# Patient Record
Sex: FEMALE | Race: WHITE | Employment: OTHER
[De-identification: names, ages, dates, MRNs, and addresses within clinical notes are randomized per-mention and may not be internally consistent; named-entity substitution may affect disease eponyms.]

---

## 2017-01-11 ENCOUNTER — TELEPHONE (OUTPATIENT)
Dept: FAMILY MEDICINE CLINIC | Facility: CLINIC | Age: 57
End: 2017-01-11

## 2017-01-20 RX ORDER — OXYCODONE HYDROCHLORIDE AND ACETAMINOPHEN 5; 325 MG/1; MG/1
1 TABLET ORAL EVERY 8 HOURS PRN
Qty: 90 TABLET | Refills: 0 | Status: SHIPPED | OUTPATIENT
Start: 2017-01-20 | End: 2017-02-27 | Stop reason: SDUPTHER

## 2017-01-20 RX ORDER — DEXTROAMPHETAMINE SACCHARATE, AMPHETAMINE ASPARTATE, DEXTROAMPHETAMINE SULFATE AND AMPHETAMINE SULFATE 7.5; 7.5; 7.5; 7.5 MG/1; MG/1; MG/1; MG/1
30 TABLET ORAL 2 TIMES DAILY
Qty: 60 TABLET | Refills: 0 | Status: SHIPPED | OUTPATIENT
Start: 2017-01-20 | End: 2017-02-22 | Stop reason: SDUPTHER

## 2017-02-22 RX ORDER — DEXTROAMPHETAMINE SACCHARATE, AMPHETAMINE ASPARTATE, DEXTROAMPHETAMINE SULFATE AND AMPHETAMINE SULFATE 7.5; 7.5; 7.5; 7.5 MG/1; MG/1; MG/1; MG/1
30 TABLET ORAL 2 TIMES DAILY
Qty: 60 TABLET | Refills: 0 | Status: SHIPPED | OUTPATIENT
Start: 2017-02-22 | End: 2017-03-20 | Stop reason: SDUPTHER

## 2017-02-27 ENCOUNTER — OFFICE VISIT (OUTPATIENT)
Dept: FAMILY MEDICINE CLINIC | Facility: CLINIC | Age: 57
End: 2017-02-27

## 2017-02-27 VITALS
OXYGEN SATURATION: 97 % | WEIGHT: 144 LBS | SYSTOLIC BLOOD PRESSURE: 130 MMHG | DIASTOLIC BLOOD PRESSURE: 80 MMHG | HEART RATE: 67 BPM | BODY MASS INDEX: 28.12 KG/M2 | RESPIRATION RATE: 16 BRPM

## 2017-02-27 DIAGNOSIS — G89.4 CHRONIC PAIN SYNDROME: ICD-10-CM

## 2017-02-27 DIAGNOSIS — S92.902S FOOT FRACTURE, LEFT, SEQUELA: ICD-10-CM

## 2017-02-27 DIAGNOSIS — J40 BRONCHITIS: ICD-10-CM

## 2017-02-27 DIAGNOSIS — R19.7 DIARRHEA, UNSPECIFIED TYPE: Primary | ICD-10-CM

## 2017-02-27 PROCEDURE — 99214 OFFICE O/P EST MOD 30 MIN: CPT | Performed by: FAMILY MEDICINE

## 2017-02-27 RX ORDER — OXYCODONE HYDROCHLORIDE AND ACETAMINOPHEN 5; 325 MG/1; MG/1
1 TABLET ORAL EVERY 8 HOURS PRN
Qty: 90 TABLET | Refills: 0 | Status: SHIPPED | OUTPATIENT
Start: 2017-02-27 | End: 2017-03-27 | Stop reason: SDUPTHER

## 2017-02-27 RX ORDER — TIZANIDINE 4 MG/1
TABLET ORAL
COMMUNITY
Start: 2016-12-28 | End: 2017-09-01

## 2017-02-27 RX ORDER — ALBUTEROL SULFATE 90 UG/1
2 AEROSOL, METERED RESPIRATORY (INHALATION) EVERY 4 HOURS PRN
Qty: 1 INHALER | Refills: 3 | Status: SHIPPED | OUTPATIENT
Start: 2017-02-27 | End: 2017-07-12 | Stop reason: SDUPTHER

## 2017-02-27 RX ORDER — HYDROCODONE BITARTRATE AND ACETAMINOPHEN 5; 325 MG/1; MG/1
TABLET ORAL
COMMUNITY
Start: 2017-02-23 | End: 2017-09-01

## 2017-02-27 RX ORDER — CLINDAMYCIN HYDROCHLORIDE 150 MG/1
CAPSULE ORAL
COMMUNITY
Start: 2017-02-21 | End: 2017-02-27 | Stop reason: ALTCHOICE

## 2017-02-27 ASSESSMENT — PATIENT HEALTH QUESTIONNAIRE - PHQ9
1. LITTLE INTEREST OR PLEASURE IN DOING THINGS: 0
SUM OF ALL RESPONSES TO PHQ QUESTIONS 1-9: 0
SUM OF ALL RESPONSES TO PHQ9 QUESTIONS 1 & 2: 0
2. FEELING DOWN, DEPRESSED OR HOPELESS: 0

## 2017-03-02 RX ORDER — OMEPRAZOLE 20 MG/1
CAPSULE, DELAYED RELEASE ORAL
Qty: 60 CAPSULE | Refills: 4 | Status: SHIPPED | OUTPATIENT
Start: 2017-03-02 | End: 2017-04-27 | Stop reason: SDUPTHER

## 2017-03-02 RX ORDER — CITALOPRAM 40 MG/1
TABLET ORAL
Qty: 30 TABLET | Refills: 3 | Status: SHIPPED | OUTPATIENT
Start: 2017-03-02 | End: 2017-06-28 | Stop reason: SDUPTHER

## 2017-03-20 RX ORDER — DEXTROAMPHETAMINE SACCHARATE, AMPHETAMINE ASPARTATE, DEXTROAMPHETAMINE SULFATE AND AMPHETAMINE SULFATE 7.5; 7.5; 7.5; 7.5 MG/1; MG/1; MG/1; MG/1
30 TABLET ORAL 2 TIMES DAILY
Qty: 60 TABLET | Refills: 0 | Status: SHIPPED | OUTPATIENT
Start: 2017-03-20 | End: 2017-04-17 | Stop reason: SDUPTHER

## 2017-03-20 RX ORDER — OMEPRAZOLE 20 MG/1
CAPSULE, DELAYED RELEASE ORAL
Qty: 60 CAPSULE | Refills: 4 | Status: CANCELLED | OUTPATIENT
Start: 2017-03-20

## 2017-03-22 ENCOUNTER — TELEPHONE (OUTPATIENT)
Dept: FAMILY MEDICINE CLINIC | Age: 57
End: 2017-03-22

## 2017-03-27 RX ORDER — OXYCODONE HYDROCHLORIDE AND ACETAMINOPHEN 5; 325 MG/1; MG/1
1 TABLET ORAL EVERY 8 HOURS PRN
Qty: 90 TABLET | Refills: 0 | Status: SHIPPED | OUTPATIENT
Start: 2017-03-27 | End: 2017-04-27 | Stop reason: SDUPTHER

## 2017-03-30 ENCOUNTER — HOSPITAL ENCOUNTER (OUTPATIENT)
Age: 57
Setting detail: SPECIMEN
Discharge: HOME | End: 2017-03-30
Payer: MEDICARE

## 2017-03-30 ENCOUNTER — OFFICE VISIT (OUTPATIENT)
Dept: FAMILY MEDICINE CLINIC | Age: 57
End: 2017-03-30
Payer: MEDICARE

## 2017-03-30 VITALS
DIASTOLIC BLOOD PRESSURE: 74 MMHG | OXYGEN SATURATION: 98 % | WEIGHT: 144 LBS | HEART RATE: 61 BPM | BODY MASS INDEX: 28.12 KG/M2 | SYSTOLIC BLOOD PRESSURE: 124 MMHG

## 2017-03-30 DIAGNOSIS — R04.2 HEMOPTYSIS: ICD-10-CM

## 2017-03-30 DIAGNOSIS — R05.9 COUGH: Primary | ICD-10-CM

## 2017-03-30 DIAGNOSIS — R05.9 COUGH: ICD-10-CM

## 2017-03-30 LAB
ANION GAP SERPL CALCULATED.3IONS-SCNC: 14 MMOL/L (ref 9–17)
BUN BLDV-MCNC: 16 MG/DL (ref 6–20)
BUN/CREAT BLD: ABNORMAL (ref 9–20)
CALCIUM SERPL-MCNC: 8.8 MG/DL (ref 8.6–10.4)
CHLORIDE BLD-SCNC: 101 MMOL/L (ref 98–107)
CO2: 25 MMOL/L (ref 20–31)
CREAT SERPL-MCNC: 1.01 MG/DL (ref 0.5–0.9)
GFR AFRICAN AMERICAN: >60 ML/MIN
GFR NON-AFRICAN AMERICAN: 56 ML/MIN
GFR SERPL CREATININE-BSD FRML MDRD: ABNORMAL ML/MIN/{1.73_M2}
GFR SERPL CREATININE-BSD FRML MDRD: ABNORMAL ML/MIN/{1.73_M2}
GLUCOSE BLD-MCNC: 87 MG/DL (ref 70–99)
HCT VFR BLD CALC: 38.3 % (ref 36–46)
HEMOGLOBIN: 12.5 G/DL (ref 12–16)
MCH RBC QN AUTO: 28.3 PG (ref 26–34)
MCHC RBC AUTO-ENTMCNC: 32.6 G/DL (ref 31–37)
MCV RBC AUTO: 86.8 FL (ref 80–100)
PDW BLD-RTO: 16.8 % (ref 12.5–15.4)
PLATELET # BLD: 367 K/UL (ref 140–450)
PMV BLD AUTO: 8.5 FL (ref 6–12)
POTASSIUM SERPL-SCNC: 4.2 MMOL/L (ref 3.7–5.3)
RBC # BLD: 4.41 M/UL (ref 4–5.2)
SODIUM BLD-SCNC: 140 MMOL/L (ref 135–144)
WBC # BLD: 7.8 K/UL (ref 3.5–11)

## 2017-03-30 PROCEDURE — 99214 OFFICE O/P EST MOD 30 MIN: CPT | Performed by: FAMILY MEDICINE

## 2017-03-30 RX ORDER — RIVAROXABAN 20 MG/1
TABLET, FILM COATED ORAL
Qty: 30 TABLET | Refills: 4 | Status: SHIPPED | OUTPATIENT
Start: 2017-03-30 | End: 2017-10-29 | Stop reason: SDUPTHER

## 2017-04-05 ENCOUNTER — HOSPITAL ENCOUNTER (OUTPATIENT)
Dept: CT IMAGING | Age: 57
Discharge: HOME | End: 2017-04-05
Payer: MEDICARE

## 2017-04-05 DIAGNOSIS — R05.9 COUGH: ICD-10-CM

## 2017-04-05 DIAGNOSIS — R04.2 HEMOPTYSIS: ICD-10-CM

## 2017-04-05 PROCEDURE — 6360000004 HC RX CONTRAST MEDICATION: Performed by: FAMILY MEDICINE

## 2017-04-05 PROCEDURE — 71260 CT THORAX DX C+: CPT

## 2017-04-05 RX ADMIN — IOVERSOL 80 ML: 741 INJECTION INTRA-ARTERIAL; INTRAVENOUS at 09:09

## 2017-04-17 RX ORDER — DEXTROAMPHETAMINE SACCHARATE, AMPHETAMINE ASPARTATE, DEXTROAMPHETAMINE SULFATE AND AMPHETAMINE SULFATE 7.5; 7.5; 7.5; 7.5 MG/1; MG/1; MG/1; MG/1
30 TABLET ORAL 2 TIMES DAILY
Qty: 60 TABLET | Refills: 0 | Status: SHIPPED | OUTPATIENT
Start: 2017-04-17 | End: 2017-05-19 | Stop reason: SDUPTHER

## 2017-04-27 RX ORDER — OXYCODONE HYDROCHLORIDE AND ACETAMINOPHEN 5; 325 MG/1; MG/1
1 TABLET ORAL EVERY 8 HOURS PRN
Qty: 90 TABLET | Refills: 0 | Status: SHIPPED | OUTPATIENT
Start: 2017-04-27 | End: 2017-05-26 | Stop reason: SDUPTHER

## 2017-04-27 RX ORDER — OMEPRAZOLE 20 MG/1
20 CAPSULE, DELAYED RELEASE ORAL DAILY
Qty: 60 CAPSULE | Refills: 4 | Status: SHIPPED | OUTPATIENT
Start: 2017-04-27 | End: 2017-12-13

## 2017-05-01 ENCOUNTER — TELEPHONE (OUTPATIENT)
Dept: FAMILY MEDICINE CLINIC | Age: 57
End: 2017-05-01

## 2017-05-19 RX ORDER — DEXTROAMPHETAMINE SACCHARATE, AMPHETAMINE ASPARTATE, DEXTROAMPHETAMINE SULFATE AND AMPHETAMINE SULFATE 7.5; 7.5; 7.5; 7.5 MG/1; MG/1; MG/1; MG/1
30 TABLET ORAL 2 TIMES DAILY
Qty: 60 TABLET | Refills: 0 | Status: SHIPPED | OUTPATIENT
Start: 2017-05-19 | End: 2017-06-14 | Stop reason: SDUPTHER

## 2017-05-26 RX ORDER — OXYCODONE HYDROCHLORIDE AND ACETAMINOPHEN 5; 325 MG/1; MG/1
1 TABLET ORAL EVERY 8 HOURS PRN
Qty: 90 TABLET | Refills: 0 | Status: SHIPPED | OUTPATIENT
Start: 2017-05-26 | End: 2017-06-21 | Stop reason: SDUPTHER

## 2017-05-31 ENCOUNTER — OFFICE VISIT (OUTPATIENT)
Dept: FAMILY MEDICINE CLINIC | Age: 57
End: 2017-05-31
Payer: MEDICARE

## 2017-05-31 VITALS
BODY MASS INDEX: 27.54 KG/M2 | WEIGHT: 141 LBS | DIASTOLIC BLOOD PRESSURE: 80 MMHG | HEART RATE: 83 BPM | OXYGEN SATURATION: 98 % | RESPIRATION RATE: 16 BRPM | SYSTOLIC BLOOD PRESSURE: 120 MMHG

## 2017-05-31 DIAGNOSIS — I10 ESSENTIAL HYPERTENSION: Primary | ICD-10-CM

## 2017-05-31 DIAGNOSIS — F32.A DEPRESSION, UNSPECIFIED DEPRESSION TYPE: ICD-10-CM

## 2017-05-31 PROCEDURE — 99214 OFFICE O/P EST MOD 30 MIN: CPT | Performed by: FAMILY MEDICINE

## 2017-05-31 RX ORDER — BUPROPION HYDROCHLORIDE 150 MG/1
150 TABLET ORAL EVERY MORNING
Qty: 30 TABLET | Refills: 3 | Status: SHIPPED | OUTPATIENT
Start: 2017-05-31 | End: 2017-10-16 | Stop reason: SDUPTHER

## 2017-06-14 RX ORDER — DEXTROAMPHETAMINE SACCHARATE, AMPHETAMINE ASPARTATE, DEXTROAMPHETAMINE SULFATE AND AMPHETAMINE SULFATE 7.5; 7.5; 7.5; 7.5 MG/1; MG/1; MG/1; MG/1
30 TABLET ORAL 2 TIMES DAILY
Qty: 60 TABLET | Refills: 0 | Status: SHIPPED | OUTPATIENT
Start: 2017-06-14 | End: 2017-07-12 | Stop reason: SDUPTHER

## 2017-06-21 RX ORDER — OXYCODONE HYDROCHLORIDE AND ACETAMINOPHEN 5; 325 MG/1; MG/1
1 TABLET ORAL EVERY 8 HOURS PRN
Qty: 90 TABLET | Refills: 0 | Status: SHIPPED | OUTPATIENT
Start: 2017-06-21 | End: 2017-07-25 | Stop reason: SDUPTHER

## 2017-06-29 RX ORDER — CITALOPRAM 40 MG/1
TABLET ORAL
Qty: 30 TABLET | Refills: 2 | Status: SHIPPED | OUTPATIENT
Start: 2017-06-29 | End: 2017-12-03 | Stop reason: SDUPTHER

## 2017-07-03 ENCOUNTER — TELEPHONE (OUTPATIENT)
Dept: FAMILY MEDICINE CLINIC | Age: 57
End: 2017-07-03

## 2017-07-03 RX ORDER — DOXYCYCLINE HYCLATE 100 MG
100 TABLET ORAL 2 TIMES DAILY
Qty: 30 TABLET | Refills: 0 | Status: SHIPPED | OUTPATIENT
Start: 2017-07-03 | End: 2017-07-18

## 2017-07-12 RX ORDER — ALBUTEROL SULFATE 90 UG/1
2 AEROSOL, METERED RESPIRATORY (INHALATION) EVERY 4 HOURS PRN
Qty: 1 INHALER | Refills: 0 | Status: SHIPPED | OUTPATIENT
Start: 2017-07-12 | End: 2018-02-05 | Stop reason: SDUPTHER

## 2017-07-12 RX ORDER — DEXTROAMPHETAMINE SACCHARATE, AMPHETAMINE ASPARTATE, DEXTROAMPHETAMINE SULFATE AND AMPHETAMINE SULFATE 7.5; 7.5; 7.5; 7.5 MG/1; MG/1; MG/1; MG/1
30 TABLET ORAL 2 TIMES DAILY
Qty: 60 TABLET | Refills: 0 | Status: SHIPPED | OUTPATIENT
Start: 2017-07-12 | End: 2017-08-15 | Stop reason: SDUPTHER

## 2017-07-25 RX ORDER — OXYCODONE HYDROCHLORIDE AND ACETAMINOPHEN 5; 325 MG/1; MG/1
1 TABLET ORAL EVERY 8 HOURS PRN
Qty: 90 TABLET | Refills: 0 | Status: SHIPPED | OUTPATIENT
Start: 2017-07-25 | End: 2017-08-22 | Stop reason: SDUPTHER

## 2017-08-15 RX ORDER — DEXTROAMPHETAMINE SACCHARATE, AMPHETAMINE ASPARTATE, DEXTROAMPHETAMINE SULFATE AND AMPHETAMINE SULFATE 7.5; 7.5; 7.5; 7.5 MG/1; MG/1; MG/1; MG/1
30 TABLET ORAL 2 TIMES DAILY
Qty: 60 TABLET | Refills: 0 | Status: SHIPPED | OUTPATIENT
Start: 2017-08-15 | End: 2017-09-12 | Stop reason: SDUPTHER

## 2017-08-22 RX ORDER — OXYCODONE HYDROCHLORIDE AND ACETAMINOPHEN 5; 325 MG/1; MG/1
1 TABLET ORAL EVERY 8 HOURS PRN
Qty: 90 TABLET | Refills: 0 | Status: SHIPPED | OUTPATIENT
Start: 2017-08-22 | End: 2017-09-18 | Stop reason: SDUPTHER

## 2017-09-01 ENCOUNTER — OFFICE VISIT (OUTPATIENT)
Dept: FAMILY MEDICINE CLINIC | Age: 57
End: 2017-09-01
Payer: MEDICARE

## 2017-09-01 VITALS
SYSTOLIC BLOOD PRESSURE: 120 MMHG | HEART RATE: 56 BPM | DIASTOLIC BLOOD PRESSURE: 62 MMHG | RESPIRATION RATE: 16 BRPM | BODY MASS INDEX: 28.32 KG/M2 | WEIGHT: 145 LBS | OXYGEN SATURATION: 98 %

## 2017-09-01 DIAGNOSIS — G47.429 NARCOLEPSY DUE TO UNDERLYING CONDITION WITHOUT CATAPLEXY: Primary | ICD-10-CM

## 2017-09-01 DIAGNOSIS — F32.A DEPRESSION, UNSPECIFIED DEPRESSION TYPE: ICD-10-CM

## 2017-09-01 DIAGNOSIS — M54.12 CERVICAL RADICULAR PAIN: ICD-10-CM

## 2017-09-01 PROCEDURE — 99213 OFFICE O/P EST LOW 20 MIN: CPT | Performed by: FAMILY MEDICINE

## 2017-09-06 ENCOUNTER — HOSPITAL ENCOUNTER (OUTPATIENT)
Age: 57
Discharge: HOME | End: 2017-09-06
Payer: MEDICARE

## 2017-09-06 ENCOUNTER — HOSPITAL ENCOUNTER (OUTPATIENT)
Dept: GENERAL RADIOLOGY | Age: 57
Discharge: HOME | End: 2017-09-06
Payer: MEDICARE

## 2017-09-06 DIAGNOSIS — M54.12 CERVICAL RADICULAR PAIN: ICD-10-CM

## 2017-09-06 PROCEDURE — 72040 X-RAY EXAM NECK SPINE 2-3 VW: CPT

## 2017-09-07 DIAGNOSIS — M50.30 DDD (DEGENERATIVE DISC DISEASE), CERVICAL: ICD-10-CM

## 2017-09-07 DIAGNOSIS — M47.22 OSTEOARTHRITIS OF SPINE WITH RADICULOPATHY, CERVICAL REGION: Primary | ICD-10-CM

## 2017-09-07 DIAGNOSIS — M54.12 CERVICAL RADICULAR PAIN: ICD-10-CM

## 2017-09-08 ENCOUNTER — TELEPHONE (OUTPATIENT)
Dept: FAMILY MEDICINE CLINIC | Age: 57
End: 2017-09-08

## 2017-09-08 RX ORDER — DIAZEPAM 10 MG/1
TABLET ORAL
Qty: 1 TABLET | Refills: 0 | Status: SHIPPED | OUTPATIENT
Start: 2017-09-08 | End: 2017-09-26 | Stop reason: ALTCHOICE

## 2017-09-13 RX ORDER — DEXTROAMPHETAMINE SACCHARATE, AMPHETAMINE ASPARTATE, DEXTROAMPHETAMINE SULFATE AND AMPHETAMINE SULFATE 7.5; 7.5; 7.5; 7.5 MG/1; MG/1; MG/1; MG/1
30 TABLET ORAL 2 TIMES DAILY
Qty: 60 TABLET | Refills: 0 | Status: SHIPPED | OUTPATIENT
Start: 2017-09-13 | End: 2017-10-12 | Stop reason: SDUPTHER

## 2017-09-18 ENCOUNTER — HOSPITAL ENCOUNTER (OUTPATIENT)
Dept: MRI IMAGING | Age: 57
Discharge: HOME | End: 2017-09-18
Payer: MEDICARE

## 2017-09-18 DIAGNOSIS — M54.12 CERVICAL RADICULAR PAIN: ICD-10-CM

## 2017-09-18 DIAGNOSIS — M50.30 DDD (DEGENERATIVE DISC DISEASE), CERVICAL: ICD-10-CM

## 2017-09-18 DIAGNOSIS — M47.22 OSTEOARTHRITIS OF SPINE WITH RADICULOPATHY, CERVICAL REGION: ICD-10-CM

## 2017-09-18 PROCEDURE — 72141 MRI NECK SPINE W/O DYE: CPT

## 2017-09-19 RX ORDER — OXYCODONE HYDROCHLORIDE AND ACETAMINOPHEN 5; 325 MG/1; MG/1
1 TABLET ORAL EVERY 8 HOURS PRN
Qty: 90 TABLET | Refills: 0 | Status: SHIPPED | OUTPATIENT
Start: 2017-09-19 | End: 2017-10-18 | Stop reason: SDUPTHER

## 2017-09-21 ENCOUNTER — OFFICE VISIT (OUTPATIENT)
Dept: FAMILY MEDICINE CLINIC | Age: 57
End: 2017-09-21
Payer: MEDICARE

## 2017-09-21 VITALS
OXYGEN SATURATION: 98 % | WEIGHT: 147 LBS | SYSTOLIC BLOOD PRESSURE: 130 MMHG | DIASTOLIC BLOOD PRESSURE: 80 MMHG | RESPIRATION RATE: 16 BRPM | HEART RATE: 79 BPM | BODY MASS INDEX: 28.71 KG/M2
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
## 2017-09-21 DIAGNOSIS — M54.12 LEFT CERVICAL RADICULOPATHY: Primary | ICD-10-CM

## 2017-09-21 PROCEDURE — 99213 OFFICE O/P EST LOW 20 MIN: CPT | Performed by: FAMILY MEDICINE

## 2017-09-22 ENCOUNTER — HOSPITAL ENCOUNTER (OUTPATIENT)
Dept: GENERAL RADIOLOGY | Age: 57
Discharge: HOME | End: 2017-09-22
Payer: MEDICARE

## 2017-09-22 ENCOUNTER — HOSPITAL ENCOUNTER (OUTPATIENT)
Age: 57
Discharge: HOME | End: 2017-09-22
Payer: MEDICARE
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
## 2017-09-22 PROCEDURE — 73620 X-RAY EXAM OF FOOT: CPT

## 2017-09-22 PROCEDURE — 73600 X-RAY EXAM OF ANKLE: CPT

## 2017-09-26 ENCOUNTER — OFFICE VISIT (OUTPATIENT)
Dept: FAMILY MEDICINE CLINIC | Age: 57
End: 2017-09-26
Payer: MEDICARE

## 2017-09-26 ENCOUNTER — HOSPITAL ENCOUNTER (OUTPATIENT)
Dept: PHYSICAL THERAPY | Facility: CLINIC | Age: 57
Setting detail: THERAPIES SERIES
Discharge: HOME | End: 2017-09-26
Payer: MEDICARE

## 2017-09-26 VITALS
HEART RATE: 73 BPM | DIASTOLIC BLOOD PRESSURE: 70 MMHG | BODY MASS INDEX: 28.26 KG/M2 | SYSTOLIC BLOOD PRESSURE: 130 MMHG | WEIGHT: 140.2 LBS | HEIGHT: 59 IN | OXYGEN SATURATION: 96 %

## 2017-09-26 DIAGNOSIS — S92.354D CLOSED NONDISPLACED FRACTURE OF FIFTH METATARSAL BONE OF RIGHT FOOT WITH ROUTINE HEALING, SUBSEQUENT ENCOUNTER: ICD-10-CM

## 2017-09-26 DIAGNOSIS — S92.324D CLOSED NONDISPLACED FRACTURE OF SECOND METATARSAL BONE OF RIGHT FOOT WITH ROUTINE HEALING, SUBSEQUENT ENCOUNTER: Primary | ICD-10-CM

## 2017-09-26 PROCEDURE — 99213 OFFICE O/P EST LOW 20 MIN: CPT | Performed by: FAMILY MEDICINE

## 2017-10-09 ENCOUNTER — TELEPHONE (OUTPATIENT)
Dept: FAMILY MEDICINE CLINIC | Age: 57
End: 2017-10-09

## 2017-10-09 ENCOUNTER — HOSPITAL ENCOUNTER (OUTPATIENT)
Dept: PHYSICAL THERAPY | Facility: CLINIC | Age: 57
Setting detail: THERAPIES SERIES
Discharge: HOME | End: 2017-10-09
Payer: MEDICARE

## 2017-10-09 NOTE — FLOWSHEET NOTE
[] [facility]        Outpatient Physical                Therapy       [address]       Phone: [phone]       Fax: [phone] [x] [facility] at [address] [facility]       Phone: [phone]       Fax: [phone] [] Eduardo. [address] [place] [address]     [address] [facility]      Phone: [phone]      Fax:  [phone]     Physical Therapy Cancel/No Show note    Date: 10/9/2017  Patient: Sabine Bess  : 1960  MRN: [id number]    Cancels/No Shows to date:     For today's appointment patient:  [x]  Cancelled  []  Rescheduled appointment  []  No-show     Reason given by patient:  []  Patient ill  []  Conflicting appointment  []  No transportation    []  Conflict with work  []  No reason given  []  Weather related  [x]  Other:     Comments: Pt showed up to her appt but walked out when  told pt. what her copay would be.       Electronically signed by: Angus Rivas PT

## 2017-10-09 NOTE — TELEPHONE ENCOUNTER
The patient was calling in to notify you that she does not have $30 to pay every time she goes to PT. She says that she will no longer go. Just an FYI.

## 2017-10-12 RX ORDER — DEXTROAMPHETAMINE SACCHARATE, AMPHETAMINE ASPARTATE, DEXTROAMPHETAMINE SULFATE AND AMPHETAMINE SULFATE 7.5; 7.5; 7.5; 7.5 MG/1; MG/1; MG/1; MG/1
30 TABLET ORAL 2 TIMES DAILY
Qty: 60 TABLET | Refills: 0 | Status: SHIPPED | OUTPATIENT
Start: 2017-10-12 | End: 2017-11-09 | Stop reason: SDUPTHER

## 2017-10-12 NOTE — TELEPHONE ENCOUNTER
From: Marlene Beltre  Sent: 10/12/2017 3:47 PM EDT  Subject: Medication Renewal Request    Daphney Mariella.  Sinan Flory would like a refill of the following medications:  amphetamine-dextroamphetamine (ADDERALL) 30 MG tablet Tonja Dominguez MD]    [facility] [address] [place] [address]    Comment:

## 2017-10-16 RX ORDER — BUPROPION HYDROCHLORIDE 150 MG/1
TABLET ORAL
Qty: 30 TABLET | Refills: 2 | Status: SHIPPED | OUTPATIENT
Start: 2017-10-16 | End: 2018-01-16 | Stop reason: SDUPTHER

## 2017-10-16 RX ORDER — ALENDRONATE SODIUM 70 MG/1
TABLET ORAL
Qty: 4 TABLET | Refills: 10 | Status: SHIPPED | OUTPATIENT
Start: 2017-10-16 | End: 2018-08-17 | Stop reason: SDUPTHER

## 2017-10-18 ENCOUNTER — HOSPITAL ENCOUNTER (OUTPATIENT)
Dept: PHYSICAL THERAPY | Facility: CLINIC | Age: 57
Setting detail: THERAPIES SERIES
Discharge: HOME | End: 2017-10-18
Payer: MEDICARE

## 2017-10-18 PROCEDURE — G8985 CARRY GOAL STATUS: HCPCS

## 2017-10-18 PROCEDURE — G8984 CARRY CURRENT STATUS: HCPCS

## 2017-10-18 PROCEDURE — 97110 THERAPEUTIC EXERCISES: CPT

## 2017-10-18 PROCEDURE — 97162 PT EVAL MOD COMPLEX 30 MIN: CPT

## 2017-10-18 RX ORDER — OXYCODONE HYDROCHLORIDE AND ACETAMINOPHEN 5; 325 MG/1; MG/1
1 TABLET ORAL EVERY 8 HOURS PRN
Qty: 90 TABLET | Refills: 0 | Status: SHIPPED | OUTPATIENT
Start: 2017-10-18 | End: 2017-11-14 | Stop reason: SDUPTHER

## 2017-10-18 NOTE — CONSULTS
[] JADIEL [facility]        Outpatient Physical                Therapy       [address]       Phone: [phone]       Fax: [phone] [] [facility]       Promotion at [address]       Phone: [phone]       Fax: [phone] [] Eduardo. [facility]    [address]     Phone: [phone]     Fax:  [phone]     Physical Therapy Spine Evaluation    Date:  10/18/2017  Patient: Seda Hernandez  : 1960  MRN: [id number]  Physician: Bryan Fitzgerald MD   Insurance: Medical [place] Medicare Advantage  Medical Diagnosis: Left cervical radiculopathy Rehab Codes: M54.12  Onset Date: 2017  Next 's appt.: 2017   Medicare cap:  10/18/17 Eval  $77.68, therex $23.89 = $101.57  Subjective:   CC: History of chronic neck pain, last six months progressively worse neck pain, radicular symptoms into right UE to hand and left shoulder blade. HPI: 2017    PMHx: [] Unremarkable [] Diabetes [] HTN  [] Pacemaker   [] MI/Heart Problems [] Cancer [] Arthritis [x] Other: DVT, Adult ADHD              [x] Refer to full medical chart  In EPIC   Tests: [x] X-ray   17    No acute abnormality.  Partial visualization C7.  Non visualization T1. Multilevel degenerative findings, most severe C5-C7. [x] MRI: 17    Multilevel degenerative changes of the cervical spine.  There is moderate   spinal canal stenosis at C5-C6.       Multilevel severe neural foraminal narrowing.       [] Other:    Medications: [x] Refer to full medical record [] None [] Other:  Allergies:      [x] Refer to full medical record [] None [] Other:    Function:  Hand Dominance  [x] Right  [] Left  Working:  [] Normal Duty  [] Light Duty  [] Off D/T Condition  [] Retired  [] Not Employed                  [x]  Disability since   [] Other:           Return to work:   Job/ADL Description:  Pain:  [x] Yes  [] No Location: Occiput down cervical spine into right and left shoulder radiating down right shoulder to scapula, and left UE to hand. Pain Rating: (0-10 scale) 7/10. Pain never goes below a 4/10. Pain altered Tx:  [] Yes  [x] No  Action:  Symptoms:  [] Improving [] Worsening [x] Same  Better:  [] AM    [] PM    [] Sit    [] Rise/Sit    []Stand    [] Walk    [] Lying    [] Other:  Worse: [] AM    [] PM    [] Sit    [] Rise/Sit    []Stand    [] Walk    [] Lying    [] Bend                             [] Valsalva    [] Other:  Sleep: [] OK    [x] Disturbed    Objective:      STRENGTH  STRENGTH  ROM    Left Right  Left Right Cervical    C5 Shld Abd 5 5 L1-2 Hip Flex   Flexion 40   Shld Flexion 5 5 Hip Abd   Extension 20   Shld IR 5 5 L3-4 Knee Ext   Rotation L  30 R 40   Shld ER 5 5 L4 Ankle DF   Sidebend L  20 R  20   C6 Elb Flex 5 5 5L5 EHL   Retraction    C7 Elb Ext 4+ 5 S1 Plant. Flex   Lumbar    C8 EPL 5 5 Abdominals   Flexion    T1 Fing Abd   Erector Spinae   Extension     60lbs 50lbs    Rotation L  R         Sidebend L R         UE/LE                                                            Bilateral shoulder Flexion to 110Right wrist fused, wrist stength and motion not tested. TESTS (+/-) LEFT RIGHT Not Tested   SLR [] sit [] supine   []   Hamstring (SLR)   []   SKTC   []   DKTC   []   Slump/Dural   []   SI JT   []   ELIZABETH   []   Joint Mobility Limited right cervical ext C 2-5  []   Cerv. Comp + + []   Cerv. Distraction Relieves Relieves []   Cerv. Alar/Transverse   []   Vertebral Artery   []   Adsons   []   Donelda Sever   []   Renetta Tests ? Pain ?  Pain No Change Not Tested   RFIS [] [] [] []   ELIJAH [] [] [] []   RFIL [] [] [] []   REIL [] [] [] []   Rep Prot [] [] [] []   Rep Retract [] [] [] []       OBSERVATION No Deficit Deficit Not Tested Comments   Posture       Forward Head [] [x] []    Rounded Shoulders [] [] []    Kyphosis [] [] []    Lordosis [] [x] [] Increased lumbar lordosis   Lateral Shift [] [] []    Scoliosis [] [] []    Iliac Crest [] [] []    PSIS [] [] []    ASIS [] [] []    Genu Valgus [] [] []    Genu Varus [] [] []    Genu Recurvatum [] [] []    Pronation [] [] []    Supination [] [] []    Leg Length Discrp [] [] []    Slumped Sitting [] [] []    Palpation [] [x] [] Trigger point tenderness Levator at scapular medial border bilaterally, UT bilaterally, hyptertension mm cervical paraspinal   Sensation [] [] []    Edema [] [] []    Neurological [] [x] [] Right biceps DTR hyper,  Left normal               FUNCTION Normal Difficult Unable   Sitting [] [] []   Standing [] [] []   Ambulation [] [] []   Groom/Dress [] [] []   Lift/Carry [] [x] []   Stairs [] [] []   Bending [] [] []   OH reach [] [x] []   Sit to Stand [] [] []     Comments:  Most difficulty is with driving and looking behind   Assessment:  Problems:    [] ? Back Pain:    [x] ? Cervical Pain:    [x] ? ROM:     [x] ? Strength:   [x] ? Function:  NDI = 48%  [x] Postural Deviations  [] Gait Deviations  [x] Other:    STG: (to be met in 6-8 treatments)  1. ? Pain:  Pain in cervical spine and shoulders rated 3/10  2. ? ROM:  Cervical and shoulder ROM WNL  3. ? Strength:  Bilateral shoulder, left triceps 5/5  4. ? Function: NDI = 25%  5. Independent with Home Exercise Programs  6. Demonstrate Knowledge of fall prevention  LTG: (to be met in 8 treatments)  1. Pain in cervical spine and shoulders rated 1-2/10  2.  NDI 10%      Patient goals:  Function normally    G-CODE    Functional Limitation:   Carrying, moving and handling objects  Functional Assessment Used: NDI = 48%  Current Status Modifier: CK  Goal Status Modifier: CI  Discharge Status Modifier:       Rehab Potential:  [x] Good  [] Fair  [] Poor   Suggested Professional Referral:  [x] No  [] Yes:  Barriers to Goal Achievement[de-identified]  [x] No  [] Yes:  Domestic Concerns:  [x] No  [] Yes:    Pt. Education:  [x] Plans/Goals, Risks/Benefits discussed  [x] Home exercise program  Method of Education: [x] Verbal  [x] Demo  [x] Written  Comprehension of Education:  [x] Verbalizes understanding. [x] Demonstrates understanding. [x] Needs Review. [] Demonstrates/verbalizes understanding of HEP/Ed previously given. Treatment Plan:  [x] Therapeutic Exercise    [x] Modalities:  [] Therapeutic Activity    [] Ultrasound  [x] Electrical Stimulation  [] Gait Training     []Massage       [x] Lumbar/Cervical Traction  [x] Neuromuscular Re-education [x] Cold/hotpack [] Iontophoresis: 4 mg/mL  [x] Instruction in HEP             Dexamethasone Sodium  [x] Manual Therapy             Phosphate 40-80 mAmin  [] Aquatic Therapy   [x] Other:  Dry needling  []  Medication allergies reviewed for use of    Dexamethasone Sodium Phosphate 4mg/ml     with iontophoresis treatments. Pt is not allergic. Frequency:  1 x/week for 8 visits    Todays Treatment:  Modalities:   Precautions:  DVT, Xeralto  Exercises:  Exercise Reps/ Time Weight/ Level Comments   Chin retraction 10 reps     Scap retraction 10 reps     UT stretch  30 second hold x 3     Seated Brugger - ER c breathing 5           Other:    Specific Instructions for next treatment:  Manual c spine mobs C2-5 right for extension restriction, MFR c spine paraspinals, UT, Levator, cervical DNF strength, scap stab strength, Hot pack IFC for pain, DN for pain if appropriate    Treatment Charges: Mins Units   [] Evaluation       []  Low       [x]  Moderate       []  High 45 1   []  Modalities     [x]  Ther Exercise 15 1   []  Manual Therapy     []  Ther Activities     []  Aquatics     []  Vasocompression     []  Other       TOTAL TREATMENT TIME: 60    Time in: 1630      Time out: 5377    Electronically signed by: Lashawn Hernandez PT        Physician Signature:________________________________Date:__________________  By signing above or cosigning this note, I have reviewed this plan of care and certify a need for medically necessary rehabilitation services.      *PLEASE SIGN

## 2017-10-19 ENCOUNTER — TELEPHONE (OUTPATIENT)
Dept: FAMILY MEDICINE CLINIC | Age: 57
End: 2017-10-19

## 2017-10-19 DIAGNOSIS — S92.912A CLOSED FRACTURE OF PHALANX OF TOE OF LEFT FOOT, PHYSEAL INVOLVEMENT UNSPECIFIED, UNSPECIFIED TOE, INITIAL ENCOUNTER: Primary | ICD-10-CM

## 2017-10-20 ENCOUNTER — HOSPITAL ENCOUNTER (OUTPATIENT)
Dept: GENERAL RADIOLOGY | Age: 57
Discharge: HOME | End: 2017-10-20
Payer: MEDICARE

## 2017-10-20 ENCOUNTER — HOSPITAL ENCOUNTER (OUTPATIENT)
Age: 57
Discharge: HOME | End: 2017-10-20
Payer: MEDICARE

## 2017-10-20 DIAGNOSIS — S92.912A CLOSED FRACTURE OF PHALANX OF TOE OF LEFT FOOT, PHYSEAL INVOLVEMENT UNSPECIFIED, UNSPECIFIED TOE, INITIAL ENCOUNTER: ICD-10-CM

## 2017-10-20 PROCEDURE — 73620 X-RAY EXAM OF FOOT: CPT

## 2017-10-30 ENCOUNTER — HOSPITAL ENCOUNTER (OUTPATIENT)
Dept: PHYSICAL THERAPY | Facility: CLINIC | Age: 57
Setting detail: THERAPIES SERIES
Discharge: HOME | End: 2017-10-30
Payer: MEDICARE

## 2017-10-30 RX ORDER — OMEPRAZOLE 40 MG/1
CAPSULE, DELAYED RELEASE ORAL
Qty: 30 CAPSULE | Refills: 2 | Status: SHIPPED | OUTPATIENT
Start: 2017-10-30 | End: 2018-02-05 | Stop reason: SDUPTHER

## 2017-10-30 RX ORDER — RIVAROXABAN 20 MG/1
TABLET, FILM COATED ORAL
Qty: 30 TABLET | Refills: 3 | Status: SHIPPED | OUTPATIENT
Start: 2017-10-30 | End: 2018-03-16 | Stop reason: SDUPTHER

## 2017-10-30 NOTE — TELEPHONE ENCOUNTER
Next Visit Date:  Future Appointments  Date Time Provider Naya Roger   10/30/2017 6:00 PM SAMANTHA Choi [facility] [address]   12/4/2017 10:30 AM Nayeli Chase  [address] Maintenance   Topic Date Due    Hepatitis C screen  1960    HIV screen  01/05/1974    DTaP/Tdap/Td vaccine (1 - Tdap) 07/19/2003    Breast cancer screen  07/19/2015    Flu vaccine (1) 09/01/2017    Cervical cancer screen  02/02/2019    Lipid screen  02/02/2021    Colon cancer screen colonoscopy  02/02/2025    Pneumococcal med risk  Completed       No results found for: LABA1C          ( goal A1C is < 7)   No results found for: LABMICR  LDL Cholesterol (mg/dL)   Date Value   02/02/2016 117       (goal LDL is <100)   AST (U/L)   Date Value   02/02/2016 37 (H)     ALT (U/L)   Date Value   02/02/2016 32     BUN (mg/dL)   Date Value   03/30/2017 16     BP Readings from Last 3 Encounters:   09/26/17 130/70   09/21/17 130/80   09/01/17 120/62          (goal 120/80)    All Future Testing planned in [facility]  Lab Frequency Next Occurrence               Patient Active Problem List:     Adult ADHD     Narcolepsy     Asthma     History of DVT (deep vein thrombosis)     DVT of leg (deep venous thrombosis) (HCC)     Closed displaced fracture of body of calcaneus     Hiatal hernia     Internal hemorrhoids     Helicobacter pylori (H. pylori) infection     Dysphagia

## 2017-10-30 NOTE — FLOWSHEET NOTE
[] Grisel Zambrano        Outpatient Physical                Therapy       [address]       Phone: [phone]       Fax: [phone] [x] [facility] at [address] [facility]       Phone: [phone]       Fax: [phone] [] Eduardo. [address] [place] [address]      Phone: [phone]      Fax:  [phone]     Physical Therapy Cancel/No Show note    Date: 10/30/2017  Patient: Kevin Munoz  : 1960  MRN: [id number]    Cancels/No Shows to date: 3 cx    For today's appointment patient:  [x]  Cancelled  []  Rescheduled appointment  []  No-show     Reason given by patient:  []  Patient ill  []  Conflicting appointment  []  No transportation    []  Conflict with work  []  No reason given  []  Weather related  [x]  Other:  Patient's ankle is swollen/painful.    Comments: scheduled patient for 17 at 10:00 am    Electronically signed by: Aneta Reyes PT

## 2017-11-02 ENCOUNTER — HOSPITAL ENCOUNTER (OUTPATIENT)
Dept: PHYSICAL THERAPY | Facility: CLINIC | Age: 57
Setting detail: THERAPIES SERIES
Discharge: HOME | End: 2017-11-02
Payer: MEDICARE

## 2017-11-02 PROCEDURE — 97110 THERAPEUTIC EXERCISES: CPT

## 2017-11-02 PROCEDURE — 97140 MANUAL THERAPY 1/> REGIONS: CPT

## 2017-11-02 NOTE — FLOWSHEET NOTE
[] Antonia RUST       Outpatient Physical        Therapy       [address]       Phone: [phone]       Fax: [phone] [x] [facility] at [address] [facility]       Phone: [phone]       Fax: [phone] [] [facility]  [address]   Phone: [phone]   Fax:  [phone]     Physical Therapy Daily Treatment Note    Date:  2017  Patient Name:  Venkata Zuniga    :  1960  MRN: [id number]  Physician: Stefan Perez MD                                                                 Insurance: Medical [place] Medicare Advantage  Medical Diagnosis: Left cervical radiculopathy                  Rehab Codes: M54.12  Onset Date: 2017                                   Next 's appt.: 2017   Medicare cap:  17 = $154.94  Visit# / total visits: 2/8  Cancels/No Shows: 1/0    Subjective:    Pain:  [] Yes  [] No Location:Neck  Pain Rating: (0-10 scale) 3/10  Pain altered Tx:  [] No  [] Yes  Action:  Comments: Pt reports she took a pain pill this morning. Before she took the pain pill her pain was a 8/10. Pt frustrated with her costs and is not sure what to do.      Objective:  Modalities:  HP to cervical area 15' after exercises   Precautions: DVT, Xeralto  Exercises:  Exercise Reps/ Time Weight/ Level Comments   UT stretch  3x30\"     Levator stretch  3x30\"           Scap Retraction 10x      Chin retraction  10x     Seated Brugger-ER c breathing  10x                 Other: MFR to C spine paraspinals, UT, and levator     Specific Instructions for next treatment: Manual c spine mobs C2-5 right for extension restriction, MFR c spine paraspinals, UT, Levator, cervical DNF     Treatment Charges: Mins Units   [x]  Modalities: HP 15 0   [x]  Ther Exercise 15 1   [x]  Manual Therapy 10 1   []  Ther Activities     []  Aquatics     []  Vasocompression     []  Other     Total Treatment time 25 2       Assessment: [] Progressing toward goals. [] No change. [x] Other: Pt had increased pain throughout stretches and exercises today. Discussed to perform stretches to a stretch and not pain. Pt noted some relief and less tightness in upper traps after MFR to that area. STG: (to be met in 6-8 treatments)  1. ? Pain:  Pain in cervical spine and shoulders rated 3/10  2. ? ROM:  Cervical and shoulder ROM WNL  3. ? Strength:  Bilateral shoulder, left triceps 5/5  4. ? Function: NDI = 25%  5. Independent with Home Exercise Programs  6. Demonstrate Knowledge of fall prevention  LTG: (to be met in 8 treatments)  1. Pain in cervical spine and shoulders rated 1-2/10  2. NDI 10%        Patient goals:  Function normally     G-CODE     Functional Limitation:   Carrying, moving and handling objects  Functional Assessment Used: NDI = 48%  Current Status Modifier: CK  Goal Status Modifier: CI  Discharge Status Modifier:     Pt. Education:  [x] Yes  [] No  [x] Reviewed Prior HEP/Ed  Method of Education: [x] Verbal  [x] Demo  [] Written  Comprehension of Education:  [x] Verbalizes understanding. [x] Demonstrates understanding. [x] Needs review. [x] Demonstrates/verbalizes HEP/Ed previously given. Plan: [x] Continue per plan of care.    [] Other:      Time In:1015            Time Out: 8693    Electronically signed by:  Fiona Esquivel PTA

## 2017-11-10 RX ORDER — DEXTROAMPHETAMINE SACCHARATE, AMPHETAMINE ASPARTATE, DEXTROAMPHETAMINE SULFATE AND AMPHETAMINE SULFATE 7.5; 7.5; 7.5; 7.5 MG/1; MG/1; MG/1; MG/1
30 TABLET ORAL 2 TIMES DAILY
Qty: 60 TABLET | Refills: 0 | Status: SHIPPED | OUTPATIENT
Start: 2017-11-10 | End: 2017-12-11 | Stop reason: SDUPTHER

## 2017-11-14 RX ORDER — OXYCODONE HYDROCHLORIDE AND ACETAMINOPHEN 5; 325 MG/1; MG/1
1 TABLET ORAL EVERY 8 HOURS PRN
Qty: 90 TABLET | Refills: 0 | Status: SHIPPED | OUTPATIENT
Start: 2017-11-14 | End: 2017-12-13 | Stop reason: SDUPTHER

## 2017-12-04 RX ORDER — CITALOPRAM 40 MG/1
TABLET ORAL
Qty: 30 TABLET | Refills: 1 | Status: SHIPPED | OUTPATIENT
Start: 2017-12-04 | End: 2018-02-12 | Stop reason: SDUPTHER

## 2017-12-04 NOTE — TELEPHONE ENCOUNTER
Next Visit Date:  Future Appointments  Date Time Provider Naya Roger   12/4/2017 10:30 AM Tejal Wolff  [address] [facility]   Topic Date Due    Hepatitis C screen  1960    HIV screen  01/05/1974    DTaP/Tdap/Td vaccine (1 - Tdap) 07/19/2003    Breast cancer screen  07/19/2015    Flu vaccine (1) 09/01/2017    Cervical cancer screen  02/02/2019    Lipid screen  02/02/2021    Colon cancer screen colonoscopy  02/02/2025    Pneumococcal med risk  Completed       No results found for: LABA1C          ( goal A1C is < 7)   No results found for: LABMICR  LDL Cholesterol (mg/dL)   Date Value   02/02/2016 117       (goal LDL is <100)   AST (U/L)   Date Value   02/02/2016 37 (H)     ALT (U/L)   Date Value   02/02/2016 32     BUN (mg/dL)   Date Value   03/30/2017 16     BP Readings from Last 3 Encounters:   09/26/17 130/70   09/21/17 130/80   09/01/17 120/62          (goal 120/80)    All Future Testing planned in [facility]  Lab Frequency Next Occurrence               Patient Active Problem List:     Adult ADHD     Narcolepsy     Asthma     History of DVT (deep vein thrombosis)     DVT of leg (deep venous thrombosis) (HCC)     Closed displaced fracture of body of calcaneus     Hiatal hernia     Internal hemorrhoids     Helicobacter pylori (H. pylori) infection     Dysphagia

## 2017-12-11 RX ORDER — DEXTROAMPHETAMINE SACCHARATE, AMPHETAMINE ASPARTATE, DEXTROAMPHETAMINE SULFATE AND AMPHETAMINE SULFATE 7.5; 7.5; 7.5; 7.5 MG/1; MG/1; MG/1; MG/1
30 TABLET ORAL 2 TIMES DAILY
Qty: 60 TABLET | Refills: 0 | Status: SHIPPED | OUTPATIENT
Start: 2017-12-11 | End: 2018-01-05 | Stop reason: SDUPTHER

## 2017-12-11 NOTE — TELEPHONE ENCOUNTER
From: Joseph Marking  Sent: 12/11/2017 12:32 PM EST  Subject: Medication Renewal Request    Marita Cartwright would like a refill of the following medications:  amphetamine-dextroamphetamine (ADDERALL) 30 MG tablet Jazmin Squires MD]    Preferred pharmacy: [place] [address] [address] [facility]    Comment:

## 2017-12-11 NOTE — TELEPHONE ENCOUNTER
lv 9/21/17  Oars 10/18/17    Next Visit Date:  Future Appointments  Date Time Provider Naya Roger   12/13/2017 8:10 AM Oj Funk  [address] [facility] Maintenance   Topic Date Due    Hepatitis C screen  1960    HIV screen  01/05/1974    DTaP/Tdap/Td vaccine (1 - Tdap) 07/19/2003    Breast cancer screen  07/19/2015    Flu vaccine (1) 09/01/2017    Cervical cancer screen  02/02/2019    Lipid screen  02/02/2021    Colon cancer screen colonoscopy  02/02/2025    Pneumococcal med risk  Completed       No results found for: LABA1C          ( goal A1C is < 7)   No results found for: LABMICR  LDL Cholesterol (mg/dL)   Date Value   02/02/2016 117       (goal LDL is <100)   AST (U/L)   Date Value   02/02/2016 37 (H)     ALT (U/L)   Date Value   02/02/2016 32     BUN (mg/dL)   Date Value   03/30/2017 16     BP Readings from Last 3 Encounters:   09/26/17 130/70   09/21/17 130/80   09/01/17 120/62          (goal 120/80)    All Future Testing planned in [facility]  Lab Frequency Next Occurrence               Patient Active Problem List:     Adult ADHD     Narcolepsy     Asthma     History of DVT (deep vein thrombosis)     DVT of leg (deep venous thrombosis) (HCC)     Closed displaced fracture of body of calcaneus     Hiatal hernia     Internal hemorrhoids     Helicobacter pylori (H. pylori) infection     Dysphagia

## 2017-12-13 ENCOUNTER — OFFICE VISIT (OUTPATIENT)
Dept: FAMILY MEDICINE CLINIC | Age: 57
End: 2017-12-13
Payer: MEDICARE

## 2017-12-13 VITALS
DIASTOLIC BLOOD PRESSURE: 80 MMHG | SYSTOLIC BLOOD PRESSURE: 138 MMHG | RESPIRATION RATE: 16 BRPM | WEIGHT: 150 LBS | HEART RATE: 81 BPM | BODY MASS INDEX: 30.3 KG/M2 | OXYGEN SATURATION: 98 %

## 2017-12-13 DIAGNOSIS — Z00.00 ROUTINE ADULT HEALTH MAINTENANCE: ICD-10-CM

## 2017-12-13 DIAGNOSIS — F98.8 ATTENTION DEFICIT DISORDER (ADD) WITHOUT HYPERACTIVITY: ICD-10-CM

## 2017-12-13 DIAGNOSIS — M54.12 CERVICAL RADICULOPATHY: ICD-10-CM

## 2017-12-13 DIAGNOSIS — G89.4 CHRONIC PAIN SYNDROME: Primary | ICD-10-CM

## 2017-12-13 PROCEDURE — 99213 OFFICE O/P EST LOW 20 MIN: CPT | Performed by: FAMILY MEDICINE

## 2017-12-13 RX ORDER — OXYCODONE HYDROCHLORIDE AND ACETAMINOPHEN 5; 325 MG/1; MG/1
1 TABLET ORAL EVERY 8 HOURS PRN
Qty: 90 TABLET | Refills: 0 | Status: SHIPPED | OUTPATIENT
Start: 2017-12-13 | End: 2018-01-09 | Stop reason: SDUPTHER

## 2017-12-19 NOTE — TELEPHONE ENCOUNTER
Next Visit Date:  No future appointments.     Health Maintenance   Topic Date Due    Hepatitis C screen  1960    HIV screen  01/05/1974    DTaP/Tdap/Td vaccine (1 - Tdap) 07/19/2003    Breast cancer screen  07/19/2015    Flu vaccine (1) 09/01/2017    Cervical cancer screen  02/02/2019    Lipid screen  02/02/2021    Colon cancer screen colonoscopy  02/02/2025    Pneumococcal med risk  Completed       No results found for: LABA1C          ( goal A1C is < 7)   No results found for: LABMICR  LDL Cholesterol (mg/dL)   Date Value   02/02/2016 117       (goal LDL is <100)   AST (U/L)   Date Value   02/02/2016 37 (H)     ALT (U/L)   Date Value   02/02/2016 32     BUN (mg/dL)   Date Value   03/30/2017 16     BP Readings from Last 3 Encounters:   12/13/17 138/80   09/26/17 130/70   09/21/17 130/80          (goal 120/80)    All Future Testing planned in [facility]  Lab Frequency Next Occurrence   HIV Screen Once 12/13/2017   Hepatitis C Antibody Once 12/13/2017               Patient Active Problem List:     Adult ADHD     Narcolepsy     Asthma     History of DVT (deep vein thrombosis)     DVT of leg (deep venous thrombosis) (HCC)     Closed displaced fracture of body of calcaneus     Hiatal hernia     Internal hemorrhoids     Helicobacter pylori (H. pylori) infection     Dysphagia     Cervical radiculopathy

## 2017-12-21 ENCOUNTER — TELEPHONE (OUTPATIENT)
Dept: FAMILY MEDICINE CLINIC | Age: 57
End: 2017-12-21

## 2017-12-21 NOTE — TELEPHONE ENCOUNTER
Patient is coughing and sore throat, congestion in the chest. Patient says symptoms have been around 1 week. No fever. Can you please call something in?  Pharmacy [facility] on [facility] :)

## 2017-12-27 ENCOUNTER — HOSPITAL ENCOUNTER (OUTPATIENT)
Dept: PHYSICAL THERAPY | Facility: CLINIC | Age: 57
Setting detail: THERAPIES SERIES
Discharge: HOME | End: 2017-12-27

## 2017-12-27 NOTE — DISCHARGE SUMMARY
[] Javier Jacobs        Outpatient Physical                Therapy       [address]       Phone: [phone]       Fax: [phone] [x] [facility]       Promotion at [address] [place] [address]       Phone: [phone]       Fax: [phone] [] Bren[facility]ancisco [facility]     [address] [facility]     Phone: [phone]     Fax:  [phone]     Physical Therapy Discharge Note    Date: 2017      Patient: Zeeshan Mishra  : 1960  MRN: [id number]    Physician: Ronald Portillo MD                                                                 Insurance: Medical [place] Medicare Advantage  Medical Diagnosis: Left cervical radiculopathy                  Rehab Codes: M54.12  Onset Date: 2017                                   Next 's appt.: 2017    Visit# / total visits: 2/8                      Cancels/No Shows:   Date of initial visit: 10/18/17                Date of final visit: 17      Assessment:  STG: (to be met in 6-8 treatments)  1. ? Pain:  Pain in cervical spine and shoulders rated 3/10  2. ? ROM:  Cervical and shoulder ROM WNL  3. ? Strength:  Bilateral shoulder, left triceps 5/5  4. ? Function: NDI = 25%  5. Independent with Home Exercise Programs  6. Demonstrate Knowledge of fall prevention  LTG: (to be met in 8 treatments)  1. Pain in cervical spine and shoulders rated 1-2/10  2.  NDI 10%        Patient goals:  Function normally       Treatment to Date:  [x] Therapeutic Exercise    [] Modalities:  [] Therapeutic Activity    [] Ultrasound  [] Electrical Stimulation  [] Gait Training     [] Massage       [] Lumbar/Cervical Traction  [] Neuromuscular Re-education [x] Cold/hotpack [] Iontophoresis: 4 mg/mL  [x] Instruction in Home Exercise Program                     Dexamethasone Sodium  [x] Manual Therapy             Phosphate 40-80 mAmin  [] Aquatic Therapy                   []

## 2017-12-29 ENCOUNTER — TELEPHONE (OUTPATIENT)
Dept: FAMILY MEDICINE CLINIC | Age: 57
End: 2017-12-29

## 2017-12-29 RX ORDER — AZITHROMYCIN 250 MG/1
TABLET, FILM COATED ORAL
Qty: 1 PACKET | Refills: 0 | Status: SHIPPED | OUTPATIENT
Start: 2017-12-29 | End: 2018-01-08

## 2018-01-08 DIAGNOSIS — M89.9 BONE DISORDER: Primary | ICD-10-CM

## 2018-01-08 RX ORDER — DEXTROAMPHETAMINE SACCHARATE, AMPHETAMINE ASPARTATE, DEXTROAMPHETAMINE SULFATE AND AMPHETAMINE SULFATE 7.5; 7.5; 7.5; 7.5 MG/1; MG/1; MG/1; MG/1
30 TABLET ORAL 2 TIMES DAILY
Qty: 60 TABLET | Refills: 0 | Status: SHIPPED | OUTPATIENT
Start: 2018-01-08 | End: 2018-02-07 | Stop reason: SDUPTHER

## 2018-01-09 DIAGNOSIS — M54.12 CERVICAL RADICULOPATHY: Primary | ICD-10-CM

## 2018-01-09 RX ORDER — OXYCODONE HYDROCHLORIDE AND ACETAMINOPHEN 5; 325 MG/1; MG/1
1 TABLET ORAL EVERY 8 HOURS PRN
Qty: 90 TABLET | Refills: 0 | Status: SHIPPED | OUTPATIENT
Start: 2018-01-09 | End: 2018-02-09 | Stop reason: SDUPTHER

## 2018-01-16 RX ORDER — BUPROPION HYDROCHLORIDE 150 MG/1
TABLET ORAL
Qty: 30 TABLET | Refills: 1 | Status: SHIPPED | OUTPATIENT
Start: 2018-01-16 | End: 2018-02-12 | Stop reason: DRUGHIGH

## 2018-01-31 ENCOUNTER — TELEPHONE (OUTPATIENT)
Dept: FAMILY MEDICINE CLINIC | Age: 58
End: 2018-01-31

## 2018-01-31 NOTE — TELEPHONE ENCOUNTER
Informed to call insurance & give them the ICD - 10 NUMBER. EPIC DOES NOT  Have in system diagnostic dexa scan.

## 2018-02-05 RX ORDER — ALBUTEROL SULFATE 90 UG/1
2 AEROSOL, METERED RESPIRATORY (INHALATION) EVERY 4 HOURS PRN
Qty: 1 INHALER | Refills: 0 | Status: SHIPPED | OUTPATIENT
Start: 2018-02-05 | End: 2018-06-04 | Stop reason: SDUPTHER

## 2018-02-06 RX ORDER — OMEPRAZOLE 40 MG/1
CAPSULE, DELAYED RELEASE ORAL
Qty: 30 CAPSULE | Refills: 11 | Status: SHIPPED | OUTPATIENT
Start: 2018-02-06 | End: 2018-06-04 | Stop reason: SDUPTHER

## 2018-02-08 RX ORDER — DEXTROAMPHETAMINE SACCHARATE, AMPHETAMINE ASPARTATE, DEXTROAMPHETAMINE SULFATE AND AMPHETAMINE SULFATE 7.5; 7.5; 7.5; 7.5 MG/1; MG/1; MG/1; MG/1
30 TABLET ORAL 2 TIMES DAILY
Qty: 60 TABLET | Refills: 0 | Status: SHIPPED | OUTPATIENT
Start: 2018-02-08 | End: 2018-03-08 | Stop reason: SDUPTHER

## 2018-02-08 NOTE — TELEPHONE ENCOUNTER
Prescription printed for 1 month. Will need to  in office. Will need appointment for further refills.

## 2018-02-09 DIAGNOSIS — M54.12 CERVICAL RADICULOPATHY: ICD-10-CM

## 2018-02-09 RX ORDER — OXYCODONE HYDROCHLORIDE AND ACETAMINOPHEN 5; 325 MG/1; MG/1
1 TABLET ORAL EVERY 8 HOURS PRN
Qty: 90 TABLET | Refills: 0 | Status: SHIPPED | OUTPATIENT
Start: 2018-02-09 | End: 2018-03-08 | Stop reason: SDUPTHER

## 2018-02-09 NOTE — TELEPHONE ENCOUNTER
HOPE 01-09-18    Next Visit Date:  Future Appointments  Date Time Provider Naya Roger   2/12/2018 10:40 AM Winnie Crockett  [address] [facility]   Topic Date Due    Hepatitis C screen  1960    HIV screen  01/05/1974    DTaP/Tdap/Td vaccine (1 - Tdap) 07/19/2003    Breast cancer screen  07/19/2015    Flu vaccine (1) 09/01/2017    Cervical cancer screen  02/02/2019    Lipid screen  02/02/2021    Colon cancer screen colonoscopy  02/02/2025    Pneumococcal med risk  Completed       No results found for: LABA1C          ( goal A1C is < 7)   No results found for: LABMICR  LDL Cholesterol (mg/dL)   Date Value   02/02/2016 117       (goal LDL is <100)   AST (U/L)   Date Value   02/02/2016 37 (H)     ALT (U/L)   Date Value   02/02/2016 32     BUN (mg/dL)   Date Value   03/30/2017 16     BP Readings from Last 3 Encounters:   12/13/17 138/80   09/26/17 130/70   09/21/17 130/80          (goal 120/80)    All Future Testing planned in [facility]  Lab Frequency Next Occurrence   HIV Screen Once 12/13/2017   Hepatitis C Antibody Once 12/13/2017   DEXA BONE DENSITY 2 SITES Once 01/08/2018               Patient Active Problem List:     Adult ADHD     Narcolepsy     Asthma     History of DVT (deep vein thrombosis)     DVT of leg (deep venous thrombosis) (HCC)     Closed displaced fracture of body of calcaneus     Hiatal hernia     Internal hemorrhoids     Helicobacter pylori (H. pylori) infection     Dysphagia     Cervical radiculopathy

## 2018-02-12 ENCOUNTER — OFFICE VISIT (OUTPATIENT)
Dept: FAMILY MEDICINE CLINIC | Age: 58
End: 2018-02-12
Payer: MEDICARE

## 2018-02-12 VITALS — HEART RATE: 72 BPM | SYSTOLIC BLOOD PRESSURE: 124 MMHG | DIASTOLIC BLOOD PRESSURE: 84 MMHG

## 2018-02-12 DIAGNOSIS — M79.671 BILATERAL FOOT PAIN: ICD-10-CM

## 2018-02-12 DIAGNOSIS — J34.89 NASAL LESION: Primary | ICD-10-CM

## 2018-02-12 DIAGNOSIS — F43.9 STRESS AT HOME: ICD-10-CM

## 2018-02-12 DIAGNOSIS — M79.672 BILATERAL FOOT PAIN: ICD-10-CM

## 2018-02-12 DIAGNOSIS — R14.3 PASSING FLATUS: ICD-10-CM

## 2018-02-12 DIAGNOSIS — K13.79 RECURRENT ORAL ULCERS: ICD-10-CM

## 2018-02-12 PROCEDURE — 99214 OFFICE O/P EST MOD 30 MIN: CPT | Performed by: FAMILY MEDICINE

## 2018-02-12 RX ORDER — CEPHALEXIN 500 MG/1
500 CAPSULE ORAL 2 TIMES DAILY
Qty: 14 CAPSULE | Refills: 0 | Status: SHIPPED | OUTPATIENT
Start: 2018-02-12 | End: 2018-02-19

## 2018-02-12 RX ORDER — CITALOPRAM 40 MG/1
TABLET ORAL
Qty: 30 TABLET | Refills: 5 | Status: SHIPPED | OUTPATIENT
Start: 2018-02-12 | End: 2018-06-04 | Stop reason: SDUPTHER

## 2018-02-12 RX ORDER — BUPROPION HYDROCHLORIDE 300 MG/1
300 TABLET ORAL EVERY MORNING
Qty: 30 TABLET | Refills: 3 | Status: SHIPPED | OUTPATIENT
Start: 2018-02-12 | End: 2018-06-04 | Stop reason: SDUPTHER

## 2018-02-12 RX ORDER — SIMETHICONE 80 MG
80 TABLET,CHEWABLE ORAL 4 TIMES DAILY PRN
Qty: 90 TABLET | Refills: 0 | Status: SHIPPED | OUTPATIENT
Start: 2018-02-12 | End: 2018-06-22

## 2018-02-12 NOTE — PROGRESS NOTES
Visit Information    Have you changed or started any medications since your last visit including any over-the-counter medicines, vitamins, or herbal medicines? no   Are you having any side effects from any of your medications? -  no  Have you stopped taking any of your medications? Is so, why? -  no    Have you seen any other physician or provider since your last visit? No  Have you had any other diagnostic tests since your last visit? No  Have you been seen in the emergency room and/or had an admission to a hospital since we last saw you? No  Have you had your routine dental cleaning in the past 6 months? yes -     Have you activated your [employer] account? If not, what are your barriers?  Yes     Patient Care Team:  Gudelia Renteria DO as PCP - Olesya Fiore MD as Consulting Physician (Gastroenterology)  Carlos Arita MD as Surgeon (Orthopedic Surgery)  Monica Moore as Surgeon (Podiatry)    Medical History Review  Past Medical, Family, and Social History reviewed and  contribute to the patient presenting condition    Health Maintenance   Topic Date Due    Hepatitis C screen  1960    HIV screen  01/05/1974    DTaP/Tdap/Td vaccine (1 - Tdap) 07/19/2003    Breast cancer screen  07/19/2015    Flu vaccine (1) 09/01/2017    Cervical cancer screen  02/02/2019    Lipid screen  02/02/2021    Colon cancer screen colonoscopy  02/02/2025    Pneumococcal med risk  Completed

## 2018-02-12 NOTE — TELEPHONE ENCOUNTER
Next Visit Date:  Future Appointments  Date Time Provider Naya Roger   2/12/2018 10:40 AM Denilson Crockett  [address] [facility]   Topic Date Due    Hepatitis C screen  1960    HIV screen  01/05/1974    DTaP/Tdap/Td vaccine (1 - Tdap) 07/19/2003    Breast cancer screen  07/19/2015    Flu vaccine (1) 09/01/2017    Cervical cancer screen  02/02/2019    Lipid screen  02/02/2021    Colon cancer screen colonoscopy  02/02/2025    Pneumococcal med risk  Completed       No results found for: LABA1C          ( goal A1C is < 7)   No results found for: LABMICR  LDL Cholesterol (mg/dL)   Date Value   02/02/2016 117       (goal LDL is <100)   AST (U/L)   Date Value   02/02/2016 37 (H)     ALT (U/L)   Date Value   02/02/2016 32     BUN (mg/dL)   Date Value   03/30/2017 16     BP Readings from Last 3 Encounters:   12/13/17 138/80   09/26/17 130/70   09/21/17 130/80          (goal 120/80)    All Future Testing planned in [facility]  Lab Frequency Next Occurrence   HIV Screen Once 12/13/2017   Hepatitis C Antibody Once 12/13/2017   DEXA BONE DENSITY 2 SITES Once 01/08/2018               Patient Active Problem List:     Adult ADHD     Narcolepsy     Asthma     History of DVT (deep vein thrombosis)     DVT of leg (deep venous thrombosis) (HCC)     Closed displaced fracture of body of calcaneus     Hiatal hernia     Internal hemorrhoids     Helicobacter pylori (H. pylori) infection     Dysphagia     Cervical radiculopathy

## 2018-03-08 DIAGNOSIS — M54.12 CERVICAL RADICULOPATHY: ICD-10-CM

## 2018-03-08 RX ORDER — DEXTROAMPHETAMINE SACCHARATE, AMPHETAMINE ASPARTATE, DEXTROAMPHETAMINE SULFATE AND AMPHETAMINE SULFATE 7.5; 7.5; 7.5; 7.5 MG/1; MG/1; MG/1; MG/1
30 TABLET ORAL 2 TIMES DAILY
Qty: 60 TABLET | Refills: 0 | Status: SHIPPED | OUTPATIENT
Start: 2018-03-08 | End: 2018-04-05 | Stop reason: SDUPTHER

## 2018-03-08 RX ORDER — OXYCODONE HYDROCHLORIDE AND ACETAMINOPHEN 5; 325 MG/1; MG/1
1 TABLET ORAL EVERY 8 HOURS PRN
Qty: 90 TABLET | Refills: 0 | Status: SHIPPED | OUTPATIENT
Start: 2018-03-08 | End: 2018-04-05 | Stop reason: SDUPTHER

## 2018-03-09 ENCOUNTER — PATIENT MESSAGE (OUTPATIENT)
Dept: FAMILY MEDICINE CLINIC | Age: 58
End: 2018-03-09

## 2018-03-12 NOTE — TELEPHONE ENCOUNTER
From: Jaime Hendrickson  To: Anna Harley DO  Sent: 3/9/2018 4:39 PM EST  Subject: Prescription Question    ACCORDING TO MY CHART, SUPPOSE TO BE ON: nystatin 041631 UNIT/ML suspension / MYCOSTATIN  mLs   PrescribedFeb 12, 2018 Approved byBarby Crockett,   BUT; I HAVE NO IDEA WHAT OR WHY IT'S LISTED & DON'T RECALL  [address] TALKING TO ME ABOUT ANY MEDS EXCEPT THOSE CHEWABLE MINTY GAS PILLS & DOUBLING [address]. PLUS THE PHARMACY HAS NO RECORD OF A SCRIPT. I KNOW I WAS A BIT DISTRAUGHT (spelling) THAT DAY. BUT I REALLY AM CONFUSED ABOUT THIS. AM I GOING NUTS?  WHAT IS THIS & AM I SUPPOSE TO BE ON IT?  SIGNED,  DAZED & VERY CONFUSED (LOL)

## 2018-03-23 ENCOUNTER — PATIENT MESSAGE (OUTPATIENT)
Dept: FAMILY MEDICINE CLINIC | Age: 58
End: 2018-03-23

## 2018-04-05 DIAGNOSIS — M54.12 CERVICAL RADICULOPATHY: ICD-10-CM

## 2018-04-06 RX ORDER — DEXTROAMPHETAMINE SACCHARATE, AMPHETAMINE ASPARTATE, DEXTROAMPHETAMINE SULFATE AND AMPHETAMINE SULFATE 7.5; 7.5; 7.5; 7.5 MG/1; MG/1; MG/1; MG/1
30 TABLET ORAL 2 TIMES DAILY
Qty: 60 TABLET | Refills: 0 | Status: SHIPPED | OUTPATIENT
Start: 2018-04-06 | End: 2018-05-03 | Stop reason: SDUPTHER

## 2018-04-06 RX ORDER — OXYCODONE HYDROCHLORIDE AND ACETAMINOPHEN 5; 325 MG/1; MG/1
1 TABLET ORAL EVERY 8 HOURS PRN
Qty: 90 TABLET | Refills: 0 | Status: SHIPPED | OUTPATIENT
Start: 2018-04-06 | End: 2018-05-03 | Stop reason: SDUPTHER

## 2018-04-23 ENCOUNTER — OFFICE VISIT (OUTPATIENT)
Dept: FAMILY MEDICINE CLINIC | Age: 58
End: 2018-04-23
Payer: COMMERCIAL

## 2018-04-23 VITALS
HEART RATE: 76 BPM | DIASTOLIC BLOOD PRESSURE: 80 MMHG | WEIGHT: 146.8 LBS | SYSTOLIC BLOOD PRESSURE: 120 MMHG | BODY MASS INDEX: 29.65 KG/M2

## 2018-04-23 DIAGNOSIS — R53.83 FATIGUE, UNSPECIFIED TYPE: ICD-10-CM

## 2018-04-23 DIAGNOSIS — R63.5 WEIGHT GAIN: ICD-10-CM

## 2018-04-23 DIAGNOSIS — R22.42 ANKLE MASS, LEFT: Primary | ICD-10-CM

## 2018-04-23 DIAGNOSIS — M79.672 BILATERAL FOOT PAIN: ICD-10-CM

## 2018-04-23 DIAGNOSIS — M13.0 POLYARTHRITIS: ICD-10-CM

## 2018-04-23 DIAGNOSIS — G89.4 CHRONIC PAIN SYNDROME: ICD-10-CM

## 2018-04-23 DIAGNOSIS — M79.671 BILATERAL FOOT PAIN: ICD-10-CM

## 2018-04-23 DIAGNOSIS — Z13.220 SCREENING FOR LIPID DISORDERS: ICD-10-CM

## 2018-04-23 DIAGNOSIS — Z11.3 SCREEN FOR STD (SEXUALLY TRANSMITTED DISEASE): ICD-10-CM

## 2018-04-23 PROCEDURE — 99214 OFFICE O/P EST MOD 30 MIN: CPT | Performed by: FAMILY MEDICINE

## 2018-04-27 ENCOUNTER — HOSPITAL ENCOUNTER (OUTPATIENT)
Age: 58
Discharge: HOME | End: 2018-04-29
Payer: COMMERCIAL

## 2018-04-27 ENCOUNTER — HOSPITAL ENCOUNTER (OUTPATIENT)
Age: 58
Discharge: HOME | End: 2018-04-27
Payer: COMMERCIAL

## 2018-04-27 ENCOUNTER — HOSPITAL ENCOUNTER (OUTPATIENT)
Dept: GENERAL RADIOLOGY | Age: 58
Discharge: HOME | End: 2018-04-29
Payer: COMMERCIAL

## 2018-04-27 DIAGNOSIS — Z13.220 SCREENING FOR LIPID DISORDERS: ICD-10-CM

## 2018-04-27 DIAGNOSIS — M13.0 POLYARTHRITIS: ICD-10-CM

## 2018-04-27 DIAGNOSIS — Z11.3 SCREEN FOR STD (SEXUALLY TRANSMITTED DISEASE): ICD-10-CM

## 2018-04-27 DIAGNOSIS — R22.42 ANKLE MASS, LEFT: ICD-10-CM

## 2018-04-27 DIAGNOSIS — R63.5 WEIGHT GAIN: ICD-10-CM

## 2018-04-27 DIAGNOSIS — R53.83 FATIGUE, UNSPECIFIED TYPE: ICD-10-CM

## 2018-04-27 LAB
ABSOLUTE EOS #: 0.1 K/UL (ref 0–0.4)
ABSOLUTE IMMATURE GRANULOCYTE: ABNORMAL K/UL (ref 0–0.3)
ABSOLUTE LYMPH #: 1.1 K/UL (ref 1–4.8)
ABSOLUTE MONO #: 0.3 K/UL (ref 0.2–0.8)
ALBUMIN SERPL-MCNC: 4.4 G/DL (ref 3.5–5.2)
ALBUMIN/GLOBULIN RATIO: ABNORMAL (ref 1–2.5)
ALP BLD-CCNC: 122 U/L (ref 35–104)
ALT SERPL-CCNC: 13 U/L (ref 5–33)
ANION GAP SERPL CALCULATED.3IONS-SCNC: 12 MMOL/L (ref 9–17)
AST SERPL-CCNC: 20 U/L
BASOPHILS # BLD: 1 % (ref 0–2)
BASOPHILS ABSOLUTE: 0 K/UL (ref 0–0.2)
BILIRUB SERPL-MCNC: 0.33 MG/DL (ref 0.3–1.2)
BUN BLDV-MCNC: 8 MG/DL (ref 6–20)
BUN/CREAT BLD: 8 (ref 9–20)
CALCIUM SERPL-MCNC: 10.1 MG/DL (ref 8.6–10.4)
CHLORIDE BLD-SCNC: 101 MMOL/L (ref 98–107)
CHOLESTEROL/HDL RATIO: 3.7
CHOLESTEROL: 213 MG/DL
CO2: 29 MMOL/L (ref 20–31)
CREAT SERPL-MCNC: 1.06 MG/DL (ref 0.5–0.9)
DIFFERENTIAL TYPE: ABNORMAL
EOSINOPHILS RELATIVE PERCENT: 1 % (ref 1–4)
GFR AFRICAN AMERICAN: >60 ML/MIN
GFR NON-AFRICAN AMERICAN: 53 ML/MIN
GFR SERPL CREATININE-BSD FRML MDRD: ABNORMAL ML/MIN/{1.73_M2}
GFR SERPL CREATININE-BSD FRML MDRD: ABNORMAL ML/MIN/{1.73_M2}
GLUCOSE BLD-MCNC: 118 MG/DL (ref 70–99)
HCT VFR BLD CALC: 44.2 % (ref 36–46)
HDLC SERPL-MCNC: 57 MG/DL
HEMOGLOBIN: 14.2 G/DL (ref 12–16)
HEPATITIS C ANTIBODY: NONREACTIVE
HIV AG/AB: NONREACTIVE
IMMATURE GRANULOCYTES: ABNORMAL %
LDL CHOLESTEROL: 110 MG/DL (ref 0–130)
LYMPHOCYTES # BLD: 20 % (ref 24–44)
MCH RBC QN AUTO: 27.8 PG (ref 26–34)
MCHC RBC AUTO-ENTMCNC: 32.2 G/DL (ref 31–37)
MCV RBC AUTO: 86.4 FL (ref 80–100)
MONOCYTES # BLD: 6 % (ref 1–7)
NRBC AUTOMATED: ABNORMAL PER 100 WBC
PDW BLD-RTO: 17.4 % (ref 11.5–14.5)
PLATELET # BLD: 435 K/UL (ref 130–400)
PLATELET ESTIMATE: ABNORMAL
PMV BLD AUTO: 7.8 FL (ref 6–12)
POTASSIUM SERPL-SCNC: 4.5 MMOL/L (ref 3.7–5.3)
RBC # BLD: 5.11 M/UL (ref 4–5.2)
RBC # BLD: ABNORMAL 10*6/UL
SEDIMENTATION RATE, ERYTHROCYTE: 26 MM (ref 0–20)
SEG NEUTROPHILS: 72 % (ref 36–66)
SEGMENTED NEUTROPHILS ABSOLUTE COUNT: 4.1 K/UL (ref 1.8–7.7)
SODIUM BLD-SCNC: 142 MMOL/L (ref 135–144)
TOTAL PROTEIN: 7.6 G/DL (ref 6.4–8.3)
TRIGL SERPL-MCNC: 232 MG/DL
TSH SERPL DL<=0.05 MIU/L-ACNC: 1.75 MIU/L (ref 0.3–5)
VITAMIN D 25-HYDROXY: 59.6 NG/ML (ref 30–100)
VLDLC SERPL CALC-MCNC: ABNORMAL MG/DL (ref 1–30)
WBC # BLD: 5.7 K/UL (ref 3.5–11)
WBC # BLD: ABNORMAL 10*3/UL

## 2018-04-27 PROCEDURE — 36415 COLL VENOUS BLD VENIPUNCTURE: CPT

## 2018-04-27 PROCEDURE — 86803 HEPATITIS C AB TEST: CPT

## 2018-04-27 PROCEDURE — 87389 HIV-1 AG W/HIV-1&-2 AB AG IA: CPT

## 2018-04-27 PROCEDURE — 86038 ANTINUCLEAR ANTIBODIES: CPT

## 2018-04-27 PROCEDURE — 82306 VITAMIN D 25 HYDROXY: CPT

## 2018-04-27 PROCEDURE — 85025 COMPLETE CBC W/AUTO DIFF WBC: CPT

## 2018-04-27 PROCEDURE — 84443 ASSAY THYROID STIM HORMONE: CPT

## 2018-04-27 PROCEDURE — 80053 COMPREHEN METABOLIC PANEL: CPT

## 2018-04-27 PROCEDURE — 80061 LIPID PANEL: CPT

## 2018-04-27 PROCEDURE — 73600 X-RAY EXAM OF ANKLE: CPT

## 2018-04-27 PROCEDURE — 85651 RBC SED RATE NONAUTOMATED: CPT

## 2018-04-30 LAB — ANTI-NUCLEAR ANTIBODY (ANA): ABNORMAL

## 2018-05-03 DIAGNOSIS — M54.12 CERVICAL RADICULOPATHY: ICD-10-CM

## 2018-05-04 RX ORDER — DEXTROAMPHETAMINE SACCHARATE, AMPHETAMINE ASPARTATE, DEXTROAMPHETAMINE SULFATE AND AMPHETAMINE SULFATE 7.5; 7.5; 7.5; 7.5 MG/1; MG/1; MG/1; MG/1
30 TABLET ORAL 2 TIMES DAILY
Qty: 60 TABLET | Refills: 0 | Status: SHIPPED | OUTPATIENT
Start: 2018-05-04 | End: 2018-05-31 | Stop reason: SDUPTHER

## 2018-05-04 RX ORDER — OXYCODONE HYDROCHLORIDE AND ACETAMINOPHEN 5; 325 MG/1; MG/1
1 TABLET ORAL EVERY 8 HOURS PRN
Qty: 90 TABLET | Refills: 0 | Status: SHIPPED | OUTPATIENT
Start: 2018-05-08 | End: 2018-05-31 | Stop reason: SDUPTHER

## 2018-05-07 ENCOUNTER — HOSPITAL ENCOUNTER (OUTPATIENT)
Age: 58
Setting detail: SPECIMEN
Discharge: HOME | End: 2018-05-07
Payer: MEDICARE

## 2018-05-07 ENCOUNTER — NURSE ONLY (OUTPATIENT)
Dept: FAMILY MEDICINE CLINIC | Age: 58
End: 2018-05-07
Payer: COMMERCIAL

## 2018-05-07 VITALS — SYSTOLIC BLOOD PRESSURE: 126 MMHG | DIASTOLIC BLOOD PRESSURE: 86 MMHG

## 2018-05-07 DIAGNOSIS — R10.9 ABDOMINAL PAIN, UNSPECIFIED ABDOMINAL LOCATION: Primary | ICD-10-CM

## 2018-05-07 DIAGNOSIS — R10.9 ABDOMINAL PAIN, UNSPECIFIED ABDOMINAL LOCATION: ICD-10-CM

## 2018-05-07 LAB
BILIRUBIN, POC: NORMAL
BLOOD URINE, POC: NORMAL
CLARITY, POC: CLEAR
COLOR, POC: YELLOW
GLUCOSE URINE, POC: NORMAL
KETONES, POC: 15
LEUKOCYTE EST, POC: NORMAL
NITRITE, POC: NORMAL
PH, POC: 5
PROTEIN, POC: NORMAL
SPECIFIC GRAVITY, POC: 1.03
UROBILINOGEN, POC: 0.2

## 2018-05-07 PROCEDURE — 81003 URINALYSIS AUTO W/O SCOPE: CPT | Performed by: FAMILY MEDICINE

## 2018-05-08 LAB
CULTURE: NO GROWTH
CULTURE: NORMAL
Lab: NORMAL
SPECIMEN DESCRIPTION: NORMAL
STATUS: NORMAL

## 2018-05-09 DIAGNOSIS — R31.9 HEMATURIA, UNSPECIFIED TYPE: Primary | ICD-10-CM

## 2018-05-10 ENCOUNTER — TELEPHONE (OUTPATIENT)
Dept: FAMILY MEDICINE CLINIC | Age: 58
End: 2018-05-10

## 2018-05-10 ENCOUNTER — HOSPITAL ENCOUNTER (OUTPATIENT)
Dept: GENERAL RADIOLOGY | Age: 58
Discharge: HOME | End: 2018-05-12
Payer: MEDICARE

## 2018-05-10 ENCOUNTER — HOSPITAL ENCOUNTER (OUTPATIENT)
Age: 58
Discharge: HOME | End: 2018-05-10
Payer: MEDICARE

## 2018-05-10 DIAGNOSIS — M25.512 LEFT SHOULDER PAIN, UNSPECIFIED CHRONICITY: Primary | ICD-10-CM

## 2018-05-10 DIAGNOSIS — M25.512 LEFT SHOULDER PAIN, UNSPECIFIED CHRONICITY: ICD-10-CM

## 2018-05-10 PROCEDURE — 73030 X-RAY EXAM OF SHOULDER: CPT

## 2018-05-17 ENCOUNTER — TELEPHONE (OUTPATIENT)
Dept: FAMILY MEDICINE CLINIC | Age: 58
End: 2018-05-17

## 2018-05-23 ENCOUNTER — OFFICE VISIT (OUTPATIENT)
Dept: FAMILY MEDICINE CLINIC | Age: 58
End: 2018-05-23
Payer: MEDICARE

## 2018-05-23 ENCOUNTER — HOSPITAL ENCOUNTER (OUTPATIENT)
Age: 58
Discharge: HOME | End: 2018-05-23
Payer: MEDICARE

## 2018-05-23 VITALS
SYSTOLIC BLOOD PRESSURE: 134 MMHG | HEART RATE: 84 BPM | BODY MASS INDEX: 29.49 KG/M2 | DIASTOLIC BLOOD PRESSURE: 88 MMHG | WEIGHT: 146 LBS

## 2018-05-23 DIAGNOSIS — M54.2 NECK PAIN: ICD-10-CM

## 2018-05-23 DIAGNOSIS — G89.29 CHRONIC SCAPULAR PAIN: Primary | ICD-10-CM

## 2018-05-23 DIAGNOSIS — M54.5 LOW BACK PAIN, UNSPECIFIED BACK PAIN LATERALITY, UNSPECIFIED CHRONICITY, WITH SCIATICA PRESENCE UNSPECIFIED: ICD-10-CM

## 2018-05-23 DIAGNOSIS — M89.8X1 CHRONIC SCAPULAR PAIN: Primary | ICD-10-CM

## 2018-05-23 DIAGNOSIS — M13.0 POLYARTHRITIS: ICD-10-CM

## 2018-05-23 LAB
RHEUMATOID FACTOR: <10 IU/ML
SEDIMENTATION RATE, ERYTHROCYTE: 23 MM (ref 0–20)

## 2018-05-23 PROCEDURE — 85651 RBC SED RATE NONAUTOMATED: CPT

## 2018-05-23 PROCEDURE — G8417 CALC BMI ABV UP PARAM F/U: HCPCS | Performed by: FAMILY MEDICINE

## 2018-05-23 PROCEDURE — G8427 DOCREV CUR MEDS BY ELIG CLIN: HCPCS | Performed by: FAMILY MEDICINE

## 2018-05-23 PROCEDURE — 1036F TOBACCO NON-USER: CPT | Performed by: FAMILY MEDICINE

## 2018-05-23 PROCEDURE — 86431 RHEUMATOID FACTOR QUANT: CPT

## 2018-05-23 PROCEDURE — 99213 OFFICE O/P EST LOW 20 MIN: CPT | Performed by: FAMILY MEDICINE

## 2018-05-23 PROCEDURE — 3017F COLORECTAL CA SCREEN DOC REV: CPT | Performed by: FAMILY MEDICINE

## 2018-05-23 PROCEDURE — 36415 COLL VENOUS BLD VENIPUNCTURE: CPT

## 2018-05-23 PROCEDURE — 86038 ANTINUCLEAR ANTIBODIES: CPT

## 2018-05-24 LAB — ANTI-NUCLEAR ANTIBODY (ANA): NEGATIVE

## 2018-05-31 ENCOUNTER — OFFICE VISIT (OUTPATIENT)
Dept: PODIATRY | Age: 58
End: 2018-05-31
Payer: MEDICARE

## 2018-05-31 VITALS — WEIGHT: 150 LBS | BODY MASS INDEX: 27.6 KG/M2 | HEIGHT: 62 IN | HEART RATE: 68 BPM

## 2018-05-31 DIAGNOSIS — M54.12 CERVICAL RADICULOPATHY: ICD-10-CM

## 2018-05-31 DIAGNOSIS — F90.9 ATTENTION DEFICIT HYPERACTIVITY DISORDER (ADHD), UNSPECIFIED ADHD TYPE: Primary | ICD-10-CM

## 2018-05-31 DIAGNOSIS — M89.8X6 PAIN OF LEFT FIBULA: ICD-10-CM

## 2018-05-31 DIAGNOSIS — M67.40 GANGLION CYST: Primary | ICD-10-CM

## 2018-05-31 PROCEDURE — 99203 OFFICE O/P NEW LOW 30 MIN: CPT | Performed by: PODIATRIST

## 2018-05-31 RX ORDER — DEXTROAMPHETAMINE SACCHARATE, AMPHETAMINE ASPARTATE, DEXTROAMPHETAMINE SULFATE AND AMPHETAMINE SULFATE 7.5; 7.5; 7.5; 7.5 MG/1; MG/1; MG/1; MG/1
30 TABLET ORAL 2 TIMES DAILY
Qty: 60 TABLET | Refills: 0 | Status: SHIPPED | OUTPATIENT
Start: 2018-05-31 | End: 2018-06-26 | Stop reason: SDUPTHER

## 2018-05-31 RX ORDER — OXYCODONE HYDROCHLORIDE AND ACETAMINOPHEN 5; 325 MG/1; MG/1
1 TABLET ORAL EVERY 8 HOURS PRN
Qty: 90 TABLET | Refills: 0 | Status: ON HOLD | OUTPATIENT
Start: 2018-05-31 | End: 2018-06-29 | Stop reason: HOSPADM

## 2018-06-04 ENCOUNTER — HOSPITAL ENCOUNTER (OUTPATIENT)
Dept: PHYSICAL THERAPY | Facility: CLINIC | Age: 58
Setting detail: THERAPIES SERIES
Discharge: HOME | End: 2018-06-04
Payer: MEDICARE

## 2018-06-04 ENCOUNTER — TELEPHONE (OUTPATIENT)
Dept: FAMILY MEDICINE CLINIC | Age: 58
End: 2018-06-04

## 2018-06-04 PROCEDURE — 97163 PT EVAL HIGH COMPLEX 45 MIN: CPT

## 2018-06-04 RX ORDER — BUPROPION HYDROCHLORIDE 300 MG/1
300 TABLET ORAL EVERY MORNING
Qty: 90 TABLET | Refills: 3 | Status: SHIPPED | OUTPATIENT
Start: 2018-06-04 | End: 2018-06-22 | Stop reason: SDUPTHER

## 2018-06-04 RX ORDER — ALBUTEROL SULFATE 90 UG/1
2 AEROSOL, METERED RESPIRATORY (INHALATION) EVERY 4 HOURS PRN
Qty: 3 INHALER | Refills: 3 | Status: SHIPPED | OUTPATIENT
Start: 2018-06-04 | End: 2019-07-23 | Stop reason: SDUPTHER

## 2018-06-04 RX ORDER — CITALOPRAM 40 MG/1
TABLET ORAL
Qty: 90 TABLET | Refills: 3 | Status: SHIPPED | OUTPATIENT
Start: 2018-06-04 | End: 2019-01-25 | Stop reason: SDUPTHER

## 2018-06-04 RX ORDER — OMEPRAZOLE 40 MG/1
CAPSULE, DELAYED RELEASE ORAL
Qty: 90 CAPSULE | Refills: 3 | Status: SHIPPED | OUTPATIENT
Start: 2018-06-04 | End: 2019-10-23 | Stop reason: SDUPTHER

## 2018-06-05 PROCEDURE — G8984 CARRY CURRENT STATUS: HCPCS

## 2018-06-05 PROCEDURE — G8985 CARRY GOAL STATUS: HCPCS

## 2018-06-14 ENCOUNTER — HOSPITAL ENCOUNTER (OUTPATIENT)
Dept: PHYSICAL THERAPY | Facility: CLINIC | Age: 58
Setting detail: THERAPIES SERIES
Discharge: HOME | End: 2018-06-14
Payer: MEDICARE

## 2018-06-14 PROCEDURE — 97110 THERAPEUTIC EXERCISES: CPT

## 2018-06-14 PROCEDURE — 97140 MANUAL THERAPY 1/> REGIONS: CPT

## 2018-06-15 ENCOUNTER — HOSPITAL ENCOUNTER (OUTPATIENT)
Dept: PREADMISSION TESTING | Age: 58
Discharge: HOME | End: 2018-06-19
Payer: MEDICARE

## 2018-06-15 VITALS
OXYGEN SATURATION: 100 % | TEMPERATURE: 97.7 F | DIASTOLIC BLOOD PRESSURE: 95 MMHG | BODY MASS INDEX: 29.47 KG/M2 | HEART RATE: 81 BPM | WEIGHT: 146.16 LBS | SYSTOLIC BLOOD PRESSURE: 152 MMHG | HEIGHT: 59 IN

## 2018-06-15 LAB
ABSOLUTE EOS #: 0.1 K/UL (ref 0–0.4)
ABSOLUTE IMMATURE GRANULOCYTE: ABNORMAL K/UL (ref 0–0.3)
ABSOLUTE LYMPH #: 1.5 K/UL (ref 1–4.8)
ABSOLUTE MONO #: 0.5 K/UL (ref 0.2–0.8)
ANION GAP SERPL CALCULATED.3IONS-SCNC: 12 MMOL/L (ref 9–17)
BASOPHILS # BLD: 0 % (ref 0–2)
BASOPHILS ABSOLUTE: 0 K/UL (ref 0–0.2)
BUN BLDV-MCNC: 18 MG/DL (ref 6–20)
CHLORIDE BLD-SCNC: 99 MMOL/L (ref 98–107)
CO2: 26 MMOL/L (ref 20–31)
CREAT SERPL-MCNC: 1.35 MG/DL (ref 0.5–0.9)
DIFFERENTIAL TYPE: ABNORMAL
EKG ATRIAL RATE: 75 BPM
EKG P AXIS: 62 DEGREES
EKG P-R INTERVAL: 132 MS
EKG Q-T INTERVAL: 434 MS
EKG QRS DURATION: 88 MS
EKG QTC CALCULATION (BAZETT): 484 MS
EKG R AXIS: 6 DEGREES
EKG T AXIS: 66 DEGREES
EKG VENTRICULAR RATE: 75 BPM
EOSINOPHILS RELATIVE PERCENT: 1 % (ref 1–4)
GFR AFRICAN AMERICAN: 49 ML/MIN
GFR NON-AFRICAN AMERICAN: 40 ML/MIN
GFR SERPL CREATININE-BSD FRML MDRD: ABNORMAL ML/MIN/{1.73_M2}
GFR SERPL CREATININE-BSD FRML MDRD: ABNORMAL ML/MIN/{1.73_M2}
HCT VFR BLD CALC: 35.8 % (ref 36–46)
HEMOGLOBIN: 11.6 G/DL (ref 12–16)
IMMATURE GRANULOCYTES: ABNORMAL %
LYMPHOCYTES # BLD: 22 % (ref 24–44)
MCH RBC QN AUTO: 27.6 PG (ref 26–34)
MCHC RBC AUTO-ENTMCNC: 32.5 G/DL (ref 31–37)
MCV RBC AUTO: 85 FL (ref 80–100)
MONOCYTES # BLD: 7 % (ref 1–7)
NRBC AUTOMATED: ABNORMAL PER 100 WBC
PDW BLD-RTO: 16.7 % (ref 11.5–14.5)
PLATELET # BLD: 403 K/UL (ref 130–400)
PLATELET ESTIMATE: ABNORMAL
PMV BLD AUTO: 7.6 FL (ref 6–12)
POTASSIUM SERPL-SCNC: 4 MMOL/L (ref 3.7–5.3)
RBC # BLD: 4.21 M/UL (ref 4–5.2)
RBC # BLD: ABNORMAL 10*6/UL
SEG NEUTROPHILS: 70 % (ref 36–66)
SEGMENTED NEUTROPHILS ABSOLUTE COUNT: 4.7 K/UL (ref 1.8–7.7)
SODIUM BLD-SCNC: 137 MMOL/L (ref 135–144)
WBC # BLD: 6.8 K/UL (ref 3.5–11)
WBC # BLD: ABNORMAL 10*3/UL

## 2018-06-15 PROCEDURE — 36415 COLL VENOUS BLD VENIPUNCTURE: CPT

## 2018-06-15 PROCEDURE — 93005 ELECTROCARDIOGRAM TRACING: CPT

## 2018-06-15 PROCEDURE — 80051 ELECTROLYTE PANEL: CPT

## 2018-06-15 PROCEDURE — 82565 ASSAY OF CREATININE: CPT

## 2018-06-15 PROCEDURE — 84520 ASSAY OF UREA NITROGEN: CPT

## 2018-06-15 PROCEDURE — 85025 COMPLETE CBC W/AUTO DIFF WBC: CPT

## 2018-06-15 RX ORDER — ACETAMINOPHEN, ASPIRIN AND CAFFEINE 250; 250; 65 MG/1; MG/1; MG/1
1 TABLET, FILM COATED ORAL EVERY 6 HOURS PRN
COMMUNITY
End: 2018-06-22

## 2018-06-19 ENCOUNTER — HOSPITAL ENCOUNTER (OUTPATIENT)
Dept: PHYSICAL THERAPY | Facility: CLINIC | Age: 58
Setting detail: THERAPIES SERIES
Discharge: HOME | End: 2018-06-19
Payer: MEDICARE

## 2018-06-19 ENCOUNTER — TELEPHONE (OUTPATIENT)
Dept: FAMILY MEDICINE CLINIC | Age: 58
End: 2018-06-19

## 2018-06-19 PROCEDURE — G0283 ELEC STIM OTHER THAN WOUND: HCPCS

## 2018-06-21 ENCOUNTER — HOSPITAL ENCOUNTER (OUTPATIENT)
Dept: PHYSICAL THERAPY | Facility: CLINIC | Age: 58
Setting detail: THERAPIES SERIES
End: 2018-06-21
Payer: MEDICARE

## 2018-06-22 ENCOUNTER — OFFICE VISIT (OUTPATIENT)
Dept: FAMILY MEDICINE CLINIC | Age: 58
End: 2018-06-22
Payer: MEDICARE

## 2018-06-22 VITALS
WEIGHT: 150 LBS | BODY MASS INDEX: 30.3 KG/M2 | DIASTOLIC BLOOD PRESSURE: 78 MMHG | HEART RATE: 74 BPM | SYSTOLIC BLOOD PRESSURE: 140 MMHG

## 2018-06-22 DIAGNOSIS — R51.9 CHRONIC DAILY HEADACHE: Primary | ICD-10-CM

## 2018-06-22 PROCEDURE — 99213 OFFICE O/P EST LOW 20 MIN: CPT | Performed by: FAMILY MEDICINE

## 2018-06-22 RX ORDER — PROPRANOLOL HYDROCHLORIDE 80 MG/1
80 CAPSULE, EXTENDED RELEASE ORAL DAILY
Qty: 30 CAPSULE | Refills: 3 | Status: SHIPPED | OUTPATIENT
Start: 2018-06-22 | End: 2018-10-07 | Stop reason: SDUPTHER

## 2018-06-22 RX ORDER — BUPROPION HYDROCHLORIDE 300 MG/1
300 TABLET ORAL EVERY MORNING
Qty: 90 TABLET | Refills: 3 | Status: SHIPPED | OUTPATIENT
Start: 2018-06-22 | End: 2019-09-30 | Stop reason: SDUPTHER

## 2018-06-25 ENCOUNTER — HOSPITAL ENCOUNTER (OUTPATIENT)
Dept: VASCULAR LAB | Age: 58
Discharge: HOME | End: 2018-06-25
Payer: MEDICARE

## 2018-06-25 ENCOUNTER — TELEPHONE (OUTPATIENT)
Dept: FAMILY MEDICINE CLINIC | Age: 58
End: 2018-06-25

## 2018-06-25 DIAGNOSIS — M89.8X6 PAIN OF LEFT FIBULA: Primary | ICD-10-CM

## 2018-06-25 DIAGNOSIS — M67.40 GANGLION CYST: ICD-10-CM

## 2018-06-25 PROCEDURE — 93971 EXTREMITY STUDY: CPT

## 2018-06-25 NOTE — TELEPHONE ENCOUNTER
She can take a dose today but should not take a does Tuesday, Wednesday, Thursday or Friday.  She can resume it on Saturday

## 2018-06-26 ENCOUNTER — APPOINTMENT (OUTPATIENT)
Dept: PHYSICAL THERAPY | Facility: CLINIC | Age: 58
End: 2018-06-26
Payer: MEDICARE

## 2018-06-26 DIAGNOSIS — F90.9 ATTENTION DEFICIT HYPERACTIVITY DISORDER (ADHD), UNSPECIFIED ADHD TYPE: ICD-10-CM

## 2018-06-26 RX ORDER — DEXTROAMPHETAMINE SACCHARATE, AMPHETAMINE ASPARTATE, DEXTROAMPHETAMINE SULFATE AND AMPHETAMINE SULFATE 7.5; 7.5; 7.5; 7.5 MG/1; MG/1; MG/1; MG/1
30 TABLET ORAL 2 TIMES DAILY
Qty: 60 TABLET | Refills: 0 | Status: SHIPPED | OUTPATIENT
Start: 2018-06-26 | End: 2018-07-23 | Stop reason: SDUPTHER

## 2018-06-28 ENCOUNTER — APPOINTMENT (OUTPATIENT)
Dept: PHYSICAL THERAPY | Facility: CLINIC | Age: 58
End: 2018-06-28
Payer: MEDICARE

## 2018-06-28 ENCOUNTER — ANESTHESIA EVENT (OUTPATIENT)
Dept: OPERATING ROOM | Age: 58
End: 2018-06-28
Payer: MEDICARE

## 2018-06-28 RX ORDER — SODIUM CHLORIDE 9 MG/ML
INJECTION, SOLUTION INTRAVENOUS CONTINUOUS
Status: CANCELLED | OUTPATIENT
Start: 2018-06-28

## 2018-06-29 ENCOUNTER — APPOINTMENT (OUTPATIENT)
Dept: GENERAL RADIOLOGY | Age: 58
End: 2018-06-29
Attending: PODIATRIST
Payer: MEDICARE

## 2018-06-29 ENCOUNTER — HOSPITAL ENCOUNTER (OUTPATIENT)
Age: 58
Setting detail: OUTPATIENT SURGERY
Discharge: HOME | End: 2018-06-29
Attending: PODIATRIST | Admitting: PODIATRIST
Payer: MEDICARE

## 2018-06-29 ENCOUNTER — ANESTHESIA (OUTPATIENT)
Dept: OPERATING ROOM | Age: 58
End: 2018-06-29
Payer: MEDICARE

## 2018-06-29 VITALS
HEART RATE: 49 BPM | SYSTOLIC BLOOD PRESSURE: 101 MMHG | DIASTOLIC BLOOD PRESSURE: 60 MMHG | HEIGHT: 59 IN | BODY MASS INDEX: 29.43 KG/M2 | TEMPERATURE: 97 F | OXYGEN SATURATION: 99 % | WEIGHT: 146 LBS | RESPIRATION RATE: 11 BRPM

## 2018-06-29 VITALS — OXYGEN SATURATION: 99 % | DIASTOLIC BLOOD PRESSURE: 54 MMHG | SYSTOLIC BLOOD PRESSURE: 84 MMHG

## 2018-06-29 DIAGNOSIS — G89.18 POSTOPERATIVE PAIN: Primary | ICD-10-CM

## 2018-06-29 DIAGNOSIS — Z98.890 STATUS POST LEFT FOOT SURGERY: ICD-10-CM

## 2018-06-29 PROCEDURE — 27635 REMOVE LOWER LEG BONE LESION: CPT | Performed by: PODIATRIST

## 2018-06-29 PROCEDURE — 2580000003 HC RX 258: Performed by: ANESTHESIOLOGY

## 2018-06-29 PROCEDURE — 6360000002 HC RX W HCPCS: Performed by: ANESTHESIOLOGY

## 2018-06-29 PROCEDURE — 6360000002 HC RX W HCPCS: Performed by: PODIATRIST

## 2018-06-29 PROCEDURE — 7100000011 HC PHASE II RECOVERY - ADDTL 15 MIN: Performed by: PODIATRIST

## 2018-06-29 PROCEDURE — 3700000000 HC ANESTHESIA ATTENDED CARE: Performed by: PODIATRIST

## 2018-06-29 PROCEDURE — C9290 INJ, BUPIVACAINE LIPOSOME: HCPCS | Performed by: PODIATRIST

## 2018-06-29 PROCEDURE — 7100000010 HC PHASE II RECOVERY - FIRST 15 MIN: Performed by: PODIATRIST

## 2018-06-29 PROCEDURE — 88305 TISSUE EXAM BY PATHOLOGIST: CPT

## 2018-06-29 PROCEDURE — 3700000001 HC ADD 15 MINUTES (ANESTHESIA): Performed by: PODIATRIST

## 2018-06-29 PROCEDURE — 3600000012 HC SURGERY LEVEL 2 ADDTL 15MIN: Performed by: PODIATRIST

## 2018-06-29 PROCEDURE — 27630 REMOVAL OF TENDON LESION: CPT | Performed by: PODIATRIST

## 2018-06-29 PROCEDURE — 88311 DECALCIFY TISSUE: CPT

## 2018-06-29 PROCEDURE — 2580000003 HC RX 258: Performed by: NURSE ANESTHETIST, CERTIFIED REGISTERED

## 2018-06-29 PROCEDURE — 2500000003 HC RX 250 WO HCPCS: Performed by: NURSE ANESTHETIST, CERTIFIED REGISTERED

## 2018-06-29 PROCEDURE — 3600000002 HC SURGERY LEVEL 2 BASE: Performed by: PODIATRIST

## 2018-06-29 PROCEDURE — 6360000002 HC RX W HCPCS: Performed by: NURSE ANESTHETIST, CERTIFIED REGISTERED

## 2018-06-29 PROCEDURE — 2500000003 HC RX 250 WO HCPCS: Performed by: PODIATRIST

## 2018-06-29 PROCEDURE — 73610 X-RAY EXAM OF ANKLE: CPT

## 2018-06-29 PROCEDURE — 2500000003 HC RX 250 WO HCPCS

## 2018-06-29 PROCEDURE — A6454 SELF-ADHER BAND W>=3" <5"/YD: HCPCS | Performed by: PODIATRIST

## 2018-06-29 RX ORDER — PROPOFOL 10 MG/ML
INJECTION, EMULSION INTRAVENOUS CONTINUOUS PRN
Status: DISCONTINUED | OUTPATIENT
Start: 2018-06-29 | End: 2018-06-29 | Stop reason: SDUPTHER

## 2018-06-29 RX ORDER — SODIUM CHLORIDE, SODIUM LACTATE, POTASSIUM CHLORIDE, CALCIUM CHLORIDE 600; 310; 30; 20 MG/100ML; MG/100ML; MG/100ML; MG/100ML
INJECTION, SOLUTION INTRAVENOUS CONTINUOUS
Status: DISCONTINUED | OUTPATIENT
Start: 2018-06-29 | End: 2018-06-29 | Stop reason: HOSPADM

## 2018-06-29 RX ORDER — HYDROMORPHONE HCL 110MG/55ML
0.25 PATIENT CONTROLLED ANALGESIA SYRINGE INTRAVENOUS EVERY 5 MIN PRN
Status: DISCONTINUED | OUTPATIENT
Start: 2018-06-29 | End: 2018-06-29 | Stop reason: HOSPADM

## 2018-06-29 RX ORDER — SODIUM CHLORIDE, SODIUM LACTATE, POTASSIUM CHLORIDE, CALCIUM CHLORIDE 600; 310; 30; 20 MG/100ML; MG/100ML; MG/100ML; MG/100ML
INJECTION, SOLUTION INTRAVENOUS CONTINUOUS PRN
Status: DISCONTINUED | OUTPATIENT
Start: 2018-06-29 | End: 2018-06-29 | Stop reason: SDUPTHER

## 2018-06-29 RX ORDER — SODIUM CHLORIDE 0.9 % (FLUSH) 0.9 %
10 SYRINGE (ML) INJECTION PRN
Status: DISCONTINUED | OUTPATIENT
Start: 2018-06-29 | End: 2018-06-29 | Stop reason: HOSPADM

## 2018-06-29 RX ORDER — ONDANSETRON 2 MG/ML
4 INJECTION INTRAMUSCULAR; INTRAVENOUS
Status: DISCONTINUED | OUTPATIENT
Start: 2018-06-29 | End: 2018-06-29 | Stop reason: HOSPADM

## 2018-06-29 RX ORDER — LIDOCAINE HYDROCHLORIDE 10 MG/ML
INJECTION, SOLUTION EPIDURAL; INFILTRATION; INTRACAUDAL; PERINEURAL PRN
Status: DISCONTINUED | OUTPATIENT
Start: 2018-06-29 | End: 2018-06-29 | Stop reason: HOSPADM

## 2018-06-29 RX ORDER — PROPOFOL 10 MG/ML
INJECTION, EMULSION INTRAVENOUS PRN
Status: DISCONTINUED | OUTPATIENT
Start: 2018-06-29 | End: 2018-06-29 | Stop reason: SDUPTHER

## 2018-06-29 RX ORDER — SODIUM CHLORIDE 0.9 % (FLUSH) 0.9 %
10 SYRINGE (ML) INJECTION EVERY 12 HOURS SCHEDULED
Status: DISCONTINUED | OUTPATIENT
Start: 2018-06-29 | End: 2018-06-29 | Stop reason: HOSPADM

## 2018-06-29 RX ORDER — EPHEDRINE SULFATE/0.9% NACL/PF 10MG/ML(1)
SYRINGE (ML) INTRAVENOUS PRN
Status: DISCONTINUED | OUTPATIENT
Start: 2018-06-29 | End: 2018-06-29 | Stop reason: SDUPTHER

## 2018-06-29 RX ORDER — FENTANYL CITRATE 50 UG/ML
25 INJECTION, SOLUTION INTRAMUSCULAR; INTRAVENOUS EVERY 5 MIN PRN
Status: DISCONTINUED | OUTPATIENT
Start: 2018-06-29 | End: 2018-06-29 | Stop reason: HOSPADM

## 2018-06-29 RX ORDER — LIDOCAINE HYDROCHLORIDE 20 MG/ML
INJECTION, SOLUTION EPIDURAL; INFILTRATION; INTRACAUDAL; PERINEURAL PRN
Status: DISCONTINUED | OUTPATIENT
Start: 2018-06-29 | End: 2018-06-29 | Stop reason: SDUPTHER

## 2018-06-29 RX ORDER — LIDOCAINE HYDROCHLORIDE 10 MG/ML
1 INJECTION, SOLUTION EPIDURAL; INFILTRATION; INTRACAUDAL; PERINEURAL
Status: DISCONTINUED | OUTPATIENT
Start: 2018-06-29 | End: 2018-06-29 | Stop reason: HOSPADM

## 2018-06-29 RX ORDER — DOXYCYCLINE HYCLATE 100 MG
100 TABLET ORAL 2 TIMES DAILY
Qty: 14 TABLET | Refills: 0 | Status: SHIPPED | OUTPATIENT
Start: 2018-06-29 | End: 2018-07-06

## 2018-06-29 RX ORDER — OXYCODONE AND ACETAMINOPHEN 10; 325 MG/1; MG/1
1 TABLET ORAL EVERY 6 HOURS PRN
Qty: 28 TABLET | Refills: 0 | Status: SHIPPED | OUTPATIENT
Start: 2018-06-29 | End: 2018-07-09 | Stop reason: SDUPTHER

## 2018-06-29 RX ORDER — FENTANYL CITRATE 50 UG/ML
50 INJECTION, SOLUTION INTRAMUSCULAR; INTRAVENOUS EVERY 5 MIN PRN
Status: DISCONTINUED | OUTPATIENT
Start: 2018-06-29 | End: 2018-06-29 | Stop reason: HOSPADM

## 2018-06-29 RX ORDER — HYDROMORPHONE HCL 110MG/55ML
0.5 PATIENT CONTROLLED ANALGESIA SYRINGE INTRAVENOUS EVERY 5 MIN PRN
Status: DISCONTINUED | OUTPATIENT
Start: 2018-06-29 | End: 2018-06-29 | Stop reason: HOSPADM

## 2018-06-29 RX ADMIN — SODIUM CHLORIDE, POTASSIUM CHLORIDE, SODIUM LACTATE AND CALCIUM CHLORIDE: 600; 310; 30; 20 INJECTION, SOLUTION INTRAVENOUS at 08:28

## 2018-06-29 RX ADMIN — LIDOCAINE HYDROCHLORIDE 100 MG: 20 INJECTION, SOLUTION EPIDURAL; INFILTRATION; INTRACAUDAL; PERINEURAL at 10:42

## 2018-06-29 RX ADMIN — CEFAZOLIN SODIUM 2 G: 2 SOLUTION INTRAVENOUS at 10:45

## 2018-06-29 RX ADMIN — Medication 10 MG: at 10:56

## 2018-06-29 RX ADMIN — FENTANYL CITRATE 25 MCG: 50 INJECTION INTRAMUSCULAR; INTRAVENOUS at 11:38

## 2018-06-29 RX ADMIN — SODIUM CHLORIDE, POTASSIUM CHLORIDE, SODIUM LACTATE AND CALCIUM CHLORIDE: 600; 310; 30; 20 INJECTION, SOLUTION INTRAVENOUS at 10:41

## 2018-06-29 RX ADMIN — PROPOFOL 100 MCG/KG/MIN: 10 INJECTION, EMULSION INTRAVENOUS at 10:42

## 2018-06-29 RX ADMIN — PROPOFOL 100 MG: 10 INJECTION, EMULSION INTRAVENOUS at 10:42

## 2018-06-29 ASSESSMENT — PULMONARY FUNCTION TESTS
PIF_VALUE: 1
PIF_VALUE: 2
PIF_VALUE: 1
PIF_VALUE: 0
PIF_VALUE: 1
PIF_VALUE: 1
PIF_VALUE: 0
PIF_VALUE: 1

## 2018-06-29 ASSESSMENT — PAIN DESCRIPTION - ORIENTATION: ORIENTATION: LEFT

## 2018-06-29 ASSESSMENT — PAIN - FUNCTIONAL ASSESSMENT: PAIN_FUNCTIONAL_ASSESSMENT: 0-10

## 2018-06-29 ASSESSMENT — PAIN SCALES - GENERAL
PAINLEVEL_OUTOF10: 5
PAINLEVEL_OUTOF10: 5

## 2018-06-29 ASSESSMENT — PAIN DESCRIPTION - LOCATION: LOCATION: ANKLE

## 2018-06-29 NOTE — H&P
History and Physical Update    Pt Name: Anabel Muñoz  MRN: [id number]  YOB: 1960  Date of evaluation: 6/29/2018      [x] I have reviewed the H&P by Leigh Gracia NP on 6/15/18 which meets the criteria for an Interval History and Physical note and is attached below. [x] I have examined  Anabel Muñoz  There are no changes to the patient who is scheduled for a resection bony neoplasm left fibula by Dr. Shannan Che. The patient denies health changes, fever, chills, productive cough, SOB, skin changes or chest pain. Vital signs: /81   Pulse 50   Temp 98.4 °F (36.9 °C)   Resp 20   Ht 4' 11\" (1.499 m)   Wt 146 lb (66.2 kg)   SpO2 99%   BMI 29.49 kg/m²     Allergies:  Latex; Prozac [fluoxetine hcl]; Sulfa antibiotics; and Codeine    Medications:    Prior to Admission medications    Medication Sig Start Date End Date Taking? Authorizing Provider   amphetamine-dextroamphetamine (ADDERALL) 30 MG tablet Take 1 tablet by mouth 2 times daily for 30 days. . 6/26/18 7/26/18 Yes Barby Crockett DO   buPROPion (WELLBUTRIN XL) 300 MG extended release tablet Take 1 tablet by mouth every morning 6/22/18  Yes Barby Crockett DO   propranolol (INDERAL LA) 80 MG extended release capsule Take 1 capsule by mouth daily 6/22/18  Yes Barby Crockett DO   Calcium Carbonate (CALCIUM 600 PO) Take 600 mg by mouth daily   Yes Historical Provider, MD   omeprazole (PRILOSEC) 40 MG delayed release capsule TAKE ONE CAPSULE BY MOUTH DAILY 6/4/18  Yes Barby Crockett DO   citalopram (CELEXA) 40 MG tablet TAKE ONE TABLET BY MOUTH DAILY 6/4/18  Yes Barby Crockett DO   albuterol sulfate HFA (PROAIR HFA) 108 (90 Base) MCG/ACT inhaler Inhale 2 puffs into the lungs every 4 hours as needed for Wheezing 6/4/18  Yes Barby Crockett DO   oxyCODONE-acetaminophen (PERCOCET) 5-325 MG per tablet Take 1 tablet by mouth every 8 hours as needed for Pain for up to 30 days. . 5/31/18 6/30/18 Yes [address], DO alendronate (FOSAMAX) 70 MG tablet TAKE 1 TABLET BY MOUTH ONCE WEEKLY BEFORE BREAKFAST, ON AN EMPTY STOMACH: REMAIN UPRIGHT FOR 30 MINUTES 10/16/17  Yes Ifeanyi Roy MD   rivaroxaban (XARELTO) 20 MG TABS tablet TAKE ONE TABLET BY MOUTH DAILY 6/4/18   Elliot Montoya DO       This is a 60 y.o. female who is pleasant, cooperative, alert and oriented x3, in no acute distress. Heart: Heart sounds are normal.  Bradycardia rate 52 and rhythm without murmur, gallop or rub. Lungs: Normal respiratory effort with good air exchange and clear to auscultation without wheezes or rales bilaterally   Abdomen: soft, nontender, nondistended with bowel sounds . Labs:  Recent Labs      06/15/18   1203   HGB  11.6*   HCT  35.8*   WBC  6.8   MCV  85.0   PLT  403*   NA  137   K  4.0   CL  99   CO2  26   BUN  18   CREATININE  1.35*       BRUCE MIREILLE Lennon-BC  Electronically signed 6/29/2018 at 8:29 AM     PIETER Emanuel - CNP Nurse Practitioner Signed General Surgery  H&P Date of Service: 6/15/2018 11:12 AM   Related encounter: Pre-Admission Testing Visit from 6/15/2018 in [place] [address] PRE-ADMIT TESTING       Expand All Collapse All    []Manual[]Template  []Copied  History and Physical Service   [place] [address]     HISTORY AND PHYSICAL EXAMINATION            Date of Evaluation:     6/15/2018  Patient name:              Marine Quigley  MRN:                           [id number]  YOB: 1960  PCP:                            Joy Rivera DO     History Obtained From:      Patient     History of Present Illness:      This is Marine Quigley a 60 y.o. female who presents for a pre-admission testing appointment for an upcoming resection/removal bony neoplasm left fibula, left soft tissue mass excision with bone biopsy left ankle fibula by Dr. Suleman Curry scheduled on 06- at 0900 due to left benign bony neoplasm, left soft tissue mass.  The  patient's chief complaint is constant, 6/10 left foot pain which has progressively worsened over the past three years when the pt fell, injuring her left foot. Pt states she had left foot surgery soon after the injury. Left foot pain is aggravated by walking; and is minimally relieved with percocet. Denies numbness and tinging in left foot. Pt states she is not able to wear certain types of shoes due to the left foot pain. Left foot weakness and swelling. Prior treatment includes a left foot boot. Pt states she \"tripped up the steps\" two days ago, but denies injury to her head and left foot. Patient presents for surgical correction.          Past Medical History:      Past Medical History        Past Medical History:   Diagnosis Date    Adult ADHD 2012    Asthma 2012    Broken foot      Left foot    Depression       Under control per pt. on 6/15/18    DVT of leg (deep venous thrombosis) ([address] 3/86/1306    Helicobacter pylori (H. pylori) infection      per EGD    Hematuria 2018     Has a follow-up with Urologist     Hiatal hernia      History of DVT (deep vein thrombosis) 2012    Internal hemorrhoids      MDRO (multiple drug resistant organisms) resistance      Staph, unknown location per pt.     Narcolepsy 2012            Past Surgical History:      Past Surgical History         Past Surgical History:   Procedure Laterality Date    ARM SURGERY Right       Pt states she has had 13 right arm surgeries    BREAST ENHANCEMENT SURGERY Bilateral       SECTION         x 3    COLONOSCOPY   2013     sigmoid diverticula, prominent large internal hemorrhoid    FOOT SURGERY Left 2015    KNEE ARTHROSCOPY Left       Two surgeries on left knee per pt    KNEE SURGERY Right       Three surgeries per pt    NOSE SURGERY        SHOULDER SURGERY Left      UPPER GASTROINTESTINAL ENDOSCOPY   2013     tertiary contractures esophagus, 3 to 4 cm hiatal hernia, antral gastritis, + H Pylori, mild active chronic esophagitis    UPPER GASTROINTESTINAL ENDOSCOPY   4/6/2016     one biopsy    VAGINAL DILATATION        WRIST SURGERY                Medications Prior to Admission:      Home Medications           Prior to Admission medications    Medication Sig Start Date End Date Taking? Authorizing Provider   Calcium Carbonate (CALCIUM 600 PO) Take 600 mg by mouth daily     Yes Historical Provider, MD   aspirin-acetaminophen-caffeine ([place]) [phone] MG per tablet Take 1 tablet by mouth every 6 hours as needed for Headaches     Yes Historical Provider, MD   rivaroxaban (XARELTO) 20 MG TABS tablet TAKE ONE TABLET BY MOUTH DAILY 6/4/18     Heidy Crockett, DO   omeprazole (PRILOSEC) 40 MG delayed release capsule TAKE ONE CAPSULE BY MOUTH DAILY 6/4/18     Heidy Crockett, DO   buPROPion (WELLBUTRIN XL) 300 MG extended release tablet Take 1 tablet by mouth every morning 6/4/18     Barby Crockett, DO   citalopram (CELEXA) 40 MG tablet TAKE ONE TABLET BY MOUTH DAILY 6/4/18     Heidy Crockett,    albuterol sulfate HFA (PROAIR HFA) 108 (90 Base) MCG/ACT inhaler Inhale 2 puffs into the lungs every 4 hours as needed for Wheezing 6/4/18     Barby Crockett,    amphetamine-dextroamphetamine (ADDERALL) 30 MG tablet Take 1 tablet by mouth 2 times daily for 30 days. . 5/31/18 6/30/18   Heidy Crockett,    oxyCODONE-acetaminophen (PERCOCET) 5-325 MG per tablet Take 1 tablet by mouth every 8 hours as needed for Pain for up to 30 days. . 5/31/18 6/30/18   Heidy Crockett,    simethicone (MYLICON) 80 MG chewable tablet Take 1 tablet by mouth 4 times daily as needed for Flatulence 2/12/18     Barby Crockett,    alendronate (FOSAMAX) 70 MG tablet TAKE 1 TABLET BY MOUTH ONCE WEEKLY BEFORE BREAKFAST, ON AN EMPTY STOMACH: REMAIN UPRIGHT FOR 30 MINUTES 10/16/17     Diana Moser MD            Allergies:      Latex; Prozac [fluoxetine hcl]; Sulfa antibiotics; and Codeine     Social History:   P-R Interval 132 ms     QRS Duration 88 ms     Q-T Interval 434 ms     QTc Calculation (Bazett) 484 ms     P Axis 62 degrees     R Axis 6 degrees     T Axis 66 degrees   CBC Auto Differential     Collection Time: 06/15/18 12:03 PM   Result Value Ref Range     WBC 6.8 3.5 - 11.0 k/uL     RBC 4.21 4.0 - 5.2 m/uL     Hemoglobin 11.6 (L) 12.0 - 16.0 g/dL     Hematocrit 35.8 (L) 36 - 46 %     MCV 85.0 80 - 100 fL     MCH 27.6 26 - 34 pg     MCHC 32.5 31 - 37 g/dL     RDW 16.7 (H) 11.5 - 14.5 %     Platelets 660 (H) 450 - 400 k/uL     MPV 7.6 6.0 - 12.0 fL     NRBC Automated NOT REPORTED per 100 WBC     Differential Type NOT REPORTED       Seg Neutrophils 70 (H) 36 - 66 %     Lymphocytes 22 (L) 24 - 44 %     Monocytes 7 1 - 7 %     Eosinophils % 1 1 - 4 %     Basophils 0 0 - 2 %     Immature Granulocytes NOT REPORTED 0 %     Segs Absolute 4.70 1.8 - 7.7 k/uL     Absolute Lymph # 1.50 1.0 - 4.8 k/uL     Absolute Mono # 0.50 0.2 - 0.8 k/uL     Absolute Eos # 0.10 0.0 - 0.4 k/uL     Basophils # 0.00 0.0 - 0.2 k/uL     Absolute Immature Granulocyte NOT REPORTED 0.00 - 0.30 k/uL     WBC Morphology NOT REPORTED       RBC Morphology NOT REPORTED       Platelet Estimate NOT REPORTED     BUN & Creatinine     Collection Time: 06/15/18 12:03 PM   Result Value Ref Range     BUN 18 6 - 20 mg/dL     CREATININE 1.35 (H) 0.50 - 0.90 mg/dL     GFR Non-African American 40 (L) >60 mL/min     GFR  49 (L) >60 mL/min     GFR Comment            GFR Staging NOT REPORTED     Electrolyte Panel     Collection Time: 06/15/18 12:03 PM   Result Value Ref Range     Sodium 137 135 - 144 mmol/L     Potassium 4.0 3.7 - 5.3 mmol/L     Chloride 99 98 - 107 mmol/L     CO2 26 20 - 31 mmol/L     Anion Gap 12 9 - 17 mmol/L                Recent Labs      06/15/18   1203   HGB  11.6*   HCT  35.8*   WBC  6.8   MCV  85.0   NA  137   K  4.0   CL  99   CO2  26   BUN  18   CREATININE  1.35*         Imaging/Diagnostics:     EK-.  See

## 2018-07-01 NOTE — OP NOTE
PODIATRY BRIEF OP NOTE     PATIENT NAME: Rogelio Oates DATE: 1960  -  60 y.o. female  MRN: [id number]  DATE: 6/29/2018     BILLING #:[id number]     SURGEON: Nomi Coronado DPM     ASSISTANTS: Luther Her, NIRANJANY2     PRE-OP DIAGNOSIS: 1) Ganglion cyst, left ankle. 2) Osseous lesion left fibula.      POST-OP DIAGNOSIS: Same as above.     PROCEDURE: 1) Excision of ganglion cyst left ankle. 2) Excision of osseous lesion left fibula,      ANESTHESIA: MAC. 10 cc of 1% lidocaine plain.      HEMOSTASIS: Pneumatic ankle tourniquet @ 250 mmHg for 22 minutes.     ESTIMATED BLOOD LOSS: Less than 10 cc.     MATERIALS: 4-0 monocryl, 3-0 prolene.      INJECTABLES: 10 cc of exparel.      SPECIMEN: 1) Osseous lesion left fibula.      COMPLICATIONS: None.      FINDINGS: As expected. INDICATION FOR PROCEDURE  This is a 60 y.o. female that presents with a ganglion cyst of the left ankle and an osseous outgrowth of the left fibula. The patient has failed conservative measures and opted to have this surgically addressed. NPO status was confirmed. An H&P was performed. Consent was signed and placed in the chart. The patient was made aware of all risks, benefits and alternatives to surgery. No guarantees were given or implied. OPERATIVE PROCEDURE IN DETAIL:  The patient was brought to the operating room and placed on the operating table in the supine position. Following induction of IV sedation, local anesthesia was obtained using 10 cc of 1% lidocaine plain. A tourniquet was then placed to the left high ankle. The left lower extremity was then scrubbed, prepped and draped in the usual aseptic manner. Following a timeout, the tourniquet was inflated to 250 mmHg. Attention was directed to the left ankle. A #15 blade was used to make a linear longitudinal incision over the fibula. This was carried down through subcutaneous tissue with care to avoid all neurovascular structures and to cauterize all bleeders.  The

## 2018-07-02 LAB — SURGICAL PATHOLOGY REPORT: NORMAL

## 2018-07-05 ENCOUNTER — OFFICE VISIT (OUTPATIENT)
Dept: PODIATRY | Age: 58
End: 2018-07-05

## 2018-07-05 VITALS — WEIGHT: 150 LBS | HEIGHT: 62 IN | BODY MASS INDEX: 27.6 KG/M2

## 2018-07-05 DIAGNOSIS — Z98.890 POST-OPERATIVE STATE: Primary | ICD-10-CM

## 2018-07-05 PROBLEM — M17.9 OSTEOARTHRITIS OF KNEE: Status: ACTIVE | Noted: 2018-07-05

## 2018-07-05 PROBLEM — M65.30 ACQUIRED TRIGGER FINGER: Status: ACTIVE | Noted: 2018-07-05

## 2018-07-05 PROBLEM — M72.2 PLANTAR FASCIITIS: Status: ACTIVE | Noted: 2018-07-05

## 2018-07-05 PROBLEM — M76.60 ACHILLES TENDINITIS: Status: ACTIVE | Noted: 2018-07-05

## 2018-07-05 PROBLEM — M25.569 KNEE PAIN: Status: ACTIVE | Noted: 2018-07-05

## 2018-07-05 PROBLEM — M70.50 PES ANSERINUS BURSITIS: Status: ACTIVE | Noted: 2018-07-05

## 2018-07-05 PROCEDURE — 99024 POSTOP FOLLOW-UP VISIT: CPT | Performed by: PODIATRIST

## 2018-07-06 ENCOUNTER — PATIENT MESSAGE (OUTPATIENT)
Dept: FAMILY MEDICINE CLINIC | Age: 58
End: 2018-07-06

## 2018-07-09 ENCOUNTER — PATIENT MESSAGE (OUTPATIENT)
Dept: FAMILY MEDICINE CLINIC | Age: 58
End: 2018-07-09

## 2018-07-09 DIAGNOSIS — G89.18 POSTOPERATIVE PAIN: ICD-10-CM

## 2018-07-09 DIAGNOSIS — Z98.890 STATUS POST LEFT FOOT SURGERY: ICD-10-CM

## 2018-07-09 RX ORDER — OXYCODONE AND ACETAMINOPHEN 10; 325 MG/1; MG/1
1 TABLET ORAL EVERY 6 HOURS PRN
Qty: 28 TABLET | Refills: 0 | Status: SHIPPED | OUTPATIENT
Start: 2018-07-09 | End: 2018-07-16

## 2018-07-09 NOTE — TELEPHONE ENCOUNTER
LV: 6/22/18    Next Visit Date:  Future Appointments  Date Time Provider Naya Roger   7/12/2018 9:30 AM PRINCE Mccauley [facility]   7/23/2018 1:40 PM Jordan Mae  [address] [facility]   Topic Date Due    Diabetes screen  01/05/1999    DTaP/Tdap/Td vaccine (1 - Tdap) 07/19/2003    Shingles Vaccine (1 of 2 - 2 Dose Series) 01/05/2009    Breast cancer screen  07/19/2015    Flu vaccine (1) 09/01/2018    Cervical cancer screen  02/02/2019    Lipid screen  04/27/2023    Colon cancer screen colonoscopy  02/02/2025    Pneumococcal med risk  Completed    Hepatitis C screen  Completed    HIV screen  Completed       No results found for: LABA1C          ( goal A1C is < 7)   No results found for: LABMICR  LDL Cholesterol (mg/dL)   Date Value   04/27/2018 110   02/02/2016 117       (goal LDL is <100)   AST (U/L)   Date Value   04/27/2018 20     ALT (U/L)   Date Value   04/27/2018 13     BUN (mg/dL)   Date Value   06/15/2018 18     BP Readings from Last 3 Encounters:   06/29/18 (!) 84/54   06/29/18 101/60   06/22/18 (!) 140/78          (goal 120/80)    All Future Testing planned in [facility]  Lab Frequency Next Occurrence   HIV Screen Once 12/13/2017   Hepatitis C Antibody Once 12/13/2017   DEXA BONE DENSITY 2 SITES Once 01/08/2018               Patient Active Problem List:     Adult ADHD     Narcolepsy     Asthma     History of DVT (deep vein thrombosis)     DVT of leg (deep venous thrombosis) (HCC)     Closed displaced fracture of body of calcaneus     Hiatal hernia     Internal hemorrhoids     Helicobacter pylori (H. 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

## 2018-07-09 NOTE — TELEPHONE ENCOUNTER
From: Len Francisco  To: Jamel Fabry, DO  Sent: 7/9/2018 9:34 AM EDT  Subject: Prescription Question    Thank you but to tell the truth I had more pain from the other part of my body (that Dr QUAN is addressing) than where I had surgery. I know THE WEIRDEST THING EVER. As of this am though my pain is back to a bit more of a tolerable level and my regular maint dose from you guys helps to keep it in check. I m sorry for bothering you the other day but I just hurt so bad. Maybe it was the weather, I don't know. My stitches come out on the 12th + then I'll go back to PT. I guess we go from there. Nxt appt w/Dr QUAN on 23rd but willing to see her earlier if she wants. Thanks again and sorry to bother you.  ----- Message -----  From: Basim French  Sent: 7/9/2018 8:23 AM EDT  To: Ryan Prasad  Subject: RE: Prescription Question  Mario Toledo- I hope you were able to talk to someone before now. First, you need to understand, that this My Chart is for non emergent questions. We see these during the regular work day,not on weekends or enenings. If your pain is from the surgery then I would think you should be contacting the surgeon. If you want to have an appointment here please call [phone] to get one scheduled. Dr Michael Ray is out of the office this week until Wednesday, but perhaps one of the other doctors, or nurse practitioner might have an opening. Take care,   [facility]    ----- Message -----   From: Ryan Shetty [place]: 7/6/2018 6:49 PM EDT   To: Sherron De Los Santos DO  Subject: Prescription Question    I have been on hold for the last 55 min and in really bad pain. Partially due to last fridays surgery (undetrstandable) but even percocet 10/325 prescribed after surgery ain't making a dent in the pain level of my parts receiving PT ( shoulders, neck, back & everything else) HELP! What's the possibility of coming in tomorrow to get a couple trigger pt shots? Please.  If you can't see me till Monday I

## 2018-07-09 NOTE — TELEPHONE ENCOUNTER
From: Len Francisco  To: Jamel Fabry, DO  Sent: 7/6/2018 6:49 PM EDT  Subject: Prescription Question    I have been on hold for the last 55 min and in really bad pain. Partially due to last fridays surgery (undetrstandable) but even percocet 10/325 prescribed after surgery ain't making a dent in the pain level of my parts receiving PT ( shoulders, neck, back & everything else) HELP! What's the possibility of coming in tomorrow to get a couple trigger pt shots? Please. If you can't see me till Monday I understand but REALLY hurts. Is it possible to prescribe a mild muscle relaxer or something to help the pain pills work? That's helped in the past. Just till you can work me in for those trigger pts. Please let me know if you can help me out.  ASAP if possible

## 2018-07-10 ENCOUNTER — PATIENT MESSAGE (OUTPATIENT)
Dept: FAMILY MEDICINE CLINIC | Age: 58
End: 2018-07-10

## 2018-07-10 DIAGNOSIS — M54.12 CERVICAL RADICULOPATHY: ICD-10-CM

## 2018-07-10 RX ORDER — OXYCODONE HYDROCHLORIDE AND ACETAMINOPHEN 5; 325 MG/1; MG/1
1 TABLET ORAL EVERY 8 HOURS PRN
Qty: 90 TABLET | Refills: 0 | Status: SHIPPED | OUTPATIENT
Start: 2018-07-10 | End: 2018-08-06 | Stop reason: SDUPTHER

## 2018-07-12 ENCOUNTER — OFFICE VISIT (OUTPATIENT)
Dept: PODIATRY | Age: 58
End: 2018-07-12

## 2018-07-12 VITALS — BODY MASS INDEX: 27.6 KG/M2 | HEIGHT: 62 IN | WEIGHT: 150 LBS

## 2018-07-12 DIAGNOSIS — Z98.890 POST-OPERATIVE STATE: Primary | ICD-10-CM

## 2018-07-12 PROCEDURE — 99024 POSTOP FOLLOW-UP VISIT: CPT | Performed by: PODIATRIST

## 2018-07-17 ENCOUNTER — HOSPITAL ENCOUNTER (OUTPATIENT)
Dept: PHYSICAL THERAPY | Facility: CLINIC | Age: 58
Setting detail: THERAPIES SERIES
Discharge: HOME | End: 2018-07-17
Payer: MEDICARE

## 2018-07-23 ENCOUNTER — OFFICE VISIT (OUTPATIENT)
Dept: FAMILY MEDICINE CLINIC | Age: 58
End: 2018-07-23
Payer: MEDICARE

## 2018-07-23 ENCOUNTER — HOSPITAL ENCOUNTER (OUTPATIENT)
Age: 58
Setting detail: SPECIMEN
Discharge: HOME | End: 2018-07-23
Payer: MEDICARE

## 2018-07-23 ENCOUNTER — HOSPITAL ENCOUNTER (OUTPATIENT)
Dept: PHYSICAL THERAPY | Facility: CLINIC | Age: 58
Setting detail: THERAPIES SERIES
Discharge: HOME | End: 2018-07-23
Payer: MEDICARE

## 2018-07-23 VITALS
HEART RATE: 72 BPM | WEIGHT: 149.8 LBS | BODY MASS INDEX: 27.4 KG/M2 | DIASTOLIC BLOOD PRESSURE: 82 MMHG | SYSTOLIC BLOOD PRESSURE: 124 MMHG

## 2018-07-23 DIAGNOSIS — R51.9 CHRONIC DAILY HEADACHE: ICD-10-CM

## 2018-07-23 DIAGNOSIS — F90.9 ATTENTION DEFICIT HYPERACTIVITY DISORDER (ADHD), UNSPECIFIED ADHD TYPE: Primary | ICD-10-CM

## 2018-07-23 DIAGNOSIS — G89.4 CHRONIC PAIN SYNDROME: ICD-10-CM

## 2018-07-23 DIAGNOSIS — F41.9 ANXIETY: ICD-10-CM

## 2018-07-23 DIAGNOSIS — Z12.39 SCREENING FOR BREAST CANCER: ICD-10-CM

## 2018-07-23 PROCEDURE — G8978 MOBILITY CURRENT STATUS: HCPCS

## 2018-07-23 PROCEDURE — 97110 THERAPEUTIC EXERCISES: CPT

## 2018-07-23 PROCEDURE — 97140 MANUAL THERAPY 1/> REGIONS: CPT

## 2018-07-23 PROCEDURE — 99214 OFFICE O/P EST MOD 30 MIN: CPT | Performed by: FAMILY MEDICINE

## 2018-07-23 PROCEDURE — G8979 MOBILITY GOAL STATUS: HCPCS

## 2018-07-23 RX ORDER — HYDROXYZINE 50 MG/1
50 TABLET, FILM COATED ORAL EVERY 8 HOURS PRN
Qty: 30 TABLET | Refills: 1 | Status: SHIPPED | OUTPATIENT
Start: 2018-07-23 | End: 2018-08-01 | Stop reason: ALTCHOICE

## 2018-07-23 RX ORDER — DEXTROAMPHETAMINE SACCHARATE, AMPHETAMINE ASPARTATE, DEXTROAMPHETAMINE SULFATE AND AMPHETAMINE SULFATE 7.5; 7.5; 7.5; 7.5 MG/1; MG/1; MG/1; MG/1
30 TABLET ORAL 2 TIMES DAILY
Qty: 60 TABLET | Refills: 0 | Status: SHIPPED | OUTPATIENT
Start: 2018-07-23 | End: 2018-08-27 | Stop reason: SDUPTHER

## 2018-07-23 NOTE — PLAN OF CARE
[] Chris Banegasund        Outpatient Physical                Therapy       [address]       Phone: [phone]       Fax: [phone] [x] [facility] at [address]       Phone: [phone]       Fax: [phone] [] Eduardo. [facility] [address]     Phone: [phone]     Fax:  [phone]     Physical Therapy Plan of Care/RE-EVAL  *Ignore recent d/c as pt resumed therapy the following day after discharge was made with same diagnoses     Date:  2018  Patient: Hannah Bloom  : 1960  MRN: [id number]  Physician: Thien Gomez DO   Medical Diagnosis:   O05.7U0, R00.85 (ICD-10-CM) - Chronic scapular pain   M54.2 (ICD-10-CM) - Neck pain   M54.5 (ICD-10-CM) - Low back pain, unspecified back pain laterality, unspecified chronicity, with sciatica presence unspecified   Rehab Codes: R29.3, M54.2, M54.6, M54.5, M79.1, M62.830, 729.5   Onset Date:2007              Next Dr.'s appt. : tbd     Visit# / total visits:                     Cancels/No Shows:     Subjective:   Pain:  [x] Yes  [] No                      Location: neck, low back and shoulder pain bilaterally Pain Rating: (0-10 scale) 5/10 (neck); low back 6/10;   Pain altered Tx:  [x] No  [] Yes  Action:      Comments: Pt is returning to therapy after 1 month absence. Current complaints are : bilateral hip pain, low back pain, R knee pain, bilateral shoulder pain (worst), neck pain. Pt reports she was put on medication  That tackles headache, BP pressure, and anxiety. Pt reports she still gets about 3-4x with severe headaches. No falls in past 5 weeks. Pt received left ankle surgery on  and is cleared for all water activities/fulll weight bearing activities including exercises per pt.  Pt reports she has \"been very stressed lately because my mother has dementia and its been worsening and I feel like everything I do keeps getting undone and its so hard to see her like this. I cry every day. \"       Reassessment/objective:     STRENGTH  STRENGTH      LEFT RIGHT   HIP FLEX 4+ 4+, pain in hip    HIPEXT 3+ 3+   HIP ABD 4 4            KNEE FLEX 4 4   KNEE EXT 4 4           ANKLE DF 3, pain  4                PF 4 4        ROM            A/P ROM A/P STRENGTH  STRENGTH     LEFT RIGHT LEFT RIGHT   SEATED SHLD FLEX     [id number] XXXXXXXXX   SEATED SHD ABD     XXXXXXXX BVPGRPEOT   SEATED IR     [id number]               SHLD FLEX 120/140 empty (pain) 120/135; empty (pain)       SHLD ABD 85/85 70/75, empty (painful)                    ELBOW FLEX 4+ 4+       ELBOW EXT. 4+ 4+        strength  Strong  Strong              Special Tests:   L SPURLINGS(-)   Distraction relief of s/xsx  Compression - uncvomforatble  R spurlings (+) R radicular to shoulder     Tests ? Pain ? Pain No Change   RFIS [] [x] []   ELIJAH [] [] [x]   RFIL [] [x] []   REIL [x] [] []         Assessment:  Resume Therapy; transfer to Aquatics due to pt's multiple pain complaints that worsen with gravity resisted exercise. Pt has multiple co-morbidities.      STG: (to be met in 10  treatments)   1. ? Pain:  a. Pt to report pain less than 5/10 on average generally for improved quality of life. 2. ? Strength:  a. Pt to score greater than 4/5 for bilateral shoulder strength, pain free for improved shoulder stabilization and use of BUEs for completing ADLs. 3. ? Function:  1. Pt to have >75% improvement in radiating sx/sx for improved function and use of LUE. 2. Pt to exhibit pain free  AROM for lumbar spine for improved spinal mobility/function. 4. Independent with Home Exercise Programs  5. Demonstrate Knowledge of fall prevention  LTG: (to be met in 20 treatments)  1. Pt to score less than 25% on the NDI to indicate improved function/return to baseline.    2. Pt to report pain at 4/10 (for all pain complaints) with aggravation for return to normal functioning. 3. Pt to exhibit WFL, pain free cervical AROM to allow for improved mobility when driving or performing recreational activities. 4. Pt able to lift greater than 5# over head for 5 repetitions to stimulate putting items away in [place] cabinet (plates, groceries, etc) with less than 1/10 pain present. 5. Pt to score 120 deg of A shoulder flexion for reaching OH into cabinets for the left shoulder. 6. Pt to be able to withstand 10 min of walking with pain <2/10 to allow pt to walk her dog and to improve cardiovascular endurance. 7. Pt to be able to negotiate 10 stairs reciprocally with minimal pain (<2/10) for every day activity. 8. Pt able to maintain 15s of SLS balance bilaterally to prevent falls.        Patient goals: \"to at least function with some normality and to work out in some shape or form. \"     G-CODE (updated 7/23)      Functional Limitation: Carrying, moving objects    Functional Assessment Used: NDI (50%)   Current Status Modifier: CK   Goal Status Modifier: CI  Discharge Status Modifier:      Treatment Plan:  [x] Therapeutic Exercise    [x] Modalities:  [] Therapeutic Activity    [] Ultrasound  [x] Electrical Stimulation  [] Gait Training     [] Massage       [x] Lumbar/Cervical Traction  [] Neuromuscular Re-education [x] Cold/hotpack [] Iontophoresis: 4 mg/mL  [x] Instruction in HEP             Dexamethasone Sodium  [x] Manual Therapy             Phosphate 40-80 mAmin  [] Aquatic Therapy    [] Other:     [] Vasocompression/       Game Ready      []  Medication allergies reviewed for use of    Dexamethasone Sodium Phosphate 4mg/ml     with iontophoresis treatments. Pt is not allergic.       Frequency:  2 x/week for 16 visits      Electronically signed by: Lady Daja PT        Physician Signature:________________________________Date:__________________  By signing above or cosigning this note, I have reviewed this plan of care and certify a need for medically necessary

## 2018-07-23 NOTE — FLOWSHEET NOTE
[] [facility]       Outpatient Physical        Therapy       [address]       Phone: [phone]       Fax: [phone] [x] [facility] at [address]       Phone: [phone]       Fax: [phone] [] Corneliusfrida. [address] [place] [address]   Phone: [phone]   Fax:  [phone]     Physical Therapy Daily Treatment Note/ RE-EVAL  *Ignore recent d/c as pt resumed therapy the following day after discharge was made with same diagnoses       Date:  2018  Patient Name:  Anabel Muñoz    :  1960  MRN: [id number]  Physician: Jeffrey Aase, DO   Medical Diagnosis:   M89.8X1, G89.29 (ICD-10-CM) - Chronic scapular pain   M54.2 (ICD-10-CM) - Neck pain   M54.5 (ICD-10-CM) - Low back pain, unspecified back pain laterality, unspecified chronicity, with sciatica presence unspecified   Rehab Codes: R29.3, M54.2, M54.6, M54.5, M79.1, M62.830, 729.5   Onset Date:2007              Next 's appt. : tbd    Visit# / total visits:  Cancels/No Shows:     Subjective:    Pain:  [x] Yes  [] No  Location: neck, low back and shoulder pain bilaterally Pain Rating: (0-10 scale) 5/10 (neck); low back 6/10;   Pain altered Tx:  [x] No  [] Yes  Action:     Comments: Pt is returning to therapy after 1 month absence. Current complaints are : bilateral hip pain, low back pain, R knee pain, bilateral shoulder pain (worst), neck pain. Pt reports she was put on medication  That tackles headache, BP pressure, and anxiety. Pt reports she still gets about 3-4x with severe headaches. No falls in past 5 weeks. Pt received left ankle surgery on  and is cleared for all water activities/fulll weight bearing activities including exercises per pt.  Pt reports she has \"been very stressed lately because my mother has dementia and its been worsening and I feel like everything I do keeps getting undone and its Activities     []  Aquatics     []  Vasocompression     []  Other     Total Treatment time 28 2       Assessment: [] Progressing toward goals. [] No change. [x] Other:  Resume Therapy; transfer to Aquatics due to pt's multiple pain complaints that worsen with gravity resisted exercise. Pt has multiple co-morbidities. STG: (to be met in 10  treatments)   1. ? Pain:  a. Pt to report pain less than 5/10 on average generally for improved quality of life. 2. ? Strength:  a. Pt to score greater than 4/5 for bilateral shoulder strength, pain free for improved shoulder stabilization and use of BUEs for completing ADLs. 3. ? Function:  1. Pt to have >75% improvement in radiating sx/sx for improved function and use of LUE. 2. Pt to exhibit pain free  AROM for lumbar spine for improved spinal mobility/function. 4. Independent with Home Exercise Programs  5. Demonstrate Knowledge of fall prevention  LTG: (to be met in 20 treatments)  1. Pt to score less than 25% on the NDI to indicate improved function/return to baseline. 2. Pt to report pain at 4/10 (for all pain complaints) with aggravation for return to normal functioning. 3. Pt to exhibit WFL, pain free cervical AROM to allow for improved mobility when driving or performing recreational activities. 4. Pt able to lift greater than 5# over head for 5 repetitions to stimulate putting items away in [place] cabinet (plates, groceries, etc) with less than 1/10 pain present. 5. Pt to score 120 deg of A shoulder flexion for reaching OH into cabinets for the left shoulder. 6. Pt to be able to withstand 10 min of walking with pain <2/10 to allow pt to walk her dog and to improve cardiovascular endurance. 7. Pt to be able to negotiate 10 stairs reciprocally with minimal pain (<2/10) for every day activity.    8. Pt able to maintain 15s of SLS balance bilaterally to prevent falls.        Patient goals: \"to at least function with some normality and to work out in

## 2018-07-23 NOTE — PROGRESS NOTES
Visit Information    Have you changed or started any medications since your last visit including any over-the-counter medicines, vitamins, or herbal medicines? no   Are you having any side effects from any of your medications? -  no  Have you stopped taking any of your medications? Is so, why? -  no    Have you seen any other physician or provider since your last visit? No  Have you had any other diagnostic tests since your last visit? No  Have you been seen in the emergency room and/or had an admission to a hospital since we last saw you? No  Have you had your routine dental cleaning in the past 6 months? yes -     Have you activated your [employer] account? If not, what are your barriers?  Yes     Patient Care Team:  Nadine Perez DO as PCP - Bharath Murphy MD as Consulting Physician (Gastroenterology)  Mellissa Peterson MD as Surgeon (Orthopedic Surgery)  Jina Abrams as Surgeon (Podiatry)  Smiley Younger DPM as Physician (Podiatry)    Medical History Review  Past Medical, Family, and Social History reviewed and  contribute to the patient presenting condition    Health Maintenance   Topic Date Due    Diabetes screen  01/05/1999    DTaP/Tdap/Td vaccine (1 - Tdap) 07/19/2003    Shingles Vaccine (1 of 2 - 2 Dose Series) 01/05/2009    Breast cancer screen  07/19/2015    Flu vaccine (1) 09/01/2018    Cervical cancer screen  02/02/2019    Lipid screen  04/27/2023    Colon cancer screen colonoscopy  02/02/2025    Pneumococcal med risk  Completed    Hepatitis C screen  Completed    HIV screen  Completed

## 2018-07-24 LAB
AMPHETAMINE SCREEN URINE: POSITIVE
BARBITURATE SCREEN URINE: NEGATIVE
BENZODIAZEPINE SCREEN, URINE: NEGATIVE
BUPRENORPHINE URINE: ABNORMAL
CANNABINOID SCREEN URINE: NEGATIVE
COCAINE METABOLITE, URINE: NEGATIVE
MDMA URINE: ABNORMAL
METHADONE SCREEN, URINE: NEGATIVE
METHAMPHETAMINE, URINE: ABNORMAL
OPIATES, URINE: NEGATIVE
OXYCODONE SCREEN URINE: NEGATIVE
PHENCYCLIDINE, URINE: NEGATIVE
PROPOXYPHENE, URINE: ABNORMAL
TEST INFORMATION: ABNORMAL
TRICYCLIC ANTIDEPRESSANTS, UR: ABNORMAL

## 2018-07-27 DIAGNOSIS — M54.12 CERVICAL RADICULOPATHY: ICD-10-CM

## 2018-07-27 RX ORDER — OXYCODONE HYDROCHLORIDE AND ACETAMINOPHEN 5; 325 MG/1; MG/1
1 TABLET ORAL EVERY 8 HOURS PRN
Qty: 90 TABLET | Refills: 0 | OUTPATIENT
Start: 2018-07-27 | End: 2018-08-26

## 2018-07-27 NOTE — TELEPHONE ENCOUNTER
lv 7/23/18  Oars  Please review today    Next Visit Date:  Future Appointments  Date Time Provider Naya Roger   8/2/2018 9:45 AM Andrew Cedeno DPM [facility]   8/13/2018 3:15 PM STA SCR MAMMO RM 2 STAZ MAMMO STA Radiolog   10/23/2018 1:40 PM Farhana Luis  [address] [facility]   Topic Date Due    Diabetes screen  01/05/1999    DTaP/Tdap/Td vaccine (1 - Tdap) 07/19/2003    Shingles Vaccine (1 of 2 - 2 Dose Series) 01/05/2009    Breast cancer screen  07/19/2015    Flu vaccine (1) 09/01/2018    Cervical cancer screen  02/02/2019    Lipid screen  04/27/2023    Colon cancer screen colonoscopy  02/02/2025    Pneumococcal med risk  Completed    Hepatitis C screen  Completed    HIV screen  Completed       No results found for: LABA1C          ( goal A1C is < 7)   No results found for: LABMICR  LDL Cholesterol (mg/dL)   Date Value   04/27/2018 110   02/02/2016 117       (goal LDL is <100)   AST (U/L)   Date Value   04/27/2018 20     ALT (U/L)   Date Value   04/27/2018 13     BUN (mg/dL)   Date Value   06/15/2018 18     BP Readings from Last 3 Encounters:   07/23/18 124/82   06/29/18 (!) 84/54   06/29/18 101/60          (goal 120/80)    All Future Testing planned in [facility]  Lab Frequency Next Occurrence   HIV Screen Once 12/13/2017   Hepatitis C Antibody Once 12/13/2017   DEXA BONE DENSITY 2 SITES Once 01/08/2018   JULES CAD SCREENING Once 07/23/2018               Patient Active Problem List:     Adult ADHD     Narcolepsy     Asthma     History of DVT (deep vein thrombosis)     DVT of leg (deep venous thrombosis) (HCC)     Closed displaced fracture of body of calcaneus     Hiatal hernia     Internal hemorrhoids     Helicobacter pylori (H. 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

## 2018-07-31 ENCOUNTER — TELEPHONE (OUTPATIENT)
Dept: FAMILY MEDICINE CLINIC | Age: 58
End: 2018-07-31

## 2018-08-01 RX ORDER — BUSPIRONE HYDROCHLORIDE 10 MG/1
10 TABLET ORAL 2 TIMES DAILY PRN
Qty: 60 TABLET | Refills: 3 | Status: SHIPPED | OUTPATIENT
Start: 2018-08-01 | End: 2018-09-18 | Stop reason: DRUGHIGH

## 2018-08-06 DIAGNOSIS — M54.12 CERVICAL RADICULOPATHY: ICD-10-CM

## 2018-08-06 RX ORDER — OXYCODONE HYDROCHLORIDE AND ACETAMINOPHEN 5; 325 MG/1; MG/1
1 TABLET ORAL EVERY 8 HOURS PRN
Qty: 90 TABLET | Refills: 0 | Status: SHIPPED | OUTPATIENT
Start: 2018-08-06 | End: 2018-08-31 | Stop reason: SDUPTHER

## 2018-08-06 NOTE — TELEPHONE ENCOUNTER
From: Darleen Kawasaki  Sent: 2018 3:09 AM EDT  Subject: Medication Renewal Request    Maryloubang Chikis. Louie Santiago would like a refill of the following medications:     oxyCODONE-acetaminophen (PERCOCET) 5-325 MG per tablet Mitch Crockett, MAGGIE   Patient Comment: I DONT UNDERSTAND WHY MY PAIN PILLS WERE DENIED. THOUGHT CAUSED BY MY EARLY REFILL REQUEST BUT AS WE AGREED THAT IT [facility] [address] BE A PROBLEM SINCE [facility] HOLDS REFILLS, DISPENSED ALEGAL REFILL DATEPROPER REFIL DATEIT TILL THE REFILL DATE, SO I FIGURED WE WERE GOOD. -UNLESS THERE IS ANOTHER REASON FOR DENIAL, IF SO PLEASE LET ME KNOW ASAP. I NEED TO GO TO [place] FOR UNCLES  BUT BACK IN A FEW DAYS, AND CLOSE TO RUNNING OUT.  SO U CAN SEE WHY I WANT TO CLR EVERYTHING UP     Preferred pharmacy: [facility] [address]  [phone] - F [phone]

## 2018-08-06 NOTE — TELEPHONE ENCOUNTER
OARRS 4/6/18 contract 7/23/18 lv 7/23/18   Next Visit Date:  Future Appointments  Date Time Provider Naya Roger   8/13/2018 3:15 PM STA SCR MAMMO RM 2 STAZ MAMMO STA Radiolog   8/14/2018 2:15 PM PRINCE [facility]   10/23/2018 1:40 PM Stefani Rahman  [address] [facility]   Topic Date Due    Diabetes screen  01/05/1999    DTaP/Tdap/Td vaccine (1 - Tdap) 07/19/2003    Shingles Vaccine (1 of 2 - 2 Dose Series) 01/05/2009    Breast cancer screen  07/19/2015    Flu vaccine (1) 09/01/2018    Cervical cancer screen  02/02/2019    Lipid screen  04/27/2023    Colon cancer screen colonoscopy  02/02/2025    Pneumococcal med risk  Completed    Hepatitis C screen  Completed    HIV screen  Completed       No results found for: LABA1C          ( goal A1C is < 7)   No results found for: LABMICR  LDL Cholesterol (mg/dL)   Date Value   04/27/2018 110   02/02/2016 117       (goal LDL is <100)   AST (U/L)   Date Value   04/27/2018 20     ALT (U/L)   Date Value   04/27/2018 13     BUN (mg/dL)   Date Value   06/15/2018 18     BP Readings from Last 3 Encounters:   07/23/18 124/82   06/29/18 (!) 84/54   06/29/18 101/60          (goal 120/80)    All Future Testing planned in [facility]  Lab Frequency Next Occurrence   HIV Screen Once 12/13/2017   Hepatitis C Antibody Once 12/13/2017   DEXA BONE DENSITY 2 SITES Once 01/08/2018   JULES CAD SCREENING Once 07/23/2018               Patient Active Problem List:     Adult ADHD     Narcolepsy     Asthma     History of DVT (deep vein thrombosis)     DVT of leg (deep venous thrombosis) (HCC)     Closed displaced fracture of body of calcaneus     Hiatal hernia     Internal hemorrhoids     Helicobacter pylori (H. 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

## 2018-08-09 ENCOUNTER — TELEPHONE (OUTPATIENT)
Dept: FAMILY MEDICINE CLINIC | Age: 58
End: 2018-08-09

## 2018-08-09 NOTE — TELEPHONE ENCOUNTER
Pt called and stated that she has jury duty and would like something in writing stating she can not sit that long due to her back and neck.

## 2018-08-13 NOTE — TELEPHONE ENCOUNTER
Last visit:7/23/18  Last Med refill:6-4-18    Next Visit Date:  Future Appointments  Date Time Provider Naya Roger   8/14/2018 2:15 PM [facility]   10/23/2018 1:40 PM Farhana Smith  [address] [facility]   Topic Date Due    Diabetes screen  01/05/1999    DTaP/Tdap/Td vaccine (1 - Tdap) 07/19/2003    Shingles Vaccine (1 of 2 - 2 Dose Series) 01/05/2009    Breast cancer screen  07/19/2015    Flu vaccine (1) 09/01/2018    Cervical cancer screen  02/02/2019    Lipid screen  04/27/2023    Colon cancer screen colonoscopy  02/02/2025    Pneumococcal med risk  Completed    Hepatitis C screen  Completed    HIV screen  Completed       No results found for: LABA1C          ( goal A1C is < 7)   No results found for: LABMICR  LDL Cholesterol (mg/dL)   Date Value   04/27/2018 110   02/02/2016 117       (goal LDL is <100)   AST (U/L)   Date Value   04/27/2018 20     ALT (U/L)   Date Value   04/27/2018 13     BUN (mg/dL)   Date Value   06/15/2018 18     BP Readings from Last 3 Encounters:   07/23/18 124/82   06/29/18 (!) 84/54   06/29/18 101/60          (goal 120/80)    All Future Testing planned in [facility]  Lab Frequency Next Occurrence   HIV Screen Once 12/13/2017   Hepatitis C Antibody Once 12/13/2017   DEXA BONE DENSITY 2 SITES Once 01/08/2018   JULES CAD SCREENING Once 07/23/2018               Patient Active Problem List:     Adult ADHD     Narcolepsy     Asthma     History of DVT (deep vein thrombosis)     DVT of leg (deep venous thrombosis) (HCC)     Closed displaced fracture of body of calcaneus     Hiatal hernia     Internal hemorrhoids     Helicobacter pylori (H. 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

## 2018-08-14 ENCOUNTER — OFFICE VISIT (OUTPATIENT)
Dept: PODIATRY | Age: 58
End: 2018-08-14
Payer: MEDICARE

## 2018-08-14 VITALS — WEIGHT: 150 LBS | RESPIRATION RATE: 16 BRPM | BODY MASS INDEX: 27.6 KG/M2 | HEIGHT: 62 IN

## 2018-08-14 DIAGNOSIS — S92.321A DISPLACED FRACTURE OF SECOND METATARSAL BONE, RIGHT FOOT, INITIAL ENCOUNTER FOR CLOSED FRACTURE: ICD-10-CM

## 2018-08-14 DIAGNOSIS — Z98.890 POST-OPERATIVE STATE: Primary | ICD-10-CM

## 2018-08-14 PROCEDURE — 73600 X-RAY EXAM OF ANKLE: CPT | Performed by: PODIATRIST

## 2018-08-14 PROCEDURE — 99213 OFFICE O/P EST LOW 20 MIN: CPT | Performed by: PODIATRIST

## 2018-08-14 PROCEDURE — 73630 X-RAY EXAM OF FOOT: CPT | Performed by: PODIATRIST

## 2018-08-14 NOTE — PROGRESS NOTES
[facility]YSICIANS  [facility] [address]  [address] [place] [address]  Dept: [phone]  Dept Fax: [phone]    POST-OP PROGRESS NOTE  Date of patient's visit: 8/14/2018  Patient's Name:  Ezio Daniels YOB: 1960            Patient Care Team:  Nita Elmore DO as PCP - Trena Lemus MD as Consulting Physician (Gastroenterology)  Blade Corona MD as Surgeon (Orthopedic Surgery)  Yumiko Soler as Surgeon (Podiatry)  Carly Dawkins DPM as Physician (Podiatry)        Chief Complaint   Patient presents with    Post-Op Check     RESECTION/REMOVAL BONY NEOPLASM LEFT FIBULA, LEFT SOFT TISSUE MASS EXCISION WITH BONE BIOPSY LEFT ANKLE FIBULA           Subjective: Ezio Daniels is a 60 y.o. female who presents to the office today 6week(s)  S/P pain to left foot for correction of   Problem List Items Addressed This Visit     None      . Patient relates pain is Present and worse. Pt states pain is an 8 and is now having pain to right foot F/C/N/V. Patient is taking pain medications as prescribed and is controlling pain. Physical Examination:  Incision is coapted, sutures/steri-strips are intact. Minimal bleeding post operatively. Edema present. No erythema. No Pus. Operative correction is satisfactory. Radiographs: right and left foot     Right foot. Fracture of the base of the 2nd metatarsal.  Pain     Left ankle show no fracture or dislocation. Assessment: Ezio Daniels is status post as above  Normal post operative course. Doing well           Diagnosis Orders   1. Post-operative state  US DOPPLER VENOUS LEGS B/L   2. Displaced fracture of second metatarsal bone, right foot, initial encounter for closed fracture         Plan:  Patient examined and evaluated. Current condition and treatment options discussed in detail. Advised pt to her condtion. rx given for doppler .   Verbal and written instructions given to patient. Orders: No orders of the defined types were placed in this encounter. Contact office with any questions/problems/concerns. RTC in 10 days.      Electronically signed by Andrew Cedeno DPM on 8/14/2018 at 2:16 PM  8/14/2018

## 2018-08-16 ENCOUNTER — PATIENT MESSAGE (OUTPATIENT)
Dept: FAMILY MEDICINE CLINIC | Age: 58
End: 2018-08-16

## 2018-08-17 RX ORDER — ALENDRONATE SODIUM 70 MG/1
TABLET ORAL
Qty: 4 TABLET | Refills: 11 | Status: SHIPPED | OUTPATIENT
Start: 2018-08-17 | End: 2018-10-19 | Stop reason: SDUPTHER

## 2018-08-17 NOTE — TELEPHONE ENCOUNTER
Last visit:18  Last Med refill:10-16-17    Next Visit Date:  Future Appointments  Date Time Provider Naya Roger   2018 1:00 PM STA VASCULAR RM STAZ VASCLAB None   2018 2:30 PM [facility]   10/23/2018 1:40 PM Julissa Alas  [address] [facility]   Topic Date Due    Diabetes screen  1999    DTaP/Tdap/Td vaccine (1 - Tdap) 2003    Shingles Vaccine (1 of 2 - 2 Dose Series) 2009    Breast cancer screen  2015    Flu vaccine (1) 2018    Cervical cancer screen  2019    Lipid screen  2023    Colon cancer screen colonoscopy  2025    Pneumococcal med risk  Completed    Hepatitis C screen  Completed    HIV screen  Completed       No results found for: LABA1C          ( goal A1C is < 7)   No results found for: LABMICR  LDL Cholesterol (mg/dL)   Date Value   2018 110   2016 117       (goal LDL is <100)   AST (U/L)   Date Value   2018 20     ALT (U/L)   Date Value   2018 13     BUN (mg/dL)   Date Value   06/15/2018 18     BP Readings from Last 3 Encounters:   18 124/82   18 (!) 84/54   18 101/60          (goal 120/80)    All Future Testing planned in [facility]  Lab Frequency Next Occurrence   HIV Screen Once 2017   Hepatitis C Antibody Once 2017   DEXA BONE DENSITY 2 SITES Once 2018   JULES CAD SCREENING Once 2018   US DOPPLER VENOUS LEGS B/L Once 2018               Patient Active Problem List:     Adult ADHD     Narcolepsy     Asthma     History of DVT (deep vein thrombosis)     DVT of leg (deep venous thrombosis) (HCC)     Closed displaced fracture of body of calcaneus     Hiatal hernia     Internal hemorrhoids     Helicobacter pylori (H. 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

## 2018-08-18 ENCOUNTER — HOSPITAL ENCOUNTER (OUTPATIENT)
Dept: VASCULAR LAB | Age: 58
Discharge: HOME | End: 2018-08-18
Payer: MEDICARE

## 2018-08-18 DIAGNOSIS — Z98.890 POST-OPERATIVE STATE: ICD-10-CM

## 2018-08-18 PROCEDURE — 93970 EXTREMITY STUDY: CPT

## 2018-08-24 ENCOUNTER — TELEPHONE (OUTPATIENT)
Dept: FAMILY MEDICINE CLINIC | Age: 58
End: 2018-08-24

## 2018-08-24 NOTE — TELEPHONE ENCOUNTER
Would she be interested in coming to talk with Miguel Angel Saucedo sometime to go over some coping mechanisms and ways to keep herself calm?
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
## 2018-08-26 ENCOUNTER — TELEPHONE (OUTPATIENT)
Dept: FAMILY MEDICINE CLINIC | Age: 58
End: 2018-08-26

## 2018-08-27 DIAGNOSIS — F90.9 ATTENTION DEFICIT HYPERACTIVITY DISORDER (ADHD), UNSPECIFIED ADHD TYPE: ICD-10-CM

## 2018-08-27 RX ORDER — DEXTROAMPHETAMINE SACCHARATE, AMPHETAMINE ASPARTATE, DEXTROAMPHETAMINE SULFATE AND AMPHETAMINE SULFATE 7.5; 7.5; 7.5; 7.5 MG/1; MG/1; MG/1; MG/1
30 TABLET ORAL 2 TIMES DAILY
Qty: 60 TABLET | Refills: 0 | Status: SHIPPED | OUTPATIENT
Start: 2018-08-27 | End: 2018-09-24 | Stop reason: SDUPTHER
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
## 2018-08-28 ENCOUNTER — APPOINTMENT (OUTPATIENT)
Dept: CT IMAGING | Age: 58
End: 2018-08-28
Payer: MEDICARE

## 2018-08-28 ENCOUNTER — HOSPITAL ENCOUNTER (EMERGENCY)
Age: 58
Discharge: HOME | End: 2018-08-28
Payer: MEDICARE

## 2018-08-28 VITALS
RESPIRATION RATE: 18 BRPM | DIASTOLIC BLOOD PRESSURE: 70 MMHG | HEIGHT: 59 IN | OXYGEN SATURATION: 99 % | WEIGHT: 150 LBS | TEMPERATURE: 98.1 F | SYSTOLIC BLOOD PRESSURE: 125 MMHG | HEART RATE: 63 BPM | BODY MASS INDEX: 30.24 KG/M2

## 2018-08-28 DIAGNOSIS — S06.0X0A CONCUSSION WITHOUT LOSS OF CONSCIOUSNESS, INITIAL ENCOUNTER: Primary | ICD-10-CM

## 2018-08-28 PROCEDURE — 99283 EMERGENCY DEPT VISIT LOW MDM: CPT

## 2018-08-28 PROCEDURE — 70450 CT HEAD/BRAIN W/O DYE: CPT

## 2018-08-28 PROCEDURE — 6370000000 HC RX 637 (ALT 250 FOR IP): Performed by: PHYSICIAN ASSISTANT

## 2018-08-28 PROCEDURE — 6360000002 HC RX W HCPCS: Performed by: PHYSICIAN ASSISTANT

## 2018-08-28 PROCEDURE — 72125 CT NECK SPINE W/O DYE: CPT

## 2018-08-28 RX ORDER — ONDANSETRON 4 MG/1
4 TABLET, ORALLY DISINTEGRATING ORAL EVERY 8 HOURS PRN
Qty: 20 TABLET | Refills: 0 | Status: SHIPPED | OUTPATIENT
Start: 2018-08-28 | End: 2018-11-12

## 2018-08-28 RX ORDER — OXYCODONE HYDROCHLORIDE AND ACETAMINOPHEN 5; 325 MG/1; MG/1
1 TABLET ORAL ONCE
Status: COMPLETED | OUTPATIENT
Start: 2018-08-28 | End: 2018-08-28

## 2018-08-28 RX ORDER — ONDANSETRON 4 MG/1
4 TABLET, ORALLY DISINTEGRATING ORAL ONCE
Status: COMPLETED | OUTPATIENT
Start: 2018-08-28 | End: 2018-08-28

## 2018-08-28 RX ADMIN — ONDANSETRON 4 MG: 4 TABLET, ORALLY DISINTEGRATING ORAL at 15:12

## 2018-08-28 RX ADMIN — OXYCODONE HYDROCHLORIDE AND ACETAMINOPHEN 1 TABLET: 5; 325 TABLET ORAL at 15:31

## 2018-08-28 ASSESSMENT — PAIN DESCRIPTION - DESCRIPTORS: DESCRIPTORS: POUNDING;PRESSURE

## 2018-08-28 ASSESSMENT — PAIN DESCRIPTION - LOCATION: LOCATION: HEAD

## 2018-08-28 ASSESSMENT — PAIN DESCRIPTION - PROGRESSION: CLINICAL_PROGRESSION: RESOLVED

## 2018-08-28 ASSESSMENT — PAIN DESCRIPTION - PAIN TYPE: TYPE: ACUTE PAIN

## 2018-08-28 ASSESSMENT — PAIN SCALES - GENERAL
PAINLEVEL_OUTOF10: 0
PAINLEVEL_OUTOF10: 9
PAINLEVEL_OUTOF10: 9

## 2018-08-28 NOTE — ED PROVIDER NOTES
The patient was seen and examined by me in conjunction with the mid-level provider. I agree with his/her assessment and treatment plan. CT is negative and she is able to be released. Findings discussed with the patient.      Sebastian Heard MD  08/28/18 [id number]

## 2018-08-31 DIAGNOSIS — M54.12 CERVICAL RADICULOPATHY: ICD-10-CM

## 2018-08-31 RX ORDER — OXYCODONE HYDROCHLORIDE AND ACETAMINOPHEN 5; 325 MG/1; MG/1
1 TABLET ORAL EVERY 8 HOURS PRN
Qty: 90 TABLET | Refills: 0 | Status: SHIPPED | OUTPATIENT
Start: 2018-09-07 | End: 2018-10-02 | Stop reason: SDUPTHER

## 2018-08-31 NOTE — TELEPHONE ENCOUNTER
Last visit:7/23/18  Last Med refill:8/6/18    Next Visit Date:  Future Appointments  Date Time Provider Naya Roger   9/4/2018 2:30 PM PR[facility]   10/23/2018 1:40 PM Elliot Montoya  [address] [facility] Maintenance   Topic Date Due    Diabetes screen  01/05/1999    DTaP/Tdap/Td vaccine (1 - Tdap) 07/19/2003    Shingles Vaccine (1 of 2 - 2 Dose Series) 01/05/2009    Breast cancer screen  07/19/2015    Flu vaccine (1) 09/01/2018    Cervical cancer screen  02/02/2019    Lipid screen  04/27/2023    Colon cancer screen colonoscopy  02/02/2025    Pneumococcal med risk  Completed    Hepatitis C screen  Completed    HIV screen  Completed       No results found for: LABA1C          ( goal A1C is < 7)   No results found for: LABMICR  LDL Cholesterol (mg/dL)   Date Value   04/27/2018 110   02/02/2016 117       (goal LDL is <100)   AST (U/L)   Date Value   04/27/2018 20     ALT (U/L)   Date Value   04/27/2018 13     BUN (mg/dL)   Date Value   06/15/2018 18     BP Readings from Last 3 Encounters:   08/28/18 125/70   07/23/18 124/82   06/29/18 (!) 84/54          (goal 120/80)    All Future Testing planned in [facility]  Lab Frequency Next Occurrence   HIV Screen Once 12/13/2017   Hepatitis C Antibody Once 12/13/2017   DEXA BONE DENSITY 2 SITES Once 01/08/2018   JULES CAD SCREENING Once 07/23/2018               Patient Active Problem List:     Adult ADHD     Narcolepsy     Asthma     History of DVT (deep vein thrombosis)     DVT of leg (deep venous thrombosis) (HCC)     Closed displaced fracture of body of calcaneus     Hiatal hernia     Internal hemorrhoids     Helicobacter pylori (H. 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

## 2018-09-04 ENCOUNTER — OFFICE VISIT (OUTPATIENT)
Dept: PODIATRY | Age: 58
End: 2018-09-04

## 2018-09-04 ENCOUNTER — HOSPITAL ENCOUNTER (OUTPATIENT)
Dept: GENERAL RADIOLOGY | Age: 58
Discharge: HOME | End: 2018-09-06
Payer: MEDICARE

## 2018-09-04 ENCOUNTER — HOSPITAL ENCOUNTER (OUTPATIENT)
Age: 58
Discharge: HOME | End: 2018-09-06
Payer: MEDICARE

## 2018-09-04 VITALS — HEIGHT: 62 IN | WEIGHT: 150 LBS | RESPIRATION RATE: 16 BRPM | BODY MASS INDEX: 27.6 KG/M2

## 2018-09-04 DIAGNOSIS — Z98.890 POST-OPERATIVE STATE: ICD-10-CM

## 2018-09-04 DIAGNOSIS — M79.672 LEFT FOOT PAIN: ICD-10-CM

## 2018-09-04 DIAGNOSIS — M25.572 ACUTE LEFT ANKLE PAIN: ICD-10-CM

## 2018-09-04 DIAGNOSIS — M79.672 LEFT FOOT PAIN: Primary | ICD-10-CM

## 2018-09-04 PROCEDURE — 99024 POSTOP FOLLOW-UP VISIT: CPT | Performed by: PODIATRIST

## 2018-09-04 PROCEDURE — 73600 X-RAY EXAM OF ANKLE: CPT

## 2018-09-04 PROCEDURE — 73630 X-RAY EXAM OF FOOT: CPT

## 2018-09-04 RX ORDER — PREDNISONE 10 MG/1
TABLET ORAL
Qty: 20 TABLET | Refills: 0 | Status: SHIPPED | OUTPATIENT
Start: 2018-09-04 | End: 2018-10-23 | Stop reason: ALTCHOICE

## 2018-09-10 ENCOUNTER — TELEPHONE (OUTPATIENT)
Dept: FAMILY MEDICINE CLINIC | Age: 58
End: 2018-09-10
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
## 2018-09-10 NOTE — PROGRESS NOTES
[facility]  [facility] [address]  [address] [place] [address]  Dept: [phone]  Dept Fax: [phone]    POST-OP PROGRESS NOTE  Date of patient's visit: 9/10/2018  Patient's Name:  Steven Mosqueda YOB: 1960            Patient Care Team:  Kirti Franklin DO as PCP - Franco Ellis MD as Consulting Physician (Gastroenterology)  Shazia Raya MD as Surgeon (Orthopedic Surgery)  Xi Liz as Surgeon (Podiatry)  Zuleyka Keenan DPM as Physician (Podiatry)        Chief Complaint   Patient presents with    Post-Op Check    Foot Pain     bilateral           Subjective: Steven Mosqueda is a 60 y.o. female who presents to the office today 4week(s)  S/P resection of fibula mass for correction of pain  Problem List Items Addressed This Visit     None      Visit Diagnoses     Left foot pain    -  Primary    Relevant Orders    XR FOOT LEFT (MIN 3 VIEWS) (Completed)    XR ANKLE LEFT (2 VIEWS) (Completed)    Post-operative state        Relevant Orders    XR FOOT LEFT (MIN 3 VIEWS) (Completed)    XR ANKLE LEFT (2 VIEWS) (Completed)    Acute left ankle pain          . Patient relates pain is Present and improved. Pain is rated 4 out of 10 and is described as constant. Currently denies F/C/N/V. Patient is taking pain medications as prescribed and is controlling pain. Physical Examination:  Incision is coapted, sutures/steri-strips are intact. Minimal bleeding post operatively. Edema present. No erythema. No Pus. Operative correction is satisfactory. Assessment: Steven Mosqueda is status post as above  Normal post operative course. Doing well            ICD-10-CM ICD-9-CM    1.  Left foot pain M79.672 729.5 XR FOOT LEFT (MIN 3 VIEWS)      XR ANKLE LEFT (2 VIEWS)      CANCELED: XR FOOT LEFT (MIN 3 VIEWS)   2. Post-operative state Z98.890 V45.89 XR FOOT LEFT (MIN 3 VIEWS)      XR ANKLE LEFT (2 VIEWS)      CANCELED: XR FOOT LEFT (MIN 3

## 2018-09-11 ENCOUNTER — OFFICE VISIT (OUTPATIENT)
Dept: FAMILY MEDICINE CLINIC | Age: 58
End: 2018-09-11
Payer: MEDICARE

## 2018-09-11 DIAGNOSIS — F43.0 STRESS REACTION: ICD-10-CM

## 2018-09-11 DIAGNOSIS — M54.9 BACK PAIN, UNSPECIFIED BACK LOCATION, UNSPECIFIED BACK PAIN LATERALITY, UNSPECIFIED CHRONICITY: Primary | ICD-10-CM

## 2018-09-11 PROCEDURE — 99213 OFFICE O/P EST LOW 20 MIN: CPT | Performed by: FAMILY MEDICINE

## 2018-09-11 NOTE — PROGRESS NOTES
Visit Information    Have you changed or started any medications since your last visit including any over-the-counter medicines, vitamins, or herbal medicines? no   Are you having any side effects from any of your medications? -  no  Have you stopped taking any of your medications? Is so, why? -  no    Have you seen any other physician or provider since your last visit? No  Have you had any other diagnostic tests since your last visit? No  Have you been seen in the emergency room and/or had an admission to a hospital since we last saw you? No  Have you had your routine dental cleaning in the past 6 months? no    Have you activated your [employer] account? If not, what are your barriers?  Yes     Patient Care Team:  Luma Youssef DO as PCP - Vern Enriquez MD as Consulting Physician (Gastroenterology)  Gaudencio Flores MD as Surgeon (Orthopedic Surgery)  Alla García as Surgeon (Podiatry)  Maria Del Carmen Goodwin DPM as Physician (Podiatry)    Medical History Review  Past Medical, Family, and Social History reviewed and  contribute to the patient presenting condition    Health Maintenance   Topic Date Due    Diabetes screen  01/05/1999    DTaP/Tdap/Td vaccine (1 - Tdap) 07/19/2003    Shingles Vaccine (1 of 2 - 2 Dose Series) 01/05/2009    Breast cancer screen  07/19/2015    Flu vaccine (1) 09/01/2018    Cervical cancer screen  02/02/2019    Lipid screen  04/27/2023    Colon cancer screen colonoscopy  02/02/2025    Pneumococcal med risk  Completed    Hepatitis C screen  Completed    HIV screen  Completed

## 2018-09-17 ENCOUNTER — TELEPHONE (OUTPATIENT)
Dept: FAMILY MEDICINE CLINIC | Age: 58
End: 2018-09-17

## 2018-09-18 ENCOUNTER — OFFICE VISIT (OUTPATIENT)
Dept: PODIATRY | Age: 58
End: 2018-09-18
Payer: MEDICARE

## 2018-09-18 VITALS — HEIGHT: 62 IN | WEIGHT: 150 LBS | BODY MASS INDEX: 27.6 KG/M2 | RESPIRATION RATE: 16 BRPM

## 2018-09-18 DIAGNOSIS — Z98.890 POST-OPERATIVE STATE: ICD-10-CM

## 2018-09-18 DIAGNOSIS — M84.374D STRESS FRACTURE OF METATARSAL BONE OF RIGHT FOOT WITH ROUTINE HEALING, SUBSEQUENT ENCOUNTER: Primary | ICD-10-CM

## 2018-09-18 PROCEDURE — 99024 POSTOP FOLLOW-UP VISIT: CPT | Performed by: PODIATRIST

## 2018-09-18 PROCEDURE — 73630 X-RAY EXAM OF FOOT: CPT | Performed by: PODIATRIST

## 2018-09-18 RX ORDER — BUSPIRONE HYDROCHLORIDE 15 MG/1
15 TABLET ORAL 3 TIMES DAILY
Qty: 90 TABLET | Refills: 5 | Status: SHIPPED | OUTPATIENT
Start: 2018-09-18 | End: 2019-03-19 | Stop reason: SDUPTHER

## 2018-09-24 DIAGNOSIS — F90.9 ATTENTION DEFICIT HYPERACTIVITY DISORDER (ADHD), UNSPECIFIED ADHD TYPE: ICD-10-CM

## 2018-09-24 NOTE — TELEPHONE ENCOUNTER
Last visit:8/27/18  Last Med refill:9/11/18    Next Visit Date:  Future Appointments  Date Time Provider Naya Roger   10/16/2018 2:00 PM St[facility]   10/23/2018 1:40 PM Anna Haro  [address] [facility] Maintenance   Topic Date Due    Diabetes screen  01/05/1999    DTaP/Tdap/Td vaccine (1 - Tdap) 07/19/2003    Shingles Vaccine (1 of 2 - 2 Dose Series) 01/05/2009    Breast cancer screen  07/19/2015    Flu vaccine (1) 09/01/2018    Cervical cancer screen  02/02/2019    Lipid screen  04/27/2023    Colon cancer screen colonoscopy  02/02/2025    Pneumococcal med risk  Completed    Hepatitis C screen  Completed    HIV screen  Completed       No results found for: LABA1C          ( goal A1C is < 7)   No results found for: LABMICR  LDL Cholesterol (mg/dL)   Date Value   04/27/2018 110   02/02/2016 117       (goal LDL is <100)   AST (U/L)   Date Value   04/27/2018 20     ALT (U/L)   Date Value   04/27/2018 13     BUN (mg/dL)   Date Value   06/15/2018 18     BP Readings from Last 3 Encounters:   08/28/18 125/70   07/23/18 124/82   06/29/18 (!) 84/54          (goal 120/80)    All Future Testing planned in [facility]  Lab Frequency Next Occurrence   HIV Screen Once 12/13/2017   Hepatitis C Antibody Once 12/13/2017   DEXA BONE DENSITY 2 SITES Once 01/08/2018   JULES CAD SCREENING Once 07/23/2018               Patient Active Problem List:     Adult ADHD     Narcolepsy     Asthma     History of DVT (deep vein thrombosis)     DVT of leg (deep venous thrombosis) (HCC)     Closed displaced fracture of body of calcaneus     Hiatal hernia     Internal hemorrhoids     Helicobacter pylori (H. 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

## 2018-09-25 RX ORDER — DEXTROAMPHETAMINE SACCHARATE, AMPHETAMINE ASPARTATE, DEXTROAMPHETAMINE SULFATE AND AMPHETAMINE SULFATE 7.5; 7.5; 7.5; 7.5 MG/1; MG/1; MG/1; MG/1
30 TABLET ORAL 2 TIMES DAILY
Qty: 60 TABLET | Refills: 0 | Status: SHIPPED | OUTPATIENT
Start: 2018-09-25 | End: 2018-10-23 | Stop reason: SDUPTHER

## 2018-09-26 ENCOUNTER — PATIENT MESSAGE (OUTPATIENT)
Dept: FAMILY MEDICINE CLINIC | Age: 58
End: 2018-09-26

## 2018-09-27 ENCOUNTER — PATIENT MESSAGE (OUTPATIENT)
Dept: FAMILY MEDICINE CLINIC | Age: 58
End: 2018-09-27

## 2018-09-28 NOTE — TELEPHONE ENCOUNTER
From: Loraine Cruz  To: Arturo Franz DO  Sent: 9/27/2018 5:44 PM EDT  Subject: Non-Urgent Medical Question    okee dooc ok  ----- Message -----  From: Rosangela Yu  Sent: 9/27/2018 1:04 PM EDT  To: Dolores Peres. Osmar  Subject: RE: Non-Urgent Medical Question  Hi Bruna Ramirezck- Probably the best thing would be for Dr Maribel Victor to do a new order for the DEXA. We can do that next month at your visit with her. Take care,   Jazmin Sen[place]    ----- Message -----   From: Dolores Peres. Denia Morenogger: 9/26/2018 11:08 PM EDT   To: Jomar Zhang DO  Subject: Non-Urgent Medical Question    FYI - FLIPPED THROUGH MY CHART, NOTICED MIGHT BE DUE FOR BONE DENSITY TEST. RADHA ORDERED 1st OF YR- INS. DENIED. SAID TOO SOON. STILL MIGHT BE.   ODDER EXPIRES YR END SO FIGURED WORTH MENTIONING. NOT SURE EVEN NEEDED. WAS JUST CURIOUS. GUESS BALL YOUR COURT. GONNA HURT EITHER WAY SO NO PROB IF LET IT SLIDE. BESIDES HATE TONS OF APPTS .

## 2018-10-02 DIAGNOSIS — M54.12 CERVICAL RADICULOPATHY: ICD-10-CM

## 2018-10-03 RX ORDER — OXYCODONE HYDROCHLORIDE AND ACETAMINOPHEN 5; 325 MG/1; MG/1
1 TABLET ORAL EVERY 8 HOURS PRN
Qty: 90 TABLET | Refills: 0 | Status: SHIPPED | OUTPATIENT
Start: 2018-10-03 | End: 2018-10-31 | Stop reason: SDUPTHER

## 2018-10-05 ENCOUNTER — TELEPHONE (OUTPATIENT)
Dept: FAMILY MEDICINE CLINIC | Age: 58
End: 2018-10-05

## 2018-10-07 RX ORDER — PROPRANOLOL HYDROCHLORIDE 80 MG/1
CAPSULE, EXTENDED RELEASE ORAL
Qty: 30 CAPSULE | Refills: 2 | Status: SHIPPED | OUTPATIENT
Start: 2018-10-07 | End: 2019-01-18 | Stop reason: SDUPTHER

## 2018-10-17 ENCOUNTER — HOSPITAL ENCOUNTER (OUTPATIENT)
Dept: PHYSICAL THERAPY | Age: 58
Setting detail: THERAPIES SERIES
Discharge: HOME | End: 2018-10-17
Payer: MEDICARE

## 2018-10-17 PROCEDURE — G8985 CARRY GOAL STATUS: HCPCS

## 2018-10-17 PROCEDURE — 97110 THERAPEUTIC EXERCISES: CPT

## 2018-10-17 PROCEDURE — 97164 PT RE-EVAL EST PLAN CARE: CPT

## 2018-10-17 PROCEDURE — G8984 CARRY CURRENT STATUS: HCPCS

## 2018-10-18 DIAGNOSIS — G89.29 CHRONIC FOOT PAIN, LEFT: ICD-10-CM

## 2018-10-18 DIAGNOSIS — S86.312D TEAR OF PERONEAL TENDON, LEFT, SUBSEQUENT ENCOUNTER: ICD-10-CM

## 2018-10-18 DIAGNOSIS — R26.2 DIFFICULTY WALKING: Primary | ICD-10-CM

## 2018-10-18 DIAGNOSIS — M79.672 CHRONIC FOOT PAIN, LEFT: ICD-10-CM

## 2018-10-19 RX ORDER — ALENDRONATE SODIUM 70 MG/1
TABLET ORAL
Qty: 4 TABLET | Refills: 11 | Status: SHIPPED | OUTPATIENT
Start: 2018-10-19 | End: 2019-10-23 | Stop reason: SDUPTHER

## 2018-10-19 NOTE — TELEPHONE ENCOUNTER
From: Lou Jefferson  Sent: 10/19/2018 2:29 AM EDT  Subject: Medication Renewal Request    Zaire Garner.  Jabari Galan would like a refill of the following medications:     alendronate (FOSAMAX) 70 MG tablet Miles Kunz DO]   Patient Comment: Jordan Hernandez    Preferred pharmacy: [facility] [address] [place] [address]  [phone] - F [phone]

## 2018-10-22 ENCOUNTER — HOSPITAL ENCOUNTER (OUTPATIENT)
Dept: PHYSICAL THERAPY | Age: 58
Setting detail: THERAPIES SERIES
Discharge: HOME | End: 2018-10-22
Payer: MEDICARE

## 2018-10-22 PROCEDURE — 97113 AQUATIC THERAPY/EXERCISES: CPT

## 2018-10-22 NOTE — PROGRESS NOTES
Retro Shrugs 10x           Horiz abd/add  10x           Fwd flex  10x           Abd/add  10x           PNF             Bicep Curls  10x           IR/ER  10x           Wall Push-Ups                           Kickboard Ex.   Add          ROM w/ kickboard             Iso Abd.             Push-pull             Paddling             Breast-stroke 2 Laps            Rope pull                           UE Stretches             Corner stretch             Post. Capsule stretch             LE Stretches             Hamstring             Achilles             Quad             C-Spine Stretches             Upper trap             Chin tucks             Cerv ROM             Deep H2O             Cycling             Jacks             Cross-country             Hang                           Pain Rating 8               Specific Instructions for next treatment: Assess response and add stretches    Treatment Charges: Mins Units   []  Modalities     []  Ther Exercise     []  Manual Therapy     []  Ther Activities     [x]  Aquatics 24 2   []  Neuromuscular     []  Other     Total Treatment time 24 2       Assessment: [x] Progressing toward goals. Emphasis on technique- patient needs to be redirected and encouraged for proper technique. [] No change. [] Other:    STG: (to be met in 10  treatments)   1. ? Pain:  a. Pt to report pain less than 5/10 on average generally for improved quality of life. 2. ? Strength:  a. Pt to score greater than 4/5 for bilateral shoulder strength, pain free for improved shoulder stabilization and use of BUEs for completing ADLs. 3. ? Function:  1. Pt to have >75% improvement in radiating sx/sx for improved function and use of LUE. 2. Pt to exhibit pain free  AROM for lumbar spine for improved spinal mobility/function. 4. Independent with Home Exercise Programs  5. Demonstrate Knowledge of fall prevention  LTG: (to be met in 20 treatments)  1.  Pt to score less than 25% on the NDI to indicate

## 2018-10-23 ENCOUNTER — OFFICE VISIT (OUTPATIENT)
Dept: FAMILY MEDICINE CLINIC | Age: 58
End: 2018-10-23
Payer: MEDICARE

## 2018-10-23 VITALS — WEIGHT: 150 LBS | SYSTOLIC BLOOD PRESSURE: 130 MMHG | BODY MASS INDEX: 30.3 KG/M2 | DIASTOLIC BLOOD PRESSURE: 84 MMHG

## 2018-10-23 DIAGNOSIS — G89.4 CHRONIC PAIN SYNDROME: ICD-10-CM

## 2018-10-23 DIAGNOSIS — R63.5 WEIGHT GAIN: ICD-10-CM

## 2018-10-23 DIAGNOSIS — Z00.00 ROUTINE ADULT HEALTH MAINTENANCE: ICD-10-CM

## 2018-10-23 DIAGNOSIS — F41.9 ANXIETY: ICD-10-CM

## 2018-10-23 DIAGNOSIS — N95.0 POSTMENOPAUSAL BLEEDING: ICD-10-CM

## 2018-10-23 DIAGNOSIS — F90.9 ATTENTION DEFICIT HYPERACTIVITY DISORDER (ADHD), UNSPECIFIED ADHD TYPE: Primary | ICD-10-CM

## 2018-10-23 PROCEDURE — 99214 OFFICE O/P EST MOD 30 MIN: CPT | Performed by: FAMILY MEDICINE

## 2018-10-23 RX ORDER — DEXTROAMPHETAMINE SACCHARATE, AMPHETAMINE ASPARTATE, DEXTROAMPHETAMINE SULFATE AND AMPHETAMINE SULFATE 7.5; 7.5; 7.5; 7.5 MG/1; MG/1; MG/1; MG/1
30 TABLET ORAL 2 TIMES DAILY
Qty: 60 TABLET | Refills: 0 | Status: CANCELLED | OUTPATIENT
Start: 2018-10-23 | End: 2018-11-22

## 2018-10-23 RX ORDER — DEXTROAMPHETAMINE SACCHARATE, AMPHETAMINE ASPARTATE, DEXTROAMPHETAMINE SULFATE AND AMPHETAMINE SULFATE 7.5; 7.5; 7.5; 7.5 MG/1; MG/1; MG/1; MG/1
30 TABLET ORAL 2 TIMES DAILY
Qty: 60 TABLET | Refills: 0 | Status: SHIPPED | OUTPATIENT
Start: 2018-10-23 | End: 2018-11-19 | Stop reason: SDUPTHER

## 2018-10-23 ASSESSMENT — PATIENT HEALTH QUESTIONNAIRE - PHQ9
SUM OF ALL RESPONSES TO PHQ9 QUESTIONS 1 & 2: 0
2. FEELING DOWN, DEPRESSED OR HOPELESS: 0
SUM OF ALL RESPONSES TO PHQ QUESTIONS 1-9: 0
SUM OF ALL RESPONSES TO PHQ QUESTIONS 1-9: 0
1. LITTLE INTEREST OR PLEASURE IN DOING THINGS: 0

## 2018-10-24 ENCOUNTER — HOSPITAL ENCOUNTER (OUTPATIENT)
Dept: PHYSICAL THERAPY | Age: 58
Setting detail: THERAPIES SERIES
Discharge: HOME | End: 2018-10-24
Payer: MEDICARE

## 2018-10-24 PROCEDURE — 97113 AQUATIC THERAPY/EXERCISES: CPT

## 2018-10-24 NOTE — PROGRESS NOTES
[facility]   [address]   Phone: [phone]   Fax: [phone]    Physical Therapy Daily Treatment Note      Date:  10/24/2018  Patient Name:  Mike Lewis    :  1960  MRN: [id number]  Physician: Alexandria Mahmood DO   Medical Diagnosis:   F70.2E5, S11.26 (ICD-10-CM) - Chronic scapular pain   M54.2 (ICD-10-CM) - Neck pain   M54.5 (ICD-10-CM) - Low back pain, unspecified back pain laterality, unspecified chronicity, with sciatica presence unspecified   Rehab Codes: R29.3, M54.2, M54.6, M54.5, M79.1, M62.830, 729.5   Onset Date:2007              Next 's appt.: 10/23/2018   Visit# / total visits:                     Cancels/No Shows:       Subjective:  Pain is better today than last visit but noted increased symptoms after initial visit- Neck and shoulder are most bothersome today. Was in bed most of the day yesterday. MRI tomorrow of L ankle  Pain:  [x] Yes  [] No Location: Neck 6/10 and L UE just above elbow 4/10; L ankle 8/10; Low back 5/10 Pain Rating: (0-10 scale)   Pain altered Tx:  [x] No  [] Yes  Action:  Comments: Reviewed importance of dorsal/core stability and postural awareness  Objective[de-identified]  Exercises:  [facility] Exercise Log  Protocol Fibromyalgia     Date of Eval:       10/17/18 ( transfer from [facility] )                         Primary PT: Leatha Nails neck/ scapular/low back pain  Things to Focus On (goals):  Improve overall mobility particularly neck/ B shoulders  Surgical Precautions: recent L foot surgery  Medical Precautions:  [] C-9 dates  [] Occ Med   [x] Medicare     Date 10/22/18   10/24/18         Visit #          Walk F/L/R w/ UE ROM 2 Laps   2 Laps         LE Exercise             Marching 10x   10x  Lap       Squats  10x5\"  10x5\"   Low Box         Step-Ups F/L             SLR F/L/R  10x 10x+R          HS Curl             Lunges             Knee spine for improved spinal mobility/function. 4. Independent with Home Exercise Programs  5. Demonstrate Knowledge of fall prevention  LTG: (to be met in 20 treatments)  1. Pt to score less than 25% on the NDI to indicate improved function/return to baseline. 2. Pt to report pain at 4/10 (for all pain complaints) with aggravation for return to normal functioning. 3. Pt to exhibit WFL, pain free cervical AROM to allow for improved mobility when driving or performing recreational activities. 4. Pt able to lift greater than 5# over head for 5 repetitions to stimulate putting items away in [place] cabinet (plates, groceries, etc) with less than 1/10 pain present. 5. Pt to score 120 deg of A shoulder flexion for reaching [place] into cabinets for the left shoulder. 6. Pt to be able to withstand 10 min of walking with pain <2/10 to allow pt to walk her dog and to improve cardiovascular endurance. 7. Pt to be able to negotiate 10 stairs reciprocally with minimal pain (<2/10) for every day activity. 8. Pt able to maintain 15s of SLS balance bilaterally to prevent falls.     Patient goals: \"to at least function with some normality and to work out in some shape or form. \"    Pt. Education:  [x] Yes  [] No  [] Reviewed Prior HEP/Ed  Method of Education: [x] Verbal  [x] Demo  [] Written  Comprehension of Education:  [x] Verbalizes understanding. [x] Demonstrates understanding. [x] Needs review. [] Demonstrates/verbalizes HEP/Ed previously given. Plan: [x] Continue per plan of care.    [] Other:      Time In: 150 PM           Time Out: 230 PM    Electronically signed by:  Uche Hernandez PTA

## 2018-10-25 ENCOUNTER — HOSPITAL ENCOUNTER (OUTPATIENT)
Dept: MRI IMAGING | Age: 58
Discharge: HOME | End: 2018-10-27
Payer: MEDICARE

## 2018-10-25 DIAGNOSIS — R26.2 TROUBLE WALKING: ICD-10-CM

## 2018-10-25 DIAGNOSIS — M25.572 CHRONIC PAIN OF LEFT ANKLE: ICD-10-CM

## 2018-10-25 DIAGNOSIS — M79.672 CHRONIC FOOT PAIN, LEFT: ICD-10-CM

## 2018-10-25 DIAGNOSIS — R26.2 DIFFICULTY WALKING: ICD-10-CM

## 2018-10-25 DIAGNOSIS — S86.312D TEAR OF PERONEAL TENDON, LEFT, SUBSEQUENT ENCOUNTER: ICD-10-CM

## 2018-10-25 DIAGNOSIS — G89.29 CHRONIC FOOT PAIN, LEFT: ICD-10-CM

## 2018-10-25 DIAGNOSIS — G89.29 CHRONIC PAIN OF LEFT ANKLE: ICD-10-CM

## 2018-10-25 PROCEDURE — 73718 MRI LOWER EXTREMITY W/O DYE: CPT

## 2018-10-25 PROCEDURE — 73721 MRI JNT OF LWR EXTRE W/O DYE: CPT

## 2018-10-29 ENCOUNTER — HOSPITAL ENCOUNTER (OUTPATIENT)
Dept: PHYSICAL THERAPY | Age: 58
Setting detail: THERAPIES SERIES
Discharge: HOME | End: 2018-10-29
Payer: MEDICARE

## 2018-10-29 ENCOUNTER — TELEPHONE (OUTPATIENT)
Dept: FAMILY MEDICINE CLINIC | Age: 58
End: 2018-10-29

## 2018-10-29 PROCEDURE — 97113 AQUATIC THERAPY/EXERCISES: CPT

## 2018-10-29 NOTE — PROGRESS NOTES
10/24/18  10/29/18       Visit # 6/20 7/20 8/20       Walk F/L/R w/ UE ROM 2 Laps   2 Laps  2 laps ea       LE Exercise             Marching 10x   10x  2 laps       Squats  10x5\"  10x5\"   Low Box  10x5\"  Low Box       Step-Ups F/L             SLR F/L/R  10x 10x+R   10x ea       HS Curl             Lunges             Knee flex/ext 10x 10x 10x        UE exercises             Retro Shrugs 10x  10x 10x        Horiz abd/add  10x  10x 10x        Fwd flex  10x  10x  10x       Abd/add  10x  10x  10x       PNF             Bicep Curls  10x  10x 10x        IR/ER  10x  10x  10x       Wall Push-Ups                           Kickboard Ex. Small  Small       ROM w/ kickboard             Iso Abd.    10x5\"  10x5\"       Push-pull    10x Unable-pain        Paddling             Breast-stroke 2 Laps   2 Laps 2 laps        Rope pull                           UE Stretches             Corner stretch             Post. Capsule stretch     2x20\"       LE Stretches             Hamstring   2x20\"   2x20\"       Achilles    2x20\"  2x20\"       Quad             C-Spine Stretches             Upper trap             Chin tucks             Cerv ROM             Deep H2O             Cycling             Jacks             Cross-country             Hang                           Pain Rating 8  5-8  3-7.5           Specific Instructions for next treatment: Assess patient response and progress as able    Treatment Charges: Mins Units   []  Modalities     []  Ther Exercise     []  Manual Therapy     []  Ther Activities     [x]  Aquatics 39 3   []  Neuromuscular     []  Other     Total Treatment time 39 3        Assessment: [x] Progressing toward goals. Emphasis on technique- patient needs to be redirected and encouraged to perform small/controlled motions. Progressed to marching laps and added (B) post. Capsular stretches without issue   [] No change. [] Other:    STG: (to be met in 10  treatments)   1. ? Pain:  a.  Pt to report pain less than 5/10 on average generally for improved quality of life. 2. ? Strength:  a. Pt to score greater than 4/5 for bilateral shoulder strength, pain free for improved shoulder stabilization and use of BUEs for completing ADLs. 3. ? Function:  1. Pt to have >75% improvement in radiating sx/sx for improved function and use of LUE. 2. Pt to exhibit pain free  AROM for lumbar spine for improved spinal mobility/function. 4. Independent with Home Exercise Programs  5. Demonstrate Knowledge of fall prevention  LTG: (to be met in 20 treatments)  1. Pt to score less than 25% on the NDI to indicate improved function/return to baseline. 2. Pt to report pain at 4/10 (for all pain complaints) with aggravation for return to normal functioning. 3. Pt to exhibit WFL, pain free cervical AROM to allow for improved mobility when driving or performing recreational activities. 4. Pt able to lift greater than 5# over head for 5 repetitions to stimulate putting items away in [place] cabinet (plates, groceries, etc) with less than 1/10 pain present. 5. Pt to score 120 deg of A shoulder flexion for reaching [place] into cabinets for the left shoulder. 6. Pt to be able to withstand 10 min of walking with pain <2/10 to allow pt to walk her dog and to improve cardiovascular endurance. 7. Pt to be able to negotiate 10 stairs reciprocally with minimal pain (<2/10) for every day activity. 8. Pt able to maintain 15s of SLS balance bilaterally to prevent falls.     Patient goals: \"to at least function with some normality and to work out in some shape or form. \"    Pt. Education:  [x] Yes  [] No  [] Reviewed Prior HEP/Ed  Method of Education: [x] Verbal  [x] Demo  [] Written  Comprehension of Education:  [x] Verbalizes understanding. [x] Demonstrates understanding. [x] Needs review. [] Demonstrates/verbalizes HEP/Ed previously given. Plan: [x] Continue per plan of care.    [] Other:      Time In: 3960           Time Out: 1450    Electronically

## 2018-10-30 NOTE — TELEPHONE ENCOUNTER
Patient would like to see if she could get this prescribed and see if her insurance will cover it first, if that is ok?

## 2018-10-31 ENCOUNTER — HOSPITAL ENCOUNTER (OUTPATIENT)
Dept: PHYSICAL THERAPY | Age: 58
Setting detail: THERAPIES SERIES
Discharge: HOME | End: 2018-10-31
Payer: MEDICARE

## 2018-10-31 DIAGNOSIS — M54.12 CERVICAL RADICULOPATHY: ICD-10-CM

## 2018-10-31 PROCEDURE — 97113 AQUATIC THERAPY/EXERCISES: CPT

## 2018-10-31 RX ORDER — OXYCODONE HYDROCHLORIDE AND ACETAMINOPHEN 5; 325 MG/1; MG/1
1 TABLET ORAL EVERY 8 HOURS PRN
Qty: 90 TABLET | Refills: 0 | Status: SHIPPED | OUTPATIENT
Start: 2018-10-31 | End: 2018-12-03 | Stop reason: SDUPTHER

## 2018-10-31 NOTE — PROGRESS NOTES
2 laps ea 2 Laps     LE Exercise             Marching 10x   10x  2 laps 2 Laps      Squats  10x5\"  10x5\"   Low Box  10x5\"  Low Box Low Box 12x5\"      Step-Ups F/L       Low 10x      SLR F/L/R  10x 10x+R   10x ea  12x     Lunges             Knee flex/ext 10x 10x 10x  12x      UE exercises             Retro Shrugs 10x  10x 10x   10x     Horiz abd/add  10x  10x 10x   10x     Fwd flex  10x  10x  10x  10x     Abd/add  10x  10x  10x  10x     PNF         Add    Bicep Curls  10x  10x 10x   10x     IR/ER  10x  10x  10x  10x     Wall Push-Ups                     Ball Shapes     Add                 Kickboard Ex. Small  Small Small      3- Way ROM w/ kickboard        3x5\" each     Iso Abd.    10x5\"  10x5\"  10x5\"     Push-pull    10x Unable-pain   NT     Paddling             Breast-stroke 2 Laps   2 Laps 2 laps   2 Laps     Rope pull                           UE Stretches             Corner stretch             Post. Capsule stretch     2x20\"  2x20\"     LE Stretches             Hamstring   2x20\"   2x20\"  2x20\"     Achilles    2x20\"  2x20\"  2x20\"     Piriformis-  Seated on Step       2x20\"     C-Spine Stretches             Upper trap             Chin tucks             Cerv ROM             Deep H2O             Cycling             Jacks             Cross-country             Hang                           Pain Rating 8  5-8  3-7.5   see above        Specific Instructions for next treatment: Add PNF and ball shapes to challenge core and dorsal stability    Treatment Charges: Mins Units   []  Modalities     []  Ther Exercise     []  Manual Therapy     []  Ther Activities     [x]  Aquatics 41 3   []  Neuromuscular     []  Other     Total Treatment time 41 3        Assessment: [x] Progressing toward goals. Emphasis on technique- patient needs to be redirected and encouraged to perform small/controlled motions. Added step ups and increased reps with LE exercise; also added kickboard stretches.  Attempted piriformis stretch on step- very tight with hip ER- demonstrated to patient how to perform knee to opposite chest supine at home   [] No change. [] Other:    STG: (to be met in 10  treatments)   1. ? Pain:  a. Pt to report pain less than 5/10 on average generally for improved quality of life. 2. ? Strength:  a. Pt to score greater than 4/5 for bilateral shoulder strength, pain free for improved shoulder stabilization and use of BUEs for completing ADLs. 3. ? Function:  1. Pt to have >75% improvement in radiating sx/sx for improved function and use of LUE. 2. Pt to exhibit pain free  AROM for lumbar spine for improved spinal mobility/function. 4. Independent with Home Exercise Programs  5. Demonstrate Knowledge of fall prevention  LTG: (to be met in 20 treatments)  1. Pt to score less than 25% on the NDI to indicate improved function/return to baseline. 2. Pt to report pain at 4/10 (for all pain complaints) with aggravation for return to normal functioning. 3. Pt to exhibit WFL, pain free cervical AROM to allow for improved mobility when driving or performing recreational activities. 4. Pt able to lift greater than 5# over head for 5 repetitions to stimulate putting items away in [place] cabinet (plates, groceries, etc) with less than 1/10 pain present. 5. Pt to score 120 deg of A shoulder flexion for reaching [place] into cabinets for the left shoulder. 6. Pt to be able to withstand 10 min of walking with pain <2/10 to allow pt to walk her dog and to improve cardiovascular endurance. 7. Pt to be able to negotiate 10 stairs reciprocally with minimal pain (<2/10) for every day activity. 8. Pt able to maintain 15s of SLS balance bilaterally to prevent falls.     Patient goals: \"to at least function with some normality and to work out in some shape or form. \"    Pt. Education:  [x] Yes  [] No  [] Reviewed Prior HEP/Ed  Method of Education: [x] Verbal  [x] Demo  [] Written  Comprehension of Education:  [x] Verbalizes understanding.   [x]

## 2018-11-06 ENCOUNTER — HOSPITAL ENCOUNTER (OUTPATIENT)
Dept: PHYSICAL THERAPY | Age: 58
Setting detail: THERAPIES SERIES
Discharge: HOME | End: 2018-11-06
Payer: MEDICARE

## 2018-11-06 PROCEDURE — 97113 AQUATIC THERAPY/EXERCISES: CPT

## 2018-11-06 PROCEDURE — G8985 CARRY GOAL STATUS: HCPCS

## 2018-11-06 PROCEDURE — G8984 CARRY CURRENT STATUS: HCPCS

## 2018-11-06 PROCEDURE — 97110 THERAPEUTIC EXERCISES: CPT

## 2018-11-06 NOTE — PLAN OF CARE
[] Misael Ahuja        Outpatient Physical                Therapy       [address]       Phone: [phone]       Fax: [phone] [x] [facility]       Promotion at [address] [facility]       Phone: [phone]       Fax: [phone] [] Suzy Melissa Martinez      for Health Promotion    [address]     Phone: [phone]     Fax:  [phone]     Date:  2018  Patient: Valerie Bailey  : 1960  MRN: [id number]  Physician: Sudhakar Francisco DO   Medical Diagnosis:   V51.8G1, X02.92 (ICD-10-CM) - Chronic scapular pain   M54.2 (ICD-10-CM) - Neck pain   M54.5 (ICD-10-CM) - Low back pain, unspecified back pain laterality, unspecified chronicity, with sciatica presence unspecified   Rehab Codes: R29.3, M54.2, M54.6, M54.5, M79.1, M62.830, 729.5   Onset Date:2007              Next 's appt. : 2019     Visit# / total visits:10/20                     Cancels/No Shows:     Subjective:   Pain:  [x] Yes  [] No                      Location: neck, low back and shoulder pain bilaterally Pain Rating: (0-10 scale) 8 1/2 - 10 (neck); low back 5/10;L shoulder  7/10; R scapula 5/10 - increased pain after Aquatics  Pain altered Tx:  [x] No  [] Yes  Action:      Comments: Feels she's getting worse , maybe \" weather related \"  Reassessment/objective:     STRENGTH  STRENGTH      LEFT RIGHT   HIP FLEX 4+ 4+, pain in hip ( NOW 4/5)   HIPEXT 3+ 3+   HIP ABD 4 4            KNEE FLEX 4 4   KNEE EXT 4 4           ANKLE DF defer  defer                PF defer defer         ROM            A/P ROM A/P STRENGTH       LEFT RIGHT LEFT    SEATED SHLD FLEX     3-/5 3-/5    SEATED SHD ABD     3-/5 3-/5    SEATED IR     3+-4/5 3+-4/5     Cervical  sidebend  24 ( pain ); rotation:50  20 (pain); 50 ( NOW 45 )      SHLD FLEX 70/75  (pain/ tightness) - NOW 65 115/135;  (pain)  NOW 95 flex      SHLD ABD 50/55  (pain/ tightness)- now 55 90/95,  (painful)

## 2018-11-06 NOTE — PROGRESS NOTES
[facility] Outpatient Physical Therapy   [address]   Phone: [phone]   Fax: [phone]    Physical Therapy Daily Treatment Note    Date:  2018  Patient Name:  Sergo Agarwal    :  1960  MRN: [id number]  Physician: Margarita Ro DO   Medical Diagnosis:   U91.7A5, B78.61 (ICD-10-CM) - Chronic scapular pain   M54.2 (ICD-10-CM) - Neck pain   M54.5 (ICD-10-CM) - Low back pain, unspecified back pain laterality, unspecified chronicity, with sciatica presence unspecified   Rehab Codes: R29.3, M54.2, M54.6, M54.5, M79.1, M62.830, 729.5   Onset Date:2007              Next 's appt. : 18;  PCP 18    Visit# / total visits: 10/20     (6/10 G-Codes)                Cancels/No Shows:     Subjective:   States pain overall is worse since beginning PT- yesterday pain was so bad in L ankle- it fred/shot pain and caused her to fall. [facility] Feel B shoulders are very painful and only L was at beginning of PT. Has trouble sleeping due to inability to get comfortable. Pain was up to 10/10 yesterday. Feel colder weather has contributed to pain also. Also reports she was unable to put weight on R LE after last visit when trying to climb steps and walking    Pain:  [x] Yes  [] No Location: Neck 6/10, (B) Ankles 3/10, Left shoulder/ Upper arm 7.5/10, Low Back 10/10 with sit to stand; 2-3/10 at rest  Pain altered Tx:  [] No  [x] Yes  Action: Held all UE ROM with walking and exercise; modified LE exercise to tolerance- patient very painful    Comments: Reviewed importance of dorsal/core stability and postural awareness     Objective[de-identified]  Exercises:  [facility] Exercise Log  Protocol Fibromyalgia     Date of Eval:       10/17/18 ( transfer from [facility] )                         Primary PT: Kathy Brad neck/ scapular/low back pain  Things to Focus On (goals):  Improve overall mobility particularly neck/ B shoulders  Surgical Precautions: recent L foot surgery  Medical Precautions:  [] C-9 dates  [] Occ Med   [x] Medicare     Date 10/22/18   10/24/18  10/29/18  10/31/18  11/6/18   Visit # 6/20  7/20  8/20  9/20  10/20   Walk F/L/R w/ UE ROM 2 Laps   2 Laps  2 laps ea 2 Laps  2 Laps NO UE   LE Exercise             Marching 10x   10x  2 laps 2 Laps   2 Laps   Squats  10x5\"  10x5\"   Low Box  10x5\"  Low Box Low Box 12x5\"   Low Box 10x5\"   Step-Ups F/L       Low 10x   Low 10x   SLR F/L/R  10x 10x+R   10x ea  12x  10x   Lunges             Knee flex/ext 10x 10x 10x  12x   10x   UE exercises         Hold    Retro Shrugs 10x  10x 10x   10x  -   Horiz abd/add  10x  10x 10x   10x  -   Fwd flex  10x  10x  10x  10x  -   Abd/add  10x  10x  10x  10x  -   PNF         -    Bicep Curls  10x  10x 10x   10x -    IR/ER  10x  10x  10x  10x -    Wall Push-Ups                     Ball Shapes     -                 Kickboard Ex. Small  Small Small  Hold    3- Way ROM w/ kickboard        3x5\" each  -   Iso Abd.     10x5\"  10x5\"  10x5\"  -   Push-pull    10x Unable-pain   NT  -   Paddling             Breast-stroke 2 Laps   2 Laps 2 laps   2 Laps  Hold   Rope pull                           UE Stretches             Corner stretch             Post. Capsule stretch     2x20\"  2x20\" Hold    LE Stretches             Hamstring   2x20\"   2x20\"  2x20\" 2x20\"    Achilles    2x20\"  2x20\"  2x20\"  2x20\"   Piriformis-  Seated on Step       2x20\" NT    C-Spine Stretches             Upper trap             Chin tucks             Cerv ROM             Deep H2O             Cycling             Jacks             Cross-country             Hang                           Pain Rating 8  5-8  3-7.5   see above See Above       Specific Instructions for next treatment: Resume exercise to tolerance    Treatment Charges: Mins Units   []  Modalities     []  Ther Exercise     []  Manual Therapy     []  Ther Activities     [x]  Aquatics 25 2   []  Neuromuscular     []  Other

## 2018-11-12 ENCOUNTER — OFFICE VISIT (OUTPATIENT)
Dept: PODIATRY | Age: 58
End: 2018-11-12
Payer: MEDICARE

## 2018-11-12 VITALS — BODY MASS INDEX: 30.24 KG/M2 | HEIGHT: 59 IN | WEIGHT: 150 LBS

## 2018-11-12 DIAGNOSIS — M25.572 ACUTE LEFT ANKLE PAIN: Primary | ICD-10-CM

## 2018-11-12 DIAGNOSIS — M76.72 PERONEAL TENDONITIS OF LEFT LOWER EXTREMITY: ICD-10-CM

## 2018-11-12 PROCEDURE — 20550 NJX 1 TENDON SHEATH/LIGAMENT: CPT | Performed by: PODIATRIST

## 2018-11-12 PROCEDURE — 99213 OFFICE O/P EST LOW 20 MIN: CPT | Performed by: PODIATRIST

## 2018-11-12 RX ORDER — BETAMETHASONE SODIUM PHOSPHATE AND BETAMETHASONE ACETATE 3; 3 MG/ML; MG/ML
3 INJECTION, SUSPENSION INTRA-ARTICULAR; INTRALESIONAL; INTRAMUSCULAR; SOFT TISSUE ONCE
Status: COMPLETED | OUTPATIENT
Start: 2018-11-12 | End: 2018-11-13

## 2018-11-13 RX ADMIN — BETAMETHASONE SODIUM PHOSPHATE AND BETAMETHASONE ACETATE 3 MG: 3; 3 INJECTION, SUSPENSION INTRA-ARTICULAR; INTRALESIONAL; INTRAMUSCULAR; SOFT TISSUE at 15:52

## 2018-11-14 ENCOUNTER — HOSPITAL ENCOUNTER (OUTPATIENT)
Dept: PHYSICAL THERAPY | Age: 58
Setting detail: THERAPIES SERIES
Discharge: HOME | End: 2018-11-14
Payer: MEDICARE

## 2018-11-14 PROCEDURE — 97113 AQUATIC THERAPY/EXERCISES: CPT

## 2018-11-14 NOTE — PROGRESS NOTES
ADLs. 3. ? Function:  1. Pt to have >75% improvement in radiating sx/sx for improved function and use of LUE. 2. Pt to exhibit pain free  AROM for lumbar spine for improved spinal mobility/function. 4. Independent with Home Exercise Programs  5. Demonstrate Knowledge of fall prevention  LTG: (to be met in 20 treatments)  1. Pt to score less than 25% on the NDI to indicate improved function/return to baseline. 2. Pt to report pain at 4/10 (for all pain complaints) with aggravation for return to normal functioning. 3. Pt to exhibit WFL, pain free cervical AROM to allow for improved mobility when driving or performing recreational activities. 4. Pt able to lift greater than 5# over head for 5 repetitions to stimulate putting items away in [place] cabinet (plates, groceries, etc) with less than 1/10 pain present. 5. Pt to score 120 deg of A shoulder flexion for reaching OH into cabinets for the left shoulder. 6. Pt to be able to withstand 10 min of walking with pain <2/10 to allow pt to walk her dog and to improve cardiovascular endurance. 7. Pt to be able to negotiate 10 stairs reciprocally with minimal pain (<2/10) for every day activity. 8. Pt able to maintain 15s of SLS balance bilaterally to prevent falls.     Patient goals: \"to at least function with some normality and to work out in some shape or form. \"    Pt. Education:  [x] Yes  [] No  [] Reviewed Prior HEP/Ed  Method of Education: [x] Verbal  [x] Demo  [] Written  Comprehension of Education:  [x] Verbalizes understanding. [x] Demonstrates understanding. [x] Needs review. [] Demonstrates/verbalizes HEP/Ed previously given. Plan: [] Continue per plan of care.    [x] Other: PT Re-assess this date    Time In: 0604        Time Out: 8927    Electronically signed by Shai Schumacher PTA on 11/14/2018 at 12:59 PM

## 2018-11-15 ENCOUNTER — HOSPITAL ENCOUNTER (OUTPATIENT)
Dept: ULTRASOUND IMAGING | Age: 58
Discharge: HOME | End: 2018-11-17
Payer: MEDICARE

## 2018-11-15 ENCOUNTER — HOSPITAL ENCOUNTER (OUTPATIENT)
Dept: MAMMOGRAPHY | Age: 58
Discharge: HOME | End: 2018-11-17
Payer: MEDICARE

## 2018-11-15 ENCOUNTER — HOSPITAL ENCOUNTER (OUTPATIENT)
Age: 58
Discharge: HOME | End: 2018-11-15
Payer: MEDICARE

## 2018-11-15 DIAGNOSIS — Z12.39 SCREENING FOR BREAST CANCER: ICD-10-CM

## 2018-11-15 DIAGNOSIS — M89.9 BONE DISORDER: ICD-10-CM

## 2018-11-15 DIAGNOSIS — N95.0 POSTMENOPAUSAL BLEEDING: ICD-10-CM

## 2018-11-15 DIAGNOSIS — Z00.00 ROUTINE ADULT HEALTH MAINTENANCE: ICD-10-CM

## 2018-11-15 LAB
ABSOLUTE EOS #: 0.05 K/UL (ref 0–0.44)
ABSOLUTE IMMATURE GRANULOCYTE: <0.03 K/UL (ref 0–0.3)
ABSOLUTE LYMPH #: 1.51 K/UL (ref 1.1–3.7)
ABSOLUTE MONO #: 0.51 K/UL (ref 0.1–1.2)
ALBUMIN SERPL-MCNC: 4 G/DL (ref 3.5–5.2)
ALBUMIN/GLOBULIN RATIO: 1.4 (ref 1–2.5)
ALP BLD-CCNC: 101 U/L (ref 35–104)
ALT SERPL-CCNC: 12 U/L (ref 5–33)
ANION GAP SERPL CALCULATED.3IONS-SCNC: 12 MMOL/L (ref 9–17)
AST SERPL-CCNC: 18 U/L
BASOPHILS # BLD: 1 % (ref 0–2)
BASOPHILS ABSOLUTE: 0.07 K/UL (ref 0–0.2)
BILIRUB SERPL-MCNC: 0.21 MG/DL (ref 0.3–1.2)
BUN BLDV-MCNC: 15 MG/DL (ref 6–20)
BUN/CREAT BLD: ABNORMAL (ref 9–20)
CALCIUM SERPL-MCNC: 9.4 MG/DL (ref 8.6–10.4)
CHLORIDE BLD-SCNC: 105 MMOL/L (ref 98–107)
CHOLESTEROL/HDL RATIO: 3.8
CHOLESTEROL: 198 MG/DL
CO2: 27 MMOL/L (ref 20–31)
CREAT SERPL-MCNC: 1.04 MG/DL (ref 0.5–0.9)
DIFFERENTIAL TYPE: ABNORMAL
EOSINOPHILS RELATIVE PERCENT: 1 % (ref 1–4)
GFR AFRICAN AMERICAN: >60 ML/MIN
GFR NON-AFRICAN AMERICAN: 54 ML/MIN
GFR SERPL CREATININE-BSD FRML MDRD: ABNORMAL ML/MIN/{1.73_M2}
GFR SERPL CREATININE-BSD FRML MDRD: ABNORMAL ML/MIN/{1.73_M2}
GLUCOSE BLD-MCNC: 101 MG/DL (ref 70–99)
HCT VFR BLD CALC: 43.2 % (ref 36.3–47.1)
HDLC SERPL-MCNC: 52 MG/DL
HEMOGLOBIN: 12.9 G/DL (ref 11.9–15.1)
IMMATURE GRANULOCYTES: 0 %
LDL CHOLESTEROL: 111 MG/DL (ref 0–130)
LYMPHOCYTES # BLD: 26 % (ref 24–43)
MCH RBC QN AUTO: 27.7 PG (ref 25.2–33.5)
MCHC RBC AUTO-ENTMCNC: 29.9 G/DL (ref 28.4–34.8)
MCV RBC AUTO: 92.7 FL (ref 82.6–102.9)
MONOCYTES # BLD: 9 % (ref 3–12)
NRBC AUTOMATED: 0 PER 100 WBC
PDW BLD-RTO: 15.6 % (ref 11.8–14.4)
PLATELET # BLD: 376 K/UL (ref 138–453)
PLATELET ESTIMATE: ABNORMAL
PMV BLD AUTO: 10.5 FL (ref 8.1–13.5)
POTASSIUM SERPL-SCNC: 3.8 MMOL/L (ref 3.7–5.3)
RBC # BLD: 4.66 M/UL (ref 3.95–5.11)
RBC # BLD: ABNORMAL 10*6/UL
SEG NEUTROPHILS: 63 % (ref 36–65)
SEGMENTED NEUTROPHILS ABSOLUTE COUNT: 3.7 K/UL (ref 1.5–8.1)
SODIUM BLD-SCNC: 144 MMOL/L (ref 135–144)
TOTAL PROTEIN: 6.9 G/DL (ref 6.4–8.3)
TRIGL SERPL-MCNC: 176 MG/DL
VLDLC SERPL CALC-MCNC: ABNORMAL MG/DL (ref 1–30)
WBC # BLD: 5.9 K/UL (ref 3.5–11.3)
WBC # BLD: ABNORMAL 10*3/UL

## 2018-11-15 PROCEDURE — 80053 COMPREHEN METABOLIC PANEL: CPT

## 2018-11-15 PROCEDURE — 76830 TRANSVAGINAL US NON-OB: CPT

## 2018-11-15 PROCEDURE — 36415 COLL VENOUS BLD VENIPUNCTURE: CPT

## 2018-11-15 PROCEDURE — 80061 LIPID PANEL: CPT

## 2018-11-15 PROCEDURE — 85025 COMPLETE CBC W/AUTO DIFF WBC: CPT

## 2018-11-15 PROCEDURE — 77063 BREAST TOMOSYNTHESIS BI: CPT

## 2018-11-19 ENCOUNTER — HOSPITAL ENCOUNTER (OUTPATIENT)
Dept: PHYSICAL THERAPY | Age: 58
Setting detail: THERAPIES SERIES
Discharge: HOME | End: 2018-11-19
Payer: MEDICARE

## 2018-11-19 DIAGNOSIS — F90.9 ATTENTION DEFICIT HYPERACTIVITY DISORDER (ADHD), UNSPECIFIED ADHD TYPE: ICD-10-CM

## 2018-11-19 PROCEDURE — 97113 AQUATIC THERAPY/EXERCISES: CPT

## 2018-11-19 RX ORDER — DEXTROAMPHETAMINE SACCHARATE, AMPHETAMINE ASPARTATE, DEXTROAMPHETAMINE SULFATE AND AMPHETAMINE SULFATE 7.5; 7.5; 7.5; 7.5 MG/1; MG/1; MG/1; MG/1
30 TABLET ORAL 2 TIMES DAILY
Qty: 60 TABLET | Refills: 0 | Status: SHIPPED | OUTPATIENT
Start: 2018-11-19 | End: 2018-12-21 | Stop reason: SDUPTHER

## 2018-11-19 NOTE — TELEPHONE ENCOUNTER
Last visit:10-23-18  Last Med refill:10-23-18    Next Visit Date:  Future Appointments  Date Time Provider Naya Acacia   11/19/2018 1:30 PM MARICRUZ [facility] Professor Gerald Garcia 192   11/21/2018 1:30 PM Ivreece Bruce PTA [facility] Professor Gerald Garcia 192   11/26/2018 1:30 PM Ivreece Bruce PTA [facility] Professor Gerald Garcia 192   11/27/2018 9:00 AM Makayla Herring DPM [facility]   11/28/2018 1:30 PM Ivreece Bruce PTA [facility] PT Professor Gerald Garcia 192   1/25/2019 2:40 PM Nathalie Crockett  [facility]   Topic Date Due    Diabetes screen  01/05/1999    DTaP/Tdap/Td vaccine (1 - Tdap) 07/19/2003    Shingles Vaccine (1 of 2 - 2 Dose Series) 01/05/2009    Flu vaccine (1) 09/01/2018    Cervical cancer screen  02/02/2019    Breast cancer screen  11/15/2020    Lipid screen  11/15/2023    Colon cancer screen colonoscopy  02/02/2025    Pneumococcal med risk  Completed    Hepatitis C screen  Completed    HIV screen  Completed       No results found for: LABA1C          ( goal A1C is < 7)   No results found for: LABMICR  LDL Cholesterol (mg/dL)   Date Value   11/15/2018 111   04/27/2018 110       (goal LDL is <100)   AST (U/L)   Date Value   11/15/2018 18     ALT (U/L)   Date Value   11/15/2018 12     BUN (mg/dL)   Date Value   11/15/2018 15     BP Readings from Last 3 Encounters:   10/23/18 130/84   08/28/18 125/70   07/23/18 124/82          (goal 120/80)    All Future Testing planned in [facility]  Lab Frequency Next Occurrence   HIV Screen Once 12/13/2017   Hepatitis C Antibody Once 12/13/2017   DEXA BONE DENSITY 2 SITES Once 01/08/2018               Patient Active Problem List:     Adult ADHD     Narcolepsy     Asthma     History of DVT (deep vein thrombosis)     DVT of leg (deep venous thrombosis) (HCC)     Closed displaced fracture of body of calcaneus     Hiatal hernia     Internal hemorrhoids     Helicobacter pylori (H. 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

## 2018-11-19 NOTE — PROGRESS NOTES
[facility] Outpatient Physical Therapy   [address]   Phone: [phone]   Fax: [phone]    Physical Therapy Daily Treatment Note    Date:  2018  Patient Name:  Marito Diamond    :  1960  MRN: [id number]  Physician: Day Carrera DO   Medical Diagnosis:   F92.0C8, G00.34 (ICD-10-CM) - Chronic scapular pain   M54.2 (ICD-10-CM) - Neck pain   M54.5 (ICD-10-CM) - Low back pain, unspecified back pain laterality, unspecified chronicity, with sciatica presence unspecified   Rehab Codes: R29.3, M54.2, M54.6, M54.5, M79.1, M62.830, 729.5   Onset Date:2007              Next 's appt. : 18;  PCP 18    Visit# / total visits:      (2/10 G-Codes)                Cancels/No Shows:     Subjective:   Felt great last week due to ankle injection- states it only lasted 3 days and pain all returned. Has the most pain with sit to stand- excruciating low back pain; also with rotating L forearm/arm- states it locks up. Pain:  [x] Yes  [] No Location: Neck 5/10, (B) Ankles 2/10, Left shoulder/ Upper arm/forearm 5/10, Low Back 10/10 with sit to stand; 1/10 at rest  Pain altered Tx:  [x] No  [] Yes  Action:   Comments: Reviewed importance of dorsal/core stability and postural awareness   Objective[de-identified]  Exercises:  [facility] Exercise Log  Protocol Fibromyalgia     Date of Eval:       10/17/18 ( transfer from [facility] )                         Primary PT: Cherise Castro neck/ scapular/low back pain  Things to Focus On (goals):  Improve overall mobility particularly neck/ B shoulders  Surgical Precautions: recent L foot surgery  Medical Precautions:  [] C-9 dates  [] Occ Med   [x] Medicare     Date 18      Visit #       Walk F/L/R w/ UE ROM 2 Laps w/ UE 2 Laps w/ UE      LE Exercise        Marching 2 Laps 2 Laps         Low Box     Squats Low Box  10x5\" Tall box  10x5'      Step-Ups F/L Low 10x

## 2018-11-19 NOTE — TELEPHONE ENCOUNTER
From: Jordon Campos  Sent: 11/17/2018 5:01 PM EST  Subject: Medication Renewal Request    Mariana Dias would like a refill of the following medications:     amphetamine-dextroamphetamine (ADDERALL) 30 MG tablet Josh Nuñez DO]    Preferred pharmacy: [address], [address] [place], [place] [phone] -  [phone]

## 2018-11-20 DIAGNOSIS — N93.9 ABNORMAL UTERINE BLEEDING: Primary | ICD-10-CM

## 2018-11-21 ENCOUNTER — HOSPITAL ENCOUNTER (OUTPATIENT)
Dept: PHYSICAL THERAPY | Age: 58
Setting detail: THERAPIES SERIES
Discharge: HOME | End: 2018-11-21
Payer: MEDICARE

## 2018-11-21 PROCEDURE — 97113 AQUATIC THERAPY/EXERCISES: CPT

## 2018-11-21 NOTE — PROGRESS NOTES
[facility] Outpatient Physical Therapy   [address]   Phone: [phone]   Fax: [phone]    Physical Therapy Daily Treatment Note    Date:  2018  Patient Name:  John Gutierrez    :  1960  MRN: [id number]  Physician: Tai Montero DO   Medical Diagnosis:   P50.8Q1, G41.66 (ICD-10-CM) - Chronic scapular pain   M54.2 (ICD-10-CM) - Neck pain   M54.5 (ICD-10-CM) - Low back pain, unspecified back pain laterality, unspecified chronicity, with sciatica presence unspecified   Rehab Codes: R29.3, M54.2, M54.6, M54.5, M79.1, M62.830, 729.5   Onset Date:2007              Next 's appt. : 18;  PCP 18    Visit# / total visits:      (2/10 G-Codes)                Cancels/No Shows:     Subjective:   Felt great last week due to ankle injection- states it only lasted 3 days and pain all returned. Has the most pain with sit to stand- excruciating low back pain; also with rotating L forearm/arm- states it locks up. Pain:  [x] Yes  [] No Location: Neck 5/10, (B) Ankles 2/10, Left shoulder/ Upper arm/forearm 5/10, Low Back 10/10 with sit to stand; 1/10 at rest  Pain altered Tx:  [x] No  [] Yes  Action:   Comments: Reviewed importance of dorsal/core stability and postural awareness   Objective[de-identified]  Exercises:  [facility] Exercise Log  Protocol Fibromyalgia     Date of Eval:       10/17/18 ( transfer from [facility] )                         Primary PT: Quynh Delong neck/ scapular/low back pain  Things to Focus On (goals):  Improve overall mobility particularly neck/ B shoulders  Surgical Precautions: recent L foot surgery  Medical Precautions:  [] C-9 dates  [] Occ Med   [x] Medicare     Date 18     Visit #      Walk F/L/R w/ UE ROM 2 Laps w/ UE 2 Laps w/ UE 2 Laps w/ UE ROM     LE Exercise        Marching 2 Laps 2 Laps 2 Laps        Low Box     Squats Low Box  10x5\" Tall improved spinal mobility/function. 4. Independent with Home Exercise Programs  5. Demonstrate Knowledge of fall prevention  LTG: (to be met in 20 treatments)  1. Pt to score less than 25% on the NDI to indicate improved function/return to baseline. 2. Pt to report pain at 4/10 (for all pain complaints) with aggravation for return to normal functioning. 3. Pt to exhibit WFL, pain free cervical AROM to allow for improved mobility when driving or performing recreational activities. 4. Pt able to lift greater than 5# over head for 5 repetitions to stimulate putting items away in [place] cabinet (plates, groceries, etc) with less than 1/10 pain present. 5. Pt to score 120 deg of A shoulder flexion for reaching OH into cabinets for the left shoulder. 6. Pt to be able to withstand 10 min of walking with pain <2/10 to allow pt to walk her dog and to improve cardiovascular endurance. 7. Pt to be able to negotiate 10 stairs reciprocally with minimal pain (<2/10) for every day activity. 8. Pt able to maintain 15s of SLS balance bilaterally to prevent falls.     Patient goals: \"to at least function with some normality and to work out in some shape or form. \"    Pt. Education:  [x] Yes  [] No  [] Reviewed Prior HEP/Ed  Method of Education: [x] Verbal  [x] Demo  [] Written  Comprehension of Education:  [x] Verbalizes understanding. [x] Demonstrates understanding. [x] Needs review. [] Demonstrates/verbalizes HEP/Ed previously given. Plan: [x] Continue per plan of care.    [] Other:    Time In: 6775     Time Out: 2058    Electronically signed by Daily Lopez PTA on 11/21/2018 at 1:54 PM

## 2018-11-26 ENCOUNTER — HOSPITAL ENCOUNTER (OUTPATIENT)
Dept: PHYSICAL THERAPY | Age: 58
Setting detail: THERAPIES SERIES
Discharge: HOME | End: 2018-11-26
Payer: MEDICARE

## 2018-11-26 PROCEDURE — 97113 AQUATIC THERAPY/EXERCISES: CPT

## 2018-12-02 DIAGNOSIS — M54.12 CERVICAL RADICULOPATHY: ICD-10-CM

## 2018-12-03 ENCOUNTER — HOSPITAL ENCOUNTER (OUTPATIENT)
Dept: PHYSICAL THERAPY | Age: 58
Setting detail: THERAPIES SERIES
End: 2018-12-03
Payer: MEDICARE

## 2018-12-03 RX ORDER — CITALOPRAM 40 MG/1
TABLET ORAL
Qty: 30 TABLET | Refills: 11 | Status: SHIPPED | OUTPATIENT
Start: 2018-12-03 | End: 2020-02-24 | Stop reason: SDUPTHER

## 2018-12-03 RX ORDER — OXYCODONE HYDROCHLORIDE AND ACETAMINOPHEN 5; 325 MG/1; MG/1
1 TABLET ORAL EVERY 8 HOURS PRN
Qty: 90 TABLET | Refills: 0 | Status: SHIPPED | OUTPATIENT
Start: 2018-12-03 | End: 2018-12-31 | Stop reason: SDUPTHER

## 2018-12-03 NOTE — TELEPHONE ENCOUNTER
Last visit:10-23-18  Last Med refill:6/4/18    Next Visit Date:  Future Appointments  Date Time Provider Naya Roger   12/3/2018 1:30 PM Natalia French, PTA [facility]   12/5/2018 1:30 PM Natalia French, PTA [facility] 1 [facility]   12/6/2018 11:45 AM PRINCE [facility]   12/13/2018 8:30 AM STA DEXA RM [facility]   1/25/2019 2:40 PM Rebekah Lazo  [address] [facility]   Topic Date Due    Diabetes screen  01/05/1999    DTaP/Tdap/Td vaccine (1 - Tdap) 07/19/2003    Shingles Vaccine (1 of 2 - 2 Dose Series) 01/05/2009    Flu vaccine (1) 09/01/2018    Cervical cancer screen  02/02/2019    Breast cancer screen  11/15/2020    Lipid screen  11/15/2023    Colon cancer screen colonoscopy  02/02/2025    Pneumococcal med risk  Completed    Hepatitis C screen  Completed    HIV screen  Completed       No results found for: LABA1C          ( goal A1C is < 7)   No results found for: LABMICR  LDL Cholesterol (mg/dL)   Date Value   11/15/2018 111   04/27/2018 110       (goal LDL is <100)   AST (U/L)   Date Value   11/15/2018 18     ALT (U/L)   Date Value   11/15/2018 12     BUN (mg/dL)   Date Value   11/15/2018 15     BP Readings from Last 3 Encounters:   10/23/18 130/84   08/28/18 125/70   07/23/18 124/82          (goal 120/80)    All Future Testing planned in [facility]  Lab Frequency Next Occurrence   HIV Screen Once 12/13/2017   Hepatitis C Antibody Once 12/13/2017   DEXA BONE DENSITY 2 SITES Once 01/08/2018               Patient Active Problem List:     Adult ADHD     Narcolepsy     Asthma     History of DVT (deep vein thrombosis)     DVT of leg (deep venous thrombosis) (HCC)     Closed displaced fracture of body of calcaneus     Hiatal hernia     Internal hemorrhoids     Helicobacter pylori (H. pylori) infection     Dysphagia     Cervical radiculopathy     Ganglion cyst     Chronic pain of left ankle     Benign neoplasm of long bone of

## 2018-12-05 ENCOUNTER — HOSPITAL ENCOUNTER (OUTPATIENT)
Dept: PHYSICAL THERAPY | Age: 58
Setting detail: THERAPIES SERIES
Discharge: HOME | End: 2018-12-05
Payer: MEDICARE

## 2018-12-05 PROCEDURE — 97113 AQUATIC THERAPY/EXERCISES: CPT

## 2018-12-05 NOTE — PROGRESS NOTES
[facility]   [address]   Phone: [phone]   Fax: [phone]    Physical Therapy Daily Treatment Note    Date:  2018  Patient Name:  Esthela Rose    :  1960  MRN: [id number]  Physician: Madelyn Villarreal DO   Medical Diagnosis:   O71.8O8, M32.74 (ICD-10-CM) - Chronic scapular pain   M54.2 (ICD-10-CM) - Neck pain   M54.5 (ICD-10-CM) - Low back pain, unspecified back pain laterality, unspecified chronicity, with sciatica presence unspecified   Rehab Codes: R29.3, M54.2, M54.6, M54.5, M79.1, M62.830, 729.5   Onset Date:2007              Next 's appt. : 18;  PCP 18    Visit# / total visits: 15/20     (5/10 G-Codes)                Cancels/No Shows:     Subjective: Reports decreased pain this date- \"It feels okay. Much better than last time, I am a six all around. \"    Pain:  [x] Yes  [] No Location: R Side of body /10; L shoulder, ankle, knee also painful 6/10  Pain altered Tx:  [x] No  [] Yes  Action:     Comments: Reviewed importance of dorsal/core stability and postural awareness     Objective[de-identified]  Exercises:  [facility] J Exercise Log  Protocol Fibromyalgia     Date of Eval:       10/17/18 ( transfer from [facility] )                         Primary PT: Robson Clark neck/ scapular/low back pain  Things to Focus On (goals):  Improve overall mobility particularly neck/ B shoulders  Surgical Precautions: recent L foot surgery  Medical Precautions:  [] C-9 dates  [] Occ Med   [x] Medicare     Date 18    Visit # 12/20 13/20 14/20 15/20    Walk F/L/R w/ UE ROM 2 Laps w/ UE 2 Laps w/ UE ROM 2 Laps w/ UE ROM 2 laps ea w/ UE ROM    LE Exercise        Marching 2 Laps 2 Laps 2 Laps 2 laps      Low Box Tall Box Tall Box    Squats Tall box  10x5' 10x5\" 10x5\" 10x5\"    Step-Ups F/L Tall 10x Tall 10x Tall 10x Tall 10x    SLR F/L/R 10x 10x 10x 10x ea    Lunges

## 2018-12-06 ENCOUNTER — OFFICE VISIT (OUTPATIENT)
Dept: PODIATRY | Age: 58
End: 2018-12-06
Payer: MEDICARE

## 2018-12-06 VITALS — WEIGHT: 150 LBS | HEIGHT: 59 IN | BODY MASS INDEX: 30.24 KG/M2

## 2018-12-06 DIAGNOSIS — G89.29 CHRONIC PAIN OF LEFT ANKLE: Primary | ICD-10-CM

## 2018-12-06 DIAGNOSIS — M25.572 CHRONIC PAIN OF LEFT ANKLE: Primary | ICD-10-CM

## 2018-12-06 DIAGNOSIS — M76.72 PERONEAL TENDINITIS OF LEFT LOWER EXTREMITY: ICD-10-CM

## 2018-12-06 PROCEDURE — 99213 OFFICE O/P EST LOW 20 MIN: CPT | Performed by: PODIATRIST

## 2018-12-06 RX ORDER — OXYCODONE HYDROCHLORIDE AND ACETAMINOPHEN 5; 325 MG/1; MG/1
TABLET ORAL
COMMUNITY
End: 2018-12-06 | Stop reason: SDUPTHER

## 2018-12-06 NOTE — PROGRESS NOTES
 Hypertension     Internal hemorrhoids     Narcolepsy 8/1/2012       Prior to Admission medications    Medication Sig Start Date End Date Taking? Authorizing Provider   citalopram (CELEXA) 40 MG tablet TAKE ONE TABLET BY MOUTH DAILY 12/3/18  Yes Barby Crockett DO   oxyCODONE-acetaminophen (PERCOCET) 5-325 MG per tablet Take 1 tablet by mouth every 8 hours as needed for Pain for up to 30 days. . 12/3/18 1/2/19 Yes Barby Crockett DO   amphetamine-dextroamphetamine (ADDERALL) 30 MG tablet Take 1 tablet by mouth 2 times daily for 30 days. . 11/19/18 12/19/18 Yes Magdalena Allen MD   alendronate (FOSAMAX) 70 MG tablet TAKE 1 TABLET BY MOUTH ONCE WEEKLY BEFORE BREAKFAST, ON AN EMPTY STOMACH: REMAIN UPRIGHT FOR 30 MINUTES 10/19/18  Yes Barby Crockett DO   propranolol (INDERAL LA) 80 MG extended release capsule TAKE ONE CAPSULE BY MOUTH DAILY 10/7/18  Yes Magdalena Allen MD   busPIRone (BUSPAR) 15 MG tablet Take 15 mg by mouth 3 times daily 9/18/18  Yes Barby Crockett DO   VENTOLIN  (90 Base) MCG/ACT inhaler INHALE TWO PUFFS BY MOUTH EVERY 4 HOURS AS NEEDED FOR WHEEZING 8/13/18  Yes Barby Crockett DO   buPROPion (WELLBUTRIN XL) 300 MG extended release tablet Take 1 tablet by mouth every morning 6/22/18  Yes Barby Crockett DO   Calcium Carbonate (CALCIUM 600 PO) Take 600 mg by mouth daily   Yes Historical Provider, MD   rivaroxaban (XARELTO) 20 MG TABS tablet TAKE ONE TABLET BY MOUTH DAILY 6/4/18  Yes Kim Crockett DO   omeprazole (PRILOSEC) 40 MG delayed release capsule TAKE ONE CAPSULE BY MOUTH DAILY 6/4/18  Yes Barby Crockett DO   citalopram (CELEXA) 40 MG tablet TAKE ONE TABLET BY MOUTH DAILY 6/4/18  Yes Barby Crockett DO   albuterol sulfate HFA (PROAIR HFA) 108 (90 Base) MCG/ACT inhaler Inhale 2 puffs into the lungs every 4 hours as needed for Wheezing 6/4/18  Yes Fozia Polo, DO       Review of Systems    Review of Systems:  History obtained from chart

## 2018-12-10 ENCOUNTER — HOSPITAL ENCOUNTER (OUTPATIENT)
Dept: PHYSICAL THERAPY | Age: 58
Setting detail: THERAPIES SERIES
Discharge: HOME | End: 2018-12-10
Payer: MEDICARE

## 2018-12-10 PROCEDURE — 97113 AQUATIC THERAPY/EXERCISES: CPT

## 2018-12-10 NOTE — PROGRESS NOTES
aggravation for return to normal functioning. 3. Pt to exhibit WFL, pain free cervical AROM to allow for improved mobility when driving or performing recreational activities. 4. Pt able to lift greater than 5# over head for 5 repetitions to stimulate putting items away in [facility] cabinet (plates, groceries, etc) with less than 1/10 pain present. 5. Pt to score 120 deg of A shoulder flexion for reaching OH into cabinets for the left shoulder. 6. Pt to be able to withstand 10 min of walking with pain <2/10 to allow pt to walk her dog and to improve cardiovascular endurance. 7. Pt to be able to negotiate 10 stairs reciprocally with minimal pain (<2/10) for every day activity. 8. Pt able to maintain 15s of SLS balance bilaterally to prevent falls.     Patient goals: \"to at least function with some normality and to work out in some shape or form. \"    Pt. Education:  [x] Yes  [] No  [] Reviewed Prior HEP/Ed  Method of Education: [x] Verbal  [x] Demo  [] Written  Comprehension of Education:  [x] Verbalizes understanding. [x] Demonstrates understanding. [x] Needs review. [] Demonstrates/verbalizes HEP/Ed previously given. Plan: [x] Continue per plan of care.    [] Other:      Time In: 205     Time Out: 250    Electronically signed by Uche Hernandez PTA on 12/10/2018 at 2:02 PM

## 2018-12-12 ENCOUNTER — HOSPITAL ENCOUNTER (OUTPATIENT)
Dept: PHYSICAL THERAPY | Age: 58
Setting detail: THERAPIES SERIES
Discharge: HOME | End: 2018-12-12
Payer: MEDICARE

## 2018-12-12 PROCEDURE — 97113 AQUATIC THERAPY/EXERCISES: CPT

## 2018-12-12 NOTE — PROGRESS NOTES
[address] [facility] Outpatient Physical Therapy   [address] [facility] [address]   Phone: [phone]   Fax: [phone]    Physical Therapy Daily Treatment Note    Date:  2018  Patient Name:  Glenn Riggs    :  1960  MRN: [id number]  Physician: Steve Perry DO   Medical Diagnosis:   G44.8A1, U64.09 (ICD-10-CM) - Chronic scapular pain   M54.2 (ICD-10-CM) - Neck pain   M54.5 (ICD-10-CM) - Low back pain, unspecified back pain laterality, unspecified chronicity, with sciatica presence unspecified   Rehab Codes: R29.3, M54.2, M54.6, M54.5, M79.1, M62.830, 729.5   Onset Date:2007              Next 's appt. : 18;  PCP 18    Visit# / total visits:      (7/10 G-Codes)                Cancels/No Shows:     Subjective: Has headache with neck pain today into L shoulder; also states R hip is very sore today. Did not wear brace on L ankle this date so notes increased discomfort. Has not been sleeping well for last few days- takes percocet PRN but it only takes edge off. Was up and down steps a lot yesterday doing laundry  Pain:  [x] Yes  [] No Location: Neck/head 9-/10 L shoulder 8/10, R hip 8/10, L Ankle 8/10  Pain altered Tx:  [x] No  [] Yes  Action:     Comments: Reviewed importance of dorsal/core stability and postural awareness     Objective[de-identified]  Exercises:  [address] [facility] J Exercise Log  Protocol Fibromyalgia     Date of Eval:       10/17/18 ( transfer from [facility] )                         Primary PT: Bronwyn Lands neck/ scapular/low back pain  Things to Focus On (goals):  Improve overall mobility particularly neck/ B shoulders  Surgical Precautions: recent L foot surgery  Medical Precautions:  [] C-9 dates  [] Occ Med   [x] Medicare     Date 12/5/18 12/10/18 12/12/18     Visit # 15/20 16/20 17/20     Walk F/L/R w/ UE ROM 2 laps ea w/ UE ROM 2 Laps w/ UE ROM 2 Laps w/ UE ROM     LE Exercise        Marching 2 laps 2 Laps 2 Laps BUEs for completing ADLs. 3. ? Function:  1. Pt to have >75% improvement in radiating sx/sx for improved function and use of LUE. 2. Pt to exhibit pain free  AROM for lumbar spine for improved spinal mobility/function. 4. Independent with Home Exercise Programs  5. Demonstrate Knowledge of fall prevention  LTG: (to be met in 20 treatments)  1. Pt to score less than 25% on the NDI to indicate improved function/return to baseline. 2. Pt to report pain at 4/10 (for all pain complaints) with aggravation for return to normal functioning. 3. Pt to exhibit WFL, pain free cervical AROM to allow for improved mobility when driving or performing recreational activities. 4. Pt able to lift greater than 5# over head for 5 repetitions to stimulate putting items away in [place] cabinet (plates, groceries, etc) with less than 1/10 pain present. 5. Pt to score 120 deg of A shoulder flexion for reaching OH into cabinets for the left shoulder. 6. Pt to be able to withstand 10 min of walking with pain <2/10 to allow pt to walk her dog and to improve cardiovascular endurance. 7. Pt to be able to negotiate 10 stairs reciprocally with minimal pain (<2/10) for every day activity. 8. Pt able to maintain 15s of SLS balance bilaterally to prevent falls.     Patient goals: \"to at least function with some normality and to work out in some shape or form. \"    Pt. Education:  [x] Yes  [] No  [] Reviewed Prior HEP/Ed  Method of Education: [x] Verbal  [x] Demo  [] Written  Comprehension of Education:  [x] Verbalizes understanding. [x] Demonstrates understanding. [x] Needs review. [] Demonstrates/verbalizes HEP/Ed previously given. Plan: [x] Continue per plan of care.    [] Other:      Time In: 115 PM     Time Out: 215 PM    Electronically signed by Andrade Rm PTA on 12/12/2018 at 1:11 PM

## 2018-12-13 ENCOUNTER — HOSPITAL ENCOUNTER (OUTPATIENT)
Dept: MAMMOGRAPHY | Age: 58
Discharge: HOME | End: 2018-12-15
Payer: MEDICARE

## 2018-12-13 PROCEDURE — 77080 DXA BONE DENSITY AXIAL: CPT

## 2018-12-17 ENCOUNTER — HOSPITAL ENCOUNTER (OUTPATIENT)
Dept: PHYSICAL THERAPY | Age: 58
Setting detail: THERAPIES SERIES
Discharge: HOME | End: 2018-12-17
Payer: MEDICARE

## 2018-12-17 PROCEDURE — 97113 AQUATIC THERAPY/EXERCISES: CPT

## 2018-12-17 NOTE — PROGRESS NOTES
with Home Exercise Programs  5. Demonstrate Knowledge of fall prevention  LTG: (to be met in 20 treatments)  1. Pt to score less than 25% on the NDI to indicate improved function/return to baseline. 2. Pt to report pain at 4/10 (for all pain complaints) with aggravation for return to normal functioning. 3. Pt to exhibit WFL, pain free cervical AROM to allow for improved mobility when driving or performing recreational activities. 4. Pt able to lift greater than 5# over head for 5 repetitions to stimulate putting items away in [place] cabinet (plates, groceries, etc) with less than 1/10 pain present. 5. Pt to score 120 deg of A shoulder flexion for reaching OH into cabinets for the left shoulder. 6. Pt to be able to withstand 10 min of walking with pain <2/10 to allow pt to walk her dog and to improve cardiovascular endurance. 7. Pt to be able to negotiate 10 stairs reciprocally with minimal pain (<2/10) for every day activity. 8. Pt able to maintain 15s of SLS balance bilaterally to prevent falls.     Patient goals: \"to at least function with some normality and to work out in some shape or form. \"    Pt. Education:  [x] Yes  [] No  [] Reviewed Prior HEP/Ed  Method of Education: [x] Verbal  [x] Demo  [] Written  Comprehension of Education:  [x] Verbalizes understanding. [x] Demonstrates understanding. [x] Needs review. [] Demonstrates/verbalizes HEP/Ed previously given. Plan: [x] Continue per plan of care.    [] Other:      Time In: 125 PM     Time Out:  208 PM    Electronically signed by Emily Manning PTA on 12/17/2018 at 1:33 PM

## 2018-12-19 ENCOUNTER — HOSPITAL ENCOUNTER (OUTPATIENT)
Dept: PHYSICAL THERAPY | Age: 58
Setting detail: THERAPIES SERIES
Discharge: HOME | End: 2018-12-19
Payer: MEDICARE

## 2018-12-19 PROCEDURE — 97110 THERAPEUTIC EXERCISES: CPT

## 2018-12-19 PROCEDURE — 97113 AQUATIC THERAPY/EXERCISES: CPT

## 2018-12-19 NOTE — PLAN OF CARE
[] [facility]        Outpatient Physical                Therapy       [address]       Phone: [phone]       Fax: [phone] [x] [facility] at [address]       Phone: [phone]       Fax: [phone] [] Eduardo. [address] [place] [address] [facility]    [address] [place] [address]     Phone: [phone]     Fax:  [phone]     Date:  2018  Patient: Tina Braxton  : 1960  MRN: [id number]  Physician: Melanie Gracia DO   Medical Diagnosis:   U60.6F8, A15.11 (ICD-10-CM) - Chronic scapular pain   M54.2 (ICD-10-CM) - Neck pain   M54.5 (ICD-10-CM) - Low back pain, unspecified back pain laterality, unspecified chronicity, with sciatica presence unspecified   Rehab Codes: R29.3, M54.2, M54.6, M54.5, M79.1, M62.830, 729.5   Onset Date:2007              Next 's appt. : 2019     Visit# / total visits:                     Cancels/No Shows:     Subjective:   Pain:  [x] Yes  [] No                      Location: neck, low back and shoulder pain bilaterally Pain Rating: (0-10 scale) 8 / 10 (neck); low back 5/10;L shoulder  9/10; R scapula 0-1/10 ( has not gone up to 4/10 )  Pain altered Tx:  [x] No  [] Yes  Action:      Comments: Feels Aquatics seem to help especially distraction in the pool.   Reassessment/objective:     STRENGTH  STRENGTH      LEFT RIGHT   HIP FLEX 4+ 4+, pain in hip ( NOW 4/5)   HIPEXT 3+ 3+   HIP ABD 4 4            KNEE FLEX 4 4   KNEE EXT 4 4           ANKLE DF defer  defer                PF defer defer         ROM            A/P ROM A/P STRENGTH       LEFT RIGHT LEFT    SEATED SHLD FLEX     3-/5 3-/5    SEATED SHD ABD     3-/5 3-/5    SEATED IR     3+-4/5 3+-4/5     Cervical  sidebend  24 ( pain ); rotation:50  20 (pain); 50 ( NOW 45 )      SHLD FLEX 70/75  (pain/ tightness) - NOW 44 115/135;  (pain)   flex      SHLD ABD 50/55  (pain/ tightness)- now 35 90/95,

## 2018-12-21 DIAGNOSIS — F90.9 ATTENTION DEFICIT HYPERACTIVITY DISORDER (ADHD), UNSPECIFIED ADHD TYPE: ICD-10-CM

## 2018-12-21 RX ORDER — DEXTROAMPHETAMINE SACCHARATE, AMPHETAMINE ASPARTATE, DEXTROAMPHETAMINE SULFATE AND AMPHETAMINE SULFATE 7.5; 7.5; 7.5; 7.5 MG/1; MG/1; MG/1; MG/1
30 TABLET ORAL 2 TIMES DAILY
Qty: 60 TABLET | Refills: 0 | Status: SHIPPED | OUTPATIENT
Start: 2018-12-21 | End: 2019-01-18 | Stop reason: SDUPTHER

## 2018-12-21 NOTE — TELEPHONE ENCOUNTER
From: Chanel Mohamud  Sent: 12/20/2018 6:46 PM EST  Subject: Medication Renewal Request    Latoya Geiger Chas would like a refill of the following medications:     amphetamine-dextroamphetamine (ADDERALL) 30 MG tablet Sharla Solano MD]   Patient Comment: thank you + flor Zarate    Preferred pharmacy: [address], [address] [place], [place] [phone] - F [phone]

## 2018-12-26 ENCOUNTER — HOSPITAL ENCOUNTER (OUTPATIENT)
Dept: PHYSICAL THERAPY | Age: 58
Setting detail: THERAPIES SERIES
Discharge: HOME | End: 2018-12-26
Payer: MEDICARE
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
## 2018-12-27 ENCOUNTER — APPOINTMENT (OUTPATIENT)
Dept: PHYSICAL THERAPY | Age: 58
End: 2018-12-27
Payer: MEDICARE

## 2018-12-31 DIAGNOSIS — M54.12 CERVICAL RADICULOPATHY: ICD-10-CM

## 2018-12-31 RX ORDER — OXYCODONE HYDROCHLORIDE AND ACETAMINOPHEN 5; 325 MG/1; MG/1
1 TABLET ORAL EVERY 8 HOURS PRN
Qty: 90 TABLET | Refills: 0 | Status: SHIPPED | OUTPATIENT
Start: 2018-12-31 | End: 2019-01-28 | Stop reason: SDUPTHER

## 2018-12-31 NOTE — TELEPHONE ENCOUNTER
Ebony Rodriguez is an 45 year old female.  CPE  OBESITY- WT STABLE.  LIPIDS-LOW FAT DIET.  GERD-NO NSAIDS, DYSPHAGIA.  ANXIETY- SEEING DR BRAGA  DM-.  BIPOLAR/SI- CRISIS CENTER RECENTLY. SEEING DR BRAGA.  RAD- ALBUTEROL 1X A DAY.  SMOKING-NOT READY TO QUIT  WANTS DISABILITY EVAL.      Current Outpatient Prescriptions   Medication Sig Dispense Refill   • tiZANidine (ZANAFLEX) 2 MG tablet TAKE 1 TO 2 TABLETS BY MOUTH EVERY NIGHT AT BEDTIME FOR NECK PAIN 60 tablet 6   • cetirizine (ZYRTEC) 10 MG tablet TAKE 1 TABLET BY MOUTH DAILY 90 tablet 3   • spironolactone (ALDACTONE) 25 MG tablet Take 1 tablet by mouth daily. 90 tablet 1   • budesonide-formoterol (SYMBICORT) 160-4.5 MCG/ACT inhaler Inhale 2 puffs into the lungs 2 times daily. 10.2 g 6   • fluticasone (FLONASE) 50 MCG/ACT nasal spray SHAKE LIQUID AND USE 1 SPRAY IN EACH NOSTRIL DAILY 48 mL 1   • blood glucose meter Test blood sugar 1 times daily as directed. Diagnosis: E11.9. Meter: Insurance preference 1 kit 0   • blood glucose test strip Test blood sugar 1 times daily as directed. Diagnosis: E11.9. Meter: Insurance prefrence 100 each 5   • blood glucose lancets Test blood sugar 1 times daily as directed. Diagnosis: E11.9. Meter: Insurance preference. 100 each 5   • albuterol 108 (90 Base) MCG/ACT inhaler Inhale 2 puffs into the lungs every 4 hours as needed for Shortness of Breath or Wheezing. 1 Inhaler 1   • olopatadine (PATADAY) 0.2 % ophthalmic solution Place 1 drop into both eyes as needed for Allergies. 5 mL 6   • albuterol-ipratropium 2.5 mg/0.5 mg (DUONEB) 0.5-2.5 (3) MG/3ML nebulizer solution 3 ML QD PRN 90 mL 6   • Respiratory Therapy Supplies (NEBULIZER) Device      • LORazepam (ATIVAN) 2 MG tablet Take 2 mg by mouth every 6 hours as needed for Anxiety.     • traMADol (ULTRAM) 50 MG tablet 1 BID PRN 30 tablet 0   • meloxicam (MOBIC) 15 MG tablet Take 1 tablet by mouth daily. 30 tablet 1   • hydroCORTisone (ANUSOL-HC) 2.5 % rectal cream Apply rectally  Other synovitis and tenosynovitis, unspecified hand twice daily for 5 days then as needed 30 g 6   • lactulose (CHRONULAC) 10 GM/15ML solution 30 ML QD- HIGHER DOSE 900 mL 6   • lidocaine (XYLOCAINE) 5 % ointment Apply 1 application topically 2 times daily as needed (rectal pain from hemorrhoids). 30 g 3   • Hydrocortisone 1 % Foam Apply 1 Dose topically as needed (hemmorhoids). 1 Can 3     No current facility-administered medications for this visit.        Past Medical History:   Diagnosis Date   • Anxiety    • Asthma    • Bipolar affective disorder (CMS/HCC)     SEES DR BRAGA -[facility]   • Cancer of cervix (CMS/HCC)     age 23   • Depressive disorder    • Diverticulitis    • DJD (degenerative joint disease) of knee    • Essential (primary) hypertension    • GERD (gastroesophageal reflux disease)    • Hyperlipidemia    • Multiple personalities    • Obesity      Past Surgical History:   Procedure Laterality Date   • Appendectomy     • Hysterectomy      total abdominal hysterectomy   • Wrist surgery       Family History   Problem Relation Age of Onset   • Hypertension Mother    • Hyperlipidemia Mother    • Genitourinary Mother         /renal   • Diabetes Mother    • Diabetes Brother    • Depression Brother    • Anxiety disorder Brother    • Cancer Maternal Grandmother      Social History   Substance Use Topics   • Smoking status: Current Every Day Smoker     Packs/day: 0.50     Years: 27.00     Types: Cigarettes     Start date: 2/10/1995   • Smokeless tobacco: Never Used      Comment: refuses quit line    • Alcohol use No       Prior to Admission Meds:  (Not in a hospital admission)  Scheduled Meds:  Continuous Infusions:  PRN Meds:    Allergies:   ALLERGIES:   Allergen Reactions   • Influenza Virus Vaccine H5n1 Other (See Comments)     Flu like symptoms       Active Problems:    * No active hospital problems. *    Blood pressure 122/86, pulse 97, height 5' 3\" (1.6 m), weight 124.3 kg, SpO2 97 %.    Review of Systems   Constitutional: Negative for malaise/fatigue.    Respiratory: Negative for shortness of breath.    Cardiovascular: Negative for chest pain.   Gastrointestinal: Negative for abdominal pain and heartburn.   Neurological: Negative for dizziness and tingling.       Physical Exam   Constitutional: She is oriented to person, place, and time. She appears well-developed and well-nourished. No distress.   HENT:   Head: Normocephalic and atraumatic.   Right Ear: External ear normal.   Left Ear: External ear normal.   Nose: Nose normal.   Mouth/Throat: Oropharynx is clear and moist. No oropharyngeal exudate.   Eyes: Pupils are equal, round, and reactive to light. Conjunctivae and EOM are normal. Right eye exhibits no discharge. Left eye exhibits no discharge. No scleral icterus.   Neck: Neck supple. Carotid bruit is not present. No thyromegaly present.   Cardiovascular: Normal rate, regular rhythm and intact distal pulses.  Exam reveals no gallop and no friction rub.    No murmur heard.  Pulmonary/Chest: Effort normal and breath sounds normal. No respiratory distress. She has no wheezes. She has no rales.   Abdominal: Soft. Bowel sounds are normal. She exhibits no distension and no mass. There is no splenomegaly or hepatomegaly. There is no tenderness. There is no rebound and no guarding.   Genitourinary: No breast swelling, tenderness, discharge or bleeding.   Musculoskeletal: She exhibits no edema or tenderness.   Lymphadenopathy:     She has no cervical adenopathy.   Neurological: She is alert and oriented to person, place, and time. No cranial nerve deficit. Coordination normal.   Diabetic Foot Exam Documentation     Normal Bilateral Foot Exam: Skin integrity is normal. Dorsalis pedis and posterior tibial pulses are present.  Pressure sensation using the [place]anne monofilament is present.     Skin: Skin is warm and dry. No rash noted. She is not diaphoretic.   Psychiatric:   FLAT AFFECT   Vitals reviewed.      ASSESSMENT:  1. Preventative health care    2. Type  2 diabetes mellitus without complication, without long-term current use of insulin (CMS/[facility])    3. Carpal tunnel syndrome of right wrist    4. Bipolar affective disorder, remission status unspecified (CMS/[facility])    5. Benign essential HTN    6. GERD without esophagitis    7. Severe persistent asthma with acute exacerbation    8. Tobacco use disorder      PLAN:  Orders Placed This Encounter   • Mammo Screening Bilateral   • Lipid Panel without Reflex   • Comprehensive Metabolic Panel   • Microalbumin Urine Random   • Glycohemoglobin   • SERVICE TO OCCUPATIONAL MEDICINE   • albuterol-ipratropium 2.5 mg/0.5 mg (DUONEB) 0.5-2.5 (3) MG/3ML nebulizer solution   • olopatadine (PATADAY) 0.2 % ophthalmic solution   LOSE 20 LBS  NEED TO CUT BACK ON ALBUTEROL USE.      Return in about 6 months (around 1/30/2019).  German Moreno MD  7/30/2018

## 2019-01-03 ENCOUNTER — OFFICE VISIT (OUTPATIENT)
Dept: PODIATRY | Age: 59
End: 2019-01-03
Payer: MEDICARE

## 2019-01-03 VITALS — HEIGHT: 60 IN | BODY MASS INDEX: 28.27 KG/M2 | WEIGHT: 144 LBS

## 2019-01-03 DIAGNOSIS — S93.412A SPRAIN OF CALCANEOFIBULAR LIGAMENT OF LEFT ANKLE, INITIAL ENCOUNTER: ICD-10-CM

## 2019-01-03 DIAGNOSIS — M25.572 ACUTE LEFT ANKLE PAIN: ICD-10-CM

## 2019-01-03 DIAGNOSIS — R26.2 TROUBLE WALKING: Primary | ICD-10-CM

## 2019-01-03 PROCEDURE — L4360 PNEUMAT WALKING BOOT PRE CST: HCPCS | Performed by: PODIATRIST

## 2019-01-03 PROCEDURE — 99213 OFFICE O/P EST LOW 20 MIN: CPT | Performed by: PODIATRIST

## 2019-01-18 DIAGNOSIS — F90.9 ATTENTION DEFICIT HYPERACTIVITY DISORDER (ADHD), UNSPECIFIED ADHD TYPE: ICD-10-CM

## 2019-01-18 RX ORDER — PROPRANOLOL HYDROCHLORIDE 80 MG/1
CAPSULE, EXTENDED RELEASE ORAL
Qty: 30 CAPSULE | Refills: 11 | Status: SHIPPED | OUTPATIENT
Start: 2019-01-18 | End: 2019-11-21 | Stop reason: SDUPTHER

## 2019-01-18 RX ORDER — DEXTROAMPHETAMINE SACCHARATE, AMPHETAMINE ASPARTATE, DEXTROAMPHETAMINE SULFATE AND AMPHETAMINE SULFATE 7.5; 7.5; 7.5; 7.5 MG/1; MG/1; MG/1; MG/1
30 TABLET ORAL 2 TIMES DAILY
Qty: 60 TABLET | Refills: 0 | Status: SHIPPED | OUTPATIENT
Start: 2019-01-18 | End: 2019-02-19 | Stop reason: SDUPTHER

## 2019-01-25 ENCOUNTER — OFFICE VISIT (OUTPATIENT)
Dept: FAMILY MEDICINE CLINIC | Age: 59
End: 2019-01-25
Payer: MEDICARE

## 2019-01-25 VITALS
SYSTOLIC BLOOD PRESSURE: 136 MMHG | WEIGHT: 150 LBS | BODY MASS INDEX: 29.29 KG/M2 | OXYGEN SATURATION: 97 % | HEART RATE: 66 BPM | DIASTOLIC BLOOD PRESSURE: 86 MMHG

## 2019-01-25 DIAGNOSIS — F90.9 ATTENTION DEFICIT HYPERACTIVITY DISORDER (ADHD), UNSPECIFIED ADHD TYPE: Primary | ICD-10-CM

## 2019-01-25 DIAGNOSIS — F41.9 ANXIETY: ICD-10-CM

## 2019-01-25 DIAGNOSIS — G89.4 CHRONIC PAIN SYNDROME: ICD-10-CM

## 2019-01-25 PROCEDURE — 99213 OFFICE O/P EST LOW 20 MIN: CPT | Performed by: FAMILY MEDICINE

## 2019-01-25 RX ORDER — LIDOCAINE 50 MG/G
OINTMENT TOPICAL
Qty: 240 G | Refills: 2 | Status: SHIPPED | OUTPATIENT
Start: 2019-01-25 | End: 2019-10-23 | Stop reason: ALTCHOICE

## 2019-01-28 DIAGNOSIS — M54.12 CERVICAL RADICULOPATHY: ICD-10-CM

## 2019-01-28 RX ORDER — OXYCODONE HYDROCHLORIDE AND ACETAMINOPHEN 5; 325 MG/1; MG/1
1 TABLET ORAL EVERY 8 HOURS PRN
Qty: 90 TABLET | Refills: 0 | Status: ON HOLD | OUTPATIENT
Start: 2019-01-28 | End: 2019-02-22 | Stop reason: HOSPADM

## 2019-01-31 ENCOUNTER — OFFICE VISIT (OUTPATIENT)
Dept: PODIATRY | Age: 59
End: 2019-01-31
Payer: MEDICARE

## 2019-01-31 ENCOUNTER — TELEPHONE (OUTPATIENT)
Dept: FAMILY MEDICINE CLINIC | Age: 59
End: 2019-01-31

## 2019-01-31 VITALS — HEIGHT: 62 IN | BODY MASS INDEX: 27.6 KG/M2 | RESPIRATION RATE: 16 BRPM | WEIGHT: 150 LBS

## 2019-01-31 DIAGNOSIS — G89.29 CHRONIC PAIN OF LEFT ANKLE: Primary | ICD-10-CM

## 2019-01-31 DIAGNOSIS — M25.572 CHRONIC PAIN OF LEFT ANKLE: Primary | ICD-10-CM

## 2019-01-31 DIAGNOSIS — M17.9 OSTEOARTHRITIS OF KNEE, UNSPECIFIED LATERALITY, UNSPECIFIED OSTEOARTHRITIS TYPE: Primary | ICD-10-CM

## 2019-01-31 DIAGNOSIS — M19.039 OSTEOARTHRITIS OF WRIST, UNSPECIFIED LATERALITY, UNSPECIFIED OSTEOARTHRITIS TYPE: ICD-10-CM

## 2019-01-31 PROCEDURE — 99213 OFFICE O/P EST LOW 20 MIN: CPT | Performed by: PODIATRIST

## 2019-02-01 ENCOUNTER — TELEPHONE (OUTPATIENT)
Dept: PODIATRY | Age: 59
End: 2019-02-01

## 2019-02-04 ENCOUNTER — TELEPHONE (OUTPATIENT)
Dept: FAMILY MEDICINE CLINIC | Age: 59
End: 2019-02-04

## 2019-02-04 DIAGNOSIS — M54.5 LOW BACK PAIN, UNSPECIFIED BACK PAIN LATERALITY, UNSPECIFIED CHRONICITY, WITH SCIATICA PRESENCE UNSPECIFIED: Primary | ICD-10-CM

## 2019-02-04 DIAGNOSIS — M25.511 CHRONIC PAIN OF BOTH SHOULDERS: ICD-10-CM

## 2019-02-04 DIAGNOSIS — M25.512 CHRONIC PAIN OF BOTH SHOULDERS: ICD-10-CM

## 2019-02-04 DIAGNOSIS — G89.29 CHRONIC PAIN OF BOTH SHOULDERS: ICD-10-CM

## 2019-02-04 DIAGNOSIS — M54.2 NONSPECIFIC PAIN IN THE NECK REGION: ICD-10-CM

## 2019-02-11 RX ORDER — OMEPRAZOLE 40 MG/1
CAPSULE, DELAYED RELEASE ORAL
Qty: 30 CAPSULE | Refills: 10 | Status: SHIPPED | OUTPATIENT
Start: 2019-02-11 | End: 2019-02-12

## 2019-02-12 ENCOUNTER — HOSPITAL ENCOUNTER (OUTPATIENT)
Dept: PREADMISSION TESTING | Age: 59
Discharge: HOME | End: 2019-02-16
Payer: MEDICARE

## 2019-02-12 VITALS
DIASTOLIC BLOOD PRESSURE: 81 MMHG | OXYGEN SATURATION: 97 % | BODY MASS INDEX: 30 KG/M2 | RESPIRATION RATE: 18 BRPM | WEIGHT: 148.81 LBS | HEIGHT: 59 IN | SYSTOLIC BLOOD PRESSURE: 136 MMHG | HEART RATE: 87 BPM

## 2019-02-12 LAB
ABSOLUTE EOS #: 0.1 K/UL (ref 0–0.4)
ABSOLUTE IMMATURE GRANULOCYTE: ABNORMAL K/UL (ref 0–0.3)
ABSOLUTE LYMPH #: 1.1 K/UL (ref 1–4.8)
ABSOLUTE MONO #: 0.4 K/UL (ref 0.2–0.8)
ANION GAP SERPL CALCULATED.3IONS-SCNC: 13 MMOL/L (ref 9–17)
BASOPHILS # BLD: 1 % (ref 0–2)
BASOPHILS ABSOLUTE: 0 K/UL (ref 0–0.2)
BUN BLDV-MCNC: 11 MG/DL (ref 8–23)
CHLORIDE BLD-SCNC: 104 MMOL/L (ref 98–107)
CO2: 24 MMOL/L (ref 20–31)
CREAT SERPL-MCNC: 1.13 MG/DL (ref 0.5–0.9)
DIFFERENTIAL TYPE: ABNORMAL
EKG ATRIAL RATE: 69 BPM
EKG P AXIS: 74 DEGREES
EKG P-R INTERVAL: 138 MS
EKG Q-T INTERVAL: 404 MS
EKG QRS DURATION: 80 MS
EKG QTC CALCULATION (BAZETT): 432 MS
EKG R AXIS: 12 DEGREES
EKG T AXIS: 69 DEGREES
EKG VENTRICULAR RATE: 69 BPM
EOSINOPHILS RELATIVE PERCENT: 2 % (ref 1–4)
GFR AFRICAN AMERICAN: 60 ML/MIN
GFR NON-AFRICAN AMERICAN: 49 ML/MIN
GFR SERPL CREATININE-BSD FRML MDRD: ABNORMAL ML/MIN/{1.73_M2}
GFR SERPL CREATININE-BSD FRML MDRD: ABNORMAL ML/MIN/{1.73_M2}
HCT VFR BLD CALC: 38.8 % (ref 36–46)
HEMOGLOBIN: 12.6 G/DL (ref 12–16)
IMMATURE GRANULOCYTES: ABNORMAL %
LYMPHOCYTES # BLD: 21 % (ref 24–44)
MCH RBC QN AUTO: 27.8 PG (ref 26–34)
MCHC RBC AUTO-ENTMCNC: 32.4 G/DL (ref 31–37)
MCV RBC AUTO: 85.8 FL (ref 80–100)
MONOCYTES # BLD: 7 % (ref 1–7)
NRBC AUTOMATED: ABNORMAL PER 100 WBC
PDW BLD-RTO: 16.6 % (ref 11.5–14.5)
PLATELET # BLD: 341 K/UL (ref 130–400)
PLATELET ESTIMATE: ABNORMAL
PMV BLD AUTO: 7.9 FL (ref 6–12)
POTASSIUM SERPL-SCNC: 4.1 MMOL/L (ref 3.7–5.3)
RBC # BLD: 4.53 M/UL (ref 4–5.2)
RBC # BLD: ABNORMAL 10*6/UL
SEG NEUTROPHILS: 69 % (ref 36–66)
SEGMENTED NEUTROPHILS ABSOLUTE COUNT: 3.9 K/UL (ref 1.8–7.7)
SODIUM BLD-SCNC: 141 MMOL/L (ref 135–144)
WBC # BLD: 5.6 K/UL (ref 3.5–11)
WBC # BLD: ABNORMAL 10*3/UL

## 2019-02-12 PROCEDURE — 82565 ASSAY OF CREATININE: CPT

## 2019-02-12 PROCEDURE — 85025 COMPLETE CBC W/AUTO DIFF WBC: CPT

## 2019-02-12 PROCEDURE — 36415 COLL VENOUS BLD VENIPUNCTURE: CPT

## 2019-02-12 PROCEDURE — 80051 ELECTROLYTE PANEL: CPT

## 2019-02-12 PROCEDURE — 84520 ASSAY OF UREA NITROGEN: CPT

## 2019-02-12 PROCEDURE — 93005 ELECTROCARDIOGRAM TRACING: CPT

## 2019-02-12 ASSESSMENT — PAIN DESCRIPTION - PROGRESSION: CLINICAL_PROGRESSION: GRADUALLY WORSENING

## 2019-02-12 ASSESSMENT — PAIN DESCRIPTION - PAIN TYPE: TYPE: ACUTE PAIN

## 2019-02-12 ASSESSMENT — PAIN SCALES - GENERAL: PAINLEVEL_OUTOF10: 7

## 2019-02-12 ASSESSMENT — PAIN DESCRIPTION - FREQUENCY: FREQUENCY: CONTINUOUS

## 2019-02-12 ASSESSMENT — PAIN DESCRIPTION - ORIENTATION: ORIENTATION: LEFT

## 2019-02-12 ASSESSMENT — PAIN DESCRIPTION - ONSET: ONSET: AWAKENED FROM SLEEP

## 2019-02-12 ASSESSMENT — PAIN DESCRIPTION - DESCRIPTORS: DESCRIPTORS: STABBING;BURNING;CONSTANT

## 2019-02-12 ASSESSMENT — PAIN DESCRIPTION - LOCATION: LOCATION: FOOT

## 2019-02-12 ASSESSMENT — PAIN - FUNCTIONAL ASSESSMENT: PAIN_FUNCTIONAL_ASSESSMENT: PREVENTS OR INTERFERES SOME ACTIVE ACTIVITIES AND ADLS

## 2019-02-14 ENCOUNTER — TELEPHONE (OUTPATIENT)
Dept: FAMILY MEDICINE CLINIC | Age: 59
End: 2019-02-14

## 2019-02-19 DIAGNOSIS — F90.9 ATTENTION DEFICIT HYPERACTIVITY DISORDER (ADHD), UNSPECIFIED ADHD TYPE: ICD-10-CM

## 2019-02-19 RX ORDER — DEXTROAMPHETAMINE SACCHARATE, AMPHETAMINE ASPARTATE, DEXTROAMPHETAMINE SULFATE AND AMPHETAMINE SULFATE 7.5; 7.5; 7.5; 7.5 MG/1; MG/1; MG/1; MG/1
30 TABLET ORAL 2 TIMES DAILY
Qty: 60 TABLET | Refills: 0 | Status: SHIPPED | OUTPATIENT
Start: 2019-02-19 | End: 2019-03-20 | Stop reason: SDUPTHER

## 2019-02-21 ENCOUNTER — ANESTHESIA EVENT (OUTPATIENT)
Dept: OPERATING ROOM | Age: 59
End: 2019-02-21
Payer: MEDICARE

## 2019-02-22 ENCOUNTER — ANESTHESIA (OUTPATIENT)
Dept: OPERATING ROOM | Age: 59
End: 2019-02-22
Payer: MEDICARE

## 2019-02-22 ENCOUNTER — HOSPITAL ENCOUNTER (OUTPATIENT)
Age: 59
Setting detail: OUTPATIENT SURGERY
Discharge: HOME | End: 2019-02-22
Attending: PODIATRIST | Admitting: PODIATRIST
Payer: MEDICARE

## 2019-02-22 ENCOUNTER — TELEPHONE (OUTPATIENT)
Dept: FAMILY MEDICINE CLINIC | Age: 59
End: 2019-02-22

## 2019-02-22 VITALS — SYSTOLIC BLOOD PRESSURE: 93 MMHG | OXYGEN SATURATION: 100 % | TEMPERATURE: 97.3 F | DIASTOLIC BLOOD PRESSURE: 60 MMHG

## 2019-02-22 VITALS
SYSTOLIC BLOOD PRESSURE: 108 MMHG | BODY MASS INDEX: 30 KG/M2 | OXYGEN SATURATION: 96 % | TEMPERATURE: 97.3 F | HEIGHT: 59 IN | HEART RATE: 68 BPM | DIASTOLIC BLOOD PRESSURE: 66 MMHG | RESPIRATION RATE: 17 BRPM | WEIGHT: 148.81 LBS

## 2019-02-22 DIAGNOSIS — G89.18 POSTOPERATIVE PAIN: Primary | ICD-10-CM

## 2019-02-22 DIAGNOSIS — Z98.890 STATUS POST LEFT FOOT SURGERY: ICD-10-CM

## 2019-02-22 PROCEDURE — 3600000002 HC SURGERY LEVEL 2 BASE: Performed by: PODIATRIST

## 2019-02-22 PROCEDURE — 6360000002 HC RX W HCPCS: Performed by: NURSE ANESTHETIST, CERTIFIED REGISTERED

## 2019-02-22 PROCEDURE — 2709999900 HC NON-CHARGEABLE SUPPLY: Performed by: PODIATRIST

## 2019-02-22 PROCEDURE — 7100000011 HC PHASE II RECOVERY - ADDTL 15 MIN: Performed by: PODIATRIST

## 2019-02-22 PROCEDURE — 3700000001 HC ADD 15 MINUTES (ANESTHESIA): Performed by: PODIATRIST

## 2019-02-22 PROCEDURE — 2500000003 HC RX 250 WO HCPCS: Performed by: NURSE ANESTHETIST, CERTIFIED REGISTERED

## 2019-02-22 PROCEDURE — 3700000000 HC ANESTHESIA ATTENDED CARE: Performed by: PODIATRIST

## 2019-02-22 PROCEDURE — 6360000002 HC RX W HCPCS: Performed by: PODIATRIST

## 2019-02-22 PROCEDURE — 3600000012 HC SURGERY LEVEL 2 ADDTL 15MIN: Performed by: PODIATRIST

## 2019-02-22 PROCEDURE — 2500000003 HC RX 250 WO HCPCS: Performed by: PODIATRIST

## 2019-02-22 PROCEDURE — 2580000003 HC RX 258: Performed by: ANESTHESIOLOGY

## 2019-02-22 PROCEDURE — 27691 REVISE LOWER LEG TENDON: CPT | Performed by: PODIATRIST

## 2019-02-22 PROCEDURE — 7100000010 HC PHASE II RECOVERY - FIRST 15 MIN: Performed by: PODIATRIST

## 2019-02-22 RX ORDER — SODIUM CHLORIDE 9 MG/ML
INJECTION, SOLUTION INTRAVENOUS CONTINUOUS
Status: DISCONTINUED | OUTPATIENT
Start: 2019-02-22 | End: 2019-02-22

## 2019-02-22 RX ORDER — CEPHALEXIN 500 MG/1
500 CAPSULE ORAL 3 TIMES DAILY
Qty: 7 CAPSULE | Refills: 0 | Status: SHIPPED | OUTPATIENT
Start: 2019-02-22 | End: 2019-02-26

## 2019-02-22 RX ORDER — FENTANYL CITRATE 50 UG/ML
INJECTION, SOLUTION INTRAMUSCULAR; INTRAVENOUS PRN
Status: DISCONTINUED | OUTPATIENT
Start: 2019-02-22 | End: 2019-02-22 | Stop reason: SDUPTHER

## 2019-02-22 RX ORDER — ONDANSETRON 2 MG/ML
4 INJECTION INTRAMUSCULAR; INTRAVENOUS
Status: DISCONTINUED | OUTPATIENT
Start: 2019-02-22 | End: 2019-02-22 | Stop reason: HOSPADM

## 2019-02-22 RX ORDER — GLYCOPYRROLATE 1 MG/5 ML
SYRINGE (ML) INTRAVENOUS PRN
Status: DISCONTINUED | OUTPATIENT
Start: 2019-02-22 | End: 2019-02-22 | Stop reason: SDUPTHER

## 2019-02-22 RX ORDER — SODIUM CHLORIDE 0.9 % (FLUSH) 0.9 %
10 SYRINGE (ML) INJECTION PRN
Status: DISCONTINUED | OUTPATIENT
Start: 2019-02-22 | End: 2019-02-22 | Stop reason: HOSPADM

## 2019-02-22 RX ORDER — CEFAZOLIN SODIUM 2 G/50ML
2 SOLUTION INTRAVENOUS ONCE
Status: COMPLETED | OUTPATIENT
Start: 2019-02-22 | End: 2019-02-22

## 2019-02-22 RX ORDER — DEXAMETHASONE SODIUM PHOSPHATE 10 MG/ML
INJECTION INTRAMUSCULAR; INTRAVENOUS PRN
Status: DISCONTINUED | OUTPATIENT
Start: 2019-02-22 | End: 2019-02-22 | Stop reason: SDUPTHER

## 2019-02-22 RX ORDER — SODIUM CHLORIDE, SODIUM LACTATE, POTASSIUM CHLORIDE, CALCIUM CHLORIDE 600; 310; 30; 20 MG/100ML; MG/100ML; MG/100ML; MG/100ML
INJECTION, SOLUTION INTRAVENOUS CONTINUOUS
Status: DISCONTINUED | OUTPATIENT
Start: 2019-02-22 | End: 2019-02-22 | Stop reason: HOSPADM

## 2019-02-22 RX ORDER — PROPOFOL 10 MG/ML
INJECTION, EMULSION INTRAVENOUS PRN
Status: DISCONTINUED | OUTPATIENT
Start: 2019-02-22 | End: 2019-02-22 | Stop reason: SDUPTHER

## 2019-02-22 RX ORDER — LIDOCAINE HYDROCHLORIDE 10 MG/ML
1 INJECTION, SOLUTION EPIDURAL; INFILTRATION; INTRACAUDAL; PERINEURAL
Status: DISCONTINUED | OUTPATIENT
Start: 2019-02-22 | End: 2019-02-22 | Stop reason: HOSPADM

## 2019-02-22 RX ORDER — SODIUM CHLORIDE 0.9 % (FLUSH) 0.9 %
10 SYRINGE (ML) INJECTION EVERY 12 HOURS SCHEDULED
Status: DISCONTINUED | OUTPATIENT
Start: 2019-02-22 | End: 2019-02-22

## 2019-02-22 RX ORDER — OXYCODONE HYDROCHLORIDE AND ACETAMINOPHEN 5; 325 MG/1; MG/1
1 TABLET ORAL EVERY 6 HOURS PRN
Qty: 28 TABLET | Refills: 0 | Status: SHIPPED | OUTPATIENT
Start: 2019-02-22 | End: 2019-03-01

## 2019-02-22 RX ORDER — MIDAZOLAM HYDROCHLORIDE 1 MG/ML
INJECTION INTRAMUSCULAR; INTRAVENOUS PRN
Status: DISCONTINUED | OUTPATIENT
Start: 2019-02-22 | End: 2019-02-22 | Stop reason: SDUPTHER

## 2019-02-22 RX ORDER — FENTANYL CITRATE 50 UG/ML
25 INJECTION, SOLUTION INTRAMUSCULAR; INTRAVENOUS EVERY 5 MIN PRN
Status: DISCONTINUED | OUTPATIENT
Start: 2019-02-22 | End: 2019-02-22 | Stop reason: HOSPADM

## 2019-02-22 RX ORDER — FENTANYL CITRATE 50 UG/ML
50 INJECTION, SOLUTION INTRAMUSCULAR; INTRAVENOUS EVERY 5 MIN PRN
Status: DISCONTINUED | OUTPATIENT
Start: 2019-02-22 | End: 2019-02-22 | Stop reason: HOSPADM

## 2019-02-22 RX ORDER — ONDANSETRON 2 MG/ML
INJECTION INTRAMUSCULAR; INTRAVENOUS PRN
Status: DISCONTINUED | OUTPATIENT
Start: 2019-02-22 | End: 2019-02-22 | Stop reason: SDUPTHER

## 2019-02-22 RX ORDER — LIDOCAINE HYDROCHLORIDE 20 MG/ML
INJECTION, SOLUTION EPIDURAL; INFILTRATION; INTRACAUDAL; PERINEURAL PRN
Status: DISCONTINUED | OUTPATIENT
Start: 2019-02-22 | End: 2019-02-22 | Stop reason: SDUPTHER

## 2019-02-22 RX ORDER — LIDOCAINE HYDROCHLORIDE 10 MG/ML
INJECTION, SOLUTION EPIDURAL; INFILTRATION; INTRACAUDAL; PERINEURAL PRN
Status: DISCONTINUED | OUTPATIENT
Start: 2019-02-22 | End: 2019-02-22 | Stop reason: ALTCHOICE

## 2019-02-22 RX ADMIN — FENTANYL CITRATE 25 MCG: 50 INJECTION, SOLUTION INTRAMUSCULAR; INTRAVENOUS at 10:26

## 2019-02-22 RX ADMIN — DEXAMETHASONE SODIUM PHOSPHATE 10 MG: 10 INJECTION INTRAMUSCULAR; INTRAVENOUS at 09:24

## 2019-02-22 RX ADMIN — Medication 0.2 MG: at 09:30

## 2019-02-22 RX ADMIN — FENTANYL CITRATE 25 MCG: 50 INJECTION, SOLUTION INTRAMUSCULAR; INTRAVENOUS at 10:30

## 2019-02-22 RX ADMIN — FENTANYL CITRATE 25 MCG: 50 INJECTION, SOLUTION INTRAMUSCULAR; INTRAVENOUS at 10:12

## 2019-02-22 RX ADMIN — FENTANYL CITRATE 50 MCG: 50 INJECTION, SOLUTION INTRAMUSCULAR; INTRAVENOUS at 09:38

## 2019-02-22 RX ADMIN — LIDOCAINE HYDROCHLORIDE 60 MG: 20 INJECTION, SOLUTION EPIDURAL; INFILTRATION; INTRACAUDAL; PERINEURAL at 09:16

## 2019-02-22 RX ADMIN — FENTANYL CITRATE 50 MCG: 50 INJECTION, SOLUTION INTRAMUSCULAR; INTRAVENOUS at 09:16

## 2019-02-22 RX ADMIN — SODIUM CHLORIDE, POTASSIUM CHLORIDE, SODIUM LACTATE AND CALCIUM CHLORIDE: 600; 310; 30; 20 INJECTION, SOLUTION INTRAVENOUS at 07:21

## 2019-02-22 RX ADMIN — MIDAZOLAM 2 MG: 1 INJECTION INTRAMUSCULAR; INTRAVENOUS at 09:12

## 2019-02-22 RX ADMIN — PROPOFOL 200 MG: 10 INJECTION, EMULSION INTRAVENOUS at 09:16

## 2019-02-22 RX ADMIN — ONDANSETRON 4 MG: 2 INJECTION INTRAMUSCULAR; INTRAVENOUS at 09:54

## 2019-02-22 RX ADMIN — CEFAZOLIN SODIUM 2 G: 2 SOLUTION INTRAVENOUS at 09:21

## 2019-02-22 RX ADMIN — FENTANYL CITRATE 25 MCG: 50 INJECTION, SOLUTION INTRAMUSCULAR; INTRAVENOUS at 10:20

## 2019-02-22 ASSESSMENT — PULMONARY FUNCTION TESTS
PIF_VALUE: 3
PIF_VALUE: 15
PIF_VALUE: 13
PIF_VALUE: 12
PIF_VALUE: 14
PIF_VALUE: 19
PIF_VALUE: 19
PIF_VALUE: 14
PIF_VALUE: 19
PIF_VALUE: 14
PIF_VALUE: 19
PIF_VALUE: 5
PIF_VALUE: 14
PIF_VALUE: 13
PIF_VALUE: 19
PIF_VALUE: 4
PIF_VALUE: 14
PIF_VALUE: 19
PIF_VALUE: 22
PIF_VALUE: 13
PIF_VALUE: 19
PIF_VALUE: 14
PIF_VALUE: 17
PIF_VALUE: 13
PIF_VALUE: 19
PIF_VALUE: 13
PIF_VALUE: 14
PIF_VALUE: 13
PIF_VALUE: 18
PIF_VALUE: 0
PIF_VALUE: 14
PIF_VALUE: 13
PIF_VALUE: 13
PIF_VALUE: 19
PIF_VALUE: 13
PIF_VALUE: 19
PIF_VALUE: 1
PIF_VALUE: 1
PIF_VALUE: 19
PIF_VALUE: 14
PIF_VALUE: 18
PIF_VALUE: 14
PIF_VALUE: 13
PIF_VALUE: 1
PIF_VALUE: 13
PIF_VALUE: 14
PIF_VALUE: 14
PIF_VALUE: 19
PIF_VALUE: 20
PIF_VALUE: 13
PIF_VALUE: 14
PIF_VALUE: 13
PIF_VALUE: 19
PIF_VALUE: 4
PIF_VALUE: 1
PIF_VALUE: 14
PIF_VALUE: 13
PIF_VALUE: 13
PIF_VALUE: 19
PIF_VALUE: 14
PIF_VALUE: 3
PIF_VALUE: 1
PIF_VALUE: 13

## 2019-02-22 ASSESSMENT — PAIN DESCRIPTION - DESCRIPTORS: DESCRIPTORS: STABBING;BURNING;CONSTANT

## 2019-02-22 ASSESSMENT — PAIN SCALES - GENERAL
PAINLEVEL_OUTOF10: 0

## 2019-02-22 ASSESSMENT — ENCOUNTER SYMPTOMS: SHORTNESS OF BREATH: 0

## 2019-02-22 ASSESSMENT — PAIN - FUNCTIONAL ASSESSMENT: PAIN_FUNCTIONAL_ASSESSMENT: 0-10

## 2019-02-25 DIAGNOSIS — G89.18 POSTOPERATIVE PAIN: ICD-10-CM

## 2019-02-25 DIAGNOSIS — Z98.890 STATUS POST LEFT FOOT SURGERY: ICD-10-CM

## 2019-02-25 DIAGNOSIS — M54.12 CERVICAL RADICULOPATHY: ICD-10-CM

## 2019-02-25 RX ORDER — OXYCODONE HYDROCHLORIDE AND ACETAMINOPHEN 5; 325 MG/1; MG/1
1 TABLET ORAL EVERY 8 HOURS PRN
Qty: 90 TABLET | Refills: 0 | Status: SHIPPED | OUTPATIENT
Start: 2019-02-25 | End: 2019-03-22 | Stop reason: SDUPTHER

## 2019-02-25 RX ORDER — OXYCODONE HYDROCHLORIDE AND ACETAMINOPHEN 5; 325 MG/1; MG/1
1 TABLET ORAL EVERY 6 HOURS PRN
Qty: 28 TABLET | Refills: 0 | Status: CANCELLED | OUTPATIENT
Start: 2019-02-25 | End: 2019-03-04

## 2019-02-26 ENCOUNTER — OFFICE VISIT (OUTPATIENT)
Dept: FAMILY MEDICINE CLINIC | Age: 59
End: 2019-02-26
Payer: MEDICARE

## 2019-02-26 VITALS — OXYGEN SATURATION: 97 % | HEART RATE: 51 BPM | SYSTOLIC BLOOD PRESSURE: 118 MMHG | DIASTOLIC BLOOD PRESSURE: 80 MMHG

## 2019-02-26 DIAGNOSIS — M25.562 LEFT KNEE PAIN, UNSPECIFIED CHRONICITY: Primary | ICD-10-CM

## 2019-02-26 PROCEDURE — 99213 OFFICE O/P EST LOW 20 MIN: CPT | Performed by: FAMILY MEDICINE

## 2019-02-28 ENCOUNTER — HOSPITAL ENCOUNTER (OUTPATIENT)
Dept: GENERAL RADIOLOGY | Age: 59
Discharge: HOME | End: 2019-03-02
Payer: MEDICARE

## 2019-02-28 ENCOUNTER — HOSPITAL ENCOUNTER (OUTPATIENT)
Age: 59
Discharge: HOME | End: 2019-03-02
Payer: MEDICARE

## 2019-02-28 ENCOUNTER — OFFICE VISIT (OUTPATIENT)
Dept: PODIATRY | Age: 59
End: 2019-02-28

## 2019-02-28 VITALS — HEIGHT: 60 IN | WEIGHT: 145 LBS | BODY MASS INDEX: 28.47 KG/M2

## 2019-02-28 DIAGNOSIS — M79.671 FOOT PAIN, BILATERAL: Primary | ICD-10-CM

## 2019-02-28 DIAGNOSIS — Z98.890 POST-OPERATIVE STATE: ICD-10-CM

## 2019-02-28 DIAGNOSIS — M25.562 LEFT KNEE PAIN, UNSPECIFIED CHRONICITY: ICD-10-CM

## 2019-02-28 DIAGNOSIS — M79.672 FOOT PAIN, BILATERAL: Primary | ICD-10-CM

## 2019-02-28 DIAGNOSIS — R26.2 TROUBLE WALKING: ICD-10-CM

## 2019-02-28 PROCEDURE — 99024 POSTOP FOLLOW-UP VISIT: CPT | Performed by: PODIATRIST

## 2019-02-28 PROCEDURE — 73562 X-RAY EXAM OF KNEE 3: CPT

## 2019-03-07 ENCOUNTER — OFFICE VISIT (OUTPATIENT)
Dept: PODIATRY | Age: 59
End: 2019-03-07
Payer: MEDICARE

## 2019-03-07 VITALS — HEIGHT: 60 IN | WEIGHT: 145 LBS | BODY MASS INDEX: 28.47 KG/M2

## 2019-03-07 DIAGNOSIS — Z98.890 POST-OPERATIVE STATE: ICD-10-CM

## 2019-03-07 DIAGNOSIS — R26.2 TROUBLE WALKING: Primary | ICD-10-CM

## 2019-03-07 DIAGNOSIS — M79.672 LEFT FOOT PAIN: ICD-10-CM

## 2019-03-07 DIAGNOSIS — M76.72 PERONEAL TENDONITIS OF LEFT LOWER EXTREMITY: ICD-10-CM

## 2019-03-07 PROCEDURE — L4361 PNEUMA/VAC WALK BOOT PRE OTS: HCPCS | Performed by: PODIATRIST

## 2019-03-07 PROCEDURE — 99024 POSTOP FOLLOW-UP VISIT: CPT | Performed by: PODIATRIST

## 2019-03-14 ENCOUNTER — TELEPHONE (OUTPATIENT)
Dept: FAMILY MEDICINE CLINIC | Age: 59
End: 2019-03-14

## 2019-03-15 ENCOUNTER — PATIENT MESSAGE (OUTPATIENT)
Dept: FAMILY MEDICINE CLINIC | Age: 59
End: 2019-03-15

## 2019-03-19 RX ORDER — BUSPIRONE HYDROCHLORIDE 15 MG/1
TABLET ORAL
Qty: 90 TABLET | Refills: 5 | Status: SHIPPED | OUTPATIENT
Start: 2019-03-19 | End: 2019-09-13 | Stop reason: SDUPTHER

## 2019-03-20 DIAGNOSIS — F90.9 ATTENTION DEFICIT HYPERACTIVITY DISORDER (ADHD), UNSPECIFIED ADHD TYPE: ICD-10-CM

## 2019-03-20 RX ORDER — DEXTROAMPHETAMINE SACCHARATE, AMPHETAMINE ASPARTATE, DEXTROAMPHETAMINE SULFATE AND AMPHETAMINE SULFATE 7.5; 7.5; 7.5; 7.5 MG/1; MG/1; MG/1; MG/1
30 TABLET ORAL 2 TIMES DAILY
Qty: 60 TABLET | Refills: 0 | Status: SHIPPED | OUTPATIENT
Start: 2019-03-20 | End: 2019-04-15 | Stop reason: SDUPTHER

## 2019-03-22 DIAGNOSIS — M54.12 CERVICAL RADICULOPATHY: ICD-10-CM

## 2019-03-25 RX ORDER — OXYCODONE HYDROCHLORIDE AND ACETAMINOPHEN 5; 325 MG/1; MG/1
1 TABLET ORAL EVERY 8 HOURS PRN
Qty: 90 TABLET | Refills: 0 | Status: SHIPPED | OUTPATIENT
Start: 2019-03-25 | End: 2019-04-24 | Stop reason: SDUPTHER

## 2019-03-26 ENCOUNTER — OFFICE VISIT (OUTPATIENT)
Dept: PODIATRY | Age: 59
End: 2019-03-26

## 2019-03-26 VITALS — WEIGHT: 145 LBS | HEIGHT: 60 IN | BODY MASS INDEX: 28.47 KG/M2

## 2019-03-26 DIAGNOSIS — M79.672 LEFT FOOT PAIN: ICD-10-CM

## 2019-03-26 DIAGNOSIS — Z98.890 POST-OPERATIVE STATE: ICD-10-CM

## 2019-03-26 DIAGNOSIS — R26.2 TROUBLE WALKING: Primary | ICD-10-CM

## 2019-03-26 PROCEDURE — 99024 POSTOP FOLLOW-UP VISIT: CPT | Performed by: PODIATRIST

## 2019-04-05 ENCOUNTER — TELEPHONE (OUTPATIENT)
Dept: FAMILY MEDICINE CLINIC | Age: 59
End: 2019-04-05

## 2019-04-05 ENCOUNTER — HOSPITAL ENCOUNTER (EMERGENCY)
Age: 59
Discharge: HOME | End: 2019-04-05
Attending: EMERGENCY MEDICINE
Payer: MEDICARE

## 2019-04-05 ENCOUNTER — APPOINTMENT (OUTPATIENT)
Dept: GENERAL RADIOLOGY | Age: 59
End: 2019-04-05
Payer: MEDICARE

## 2019-04-05 VITALS
TEMPERATURE: 98.3 F | WEIGHT: 154 LBS | BODY MASS INDEX: 30.23 KG/M2 | HEIGHT: 60 IN | SYSTOLIC BLOOD PRESSURE: 134 MMHG | DIASTOLIC BLOOD PRESSURE: 88 MMHG | RESPIRATION RATE: 16 BRPM | HEART RATE: 66 BPM | OXYGEN SATURATION: 98 %

## 2019-04-05 DIAGNOSIS — S91.331A PUNCTURE WOUND OF RIGHT FOOT, INITIAL ENCOUNTER: Primary | ICD-10-CM

## 2019-04-05 PROCEDURE — 6360000002 HC RX W HCPCS: Performed by: NURSE PRACTITIONER

## 2019-04-05 PROCEDURE — 90715 TDAP VACCINE 7 YRS/> IM: CPT | Performed by: NURSE PRACTITIONER

## 2019-04-05 PROCEDURE — 90471 IMMUNIZATION ADMIN: CPT | Performed by: NURSE PRACTITIONER

## 2019-04-05 PROCEDURE — 73630 X-RAY EXAM OF FOOT: CPT

## 2019-04-05 PROCEDURE — 99283 EMERGENCY DEPT VISIT LOW MDM: CPT

## 2019-04-05 RX ORDER — CIPROFLOXACIN 500 MG/1
500 TABLET, FILM COATED ORAL 2 TIMES DAILY
Qty: 20 TABLET | Refills: 0 | Status: SHIPPED | OUTPATIENT
Start: 2019-04-05 | End: 2019-04-15

## 2019-04-05 RX ADMIN — TETANUS TOXOID, REDUCED DIPHTHERIA TOXOID AND ACELLULAR PERTUSSIS VACCINE, ADSORBED 0.5 ML: 5; 2.5; 8; 8; 2.5 SUSPENSION INTRAMUSCULAR at 19:55

## 2019-04-05 ASSESSMENT — PAIN DESCRIPTION - DESCRIPTORS: DESCRIPTORS: SORE

## 2019-04-05 ASSESSMENT — PAIN SCALES - GENERAL: PAINLEVEL_OUTOF10: 6

## 2019-04-05 ASSESSMENT — PAIN DESCRIPTION - PAIN TYPE: TYPE: ACUTE PAIN

## 2019-04-05 ASSESSMENT — PAIN DESCRIPTION - LOCATION: LOCATION: FOOT

## 2019-04-05 ASSESSMENT — PAIN DESCRIPTION - ORIENTATION: ORIENTATION: RIGHT

## 2019-04-05 ASSESSMENT — ENCOUNTER SYMPTOMS: COLOR CHANGE: 0

## 2019-04-05 NOTE — ED PROVIDER NOTES
eMERGENCY dEPARTMENT eNCOUnter   Attending Attestation     Pt Name: Elias Miles  MRN: [id number]  [place] 1960  Date of evaluation: 4/5/19   Elias Miles is a 60 y.o. female with CC: Puncture Wound (stepped on nail, r foot, needs Tdap)    MDM:   Patient's a 60-year-old female presented to the ED secondary to wound evaluation. Patient stepped on a nail, x-rays obtained, tetanus updated patient started antibiotics. Patient discharged home with outpatient follow-up and parameters for return to the emergency department such as erythema and pain or other concerning symptoms. CRITICAL CARE:       EKG: All EKG's are interpreted by the Emergency Department Physician who either signs or Co-signs this chart in the absence of a cardiologist.      RADIOLOGY:All plain film, CT, MRI, and formal ultrasound images (except ED bedside ultrasound) are read by the radiologist, see reports below, unless otherwise noted in MDM or here. XR FOOT RIGHT (MIN 3 VIEWS)    (Results Pending)     LABS: All lab results were reviewed by myself, and all abnormals are listed below. Labs Reviewed - No data to display        I personally evaluated and examined the patient in conjunction with the APC and agree with the assessment, treatment plan, and disposition of the patient as recorded by the APC.    Tessy Kenny MD  Attending Emergency Physician          Tessy Kenny MD  82/11/80 [id number]

## 2019-04-05 NOTE — TELEPHONE ENCOUNTER
Patient stepped on a nail while weeding a flower bed , and she said it went through her right foot. It went through the sole of shoe, gel insert , her sock and her foot . Last tetnaus was 2003. I advised her to get a tetnaus today but she wants to wait til her appt at the end of the month.

## 2019-04-05 NOTE — ED PROVIDER NOTES
[address] [facility] ED  eMERGENCY dEPARTMENT eNCOUnter      Pt Name: Ekaterina Chauhan  MRN: [id number]  [place] 1960  Date of evaluation: 2019  Provider: Palmira Craven       Chief Complaint   Patient presents with    Puncture Wound     stepped on nail, r foot, needs Tdap         HISTORY OFPRESENT ILLNESS  (Location/Symptom, Timing/Onset, Context/Setting, Quality, Duration, Modifying Factors, Severity.)   Ekaterina Chauhan is a 60 y.o. female who presents to the emergency department by private auto for evaluation of puncture wound to right foot. Patient states she went this [place] and stepped on a marie nail. She was wearing shoes at the time and states that she had to pull the nail out with pliers. Her pain is a 6 out of 10. She denies fever or chills. Nursing Notes were reviewed. PASTMEDICAL HISTORY     Past Medical History:   Diagnosis Date    Acid reflux     Adult ADHD 2012    Asthma 2012    Bipolar 1 disorder ([address]     Broken foot     Left foot    Chest pain of uncertain etiology     Last episode was in  (Written 2019)    Depression     Under control per pt.  on 6/15/18    DVT of leg (deep venous thrombosis) ([address] 2014    Fracture     right foot / pt denies surgical intervention    Helicobacter pylori (H. pylori) infection     per EGD    Hematuria 2018    Has a follow-up with Urologist     Hiatal hernia     History of DVT (deep vein thrombosis) 2012    Hypertension     Internal hemorrhoids     Narcolepsy 2012    Osteoporosis          SURGICAL HISTORY       Past Surgical History:   Procedure Laterality Date    ANKLE SURGERY Left 2018    mass excision    ARM SURGERY Right     Pt states she has had 13 right arm surgeries    BREAST ENHANCEMENT SURGERY Bilateral      SECTION      x 3    COLONOSCOPY  2013    sigmoid diverticula, prominent large internal 40 MG tablet TAKE ONE TABLET BY MOUTH DAILY, Disp-30 tablet, R-11Normal      alendronate (FOSAMAX) 70 MG tablet TAKE 1 TABLET BY MOUTH ONCE WEEKLY BEFORE BREAKFAST, ON AN EMPTY STOMACH: REMAIN UPRIGHT FOR 30 MINUTES, Disp-4 tablet, R-11Normal      !! VENTOLIN  (90 Base) MCG/ACT inhaler INHALE TWO PUFFS BY MOUTH EVERY 4 HOURS AS NEEDED FOR WHEEZING, Disp-1 Inhaler, R-11Normal      buPROPion (WELLBUTRIN XL) 300 MG extended release tablet Take 1 tablet by mouth every morning, Disp-90 tablet, R-3Normal      Calcium Carbonate (CALCIUM 600 PO) Take 600 mg by mouth dailyHistorical Med      omeprazole (PRILOSEC) 40 MG delayed release capsule TAKE ONE CAPSULE BY MOUTH DAILY, Disp-90 capsule, R-3Normal      !! albuterol sulfate HFA (PROAIR HFA) 108 (90 Base) MCG/ACT inhaler Inhale 2 puffs into the lungs every 4 hours as needed for Wheezing, Disp-3 Inhaler, R-3Normal       !! - Potential duplicate medications found. Please discuss with provider. ALLERGIES     Latex; Prozac [fluoxetine hcl]; Sulfa antibiotics; and Codeine    FAMILY HISTORY       Family History   Problem Relation Age of Onset    Cancer Mother     Hypertension Mother     Stroke Mother     Cancer Maternal Aunt     Hypertension Father     Heart Surgery Father     Diabetes Neg Hx           SOCIAL HISTORY       Social History     Socioeconomic History    Marital status: Single     Spouse name: None    Number of children: None    Years of education: None    Highest education level: None   Occupational History    None   Social Needs    Financial resource strain: None    Food insecurity:     Worry: None     Inability: None    Transportation needs:     Medical: None     Non-medical: None   Tobacco Use    Smoking status: Former Smoker     Packs/day: 1.00     Years: 20.00     Pack years: 20.00     Last attempt to quit: 46     Years since quittin.2    Smokeless tobacco: Never Used   Substance and Sexual Activity    Alcohol use:  Yes Comment: rare    Drug use: No    Sexual activity: None   Lifestyle    Physical activity:     Days per week: None     Minutes per session: None    Stress: None   Relationships    Social connections:     Talks on phone: None     Gets together: None     Attends Buddhist service: None     Active member of club or organization: None     Attends meetings of clubs or organizations: None     Relationship status: None    Intimate partner violence:     Fear of current or ex partner: None     Emotionally abused: None     Physically abused: None     Forced sexual activity: None   Other Topics Concern    None   Social History Narrative    None         REVIEW OF SYSTEMS    (2-9 systems for level 4, 10 or more for level 5)     Review of Systems   Musculoskeletal: Positive for arthralgias. Skin: Positive for wound. Negative for color change. All other systems reviewed and are negative. Except as noted above the remainder of the review of systems was reviewed and negative. PHYSICAL EXAM    (up to 7 for level 4, 8 or more for level 5)     ED Triage Vitals [04/05/19 1748]   BP Temp Temp Source Pulse Resp SpO2 Height Weight   134/88 98.3 °F (36.8 °C) Oral 66 16 98 % 5' (1.524 m) 154 lb (69.9 kg)       Physical Exam   Constitutional: She is oriented to person, place, and time. She appears well-developed and well-nourished. HENT:   Head: Normocephalic and atraumatic. Right Ear: External ear normal.   Left Ear: External ear normal.   Nose: Nose normal.   Mouth/Throat: Oropharynx is clear and moist.   Eyes: Pupils are equal, round, and reactive to light. Conjunctivae and EOM are normal.   Neck: Normal range of motion. Neck supple. Pulmonary/Chest: Effort normal. No respiratory distress. Musculoskeletal: Normal range of motion. Right foot: There is tenderness. There is no swelling, no crepitus and no deformity. Feet:    Neurological: She is alert and oriented to person, place, and time.  She has normal strength. No cranial nerve deficit or sensory deficit. Skin: Skin is warm and dry. Capillary refill takes less than 2 seconds. No ecchymosis noted. No erythema. DIAGNOSTIC RESULTS     EKG:All EKG's are interpreted by the Emergency Department Physician who either signs or Co-signs this chart in the absence of a cardiologist.        RADIOLOGY:   Non-plain film images such as CT, Ultrasound and MRI are read by theradiologist. Plain radiographic images are visualized and preliminarily interpreted by the emergency physician with the below findings:  Xr Foot Right (min 3 Views)    Result Date: 4/5/2019  EXAMINATION: 3 XRAY VIEWS OF THE RIGHT FOOT 4/5/2019 7:24 pm COMPARISON: None. HISTORY: ORDERING SYSTEM PROVIDED HISTORY: Stepped on nail this morning TECHNOLOGIST PROVIDED HISTORY: Stepped on nail this morning Ordering Physician Provided Reason for Exam: Rt foot pain, puncture wound to plantar surface Acuity: Acute Type of Exam: Initial Mechanism of Injury: stepped on nail FINDINGS: There is no acute fracture or dislocation. Chronic stress fracture deformity of the proximal 2nd metatarsal is noted. There are advanced degenerative changes within the 1st MTP. There are no radiopaque foreign bodies. No acute radiographic abnormality. Interpretation per the Radiologist below, if available at the time of this note:    XR FOOT RIGHT (MIN 3 VIEWS)   Final Result   No acute radiographic abnormality. EDBEDSIDE ULTRASOUND:   Performed by Croy Braxton - rohith    LABS:  [unfilled]    All other labs were within normal range or not returned as of this dictation. EMERGENCY DEPARTMENT COURSE andDIFFERENTIAL DIAGNOSIS/MDM:   X-ray of right foot per radiologist shows no acute findings. Tetanus updated. Patient was discharged on ciprofloxacin. She was instructed to check her foot for redness, swelling or increased pain. Instructed to follow-up with her doctor as needed.             Vitals: Vitals:    04/05/19 1748   BP: 134/88   Pulse: 66   Resp: 16   Temp: 98.3 °F (36.8 °C)   TempSrc: Oral   SpO2: 98%   Weight: 154 lb (69.9 kg)   Height: 5' (1.524 m)         CONSULTS:  None    PROCEDURES:  Procedures    FINAL IMPRESSION      1.  Puncture wound of right foot, initial encounter          DISPOSITION/PLAN   DISPOSITION Decision To Discharge 04/05/2019 07:54:06 PM      PATIENT REFERRED TO:   Madhav Chen, [address] [place] [address] [facility]  [address], [address] [facility]  [phone]      As needed      DISCHARGE MEDICATIONS:     Discharge Medication List as of 4/5/2019  7:55 PM      START taking these medications    Details   ciprofloxacin (CIPRO) 500 MG tablet Take 1 tablet by mouth 2 times daily for 10 days, Disp-20 tablet, R-0Print           Electronically signed by PIETER Reynoso 4/5/2019 at 8:42 PM           PIEETR Reynoso CNP  04/05/19 204

## 2019-04-09 ENCOUNTER — OFFICE VISIT (OUTPATIENT)
Dept: PODIATRY | Age: 59
End: 2019-04-09

## 2019-04-09 VITALS — RESPIRATION RATE: 18 BRPM | WEIGHT: 145 LBS | BODY MASS INDEX: 28.47 KG/M2 | HEIGHT: 60 IN

## 2019-04-09 DIAGNOSIS — R26.2 TROUBLE WALKING: ICD-10-CM

## 2019-04-09 DIAGNOSIS — M79.672 LEFT FOOT PAIN: ICD-10-CM

## 2019-04-09 DIAGNOSIS — Z98.890 POST-OPERATIVE STATE: Primary | ICD-10-CM

## 2019-04-09 PROCEDURE — 99024 POSTOP FOLLOW-UP VISIT: CPT | Performed by: PODIATRIST

## 2019-04-09 NOTE — PROGRESS NOTES
[facility] PHYSICIANS  [facility] [address]  [address] [facility] [address]  Dept: [phone]  Dept Fax: [phone]    POST-OP PROGRESS NOTE  Date of patient's visit: 4/9/2019  Patient's Name:  Abdullahi Delatorre YOB: 1960            Patient Care Team:  Dolores Jain DO as PCP - Tuyet Ch MD as Consulting Physician (Gastroenterology)  Vitaly Oro MD as Surgeon (Orthopedic Surgery)  Merari Nolan as Surgeon (Podiatry)  aSlina Flores DPM as Physician (Podiatry)        Chief Complaint   Patient presents with    Post-Op Check    Foot Pain       Pt's primary care physician is Abdi Deleon DO last seen 2/26/2019. Subjective: Abdullahi Delatorre is a 60 y.o. female who presents to the office today 1month(s)  S/P left foot peroneal tendon transfer for correction of torn tendon to the left foot  Problem List Items Addressed This Visit     None      Visit Diagnoses     Post-operative state    -  Primary    Left foot pain        Trouble walking          . Patient relates pain is Absent  and improved. Pain is rated 3 out of 10 and is described as mild. Currently denies F/C/N/V. Is patient taking pain medications as prescribed and is controlling pain    Physical Examination:  Incision is coapted, sutures/steri-strips are intact. Minimal bleeding post operatively. Edema present. No erythema. No Pus. Operative correction is satisfactory. Some mild pain with ROM          Assessment: Abdullahi Delatorre is status post as above  Normal post operative course. Doing well          ICD-10-CM    1. Post-operative state Z98.890    2. Left foot pain M79.672    3. Trouble walking R26.2          Plan:  Patient examined and evaluated. Current condition and treatment options discussed in detail. Advised pt to his condition. rx for referral to physical therapy. Verbal and written instructions given to patient.   Orders: No orders of the defined types were placed in this encounter. Contact office with any questions/problems/concerns. RTC in 2week(s) after starting therapy.      Electronically signed by Minnie Bueno DPM on 4/9/2019 at 9:25 AM  4/9/2019

## 2019-04-15 DIAGNOSIS — F90.9 ATTENTION DEFICIT HYPERACTIVITY DISORDER (ADHD), UNSPECIFIED ADHD TYPE: ICD-10-CM

## 2019-04-16 NOTE — TELEPHONE ENCOUNTER
Last visit: 2-26-19  Last Med refill: 3-20-19  Does patient have enough medication for 72 hours: Yes    Next Visit Date:  Future Appointments   Date Time Provider Naya Roger   4/25/2019  3:00 PM Emmy MELENDEZ MORAIMA [facility]   4/30/2019  9:15 AM PRINCE [address] Maintenance   Topic Date Due    Diabetes screen  01/05/1999    Shingles Vaccine (1 of 2) 01/05/2009    Cervical cancer screen  02/02/2019    Flu vaccine (Season Ended) 09/01/2019    Breast cancer screen  11/15/2020    Lipid screen  11/15/2023    Colon cancer screen colonoscopy  02/02/2025    DTaP/Tdap/Td vaccine (2 - Td) 04/05/2029    Pneumococcal 0-64 years Vaccine  Completed    Hepatitis C screen  Completed    HIV screen  Completed       No results found for: LABA1C          ( goal A1C is < 7)   No results found for: LABMICR  LDL Cholesterol (mg/dL)   Date Value   11/15/2018 111   04/27/2018 110       (goal LDL is <100)   AST (U/L)   Date Value   11/15/2018 18     ALT (U/L)   Date Value   11/15/2018 12     BUN (mg/dL)   Date Value   02/12/2019 11     BP Readings from Last 3 Encounters:   04/05/19 134/88   02/26/19 118/80   02/22/19 93/60          (goal 120/80)    All Future Testing planned in [facility]  Lab Frequency Next Occurrence               Patient Active Problem List:     Adult ADHD     Narcolepsy     Asthma     History of DVT (deep vein thrombosis)     DVT of leg (deep venous thrombosis) (HCC)     Closed displaced fracture of body of calcaneus     Hiatal hernia     Internal hemorrhoids     Helicobacter pylori (H. 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

## 2019-04-17 RX ORDER — DEXTROAMPHETAMINE SACCHARATE, AMPHETAMINE ASPARTATE, DEXTROAMPHETAMINE SULFATE AND AMPHETAMINE SULFATE 7.5; 7.5; 7.5; 7.5 MG/1; MG/1; MG/1; MG/1
30 TABLET ORAL 2 TIMES DAILY
Qty: 60 TABLET | Refills: 0 | Status: SHIPPED | OUTPATIENT
Start: 2019-04-17 | End: 2019-05-16 | Stop reason: SDUPTHER

## 2019-04-24 DIAGNOSIS — M54.12 CERVICAL RADICULOPATHY: ICD-10-CM

## 2019-04-24 RX ORDER — OXYCODONE HYDROCHLORIDE AND ACETAMINOPHEN 5; 325 MG/1; MG/1
1 TABLET ORAL EVERY 8 HOURS PRN
Qty: 90 TABLET | Refills: 0 | Status: SHIPPED | OUTPATIENT
Start: 2019-04-24 | End: 2019-05-22 | Stop reason: SDUPTHER

## 2019-04-24 NOTE — TELEPHONE ENCOUNTER
Last visit: 2/26/19  Last Med refill: 3-25-19 OARRS 1-25-19  Does patient have enough medication for 72 hours: Yes    Next Visit Date:  Future Appointments   Date Time Provider Naya Roger   4/25/2019  3:00 PM Cameron Lily VALERA [facility]   4/30/2019  9:15 AM Dearl PRINCE [facility] [address] Maintenance   Topic Date Due    Diabetes screen  01/05/1999    Shingles Vaccine (1 of 2) 01/05/2009    Cervical cancer screen  02/02/2019    Flu vaccine (Season Ended) 09/01/2019    Breast cancer screen  11/15/2020    Lipid screen  11/15/2023    Colon cancer screen colonoscopy  02/02/2025    DTaP/Tdap/Td vaccine (2 - Td) 04/05/2029    Pneumococcal 0-64 years Vaccine  Completed    Hepatitis C screen  Completed    HIV screen  Completed       No results found for: LABA1C          ( goal A1C is < 7)   No results found for: LABMICR  LDL Cholesterol (mg/dL)   Date Value   11/15/2018 111   04/27/2018 110       (goal LDL is <100)   AST (U/L)   Date Value   11/15/2018 18     ALT (U/L)   Date Value   11/15/2018 12     BUN (mg/dL)   Date Value   02/12/2019 11     BP Readings from Last 3 Encounters:   04/05/19 134/88   02/26/19 118/80   02/22/19 93/60          (goal 120/80)    All Future Testing planned in [facility]  Lab Frequency Next Occurrence               Patient Active Problem List:     Adult ADHD     Narcolepsy     Asthma     History of DVT (deep vein thrombosis)     DVT of leg (deep venous thrombosis) (HCC)     Closed displaced fracture of body of calcaneus     Hiatal hernia     Internal hemorrhoids     Helicobacter pylori (H. 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

## 2019-04-25 ENCOUNTER — OFFICE VISIT (OUTPATIENT)
Dept: FAMILY MEDICINE CLINIC | Age: 59
End: 2019-04-25
Payer: MEDICARE

## 2019-04-25 ENCOUNTER — HOSPITAL ENCOUNTER (OUTPATIENT)
Age: 59
Setting detail: SPECIMEN
Discharge: HOME | End: 2019-04-25
Payer: MEDICARE

## 2019-04-25 VITALS
WEIGHT: 151.6 LBS | SYSTOLIC BLOOD PRESSURE: 124 MMHG | BODY MASS INDEX: 29.61 KG/M2 | HEART RATE: 63 BPM | DIASTOLIC BLOOD PRESSURE: 82 MMHG | OXYGEN SATURATION: 98 %

## 2019-04-25 DIAGNOSIS — F90.9 ATTENTION DEFICIT HYPERACTIVITY DISORDER (ADHD), UNSPECIFIED ADHD TYPE: Primary | ICD-10-CM

## 2019-04-25 DIAGNOSIS — G89.4 CHRONIC PAIN SYNDROME: ICD-10-CM

## 2019-04-25 DIAGNOSIS — R61 HYPERHIDROSIS: ICD-10-CM

## 2019-04-25 LAB
AMPHETAMINE SCREEN URINE: POSITIVE
BARBITURATE SCREEN URINE: NEGATIVE
BENZODIAZEPINE SCREEN, URINE: NEGATIVE
BUPRENORPHINE URINE: ABNORMAL
CANNABINOID SCREEN URINE: NEGATIVE
COCAINE METABOLITE, URINE: NEGATIVE
MDMA URINE: ABNORMAL
METHADONE SCREEN, URINE: NEGATIVE
METHAMPHETAMINE, URINE: ABNORMAL
OPIATES, URINE: NEGATIVE
OXYCODONE SCREEN URINE: POSITIVE
PHENCYCLIDINE, URINE: NEGATIVE
PROPOXYPHENE, URINE: ABNORMAL
TEST INFORMATION: ABNORMAL
TRICYCLIC ANTIDEPRESSANTS, UR: ABNORMAL

## 2019-04-25 PROCEDURE — 99214 OFFICE O/P EST MOD 30 MIN: CPT | Performed by: FAMILY MEDICINE

## 2019-04-25 RX ORDER — CYCLOBENZAPRINE HCL 10 MG
5-10 TABLET ORAL 3 TIMES DAILY PRN
Qty: 30 TABLET | Refills: 0 | Status: SHIPPED | OUTPATIENT
Start: 2019-04-25 | End: 2019-05-20 | Stop reason: SDUPTHER

## 2019-04-25 ASSESSMENT — PATIENT HEALTH QUESTIONNAIRE - PHQ9
SUM OF ALL RESPONSES TO PHQ9 QUESTIONS 1 & 2: 0
2. FEELING DOWN, DEPRESSED OR HOPELESS: 0
1. LITTLE INTEREST OR PLEASURE IN DOING THINGS: 0
SUM OF ALL RESPONSES TO PHQ QUESTIONS 1-9: 0
SUM OF ALL RESPONSES TO PHQ QUESTIONS 1-9: 0

## 2019-04-25 NOTE — PROGRESS NOTES
Visit Information    Have you changed or started any medications since your last visit including any over-the-counter medicines, vitamins, or herbal medicines? no   Are you having any side effects from any of your medications? -  no  Have you stopped taking any of your medications? Is so, why? -  no    Have you seen any other physician or provider since your last visit? No  Have you had any other diagnostic tests since your last visit? No  Have you been seen in the emergency room and/or had an admission to a hospital since we last saw you? No  Have you had your routine dental cleaning in the past 6 months? yes -     Have you activated your [employer] account? If not, what are your barriers?  Yes     Patient Care Team:  Pamela Betancur DO as PCP - Dwain De La Torre DO as PCP - S Attributed Provider  Rafael Oneal MD as Consulting Physician (Gastroenterology)  Aleks Laird MD as Surgeon (Orthopedic Surgery)  Lorne Romano as Surgeon (Podiatry)  Samira Reza DPM as Physician (Podiatry)    Medical History Review  Past Medical, Family, and Social History reviewed and  contribute to the patient presenting condition    Health Maintenance   Topic Date Due    Diabetes screen  01/05/1999    Shingles Vaccine (1 of 2) 01/05/2009    Cervical cancer screen  02/02/2019    Flu vaccine (Season Ended) 09/01/2019    Breast cancer screen  11/15/2020    Lipid screen  11/15/2023    Colon cancer screen colonoscopy  02/02/2025    DTaP/Tdap/Td vaccine (2 - Td) 04/05/2029    Pneumococcal 0-64 years Vaccine  Completed    Hepatitis C screen  Completed    HIV screen  Completed

## 2019-04-25 NOTE — PROGRESS NOTES
Subjective:      Patient ID: Lorenzo Soler is a 60 y.o. female. HPI     Here to today to follow up on anxiety/depression, ADHD, and chronic pain    Doing fairly well lately. Would like to try a muscle relaxer for her pain. Notes a lot of swelling. Still seeing podiatry for chronic foot pain  She feels she is dealing with her mom's dementia with behavioral disturbance much better than previously. Hasn't been able to schedule with GYN yet    She is s/p surgery for removal of mass to ankle and is doing really well. Continues physical therapy for back and shoulder pain. She is doing great with this. Continues to take percocet for chronic pain. No side effects, continues to work well. She is on chronic anticoagulation for recurrent DVT (xarelto)  Takes adderall for ADHD  Takes wellbutrin, buspar, celexa and propranolol for anxiety and depression  Propranolol is also for chronic migraine prophylaxis  Fosamax for osteopenia    Review of Systems   Except as noted in HPI, otherwise 10 point ROS negative    Objective:   Physical Exam   Constitutional: She appears well-developed and well-nourished. No distress. HENT:   Head: Normocephalic and atraumatic. Cardiovascular: Normal rate, regular rhythm and normal heart sounds. No murmur heard. Pulmonary/Chest: Effort normal and breath sounds normal. She has no wheezes. Musculoskeletal: She exhibits tenderness (cervical paraspinals). Neurological: She is alert. No cranial nerve deficit. She exhibits normal muscle tone. Coordination normal.   Skin: Skin is warm and dry. Psychiatric: She has a normal mood and affect. Her behavior is normal. Judgment and thought content normal.   Vitals reviewed. Assessment:      1. Attention deficit hyperactivity disorder (ADHD), unspecified ADHD type    2. Chronic pain syndrome    3. Hyperhidrosis          Plan:      1. Symptoms managed. Continue current regimen  2. OARRS reviewed. No red flags.  Existing med contract. UDS collected. Add flexeril  3.  Trial of drysol    FU 3 mo or sooner PRN

## 2019-04-30 ENCOUNTER — OFFICE VISIT (OUTPATIENT)
Dept: PODIATRY | Age: 59
End: 2019-04-30

## 2019-04-30 VITALS — WEIGHT: 145 LBS | BODY MASS INDEX: 28.47 KG/M2 | RESPIRATION RATE: 16 BRPM | HEIGHT: 60 IN

## 2019-04-30 DIAGNOSIS — R26.2 TROUBLE WALKING: ICD-10-CM

## 2019-04-30 DIAGNOSIS — M79.672 LEFT FOOT PAIN: Primary | ICD-10-CM

## 2019-04-30 PROCEDURE — 99024 POSTOP FOLLOW-UP VISIT: CPT | Performed by: PODIATRIST

## 2019-04-30 NOTE — PROGRESS NOTES
[facility]ICIANS  [facility] [address]  [address] [place]   Dept: [phone]  Dept Fax: [phone]    RETURN PATIENT PROGRESS NOTE  Date of patient's visit: 4/30/2019  Patient's Name:  Nathaniel Natarajan YOB: 1960            Patient Care Team:  Demian Mayo DO as PCP - [facility] DO Britt as PCP - [facility] Attributed Provider  Ami Rodriguez MD as Consulting Physician (Gastroenterology)  Glenys Mauro MD as Surgeon (Orthopedic Surgery)  Waylon Ashley as Surgeon (Podiatry)  Ashley Medrano DPM as Physician (Podiatry)       Nathaniel Natarajan 60 y.o. female that presents for follow-up of   Chief Complaint   Patient presents with    Follow-up     left foot. pt was supposed to start therapy but was unable to   [address]     Pt's primary care physician is Mirna Mckenzie DO last seen 4/25/2019  Symptoms began 2 month(s) ago and are unchanged . Patient relates pain is Present. Pain is rated 4 out of 10 and is described as constant, moderate. Treatments prior to today's visit include: surgery on the left ankle and it has been much better. .  Currently denies F/C/N/V. Allergies   Allergen Reactions    Latex Itching    Prozac [Fluoxetine Hcl]     Sulfa Antibiotics      migraine    Codeine Itching       Past Medical History:   Diagnosis Date    Acid reflux     Adult ADHD 8/1/2012    Asthma 8/1/2012    Bipolar 1 disorder ([address]     Broken foot 2014    Left foot    Chest pain of uncertain etiology 15/99/6149    Last episode was in 2013 (Written 02/12/2019)    Depression     Under control per pt.  on 6/15/18    DVT of leg (deep venous thrombosis) ([address] 8/22/2014    Fracture     right foot / pt denies surgical intervention    Helicobacter pylori (H. pylori) infection 2013    per EGD    Hematuria 04/2018    Has a follow-up with Urologist 6/20    Hiatal hernia     History of DVT (deep vein thrombosis) 8/1/2012    Hypertension  Internal hemorrhoids     Narcolepsy 8/1/2012    Osteoporosis        Prior to Admission medications    Medication Sig Start Date End Date Taking? Authorizing Provider   cyclobenzaprine (FLEXERIL) 10 MG tablet Take 0.5-1 tablets by mouth 3 times daily as needed for Muscle spasms 4/25/19 5/5/19 Yes Barby Crockett DO   aluminum chloride (DRYSOL) 20 % external solution Apply topically nightly. 4/25/19  Yes Barby Crockett DO   oxyCODONE-acetaminophen (PERCOCET) 5-325 MG per tablet Take 1 tablet by mouth every 8 hours as needed for Pain for up to 30 days. 4/24/19 5/24/19 Yes Barby Crockett DO   amphetamine-dextroamphetamine (ADDERALL) 30 MG tablet Take 1 tablet by mouth 2 times daily for 30 days. 4/17/19 5/17/19 Yes Barby Crockett DO   busPIRone (BUSPAR) 15 MG tablet TAKE ONE TABLET BY MOUTH THREE TIMES A DAY 3/19/19  Yes Barby Crockett DO   albuterol (PROVENTIL) (5 MG/ML) 0.5% nebulizer solution Take 1 mL by nebulization 4 times daily as needed for Wheezing 3/13/19  Yes Ankit Walker MD   lidocaine (XYLOCAINE) 5 % ointment Apply topically as needed.  1/25/19  Yes Barby Crockett DO   propranolol (INDERAL LA) 80 MG extended release capsule TAKE ONE CAPSULE BY MOUTH DAILY 1/18/19  Yes Barby Crockett DO   XARELTO 20 MG TABS tablet TAKE ONE TABLET BY MOUTH DAILY 12/31/18  Yes Barby Crockett DO   citalopram (CELEXA) 40 MG tablet TAKE ONE TABLET BY MOUTH DAILY 12/3/18  Yes Cora Crockett DO   alendronate (FOSAMAX) 70 MG tablet TAKE 1 TABLET BY MOUTH ONCE WEEKLY BEFORE BREAKFAST, ON AN EMPTY STOMACH: REMAIN UPRIGHT FOR 30 MINUTES 10/19/18  Yes Barby Crockett DO   VENTOLIN  (90 Base) MCG/ACT inhaler INHALE TWO PUFFS BY MOUTH EVERY 4 HOURS AS NEEDED FOR WHEEZING 8/13/18  Yes Barby Crockett DO   buPROPion (WELLBUTRIN XL) 300 MG extended release tablet Take 1 tablet by mouth every morning 6/22/18  Yes Barby Crockett, DO   Calcium Carbonate (CALCIUM 600 PO) Bilateral.  negative Tinel's, Bilateral.  negative Valleix sign, Bilateral.      Integument: Warm, dry, supple, Bilateral.  Open lesion absent, Bilateral.  Interdigital maceration absent to web spaces 1-4, Bilateral.  Nails are normal in length, thickness and color 1-5 bilateral.  Fissures absent, Bilateral.         Asessment: Patient is a 60 y.o. female with:   1. Left foot pain    2. Trouble walking        Plan: Patient examined and evaluated. Current condition and treatment options discussed in detail. Advised pt to attend the therapy sessions and work the program.  Pt to stay out of the boot. .  Verbal and written instructions given to patient. Contact office with any questions/problems/concerns. No orders of the defined types were placed in this encounter. No orders of the defined types were placed in this encounter. RTC in 4week(s).     4/30/2019      Electronically signed by Marla Pacheco DPM on 4/30/2019 at 9:13 AM  4/30/2019

## 2019-05-06 ENCOUNTER — HOSPITAL ENCOUNTER (OUTPATIENT)
Dept: PHYSICAL THERAPY | Age: 59
Setting detail: THERAPIES SERIES
Discharge: HOME | End: 2019-05-06
Payer: MEDICARE

## 2019-05-06 PROCEDURE — 97162 PT EVAL MOD COMPLEX 30 MIN: CPT

## 2019-05-06 PROCEDURE — 97110 THERAPEUTIC EXERCISES: CPT

## 2019-05-06 NOTE — CONSULTS
[address] [facility]  [address] [facility]. [address]         Phone: [phone]       Fax: [phone]    Physical Therapy Lower Extremity Evaluation    Date:  2019  Patient: Franchesca Hall  : 1960  MRN: [id number]  Physician: Edita Waldrop DPM  Insurance:  Medical [place]               Eligibility Status:  Eligible        Secondary Insurance (if applicable) [place] Medicaid - [employer]              Eligibility Status: Eligible        DOS: 19  # of visits allowed/remaining: based on medical necessity  Source: Website  Spoke To: "[employer]  Reference: [id number]  Auth: NPRe     Electronically signed by Connie Pate on 19 at 2:10 PM     Medical Diagnosis: Left foot pain, difficulty walking, s/p PTT 19    Rehab Codes: M62.81, M25.572, M25.562, M25.662, R26.2  Onset date: 2019  Next 's appt.: 19    Subjective: Patient requents HEP she cannot afford copays for PT. Patient scheduled 1 follow up visit. CC:  HPI: (2019) Subjective: Franchesca Hall is a 60 y.o. female who presents to the office today 1month(s)  S/P left foot peroneal tendon transfer for correction of torn tendon to the left foot 19.         PMHx: [] Unremarkable [] Diabetes [x] HTN  [] Pacemaker   [] MI/Heart Problems [] Cancer [] Arthritis [x] Other: Left LE DVT, psych: bipolar  LIGAMENT REPAIR Left 2019 LEFT PERONEAL TENDON REPAIR performed by Edita Waldrop DPM at [address] [place] [address] Provider                         [x] Refer to full medical chart  In EPIC   Tests: [] X-Ray: [] MRI:  [] Other:     Medications: [x] Refer to full medical record [] None [] Other:  Allergies:      [x] Refer to full medical record [] None [] Other:    Function:  Hand Dominance  [x] Right  [] Left  Working:  [] Normal Duty  [] Light Duty  [] Off D/T Condition  [] Retired    [] Not Employed    [x]  Disability  [] Other:           Return to work:   Job/ADL Description:      Pain:  [x] Yes  [] No Location: left foot  Pain Rating: (0-10 scale) 8/10  Pain altered Tx:  [] Yes  [] No  Action:  Symptoms:  [] Improving [] Worsening [] Same  Better:  [] AM    [] PM    [] Sit    [] Rise/Sit    []Stand    [] Walk    [] Lying    [] Other:  Worse: [] AM    [] PM    [] Sit    [x] Rise/Sit    [x]Stand    [x] Walk    [] Lying    [] Bend                             [] Valsalva    [] Other:  Sleep: [] OK    [] Disturbed    Objective:    ROM  ° A/P STRENGTH TESTS (+/-) Left Right Not Tested    Left Right Left Right Ant.  Drawer   []   Hip Flex     Post. Drawer   []   Ext     Lachmans   []   ER     Valgus Stress   []   IR     Varus Stress   []   ABD     Sherris   []   ADD     Apleys Comp.   []   Knee Flex     Apleys Dist.   []   Ext     Hip Scouring   []   Ankle DF 0  5-  ELIZABETHs   []   PF   5-  Piriformis   []   INV 5  NT  Pardeeps   []   EVER 0  NT  Talor Tilt   []        Pat-Fem Grind   []     Observation:  Calcaneal incision, left lateral malleolar incision    OBSERVATION No Deficit Deficit Not Tested Comments   Posture       Forward Head [] [] []    Rounded Shoulders [] [] []    Kyphosis [] [] []    Lordosis [] [] []    Lateral Shift [] [] []    Scoliosis [] [] []    Iliac Crest [] [] []    PSIS [] [] []    ASIS [] [] []    Genu Valgus [] [] []    Genu Varus [] [] []    Genu Recurvatum [] [] []    Pronation [] [] []    Supination [] [] []    Leg Length Discrp [] [] []    Slumped Sitting [] [] []    Palpation [] [] []    Sensation [] [] []    Edema [] [x] [] Figure 8 left ankle 56 cm, right 52.5 cm   Neurological [] [] []    Patellar Mobility [] [] []    Patellar Orientation [] [] []    Gait [] [x] [] Analysis:  Antalgic gait pattern due to left foot pain         FUNCTION Normal Difficult Unable   Sitting [] [] []   Standing [] [x] []   Ambulation [] [x] []   Groom/Dress [] [] []   Lift/Carry [] [] []   Stairs [] [x] []   Bending [] [x] []   Squat [] [x] []   Kneel [] [x] []     BALANCE/PROPRIOCEPTION              [] Not tested   Single leg stance       R                     L        Unable                        PAIN   Eyes open                             Sec. Sec                  . []    Eyes closed                          Sec. Sec                  . []        Comments:  Assessment:  Problems:    [x] ? Pain: Left ankle 8/10    [x] ? ROM:  Limited left ankle ROM    [x] ? Strength:  Decreased Left LE strength    [x] ? Function: LEFS 71%   [x] ? Balance  Unable  [x] Edema:  Left ankle  [] Postural Deviations  [x] Gait Deviations  [] Other:      STG: (to be met in 1 treatments)  1. ? Pain:  Left ankle 5/10  2. ? ROM:  Left ankle ROM improved 50% all planes  3. ? Strength:  Bilateral LE grossly 5/5  4. ? Function:  LEFS 40% or less  5. Independent with Home Exercise Programs  6. Demonstrate Knowledge of fall prevention  LTG: (to be met in 2 treatments)  1. Left ankle pain 0/10  2. LEFS 25% or less                   Patient goals: No more pain, be able to walk, ride bike, exercise    Rehab Potential:  [x] Good  [] Fair  [] Poor   Suggested Professional Referral:  [x] No  [] Yes:  Barriers to Goal Achievement[de-identified]  [x] No  [] Yes:  Domestic Concerns:  [x] No  [] Yes:    Pt. Education:  [x] Plans/Goals, Risks/Benefits discussed  [x] Home exercise program    Method of Education: [x] Verbal  [x] Demo  [x] Written  Comprehension of Education:  [x] Verbalizes understanding. [x] Demonstrates understanding. [x] Needs Review. [] Demonstrates/verbalizes understanding of HEP/Ed previously given.     Treatment Plan:  [x] Therapeutic Exercise    [x] Modalities:  [] Therapeutic Activity    [] Ultrasound  [x] Electrical Stimulation  [x] Gait Training     [] Massage       [] Lumbar/Cervical Traction  [x] Neuromuscular Re-education [] Cold/hotpack [] Iontophoresis: 4 mg/mL  [x] Instruction in HEP             Dexamethasone Sodium  [x] Manual Therapy             Phosphate 40-80 mAmin  [x] Aquatic Therapy  [x]

## 2019-05-06 NOTE — FLOWSHEET NOTE
Bud Fall Risk Assessment    Patient Name:  Rambo Poster  : 1960        Risk Factor Scale  Score   History of Falls [x] Yes  [] No 25  0    Secondary Diagnosis [] Yes  [x] No 15  0    Ambulatory Aid [] Furniture  [] Crutches/cane/walker  [x] None/bedrest/wheelchair/nurse 30  15  0    IV/Heparin Lock [] Yes  [x] No 20  0    Gait/Transferring [] Impaired  [] Weak  [x] Normal/bedrest/immobile 20  10  0    Mental Status [] Forgets limitations  [x] Oriented to own ability 15  0       Total: 25     Based on the Assessment score: check the appropriate box.     []  No intervention needed   Low =   Score of 0-24    [x]  Use standard prevention interventions Moderate =  Score of 24-44   [x] Give patient handout and discuss fall prevention strategies   [x] Establish goal of education for patient/family RE: fall prevention strategies    []  Use high risk prevention interventions High = Score of 45 and higher   [] Give patient handout and discuss fall prevention strategies   [] Establish goal of education for patient/family Re: fall prevention strategies   [] Discuss lifeline / other resources    Electronically signed by:   Jessenia Marr PT  Date: 2019

## 2019-05-10 NOTE — FLOWSHEET NOTE
[] [facility]ade[facility]  Outpatient Physical Therapy  [address]  Phone: [phone]  Fax: [phone]  [] [facility]  Promotion at [address] [place] [address]  Phone: [phone]  Fax: [phone]  [] [facility]  [address]  Phone: [phone]  Fax: [phone]      THERAPY RESPONSIBILITY OF CARE TRANSFER FORM       PATIENT NAME: Sonia Anand  MRN: [id number]   : 1960      TRANSFERRING FACILITY:    [] [facility]   [x] [facility]   []  [facility]   [] [facility]   [] [facility]   [] [facility]   [] [facility]    [] Other:       ACCEPTING FACILITY   [] [facility]   [x] [facility]   []  [facility]   [] [facility]   [] [facility]   [] [facility]   [] [facility]    [] Other:          REASON FOR TRANSFER: Primary PT relocating      TRANSFER OF CARE:    I am transferring the care of the above patient to: Chet Marcelino, PT  Mary Gutierrez PT  5/10/2019      ACCEPTANCE OF CARE:     I am accepting the care of the above patient.  Chet Marcelino, PT

## 2019-05-16 DIAGNOSIS — F90.9 ATTENTION DEFICIT HYPERACTIVITY DISORDER (ADHD), UNSPECIFIED ADHD TYPE: ICD-10-CM

## 2019-05-16 RX ORDER — DEXTROAMPHETAMINE SACCHARATE, AMPHETAMINE ASPARTATE, DEXTROAMPHETAMINE SULFATE AND AMPHETAMINE SULFATE 7.5; 7.5; 7.5; 7.5 MG/1; MG/1; MG/1; MG/1
30 TABLET ORAL 2 TIMES DAILY
Qty: 60 TABLET | Refills: 0 | Status: SHIPPED | OUTPATIENT
Start: 2019-05-16 | End: 2019-06-14 | Stop reason: SDUPTHER

## 2019-05-16 NOTE — TELEPHONE ENCOUNTER
tenosynovitis, unspecified hand     Flexion contracture of joint of foot, left     Peroneal tendon injury, left, subsequent encounter

## 2019-05-20 RX ORDER — CYCLOBENZAPRINE HCL 10 MG
TABLET ORAL
Qty: 30 TABLET | Refills: 5 | Status: SHIPPED | OUTPATIENT
Start: 2019-05-20 | End: 2019-07-23 | Stop reason: SDUPTHER

## 2019-05-20 NOTE — TELEPHONE ENCOUNTER
Last visit: 4/25/19  Last Med refill: 4/25/19  Does patient have enough medication for 72 hours: Yes    Next Visit Date:  Future Appointments   Date Time Provider Naya Roger   5/28/2019  9:30 AM PRINCE Rojas [facility]   6/7/2019 10:15 AM Jaleel Gutierrez, PT [facility]   7/23/2019  2:00 PM Ashley Kinney  [address] [facility]   Topic Date Due    Diabetes screen  01/05/1999    Shingles Vaccine (1 of 2) 01/05/2009    Cervical cancer screen  02/02/2019    Flu vaccine (Season Ended) 09/01/2019    Breast cancer screen  11/15/2020    Lipid screen  11/15/2023    Colon cancer screen colonoscopy  02/02/2025    DTaP/Tdap/Td vaccine (2 - Td) 04/05/2029    Pneumococcal 0-64 years Vaccine  Completed    Hepatitis C screen  Completed    HIV screen  Completed       No results found for: LABA1C          ( goal A1C is < 7)   No results found for: LABMICR  LDL Cholesterol (mg/dL)   Date Value   11/15/2018 111   04/27/2018 110       (goal LDL is <100)   AST (U/L)   Date Value   11/15/2018 18     ALT (U/L)   Date Value   11/15/2018 12     BUN (mg/dL)   Date Value   02/12/2019 11     BP Readings from Last 3 Encounters:   04/25/19 124/82   04/05/19 134/88   02/26/19 118/80          (goal 120/80)    All Future Testing planned in [facility]  Lab Frequency Next Occurrence               Patient Active Problem List:     Adult ADHD     Narcolepsy     Asthma     History of DVT (deep vein thrombosis)     DVT of leg (deep venous thrombosis) (HCC)     Closed displaced fracture of body of calcaneus     Hiatal hernia     Internal hemorrhoids     Helicobacter pylori (H. 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

## 2019-05-22 DIAGNOSIS — M54.12 CERVICAL RADICULOPATHY: ICD-10-CM

## 2019-05-22 RX ORDER — OXYCODONE HYDROCHLORIDE AND ACETAMINOPHEN 5; 325 MG/1; MG/1
1 TABLET ORAL EVERY 8 HOURS PRN
Qty: 90 TABLET | Refills: 0 | Status: SHIPPED | OUTPATIENT
Start: 2019-05-22 | End: 2019-06-25 | Stop reason: SDUPTHER

## 2019-05-22 RX ORDER — CYCLOBENZAPRINE HCL 10 MG
TABLET ORAL
Qty: 30 TABLET | Refills: 5 | Status: CANCELLED | OUTPATIENT
Start: 2019-05-22

## 2019-05-22 NOTE — TELEPHONE ENCOUNTER
Last visit: 4/25/19  Last Med refill: 4/24/19 [facility] 4/27/19  Does patient have enough medication for 72 hours: Yes    Next Visit Date:  Future Appointments   Date Time Provider Naya Roger   5/28/2019  9:30 AM PRINCE Jeetr [facility]iat[facility]   6/7/2019 10:15 AM Daisy Garcia, PT [facility]   7/23/2019  2:00 PM Maddison Payan  [address] [facility]   Topic Date Due    Diabetes screen  01/05/1999    Shingles Vaccine (1 of 2) 01/05/2009    Cervical cancer screen  02/02/2019    Flu vaccine (Season Ended) 09/01/2019    Breast cancer screen  11/15/2020    Lipid screen  11/15/2023    Colon cancer screen colonoscopy  02/02/2025    DTaP/Tdap/Td vaccine (2 - Td) 04/05/2029    Pneumococcal 0-64 years Vaccine  Completed    Hepatitis C screen  Completed    HIV screen  Completed       No results found for: LABA1C          ( goal A1C is < 7)   No results found for: LABMICR  LDL Cholesterol (mg/dL)   Date Value   11/15/2018 111   04/27/2018 110       (goal LDL is <100)   AST (U/L)   Date Value   11/15/2018 18     ALT (U/L)   Date Value   11/15/2018 12     BUN (mg/dL)   Date Value   02/12/2019 11     BP Readings from Last 3 Encounters:   04/25/19 124/82   04/05/19 134/88   02/26/19 118/80          (goal 120/80)    All Future Testing planned in [facility]  Lab Frequency Next Occurrence               Patient Active Problem List:     Adult ADHD     Narcolepsy     Asthma     History of DVT (deep vein thrombosis)     DVT of leg (deep venous thrombosis) (HCC)     Closed displaced fracture of body of calcaneus     Hiatal hernia     Internal hemorrhoids     Helicobacter pylori (H. 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

## 2019-05-28 ENCOUNTER — OFFICE VISIT (OUTPATIENT)
Dept: PODIATRY | Age: 59
End: 2019-05-28

## 2019-05-28 VITALS — RESPIRATION RATE: 16 BRPM | WEIGHT: 145 LBS | BODY MASS INDEX: 25.69 KG/M2 | HEIGHT: 63 IN

## 2019-05-28 DIAGNOSIS — S93.412A SPRAIN OF CALCANEOFIBULAR LIGAMENT OF LEFT ANKLE, INITIAL ENCOUNTER: ICD-10-CM

## 2019-05-28 DIAGNOSIS — M25.572 ACUTE LEFT ANKLE PAIN: ICD-10-CM

## 2019-05-28 DIAGNOSIS — M76.72 PERONEAL TENDINITIS OF LEFT LOWER EXTREMITY: Primary | ICD-10-CM

## 2019-05-28 PROCEDURE — 99024 POSTOP FOLLOW-UP VISIT: CPT | Performed by: PODIATRIST

## 2019-05-28 NOTE — PROGRESS NOTES
[facility]  [facility] [address]  [address] [place]   Dept: [phone]  Dept Fax: [phone]    RETURN PATIENT PROGRESS NOTE  Date of patient's visit: 5/28/2019  Patient's Name:  Maxi Rogel YOB: 1960            Patient Care Team:  Vidal Keith [facility] - [facility] Attributed Provider  Jadiel Garnica MD as Consulting Physician (Gastroenterology)  Jim Lancaster MD as Surgeon (Orthopedic Surgery)  Michelle Najera as Surgeon (Podiatry)  Vinita Burns DPM as Physician (Podiatry)       Maxi Juan F 60 y.o. female that presents for follow-up of   Chief Complaint   Patient presents with    Post-Op Check     12 wks    Foot Pain     left     Pt's primary care physician is Shilpi Shelby DO last seen 4/25/2019  Symptoms began 2 month(s) ago and are unchanged . Patient relates pain is Present. Pain is rated 4 out of 10 and is described as constant, moderate. Treatments prior to today's visit include: surgery on the left ankle and it has been much better. .  Currently denies F/C/N/V. Allergies   Allergen Reactions    Latex Itching    Prozac [Fluoxetine Hcl]     Sulfa Antibiotics      migraine    Codeine Itching       Past Medical History:   Diagnosis Date    Acid reflux     Adult ADHD 8/1/2012    Asthma 8/1/2012    Bipolar 1 disorder ([address]     Broken foot 2014    Left foot    Chest pain of uncertain etiology 45/60/3499    Last episode was in 2013 (Written 02/12/2019)    Depression     Under control per pt.  on 6/15/18    DVT of leg (deep venous thrombosis) ([address] 8/22/2014    Fracture     right foot / pt denies surgical intervention    Helicobacter pylori (H. pylori) infection 2013    per EGD    Hematuria 04/2018    Has a follow-up with Urologist 6/20    Hiatal hernia     History of DVT (deep vein thrombosis) 8/1/2012    Hypertension     Internal hemorrhoids     Narcolepsy Integument: Warm, dry, supple, Bilateral.  Open lesion absent, Bilateral.  Interdigital maceration absent to web spaces 1-4, Bilateral.  Nails are normal in length, thickness and color 1-5 bilateral.  Fissures absent, Bilateral.         Asessment: Patient is a 60 y.o. female with:    Diagnosis Orders   1. Peroneal tendinitis of left lower extremity     2. Sprain of calcaneofibular ligament of left ankle, initial encounter     3. Acute left ankle pain           Plan: Patient examined and evaluated. Current condition and treatment options discussed in detail. Advised pt to attend the therapy sessions and work the program.  Pt to stay out of the boot. .PT can continue to do her physical activities. Verbal and written instructions given to patient. Contact office with any questions/problems/concerns. No orders of the defined types were placed in this encounter. No orders of the defined types were placed in this encounter.        RTC in 4week(s).    5/28/2019      Electronically signed by Ashley Medrano DPM on 5/28/2019 at 9:47 AM  5/28/2019

## 2019-06-14 DIAGNOSIS — F90.9 ATTENTION DEFICIT HYPERACTIVITY DISORDER (ADHD), UNSPECIFIED ADHD TYPE: ICD-10-CM

## 2019-06-14 RX ORDER — DEXTROAMPHETAMINE SACCHARATE, AMPHETAMINE ASPARTATE, DEXTROAMPHETAMINE SULFATE AND AMPHETAMINE SULFATE 7.5; 7.5; 7.5; 7.5 MG/1; MG/1; MG/1; MG/1
30 TABLET ORAL 2 TIMES DAILY
Qty: 60 TABLET | Refills: 0 | Status: SHIPPED | OUTPATIENT
Start: 2019-06-14 | End: 2019-07-20 | Stop reason: SDUPTHER

## 2019-06-14 NOTE — TELEPHONE ENCOUNTER
Last visit: 4/25/19  Last Med refill: 5-16-19  Does patient have enough medication for 72 hours: Yes    Next Visit Date:  Future Appointments   Date Time Provider Naya Roger   7/23/2019  2:00 PM Cameron Gravel [facility]   7/30/2019  9:15 AM Dearl PRINCE [address] Maintenance   Topic Date Due    Diabetes screen  01/05/1999    Shingles Vaccine (1 of 2) 01/05/2009    Cervical cancer screen  02/02/2019    Flu vaccine (Season Ended) 09/01/2019    Breast cancer screen  11/15/2020    Lipid screen  11/15/2023    Colon cancer screen colonoscopy  02/02/2025    DTaP/Tdap/Td vaccine (2 - Td) 04/05/2029    Pneumococcal 0-64 years Vaccine  Completed    Hepatitis C screen  Completed    HIV screen  Completed       No results found for: LABA1C          ( goal A1C is < 7)   No results found for: LABMICR  LDL Cholesterol (mg/dL)   Date Value   11/15/2018 111   04/27/2018 110       (goal LDL is <100)   AST (U/L)   Date Value   11/15/2018 18     ALT (U/L)   Date Value   11/15/2018 12     BUN (mg/dL)   Date Value   02/12/2019 11     BP Readings from Last 3 Encounters:   04/25/19 124/82   04/05/19 134/88   02/26/19 118/80          (goal 120/80)    All Future Testing planned in [facility]  Lab Frequency Next Occurrence               Patient Active Problem List:     Adult ADHD     Narcolepsy     Asthma     History of DVT (deep vein thrombosis)     DVT of leg (deep venous thrombosis) (HCC)     Closed displaced fracture of body of calcaneus     Hiatal hernia     Internal hemorrhoids     Helicobacter pylori (H. 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

## 2019-06-24 DIAGNOSIS — M54.12 CERVICAL RADICULOPATHY: ICD-10-CM

## 2019-06-24 NOTE — TELEPHONE ENCOUNTER
Last visit: 4/25/19  Last Med refill: 5-22-19 [facility] 4/25/19  Does patient have enough medication for 72 hours: Yes    Next Visit Date:  Future Appointments   Date Time Provider Naya Roger   7/1/2019  9:45 AM Valerie Whitlock, PT [facility]   7/23/2019  2:00 PM DO Thu Starks [facility]   7/30/2019  9:15 AM PRINCE [facility] [address] Maintenance   Topic Date Due    Diabetes screen  01/05/1999    Shingles Vaccine (1 of 2) 01/05/2009    Cervical cancer screen  02/02/2019    Flu vaccine (Season Ended) 09/01/2019    Breast cancer screen  11/15/2020    Annual Wellness Visit (AWV)  01/05/2022    Lipid screen  11/15/2023    Colon cancer screen colonoscopy  02/02/2025    DTaP/Tdap/Td vaccine (2 - Td) 04/05/2029    Pneumococcal 0-64 years Vaccine  Completed    Hepatitis C screen  Completed    HIV screen  Completed       No results found for: LABA1C          ( goal A1C is < 7)   No results found for: LABMICR  LDL Cholesterol (mg/dL)   Date Value   11/15/2018 111   04/27/2018 110       (goal LDL is <100)   AST (U/L)   Date Value   11/15/2018 18     ALT (U/L)   Date Value   11/15/2018 12     BUN (mg/dL)   Date Value   02/12/2019 11     BP Readings from Last 3 Encounters:   04/25/19 124/82   04/05/19 134/88   02/26/19 118/80          (goal 120/80)    All Future Testing planned in [facility]  Lab Frequency Next Occurrence               Patient Active Problem List:     Adult ADHD     Narcolepsy     Asthma     History of DVT (deep vein thrombosis)     DVT of leg (deep venous thrombosis) (HCC)     Closed displaced fracture of body of calcaneus     Hiatal hernia     Internal hemorrhoids     Helicobacter pylori (H. 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

## 2019-06-25 RX ORDER — OXYCODONE HYDROCHLORIDE AND ACETAMINOPHEN 5; 325 MG/1; MG/1
1 TABLET ORAL EVERY 8 HOURS PRN
Qty: 90 TABLET | Refills: 0 | Status: SHIPPED | OUTPATIENT
Start: 2019-06-25 | End: 2019-07-22 | Stop reason: SDUPTHER

## 2019-07-20 DIAGNOSIS — F90.9 ATTENTION DEFICIT HYPERACTIVITY DISORDER (ADHD), UNSPECIFIED ADHD TYPE: ICD-10-CM

## 2019-07-20 NOTE — PROGRESS NOTES
20-Jul-2019 16:10 Visit Information    Have you changed or started any medications since your last visit including any over-the-counter medicines, vitamins, or herbal medicines? no   Have you stopped taking any of your medications? Is so, why? -  no  Are you having any side effects from any of your medications? - no    Have you seen any other physician or provider since your last visit?  no   Have you had any other diagnostic tests since your last visit?  no   Have you been seen in the emergency room and/or had an admission in a hospital since we last saw you?  no   Have you had your routine dental cleaning in the past 6 months?  yes -      Do you have an active MyChart account? If no, what is the barrier?   Yes    Patient Care Team:  Sabine Wyatt MD as PCP - General (Family Medicine)  Nevaeh Romero MD as Consulting Physician (Gastroenterology)  Pina Moncada MD as Surgeon (Orthopedic Surgery)  Ligia Vang as Surgeon (Podiatry)    Medical History Review  Past Medical, Family, and Social History reviewed and does not contribute to the patient presenting condition    Health Maintenance   Topic Date Due    Hepatitis C screen  1960    HIV screen  01/05/1974    DTaP/Tdap/Td vaccine (1 - Tdap) 07/19/2003    Breast cancer screen  07/19/2015    Flu vaccine (1) 09/01/2017    Cervical cancer screen  02/02/2019    Lipid screen  02/02/2021    Colon cancer screen colonoscopy  02/02/2025    Pneumococcal med risk  Completed

## 2019-07-22 DIAGNOSIS — M54.12 CERVICAL RADICULOPATHY: ICD-10-CM

## 2019-07-22 RX ORDER — DEXTROAMPHETAMINE SACCHARATE, AMPHETAMINE ASPARTATE, DEXTROAMPHETAMINE SULFATE AND AMPHETAMINE SULFATE 7.5; 7.5; 7.5; 7.5 MG/1; MG/1; MG/1; MG/1
30 TABLET ORAL 2 TIMES DAILY
Qty: 60 TABLET | Refills: 0 | Status: SHIPPED | OUTPATIENT
Start: 2019-07-22 | End: 2019-08-20 | Stop reason: SDUPTHER

## 2019-07-23 ENCOUNTER — OFFICE VISIT (OUTPATIENT)
Dept: FAMILY MEDICINE CLINIC | Age: 59
End: 2019-07-23
Payer: MEDICARE

## 2019-07-23 VITALS
BODY MASS INDEX: 26.32 KG/M2 | DIASTOLIC BLOOD PRESSURE: 84 MMHG | OXYGEN SATURATION: 94 % | SYSTOLIC BLOOD PRESSURE: 126 MMHG | WEIGHT: 148.6 LBS | HEART RATE: 66 BPM

## 2019-07-23 DIAGNOSIS — G89.4 CHRONIC PAIN SYNDROME: ICD-10-CM

## 2019-07-23 DIAGNOSIS — F90.9 ATTENTION DEFICIT HYPERACTIVITY DISORDER (ADHD), UNSPECIFIED ADHD TYPE: Primary | ICD-10-CM

## 2019-07-23 PROCEDURE — 99213 OFFICE O/P EST LOW 20 MIN: CPT | Performed by: FAMILY MEDICINE

## 2019-07-23 RX ORDER — OXYCODONE HYDROCHLORIDE AND ACETAMINOPHEN 5; 325 MG/1; MG/1
1 TABLET ORAL EVERY 8 HOURS PRN
Qty: 90 TABLET | Refills: 0 | Status: SHIPPED | OUTPATIENT
Start: 2019-07-23 | End: 2019-08-20 | Stop reason: SDUPTHER

## 2019-07-23 RX ORDER — CYCLOBENZAPRINE HCL 10 MG
TABLET ORAL
Qty: 30 TABLET | Refills: 5 | Status: SHIPPED | OUTPATIENT
Start: 2019-07-23 | End: 2020-02-24 | Stop reason: SDUPTHER

## 2019-07-24 ENCOUNTER — TELEPHONE (OUTPATIENT)
Dept: FAMILY MEDICINE CLINIC | Age: 59
End: 2019-07-24

## 2019-07-24 NOTE — TELEPHONE ENCOUNTER
Please let Kanwal Manan know that it is okay to take more buspar. The daily maximum is 60 mg.  It would be too much for her to take 1 1/2 tablets three times a day but she could do that for one of the doses or I could change her Rx to 20 mg tablets

## 2019-07-25 ENCOUNTER — TELEPHONE (OUTPATIENT)
Dept: FAMILY MEDICINE CLINIC | Age: 59
End: 2019-07-25

## 2019-07-25 NOTE — TELEPHONE ENCOUNTER
Patient is calling because she has eczema on her right foot on the top. [facility]

## 2019-07-25 NOTE — TELEPHONE ENCOUNTER
I can but If it doesn't get better it could be fungus because they can look the same.  Please have her call if it does not get better

## 2019-08-08 ENCOUNTER — OFFICE VISIT (OUTPATIENT)
Dept: PODIATRY | Age: 59
End: 2019-08-08
Payer: MEDICARE

## 2019-08-08 VITALS — WEIGHT: 147 LBS | RESPIRATION RATE: 16 BRPM | HEIGHT: 60 IN | BODY MASS INDEX: 28.86 KG/M2

## 2019-08-08 DIAGNOSIS — M79.672 LEFT FOOT PAIN: Primary | ICD-10-CM

## 2019-08-08 DIAGNOSIS — R26.2 TROUBLE WALKING: ICD-10-CM

## 2019-08-08 PROCEDURE — 99213 OFFICE O/P EST LOW 20 MIN: CPT | Performed by: PODIATRIST

## 2019-08-20 DIAGNOSIS — M54.12 CERVICAL RADICULOPATHY: ICD-10-CM

## 2019-08-20 DIAGNOSIS — F90.9 ATTENTION DEFICIT HYPERACTIVITY DISORDER (ADHD), UNSPECIFIED ADHD TYPE: ICD-10-CM

## 2019-08-20 RX ORDER — DEXTROAMPHETAMINE SACCHARATE, AMPHETAMINE ASPARTATE, DEXTROAMPHETAMINE SULFATE AND AMPHETAMINE SULFATE 7.5; 7.5; 7.5; 7.5 MG/1; MG/1; MG/1; MG/1
30 TABLET ORAL 2 TIMES DAILY
Qty: 60 TABLET | Refills: 0 | Status: SHIPPED | OUTPATIENT
Start: 2019-08-20 | End: 2019-09-20 | Stop reason: SDUPTHER

## 2019-08-20 RX ORDER — OXYCODONE HYDROCHLORIDE AND ACETAMINOPHEN 5; 325 MG/1; MG/1
1 TABLET ORAL EVERY 8 HOURS PRN
Qty: 90 TABLET | Refills: 0 | Status: SHIPPED | OUTPATIENT
Start: 2019-08-20 | End: 2019-09-19 | Stop reason: SDUPTHER

## 2019-08-20 NOTE — TELEPHONE ENCOUNTER
wrist     Other synovitis and tenosynovitis, unspecified hand     Flexion contracture of joint of foot, left     Peroneal tendon injury, left, subsequent encounter

## 2019-09-13 RX ORDER — BUSPIRONE HYDROCHLORIDE 15 MG/1
TABLET ORAL
Qty: 90 TABLET | Refills: 3 | Status: SHIPPED | OUTPATIENT
Start: 2019-09-13 | End: 2020-04-06 | Stop reason: SDUPTHER

## 2019-09-19 DIAGNOSIS — M54.12 CERVICAL RADICULOPATHY: ICD-10-CM

## 2019-09-19 DIAGNOSIS — F90.9 ATTENTION DEFICIT HYPERACTIVITY DISORDER (ADHD), UNSPECIFIED ADHD TYPE: ICD-10-CM

## 2019-09-20 RX ORDER — OXYCODONE HYDROCHLORIDE AND ACETAMINOPHEN 5; 325 MG/1; MG/1
1 TABLET ORAL EVERY 8 HOURS PRN
Qty: 90 TABLET | Refills: 0 | Status: SHIPPED | OUTPATIENT
Start: 2019-09-20 | End: 2019-10-20

## 2019-09-20 RX ORDER — DEXTROAMPHETAMINE SACCHARATE, AMPHETAMINE ASPARTATE, DEXTROAMPHETAMINE SULFATE AND AMPHETAMINE SULFATE 7.5; 7.5; 7.5; 7.5 MG/1; MG/1; MG/1; MG/1
30 TABLET ORAL 2 TIMES DAILY
Qty: 60 TABLET | Refills: 0 | Status: SHIPPED | OUTPATIENT
Start: 2019-09-20 | End: 2019-10-23

## 2019-10-01 RX ORDER — BUPROPION HYDROCHLORIDE 300 MG/1
TABLET ORAL
Qty: 90 TABLET | Refills: 3 | Status: SHIPPED | OUTPATIENT
Start: 2019-10-01 | End: 2020-12-11 | Stop reason: SDUPTHER

## 2019-10-11 ENCOUNTER — TELEPHONE (OUTPATIENT)
Dept: FAMILY MEDICINE CLINIC | Age: 59
End: 2019-10-11

## 2019-10-15 ENCOUNTER — TELEPHONE (OUTPATIENT)
Dept: FAMILY MEDICINE CLINIC | Age: 59
End: 2019-10-15

## 2019-10-15 DIAGNOSIS — M79.662 PAIN OF LEFT CALF: Primary | ICD-10-CM

## 2019-10-16 ENCOUNTER — HOSPITAL ENCOUNTER (OUTPATIENT)
Dept: VASCULAR LAB | Age: 59
Discharge: HOME | End: 2019-10-16
Payer: MEDICARE

## 2019-10-16 DIAGNOSIS — M79.662 PAIN OF LEFT CALF: ICD-10-CM

## 2019-10-16 PROCEDURE — 93971 EXTREMITY STUDY: CPT

## 2019-10-18 ENCOUNTER — OFFICE VISIT (OUTPATIENT)
Dept: FAMILY MEDICINE CLINIC | Age: 59
End: 2019-10-18
Payer: MEDICARE

## 2019-10-18 VITALS
BODY MASS INDEX: 30.12 KG/M2 | SYSTOLIC BLOOD PRESSURE: 120 MMHG | OXYGEN SATURATION: 98 % | DIASTOLIC BLOOD PRESSURE: 84 MMHG | HEART RATE: 59 BPM | WEIGHT: 154.2 LBS

## 2019-10-18 DIAGNOSIS — M54.12 CERVICAL RADICULOPATHY: Primary | ICD-10-CM

## 2019-10-18 DIAGNOSIS — G89.4 CHRONIC PAIN SYNDROME: ICD-10-CM

## 2019-10-18 DIAGNOSIS — F90.9 ATTENTION DEFICIT HYPERACTIVITY DISORDER (ADHD), UNSPECIFIED ADHD TYPE: ICD-10-CM

## 2019-10-18 DIAGNOSIS — R06.09 EXERTIONAL DYSPNEA: ICD-10-CM

## 2019-10-18 PROCEDURE — 99214 OFFICE O/P EST MOD 30 MIN: CPT | Performed by: FAMILY MEDICINE

## 2019-10-18 RX ORDER — OXYCODONE HYDROCHLORIDE AND ACETAMINOPHEN 5; 325 MG/1; MG/1
1 TABLET ORAL EVERY 8 HOURS PRN
Qty: 90 TABLET | Refills: 0 | Status: SHIPPED | OUTPATIENT
Start: 2019-10-20 | End: 2019-10-23

## 2019-10-18 RX ORDER — DEXTROAMPHETAMINE SACCHARATE, AMPHETAMINE ASPARTATE, DEXTROAMPHETAMINE SULFATE AND AMPHETAMINE SULFATE 7.5; 7.5; 7.5; 7.5 MG/1; MG/1; MG/1; MG/1
30 TABLET ORAL 2 TIMES DAILY
Qty: 60 TABLET | Refills: 0 | Status: SHIPPED | OUTPATIENT
Start: 2019-10-20 | End: 2019-10-23

## 2019-10-18 RX ORDER — DEXTROAMPHETAMINE SACCHARATE, AMPHETAMINE ASPARTATE, DEXTROAMPHETAMINE SULFATE AND AMPHETAMINE SULFATE 7.5; 7.5; 7.5; 7.5 MG/1; MG/1; MG/1; MG/1
30 TABLET ORAL 2 TIMES DAILY
Qty: 60 TABLET | Refills: 0 | Status: SHIPPED | OUTPATIENT
Start: 2019-12-19 | End: 2020-01-17 | Stop reason: SDUPTHER

## 2019-10-18 RX ORDER — DEXTROAMPHETAMINE SACCHARATE, AMPHETAMINE ASPARTATE, DEXTROAMPHETAMINE SULFATE AND AMPHETAMINE SULFATE 7.5; 7.5; 7.5; 7.5 MG/1; MG/1; MG/1; MG/1
30 TABLET ORAL 2 TIMES DAILY
Qty: 60 TABLET | Refills: 0 | Status: SHIPPED | OUTPATIENT
Start: 2019-11-19 | End: 2019-10-23

## 2019-10-18 RX ORDER — OXYCODONE HYDROCHLORIDE AND ACETAMINOPHEN 5; 325 MG/1; MG/1
1 TABLET ORAL EVERY 8 HOURS PRN
Qty: 90 TABLET | Refills: 0 | Status: SHIPPED | OUTPATIENT
Start: 2019-12-19 | End: 2020-01-17 | Stop reason: SDUPTHER

## 2019-10-18 RX ORDER — OXYCODONE HYDROCHLORIDE AND ACETAMINOPHEN 5; 325 MG/1; MG/1
1 TABLET ORAL EVERY 8 HOURS PRN
Qty: 90 TABLET | Refills: 0 | Status: SHIPPED | OUTPATIENT
Start: 2019-11-19 | End: 2019-10-23

## 2019-10-23 ENCOUNTER — HOSPITAL ENCOUNTER (OUTPATIENT)
Dept: GENERAL RADIOLOGY | Age: 59
Discharge: HOME | End: 2019-10-25
Payer: MEDICARE

## 2019-10-23 ENCOUNTER — HOSPITAL ENCOUNTER (OUTPATIENT)
Age: 59
Discharge: HOME | End: 2019-10-25
Payer: MEDICARE

## 2019-10-23 ENCOUNTER — OFFICE VISIT (OUTPATIENT)
Dept: FAMILY MEDICINE CLINIC | Age: 59
End: 2019-10-23
Payer: MEDICARE

## 2019-10-23 VITALS
OXYGEN SATURATION: 97 % | HEART RATE: 88 BPM | BODY MASS INDEX: 30.31 KG/M2 | WEIGHT: 155.2 LBS | DIASTOLIC BLOOD PRESSURE: 60 MMHG | SYSTOLIC BLOOD PRESSURE: 110 MMHG

## 2019-10-23 DIAGNOSIS — K21.9 GASTROESOPHAGEAL REFLUX DISEASE WITHOUT ESOPHAGITIS: ICD-10-CM

## 2019-10-23 DIAGNOSIS — G89.4 CHRONIC PAIN SYNDROME: ICD-10-CM

## 2019-10-23 DIAGNOSIS — R93.7 ABNORMAL X-RAY OF LUMBAR SPINE: ICD-10-CM

## 2019-10-23 DIAGNOSIS — F32.A ANXIETY AND DEPRESSION: ICD-10-CM

## 2019-10-23 DIAGNOSIS — M85.80 OSTEOPENIA, UNSPECIFIED LOCATION: ICD-10-CM

## 2019-10-23 DIAGNOSIS — G89.29 CHRONIC MIDLINE LOW BACK PAIN WITH LEFT-SIDED SCIATICA: ICD-10-CM

## 2019-10-23 DIAGNOSIS — M79.662 PAIN IN LEFT LOWER LEG: Primary | ICD-10-CM

## 2019-10-23 DIAGNOSIS — Z86.718 H/O DEEP VENOUS THROMBOSIS: ICD-10-CM

## 2019-10-23 DIAGNOSIS — M54.42 CHRONIC MIDLINE LOW BACK PAIN WITH LEFT-SIDED SCIATICA: ICD-10-CM

## 2019-10-23 DIAGNOSIS — Z00.00 ENCOUNTER FOR MEDICAL EXAMINATION TO ESTABLISH CARE: ICD-10-CM

## 2019-10-23 DIAGNOSIS — F41.9 ANXIETY AND DEPRESSION: ICD-10-CM

## 2019-10-23 DIAGNOSIS — F90.9 ATTENTION DEFICIT HYPERACTIVITY DISORDER (ADHD), UNSPECIFIED ADHD TYPE: ICD-10-CM

## 2019-10-23 PROCEDURE — 99214 OFFICE O/P EST MOD 30 MIN: CPT | Performed by: NURSE PRACTITIONER

## 2019-10-23 PROCEDURE — 72100 X-RAY EXAM L-S SPINE 2/3 VWS: CPT

## 2019-10-23 RX ORDER — OMEPRAZOLE 40 MG/1
CAPSULE, DELAYED RELEASE ORAL
Qty: 90 CAPSULE | Refills: 3 | Status: SHIPPED
Start: 2019-10-23 | End: 2020-02-07 | Stop reason: ALTCHOICE

## 2019-10-23 RX ORDER — ALENDRONATE SODIUM 70 MG/1
TABLET ORAL
Qty: 4 TABLET | Refills: 11 | Status: SHIPPED | OUTPATIENT
Start: 2019-10-23 | End: 2020-11-02

## 2019-11-21 RX ORDER — PROPRANOLOL HYDROCHLORIDE 80 MG/1
CAPSULE, EXTENDED RELEASE ORAL
Qty: 30 CAPSULE | Refills: 0 | Status: SHIPPED | OUTPATIENT
Start: 2019-11-21 | End: 2020-04-06 | Stop reason: SDUPTHER

## 2019-11-26 ENCOUNTER — TELEPHONE (OUTPATIENT)
Dept: FAMILY MEDICINE CLINIC | Age: 59
End: 2019-11-26

## 2019-11-29 ENCOUNTER — TELEPHONE (OUTPATIENT)
Dept: FAMILY MEDICINE CLINIC | Age: 59
End: 2019-11-29

## 2019-12-11 ENCOUNTER — OFFICE VISIT (OUTPATIENT)
Dept: FAMILY MEDICINE CLINIC | Age: 59
End: 2019-12-11
Payer: MEDICARE

## 2019-12-11 VITALS
WEIGHT: 154.3 LBS | DIASTOLIC BLOOD PRESSURE: 76 MMHG | BODY MASS INDEX: 30.13 KG/M2 | HEART RATE: 81 BPM | OXYGEN SATURATION: 90 % | SYSTOLIC BLOOD PRESSURE: 120 MMHG

## 2019-12-11 DIAGNOSIS — F90.9 ADULT ADHD: ICD-10-CM

## 2019-12-11 DIAGNOSIS — J45.901 MODERATE ASTHMA WITH ACUTE EXACERBATION, UNSPECIFIED WHETHER PERSISTENT: Primary | ICD-10-CM

## 2019-12-11 PROCEDURE — 99213 OFFICE O/P EST LOW 20 MIN: CPT | Performed by: FAMILY MEDICINE

## 2019-12-11 RX ORDER — AZITHROMYCIN 250 MG/1
TABLET, FILM COATED ORAL
Qty: 1 PACKET | Refills: 0 | Status: SHIPPED | OUTPATIENT
Start: 2019-12-11 | End: 2020-02-07 | Stop reason: ALTCHOICE

## 2019-12-11 RX ORDER — PREDNISONE 20 MG/1
TABLET ORAL
Qty: 20 TABLET | Refills: 0 | Status: SHIPPED | OUTPATIENT
Start: 2019-12-11 | End: 2020-02-08

## 2019-12-11 ASSESSMENT — ENCOUNTER SYMPTOMS
SINUS PRESSURE: 0
BACK PAIN: 0
WHEEZING: 1
SHORTNESS OF BREATH: 1
SORE THROAT: 0
CHEST TIGHTNESS: 1
DIARRHEA: 0
NAUSEA: 0
COUGH: 0
VOMITING: 0

## 2020-01-15 ENCOUNTER — TELEPHONE (OUTPATIENT)
Dept: FAMILY MEDICINE CLINIC | Age: 60
End: 2020-01-15

## 2020-01-17 RX ORDER — DEXTROAMPHETAMINE SACCHARATE, AMPHETAMINE ASPARTATE, DEXTROAMPHETAMINE SULFATE AND AMPHETAMINE SULFATE 7.5; 7.5; 7.5; 7.5 MG/1; MG/1; MG/1; MG/1
30 TABLET ORAL 2 TIMES DAILY
Qty: 60 TABLET | Refills: 0 | Status: SHIPPED | OUTPATIENT
Start: 2020-01-17 | End: 2020-02-20 | Stop reason: SDUPTHER

## 2020-01-17 RX ORDER — OXYCODONE HYDROCHLORIDE AND ACETAMINOPHEN 5; 325 MG/1; MG/1
1 TABLET ORAL EVERY 8 HOURS PRN
Qty: 90 TABLET | Refills: 0 | Status: SHIPPED | OUTPATIENT
Start: 2020-01-17 | End: 2020-02-20 | Stop reason: SDUPTHER

## 2020-01-24 ENCOUNTER — TELEPHONE (OUTPATIENT)
Dept: FAMILY MEDICINE CLINIC | Age: 60
End: 2020-01-24

## 2020-01-29 ENCOUNTER — OFFICE VISIT (OUTPATIENT)
Dept: FAMILY MEDICINE CLINIC | Age: 60
End: 2020-01-29
Payer: MEDICARE

## 2020-01-29 VITALS
SYSTOLIC BLOOD PRESSURE: 116 MMHG | BODY MASS INDEX: 30.84 KG/M2 | DIASTOLIC BLOOD PRESSURE: 78 MMHG | HEIGHT: 59 IN | RESPIRATION RATE: 98 BRPM | HEART RATE: 66 BPM | WEIGHT: 153 LBS

## 2020-01-29 PROCEDURE — 99213 OFFICE O/P EST LOW 20 MIN: CPT | Performed by: FAMILY MEDICINE

## 2020-01-29 PROCEDURE — G0444 DEPRESSION SCREEN ANNUAL: HCPCS | Performed by: FAMILY MEDICINE

## 2020-01-29 RX ORDER — PREDNISONE 20 MG/1
TABLET ORAL
Qty: 20 TABLET | Refills: 0 | Status: SHIPPED
Start: 2020-01-29 | End: 2020-02-07 | Stop reason: SDUPTHER

## 2020-01-29 ASSESSMENT — PATIENT HEALTH QUESTIONNAIRE - PHQ9
9. THOUGHTS THAT YOU WOULD BE BETTER OFF DEAD, OR OF HURTING YOURSELF: 0
10. IF YOU CHECKED OFF ANY PROBLEMS, HOW DIFFICULT HAVE THESE PROBLEMS MADE IT FOR YOU TO DO YOUR WORK, TAKE CARE OF THINGS AT HOME, OR GET ALONG WITH OTHER PEOPLE: 0
1. LITTLE INTEREST OR PLEASURE IN DOING THINGS: 3
SUM OF ALL RESPONSES TO PHQ QUESTIONS 1-9: 11
2. FEELING DOWN, DEPRESSED OR HOPELESS: 3
6. FEELING BAD ABOUT YOURSELF - OR THAT YOU ARE A FAILURE OR HAVE LET YOURSELF OR YOUR FAMILY DOWN: 3
7. TROUBLE CONCENTRATING ON THINGS, SUCH AS READING THE NEWSPAPER OR WATCHING TELEVISION: 1
SUM OF ALL RESPONSES TO PHQ9 QUESTIONS 1 & 2: 6
SUM OF ALL RESPONSES TO PHQ QUESTIONS 1-9: 11
8. MOVING OR SPEAKING SO SLOWLY THAT OTHER PEOPLE COULD HAVE NOTICED. OR THE OPPOSITE, BEING SO FIGETY OR RESTLESS THAT YOU HAVE BEEN MOVING AROUND A LOT MORE THAN USUAL: 0
3. TROUBLE FALLING OR STAYING ASLEEP: 0
4. FEELING TIRED OR HAVING LITTLE ENERGY: 1
5. POOR APPETITE OR OVEREATING: 0

## 2020-01-29 ASSESSMENT — ENCOUNTER SYMPTOMS
SORE THROAT: 0
SHORTNESS OF BREATH: 1
CHEST TIGHTNESS: 1
DIARRHEA: 0
COUGH: 1
VOMITING: 0
BACK PAIN: 0
NAUSEA: 0
WHEEZING: 1
SINUS PRESSURE: 0

## 2020-01-29 NOTE — PROGRESS NOTES
Allyson Bowens is a 63 y.o. female who presents todayfor her medical conditions/complaints as noted below. Allyson Bowens is here today c/oCough; Shortness of Breath; and Discuss Medications  Routine prednisone helped but as soon as she was off her tightness recurred. She failed to increase her asmanex dosage to bid.    :         HPI      Past Medical History:   Diagnosis Date    Acid reflux     Adult ADHD 2012    Asthma 2012    Bipolar 1 disorder ([facility] [address]     Broken foot     Left foot    Chest pain of uncertain etiology     Last episode was in  (Written 2019)    Depression     Under control per pt.  on 6/15/18    DVT of leg (deep venous thrombosis) ([facility] [address] 2014    Fracture     right foot / pt denies surgical intervention    Helicobacter pylori (H. pylori) infection     per EGD    Hematuria 2018    Has a follow-up with Urologist     Hiatal hernia     History of DVT (deep vein thrombosis) 2012    Hypertension     Internal hemorrhoids     Narcolepsy 2012    Osteoporosis       Past Surgical History:   Procedure Laterality Date    ANKLE SURGERY Left 2018    mass excision    ARM SURGERY Right     Pt states she has had 13 right arm surgeries    BREAST ENHANCEMENT SURGERY Bilateral      SECTION      x 3    COLONOSCOPY  2013    sigmoid diverticula, prominent large internal hemorrhoid    FOOT SURGERY Left     FOOT TENDON SURGERY Left 2019    LEFT PERONEAL TENDON REPAIR WITH POSSIBLE LEFT TENDON TRANSFER (Left )    KNEE ARTHROSCOPY Left     Two surgeries on left knee per pt    KNEE SURGERY Right     Three surgeries per pt    LIGAMENT REPAIR Left 2019    LEFT PERONEAL TENDON REPAIR performed by Flori Vicente DPM at [address] [place] [address] OFFICE/OUTPT VISIT,PROCEDURE ONLY Left 2018    RESECTION/REMOVAL BONY NEOPLASM LEFT FIBULA, LEFT SOFT TISSUE MASS EXCISION WITH BONE BIOPSY LEFT disturbance.       :   /78   Pulse 66   Resp (!) 98   Ht 4' 11\" (1.499 m)   Wt 153 lb (69.4 kg)   BMI 30.90 kg/m²     Physical Exam  Constitutional:       General: She is not in acute distress. Appearance: She is well-developed. She is not diaphoretic. HENT:      Head: Normocephalic and atraumatic. Mouth/Throat:      Pharynx: No oropharyngeal exudate. Eyes:      General: No scleral icterus. Neck:      Musculoskeletal: Neck supple. Vascular: No carotid bruit. Cardiovascular:      Heart sounds: No murmur. No friction rub. No gallop. Pulmonary:      Effort: No respiratory distress. Breath sounds: No wheezing or rales. Chest:      Chest wall: No tenderness. Lymphadenopathy:      Cervical: No cervical adenopathy. Skin:     Findings: No rash. Neurological:      Mental Status: She is alert and oriented to person, place, and time. Cranial Nerves: No cranial nerve deficit. Psychiatric:         Behavior: Behavior normal.         Thought Content: Thought content normal.         Judgment: Judgment normal.         Assessment:       Diagnosis Orders   1. Moderate asthma with acute exacerbation, unspecified whether persistent           Plan:      Return in about 4 weeks (around 2/26/2020). No orders of the defined types were placed in this encounter. Orders Placed This Encounter   Medications    predniSONE (DELTASONE) 20 MG tablet     Sig: Take 3 tabs daily for 3 days, then 2 a day for 3 days, then 1 a day for 3 days, then 1/2 a day for 3 days. Dispense:  20 tablet     Refill:  0    mometasone (ASMANEX HFA) 200 MCG/ACT AERO inhaler     Sig: Inhale 2 puffs into the lungs 2 times daily     Dispense:  1 Inhaler     Refill:  5     Repeat prednisone but this time pt knows to increase to bid her asmanex see me in one month.

## 2020-02-03 ENCOUNTER — TELEPHONE (OUTPATIENT)
Dept: FAMILY MEDICINE CLINIC | Age: 60
End: 2020-02-03

## 2020-02-03 NOTE — TELEPHONE ENCOUNTER
Patient says her nebulizer is broke down and she would like an order for a new nebulizer sent to  [facility] on [address]

## 2020-02-07 ENCOUNTER — OFFICE VISIT (OUTPATIENT)
Dept: FAMILY MEDICINE CLINIC | Age: 60
End: 2020-02-07
Payer: MEDICARE

## 2020-02-07 ENCOUNTER — HOSPITAL ENCOUNTER (OUTPATIENT)
Age: 60
Setting detail: SPECIMEN
Discharge: HOME | End: 2020-02-07
Payer: MEDICARE

## 2020-02-07 ENCOUNTER — APPOINTMENT (OUTPATIENT)
Dept: GENERAL RADIOLOGY | Age: 60
DRG: 378 | End: 2020-02-07
Payer: MEDICARE

## 2020-02-07 ENCOUNTER — HOSPITAL ENCOUNTER (INPATIENT)
Age: 60
LOS: 3 days | Discharge: HOME | DRG: 378 | End: 2020-02-10
Attending: EMERGENCY MEDICINE | Admitting: INTERNAL MEDICINE
Payer: MEDICARE

## 2020-02-07 ENCOUNTER — APPOINTMENT (OUTPATIENT)
Dept: CT IMAGING | Age: 60
DRG: 378 | End: 2020-02-07
Payer: MEDICARE

## 2020-02-07 VITALS
WEIGHT: 154.4 LBS | DIASTOLIC BLOOD PRESSURE: 80 MMHG | OXYGEN SATURATION: 90 % | BODY MASS INDEX: 31.19 KG/M2 | SYSTOLIC BLOOD PRESSURE: 150 MMHG | HEART RATE: 84 BPM

## 2020-02-07 PROBLEM — K92.2 GI BLEED: Status: ACTIVE | Noted: 2020-02-07

## 2020-02-07 LAB
ABSOLUTE EOS #: 0 K/UL (ref 0–0.44)
ABSOLUTE EOS #: 0.37 K/UL (ref 0–0.44)
ABSOLUTE IMMATURE GRANULOCYTE: 0.16 K/UL (ref 0–0.3)
ABSOLUTE IMMATURE GRANULOCYTE: 0.19 K/UL (ref 0–0.3)
ABSOLUTE LYMPH #: 1.31 K/UL (ref 1.1–3.7)
ABSOLUTE LYMPH #: 1.76 K/UL (ref 1.1–3.7)
ABSOLUTE MONO #: 1.12 K/UL (ref 0.1–1.2)
ABSOLUTE MONO #: 1.31 K/UL (ref 0.1–1.2)
ALBUMIN SERPL-MCNC: 3.7 G/DL (ref 3.5–5.2)
ALBUMIN SERPL-MCNC: 3.8 G/DL (ref 3.5–5.2)
ALBUMIN/GLOBULIN RATIO: 1.7 (ref 1–2.5)
ALBUMIN/GLOBULIN RATIO: ABNORMAL (ref 1–2.5)
ALP BLD-CCNC: 67 U/L (ref 35–104)
ALP BLD-CCNC: 69 U/L (ref 35–104)
ALT SERPL-CCNC: 12 U/L (ref 5–33)
ALT SERPL-CCNC: 12 U/L (ref 5–33)
ANION GAP SERPL CALCULATED.3IONS-SCNC: 10 MMOL/L (ref 9–17)
ANION GAP SERPL CALCULATED.3IONS-SCNC: 15 MMOL/L (ref 9–17)
AST SERPL-CCNC: 16 U/L
AST SERPL-CCNC: 16 U/L
BASOPHILS # BLD: 0 % (ref 0–2)
BASOPHILS # BLD: 0 % (ref 0–2)
BASOPHILS ABSOLUTE: 0 K/UL (ref 0–0.2)
BASOPHILS ABSOLUTE: 0 K/UL (ref 0–0.2)
BILIRUB SERPL-MCNC: 0.11 MG/DL (ref 0.3–1.2)
BILIRUB SERPL-MCNC: 0.19 MG/DL (ref 0.3–1.2)
BILIRUBIN DIRECT: <0.08 MG/DL
BILIRUBIN, INDIRECT: ABNORMAL MG/DL (ref 0–1)
BNP INTERPRETATION: ABNORMAL
BUN BLDV-MCNC: 15 MG/DL (ref 8–23)
BUN BLDV-MCNC: 16 MG/DL (ref 8–23)
BUN/CREAT BLD: 15 (ref 9–20)
BUN/CREAT BLD: ABNORMAL (ref 9–20)
CALCIUM SERPL-MCNC: 8.6 MG/DL (ref 8.6–10.4)
CALCIUM SERPL-MCNC: 9.1 MG/DL (ref 8.6–10.4)
CHLORIDE BLD-SCNC: 103 MMOL/L (ref 98–107)
CHLORIDE BLD-SCNC: 106 MMOL/L (ref 98–107)
CO2: 20 MMOL/L (ref 20–31)
CO2: 23 MMOL/L (ref 20–31)
CREAT SERPL-MCNC: 1.03 MG/DL (ref 0.5–0.9)
CREAT SERPL-MCNC: 1.07 MG/DL (ref 0.5–0.9)
DATE, STOOL #1: ABNORMAL
DATE, STOOL #2: ABNORMAL
DATE, STOOL #3: ABNORMAL
DIFFERENTIAL TYPE: ABNORMAL
DIFFERENTIAL TYPE: ABNORMAL
EOSINOPHILS RELATIVE PERCENT: 0 % (ref 1–4)
EOSINOPHILS RELATIVE PERCENT: 2 % (ref 1–4)
GFR AFRICAN AMERICAN: >60 ML/MIN
GFR AFRICAN AMERICAN: >60 ML/MIN
GFR NON-AFRICAN AMERICAN: 52 ML/MIN
GFR NON-AFRICAN AMERICAN: 54 ML/MIN
GFR SERPL CREATININE-BSD FRML MDRD: ABNORMAL ML/MIN/{1.73_M2}
GLOBULIN: ABNORMAL G/DL (ref 1.5–3.8)
GLUCOSE BLD-MCNC: 108 MG/DL (ref 70–99)
GLUCOSE BLD-MCNC: 98 MG/DL (ref 70–99)
HCT VFR BLD CALC: 19.4 % (ref 36.3–47.1)
HCT VFR BLD CALC: 21.7 % (ref 36.3–47.1)
HEMOCCULT SP1 STL QL: POSITIVE
HEMOCCULT SP2 STL QL: ABNORMAL
HEMOCCULT SP3 STL QL: ABNORMAL
HEMOGLOBIN: 5.4 G/DL (ref 11.9–15.1)
HEMOGLOBIN: 5.9 G/DL (ref 11.9–15.1)
IMMATURE GRANULOCYTES: 1 %
IMMATURE GRANULOCYTES: 1 %
INR BLD: 1.4
LIPASE: 25 U/L (ref 13–60)
LYMPHOCYTES # BLD: 11 % (ref 24–43)
LYMPHOCYTES # BLD: 7 % (ref 24–43)
MCH RBC QN AUTO: 21.4 PG (ref 25.2–33.5)
MCH RBC QN AUTO: 21.4 PG (ref 25.2–33.5)
MCHC RBC AUTO-ENTMCNC: 27.2 G/DL (ref 28.4–34.8)
MCHC RBC AUTO-ENTMCNC: 27.8 G/DL (ref 28.4–34.8)
MCV RBC AUTO: 77 FL (ref 82.6–102.9)
MCV RBC AUTO: 78.6 FL (ref 82.6–102.9)
MONOCYTES # BLD: 7 % (ref 3–12)
MONOCYTES # BLD: 7 % (ref 3–12)
MORPHOLOGY: ABNORMAL
NRBC AUTOMATED: 1 PER 100 WBC
NRBC AUTOMATED: 1.2 PER 100 WBC
PARTIAL THROMBOPLASTIN TIME: 25.1 SEC (ref 23–31)
PDW BLD-RTO: 17.8 % (ref 11.8–14.4)
PDW BLD-RTO: 17.9 % (ref 11.8–14.4)
PLATELET # BLD: 451 K/UL (ref 138–453)
PLATELET # BLD: 504 K/UL (ref 138–453)
PLATELET ESTIMATE: ABNORMAL
PLATELET ESTIMATE: ABNORMAL
PMV BLD AUTO: 10.1 FL (ref 8.1–13.5)
PMV BLD AUTO: 10.8 FL (ref 8.1–13.5)
POTASSIUM SERPL-SCNC: 3.6 MMOL/L (ref 3.7–5.3)
POTASSIUM SERPL-SCNC: 4 MMOL/L (ref 3.7–5.3)
PRO-BNP: 2547 PG/ML
PROTHROMBIN TIME: 14.2 SEC (ref 9.7–11.6)
RBC # BLD: 2.52 M/UL (ref 3.95–5.11)
RBC # BLD: 2.76 M/UL (ref 3.95–5.11)
RBC # BLD: ABNORMAL 10*6/UL
RBC # BLD: ABNORMAL 10*6/UL
SEG NEUTROPHILS: 81 % (ref 36–65)
SEG NEUTROPHILS: 83 % (ref 36–65)
SEGMENTED NEUTROPHILS ABSOLUTE COUNT: 12.96 K/UL (ref 1.5–8.1)
SEGMENTED NEUTROPHILS ABSOLUTE COUNT: 15.52 K/UL (ref 1.5–8.1)
SODIUM BLD-SCNC: 136 MMOL/L (ref 135–144)
SODIUM BLD-SCNC: 141 MMOL/L (ref 135–144)
TIME, STOOL #1: 2153
TIME, STOOL #2: ABNORMAL
TIME, STOOL #3: ABNORMAL
TOTAL PROTEIN: 6 G/DL (ref 6.4–8.3)
TOTAL PROTEIN: 6.1 G/DL (ref 6.4–8.3)
WBC # BLD: 16 K/UL (ref 3.5–11.3)
WBC # BLD: 18.7 K/UL (ref 3.5–11.3)
WBC # BLD: ABNORMAL 10*3/UL
WBC # BLD: ABNORMAL 10*3/UL

## 2020-02-07 PROCEDURE — 82607 VITAMIN B-12: CPT

## 2020-02-07 PROCEDURE — 85610 PROTHROMBIN TIME: CPT

## 2020-02-07 PROCEDURE — 6360000002 HC RX W HCPCS: Performed by: NURSE PRACTITIONER

## 2020-02-07 PROCEDURE — 74177 CT ABD & PELVIS W/CONTRAST: CPT

## 2020-02-07 PROCEDURE — 99285 EMERGENCY DEPT VISIT HI MDM: CPT

## 2020-02-07 PROCEDURE — 80048 BASIC METABOLIC PNL TOTAL CA: CPT

## 2020-02-07 PROCEDURE — 83690 ASSAY OF LIPASE: CPT

## 2020-02-07 PROCEDURE — 82728 ASSAY OF FERRITIN: CPT

## 2020-02-07 PROCEDURE — 86901 BLOOD TYPING SEROLOGIC RH(D): CPT

## 2020-02-07 PROCEDURE — 96374 THER/PROPH/DIAG INJ IV PUSH: CPT

## 2020-02-07 PROCEDURE — C9113 INJ PANTOPRAZOLE SODIUM, VIA: HCPCS | Performed by: NURSE PRACTITIONER

## 2020-02-07 PROCEDURE — 82746 ASSAY OF FOLIC ACID SERUM: CPT

## 2020-02-07 PROCEDURE — 83880 ASSAY OF NATRIURETIC PEPTIDE: CPT

## 2020-02-07 PROCEDURE — 83550 IRON BINDING TEST: CPT

## 2020-02-07 PROCEDURE — 2580000003 HC RX 258: Performed by: NURSE PRACTITIONER

## 2020-02-07 PROCEDURE — 71045 X-RAY EXAM CHEST 1 VIEW: CPT

## 2020-02-07 PROCEDURE — 86900 BLOOD TYPING SEROLOGIC ABO: CPT

## 2020-02-07 PROCEDURE — 6360000004 HC RX CONTRAST MEDICATION: Performed by: NURSE PRACTITIONER

## 2020-02-07 PROCEDURE — 86850 RBC ANTIBODY SCREEN: CPT

## 2020-02-07 PROCEDURE — 2060000000 HC ICU INTERMEDIATE R&B

## 2020-02-07 PROCEDURE — 80076 HEPATIC FUNCTION PANEL: CPT

## 2020-02-07 PROCEDURE — 85730 THROMBOPLASTIN TIME PARTIAL: CPT

## 2020-02-07 PROCEDURE — 85025 COMPLETE CBC W/AUTO DIFF WBC: CPT

## 2020-02-07 PROCEDURE — 86920 COMPATIBILITY TEST SPIN: CPT

## 2020-02-07 PROCEDURE — P9016 RBC LEUKOCYTES REDUCED: HCPCS

## 2020-02-07 PROCEDURE — G0328 FECAL BLOOD SCRN IMMUNOASSAY: HCPCS

## 2020-02-07 PROCEDURE — 99213 OFFICE O/P EST LOW 20 MIN: CPT | Performed by: FAMILY MEDICINE

## 2020-02-07 PROCEDURE — 83540 ASSAY OF IRON: CPT

## 2020-02-07 PROCEDURE — 36430 TRANSFUSION BLD/BLD COMPNT: CPT

## 2020-02-07 PROCEDURE — 93005 ELECTROCARDIOGRAM TRACING: CPT | Performed by: INTERNAL MEDICINE

## 2020-02-07 RX ORDER — SODIUM CHLORIDE 9 MG/ML
10 INJECTION INTRAVENOUS ONCE
Status: COMPLETED | OUTPATIENT
Start: 2020-02-07 | End: 2020-02-07

## 2020-02-07 RX ORDER — SODIUM CHLORIDE 9 MG/ML
INJECTION, SOLUTION INTRAVENOUS CONTINUOUS
Status: DISCONTINUED | OUTPATIENT
Start: 2020-02-07 | End: 2020-02-08 | Stop reason: SDUPTHER

## 2020-02-07 RX ORDER — MORPHINE SULFATE 2 MG/ML
2 INJECTION, SOLUTION INTRAMUSCULAR; INTRAVENOUS ONCE
Status: COMPLETED | OUTPATIENT
Start: 2020-02-07 | End: 2020-02-07

## 2020-02-07 RX ORDER — 0.9 % SODIUM CHLORIDE 0.9 %
250 INTRAVENOUS SOLUTION INTRAVENOUS ONCE
Status: COMPLETED | OUTPATIENT
Start: 2020-02-07 | End: 2020-02-08

## 2020-02-07 RX ORDER — PANTOPRAZOLE SODIUM 40 MG/10ML
40 INJECTION, POWDER, LYOPHILIZED, FOR SOLUTION INTRAVENOUS ONCE
Status: COMPLETED | OUTPATIENT
Start: 2020-02-07 | End: 2020-02-07

## 2020-02-07 RX ORDER — PANTOPRAZOLE SODIUM 40 MG/1
40 TABLET, DELAYED RELEASE ORAL
Qty: 90 TABLET | Refills: 1 | Status: SHIPPED | OUTPATIENT
Start: 2020-02-07 | End: 2020-08-03

## 2020-02-07 RX ORDER — ACETAMINOPHEN 325 MG/1
650 TABLET ORAL ONCE
Status: DISCONTINUED | OUTPATIENT
Start: 2020-02-07 | End: 2020-02-07

## 2020-02-07 RX ORDER — 0.9 % SODIUM CHLORIDE 0.9 %
80 INTRAVENOUS SOLUTION INTRAVENOUS ONCE
Status: COMPLETED | OUTPATIENT
Start: 2020-02-07 | End: 2020-02-07

## 2020-02-07 RX ORDER — ONDANSETRON 2 MG/ML
4 INJECTION INTRAMUSCULAR; INTRAVENOUS ONCE
Status: COMPLETED | OUTPATIENT
Start: 2020-02-07 | End: 2020-02-07

## 2020-02-07 RX ORDER — SODIUM CHLORIDE 0.9 % (FLUSH) 0.9 %
10 SYRINGE (ML) INJECTION PRN
Status: DISCONTINUED | OUTPATIENT
Start: 2020-02-07 | End: 2020-02-08 | Stop reason: SDUPTHER

## 2020-02-07 RX ADMIN — IOPAMIDOL 75 ML: 755 INJECTION, SOLUTION INTRAVENOUS at 23:04

## 2020-02-07 RX ADMIN — PANTOPRAZOLE SODIUM 40 MG: 40 INJECTION, POWDER, FOR SOLUTION INTRAVENOUS at 23:48

## 2020-02-07 RX ADMIN — SODIUM CHLORIDE: 9 INJECTION, SOLUTION INTRAVENOUS at 23:19

## 2020-02-07 RX ADMIN — Medication 10 ML: at 22:17

## 2020-02-07 RX ADMIN — PANTOPRAZOLE SODIUM 40 MG: 40 INJECTION, POWDER, FOR SOLUTION INTRAVENOUS at 22:17

## 2020-02-07 RX ADMIN — SODIUM CHLORIDE 80 ML: 9 INJECTION, SOLUTION INTRAVENOUS at 23:04

## 2020-02-07 RX ADMIN — Medication 10 ML: at 23:04

## 2020-02-07 RX ADMIN — SODIUM CHLORIDE 250 ML: 0.9 INJECTION, SOLUTION INTRAVENOUS at 23:56

## 2020-02-07 RX ADMIN — ONDANSETRON 4 MG: 2 INJECTION INTRAMUSCULAR; INTRAVENOUS at 23:50

## 2020-02-07 RX ADMIN — MORPHINE SULFATE 2 MG: 2 INJECTION, SOLUTION INTRAMUSCULAR; INTRAVENOUS at 23:48

## 2020-02-07 RX ADMIN — SODIUM CHLORIDE 10 ML: 9 INJECTION, SOLUTION INTRAMUSCULAR; INTRAVENOUS; SUBCUTANEOUS at 23:48

## 2020-02-07 ASSESSMENT — ENCOUNTER SYMPTOMS
COLOR CHANGE: 0
SHORTNESS OF BREATH: 1
BACK PAIN: 0
COUGH: 1
BACK PAIN: 0
DIARRHEA: 0
DIARRHEA: 0
SORE THROAT: 0
RECTAL PAIN: 0
ABDOMINAL PAIN: 1
VOMITING: 1
ANAL BLEEDING: 0
NAUSEA: 1
SHORTNESS OF BREATH: 1
VOMITING: 0
SINUS PRESSURE: 0
BLOOD IN STOOL: 1
COUGH: 0
NAUSEA: 0

## 2020-02-07 ASSESSMENT — PAIN SCALES - GENERAL
PAINLEVEL_OUTOF10: 2
PAINLEVEL_OUTOF10: 6

## 2020-02-07 NOTE — PROGRESS NOTES
Head: Normocephalic and atraumatic. Mouth/Throat:      Pharynx: No oropharyngeal exudate. Eyes:      General: No scleral icterus. Neck:      Musculoskeletal: Neck supple. Vascular: No carotid bruit. Cardiovascular:      Heart sounds: No murmur. No friction rub. No gallop. Pulmonary:      Effort: No respiratory distress. Breath sounds: Examination of the right-lower field reveals decreased breath sounds. Examination of the left-lower field reveals decreased breath sounds. Decreased breath sounds present. No wheezing or rales. Chest:      Chest wall: No tenderness. Lymphadenopathy:      Cervical: No cervical adenopathy. Skin:     Findings: No rash. Neurological:      Mental Status: She is alert and oriented to person, place, and time. Cranial Nerves: No cranial nerve deficit. Psychiatric:         Behavior: Behavior normal.         Thought Content: Thought content normal.         Judgment: Judgment normal.         Assessment:       Diagnosis Orders   1. Shortness of breath  CBC Auto Differential    Comprehensive Metabolic Panel    POCT Fecal Immunochemical Test (FIT)   2. Epigastric pain     3. Change in stool           Plan:      No follow-ups on file. Orders Placed This Encounter   Procedures    CBC Auto Differential     Standing Status:   Future     Standing Expiration Date:   2/7/2021    Comprehensive Metabolic Panel     Standing Status:   Future     Standing Expiration Date:   2/7/2021    POCT Fecal Immunochemical Test (FIT)     Standing Status:   Future     Standing Expiration Date:   2/7/2021     Orders Placed This Encounter   Medications    pantoprazole (PROTONIX) 40 MG tablet     Sig: Take 1 tablet by mouth every morning (before breakfast)     Dispense:  90 tablet     Refill:  1     She was given samples of mucinex for sx. Labs now. Stop prilosec in favor of above. Keep March appt.

## 2020-02-08 PROBLEM — D64.9 ANEMIA: Status: ACTIVE | Noted: 2020-02-08

## 2020-02-08 PROBLEM — Z79.01 CHRONIC ANTICOAGULATION: Status: ACTIVE | Noted: 2020-02-08

## 2020-02-08 PROBLEM — D50.0 IRON DEFICIENCY ANEMIA DUE TO CHRONIC BLOOD LOSS: Status: ACTIVE | Noted: 2020-02-08

## 2020-02-08 PROBLEM — D62 ACUTE ON CHRONIC BLOOD LOSS ANEMIA: Status: ACTIVE | Noted: 2020-02-08

## 2020-02-08 LAB
ANION GAP SERPL CALCULATED.3IONS-SCNC: 11 MMOL/L (ref 9–17)
BUN BLDV-MCNC: 13 MG/DL (ref 8–23)
BUN/CREAT BLD: 13 (ref 9–20)
CALCIUM SERPL-MCNC: 8 MG/DL (ref 8.6–10.4)
CHLORIDE BLD-SCNC: 107 MMOL/L (ref 98–107)
CO2: 21 MMOL/L (ref 20–31)
CREAT SERPL-MCNC: 0.99 MG/DL (ref 0.5–0.9)
EKG ATRIAL RATE: 54 BPM
EKG ATRIAL RATE: 60 BPM
EKG P AXIS: 35 DEGREES
EKG P AXIS: 46 DEGREES
EKG P-R INTERVAL: 126 MS
EKG P-R INTERVAL: 134 MS
EKG Q-T INTERVAL: 462 MS
EKG Q-T INTERVAL: 486 MS
EKG QRS DURATION: 82 MS
EKG QRS DURATION: 84 MS
EKG QTC CALCULATION (BAZETT): 460 MS
EKG QTC CALCULATION (BAZETT): 462 MS
EKG R AXIS: 16 DEGREES
EKG R AXIS: 25 DEGREES
EKG T AXIS: 72 DEGREES
EKG T AXIS: 75 DEGREES
EKG VENTRICULAR RATE: 54 BPM
EKG VENTRICULAR RATE: 60 BPM
FERRITIN: 5 UG/L (ref 13–150)
FOLATE: 18 NG/ML
GFR AFRICAN AMERICAN: >60 ML/MIN
GFR NON-AFRICAN AMERICAN: 57 ML/MIN
GFR SERPL CREATININE-BSD FRML MDRD: ABNORMAL ML/MIN/{1.73_M2}
GFR SERPL CREATININE-BSD FRML MDRD: ABNORMAL ML/MIN/{1.73_M2}
GLUCOSE BLD-MCNC: 89 MG/DL (ref 70–99)
HCT VFR BLD CALC: 25.2 % (ref 36.3–47.1)
HEMOGLOBIN: 7.5 G/DL (ref 11.9–15.1)
HEMOGLOBIN: 7.6 G/DL (ref 11.9–15.1)
HEMOGLOBIN: 8.1 G/DL (ref 11.9–15.1)
INR BLD: 1.2
IRON SATURATION: 3 % (ref 20–55)
IRON: 11 UG/DL (ref 37–145)
MCH RBC QN AUTO: 24.1 PG (ref 25.2–33.5)
MCHC RBC AUTO-ENTMCNC: 30.2 G/DL (ref 28.4–34.8)
MCV RBC AUTO: 79.7 FL (ref 82.6–102.9)
NRBC AUTOMATED: 1.3 PER 100 WBC
PDW BLD-RTO: 17.6 % (ref 11.8–14.4)
PLATELET # BLD: 339 K/UL (ref 138–453)
PMV BLD AUTO: 10 FL (ref 8.1–13.5)
POTASSIUM SERPL-SCNC: 3.8 MMOL/L (ref 3.7–5.3)
PROTHROMBIN TIME: 12 SEC (ref 9.7–11.6)
RBC # BLD: 3.16 M/UL (ref 3.95–5.11)
SODIUM BLD-SCNC: 139 MMOL/L (ref 135–144)
TOTAL IRON BINDING CAPACITY: 381 UG/DL (ref 250–450)
UNSATURATED IRON BINDING CAPACITY: 370 UG/DL (ref 112–347)
VITAMIN B-12: <150 PG/ML (ref 232–1245)
WBC # BLD: 13.8 K/UL (ref 3.5–11.3)

## 2020-02-08 PROCEDURE — 86900 BLOOD TYPING SEROLOGIC ABO: CPT

## 2020-02-08 PROCEDURE — 6370000000 HC RX 637 (ALT 250 FOR IP): Performed by: INTERNAL MEDICINE

## 2020-02-08 PROCEDURE — 99222 1ST HOSP IP/OBS MODERATE 55: CPT | Performed by: INTERNAL MEDICINE

## 2020-02-08 PROCEDURE — 99223 1ST HOSP IP/OBS HIGH 75: CPT | Performed by: INTERNAL MEDICINE

## 2020-02-08 PROCEDURE — C9113 INJ PANTOPRAZOLE SODIUM, VIA: HCPCS | Performed by: NURSE PRACTITIONER

## 2020-02-08 PROCEDURE — 6370000000 HC RX 637 (ALT 250 FOR IP): Performed by: NURSE PRACTITIONER

## 2020-02-08 PROCEDURE — P9016 RBC LEUKOCYTES REDUCED: HCPCS

## 2020-02-08 PROCEDURE — 36415 COLL VENOUS BLD VENIPUNCTURE: CPT

## 2020-02-08 PROCEDURE — 2580000003 HC RX 258: Performed by: NURSE PRACTITIONER

## 2020-02-08 PROCEDURE — APPNB30 APP NON BILLABLE TIME 0-30 MINS: Performed by: NURSE PRACTITIONER

## 2020-02-08 PROCEDURE — 93010 ELECTROCARDIOGRAM REPORT: CPT | Performed by: INTERNAL MEDICINE

## 2020-02-08 PROCEDURE — 2060000000 HC ICU INTERMEDIATE R&B

## 2020-02-08 PROCEDURE — 80048 BASIC METABOLIC PNL TOTAL CA: CPT

## 2020-02-08 PROCEDURE — 85027 COMPLETE CBC AUTOMATED: CPT

## 2020-02-08 PROCEDURE — 6360000002 HC RX W HCPCS: Performed by: INTERNAL MEDICINE

## 2020-02-08 PROCEDURE — 6360000002 HC RX W HCPCS: Performed by: NURSE PRACTITIONER

## 2020-02-08 PROCEDURE — 85018 HEMOGLOBIN: CPT

## 2020-02-08 PROCEDURE — 2580000003 HC RX 258: Performed by: INTERNAL MEDICINE

## 2020-02-08 PROCEDURE — 93005 ELECTROCARDIOGRAM TRACING: CPT | Performed by: INTERNAL MEDICINE

## 2020-02-08 PROCEDURE — 85610 PROTHROMBIN TIME: CPT

## 2020-02-08 PROCEDURE — 36430 TRANSFUSION BLD/BLD COMPNT: CPT

## 2020-02-08 RX ORDER — TRAMADOL HYDROCHLORIDE 50 MG/1
50 TABLET ORAL EVERY 6 HOURS PRN
Status: DISCONTINUED | OUTPATIENT
Start: 2020-02-08 | End: 2020-02-10 | Stop reason: HOSPADM

## 2020-02-08 RX ORDER — ONDANSETRON 2 MG/ML
4 INJECTION INTRAMUSCULAR; INTRAVENOUS EVERY 6 HOURS PRN
Status: DISCONTINUED | OUTPATIENT
Start: 2020-02-08 | End: 2020-02-10 | Stop reason: HOSPADM

## 2020-02-08 RX ORDER — BUPROPION HYDROCHLORIDE 150 MG/1
300 TABLET ORAL DAILY
Status: DISCONTINUED | OUTPATIENT
Start: 2020-02-08 | End: 2020-02-10 | Stop reason: HOSPADM

## 2020-02-08 RX ORDER — CITALOPRAM 20 MG/1
40 TABLET ORAL DAILY
Status: DISCONTINUED | OUTPATIENT
Start: 2020-02-08 | End: 2020-02-10 | Stop reason: HOSPADM

## 2020-02-08 RX ORDER — OXYCODONE HYDROCHLORIDE AND ACETAMINOPHEN 5; 325 MG/1; MG/1
1 TABLET ORAL EVERY 8 HOURS PRN
Status: DISCONTINUED | OUTPATIENT
Start: 2020-02-08 | End: 2020-02-10 | Stop reason: HOSPADM

## 2020-02-08 RX ORDER — PANTOPRAZOLE SODIUM 40 MG/1
40 TABLET, DELAYED RELEASE ORAL
Status: DISCONTINUED | OUTPATIENT
Start: 2020-02-08 | End: 2020-02-08 | Stop reason: ALTCHOICE

## 2020-02-08 RX ORDER — ACETAMINOPHEN 325 MG/1
650 TABLET ORAL EVERY 4 HOURS PRN
Status: DISCONTINUED | OUTPATIENT
Start: 2020-02-08 | End: 2020-02-10 | Stop reason: HOSPADM

## 2020-02-08 RX ORDER — ALBUTEROL SULFATE 2.5 MG/3ML
2.5 SOLUTION RESPIRATORY (INHALATION) EVERY 6 HOURS PRN
COMMUNITY

## 2020-02-08 RX ORDER — ONDANSETRON 2 MG/ML
4 INJECTION INTRAMUSCULAR; INTRAVENOUS EVERY 6 HOURS PRN
Status: DISCONTINUED | OUTPATIENT
Start: 2020-02-08 | End: 2020-02-08 | Stop reason: SDUPTHER

## 2020-02-08 RX ORDER — PANTOPRAZOLE SODIUM 40 MG/10ML
40 INJECTION, POWDER, LYOPHILIZED, FOR SOLUTION INTRAVENOUS DAILY
Status: DISCONTINUED | OUTPATIENT
Start: 2020-02-11 | End: 2020-02-10

## 2020-02-08 RX ORDER — SODIUM CHLORIDE 0.9 % (FLUSH) 0.9 %
10 SYRINGE (ML) INJECTION PRN
Status: DISCONTINUED | OUTPATIENT
Start: 2020-02-08 | End: 2020-02-10 | Stop reason: HOSPADM

## 2020-02-08 RX ORDER — SODIUM CHLORIDE 9 MG/ML
10 INJECTION INTRAVENOUS DAILY
Status: DISCONTINUED | OUTPATIENT
Start: 2020-02-11 | End: 2020-02-10

## 2020-02-08 RX ORDER — SODIUM CHLORIDE 9 MG/ML
INJECTION, SOLUTION INTRAVENOUS CONTINUOUS
Status: DISCONTINUED | OUTPATIENT
Start: 2020-02-08 | End: 2020-02-10 | Stop reason: HOSPADM

## 2020-02-08 RX ORDER — ONDANSETRON 4 MG/1
4 TABLET, ORALLY DISINTEGRATING ORAL EVERY 6 HOURS PRN
Status: DISCONTINUED | OUTPATIENT
Start: 2020-02-08 | End: 2020-02-10 | Stop reason: HOSPADM

## 2020-02-08 RX ORDER — BENZONATATE 100 MG/1
100 CAPSULE ORAL 3 TIMES DAILY PRN
Status: DISCONTINUED | OUTPATIENT
Start: 2020-02-08 | End: 2020-02-10 | Stop reason: HOSPADM

## 2020-02-08 RX ORDER — PROPRANOLOL HYDROCHLORIDE 80 MG/1
80 CAPSULE, EXTENDED RELEASE ORAL DAILY
Status: DISCONTINUED | OUTPATIENT
Start: 2020-02-08 | End: 2020-02-10 | Stop reason: HOSPADM

## 2020-02-08 RX ORDER — ALBUTEROL SULFATE 0.63 MG/3ML
1 SOLUTION RESPIRATORY (INHALATION) EVERY 6 HOURS PRN
COMMUNITY
End: 2020-02-08

## 2020-02-08 RX ORDER — SODIUM CHLORIDE 0.9 % (FLUSH) 0.9 %
10 SYRINGE (ML) INJECTION EVERY 12 HOURS SCHEDULED
Status: DISCONTINUED | OUTPATIENT
Start: 2020-02-08 | End: 2020-02-10 | Stop reason: HOSPADM

## 2020-02-08 RX ORDER — CYCLOBENZAPRINE HCL 5 MG
5 TABLET ORAL 2 TIMES DAILY PRN
Status: DISCONTINUED | OUTPATIENT
Start: 2020-02-08 | End: 2020-02-10 | Stop reason: HOSPADM

## 2020-02-08 RX ORDER — LIDOCAINE 4 G/G
1 PATCH TOPICAL DAILY
Status: DISCONTINUED | OUTPATIENT
Start: 2020-02-08 | End: 2020-02-10 | Stop reason: HOSPADM

## 2020-02-08 RX ADMIN — BUSPIRONE HYDROCHLORIDE 15 MG: 10 TABLET ORAL at 08:59

## 2020-02-08 RX ADMIN — SODIUM CHLORIDE 8 MG/HR: 9 INJECTION, SOLUTION INTRAVENOUS at 23:10

## 2020-02-08 RX ADMIN — TRAMADOL HYDROCHLORIDE 50 MG: 50 TABLET, FILM COATED ORAL at 10:17

## 2020-02-08 RX ADMIN — IRON SUCROSE 200 MG: 20 INJECTION, SOLUTION INTRAVENOUS at 14:52

## 2020-02-08 RX ADMIN — SODIUM CHLORIDE: 9 INJECTION, SOLUTION INTRAVENOUS at 01:09

## 2020-02-08 RX ADMIN — SODIUM CHLORIDE, PRESERVATIVE FREE 10 ML: 5 INJECTION INTRAVENOUS at 09:03

## 2020-02-08 RX ADMIN — OXYCODONE AND ACETAMINOPHEN 1 TABLET: 5; 325 TABLET ORAL at 14:24

## 2020-02-08 RX ADMIN — SODIUM CHLORIDE 8 MG/HR: 9 INJECTION, SOLUTION INTRAVENOUS at 14:52

## 2020-02-08 RX ADMIN — ACETAMINOPHEN 650 MG: 325 TABLET ORAL at 04:45

## 2020-02-08 RX ADMIN — OXYCODONE AND ACETAMINOPHEN 1 TABLET: 5; 325 TABLET ORAL at 17:56

## 2020-02-08 RX ADMIN — BUSPIRONE HYDROCHLORIDE 15 MG: 10 TABLET ORAL at 20:34

## 2020-02-08 RX ADMIN — BUPROPION HYDROCHLORIDE 300 MG: 150 TABLET, EXTENDED RELEASE ORAL at 08:59

## 2020-02-08 RX ADMIN — BENZONATATE 100 MG: 100 CAPSULE ORAL at 10:17

## 2020-02-08 RX ADMIN — ACETAMINOPHEN 650 MG: 325 TABLET ORAL at 09:00

## 2020-02-08 RX ADMIN — PROPRANOLOL HYDROCHLORIDE 80 MG: 80 CAPSULE, EXTENDED RELEASE ORAL at 08:59

## 2020-02-08 RX ADMIN — BUSPIRONE HYDROCHLORIDE 15 MG: 10 TABLET ORAL at 14:24

## 2020-02-08 RX ADMIN — CYCLOBENZAPRINE HYDROCHLORIDE 5 MG: 5 TABLET, FILM COATED ORAL at 23:03

## 2020-02-08 RX ADMIN — BENZONATATE 100 MG: 100 CAPSULE ORAL at 17:56

## 2020-02-08 RX ADMIN — CITALOPRAM HYDROBROMIDE 40 MG: 20 TABLET ORAL at 09:00

## 2020-02-08 RX ADMIN — SODIUM CHLORIDE 8 MG/HR: 9 INJECTION, SOLUTION INTRAVENOUS at 02:38

## 2020-02-08 RX ADMIN — SODIUM CHLORIDE, PRESERVATIVE FREE 10 ML: 5 INJECTION INTRAVENOUS at 20:29

## 2020-02-08 RX ADMIN — SODIUM CHLORIDE: 9 INJECTION, SOLUTION INTRAVENOUS at 14:26

## 2020-02-08 ASSESSMENT — PAIN SCALES - GENERAL
PAINLEVEL_OUTOF10: 8
PAINLEVEL_OUTOF10: 8
PAINLEVEL_OUTOF10: 6
PAINLEVEL_OUTOF10: 3
PAINLEVEL_OUTOF10: 0
PAINLEVEL_OUTOF10: 6
PAINLEVEL_OUTOF10: 7

## 2020-02-08 NOTE — H&P
palpable, no pedal edema or calf pain with palpation  Psych: normal affect     Investigations:      Laboratory Testing:  Recent Results (from the past 24 hour(s))   EKG 12 Lead    Collection Time: 02/07/20  9:13 PM   Result Value Ref Range    Ventricular Rate 60 BPM    Atrial Rate 60 BPM    P-R Interval 126 ms    QRS Duration 82 ms    Q-T Interval 462 ms    QTc Calculation (Bazett) 462 ms    P Axis 46 degrees    R Axis 25 degrees    T Axis 75 degrees   Basic Metabolic Prof    Collection Time: 02/07/20  9:42 PM   Result Value Ref Range    Glucose 108 (H) 70 - 99 mg/dL    BUN 16 8 - 23 mg/dL    CREATININE 1.07 (H) 0.50 - 0.90 mg/dL    Bun/Cre Ratio 15 9 - 20    Calcium 8.6 8.6 - 10.4 mg/dL    Sodium 136 135 - 144 mmol/L    Potassium 3.6 (L) 3.7 - 5.3 mmol/L    Chloride 103 98 - 107 mmol/L    CO2 23 20 - 31 mmol/L    Anion Gap 10 9 - 17 mmol/L    GFR Non-African American 52 (L) >60 mL/min    GFR African American >60 >60 mL/min    GFR Comment          GFR Staging NOT REPORTED    Brain Natriuretic Peptide    Collection Time: 02/07/20  9:42 PM   Result Value Ref Range    Pro-BNP 2,547 (H) <300 pg/mL    BNP Interpretation Pro-BNP Reference Range:    CBC with DIFF    Collection Time: 02/07/20  9:42 PM   Result Value Ref Range    WBC 16.0 (H) 3.5 - 11.3 k/uL    RBC 2.52 (L) 3.95 - 5.11 m/uL    Hemoglobin 5.4 (LL) 11.9 - 15.1 g/dL    Hematocrit 19.4 (L) 36.3 - 47.1 %    MCV 77.0 (L) 82.6 - 102.9 fL    MCH 21.4 (L) 25.2 - 33.5 pg    MCHC 27.8 (L) 28.4 - 34.8 g/dL    RDW 17.8 (H) 11.8 - 14.4 %    Platelets 232 524 - 196 k/uL    MPV 10.1 8.1 - 13.5 fL    NRBC Automated 1.2 (H) 0.0 per 100 WBC    Differential Type NOT REPORTED     WBC Morphology NOT REPORTED     RBC Morphology NOT REPORTED     Platelet Estimate NOT REPORTED     Seg Neutrophils 81 (H) 36 - 65 %    Lymphocytes 11 (L) 24 - 43 %    Monocytes 7 3 - 12 %    Eosinophils % 0 (L) 1 - 4 %    Basophils 0 0 - 2 %    Immature Granulocytes 1 (H) 0 %    Segs Absolute 12.96 (H) NOT REPORTED     Time, Stool #3 NOT REPORTED    TYPE AND SCREEN    Collection Time: 02/07/20 10:05 PM   Result Value Ref Range    Expiration Date 02/10/2020,2359     Arm Band Number [id number]     ABO/Rh A POSITIVE     Antibody Screen NEGATIVE     Unit Number [id number]     Product Code Leukocyte Reduced Red Cell     Unit Divison 00     Dispense Status ISSUED     Transfusion Status OK TO TRANSFUSE     Crossmatch Result COMPATIBLE     Unit Number [id number]     Product Code Leukocyte Reduced Red Cell     Unit Divison 00     Dispense Status TRANSFUSED     Transfusion Status OK TO TRANSFUSE     Crossmatch Result COMPATIBLE    Basic Metabolic Panel w/ Reflex to MG    Collection Time: 02/08/20  5:39 AM   Result Value Ref Range    Glucose 89 70 - 99 mg/dL    BUN 13 8 - 23 mg/dL    CREATININE 0.99 (H) 0.50 - 0.90 mg/dL    Bun/Cre Ratio 13 9 - 20    Calcium 8.0 (L) 8.6 - 10.4 mg/dL    Sodium 139 135 - 144 mmol/L    Potassium 3.8 3.7 - 5.3 mmol/L    Chloride 107 98 - 107 mmol/L    CO2 21 20 - 31 mmol/L    Anion Gap 11 9 - 17 mmol/L    GFR Non-African American 57 (L) >60 mL/min    GFR African American >60 >60 mL/min    GFR Comment          GFR Staging NOT REPORTED    CBC    Collection Time: 02/08/20  5:39 AM   Result Value Ref Range    WBC 13.8 (H) 3.5 - 11.3 k/uL    RBC 3.16 (L) 3.95 - 5.11 m/uL    Hemoglobin 7.6 (L) 11.9 - 15.1 g/dL    Hematocrit 25.2 (L) 36.3 - 47.1 %    MCV 79.7 (L) 82.6 - 102.9 fL    MCH 24.1 (L) 25.2 - 33.5 pg    MCHC 30.2 28.4 - 34.8 g/dL    RDW 17.6 (H) 11.8 - 14.4 %    Platelets 790 893 - 504 k/uL    MPV 10.0 8.1 - 13.5 fL    NRBC Automated 1.3 (H) 0.0 per 100 WBC   Protime-INR    Collection Time: 02/08/20  5:39 AM   Result Value Ref Range    Protime 12.0 (H) 9.7 - 11.6 sec    INR 1.2    EKG 12 Lead    Collection Time: 02/08/20  7:15 AM   Result Value Ref Range    Ventricular Rate 54 BPM    Atrial Rate 54 BPM    P-R Interval 134 ms    QRS Duration 84 ms    Q-T Interval 486 ms    QTc Calculation

## 2020-02-08 NOTE — ED PROVIDER NOTES
[facility] ED  eMERGENCY dEPARTMENT eNCOUnter      Pt Name: Sunil Farias  MRN: [id number]  [place] 1960  Date of evaluation: 2/7/2020  Provider: PIETER Lloyd [id number]       Chief Complaint   Patient presents with    Shortness of Breath     Dr. Stevens Come called her PTA and said thather hgb is low    Chest Pain     with walking    Abdominal Pain     was having black tarry stool, saw PCP today, and he ran blood work today, and now she is here due to low hgb         HISTORY OF PRESENT ILLNESS  (Location/Symptom, Timing/Onset, Context/Setting, Quality, Duration, Modifying Factors, Severity.)   Sunil Farias is a 63 y.o. female who presents to the emergency department via private auto for SOB, fatigue, generalized weakness. It has been getting progressively worse over the past few days. States earlier this week she had black tarry stools for 2 days. She also has some epigastric discomfort. She had out patient laboratory studies drawn today. She was advised to come to the ED for a hemoglobin of 5.9. Rates her pain 2/10 at this time. She has a history of H pylori, GERD. States she does not consume alcohol. She takes xarelto for a history of DVT. Nursing Notes were reviewed. ALLERGIES     Latex; Prozac [fluoxetine hcl]; Sulfa antibiotics; and Codeine    CURRENT MEDICATIONS       Previous Medications    ALBUTEROL (PROVENTIL) (5 MG/ML) 0.5% NEBULIZER SOLUTION    Take 1 mL by nebulization 4 times daily as needed for Wheezing    ALENDRONATE (FOSAMAX) 70 MG TABLET    TAKE 1 TABLET BY MOUTH ONCE WEEKLY BEFORE BREAKFAST, ON AN EMPTY STOMACH: REMAIN UPRIGHT FOR 30 MINUTES    AMPHETAMINE-DEXTROAMPHETAMINE (ADDERALL) 30 MG TABLET    Take 1 tablet by mouth 2 times daily for 30 days.     BUPROPION (WELLBUTRIN XL) 300 MG EXTENDED RELEASE TABLET    TAKE ONE TABLET BY MOUTH EVERY MORNING    BUSPIRONE (BUSPAR) 15 MG TABLET    TAKE ONE TABLET BY MOUTH THREE TIMES A DAY    CALCIUM SURGERY Bilateral      SECTION      x 3    COLONOSCOPY  2013    sigmoid diverticula, prominent large internal hemorrhoid    FOOT SURGERY Left 2015    FOOT TENDON SURGERY Left 2019    LEFT PERONEAL TENDON REPAIR WITH POSSIBLE LEFT TENDON TRANSFER (Left )    KNEE ARTHROSCOPY Left     Two surgeries on left knee per pt    KNEE SURGERY Right     Three surgeries per pt    LIGAMENT REPAIR Left 2019    LEFT PERONEAL TENDON REPAIR performed by Gaile Najjar, DPM at [facility] OFFICE/OUTPT VISIT,PROCEDURE ONLY Left 2018    RESECTION/REMOVAL BONY NEOPLASM LEFT FIBULA, LEFT SOFT TISSUE MASS EXCISION WITH BONE BIOPSY LEFT ANKLE FIBULA performed by Gaile Najjar, DPM at [address] [facility] Left     UPPER GASTROINTESTINAL ENDOSCOPY  2013    tertiary contractures esophagus, 3 to 4 cm hiatal hernia, antral gastritis, + H Pylori, mild active chronic esophagitis    UPPER GASTROINTESTINAL ENDOSCOPY  2016    one biopsy    VAGINAL DILATATION      WRIST SURGERY           FAMILY HISTORY           Problem Relation Age of Onset    Cancer Mother     Hypertension Mother     Stroke Mother     Cancer Maternal Aunt     Hypertension Father     Heart Surgery Father     Diabetes Neg Hx      Family Status   Relation Name Status    Mother  (Not Specified)    MAunt  (Not Specified)    Father  (Not Specified)    Neg Hx  (Not Specified)        SOCIAL HISTORY      reports that she quit smoking about 31 years ago. She has a 20.00 pack-year smoking history. She has never used smokeless tobacco. She reports current alcohol use. She reports that she does not use drugs. REVIEW OF SYSTEMS    (2-9 systems for level 4, 10 or more for level 5)     Review of Systems   Constitutional: Positive for fatigue. Negative for chills, diaphoresis and fever. Respiratory: Positive for shortness of breath. Negative for cough.     Cardiovascular: Negative for chest pain, She is alert and oriented to person, place, and time. Psychiatric:         Behavior: Behavior normal.         DIAGNOSTIC RESULTS       RADIOLOGY:   Non-plain film images such as CT, Ultrasound and MRI are read by the radiologist. Plain radiographic images are visualized and preliminarily interpreted by the emergency physician with the below findings:    Interpretation per the Radiologist below, if available at the time of this note:    Ct Abdomen Pelvis W Iv Contrast Additional Contrast? None    Result Date: 2/7/2020  EXAMINATION: CT OF THE ABDOMEN AND PELVIS WITH CONTRAST 2/7/2020 10:51 pm TECHNIQUE: CT of the abdomen and pelvis was performed with the administration of intravenous contrast. Multiplanar reformatted images are provided for review. Dose modulation, iterative reconstruction, and/or weight based adjustment of the mA/kV was utilized to reduce the radiation dose to as low as reasonably achievable. COMPARISON: January 22, 2009 HISTORY: ORDERING SYSTEM PROVIDED HISTORY: upper abd pain TECHNOLOGIST PROVIDED HISTORY: upper abd pain FINDINGS: Lower Chest: Atelectatic changes now seen within the lung bases bilaterally. Bilateral breast implants are noted. Organs: Hepatic steatosis is present. Gallbladder is partially contracted. No calcified gallstones are present. The pancreas, spleen, adrenal glands, and kidneys are normal. GI/Bowel: A large hiatal hernia is present. Small bowel appears normal. Scattered colonic diverticula are noted, without evidence of diverticulitis. The appendix is normal Pelvis: Urinary bladder, uterus, and adnexal regions appear normal. Peritoneum/Retroperitoneum: No free fluid is seen within the abdomen or pelvis. No adenopathy is noted. No aneurysm formation is present. Bones/Soft Tissues: Degenerate changes are present within the spine. No acute osseous abnormality is noted. Deformity seen involving the right iliac wing, likely related to prior trauma.   A small umbilical normal limits   PROTIME-INR - Abnormal; Notable for the following components:    Protime 14.2 (*)     All other components within normal limits   OCCULT BLOOD SCREEN - Abnormal; Notable for the following components:    Occult Blood, Stool #1 POSITIVE (*)     All other components within normal limits   LIPASE   APTT   TYPE AND SCREEN   PREPARE RBC (CROSSMATCH)       All other labs were within normal range or not returned as of this dictation. EMERGENCY DEPARTMENT COURSE and DIFFERENTIAL DIAGNOSIS/MDM:   Vitals:    Vitals:    02/07/20 2110   BP: (!) 142/84   Pulse: 65   Resp: 16   Temp: 98 °F (36.7 °C)   TempSrc: Oral   SpO2: 99%   Weight: 153 lb (69.4 kg)   Height: 4' 11\" (1.499 m)       MEDICATIONS GIVEN IN THE ED:  Medications   0.9 % sodium chloride infusion ( Intravenous New Bag 2/7/20 2319)   sodium chloride flush 0.9 % injection 10 mL (10 mLs Intravenous Given 2/7/20 2304)   pantoprazole (PROTONIX) injection 40 mg (has no administration in time range)     And   sodium chloride (PF) 0.9 % injection 10 mL (has no administration in time range)   pantoprazole (PROTONIX) 80 mg in sodium chloride 0.9 % 100 mL infusion (has no administration in time range)   ondansetron (ZOFRAN) injection 4 mg (has no administration in time range)   morphine (PF) injection 2 mg (has no administration in time range)   0.9 % sodium chloride bolus (has no administration in time range)   pantoprazole (PROTONIX) injection 40 mg (40 mg Intravenous Given 2/7/20 2217)     And   sodium chloride (PF) 0.9 % injection 10 mL (10 mLs Intravenous Given 2/7/20 2217)   0.9 % sodium chloride bolus (0 mLs Intravenous Stopped 2/7/20 2305)   iopamidol (ISOVUE-370) 76 % injection 75 mL (75 mLs Intravenous Given 2/7/20 2304)       CLINICAL DECISION MAKING:  The patient presented alert with a nontoxic appearance and was seen in conjunction with Dr. Ernesto Pina. Her Hgb was 5.4. Hemoccult was positive. She will be admitted for further evaluation and treatment.

## 2020-02-09 LAB
ABO/RH: NORMAL
ANTIBODY SCREEN: NEGATIVE
ARM BAND NUMBER: NORMAL
BLD PROD TYP BPU: NORMAL
BLD PROD TYP BPU: NORMAL
CROSSMATCH RESULT: NORMAL
CROSSMATCH RESULT: NORMAL
DISPENSE STATUS BLOOD BANK: NORMAL
DISPENSE STATUS BLOOD BANK: NORMAL
EXPIRATION DATE: NORMAL
HEMOGLOBIN: 7.4 G/DL (ref 11.9–15.1)
HEMOGLOBIN: 7.7 G/DL (ref 11.9–15.1)
HEMOGLOBIN: 7.9 G/DL (ref 11.9–15.1)
HEMOGLOBIN: 8 G/DL (ref 11.9–15.1)
TRANSFUSION STATUS: NORMAL
TRANSFUSION STATUS: NORMAL
UNIT DIVISION: 0
UNIT DIVISION: 0
UNIT NUMBER: NORMAL
UNIT NUMBER: NORMAL

## 2020-02-09 PROCEDURE — 6360000002 HC RX W HCPCS: Performed by: INTERNAL MEDICINE

## 2020-02-09 PROCEDURE — 85018 HEMOGLOBIN: CPT

## 2020-02-09 PROCEDURE — 2580000003 HC RX 258: Performed by: INTERNAL MEDICINE

## 2020-02-09 PROCEDURE — 99232 SBSQ HOSP IP/OBS MODERATE 35: CPT | Performed by: INTERNAL MEDICINE

## 2020-02-09 PROCEDURE — 6370000000 HC RX 637 (ALT 250 FOR IP): Performed by: NURSE PRACTITIONER

## 2020-02-09 PROCEDURE — 6360000002 HC RX W HCPCS: Performed by: NURSE PRACTITIONER

## 2020-02-09 PROCEDURE — APPNB30 APP NON BILLABLE TIME 0-30 MINS: Performed by: NURSE PRACTITIONER

## 2020-02-09 PROCEDURE — 36415 COLL VENOUS BLD VENIPUNCTURE: CPT

## 2020-02-09 PROCEDURE — 2580000003 HC RX 258: Performed by: NURSE PRACTITIONER

## 2020-02-09 PROCEDURE — C9113 INJ PANTOPRAZOLE SODIUM, VIA: HCPCS | Performed by: NURSE PRACTITIONER

## 2020-02-09 PROCEDURE — 6370000000 HC RX 637 (ALT 250 FOR IP): Performed by: INTERNAL MEDICINE

## 2020-02-09 PROCEDURE — 2060000000 HC ICU INTERMEDIATE R&B

## 2020-02-09 RX ADMIN — IRON SUCROSE 200 MG: 20 INJECTION, SOLUTION INTRAVENOUS at 15:19

## 2020-02-09 RX ADMIN — BUSPIRONE HYDROCHLORIDE 15 MG: 10 TABLET ORAL at 21:36

## 2020-02-09 RX ADMIN — SODIUM CHLORIDE, PRESERVATIVE FREE 10 ML: 5 INJECTION INTRAVENOUS at 12:40

## 2020-02-09 RX ADMIN — ACETAMINOPHEN 650 MG: 325 TABLET ORAL at 08:43

## 2020-02-09 RX ADMIN — OXYCODONE AND ACETAMINOPHEN 1 TABLET: 5; 325 TABLET ORAL at 13:30

## 2020-02-09 RX ADMIN — CITALOPRAM HYDROBROMIDE 40 MG: 20 TABLET ORAL at 08:42

## 2020-02-09 RX ADMIN — BENZONATATE 100 MG: 100 CAPSULE ORAL at 21:36

## 2020-02-09 RX ADMIN — POLYETHYLENE GLYCOL 3350, SODIUM SULFATE ANHYDROUS, SODIUM BICARBONATE, SODIUM CHLORIDE, POTASSIUM CHLORIDE 2000 ML: 236; 22.74; 6.74; 5.86; 2.97 POWDER, FOR SOLUTION ORAL at 17:52

## 2020-02-09 RX ADMIN — BUSPIRONE HYDROCHLORIDE 15 MG: 10 TABLET ORAL at 12:40

## 2020-02-09 RX ADMIN — CYCLOBENZAPRINE HYDROCHLORIDE 5 MG: 5 TABLET, FILM COATED ORAL at 23:46

## 2020-02-09 RX ADMIN — BENZONATATE 100 MG: 100 CAPSULE ORAL at 02:51

## 2020-02-09 RX ADMIN — SODIUM CHLORIDE 8 MG/HR: 9 INJECTION, SOLUTION INTRAVENOUS at 23:46

## 2020-02-09 RX ADMIN — BUSPIRONE HYDROCHLORIDE 15 MG: 10 TABLET ORAL at 08:42

## 2020-02-09 RX ADMIN — BUPROPION HYDROCHLORIDE 300 MG: 150 TABLET, EXTENDED RELEASE ORAL at 08:42

## 2020-02-09 RX ADMIN — OXYCODONE AND ACETAMINOPHEN 1 TABLET: 5; 325 TABLET ORAL at 23:45

## 2020-02-09 RX ADMIN — OXYCODONE AND ACETAMINOPHEN 1 TABLET: 5; 325 TABLET ORAL at 02:40

## 2020-02-09 RX ADMIN — PROPRANOLOL HYDROCHLORIDE 80 MG: 80 CAPSULE, EXTENDED RELEASE ORAL at 08:43

## 2020-02-09 ASSESSMENT — PAIN SCALES - GENERAL
PAINLEVEL_OUTOF10: 8
PAINLEVEL_OUTOF10: 7
PAINLEVEL_OUTOF10: 7
PAINLEVEL_OUTOF10: 5

## 2020-02-09 NOTE — PROGRESS NOTES
Son @ bedside ,updated on plan for scope tomorrow. Patient resting quietly,anxiety appears to have subsided.

## 2020-02-09 NOTE — PROGRESS NOTES
is noted.  No aneurysm formation is present.       Bones/Soft Tissues: Degenerate changes are present within the spine.  No   acute osseous abnormality is noted.  Deformity seen involving the right iliac   wing, likely related to prior trauma.  A small umbilical hernia is present,   containing fat           Impression   1. No acute findings within the abdomen or pelvis   2. Bibasilar atelectasis   3. Large hiatal hernia       ASSESSMENT/PLAN:  Anemia with melena; no bowel movements overnight hgb 7.4  -continue the ppi and venofir  -clear diet today then npo after midnight for egd and colonoscopy tomorrow  -trend h/h and keep hgb >7  -hold the xaralto             This plan was formulated in collaboration with Dr. Frank Nolasco . Electronically signed by: PIETER Mejias - CNP on 2/9/2020 at 9:42 AM   Attending Physician Statement  I have discussed the care of Flako Coronado and   I have examined the patient myselft independently, and taken ros and hpi , including pertinent history and exam findings,  with the author of this note . I have reviewed the key elements of all parts of the encounter with the nurse practitioner/resident.     I agree with the assessment, plan and orders as documented by the above health care provider         Electronically signed by Feli Galicia MD

## 2020-02-09 NOTE — PROGRESS NOTES
[address]    Progress Note    2/9/2020    2:52 PM    Name:   Irene Moser  MRN:     [id number]     Acct:      [de-identified]   Room:   [address] [place] [address] Day:  2  Admit Date:  2/7/2020  9:26 PM    PCP:   Mic Hussein MD  Code Status:  Full Code    Subjective:     C/C:   Chief Complaint   Patient presents with    Shortness of Breath     Dr. Flaco Shepherd called her PTA and said thather hgb is low    Chest Pain     with walking    Abdominal Pain     was having black tarry stool, saw PCP today, and he ran blood work today, and now she is here due to low hgb     Interval History Status: improved. Patient feels much better after blood transfusion yesterday. Denies any chest pain, shortness of breath, nausea or vomiting or complaints. Brief History: This is a 76-year-old female that presents with a complaint of progressive dyspnea over several months and subsequent findings of severe microcytic anemia on outpatient labs. She is found to have a hemoglobin less than 6 and was advised to come to the emergency room. Findings were confirmed and she was transfused 2 units of packed cells. She had evidence of melena and Hemoccult positive stools and GI consultations been requested. She has a history of hiatal hernia and reflux disease has been worked up for chronic gastrointestinal bleed with chronic blood loss. She has documented history of prior H. pylori infection 4 years ago. Review of Systems:     Constitutional:  negative for chills, fevers, sweats  Respiratory:  negative for cough, dyspnea on exertion, shortness of breath, wheezing  Cardiovascular:  negative for chest pain, chest pressure/discomfort, lower extremity edema, palpitations  Gastrointestinal:  negative for abdominal pain, constipation, diarrhea, nausea, vomiting  Neurological:  negative for dizziness, headache    Medications: Allergies:     Allergies   Allergen Reactions    m)   Wt 161 lb 1.6 oz (73.1 kg)   SpO2 97%   BMI 32.54 kg/m²   Temp (24hrs), Av °F (36.7 °C), Min:97.9 °F (36.6 °C), Max:98.4 °F (36.9 °C)    No results for input(s): POCGLU in the last 72 hours. I/O (24Hr): Intake/Output Summary (Last 24 hours) at 2020 1452  Last data filed at 2020 0616  Gross per 24 hour   Intake 1227 ml   Output --   Net 1227 ml       Labs:  Hematology:  Recent Labs     20  11320  0539  20  2349 20  0529 20  1138   WBC 18.7* 16.0* 13.8*  --   --   --   --    RBC 2.76* 2.52* 3.16*  --   --   --   --    HGB 5.9* 5.4* 7.6*   < > 7.9* 7.4* 7.7*   HCT 21.7* 19.4* 25.2*  --   --   --   --    MCV 78.6* 77.0* 79.7*  --   --   --   --    MCH 21.4* 21.4* 24.1*  --   --   --   --    MCHC 27.2* 27.8* 30.2  --   --   --   --    RDW 17.9* 17.8* 17.6*  --   --   --   --    * 451 339  --   --   --   --    MPV 10.8 10.1 10.0  --   --   --   --    INR  --  1.4 1.2  --   --   --   --     < > = values in this interval not displayed.      Chemistry:  Recent Labs     20  1130 20  0539    136 139   K 4.0 3.6* 3.8    103 107   CO2 20 23 21   GLUCOSE 98 108* 89   BUN 15 16 13   CREATININE 1.03* 1.07* 0.99*   ANIONGAP 15 10 11   LABGLOM 54* 52* 57*   GFRAA >60 >60 >60   CALCIUM 9.1 8.6 8.0*   PROBNP  --  2,547*  --      Recent Labs     20  1130 20  2142   PROT 6.1* 6.0*   LABALBU 3.8 3.7   AST 16 16   ALT 12 12   ALKPHOS 67 69   BILITOT 0.19* 0.11*   BILIDIR  --  <0.08   LIPASE  --  25     ABG:No results found for: POCPH, PHART, PH, POCPCO2, IBT4NSU, PCO2, POCPO2, PO2ART, PO2, POCHCO3, CZM4LNG, HCO3, NBEA, PBEA, BEART, BE, THGBART, THB, SWL7NSJ, XKIE6QYL, U4HONEUH, O2SAT, FIO2  Lab Results   Component Value Date/Time    SPECIAL NOT REPORTED 2018 10:19 PM     Lab Results   Component Value Date/Time    CULTURE NO GROWTH 2018 10:19 PM    CULTURE  2018 10:19 PM     [employer] [id number] [facility], [address] [place] [address] [phone]       Radiology:  Ct Abdomen Pelvis W Iv Contrast Additional Contrast? None    Result Date: 2/7/2020  1. No acute findings within the abdomen or pelvis 2. Bibasilar atelectasis 3. Large hiatal hernia     Xr Chest Portable    Result Date: 2/7/2020  1. Mild bilateral linear atelectasis/scarring. No focal pulmonary consolidation. 2.  Cardiomegaly. 3.  Moderate hiatal hernia.        Physical Examination:        General appearance:  alert, cooperative and no distress  Mental Status:  oriented to person, place and time and normal affect  Lungs:  clear to auscultation bilaterally, normal effort  Heart:  regular rate and rhythm, no murmur  Abdomen:  soft, nontender, nondistended, normal bowel sounds, no masses, hepatomegaly, splenomegaly  Extremities:  no edema, redness, tenderness in the calves  Skin:  no gross lesions, rashes, induration    Assessment:        Hospital Problems           Last Modified POA    * (Principal) Acute upper GI bleed 2/8/2020 Yes    Narcolepsy 2/8/2020 Yes    Moderate persistent asthma without complication 8/6/3861 Yes    Hiatal hernia 2/8/2020 Yes    Acute on chronic blood loss anemia 2/9/2020 Yes    Essential hypertension 2/8/2020 Yes    Chronic anticoagulation 2/8/2020 Yes    Iron deficiency anemia due to chronic blood loss 2/8/2020 Yes    Gastrointestinal hemorrhage 2/8/2020 Yes          Plan:        Follow H&H, transfuse as needed decrease frequency of lab draws as H&H appears stable no active bleeding  Continue IV iron replacement  Continue home medications, avoid anticoagulation pending endoscopy results  Monitor and control blood pressure  Oxygen and aerosols as needed  GI evaluation progress, EGD and colonoscopy tomorrow  Further recommendations and plans pending endoscopy findings    Michelle Culver DO  2/9/2020  2:52 PM

## 2020-02-09 NOTE — CONSULTS
Gastroenterology Consult Note      Patient: Kae Monreal  : 1960  Acct#:  [de-identified]     Date:  2020    Subjective:       History of Present Illness  Patient is a 63 y.o.  female admitted with GI bleed [K92.2] who is seen in consult for     Patient with multiple medical problems including DVT hypertension bipolar disease acid reflux. Admitted with a very low hemoglobin of 5.4 after she was called to come to the hospital with a regular check but when asked she did admit to melena for 2 days last week after that resolved. In the last few days she did not have any black stool she did not have any blood in the stool she denied any hematemesis she denied any new symptoms she is on Protonix for reflux. May be some intermittent mild epigastric pain she said. Which been going on for months. The patient is on Xarelto for history of DVT. But she denied taking any aspirin or any nonsteroidal anti-inflammatory medication  CT scan of her abdomen showed steatosis of the liver, hiatal hernia, diverticulosis. After transfusion her hemoglobin right now is 7.5  She is iron deficient with very low saturation of 3%  The rest of her labs are unremarkable her INR is 1.2    She had an EGD with expiratory which was treated in   Large hiatal hernia and gastritis  No colonoscopy      Past Medical History:   Diagnosis Date    Acid reflux     Adult ADHD 2012    Asthma 2012    Bipolar 1 disorder ([address]     Broken foot     Left foot    Chest pain of uncertain etiology     Last episode was in  (Written 2019)    Depression     Under control per pt.  on 6/15/18    DVT of leg (deep venous thrombosis) ([address] 2014    Fracture     right foot / pt denies surgical intervention    Helicobacter pylori (H. pylori) infection     per EGD    Hematuria 2018    Has a follow-up with Urologist     Hiatal hernia     History of DVT (deep vein thrombosis) 2012    Hypertension     Internal hemorrhoids     Narcolepsy 2012    Osteoporosis       Past Surgical History:   Procedure Laterality Date    ANKLE SURGERY Left 2018    mass excision    ARM SURGERY Right     Pt states she has had 13 right arm surgeries    BREAST ENHANCEMENT SURGERY Bilateral      SECTION      x 3    COLONOSCOPY  2013    sigmoid diverticula, prominent large internal hemorrhoid    FOOT SURGERY Left 2015    FOOT TENDON SURGERY Left 2019    LEFT PERONEAL TENDON REPAIR WITH POSSIBLE LEFT TENDON TRANSFER (Left )    KNEE ARTHROSCOPY Left     Two surgeries on left knee per pt    KNEE SURGERY Right     Three surgeries per pt    LIGAMENT REPAIR Left 2019    LEFT PERONEAL TENDON REPAIR performed by Joseph Bach DPM at [facility] OFFICE/OUTPT VISIT,PROCEDURE ONLY Left 2018    RESECTION/REMOVAL BONY NEOPLASM LEFT FIBULA, LEFT SOFT TISSUE MASS EXCISION WITH BONE BIOPSY LEFT ANKLE FIBULA performed by Joseph [facility] DPM at [facility] Count includes the [facility] Left     UPPER GASTROINTESTINAL ENDOSCOPY  2013    tertiary contractures esophagus, 3 to 4 cm hiatal hernia, antral gastritis, + H Pylori, mild active chronic esophagitis    UPPER GASTROINTESTINAL ENDOSCOPY  2016    one biopsy    VAGINAL DILATATION      WRIST SURGERY        Past Endoscopic History as above    Admission Meds  No current facility-administered medications on file prior to encounter. Current Outpatient Medications on File Prior to Encounter   Medication Sig Dispense Refill    mometasone (ASMANEX HFA) 200 MCG/ACT AERO inhaler Inhale 2 puffs into the lungs 2 times daily 1 Inhaler 5    rivaroxaban (XARELTO) 20 MG TABS tablet TAKE ONE TABLET BY MOUTH DAILY 30 tablet 11    oxyCODONE-acetaminophen (PERCOCET) 5-325 MG per tablet Take 1 tablet by mouth every 8 hours as needed for Pain for up to 30 days.  90 tablet 0    amphetamine-dextroamphetamine (ADDERALL) 30 MG tablet Take 1 tablet by mouth 2 times daily for 30 days.  60 tablet 0    propranolol (INDERAL LA) 80 MG extended release capsule TAKE ONE CAPSULE BY MOUTH DAILY 30 capsule 0    alendronate (FOSAMAX) 70 MG tablet TAKE 1 TABLET BY MOUTH ONCE WEEKLY BEFORE BREAKFAST, ON AN EMPTY STOMACH: REMAIN UPRIGHT FOR 30 MINUTES (Patient taking differently: TAKE 1 TABLET BY MOUTH ONCE WEEKLY BEFORE BREAKFAST, ON AN EMPTY STOMACH: REMAIN UPRIGHT FOR 30 MINUTES, Takes on ) 4 tablet 11    buPROPion (WELLBUTRIN XL) 300 MG extended release tablet TAKE ONE TABLET BY MOUTH EVERY MORNING 90 tablet 3    busPIRone (BUSPAR) 15 MG tablet TAKE ONE TABLET BY MOUTH THREE TIMES A DAY 90 tablet 3    cyclobenzaprine (FLEXERIL) 10 MG tablet TAKE ONE-HALF TO ONE TABLET BY MOUTH THREE TIMES A DAY AS NEEDED FOR MUSCLE SPASMS 30 tablet 5    citalopram (CELEXA) 40 MG tablet TAKE ONE TABLET BY MOUTH DAILY 30 tablet 11    Calcium Carbonate (CALCIUM 600 PO) Take 600 mg by mouth daily      albuterol (PROVENTIL) (2.5 MG/3ML) 0.083% nebulizer solution Take 2.5 mg by nebulization every 6 hours as needed for Wheezing or Shortness of Breath      pantoprazole (PROTONIX) 40 MG tablet Take 1 tablet by mouth every morning (before breakfast) 90 tablet 1    VENTOLIN  (90 Base) MCG/ACT inhaler INHALE TWO PUFFS BY MOUTH EVERY 4 HOURS AS NEEDED FOR WHEEZING 1 Inhaler 11       Patient   Does Use ASA, NSAID No  Allergies  Allergies   Allergen Reactions    Latex Itching    Prozac [Fluoxetine Hcl]     Sulfa Antibiotics      migraine    Codeine Itching        Social   Social History     Tobacco Use    Smoking status: Former Smoker     Packs/day: 1.00     Years: 20.00     Pack years: 20.00     Last attempt to quit:      Years since quittin.1    Smokeless tobacco: Never Used   Substance Use Topics    Alcohol use: Yes     Comment: rare        PSYCH HISTORY:  Depression No  Anxiety No  Suicide No       Family History 02/09/20  0529   WBC 18.7* 16.0* 13.8*  --   --   --   --    HGB 5.9* 5.4* 7.6*   < > 8.1* 7.9* 7.4*   HCT 21.7* 19.4* 25.2*  --   --   --   --    MCV 78.6* 77.0* 79.7*  --   --   --   --    * 451 339  --   --   --   --     < > = values in this interval not displayed. Recent Labs     02/07/20 1130 02/07/20 2142 02/08/20  0539    136 139   K 4.0 3.6* 3.8    103 107   CO2 20 23 21   BUN 15 16 13   CREATININE 1.03* 1.07* 0.99*     Recent Labs     02/07/20 1130 02/07/20 2142   AST 16 16   ALT 12 12   BILIDIR  --  <0.08   BILITOT 0.19* 0.11*   ALKPHOS 67 69     Recent Labs     02/07/20 2142   LIPASE 25     Recent Labs     02/07/20 2142 02/08/20  0539   PROTIME 14.2* 12.0*   INR 1.4 1.2     No results for input(s): PTT in the last 72 hours. No results for input(s): OCCULTBLD in the last 72 hours. CEA:  No results found for: CEA  Ca 125:  No results found for:   Ca 19-9:  No results found for:   Ca 15-3:  No results found for:   AFP:  No components found for: AFAFP  Beta HCG:  No components found for: BHCG  Neuron Specific Enolase:  No results found for: NSE  Imaging Studies:                           All appropriate imaging studies and reports reviewed: Yes                 Assessment:     Principal Problem:    Acute upper GI bleed  Active Problems:    Narcolepsy    Moderate persistent asthma without complication    Hiatal hernia    Anemia    Essential hypertension    Chronic anticoagulation    Iron deficiency anemia due to chronic blood loss    Gastrointestinal hemorrhage  Resolved Problems:    * No resolved hospital problems.  *    Patient with severe iron deficiency anemia  History of reflux  History of H. pylori gastritis  Some abdominal pain  Family history of colon cancer  Intermittent history of melena    Recommendations:   Plan for EGD colonoscopy on Monday  Transfuse as needed  She will need iron infusion with Venofer  PPI  Follow closely                      Thank you for allowing me to participate in the care of your patient. Please feel free to contact me with any questions or concerns.      Lula Baumgarten, MD

## 2020-02-09 NOTE — CARE COORDINATION
Case Management Initial Discharge Plan  Kae Monreal,         Readmission Risk              Risk of Unplanned Readmission:        15             Met with:patient to discuss discharge plans. Information verified: address, contacts, phone number, , insurance Yes  PCP: Apryl Pink MD  Date of last visit:     Insurance Provider:  M[employer] medicare    Discharge Planning  Current Residence: house  Living Arrangements:  Family Members   Home has 2 stories/1 flight stairs to climb  Support Systems:  Family Members, Spouse/Significant Other  Current Services PTA:  none Agency:  none  Patient able to perform ADL's:Independent  DME in home:  cane  DME used to aid ambulation prior to admission:   cane  DME used during admission:  none    Potential Assistance Needed:  N/A    Pharmacy:  [facility] Medications:  Yes  Does patient want to participate in local refill/ meds to beds program?  Yes    Patient agreeable to home care: No  [place] of choice provided:  n/a      Type of Home Care Services:  None  Patient expects to be discharged to:  home    Prior SNF/Rehab Placement and Facility:  none  Agreeable to SNF/Rehab: No  [place] of choice provided: n/a   Evaluation: no    Expected Discharge date:  02/10/20  Follow Up Appointment: Best Day/ Time: Tuesday AM    Transportation provider: self  Transportation arrangements needed for discharge: No    Discharge Plan: Met with pt at bedside. Pt admitted with GI bleed. HGB 5.9  Plan for EGD and colonoscopy tomorrow. Pt lives with her [address] [place] [address] and is independent at home. 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 States she is over to her mother's home caring for her several times a day and at night. Pt very upset and wanting to leave AMA to care for her mother. States her sons and SO assist but have been unwilling to provide care her mother needs.   Encouraged pt to talk with family to provide care and safe

## 2020-02-09 NOTE — PLAN OF CARE
Patient resting in bed, but is moving constantly. Appears very anxious,but denies pain or distress. Will continue to monitor. Uses call light appropriately. Up in room with a slow,steady gait. No falls or injury noted.

## 2020-02-10 ENCOUNTER — ANESTHESIA (OUTPATIENT)
Dept: OPERATING ROOM | Age: 60
DRG: 378 | End: 2020-02-10
Payer: MEDICARE

## 2020-02-10 ENCOUNTER — ANESTHESIA EVENT (OUTPATIENT)
Dept: OPERATING ROOM | Age: 60
DRG: 378 | End: 2020-02-10
Payer: MEDICARE

## 2020-02-10 VITALS
SYSTOLIC BLOOD PRESSURE: 164 MMHG | RESPIRATION RATE: 35 BRPM | DIASTOLIC BLOOD PRESSURE: 72 MMHG | OXYGEN SATURATION: 88 %

## 2020-02-10 VITALS
HEART RATE: 97 BPM | TEMPERATURE: 100.7 F | OXYGEN SATURATION: 95 % | HEIGHT: 59 IN | RESPIRATION RATE: 24 BRPM | BODY MASS INDEX: 31.69 KG/M2 | DIASTOLIC BLOOD PRESSURE: 77 MMHG | WEIGHT: 157.2 LBS | SYSTOLIC BLOOD PRESSURE: 141 MMHG

## 2020-02-10 LAB
ABSOLUTE EOS #: 0.19 K/UL (ref 0–0.44)
ABSOLUTE IMMATURE GRANULOCYTE: 0.18 K/UL (ref 0–0.3)
ABSOLUTE LYMPH #: 1.43 K/UL (ref 1.1–3.7)
ABSOLUTE MONO #: 0.85 K/UL (ref 0.1–1.2)
ANION GAP SERPL CALCULATED.3IONS-SCNC: 6 MMOL/L (ref 9–17)
BASOPHILS # BLD: 0 % (ref 0–2)
BASOPHILS ABSOLUTE: 0.04 K/UL (ref 0–0.2)
BUN BLDV-MCNC: 4 MG/DL (ref 8–23)
BUN/CREAT BLD: 4 (ref 9–20)
CALCIUM SERPL-MCNC: 8.4 MG/DL (ref 8.6–10.4)
CHLORIDE BLD-SCNC: 105 MMOL/L (ref 98–107)
CO2: 27 MMOL/L (ref 20–31)
CREAT SERPL-MCNC: 0.95 MG/DL (ref 0.5–0.9)
DIFFERENTIAL TYPE: ABNORMAL
EOSINOPHILS RELATIVE PERCENT: 2 % (ref 1–4)
GFR AFRICAN AMERICAN: >60 ML/MIN
GFR NON-AFRICAN AMERICAN: 60 ML/MIN
GFR SERPL CREATININE-BSD FRML MDRD: ABNORMAL ML/MIN/{1.73_M2}
GFR SERPL CREATININE-BSD FRML MDRD: ABNORMAL ML/MIN/{1.73_M2}
GLUCOSE BLD-MCNC: 89 MG/DL (ref 70–99)
HCT VFR BLD CALC: 28.1 % (ref 36.3–47.1)
HEMOGLOBIN: 8.2 G/DL (ref 11.9–15.1)
HEMOGLOBIN: 8.2 G/DL (ref 11.9–15.1)
IMMATURE GRANULOCYTES: 1 %
LYMPHOCYTES # BLD: 11 % (ref 24–43)
MCH RBC QN AUTO: 23.6 PG (ref 25.2–33.5)
MCHC RBC AUTO-ENTMCNC: 29.2 G/DL (ref 28.4–34.8)
MCV RBC AUTO: 80.7 FL (ref 82.6–102.9)
MONOCYTES # BLD: 7 % (ref 3–12)
NRBC AUTOMATED: 1.3 PER 100 WBC
PDW BLD-RTO: 18.9 % (ref 11.8–14.4)
PLATELET # BLD: 345 K/UL (ref 138–453)
PLATELET ESTIMATE: ABNORMAL
PMV BLD AUTO: 10 FL (ref 8.1–13.5)
POTASSIUM SERPL-SCNC: 4.1 MMOL/L (ref 3.7–5.3)
RBC # BLD: 3.48 M/UL (ref 3.95–5.11)
RBC # BLD: ABNORMAL 10*6/UL
SEG NEUTROPHILS: 79 % (ref 36–65)
SEGMENTED NEUTROPHILS ABSOLUTE COUNT: 9.96 K/UL (ref 1.5–8.1)
SODIUM BLD-SCNC: 138 MMOL/L (ref 135–144)
WBC # BLD: 12.7 K/UL (ref 3.5–11.3)
WBC # BLD: ABNORMAL 10*3/UL

## 2020-02-10 PROCEDURE — 7100000000 HC PACU RECOVERY - FIRST 15 MIN: Performed by: INTERNAL MEDICINE

## 2020-02-10 PROCEDURE — 2580000003 HC RX 258: Performed by: NURSE PRACTITIONER

## 2020-02-10 PROCEDURE — 88305 TISSUE EXAM BY PATHOLOGIST: CPT

## 2020-02-10 PROCEDURE — 3609027000 HC COLONOSCOPY: Performed by: INTERNAL MEDICINE

## 2020-02-10 PROCEDURE — 3700000001 HC ADD 15 MINUTES (ANESTHESIA): Performed by: INTERNAL MEDICINE

## 2020-02-10 PROCEDURE — 2709999900 HC NON-CHARGEABLE SUPPLY: Performed by: INTERNAL MEDICINE

## 2020-02-10 PROCEDURE — 6370000000 HC RX 637 (ALT 250 FOR IP): Performed by: INTERNAL MEDICINE

## 2020-02-10 PROCEDURE — 0DB88ZX EXCISION OF SMALL INTESTINE, VIA NATURAL OR ARTIFICIAL OPENING ENDOSCOPIC, DIAGNOSTIC: ICD-10-PCS | Performed by: INTERNAL MEDICINE

## 2020-02-10 PROCEDURE — 0DJD8ZZ INSPECTION OF LOWER INTESTINAL TRACT, VIA NATURAL OR ARTIFICIAL OPENING ENDOSCOPIC: ICD-10-PCS | Performed by: INTERNAL MEDICINE

## 2020-02-10 PROCEDURE — 2500000003 HC RX 250 WO HCPCS: Performed by: NURSE ANESTHETIST, CERTIFIED REGISTERED

## 2020-02-10 PROCEDURE — 7100000001 HC PACU RECOVERY - ADDTL 15 MIN: Performed by: INTERNAL MEDICINE

## 2020-02-10 PROCEDURE — 85018 HEMOGLOBIN: CPT

## 2020-02-10 PROCEDURE — 6370000000 HC RX 637 (ALT 250 FOR IP): Performed by: NURSE PRACTITIONER

## 2020-02-10 PROCEDURE — 80048 BASIC METABOLIC PNL TOTAL CA: CPT

## 2020-02-10 PROCEDURE — 36415 COLL VENOUS BLD VENIPUNCTURE: CPT

## 2020-02-10 PROCEDURE — 99232 SBSQ HOSP IP/OBS MODERATE 35: CPT | Performed by: INTERNAL MEDICINE

## 2020-02-10 PROCEDURE — 3700000000 HC ANESTHESIA ATTENDED CARE: Performed by: INTERNAL MEDICINE

## 2020-02-10 PROCEDURE — 2580000003 HC RX 258: Performed by: NURSE ANESTHETIST, CERTIFIED REGISTERED

## 2020-02-10 PROCEDURE — 6360000002 HC RX W HCPCS: Performed by: NURSE ANESTHETIST, CERTIFIED REGISTERED

## 2020-02-10 PROCEDURE — 43239 EGD BIOPSY SINGLE/MULTIPLE: CPT | Performed by: INTERNAL MEDICINE

## 2020-02-10 PROCEDURE — 6370000000 HC RX 637 (ALT 250 FOR IP)

## 2020-02-10 PROCEDURE — 45378 DIAGNOSTIC COLONOSCOPY: CPT | Performed by: INTERNAL MEDICINE

## 2020-02-10 PROCEDURE — 3609012400 HC EGD TRANSORAL BIOPSY SINGLE/MULTIPLE: Performed by: INTERNAL MEDICINE

## 2020-02-10 PROCEDURE — 85025 COMPLETE CBC W/AUTO DIFF WBC: CPT

## 2020-02-10 RX ORDER — BENZONATATE 100 MG/1
100 CAPSULE ORAL 3 TIMES DAILY PRN
Qty: 30 CAPSULE | Refills: 0 | Status: SHIPPED | OUTPATIENT
Start: 2020-02-10 | End: 2020-02-12 | Stop reason: SDUPTHER

## 2020-02-10 RX ORDER — SODIUM CHLORIDE 9 MG/ML
INJECTION, SOLUTION INTRAVENOUS CONTINUOUS PRN
Status: DISCONTINUED | OUTPATIENT
Start: 2020-02-10 | End: 2020-02-10 | Stop reason: SDUPTHER

## 2020-02-10 RX ORDER — PROPOFOL 10 MG/ML
INJECTION, EMULSION INTRAVENOUS PRN
Status: DISCONTINUED | OUTPATIENT
Start: 2020-02-10 | End: 2020-02-10 | Stop reason: SDUPTHER

## 2020-02-10 RX ORDER — BUTALBITAL, ACETAMINOPHEN AND CAFFEINE 50; 325; 40 MG/1; MG/1; MG/1
1 TABLET ORAL EVERY 4 HOURS PRN
Status: DISCONTINUED | OUTPATIENT
Start: 2020-02-10 | End: 2020-02-10 | Stop reason: HOSPADM

## 2020-02-10 RX ORDER — FERROUS SULFATE 325(65) MG
325 TABLET ORAL
Qty: 30 TABLET | Refills: 5 | Status: SHIPPED | OUTPATIENT
Start: 2020-02-10 | End: 2020-05-05

## 2020-02-10 RX ORDER — LIDOCAINE HYDROCHLORIDE 20 MG/ML
INJECTION, SOLUTION EPIDURAL; INFILTRATION; INTRACAUDAL; PERINEURAL PRN
Status: DISCONTINUED | OUTPATIENT
Start: 2020-02-10 | End: 2020-02-10 | Stop reason: SDUPTHER

## 2020-02-10 RX ORDER — IPRATROPIUM BROMIDE AND ALBUTEROL SULFATE 2.5; .5 MG/3ML; MG/3ML
SOLUTION RESPIRATORY (INHALATION)
Status: COMPLETED
Start: 2020-02-10 | End: 2020-02-10

## 2020-02-10 RX ORDER — ONDANSETRON 2 MG/ML
INJECTION INTRAMUSCULAR; INTRAVENOUS PRN
Status: DISCONTINUED | OUTPATIENT
Start: 2020-02-10 | End: 2020-02-10 | Stop reason: SDUPTHER

## 2020-02-10 RX ORDER — IPRATROPIUM BROMIDE AND ALBUTEROL SULFATE 2.5; .5 MG/3ML; MG/3ML
1 SOLUTION RESPIRATORY (INHALATION)
Status: DISCONTINUED | OUTPATIENT
Start: 2020-02-10 | End: 2020-02-10 | Stop reason: HOSPADM

## 2020-02-10 RX ADMIN — ACETAMINOPHEN 650 MG: 325 TABLET ORAL at 16:27

## 2020-02-10 RX ADMIN — PROPOFOL 20 MG: 10 INJECTION, EMULSION INTRAVENOUS at 14:02

## 2020-02-10 RX ADMIN — PROPOFOL 100 MG: 10 INJECTION, EMULSION INTRAVENOUS at 14:00

## 2020-02-10 RX ADMIN — PROPOFOL 20 MG: 10 INJECTION, EMULSION INTRAVENOUS at 14:12

## 2020-02-10 RX ADMIN — BUTALBITAL, ACETAMINOPHEN, AND CAFFEINE 1 TABLET: 50; 325; 40 TABLET ORAL at 17:19

## 2020-02-10 RX ADMIN — BUSPIRONE HYDROCHLORIDE 15 MG: 10 TABLET ORAL at 08:56

## 2020-02-10 RX ADMIN — CITALOPRAM HYDROBROMIDE 40 MG: 20 TABLET ORAL at 08:56

## 2020-02-10 RX ADMIN — IPRATROPIUM BROMIDE AND ALBUTEROL SULFATE 3 ML: .5; 3 SOLUTION RESPIRATORY (INHALATION) at 14:36

## 2020-02-10 RX ADMIN — POLYETHYLENE GLYCOL-3350 AND ELECTROLYTES 2000 ML: 236; 6.74; 5.86; 2.97; 22.74 POWDER, FOR SOLUTION ORAL at 00:45

## 2020-02-10 RX ADMIN — PROPOFOL 20 MG: 10 INJECTION, EMULSION INTRAVENOUS at 14:06

## 2020-02-10 RX ADMIN — PROPOFOL 20 MG: 10 INJECTION, EMULSION INTRAVENOUS at 14:10

## 2020-02-10 RX ADMIN — PROPRANOLOL HYDROCHLORIDE 80 MG: 80 CAPSULE, EXTENDED RELEASE ORAL at 08:56

## 2020-02-10 RX ADMIN — PROPOFOL 20 MG: 10 INJECTION, EMULSION INTRAVENOUS at 14:16

## 2020-02-10 RX ADMIN — PROPOFOL 20 MG: 10 INJECTION, EMULSION INTRAVENOUS at 14:14

## 2020-02-10 RX ADMIN — SODIUM CHLORIDE, PRESERVATIVE FREE 10 ML: 5 INJECTION INTRAVENOUS at 08:57

## 2020-02-10 RX ADMIN — LIDOCAINE HYDROCHLORIDE 100 MG: 20 INJECTION, SOLUTION EPIDURAL; INFILTRATION; INTRACAUDAL; PERINEURAL at 14:00

## 2020-02-10 RX ADMIN — ONDANSETRON 4 MG: 2 INJECTION, SOLUTION INTRAMUSCULAR; INTRAVENOUS at 14:00

## 2020-02-10 RX ADMIN — BUPROPION HYDROCHLORIDE 300 MG: 150 TABLET, EXTENDED RELEASE ORAL at 08:56

## 2020-02-10 RX ADMIN — SODIUM CHLORIDE: 9 INJECTION, SOLUTION INTRAVENOUS at 13:54

## 2020-02-10 RX ADMIN — BUSPIRONE HYDROCHLORIDE 15 MG: 10 TABLET ORAL at 16:27

## 2020-02-10 RX ADMIN — PROPOFOL 20 MG: 10 INJECTION, EMULSION INTRAVENOUS at 14:08

## 2020-02-10 RX ADMIN — PROPOFOL 20 MG: 10 INJECTION, EMULSION INTRAVENOUS at 14:04

## 2020-02-10 ASSESSMENT — PAIN SCALES - GENERAL
PAINLEVEL_OUTOF10: 0
PAINLEVEL_OUTOF10: 3
PAINLEVEL_OUTOF10: 9
PAINLEVEL_OUTOF10: 0
PAINLEVEL_OUTOF10: 0

## 2020-02-10 ASSESSMENT — PULMONARY FUNCTION TESTS
PIF_VALUE: 1

## 2020-02-10 ASSESSMENT — PAIN DESCRIPTION - PAIN TYPE: TYPE: ACUTE PAIN

## 2020-02-10 ASSESSMENT — PAIN DESCRIPTION - ORIENTATION: ORIENTATION: RIGHT

## 2020-02-10 ASSESSMENT — PAIN DESCRIPTION - LOCATION: LOCATION: HEAD

## 2020-02-10 ASSESSMENT — PAIN DESCRIPTION - DESCRIPTORS: DESCRIPTORS: HEADACHE

## 2020-02-10 ASSESSMENT — PAIN DESCRIPTION - FREQUENCY: FREQUENCY: CONTINUOUS

## 2020-02-10 NOTE — CARE COORDINATION
[address] Transitions Interview     2/10/2020    Patient: Efren Archuleta Patient : 1960   MRN: [id number]  Reason for Admission: GI hemorrhage, hgb 5.9  RARS: Readmission Risk Score: 15         Spoke with: Bo     Met with pt in her room. Writer reviewed role of CTN following discharge- v/u. Contact information provided.  Agreeable to transition calls   EGD/ colonoscopy today  Per CM/ DP, plan is home independently      Readmission Risk  Patient Active Problem List   Diagnosis    Adult ADHD    Narcolepsy    Moderate persistent asthma without complication    History of DVT (deep vein thrombosis)    DVT of leg (deep venous thrombosis) (HCC)    Closed displaced fracture of body of calcaneus    Hiatal hernia    Internal hemorrhoids    Helicobacter pylori (H. 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    Achilles tendinitis    Acquired trigger finger    Knee pain    Osteoarthritis of knee    Osteoarthritis of wrist    Pes anserinus bursitis    Plantar fasciitis    Sprain of shoulder and upper arm    Sprain of wrist    Other synovitis and tenosynovitis, unspecified hand    Flexion contracture of joint of foot, left    Peroneal tendon injury, left, subsequent encounter    Acute upper GI bleed    Acute on chronic blood loss anemia    Essential hypertension    Chronic anticoagulation    Iron deficiency anemia due to chronic blood loss    Gastrointestinal hemorrhage       Inpatient Assessment  Care Transitions Summary    Care Transitions Inpatient Review  Medication Review  Housing Review  Are you an active caregiver in your home?:  Yes  For whom are you the caregiver?:  cares for her mom  in mother's home several times a day   Social Support  Do you have a ?:  No  Do you have a [address] [place] [address]?:  No  Durable Medical Equipment  Patient DME:  Straight cane  Functional Review  Ability to seek help/take action for Emergent/Urgent situations i.e. fire, crime, inclement weather or health crisis. :  Independent  Ability handle personal hygiene needs (bathing/dressing/grooming): Independent  Ability to manage medications: Independent  Ability to prepare food:  Independent  Ability to maintain home (clean home, laundry): Independent  Ability to drive and/or has transportation:  Independent  Ability to do shopping:  Independent  Ability to manage finances: Independent  Is patient able to live independently?:  Yes  Hearing and Vision  Care Transitions Interventions                                 Follow Up  Future Appointments   Date Time Provider Naya Roger   3/3/2020 11:30 AM Trina Almanzar  [facility]  There are no preventive care reminders to display for this patient.     Roberta Delacruz RN

## 2020-02-10 NOTE — ANESTHESIA PRE PROCEDURE
Department of Anesthesiology  Preprocedure Note       Name:  Konrad Cat   Age:  63 y.o.  :  1960                                          MRN:  [id number]         Date:  2/10/2020      Surgeon: Erin Yarbrough):  Delisa Alvarenga MD    Procedure: EGD ESOPHAGOGASTRODUODENOSCOPY (N/A )    Medications prior to admission:   Prior to Admission medications    Medication Sig Start Date End Date Taking? Authorizing Provider   [facility]CG/ACT AERO inhaler Inhale 2 puffs into the lungs 2 times daily 20  Yes Ankit Walker MD   rivaroxaban (XARELTO) 20 MG TABS tablet TAKE ONE TABLET BY MOUTH DAILY 20  Yes Bashir Pierce MD   oxyCODONE-acetaminophen (PERCOCET) 5-325 MG per tablet Take 1 tablet by mouth every 8 hours as needed for Pain for up to 30 days. 20 Yes Valentín Kimbrough DO   amphetamine-dextroamphetamine (ADDERALL) 30 MG tablet Take 1 tablet by mouth 2 times daily for 30 days.  20 Yes Valentín Kimbrough DO   propranolol (INDERAL LA) 80 MG extended release capsule TAKE ONE CAPSULE BY MOUTH DAILY 19  Yes PIETER Bazzi CNP   alendronate (FOSAMAX) 70 MG tablet TAKE 1 TABLET BY MOUTH ONCE WEEKLY BEFORE BREAKFAST, ON AN EMPTY STOMACH: REMAIN UPRIGHT FOR 30 MINUTES  Patient taking differently: TAKE 1 TABLET BY MOUTH ONCE WEEKLY BEFORE BREAKFAST, ON AN EMPTY STOMACH: REMAIN UPRIGHT FOR 30 MINUTES, Takes on Fridays 10/23/19  Yes PIETER Bazzi CNP   buPROPion (WELLBUTRIN XL) 300 MG extended release tablet TAKE ONE TABLET BY MOUTH EVERY MORNING 10/1/19  Yes Barby Crockett DO   busPIRone (BUSPAR) 15 MG tablet TAKE ONE TABLET BY MOUTH THREE TIMES A DAY 19  Yes Barby Crockett DO   cyclobenzaprine (FLEXERIL) 10 MG tablet TAKE ONE-HALF TO ONE TABLET BY MOUTH THREE TIMES A DAY AS NEEDED FOR MUSCLE SPASMS 19  Yes Barby Crockett DO   citalopram (CELEXA) 40 MG tablet TAKE ONE TABLET BY MOUTH DAILY 12/3/18  Yes Barby Crockett, DO   Calcium Carbonate (CALCIUM 600 PO) Take 600 mg by mouth daily   Yes Historical Provider, MD   albuterol (PROVENTIL) (2.5 MG/3ML) 0.083% nebulizer solution Take 2.5 mg by nebulization every 6 hours as needed for Wheezing or Shortness of Breath    Historical Provider, MD   pantoprazole (PROTONIX) 40 MG tablet Take 1 tablet by mouth every morning (before breakfast) 2/7/20   Liz Walker MD   VENTOLIN  (90 Base) MCG/ACT inhaler INHALE TWO PUFFS BY MOUTH EVERY 4 HOURS AS NEEDED FOR WHEEZING 8/13/18   Dorota Crockett DO       Current medications:    Current Facility-Administered Medications   Medication Dose Route Frequency Provider Last Rate Last Dose    buPROPion (WELLBUTRIN XL) extended release tablet 300 mg  300 mg Oral Daily Jailyn Bone, APRN - CNP   300 mg at 02/10/20 0856    busPIRone (BUSPAR) tablet 15 mg  15 mg Oral TID Jailyn Bone, APRN - CNP   15 mg at 02/10/20 0856    citalopram (CELEXA) tablet 40 mg  40 mg Oral Daily Jailyn Bone, APRN - CNP   40 mg at 02/10/20 0856    propranolol (INDERAL LA) extended release capsule 80 mg  80 mg Oral Daily Jailyn Bone, APRN - CNP   80 mg at 02/10/20 0856    0.9 % sodium chloride infusion   Intravenous Continuous Carmen Barnett DO 75 mL/hr at 02/08/20 1838      sodium chloride flush 0.9 % injection 10 mL  10 mL Intravenous 2 times per day Jailyn Bone, APRN - CNP   10 mL at 02/10/20 0857    sodium chloride flush 0.9 % injection 10 mL  10 mL Intravenous PRN Jailyn Bone, APRN - CNP        acetaminophen (TYLENOL) tablet 650 mg  650 mg Oral Q4H PRN Jailyn Bone, APRN - CNP   650 mg at 02/09/20 0843    pantoprazole (PROTONIX) 80 mg in sodium chloride 0.9 % 50 mL bolus  80 mg Intravenous Once Jailyn Bone, APRN - CNP        pantoprazole (PROTONIX) 80 mg in sodium chloride 0.9 % 100 mL infusion  8 mg/hr Intravenous Continuous Jailyn Bone, APRN - CNP 10 mL/hr at 02/09/20 2346 8 mg/hr at 02/09/20 2346    [MAR Hold] ondansetron (ZOFRAN-ODT) disintegrating tablet 4 mg  4 mg Oral Q6H PRN Beatrice Deep, APRN - CNP        Or    [MAR Hold] ondansetron (ZOFRAN) injection 4 mg  4 mg Intravenous Q6H PRN Beatrice Deep, APRN - CNP        [MAR Hold] benzonatate (TESSALON) capsule 100 mg  100 mg Oral TID PRN [facility] Or[facility], DO   100 mg at 02/09/20 2136    [MAR Hold] traMADol (ULTRAM) tablet 50 mg  50 mg Oral Q6H PRN [facility] Or[facility], DO   50 mg at 02/08/20 1017    [MAR Hold] oxyCODONE-acetaminophen (PERCOCET) 5-325 MG per tablet 1 tablet  1 tablet Oral Q8H PRN [facility] Orlop, DO   1 tablet at 02/09/20 2345    [MAR Hold] iron sucrose (VENOFER) 200 mg in sodium chloride 0.9 % 100 mL IVPB  200 mg Intravenous Q24H [facility], DO   Stopped at 02/09/20 1619    [MAR Hold] lidocaine 4 % external patch 1 patch  1 patch Transdermal Daily [facility] Or[facility], DO   1 patch at 02/10/20 1221    [MAR Hold] cyclobenzaprine (FLEXERIL) tablet 5 mg  5 mg Oral BID PRN Beatrice Deep, APRN - CNP   5 mg at 02/09/20 2346       Allergies:     Allergies   Allergen Reactions    Latex Itching    Prozac [Fluoxetine Hcl]     Sulfa Antibiotics      migraine    Codeine Itching       Problem List:    Patient Active Problem List   Diagnosis Code    Adult ADHD F90.9    Narcolepsy G47.419    Moderate persistent asthma without complication X92.91    History of DVT (deep vein thrombosis) Z86.718    DVT of leg (deep venous thrombosis) ([facility]) I82.409    Closed displaced fracture of body of calcaneus S92.013A    Hiatal hernia K44.9    Internal hemorrhoids U57.4    Helicobacter pylori (H. 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    Achilles tendinitis M76.60    Acquired trigger finger M65.30    Knee pain M25.569    02/10/2020       CMP:   Lab Results   Component Value Date     02/10/2020    K 4.1 02/10/2020     02/10/2020    CO2 27 02/10/2020    BUN 4 02/10/2020    CREATININE 0.95 02/10/2020    GFRAA >60 02/10/2020    LABGLOM 60 02/10/2020    GLUCOSE 89 02/10/2020    PROT 6.0 02/07/2020    CALCIUM 8.4 02/10/2020    BILITOT 0.11 02/07/2020    ALKPHOS 69 02/07/2020    AST 16 02/07/2020    ALT 12 02/07/2020       POC Tests: No results for input(s): POCGLU, POCNA, POCK, POCCL, POCBUN, POCHEMO, POCHCT in the last 72 hours. Coags:   Lab Results   Component Value Date    PROTIME 12.0 02/08/2020    INR 1.2 02/08/2020    APTT 25.1 02/07/2020       HCG (If Applicable): No results found for: PREGTESTUR, PREGSERUM, HCG, HCGQUANT     ABGs: No results found for: PHART, PO2ART, GAG4PKN, TVL6LNO, BEART, L7ZKPDAV     Type & Screen (If Applicable):  No results found for: [facility]    Anesthesia Evaluation  Patient summary reviewed and Nursing notes reviewed  Airway: Mallampati: II  TM distance: >3 FB   Neck ROM: full  Mouth opening: > = 3 FB Dental: normal exam         Pulmonary:normal exam    (+) asthma:                            Cardiovascular:  Exercise tolerance: no interval change,   (+) hypertension:,     (-) past MI, CAD and CABG/stent        Rate: normal                    Neuro/Psych:   (+) psychiatric history:            GI/Hepatic/Renal:   (+) GERD:,           Endo/Other:        (-) diabetes mellitus               Abdominal:           Vascular:                                        Anesthesia Plan      MAC and general     ASA 2       Induction: intravenous. Anesthetic plan and risks discussed with patient. Plan discussed with CRNA.     Attending anesthesiologist reviewed and agrees with Pre Eval content              Amanda Brown DO   2/10/2020

## 2020-02-10 NOTE — PROGRESS NOTES
Patient was non-compliant with the prep ordered for her EGD and colonoscopy scheduled for Monday 2/10. Writer tried to encourage her by having the Golytely flavored, ensured it was cold, and brought in a bedside commode to make the process easier, however only abuot 100 ml/4000 ml  was taken in before midnight and there was no BM.

## 2020-02-10 NOTE — FLOWSHEET NOTE
02/10/20 1242   Provider Notification   Reason for Communication Evaluate  (Sepsis screening popped up )   [facility]    Provider Notification Physician   Method of Communication Secure Message   Response No new orders   Notification Time [phone]

## 2020-02-10 NOTE — PROGRESS NOTES
Patient still refused to drink prep. Even with extended time up 10am.    Tap water enema x1 given per order (With much protest)     Patient got up to commode, and claimed to be \"clear\"  However, unable to determine how much is tap water and how much is stool. Small brown flecks seen in commode.      Will continue to monitor

## 2020-02-10 NOTE — PROGRESS NOTES
Patient transferred back from PACU post-EGD/Colonscopy   Vitals stable  Re-oriented to room  Assessment completed        updated on status of negative EGD and inadequate colonoscopy prep

## 2020-02-10 NOTE — DISCHARGE SUMMARY
[facility]    Discharge Summary     Patient ID: Mary Gates  :  1960   MRN: [id number]     ACCOUNT:  [de-identified]   Patient's PCP: Autry Lesch, MD  Admit Date: 2020   Discharge Date: 2/10/2020     Length of Stay: 3  Code Status:  Full Code  Admitting Physician: Carlos Gifford DO  Discharge Physician: Carlos Gifford DO     Active Discharge Diagnoses:     Hospital Problem Lists:  Principal Problem:    Acute upper GI bleed  Active Problems:    Narcolepsy    Moderate persistent asthma without complication    Hiatal hernia    Acute on chronic blood loss anemia    Essential hypertension    Chronic anticoagulation    Iron deficiency anemia due to chronic blood loss    Gastrointestinal hemorrhage  Resolved Problems:    * No resolved hospital problems. *      Admission Condition:  fair     Discharged Condition: stable    Hospital Stay:     Hospital Course:  Mary Gates is a 63 y.o. female who was admitted for the management of  Acute upper GI bleed , presented to ER with Shortness of Breath (Dr. Patti Leahy called her PTA and said thather hgb is low); Chest Pain (with walking); and Abdominal Pain (was having black tarry stool, saw PCP today, and he ran blood work today, and now she is here due to low hgb)    This is a 56-year-old white female that presents with a complaint of abnormal labs. She had been seen by her primary care physician for complaint of fatigue and exertional dyspnea and had outpatient labs completed. She was found to have a hemoglobin of 5.9 was advised to go the emergency room. Findings were confirmed with a hemoglobin less than 6 and she was transfused 2 units of packed cells. She reported complaints of melena over the last week or so. GI was consulted she underwent EGD which was nondiagnostic. She went a colonoscopy with poor bowel prep and had evidence of diverticulosis but again a poor prep.   She is advised HOSPITALIZATION/Incidental Findings: Outpatient colonoscopy per GI. CBC at discretion of PCP, recommend following monthly until H&H is stable and recovered from iron deficiency. Further work-up for iron deficiency because outpatient pending colonoscopy findings    Diet: cardiac diet    Activity: As tolerated    Instructions to Patient: Take medications as prescribed    Discharge Medications:      Medication List      START taking these medications    benzonatate 100 MG capsule  Commonly known as:  TESSALON  Take 1 capsule by mouth 3 times daily as needed for Cough     ferrous sulfate 325 (65 Fe) MG tablet  Take 1 tablet by mouth daily (with breakfast)        CHANGE how you take these medications    alendronate 70 MG tablet  Commonly known as:  FOSAMAX  TAKE 1 TABLET BY MOUTH ONCE WEEKLY BEFORE BREAKFAST, ON AN EMPTY STOMACH: REMAIN UPRIGHT FOR 30 MINUTES  What changed:  additional instructions        CONTINUE taking these medications    amphetamine-dextroamphetamine 30 MG tablet  Commonly known as:  ADDERALL  Take 1 tablet by mouth 2 times daily for 30 days. buPROPion 300 MG extended release tablet  Commonly known as:  WELLBUTRIN XL  TAKE ONE TABLET BY MOUTH EVERY MORNING     busPIRone 15 MG tablet  Commonly known as:  BUSPAR  TAKE ONE TABLET BY MOUTH THREE TIMES A DAY     CALCIUM 600 PO     citalopram 40 MG tablet  Commonly known as:  CELEXA  TAKE ONE TABLET BY MOUTH DAILY     cyclobenzaprine 10 MG tablet  Commonly known as:  FLEXERIL  TAKE ONE-HALF TO ONE TABLET BY MOUTH THREE TIMES A DAY AS NEEDED FOR MUSCLE SPASMS     mometasone 200 MCG/ACT Aero inhaler  Commonly known as:  Asmanex HFA  Inhale 2 puffs into the lungs 2 times daily     oxyCODONE-acetaminophen 5-325 MG per tablet  Commonly known as:  PERCOCET  Take 1 tablet by mouth every 8 hours as needed for Pain for up to 30 days.      pantoprazole 40 MG tablet  Commonly known as:  PROTONIX  Take 1 tablet by mouth every morning (before breakfast) propranolol 80 MG extended release capsule  Commonly known as:  INDERAL LA  TAKE ONE CAPSULE BY MOUTH DAILY     rivaroxaban 20 MG Tabs tablet  Commonly known as:  Xarelto  TAKE ONE TABLET BY MOUTH DAILY     * Ventolin  (90 Base) MCG/ACT inhaler  Generic drug:  albuterol sulfate HFA  INHALE TWO PUFFS BY MOUTH EVERY 4 HOURS AS NEEDED FOR WHEEZING     * albuterol (2.5 MG/3ML) 0.083% nebulizer solution  Commonly known as:  PROVENTIL         * This list has 2 medication(s) that are the same as other medications prescribed for you. Read the directions carefully, and ask your doctor or other care provider to review them with you. Where to Get Your Medications      These medications were sent to [address] [facility] [phone] [facility] [phone] [address] [facility] [address]    Phone:  [phone]   benzonatate 100 MG capsule  ferrous sulfate 325 (65 Fe) MG tablet         No discharge procedures on file. Time Spent on discharge is  27 minutes in patient examination, evaluation, counseling as well as medication reconciliation, prescriptions for required medications, discharge plan and follow up. Electronically signed by   Jovon Saavedra DO  2/10/2020  4:35 PM      Thank you Dr. Yaneth Gardner MD for the opportunity to be involved in this patient's care.

## 2020-02-11 ENCOUNTER — CARE COORDINATION (OUTPATIENT)
Dept: CASE MANAGEMENT | Age: 60
End: 2020-02-11

## 2020-02-12 ENCOUNTER — OFFICE VISIT (OUTPATIENT)
Dept: FAMILY MEDICINE CLINIC | Age: 60
End: 2020-02-12
Payer: MEDICARE

## 2020-02-12 ENCOUNTER — CARE COORDINATION (OUTPATIENT)
Dept: CASE MANAGEMENT | Age: 60
End: 2020-02-12

## 2020-02-12 VITALS
SYSTOLIC BLOOD PRESSURE: 112 MMHG | BODY MASS INDEX: 30.13 KG/M2 | DIASTOLIC BLOOD PRESSURE: 74 MMHG | HEART RATE: 74 BPM | OXYGEN SATURATION: 96 % | WEIGHT: 149.2 LBS

## 2020-02-12 LAB — SURGICAL PATHOLOGY REPORT: NORMAL

## 2020-02-12 PROCEDURE — 1111F DSCHRG MED/CURRENT MED MERGE: CPT | Performed by: FAMILY MEDICINE

## 2020-02-12 PROCEDURE — 99495 TRANSJ CARE MGMT MOD F2F 14D: CPT | Performed by: FAMILY MEDICINE

## 2020-02-12 RX ORDER — BENZONATATE 100 MG/1
100 CAPSULE ORAL 3 TIMES DAILY PRN
Qty: 30 CAPSULE | Refills: 0 | Status: SHIPPED | OUTPATIENT
Start: 2020-02-12 | End: 2020-02-19

## 2020-02-12 NOTE — PROGRESS NOTES
2/12/20 encounter (Office Visit) with Jose Herrmann MD   Medication Sig Dispense Refill    benzonatate (TESSALON) 100 MG capsule Take 1 capsule by mouth 3 times daily as needed for Cough 30 capsule 0    ferrous sulfate 325 (65 Fe) MG tablet Take 1 tablet by mouth daily (with breakfast) 30 tablet 5    albuterol (PROVENTIL) (2.5 MG/3ML) 0.083% nebulizer solution Take 2.5 mg by nebulization every 6 hours as needed for Wheezing or Shortness of Breath      pantoprazole (PROTONIX) 40 MG tablet Take 1 tablet by mouth every morning (before breakfast) 90 tablet 1    mometasone (ASMANEX HFA) 200 MCG/ACT AERO inhaler Inhale 2 puffs into the lungs 2 times daily 1 Inhaler 5    rivaroxaban (XARELTO) 20 MG TABS tablet TAKE ONE TABLET BY MOUTH DAILY 30 tablet 11    oxyCODONE-acetaminophen (PERCOCET) 5-325 MG per tablet Take 1 tablet by mouth every 8 hours as needed for Pain for up to 30 days. 90 tablet 0    amphetamine-dextroamphetamine (ADDERALL) 30 MG tablet Take 1 tablet by mouth 2 times daily for 30 days.  60 tablet 0    propranolol (INDERAL LA) 80 MG extended release capsule TAKE ONE CAPSULE BY MOUTH DAILY 30 capsule 0    alendronate (FOSAMAX) 70 MG tablet TAKE 1 TABLET BY MOUTH ONCE WEEKLY BEFORE BREAKFAST, ON AN EMPTY STOMACH: REMAIN UPRIGHT FOR 30 MINUTES (Patient taking differently: TAKE 1 TABLET BY MOUTH ONCE WEEKLY BEFORE BREAKFAST, ON AN EMPTY STOMACH: REMAIN UPRIGHT FOR 30 MINUTES, Takes on Fridays) 4 tablet 11    buPROPion (WELLBUTRIN XL) 300 MG extended release tablet TAKE ONE TABLET BY MOUTH EVERY MORNING 90 tablet 3    busPIRone (BUSPAR) 15 MG tablet TAKE ONE TABLET BY MOUTH THREE TIMES A DAY 90 tablet 3    cyclobenzaprine (FLEXERIL) 10 MG tablet TAKE ONE-HALF TO ONE TABLET BY MOUTH THREE TIMES A DAY AS NEEDED FOR MUSCLE SPASMS 30 tablet 5    citalopram (CELEXA) 40 MG tablet TAKE ONE TABLET BY MOUTH DAILY 30 tablet 11    VENTOLIN  (90 Base) MCG/ACT inhaler INHALE TWO PUFFS BY MOUTH EVERY 4

## 2020-02-12 NOTE — CARE COORDINATION
[facility] Transitions Follow Up Call    2020    Patient: Gerald Sal  Patient : 1960   MRN: [id number]  Reason for Admission: GI hemorrhage, hgb 5.9, SOB, CP, abdominal pain    Discharge Date: 2/10/20 RARS: Readmission Risk Score: 16         Spoke with: Mercedes Black     Call back to pt who states she is \"not feeling real good\"  who states she still has a bad headache, cough and stomach ache. States she did not mention headache or stomach ache to Dr Jyothi Benitez at appt this morning  States she did not mention to him she still cannot walk in a straight line either   States she has not made appt with Dr Avis Simpson (GI) yet- writer strongly encourages her to schedule this since she was admitted for GI bleed. States she takes 3 percocet daily for her back and does not really help the headache. Writer advised notifying Dr Jyothi Benitez if the headache, stomach ache and difficulty walking continue or worsens  States appetite is fair, denies abdominal pain, N, V, D. States she is passing as but no BM yet since discharge  Again, writer advises calling Dr Marlon Bacon office if symptoms continue or worsen- v/u  Denies needs  Encouraged to call writer/ CTC or providers if questions or concerns- v/u       Care Transitions Subsequent and Final Call    Subsequent and Final Calls  Do you have any ongoing symptoms?:  No  Have your medications changed?:  No  Do you have any questions related to your medications?:  No  Do you currently have any active services?:  No  Do you have any needs or concerns that I can assist you with?:  No  Identified Barriers:  Impairment, Lack of Education, Stress, Lack of Support  Care Transitions Interventions                          Other Interventions:             Follow Up  Future Appointments   Date Time Provider Naya Roger   3/3/2020 11:30 AM MD KAMRYN Hernandez RN

## 2020-02-13 ENCOUNTER — HOSPITAL ENCOUNTER (EMERGENCY)
Age: 60
Discharge: HOME | End: 2020-02-14
Attending: EMERGENCY MEDICINE
Payer: MEDICARE

## 2020-02-13 ENCOUNTER — NURSE TRIAGE (OUTPATIENT)
Dept: OTHER | Age: 60
End: 2020-02-13

## 2020-02-13 ENCOUNTER — APPOINTMENT (OUTPATIENT)
Dept: GENERAL RADIOLOGY | Age: 60
End: 2020-02-13
Payer: MEDICARE

## 2020-02-13 LAB
-: ABNORMAL
ALBUMIN SERPL-MCNC: 3.7 G/DL (ref 3.5–5.2)
ALBUMIN/GLOBULIN RATIO: ABNORMAL (ref 1–2.5)
ALP BLD-CCNC: 76 U/L (ref 35–104)
ALT SERPL-CCNC: 14 U/L (ref 5–33)
AMORPHOUS: ABNORMAL
ANION GAP SERPL CALCULATED.3IONS-SCNC: 13 MMOL/L (ref 9–17)
AST SERPL-CCNC: 41 U/L
BACTERIA: ABNORMAL
BILIRUB SERPL-MCNC: 0.23 MG/DL (ref 0.3–1.2)
BILIRUBIN URINE: NEGATIVE
BUN BLDV-MCNC: 17 MG/DL (ref 8–23)
BUN/CREAT BLD: 14 (ref 9–20)
CALCIUM SERPL-MCNC: 8.5 MG/DL (ref 8.6–10.4)
CASTS UA: ABNORMAL /LPF
CASTS UA: ABNORMAL /LPF
CHLORIDE BLD-SCNC: 96 MMOL/L (ref 98–107)
CO2: 22 MMOL/L (ref 20–31)
COLOR: YELLOW
COMMENT UA: ABNORMAL
CREAT SERPL-MCNC: 1.25 MG/DL (ref 0.5–0.9)
CRYSTALS, UA: ABNORMAL /HPF
EPITHELIAL CELLS UA: ABNORMAL /HPF (ref 0–5)
GFR AFRICAN AMERICAN: 53 ML/MIN
GFR NON-AFRICAN AMERICAN: 44 ML/MIN
GFR SERPL CREATININE-BSD FRML MDRD: ABNORMAL ML/MIN/{1.73_M2}
GFR SERPL CREATININE-BSD FRML MDRD: ABNORMAL ML/MIN/{1.73_M2}
GLUCOSE BLD-MCNC: 97 MG/DL (ref 70–99)
GLUCOSE URINE: NEGATIVE
KETONES, URINE: NEGATIVE
LEUKOCYTE ESTERASE, URINE: NEGATIVE
LIPASE: 39 U/L (ref 13–60)
MUCUS: ABNORMAL
NITRITE, URINE: NEGATIVE
OTHER OBSERVATIONS UA: ABNORMAL
PH UA: 5.5 (ref 5–8)
POTASSIUM SERPL-SCNC: 4.1 MMOL/L (ref 3.7–5.3)
PROTEIN UA: ABNORMAL
RBC UA: ABNORMAL /HPF (ref 0–2)
RENAL EPITHELIAL, UA: ABNORMAL /HPF
SODIUM BLD-SCNC: 131 MMOL/L (ref 135–144)
SPECIFIC GRAVITY UA: 1.02 (ref 1–1.03)
TOTAL PROTEIN: 6.4 G/DL (ref 6.4–8.3)
TRICHOMONAS: ABNORMAL
TURBIDITY: CLEAR
URINE HGB: ABNORMAL
UROBILINOGEN, URINE: NORMAL
WBC UA: ABNORMAL /HPF (ref 0–5)
YEAST: ABNORMAL

## 2020-02-13 PROCEDURE — 81001 URINALYSIS AUTO W/SCOPE: CPT

## 2020-02-13 PROCEDURE — 85025 COMPLETE CBC W/AUTO DIFF WBC: CPT

## 2020-02-13 PROCEDURE — 96375 TX/PRO/DX INJ NEW DRUG ADDON: CPT

## 2020-02-13 PROCEDURE — 80053 COMPREHEN METABOLIC PANEL: CPT

## 2020-02-13 PROCEDURE — 83690 ASSAY OF LIPASE: CPT

## 2020-02-13 PROCEDURE — 96374 THER/PROPH/DIAG INJ IV PUSH: CPT

## 2020-02-13 PROCEDURE — 6360000002 HC RX W HCPCS: Performed by: NURSE PRACTITIONER

## 2020-02-13 PROCEDURE — 99284 EMERGENCY DEPT VISIT MOD MDM: CPT

## 2020-02-13 PROCEDURE — 71046 X-RAY EXAM CHEST 2 VIEWS: CPT

## 2020-02-13 RX ORDER — ONDANSETRON 2 MG/ML
4 INJECTION INTRAMUSCULAR; INTRAVENOUS ONCE
Status: COMPLETED | OUTPATIENT
Start: 2020-02-13 | End: 2020-02-13

## 2020-02-13 RX ORDER — MORPHINE SULFATE 2 MG/ML
2 INJECTION, SOLUTION INTRAMUSCULAR; INTRAVENOUS ONCE
Status: COMPLETED | OUTPATIENT
Start: 2020-02-13 | End: 2020-02-13

## 2020-02-13 RX ADMIN — ONDANSETRON 4 MG: 2 INJECTION INTRAMUSCULAR; INTRAVENOUS at 23:04

## 2020-02-13 RX ADMIN — MORPHINE SULFATE 2 MG: 2 INJECTION, SOLUTION INTRAMUSCULAR; INTRAVENOUS at 23:06

## 2020-02-13 ASSESSMENT — ENCOUNTER SYMPTOMS
SINUS PRESSURE: 0
VOMITING: 0
ABDOMINAL PAIN: 1
COUGH: 1
SHORTNESS OF BREATH: 1
CHEST TIGHTNESS: 0
BACK PAIN: 0
PHOTOPHOBIA: 0
NAUSEA: 0
DIARRHEA: 0
CONSTIPATION: 0

## 2020-02-13 ASSESSMENT — PAIN SCALES - GENERAL
PAINLEVEL_OUTOF10: 1
PAINLEVEL_OUTOF10: 6

## 2020-02-13 ASSESSMENT — PAIN DESCRIPTION - LOCATION: LOCATION: ABDOMEN

## 2020-02-14 VITALS
HEART RATE: 128 BPM | HEIGHT: 59 IN | TEMPERATURE: 99.5 F | WEIGHT: 147 LBS | DIASTOLIC BLOOD PRESSURE: 94 MMHG | BODY MASS INDEX: 29.64 KG/M2 | RESPIRATION RATE: 16 BRPM | OXYGEN SATURATION: 95 % | SYSTOLIC BLOOD PRESSURE: 116 MMHG

## 2020-02-14 LAB
ABSOLUTE EOS #: 0 K/UL (ref 0–0.4)
ABSOLUTE IMMATURE GRANULOCYTE: 0.08 K/UL (ref 0–0.3)
ABSOLUTE LYMPH #: 0.99 K/UL (ref 1–4.8)
ABSOLUTE MONO #: 0.68 K/UL (ref 0.2–0.8)
BASOPHILS # BLD: 0 %
BASOPHILS ABSOLUTE: 0 K/UL (ref 0–0.2)
DIFFERENTIAL TYPE: ABNORMAL
EOSINOPHILS RELATIVE PERCENT: 0 % (ref 1–4)
HCT VFR BLD CALC: 30.9 % (ref 36.3–47.1)
HEMOGLOBIN: 9 G/DL (ref 11.9–15.1)
IMMATURE GRANULOCYTES: 1 %
LYMPHOCYTES # BLD: 13 % (ref 24–44)
MCH RBC QN AUTO: 24.3 PG (ref 25.2–33.5)
MCHC RBC AUTO-ENTMCNC: 29.1 G/DL (ref 28.4–34.8)
MCV RBC AUTO: 83.3 FL (ref 82.6–102.9)
MONOCYTES # BLD: 9 % (ref 1–7)
MORPHOLOGY: ABNORMAL
NRBC AUTOMATED: 0.4 PER 100 WBC
PDW BLD-RTO: 22.2 % (ref 11.8–14.4)
PLATELET # BLD: 242 K/UL (ref 138–453)
PLATELET ESTIMATE: ABNORMAL
PMV BLD AUTO: 10.7 FL (ref 8.1–13.5)
RBC # BLD: 3.71 M/UL (ref 3.95–5.11)
RBC # BLD: ABNORMAL 10*6/UL
SEG NEUTROPHILS: 77 % (ref 36–66)
SEGMENTED NEUTROPHILS ABSOLUTE COUNT: 5.85 K/UL (ref 1.8–7.7)
WBC # BLD: 7.6 K/UL (ref 3.5–11.3)
WBC # BLD: ABNORMAL 10*3/UL

## 2020-02-14 PROCEDURE — 2580000003 HC RX 258: Performed by: NURSE PRACTITIONER

## 2020-02-14 RX ORDER — 0.9 % SODIUM CHLORIDE 0.9 %
1000 INTRAVENOUS SOLUTION INTRAVENOUS ONCE
Status: COMPLETED | OUTPATIENT
Start: 2020-02-14 | End: 2020-02-14

## 2020-02-14 RX ORDER — GUAIFENESIN/DEXTROMETHORPHAN 100-10MG/5
5 SYRUP ORAL 3 TIMES DAILY PRN
Qty: 120 ML | Refills: 0 | Status: SHIPPED | OUTPATIENT
Start: 2020-02-14 | End: 2020-02-24

## 2020-02-14 RX ADMIN — SODIUM CHLORIDE 1000 ML: 9 INJECTION, SOLUTION INTRAVENOUS at 00:07

## 2020-02-14 NOTE — ED NOTES
Up in bed. Awaiting lab results for dispo at this time. Voices no further c/o or request at this time.       Robyn Knowles RN  02/13/20 5184

## 2020-02-14 NOTE — ED NOTES
C/o RUQ abdominal pain. States,\"I feel like I got kicked in the ribs, I've been off balance, and had a few stools with blood in them today. \"      Magaly Washington RN  02/13/20 5222

## 2020-02-14 NOTE — ED PROVIDER NOTES
[facility] ED  EMERGENCY DEPARTMENT ENCOUNTER      Pt Name: Raad Quiroz  MRN: [id number]  [place] 1960  Date of evaluation: 2/13/20  CHIEF COMPLAINT       Chief Complaint   Patient presents with    Abdominal Pain    Cough     HISTORY OF PRESENT ILLNESS   Presents to the emergency room via private auto with multiple complaints. Planes of cough, shortness of breath, abdominal pain, fatigue and difficulty concentrating. She had the same symptoms over a week ago and had hemoglobin of 5.2 and received 3 units of packed red blood cells. She had an EGD during that visit which she says was unremarkable. She was not able to tolerate the prep for the colonoscopy and has a follow-up appointment with the gastroenterologist.  She has not had any blood in the stool, hematuria or abnormal bruising. Her abdominal pain is constant and worse with coughing. She feels as if the abdominal pain is from the constant cough. The history is provided by the patient. REVIEW OF SYSTEMS     Review of Systems   Constitutional: Positive for activity change and fatigue. Negative for fever. HENT: Negative for congestion and sinus pressure. Eyes: Negative for photophobia. Respiratory: Positive for cough and shortness of breath. Negative for chest tightness. Cardiovascular: Negative for chest pain. Gastrointestinal: Positive for abdominal pain. Negative for constipation, diarrhea, nausea and vomiting. Genitourinary: Negative for difficulty urinating, dysuria and frequency. Musculoskeletal: Negative for back pain and myalgias. Neurological: Negative for dizziness, weakness, numbness and headaches. Psychiatric/Behavioral: Positive for decreased concentration. Negative for confusion.      PASTMEDICAL HISTORY     Past Medical History:   Diagnosis Date    Acid reflux     Adult ADHD 8/1/2012    Asthma 8/1/2012    B[facility]     Broken foot 2014    Left foot    Chest pain of uncertain VAGINAL DILATATION      WRIST SURGERY       CURRENT MEDICATIONS       Previous Medications    ALBUTEROL (PROVENTIL) (2.5 MG/3ML) 0.083% NEBULIZER SOLUTION    Take 2.5 mg by nebulization every 6 hours as needed for Wheezing or Shortness of Breath    ALENDRONATE (FOSAMAX) 70 MG TABLET    TAKE 1 TABLET BY MOUTH ONCE WEEKLY BEFORE BREAKFAST, ON AN EMPTY STOMACH: REMAIN UPRIGHT FOR 30 MINUTES    AMPHETAMINE-DEXTROAMPHETAMINE (ADDERALL) 30 MG TABLET    Take 1 tablet by mouth 2 times daily for 30 days. BENZONATATE (TESSALON) 100 MG CAPSULE    Take 1 capsule by mouth 3 times daily as needed for Cough    BUPROPION (WELLBUTRIN XL) 300 MG EXTENDED RELEASE TABLET    TAKE ONE TABLET BY MOUTH EVERY MORNING    BUSPIRONE (BUSPAR) 15 MG TABLET    TAKE ONE TABLET BY MOUTH THREE TIMES A DAY    CALCIUM CARBONATE (CALCIUM 600 PO)    Take 600 mg by mouth daily    CITALOPRAM (CELEXA) 40 MG TABLET    TAKE ONE TABLET BY MOUTH DAILY    CYCLOBENZAPRINE (FLEXERIL) 10 MG TABLET    TAKE ONE-HALF TO ONE TABLET BY MOUTH THREE TIMES A DAY AS NEEDED FOR MUSCLE SPASMS    FERROUS SULFATE 325 (65 FE) MG TABLET    Take 1 tablet by mouth daily (with breakfast)    MOMETASONE (ASMANEX HFA) 200 MCG/ACT AERO INHALER    Inhale 2 puffs into the lungs 2 times daily    OXYCODONE-ACETAMINOPHEN (PERCOCET) 5-325 MG PER TABLET    Take 1 tablet by mouth every 8 hours as needed for Pain for up to 30 days. PANTOPRAZOLE (PROTONIX) 40 MG TABLET    Take 1 tablet by mouth every morning (before breakfast)    PROPRANOLOL (INDERAL LA) 80 MG EXTENDED RELEASE CAPSULE    TAKE ONE CAPSULE BY MOUTH DAILY    RIVAROXABAN (XARELTO) 20 MG TABS TABLET    TAKE ONE TABLET BY MOUTH DAILY    VENTOLIN  (90 BASE) MCG/ACT INHALER    INHALE TWO PUFFS BY MOUTH EVERY 4 HOURS AS NEEDED FOR WHEEZING     ALLERGIES     is allergic to latex; prozac [fluoxetine hcl]; sulfa antibiotics; and codeine. FAMILY HISTORY     She indicated that the status of her mother is unknown.  She indicated CT, MRI, and formal ultrasound images (except ED bedside ultrasound) are read by the radiologist, see reports below, unless otherwise noted in MDM or here. Interpretation per the Radiologist below, if available at the time of this note:    XR CHEST STANDARD (2 VW)   Final Result   No acute cardiopulmonary process      Right hilar fullness may correspond to right hilar vasculature. LABS:  Labs Reviewed   CBC WITH AUTO DIFFERENTIAL - Abnormal; Notable for the following components:       Result Value    RBC 3.71 (*)     Hemoglobin 9.0 (*)     Hematocrit 30.9 (*)     MCH 24.3 (*)     RDW 22.2 (*)     NRBC Automated 0.4 (*)     All other components within normal limits   COMPREHENSIVE METABOLIC PANEL - Abnormal; Notable for the following components:    CREATININE 1.25 (*)     Calcium 8.5 (*)     Sodium 131 (*)     Chloride 96 (*)     AST 41 (*)     Total Bilirubin 0.23 (*)     GFR Non- 44 (*)     GFR  53 (*)     All other components within normal limits   URINALYSIS - Abnormal; Notable for the following components:    Urine Hgb 1+ (*)     Protein, UA TRACE (*)     All other components within normal limits   MICROSCOPIC URINALYSIS - Abnormal; Notable for the following components:    Bacteria, UA MANY (*)     Mucus, UA 1+ (*)     Amorphous, UA 1+ (*)     All other components within normal limits   LIPASE       All other labs were within normal range or not returned as of this dictation. EMERGENCY DEPARTMENT COURSE and DIFFERENTIAL DIAGNOSIS/MDM:   Vitals:    Vitals:    20 2150 20 2307   BP: (!) 134/106 (!) 140/70   Pulse: 66 56   Resp: 24 20   Temp: 99.5 °F (37.5 °C)    TempSrc: Oral    SpO2: 97% 96%   Weight: 147 lb (66.7 kg)    Height: 4' 11\" (1.499 m)        Medical Decision Makin-year-old female who is afebrile does not appear ill. No distress. Lungs are clear and oxygen saturation is 96% room air. Chest x-ray did not have any evidence of pneumonia. Hemoglobin is stable at 9.0 actually improved from her most recent of 8.2. Slight bump in her creatinine of 1.25 with a normal BUN of 17. She will be gently hydrated. Abdominal exam is benign. She had a CT scan seven 1 week ago did not have any acute findings. There was incidental finding of hiatal hernia. CT which was performed on the 10th, 3 days ago was reviewed. She had a large hiatal hernia. The stomach body and antrum were normal.  Esophagus was normal.  She had biopsies taken which are pending. I have any clinical concern for surgical abdomen. She is unable to tolerate codeine. She will be discharged home with Robitussin for the cough. She was educated on the importance of following up with both her primary care provider and the gastroenterologist.      ED Course as of Feb 14 0025   Thu Feb 13, 2020   2333 Hemoglobin Quant(!): 9.0 [JA]      ED Course User Index  [JA] Uriah Briggs MD           The patient was given the following meds in the ED:  Orders Placed This Encounter   Medications    ondansetron (ZOFRAN) injection 4 mg    morphine (PF) injection 2 mg    0.9 % sodium chloride bolus    guaiFENesin-dextromethorphan (ROBITUSSIN DM) 100-10 MG/5ML syrup     Sig: Take 5 mLs by mouth 3 times daily as needed for Cough     Dispense:  120 mL     Refill:  0       FINAL IMPRESSION      1. Generalized abdominal pain    2.  Cough          DISPOSITION/PLAN   DISPOSITION Discharge - Pending Orders Complete 02/14/2020 12:09:45 AM      PATIENT REFERRED TO:   Zabrina Pierre MD  [address] [facility] [address] [phone]      as scheduled or sooner if needed    Rizwana Francois MD  [address] [place] [address]  [phone]            DISCHARGE MEDICATIONS:     New Prescriptions    GUAIFENESIN-DEXTROMETHORPHAN (ROBITUSSIN DM) 100-10 MG/5ML SYRUP    Take 5 mLs by mouth 3 times daily as needed for Cough       CRITICAL CARE TIME       Please note that portions of this note were completed with a voice recognition program.      GUY PUENTES, APRN - CNP            Estephania Goetz, APRN - CNP  02/14/20 Tad Lopez., APRN - CNP  02/14/20 0028

## 2020-02-17 ENCOUNTER — CARE COORDINATION (OUTPATIENT)
Dept: CASE MANAGEMENT | Age: 60
End: 2020-02-17

## 2020-02-18 ENCOUNTER — CARE COORDINATION (OUTPATIENT)
Dept: CASE MANAGEMENT | Age: 60
End: 2020-02-18

## 2020-02-19 NOTE — CARE COORDINATION
[facility] Transitions Follow Up Call- 2nd attempt    2020    Patient: Fazal Wan  Patient : 1960   MRN: [id number]  Reason for Admission: GI hemorrhage, hgb 5.9, SOB, CP, abdominal pain     Discharge Date: 20 RARS: Readmission Risk Score: 16         Attempted to reach patient for subsequent transitional call.  left to return call to [phone].     Follow Up  Future Appointments   Date Time Provider Naya Roger   3/3/2020 11:30 AM MD KAMRYN Olson, RN

## 2020-02-20 RX ORDER — DEXTROAMPHETAMINE SACCHARATE, AMPHETAMINE ASPARTATE, DEXTROAMPHETAMINE SULFATE AND AMPHETAMINE SULFATE 7.5; 7.5; 7.5; 7.5 MG/1; MG/1; MG/1; MG/1
30 TABLET ORAL 2 TIMES DAILY
Qty: 60 TABLET | Refills: 0 | Status: SHIPPED | OUTPATIENT
Start: 2020-02-20 | End: 2020-03-17 | Stop reason: SDUPTHER

## 2020-02-20 RX ORDER — OXYCODONE HYDROCHLORIDE AND ACETAMINOPHEN 5; 325 MG/1; MG/1
1 TABLET ORAL EVERY 8 HOURS PRN
Qty: 90 TABLET | Refills: 0 | Status: SHIPPED | OUTPATIENT
Start: 2020-02-20 | End: 2020-03-17 | Stop reason: SDUPTHER

## 2020-02-21 ENCOUNTER — TELEPHONE (OUTPATIENT)
Dept: FAMILY MEDICINE CLINIC | Age: 60
End: 2020-02-21

## 2020-02-21 NOTE — TELEPHONE ENCOUNTER
LETY * PATIENT NOTICED MORE BLACK STOOL TODAY AND HAD PAIN IN BETWEEN RIBS, OFFERED APT BEFORE 3/3/2020 BUT SHE WILL KEEP HER APT THAT DAY

## 2020-02-24 ENCOUNTER — CARE COORDINATION (OUTPATIENT)
Dept: CASE MANAGEMENT | Age: 60
End: 2020-02-24

## 2020-02-24 RX ORDER — CYCLOBENZAPRINE HCL 10 MG
TABLET ORAL
Qty: 30 TABLET | Refills: 3 | Status: SHIPPED | OUTPATIENT
Start: 2020-02-24 | End: 2020-06-11

## 2020-02-24 RX ORDER — CITALOPRAM 40 MG/1
TABLET ORAL
Qty: 30 TABLET | Refills: 11 | Status: SHIPPED | OUTPATIENT
Start: 2020-02-24 | End: 2020-04-08 | Stop reason: SDUPTHER

## 2020-02-24 NOTE — TELEPHONE ENCOUNTER
Next Visit Date:  Future Appointments   Date Time Provider Naya Landryi   3/3/2020 11:30 AM Bethany Lal  [address] [facility] Maintenance   Topic Date Due    Shingles Vaccine (1 of 2) 01/05/2009    Cervical cancer screen  02/02/2019    Annual Wellness Visit (AWV)  05/29/2019    Flu vaccine (1) 10/01/2020 (Originally 9/1/2019)    Breast cancer screen  11/15/2020    Potassium monitoring  02/13/2021    Creatinine monitoring  02/13/2021    Lipid screen  11/15/2023    DTaP/Tdap/Td vaccine (2 - Td) 04/05/2029    Colon cancer screen colonoscopy  02/10/2030    Pneumococcal 0-64 years Vaccine  Completed    Hepatitis C screen  Completed    HIV screen  Completed    Hepatitis A vaccine  Aged Out    Hepatitis B vaccine  Aged Out    Hib vaccine  Aged Out    Meningococcal (ACWY) vaccine  Aged Out       No results found for: LABA1C          ( goal A1C is < 7)   No results found for: LABMICR  LDL Cholesterol (mg/dL)   Date Value   11/15/2018 111   04/27/2018 110       (goal LDL is <100)   AST (U/L)   Date Value   02/13/2020 41 (H)     ALT (U/L)   Date Value   02/13/2020 14     BUN (mg/dL)   Date Value   02/13/2020 17     BP Readings from Last 3 Encounters:   02/14/20 (!) 116/94   02/12/20 112/74   02/10/20 (!) 141/77          (goal 120/80)    All Future Testing planned in [facility]  Lab Frequency Next Occurrence   MRI LUMBAR SPINE WO CONTRAST Once 10/23/2019   POCT Fecal Immunochemical Test (FIT) Once 02/28/2020   CBC Auto Differential Once 05/00/3509   Basic Metabolic Panel Once 60/64/7503   Hemoglobin and Hematocrit, Blood, Post Transfusion     Hemoglobin and Hematocrit, Blood, Post Transfusion                 Patient Active Problem List:     Adult ADHD     Narcolepsy     Moderate persistent asthma without complication     History of DVT (deep vein thrombosis)     DVT of leg (deep venous thrombosis) (HCC)     Closed displaced fracture of body of calcaneus     Hiatal hernia     Internal hemorrhoids     Helicobacter pylori (H. 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

## 2020-02-24 NOTE — CARE COORDINATION
[facility] Transitions Follow Up Call- 3rd attempt (final call)     2020    Patient: Karie Moreno  Patient : 1960   MRN: [id number]  Reason for Admission: GI hemorrhage, hgb 5.9, SOB, CP, abdominal pain     Discharge Date: 20 RARS: Readmission Risk Score: 16         Attempted to reach patient for final transitional call. VM left to return call to [phone] or continue communication with providers.  Episode closed          Follow Up  Future Appointments   Date Time Provider Naya Roger   3/3/2020 11:30 AM MD KAMRYN Roberts, RN

## 2020-02-25 ENCOUNTER — CARE COORDINATION (OUTPATIENT)
Dept: FAMILY MEDICINE CLINIC | Age: 60
End: 2020-02-25

## 2020-02-25 SDOH — SOCIAL STABILITY: SOCIAL NETWORK: IN A TYPICAL WEEK, HOW MANY TIMES DO YOU TALK ON THE PHONE WITH FAMILY, FRIENDS, OR NEIGHBORS?: THREE TIMES A WEEK

## 2020-02-25 SDOH — ECONOMIC STABILITY: FOOD INSECURITY: WITHIN THE PAST 12 MONTHS, YOU WORRIED THAT YOUR FOOD WOULD RUN OUT BEFORE YOU GOT MONEY TO BUY MORE.: SOMETIMES TRUE

## 2020-02-25 SDOH — ECONOMIC STABILITY: INCOME INSECURITY: HOW HARD IS IT FOR YOU TO PAY FOR THE VERY BASICS LIKE FOOD, HOUSING, MEDICAL CARE, AND HEATING?: SOMEWHAT HARD

## 2020-02-25 SDOH — ECONOMIC STABILITY: TRANSPORTATION INSECURITY
IN THE PAST 12 MONTHS, HAS THE LACK OF TRANSPORTATION KEPT YOU FROM MEDICAL APPOINTMENTS OR FROM GETTING MEDICATIONS?: NO

## 2020-02-25 SDOH — ECONOMIC STABILITY: TRANSPORTATION INSECURITY
IN THE PAST 12 MONTHS, HAS LACK OF TRANSPORTATION KEPT YOU FROM MEETINGS, WORK, OR FROM GETTING THINGS NEEDED FOR DAILY LIVING?: NO

## 2020-02-25 SDOH — ECONOMIC STABILITY: FOOD INSECURITY: WITHIN THE PAST 12 MONTHS, THE FOOD YOU BOUGHT JUST DIDN'T LAST AND YOU DIDN'T HAVE MONEY TO GET MORE.: SOMETIMES TRUE

## 2020-02-25 SDOH — SOCIAL STABILITY: SOCIAL NETWORK: HOW OFTEN DO YOU GET TOGETHER WITH FRIENDS OR RELATIVES?: ONCE A WEEK

## 2020-02-25 SDOH — SOCIAL STABILITY: SOCIAL NETWORK
DO YOU BELONG TO ANY CLUBS OR ORGANIZATIONS SUCH AS CHURCH GROUPS UNIONS, FRATERNAL OR ATHLETIC GROUPS, OR SCHOOL GROUPS?: YES

## 2020-02-25 SDOH — HEALTH STABILITY: PHYSICAL HEALTH: ON AVERAGE, HOW MANY MINUTES DO YOU ENGAGE IN EXERCISE AT THIS LEVEL?: 0 MIN

## 2020-02-25 SDOH — SOCIAL STABILITY: SOCIAL NETWORK: HOW OFTEN DO YOU ATTEND CHURCH OR RELIGIOUS SERVICES?: 1 TO 4 TIMES PER YEAR

## 2020-02-25 SDOH — HEALTH STABILITY: PHYSICAL HEALTH: ON AVERAGE, HOW MANY DAYS PER WEEK DO YOU ENGAGE IN MODERATE TO STRENUOUS EXERCISE (LIKE A BRISK WALK)?: 0 DAYS

## 2020-02-25 SDOH — SOCIAL STABILITY: SOCIAL NETWORK: HOW OFTEN DO YOU ATTENT MEETINGS OF THE CLUB OR ORGANIZATION YOU BELONG TO?: 1 TO 4 TIMES PER YEAR

## 2020-02-25 SDOH — HEALTH STABILITY: MENTAL HEALTH
STRESS IS WHEN SOMEONE FEELS TENSE, NERVOUS, ANXIOUS, OR CAN'T SLEEP AT NIGHT BECAUSE THEIR MIND IS TROUBLED. HOW STRESSED ARE YOU?: TO SOME EXTENT

## 2020-02-25 SDOH — SOCIAL STABILITY: SOCIAL NETWORK: ARE YOU MARRIED, WIDOWED, DIVORCED, SEPARATED, NEVER MARRIED, OR LIVING WITH A PARTNER?: DIVORCED

## 2020-02-25 NOTE — CARE COORDINATION
well-being. Care Coordination Interventions    Program Enrollment:  Complex Care  Referral from Primary Care Provider:  No  Suggested Interventions and Community Resources  Behavorial Health:  Not Started  Home Health Services:  Declined  Meals on Wheels:  Declined (Comment: Had in past and did not care for)  Social Work:  Not Started  Zone Management Tools:   (Comment: No chronic diseases)         Goals Addressed                 This Visit's Progress     Conditions and Symptoms   Improving     I will schedule office visits, as directed by my provider. I will keep my appointment or reschedule if I have to cancel. I will notify my provider of any barriers to my plan of care. I will follow my Zone Management tool to seek urgent or emergent care. I will notify my provider of any symptoms that indicate a worsening of my condition. Barriers: overwhelmed by complexity of regimen  Plan for overcoming my barriers: Willing to work with ACM and PCP for recent GI bleed  Confidence: 9/10  Anticipated Goal Completion Date: 6/2020              Prior to Admission medications    Medication Sig Start Date End Date Taking? Authorizing Provider   citalopram (CELEXA) 40 MG tablet TAKE ONE TABLET BY MOUTH DAILY 2/24/20  Yes Ankit Walker MD   cyclobenzaprine (FLEXERIL) 10 MG tablet TAKE ONE-HALF TO ONE TABLET BY MOUTH THREE TIMES A DAY AS NEEDED FOR MUSCLE SPASMS 2/24/20  Yes Ankit Walker MD   amphetamine-dextroamphetamine (ADDERALL) 30 MG tablet Take 1 tablet by mouth 2 times daily for 30 days. 2/20/20 3/21/20 Yes Елена Bentley MD   oxyCODONE-acetaminophen (PERCOCET) 5-325 MG per tablet Take 1 tablet by mouth every 8 hours as needed for Pain for up to 30 days.  2/20/20 3/21/20 Yes Ankit Walker MD   ferrous sulfate 325 (65 Fe) MG tablet Take 1 tablet by mouth daily (with breakfast) 2/10/20  Yes William Barnett DO   albuterol (PROVENTIL) (2.5 MG/3ML) 0.083% nebulizer solution Take 2.5 mg by nebulization

## 2020-02-26 ENCOUNTER — TELEPHONE (OUTPATIENT)
Dept: GASTROENTEROLOGY | Age: 60
End: 2020-02-26

## 2020-02-26 ENCOUNTER — TELEPHONE (OUTPATIENT)
Dept: FAMILY MEDICINE CLINIC | Age: 60
End: 2020-02-26

## 2020-02-26 NOTE — TELEPHONE ENCOUNTER
New to CC program, recent GI bleed, CTC hand off. 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 Does have some financial strain, and potential food needs, but most concerning is home needs, her mother currently has [address], but with [place] health issues, would probably benefit from [address], she attempted to apply before, but never followed through.  Thank you

## 2020-02-26 NOTE — TELEPHONE ENCOUNTER
call from pt who had [facility] transitional nurse, Sandra Meneses, in her her home and was asking if Jose Montejo should have more labs taken before her 3/3/20 appt here. She still has dark colored blood in her stool. She has not made a f/u appt with GI who she saw in the hosp yet.

## 2020-02-27 ENCOUNTER — CARE COORDINATION (OUTPATIENT)
Dept: CARE COORDINATION | Age: 60
End: 2020-02-27

## 2020-03-02 ENCOUNTER — HOSPITAL ENCOUNTER (OUTPATIENT)
Age: 60
Discharge: HOME | End: 2020-03-02
Payer: MEDICARE

## 2020-03-02 LAB
ABSOLUTE EOS #: 0.05 K/UL (ref 0–0.4)
ABSOLUTE IMMATURE GRANULOCYTE: 0 K/UL (ref 0–0.3)
ABSOLUTE LYMPH #: 1.8 K/UL (ref 1–4.8)
ABSOLUTE MONO #: 0.32 K/UL (ref 0.1–0.8)
ANION GAP SERPL CALCULATED.3IONS-SCNC: 11 MMOL/L (ref 9–17)
BASOPHILS # BLD: 0 % (ref 0–2)
BASOPHILS ABSOLUTE: 0 K/UL (ref 0–0.2)
BUN BLDV-MCNC: 12 MG/DL (ref 8–23)
BUN/CREAT BLD: ABNORMAL (ref 9–20)
CALCIUM SERPL-MCNC: 9.3 MG/DL (ref 8.6–10.4)
CHLORIDE BLD-SCNC: 103 MMOL/L (ref 98–107)
CO2: 25 MMOL/L (ref 20–31)
CREAT SERPL-MCNC: 1.19 MG/DL (ref 0.5–0.9)
DIFFERENTIAL TYPE: ABNORMAL
EOSINOPHILS RELATIVE PERCENT: 1 % (ref 1–4)
GFR AFRICAN AMERICAN: 56 ML/MIN
GFR NON-AFRICAN AMERICAN: 46 ML/MIN
GFR SERPL CREATININE-BSD FRML MDRD: ABNORMAL ML/MIN/{1.73_M2}
GFR SERPL CREATININE-BSD FRML MDRD: ABNORMAL ML/MIN/{1.73_M2}
GLUCOSE BLD-MCNC: 98 MG/DL (ref 70–99)
HCT VFR BLD CALC: 38.7 % (ref 36.3–47.1)
HEMOGLOBIN: 11 G/DL (ref 11.9–15.1)
IMMATURE GRANULOCYTES: 0 %
LYMPHOCYTES # BLD: 34 % (ref 24–44)
MCH RBC QN AUTO: 25.7 PG (ref 25.2–33.5)
MCHC RBC AUTO-ENTMCNC: 28.4 G/DL (ref 28.4–34.8)
MCV RBC AUTO: 90.4 FL (ref 82.6–102.9)
MONOCYTES # BLD: 6 % (ref 1–7)
MORPHOLOGY: ABNORMAL
MORPHOLOGY: ABNORMAL
NRBC AUTOMATED: 0 PER 100 WBC
PDW BLD-RTO: 23.9 % (ref 11.8–14.4)
PLATELET # BLD: 392 K/UL (ref 138–453)
PLATELET ESTIMATE: ABNORMAL
PMV BLD AUTO: 10.6 FL (ref 8.1–13.5)
POTASSIUM SERPL-SCNC: 3.8 MMOL/L (ref 3.7–5.3)
RBC # BLD: 4.28 M/UL (ref 3.95–5.11)
RBC # BLD: ABNORMAL 10*6/UL
SEG NEUTROPHILS: 59 % (ref 36–66)
SEGMENTED NEUTROPHILS ABSOLUTE COUNT: 3.13 K/UL (ref 1.8–7.7)
SODIUM BLD-SCNC: 139 MMOL/L (ref 135–144)
WBC # BLD: 5.3 K/UL (ref 3.5–11.3)
WBC # BLD: ABNORMAL 10*3/UL

## 2020-03-02 PROCEDURE — 36415 COLL VENOUS BLD VENIPUNCTURE: CPT

## 2020-03-02 PROCEDURE — 80048 BASIC METABOLIC PNL TOTAL CA: CPT

## 2020-03-02 PROCEDURE — 85025 COMPLETE CBC W/AUTO DIFF WBC: CPT

## 2020-03-03 ENCOUNTER — CARE COORDINATION (OUTPATIENT)
Dept: FAMILY MEDICINE CLINIC | Age: 60
End: 2020-03-03

## 2020-03-03 ENCOUNTER — OFFICE VISIT (OUTPATIENT)
Dept: FAMILY MEDICINE CLINIC | Age: 60
End: 2020-03-03
Payer: MEDICARE

## 2020-03-03 VITALS
OXYGEN SATURATION: 97 % | BODY MASS INDEX: 29.93 KG/M2 | DIASTOLIC BLOOD PRESSURE: 70 MMHG | HEART RATE: 68 BPM | WEIGHT: 148.2 LBS | SYSTOLIC BLOOD PRESSURE: 124 MMHG

## 2020-03-03 PROCEDURE — 99213 OFFICE O/P EST LOW 20 MIN: CPT | Performed by: FAMILY MEDICINE

## 2020-03-03 ASSESSMENT — ENCOUNTER SYMPTOMS
SHORTNESS OF BREATH: 0
VOMITING: 0
NAUSEA: 0
COUGH: 0
BACK PAIN: 1
DIARRHEA: 0
ABDOMINAL PAIN: 1
SINUS PRESSURE: 0
SORE THROAT: 0

## 2020-03-03 NOTE — PROGRESS NOTES
TISSUE MASS EXCISION WITH BONE BIOPSY LEFT ANKLE FIBULA performed by Gardenia Fernandes DPM at [facility] Left     UPPER GASTROINTESTINAL ENDOSCOPY  2013    tertiary contractures esophagus, 3 to 4 cm hiatal hernia, antral gastritis, + H Pylori, mild active chronic esophagitis    UPPER GASTROINTESTINAL ENDOSCOPY  2016    one biopsy    UPPER GASTROINTESTINAL ENDOSCOPY N/A 2/10/2020    EGD BIOPSY performed by Tamar Joyce MD at [facility]       Family History   Problem Relation Age of Onset    Cancer Mother     Hypertension Mother     Stroke Mother     Cancer Maternal Aunt     Hypertension Father     Heart Surgery Father     Diabetes Neg Hx      Social History     Tobacco Use    Smoking status: Former Smoker     Packs/day: 1.00     Years: 20.00     Pack years: 20.00     Last attempt to quit:      Years since quittin.1    Smokeless tobacco: Never Used   Substance Use Topics    Alcohol use: Yes     Comment: rare      Current Outpatient Medications   Medication Sig Dispense Refill    citalopram (CELEXA) 40 MG tablet TAKE ONE TABLET BY MOUTH DAILY 30 tablet 11    cyclobenzaprine (FLEXERIL) 10 MG tablet TAKE ONE-HALF TO ONE TABLET BY MOUTH THREE TIMES A DAY AS NEEDED FOR MUSCLE SPASMS 30 tablet 3    amphetamine-dextroamphetamine (ADDERALL) 30 MG tablet Take 1 tablet by mouth 2 times daily for 30 days. 60 tablet 0    oxyCODONE-acetaminophen (PERCOCET) 5-325 MG per tablet Take 1 tablet by mouth every 8 hours as needed for Pain for up to 30 days.  90 tablet 0    ferrous sulfate 325 (65 Fe) MG tablet Take 1 tablet by mouth daily (with breakfast) 30 tablet 5    albuterol (PROVENTIL) (2.5 MG/3ML) 0.083% nebulizer solution Take 2.5 mg by nebulization every 6 hours as needed for Wheezing or Shortness of Breath      pantoprazole (PROTONIX) 40 MG tablet Take 1 tablet by mouth every morning (before breakfast) 90 tablet 1    mometasone :   /70   Pulse 68   Wt 148 lb 3.2 oz (67.2 kg)   SpO2 97%   BMI 29.93 kg/m²     Physical Exam  Constitutional:       General: She is not in acute distress. Appearance: She is well-developed. She is not diaphoretic. HENT:      Head: Normocephalic and atraumatic. Mouth/Throat:      Pharynx: No oropharyngeal exudate. Eyes:      General: No scleral icterus. Neck:      Musculoskeletal: Neck supple. Vascular: No carotid bruit. Cardiovascular:      Heart sounds: No murmur. No friction rub. No gallop. Pulmonary:      Effort: No respiratory distress. Breath sounds: No wheezing or rales. Chest:      Chest wall: No tenderness. Lymphadenopathy:      Cervical: No cervical adenopathy. Skin:     Findings: No rash. Neurological:      Mental Status: She is alert and oriented to person, place, and time. Cranial Nerves: No cranial nerve deficit. Psychiatric:         Behavior: Behavior normal.         Thought Content: Thought content normal.         Judgment: Judgment normal.         Assessment:       Diagnosis Orders   1. Essential hypertension  CBC Auto Differential    Comprehensive Metabolic Panel   2. Blood loss anemia  CBC Auto Differential    Comprehensive Metabolic Panel         Plan:      Return in about 4 weeks (around 3/31/2020). Orders Placed This Encounter   Procedures    CBC Auto Differential     Standing Status:   Future     Standing Expiration Date:   3/3/2021    Comprehensive Metabolic Panel     Standing Status:   Future     Standing Expiration Date:   3/3/2021     No orders of the defined types were placed in this encounter. Hgb has rebounded back to normal.  Stop iron supplements and will recheck in one month. Pain contract signed with me. Needs ongoing for chronic management of pain reviewed. Above labs a few days prior to one month follow up.

## 2020-03-03 NOTE — CARE COORDINATION
(PERCOCET) 5-325 MG per tablet Take 1 tablet by mouth every 8 hours as needed for Pain for up to 30 days.  2/20/20 3/21/20  Sharyle Nanny, MD   ferrous sulfate 325 (65 Fe) MG tablet Take 1 tablet by mouth daily (with breakfast) 2/10/20   Michelle Barnett DO   albuterol (PROVENTIL) (2.5 MG/3ML) 0.083% nebulizer solution Take 2.5 mg by nebulization every 6 hours as needed for Wheezing or Shortness of Breath    Historical Provider, MD   pantoprazole (PROTONIX) 40 MG tablet Take 1 tablet by mouth every morning (before breakfast) 2/7/20   Liberty Walker MD   mometasone (ASMANEX HFA) 200 MCG/ACT AERO inhaler Inhale 2 puffs into the lungs 2 times daily 1/29/20   Ankit Walker MD   rivaroxaban (XARELTO) 20 MG TABS tablet TAKE ONE TABLET BY MOUTH DAILY 1/20/20   Desmond Burnett MD   propranolol (INDERAL LA) 80 MG extended release capsule TAKE ONE CAPSULE BY MOUTH DAILY 11/21/19   PIETER Salazar CNP   alendronate (FOSAMAX) 70 MG tablet TAKE 1 TABLET BY MOUTH ONCE WEEKLY BEFORE BREAKFAST, ON AN EMPTY STOMACH: REMAIN UPRIGHT FOR 30 MINUTES  Patient taking differently: TAKE 1 TABLET BY MOUTH ONCE WEEKLY BEFORE BREAKFAST, ON AN EMPTY STOMACH: REMAIN UPRIGHT FOR 30 MINUTES, Takes on Fridays 10/23/19   PIETER Salazar CNP   buPROPion (WELLBUTRIN XL) 300 MG extended release tablet TAKE ONE TABLET BY MOUTH EVERY MORNING 10/1/19   Barby Crockett DO   busPIRone (BUSPAR) 15 MG tablet TAKE ONE TABLET BY MOUTH THREE TIMES A DAY 9/13/19   Barby Crockett DO   VENTOLIN  (90 Base) MCG/ACT inhaler INHALE TWO PUFFS BY MOUTH EVERY 4 HOURS AS NEEDED FOR WHEEZING 8/13/18   Shadi Crockett DO   Calcium Carbonate (CALCIUM 600 PO) Take 600 mg by mouth daily    Historical Provider, MD       Future Appointments   Date Time Provider Naya Roger   3/31/2020  1:15 PM Sharyle Nanny, MD [facility]   5/5/2020  3:30 PM Myra Mercado MD [facility]      and   General Assessment

## 2020-03-10 ENCOUNTER — CARE COORDINATION (OUTPATIENT)
Dept: CARE COORDINATION | Age: 60
End: 2020-03-10

## 2020-03-17 ENCOUNTER — CARE COORDINATION (OUTPATIENT)
Dept: FAMILY MEDICINE CLINIC | Age: 60
End: 2020-03-17

## 2020-03-17 ENCOUNTER — CARE COORDINATION (OUTPATIENT)
Dept: CARE COORDINATION | Age: 60
End: 2020-03-17

## 2020-03-17 RX ORDER — OXYCODONE HYDROCHLORIDE AND ACETAMINOPHEN 5; 325 MG/1; MG/1
1 TABLET ORAL EVERY 8 HOURS PRN
Qty: 90 TABLET | Refills: 0 | Status: SHIPPED | OUTPATIENT
Start: 2020-03-17 | End: 2020-04-15 | Stop reason: SDUPTHER

## 2020-03-17 RX ORDER — DEXTROAMPHETAMINE SACCHARATE, AMPHETAMINE ASPARTATE, DEXTROAMPHETAMINE SULFATE AND AMPHETAMINE SULFATE 7.5; 7.5; 7.5; 7.5 MG/1; MG/1; MG/1; MG/1
30 TABLET ORAL 2 TIMES DAILY
Qty: 60 TABLET | Refills: 0 | Status: SHIPPED | OUTPATIENT
Start: 2020-03-17 | End: 2020-04-15 | Stop reason: SDUPTHER

## 2020-03-17 NOTE — TELEPHONE ENCOUNTER
Pt cld requests refills for oxycodone 5-325 mg and adderall 30 mg be sent to [address] [place] [address]#[phone] [facility]#[phone]    Last ov-3/3/20  Next ov-3/31/20

## 2020-03-17 NOTE — CARE COORDINATION
Call to pt, LVM to return SW call. LVM for AOoA regarding referral for pt mom.   Paula Daugherty MSW, [facility]   [phone]

## 2020-03-27 ENCOUNTER — CARE COORDINATION (OUTPATIENT)
Dept: CARE COORDINATION | Age: 60
End: 2020-03-27

## 2020-04-02 ENCOUNTER — CARE COORDINATION (OUTPATIENT)
Dept: FAMILY MEDICINE CLINIC | Age: 60
End: 2020-04-02

## 2020-04-06 RX ORDER — PROPRANOLOL HYDROCHLORIDE 80 MG/1
CAPSULE, EXTENDED RELEASE ORAL
Qty: 30 CAPSULE | Refills: 0 | Status: SHIPPED | OUTPATIENT
Start: 2020-04-06 | End: 2020-04-08 | Stop reason: SDUPTHER

## 2020-04-06 RX ORDER — BUSPIRONE HYDROCHLORIDE 15 MG/1
15 TABLET ORAL 3 TIMES DAILY
Qty: 90 TABLET | Refills: 3 | Status: SHIPPED | OUTPATIENT
Start: 2020-04-06 | End: 2021-01-07

## 2020-04-06 NOTE — TELEPHONE ENCOUNTER
Next Visit Date:  Future Appointments   Date Time Provider Naya Roger   5/5/2020  3:30 PM Donna Nagel MD [place] Maintenance   Topic Date Due    Shingles Vaccine (1 of 2) 01/05/2009    Cervical cancer screen  02/02/2019    Annual Wellness Visit (AWV)  05/29/2019    Flu vaccine (Season Ended) 10/01/2020 (Originally 9/1/2020)    Breast cancer screen  11/15/2020    Potassium monitoring  03/02/2021    Creatinine monitoring  03/02/2021    Lipid screen  11/15/2023    DTaP/Tdap/Td vaccine (2 - Td) 04/05/2029    Colon cancer screen colonoscopy  02/10/2030    Pneumococcal 0-64 years Vaccine  Completed    Hepatitis C screen  Completed    HIV screen  Completed    Hepatitis A vaccine  Aged Out    Hepatitis B vaccine  Aged Out    Hib vaccine  Aged Out    Meningococcal (ACWY) vaccine  Aged Out       No results found for: LABA1C          ( goal A1C is < 7)   No results found for: LABMICR  LDL Cholesterol (mg/dL)   Date Value   11/15/2018 111   04/27/2018 110       (goal LDL is <100)   AST (U/L)   Date Value   02/13/2020 41 (H)     ALT (U/L)   Date Value   02/13/2020 14     BUN (mg/dL)   Date Value   03/02/2020 12     BP Readings from Last 3 Encounters:   03/03/20 124/70   02/14/20 (!) 116/94   02/12/20 112/74          (goal 120/80)    All Future Testing planned in [facility]  Lab Frequency Next Occurrence   MRI LUMBAR SPINE WO CONTRAST Once 10/23/2019   POCT Fecal Immunochemical Test (FIT) Once 02/28/2020   CBC Auto Differential Once 30/44/0689   Basic Metabolic Panel Once 96/87/2715   CBC Auto Differential Once 04/01/2020   Comprehensive Metabolic Panel Once 63/20/5734   Hemoglobin and Hematocrit, Blood, Post Transfusion POST TRANSFUSION    Hemoglobin and Hematocrit, Blood, Post Transfusion POST TRANSFUSION                Patient Active Problem List:     Adult ADHD     Narcolepsy     Moderate persistent asthma without complication     History of DVT (deep vein thrombosis)

## 2020-04-07 ENCOUNTER — TELEPHONE (OUTPATIENT)
Dept: FAMILY MEDICINE CLINIC | Age: 60
End: 2020-04-07

## 2020-04-07 RX ORDER — MINOCYCLINE HYDROCHLORIDE 50 MG/1
50 TABLET ORAL 2 TIMES DAILY
Qty: 60 TABLET | Refills: 0 | Status: SHIPPED | OUTPATIENT
Start: 2020-04-07 | End: 2020-05-07

## 2020-04-08 RX ORDER — PROPRANOLOL HYDROCHLORIDE 80 MG/1
CAPSULE, EXTENDED RELEASE ORAL
Qty: 90 CAPSULE | Refills: 0 | Status: SHIPPED | OUTPATIENT
Start: 2020-04-08 | End: 2020-10-19

## 2020-04-08 RX ORDER — CITALOPRAM 40 MG/1
TABLET ORAL
Qty: 90 TABLET | Refills: 3 | Status: SHIPPED | OUTPATIENT
Start: 2020-04-08

## 2020-04-08 RX ORDER — MOMETASONE FUROATE 200 UG/1
2 AEROSOL RESPIRATORY (INHALATION) 2 TIMES DAILY
Qty: 3 INHALER | Refills: 3 | Status: SHIPPED | OUTPATIENT
Start: 2020-04-08 | End: 2020-11-18

## 2020-04-08 NOTE — TELEPHONE ENCOUNTER
DVT of leg (deep venous thrombosis) (HCC)     Closed displaced fracture of body of calcaneus     Hiatal hernia     Internal hemorrhoids     Helicobacter pylori (H. 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

## 2020-04-15 RX ORDER — DEXTROAMPHETAMINE SACCHARATE, AMPHETAMINE ASPARTATE, DEXTROAMPHETAMINE SULFATE AND AMPHETAMINE SULFATE 7.5; 7.5; 7.5; 7.5 MG/1; MG/1; MG/1; MG/1
30 TABLET ORAL 2 TIMES DAILY
Qty: 60 TABLET | Refills: 0 | Status: SHIPPED | OUTPATIENT
Start: 2020-04-15 | End: 2020-05-12 | Stop reason: SDUPTHER

## 2020-04-15 RX ORDER — OXYCODONE HYDROCHLORIDE AND ACETAMINOPHEN 5; 325 MG/1; MG/1
1 TABLET ORAL EVERY 8 HOURS PRN
Qty: 90 TABLET | Refills: 0 | Status: SHIPPED | OUTPATIENT
Start: 2020-04-15 | End: 2020-05-12 | Stop reason: SDUPTHER

## 2020-04-16 ENCOUNTER — CARE COORDINATION (OUTPATIENT)
Dept: FAMILY MEDICINE CLINIC | Age: 60
End: 2020-04-16

## 2020-04-16 NOTE — CARE COORDINATION
Ambulatory Care Coordination Note  4/16/2020  CM Risk Score: 5  Charlson 10 Year Mortality Risk Score: 23%     ACC: Angelo Agosto RN    Summary Note: Continues with dynacin for reaction acne to face, improving. No other concerns at this time    COVID 19 precautions reviewed. ACM Plan    Will follow up with Claudetta Ing in a few weeks for updates with well-being. Care Coordination Interventions    Program Enrollment:  Complex Care  Referral from Primary Care Provider:  No  Suggested Interventions and Community Resources  Behavorial Health:  Not Started  Home Health Services:  Declined  Meals on Wheels:  Declined (Comment: Had in past and did not care for)  Social Work:  Not Started  Zone Management Tools:   (Comment: No chronic diseases)         Goals Addressed                 This Visit's Progress     Conditions and Symptoms   On track     I will schedule office visits, as directed by my provider. I will keep my appointment or reschedule if I have to cancel. I will notify my provider of any barriers to my plan of care. I will follow my Zone Management tool to seek urgent or emergent care. I will notify my provider of any symptoms that indicate a worsening of my condition. Barriers: overwhelmed by complexity of regimen  Plan for overcoming my barriers: Willing to work with ACM and PCP for recent GI bleed  Confidence: 9/10  Anticipated Goal Completion Date: 6/2020              Prior to Admission medications    Medication Sig Start Date End Date Taking? Authorizing Provider   oxyCODONE-acetaminophen (PERCOCET) 5-325 MG per tablet Take 1 tablet by mouth every 8 hours as needed for Pain for up to 30 days. 4/15/20 5/15/20  Ankit Walker MD   amphetamine-dextroamphetamine (ADDERALL) 30 MG tablet Take 1 tablet by mouth 2 times daily for 30 days.  4/15/20 5/15/20  Chris Zapien MD   citalopram (CELEXA) 40 MG tablet TAKE ONE TABLET BY MOUTH DAILY 4/8/20   Chris Zapien MD   rivaroxaban ([place]) 20 you have any symptoms that are causing concern?:  Yes  Progression since Onset:  Gradually Improving  Reported Symptoms:  Other (Comment: Acne/rash on face)

## 2020-04-21 ENCOUNTER — OFFICE VISIT (OUTPATIENT)
Dept: PODIATRY | Age: 60
End: 2020-04-21
Payer: MEDICARE

## 2020-04-21 VITALS — BODY MASS INDEX: 29.84 KG/M2 | HEIGHT: 59 IN | RESPIRATION RATE: 16 BRPM | WEIGHT: 148 LBS | TEMPERATURE: 97.8 F

## 2020-04-21 PROCEDURE — 99213 OFFICE O/P EST LOW 20 MIN: CPT | Performed by: PODIATRIST

## 2020-04-21 RX ORDER — DOXYCYCLINE HYCLATE 100 MG
100 TABLET ORAL 2 TIMES DAILY
Qty: 28 TABLET | Refills: 0 | Status: SHIPPED | OUTPATIENT
Start: 2020-04-21 | End: 2020-05-05

## 2020-04-23 RX ORDER — ALBUTEROL SULFATE 90 UG/1
AEROSOL, METERED RESPIRATORY (INHALATION)
Qty: 1 INHALER | Refills: 5 | Status: SHIPPED | OUTPATIENT
Start: 2020-04-23 | End: 2020-10-28 | Stop reason: SDUPTHER

## 2020-04-23 NOTE — TELEPHONE ENCOUNTER
without complication     History of DVT (deep vein thrombosis)     DVT of leg (deep venous thrombosis) (HCC)     Closed displaced fracture of body of calcaneus     Hiatal hernia     Internal hemorrhoids     Helicobacter pylori (H. 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

## 2020-04-24 ENCOUNTER — CARE COORDINATION (OUTPATIENT)
Dept: CARE COORDINATION | Age: 60
End: 2020-04-24

## 2020-04-30 ENCOUNTER — TELEPHONE (OUTPATIENT)
Dept: GASTROENTEROLOGY | Age: 60
End: 2020-04-30

## 2020-04-30 ENCOUNTER — HOSPITAL ENCOUNTER (OUTPATIENT)
Age: 60
Discharge: HOME | End: 2020-04-30
Payer: MEDICARE

## 2020-04-30 ENCOUNTER — OFFICE VISIT (OUTPATIENT)
Dept: PODIATRY | Age: 60
End: 2020-04-30
Payer: MEDICARE

## 2020-04-30 ENCOUNTER — CARE COORDINATION (OUTPATIENT)
Dept: FAMILY MEDICINE CLINIC | Age: 60
End: 2020-04-30

## 2020-04-30 VITALS — WEIGHT: 146 LBS | HEIGHT: 60 IN | TEMPERATURE: 97.8 F | RESPIRATION RATE: 18 BRPM | BODY MASS INDEX: 28.66 KG/M2

## 2020-04-30 LAB
ABSOLUTE EOS #: 0.23 K/UL (ref 0–0.44)
ABSOLUTE IMMATURE GRANULOCYTE: <0.03 K/UL (ref 0–0.3)
ABSOLUTE LYMPH #: 1.56 K/UL (ref 1.1–3.7)
ABSOLUTE MONO #: 0.62 K/UL (ref 0.1–1.2)
ALBUMIN SERPL-MCNC: 3.5 G/DL (ref 3.5–5.2)
ALBUMIN/GLOBULIN RATIO: 1.3 (ref 1–2.5)
ALP BLD-CCNC: 110 U/L (ref 35–104)
ALT SERPL-CCNC: 12 U/L (ref 5–33)
ANION GAP SERPL CALCULATED.3IONS-SCNC: 15 MMOL/L (ref 9–17)
AST SERPL-CCNC: 19 U/L
BASOPHILS # BLD: 1 % (ref 0–2)
BASOPHILS ABSOLUTE: 0.05 K/UL (ref 0–0.2)
BILIRUB SERPL-MCNC: <0.1 MG/DL (ref 0.3–1.2)
BUN BLDV-MCNC: 27 MG/DL (ref 8–23)
BUN/CREAT BLD: ABNORMAL (ref 9–20)
CALCIUM SERPL-MCNC: 9.1 MG/DL (ref 8.6–10.4)
CHLORIDE BLD-SCNC: 107 MMOL/L (ref 98–107)
CO2: 21 MMOL/L (ref 20–31)
CREAT SERPL-MCNC: 1 MG/DL (ref 0.5–0.9)
DIFFERENTIAL TYPE: ABNORMAL
EOSINOPHILS RELATIVE PERCENT: 4 % (ref 1–4)
GFR AFRICAN AMERICAN: >60 ML/MIN
GFR NON-AFRICAN AMERICAN: 56 ML/MIN
GFR SERPL CREATININE-BSD FRML MDRD: ABNORMAL ML/MIN/{1.73_M2}
GFR SERPL CREATININE-BSD FRML MDRD: ABNORMAL ML/MIN/{1.73_M2}
GLUCOSE BLD-MCNC: 99 MG/DL (ref 70–99)
HCT VFR BLD CALC: 30.2 % (ref 36.3–47.1)
HEMOGLOBIN: 9 G/DL (ref 11.9–15.1)
IMMATURE GRANULOCYTES: 0 %
LYMPHOCYTES # BLD: 26 % (ref 24–43)
MCH RBC QN AUTO: 27.2 PG (ref 25.2–33.5)
MCHC RBC AUTO-ENTMCNC: 29.8 G/DL (ref 28.4–34.8)
MCV RBC AUTO: 91.2 FL (ref 82.6–102.9)
MONOCYTES # BLD: 10 % (ref 3–12)
NRBC AUTOMATED: 0 PER 100 WBC
PDW BLD-RTO: 17.1 % (ref 11.8–14.4)
PLATELET # BLD: 431 K/UL (ref 138–453)
PLATELET ESTIMATE: ABNORMAL
PMV BLD AUTO: 9.9 FL (ref 8.1–13.5)
POTASSIUM SERPL-SCNC: 4.2 MMOL/L (ref 3.7–5.3)
RBC # BLD: 3.31 M/UL (ref 3.95–5.11)
RBC # BLD: ABNORMAL 10*6/UL
SEG NEUTROPHILS: 59 % (ref 36–65)
SEGMENTED NEUTROPHILS ABSOLUTE COUNT: 3.52 K/UL (ref 1.5–8.1)
SODIUM BLD-SCNC: 143 MMOL/L (ref 135–144)
TOTAL PROTEIN: 6.3 G/DL (ref 6.4–8.3)
WBC # BLD: 6 K/UL (ref 3.5–11.3)
WBC # BLD: ABNORMAL 10*3/UL

## 2020-04-30 PROCEDURE — 85025 COMPLETE CBC W/AUTO DIFF WBC: CPT

## 2020-04-30 PROCEDURE — 36415 COLL VENOUS BLD VENIPUNCTURE: CPT

## 2020-04-30 PROCEDURE — 99213 OFFICE O/P EST LOW 20 MIN: CPT | Performed by: PODIATRIST

## 2020-04-30 PROCEDURE — 80053 COMPREHEN METABOLIC PANEL: CPT

## 2020-04-30 RX ORDER — METHYLPREDNISOLONE 4 MG/1
TABLET ORAL
Qty: 1 KIT | Refills: 0 | Status: SHIPPED | OUTPATIENT
Start: 2020-04-30 | End: 2020-05-06

## 2020-04-30 NOTE — PROGRESS NOTES
[facility] PHYSICIANS  [facility]  [address] [place] [address]  Dept: [phone]  Dept Fax: [phone]    RETURN PATIENT PROGRESS NOTE  Date of patient's visit: 4/30/2020  Patient's Name:  Cherie Goel YOB: 1960            Patient Care Team:  Neftali Santana MD as PCP - General (Family Medicine)  Neftali Santana MD as PCP - [facility]  Lady Zoya MD as Consulting Physician (Gastroenterology)  Maria Isabel Rose MD as Surgeon (Orthopedic Surgery)  Corrine Romberg as Surgeon (Podiatry)  Alejandro Jesus DPM as Physician (Podiatry)  Tosha Padilla RN as 2 [facility] [place] 63 y.o. female that presents for follow-up of   Chief Complaint   Patient presents with    Foot Pain    Follow-up     Pt's primary care physician is Neftali Santana MD last seen 03/30/2020  Symptoms began 6 day(s) ago and are unchanged . Patient relates pain is Present. Pain is rated 5 out of 10 and is described as constant, moderate. Treatments prior to today's visit include: none. Currently denies F/C/N/V. Allergies   Allergen Reactions    Latex Itching    Prozac [Fluoxetine Hcl]     Sulfa Antibiotics      migraine    Codeine Itching       Past Medical History:   Diagnosis Date    Acid reflux     Adult ADHD 8/1/2012    Asthma 8/1/2012    Bipolar 1 disorder ([address]     Broken foot 2014    Left foot    Chest pain of uncertain etiology 31/17/9182    Last episode was in 2013 (Written 02/12/2019)    Depression     Under control per pt.  on 6/15/18    DVT of leg (deep venous thrombosis) ([address] 8/22/2014    Fracture     right foot / pt denies surgical intervention    Helicobacter pylori (H. pylori) infection 2013    per EGD    Hematuria 04/2018    Has a follow-up with Urologist 6/20    Hiatal hernia     History of DVT (deep vein thrombosis) 8/1/2012    Hypertension     Internal hemorrhoids     Narcolepsy 8/1/2012   

## 2020-05-05 ENCOUNTER — VIRTUAL VISIT (OUTPATIENT)
Dept: GASTROENTEROLOGY | Age: 60
End: 2020-05-05
Payer: MEDICARE

## 2020-05-05 PROCEDURE — 99442 PR PHYS/QHP TELEPHONE EVALUATION 11-20 MIN: CPT | Performed by: INTERNAL MEDICINE

## 2020-05-05 RX ORDER — LANOLIN ALCOHOL/MO/W.PET/CERES
325 CREAM (GRAM) TOPICAL 2 TIMES DAILY
Qty: 90 TABLET | Refills: 3 | Status: ON HOLD | OUTPATIENT
Start: 2020-05-05 | End: 2020-10-30 | Stop reason: SDUPTHER

## 2020-05-05 NOTE — PROGRESS NOTES
to use it    Possible side effects including darker colored stools were explained to her    We will reschedule her for colonoscopy  The Endoscopic procedure was explained to the patient in detail  The prep and NPO were explained  All the Risks, Benefits, and Alternatives were explained  Risk of Bleeding, Perforation and Cardio Respiratory risks were explained  her questions were answered  The procedure will be scheduled with the  in the office  Patient was asked to give us a call for any questions  The patient has verbalized understanding and agreement to this plan. She was advised to quit drinking soda pop completely    Pt was discussed in detail about the possible side effects of proton pump inhibiter therapy. She was explained about the possibility of calcium and magnesium malabsorption and was advised to start taking calcium supplements with Vit D. Some over the counter regimens were explained to patient. Some dietary advices were also given. She has verbalized understanding and agreement to this. Repeat hemoglobin in 4 weeks  See me in the office after that    More than half of patient's clinic visit time was spent in counseling about lifestyle and dietary modifications  Patient's  questions were answered in this regard as well  The patient has verbalized understanding and agreement       Documentation:  I communicated with the patient and/or health care decision maker about . Details of this discussion including any medical advice provided: Yes      I affirm this is a Patient Initiated Episode with a Patient who has not had a related appointment within my department in the past 7 days or scheduled within the next 24 hours.     Patient identification was verified at the start of the visit: Yes    Total Time: minutes: 21-30 minutes    Note: not billable if this call serves to triage the patient into an appointment for the relevant concern      Casie Cottrell

## 2020-05-08 RX ORDER — SODIUM, POTASSIUM,MAG SULFATES 17.5-3.13G
SOLUTION, RECONSTITUTED, ORAL ORAL
Qty: 1 BOTTLE | Refills: 0 | Status: SHIPPED | OUTPATIENT
Start: 2020-05-08 | End: 2020-05-12 | Stop reason: SDUPTHER

## 2020-05-11 NOTE — TELEPHONE ENCOUNTER
Clearance received for patient to hold Xarelto for 5 days prior to colonoscopy on 5/18/20. This information was left on patient VM, writer asked for a call back if she has questions.

## 2020-05-12 DIAGNOSIS — F90.9 ATTENTION DEFICIT HYPERACTIVITY DISORDER (ADHD), UNSPECIFIED ADHD TYPE: ICD-10-CM

## 2020-05-12 DIAGNOSIS — M54.12 CERVICAL RADICULOPATHY: ICD-10-CM

## 2020-05-12 RX ORDER — OXYCODONE HYDROCHLORIDE AND ACETAMINOPHEN 5; 325 MG/1; MG/1
1 TABLET ORAL EVERY 8 HOURS PRN
Qty: 90 TABLET | Refills: 0 | Status: SHIPPED | OUTPATIENT
Start: 2020-05-16 | End: 2020-06-15 | Stop reason: SDUPTHER

## 2020-05-12 RX ORDER — SODIUM, POTASSIUM,MAG SULFATES 17.5-3.13G
SOLUTION, RECONSTITUTED, ORAL ORAL
Qty: 1 BOTTLE | Refills: 0 | Status: SHIPPED | OUTPATIENT
Start: 2020-05-12 | End: 2020-06-12

## 2020-05-12 RX ORDER — DEXTROAMPHETAMINE SACCHARATE, AMPHETAMINE ASPARTATE, DEXTROAMPHETAMINE SULFATE AND AMPHETAMINE SULFATE 7.5; 7.5; 7.5; 7.5 MG/1; MG/1; MG/1; MG/1
30 TABLET ORAL 2 TIMES DAILY
Qty: 60 TABLET | Refills: 0 | Status: SHIPPED | OUTPATIENT
Start: 2020-05-16 | End: 2020-06-15 | Stop reason: SDUPTHER

## 2020-05-12 NOTE — TELEPHONE ENCOUNTER
Last visit: 3/3/2020  Last Med refill: 4/15/2020  Does patient have enough medication for 72 hours: Yes    Next Visit Date:  Future Appointments   Date Time Provider Naya Roger   5/14/2020 10:45 AM PRIN[facility]   5/15/2020  2:30 PM ST[facility] PAT RM [address] [facility] [address] [facility]   6/12/2020 11:45 AM Sofia Rutledge MD [facility] [address] Maintenance   Topic Date Due    Shingles Vaccine (1 of 2) 01/05/2009    Cervical cancer screen  02/02/2019    Annual Wellness Visit (AWV)  05/29/2019    Flu vaccine (Season Ended) 10/01/2020 (Originally 9/1/2020)    Breast cancer screen  11/15/2020    Potassium monitoring  04/30/2021    Creatinine monitoring  04/30/2021    Lipid screen  11/15/2023    DTaP/Tdap/Td vaccine (2 - Td) 04/05/2029    Colon cancer screen colonoscopy  02/10/2030    Pneumococcal 0-64 years Vaccine  Completed    Hepatitis C screen  Completed    HIV screen  Completed    Hepatitis A vaccine  Aged Out    Hepatitis B vaccine  Aged Out    Hib vaccine  Aged Out    Meningococcal (ACWY) vaccine  Aged Out       No results found for: LABA1C          ( goal A1C is < 7)   No results found for: LABMICR  LDL Cholesterol (mg/dL)   Date Value   11/15/2018 111   04/27/2018 110       (goal LDL is <100)   AST (U/L)   Date Value   04/30/2020 19     ALT (U/L)   Date Value   04/30/2020 12     BUN (mg/dL)   Date Value   04/30/2020 27 (H)     BP Readings from Last 3 Encounters:   03/03/20 124/70   02/14/20 (!) 116/94   02/12/20 112/74          (goal 120/80)    All Future Testing planned in [facility]  Lab Frequency Next Occurrence   MRI LUMBAR SPINE WO CONTRAST Once 10/23/2019   POCT Fecal Immunochemical Test (FIT) Once 02/28/2020   CBC Auto Differential Once 60/89/1901   Basic Metabolic Panel Once 67/10/2084   COLONOSCOPY W/ OR W/O BIOPSY Once 08/05/2020   CBC with Differential Once 05/05/2020   Hemoglobin and Hematocrit, Blood, Post Transfusion POST TRANSFUSION    Hemoglobin and Hematocrit, Blood, Post Transfusion POST TRANSFUSION                Patient Active Problem List:     Adult ADHD     Narcolepsy     Moderate persistent asthma without complication     History of DVT (deep vein thrombosis)     DVT of leg (deep venous thrombosis) (HCC)     Closed displaced fracture of body of calcaneus     Hiatal hernia     Internal hemorrhoids     Helicobacter pylori (H. 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

## 2020-05-14 ENCOUNTER — OFFICE VISIT (OUTPATIENT)
Dept: PODIATRY | Age: 60
End: 2020-05-14
Payer: MEDICARE

## 2020-05-14 VITALS — WEIGHT: 146 LBS | RESPIRATION RATE: 18 BRPM | TEMPERATURE: 96.8 F | BODY MASS INDEX: 28.66 KG/M2 | HEIGHT: 60 IN

## 2020-05-14 PROCEDURE — 99213 OFFICE O/P EST LOW 20 MIN: CPT | Performed by: PODIATRIST

## 2020-05-14 NOTE — PROGRESS NOTES
next to a cup.  2. Sit down, and use the toes of your affected foot to lift up one marble from the floor at a time. Then try to put the marble in the cup.  3. Repeat 8 to 12 times. Towel scrunches    1. Sit down, and place your affected foot on a towel on the floor. You may also do this with both feet on the towel. 2. Scrunch the towel toward you with your toes. Then use your toes to push the towel back into place. 3. Repeat 8 to 12 times. Heel raises on a step    1. Stand on the bottom step of a staircase, facing up toward the stairs. Put the balls of your feet on the step. If you are not steady on your feet, hold on to the banister or wall. 2. Keeping both knees straight, slowly lift your heels above the step so that you are standing on your toes. Then slowly lower your heels below the step and toward the floor. 3. Return to the starting position, with your feet even with the step. 4. Repeat 8 to 12 times. Follow-up care is a key part of your treatment and safety. Be sure to make and go to all appointments, and call your doctor if you are having problems. It's also a good idea to know your test results and keep a list of the medicines you take. Where can you learn more? Go to [email or web address] org and sign in to your [employer] account. Enter H119 in the [facility] box to learn more about \"Arch Pain: Exercises. \"     If you do not have an account, please click on the \"Sign Up Now\" link. Current as of: September 20, 2018  Content Version: 12.1  © 3092-9905 Healthwise, Incorporated. Care instructions adapted under license by [facility] ([facility]). If you have questions about a medical condition or this instruction, always ask your healthcare professional. [address] any warranty or liability for your use of this information. .  Verbal and written instructions given to patient. Contact office with any questions/problems/concerns.      No orders of the defined types were placed in this encounter. No orders of the defined types were placed in this encounter. RTC in 4week(s).     5/14/2020      Electronically signed by Nevaeh Weeks DPM on 5/14/2020 at 11:01 AM  5/14/2020

## 2020-05-15 ENCOUNTER — HOSPITAL ENCOUNTER (OUTPATIENT)
Dept: PREADMISSION TESTING | Age: 60
Setting detail: SPECIMEN
Discharge: HOME | End: 2020-05-19
Payer: MEDICARE

## 2020-05-15 ENCOUNTER — ANESTHESIA EVENT (OUTPATIENT)
Dept: OPERATING ROOM | Age: 60
End: 2020-05-15
Payer: MEDICARE

## 2020-05-15 PROCEDURE — U0003 INFECTIOUS AGENT DETECTION BY NUCLEIC ACID (DNA OR RNA); SEVERE ACUTE RESPIRATORY SYNDROME CORONAVIRUS 2 (SARS-COV-2) (CORONAVIRUS DISEASE [COVID-19]), AMPLIFIED PROBE TECHNIQUE, MAKING USE OF HIGH THROUGHPUT TECHNOLOGIES AS DESCRIBED BY CMS-2020-01-R: HCPCS

## 2020-05-15 NOTE — TELEPHONE ENCOUNTER
Writer spoke to pt over the phone & she confirms she will be here for 5/18/20 colon proc and will have a . Pt was reminded of covid testing. Pt was reminded of CLD on Sunday 5/17/20 and pt states she is starting her CLD today so she doesn't have to drink all her bowel prep. Writer informed pt she needs to finish her bowel prep and drink all the prep to get a good clean out. Pt voices her understanding.

## 2020-05-16 LAB
SARS-COV-2, PCR: NORMAL
SARS-COV-2, RAPID: NORMAL
SARS-COV-2: NOT DETECTED
SOURCE: NORMAL

## 2020-05-17 ENCOUNTER — TELEPHONE (OUTPATIENT)
Dept: PRIMARY CARE CLINIC | Age: 60
End: 2020-05-17

## 2020-05-18 ENCOUNTER — ANESTHESIA (OUTPATIENT)
Dept: OPERATING ROOM | Age: 60
End: 2020-05-18
Payer: MEDICARE

## 2020-05-18 ENCOUNTER — HOSPITAL ENCOUNTER (OUTPATIENT)
Age: 60
Setting detail: OUTPATIENT SURGERY
Discharge: HOME | End: 2020-05-18
Attending: INTERNAL MEDICINE | Admitting: INTERNAL MEDICINE
Payer: MEDICARE

## 2020-05-18 VITALS
DIASTOLIC BLOOD PRESSURE: 67 MMHG | HEIGHT: 60 IN | OXYGEN SATURATION: 100 % | TEMPERATURE: 97.5 F | RESPIRATION RATE: 20 BRPM | HEART RATE: 58 BPM | WEIGHT: 140 LBS | BODY MASS INDEX: 27.48 KG/M2 | SYSTOLIC BLOOD PRESSURE: 115 MMHG

## 2020-05-18 VITALS
TEMPERATURE: 98.2 F | SYSTOLIC BLOOD PRESSURE: 110 MMHG | OXYGEN SATURATION: 100 % | RESPIRATION RATE: 14 BRPM | DIASTOLIC BLOOD PRESSURE: 59 MMHG

## 2020-05-18 PROCEDURE — 6360000002 HC RX W HCPCS: Performed by: NURSE ANESTHETIST, CERTIFIED REGISTERED

## 2020-05-18 PROCEDURE — G0121 COLON CA SCRN NOT HI RSK IND: HCPCS | Performed by: INTERNAL MEDICINE

## 2020-05-18 PROCEDURE — 3700000001 HC ADD 15 MINUTES (ANESTHESIA): Performed by: INTERNAL MEDICINE

## 2020-05-18 PROCEDURE — 2709999900 HC NON-CHARGEABLE SUPPLY: Performed by: INTERNAL MEDICINE

## 2020-05-18 PROCEDURE — 2500000003 HC RX 250 WO HCPCS: Performed by: NURSE ANESTHETIST, CERTIFIED REGISTERED

## 2020-05-18 PROCEDURE — 3609027000 HC COLONOSCOPY: Performed by: INTERNAL MEDICINE

## 2020-05-18 PROCEDURE — 7100000011 HC PHASE II RECOVERY - ADDTL 15 MIN: Performed by: INTERNAL MEDICINE

## 2020-05-18 PROCEDURE — 3700000000 HC ANESTHESIA ATTENDED CARE: Performed by: INTERNAL MEDICINE

## 2020-05-18 PROCEDURE — 2580000003 HC RX 258: Performed by: ANESTHESIOLOGY

## 2020-05-18 PROCEDURE — 7100000010 HC PHASE II RECOVERY - FIRST 15 MIN: Performed by: INTERNAL MEDICINE

## 2020-05-18 RX ORDER — LIDOCAINE HYDROCHLORIDE 10 MG/ML
1 INJECTION, SOLUTION EPIDURAL; INFILTRATION; INTRACAUDAL; PERINEURAL
Status: DISCONTINUED | OUTPATIENT
Start: 2020-05-19 | End: 2020-05-18 | Stop reason: HOSPADM

## 2020-05-18 RX ORDER — MIDAZOLAM HYDROCHLORIDE 1 MG/ML
INJECTION INTRAMUSCULAR; INTRAVENOUS PRN
Status: DISCONTINUED | OUTPATIENT
Start: 2020-05-18 | End: 2020-05-18 | Stop reason: SDUPTHER

## 2020-05-18 RX ORDER — PHENYLEPHRINE HCL IN 0.9% NACL 1 MG/10 ML
SYRINGE (ML) INTRAVENOUS PRN
Status: DISCONTINUED | OUTPATIENT
Start: 2020-05-18 | End: 2020-05-18 | Stop reason: SDUPTHER

## 2020-05-18 RX ORDER — SODIUM CHLORIDE, SODIUM LACTATE, POTASSIUM CHLORIDE, CALCIUM CHLORIDE 600; 310; 30; 20 MG/100ML; MG/100ML; MG/100ML; MG/100ML
INJECTION, SOLUTION INTRAVENOUS CONTINUOUS
Status: DISCONTINUED | OUTPATIENT
Start: 2020-05-19 | End: 2020-05-18 | Stop reason: HOSPADM

## 2020-05-18 RX ORDER — FENTANYL CITRATE 50 UG/ML
INJECTION, SOLUTION INTRAMUSCULAR; INTRAVENOUS PRN
Status: DISCONTINUED | OUTPATIENT
Start: 2020-05-18 | End: 2020-05-18 | Stop reason: SDUPTHER

## 2020-05-18 RX ORDER — PROPOFOL 10 MG/ML
INJECTION, EMULSION INTRAVENOUS PRN
Status: DISCONTINUED | OUTPATIENT
Start: 2020-05-18 | End: 2020-05-18 | Stop reason: SDUPTHER

## 2020-05-18 RX ADMIN — PROPOFOL 10 MG: 10 INJECTION, EMULSION INTRAVENOUS at 10:45

## 2020-05-18 RX ADMIN — Medication 200 MCG: at 10:50

## 2020-05-18 RX ADMIN — PROPOFOL 10 MG: 10 INJECTION, EMULSION INTRAVENOUS at 10:52

## 2020-05-18 RX ADMIN — PROPOFOL 10 MG: 10 INJECTION, EMULSION INTRAVENOUS at 10:42

## 2020-05-18 RX ADMIN — PROPOFOL 10 MG: 10 INJECTION, EMULSION INTRAVENOUS at 10:50

## 2020-05-18 RX ADMIN — PROPOFOL 10 MG: 10 INJECTION, EMULSION INTRAVENOUS at 10:54

## 2020-05-18 RX ADMIN — SODIUM CHLORIDE, POTASSIUM CHLORIDE, SODIUM LACTATE AND CALCIUM CHLORIDE: 600; 310; 30; 20 INJECTION, SOLUTION INTRAVENOUS at 09:25

## 2020-05-18 RX ADMIN — Medication 100 MCG: at 10:42

## 2020-05-18 RX ADMIN — PROPOFOL 10 MG: 10 INJECTION, EMULSION INTRAVENOUS at 10:48

## 2020-05-18 RX ADMIN — MIDAZOLAM 2 MG: 1 INJECTION INTRAMUSCULAR; INTRAVENOUS at 10:42

## 2020-05-18 ASSESSMENT — PAIN - FUNCTIONAL ASSESSMENT: PAIN_FUNCTIONAL_ASSESSMENT: 0-10

## 2020-05-18 ASSESSMENT — PULMONARY FUNCTION TESTS
PIF_VALUE: 1

## 2020-05-18 ASSESSMENT — PAIN SCALES - GENERAL
PAINLEVEL_OUTOF10: 0

## 2020-05-18 ASSESSMENT — LIFESTYLE VARIABLES: SMOKING_STATUS: 0

## 2020-05-18 NOTE — H&P
MORNING 10/1/19   Barby Crockett, DO   Calcium Carbonate (CALCIUM 600 PO) Take 600 mg by mouth daily    Historical Provider, MD        Allergies:     Latex; Prozac [fluoxetine hcl]; Sulfa antibiotics; and Codeine    Social History:     Tobacco:    reports that she quit smoking about 31 years ago. She has a 20.00 pack-year smoking history. She has never used smokeless tobacco.  Alcohol:      reports current alcohol use. Drug Use:  reports no history of drug use. Family History:     Family History   Problem Relation Age of Onset   [facility] Cancer Mother     Hypertension Mother     Stroke Mother     Cancer Maternal Aunt     Hypertension Father     Heart Surgery Father     Diabetes Neg Hx        Review of Systems:     Positive and Negative as described in HPI. CONSTITUTIONAL: +more tired  negative for fevers, chills, sweats, fatigue, weight loss  HEENT:  negative for vision, hearing changes, + runny nose \"allergies\", throat pain  RESPIRATORY:  negative for shortness of breath, cough, congestion, wheezing. CARDIOVASCULAR:  negative for chest pain, palpitations.   GASTROINTESTINAL:  See HPI negative for nausea, vomiting, diarrhea, constipation, change in bowel habits, abdominal pain   GENITOURINARY:Occasional stress incontinence  negative for difficulty of urination, burning with urination, frequency   INTEGUMENT:  negative for rash, skin lesions, easy bruising   HEMATOLOGIC/LYMPHATIC: Hx past DVT on anticoagulants  negative for swelling/edema   ALLERGIC/IMMUNOLOGIC:  negative for urticaria , itching  ENDOCRINE:  negative increase in drinking, increase in urination, hot or cold intolerance  MUSCULOSKELETAL:  negative joint pains, muscle aches, swelling of joints  NEUROLOGICAL:  negative for headaches, dizziness, lightheadedness, numbness, pain, tingling extremities  BEHAVIOR/PSYCH:  negative for depression, anxiety    Physical Exam:   Resp 18   Ht 5' (1.524 m)   Wt 140 lb (63.5 kg)   BMI 27.34 kg/m²   No LMP

## 2020-05-18 NOTE — ANESTHESIA POSTPROCEDURE EVALUATION
Department of Anesthesiology  Postprocedure Note    Patient: Annette Lima  MRN: [id number]  YOB: 1960  Date of evaluation: 5/18/2020  Time:  1:14 PM     Procedure Summary     Date:  05/18/20 Room / Location:  [place] [address] / [facility] - INPATIENT    Anesthesia Start:  5211 Anesthesia Stop:  1110    Procedure:  COLORECTAL CANCER SCREENING, NOT HIGH RISK (N/A ) Diagnosis:  (DX IRON DEFICIENCY ANEMIA)    Surgeon:  Louis Hameed MD Responsible Provider:  Dinesh Nguyễn DO    Anesthesia Type:  MAC, general ASA Status:  3          Anesthesia Type: MAC, general    Hong Phase I: Hong Score: 10    Hong Phase II: Hong Score: 9    Last vitals: Reviewed and per EMR flowsheets.        Anesthesia Post Evaluation    Patient location during evaluation: PACU  Patient participation: complete - patient participated  Level of consciousness: awake and alert  Airway patency: patent  Nausea & Vomiting: no nausea and no vomiting  Complications: no  Cardiovascular status: hemodynamically stable  Respiratory status: acceptable  Hydration status: stable

## 2020-05-18 NOTE — OP NOTE
PROCEDURE NOTE    DATE OF PROCEDURE: 5/18/2020    SURGEON: Parker Maddox MD    ASSISTANT: None    PREOPERATIVE DIAGNOSIS: ANEMIA  SCREEN    POSTOPERATIVE DIAGNOSIS: as described below    OPERATION: Total colonoscopy     ANESTHESIA: MAC PER ANESTHESIA     ESTIMATED BLOOD LOSS: less than 50     COMPLICATIONS: None. SPECIMENS:  Was Not Obtained    HISTORY: The patient is a 63y.o. year old female with history of above preop diagnosis. I recommended colonoscopy with possible biopsy or polypectomy and I explained the risk, benefits, expected outcome, and alternatives to the procedure. Risks included but are not limited to bleeding, infection, respiratory distress, hypotension, and perforation of the colon and possibility of missing a lesion. The patient understands and is in agreement. PROCEDURE: The patient was given IV conscious sedation. The patient's SPO2 remained above 90% throughout the procedure. The colonoscope was inserted per rectum and advanced under direct vision to the cecum without difficulty. The prep was fair. LARGE AMOUNT OF RETAINED STOOLS  PREP AT BEST WAS FAIR  AGGRESSIVE FLUSHING WERE DONE TO THE BEST OF THE ABILITY  Findings:  Terminal ileum: not examined    Cecum/Ascending colon: normal    Transverse colon: normal    Descending/Sigmoid colon: abnormal: RETAINED STOOLS NO GROSS PATHOLOGY    Rectum/Anus: examined in normal and retroflexed positions and was abnormal: PROMINENT INT HEMORRHOIDS    Withdrawal Time was (minutes): 12    The colon was decompressed and the scope was removed. The patient tolerated the procedure well. Recommendations/Plan:   1. Lifestyle and dietary modifications as discussed  2. F/U In Office in 3-4 weeks  3. Discussed with the family  4. Colonoscopy Recall :1-2  Year WITH BETTER PREP  5. POST SEDATION PATIENT WAS STABLE WITH STABLE VITAL SIGNS AND OXYGEN SATURATIONS AND WAS DISCHARGED HOME WITH RIDE IN A STABLE CONDITION.     Electronically signed by

## 2020-05-18 NOTE — ANESTHESIA PRE PROCEDURE
Historical Provider, MD   pantoprazole (PROTONIX) 40 MG tablet Take 1 tablet by mouth every morning (before breakfast) 2/7/20  Yes Ankit Walker MD   alendronate (FOSAMAX) 70 MG tablet TAKE 1 TABLET BY MOUTH ONCE WEEKLY BEFORE BREAKFAST, ON AN EMPTY STOMACH: REMAIN UPRIGHT FOR 30 MINUTES  Patient taking differently: TAKE 1 TABLET BY MOUTH ONCE WEEKLY BEFORE BREAKFAST, ON AN EMPTY STOMACH: REMAIN UPRIGHT FOR 30 MINUTES, Takes on Fridays 10/23/19  Yes PIETER Bazzi - CNP   buPROPion (WELLBUTRIN XL) 300 MG extended release tablet TAKE ONE TABLET BY MOUTH EVERY MORNING 10/1/19  Yes Barby Crockett,    Calcium Carbonate (CALCIUM 600 PO) Take 600 mg by mouth daily   Yes Historical Provider, MD   rivaroxaban (XARELTO) 20 MG TABS tablet TAKE ONE TABLET BY MOUTH DAILY 4/8/20   Sy Churchill MD       Current medications:    Current Facility-Administered Medications   Medication Dose Route Frequency Provider Last Rate Last Dose    [START ON 5/19/2020] lactated ringers infusion   Intravenous Continuous Yadira Parker MD 50 mL/hr at 05/18/20 0925      [START ON 5/19/2020] lidocaine PF 1 % injection 1 mL  1 mL Intradermal Once PRN Yadira Parker MD           Allergies:     Allergies   Allergen Reactions    Latex Itching    Prozac [Fluoxetine Hcl]     Sulfa Antibiotics      migraine    Codeine Itching       Problem List:    Patient Active Problem List   Diagnosis Code    Adult ADHD F90.9    Narcolepsy G47.419    Moderate persistent asthma without complication B30.88    History of DVT (deep vein thrombosis) Z86.718    DVT of leg (deep venous thrombosis) ([facility]) I82.409    Closed displaced fracture of body of calcaneus S92.013A    Hiatal hernia K44.9    Internal hemorrhoids H22.1    Helicobacter pylori (H. 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 ENDOSCOPY, COLON, DIAGNOSTIC      FOOT SURGERY Left     FOOT TENDON SURGERY Left 2019    LEFT PERONEAL TENDON REPAIR WITH POSSIBLE LEFT TENDON TRANSFER (Left )    KNEE ARTHROSCOPY Left     Two surgeries on left knee per pt    KNEE SURGERY Right     Three surgeries per pt    LIGAMENT REPAIR Left 2019    LEFT PERONEAL TENDON REPAIR performed by Osmin Marin DPM at [facility] OFFICE/OUTPT VISIT,PROCEDURE ONLY Left 2018    RESECTION/REMOVAL BONY NEOPLASM LEFT FIBULA, LEFT SOFT TISSUE MASS EXCISION WITH BONE BIOPSY LEFT ANKLE FIBULA performed by Osmin Marin DPM at [facility] Left    [facility] ENDOSCOPY  2013    tertiary contractures esophagus, 3 to 4 cm hiatal hernia, antral gastritis, + H Pylori, mild active chronic esophagitis    UPPER GASTROINTESTINAL ENDOSCOPY  2016    one biopsy    UPPER GASTROINTESTINAL ENDOSCOPY N/A 2/10/2020    EGD BIOPSY performed by Nancy Gay MD at [place] [address]         Social History:    Social History     Tobacco Use    Smoking status: Former Smoker     Packs/day: 1.00     Years: 20.00     Pack years: 20.00     Last attempt to quit:      Years since quittin.3    Smokeless tobacco: Never Used   Substance Use Topics    Alcohol use: Yes     Comment: rare                                Counseling given: Not Answered      Vital Signs (Current):   Vitals:    20 0909 20 0912   Resp: 18    Weight:  140 lb (63.5 kg)   Height:  5' (1.524 m)                                              BP Readings from Last 3 Encounters:   20 124/70   20 (!) 116/94   20 112/74       NPO Status: Time of last liquid consumption: 0600                        Time of last solid consumption:                         Date of last liquid consumption: 20                        Date of last solid food consumption: 05/15/20    BMI:   Wt Readings from Last 3 Encounters:   05/18/20 140 lb (63.5 kg)   05/14/20 146 lb (66.2 kg)   04/30/20 146 lb (66.2 kg)     Body mass index is 27.34 kg/m². CBC:   Lab Results   Component Value Date    WBC 6.0 04/30/2020    RBC 3.31 04/30/2020    HGB 9.0 04/30/2020    HCT 30.2 04/30/2020    MCV 91.2 04/30/2020    RDW 17.1 04/30/2020     04/30/2020       CMP:   Lab Results   Component Value Date     04/30/2020    K 4.2 04/30/2020     04/30/2020    CO2 21 04/30/2020    BUN 27 04/30/2020    CREATININE 1.00 04/30/2020    GFRAA >60 04/30/2020    LABGLOM 56 04/30/2020    GLUCOSE 99 04/30/2020    PROT 6.3 04/30/2020    CALCIUM 9.1 04/30/2020    BILITOT <0.10 04/30/2020    ALKPHOS 110 04/30/2020    AST 19 04/30/2020    ALT 12 04/30/2020       POC Tests: No results for input(s): POCGLU, POCNA, POCK, POCCL, POCBUN, POCHEMO, POCHCT in the last 72 hours.     Coags:   Lab Results   Component Value Date    PROTIME 12.0 02/08/2020    INR 1.2 02/08/2020    APTT 25.1 02/07/2020       HCG (If Applicable): No results found for: PREGTESTUR, PREGSERUM, HCG, HCGQUANT     ABGs: No results found for: PHART, PO2ART, NOR4WVB, YAB8WAQ, BEART, P7OFRGNW     Type & Screen (If Applicable):  No results found for: LABABO, LABRH    Drug/Infectious Status (If Applicable):  Lab Results   Component Value Date    HEPCAB NONREACTIVE 04/27/2018       COVID-19 Screening (If Applicable):   Lab Results   Component Value Date    COVID19 Not Detected 05/15/2020         Anesthesia Evaluation  Patient summary reviewed and Nursing notes reviewed no history of anesthetic complications:   Airway: Mallampati: II  TM distance: >3 FB   Neck ROM: full  Mouth opening: > = 3 FB Dental:    (+) upper dentures      Pulmonary:normal exam    (+) asthma:     (-) not a current smoker                           Cardiovascular:  Exercise tolerance: no interval change,   (+) hypertension:,     (-) CAD, CABG/stent and dysrhythmias        Rate: normal Neuro/Psych:   (+) psychiatric history:            GI/Hepatic/Renal:   (+) GERD:,           Endo/Other:        (-) diabetes mellitus               Abdominal:           Vascular:                                        Anesthesia Plan      MAC and general     ASA 3       Induction: intravenous. Anesthetic plan and risks discussed with patient. Plan discussed with CRNA.     Attending anesthesiologist reviewed and agrees with Pre Eval content              Saad Liz DO   5/18/2020

## 2020-05-19 NOTE — TELEPHONE ENCOUNTER
Well she was anemic on 30 April and Dr Shine Rodriguez ordered additional CBC when is she to have this checked probably can check now to see how her numbers look now.

## 2020-05-19 NOTE — TELEPHONE ENCOUNTER
Pt had colonoscopy by Dr. Huyen Huddleston yesterday patient having pain in the kidney area feels like she's being kicked in her back. Please advise pt.   Pt feels maybe her hemoglobin is down, she's out of breath, she was checked for covid 05/15/20 negative

## 2020-05-20 ENCOUNTER — HOSPITAL ENCOUNTER (OUTPATIENT)
Age: 60
Discharge: HOME | End: 2020-05-20
Payer: MEDICARE

## 2020-05-20 LAB
ABSOLUTE EOS #: 0.29 K/UL (ref 0–0.44)
ABSOLUTE IMMATURE GRANULOCYTE: <0.03 K/UL (ref 0–0.3)
ABSOLUTE LYMPH #: 1.23 K/UL (ref 1.1–3.7)
ABSOLUTE MONO #: 0.68 K/UL (ref 0.1–1.2)
BASOPHILS # BLD: 1 % (ref 0–2)
BASOPHILS ABSOLUTE: 0.05 K/UL (ref 0–0.2)
DIFFERENTIAL TYPE: ABNORMAL
EOSINOPHILS RELATIVE PERCENT: 4 % (ref 1–4)
HCT VFR BLD CALC: 30.6 % (ref 36.3–47.1)
HEMOGLOBIN: 9 G/DL (ref 11.9–15.1)
IMMATURE GRANULOCYTES: 0 %
LYMPHOCYTES # BLD: 19 % (ref 24–43)
MCH RBC QN AUTO: 27.1 PG (ref 25.2–33.5)
MCHC RBC AUTO-ENTMCNC: 29.4 G/DL (ref 28.4–34.8)
MCV RBC AUTO: 92.2 FL (ref 82.6–102.9)
MONOCYTES # BLD: 10 % (ref 3–12)
NRBC AUTOMATED: 0 PER 100 WBC
PDW BLD-RTO: 15.9 % (ref 11.8–14.4)
PLATELET # BLD: 312 K/UL (ref 138–453)
PLATELET ESTIMATE: ABNORMAL
PMV BLD AUTO: 10.1 FL (ref 8.1–13.5)
RBC # BLD: 3.32 M/UL (ref 3.95–5.11)
RBC # BLD: ABNORMAL 10*6/UL
SEG NEUTROPHILS: 66 % (ref 36–65)
SEGMENTED NEUTROPHILS ABSOLUTE COUNT: 4.27 K/UL (ref 1.5–8.1)
WBC # BLD: 6.5 K/UL (ref 3.5–11.3)
WBC # BLD: ABNORMAL 10*3/UL

## 2020-05-20 PROCEDURE — 36415 COLL VENOUS BLD VENIPUNCTURE: CPT

## 2020-05-20 PROCEDURE — 85025 COMPLETE CBC W/AUTO DIFF WBC: CPT

## 2020-05-21 ENCOUNTER — OFFICE VISIT (OUTPATIENT)
Dept: FAMILY MEDICINE CLINIC | Age: 60
End: 2020-05-21
Payer: MEDICARE

## 2020-05-21 VITALS
DIASTOLIC BLOOD PRESSURE: 70 MMHG | OXYGEN SATURATION: 97 % | WEIGHT: 154.2 LBS | HEIGHT: 60 IN | TEMPERATURE: 97.1 F | BODY MASS INDEX: 30.27 KG/M2 | HEART RATE: 62 BPM | SYSTOLIC BLOOD PRESSURE: 110 MMHG

## 2020-05-21 PROCEDURE — 99213 OFFICE O/P EST LOW 20 MIN: CPT | Performed by: FAMILY MEDICINE

## 2020-05-21 ASSESSMENT — ENCOUNTER SYMPTOMS
SHORTNESS OF BREATH: 1
COUGH: 0
DIARRHEA: 0
VOMITING: 0
SINUS PRESSURE: 0
SORE THROAT: 0
NAUSEA: 0
BACK PAIN: 1

## 2020-05-21 NOTE — PROGRESS NOTES
8/21/2020), or if symptoms worsen or fail to improve. Orders Placed This Encounter   Procedures   Maura Toledo MD, Hematology/Oncology, [facility]     Referral Priority:   Routine     Referral Type:   Eval and Treat     Referral Reason:   Specialty Services Required     Referred to Provider:   Sivakumar Copeland MD     Requested Specialty:   Oncology     Number of Visits Requested:   1     No orders of the defined types were placed in this encounter. Ideally if we could stop the blood thinner we might be able to allow her anemia to improve. I would like HO opinion on discontinuing the blood thinner. She may see us in three months sooner prn.

## 2020-06-01 ENCOUNTER — CARE COORDINATION (OUTPATIENT)
Dept: FAMILY MEDICINE CLINIC | Age: 60
End: 2020-06-01

## 2020-06-11 ENCOUNTER — TELEPHONE (OUTPATIENT)
Dept: GASTROENTEROLOGY | Age: 60
End: 2020-06-11

## 2020-06-11 RX ORDER — CYCLOBENZAPRINE HCL 10 MG
TABLET ORAL
Qty: 30 TABLET | Refills: 2 | Status: SHIPPED | OUTPATIENT
Start: 2020-06-11 | End: 2020-11-02

## 2020-06-11 NOTE — TELEPHONE ENCOUNTER
Adult ADHD     Narcolepsy     Moderate persistent asthma without complication     History of DVT (deep vein thrombosis)     DVT of leg (deep venous thrombosis) (HCC)     Closed displaced fracture of body of calcaneus     Hiatal hernia     Internal hemorrhoids     Helicobacter pylori (H. 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

## 2020-06-12 ENCOUNTER — OFFICE VISIT (OUTPATIENT)
Dept: GASTROENTEROLOGY | Age: 60
End: 2020-06-12
Payer: MEDICARE

## 2020-06-12 VITALS — BODY MASS INDEX: 28.44 KG/M2 | WEIGHT: 145.6 LBS

## 2020-06-12 PROBLEM — D64.9 ABSOLUTE ANEMIA: Status: ACTIVE | Noted: 2020-06-12

## 2020-06-12 PROBLEM — K21.9 GERD (GASTROESOPHAGEAL REFLUX DISEASE): Status: ACTIVE | Noted: 2020-06-12

## 2020-06-12 PROBLEM — R10.13 EPIGASTRIC BURNING SENSATION: Status: ACTIVE | Noted: 2020-06-12

## 2020-06-12 PROCEDURE — 99214 OFFICE O/P EST MOD 30 MIN: CPT | Performed by: INTERNAL MEDICINE

## 2020-06-12 ASSESSMENT — ENCOUNTER SYMPTOMS
DIARRHEA: 0
NAUSEA: 1
RECTAL PAIN: 0
BLOOD IN STOOL: 0
ABDOMINAL PAIN: 1
VOICE CHANGE: 1
COUGH: 1
CHOKING: 1
WHEEZING: 0
TROUBLE SWALLOWING: 1
ANAL BLEEDING: 0
SORE THROAT: 0
BACK PAIN: 0
SINUS PRESSURE: 0
VOMITING: 0
CONSTIPATION: 0
ABDOMINAL DISTENTION: 0

## 2020-06-12 NOTE — PROGRESS NOTES
pt is on Protonix now and states severe acid reflux and severe abd pain with burning    Anemia     recent HGB 9.0 is stable   . Past Medical,Family, and Social History reviewed and does contribute to the patient presenting condition. Patient's PMH/PSH,SH,PSYCH Hx, MEDs, ALLERGIES, and ROS were all reviewed and updated in the appropriate sections. PAST MEDICAL HISTORY:  Past Medical History:   Diagnosis Date    Acid reflux     Adult ADHD 2012    Asthma 2012    Bipolar 1 disorder ([facility] [address]     Broken foot     Left foot    Chest pain of uncertain etiology     Last episode was in  (Written 2019)    Depression     Under control per pt.  on 6/15/18    DVT of leg (deep venous thrombosis) ([facility] [address] 2014    Fracture     right foot / pt denies surgical intervention    Helicobacter pylori (H. pylori) infection     per EGD    Hematuria 2018    Has a follow-up with Urologist     Hiatal hernia     History of blood transfusion     History of DVT (deep vein thrombosis) 2012    Hx of blood clots     Hypertension     Internal hemorrhoids     Narcolepsy 2012    Osteoporosis        Past Surgical History:   Procedure Laterality Date    ANKLE SURGERY Left 2018    mass excision    ARM SURGERY Right     Pt states she has had 13 right arm surgeries    BREAST ENHANCEMENT SURGERY Bilateral      SECTION      x 3    COLONOSCOPY  2013    sigmoid diverticula, prominent large internal hemorrhoid    COLONOSCOPY N/A 2/10/2020    COLONOSCOPY DIAGNOSTIC performed by Marichuy Nunez [facility] at [facility] 2020    COLORECTAL CANCER SCREENING, NOT HIGH RISK performed by Daniel Valdez MD at [facility], COLON, DIAGNOSTIC      FOOT SURGERY Left     FOOT TENDON SURGERY Left 2019    LEFT PERONEAL TENDON REPAIR WITH POSSIBLE LEFT TENDON TRANSFER (Left )    KNEE ARTHROSCOPY Left     Two surgeries on left knee per pt    KNEE SURGERY Right     Three surgeries per pt    LIGAMENT REPAIR Left 2/22/2019    LEFT PERONEAL TENDON REPAIR performed by Osmin Marin DPM at [address] [place] [address] OFFICE/OUTPT VISIT,PROCEDURE ONLY Left 6/29/2018    RESECTION/REMOVAL BONY NEOPLASM LEFT FIBULA, LEFT SOFT TISSUE MASS EXCISION WITH BONE BIOPSY LEFT ANKLE FIBULA performed by Osmin Marin DPM at [place] [address] Left     UPPER GASTROINTESTINAL ENDOSCOPY  5/23/2013    tertiary contractures esophagus, 3 to 4 cm hiatal hernia, antral gastritis, + H Pylori, mild active chronic esophagitis    UPPER GASTROINTESTINAL ENDOSCOPY  4/6/2016    one biopsy    UPPER GASTROINTESTINAL ENDOSCOPY N/A 2/10/2020    EGD BIOPSY performed by Nancy Gay MD at [address] [place] [address]:    Current Outpatient Medications:     cyclobenzaprine (FLEXERIL) 10 MG tablet, TAKE ONE-HALF TO ONE TABLET BY MOUTH THREE TIMES A DAY AS NEEDED FOR MUSCLE SPASMS, Disp: 30 tablet, Rfl: 2    oxyCODONE-acetaminophen (PERCOCET) 5-325 MG per tablet, Take 1 tablet by mouth every 8 hours as needed for Pain for up to 30 days. , Disp: 90 tablet, Rfl: 0    amphetamine-dextroamphetamine (ADDERALL) 30 MG tablet, Take 1 tablet by mouth 2 times daily for 30 days. , Disp: 60 tablet, Rfl: 0    ferrous sulfate (FE TABS) 325 (65 Fe) MG EC tablet, Take 1 tablet by mouth 2 times daily, Disp: 90 tablet, Rfl: 3    albuterol sulfate HFA (VENTOLIN HFA) 108 (90 Base) MCG/ACT inhaler, INHALE TWO PUFFS BY MOUTH EVERY 4 HOURS AS NEEDED FOR WHEEZING, Disp: 1 Inhaler, Rfl: 5    citalopram (CELEXA) 40 MG tablet, TAKE ONE TABLET BY MOUTH DAILY, Disp: 90 tablet, Rfl: 3    rivaroxaban (XARELTO) 20 MG TABS tablet, TAKE ONE TABLET BY MOUTH DAILY, Disp: 90 tablet, Rfl: 3    propranolol (INDERAL LA) 80 MG extended release capsule, TAKE ONE CAPSULE BY MOUTH DAILY, Disp: 90 capsule, Rfl: 0    mometasone (ASMANEX HFA) 200 MCG/ACT AERO Last attempt to quit:      Years since quittin.4    Smokeless tobacco: Never Used   Substance and Sexual Activity    Alcohol use: Yes     Comment: rare    Drug use: No    Sexual activity: Not Currently   Lifestyle    Physical activity     Days per week: 0 days     Minutes per session: 0 min    Stress: To some extent   Relationships    Social connections     Talks on phone: Three times a week     Gets together: Once a week     Attends Advent service: 1 to 4 times per year     Active member of club or organization: Yes     Attends meetings of clubs or organizations: 1 to 4 times per year     Relationship status:     Intimate partner violence     Fear of current or ex partner: Not on file     Emotionally abused: Not on file     Physically abused: Not on file     Forced sexual activity: Not on file   Other Topics Concern    Not on file   Social History Narrative    Not on file         REVIEW OF SYSTEMS:         Review of Systems   Constitutional: Negative for appetite change, fatigue and unexpected weight change. HENT: Positive for trouble swallowing and voice change. Negative for dental problem, postnasal drip, sinus pressure and sore throat. Eyes: Positive for visual disturbance. Respiratory: Positive for cough and choking. Negative for wheezing. Cardiovascular: Negative for chest pain, palpitations and leg swelling. Gastrointestinal: Positive for abdominal pain and nausea. Negative for abdominal distention, anal bleeding, blood in stool, constipation, diarrhea, rectal pain and vomiting. GERD issues   Genitourinary: Negative for difficulty urinating. Musculoskeletal: Negative for arthralgias, back pain, gait problem and myalgias. Allergic/Immunologic: Negative for environmental allergies and food allergies. Neurological: Negative for dizziness, weakness, light-headedness, numbness and headaches. Hematological: Does not bruise/bleed easily. Psychiatric/Behavioral: Negative for sleep disturbance. The patient is not nervous/anxious. Reviewed and agree  PHYSICAL EXAMINATION: Vital signs reviewed per the nursing documentation. Wt 145 lb 9.6 oz (66 kg)   BMI 28.44 kg/m²   Body mass index is 28.44 kg/m². Physical Exam  Nursing note reviewed. Constitutional:       Appearance: She is well-developed. Comments: Anxious   Hyperactive     HENT:      Head: Normocephalic and atraumatic. Eyes:      Conjunctiva/sclera: Conjunctivae normal.      Pupils: Pupils are equal, round, and reactive to light. Neck:      Musculoskeletal: Normal range of motion and neck supple. Cardiovascular:      Heart sounds: Normal heart sounds. Pulmonary:      Effort: Pulmonary effort is normal.      Breath sounds: Normal breath sounds. Abdominal:      General: Bowel sounds are normal.      Palpations: Abdomen is soft. Comments: Mild  TENDER, NON DISTENTED  LIVER SPLEEN AND HERNIAS ARE NOT  PALPABLE  BOWEL SOUNDS ARE POSITIVE      Musculoskeletal: Normal range of motion. Skin:     General: Skin is warm. Neurological:      Mental Status: She is alert and oriented to person, place, and time.    Psychiatric:         Behavior: Behavior normal.           LABORATORY DATA: Reviewed  Lab Results   Component Value Date    WBC 6.5 05/20/2020    HGB 9.0 (L) 05/20/2020    HCT 30.6 (L) 05/20/2020    MCV 92.2 05/20/2020     05/20/2020     04/30/2020    K 4.2 04/30/2020     04/30/2020    CO2 21 04/30/2020    BUN 27 (H) 04/30/2020    CREATININE 1.00 (H) 04/30/2020    LABALBU 3.5 04/30/2020    BILITOT <0.10 (L) 04/30/2020    ALKPHOS 110 (H) 04/30/2020    AST 19 04/30/2020    ALT 12 04/30/2020    INR 1.2 02/08/2020         Lab Results   Component Value Date    RBC 3.32 (L) 05/20/2020    HGB 9.0 (L) 05/20/2020    MCV 92.2 05/20/2020    MCH 27.1 05/20/2020    MCHC 29.4 05/20/2020    RDW 15.9 (H) 05/20/2020    MPV 10.1 05/20/2020    BASOPCT 1 05/20/2020 was given about regular exercise. Pt has verbalized understanding and agreement to these modifications. The patient was instructed to start taking some OTC Probiotics products   These are available over the counter at the Pharmacy stores and Grocery stores  He was explained about the beneficial effects they have in the GI track  They will help to establish the good bacterial laurence and will help with the digestion and bowel movements  The patient has verbalized understanding and agreement to this plan      More than half of patient's clinic visit time was spent in counseling about lifestyle and dietary modifications  Patient's  questions were answered in this regard as well  The patient has verbalized understanding and agreement           I communicated with the patient and/or health care decision maker about . Details of this discussion including any medical advice provided: yes      I affirm this is a Patient Initiated Episode with an Established Patient who has not had a related appointment within my department in the past 7 days or scheduled within the next 24 hours. Total Time: minutes: 21-30 minutes    Note: not billable if this call serves to triage the patient into an appointment for the relevant concern      Thank you for allowing me to participate in the care of Ms. Jas Mai. For any further questions please do not hesitate to contact me. I have reviewed and agree with the ROS entered by the MA/LPN.          El Miranda MD, [facility]  Board Certified in Gastroenterology and [address] [place] [address] Gastroenterology  Office #: [phone]

## 2020-06-15 RX ORDER — OXYCODONE HYDROCHLORIDE AND ACETAMINOPHEN 5; 325 MG/1; MG/1
1 TABLET ORAL EVERY 8 HOURS PRN
Qty: 90 TABLET | Refills: 0 | Status: SHIPPED | OUTPATIENT
Start: 2020-06-15 | End: 2020-07-15 | Stop reason: SDUPTHER

## 2020-06-15 RX ORDER — DEXTROAMPHETAMINE SACCHARATE, AMPHETAMINE ASPARTATE, DEXTROAMPHETAMINE SULFATE AND AMPHETAMINE SULFATE 7.5; 7.5; 7.5; 7.5 MG/1; MG/1; MG/1; MG/1
30 TABLET ORAL 2 TIMES DAILY
Qty: 60 TABLET | Refills: 0 | Status: SHIPPED | OUTPATIENT
Start: 2020-06-15 | End: 2020-07-15 | Stop reason: SDUPTHER

## 2020-06-16 ENCOUNTER — CARE COORDINATION (OUTPATIENT)
Dept: FAMILY MEDICINE CLINIC | Age: 60
End: 2020-06-16

## 2020-06-17 ENCOUNTER — TELEPHONE (OUTPATIENT)
Dept: FAMILY MEDICINE CLINIC | Age: 60
End: 2020-06-17

## 2020-06-17 NOTE — TELEPHONE ENCOUNTER
Irene Moser was contacted to set up an annual wellness visit    Spoke with: left message to schedule awv Soyla Peabody

## 2020-06-19 ENCOUNTER — INITIAL CONSULT (OUTPATIENT)
Dept: BARIATRICS/WEIGHT MGMT | Age: 60
End: 2020-06-19
Payer: MEDICARE

## 2020-06-19 ENCOUNTER — HOSPITAL ENCOUNTER (OUTPATIENT)
Facility: MEDICAL CENTER | Age: 60
End: 2020-06-19
Payer: MEDICARE

## 2020-06-19 VITALS
RESPIRATION RATE: 20 BRPM | DIASTOLIC BLOOD PRESSURE: 84 MMHG | TEMPERATURE: 97.6 F | WEIGHT: 142 LBS | SYSTOLIC BLOOD PRESSURE: 118 MMHG | HEIGHT: 60 IN | BODY MASS INDEX: 27.88 KG/M2 | HEART RATE: 68 BPM

## 2020-06-19 PROCEDURE — 99204 OFFICE O/P NEW MOD 45 MIN: CPT | Performed by: SURGERY

## 2020-06-20 ENCOUNTER — HOSPITAL ENCOUNTER (OUTPATIENT)
Dept: PREADMISSION TESTING | Age: 60
Setting detail: SPECIMEN
Discharge: HOME | End: 2020-06-24
Payer: MEDICARE

## 2020-06-20 PROCEDURE — U0004 COV-19 TEST NON-CDC HGH THRU: HCPCS

## 2020-06-22 LAB
SARS-COV-2, PCR: NOT DETECTED
SARS-COV-2, RAPID: NORMAL
SARS-COV-2: NORMAL
SOURCE: NORMAL

## 2020-06-23 ENCOUNTER — HOSPITAL ENCOUNTER (OUTPATIENT)
Age: 60
Setting detail: OUTPATIENT SURGERY
Discharge: HOME | End: 2020-06-23
Attending: SURGERY | Admitting: SURGERY
Payer: MEDICARE

## 2020-06-23 VITALS
SYSTOLIC BLOOD PRESSURE: 116 MMHG | HEART RATE: 47 BPM | RESPIRATION RATE: 16 BRPM | HEIGHT: 60 IN | WEIGHT: 145 LBS | DIASTOLIC BLOOD PRESSURE: 73 MMHG | OXYGEN SATURATION: 97 % | BODY MASS INDEX: 28.47 KG/M2

## 2020-06-23 PROCEDURE — 3609015500 HC GASTRIC/DUODENAL MOTILITY &/OR MANOMETRY STUDY: Performed by: SURGERY

## 2020-06-23 ASSESSMENT — PAIN DESCRIPTION - DESCRIPTORS: DESCRIPTORS: CONSTANT;SHARP

## 2020-06-23 ASSESSMENT — PAIN - FUNCTIONAL ASSESSMENT: PAIN_FUNCTIONAL_ASSESSMENT: 0-10

## 2020-06-25 ENCOUNTER — TELEPHONE (OUTPATIENT)
Dept: BARIATRICS/WEIGHT MGMT | Age: 60
End: 2020-06-25

## 2020-06-25 NOTE — TELEPHONE ENCOUNTER
Spoke with patient and let her know that Dr. Mendoza Yoon will review her chart and get back with me by Monday 6/29/20 . I told the patient I will call her on Monday on how we will be moving forward. Patient appreciated phone call and seemed much calmer.

## 2020-06-25 NOTE — TELEPHONE ENCOUNTER
Patient was unable to complete the motility test on  6/23/2020 doing to unable to sit still.  She called an apologized numerous times for any trouble she may has called, explained to patient that is okay and we will talk to Dr Kit Vázquez to discuss next steps Told patent to expect a call no later than 6/26/2020 to advise of next steps

## 2020-06-28 NOTE — PROGRESS NOTES
STOMACH: REMAIN UPRIGHT FOR 30 MINUTES, Takes on Fridays) 4 tablet 11    buPROPion (WELLBUTRIN XL) 300 MG extended release tablet TAKE ONE TABLET BY MOUTH EVERY MORNING 90 tablet 3    Calcium Carbonate (CALCIUM 600 PO) Take 600 mg by mouth daily       No current facility-administered medications for this visit. Allergies   Allergen Reactions    Latex Itching    Prozac [Fluoxetine Hcl]     Sulfa Antibiotics      migraine    Codeine Itching       SOCIAL:      This patient is alone for the evaluation today. [] HIV Risk Factors (i.e.) intravenous drug abuser; at risk sexual behavior; received blood products    [] TB Risk Factors (i.e.) Medically underserved, institutional care, foreign born, endemic area; exposure to active case    [] Hepatitis B&C Risk Factors (i.e.) Received blood transfusion prior to 1992; recreational drug use; high risk sexual behaviors; tattoos or body piercings; contact with blood or needle sticks in the workplace    Comprehension    Ability to grasp concepts and respond to questions:   [x] High   [] Medium   [] Low    Motivation    [x] Asks Questions; eager to learn   [] Needs education   [] Extreme anxiety    [] uncooperative   [] Denies need for education    English Speaking Ability    [x] Speaks English well   [x] Reads English well   [x] Understands spoken Lonza Hausen    [] Understands written English   [] No need for interpretive support      [] Might benefit from interpretive support   []  required for all services     REVIEW OF SYSTEMS: (Negative unless marked otherwise)       Do you or have you had any of the following?   Cardiovascular YES NO Respiratory YES NO   High Blood Pressure   [x]   [] COPD   []   []   Heart Attack   []   [] TB/Positive skin Test   []   []   Congestive Heart Failure   []   [] Obstructive Sleep Apnea   []   []   Coronary Artery Disease   []   [] Asthma   [x]   []   Circulation Problems   []   []      Activity Intolerance   []   [] SMOKING CESSATION     [x] Not needed     [] Instructed to stop smoking    [] Pamphlet community resources given     VTE SCREEN    [] Family hx DVT/PE  /   [] Personal hx of DVT/PE    [x] Denies any family or personal hx of DVT/PE    Physician Review    [x] Past medical, family, & social history reviewed and discussed with patient. Review of surgery and post-surgical changes (by surgeon for surgical patients only)    [x] Lifelong diet expectations reviewed with patient      PHYSICAL EXAMINATION:      /84 (Site: Right Upper Arm, Position: Sitting, Cuff Size: Medium Adult)   Pulse 68   Temp 97.6 °F (36.4 °C) (Oral)   Resp 20   Ht 5' (1.524 m)   Wt 142 lb (64.4 kg)   BMI 27.73 kg/m²     Constitutional:  Vital signs are normal. The patient appears well-developed   HEENT:      Head: Normocephalic. Atraumatic     Eyes: pupils are equal and reactive. No scleral icterus is present. Neck: No mass and no thyromegaly present. Cardiovascular: Normal rate, regular rhythm, S1 normal and S2 normal.  Bilateral pulses present. Pulmonary/Chest: Effort normal and breath sounds normal. No retractions. Abdominal: Soft. Normal appearance. There is no organomegaly. No tenderness. There is no rigidity, no rebound, no guarding and no Copeland's sign. Musculoskeletal:      Right lower leg: Normal. No tenderness and no edema. Left lower leg: Normal. No tenderness and no edema. Lymphadenopathy:     No cervical adenopathy, No Exrtemity Adenopathy. Neurological: The patient is alert and oriented. Moving all four extremities equally, sensation grossly intact bilateral.  Skin: Skin is warm, dry and intact. Psychiatric: The patient has a normal mood and affect.  Speech is normal and behavior is normal. Judgment and thought content normal. Cognition and memory are normal.     RECOMMENDATIONS:     We spent a great deal of time discussing the risks and benefits of Robotic/Laparoscopic Paraesophageal hernia repair with mesh, possible open, possible fundoplication including but not limited to injury to intra-abdominal organs, breakdown of the gastric or esophageal wall or perforation, the need for re-operative therapy, prolonged hospitalization,  mechanical ventilation and death. We discussed the possibility of bleeding, pneumothorax, cardiac event, the need for blood transfusions, blood clots, hospital-acquired and intra-abdominal infection, dysphagia or stricture, and worsening GERD in the long term. And we discussed the need for post-operative visit compliance, behavior modifications and diet changes. PLAN:       Diagnosis Orders   1.  Paraesophageal hernia  Manometry Esophageal    FL Upper Gi W Air Contrast          Check Upper GI  EGD reviewed with patient  Check manometry with some dysphagia  Avoid meals 3 hours prior to bedtime  Avoid Alcohol, tobacco and caffeine  Sleep with head of bed elevated  Follow up after testing  PPI for GERD  All questions answered in office today  She would like to consider repair of the hernia    Electronically signed by Jayson Tavera DO on 6/28/2020 at 7:17 PM

## 2020-06-29 NOTE — TELEPHONE ENCOUNTER
Spoke with patient. She is going to scheduled UGI and call after she has it scheduled so we can watch for it.

## 2020-07-02 ENCOUNTER — HOSPITAL ENCOUNTER (OUTPATIENT)
Dept: PREADMISSION TESTING | Age: 60
Setting detail: SPECIMEN
Discharge: HOME | End: 2020-07-06
Payer: MEDICARE

## 2020-07-02 PROCEDURE — U0004 COV-19 TEST NON-CDC HGH THRU: HCPCS

## 2020-07-03 LAB
SARS-COV-2, PCR: NOT DETECTED
SARS-COV-2, RAPID: NORMAL
SARS-COV-2: NORMAL
SOURCE: NORMAL

## 2020-07-04 ENCOUNTER — TELEPHONE (OUTPATIENT)
Dept: PRIMARY CARE CLINIC | Age: 60
End: 2020-07-04

## 2020-07-06 ENCOUNTER — HOSPITAL ENCOUNTER (OUTPATIENT)
Dept: GENERAL RADIOLOGY | Age: 60
Discharge: HOME | End: 2020-07-08
Payer: MEDICARE

## 2020-07-06 PROCEDURE — 2500000003 HC RX 250 WO HCPCS: Performed by: SURGERY

## 2020-07-06 PROCEDURE — 74246 X-RAY XM UPR GI TRC 2CNTRST: CPT

## 2020-07-06 RX ADMIN — BARIUM SULFATE: 980 POWDER, FOR SUSPENSION ORAL at 08:40

## 2020-07-09 ENCOUNTER — TELEPHONE (OUTPATIENT)
Dept: BARIATRICS/WEIGHT MGMT | Age: 60
End: 2020-07-09

## 2020-07-09 NOTE — TELEPHONE ENCOUNTER
Her Upper GI showed severe spasms. We really need her to attempt manometry so that we can make the next decisions regarding surgery. Is she willing to repeat?

## 2020-07-09 NOTE — TELEPHONE ENCOUNTER
Patient completed UGI would like to know what the next step is, please advise.  Advised patient call back in 48-72 hours

## 2020-07-14 NOTE — TELEPHONE ENCOUNTER
oarrs 6/15/20      Last visit:5/21/20    Last Med refill: 6/15/20      Next Visit Date:  Future Appointments   Date Time Provider Naya Landryi   8/20/2020 10:30 AM Bahman Mei MD [id number]   9/11/2020 12:30 PM Casper Flores MD [place] [facility]   Topic Date Due    Shingles Vaccine (1 of 2) 01/05/2009    Cervical cancer screen  02/02/2019    Annual Wellness Visit (AWV)  05/29/2019    Flu vaccine (1) 09/01/2020    Breast cancer screen  11/15/2020    Potassium monitoring  04/30/2021    Creatinine monitoring  04/30/2021    Lipid screen  11/15/2023    DTaP/Tdap/Td vaccine (2 - Td) 04/05/2029    Colon cancer screen colonoscopy  05/18/2030    Pneumococcal 0-64 years Vaccine  Completed    Hepatitis C screen  Completed    HIV screen  Completed    Hepatitis A vaccine  Aged Out    Hepatitis B vaccine  Aged Out    Hib vaccine  Aged Out    Meningococcal (ACWY) vaccine  Aged Out       No results found for: LABA1C          ( goal A1C is < 7)   No results found for: LABMICR  LDL Cholesterol (mg/dL)   Date Value   11/15/2018 111   04/27/2018 110       (goal LDL is <100)   AST (U/L)   Date Value   04/30/2020 19     ALT (U/L)   Date Value   04/30/2020 12     BUN (mg/dL)   Date Value   04/30/2020 27 (H)     BP Readings from Last 3 Encounters:   06/23/20 116/73   06/19/20 118/84   05/21/20 110/70          (goal 120/80)    All Future Testing planned in [facility]  Lab Frequency Next Occurrence   MRI LUMBAR SPINE WO CONTRAST Once 10/23/2019   POCT Fecal Immunochemical Test (FIT) Once 02/28/2020   CBC Auto Differential Once 95/20/1151   Basic Metabolic Panel Once 86/64/4338   COLONOSCOPY W/ OR W/O BIOPSY Once 08/05/2020   CBC with Differential Once 05/05/2020   Manometry Esophageal Once 07/24/2020   Hemoglobin and Hematocrit, Blood, Post Transfusion POST TRANSFUSION    Hemoglobin and Hematocrit, Blood, Post Transfusion POST TRANSFUSION                Patient Active Problem List:     Adult ADHD     Narcolepsy     Moderate persistent asthma without complication     History of DVT (deep vein thrombosis)     DVT of leg (deep venous thrombosis) (HCC)     Closed displaced fracture of body of calcaneus     Hiatal hernia     Internal hemorrhoids     Helicobacter pylori (H. 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     Achilles tendinitis     Acquired trigger finger     Knee pain     Osteoarthritis of knee     Osteoarthritis of wrist     Pes anserinus bursitis     Plantar fasciitis     Sprain of shoulder and upper arm     Sprain of wrist     Other synovitis and tenosynovitis, unspecified hand     Flexion contracture of joint of foot, left     Peroneal tendon injury, left, subsequent encounter     Acute upper GI bleed     Acute on chronic blood loss anemia     Essential hypertension     Chronic anticoagulation     Iron deficiency anemia due to chronic blood loss     Gastrointestinal hemorrhage     GERD (gastroesophageal reflux disease)     Absolute anemia     Epigastric burning sensation

## 2020-07-15 RX ORDER — OXYCODONE HYDROCHLORIDE AND ACETAMINOPHEN 5; 325 MG/1; MG/1
1 TABLET ORAL EVERY 8 HOURS PRN
Qty: 90 TABLET | Refills: 0 | Status: ON HOLD | OUTPATIENT
Start: 2020-07-15 | End: 2020-08-14 | Stop reason: SDUPTHER

## 2020-07-15 RX ORDER — DEXTROAMPHETAMINE SACCHARATE, AMPHETAMINE ASPARTATE, DEXTROAMPHETAMINE SULFATE AND AMPHETAMINE SULFATE 7.5; 7.5; 7.5; 7.5 MG/1; MG/1; MG/1; MG/1
30 TABLET ORAL 2 TIMES DAILY
Qty: 60 TABLET | Refills: 0 | Status: ON HOLD | OUTPATIENT
Start: 2020-07-15 | End: 2020-08-14 | Stop reason: SDUPTHER

## 2020-07-16 ENCOUNTER — TELEPHONE (OUTPATIENT)
Dept: BARIATRICS/WEIGHT MGMT | Age: 60
End: 2020-07-16

## 2020-07-16 NOTE — TELEPHONE ENCOUNTER
----- Message from Bakari Quinones DO sent at 7/16/2020  9:21 AM EDT -----  We need to try and get manometry scheduled and attempted again    She has some significant findings concerning for motility disorder

## 2020-07-22 ENCOUNTER — TELEPHONE (OUTPATIENT)
Dept: FAMILY MEDICINE CLINIC | Age: 60
End: 2020-07-22

## 2020-07-22 NOTE — TELEPHONE ENCOUNTER
Pt has blotches on her face and wrist.  She says she can see it growing and when she puts a kleenex on it to pop it a liquid comes out. She says they are very painful.  Please advise

## 2020-07-27 ENCOUNTER — OFFICE VISIT (OUTPATIENT)
Dept: FAMILY MEDICINE CLINIC | Age: 60
End: 2020-07-27
Payer: MEDICARE

## 2020-07-27 ENCOUNTER — TELEPHONE (OUTPATIENT)
Dept: FAMILY MEDICINE CLINIC | Age: 60
End: 2020-07-27

## 2020-07-27 VITALS
DIASTOLIC BLOOD PRESSURE: 80 MMHG | OXYGEN SATURATION: 84 % | SYSTOLIC BLOOD PRESSURE: 120 MMHG | HEART RATE: 72 BPM | HEIGHT: 60 IN | TEMPERATURE: 97 F | BODY MASS INDEX: 27.72 KG/M2 | WEIGHT: 141.2 LBS

## 2020-07-27 PROCEDURE — 99213 OFFICE O/P EST LOW 20 MIN: CPT | Performed by: FAMILY MEDICINE

## 2020-07-27 RX ORDER — MINOCYCLINE HYDROCHLORIDE 50 MG/1
50 TABLET ORAL 2 TIMES DAILY
Qty: 60 TABLET | Refills: 0 | Status: SHIPPED | OUTPATIENT
Start: 2020-07-27 | End: 2020-08-26

## 2020-07-27 ASSESSMENT — ENCOUNTER SYMPTOMS
SORE THROAT: 0
SHORTNESS OF BREATH: 0
BACK PAIN: 0
NAUSEA: 0
SINUS PRESSURE: 0
COLOR CHANGE: 1
VOMITING: 0
COUGH: 0
DIARRHEA: 0

## 2020-07-27 ASSESSMENT — PATIENT HEALTH QUESTIONNAIRE - PHQ9
SUM OF ALL RESPONSES TO PHQ QUESTIONS 1-9: 0
SUM OF ALL RESPONSES TO PHQ9 QUESTIONS 1 & 2: 0
1. LITTLE INTEREST OR PLEASURE IN DOING THINGS: 0
2. FEELING DOWN, DEPRESSED OR HOPELESS: 0
SUM OF ALL RESPONSES TO PHQ QUESTIONS 1-9: 0

## 2020-07-27 NOTE — TELEPHONE ENCOUNTER
Patient called and wants to know if she should have her hemoglobin check due to her O2 being 84 today?

## 2020-07-27 NOTE — PROGRESS NOTES
Lucina Ellis is a 63 y.o. female who presents todayfor her medical conditions/complaints as noted below. Lucina Ellis is here today c/oSkin Exam (blotches on skin)      :     HPI    Skin condition which she is having trouble keeping her hands off of. Past Medical History:   Diagnosis Date    Acid reflux     Adult ADHD 2012    Asthma 2012    Bipolar 1 disorder ([facility] [address]     Broken foot     Left foot    Chest pain of uncertain etiology     Last episode was in  (Written 2019)    Depression     Under control per pt.  on 6/15/18    DVT of leg (deep venous thrombosis) ([facility] [address] 2014    Fracture     right foot / pt denies surgical intervention    Helicobacter pylori (H. pylori) infection     per EGD    Hematuria 2018    Has a follow-up with Urologist     Hiatal hernia     History of blood transfusion     History of DVT (deep vein thrombosis) 2012    Hx of blood clots     Hypertension     Internal hemorrhoids     Narcolepsy 2012    Osteoporosis       Past Surgical History:   Procedure Laterality Date    ANKLE SURGERY Left 2018    mass excision    ARM SURGERY Right     Pt states she has had 13 right arm surgeries    BREAST ENHANCEMENT SURGERY Bilateral      SECTION      x 3    COLONOSCOPY  2013    sigmoid diverticula, prominent large internal hemorrhoid    COLONOSCOPY N/A 2/10/2020    COLONOSCOPY DIAGNOSTIC performed by [facility]eraj Benedict [facility] at [facility] 2020    COLORECTAL CANCER SCREENING, NOT HIGH RISK performed by Americo Lord MD at [address] [place] [address] Rd, COLON, DIAGNOSTIC      FOOT SURGERY Left 2015    FOOT TENDON SURGERY Left 2019    LEFT PERONEAL TENDON REPAIR WITH POSSIBLE LEFT TENDON TRANSFER (Left )    KNEE ARTHROSCOPY Left     Two surgeries on left knee per pt    KNEE SURGERY Right     Three surgeries per pt    LIGAMENT REPAIR Left 2019    LEFT PERONEAL TENDON REPAIR (VENTOLIN HFA) 108 (90 Base) MCG/ACT inhaler INHALE TWO PUFFS BY MOUTH EVERY 4 HOURS AS NEEDED FOR WHEEZING 1 Inhaler 5    citalopram (CELEXA) 40 MG tablet TAKE ONE TABLET BY MOUTH DAILY 90 tablet 3    rivaroxaban (XARELTO) 20 MG TABS tablet TAKE ONE TABLET BY MOUTH DAILY 90 tablet 3    propranolol (INDERAL LA) 80 MG extended release capsule TAKE ONE CAPSULE BY MOUTH DAILY 90 capsule 0    mometasone (ASMANEX HFA) 200 MCG/ACT AERO inhaler Inhale 2 puffs into the lungs 2 times daily 3 Inhaler 3    busPIRone (BUSPAR) 15 MG tablet Take 15 mg by mouth 3 times daily 90 tablet 3    albuterol (PROVENTIL) (2.5 MG/3ML) 0.083% nebulizer solution Take 2.5 mg by nebulization every 6 hours as needed for Wheezing or Shortness of Breath      pantoprazole (PROTONIX) 40 MG tablet Take 1 tablet by mouth every morning (before breakfast) 90 tablet 1    alendronate (FOSAMAX) 70 MG tablet TAKE 1 TABLET BY MOUTH ONCE WEEKLY BEFORE BREAKFAST, ON AN EMPTY STOMACH: REMAIN UPRIGHT FOR 30 MINUTES (Patient taking differently: TAKE 1 TABLET BY MOUTH ONCE WEEKLY BEFORE BREAKFAST, ON AN EMPTY STOMACH: REMAIN UPRIGHT FOR 30 MINUTES, Takes on Fridays) 4 tablet 11    buPROPion (WELLBUTRIN XL) 300 MG extended release tablet TAKE ONE TABLET BY MOUTH EVERY MORNING 90 tablet 3    Calcium Carbonate (CALCIUM 600 PO) Take 600 mg by mouth daily       No current facility-administered medications for this visit. Allergies   Allergen Reactions    Latex Itching    Prozac [Fluoxetine Hcl]     Sulfa Antibiotics      migraine    Codeine Itching         Subjective:   Review of Systems   Constitutional: Negative for chills, diaphoresis and fever. HENT: Negative for congestion, sinus pressure and sore throat. Eyes: Negative for visual disturbance. Respiratory: Negative for cough and shortness of breath. Cardiovascular: Negative for chest pain and leg swelling. Gastrointestinal: Negative for diarrhea, nausea and vomiting.    Genitourinary: Negative for dysuria, menstrual problem, urgency and vaginal discharge. Musculoskeletal: Negative for arthralgias, back pain and myalgias. Skin: Positive for color change and wound. Negative for rash. Neurological: Negative for weakness, numbness and headaches. Psychiatric/Behavioral: Negative for sleep disturbance.       :   /80   Pulse 72   Temp 97 °F (36.1 °C)   Ht 5' (1.524 m)   Wt 141 lb 3.2 oz (64 kg)   SpO2 (!) 84%   BMI 27.58 kg/m²     Physical Exam  Constitutional:       General: She is not in acute distress. Appearance: She is well-developed. She is not diaphoretic. HENT:      Head: Normocephalic and atraumatic. Mouth/Throat:      Pharynx: No oropharyngeal exudate. Eyes:      General: No scleral icterus. Neck:      Musculoskeletal: Neck supple. Vascular: No carotid bruit. Cardiovascular:      Heart sounds: No murmur. No friction rub. No gallop. Pulmonary:      Effort: No respiratory distress. Breath sounds: No wheezing or rales. Chest:      Chest wall: No tenderness. Musculoskeletal:      Right lower leg: No edema. Left lower leg: No edema. Lymphadenopathy:      Cervical: No cervical adenopathy. Skin:     Findings: Lesion (areas on face where superficial skin was excoriated from her not leaving it alone.) present. No rash. Neurological:      Mental Status: She is alert and oriented to person, place, and time. Cranial Nerves: No cranial nerve deficit. Psychiatric:         Behavior: Behavior normal.         Thought Content: Thought content normal.         Judgment: Judgment normal.         Assessment:       Diagnosis Orders   1. Acne vulgaris     2. EMILIA (generalized anxiety disorder)           Plan:      No follow-ups on file. No orders of the defined types were placed in this encounter. Orders Placed This Encounter   Medications    mupirocin (BACTROBAN) 2 % ointment     Sig: Apply 3 times daily.      Dispense:  15 g     Refill:  1  minocycline (DYNACIN) 50 MG tablet     Sig: Take 1 tablet by mouth 2 times daily     Dispense:  60 tablet     Refill:  0       Will try above and I asked her to do everything she can to stop picking at her skin. Derm consult prn. Also discussed her workup difficulties with Dr Gerhardt Heaps office. See us prn.

## 2020-07-29 ENCOUNTER — HOSPITAL ENCOUNTER (OUTPATIENT)
Age: 60
Discharge: HOME | End: 2020-07-29
Payer: MEDICARE

## 2020-07-29 LAB
ABSOLUTE EOS #: 0.12 K/UL (ref 0–0.44)
ABSOLUTE IMMATURE GRANULOCYTE: <0.03 K/UL (ref 0–0.3)
ABSOLUTE LYMPH #: 1.14 K/UL (ref 1.1–3.7)
ABSOLUTE MONO #: 0.45 K/UL (ref 0.1–1.2)
ALBUMIN SERPL-MCNC: 4 G/DL (ref 3.5–5.2)
ALBUMIN/GLOBULIN RATIO: 1.5 (ref 1–2.5)
ALP BLD-CCNC: 94 U/L (ref 35–104)
ALT SERPL-CCNC: 15 U/L (ref 5–33)
ANION GAP SERPL CALCULATED.3IONS-SCNC: 10 MMOL/L (ref 9–17)
AST SERPL-CCNC: 27 U/L
BASOPHILS # BLD: 1 % (ref 0–2)
BASOPHILS ABSOLUTE: 0.06 K/UL (ref 0–0.2)
BILIRUB SERPL-MCNC: 0.17 MG/DL (ref 0.3–1.2)
BUN BLDV-MCNC: 15 MG/DL (ref 8–23)
BUN/CREAT BLD: ABNORMAL (ref 9–20)
CALCIUM SERPL-MCNC: 9.4 MG/DL (ref 8.6–10.4)
CHLORIDE BLD-SCNC: 102 MMOL/L (ref 98–107)
CO2: 25 MMOL/L (ref 20–31)
CREAT SERPL-MCNC: 1.12 MG/DL (ref 0.5–0.9)
DIFFERENTIAL TYPE: ABNORMAL
EOSINOPHILS RELATIVE PERCENT: 3 % (ref 1–4)
GFR AFRICAN AMERICAN: 60 ML/MIN
GFR NON-AFRICAN AMERICAN: 49 ML/MIN
GFR SERPL CREATININE-BSD FRML MDRD: ABNORMAL ML/MIN/{1.73_M2}
GFR SERPL CREATININE-BSD FRML MDRD: ABNORMAL ML/MIN/{1.73_M2}
GLUCOSE BLD-MCNC: 125 MG/DL (ref 70–99)
HCT VFR BLD CALC: 38.7 % (ref 36.3–47.1)
HEMOGLOBIN: 11.5 G/DL (ref 11.9–15.1)
IMMATURE GRANULOCYTES: 0 %
LYMPHOCYTES # BLD: 25 % (ref 24–43)
MCH RBC QN AUTO: 26.6 PG (ref 25.2–33.5)
MCHC RBC AUTO-ENTMCNC: 29.7 G/DL (ref 28.4–34.8)
MCV RBC AUTO: 89.6 FL (ref 82.6–102.9)
MONOCYTES # BLD: 10 % (ref 3–12)
NRBC AUTOMATED: 0 PER 100 WBC
PDW BLD-RTO: 17 % (ref 11.8–14.4)
PLATELET # BLD: 387 K/UL (ref 138–453)
PLATELET ESTIMATE: ABNORMAL
PMV BLD AUTO: 10.8 FL (ref 8.1–13.5)
POTASSIUM SERPL-SCNC: 3.7 MMOL/L (ref 3.7–5.3)
RBC # BLD: 4.32 M/UL (ref 3.95–5.11)
RBC # BLD: ABNORMAL 10*6/UL
SEG NEUTROPHILS: 61 % (ref 36–65)
SEGMENTED NEUTROPHILS ABSOLUTE COUNT: 2.75 K/UL (ref 1.5–8.1)
SODIUM BLD-SCNC: 137 MMOL/L (ref 135–144)
TOTAL PROTEIN: 6.7 G/DL (ref 6.4–8.3)
WBC # BLD: 4.5 K/UL (ref 3.5–11.3)
WBC # BLD: ABNORMAL 10*3/UL

## 2020-07-29 PROCEDURE — 80053 COMPREHEN METABOLIC PANEL: CPT

## 2020-07-29 PROCEDURE — 85025 COMPLETE CBC W/AUTO DIFF WBC: CPT

## 2020-07-29 PROCEDURE — 36415 COLL VENOUS BLD VENIPUNCTURE: CPT

## 2020-07-30 NOTE — TELEPHONE ENCOUNTER
Patient called back, gave her information on COVID testing and manometry dates.   She is requesting something to be called in to help her relax for the procedure, she will make sure she has a  for day of test.

## 2020-08-03 RX ORDER — PANTOPRAZOLE SODIUM 40 MG/1
TABLET, DELAYED RELEASE ORAL
Qty: 90 TABLET | Refills: 0 | Status: ON HOLD
Start: 2020-08-03 | End: 2020-10-30 | Stop reason: HOSPADM

## 2020-08-06 ENCOUNTER — TELEPHONE (OUTPATIENT)
Dept: FAMILY MEDICINE CLINIC | Age: 60
End: 2020-08-06

## 2020-08-06 ENCOUNTER — TELEPHONE (OUTPATIENT)
Dept: ONCOLOGY | Age: 60
End: 2020-08-06

## 2020-08-06 RX ORDER — ALPRAZOLAM 0.5 MG/1
0.5 TABLET ORAL
Qty: 1 TABLET | Refills: 0 | Status: CANCELLED | OUTPATIENT
Start: 2020-08-06 | End: 2020-08-06

## 2020-08-06 NOTE — TELEPHONE ENCOUNTER
Patient takes Wellbutrin in am and Citalopram pm    Patient keeps  forgetting to take her Citalopram at night can she take the two medications together in am?    She says she is getting very stressed by caring for her Mother. She's not sure why she can't remember to take the Citalopram at night.     Please advise

## 2020-08-08 ENCOUNTER — HOSPITAL ENCOUNTER (OUTPATIENT)
Dept: PREADMISSION TESTING | Age: 60
Setting detail: SPECIMEN
Discharge: HOME | End: 2020-08-12
Payer: MEDICARE

## 2020-08-08 PROCEDURE — U0003 INFECTIOUS AGENT DETECTION BY NUCLEIC ACID (DNA OR RNA); SEVERE ACUTE RESPIRATORY SYNDROME CORONAVIRUS 2 (SARS-COV-2) (CORONAVIRUS DISEASE [COVID-19]), AMPLIFIED PROBE TECHNIQUE, MAKING USE OF HIGH THROUGHPUT TECHNOLOGIES AS DESCRIBED BY CMS-2020-01-R: HCPCS

## 2020-08-10 LAB — SARS-COV-2, NAA: NOT DETECTED

## 2020-08-11 ENCOUNTER — TELEPHONE (OUTPATIENT)
Dept: PRIMARY CARE CLINIC | Age: 60
End: 2020-08-11

## 2020-08-11 NOTE — TELEPHONE ENCOUNTER
CONSULT WAS PLACED ON 5/21/20, PATIENT WAS SCHEDULED FOR 06/23/20 AND NO SHOWED. PATIENT HAS NOT CONTACTED OUR OFFICE TO RESCHEDULE CONSULTATION AFTER VOICEMAIL WAS LEFT, SENDING REFERRAL BACK PLEASE ADVISE.

## 2020-08-12 ENCOUNTER — HOSPITAL ENCOUNTER (OUTPATIENT)
Age: 60
Setting detail: OUTPATIENT SURGERY
Discharge: HOME | End: 2020-08-12
Attending: SURGERY
Payer: MEDICARE

## 2020-08-12 VITALS
BODY MASS INDEX: 28.47 KG/M2 | SYSTOLIC BLOOD PRESSURE: 120 MMHG | DIASTOLIC BLOOD PRESSURE: 88 MMHG | WEIGHT: 145 LBS | OXYGEN SATURATION: 95 % | HEART RATE: 81 BPM | RESPIRATION RATE: 20 BRPM | HEIGHT: 60 IN

## 2020-08-12 PROCEDURE — 3609015500 HC GASTRIC/DUODENAL MOTILITY &/OR MANOMETRY STUDY: Performed by: SURGERY

## 2020-08-12 ASSESSMENT — PAIN - FUNCTIONAL ASSESSMENT: PAIN_FUNCTIONAL_ASSESSMENT: 0-10

## 2020-08-12 NOTE — PROGRESS NOTES
Patient did not hold xeralto and Dr. Homero Chand was called. Patient needs to hold xeralto as ordered per Dr. Homero Chand. Procedure is cancelled at this time. Patient is told to call Dr. Merino Beat office for further orders. Procedure explained by Sara Santana. Questions answered. Understanding stated.

## 2020-08-14 RX ORDER — DEXTROAMPHETAMINE SACCHARATE, AMPHETAMINE ASPARTATE, DEXTROAMPHETAMINE SULFATE AND AMPHETAMINE SULFATE 7.5; 7.5; 7.5; 7.5 MG/1; MG/1; MG/1; MG/1
30 TABLET ORAL 2 TIMES DAILY
Qty: 60 TABLET | Refills: 0 | Status: SHIPPED | OUTPATIENT
Start: 2020-08-14 | End: 2020-09-11 | Stop reason: SDUPTHER

## 2020-08-14 RX ORDER — OXYCODONE HYDROCHLORIDE AND ACETAMINOPHEN 5; 325 MG/1; MG/1
1 TABLET ORAL EVERY 8 HOURS PRN
Qty: 90 TABLET | Refills: 0 | Status: SHIPPED | OUTPATIENT
Start: 2020-08-14 | End: 2020-09-11 | Stop reason: SDUPTHER

## 2020-08-20 ENCOUNTER — HOSPITAL ENCOUNTER (OUTPATIENT)
Age: 60
Setting detail: SPECIMEN
Discharge: HOME | End: 2020-08-20
Payer: MEDICARE

## 2020-08-20 ENCOUNTER — OFFICE VISIT (OUTPATIENT)
Dept: FAMILY MEDICINE CLINIC | Age: 60
End: 2020-08-20
Payer: MEDICARE

## 2020-08-20 VITALS
WEIGHT: 148.4 LBS | OXYGEN SATURATION: 93 % | DIASTOLIC BLOOD PRESSURE: 74 MMHG | SYSTOLIC BLOOD PRESSURE: 114 MMHG | TEMPERATURE: 97 F | BODY MASS INDEX: 28.98 KG/M2 | HEART RATE: 76 BPM

## 2020-08-20 LAB
ABSOLUTE EOS #: 0.17 K/UL (ref 0–0.44)
ABSOLUTE IMMATURE GRANULOCYTE: <0.03 K/UL (ref 0–0.3)
ABSOLUTE LYMPH #: 1.36 K/UL (ref 1.1–3.7)
ABSOLUTE MONO #: 0.62 K/UL (ref 0.1–1.2)
ALBUMIN SERPL-MCNC: 3.4 G/DL (ref 3.5–5.2)
ALBUMIN/GLOBULIN RATIO: 1.5 (ref 1–2.5)
ALP BLD-CCNC: 84 U/L (ref 35–104)
ALT SERPL-CCNC: 14 U/L (ref 5–33)
ANION GAP SERPL CALCULATED.3IONS-SCNC: 9 MMOL/L (ref 9–17)
AST SERPL-CCNC: 22 U/L
BASOPHILS # BLD: 1 % (ref 0–2)
BASOPHILS ABSOLUTE: 0.04 K/UL (ref 0–0.2)
BILIRUB SERPL-MCNC: <0.1 MG/DL (ref 0.3–1.2)
BUN BLDV-MCNC: 15 MG/DL (ref 8–23)
BUN/CREAT BLD: ABNORMAL (ref 9–20)
CALCIUM SERPL-MCNC: 8.6 MG/DL (ref 8.6–10.4)
CHLORIDE BLD-SCNC: 106 MMOL/L (ref 98–107)
CO2: 22 MMOL/L (ref 20–31)
CREAT SERPL-MCNC: 1.02 MG/DL (ref 0.5–0.9)
DIFFERENTIAL TYPE: ABNORMAL
EOSINOPHILS RELATIVE PERCENT: 3 % (ref 1–4)
GFR AFRICAN AMERICAN: >60 ML/MIN
GFR NON-AFRICAN AMERICAN: 55 ML/MIN
GFR SERPL CREATININE-BSD FRML MDRD: ABNORMAL ML/MIN/{1.73_M2}
GFR SERPL CREATININE-BSD FRML MDRD: ABNORMAL ML/MIN/{1.73_M2}
GLUCOSE BLD-MCNC: 84 MG/DL (ref 70–99)
HCT VFR BLD CALC: 28.5 % (ref 36.3–47.1)
HEMOGLOBIN: 8.3 G/DL (ref 11.9–15.1)
IMMATURE GRANULOCYTES: 0 %
LYMPHOCYTES # BLD: 24 % (ref 24–43)
MCH RBC QN AUTO: 26.4 PG (ref 25.2–33.5)
MCHC RBC AUTO-ENTMCNC: 29.1 G/DL (ref 28.4–34.8)
MCV RBC AUTO: 90.8 FL (ref 82.6–102.9)
MONOCYTES # BLD: 11 % (ref 3–12)
NRBC AUTOMATED: 0 PER 100 WBC
PDW BLD-RTO: 17.1 % (ref 11.8–14.4)
PLATELET # BLD: 392 K/UL (ref 138–453)
PLATELET ESTIMATE: ABNORMAL
PMV BLD AUTO: 10.7 FL (ref 8.1–13.5)
POTASSIUM SERPL-SCNC: 4.3 MMOL/L (ref 3.7–5.3)
RBC # BLD: 3.14 M/UL (ref 3.95–5.11)
RBC # BLD: ABNORMAL 10*6/UL
SEG NEUTROPHILS: 61 % (ref 36–65)
SEGMENTED NEUTROPHILS ABSOLUTE COUNT: 3.39 K/UL (ref 1.5–8.1)
SODIUM BLD-SCNC: 137 MMOL/L (ref 135–144)
TOTAL PROTEIN: 5.6 G/DL (ref 6.4–8.3)
WBC # BLD: 5.6 K/UL (ref 3.5–11.3)
WBC # BLD: ABNORMAL 10*3/UL

## 2020-08-20 PROCEDURE — 99214 OFFICE O/P EST MOD 30 MIN: CPT | Performed by: FAMILY MEDICINE

## 2020-08-20 NOTE — PROGRESS NOTES
Subjective:  Bettye Kiser presents for   Chief Complaint   Patient presents with    Shortness of Breath    Fatigue     ? due to low Hgb      is on iron currently. .  These sx have started slowly since her last hgb check about 3 weeks ago. Stools are always black. Has seen a smoall aoutn of bright red bloodl      Has hx of ulcer and dvt .     Patient Active Problem List   Diagnosis    Adult ADHD    Narcolepsy    Moderate persistent asthma without complication    History of DVT (deep vein thrombosis)    DVT of leg (deep venous thrombosis) (HCC)    Closed displaced fracture of body of calcaneus    Hiatal hernia    Internal hemorrhoids    Helicobacter pylori (H. 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    Achilles tendinitis    Acquired trigger finger    Knee pain    Osteoarthritis of knee    Osteoarthritis of wrist    Pes anserinus bursitis    Plantar fasciitis    Sprain of shoulder and upper arm    Sprain of wrist    Other synovitis and tenosynovitis, unspecified hand    Flexion contracture of joint of foot, left    Peroneal tendon injury, left, subsequent encounter    Acute upper GI bleed    Acute on chronic blood loss anemia    Essential hypertension    Chronic anticoagulation    Iron deficiency anemia due to chronic blood loss    Gastrointestinal hemorrhage    GERD (gastroesophageal reflux disease)    Absolute anemia    Epigastric burning sensation       ·     Objective:  Physical Exam   Vitals:   Vitals:    08/20/20 1348   BP: 114/74   Pulse: 76   Temp: 97 °F (36.1 °C)   TempSrc: Temporal   SpO2: 93%   Weight: 148 lb 6.4 oz (67.3 kg)     Wt Readings from Last 3 Encounters:   08/20/20 148 lb 6.4 oz (67.3 kg)   08/12/20 145 lb (65.8 kg)   07/27/20 141 lb 3.2 oz (64 kg)     Ht Readings from Last 3 Encounters:   08/12/20 5' (1.524 m)   07/27/20 5' (1.524 m)   06/23/20 5' (1.524 m)     Body mass index is 28.98 kg/m². Constitutional: She is oriented to person, place, and time. She appears well-developed and well-nourished and in no acute distress. Answers all my questions appropriately. When talking she was not dyspneic. Head: Normocephalic and atraumatic. Eyes:conjunctiva appear normal.  Neck: Normal range of motion. Neck supple. No tracheal deviation present. No abnormal lymphadenopathy. No JVD noted. Carotids are clear bilaterally. No thyroid masses noted. Heart: RRR   Chest: Clear to auscultation bilaterally. Good breath sounds noted. No rales, wheezes, or rhonchi noted. No respiratory retractions noted. Wall has symmetrical movement with respirations. Abdomen: No distension noted.  + bowel sounds in all quadrants which are normoactive. No bruits noted. No masses could be palpated. No unusual pulsatile masses noted. Patient complained of discomfort when I was barely touching the skin and even when I was touching the ribs. Assessment:   Encounter Diagnoses   Name Primary?  Other fatigue Yes    Iron deficiency anemia, unspecified iron deficiency anemia type     SOB (shortness of breath)          Plan:   There are no discontinued medications. THE ABOVE NOTED DISCONTINUED MEDS MAY ONLY BE FROM 'CLEANING UP' THE MED LIST AND WERE NOT ACTUALLY CANCELED;  SEE CHART FOR DETAILS! No orders of the defined types were placed in this encounter. Orders Placed This Encounter   Procedures    CBC Auto Differential     Standing Status:   Future     Standing Expiration Date:   8/20/2021    Comprehensive Metabolic Panel     Standing Status:   Future     Standing Expiration Date:   8/20/2021     Return in about 1 week (around 8/27/2020). There are no Patient Instructions on file for this visit.   FU with [facility]

## 2020-08-21 ENCOUNTER — TELEPHONE (OUTPATIENT)
Dept: GASTROENTEROLOGY | Age: 60
End: 2020-08-21

## 2020-08-21 NOTE — TELEPHONE ENCOUNTER
Rula Albrecht called, states she was told by her PCP to call us in regards to her labs. Pt states \"my blood is low and that explains why I am so tired and out of breath just getting from room to room in my house. \" Patient quested if this is something we take care of or if she should call her surgeon. Writer informed her that if it has to do with her blood/hemoglobin we can assist with her care in regards to that. Patient may need a sooner appointment that 9/11/2020.  Please advise-

## 2020-08-21 NOTE — TELEPHONE ENCOUNTER
Writer returned patient call and was going to move the appt up to Tues at 2:15. Patient was going to take that appt, but after she stated she was having to stop 3 times to walk up the stairs and the she knows she is bleeding from the hernia, she is advised to go to ER and call back on Monday if she needs to reschedule appt.

## 2020-08-24 NOTE — TELEPHONE ENCOUNTER
Patient called stating that she did not end up going to the ED over the weekend like she was advised by Indira, she said that she laid low. She called today because she is still not feeling better and is fearful that she is going \"bleed out\" from her hernia. She is still winded from walking to room to room and doesn't really feel safe to drive. Spoke with Debby and patient was scheduled to be seen tomorrow at 115 pm and that she needs to have someone bring her and not drive. If she cannot keep appointment for tomorrow, she needs to go to the ED. Patient verbalized she understood.

## 2020-08-25 ENCOUNTER — OFFICE VISIT (OUTPATIENT)
Dept: GASTROENTEROLOGY | Age: 60
End: 2020-08-25
Payer: MEDICARE

## 2020-08-25 VITALS — WEIGHT: 149.6 LBS | BODY MASS INDEX: 29.22 KG/M2

## 2020-08-25 PROBLEM — R10.13 ABDOMINAL PAIN, EPIGASTRIC: Status: ACTIVE | Noted: 2020-08-25

## 2020-08-25 PROCEDURE — 99214 OFFICE O/P EST MOD 30 MIN: CPT | Performed by: INTERNAL MEDICINE

## 2020-08-25 ASSESSMENT — ENCOUNTER SYMPTOMS
BLOOD IN STOOL: 1
ABDOMINAL PAIN: 1
BACK PAIN: 0
SHORTNESS OF BREATH: 1
VOICE CHANGE: 1
CHOKING: 1
COUGH: 1
NAUSEA: 0
ABDOMINAL DISTENTION: 1
DIARRHEA: 0
VOMITING: 0
WHEEZING: 0
SINUS PRESSURE: 0
RECTAL PAIN: 0
CONSTIPATION: 0
ANAL BLEEDING: 0
TROUBLE SWALLOWING: 1
SORE THROAT: 0

## 2020-08-25 NOTE — PROGRESS NOTES
GI OFFICE FOLLOW UP    Cali Balderas is a 63 y.o. female evaluated via on 8/25/2020. Consent:  She and/or health care decision maker is aware that that she may receive a bill for this telephone service, depending on her insurance coverage, and has provided verbal consent to proceed: YES      INTERVAL HISTORY:   No referring provider defined for this encounter. Chief Complaint   Patient presents with    GI Problem     pt states she had consult with Dr Devin Bradford for hiatal hernia sx but states she has low HGB       1. Hiatal hernia    2. Other iron deficiency anemia    3. Abdominal pain, epigastric    4. Internal hemorrhoids    5. Gastroesophageal reflux disease, esophagitis presence not specified    6.  Chronic anticoagulation      This patient evaluated in my office accompanied by her son  She has history significant for chronic anemia history for large hiatus hernia  She was referred to Dr. Devin Bradford for possible repair of the hernia    It appears that some tests were ordered including pH study and upper GI x-rays she had not had it done had some issues with those and has not followed with Dr. Devin Bradford since then  Her current hemoglobin is 8.3    She has been on iron pills denies any overt bleeding   Previous colonoscopy was done with some relatively poor prep     Patient appears to have some irritable bowel syndrome-like symptoms with abdominal bloating and gas symptoms  She has been taking chronic narcotic pain medications as well which contributing to her symptoms of constipation and narcotic bowel syndrome    Denies any hematemesis  Has upper abdominal discomfort and some nausea as well      HISTORY OF PRESENT ILLNESS: Ms.Carol AMRIT Oliva is a 63 y.o. female with a past history remarkable for , referred for evaluation of   Chief Complaint   Patient presents with    GI Problem     pt states she had TRANSFER (Left )    KNEE ARTHROSCOPY Left     Two surgeries on left knee per pt    KNEE SURGERY Right     Three surgeries per pt    LIGAMENT REPAIR Left 2/22/2019    LEFT PERONEAL TENDON REPAIR performed by Aaliyah Quinn DPM at [facility] OFFICE/OUTPT VISIT,PROCEDURE ONLY Left 6/29/2018    RESECTION/REMOVAL BONY NEOPLASM LEFT FIBULA, LEFT SOFT TISSUE MASS EXCISION WITH BONE BIOPSY LEFT ANKLE FIBULA performed by Aaliyah Quinn DPM at [facility] Left     UPPER GASTROINTESTINAL ENDOSCOPY  5/23/2013    tertiary contractures esophagus, 3 to 4 cm hiatal hernia, antral gastritis, + H Pylori, mild active chronic esophagitis    UPPER GASTROINTESTINAL ENDOSCOPY  4/6/2016    one biopsy    UPPER GASTROINTESTINAL ENDOSCOPY N/A 2/10/2020    EGD BIOPSY performed by Greyson Jin MD at [address] [place] [address] [place]:    Current Outpatient Medications:     oxyCODONE-acetaminophen (PERCOCET) 5-325 MG per tablet, Take 1 tablet by mouth every 8 hours as needed for Pain for up to 30 days. , Disp: 90 tablet, Rfl: 0    amphetamine-dextroamphetamine (ADDERALL) 30 MG tablet, Take 1 tablet by mouth 2 times daily for 30 days. , Disp: 60 tablet, Rfl: 0    pantoprazole (PROTONIX) 40 MG tablet, TAKE ONE TABLET BY MOUTH EVERY MORNING BEFORE BREAKFAST, Disp: 90 tablet, Rfl: 0    minocycline (DYNACIN) 50 MG tablet, Take 1 tablet by mouth 2 times daily, Disp: 60 tablet, Rfl: 0    cyclobenzaprine (FLEXERIL) 10 MG tablet, TAKE ONE-HALF TO ONE TABLET BY MOUTH THREE TIMES A DAY AS NEEDED FOR MUSCLE SPASMS, Disp: 30 tablet, Rfl: 2    ferrous sulfate (FE TABS) 325 (65 Fe) MG EC tablet, Take 1 tablet by mouth 2 times daily, Disp: 90 tablet, Rfl: 3    albuterol sulfate HFA (VENTOLIN HFA) 108 (90 Base) MCG/ACT inhaler, INHALE TWO PUFFS BY MOUTH EVERY 4 HOURS AS NEEDED FOR WHEEZING, Disp: 1 Inhaler, Rfl: 5    citalopram (CELEXA) 40 MG tablet, TAKE ONE Sometimes true    Transportation needs     Medical: No     Non-medical: No   Tobacco Use    Smoking status: Former Smoker     Packs/day: 1.00     Years: 20.00     Pack years: 20.00     Last attempt to quit:      Years since quittin.6    Smokeless tobacco: Never Used   Substance and Sexual Activity    Alcohol use: Yes     Comment: rare    Drug use: No    Sexual activity: Not Currently   Lifestyle    Physical activity     Days per week: 0 days     Minutes per session: 0 min    Stress: To some extent   Relationships    Social connections     Talks on phone: Three times a week     Gets together: Once a week     Attends Muslim service: 1 to 4 times per year     Active member of club or organization: Yes     Attends meetings of clubs or organizations: 1 to 4 times per year     Relationship status:     Intimate partner violence     Fear of current or ex partner: Not on file     Emotionally abused: Not on file     Physically abused: Not on file     Forced sexual activity: Not on file   Other Topics Concern    Not on file   Social History Narrative    Not on file         REVIEW OF SYSTEMS:         Review of Systems   Constitutional: Positive for fatigue. Negative for appetite change and unexpected weight change. HENT: Positive for trouble swallowing and voice change. Negative for dental problem, postnasal drip, sinus pressure and sore throat. Eyes: Positive for visual disturbance. Respiratory: Positive for cough, choking and shortness of breath. Negative for wheezing. Cardiovascular: Negative for chest pain, palpitations and leg swelling. Gastrointestinal: Positive for abdominal distention, abdominal pain and blood in stool. Negative for anal bleeding, constipation, diarrhea, nausea, rectal pain and vomiting. GERD issues   Genitourinary: Negative for difficulty urinating. Musculoskeletal: Negative for arthralgias, back pain, gait problem and myalgias. Allergic/Immunologic: Negative for environmental allergies and food allergies. Neurological: Positive for dizziness, weakness and light-headedness. Negative for numbness and headaches. Hematological: Bruises/bleeds easily. Psychiatric/Behavioral: Negative for sleep disturbance. The patient is not nervous/anxious. Reviewed and agree    PHYSICAL EXAMINATION: Vital signs reviewed per the nursing documentation. Wt 149 lb 9.6 oz (67.9 kg)   BMI 29.22 kg/m²   Body mass index is 29.22 kg/m². Physical Exam  Nursing note reviewed. Constitutional:       Appearance: She is well-developed. Comments: Anxious   Hyperactive    HENT:      Head: Normocephalic and atraumatic. Eyes:      Conjunctiva/sclera: Conjunctivae normal.      Pupils: Pupils are equal, round, and reactive to light. Neck:      Musculoskeletal: Normal range of motion and neck supple. Cardiovascular:      Heart sounds: Normal heart sounds. Pulmonary:      Effort: Pulmonary effort is normal.      Breath sounds: Normal breath sounds. Abdominal:      General: Bowel sounds are normal.      Palpations: Abdomen is soft. Comments: Mild  TENDER, NON DISTENTED  LIVER SPLEEN AND HERNIAS ARE NOT  PALPABLE  BOWEL SOUNDS ARE POSITIVE      Musculoskeletal: Normal range of motion. Skin:     General: Skin is warm. Neurological:      Mental Status: She is alert and oriented to person, place, and time.    Psychiatric:         Behavior: Behavior normal.           LABORATORY DATA: Reviewed  Lab Results   Component Value Date    WBC 5.6 08/20/2020    HGB 8.3 (L) 08/20/2020    HCT 28.5 (L) 08/20/2020    MCV 90.8 08/20/2020     08/20/2020     08/20/2020    K 4.3 08/20/2020     08/20/2020    CO2 22 08/20/2020    BUN 15 08/20/2020    CREATININE 1.02 (H) 08/20/2020    LABALBU 3.4 (L) 08/20/2020    BILITOT <0.10 (L) 08/20/2020    ALKPHOS 84 08/20/2020    AST 22 08/20/2020    ALT 14 08/20/2020    INR 1.2 02/08/2020         Lab about some life style and dietary modifications. She was advised about avoidance of caffeine, nicotine and chocolate. Pt was also told to stay away from any kind of fast foods, soda pops. She was also advised to avoid lots of spices, grease and fried food etc.     Instructions were also given about trying to arrange the timing, quality and quantity of food. Instructions were given about using ample amount of fiber including dietary and supplemental fiber either metamucil, bennafiber or citrucell etc.  Pt was advised about drinking ample amount of water without any colors or chemicals. Stress was given about regular exercise. Pt has verbalized understanding and agreement to these modifications. Pt seems to have signs and symptoms consistent with GERD, acid indigestion and heartburns. She was discussed  in detail about some possible life style and dietary modifications. She was stressed about the maintenance  of appropriate weight and effect of obesity contributing to reflux symptoms. Routine exercise was streesed. Avoidance of Caffeine, nicotine and chocolate were explained. Pt was asked to avoid spices grease and fried food. Advices were also given about avoidance of any kind of fast foods, soda pops and high energy drinks. Pt was advised to place two small block under the head end of the bed which may help with night time reflux. Was advised not to eat any thin at least 2-3 hrs before going to bed and walk especially after dinner    Pt has verbalized understanding and agreement to this plan.     constipation    More than half of patient's clinic visit time was spent in counseling about lifestyle and dietary modifications  Patient's  questions were answered in this regard as well  The patient has verbalized understanding and agreement     Also discussed with her son      I communicated with the patient and/or health care decision maker about   Details of this discussion including any medical advice provided:YES      I affirm this is a Patient Initiated Episode with an Established Patient who has not had a related appointment within my department in the past 7 days or scheduled within the next 24 hours. Total Time: minutes: 21-30 minutes    Note: not billable if this call serves to triage the patient into an appointment for the relevant concern      Thank you for allowing me to participate in the care of Ms. Jordan Xavier. For any further questions please do not hesitate to contact me. I have reviewed and agree with the ROS entered by the MA/LPN.          Manasa Pitt MD, [facility]  Board Certified in Gastroenterology and [address] [place] [address] Gastroenterology  Office #: [phone]

## 2020-08-27 ENCOUNTER — TELEPHONE (OUTPATIENT)
Dept: BARIATRICS/WEIGHT MGMT | Age: 60
End: 2020-08-27

## 2020-08-27 NOTE — TELEPHONE ENCOUNTER
Next Visit Date:  Visit date not found      Patient called stated she was advised to call us and discuss scheduling surgery. Please advise what procedure patient needs     Patient advised:   If  Symptoms worsen seek treatment at  Emergency Room. You will receive a call back from the office within 24-48 hours.     Is it ok to leave a message if we call back yes

## 2020-08-28 ENCOUNTER — OFFICE VISIT (OUTPATIENT)
Dept: FAMILY MEDICINE CLINIC | Age: 60
End: 2020-08-28
Payer: MEDICARE

## 2020-08-28 VITALS
OXYGEN SATURATION: 97 % | DIASTOLIC BLOOD PRESSURE: 80 MMHG | BODY MASS INDEX: 28.94 KG/M2 | HEART RATE: 74 BPM | TEMPERATURE: 97 F | SYSTOLIC BLOOD PRESSURE: 120 MMHG | HEIGHT: 60 IN | WEIGHT: 147.4 LBS

## 2020-08-28 PROCEDURE — 99213 OFFICE O/P EST LOW 20 MIN: CPT | Performed by: FAMILY MEDICINE

## 2020-08-28 ASSESSMENT — ENCOUNTER SYMPTOMS
NAUSEA: 0
SORE THROAT: 0
SHORTNESS OF BREATH: 0
VOMITING: 0
SINUS PRESSURE: 0
COUGH: 0
DIARRHEA: 0
BACK PAIN: 0

## 2020-08-28 ASSESSMENT — PATIENT HEALTH QUESTIONNAIRE - PHQ9
SUM OF ALL RESPONSES TO PHQ QUESTIONS 1-9: 0
1. LITTLE INTEREST OR PLEASURE IN DOING THINGS: 0
SUM OF ALL RESPONSES TO PHQ9 QUESTIONS 1 & 2: 0
SUM OF ALL RESPONSES TO PHQ QUESTIONS 1-9: 0
2. FEELING DOWN, DEPRESSED OR HOPELESS: 0

## 2020-08-28 NOTE — PROGRESS NOTES
Left     Two surgeries on left knee per pt    KNEE SURGERY Right     Three surgeries per pt    LIGAMENT REPAIR Left 2019    LEFT PERONEAL TENDON REPAIR performed by Sabina Eldridge DPM at [facility] OFFICE/OUTPT VISIT,PROCEDURE ONLY Left 2018    RESECTION/REMOVAL BONY NEOPLASM LEFT FIBULA, LEFT SOFT TISSUE MASS EXCISION WITH BONE BIOPSY LEFT ANKLE FIBULA performed by Sabina Eldridge DPM at [facility] Left     UPPER GASTROINTESTINAL ENDOSCOPY  2013    tertiary contractures esophagus, 3 to 4 cm hiatal hernia, antral gastritis, + H Pylori, mild active chronic esophagitis    UPPER GASTROINTESTINAL ENDOSCOPY  2016    one biopsy    UPPER GASTROINTESTINAL ENDOSCOPY N/A 2/10/2020    EGD BIOPSY performed by Altaf To MD at [facility]       Family History   Problem Relation Age of Onset    Cancer Mother     Hypertension Mother     Stroke Mother     Cancer Maternal Aunt     Hypertension Father     Heart Surgery Father     Diabetes Neg Hx      Social History     Tobacco Use    Smoking status: Former Smoker     Packs/day: 1.00     Years: 20.00     Pack years: 20.00     Last attempt to quit:      Years since quittin.6    Smokeless tobacco: Never Used   Substance Use Topics    Alcohol use: Yes     Comment: rare      Current Outpatient Medications   Medication Sig Dispense Refill    oxyCODONE-acetaminophen (PERCOCET) 5-325 MG per tablet Take 1 tablet by mouth every 8 hours as needed for Pain for up to 30 days. 90 tablet 0    amphetamine-dextroamphetamine (ADDERALL) 30 MG tablet Take 1 tablet by mouth 2 times daily for 30 days.  60 tablet 0    pantoprazole (PROTONIX) 40 MG tablet TAKE ONE TABLET BY MOUTH EVERY MORNING BEFORE BREAKFAST 90 tablet 0    cyclobenzaprine (FLEXERIL) 10 MG tablet TAKE ONE-HALF TO ONE TABLET BY MOUTH THREE TIMES A DAY AS NEEDED FOR MUSCLE SPASMS 30 tablet 2    ferrous sulfate (FE TABS) 325 (65 Fe) MG EC tablet Take 1 tablet by mouth 2 times daily 90 tablet 3    albuterol sulfate HFA (VENTOLIN HFA) 108 (90 Base) MCG/ACT inhaler INHALE TWO PUFFS BY MOUTH EVERY 4 HOURS AS NEEDED FOR WHEEZING 1 Inhaler 5    citalopram (CELEXA) 40 MG tablet TAKE ONE TABLET BY MOUTH DAILY 90 tablet 3    rivaroxaban (XARELTO) 20 MG TABS tablet TAKE ONE TABLET BY MOUTH DAILY 90 tablet 3    propranolol (INDERAL LA) 80 MG extended release capsule TAKE ONE CAPSULE BY MOUTH DAILY 90 capsule 0    mometasone (ASMANEX HFA) 200 MCG/ACT AERO inhaler Inhale 2 puffs into the lungs 2 times daily 3 Inhaler 3    busPIRone (BUSPAR) 15 MG tablet Take 15 mg by mouth 3 times daily 90 tablet 3    albuterol (PROVENTIL) (2.5 MG/3ML) 0.083% nebulizer solution Take 2.5 mg by nebulization every 6 hours as needed for Wheezing or Shortness of Breath      alendronate (FOSAMAX) 70 MG tablet TAKE 1 TABLET BY MOUTH ONCE WEEKLY BEFORE BREAKFAST, ON AN EMPTY STOMACH: REMAIN UPRIGHT FOR 30 MINUTES (Patient taking differently: TAKE 1 TABLET BY MOUTH ONCE WEEKLY BEFORE BREAKFAST, ON AN EMPTY STOMACH: REMAIN UPRIGHT FOR 30 MINUTES, Takes on Fridays) 4 tablet 11    buPROPion (WELLBUTRIN XL) 300 MG extended release tablet TAKE ONE TABLET BY MOUTH EVERY MORNING 90 tablet 3    Calcium Carbonate (CALCIUM 600 PO) Take 600 mg by mouth daily       No current facility-administered medications for this visit. Allergies   Allergen Reactions    Latex Itching    Prozac [Fluoxetine Hcl]     Sulfa Antibiotics      migraine    Codeine Itching         Subjective:   Review of Systems   Constitutional: Negative for chills, diaphoresis and fever. HENT: Negative for congestion, sinus pressure and sore throat. Eyes: Negative for visual disturbance. Respiratory: Negative for cough and shortness of breath. Cardiovascular: Negative for chest pain and leg swelling. Gastrointestinal: Negative for diarrhea, nausea and vomiting. Genitourinary: Negative for dysuria, menstrual problem, urgency and vaginal discharge. Musculoskeletal: Negative for arthralgias, back pain and myalgias. Skin: Negative for rash. Neurological: Negative for weakness, numbness and headaches. Psychiatric/Behavioral: Negative for sleep disturbance.       :   /80   Pulse 74   Temp 97 °F (36.1 °C)   Ht 5' (1.524 m)   Wt 147 lb 6.4 oz (66.9 kg)   SpO2 97%   BMI 28.79 kg/m²     Physical Exam  Constitutional:       General: She is not in acute distress. Appearance: She is well-developed. She is not diaphoretic. HENT:      Head: Normocephalic and atraumatic. Mouth/Throat:      Pharynx: No oropharyngeal exudate. Eyes:      General: No scleral icterus. Neck:      Musculoskeletal: Neck supple. Vascular: No carotid bruit. Cardiovascular:      Heart sounds: No murmur. No friction rub. No gallop. Pulmonary:      Effort: No respiratory distress. Breath sounds: No wheezing or rales. Chest:      Chest wall: No tenderness. Musculoskeletal:      Right lower leg: No edema. Left lower leg: No edema. Lymphadenopathy:      Cervical: No cervical adenopathy. Skin:     Findings: No rash. Neurological:      Mental Status: She is alert and oriented to person, place, and time. Cranial Nerves: No cranial nerve deficit. Psychiatric:         Behavior: Behavior normal.         Thought Content: Thought content normal.         Judgment: Judgment normal.         Assessment:       Diagnosis Orders   1. Hiatal hernia     2. History of DVT (deep vein thrombosis)     3. Moderate persistent asthma without complication           Plan:      Return in about 2 months (around 10/28/2020). No orders of the defined types were placed in this encounter. No orders of the defined types were placed in this encounter. My chart information  Encourage to have testing done. Follow CBC  See me in two months sooner prn.

## 2020-08-28 NOTE — TELEPHONE ENCOUNTER
I spoke with patient. I let her know I will call GI and remind them they are to call Dr Linette Chacon when she is there to have him give her something to call her down for the Manometry.

## 2020-08-31 ENCOUNTER — TELEPHONE (OUTPATIENT)
Dept: BARIATRICS/WEIGHT MGMT | Age: 60
End: 2020-08-31

## 2020-08-31 NOTE — TELEPHONE ENCOUNTER
Patient scheduled for Manometry on 9/10/20, Covid testing is scheduled for 9/6/20 at 9:30am.  Patient will need something called in to take the morning of procedure, GI nurses do not give medication while patient is there per conversation with GI nurse on 8/31/20.

## 2020-08-31 NOTE — TELEPHONE ENCOUNTER
Called patient to schedule manometry, patient was crying and very hard to hear and understand what she was saying. She hung up the phone on me.

## 2020-08-31 NOTE — TELEPHONE ENCOUNTER
Received phone call from [facility], patient called 911 because of passing blood. Patient passed a golf ball size blood clot, vitals are stable.

## 2020-08-31 NOTE — TELEPHONE ENCOUNTER
Patient called stating she used the restroom this morning and had a large amount of dark red blood ( not black) come out of her rectum. She states she sees Dr Rafi Pereyra and I directed her to contact him regarding the blood. I again told patient I would be calling today to get her scheduled for the Manometry. I let her know if the bleeding continues she is to go the ER.

## 2020-09-01 ENCOUNTER — TELEPHONE (OUTPATIENT)
Dept: GASTROENTEROLOGY | Age: 60
End: 2020-09-01

## 2020-09-03 ENCOUNTER — HOSPITAL ENCOUNTER (OUTPATIENT)
Age: 60
Discharge: HOME | End: 2020-09-03
Payer: MEDICARE

## 2020-09-03 LAB
ABSOLUTE EOS #: 0.11 K/UL (ref 0–0.44)
ABSOLUTE IMMATURE GRANULOCYTE: <0.03 K/UL (ref 0–0.3)
ABSOLUTE LYMPH #: 1.21 K/UL (ref 1.1–3.7)
ABSOLUTE MONO #: 0.58 K/UL (ref 0.1–1.2)
BASOPHILS # BLD: 1 % (ref 0–2)
BASOPHILS ABSOLUTE: 0.05 K/UL (ref 0–0.2)
DIFFERENTIAL TYPE: ABNORMAL
EOSINOPHILS RELATIVE PERCENT: 2 % (ref 1–4)
HCT VFR BLD CALC: 34.1 % (ref 36.3–47.1)
HEMOGLOBIN: 9.7 G/DL (ref 11.9–15.1)
IMMATURE GRANULOCYTES: 0 %
LYMPHOCYTES # BLD: 25 % (ref 24–43)
MCH RBC QN AUTO: 24.9 PG (ref 25.2–33.5)
MCHC RBC AUTO-ENTMCNC: 28.4 G/DL (ref 28.4–34.8)
MCV RBC AUTO: 87.4 FL (ref 82.6–102.9)
MONOCYTES # BLD: 12 % (ref 3–12)
NRBC AUTOMATED: 0 PER 100 WBC
PDW BLD-RTO: 15.9 % (ref 11.8–14.4)
PLATELET # BLD: 395 K/UL (ref 138–453)
PLATELET ESTIMATE: ABNORMAL
PMV BLD AUTO: 10.4 FL (ref 8.1–13.5)
RBC # BLD: 3.9 M/UL (ref 3.95–5.11)
RBC # BLD: ABNORMAL 10*6/UL
SEG NEUTROPHILS: 60 % (ref 36–65)
SEGMENTED NEUTROPHILS ABSOLUTE COUNT: 2.84 K/UL (ref 1.5–8.1)
WBC # BLD: 4.8 K/UL (ref 3.5–11.3)
WBC # BLD: ABNORMAL 10*3/UL

## 2020-09-03 PROCEDURE — 36415 COLL VENOUS BLD VENIPUNCTURE: CPT

## 2020-09-03 PROCEDURE — 85025 COMPLETE CBC W/AUTO DIFF WBC: CPT

## 2020-09-03 NOTE — TELEPHONE ENCOUNTER
I left message letting patient know Dr Angela Patel will write prescription when he comes into the office tomorrow afternoon.

## 2020-09-04 ENCOUNTER — TELEPHONE (OUTPATIENT)
Dept: GASTROENTEROLOGY | Age: 60
End: 2020-09-04

## 2020-09-04 RX ORDER — ALPRAZOLAM 0.5 MG/1
0.5 TABLET ORAL ONCE
Qty: 1 TABLET | Refills: 0 | Status: SHIPPED | OUTPATIENT
Start: 2020-09-04 | End: 2020-09-04

## 2020-09-04 NOTE — TELEPHONE ENCOUNTER
Called pt and left message the HGB is improving and to go to ER if any major bleeding over the holiday weekend.

## 2020-09-06 ENCOUNTER — HOSPITAL ENCOUNTER (OUTPATIENT)
Dept: PREADMISSION TESTING | Age: 60
Setting detail: SPECIMEN
Discharge: HOME | End: 2020-09-10
Payer: MEDICARE

## 2020-09-06 PROCEDURE — U0003 INFECTIOUS AGENT DETECTION BY NUCLEIC ACID (DNA OR RNA); SEVERE ACUTE RESPIRATORY SYNDROME CORONAVIRUS 2 (SARS-COV-2) (CORONAVIRUS DISEASE [COVID-19]), AMPLIFIED PROBE TECHNIQUE, MAKING USE OF HIGH THROUGHPUT TECHNOLOGIES AS DESCRIBED BY CMS-2020-01-R: HCPCS

## 2020-09-09 LAB — SARS-COV-2, NAA: NOT DETECTED

## 2020-09-09 NOTE — TELEPHONE ENCOUNTER
Pt left vm on nurse line at GI office (Dr Betty Bradley). She states that your office isn't scheduling her fast enough and she did mention the manometry but said that she didn't want to schedule that. Pt was crying, insisting that your office wasn't taking care of her. Writer did call pt back this morning but had to leave a vm. Did leave her a vm that there are several notes in her chart from Dr Jen Medrano office that they are ready to schedule surgery after she completes the manometry. Did reassure her that your office needs this test completed first and it appears at last contact, that was not scheduled. Did also instruct pt to call your office today to take care of the manometry and continue forward. Thanks for your help!

## 2020-09-10 ENCOUNTER — TELEPHONE (OUTPATIENT)
Dept: BARIATRICS/WEIGHT MGMT | Age: 60
End: 2020-09-10

## 2020-09-10 ENCOUNTER — HOSPITAL ENCOUNTER (OUTPATIENT)
Age: 60
Setting detail: OUTPATIENT SURGERY
Discharge: HOME | End: 2020-09-10
Attending: SURGERY | Admitting: SURGERY
Payer: MEDICARE

## 2020-09-10 VITALS
RESPIRATION RATE: 18 BRPM | DIASTOLIC BLOOD PRESSURE: 67 MMHG | WEIGHT: 145 LBS | BODY MASS INDEX: 28.47 KG/M2 | HEIGHT: 60 IN | OXYGEN SATURATION: 97 % | SYSTOLIC BLOOD PRESSURE: 98 MMHG | HEART RATE: 102 BPM | TEMPERATURE: 97.8 F

## 2020-09-10 PROCEDURE — 3609015500 HC GASTRIC/DUODENAL MOTILITY &/OR MANOMETRY STUDY: Performed by: SURGERY

## 2020-09-10 ASSESSMENT — PAIN - FUNCTIONAL ASSESSMENT: PAIN_FUNCTIONAL_ASSESSMENT: 0-10

## 2020-09-10 NOTE — TELEPHONE ENCOUNTER
Pt called re motility study which was unable to be completed today due to some issues with her nose; she would like to know next steps; she would like to have the surgery without the study if that is possible; informed the patient 24-48hr response time

## 2020-09-10 NOTE — TELEPHONE ENCOUNTER
Patient needed to complete tests to have surgery. She has not completed them at this point. She has a spastic esophagus, she did not even complete the upper GI, however, the tertiary contractions appears significant, and there is severe spasm. The exam was incomplete. We gave her Xanax to try and complete the manometry. Per nursing staff she was not cooperative and combative this morning in even trying to complete the manometry. We have tried twice at this point. She is welcome to schedule an appointment to discuss her options, however, at this point we have tried multiple times to get her to complete testing so that we can plan for surgery in a safe and appropriate manner. She cannot have surgery without proper evaluation/work up, especially given the spasm seen on her Upper GI.

## 2020-09-10 NOTE — PROGRESS NOTES
0750 AM Procedure explained to patient and questions answered. 2373 am patient transported to procedure room 2. Patient placed in a sitting position, patient tearful, voice concern about not being able to complete study. Patient reassured. Attempted esophageal motility probe placement to right nare. Patient crying, states \" stop, stop, stop\". Patient reassured and encouraged to take deep breathes . Patient allowed time to relax. Attempted esophageal probe placement to left nare. Patient noted to cry and yell \" stop, stop, stop, take it out, take it out . \"'  Procedure aborted and Dr Ana Peguero called. 200 Dr Ana Peguero informed of above events, no orders given.   Katty Biggs RN

## 2020-09-11 ENCOUNTER — TELEPHONE (OUTPATIENT)
Dept: FAMILY MEDICINE CLINIC | Age: 60
End: 2020-09-11

## 2020-09-11 ENCOUNTER — TELEPHONE (OUTPATIENT)
Dept: BARIATRICS/WEIGHT MGMT | Age: 60
End: 2020-09-11

## 2020-09-11 RX ORDER — OXYCODONE HYDROCHLORIDE AND ACETAMINOPHEN 5; 325 MG/1; MG/1
1 TABLET ORAL EVERY 8 HOURS PRN
Qty: 90 TABLET | Refills: 0 | Status: SHIPPED | OUTPATIENT
Start: 2020-09-11 | End: 2020-10-08 | Stop reason: SDUPTHER

## 2020-09-11 RX ORDER — DEXTROAMPHETAMINE SACCHARATE, AMPHETAMINE ASPARTATE, DEXTROAMPHETAMINE SULFATE AND AMPHETAMINE SULFATE 7.5; 7.5; 7.5; 7.5 MG/1; MG/1; MG/1; MG/1
30 TABLET ORAL 2 TIMES DAILY
Qty: 60 TABLET | Refills: 0 | Status: SHIPPED | OUTPATIENT
Start: 2020-09-11 | End: 2020-10-08 | Stop reason: SDUPTHER

## 2020-09-11 NOTE — TELEPHONE ENCOUNTER
Aida Hernandez is calling to request a refill on the following medication(s):    Medication Request:  Requested Prescriptions     Pending Prescriptions Disp Refills    oxyCODONE-acetaminophen (PERCOCET) 5-325 MG per tablet 90 tablet 0     Sig: Take 1 tablet by mouth every 8 hours as needed for Pain for up to 30 days.  amphetamine-dextroamphetamine (ADDERALL) 30 MG tablet 60 tablet 0     Sig: Take 1 tablet by mouth 2 times daily for 30 days.        Last Visit Date (If Applicable):  0/14/9935    Next Visit Date:    10/28/2020

## 2020-09-11 NOTE — TELEPHONE ENCOUNTER
I spoke to patient this morning and no mention of any pain, she was agreeable to seeing dr Vilma Lazaro at the next scheduled office visit to discuss surgery

## 2020-09-11 NOTE — TELEPHONE ENCOUNTER
Patient says she is having severe pain in her stomach, she rates it a an 8 and it is getting worse. The percocet doesn't help it much. \"It's like there is a knife stuck between my ribs\"  Please advise.   Pt has not been able to do EMG yet, even though she has tried 4 times

## 2020-09-11 NOTE — TELEPHONE ENCOUNTER
I really have no suggestion for her other than she seek ER care and evaluation to determine exact cause she will likely need xrays and lab work which can more quickly be obtained in hospital setting.

## 2020-09-11 NOTE — TELEPHONE ENCOUNTER
Pt called as pain like a knife between ribs that is severe and increasing; the patient called earlier today as well and was inconsolable re unable to do the motility study and surgery delay because of that; she feels like the pain may be related to anxiety possible; she has no other symptoms at this time except nausea;  she took a percocet but the pain continues; informed pt 24-48hrs; go to ER if pain continues; of note pt also has called PCP re same issue please see their phone encounter as well

## 2020-09-17 ENCOUNTER — OFFICE VISIT (OUTPATIENT)
Dept: BARIATRICS/WEIGHT MGMT | Age: 60
End: 2020-09-17
Payer: MEDICARE

## 2020-09-17 VITALS
DIASTOLIC BLOOD PRESSURE: 70 MMHG | WEIGHT: 145 LBS | SYSTOLIC BLOOD PRESSURE: 120 MMHG | TEMPERATURE: 97.3 F | HEART RATE: 68 BPM | BODY MASS INDEX: 28.32 KG/M2

## 2020-09-17 PROCEDURE — 99213 OFFICE O/P EST LOW 20 MIN: CPT | Performed by: SURGERY

## 2020-09-17 NOTE — Clinical Note
Dr. Betsy Edward    We are going to try and complete testing again. She has been unable to complete UGI or manometry. I have given her Xanax to try and complete the tests. This did not work. We will try again.     Thanks    Judge Golden

## 2020-09-25 ENCOUNTER — HOSPITAL ENCOUNTER (OUTPATIENT)
Dept: PREADMISSION TESTING | Age: 60
Setting detail: SPECIMEN
Discharge: HOME | End: 2020-09-29
Payer: MEDICARE

## 2020-09-25 PROCEDURE — U0003 INFECTIOUS AGENT DETECTION BY NUCLEIC ACID (DNA OR RNA); SEVERE ACUTE RESPIRATORY SYNDROME CORONAVIRUS 2 (SARS-COV-2) (CORONAVIRUS DISEASE [COVID-19]), AMPLIFIED PROBE TECHNIQUE, MAKING USE OF HIGH THROUGHPUT TECHNOLOGIES AS DESCRIBED BY CMS-2020-01-R: HCPCS

## 2020-09-26 LAB — SARS-COV-2, NAA: NOT DETECTED

## 2020-09-27 NOTE — PROGRESS NOTES
Dysphagia   [] Melena   [] BRBPR  [x] Vomiting   [x] Abdominal Pain   [] Diarrhea  [] Hernia    [] Constipation  [] Other:     Positive for: [] Heartburn   [] Reflux   [] Dysphagia   [] Melena   [] BRBPR  [] Vomiting   [] Abdominal Pain   [] Diarrhea  [] Hernia    [] Constipation  [] Other:    Muskuloskeletal Negative for: [] Joint pain   [] Back pain   [] Knee Pain   [] Muscle weakness   [x] Edema [] Other:     Positive for: [x] Joint pain   [] Back pain   [] Knee Pain   [] Muscle weakness  [] Edema [] Other:    Neurologic Negative for: [x] Syncope   [] Insomnia   [] Being treated for depression  [] Other:     Positive for: [] Syncope   [] Insomnia   [x] Being treated for depression  [] Other:    Skin Negative for: [] Wound:   [] Open   [] Draining   [] Incisional     [x] Rash   [x] Hair Loss  [] Other:     Positive for: [] Wound:   [] Open   [] Draining    [] Incisional  [] Rash   [] Hair Loss  [] Other:          PHYSICAL EXAMINATION:      /70   Pulse 68   Temp 97.3 °F (36.3 °C)   Wt 145 lb (65.8 kg)   BMI 28.32 kg/m²   Constitutional:  Vital signs are normal. The patient appears well-developed and well-nourished. HEENT:   Head: Normocephalic. Atraumatic  Eyes: pupils are equal and reactive. No scleral icterus is present. Neck: No mass and no thyromegaly present. Cardiovascular: Normal rate, regular rhythm, S1 normal and S2 normal.  Radial pulses present   Pulmonary/Chest: Effort normal and breath sounds normal. No retractions  Abdominal: Soft. Normal appearance. There is no organomegaly. No tenderness. There is no rigidity, no rebound, no guarding and no Copeland's sign. Musculoskeletal:        Right lower leg: Normal. No tenderness and no edema. Left lower leg: Normal. No tenderness and no edema. Neurological: The patient is alert and oriented. Moving all 4 extremities, sensation grossly intact bilateral  Skin: Skin is warm, dry and intact.    Psychiatric: The patient has a normal mood

## 2020-09-29 ENCOUNTER — HOSPITAL ENCOUNTER (OUTPATIENT)
Dept: GENERAL RADIOLOGY | Age: 60
Discharge: HOME | End: 2020-10-01
Payer: MEDICARE

## 2020-09-29 PROCEDURE — 2500000003 HC RX 250 WO HCPCS: Performed by: SURGERY

## 2020-09-29 PROCEDURE — 74246 X-RAY XM UPR GI TRC 2CNTRST: CPT

## 2020-09-29 RX ADMIN — BARIUM SULFATE 130 ML: 980 POWDER, FOR SUSPENSION ORAL at 10:15

## 2020-10-06 ENCOUNTER — TELEPHONE (OUTPATIENT)
Dept: BARIATRICS/WEIGHT MGMT | Age: 60
End: 2020-10-06

## 2020-10-06 NOTE — TELEPHONE ENCOUNTER
Patient called stating she had testing done about a week ago - believes it was a barium swallow study. Would like to know what the next step in her plan of care is. Please advise.

## 2020-10-08 RX ORDER — DEXTROAMPHETAMINE SACCHARATE, AMPHETAMINE ASPARTATE, DEXTROAMPHETAMINE SULFATE AND AMPHETAMINE SULFATE 7.5; 7.5; 7.5; 7.5 MG/1; MG/1; MG/1; MG/1
30 TABLET ORAL 2 TIMES DAILY
Qty: 60 TABLET | Refills: 0 | Status: SHIPPED | OUTPATIENT
Start: 2020-10-08 | End: 2020-11-03 | Stop reason: SDUPTHER

## 2020-10-08 RX ORDER — OXYCODONE HYDROCHLORIDE AND ACETAMINOPHEN 5; 325 MG/1; MG/1
1 TABLET ORAL EVERY 8 HOURS PRN
Qty: 90 TABLET | Refills: 0 | Status: SHIPPED | OUTPATIENT
Start: 2020-10-08 | End: 2020-11-03 | Stop reason: SDUPTHER

## 2020-10-19 RX ORDER — PROPRANOLOL HYDROCHLORIDE 80 MG/1
CAPSULE, EXTENDED RELEASE ORAL
Qty: 90 CAPSULE | Refills: 0 | Status: SHIPPED | OUTPATIENT
Start: 2020-10-19 | End: 2021-01-27

## 2020-10-19 NOTE — TELEPHONE ENCOUNTER
Mark Obrien is calling to request a refill on the following medication(s):    Medication Request:  Requested Prescriptions     Pending Prescriptions Disp Refills    propranolol (INDERAL LA) 80 MG extended release capsule [Pharmacy Med Name: PROPRANOLOL ER 80 MG CAPSULE] 90 capsule 0     Sig: TAKE ONE CAPSULE BY MOUTH DAILY       Last Visit Date (If Applicable):  7/71/8414    Next Visit Date:    10/28/2020

## 2020-10-22 ENCOUNTER — OFFICE VISIT (OUTPATIENT)
Dept: BARIATRICS/WEIGHT MGMT | Age: 60
End: 2020-10-22
Payer: MEDICARE

## 2020-10-22 ENCOUNTER — TELEPHONE (OUTPATIENT)
Dept: FAMILY MEDICINE CLINIC | Age: 60
End: 2020-10-22

## 2020-10-22 VITALS
SYSTOLIC BLOOD PRESSURE: 146 MMHG | BODY MASS INDEX: 29.25 KG/M2 | TEMPERATURE: 97.1 F | HEART RATE: 80 BPM | DIASTOLIC BLOOD PRESSURE: 94 MMHG | HEIGHT: 60 IN | WEIGHT: 149 LBS

## 2020-10-22 PROCEDURE — 99213 OFFICE O/P EST LOW 20 MIN: CPT | Performed by: SURGERY

## 2020-10-28 ENCOUNTER — HOSPITAL ENCOUNTER (OUTPATIENT)
Age: 60
Setting detail: OBSERVATION
Discharge: HOME | End: 2020-10-30
Attending: EMERGENCY MEDICINE | Admitting: INTERNAL MEDICINE
Payer: MEDICARE

## 2020-10-28 ENCOUNTER — TELEPHONE (OUTPATIENT)
Dept: FAMILY MEDICINE CLINIC | Age: 60
End: 2020-10-28

## 2020-10-28 ENCOUNTER — HOSPITAL ENCOUNTER (OUTPATIENT)
Age: 60
Setting detail: SPECIMEN
Discharge: HOME | End: 2020-10-28
Payer: MEDICARE

## 2020-10-28 ENCOUNTER — OFFICE VISIT (OUTPATIENT)
Dept: FAMILY MEDICINE CLINIC | Age: 60
End: 2020-10-28
Payer: MEDICARE

## 2020-10-28 VITALS
HEART RATE: 68 BPM | HEIGHT: 60 IN | OXYGEN SATURATION: 95 % | DIASTOLIC BLOOD PRESSURE: 80 MMHG | SYSTOLIC BLOOD PRESSURE: 120 MMHG | TEMPERATURE: 97.5 F | BODY MASS INDEX: 29.09 KG/M2 | WEIGHT: 148.2 LBS

## 2020-10-28 PROBLEM — D64.9 ANEMIA: Status: ACTIVE | Noted: 2020-10-28

## 2020-10-28 LAB
ABSOLUTE EOS #: 0 K/UL (ref 0–0.4)
ABSOLUTE EOS #: 0.06 K/UL (ref 0–0.4)
ABSOLUTE IMMATURE GRANULOCYTE: 0 K/UL (ref 0–0.3)
ABSOLUTE IMMATURE GRANULOCYTE: 0 K/UL (ref 0–0.3)
ABSOLUTE LYMPH #: 0.73 K/UL (ref 1–4.8)
ABSOLUTE LYMPH #: 2.04 K/UL (ref 1–4.8)
ABSOLUTE MONO #: 0.36 K/UL (ref 0.2–0.8)
ABSOLUTE MONO #: 0.52 K/UL (ref 0.1–0.8)
ABSOLUTE RETIC #: 0.06 M/UL (ref 0.03–0.08)
ALBUMIN SERPL-MCNC: 3.8 G/DL (ref 3.5–5.2)
ALBUMIN SERPL-MCNC: 4 G/DL (ref 3.5–5.2)
ALBUMIN/GLOBULIN RATIO: 1.6 (ref 1–2.5)
ALBUMIN/GLOBULIN RATIO: ABNORMAL (ref 1–2.5)
ALP BLD-CCNC: 89 U/L (ref 35–104)
ALP BLD-CCNC: 94 U/L (ref 35–104)
ALT SERPL-CCNC: 11 U/L (ref 5–33)
ALT SERPL-CCNC: 11 U/L (ref 5–33)
ANION GAP SERPL CALCULATED.3IONS-SCNC: 10 MMOL/L (ref 9–17)
ANION GAP SERPL CALCULATED.3IONS-SCNC: 10 MMOL/L (ref 9–17)
AST SERPL-CCNC: 16 U/L
AST SERPL-CCNC: 18 U/L
BASOPHILS # BLD: 0 % (ref 0–2)
BASOPHILS # BLD: 2 %
BASOPHILS ABSOLUTE: 0 K/UL (ref 0–0.2)
BASOPHILS ABSOLUTE: 0.12 K/UL (ref 0–0.2)
BILIRUB SERPL-MCNC: 0.12 MG/DL (ref 0.3–1.2)
BILIRUB SERPL-MCNC: <0.1 MG/DL (ref 0.3–1.2)
BILIRUBIN DIRECT: <0.08 MG/DL
BILIRUBIN, INDIRECT: ABNORMAL MG/DL (ref 0–1)
BUN BLDV-MCNC: 15 MG/DL (ref 8–23)
BUN BLDV-MCNC: 17 MG/DL (ref 8–23)
BUN/CREAT BLD: 14 (ref 9–20)
BUN/CREAT BLD: ABNORMAL (ref 9–20)
CALCIUM SERPL-MCNC: 8.8 MG/DL (ref 8.6–10.4)
CALCIUM SERPL-MCNC: 9.1 MG/DL (ref 8.6–10.4)
CHLORIDE BLD-SCNC: 106 MMOL/L (ref 98–107)
CHLORIDE BLD-SCNC: 106 MMOL/L (ref 98–107)
CO2: 22 MMOL/L (ref 20–31)
CO2: 24 MMOL/L (ref 20–31)
CREAT SERPL-MCNC: 1.15 MG/DL (ref 0.5–0.9)
CREAT SERPL-MCNC: 1.2 MG/DL (ref 0.5–0.9)
DIFFERENTIAL TYPE: ABNORMAL
DIFFERENTIAL TYPE: ABNORMAL
EOSINOPHILS RELATIVE PERCENT: 0 % (ref 1–4)
EOSINOPHILS RELATIVE PERCENT: 1 % (ref 1–4)
FERRITIN: 6 UG/L (ref 13–150)
GFR AFRICAN AMERICAN: 55 ML/MIN
GFR AFRICAN AMERICAN: 58 ML/MIN
GFR NON-AFRICAN AMERICAN: 46 ML/MIN
GFR NON-AFRICAN AMERICAN: 48 ML/MIN
GFR SERPL CREATININE-BSD FRML MDRD: ABNORMAL ML/MIN/{1.73_M2}
GLOBULIN: ABNORMAL G/DL (ref 1.5–3.8)
GLUCOSE BLD-MCNC: 104 MG/DL (ref 70–99)
GLUCOSE BLD-MCNC: 94 MG/DL (ref 70–99)
HCT VFR BLD CALC: 23.9 % (ref 36.3–47.1)
HCT VFR BLD CALC: 23.9 % (ref 36.3–47.1)
HEMOGLOBIN: 6.5 G/DL (ref 11.9–15.1)
HEMOGLOBIN: 6.5 G/DL (ref 11.9–15.1)
IMMATURE GRANULOCYTES: 0 %
IMMATURE GRANULOCYTES: 0 %
IMMATURE RETIC FRACT: 32.9 % (ref 2.7–18.3)
INR BLD: 1.3
LACTATE DEHYDROGENASE: 221 U/L (ref 135–214)
LYMPHOCYTES # BLD: 14 % (ref 24–44)
LYMPHOCYTES # BLD: 34 % (ref 24–44)
MCH RBC QN AUTO: 21.8 PG (ref 25.2–33.5)
MCH RBC QN AUTO: 22.2 PG (ref 25.2–33.5)
MCHC RBC AUTO-ENTMCNC: 27.2 G/DL (ref 28.4–34.8)
MCHC RBC AUTO-ENTMCNC: 27.2 G/DL (ref 28.4–34.8)
MCV RBC AUTO: 80.2 FL (ref 82.6–102.9)
MCV RBC AUTO: 81.6 FL (ref 82.6–102.9)
MONOCYTES # BLD: 10 % (ref 1–7)
MONOCYTES # BLD: 6 % (ref 1–7)
MORPHOLOGY: ABNORMAL
NRBC AUTOMATED: 0.3 PER 100 WBC
NRBC AUTOMATED: 0.4 PER 100 WBC
NUCLEATED RED BLOOD CELLS: 1 PER 100 WBC
NUCLEATED RED BLOOD CELLS: 1 PER 100 WBC
PDW BLD-RTO: 16.1 % (ref 11.8–14.4)
PDW BLD-RTO: 16.2 % (ref 11.8–14.4)
PLATELET # BLD: 437 K/UL (ref 138–453)
PLATELET # BLD: 453 K/UL (ref 138–453)
PLATELET ESTIMATE: ABNORMAL
PLATELET ESTIMATE: ABNORMAL
PMV BLD AUTO: 10.7 FL (ref 8.1–13.5)
PMV BLD AUTO: 11.3 FL (ref 8.1–13.5)
POTASSIUM SERPL-SCNC: 3.4 MMOL/L (ref 3.7–5.3)
POTASSIUM SERPL-SCNC: 3.8 MMOL/L (ref 3.7–5.3)
PROTHROMBIN TIME: 15.5 SEC (ref 11.5–14.2)
RBC # BLD: 2.93 M/UL (ref 3.95–5.11)
RBC # BLD: 2.98 M/UL (ref 3.95–5.11)
RBC # BLD: ABNORMAL 10*6/UL
RBC # BLD: ABNORMAL 10*6/UL
RETIC %: 2.1 % (ref 0.5–1.9)
RETIC HEMOGLOBIN: 16.6 PG (ref 28.2–35.7)
SEG NEUTROPHILS: 57 % (ref 36–66)
SEG NEUTROPHILS: 76 % (ref 36–66)
SEGMENTED NEUTROPHILS ABSOLUTE COUNT: 3.42 K/UL (ref 1.8–7.7)
SEGMENTED NEUTROPHILS ABSOLUTE COUNT: 3.95 K/UL (ref 1.8–7.7)
SODIUM BLD-SCNC: 138 MMOL/L (ref 135–144)
SODIUM BLD-SCNC: 140 MMOL/L (ref 135–144)
TOTAL PROTEIN: 6.2 G/DL (ref 6.4–8.3)
TOTAL PROTEIN: 6.3 G/DL (ref 6.4–8.3)
WBC # BLD: 5.2 K/UL (ref 3.5–11.3)
WBC # BLD: 6 K/UL (ref 3.5–11.3)
WBC # BLD: ABNORMAL 10*3/UL
WBC # BLD: ABNORMAL 10*3/UL

## 2020-10-28 PROCEDURE — 80048 BASIC METABOLIC PNL TOTAL CA: CPT

## 2020-10-28 PROCEDURE — 96375 TX/PRO/DX INJ NEW DRUG ADDON: CPT

## 2020-10-28 PROCEDURE — 6360000002 HC RX W HCPCS: Performed by: EMERGENCY MEDICINE

## 2020-10-28 PROCEDURE — 86920 COMPATIBILITY TEST SPIN: CPT

## 2020-10-28 PROCEDURE — 85045 AUTOMATED RETICULOCYTE COUNT: CPT

## 2020-10-28 PROCEDURE — 83540 ASSAY OF IRON: CPT

## 2020-10-28 PROCEDURE — 82728 ASSAY OF FERRITIN: CPT

## 2020-10-28 PROCEDURE — 99283 EMERGENCY DEPT VISIT LOW MDM: CPT

## 2020-10-28 PROCEDURE — 86850 RBC ANTIBODY SCREEN: CPT

## 2020-10-28 PROCEDURE — 86901 BLOOD TYPING SEROLOGIC RH(D): CPT

## 2020-10-28 PROCEDURE — 82746 ASSAY OF FOLIC ACID SERUM: CPT

## 2020-10-28 PROCEDURE — 2580000003 HC RX 258: Performed by: EMERGENCY MEDICINE

## 2020-10-28 PROCEDURE — 99213 OFFICE O/P EST LOW 20 MIN: CPT | Performed by: FAMILY MEDICINE

## 2020-10-28 PROCEDURE — 85610 PROTHROMBIN TIME: CPT

## 2020-10-28 PROCEDURE — C9113 INJ PANTOPRAZOLE SODIUM, VIA: HCPCS | Performed by: EMERGENCY MEDICINE

## 2020-10-28 PROCEDURE — 85025 COMPLETE CBC W/AUTO DIFF WBC: CPT

## 2020-10-28 PROCEDURE — 83550 IRON BINDING TEST: CPT

## 2020-10-28 PROCEDURE — 83615 LACTATE (LD) (LDH) ENZYME: CPT

## 2020-10-28 PROCEDURE — G0378 HOSPITAL OBSERVATION PER HR: HCPCS

## 2020-10-28 PROCEDURE — 82607 VITAMIN B-12: CPT

## 2020-10-28 PROCEDURE — 1200000000 HC SEMI PRIVATE

## 2020-10-28 PROCEDURE — 36415 COLL VENOUS BLD VENIPUNCTURE: CPT

## 2020-10-28 PROCEDURE — 80053 COMPREHEN METABOLIC PANEL: CPT

## 2020-10-28 PROCEDURE — 99222 1ST HOSP IP/OBS MODERATE 55: CPT | Performed by: NURSE PRACTITIONER

## 2020-10-28 PROCEDURE — 86900 BLOOD TYPING SEROLOGIC ABO: CPT

## 2020-10-28 PROCEDURE — 80076 HEPATIC FUNCTION PANEL: CPT

## 2020-10-28 RX ORDER — CALCIUM CARBONATE/VITAMIN D3 600 MG-10
1 TABLET ORAL DAILY
Status: DISCONTINUED | OUTPATIENT
Start: 2020-10-29 | End: 2020-10-30 | Stop reason: HOSPADM

## 2020-10-28 RX ORDER — ALBUTEROL SULFATE 90 UG/1
AEROSOL, METERED RESPIRATORY (INHALATION)
Qty: 1 INHALER | Refills: 5 | Status: ON HOLD | OUTPATIENT
Start: 2020-10-28 | End: 2022-07-15 | Stop reason: HOSPADM

## 2020-10-28 RX ORDER — PANTOPRAZOLE SODIUM 40 MG/10ML
40 INJECTION, POWDER, LYOPHILIZED, FOR SOLUTION INTRAVENOUS ONCE
Status: COMPLETED | OUTPATIENT
Start: 2020-10-28 | End: 2020-10-28

## 2020-10-28 RX ORDER — BUPROPION HYDROCHLORIDE 150 MG/1
300 TABLET ORAL DAILY
Status: DISCONTINUED | OUTPATIENT
Start: 2020-10-29 | End: 2020-10-30 | Stop reason: HOSPADM

## 2020-10-28 RX ORDER — SODIUM CHLORIDE 9 MG/ML
10 INJECTION INTRAVENOUS ONCE
Status: COMPLETED | OUTPATIENT
Start: 2020-10-28 | End: 2020-10-28

## 2020-10-28 RX ORDER — PROPRANOLOL HYDROCHLORIDE 80 MG/1
80 CAPSULE, EXTENDED RELEASE ORAL DAILY
Status: DISCONTINUED | OUTPATIENT
Start: 2020-10-29 | End: 2020-10-30 | Stop reason: HOSPADM

## 2020-10-28 RX ORDER — LANOLIN ALCOHOL/MO/W.PET/CERES
325 CREAM (GRAM) TOPICAL 2 TIMES DAILY
Status: DISCONTINUED | OUTPATIENT
Start: 2020-10-28 | End: 2020-10-29

## 2020-10-28 RX ORDER — PANTOPRAZOLE SODIUM 40 MG/10ML
40 INJECTION, POWDER, LYOPHILIZED, FOR SOLUTION INTRAVENOUS EVERY 12 HOURS
Status: DISCONTINUED | OUTPATIENT
Start: 2020-10-29 | End: 2020-10-30

## 2020-10-28 RX ORDER — SODIUM CHLORIDE 9 MG/ML
INJECTION, SOLUTION INTRAVENOUS CONTINUOUS
Status: DISCONTINUED | OUTPATIENT
Start: 2020-10-28 | End: 2020-10-30 | Stop reason: HOSPADM

## 2020-10-28 RX ORDER — SODIUM CHLORIDE 9 MG/ML
10 INJECTION INTRAVENOUS EVERY 12 HOURS
Status: DISCONTINUED | OUTPATIENT
Start: 2020-10-29 | End: 2020-10-30

## 2020-10-28 RX ORDER — CITALOPRAM 20 MG/1
40 TABLET ORAL DAILY
Status: DISCONTINUED | OUTPATIENT
Start: 2020-10-29 | End: 2020-10-30 | Stop reason: HOSPADM

## 2020-10-28 RX ADMIN — SODIUM CHLORIDE: 9 INJECTION, SOLUTION INTRAVENOUS at 20:42

## 2020-10-28 RX ADMIN — PANTOPRAZOLE SODIUM 40 MG: 40 INJECTION, POWDER, FOR SOLUTION INTRAVENOUS at 20:42

## 2020-10-28 RX ADMIN — Medication 10 ML: at 20:42

## 2020-10-28 ASSESSMENT — ENCOUNTER SYMPTOMS
ALLERGIC/IMMUNOLOGIC NEGATIVE: 1
NAUSEA: 0
SHORTNESS OF BREATH: 1
DIARRHEA: 0
RESPIRATORY NEGATIVE: 1
GASTROINTESTINAL NEGATIVE: 1
SINUS PRESSURE: 0
COUGH: 0
SORE THROAT: 0
VOMITING: 0
BACK PAIN: 0

## 2020-10-28 ASSESSMENT — PATIENT HEALTH QUESTIONNAIRE - PHQ9
SUM OF ALL RESPONSES TO PHQ QUESTIONS 1-9: 0
2. FEELING DOWN, DEPRESSED OR HOPELESS: 0
SUM OF ALL RESPONSES TO PHQ9 QUESTIONS 1 & 2: 0
SUM OF ALL RESPONSES TO PHQ QUESTIONS 1-9: 0
SUM OF ALL RESPONSES TO PHQ QUESTIONS 1-9: 0
1. LITTLE INTEREST OR PLEASURE IN DOING THINGS: 0

## 2020-10-28 NOTE — TELEPHONE ENCOUNTER
Critical lab result hgb of 6.5. Patient called with results but no answer but message left with instructions to go to the ER tonight to have this addressed. Plan to follow up throughout this evening to ensure she gets this message and acts upon it.

## 2020-10-28 NOTE — PROGRESS NOTES
Radha Torres is a 63 y.o. female who presents todayfor her medical conditions/complaints as noted below. Radha Torres is here today c/oOther (2 month)      : HPI    Routine follow up she is having more dyspnea and feels her anemia is the cause. She is on Xarelto too. Past Medical History:   Diagnosis Date    Acid reflux     Adult ADHD 2012    Asthma 2012    Bipolar 1 disorder ([facility]     Broken foot     Left foot    Chest pain of uncertain etiology     Last episode was in  (Written 2019)    Depression     Under control per pt.  on 6/15/18    DVT of leg (deep venous thrombosis) ([facility] 2014    Fracture     right foot / pt denies surgical intervention    Helicobacter pylori (H. pylori) infection     per EGD    Hematuria 2018    Has a follow-up with Urologist     Hiatal hernia     History of blood transfusion     History of DVT (deep vein thrombosis) 2012    Hx of blood clots     Hypertension     Internal hemorrhoids     Narcolepsy 2012    Osteoporosis       Past Surgical History:   Procedure Laterality Date    ANKLE SURGERY Left 2018    mass excision    ARM SURGERY Right     Pt states she has had 13 right arm surgeries    BREAST ENHANCEMENT SURGERY Bilateral      SECTION      x 3    COLONOSCOPY  2013    sigmoid diverticula, prominent large internal hemorrhoid    COLONOSCOPY N/A 2/10/2020    COLONOSCOPY DIAGNOSTIC performed by Ibis Garnica MD at [address] [facility] N/A 2020    COLORECTAL CANCER SCREENING, NOT HIGH RISK performed by Shann Spurling, MD at [address], COLON, DIAGNOSTIC      ESOPHAGEAL MOTILITY STUDY N/A 2020    PES RN - ESOPHAGEAL MOTILITY STUDY performed by Avis Martinez DO at [facility] Count includes the [facility] STUDY  9/10/2020    ESOPHAGEAL MOTILITY STUDY performed by Avis Martinez DO at [facility]    FOOT SURGERY Left     FOOT TENDON SURGERY Left 2019    LEFT PERONEAL TENDON REPAIR WITH POSSIBLE LEFT TENDON TRANSFER (Left )    KNEE ARTHROSCOPY Left     Two surgeries on left knee per pt    KNEE SURGERY Right     Three surgeries per pt    LIGAMENT REPAIR Left 2019    LEFT PERONEAL TENDON REPAIR performed by Ayan Ivy DPM at [facility] OFFICE/OUTPT VISIT,PROCEDURE ONLY Left 2018    RESECTION/REMOVAL BONY NEOPLASM LEFT FIBULA, LEFT SOFT TISSUE MASS EXCISION WITH BONE BIOPSY LEFT ANKLE FIBULA performed by Ayan Ivy DPM at [address] [facility] Left    [facility] ENDOSCOPY  2013    tertiary contractures esophagus, 3 to 4 cm hiatal hernia, antral gastritis, + H Pylori, mild active chronic esophagitis    UPPER GASTROINTESTINAL ENDOSCOPY  2016    one biopsy    UPPER GASTROINTESTINAL ENDOSCOPY N/A 2/10/2020    EGD BIOPSY performed by Margret Quiñones MD at [address]       Family History   Problem Relation Age of Onset    Cancer Mother     Hypertension Mother     Stroke Mother     Cancer Maternal Aunt     Hypertension Father     Heart Surgery Father     Diabetes Neg Hx      Social History     Tobacco Use    Smoking status: Former Smoker     Packs/day: 1.00     Years: 20.00     Pack years: 20.00     Last attempt to quit:      Years since quittin.8    Smokeless tobacco: Never Used   Substance Use Topics    Alcohol use: Yes     Comment: rare      Current Outpatient Medications   Medication Sig Dispense Refill    albuterol sulfate HFA (VENTOLIN HFA) 108 (90 Base) MCG/ACT inhaler INHALE TWO PUFFS BY MOUTH EVERY 4 HOURS AS NEEDED FOR WHEEZING 1 Inhaler 5    propranolol (INDERAL LA) 80 MG extended release capsule TAKE ONE CAPSULE BY MOUTH DAILY 90 capsule 0    oxyCODONE-acetaminophen (PERCOCET) 5-325 MG per tablet Take 1 tablet by mouth every 8 hours as needed for Pain for up to 30 days.  90 tablet 0    amphetamine-dextroamphetamine (ADDERALL) 30 MG tablet Take 1 tablet by mouth 2 times daily for 30 days. 60 tablet 0    pantoprazole (PROTONIX) 40 MG tablet TAKE ONE TABLET BY MOUTH EVERY MORNING BEFORE BREAKFAST 90 tablet 0    cyclobenzaprine (FLEXERIL) 10 MG tablet TAKE ONE-HALF TO ONE TABLET BY MOUTH THREE TIMES A DAY AS NEEDED FOR MUSCLE SPASMS 30 tablet 2    ferrous sulfate (FE TABS) 325 (65 Fe) MG EC tablet Take 1 tablet by mouth 2 times daily 90 tablet 3    citalopram (CELEXA) 40 MG tablet TAKE ONE TABLET BY MOUTH DAILY 90 tablet 3    rivaroxaban (XARELTO) 20 MG TABS tablet TAKE ONE TABLET BY MOUTH DAILY 90 tablet 3    mometasone (ASMANEX HFA) 200 MCG/ACT AERO inhaler Inhale 2 puffs into the lungs 2 times daily 3 Inhaler 3    busPIRone (BUSPAR) 15 MG tablet Take 15 mg by mouth 3 times daily 90 tablet 3    albuterol (PROVENTIL) (2.5 MG/3ML) 0.083% nebulizer solution Take 2.5 mg by nebulization every 6 hours as needed for Wheezing or Shortness of Breath      alendronate (FOSAMAX) 70 MG tablet TAKE 1 TABLET BY MOUTH ONCE WEEKLY BEFORE BREAKFAST, ON AN EMPTY STOMACH: REMAIN UPRIGHT FOR 30 MINUTES (Patient taking differently: TAKE 1 TABLET BY MOUTH ONCE WEEKLY BEFORE BREAKFAST, ON AN EMPTY STOMACH: REMAIN UPRIGHT FOR 30 MINUTES, Takes on Fridays) 4 tablet 11    buPROPion (WELLBUTRIN XL) 300 MG extended release tablet TAKE ONE TABLET BY MOUTH EVERY MORNING 90 tablet 3    Calcium Carbonate (CALCIUM 600 PO) Take 600 mg by mouth daily       No current facility-administered medications for this visit. Allergies   Allergen Reactions    Latex Itching    Prozac [Fluoxetine Hcl]     Sulfa Antibiotics      migraine    Codeine Itching         Subjective:   Review of Systems   Constitutional: Negative for chills, diaphoresis and fever. HENT: Negative for congestion, sinus pressure and sore throat. Eyes: Negative for visual disturbance. Respiratory: Positive for shortness of breath.  Negative for cough. Cardiovascular: Negative for chest pain and leg swelling. Gastrointestinal: Negative for diarrhea, nausea and vomiting. Genitourinary: Negative for dysuria, menstrual problem, urgency and vaginal discharge. Musculoskeletal: Negative for arthralgias, back pain and myalgias. Skin: Negative for rash. Neurological: Negative for weakness, numbness and headaches. Psychiatric/Behavioral: Negative for sleep disturbance.       :   /80   Pulse 68   Temp 97.5 °F (36.4 °C)   Ht 5' (1.524 m)   Wt 148 lb 3.2 oz (67.2 kg)   SpO2 95%   BMI 28.94 kg/m²     Physical Exam  Constitutional:       General: She is not in acute distress. Appearance: She is well-developed. She is not diaphoretic. HENT:      Head: Normocephalic and atraumatic. Eyes:      General: No scleral icterus. Neck:      Musculoskeletal: Neck supple. Vascular: No carotid bruit. Cardiovascular:      Rate and Rhythm: Normal rate and regular rhythm. Heart sounds: No murmur. No friction rub. No gallop. Pulmonary:      Effort: No respiratory distress. Breath sounds: No wheezing or rales. Chest:      Chest wall: No tenderness. Musculoskeletal:      Right lower leg: No edema. Left lower leg: No edema. Lymphadenopathy:      Cervical: No cervical adenopathy. Skin:     Findings: No rash. Neurological:      Mental Status: She is alert and oriented to person, place, and time. Cranial Nerves: No cranial nerve deficit. Psychiatric:         Behavior: Behavior normal.         Thought Content: Thought content normal.         Judgment: Judgment normal.         Assessment:       Diagnosis Orders   1. Moderate persistent asthma without complication     2. Dyspnea on exertion           Plan:      Return in about 4 weeks (around 11/25/2020), or if symptoms worsen or fail to improve. No orders of the defined types were placed in this encounter.     Orders Placed This Encounter   Medications    albuterol

## 2020-10-29 ENCOUNTER — TELEPHONE (OUTPATIENT)
Dept: BARIATRICS/WEIGHT MGMT | Age: 60
End: 2020-10-29

## 2020-10-29 PROBLEM — F41.9 ANXIETY: Status: ACTIVE | Noted: 2020-10-29

## 2020-10-29 LAB
ABO/RH: NORMAL
ANION GAP SERPL CALCULATED.3IONS-SCNC: 8 MMOL/L (ref 9–17)
ANTIBODY SCREEN: NEGATIVE
ARM BAND NUMBER: NORMAL
BLD PROD TYP BPU: NORMAL
BUN BLDV-MCNC: 17 MG/DL (ref 8–23)
BUN/CREAT BLD: 15 (ref 9–20)
CALCIUM SERPL-MCNC: 8.4 MG/DL (ref 8.6–10.4)
CHLORIDE BLD-SCNC: 104 MMOL/L (ref 98–107)
CO2: 24 MMOL/L (ref 20–31)
CREAT SERPL-MCNC: 1.13 MG/DL (ref 0.5–0.9)
CROSSMATCH RESULT: NORMAL
DATE, STOOL #1: ABNORMAL
DATE, STOOL #2: ABNORMAL
DATE, STOOL #3: ABNORMAL
DISPENSE STATUS BLOOD BANK: NORMAL
EXPIRATION DATE: NORMAL
FOLATE: 16 NG/ML
GFR AFRICAN AMERICAN: 59 ML/MIN
GFR NON-AFRICAN AMERICAN: 49 ML/MIN
GFR SERPL CREATININE-BSD FRML MDRD: ABNORMAL ML/MIN/{1.73_M2}
GFR SERPL CREATININE-BSD FRML MDRD: ABNORMAL ML/MIN/{1.73_M2}
GLUCOSE BLD-MCNC: 92 MG/DL (ref 70–99)
HCT VFR BLD CALC: 25 % (ref 36.3–47.1)
HCT VFR BLD CALC: 25.4 % (ref 36.3–47.1)
HCT VFR BLD CALC: 26.5 % (ref 36.3–47.1)
HEMOCCULT SP1 STL QL: POSITIVE
HEMOCCULT SP2 STL QL: ABNORMAL
HEMOCCULT SP3 STL QL: ABNORMAL
HEMOGLOBIN: 7.1 G/DL (ref 11.9–15.1)
HEMOGLOBIN: 7.3 G/DL (ref 11.9–15.1)
HEMOGLOBIN: 7.6 G/DL (ref 11.9–15.1)
IRON SATURATION: 4 % (ref 20–55)
IRON SATURATION: 7 % (ref 20–55)
IRON: 17 UG/DL (ref 37–145)
IRON: 23 UG/DL (ref 37–145)
MAGNESIUM: 2 MG/DL (ref 1.6–2.6)
POTASSIUM SERPL-SCNC: 3.5 MMOL/L (ref 3.7–5.3)
SODIUM BLD-SCNC: 136 MMOL/L (ref 135–144)
TIME, STOOL #1: 1935
TIME, STOOL #2: ABNORMAL
TIME, STOOL #3: ABNORMAL
TOTAL IRON BINDING CAPACITY: 350 UG/DL (ref 250–450)
TOTAL IRON BINDING CAPACITY: 394 UG/DL (ref 250–450)
TRANSFUSION STATUS: NORMAL
UNIT DIVISION: 0
UNIT NUMBER: NORMAL
UNSATURATED IRON BINDING CAPACITY: 327 UG/DL (ref 112–347)
UNSATURATED IRON BINDING CAPACITY: 377 UG/DL (ref 112–347)
VITAMIN B-12: 181 PG/ML (ref 232–1245)

## 2020-10-29 PROCEDURE — C9113 INJ PANTOPRAZOLE SODIUM, VIA: HCPCS | Performed by: NURSE PRACTITIONER

## 2020-10-29 PROCEDURE — 6360000002 HC RX W HCPCS: Performed by: NURSE PRACTITIONER

## 2020-10-29 PROCEDURE — G0378 HOSPITAL OBSERVATION PER HR: HCPCS

## 2020-10-29 PROCEDURE — 86900 BLOOD TYPING SEROLOGIC ABO: CPT

## 2020-10-29 PROCEDURE — 6370000000 HC RX 637 (ALT 250 FOR IP): Performed by: NURSE PRACTITIONER

## 2020-10-29 PROCEDURE — 85018 HEMOGLOBIN: CPT

## 2020-10-29 PROCEDURE — 83735 ASSAY OF MAGNESIUM: CPT

## 2020-10-29 PROCEDURE — 2580000003 HC RX 258: Performed by: EMERGENCY MEDICINE

## 2020-10-29 PROCEDURE — 80048 BASIC METABOLIC PNL TOTAL CA: CPT

## 2020-10-29 PROCEDURE — 36415 COLL VENOUS BLD VENIPUNCTURE: CPT

## 2020-10-29 PROCEDURE — 96376 TX/PRO/DX INJ SAME DRUG ADON: CPT

## 2020-10-29 PROCEDURE — 2580000003 HC RX 258: Performed by: NURSE PRACTITIONER

## 2020-10-29 PROCEDURE — 36430 TRANSFUSION BLD/BLD COMPNT: CPT

## 2020-10-29 PROCEDURE — 2580000003 HC RX 258: Performed by: INTERNAL MEDICINE

## 2020-10-29 PROCEDURE — 99222 1ST HOSP IP/OBS MODERATE 55: CPT | Performed by: INTERNAL MEDICINE

## 2020-10-29 PROCEDURE — 6370000000 HC RX 637 (ALT 250 FOR IP): Performed by: INTERNAL MEDICINE

## 2020-10-29 PROCEDURE — 96372 THER/PROPH/DIAG INJ SC/IM: CPT

## 2020-10-29 PROCEDURE — 83550 IRON BINDING TEST: CPT

## 2020-10-29 PROCEDURE — 1200000000 HC SEMI PRIVATE

## 2020-10-29 PROCEDURE — 6360000002 HC RX W HCPCS: Performed by: INTERNAL MEDICINE

## 2020-10-29 PROCEDURE — 83921 ORGANIC ACID SINGLE QUANT: CPT

## 2020-10-29 PROCEDURE — 85014 HEMATOCRIT: CPT

## 2020-10-29 PROCEDURE — 83540 ASSAY OF IRON: CPT

## 2020-10-29 PROCEDURE — 96365 THER/PROPH/DIAG IV INF INIT: CPT

## 2020-10-29 PROCEDURE — P9016 RBC LEUKOCYTES REDUCED: HCPCS

## 2020-10-29 PROCEDURE — G0328 FECAL BLOOD SCRN IMMUNOASSAY: HCPCS

## 2020-10-29 PROCEDURE — 99233 SBSQ HOSP IP/OBS HIGH 50: CPT | Performed by: INTERNAL MEDICINE

## 2020-10-29 RX ORDER — IPRATROPIUM BROMIDE AND ALBUTEROL SULFATE 2.5; .5 MG/3ML; MG/3ML
1 SOLUTION RESPIRATORY (INHALATION) EVERY 4 HOURS PRN
Status: DISCONTINUED | OUTPATIENT
Start: 2020-10-29 | End: 2020-10-30 | Stop reason: HOSPADM

## 2020-10-29 RX ORDER — SODIUM CHLORIDE 9 MG/ML
INJECTION, SOLUTION INTRAVENOUS CONTINUOUS
Status: DISCONTINUED | OUTPATIENT
Start: 2020-10-29 | End: 2020-10-29

## 2020-10-29 RX ORDER — ONDANSETRON 2 MG/ML
4 INJECTION INTRAMUSCULAR; INTRAVENOUS EVERY 6 HOURS PRN
Status: DISCONTINUED | OUTPATIENT
Start: 2020-10-29 | End: 2020-10-30 | Stop reason: HOSPADM

## 2020-10-29 RX ORDER — VITAMIN B COMPLEX
1000 TABLET ORAL DAILY
Status: DISCONTINUED | OUTPATIENT
Start: 2020-10-29 | End: 2020-10-30 | Stop reason: HOSPADM

## 2020-10-29 RX ORDER — ACETAMINOPHEN 325 MG/1
650 TABLET ORAL EVERY 6 HOURS PRN
Status: DISCONTINUED | OUTPATIENT
Start: 2020-10-29 | End: 2020-10-30 | Stop reason: HOSPADM

## 2020-10-29 RX ORDER — M-VIT,TX,IRON,MINS/CALC/FOLIC 27MG-0.4MG
1 TABLET ORAL DAILY
Status: DISCONTINUED | OUTPATIENT
Start: 2020-10-29 | End: 2020-10-30 | Stop reason: HOSPADM

## 2020-10-29 RX ORDER — LANOLIN ALCOHOL/MO/W.PET/CERES
1000 CREAM (GRAM) TOPICAL DAILY
Status: DISCONTINUED | OUTPATIENT
Start: 2020-10-29 | End: 2020-10-30 | Stop reason: HOSPADM

## 2020-10-29 RX ORDER — PROMETHAZINE HYDROCHLORIDE 25 MG/1
12.5 TABLET ORAL EVERY 6 HOURS PRN
Status: DISCONTINUED | OUTPATIENT
Start: 2020-10-29 | End: 2020-10-30 | Stop reason: HOSPADM

## 2020-10-29 RX ORDER — ACETAMINOPHEN 650 MG/1
650 SUPPOSITORY RECTAL EVERY 6 HOURS PRN
Status: DISCONTINUED | OUTPATIENT
Start: 2020-10-29 | End: 2020-10-30 | Stop reason: HOSPADM

## 2020-10-29 RX ORDER — CYANOCOBALAMIN 1000 UG/ML
1000 INJECTION INTRAMUSCULAR; SUBCUTANEOUS ONCE
Status: COMPLETED | OUTPATIENT
Start: 2020-10-29 | End: 2020-10-29

## 2020-10-29 RX ORDER — 0.9 % SODIUM CHLORIDE 0.9 %
20 INTRAVENOUS SOLUTION INTRAVENOUS ONCE
Status: DISCONTINUED | OUTPATIENT
Start: 2020-10-29 | End: 2020-10-30 | Stop reason: HOSPADM

## 2020-10-29 RX ORDER — SODIUM CHLORIDE 0.9 % (FLUSH) 0.9 %
10 SYRINGE (ML) INJECTION EVERY 12 HOURS SCHEDULED
Status: DISCONTINUED | OUTPATIENT
Start: 2020-10-29 | End: 2020-10-30 | Stop reason: HOSPADM

## 2020-10-29 RX ORDER — OXYCODONE HYDROCHLORIDE AND ACETAMINOPHEN 5; 325 MG/1; MG/1
1 TABLET ORAL EVERY 8 HOURS PRN
Status: DISCONTINUED | OUTPATIENT
Start: 2020-10-29 | End: 2020-10-30 | Stop reason: HOSPADM

## 2020-10-29 RX ORDER — SODIUM CHLORIDE 0.9 % (FLUSH) 0.9 %
10 SYRINGE (ML) INJECTION PRN
Status: DISCONTINUED | OUTPATIENT
Start: 2020-10-29 | End: 2020-10-30 | Stop reason: HOSPADM

## 2020-10-29 RX ADMIN — MULTIPLE VITAMINS W/ MINERALS TAB 1 TABLET: TAB at 13:33

## 2020-10-29 RX ADMIN — PANTOPRAZOLE SODIUM 40 MG: 40 INJECTION, POWDER, FOR SOLUTION INTRAVENOUS at 10:33

## 2020-10-29 RX ADMIN — CYANOCOBALAMIN 1000 MCG: 1000 INJECTION, SOLUTION INTRAMUSCULAR; SUBCUTANEOUS at 20:26

## 2020-10-29 RX ADMIN — Medication 10 ML: at 20:30

## 2020-10-29 RX ADMIN — PANTOPRAZOLE SODIUM 40 MG: 40 INJECTION, POWDER, FOR SOLUTION INTRAVENOUS at 20:26

## 2020-10-29 RX ADMIN — Medication 1000 UNITS: at 13:32

## 2020-10-29 RX ADMIN — BUSPIRONE HYDROCHLORIDE 15 MG: 10 TABLET ORAL at 20:26

## 2020-10-29 RX ADMIN — BUSPIRONE HYDROCHLORIDE 15 MG: 10 TABLET ORAL at 13:33

## 2020-10-29 RX ADMIN — SODIUM CHLORIDE: 9 INJECTION, SOLUTION INTRAVENOUS at 16:28

## 2020-10-29 RX ADMIN — OXYCODONE HYDROCHLORIDE AND ACETAMINOPHEN 1 TABLET: 5; 325 TABLET ORAL at 03:23

## 2020-10-29 RX ADMIN — CYANOCOBALAMIN TAB 1000 MCG 1000 MCG: 1000 TAB at 13:32

## 2020-10-29 RX ADMIN — Medication 10 ML: at 10:34

## 2020-10-29 RX ADMIN — OXYCODONE HYDROCHLORIDE AND ACETAMINOPHEN 1 TABLET: 5; 325 TABLET ORAL at 16:27

## 2020-10-29 RX ADMIN — SODIUM CHLORIDE: 9 INJECTION, SOLUTION INTRAVENOUS at 05:13

## 2020-10-29 RX ADMIN — ACETAMINOPHEN 650 MG: 325 TABLET ORAL at 01:46

## 2020-10-29 RX ADMIN — IRON SUCROSE 200 MG: 20 INJECTION, SOLUTION INTRAVENOUS at 13:31

## 2020-10-29 RX ADMIN — ACETAMINOPHEN 650 MG: 325 TABLET ORAL at 16:28

## 2020-10-29 ASSESSMENT — ENCOUNTER SYMPTOMS
CHOKING: 0
DIARRHEA: 0
EYE DISCHARGE: 1
WHEEZING: 0
CONSTIPATION: 0
TROUBLE SWALLOWING: 0
PHOTOPHOBIA: 0
BLOOD IN STOOL: 0
COLOR CHANGE: 0
SORE THROAT: 0
EYE ITCHING: 0
ABDOMINAL PAIN: 0
EYE REDNESS: 0
VOICE CHANGE: 0
NAUSEA: 0
EYE DISCHARGE: 0
ABDOMINAL DISTENTION: 0
COUGH: 0
EYE PAIN: 0
SHORTNESS OF BREATH: 0
VOMITING: 0
BACK PAIN: 0

## 2020-10-29 ASSESSMENT — PAIN SCALES - GENERAL
PAINLEVEL_OUTOF10: 7
PAINLEVEL_OUTOF10: 7
PAINLEVEL_OUTOF10: 3
PAINLEVEL_OUTOF10: 7
PAINLEVEL_OUTOF10: 7

## 2020-10-29 ASSESSMENT — PAIN DESCRIPTION - PAIN TYPE: TYPE: CHRONIC PAIN

## 2020-10-29 ASSESSMENT — PAIN DESCRIPTION - PROGRESSION
CLINICAL_PROGRESSION: NOT CHANGED

## 2020-10-29 ASSESSMENT — PAIN DESCRIPTION - ONSET: ONSET: ON-GOING

## 2020-10-29 ASSESSMENT — PAIN DESCRIPTION - LOCATION: LOCATION: BACK;NECK

## 2020-10-29 ASSESSMENT — PAIN - FUNCTIONAL ASSESSMENT: PAIN_FUNCTIONAL_ASSESSMENT: ACTIVITIES ARE NOT PREVENTED

## 2020-10-29 ASSESSMENT — PAIN DESCRIPTION - FREQUENCY: FREQUENCY: CONTINUOUS

## 2020-10-29 ASSESSMENT — PAIN DESCRIPTION - DESCRIPTORS: DESCRIPTORS: ACHING;DISCOMFORT

## 2020-10-29 NOTE — TELEPHONE ENCOUNTER
Please schedule patient for manometry on day that I am at [facility] for EGD's    Remind her to hold anticoagulation for manometry as she did in the past

## 2020-10-29 NOTE — PROGRESS NOTES
[facility]  Office: [address], DO, Polina Alonso, DO, Lucas Signs, DO, Jerilyn Mendoza Blood, DO, Zayda Sterling MD, Mignon Meckel, MD, Melanie Townsend MD, Chloe Chavez MD, Liz Hagan MD, Duarte Cronin MD, Vi Gomez MD, Jefry Flores MD, Bryon Perez MD, Cody Helton, DO, Carmela Doshi MD, Maki Oates MD, Elmira Bills, DO, Rachana Tran MD,  Willian Tidwell DO, Ronnie Mancilla MD, Vinny Henry MD, Leny Bal, [facility], [address], CNP, Isi Lundy, CNP, Kisha ChanCarole, CNS, Susie Gooden, CNP, Tamia Garnica, CNP, Gustavo Smith, CNP, Loan Priest, CNP, Jerilyn Paredes, CNP, Apolinar Carpenter PA-C, Monica Castro, [facility], Lorin Winston, CNP, Rosanne Knapp, CNP, Abigail Jacinto, CNP, Ryanne Luna, [facility], Caryle Po, [facility]    Progress Note    10/29/2020    11:46 AM    Name:   Arielle Mejia  MRN:     [id number]     [place]:      [de-identified]   Room:   [address] Day:  1  Admit Date:  10/28/2020  8:15 PM    PCP:   Jennifer Bah MD  Code Status:  Full Code    Subjective:     C/C:   Chief Complaint   Patient presents with    Abnormal Lab     pt states that her hemoglobin was 6 per PCP, was told to come in     Interval History Status: not changed. Patient seen and examined at bedside. Currently she denies any complaints including shortness of breath, difficulty breathing, chest pain. She states that over the last several weeks has been having increasing fatigue and shortness of breath with ambulation. 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 Per chart review, patient has been dealing with this chronic anemia for several months. She follows with Dr. Janett Talbot with her gastroenterologist.  Patient has been inconsistent with her follow-up. She has had a colonoscopy however the prep was poor.   Recent EGD has shown a large hiatal hernia and was previously scheduled to follow-up with Dr. Silvia Cunningham for definitive management however has been unable to do so. She currently denies any chest pain, nausea, vomiting, diarrhea. Review of Systems:     Constitutional: Admits to feeling fatigued denies any chills or sweats  Respiratory:  negative for cough, admits to dyspnea on exertion and shortness of breath with exertion   cardiovascular:  negative for chest pain, chest pressure/discomfort, lower extremity edema, palpitations  Gastrointestinal: admits to constipation and mild abdominal discomfort. Denies any diarrhea   neurological:  negative for dizziness, headache    Medications: Allergies:     Allergies   Allergen Reactions    Latex Itching    Prozac [Fluoxetine Hcl]     Sulfa Antibiotics      migraine    Codeine Itching       Current Meds:   Scheduled Meds:    sodium chloride flush  10 mL Intravenous 2 times per day    sodium chloride  20 mL Intravenous Once    buPROPion  300 mg Oral Daily    busPIRone  15 mg Oral TID    calcium carbonate  600 mg Oral Daily    citalopram  1 tablet Oral Daily    ferrous sulfate  325 mg Oral BID    propranolol  80 mg Oral Daily    pantoprazole  40 mg Intravenous Q12H    And    sodium chloride (PF)  10 mL Intravenous Q12H     Continuous Infusions:    sodium chloride      sodium chloride 100 mL/hr at 10/29/20 0513     PRN Meds: sodium chloride flush, acetaminophen **OR** acetaminophen, promethazine **OR** ondansetron, oxyCODONE-acetaminophen    Data:     Past Medical History:   has a past medical history of Acid reflux, Adult ADHD, Asthma, Bipolar 1 disorder ([facility] Broken foot, Chest pain of uncertain etiology, Depression, DVT of leg (deep venous thrombosis) ([facility] Fracture, Helicobacter pylori (H. pylori) infection, Hematuria, Hiatal hernia, History of blood transfusion, History of DVT (deep vein thrombosis), Hx of blood clots, Hypertension, Internal hemorrhoids, Narcolepsy, and Osteoporosis. Social History:   reports that she quit smoking about 31 years ago. She has a 20.00 pack-year smoking history. She has never used smokeless tobacco. She reports current alcohol use. She reports that she does not use drugs. Family History:   Family History   Problem Relation Age of Onset   [facility] Cancer Mother     Hypertension Mother     Stroke Mother     Cancer Maternal Aunt     Hypertension Father     Heart Surgery Father     Diabetes Neg Hx        Vitals:  BP (!) 159/87   Pulse 50   Temp 97.9 °F (36.6 °C)   Resp 13   Ht 5' (1.524 m)   Wt 147 lb (66.7 kg)   SpO2 97%   BMI 28.71 kg/m²   Temp (24hrs), Av °F (36.7 °C), Min:97.5 °F (36.4 °C), Max:98.3 °F (36.8 °C)    No results for input(s): POCGLU in the last 72 hours. I/O (24Hr):   No intake or output data in the 24 hours ending 10/29/20 1146    Labs:  Hematology:  Recent Labs     10/28/20  1416 10/28/20  2030 10/29/20  0330 10/29/20  0927   WBC 5.2 6.0  --   --    RBC 2.98* 2.93*  --   --    HGB 6.5* 6.5* 7.1* 7.6*   HCT 23.9* 23.9* 25.0* 26.5*   MCV 80.2* 81.6*  --   --    MCH 21.8* 22.2*  --   --    MCHC 27.2* 27.2*  --   --    RDW 16.2* 16.1*  --   --     437  --   --    MPV 11.3 10.7  --   --    INR  --  1.3  --   --      Chemistry:  Recent Labs     10/28/20  1416 10/28/20  2030 10/29/20  033    140 136   K 3.8 3.4* 3.5*    106 104   CO2 22 24 24   GLUCOSE 94 104* 92   BUN 15 17 17   CREATININE 1.15* 1.20* 1.13*   MG  --   --  2.0   ANIONGAP 10 10 8*   LABGLOM 48* 46* 49*   GFRAA 58* 55* 59*   CALCIUM 9.1 8.8 8.4*     Recent Labs     10/28/20  1416 10/28/20  2030   PROT 6.2* 6.3*   LABALBU 3.8 4.0   AST 18 16   ALT 11 11   LDH  --  221*   ALKPHOS 94 89   BILITOT <0.10* 0.12*   BILIDIR  --  <0.08     ABG:No results found for: POCPH, PHART, PH, POCPCO2, CDG9EKK, PCO2, POCPO2, PO2ART, PO2, POCHCO3, PLL2EBO, HCO3, NBEA, PBEA, BEART, BE, THGBART, THB, NHO5BYK, OSBJ2WYM, V1ZQPHPL, O2SAT, FIO2  Lab Results Propranolol 80 mg daily  History of recurrent DVT's: Hold anticoagulation 2/2 bleed. Moderate persistent asthma without complications: Douneb's Q4 hours PRN  Hiatal hernia : Continue PPI, needs continued outpatient follow up. Bipolar 1 disorder: Continue home medications  GERD: Continue PPI  Osteoporosis : Tylenol PRN for pain.    DVT ppx: SCD's  PT/OT     Emerson Fermin DO  10/29/2020  11:46 AM

## 2020-10-29 NOTE — CARE COORDINATION
Case Management Initial Discharge Plan  Isha Lynn,         Readmission Risk              Risk of Unplanned Readmission:        16             Met with:patient to discuss discharge plans. Information verified: address, contacts, phone number, , insurance Yes  PCP: Shlomo Joyce MD  Date of last visit: yesterday    Insurance Provider: [employer]    Discharge Planning  Current Residence:   house  Living Arrangements:   mother   Home has 2 stories/4 stairs to climb  Support Systems:   mother and boyfriend  Current Services PTA:   no Supplier: none  Patient able to perform ADL's:Independent  DME used to aid ambulation prior to admission: elevated toilet seat  During admission: none    Potential Assistance Needed:   tbd    Pharmacy: [facility]   Potential Assistance Purchasing Medications:   no  Does patient want to participate in local refill/ meds to beds program?   no    Patient agreeable to home care: No  Stopover of choice provided:  yes      Type of Home Care Services:     Patient expects to be discharged to:   home    Prior SNF/Rehab Placement and Facility: no  Agreeable to SNF/Rehab: No  Stopover of choice provided: yes   Evaluation: yes    Expected Discharge date:   10-31-20  Follow Up Appointment: Best Day/ Time:      Transportation provider: family  Transportation arrangements needed for discharge: No    Discharge Plan:   Patient lives at home with mother and boyfriend in 2 story home with 4 steps to enter. Patient was independent with adl's. Patient denied any drug or alcohol use. SBIRT completed. Patient to be admitted for Anemia. Patient received blood transfusion today. Patient is prescribed Xarelto through pcp. Discharge needs tbd.         Electronically signed by JOSEFINA Cano on 10/29/20 at 12:08 PM EDT

## 2020-10-29 NOTE — ED PROVIDER NOTES
[address] [place] [address] ED  eMERGENCY dEPARTMENT eNCOUnter      Pt Name: Gabriela Diez  MRN: [id number]  [place] 1960  Date of evaluation: 10/28/2020  Provider: Ernie Colon MD    [address] [place] [address]       Chief Complaint   Patient presents with    Abnormal Lab     pt states that her hemoglobin was 6 per PCP, was told to come in         HISTORY OF PRESENT ILLNESS  (Location/Symptom, Timing/Onset, Context/Setting, Quality, Duration, Modifying Factors, Severity.)   Gabriela Diez is a 63 y.o. female who presents to the emergency department due to abnormal labs. Patient is sent by PCP. Hemoglobin was 6.5 at labs drawn earlier today. Patient is on Xarelto due to history of clotting phenomenon. She also has history of hiatal hernia recurrent gastritis and severe esophagitis. She endorses melenic stools. She has been progressively fatigued and short of breath. She states she has significant exertional dyspnea and has to stop and rest frequently with simple tasks. She has had previous similar episode requiring blood transfusion. She follows with Dr. Makeda Beverly in gastroenterology      Nursing Notes were reviewed. ALLERGIES     Latex; Prozac [fluoxetine hcl]; Sulfa antibiotics; and Codeine    CURRENT MEDICATIONS       Previous Medications    ALBUTEROL (PROVENTIL) (2.5 MG/3ML) 0.083% NEBULIZER SOLUTION    Take 2.5 mg by nebulization every 6 hours as needed for Wheezing or Shortness of Breath    ALBUTEROL SULFATE HFA (VENTOLIN HFA) 108 (90 BASE) MCG/ACT INHALER    INHALE TWO PUFFS BY MOUTH EVERY 4 HOURS AS NEEDED FOR WHEEZING    ALENDRONATE (FOSAMAX) 70 MG TABLET    TAKE 1 TABLET BY MOUTH ONCE WEEKLY BEFORE BREAKFAST, ON AN EMPTY STOMACH: REMAIN UPRIGHT FOR 30 MINUTES    AMPHETAMINE-DEXTROAMPHETAMINE (ADDERALL) 30 MG TABLET    Take 1 tablet by mouth 2 times daily for 30 days.     BUPROPION (WELLBUTRIN XL) 300 MG EXTENDED RELEASE TABLET    TAKE ONE TABLET BY MOUTH EVERY MORNING    BUSPIRONE (BUSPAR) 15 MG TABLET Take 15 mg by mouth 3 times daily    CALCIUM CARBONATE (CALCIUM 600 PO)    Take 600 mg by mouth daily    CITALOPRAM (CELEXA) 40 MG TABLET    TAKE ONE TABLET BY MOUTH DAILY    CYCLOBENZAPRINE (FLEXERIL) 10 MG TABLET    TAKE ONE-HALF TO ONE TABLET BY MOUTH THREE TIMES A DAY AS NEEDED FOR MUSCLE SPASMS    FERROUS SULFATE (FE TABS) 325 (65 FE) MG EC TABLET    Take 1 tablet by mouth 2 times daily    MOMETASONE (ASMANEX HFA) 200 MCG/ACT AERO INHALER    Inhale 2 puffs into the lungs 2 times daily    OXYCODONE-ACETAMINOPHEN (PERCOCET) 5-325 MG PER TABLET    Take 1 tablet by mouth every 8 hours as needed for Pain for up to 30 days. PANTOPRAZOLE (PROTONIX) 40 MG TABLET    TAKE ONE TABLET BY MOUTH EVERY MORNING BEFORE BREAKFAST    PROPRANOLOL (INDERAL LA) 80 MG EXTENDED RELEASE CAPSULE    TAKE ONE CAPSULE BY MOUTH DAILY    RIVAROXABAN (XARELTO) 20 MG TABS TABLET    TAKE ONE TABLET BY MOUTH DAILY       PAST MEDICAL HISTORY         Diagnosis Date    Acid reflux     Adult ADHD 2012    Asthma 2012    Bipolar 1 disorder ([facility] [address]     Broken foot     Left foot    Chest pain of uncertain etiology     Last episode was in  (Written 2019)    Depression     Under control per pt.  on 6/15/18    DVT of leg (deep venous thrombosis) ([address] 2014    Fracture     right foot / pt denies surgical intervention    Helicobacter pylori (H. pylori) infection     per EGD    Hematuria 2018    Has a follow-up with Urologist     Hiatal hernia     History of blood transfusion     History of DVT (deep vein thrombosis) 2012    Hx of blood clots     Hypertension     Internal hemorrhoids     Narcolepsy 2012    Osteoporosis        SURGICAL HISTORY           Procedure Laterality Date    ANKLE SURGERY Left 2018    mass excision    ARM SURGERY Right     Pt states she has had 13 right arm surgeries    BREAST ENHANCEMENT SURGERY Bilateral      SECTION      x 3    COLONOSCOPY 5/23/2013    sigmoid diverticula, prominent large internal hemorrhoid    COLONOSCOPY N/A 2/10/2020    COLONOSCOPY DIAGNOSTIC performed by Stefanie Taylor MD at [facility] 5/18/2020    COLORECTAL CANCER SCREENING, NOT HIGH RISK performed by Amado Matos MD at [address] [place] [address], COLON, DIAGNOSTIC      ESOPHAGEAL MOTILITY STUDY N/A 8/12/2020    PES RN - ESOPHAGEAL MOTILITY STUDY performed by Akua To DO at [facility]    ESOPHAGEAL MOTILITY STUDY  9/10/2020    ESOPHAGEAL MOTILITY STUDY performed by Akua To DO at [facility]    FOOT SURGERY Left 2015    FOOT TENDON SURGERY Left 02/22/2019    LEFT PERONEAL TENDON REPAIR WITH POSSIBLE LEFT TENDON TRANSFER (Left )    KNEE ARTHROSCOPY Left     Two surgeries on left knee per pt    KNEE SURGERY Right     Three surgeries per pt    LIGAMENT REPAIR Left 2/22/2019    LEFT PERONEAL TENDON REPAIR performed by Chaz Alberto DPM at [facility] OFFICE/OUTPT VISIT,PROCEDURE ONLY Left 6/29/2018    RESECTION/REMOVAL BONY NEOPLASM LEFT FIBULA, LEFT SOFT TISSUE MASS EXCISION WITH BONE BIOPSY LEFT ANKLE FIBULA performed by Chaz Alberto DPM at [facility] Left     UPPER GASTROINTESTINAL ENDOSCOPY  5/23/2013    tertiary contractures esophagus, 3 to 4 cm hiatal hernia, antral gastritis, + H Pylori, mild active chronic esophagitis    UPPER GASTROINTESTINAL ENDOSCOPY  4/6/2016    one biopsy    UPPER GASTROINTESTINAL ENDOSCOPY N/A 2/10/2020    EGD BIOPSY performed by Stefanie Taylor MD at [facility]           Problem Relation Age of Onset    Cancer Mother     Hypertension Mother     Stroke Mother     Cancer Maternal Aunt     Hypertension Father     Heart Surgery Father     Diabetes Neg Hx      Family Status   Relation Name Status    Mother  (Not Specified)   Luz Fair  (Not Specified)    Father  (Not Specified)    Neg Hx  (Not Specified)        SOCIAL HISTORY      reports that she quit smoking about 31 years ago. She has a 20.00 pack-year smoking history. She has never used smokeless tobacco. She reports current alcohol use. She reports that she does not use drugs. REVIEW OF SYSTEMS    (2-9 systems for level 4, 10 or more for level 5)     Review of Systems   All other systems reviewed and are negative. Except as noted above the remainder of the review of systems was reviewed and negative. PHYSICAL EXAM    (up to 7 for level 4, 8 or more for level 5)     Vitals:    10/28/20 2013 10/28/20 2151   BP: (!) 140/78 137/87   Pulse: 55 53   Resp: 17 18   Temp: 98.2 °F (36.8 °C)    TempSrc: Oral    SpO2: 98% 100%   Weight: 147 lb (66.7 kg)    Height: 5' (1.524 m)        Physical exam reflects a pleasant well-nourished female. She does appear pale. She is afebrile with stable vital signs to include pulse ox of 98% on room air. She is not hypoxic. She is alert conversive endoprobe reviewed. Integument warm and dry. Conjunctiva pale who stated heart regular rate and rhythm normal S1-S2 no murmurs rubs gallops. Lungs are clear to auscultation. Abdomen is soft throughout no focal pain integument without rash or lesion.   She has no acute neurovascular deficits      DIAGNOSTIC RESULTS     EKG: All EKG's are interpreted by the Emergency Department Physician who either signs or Co-signs this chart in the absence of a cardiologist.    RADIOLOGY:   Non-plain film images such as CT, Ultrasound and MRI are read by the radiologist. Plain radiographic images are visualized and preliminarily interpreted by the emergency physician with the below findings:        Interpretation per the Radiologist below, if available at the time of this note:    No orders to display           LABS:  Labs Reviewed   CBC WITH AUTO DIFFERENTIAL - Abnormal; Notable for the following components:       Result Value    RBC 2.93 (*)     Hemoglobin 6.5 (*)

## 2020-10-29 NOTE — H&P
[facility]  Office: [address], DO, Fausto Patricia DO, Colby House DO, Pranav Ryan, DO, Slick Queen MD, Logan Roy MD, Samaria Pritchett MD, Susan Teran MD, Shira Pina MD, Krystin Garcia MD, [place] MD, Sunshine Bledsoe MD, Bryon Rivera MD, Bettye Burns DO, Makenzie Peña MD, Tessy Clifton MD, Olive Garcia DO, Mariluz Morrison MD,  Bradley Black DO, Purnima Manjarrez MD, Janelle Kim MD, Jeff Edouard CNP, [address], [facility], Andreas Dobbins, CNP, Mat Almanzar, CNS, Sharon Stanton, CNP, Claude Leep, CNP, Nicol Fam, CNP, Aurora Yanes, CNP, Nehemias Gross, CNP, Pj William PA-C, Major Berrios, JANET, Eloise Valenzuela, CNP, Aisha Linder, CNP, Darek Aquino, CNP, Morena Acosta, CNP, Beau Longoria, CNP         [address]    HISTORY AND PHYSICAL EXAMINATION            Date:   10/29/2020  Patient name:  Abel Bowers  Date of admission:  10/28/2020  8:15 PM  MRN:   [id number]  Account:  [de-identified]  YOB: 1960  PCP:    Ivett Meléndez MD  Room:   [place] [address]  Code Status:    Prior    Chief Complaint:     Chief Complaint   Patient presents with    Abnormal Lab     pt states that her hemoglobin was 6 per PCP, was told to come in       History Obtained From:     patient, electronic medical record    History of Present Illness:     Abel Bowers is a 63 y.o. Non-/non  female who presents with Abnormal Lab (pt states that her hemoglobin was 6 per PCP, was told to come in)   and is admitted to the hospital for the management of Anemia. Patient states she was sent to the emergency department at the direction of her PCP for low hemoglobin. She states she has chronic bleeding issues due to hiatal hernia. She sees Dr. Delta Rounds for surgery, but has not been able to schedule this yet due to bleeding. Dr. Jeramy Tobin is her GI physician.   She is also on Xarelto secondary to clotting issues. Medical history includes narcolepsy, asthma, acute on chronic anemia, hypertension, iron deficiency anemia, GERD, anxiety, history of DVT. She states she has noticed she has become increasingly short of breath and fatigue, and has also noticed some leg weakness and palpitations with exertion. She states these are common symptoms for her when her hemoglobin becomes low. She has also noticed melenic stools. Pertinent labs include hemoglobin 6.5, hematocrit 23.9, RBC 2.93, creatinine 1.20, GFR 46, ferritin 6, iron 17, B12 181, immature reticulocyte 32.9, reticulocyte 16.6. She is being admitted for further evaluation and management of anemia. Past Medical History:     Past Medical History:   Diagnosis Date    Acid reflux     Adult ADHD 2012    Asthma 2012    Bipolar 1 disorder ([facility]     Broken foot     Left foot    Chest pain of uncertain etiology     Last episode was in  (Written 2019)    Depression     Under control per pt.  on 6/15/18    DVT of leg (deep venous thrombosis) ([facility] 2014    Fracture     right foot / pt denies surgical intervention    Helicobacter pylori (H. pylori) infection     per EGD    Hematuria 2018    Has a follow-up with Urologist     Hiatal hernia     History of blood transfusion     History of DVT (deep vein thrombosis) 2012    Hx of blood clots     Hypertension     Internal hemorrhoids     Narcolepsy 2012    Osteoporosis         Past Surgical History:     Past Surgical History:   Procedure Laterality Date    ANKLE SURGERY Left 2018    mass excision    ARM SURGERY Right     Pt states she has had 13 right arm surgeries    BREAST ENHANCEMENT SURGERY Bilateral      SECTION      x 3    COLONOSCOPY  2013    sigmoid diverticula, prominent large internal hemorrhoid    COLONOSCOPY N/A 2/10/2020    COLONOSCOPY DIAGNOSTIC performed by Isaac Robert MD at [facility] 5/18/2020    COLORECTAL CANCER SCREENING, NOT HIGH RISK performed by Gisell Anand MD at [address], COLON, DIAGNOSTIC      ESOPHAGEAL MOTILITY STUDY N/A 8/12/2020    PES RN - ESOPHAGEAL MOTILITY STUDY performed by Marv Contreras DO at [facility]    ESOPHAGEAL MOTILITY STUDY  9/10/2020    ESOPHAGEAL MOTILITY STUDY performed by Marv Contreras DO at [facility]    FOOT SURGERY Left 2015    FOOT TENDON SURGERY Left 02/22/2019    LEFT PERONEAL TENDON REPAIR WITH POSSIBLE LEFT TENDON TRANSFER (Left )    KNEE ARTHROSCOPY Left     Two surgeries on left knee per pt    KNEE SURGERY Right     Three surgeries per pt    LIGAMENT REPAIR Left 2/22/2019    LEFT PERONEAL TENDON REPAIR performed by Kyle Lang DPM at [facility] OFFICE/OUTPT VISIT,PROCEDURE ONLY Left 6/29/2018    RESECTION/REMOVAL BONY NEOPLASM LEFT FIBULA, LEFT SOFT TISSUE MASS EXCISION WITH BONE BIOPSY LEFT ANKLE FIBULA performed by Kyle Lang DPM at [address] Left     UPPER GASTROINTESTINAL ENDOSCOPY  5/23/2013    tertiary contractures esophagus, 3 to 4 cm hiatal hernia, antral gastritis, + H Pylori, mild active chronic esophagitis    UPPER GASTROINTESTINAL ENDOSCOPY  4/6/2016    one biopsy    UPPER GASTROINTESTINAL ENDOSCOPY N/A 2/10/2020    EGD BIOPSY performed by Tanner Tejada MD at [facility]          Medications Prior to Admission:     Prior to Admission medications    Medication Sig Start Date End Date Taking?  Authorizing Provider   albuterol sulfate HFA (VENTOLIN HFA) 108 (90 Base) MCG/ACT inhaler INHALE TWO PUFFS BY MOUTH EVERY 4 HOURS AS NEEDED FOR WHEEZING 10/28/20   Ankit Walker MD   propranolol (INDERAL LA) 80 MG extended release capsule TAKE ONE CAPSULE BY MOUTH DAILY 10/19/20   Naomi Altamirano MD   oxyCODONE-acetaminophen (PERCOCET) 5-325 MG per tablet Take 1 tablet by mouth every 8 hours as needed for Pain for up to 30 days. 10/8/20 11/7/20  Ankit Walker MD   amphetamine-dextroamphetamine (ADDERALL) 30 MG tablet Take 1 tablet by mouth 2 times daily for 30 days. 10/8/20 11/7/20  Ankit Walker MD   pantoprazole (PROTONIX) 40 MG tablet TAKE ONE TABLET BY MOUTH EVERY MORNING BEFORE BREAKFAST 8/3/20   Ankit Walker MD   cyclobenzaprine (FLEXERIL) 10 MG tablet TAKE ONE-HALF TO ONE TABLET BY MOUTH THREE TIMES A DAY AS NEEDED FOR MUSCLE SPASMS 6/11/20   Jimena Mcmanus MD   ferrous sulfate (FE TABS) 325 (65 Fe) MG EC tablet Take 1 tablet by mouth 2 times daily 5/5/20   Michael Rogers MD   citalopram (CELEXA) 40 MG tablet TAKE ONE TABLET BY MOUTH DAILY 4/8/20   Sandra Walker MD   rivaroxaban (XARELTO) 20 MG TABS tablet TAKE ONE TABLET BY MOUTH DAILY 4/8/20   Sandra Walker MD   mometasone (ASMANEX HFA) 200 MCG/ACT AERO inhaler Inhale 2 puffs into the lungs 2 times daily 4/8/20   Ankit Walker MD   busPIRone (BUSPAR) 15 MG tablet Take 15 mg by mouth 3 times daily 4/6/20   Ankit Walker MD   albuterol (PROVENTIL) (2.5 MG/3ML) 0.083% nebulizer solution Take 2.5 mg by nebulization every 6 hours as needed for Wheezing or Shortness of Breath    Historical Provider, MD   alendronate (FOSAMAX) 70 MG tablet TAKE 1 TABLET BY MOUTH ONCE WEEKLY BEFORE BREAKFAST, ON AN EMPTY STOMACH: REMAIN UPRIGHT FOR 30 MINUTES  Patient taking differently: TAKE 1 TABLET BY MOUTH ONCE WEEKLY BEFORE BREAKFAST, ON AN EMPTY STOMACH: REMAIN UPRIGHT FOR 30 MINUTES, Takes on Fridays 10/23/19   PIETER Son CNP   buPROPion (WELLBUTRIN XL) 300 MG extended release tablet TAKE ONE TABLET BY MOUTH EVERY MORNING 10/1/19   Trung Crockett DO   Calcium Carbonate (CALCIUM 600 PO) Take 600 mg by mouth daily    Historical Provider, MD        Allergies:     Latex; Prozac [fluoxetine hcl]; Sulfa antibiotics; and Codeine    Social History:     Tobacco:    reports that she quit smoking about 31 years ago. She has a 20.00 pack-year smoking history. She has never used smokeless tobacco.  Alcohol:      reports current alcohol use. Drug Use:  reports no history of drug use. Family History:     Family History   Problem Relation Age of Onset   Street Cancer Mother     Hypertension Mother     Stroke Mother     Cancer Maternal Aunt     Hypertension Father     Heart Surgery Father     Diabetes Neg Hx        Review of Systems:     Positive and Negative as described in HPI. Review of Systems   Constitutional: Negative. HENT: Negative. Eyes: Positive for visual disturbance. Negative for photophobia, pain, discharge, redness and itching. Respiratory: Negative. Cardiovascular: Negative. Gastrointestinal: Negative. Endocrine: Negative. Genitourinary: Negative. Musculoskeletal: Negative. Skin: Negative. Allergic/Immunologic: Negative. Neurological: Positive for headaches. Negative for dizziness, tremors, seizures, syncope, facial asymmetry, speech difficulty, weakness, light-headedness and numbness. Hematological: Negative. Psychiatric/Behavioral: Negative. Physical Exam:   BP (!) 135/93   Pulse 56   Temp 97.9 °F (36.6 °C)   Resp 22   Ht 5' (1.524 m)   Wt 147 lb (66.7 kg)   SpO2 99%   BMI 28.71 kg/m²   Temp (24hrs), Av °F (36.7 °C), Min:97.5 °F (36.4 °C), Max:98.3 °F (36.8 °C)    No results for input(s): POCGLU in the last 72 hours. No intake or output data in the 24 hours ending 10/29/20 0533    Physical Exam  Vitals signs and nursing note reviewed. Constitutional:       General: She is not in acute distress. Appearance: Normal appearance. She is normal weight. She is not ill-appearing, toxic-appearing or diaphoretic. HENT:      Head: Normocephalic and atraumatic. Right Ear: External ear normal.      Left Ear: External ear normal.      Nose: Nose normal. No congestion or rhinorrhea. Mouth/Throat:      Mouth: Mucous membranes are moist.   Eyes:      General: No scleral icterus.         Right eye: No discharge. Left eye: No discharge. Extraocular Movements: Extraocular movements intact. Conjunctiva/sclera: Conjunctivae normal.      Pupils: Pupils are equal, round, and reactive to light. Neck:      Musculoskeletal: Normal range of motion and neck supple. No neck rigidity or muscular tenderness. Vascular: No carotid bruit. Cardiovascular:      Rate and Rhythm: Regular rhythm. Bradycardia present. Pulses: Normal pulses. Heart sounds: Normal heart sounds. No murmur. No friction rub. No gallop. Pulmonary:      Effort: Pulmonary effort is normal. No respiratory distress. Breath sounds: Normal breath sounds. No stridor. No wheezing, rhonchi or rales. Chest:      Chest wall: No tenderness. Abdominal:      General: Bowel sounds are normal. There is no distension. Palpations: Abdomen is soft. There is no mass. Tenderness: There is no abdominal tenderness. There is no guarding or rebound. Hernia: No hernia is present. Musculoskeletal:         General: Tenderness (Left lower extremity) present. No swelling, deformity or signs of injury. Right lower leg: No edema. Left lower leg: No edema. Lymphadenopathy:      Cervical: No cervical adenopathy. Skin:     Capillary Refill: Capillary refill takes less than 2 seconds. Coloration: Skin is pale. Skin is not jaundiced. Findings: No bruising, erythema, lesion or rash. Neurological:      General: No focal deficit present. Mental Status: She is alert and oriented to person, place, and time. Sensory: No sensory deficit. Motor: No weakness.       Coordination: Coordination normal.   Psychiatric:         Mood and Affect: Mood normal.         Behavior: Behavior normal.         Investigations:      Laboratory Testing:  Recent Results (from the past 24 hour(s))   Comprehensive Metabolic Panel    Collection Time: 10/28/20  2:16 PM   Result Value Ref Range    Glucose 94 70 - 99 mg/dL    BUN 15 8 - 23 mg/dL    CREATININE 1.15 (H) 0.50 - 0.90 mg/dL    Bun/Cre Ratio NOT REPORTED 9 - 20    Calcium 9.1 8.6 - 10.4 mg/dL    Sodium 138 135 - 144 mmol/L    Potassium 3.8 3.7 - 5.3 mmol/L    Chloride 106 98 - 107 mmol/L    CO2 22 20 - 31 mmol/L    Anion Gap 10 9 - 17 mmol/L    Alkaline Phosphatase 94 35 - 104 U/L    ALT 11 5 - 33 U/L    AST 18 <32 U/L    Total Bilirubin <0.10 (L) 0.3 - 1.2 mg/dL    Total Protein 6.2 (L) 6.4 - 8.3 g/dL    Alb 3.8 3.5 - 5.2 g/dL    Albumin/Globulin Ratio 1.6 1.0 - 2.5    GFR Non- 48 (L) >60 mL/min    GFR  58 (L) >60 mL/min    GFR Comment          GFR Staging NOT REPORTED    CBC Auto Differential    Collection Time: 10/28/20  2:16 PM   Result Value Ref Range    WBC 5.2 3.5 - 11.3 k/uL    RBC 2.98 (L) 3.95 - 5.11 m/uL    Hemoglobin 6.5 (LL) 11.9 - 15.1 g/dL    Hematocrit 23.9 (L) 36.3 - 47.1 %    MCV 80.2 (L) 82.6 - 102.9 fL    MCH 21.8 (L) 25.2 - 33.5 pg    MCHC 27.2 (L) 28.4 - 34.8 g/dL    RDW 16.2 (H) 11.8 - 14.4 %    Platelets 321 573 - 180 k/uL    MPV 11.3 8.1 - 13.5 fL    NRBC Automated 0.4 (H) 0.0 per 100 WBC    Differential Type NOT REPORTED     WBC Morphology NOT REPORTED     RBC Morphology NOT REPORTED     Platelet Estimate NOT REPORTED     Immature Granulocytes 0 0 %    Seg Neutrophils 76 (H) 36 - 66 %    Lymphocytes 14 (L) 24 - 44 %    Monocytes 10 (H) 1 - 7 %    Eosinophils % 0 (L) 1 - 4 %    Basophils 0 0 - 2 %    nRBC 1 (H) 0 per 100 WBC    Absolute Immature Granulocyte 0.00 0.00 - 0.30 k/uL    Segs Absolute 3.95 1.8 - 7.7 k/uL    Absolute Lymph # 0.73 (L) 1.0 - 4.8 k/uL    Absolute Mono # 0.52 0.1 - 0.8 k/uL    Absolute Eos # 0.00 0.0 - 0.4 k/uL    Basophils Absolute 0.00 0.0 - 0.2 k/uL    Morphology MICROCYTOSIS PRESENT     Morphology ANISOCYTOSIS PRESENT     Morphology HYPOCHROMIA PRESENT    TYPE AND CROSSMATCH    Collection Time: 10/28/20  8:30 PM   Result Value Ref Range    Expiration Date 10/31/2020,9604     Arm Band Number BE [id number]     ABO/Rh A POSITIVE     Antibody Screen NEGATIVE     Unit Number [id number]     Product Code Leukocyte Reduced Red Cell     Unit Divison 00     Dispense Status ISSUED     Transfusion Status OK TO TRANSFUSE     Crossmatch Result COMPATIBLE    CBC Auto Differential    Collection Time: 10/28/20  8:30 PM   Result Value Ref Range    WBC 6.0 3.5 - 11.3 k/uL    RBC 2.93 (L) 3.95 - 5.11 m/uL    Hemoglobin 6.5 (LL) 11.9 - 15.1 g/dL    Hematocrit 23.9 (L) 36.3 - 47.1 %    MCV 81.6 (L) 82.6 - 102.9 fL    MCH 22.2 (L) 25.2 - 33.5 pg    MCHC 27.2 (L) 28.4 - 34.8 g/dL    RDW 16.1 (H) 11.8 - 14.4 %    Platelets 035 017 - 115 k/uL    MPV 10.7 8.1 - 13.5 fL    NRBC Automated 0.3 (H) 0.0 per 100 WBC    Differential Type NOT REPORTED     WBC Morphology NOT REPORTED     RBC Morphology NOT REPORTED     Platelet Estimate NOT REPORTED     Seg Neutrophils 57 36 - 66 %    Lymphocytes 34 24 - 44 %    Monocytes 6 1 - 7 %    Eosinophils % 1 1 - 4 %    Basophils 2 %    nRBC 1 (H) 0 per 100 WBC    Immature Granulocytes 0 0 %    Segs Absolute 3.42 1.8 - 7.7 k/uL    Absolute Lymph # 2.04 1.0 - 4.8 k/uL    Absolute Mono # 0.36 0.2 - 0.8 k/uL    Absolute Eos # 0.06 0.0 - 0.4 k/uL    Basophils Absolute 0.12 0.0 - 0.2 k/uL    Absolute Immature Granulocyte 0.00 0.00 - 0.30 k/uL    Morphology VACUOLATED NEUTROPHILS PRESENT     Morphology MICROCYTOSIS PRESENT     Morphology ANISOCYTOSIS PRESENT     Morphology HYPOCHROMIA PRESENT     Morphology 1+ POLYCHROMASIA     Morphology 1+ ELLIPTOCYTES    Basic Metabolic Panel    Collection Time: 10/28/20  8:30 PM   Result Value Ref Range    Glucose 104 (H) 70 - 99 mg/dL    BUN 17 8 - 23 mg/dL    CREATININE 1.20 (H) 0.50 - 0.90 mg/dL    Bun/Cre Ratio 14 9 - 20    Calcium 8.8 8.6 - 10.4 mg/dL    Sodium 140 135 - 144 mmol/L    Potassium 3.4 (L) 3.7 - 5.3 mmol/L    Chloride 106 98 - 107 mmol/L    CO2 24 20 - 31 mmol/L    Anion Gap 10 9 - 17 mmol/L    GFR Non-African American 46 (L) >60 mL/min GFR  55 (L) >60 mL/min    GFR Comment          GFR Staging NOT REPORTED    Hepatic Function Panel    Collection Time: 10/28/20  8:30 PM   Result Value Ref Range    Alb 4.0 3.5 - 5.2 g/dL    Alkaline Phosphatase 89 35 - 104 U/L    ALT 11 5 - 33 U/L    AST 16 <32 U/L    Total Bilirubin 0.12 (L) 0.3 - 1.2 mg/dL    Bilirubin, Direct <0.08 <0.31 mg/dL    Bilirubin, Indirect CANNOT BE CALCULATED 0.00 - 1.00 mg/dL    Total Protein 6.3 (L) 6.4 - 8.3 g/dL    Globulin NOT REPORTED 1.5 - 3.8 g/dL    Albumin/Globulin Ratio NOT REPORTED 1.0 - 2.5   Protime-INR    Collection Time: 10/28/20  8:30 PM   Result Value Ref Range    Protime 15.5 (H) 11.5 - 14.2 sec    INR 1.3    IRON AND TIBC    Collection Time: 10/28/20  8:30 PM   Result Value Ref Range    Iron 17 (L) 37 - 145 ug/dL    TIBC 394 250 - 450 ug/dL    Iron Saturation 4 (L) 20 - 55 %    UIBC 377 (H) 112 - 347 ug/dL   Retic Count    Collection Time: 10/28/20  8:30 PM   Result Value Ref Range    Retic % 2.1 (H) 0.5 - 1.9 %    Absolute Retic # 0.061 0.030 - 0.080 M/uL    Immature Retic Fract 32.900 (H) 2.7 - 18.3 %    Retic Hemoglobin 16.6 (L) 28.2 - 35.7 pg   LACTATE DEHYDROGENASE    Collection Time: 10/28/20  8:30 PM   Result Value Ref Range     (H) 135 - 214 U/L   FOLATE    Collection Time: 10/28/20  8:30 PM   Result Value Ref Range    Folate 16.0 >4.8 ng/mL   VITAMIN B12    Collection Time: 10/28/20  8:30 PM   Result Value Ref Range    Vitamin B-12 181 (L) 232 - 1245 pg/mL   FERRITIN    Collection Time: 10/28/20  8:30 PM   Result Value Ref Range    Ferritin 6 (L) 13 - 150 ug/L   Basic Metabolic Panel w/ Reflex to MG    Collection Time: 10/29/20  3:30 AM   Result Value Ref Range    Glucose 92 70 - 99 mg/dL    BUN 17 8 - 23 mg/dL    CREATININE 1.13 (H) 0.50 - 0.90 mg/dL    Bun/Cre Ratio 15 9 - 20    Calcium 8.4 (L) 8.6 - 10.4 mg/dL    Sodium 136 135 - 144 mmol/L    Potassium 3.5 (L) 3.7 - 5.3 mmol/L    Chloride 104 98 - 107 mmol/L    CO2 24 20 - 31 mmol/L    Anion Gap 8 (L) 9 - 17 mmol/L    GFR Non-African American 49 (L) >60 mL/min    GFR  59 (L) >60 mL/min    GFR Comment          GFR Staging NOT REPORTED    Hemoglobin and Hematocrit, Blood    Collection Time: 10/29/20  3:30 AM   Result Value Ref Range    Hemoglobin 7.1 (L) 11.9 - 15.1 g/dL    Hematocrit 25.0 (L) 36.3 - 47.1 %   Magnesium    Collection Time: 10/29/20  3:30 AM   Result Value Ref Range    Magnesium 2.0 1.6 - 2.6 mg/dL       Imaging/Diagnostics:  No results found.     Assessment :      Hospital Problems           Last Modified POA    * (Principal) Anemia 10/29/2020 Yes    Narcolepsy 10/28/2020 Yes    Moderate persistent asthma without complication 00/01/0643 Yes    Hiatal hernia 10/29/2020 Yes    Acute on chronic blood loss anemia 10/28/2020 Yes    Essential hypertension 10/28/2020 Yes    Chronic anticoagulation 10/28/2020 Yes    Iron deficiency anemia due to chronic blood loss 10/29/2020 Yes    GERD (gastroesophageal reflux disease) 10/28/2020 Yes    Anxiety 10/29/2020 Yes          Plan:     Patient status inpatient in the  [facility]    Admit as inpatient to [facility]  under the internal medicine service  Consult GI for GI bleed  Transfuse packed red blood cells for anemia  Obtain posttransfusion hemoglobin and hematocrit  Trend hemoglobin and hematocrit every 6 hours  IV fluids 100 mL/h  Maintain n.p.o. status due to GI bleed  Begin IV Protonix every 12 hours for GI bleed  Anemia studies of iron and TIBC  Monitor labs of BMP and CBC  Hold anticoagulation due to bleeding  Anxiety: Continue home Celexa, BuSpar, Wellbutrin  Iron deficiency anemia: Continue home ferrous sulfate  Document stools for color and consistency  Nausea management: Phenergan or Zofran  Pain management: Percocet  Supplemental oxygen as needed  Continuous telemetry monitoring    Consultations:   IP CONSULT TO INTERNAL MEDICINE  IP CONSULT TO GI    Patient is admitted as inpatient status because of co-morbidities listed above, severity of signs and symptoms as outlined, requirement for current medical therapies and most importantly because of direct risk to patient if care not provided in a hospital setting. Expected length of stay > 48 hours.     PIETER Fong CNP  10/29/2020  5:33 AM    Copy sent to Dr. Moni Duncan MD

## 2020-10-29 NOTE — PLAN OF CARE
Problem: Falls - Risk of:  Goal: Will remain free from falls  Description: Will remain free from falls  Outcome: Ongoing  Note: Room free of clutter  Hourly rounding   Non-skid socks worn  Side rails up x2  Bed low and locked  Call light in reach  Instructed to call out before getting out of bed  Anticipatory needs met  Bed alarm on  Falling star at the door and on wristband      Problem: Discharge Planning:  Goal: Discharged to appropriate level of care  Description: Discharged to appropriate level of care  Outcome: Ongoing  Note: Identify potential discharge needs/barriers on admission  Involve family/patient/significant other in discharge process  Collaborate with Case Management/ for discharge needs/concerns

## 2020-10-29 NOTE — CONSULTS
GI Consult Note:    Name: Reyna Shell  MRN: [id number]     [place]: [de-identified]  Room: 2017/2017-02    Admit Date: 10/28/2020  PCP: Laurel Mendez MD    Physician Requesting Consult: No att. providers found     Reason for Consult: Symptomatic anemia    Chief Complaint:     Chief Complaint   Patient presents with    Abnormal Lab     pt states that her hemoglobin was 6 per PCP, was told to come in       History Obtained From:     patient, electronic medical record, nursing staff    History of Present Illness:      Reyna Shell is a  63 y.o.  female who presents with Abnormal Lab (pt states that her hemoglobin was 6 per PCP, was told to come in)      Symptoms:  Patient was seen at [facility].    Patient was admitted from emergency department with a history of weakness, tiredness, and fatigability. Also was getting exertional dyspnea. She was not able to take care of her usual tasks because of above issues. Patient was evaluated and was found to have hemoglobin 6.5. She received 1 unit of blood and hemoglobin improved up to 7.6. At present she feels better. Denies abdominal pain. No nausea vomiting. No GERD symptoms. No dyspepsia. No weight loss. Bowel movement satisfactory. This patient was found to have vitamin B12 deficiency in February 2020. In fact her B12 level was less than 150. At that time patient was evaluated by Dr. Star Sawant, and performed EGD and colonoscopy. EGD revealed hiatal hernia. Colonoscopy no lesions identified with some limitations because of preparation. Somehow patient was not treated for B12 deficiency. Still during this admission her B12 levels are low. Patient also has iron deficiency. Patient supposed to take oral iron therapy and I am not sure whether she was following this. Patient was started on oral B12 therapy during this admission by the admitting physician.     Patient also on Xarelto for DVT and this may be contributing to her anemia as well. She is known to have huge hiatal hernia and she is being followed by surgery service for hernia repair. Had a colonoscopy in May 2020 did not reveal significant pathology. Past Medical History:     Past Medical History:   Diagnosis Date    Acid reflux     Adult ADHD 2012    Asthma 2012    B[facility] 1 disorder ([facility] [address]     Broken foot     Left foot    Chest pain of uncertain etiology     Last episode was in  (Written 2019)    Depression     Under control per pt.  on 6/15/18    DVT of leg (deep venous thrombosis) ([address] 2014    Fracture     right foot / pt denies surgical intervention    Helicobacter pylori (H. pylori) infection     per EGD    Hematuria 2018    Has a follow-up with Urologist     Hiatal hernia     History of blood transfusion     History of DVT (deep vein thrombosis) 2012    Hx of blood clots     Hypertension     Internal hemorrhoids     Narcolepsy 2012    Osteoporosis         Past Surgical History:     Past Surgical History:   Procedure Laterality Date    ANKLE SURGERY Left 2018    mass excision    ARM SURGERY Right     Pt states she has had 13 right arm surgeries    BREAST ENHANCEMENT SURGERY Bilateral      SECTION      x 3    COLONOSCOPY  2013    sigmoid diverticula, prominent large internal hemorrhoid    COLONOSCOPY N/A 2/10/2020    COLONOSCOPY DIAGNOSTIC performed by Bro Schreiber MD at [facility] N/A 2020    COLORECTAL CANCER SCREENING, NOT HIGH RISK performed by Torito Goodwin MD at [address], COLON, DIAGNOSTIC      ESOPHAGEAL MOTILITY STUDY N/A 2020    PES RN - ESOPHAGEAL MOTILITY STUDY performed by Jaida Malone DO at [address] STUDY  9/10/2020    ESOPHAGEAL MOTILITY STUDY performed by Jaida Malone DO at [facility]    FOOT SURGERY Left     FOOT TENDON SURGERY Left 2019    LEFT PERONEAL TENDON REPAIR WITH POSSIBLE LEFT TENDON TRANSFER (Left )    KNEE ARTHROSCOPY Left     Two surgeries on left knee per pt    KNEE SURGERY Right     Three surgeries per pt    LIGAMENT REPAIR Left 2/22/2019    LEFT PERONEAL TENDON REPAIR performed by Lucina Petersen DPM at [address] [place] [address] OFFICE/OUTPT VISIT,PROCEDURE ONLY Left 6/29/2018    RESECTION/REMOVAL BONY NEOPLASM LEFT FIBULA, LEFT SOFT TISSUE MASS EXCISION WITH BONE BIOPSY LEFT ANKLE FIBULA performed by Lucina Petersen DPM at [place] [address] Left     UPPER GASTROINTESTINAL ENDOSCOPY  5/23/2013    tertiary contractures esophagus, 3 to 4 cm hiatal hernia, antral gastritis, + H Pylori, mild active chronic esophagitis    UPPER GASTROINTESTINAL ENDOSCOPY  4/6/2016    one biopsy    UPPER GASTROINTESTINAL ENDOSCOPY N/A 2/10/2020    EGD BIOPSY performed by Miladys Walker MD at [place] [address]          Medications Prior to Admission:       Prior to Admission medications    Medication Sig Start Date End Date Taking? Authorizing Provider   albuterol sulfate HFA (VENTOLIN HFA) 108 (90 Base) MCG/ACT inhaler INHALE TWO PUFFS BY MOUTH EVERY 4 HOURS AS NEEDED FOR WHEEZING 10/28/20   Ankit Walker MD   propranolol (INDERAL LA) 80 MG extended release capsule TAKE ONE CAPSULE BY MOUTH DAILY 10/19/20   Karmen Donis MD   oxyCODONE-acetaminophen (PERCOCET) 5-325 MG per tablet Take 1 tablet by mouth every 8 hours as needed for Pain for up to 30 days. 10/8/20 11/7/20  Ankit Walker MD   amphetamine-dextroamphetamine (ADDERALL) 30 MG tablet Take 1 tablet by mouth 2 times daily for 30 days.  10/8/20 11/7/20  Ankit Walker MD   pantoprazole (PROTONIX) 40 MG tablet TAKE ONE TABLET BY MOUTH EVERY MORNING BEFORE BREAKFAST 8/3/20   Ankit Walker MD   cyclobenzaprine (FLEXERIL) 10 MG tablet TAKE ONE-HALF TO ONE TABLET BY MOUTH THREE TIMES A DAY AS NEEDED FOR MUSCLE SPASMS 6/11/20   Karmen Donis MD ferrous sulfate (FE TABS) 325 (65 Fe) MG EC tablet Take 1 tablet by mouth 2 times daily 5/5/20   Asim Parisi MD   citalopram (CELEXA) 40 MG tablet TAKE ONE TABLET BY MOUTH DAILY 4/8/20   Moni Duncan MD   rivaroxaban (XARELTO) 20 MG TABS tablet TAKE ONE TABLET BY MOUTH DAILY 4/8/20   Moni Duncan MD   [facility] HFA) 200 MCG/ACT AERO inhaler Inhale 2 puffs into the lungs 2 times daily 4/8/20   Ankit Walker MD   busPIRone (BUSPAR) 15 MG tablet Take 15 mg by mouth 3 times daily 4/6/20   Ankit Walker MD   albuterol (PROVENTIL) (2.5 MG/3ML) 0.083% nebulizer solution Take 2.5 mg by nebulization every 6 hours as needed for Wheezing or Shortness of Breath    Historical Provider, MD   alendronate (FOSAMAX) 70 MG tablet TAKE 1 TABLET BY MOUTH ONCE WEEKLY BEFORE BREAKFAST, ON AN EMPTY STOMACH: REMAIN UPRIGHT FOR 30 MINUTES  Patient taking differently: TAKE 1 TABLET BY MOUTH ONCE WEEKLY BEFORE BREAKFAST, ON AN EMPTY STOMACH: REMAIN UPRIGHT FOR 30 MINUTES, Takes on Fridays 10/23/19   PIETER Hicks CNP   buPROPion (WELLBUTRIN XL) 300 MG extended release tablet TAKE ONE TABLET BY MOUTH EVERY MORNING 10/1/19   Juan Manuel Crockett DO   Calcium Carbonate (CALCIUM 600 PO) Take 600 mg by mouth daily    Historical Provider, MD        Allergies:       Latex; Prozac [fluoxetine hcl]; Sulfa antibiotics; and Codeine    Social History:     Tobacco:    reports that she quit smoking about 31 years ago. She has a 20.00 pack-year smoking history. She has never used smokeless tobacco.  Alcohol:      reports current alcohol use. Drug Use: reports no history of drug use. Family History:     Family History   Problem Relation Age of Onset   [facility] Cancer Mother     Hypertension Mother     Stroke Mother     Cancer Maternal Aunt     Hypertension Father     Heart Surgery Father     Diabetes Neg Hx        Review of Systems:     Review of Systems   Constitutional: Positive for activity change and fatigue.  Negative for appetite change, fever and unexpected weight change. HENT: Negative for mouth sores, sore throat, trouble swallowing and voice change. Eyes: Positive for discharge. Respiratory: Negative for cough, choking, shortness of breath and wheezing. Cardiovascular: Negative for chest pain, palpitations and leg swelling. Gastrointestinal: Negative for abdominal distention, abdominal pain, blood in stool, constipation, diarrhea, nausea and vomiting. Endocrine: Negative for polyphagia and polyuria. Genitourinary: Negative for difficulty urinating, frequency, hematuria, pelvic pain, urgency and vaginal bleeding. Musculoskeletal: Negative for arthralgias, back pain, gait problem and joint swelling. Skin: Negative for color change, pallor and rash. Allergic/Immunologic: Negative for food allergies. Neurological: Negative for dizziness, seizures, weakness, light-headedness and headaches. Hematological: Negative for adenopathy. Does not bruise/bleed easily. Psychiatric/Behavioral: Negative for sleep disturbance. The patient is nervous/anxious. Code Status:  Full Code    Physical Exam:     Vitals:  BP (!) 152/75   Pulse 53   Temp 97.9 °F (36.6 °C) (Oral)   Resp 18   Ht 5' (1.524 m)   Wt 147 lb (66.7 kg)   SpO2 96%   BMI 28.71 kg/m²   Temp (24hrs), Av.1 °F (36.7 °C), Min:97.9 °F (36.6 °C), Max:98.3 °F (36.8 °C)      Physical Exam  Vitals signs and nursing note reviewed. Constitutional:       Appearance: She is well-developed. HENT:      Head: Normocephalic and atraumatic. Eyes:      General: No scleral icterus. Conjunctiva/sclera: Conjunctivae normal.      Pupils: Pupils are equal, round, and reactive to light. Neck:      Musculoskeletal: Normal range of motion and neck supple. Thyroid: No thyromegaly. Vascular: No hepatojugular reflux or JVD. Trachea: No tracheal deviation. Cardiovascular:      Rate and Rhythm: Normal rate and regular rhythm.       Heart sounds: Normal heart sounds. Pulmonary:      Effort: Pulmonary effort is normal. No respiratory distress. Breath sounds: Normal breath sounds. No wheezing or rales. Abdominal:      General: Bowel sounds are normal. There is no distension. Palpations: Abdomen is soft. There is no hepatomegaly or mass. Tenderness: There is no abdominal tenderness. There is no rebound. Hernia: No hernia is present. Musculoskeletal:         General: No tenderness. Comments: No joint swelling   Lymphadenopathy:      Cervical: No cervical adenopathy. Skin:     General: Skin is warm. Findings: No bruising, ecchymosis, erythema or rash. Neurological:      Mental Status: She is alert and oriented to person, place, and time. Cranial Nerves: No cranial nerve deficit. Psychiatric:         Thought Content:  Thought content normal.       Data:   CBC:   Lab Results   Component Value Date    WBC 6.0 10/28/2020    RBC 2.93 10/28/2020    HGB 7.3 10/29/2020    HCT 25.4 10/29/2020    MCV 81.6 10/28/2020    MCH 22.2 10/28/2020    MCHC 27.2 10/28/2020    RDW 16.1 10/28/2020     10/28/2020    MPV 10.7 10/28/2020     CBC with Differential:  Lab Results   Component Value Date    WBC 6.0 10/28/2020    RBC 2.93 10/28/2020    HGB 7.3 10/29/2020    HCT 25.4 10/29/2020     10/28/2020    MCV 81.6 10/28/2020    MCH 22.2 10/28/2020    MCHC 27.2 10/28/2020    RDW 16.1 10/28/2020    NRBC 1 10/28/2020    LYMPHOPCT 34 10/28/2020    MONOPCT 6 10/28/2020    BASOPCT 2 10/28/2020    MONOSABS 0.36 10/28/2020    LYMPHSABS 2.04 10/28/2020    EOSABS 0.06 10/28/2020    BASOSABS 0.12 10/28/2020    DIFFTYPE NOT REPORTED 10/28/2020     Hemoglobin/Hematocrit:  Lab Results   Component Value Date    HGB 7.3 10/29/2020    HCT 25.4 10/29/2020     CMP:    Lab Results   Component Value Date     10/29/2020    K 3.5 10/29/2020     10/29/2020    CO2 24 10/29/2020    BUN 17 10/29/2020    CREATININE 1.13 10/29/2020

## 2020-10-30 VITALS
WEIGHT: 140.2 LBS | BODY MASS INDEX: 27.52 KG/M2 | RESPIRATION RATE: 16 BRPM | OXYGEN SATURATION: 93 % | TEMPERATURE: 98.8 F | HEIGHT: 60 IN | DIASTOLIC BLOOD PRESSURE: 79 MMHG | SYSTOLIC BLOOD PRESSURE: 148 MMHG | HEART RATE: 68 BPM

## 2020-10-30 LAB
ALBUMIN SERPL-MCNC: 3.4 G/DL (ref 3.5–5.2)
ANION GAP SERPL CALCULATED.3IONS-SCNC: 5 MMOL/L (ref 9–17)
BUN BLDV-MCNC: 9 MG/DL (ref 8–23)
BUN/CREAT BLD: 9 (ref 9–20)
CALCIUM SERPL-MCNC: 8.4 MG/DL (ref 8.6–10.4)
CHLORIDE BLD-SCNC: 113 MMOL/L (ref 98–107)
CO2: 24 MMOL/L (ref 20–31)
CREAT SERPL-MCNC: 0.98 MG/DL (ref 0.5–0.9)
GFR AFRICAN AMERICAN: >60 ML/MIN
GFR NON-AFRICAN AMERICAN: 58 ML/MIN
GFR SERPL CREATININE-BSD FRML MDRD: ABNORMAL ML/MIN/{1.73_M2}
GFR SERPL CREATININE-BSD FRML MDRD: ABNORMAL ML/MIN/{1.73_M2}
GLUCOSE BLD-MCNC: 82 MG/DL (ref 70–99)
HCT VFR BLD CALC: 25.5 % (ref 36.3–47.1)
HCT VFR BLD CALC: 26.9 % (ref 36.3–47.1)
HEMOGLOBIN: 7.2 G/DL (ref 11.9–15.1)
HEMOGLOBIN: 7.5 G/DL (ref 11.9–15.1)
MAGNESIUM: 2.1 MG/DL (ref 1.6–2.6)
MCH RBC QN AUTO: 22.3 PG (ref 25.2–33.5)
MCHC RBC AUTO-ENTMCNC: 27.9 G/DL (ref 28.4–34.8)
MCV RBC AUTO: 80.1 FL (ref 82.6–102.9)
NRBC AUTOMATED: 0 PER 100 WBC
PDW BLD-RTO: 16 % (ref 11.8–14.4)
PHOSPHORUS: 2.6 MG/DL (ref 2.6–4.5)
PLATELET # BLD: 359 K/UL (ref 138–453)
PMV BLD AUTO: 10.5 FL (ref 8.1–13.5)
POTASSIUM SERPL-SCNC: 3.4 MMOL/L (ref 3.7–5.3)
RBC # BLD: 3.36 M/UL (ref 3.95–5.11)
SODIUM BLD-SCNC: 142 MMOL/L (ref 135–144)
WBC # BLD: 6.4 K/UL (ref 3.5–11.3)

## 2020-10-30 PROCEDURE — G0378 HOSPITAL OBSERVATION PER HR: HCPCS

## 2020-10-30 PROCEDURE — 2580000003 HC RX 258: Performed by: INTERNAL MEDICINE

## 2020-10-30 PROCEDURE — 85014 HEMATOCRIT: CPT

## 2020-10-30 PROCEDURE — 6360000002 HC RX W HCPCS: Performed by: INTERNAL MEDICINE

## 2020-10-30 PROCEDURE — 96366 THER/PROPH/DIAG IV INF ADDON: CPT

## 2020-10-30 PROCEDURE — 83735 ASSAY OF MAGNESIUM: CPT

## 2020-10-30 PROCEDURE — 6360000002 HC RX W HCPCS: Performed by: NURSE PRACTITIONER

## 2020-10-30 PROCEDURE — C9113 INJ PANTOPRAZOLE SODIUM, VIA: HCPCS | Performed by: NURSE PRACTITIONER

## 2020-10-30 PROCEDURE — 36415 COLL VENOUS BLD VENIPUNCTURE: CPT

## 2020-10-30 PROCEDURE — 96376 TX/PRO/DX INJ SAME DRUG ADON: CPT

## 2020-10-30 PROCEDURE — 85018 HEMOGLOBIN: CPT

## 2020-10-30 PROCEDURE — 6370000000 HC RX 637 (ALT 250 FOR IP): Performed by: INTERNAL MEDICINE

## 2020-10-30 PROCEDURE — 99239 HOSP IP/OBS DSCHRG MGMT >30: CPT | Performed by: INTERNAL MEDICINE

## 2020-10-30 PROCEDURE — 6370000000 HC RX 637 (ALT 250 FOR IP): Performed by: NURSE PRACTITIONER

## 2020-10-30 PROCEDURE — 85027 COMPLETE CBC AUTOMATED: CPT

## 2020-10-30 PROCEDURE — 2580000003 HC RX 258: Performed by: NURSE PRACTITIONER

## 2020-10-30 PROCEDURE — 80069 RENAL FUNCTION PANEL: CPT

## 2020-10-30 RX ORDER — LANOLIN ALCOHOL/MO/W.PET/CERES
325 CREAM (GRAM) TOPICAL
Qty: 90 TABLET | Refills: 3 | Status: SHIPPED | OUTPATIENT
Start: 2020-10-30 | End: 2020-10-30 | Stop reason: SDUPTHER

## 2020-10-30 RX ORDER — PANTOPRAZOLE SODIUM 40 MG/1
40 TABLET, DELAYED RELEASE ORAL
Qty: 30 TABLET | Refills: 3 | Status: SHIPPED | OUTPATIENT
Start: 2020-10-30 | End: 2020-10-30

## 2020-10-30 RX ORDER — LANOLIN ALCOHOL/MO/W.PET/CERES
325 CREAM (GRAM) TOPICAL
Qty: 90 TABLET | Refills: 3 | Status: SHIPPED | OUTPATIENT
Start: 2020-10-30 | End: 2020-12-07

## 2020-10-30 RX ORDER — CHOLECALCIFEROL (VITAMIN D3) 25 MCG
1000 TABLET ORAL DAILY
Qty: 30 TABLET | Refills: 0 | Status: SHIPPED | OUTPATIENT
Start: 2020-10-31 | End: 2020-10-30

## 2020-10-30 RX ORDER — PANTOPRAZOLE SODIUM 40 MG/1
40 TABLET, DELAYED RELEASE ORAL
Qty: 60 TABLET | Refills: 0 | Status: SHIPPED | OUTPATIENT
Start: 2020-10-30 | End: 2020-10-30

## 2020-10-30 RX ORDER — PANTOPRAZOLE SODIUM 40 MG/1
40 TABLET, DELAYED RELEASE ORAL
Qty: 60 TABLET | Refills: 0 | Status: SHIPPED | OUTPATIENT
Start: 2020-10-30 | End: 2020-11-02

## 2020-10-30 RX ORDER — PANTOPRAZOLE SODIUM 40 MG/1
40 TABLET, DELAYED RELEASE ORAL
Status: DISCONTINUED | OUTPATIENT
Start: 2020-10-30 | End: 2020-10-30 | Stop reason: HOSPADM

## 2020-10-30 RX ORDER — POTASSIUM CHLORIDE 20 MEQ/1
40 TABLET, EXTENDED RELEASE ORAL ONCE
Status: COMPLETED | OUTPATIENT
Start: 2020-10-30 | End: 2020-10-30

## 2020-10-30 RX ORDER — CHOLECALCIFEROL (VITAMIN D3) 25 MCG
1000 TABLET ORAL DAILY
Qty: 30 TABLET | Refills: 0 | Status: SHIPPED | OUTPATIENT
Start: 2020-10-31 | End: 2021-03-04 | Stop reason: ALTCHOICE

## 2020-10-30 RX ADMIN — BUSPIRONE HYDROCHLORIDE 15 MG: 10 TABLET ORAL at 07:45

## 2020-10-30 RX ADMIN — CITALOPRAM HYDROBROMIDE 40 MG: 20 TABLET ORAL at 07:45

## 2020-10-30 RX ADMIN — IRON SUCROSE 200 MG: 20 INJECTION, SOLUTION INTRAVENOUS at 11:48

## 2020-10-30 RX ADMIN — POTASSIUM CHLORIDE 40 MEQ: 20 TABLET, EXTENDED RELEASE ORAL at 10:48

## 2020-10-30 RX ADMIN — Medication 1 TABLET: at 07:45

## 2020-10-30 RX ADMIN — SODIUM CHLORIDE: 9 INJECTION, SOLUTION INTRAVENOUS at 05:50

## 2020-10-30 RX ADMIN — Medication 1000 UNITS: at 07:44

## 2020-10-30 RX ADMIN — PANTOPRAZOLE SODIUM 40 MG: 40 INJECTION, POWDER, FOR SOLUTION INTRAVENOUS at 07:45

## 2020-10-30 RX ADMIN — BUPROPION HYDROCHLORIDE 300 MG: 150 TABLET, EXTENDED RELEASE ORAL at 07:44

## 2020-10-30 RX ADMIN — Medication 10 ML: at 07:58

## 2020-10-30 RX ADMIN — PROPRANOLOL HYDROCHLORIDE 80 MG: 80 CAPSULE, EXTENDED RELEASE ORAL at 07:44

## 2020-10-30 RX ADMIN — MULTIPLE VITAMINS W/ MINERALS TAB 1 TABLET: TAB at 07:44

## 2020-10-30 RX ADMIN — ACETAMINOPHEN 650 MG: 325 TABLET ORAL at 08:40

## 2020-10-30 RX ADMIN — CYANOCOBALAMIN TAB 1000 MCG 1000 MCG: 1000 TAB at 07:44

## 2020-10-30 ASSESSMENT — PAIN DESCRIPTION - PROGRESSION

## 2020-10-30 ASSESSMENT — PAIN DESCRIPTION - LOCATION: LOCATION: NECK

## 2020-10-30 ASSESSMENT — PAIN SCALES - GENERAL
PAINLEVEL_OUTOF10: 4
PAINLEVEL_OUTOF10: 5

## 2020-10-30 ASSESSMENT — PAIN DESCRIPTION - DESCRIPTORS: DESCRIPTORS: ACHING;DISCOMFORT

## 2020-10-30 ASSESSMENT — PAIN - FUNCTIONAL ASSESSMENT: PAIN_FUNCTIONAL_ASSESSMENT: ACTIVITIES ARE NOT PREVENTED

## 2020-10-30 ASSESSMENT — PAIN DESCRIPTION - PAIN TYPE: TYPE: CHRONIC PAIN

## 2020-10-30 ASSESSMENT — PAIN DESCRIPTION - FREQUENCY: FREQUENCY: CONTINUOUS

## 2020-10-30 ASSESSMENT — PAIN DESCRIPTION - ONSET: ONSET: ON-GOING

## 2020-10-30 NOTE — FLOWSHEET NOTE
Patient was sleeping as writer entered the room (no family members present). Writer provided a silent prayer of healing, rest, and comfort during her stay. Spiritual care will follow up as needed or requested.      10/30/20 1401   Encounter Summary   Services provided to: Patient   Referral/Consult From: Crissy   Continue Visiting   (10/30/2020 PT Sleeping )   Complexity of Encounter Low   Length of Encounter 15 minutes   Routine   Type Initial   Assessment Sleeping   Intervention Prayer

## 2020-10-30 NOTE — PROGRESS NOTES
[facility]  Office: [address], DO, Yifan Izaguirrese, DO, Vikash Blevins, DO, Porfirio Ryan, DO, Viral Cool MD, Guille Davison MD, Terry Tucker MD, Flex Bravo MD, Ira Freeman MD, My Guevara MD, Storm Luu MD, Cinda Benites MD, Bryon Alaniz MD, Casie Sanchez DO, Andrea Francisco MD, Kelsea Camara MD, Dharmesh Harris DO, Jessica Baron MD,  Adrian Narvaez DO, Miki White MD, Abad Darnell MD, Apollo Pichardo, CNP, [facility], CNP, Ignacio Keith, CNP, Elle Duron, CNS, Fatou Quintero, CNP, Rachell Finn, CNP, Lucy Marcial, CNP, Marcos Zafar, CNP, Maruo Pulliam, CNP, VALERIY EscobarC, Oanh Ramsey, [facility], Sonia Cunningham, CNP, Leonardo Best, CNP, Vicky Agudelo, CNP, Raven Warner, CNP, [facility]    Progress Note    10/30/2020    11:16 AM    Name:   Pasco Cheadle  MRN:     [id number]     [place]:      [de-identified]   Room:   2017/2017-02  IP Day:  2  Admit Date:  10/28/2020  8:15 PM    PCP:   Felipe Nava MD  Code Status:  Full Code    Subjective:     C/C:   Chief Complaint   Patient presents with    Abnormal Lab     pt states that her hemoglobin was 6 per PCP, was told to come in     Interval History Status: not changed. He was evaluated by gastroenterology overnight, no endoscope was planned at this time. Patient is tolerating a diet, hemoglobin stable. Patient currently does not have follow-up with primary care physician but was referred to a hematologist for treatment of her iron deficiency anemia. It was again stressed that she should follow-up with her hematologist to prevent further hospitalizations, her iron therapy will be increased. She states that she has difficulty seeing follow-up as her mother has dementia and is very sick at home.   She understands the necessity to see her consultants GI and hematology outpatient so that she prevents for the hospitalization. She has no complaints today    Review of Systems:     Constitutional: Admits to feeling fatigued denies any chills or sweats  Respiratory:  negative for cough, admits to dyspnea on exertion and shortness of breath with exertion   cardiovascular:  negative for chest pain, chest pressure/discomfort, lower extremity edema, palpitations  Gastrointestinal: Denies any diarrhea   neurological:  negative for dizziness, headache    Medications: Allergies: Allergies   Allergen Reactions    Latex Itching    Prozac [Fluoxetine Hcl]     Sulfa Antibiotics      migraine    Codeine Itching       Current Meds:   Scheduled Meds:    iron sucrose  200 mg Intravenous Q24H    sodium chloride flush  10 mL Intravenous 2 times per day    sodium chloride  20 mL Intravenous Once    vitamin B-12  1,000 mcg Oral Daily    therapeutic multivitamin-minerals  1 tablet Oral Daily    Vitamin D  1,000 Units Oral Daily    buPROPion  300 mg Oral Daily    busPIRone  15 mg Oral TID    calcium carb-cholecalciferol  1 tablet Oral Daily    citalopram  40 mg Oral Daily    propranolol  80 mg Oral Daily    pantoprazole  40 mg Intravenous Q12H    And    sodium chloride (PF)  10 mL Intravenous Q12H     Continuous Infusions:    sodium chloride 75 mL/hr at 10/30/20 0550     PRN Meds: sodium chloride flush, acetaminophen **OR** acetaminophen, promethazine **OR** ondansetron, oxyCODONE-acetaminophen, ipratropium-albuterol    Data:     Past Medical History:   has a past medical history of Acid reflux, Adult ADHD, Asthma, Bipolar 1 disorder ([facility] [address] Broken foot, Chest pain of uncertain etiology, Depression, DVT of leg (deep venous thrombosis) ([facility] [address] Fracture, Helicobacter pylori (H. pylori) infection, Hematuria, Hiatal hernia, History of blood transfusion, History of DVT (deep vein thrombosis), Hx of blood clots, Hypertension, Internal hemorrhoids, Narcolepsy, and Osteoporosis.     Social History:   reports that she quit smoking about 31 years ago. She has a 20.00 pack-year smoking history. She has never used smokeless tobacco. She reports current alcohol use. She reports that she does not use drugs. Family History:   Family History   Problem Relation Age of Onset   [facility] Cancer Mother     Hypertension Mother     Stroke Mother     Cancer Maternal Aunt     Hypertension Father     Heart Surgery Father     Diabetes Neg Hx        Vitals:  BP (!) 148/79   Pulse 68   Temp 98.8 °F (37.1 °C) (Oral)   Resp 16   Ht 5' (1.524 m)   Wt 140 lb 3.2 oz (63.6 kg)   SpO2 93%   BMI 27.38 kg/m²   Temp (24hrs), Av.1 °F (36.7 °C), Min:97.7 °F (36.5 °C), Max:98.8 °F (37.1 °C)    No results for input(s): POCGLU in the last 72 hours. I/O (24Hr): Intake/Output Summary (Last 24 hours) at 10/30/2020 1116  Last data filed at 10/29/2020 2003  Gross per 24 hour   Intake --   Output 300 ml   Net -300 ml       Labs:  Hematology:  Recent Labs     10/28/20  1416 10/28/20  2030  10/29/20  1707 10/30/20  0137 10/30/20  0746   WBC 5.2 6.0  --   --   --  6.4   RBC 2.98* 2.93*  --   --   --  3.36*   HGB 6.5* 6.5*   < > 7.3* 7.2* 7.5*   HCT 23.9* 23.9*   < > 25.4* 25.5* 26.9*   MCV 80.2* 81.6*  --   --   --  80.1*   MCH 21.8* 22.2*  --   --   --  22.3*   MCHC 27.2* 27.2*  --   --   --  27.9*   RDW 16.2* 16.1*  --   --   --  16.0*    437  --   --   --  359   MPV 11.3 10.7  --   --   --  10.5   INR  --  1.3  --   --   --   --     < > = values in this interval not displayed.      Chemistry:  Recent Labs     10/28/20  2030 10/29/20  0330 10/30/20  0746    136 142   K 3.4* 3.5* 3.4*    104 113*   CO2 24 24 24   GLUCOSE 104* 92 82   BUN 17 17 9   CREATININE 1.20* 1.13* 0.98*   MG  --  2.0 2.1   ANIONGAP 10 8* 5*   LABGLOM 46* 49* 58*   GFRAA 55* 59* >60   CALCIUM 8.8 8.4* 8.4*   PHOS  --   --  2.6     Recent Labs     10/28/20  1416 10/28/20  2030 10/30/20  0746   PROT 6.2* 6.3*  --    LABALBU 3.8 4.0 3.4*   AST 18 16  --    ALT 11 11  --    LDH

## 2020-10-30 NOTE — PROGRESS NOTES
Pt discharged to home in stable condition with [facility]  Discharge instructions given  Discharge checklist reviewed and completed with pt and family. Pt denies having any further questions at this time  personal items given to patient at discharge  Patient/family state they have everything they were admitted with.

## 2020-10-30 NOTE — PLAN OF CARE
Problem: Falls - Risk of:  Goal: Will remain free from falls  Description: Will remain free from falls  10/29/2020 2127 by Lorne Roamn RN  Outcome: Ongoing  Note: Hourly rounding, call light within reach, bed locked and lowered  10/29/2020 1849 by Jaun Marquez RN  Outcome: Ongoing  Note: Room free of clutter  Hourly rounding   Non-skid socks worn  Side rails up x2  Bed low and locked  Call light in reach  Instructed to call out before getting out of bed  Anticipatory needs met  Bed alarm on  Falling star at the door and on wristband   Goal: Absence of physical injury  Description: Absence of physical injury  10/29/2020 1849 by Jaun Marquez RN  Outcome: Ongoing     Problem: Pain:  Goal: Pain level will decrease  Description: Pain level will decrease  10/29/2020 2127 by Lorne Roman RN  Outcome: Ongoing  Note: Pain not really an issue, will continue to monitor  10/29/2020 1849 by Jaun Marquez RN  Outcome: Ongoing  Goal: Control of acute pain  Description: Control of acute pain  10/29/2020 1849 by Jaun Marquez RN  Outcome: Ongoing  Goal: Control of chronic pain  Description: Control of chronic pain  10/29/2020 1849 by Jaun Marquez RN  Outcome: Ongoing     Problem: Activity:  Goal: Fatigue will decrease  Description: Fatigue will decrease  10/29/2020 1849 by Jaun Marquez RN  Outcome: Ongoing  Goal: Ability to tolerate increased activity will improve  Description: Ability to tolerate increased activity will improve  10/29/2020 2127 by Lorne Roman RN  Outcome: Ongoing  Note: Patient able to get up to the bathroom with out fatigue. 10/29/2020 1849 by Jaun Marquez RN  Outcome: Ongoing  Goal: Ability to maintain optimal joint mobility will improve  Description: Ability to maintain optimal joint mobility will improve  10/29/2020 1849 by Jaun Marquez RN  Outcome: Ongoing     Problem:  Bowel/Gastric:  Goal: Ability to achieve a regular elimination pattern will improve  Description: Ability to behavior will improve  10/29/2020 1849 by Melissa Wright RN  Outcome: Ongoing     Problem: Health Behavior:  Goal: Identification of resources available to assist in meeting health care needs will improve  Description: Identification of resources available to assist in meeting health care needs will improve  10/29/2020 1849 by Melissa Wright RN  Outcome: Ongoing  Goal: Identification of resources available to assist in meeting health care needs will improve  Description: Identification of resources available to assist in meeting health care needs will improve  10/29/2020 1849 by Melissa Wright RN  Outcome: Ongoing     Problem: Nutritional:  Goal: Maintenance of adequate nutrition will improve  Description: Maintenance of adequate nutrition will improve  10/29/2020 1849 by Melissa Wright RN  Outcome: Ongoing  Goal: Ability to identify appropriate dietary choices will improve  Description: Ability to identify appropriate dietary choices will improve  10/29/2020 1849 by Melissa Wright RN  Outcome: Ongoing     Problem: Physical Regulation:  Goal: Complications related to the disease process, condition or treatment will be avoided or minimized  Description: Complications related to the disease process, condition or treatment will be avoided or minimized  10/29/2020 1849 by Melissa Wright RN  Outcome: Ongoing  Goal: Signs and symptoms of infection will decrease  Description: Signs and symptoms of infection will decrease  10/29/2020 1849 by Melissa Wright RN  Outcome: Ongoing  Goal: Will show no signs and symptoms of excessive bleeding  Description: Will show no signs and symptoms of excessive bleeding  10/29/2020 1849 by Melissa Wright RN  Outcome: Ongoing     Problem: Safety:  Goal: Ability to remain free from injury will improve  Description: Ability to remain free from injury will improve  10/29/2020 1849 by Melissa Wright RN  Outcome: Ongoing     Problem: Sensory:  Goal: Pain level will decrease  Description: Pain level will decrease  10/29/2020 2127 by Tosha Ponce RN  Outcome: Ongoing  Note: Pain not really an issue, will continue to monitor  10/29/2020 1849 by Esther Meyers RN  Outcome: Ongoing  Goal: General experience of comfort will improve  Description: General experience of comfort will improve  10/29/2020 1849 by Esther Meyers RN  Outcome: Ongoing     Problem: Skin Integrity:  Goal: Skin integrity will improve  Description: Skin integrity will improve  10/29/2020 1849 by Esther Meyers RN  Outcome: Ongoing  Goal: Signs of wound healing will improve  Description: Signs of wound healing will improve  10/29/2020 1849 by Esther Meyers RN  Outcome: Ongoing     Problem: Tissue Perfusion:  Goal: Ability to maintain adequate tissue perfusion will improve  Description: Ability to maintain adequate tissue perfusion will improve  10/29/2020 1849 by Esther Meyers RN  Outcome: Ongoing  Goal: Ability to maintain a stable neurologic state will improve  Description: Ability to maintain a stable neurologic state will improve  10/29/2020 1849 by Esther Meyers RN  Outcome: Ongoing     Problem: Discharge Planning:  Goal: Discharged to appropriate level of care  Description: Discharged to appropriate level of care  10/29/2020 1849 by Esther Meyers RN  Outcome: Ongoing  Note: Identify potential discharge needs/barriers on admission  Involve family/patient/significant other in discharge process  Collaborate with Case Management/ for discharge needs/concerns      Problem: SAFETY  Goal: Free from accidental physical injury  10/29/2020 1849 by Esther Meyers RN  Outcome: Ongoing  Goal: Free from intentional harm  10/29/2020 1849 by Esther Meyers RN  Outcome: Ongoing     Problem: DAILY CARE  Goal: Daily care needs are met  10/29/2020 1849 by Esther Meyers RN  Outcome: Ongoing     Problem: PAIN  Goal: Patient's pain/discomfort is manageable  10/29/2020 1849 by Esther Meyers RN  Outcome: Ongoing     Problem: SKIN INTEGRITY  Goal: Skin integrity is maintained or improved  10/29/2020 1849 by Merlin Hua, RN  Outcome: Ongoing     Problem: KNOWLEDGE DEFICIT  Goal: Patient/S.O. demonstrates understanding of disease process, treatment plan, medications, and discharge instructions.   10/29/2020 1849 by Merlin Hua, RN  Outcome: Ongoing     Problem: DISCHARGE BARRIERS  Goal: Patient's continuum of care needs are met  10/29/2020 1849 by Merlin Hua, RN  Outcome: Ongoing

## 2020-10-30 NOTE — DISCHARGE INSTR - COC
Continuity of Care Form    Patient Name: Charlie Rm   :  1960  MRN:  [id number]    Admit date:  10/28/2020  Discharge date:  ***    Code Status Order: Full Code   Advance Directives:   Advance Care Flowsheet Documentation       Date/Time Healthcare Directive Type of Healthcare Directive Copy in [address] Agent's Name Healthcare Agent's Phone Number    10/29/20 16:01:03  Yes, patient has an advance directive for healthcare treatment  --  --  --  --  --            Admitting Physician:  Carolyn Ryan DO  PCP: Christina Yarbrough MD    Discharging Nurse: [facility] Unit/Room#:   Discharging Unit Phone Number: ***    Emergency Contact:   Extended Emergency Contact Information  Primary Emergency Contact: Dyllan Alvarenga  Address: Gilmar Schaefer [facility] of [address] Phone: [phone]  Relation: Child  Secondary Emergency Contact: Nicolás Stapleton   [facility] of [address] Phone: [phone]  Relation: Other    Past Surgical History:  Past Surgical History:   Procedure Laterality Date    ANKLE SURGERY Left 2018    mass excision    ARM SURGERY Right     Pt states she has had 13 right arm surgeries    BREAST ENHANCEMENT SURGERY Bilateral      SECTION      x 3    COLONOSCOPY  2013    sigmoid diverticula, prominent large internal hemorrhoid    COLONOSCOPY N/A 2/10/2020    COLONOSCOPY DIAGNOSTIC performed by Юлия Larkin MD at [address] [place] [address] 2020    COLORECTAL CANCER SCREENING, NOT HIGH RISK performed by Shaheen Garcia MD at [place] [address], COLON, DIAGNOSTIC      ESOPHAGEAL MOTILITY STUDY N/A 2020    PES RN - ESOPHAGEAL MOTILITY STUDY performed by Rukhsana Dockery DO at [address] [place] [address]  9/10/2020    ESOPHAGEAL MOTILITY STUDY performed by Rukhsana Dockery DO at [facility]    FOOT SURGERY Left 2015    FOOT TENDON SURGERY Left 02/22/2019    LEFT PERONEAL TENDON REPAIR WITH POSSIBLE LEFT TENDON TRANSFER (Left )    KNEE ARTHROSCOPY Left     Two surgeries on left knee per pt    KNEE SURGERY Right     Three surgeries per pt    LIGAMENT REPAIR Left 2/22/2019    LEFT PERONEAL TENDON REPAIR performed by Liz Eisenberg DPM at [address] [place] [address] OFFICE/OUTPT VISIT,PROCEDURE ONLY Left 6/29/2018    RESECTION/REMOVAL BONY NEOPLASM LEFT FIBULA, LEFT SOFT TISSUE MASS EXCISION WITH BONE BIOPSY LEFT ANKLE FIBULA performed by Liz Eisenberg DPM at [place] [address] Left     UPPER GASTROINTESTINAL ENDOSCOPY  5/23/2013    tertiary contractures esophagus, 3 to 4 cm hiatal hernia, antral gastritis, + H Pylori, mild active chronic esophagitis    UPPER GASTROINTESTINAL ENDOSCOPY  4/6/2016    one biopsy    UPPER GASTROINTESTINAL ENDOSCOPY N/A 2/10/2020    EGD BIOPSY performed by Nisha Meza MD at [address] [place] [address]         Immunization History:   Immunization History   Administered Date(s) Administered    Pneumococcal Polysaccharide ([id number]) 03/28/2014    Td, unspecified formulation 07/18/2003    Tdap (Boostrix, Adacel) 04/05/2019       Active Problems:  Patient Active Problem List   Diagnosis Code    Adult ADHD F90.9    Narcolepsy G47.419    Moderate persistent asthma without complication V15.63    History of DVT (deep vein thrombosis) Z86.718    DVT of leg (deep venous thrombosis) ([facility]) I82.409    Closed displaced fracture of body of calcaneus S92.013A    Hiatal hernia K44.9    Internal hemorrhoids G34.1    Helicobacter pylori (H. 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01    Iron deficiency anemia due to chronic blood loss D50.0    Gastrointestinal hemorrhage K92.2    GERD (gastroesophageal reflux disease) K21.9    Absolute anemia D64.9    Epigastric burning sensation R10.13    Abdominal pain, epigastric R10.13    Anemia D64.9    Anxiety F41.9       Isolation/Infection:   Isolation            No Isolation          Patient Infection Status       Infection Onset Added Last Indicated Last Indicated By Review Planned Expiration Resolved Resolved By    None active    Resolved    COVID-19 Rule Out 09/24/20 09/24/20 09/25/20 Covid-19 Ambulatory (Ordered)   09/26/20 Rule-Out Test Resulted    COVID-19 Rule Out 09/05/20 09/05/20 09/06/20 Covid-19 Ambulatory (Ordered)   09/09/20 Rule-Out Test Resulted    COVID-19 Rule Out 08/07/20 08/07/20 08/08/20 Covid-19 Ambulatory (Ordered)   08/10/20 Rule-Out Test Resulted    COVID-19 Rule Out 07/02/20 07/02/20 07/02/20 COVID-19 (Ordered)   07/03/20 Rule-Out Test Resulted    COVID-19 Rule Out 06/20/20 06/20/20 06/20/20 COVID-19 (Ordered)   06/22/20 Rule-Out Test Resulted    COVID-19 Rule Out 05/15/20 05/15/20 05/15/20 COVID-19 (Ordered)   05/16/20 Rule-Out Test Resulted            Nurse Assessment:  Last Vital Signs: BP (!) 148/79   Pulse 68   Temp 98.8 °F (37.1 °C) (Oral)   Resp 16   Ht 5' (1.524 m)   Wt 140 lb 3.2 oz (63.6 kg)   SpO2 93%   BMI 27.38 kg/m²     Last documented pain score (0-10 scale): Pain Level: 5  Last Weight:   Wt Readings from Last 1 Encounters:   10/29/20 140 lb 3.2 oz (63.6 kg)     Mental Status:  {IP PT MENTAL STATUS::::0}    IV Access:  { JACQUELINE IV ACCESS:896181308:::0}    Nursing Mobility/ADLs:  Walking   {CHP DME ADLs:829861927:::0}  Transfer  {CHP DME ADLs:384380931:::0}  Bathing  {CHP DME ADLs:795543650:::0}  Dressing  {CHP DME ADLs:071741017:::0}  Toileting  {CHP DME ADLs:250686949:::0}  Feeding  {CHP DME ADLs:591905211:::0}  Med Admin  {CHP DME ADLs:524465943:::0}  Med Delivery   { JACQUELINE MED Delivery:020441896:::0}    Wound Care Documentation and Therapy:        Elimination:  Continence:    Bowel: {YES / DYLON:94371}  Bladder: {YES / BI:61463}  Urinary Catheter: {Urinary Catheter:287371356:::0}   Colostomy/Ileostomy/Ileal Conduit: {YES / BZ:11219}       Date of Last BM: ***    Intake/Output Summary (Last 24 hours) at 10/30/2020 1128  Last data filed at 10/29/2020 2003  Gross per 24 hour   Intake --   Output 300 ml   Net -300 ml     I/O last 3 completed shifts:  In: -   Out: 300 [Urine:300]    Safety Concerns:     [address] Safety Concerns:662646131:::0}    Impairments/Disabilities:      [address] Impairments/Disabilities:369936084:::0}    Nutrition Therapy:  Current Nutrition Therapy:   [address] Diet List:545803194:::0}    Routes of Feeding: {CHP DME Other Feedings:572227040:::0}  Liquids: {Slp liquid thickness:73420}  Daily Fluid Restriction: {CHP DME Yes amt example:052722470:::0}  Last Modified Barium Swallow with Video (Video Swallowing Test): {Done Not Done EEYK:348165056:::5}    Treatments at the Time of Hospital Discharge:   Respiratory Treatments: ***  Oxygen Therapy:  {Therapy; copd oxygen:62159:::0}  Ventilator:    { CC Vent List:582920058:::0}    Rehab Therapies: {THERAPEUTIC INTERVENTION:0951431222}  Weight Bearing Status/Restrictions: [address] Weight Bearin:::0}  Other Medical Equipment (for information only, NOT a DME order):  {EQUIPMENT:093047555}  Other Treatments: ***    Patient's personal belongings (please select all that are sent with patient):  {[facility] DME Belongings:342407630:::0}    RN SIGNATURE:  {Esignature:998097349:::0}    CASE MANAGEMENT/SOCIAL WORK SECTION    Inpatient Status Date: ***    Readmission Risk Assessment Score:  Readmission Risk              Risk of Unplanned Readmission:        18           Discharging to Facility/ Agency   Name:   Address:  Phone:  Fax:    Dialysis Facility (if applicable)   Name:  Address:  Dialysis Schedule:  Phone:  Fax:    / signature: {Esignature:700685743:::0}    PHYSICIAN SECTION    Prognosis: Good    Condition at Discharge: Stable    Recommended Labs or Other Treatments After Discharge:   Establish with a primary care physician, check hemoglobin next week. Follow-up with hematology and gastroenterology for chronic anemia and recurrent DVTs. Follow-up on B12 level testing    Physician Certification: I certify the above information and transfer of Bibi Mahoney  is necessary for the continuing treatment of the diagnosis listed and that she requires {Admit to Appropriate Level of Care:63724:::0} for {GREATER/LESS:465958472} 30 days.      Update Admission H&P: {CHP DME Changes in HandP:784226535:::0}    PHYSICIAN SIGNATURE:  {Esignature:401579112:::0}

## 2020-10-30 NOTE — DISCHARGE SUMMARY
[facility]  Office: [address], DO, Francisca Carter, DO, Saba Joyce, DO, Justine Mojica Blood, DO, Carissa Christianson MD, Renita Rodriguez MD, [place] Yee MD, Nehal Richard MD, Jiles Alpers, MD, Rahel Clement MD, Joselyn Asher MD, Laya Taveras MD, Bryon Harkins MD, Matthew Greene DO, Jese Short MD, Holley Hansen MD, Carlo Rodarte DO, Panchito Florence MD,  Alyssa Nguyen DO, Gage Paz MD, Cate Reynaga MD, Larry Miles, [facility], [facility], [facility], Fiona Labrum, CNP, Tamica Fu, CNS, Levi Rutledge, CNP, [place], CNP, [place], CNP, Jimy Singh, CNP, Scarlett Hammond, CNP, Eliseo Roberson PA-C, Deja Dobbins, [facility], Beatrice Noel, CNP, Jake Meza, CNP, Kristin Chand, CNP, Dena Garcia, CNP, Walter Verde, [facility]    Discharge Summary     Patient ID: Earl Lakhani  :  1960   MRN: [id number]     ACCOUNT:  [de-identified]   Patient's PCP: Johnny Sheth MD  Admit Date: 10/28/2020   Discharge Date: 10/30/2020     Length of Stay: 2  Code Status:  Full Code  Admitting Physician: Rosa Luong DO  Discharge Physician: Carlo Rodarte DO     Active Discharge Diagnoses:     Hospital Problem Lists:  Principal Problem:    Acute on chronic blood loss anemia  Active Problems:    Narcolepsy    Moderate persistent asthma without complication    Hiatal hernia    Essential hypertension    Chronic anticoagulation    Iron deficiency anemia due to chronic blood loss    GERD (gastroesophageal reflux disease)    Anemia    Anxiety  Resolved Problems:    * No resolved hospital problems. *      Admission Condition:  good     Discharged Condition: good    Hospital Stay:     Hospital Course:  Earl Lakhani is a 63 y.o. female who was admitted for the management of  Acute on chronic blood loss anemia. She presented with a hemoglobin of 6.5 was given 1 unit of PRBCs.   Patient was found to be deficient in B12 and iron. Patient had stable hemoglobin throughout the night, patient was started on IV iron and given IV B12. Patient also had an methylmalonic acid sent for confirmation of B12 deficiency. Patient states she has had adequate diet and was taking iron therapy as prescribed outpatient but due to her mother being sick, she initially was requested to see hematologist and was able to meet appointment. Patient was evaluated by gastroenterology, patient with a recent EGD colonoscopy in 2020 so was managed medically. Patient overnight with no further issues, discussed outpatient follow-up with hematology and gastroenterology which she will agree to go to. Also discussed her anticoagulation, Xarelto. Patient has had 3 previous DVTs all provoked after ankle and knee surgery. Patient has been on Xarelto since and has been speaking with physicians regarding discontinuation. At this time patient has had chronic GI bleeds requiring blood transfusions. Discussed continuation of Xarelto with patient, risk and benefits were discussed and patient is agreeable to discontinuation until follow-up outpatient.       Significant therapeutic interventions:     Significant Diagnostic Studies:   Labs / Micro:  CBC:   Lab Results   Component Value Date    WBC 6.4 10/30/2020    RBC 3.36 10/30/2020    HGB 7.5 10/30/2020    HCT 26.9 10/30/2020    MCV 80.1 10/30/2020    MCH 22.3 10/30/2020    MCHC 27.9 10/30/2020    RDW 16.0 10/30/2020     10/30/2020     BMP:    Lab Results   Component Value Date    GLUCOSE 82 10/30/2020     10/30/2020    K 3.4 10/30/2020     10/30/2020    CO2 24 10/30/2020    ANIONGAP 5 10/30/2020    BUN 9 10/30/2020    CREATININE 0.98 10/30/2020    BUNCRER 9 10/30/2020    CALCIUM 8.4 10/30/2020    LABGLOM 58 10/30/2020    GFRAA >60 10/30/2020    GFR      10/30/2020    GFR NOT REPORTED 10/30/2020     CMP:    Lab Results   Component Value Date    GLUCOSE 82 10/30/2020     10/30/2020    K 3.4 10/30/2020     10/30/2020    CO2 24 10/30/2020    BUN 9 10/30/2020    CREATININE 0.98 10/30/2020    ANIONGAP 5 10/30/2020    ALKPHOS 89 10/28/2020    ALT 11 10/28/2020    AST 16 10/28/2020    BILITOT 0.12 10/28/2020    LABALBU 3.4 10/30/2020    ALBUMIN NOT REPORTED 10/28/2020    LABGLOM 58 10/30/2020    GFRAA >60 10/30/2020    GFR      10/30/2020    GFR NOT REPORTED 10/30/2020    PROT 6.3 10/28/2020    CALCIUM 8.4 10/30/2020     PT/INR:    Lab Results   Component Value Date    PROTIME 15.5 10/28/2020    INR 1.3 10/28/2020     Radiology:  No results found. Consultations:    Consults:     Final Specialist Recommendations/Findings:   IP CONSULT TO INTERNAL MEDICINE  IP CONSULT TO GI      The patient was seen and examined on day of discharge and this discharge summary is in conjunction with any daily progress note from day of discharge. Discharge plan:     Disposition: Home    Physician Follow Up:   Hematology and Gastroenterology    Schedule an appointment as soon as possible for a visit in 1 week  hospital followup, low up with hematology for discussion regarding iron therapy and anticoagulation. Follow-up with gastroenterology for chronic bleeding       Requiring Further Evaluation/Follow Up POST HOSPITALIZATION/Incidental Findings:     Diet: regular diet    Activity: As tolerated    Instructions to Patient:   Take iron as instructed, see hematology and gastroenterology as recommended.   Establish with primary care physician    Discharge Medications:      Medication List      START taking these medications    cyanocobalamin 1000 MCG tablet  Take 1 tablet by mouth daily  Start taking on:  October 31, 2020     Vitamin D3 25 MCG Tabs  Take 1 tablet by mouth daily  Start taking on:  October 31, 2020        CHANGE how you take these medications    alendronate 70 MG tablet  Commonly known as:  FOSAMAX  TAKE 1 TABLET BY MOUTH ONCE WEEKLY BEFORE BREAKFAST, ON AN EMPTY STOMACH: REMAIN UPRIGHT Your Medications      These medications were sent to [facility] [address], [facility] [phone] Nisha Akers [phone]  [address] [place] [address]    Phone:  [phone]   cyanocobalamin 1000 MCG tablet  ferrous sulfate 325 (65 Fe) MG EC tablet  pantoprazole 40 MG tablet  Vitamin D3 25 MCG Tabs         No discharge procedures on file. Time Spent on discharge is  31 mins in patient examination, evaluation, counseling as well as medication reconciliation, prescriptions for required medications, discharge plan and follow up. Electronically signed by   Librado Hollins DO  10/30/2020  11:39 AM      Thank you Dr. David Roa MD for the opportunity to be involved in this patient's care.

## 2020-10-31 LAB — METHYLMALONIC ACID: 0.3 UMOL/L (ref 0–0.4)

## 2020-11-01 NOTE — PROGRESS NOTES
Cough  [] Calf Pain   Gastro-Intestinal Negative for: [x] Heartburn   [x] Reflux   [x] Dysphagia   [x] Melena   [] BRBPR  [x] Vomiting   [x] Abdominal Pain   [x] Diarrhea  [x] Hernia    [x] Constipation  [] Other:     Positive for: [] Heartburn   [] Reflux   [] Dysphagia   [] Melena   [] BRBPR  [] Vomiting   [] Abdominal Pain   [] Diarrhea  [] Hernia    [] Constipation  [] Other:    Muskuloskeletal Negative for: [x] Joint pain   [] Back pain   [] Knee Pain   [] Muscle weakness   [x] Edema [] Other:     Positive for: [x] Joint pain   [] Back pain   [] Knee Pain   [] Muscle weakness  [] Edema [] Other:    Neurologic Negative for: [x] Syncope   [x] Insomnia   [] Being treated for depression  [] Other:     Positive for: [] Syncope   [] Insomnia   [x] Being treated for depression  [] Other:    Skin Negative for: [x] Wound:   [] Open   [] Draining   [] Incisional     [x] Rash   [x] Hair Loss  [] Other:     Positive for: [] Wound:   [] Open   [] Draining    [] Incisional  [] Rash   [] Hair Loss  [] Other:          PHYSICAL EXAMINATION:      BP (!) 146/94   Pulse 80   Temp 97.1 °F (36.2 °C)   Ht 5' (1.524 m)   Wt 149 lb (67.6 kg)   BMI 29.10 kg/m²   Constitutional:  Vital signs are normal. The patient appears well-developed and well-nourished. HEENT:   Head: Normocephalic. Atraumatic  Eyes: pupils are equal and reactive. No scleral icterus is present. Neck: No mass and no thyromegaly present. Cardiovascular: Normal rate, regular rhythm, S1 normal and S2 normal.  Radial pulses present   Pulmonary/Chest: Effort normal and breath sounds normal. No retractions  Abdominal: Soft. Normal appearance. There is no organomegaly. No tenderness. There is no rigidity, no rebound, no guarding and no Copeland's sign. Musculoskeletal:        Right lower leg: Normal. No tenderness and no edema. Left lower leg: Normal. No tenderness and no edema. Neurological: The patient is alert and oriented.  Moving all 4 extremities,

## 2020-11-02 ENCOUNTER — TELEPHONE (OUTPATIENT)
Dept: FAMILY MEDICINE CLINIC | Age: 60
End: 2020-11-02

## 2020-11-02 ENCOUNTER — TELEPHONE (OUTPATIENT)
Dept: GASTROENTEROLOGY | Age: 60
End: 2020-11-02

## 2020-11-02 RX ORDER — ALENDRONATE SODIUM 70 MG/1
TABLET ORAL
Qty: 12 TABLET | Refills: 10 | Status: SHIPPED | OUTPATIENT
Start: 2020-11-02 | End: 2021-01-29

## 2020-11-02 RX ORDER — PANTOPRAZOLE SODIUM 40 MG/1
TABLET, DELAYED RELEASE ORAL
Qty: 90 TABLET | Refills: 0 | Status: SHIPPED | OUTPATIENT
Start: 2020-11-02 | End: 2021-03-18 | Stop reason: SDUPTHER

## 2020-11-02 RX ORDER — CYCLOBENZAPRINE HCL 10 MG
TABLET ORAL
Qty: 30 TABLET | Refills: 1 | Status: SHIPPED | OUTPATIENT
Start: 2020-11-02 | End: 2021-03-17

## 2020-11-02 NOTE — TELEPHONE ENCOUNTER
James Kahn is calling to request a refill on the following medication(s):    Medication Request:  Requested Prescriptions     Pending Prescriptions Disp Refills    amphetamine-dextroamphetamine (ADDERALL) 30 MG tablet 60 tablet 0     Sig: Take 1 tablet by mouth 2 times daily for 30 days.  oxyCODONE-acetaminophen (PERCOCET) 5-325 MG per tablet 90 tablet 0     Sig: Take 1 tablet by mouth every 8 hours as needed for Pain for up to 30 days.        Last Visit Date (If Applicable):  58/51/6073    Next Visit Date:    11/25/2020      Oars: 3-3-20

## 2020-11-02 NOTE — TELEPHONE ENCOUNTER
Gayla Tripp is calling to request a refill on the following medication(s):    Medication Request:  Requested Prescriptions     Pending Prescriptions Disp Refills    cyclobenzaprine (FLEXERIL) 10 MG tablet [Pharmacy Med Name: CYCLOBENZAPRINE 10 MG TABLET] 30 tablet 1     Sig: TAKE 1/2 TO 1 TABLET BY MOUTH THREE TIMES A DAY AS NEEDED FOR MUSCLE SPASMS       Last Visit Date (If Applicable):  72/04/8694    Next Visit Date:    11/25/2020

## 2020-11-02 NOTE — TELEPHONE ENCOUNTER
Charlie Rm is calling to request a refill on the following medication(s):    Medication Request:  Requested Prescriptions     Pending Prescriptions Disp Refills    pantoprazole (PROTONIX) 40 MG tablet [Pharmacy Med Name: PANTOPRAZOLE SOD DR 40 MG TAB] 90 tablet 0     Sig: TAKE ONE TABLET BY MOUTH EVERY MORNING BEFORE BREAKFAST       Last Visit Date (If Applicable):  32/83/9183    Next Visit Date:    11/25/2020

## 2020-11-02 NOTE — TELEPHONE ENCOUNTER
Gonzalez Catalan is calling to request a refill on the following medication(s):    Medication Request:  Requested Prescriptions     Pending Prescriptions Disp Refills    alendronate (FOSAMAX) 70 MG tablet [Pharmacy Med Name: ALENDRONATE SODIUM 70 MG TAB] 12 tablet 10     Sig: TAKE 1 TABLET BY MOUTH ONCE WEEKLY BEFORE BREAKFAST, ON AN EMPTY STOMACH: REMAIN UPRIGHT FOR 30 MINUTES:TAKE WITH 8 OUNCES OF WATER       Last Visit Date (If Applicable):  16/33/7434    Next Visit Date:    Visit date not found

## 2020-11-02 NOTE — TELEPHONE ENCOUNTER
[facility] Initial Follow Up Call    Outreach made within 2 business days of discharge: Yes    Patient: Gonzalez Catalan Patient : 1960   MRN: [id number]  Reason for Admission: There are no discharge diagnoses documented for the most recent discharge. Discharge Date: 10/30/20       Spoke with:     Discharge department/facility: [facility] Interactive Patient Contact:  Was patient able to fill all prescriptions: Yes  Was patient instructed to bring all medications to the follow-up visit: Yes  Is patient taking all medications as directed in the discharge summary?  Yes  Does patient understand their discharge instructions: Yes  Does patient have questions or concerns that need addressed prior to 7-14 day follow up office visit: no    Scheduled appointment with PCP within 7-14 days    Follow Up  Future Appointments   Date Time Provider Naya Roger   2020 10:30 AM MD KAMRYN Aguilar [facility]   2020 10:00 AM STA PAT RM 4 [facility]  [place], [place]

## 2020-11-03 RX ORDER — DEXTROAMPHETAMINE SACCHARATE, AMPHETAMINE ASPARTATE, DEXTROAMPHETAMINE SULFATE AND AMPHETAMINE SULFATE 7.5; 7.5; 7.5; 7.5 MG/1; MG/1; MG/1; MG/1
30 TABLET ORAL 2 TIMES DAILY
Qty: 60 TABLET | Refills: 0 | Status: SHIPPED | OUTPATIENT
Start: 2020-11-07 | End: 2020-11-18 | Stop reason: SDUPTHER

## 2020-11-03 RX ORDER — OXYCODONE HYDROCHLORIDE AND ACETAMINOPHEN 5; 325 MG/1; MG/1
1 TABLET ORAL EVERY 8 HOURS PRN
Qty: 90 TABLET | Refills: 0 | Status: SHIPPED | OUTPATIENT
Start: 2020-11-07 | End: 2020-11-18 | Stop reason: SDUPTHER

## 2020-11-05 ENCOUNTER — OFFICE VISIT (OUTPATIENT)
Dept: GASTROENTEROLOGY | Age: 60
End: 2020-11-05
Payer: MEDICARE

## 2020-11-05 ENCOUNTER — HOSPITAL ENCOUNTER (OUTPATIENT)
Age: 60
Setting detail: SPECIMEN
Discharge: HOME | End: 2020-11-05
Payer: MEDICARE

## 2020-11-05 ENCOUNTER — TELEPHONE (OUTPATIENT)
Dept: GASTROENTEROLOGY | Age: 60
End: 2020-11-05

## 2020-11-05 VITALS — TEMPERATURE: 97.2 F | BODY MASS INDEX: 28.32 KG/M2 | WEIGHT: 145 LBS

## 2020-11-05 LAB
ABSOLUTE EOS #: 0.09 K/UL (ref 0–0.44)
ABSOLUTE IMMATURE GRANULOCYTE: 0 K/UL (ref 0–0.3)
ABSOLUTE LYMPH #: 0.99 K/UL (ref 1.1–3.7)
ABSOLUTE MONO #: 0.54 K/UL (ref 0.1–1.2)
BASOPHILS # BLD: 1 % (ref 0–2)
BASOPHILS ABSOLUTE: 0.05 K/UL (ref 0–0.2)
DIFFERENTIAL TYPE: ABNORMAL
EOSINOPHILS RELATIVE PERCENT: 2 % (ref 1–4)
HCT VFR BLD CALC: 36.1 % (ref 36.3–47.1)
HEMOGLOBIN: 10 G/DL (ref 11.9–15.1)
IMMATURE GRANULOCYTES: 0 %
LYMPHOCYTES # BLD: 22 % (ref 24–43)
MCH RBC QN AUTO: 24 PG (ref 25.2–33.5)
MCHC RBC AUTO-ENTMCNC: 27.7 G/DL (ref 28.4–34.8)
MCV RBC AUTO: 86.8 FL (ref 82.6–102.9)
MONOCYTES # BLD: 12 % (ref 3–12)
MORPHOLOGY: ABNORMAL
MORPHOLOGY: ABNORMAL
NRBC AUTOMATED: 0 PER 100 WBC
PDW BLD-RTO: 23.1 % (ref 11.8–14.4)
PLATELET # BLD: 366 K/UL (ref 138–453)
PLATELET ESTIMATE: ABNORMAL
PMV BLD AUTO: 10.9 FL (ref 8.1–13.5)
RBC # BLD: 4.16 M/UL (ref 3.95–5.11)
RBC # BLD: ABNORMAL 10*6/UL
SEG NEUTROPHILS: 63 % (ref 36–65)
SEGMENTED NEUTROPHILS ABSOLUTE COUNT: 2.83 K/UL (ref 1.5–8.1)
WBC # BLD: 4.5 K/UL (ref 3.5–11.3)
WBC # BLD: ABNORMAL 10*3/UL

## 2020-11-05 PROCEDURE — 85025 COMPLETE CBC W/AUTO DIFF WBC: CPT

## 2020-11-05 PROCEDURE — 36415 COLL VENOUS BLD VENIPUNCTURE: CPT

## 2020-11-05 PROCEDURE — 99214 OFFICE O/P EST MOD 30 MIN: CPT | Performed by: INTERNAL MEDICINE

## 2020-11-05 ASSESSMENT — ENCOUNTER SYMPTOMS
DIARRHEA: 1
ABDOMINAL PAIN: 1
BLOOD IN STOOL: 1
ANAL BLEEDING: 1

## 2020-11-05 NOTE — PROGRESS NOTES
Manuel. Has apt with Hematologist next week, has surgery schedule with Dr Mecca Peng next month for hernia   . Past Medical,Family, and Social History reviewed and does contribute to the patient presenting condition. Patient's PMH/PSH,SH,PSYCH Hx, MEDs, ALLERGIES, and ROS were all reviewed and updated in the appropriate sections. PAST MEDICAL HISTORY:  Past Medical History:   Diagnosis Date    Acid reflux     Adult ADHD 2012    Asthma 2012    Bipolar 1 disorder ([facility]     Broken foot     Left foot    Chest pain of uncertain etiology     Last episode was in  (Written 2019)    Depression     Under control per pt.  on 6/15/18    DVT of leg (deep venous thrombosis) ([facility] 2014    Fracture     right foot / pt denies surgical intervention    Helicobacter pylori (H. pylori) infection     per EGD    Hematuria 2018    Has a follow-up with Urologist     Hiatal hernia     History of blood transfusion     History of DVT (deep vein thrombosis) 2012    Hx of blood clots     Hypertension     Internal hemorrhoids     Narcolepsy 2012    Osteoporosis        Past Surgical History:   Procedure Laterality Date    ANKLE SURGERY Left 2018    mass excision    ARM SURGERY Right     Pt states she has had 13 right arm surgeries    BREAST ENHANCEMENT SURGERY Bilateral      SECTION      x 3    COLONOSCOPY  2013    sigmoid diverticula, prominent large internal hemorrhoid    COLONOSCOPY N/A 2/10/2020    COLONOSCOPY DIAGNOSTIC performed by Danyel Andrade MD at [address] N/A 2020    COLORECTAL CANCER SCREENING, NOT HIGH RISK performed by Paul Novoa MD at [address], COLON, DIAGNOSTIC      ESOPHAGEAL MOTILITY STUDY N/A 2020    JOSE FRANCISCO RN - ESOPHAGEAL MOTILITY STUDY performed by Lucy Bautista DO at [facility]    ESOPHAGEAL MOTILITY STUDY  9/10/2020    ESOPHAGEAL MOTILITY STUDY performed by Rubi Eaton Jared Franco DO at [facility]    FOOT SURGERY Left 2015    FOOT TENDON SURGERY Left 02/22/2019    LEFT PERONEAL TENDON REPAIR WITH POSSIBLE LEFT TENDON TRANSFER (Left )    KNEE ARTHROSCOPY Left     Two surgeries on left knee per pt    KNEE SURGERY Right     Three surgeries per pt    LIGAMENT REPAIR Left 2/22/2019    LEFT PERONEAL TENDON REPAIR performed by Sudhakar Taylor DPM at [address] [facility] OFFICE/OUTPT VISIT,PROCEDURE ONLY Left 6/29/2018    RESECTION/REMOVAL BONY NEOPLASM LEFT FIBULA, LEFT SOFT TISSUE MASS EXCISION WITH BONE BIOPSY LEFT ANKLE FIBULA performed by Sudhakar Taylor DPM at [address] [facility] Left     UPPER GASTROINTESTINAL ENDOSCOPY  5/23/2013    tertiary contractures esophagus, 3 to 4 cm hiatal hernia, antral gastritis, + H Pylori, mild active chronic esophagitis    UPPER GASTROINTESTINAL ENDOSCOPY  4/6/2016    one biopsy    UPPER GASTROINTESTINAL ENDOSCOPY N/A 2/10/2020    EGD BIOPSY performed by Luz Michaels MD at [address] [place] [address] South:    Current Outpatient Medications:     [START ON 11/7/2020] amphetamine-dextroamphetamine (ADDERALL) 30 MG tablet, Take 1 tablet by mouth 2 times daily for 30 days. , Disp: 60 tablet, Rfl: 0    [START ON 11/7/2020] oxyCODONE-acetaminophen (PERCOCET) 5-325 MG per tablet, Take 1 tablet by mouth every 8 hours as needed for Pain for up to 30 days. , Disp: 90 tablet, Rfl: 0    pantoprazole (PROTONIX) 40 MG tablet, TAKE ONE TABLET BY MOUTH EVERY MORNING BEFORE BREAKFAST, Disp: 90 tablet, Rfl: 0    cyclobenzaprine (FLEXERIL) 10 MG tablet, TAKE 1/2 TO 1 TABLET BY MOUTH THREE TIMES A DAY AS NEEDED FOR MUSCLE SPASMS, Disp: 30 tablet, Rfl: 1    alendronate (FOSAMAX) 70 MG tablet, TAKE 1 TABLET BY MOUTH ONCE WEEKLY BEFORE BREAKFAST, ON AN EMPTY STOMACH: REMAIN UPRIGHT FOR 30 MINUTES:TAKE WITH 8 OUNCES OF WATER, Disp: 12 tablet, Rfl: 10    Cholecalciferol (VITAMIN D3) 25 MCG TABS, Take 1 tablet by mouth daily, Disp: 30 tablet, Rfl: 0    cyanocobalamin 1000 MCG tablet, Take 1 tablet by mouth daily, Disp: 30 tablet, Rfl: 3    ferrous sulfate (FE TABS) 325 (65 Fe) MG EC tablet, Take 1 tablet by mouth 3 times daily (with meals), Disp: 90 tablet, Rfl: 3    albuterol sulfate HFA (VENTOLIN HFA) 108 (90 Base) MCG/ACT inhaler, INHALE TWO PUFFS BY MOUTH EVERY 4 HOURS AS NEEDED FOR WHEEZING, Disp: 1 Inhaler, Rfl: 5    propranolol (INDERAL LA) 80 MG extended release capsule, TAKE ONE CAPSULE BY MOUTH DAILY, Disp: 90 capsule, Rfl: 0    citalopram (CELEXA) 40 MG tablet, TAKE ONE TABLET BY MOUTH DAILY, Disp: 90 tablet, Rfl: 3    mometasone (ASMANEX HFA) 200 MCG/ACT AERO inhaler, Inhale 2 puffs into the lungs 2 times daily, Disp: 3 Inhaler, Rfl: 3    busPIRone (BUSPAR) 15 MG tablet, Take 15 mg by mouth 3 times daily, Disp: 90 tablet, Rfl: 3    albuterol (PROVENTIL) (2.5 MG/3ML) 0.083% nebulizer solution, Take 2.5 mg by nebulization every 6 hours as needed for Wheezing or Shortness of Breath, Disp: , Rfl:     buPROPion (WELLBUTRIN XL) 300 MG extended release tablet, TAKE ONE TABLET BY MOUTH EVERY MORNING, Disp: 90 tablet, Rfl: 3    Calcium Carbonate (CALCIUM 600 PO), Take 600 mg by mouth daily, Disp: , Rfl:     ALLERGIES:   Allergies   Allergen Reactions    Latex Itching    Prozac [Fluoxetine Hcl]     Sulfa Antibiotics      migraine    Codeine Itching       FAMILY HISTORY:       Problem Relation Age of Onset    Cancer Mother     Hypertension Mother     Stroke Mother     Cancer Maternal Aunt     Hypertension Father     Heart Surgery Father     Diabetes Neg Hx          SOCIAL HISTORY:   Social History     Socioeconomic History    Marital status: Single     Spouse name: Not on file    Number of children: Not on file    Years of education: Not on file    Highest education level: Not on file   Occupational History    Not on file   Social Needs    Financial resource strain: Somewhat hard    Food insecurity     Worry: Sometimes true     Inability: Sometimes true    Transportation needs     Medical: No     Non-medical: No   Tobacco Use    Smoking status: Former Smoker     Packs/day: 1.00     Years: 20.00     Pack years: 20.00     Last attempt to quit:      Years since quittin.8    Smokeless tobacco: Never Used   Substance and Sexual Activity    Alcohol use: Yes     Comment: rare    Drug use: No    Sexual activity: Not Currently   Lifestyle    Physical activity     Days per week: 0 days     Minutes per session: 0 min    Stress: To some extent   Relationships    Social connections     Talks on phone: Three times a week     Gets together: Once a week     Attends [employer] service: 1 to 4 times per year     Active member of club or organization: Yes     Attends meetings of clubs or organizations: 1 to 4 times per year     Relationship status:     Intimate partner violence     Fear of current or ex partner: Not on file     Emotionally abused: Not on file     Physically abused: Not on file     Forced sexual activity: Not on file   Other Topics Concern    Not on file   Social History Narrative    Not on file         REVIEW OF SYSTEMS:         Review of Systems   Constitutional: Positive for appetite change, fatigue and unexpected weight change (6lb loss in a week). Eyes: Positive for visual disturbance. Gastrointestinal: Positive for abdominal pain, anal bleeding, blood in stool and diarrhea. Neurological: Positive for headaches. PHYSICAL EXAMINATION: Vital signs reviewed per the nursing documentation. Temp 97.2 °F (36.2 °C)   Wt 145 lb (65.8 kg)   BMI 28.32 kg/m²   Body mass index is 28.32 kg/m². Physical Exam  Nursing note reviewed. Constitutional:       Appearance: She is well-developed. Comments: Anxious     HENT:      Head: Normocephalic and atraumatic.    Eyes:      Conjunctiva/sclera: Conjunctivae normal.      Pupils: Pupils are equal, round, and reactive to light. Neck:      Musculoskeletal: Normal range of motion and neck supple. Cardiovascular:      Heart sounds: Normal heart sounds. Pulmonary:      Effort: Pulmonary effort is normal.      Breath sounds: Rales present. Abdominal:      General: Bowel sounds are normal.      Palpations: Abdomen is soft. Comments: Mild upper abdominal tenderness no masses palpable bowel sounds positive   Musculoskeletal: Normal range of motion. Skin:     General: Skin is warm. Neurological:      Mental Status: She is alert and oriented to person, place, and time. Psychiatric:         Behavior: Behavior normal.           LABORATORY DATA: Reviewed  Lab Results   Component Value Date    WBC 6.4 10/30/2020    HGB 7.5 (L) 10/30/2020    HCT 26.9 (L) 10/30/2020    MCV 80.1 (L) 10/30/2020     10/30/2020     10/30/2020    K 3.4 (L) 10/30/2020     (H) 10/30/2020    CO2 24 10/30/2020    BUN 9 10/30/2020    CREATININE 0.98 (H) 10/30/2020    LABALBU 3.4 (L) 10/30/2020    BILITOT 0.12 (L) 10/28/2020    ALKPHOS 89 10/28/2020    AST 16 10/28/2020    ALT 11 10/28/2020    INR 1.3 10/28/2020         Lab Results   Component Value Date    RBC 3.36 (L) 10/30/2020    HGB 7.5 (L) 10/30/2020    MCV 80.1 (L) 10/30/2020    MCH 22.3 (L) 10/30/2020    MCHC 27.9 (L) 10/30/2020    RDW 16.0 (H) 10/30/2020    MPV 10.5 10/30/2020    BASOPCT 2 10/28/2020    LYMPHSABS 2.04 10/28/2020    MONOSABS 0.36 10/28/2020    NEUTROABS 3.42 10/28/2020    EOSABS 0.06 10/28/2020    BASOSABS 0.12 10/28/2020         DIAGNOSTIC TESTING:     No results found. Assessment  1. Other iron deficiency anemia    2. Narcotic bowel syndrome    3. Hiatal hernia    4. Internal hemorrhoids    5. Chronic anticoagulation        Plan    We will plan capsule endoscopy and schedule the patient for that    Continue taking iron pills    Follow hemoglobin hematocrit    Pt was advised in detail about some life style and dietary modifications. She was advised about avoidance of caffeine, nicotine and chocolate. Pt was also told to stay away from any kind of fast foods, soda pops. She was also advised to avoid lots of spices, grease and fried food etc.     Instructions were also given about trying to arrange the timing, quality and quantity of food. Instructions were given about using ample amount of fiber including dietary and supplemental fiber either metamucil, bennafiber or citrucell etc.  Pt was advised about drinking ample amount of water without any colors or chemicals. Stress was given about regular exercise. Pt has verbalized understanding and agreement to these modifications. Follow-up with Dr. Maren Dunn    Pt seems to have signs and symptoms consistent with GERD, acid indigestion and heartburns. She was discussed  in detail about some possible life style and dietary modifications. She was stressed about the maintenance  of appropriate weight and effect of obesity contributing to reflux symptoms. Routine exercise was streesed. Avoidance of Caffeine, nicotine and chocolate were explained. Pt was asked to avoid spices grease and fried food. Advices were also given about avoidance of any kind of fast foods, soda pops and high energy drinks. Pt was advised to place two small block under the head end of the bed which may help with night time reflux. Was advised not to eat any thin at least 2-3 hrs before going to bed and walk especially after dinner    Pt has verbalized understanding and agreement to this plan.     More than half of patient's clinic visit time was spent in counseling about lifestyle and dietary modifications  Patient's  questions were answered in this regard as well  The patient has verbalized understanding and agreement     I communicated with the patient and/or health care decision maker about   Details of this discussion including any medical advice provided:YES      I affirm this is a Patient Initiated Episode with an Established Patient who has not had a related appointment within my department in the past 7 days or scheduled within the next 24 hours. Total Time: minutes: 21-30 minutes    Note: not billable if this call serves to triage the patient into an appointment for the relevant concern      Thank you for allowing me to participate in the care of Ms. Shmuel Hester. For any further questions please do not hesitate to contact me. I have reviewed and agree with the ROS entered by the MA/LPN.          Kaleb Genao MD, [facility]  Board Certified in Gastroenterology and [address] [place] [address] Gastroenterology  Office #: [phone]

## 2020-11-12 ENCOUNTER — TELEPHONE (OUTPATIENT)
Dept: INFUSION THERAPY | Facility: MEDICAL CENTER | Age: 60
End: 2020-11-12

## 2020-11-12 ENCOUNTER — TELEPHONE (OUTPATIENT)
Dept: ONCOLOGY | Age: 60
End: 2020-11-12

## 2020-11-12 ENCOUNTER — INITIAL CONSULT (OUTPATIENT)
Dept: ONCOLOGY | Age: 60
End: 2020-11-12
Payer: MEDICARE

## 2020-11-12 VITALS
TEMPERATURE: 98.1 F | RESPIRATION RATE: 16 BRPM | WEIGHT: 147.2 LBS | SYSTOLIC BLOOD PRESSURE: 135 MMHG | BODY MASS INDEX: 28.9 KG/M2 | HEIGHT: 60 IN | HEART RATE: 74 BPM | DIASTOLIC BLOOD PRESSURE: 86 MMHG

## 2020-11-12 PROBLEM — K90.9 IRON MALABSORPTION: Status: ACTIVE | Noted: 2020-11-12

## 2020-11-12 PROBLEM — E53.8 VITAMIN B12 DEFICIENCY: Status: ACTIVE | Noted: 2020-11-12

## 2020-11-12 PROCEDURE — 99201 HC NEW PT, E/M LEVEL 1: CPT | Performed by: INTERNAL MEDICINE

## 2020-11-12 PROCEDURE — 99205 OFFICE O/P NEW HI 60 MIN: CPT | Performed by: INTERNAL MEDICINE

## 2020-11-12 RX ORDER — DIPHENHYDRAMINE HYDROCHLORIDE 50 MG/ML
50 INJECTION INTRAMUSCULAR; INTRAVENOUS ONCE
Status: CANCELLED | OUTPATIENT
Start: 2020-11-24

## 2020-11-12 RX ORDER — CYANOCOBALAMIN 1000 UG/ML
1000 INJECTION INTRAMUSCULAR; SUBCUTANEOUS ONCE
Status: CANCELLED
Start: 2020-11-24

## 2020-11-12 RX ORDER — EPINEPHRINE 1 MG/ML
0.3 INJECTION, SOLUTION, CONCENTRATE INTRAVENOUS PRN
Status: CANCELLED | OUTPATIENT
Start: 2020-11-24

## 2020-11-12 RX ORDER — SODIUM CHLORIDE 0.9 % (FLUSH) 0.9 %
10 SYRINGE (ML) INJECTION PRN
Status: CANCELLED | OUTPATIENT
Start: 2020-11-24

## 2020-11-12 RX ORDER — SODIUM CHLORIDE 9 MG/ML
INJECTION, SOLUTION INTRAVENOUS CONTINUOUS
Status: CANCELLED | OUTPATIENT
Start: 2020-11-24

## 2020-11-12 RX ORDER — SODIUM CHLORIDE 0.9 % (FLUSH) 0.9 %
5 SYRINGE (ML) INJECTION PRN
Status: CANCELLED | OUTPATIENT
Start: 2020-11-24

## 2020-11-12 RX ORDER — HEPARIN SODIUM (PORCINE) LOCK FLUSH IV SOLN 100 UNIT/ML 100 UNIT/ML
500 SOLUTION INTRAVENOUS PRN
Status: CANCELLED | OUTPATIENT
Start: 2020-11-24

## 2020-11-12 RX ORDER — METHYLPREDNISOLONE SODIUM SUCCINATE 125 MG/2ML
125 INJECTION, POWDER, LYOPHILIZED, FOR SOLUTION INTRAMUSCULAR; INTRAVENOUS ONCE
Status: CANCELLED | OUTPATIENT
Start: 2020-11-24

## 2020-11-12 NOTE — TELEPHONE ENCOUNTER
New order per Dr. Regina Chiang;  Injectafer 750 mg IV x 2 doses - 1 week apart. Order noted and to front office/clerks for scheduling. Kardex updated.

## 2020-11-12 NOTE — TELEPHONE ENCOUNTER
[facility] ARRIVES AMBULATORY FOR CONSULTATION  DR LANCASTER IN TO SEE PT  ORDERS RECEIVED  IV IRON INFUSION  VITAMIN B 12 INJECTIONS SOON  RV 6-8 WKS WITH CDP, IRON STUDIES AND VITAMIN B 12 LEVEL BEFORE RV  NEW ORDER ARE PENDING CAMDENERT KAMRYN/ JORGE L  LABS CDP VITAMIN B 12& FOLATE  IRON AND TIBC  FERRITIN 12/31/20  MD VISIT 1/7/21 @ 9:45AM  AVS PRINTED WITH INSTRUCTIONS GIVEN TO PT  PT DISCHARGED AMBULATORY

## 2020-11-12 NOTE — TELEPHONE ENCOUNTER
Og Rolon called the office to check the status of the Capsule Endoscopy, at this time writer advises that the request has been submitted to her insurance for prior auth and as soon as we receive that approval we will contact her to schedule. Pt voices her understanding, writer thanked and call ended.

## 2020-11-12 NOTE — PATIENT INSTRUCTIONS
IV iron infusion  Vitamin B12 injections soon  RV 6-8 weeks with CBC, Iron studies and vitamin B12 level before RV

## 2020-11-12 NOTE — PROGRESS NOTES
_               Ms. Jacqueline Patterson is a very pleasant 63 y.o. female with history of multiple co morbidities as listed. The patient is referred for further evaluation and management of severe iron deficiency anemia. The patient had normal blood work over the last several years up to 2019. She presented in February 2020 with severe anemia with hemoglobin down to 5.4. She was hospitalized and received blood transfusion and iron infusion at that time. She had colonoscopy which was not prepped so it was repeated in May 2020 and so far did not show any active bleeding. Patient had severe upper GI symptoms with GERD and hiatal hernia. She was hospitalized for the second time late October 2020 with severe anemia again and she received blood transfusion and iron infusion as well. She had surgery evaluation and plan for possible gastric surgery in few weeks. The patient's symptoms were typical for severe anemia. She was complaining of increasing weakness and fatigue and shortness of breath on minimal exertion and palpitation. Dizzy spells. She denies any active bleeding. No history of melena or hematochezia. No hematemesis. As stated above she had severe heartburn and acid reflux and she was maintained on Protonix. The patient also had history of left leg DVT. She was maintained on anticoagulation for long duration which was discontinued at the time of her first admission for iron deficiency and possible GI bleeding. No history of pulmonary embolism. Her DVT was triggered by surgery and the cast in the leg. No family history of hypercoagulability. Patient quit smoking 31 years ago. Denies alcohol drinking. Kerry Noriega        PAST MEDICAL HISTORY: has a past medical history of Acid reflux, Adult ADHD, Asthma, Bipolar 1 disorder ([address] Broken foot, Chest pain of uncertain etiology, Depression, [facility]) [facility] Fracture, Helicobacter pylori (H. pylori) infection, Hematuria, Hiatal hernia, History of blood transfusion, History of DVT (deep vein thrombosis), Hx of blood clots, Hypertension, Internal hemorrhoids, Narcolepsy, and Osteoporosis. PAST SURGICAL HISTORY: has a past surgical history that includes  section; Nose surgery; Knee arthroscopy (Left); shoulder surgery (Left); Wrist surgery; Upper gastrointestinal endoscopy (2013); Colonoscopy (2013); Upper gastrointestinal endoscopy (2016); Foot surgery (Left, 2015); Breast enhancement surgery (Bilateral); Arm Surgery (Right); knee surgery (Right); vaginal dilatation; Ankle surgery (Left, 2018); pr office/outpt visit,procedure only (Left, 2018); Foot Tendon Surgery (Left, 2019); ligament repair (Left, 2019); Upper gastrointestinal endoscopy (N/A, 2/10/2020); Colonoscopy (N/A, 2/10/2020); Endoscopy, colon, diagnostic; Colonoscopy (N/A, 2020); esophageal motility study (N/A, 2020); and esophageal motility study (9/10/2020). CURRENT MEDICATIONS:  has a current medication list which includes the following prescription(s): amphetamine-dextroamphetamine, oxycodone-acetaminophen, pantoprazole, cyclobenzaprine, alendronate, vitamin d3, cyanocobalamin, ferrous sulfate, albuterol sulfate hfa, propranolol, citalopram, buspirone, albuterol, bupropion, calcium carbonate, and asmanex hfa. ALLERGIES:  is allergic to latex; prozac [fluoxetine hcl]; sulfa antibiotics; and codeine. FAMILY HISTORY: Negative for any hematological or oncological conditions. SOCIAL HISTORY:  reports that she quit smoking about 31 years ago. She has a 20.00 pack-year smoking history. She has never used smokeless tobacco. She reports current alcohol use. She reports that she does not use drugs. REVIEW OF SYSTEMS:     · General: Positive for weakness and fatigue. . No unanticipated weight loss or decreased appetite. No fever or chills. · Eyes: No blurred vision, eye pain or double vision. · Ears: No hearing problems or drainage. No tinnitus. · Throat: No sore throat, problems with swallowing or dysphagia. · Respiratory: No cough, sputum or hemoptysis. No shortness of breath. No pleuritic chest pain. · Cardiovascular: No chest pain, orthopnea or PND. No lower extremity edema. No palpitation. · Gastrointestinal: As above. · Genitourinary: No dysuria, hematuria, frequency or urgency. · Musculoskeletal: No muscle aches or pains. No limitation of movement. No back pain. No gait disturbance, No joint complaints. · Dermatologic: No skin rashes or pruritus. No skin lesions or discolorations. · Psychiatric: No depression, anxiety, or stress or signs of schizophrenia. No change in mood or affect. · Hematologic: No history of bleeding tendency. No bruises or ecchymosis. No history of clotting problems. · Infectious disease: No fever, chills or frequent infections. · Endocrine: No polydipsia or polyuria. No temperature intolerance. · Neurologic: No headaches or dizziness. No weakness or numbness of the extremities. No changes in balance, coordination,  memory, mentation, behavior. · Allergic/Immunologic: No nasal congestion or hives. No repeated infections. PHYSICAL EXAM:  The patient is not in acute distress. Vital signs: Blood pressure 135/86, pulse 74, temperature 98.1 °F (36.7 °C), temperature source Oral, resp. rate 16, height 5' (1.524 m), weight 147 lb 3.2 oz (66.8 kg), not currently breastfeeding.      General appearance - well appearing, not in pain or distress  Mental status - good mood, alert and oriented  Eyes - pupils equal and reactive, extraocular eye movements intact  Ears - bilateral TM's and external ear canals normal  Nose - normal and patent, no erythema, discharge or polyps  Mouth - mucous membranes moist, pharynx normal without lesions  Neck - supple, no significant adenopathy  Lymphatics - no palpable lymphadenopathy, no hepatosplenomegaly  Chest - clear to auscultation, no wheezes, rales or rhonchi, symmetric air entry  Heart - normal rate, regular rhythm, normal S1, S2, no murmurs, rubs, clicks or gallops  Abdomen - soft, nontender, nondistended, no masses or organomegaly  Neurological - alert, oriented, normal speech, no focal findings or movement disorder noted  Musculoskeletal - no joint tenderness, deformity or swelling  Extremities - peripheral pulses normal, no pedal edema, no clubbing or cyanosis  Skin - normal coloration and turgor, no rashes, no suspicious skin lesions noted     Review of Diagnostic data:   Lab Results   Component Value Date    WBC 4.5 11/05/2020    HGB 10.0 (L) 11/05/2020    HCT 36.1 (L) 11/05/2020    MCV 86.8 11/05/2020     11/05/2020       Chemistry        Component Value Date/Time     10/30/2020 0746    K 3.4 (L) 10/30/2020 0746     (H) 10/30/2020 0746    CO2 24 10/30/2020 0746    BUN 9 10/30/2020 0746    CREATININE 0.98 (H) 10/30/2020 0746        Component Value Date/Time    CALCIUM 8.4 (L) 10/30/2020 0746    ALKPHOS 89 10/28/2020 2030    AST 16 10/28/2020 2030    ALT 11 10/28/2020 2030    BILITOT 0.12 (L) 10/28/2020 2030        Lab Results   Component Value Date    IRON 23 (L) 10/29/2020    TIBC 350 10/29/2020    FERRITIN 6 (L) 10/28/2020         IMPRESSION:   Severe iron deficiency anemia  Vitamin B12 deficiency  2 episodes of probable GI blood loss February 2020 and late October 2020  Severe GERD and hiatal hernia  Past history of left leg DVT triggered by immobilization. Status post anticoagulation. PLAN: I reviewed the labs available to me and discussed with the patient. For more than 60 minutes of face to face discussion, I explained to the patient the nature of this hematologic problem. I explained the significance of these abnormalities in layman language.    Reviewing patient's labs obviously she had severe iron deficiency anemia in 2 occasions secondary to acute blood loss. She had blood transfusion and iron infusion during hospitalization. Most recent labs showed normocytic anemia. This is related to combination of iron deficiency and vitamin B12 deficiency. Considering patient's upper GI symptoms I will discontinue her oral iron and I will give her IV iron infusion and vitamin B12 replacement. She was instructed to watch carefully for dark stool or black tarry stool. Furthermore, I believe patient needs to complete her GI work-up with small intestine evaluation. She is already established with gastroenterology. She will also have follow-up with her surgeon for possibility of surgery for hiatal hernia. We will monitor labs closely and we will interfere as necessary. I discussed anticoagulation for her DVT. Obviously it was one event triggered by immobilization. There is no need for prolonged anticoagulation. This is especially so with her recent episodes of GI bleeding. Patient understands. Patient's questions were answered to the best of her satisfaction and she verbalized full understanding and agreement. [facility]                            This note is created with the assistance of a speech recognition program.  While intending to generate a document that actually reflects the content of the visit, the document can still have some errors including those of syntax and sound a like substitutions which may escape proof reading. It such instances, actual meaning can be extrapolated by contextual diversion.

## 2020-11-12 NOTE — LETTER
_    Teodoro Rizo MD    11/12/2020     Christina Yarbrough MD   [address], [address]  [place]    Dear Dr Henley Points: Thank you for referring Cristy Abel, 1960, to me for evaluation. Below are the relevant portions of my assessment and plan of care. Ms. Cristy Abel is a very pleasant 63 y.o. female with history of multiple co morbidities as listed. The patient is referred for further evaluation and management of severe iron deficiency anemia. The patient had normal blood work over the last several years up to 2019. She presented in February 2020 with severe anemia with hemoglobin down to 5.4. She was hospitalized and received blood transfusion and iron infusion at that time. She had colonoscopy which was not prepped so it was repeated in May 2020 and so far did not show any active bleeding. Patient had severe upper GI symptoms with GERD and hiatal hernia. She was hospitalized for the second time late October 2020 with severe anemia again and she received blood transfusion and iron infusion as well. She had surgery evaluation and plan for possible gastric surgery in few weeks. The patient's symptoms were typical for severe anemia. She was complaining of increasing weakness and fatigue and shortness of breath on minimal exertion and palpitation. Dizzy spells. She denies any active bleeding. No history of melena or hematochezia. No hematemesis. As stated above she had severe heartburn and acid reflux and she was maintained on Protonix. The patient also had history of left leg DVT. She was maintained on anticoagulation for long duration which was discontinued at the time of her first admission for iron deficiency and possible GI bleeding. No history of pulmonary embolism. Her DVT was triggered by surgery and the cast in the leg. No family history of hypercoagulability. Patient quit smoking 31 years ago. Denies alcohol drinking. Raghu Duran PAST MEDICAL HISTORY: has a past medical history of Acid reflux, Adult ADHD, Asthma, Bipolar 1 disorder ([facility] Broken foot, Chest pain of uncertain etiology, Depression, DVT of leg (deep venous thrombosis) ([facility] Fracture, Helicobacter pylori (H. pylori) infection, Hematuria, Hiatal hernia, History of blood transfusion, History of DVT (deep vein thrombosis), Hx of blood clots, Hypertension, Internal hemorrhoids, Narcolepsy, and Osteoporosis. PAST SURGICAL HISTORY: has a past surgical history that includes  section; Nose surgery; Knee arthroscopy (Left); shoulder surgery (Left); Wrist surgery; Upper gastrointestinal endoscopy (2013); Colonoscopy (2013); Upper gastrointestinal endoscopy (2016); Foot surgery (Left, 2015); Breast enhancement surgery (Bilateral); Arm Surgery (Right); knee surgery (Right); vaginal dilatation; Ankle surgery (Left, 2018); pr office/outpt visit,procedure only (Left, 2018); Foot Tendon Surgery (Left, 2019); ligament repair (Left, 2019); Upper gastrointestinal endoscopy (N/A, 2/10/2020); Colonoscopy (N/A, 2/10/2020); Endoscopy, colon, diagnostic; Colonoscopy (N/A, 2020); esophageal motility study (N/A, 2020); and esophageal motility study (9/10/2020). CURRENT MEDICATIONS:  has a current medication list which includes the following prescription(s): amphetamine-dextroamphetamine, oxycodone-acetaminophen, pantoprazole, cyclobenzaprine, alendronate, vitamin d3, cyanocobalamin, ferrous sulfate, albuterol sulfate hfa, propranolol, citalopram, buspirone, albuterol, bupropion, calcium carbonate, and asmanex hfa. ALLERGIES:  is allergic to latex; prozac [fluoxetine hcl]; sulfa antibiotics; and codeine. FAMILY HISTORY: Negative for any hematological or oncological conditions. SOCIAL HISTORY:  reports that she quit smoking about 31 years ago. She has a 20.00 pack-year smoking history.  She has never used smokeless tobacco. Ears - bilateral TM's and external ear canals normal  Nose - normal and patent, no erythema, discharge or polyps  Mouth - mucous membranes moist, pharynx normal without lesions  Neck - supple, no significant adenopathy  Lymphatics - no palpable lymphadenopathy, no hepatosplenomegaly  Chest - clear to auscultation, no wheezes, rales or rhonchi, symmetric air entry  Heart - normal rate, regular rhythm, normal S1, S2, no murmurs, rubs, clicks or gallops  Abdomen - soft, nontender, nondistended, no masses or organomegaly  Neurological - alert, oriented, normal speech, no focal findings or movement disorder noted  Musculoskeletal - no joint tenderness, deformity or swelling  Extremities - peripheral pulses normal, no pedal edema, no clubbing or cyanosis  Skin - normal coloration and turgor, no rashes, no suspicious skin lesions noted     Review of Diagnostic data:   Lab Results   Component Value Date    WBC 4.5 11/05/2020    HGB 10.0 (L) 11/05/2020    HCT 36.1 (L) 11/05/2020    MCV 86.8 11/05/2020     11/05/2020       Chemistry        Component Value Date/Time     10/30/2020 0746    K 3.4 (L) 10/30/2020 0746     (H) 10/30/2020 0746    CO2 24 10/30/2020 0746    BUN 9 10/30/2020 0746    CREATININE 0.98 (H) 10/30/2020 0746        Component Value Date/Time    CALCIUM 8.4 (L) 10/30/2020 0746    ALKPHOS 89 10/28/2020 2030    AST 16 10/28/2020 2030    ALT 11 10/28/2020 2030    BILITOT 0.12 (L) 10/28/2020 2030        Lab Results   Component Value Date    IRON 23 (L) 10/29/2020    TIBC 350 10/29/2020    FERRITIN 6 (L) 10/28/2020         IMPRESSION:   Severe iron deficiency anemia  Vitamin B12 deficiency  2 episodes of probable GI blood loss February 2020 and late October 2020  Severe GERD and hiatal hernia  Past history of left leg DVT triggered by immobilization. Status post anticoagulation. PLAN: I reviewed the labs available to me and discussed with the patient. such instances, actual meaning can be extrapolated by contextual diversion.

## 2020-11-13 ENCOUNTER — PATIENT MESSAGE (OUTPATIENT)
Dept: GASTROENTEROLOGY | Age: 60
End: 2020-11-13

## 2020-11-18 ENCOUNTER — OFFICE VISIT (OUTPATIENT)
Dept: FAMILY MEDICINE CLINIC | Age: 60
End: 2020-11-18
Payer: MEDICARE

## 2020-11-18 VITALS
SYSTOLIC BLOOD PRESSURE: 126 MMHG | HEIGHT: 60 IN | TEMPERATURE: 97.1 F | DIASTOLIC BLOOD PRESSURE: 88 MMHG | OXYGEN SATURATION: 96 % | HEART RATE: 65 BPM | WEIGHT: 147.6 LBS | BODY MASS INDEX: 28.98 KG/M2

## 2020-11-18 PROCEDURE — 99213 OFFICE O/P EST LOW 20 MIN: CPT | Performed by: FAMILY MEDICINE

## 2020-11-18 RX ORDER — DEXTROAMPHETAMINE SACCHARATE, AMPHETAMINE ASPARTATE MONOHYDRATE, DEXTROAMPHETAMINE SULFATE AND AMPHETAMINE SULFATE 7.5; 7.5; 7.5; 7.5 MG/1; MG/1; MG/1; MG/1
30 CAPSULE, EXTENDED RELEASE ORAL 2 TIMES DAILY
Qty: 60 CAPSULE | Refills: 0 | Status: SHIPPED | OUTPATIENT
Start: 2021-02-08 | End: 2021-01-07

## 2020-11-18 RX ORDER — DEXTROAMPHETAMINE SACCHARATE, AMPHETAMINE ASPARTATE MONOHYDRATE, DEXTROAMPHETAMINE SULFATE AND AMPHETAMINE SULFATE 7.5; 7.5; 7.5; 7.5 MG/1; MG/1; MG/1; MG/1
30 CAPSULE, EXTENDED RELEASE ORAL 2 TIMES DAILY
Qty: 60 CAPSULE | Refills: 0 | Status: SHIPPED | OUTPATIENT
Start: 2021-01-07 | End: 2021-03-03 | Stop reason: ALTCHOICE

## 2020-11-18 RX ORDER — OXYCODONE HYDROCHLORIDE AND ACETAMINOPHEN 5; 325 MG/1; MG/1
1 TABLET ORAL EVERY 8 HOURS PRN
Qty: 90 TABLET | Refills: 0 | Status: SHIPPED | OUTPATIENT
Start: 2021-01-07 | End: 2021-02-06

## 2020-11-18 RX ORDER — DEXTROAMPHETAMINE SACCHARATE, AMPHETAMINE ASPARTATE, DEXTROAMPHETAMINE SULFATE AND AMPHETAMINE SULFATE 7.5; 7.5; 7.5; 7.5 MG/1; MG/1; MG/1; MG/1
30 TABLET ORAL 2 TIMES DAILY
Qty: 60 TABLET | Refills: 0 | Status: SHIPPED | OUTPATIENT
Start: 2020-12-07 | End: 2021-01-07 | Stop reason: ALTCHOICE

## 2020-11-18 RX ORDER — OXYCODONE HYDROCHLORIDE AND ACETAMINOPHEN 5; 325 MG/1; MG/1
1 TABLET ORAL EVERY 8 HOURS PRN
Qty: 90 TABLET | Refills: 0 | Status: SHIPPED | OUTPATIENT
Start: 2020-12-07 | End: 2021-01-06

## 2020-11-18 RX ORDER — OXYCODONE HYDROCHLORIDE AND ACETAMINOPHEN 5; 325 MG/1; MG/1
1 TABLET ORAL EVERY 8 HOURS PRN
Qty: 90 TABLET | Refills: 0 | Status: SHIPPED | OUTPATIENT
Start: 2021-02-08 | End: 2021-01-07 | Stop reason: SDUPTHER

## 2020-11-18 ASSESSMENT — ENCOUNTER SYMPTOMS
VOMITING: 0
BACK PAIN: 0
NAUSEA: 0
SORE THROAT: 0
DIARRHEA: 0
COUGH: 0
SHORTNESS OF BREATH: 0
SINUS PRESSURE: 0

## 2020-11-18 NOTE — PROGRESS NOTES
Todd Rutledge is a 63 y.o. female who presents todayfor her medical conditions/complaints as noted below. Todd Rutledge is here today c/Talia chief complaint on file.      :     HPI    Routine follow up on chronic blood loss anemia. She is feeling better now. Under stress with her mother's recent fall and hip fx. Past Medical History:   Diagnosis Date    Acid reflux     Adult ADHD 2012    Asthma 2012    B[facility] 1 disorder ([facility]     Broken foot     Left foot    Chest pain of uncertain etiology     Last episode was in  (Written 2019)    Depression     Under control per pt.  on 6/15/18    DVT of leg (deep venous thrombosis) ([facility] 2014    Fracture     right foot / pt denies surgical intervention    Helicobacter pylori (H. pylori) infection     per EGD    Hematuria 2018    Has a follow-up with Urologist     Hiatal hernia     History of blood transfusion     History of DVT (deep vein thrombosis) 2012    Hx of blood clots     Hypertension     Internal hemorrhoids     Narcolepsy 2012    Osteoporosis       Past Surgical History:   Procedure Laterality Date    ANKLE SURGERY Left 2018    mass excision    ARM SURGERY Right     Pt states she has had 13 right arm surgeries    BREAST ENHANCEMENT SURGERY Bilateral      SECTION      x 3    COLONOSCOPY  2013    sigmoid diverticula, prominent large internal hemorrhoid    COLONOSCOPY N/A 2/10/2020    COLONOSCOPY DIAGNOSTIC performed by Dallin Knott MD at [address] [facility] N/A 2020    COLORECTAL CANCER SCREENING, NOT HIGH RISK performed by Sabine Veronica MD at [address], COLON, DIAGNOSTIC      ESOPHAGEAL MOTILITY STUDY N/A 2020    PES RN - ESOPHAGEAL MOTILITY STUDY performed by Sharon Gaines DO at [address] STUDY  9/10/2020    ESOPHAGEAL MOTILITY STUDY performed by Sharon Gaines DO at [facility]    FOOT SURGERY Left 2015    FOOT TENDON SURGERY Left 2019    LEFT PERONEAL TENDON REPAIR WITH POSSIBLE LEFT TENDON TRANSFER (Left )    KNEE ARTHROSCOPY Left     Two surgeries on left knee per pt    KNEE SURGERY Right     Three surgeries per pt    LIGAMENT REPAIR Left 2019    LEFT PERONEAL TENDON REPAIR performed by Jocy Burr DPM at [address] [facility] OFFICE/OUTPT VISIT,PROCEDURE ONLY Left 2018    RESECTION/REMOVAL BONY NEOPLASM LEFT FIBULA, LEFT SOFT TISSUE MASS EXCISION WITH BONE BIOPSY LEFT ANKLE FIBULA performed by Jocy Burr DPM at [email or web address] Left     UPPER GASTROINTESTINAL ENDOSCOPY  2013    tertiary contractures esophagus, 3 to 4 cm hiatal hernia, antral gastritis, + H Pylori, mild active chronic esophagitis    UPPER GASTROINTESTINAL ENDOSCOPY  2016    one biopsy    UPPER GASTROINTESTINAL ENDOSCOPY N/A 2/10/2020    EGD BIOPSY performed by Rosa Sanchez MD at [place] [address]       Family History   Problem Relation Age of Onset    Cancer Mother     Hypertension Mother     Stroke Mother     Cancer Maternal Aunt     Hypertension Father     Heart Surgery Father     Diabetes Neg Hx      Social History     Tobacco Use    Smoking status: Former Smoker     Packs/day: 1.00     Years: 20.00     Pack years: 20.00     Last attempt to quit:      Years since quittin.9    Smokeless tobacco: Never Used   Substance Use Topics    Alcohol use: Yes     Comment: rare      Current Outpatient Medications   Medication Sig Dispense Refill    [START ON 2020] amphetamine-dextroamphetamine (ADDERALL) 30 MG tablet Take 1 tablet by mouth 2 times daily for 30 days. 60 tablet 0    [START ON 2020] oxyCODONE-acetaminophen (PERCOCET) 5-325 MG per tablet Take 1 tablet by mouth every 8 hours as needed for Pain for up to 30 days.  90 tablet 0    [START ON 2021] amphetamine-dextroamphetamine (ADDERALL XR) 30 MG extended release capsule Take 1 capsule by mouth 2 times daily for 30 days. 60 capsule 0    [START ON 1/7/2021] oxyCODONE-acetaminophen (PERCOCET) 5-325 MG per tablet Take 1 tablet by mouth every 8 hours as needed for Pain for up to 30 days. 90 tablet 0    [START ON 2/8/2021] amphetamine-dextroamphetamine (ADDERALL XR) 30 MG extended release capsule Take 1 capsule by mouth 2 times daily for 30 days. 60 capsule 0    [START ON 2/8/2021] oxyCODONE-acetaminophen (PERCOCET) 5-325 MG per tablet Take 1 tablet by mouth every 8 hours as needed for Pain for up to 30 days.  90 tablet 0    pantoprazole (PROTONIX) 40 MG tablet TAKE ONE TABLET BY MOUTH EVERY MORNING BEFORE BREAKFAST 90 tablet 0    cyclobenzaprine (FLEXERIL) 10 MG tablet TAKE 1/2 TO 1 TABLET BY MOUTH THREE TIMES A DAY AS NEEDED FOR MUSCLE SPASMS 30 tablet 1    alendronate (FOSAMAX) 70 MG tablet TAKE 1 TABLET BY MOUTH ONCE WEEKLY BEFORE BREAKFAST, ON AN EMPTY STOMACH: REMAIN UPRIGHT FOR 30 MINUTES:TAKE WITH 8 OUNCES OF WATER 12 tablet 10    Cholecalciferol (VITAMIN D3) 25 MCG TABS Take 1 tablet by mouth daily 30 tablet 0    cyanocobalamin 1000 MCG tablet Take 1 tablet by mouth daily 30 tablet 3    ferrous sulfate (FE TABS) 325 (65 Fe) MG EC tablet Take 1 tablet by mouth 3 times daily (with meals) 90 tablet 3    albuterol sulfate HFA (VENTOLIN HFA) 108 (90 Base) MCG/ACT inhaler INHALE TWO PUFFS BY MOUTH EVERY 4 HOURS AS NEEDED FOR WHEEZING 1 Inhaler 5    propranolol (INDERAL LA) 80 MG extended release capsule TAKE ONE CAPSULE BY MOUTH DAILY 90 capsule 0    citalopram (CELEXA) 40 MG tablet TAKE ONE TABLET BY MOUTH DAILY 90 tablet 3    busPIRone (BUSPAR) 15 MG tablet Take 15 mg by mouth 3 times daily 90 tablet 3    albuterol (PROVENTIL) (2.5 MG/3ML) 0.083% nebulizer solution Take 2.5 mg by nebulization every 6 hours as needed for Wheezing or Shortness of Breath      buPROPion (WELLBUTRIN XL) 300 MG extended release tablet TAKE ONE TABLET BY Psychiatric:         Mood and Affect: Mood is anxious. Behavior: Behavior normal.         Thought Content: Thought content normal.         Judgment: Judgment normal.         Assessment:       Diagnosis Orders   1. Iron deficiency anemia due to chronic blood loss     2. Essential hypertension     3. Attention deficit hyperactivity disorder (ADHD), unspecified ADHD type  amphetamine-dextroamphetamine (ADDERALL) 30 MG tablet    amphetamine-dextroamphetamine (ADDERALL XR) 30 MG extended release capsule    amphetamine-dextroamphetamine (ADDERALL XR) 30 MG extended release capsule   4. Cervical radiculopathy  oxyCODONE-acetaminophen (PERCOCET) 5-325 MG per tablet    oxyCODONE-acetaminophen (PERCOCET) 5-325 MG per tablet    oxyCODONE-acetaminophen (PERCOCET) 5-325 MG per tablet         Plan:      No follow-ups on file. No orders of the defined types were placed in this encounter. Orders Placed This Encounter   Medications    amphetamine-dextroamphetamine (ADDERALL) 30 MG tablet     Sig: Take 1 tablet by mouth 2 times daily for 30 days. Dispense:  60 tablet     Refill:  0    oxyCODONE-acetaminophen (PERCOCET) 5-325 MG per tablet     Sig: Take 1 tablet by mouth every 8 hours as needed for Pain for up to 30 days. Dispense:  90 tablet     Refill:  0     Reduce doses taken as pain becomes manageable    amphetamine-dextroamphetamine (ADDERALL XR) 30 MG extended release capsule     Sig: Take 1 capsule by mouth 2 times daily for 30 days. Dispense:  60 capsule     Refill:  0    oxyCODONE-acetaminophen (PERCOCET) 5-325 MG per tablet     Sig: Take 1 tablet by mouth every 8 hours as needed for Pain for up to 30 days. Dispense:  90 tablet     Refill:  0     Reduce doses taken as pain becomes manageable    amphetamine-dextroamphetamine (ADDERALL XR) 30 MG extended release capsule     Sig: Take 1 capsule by mouth 2 times daily for 30 days.      Dispense:  60 capsule     Refill:  0    oxyCODONE-acetaminophen (PERCOCET) 5-325 MG per tablet     Sig: Take 1 tablet by mouth every 8 hours as needed for Pain for up to 30 days. Dispense:  90 tablet     Refill:  0     Reduce doses taken as pain becomes manageable       Rx to get her through until she can see new provider.

## 2020-11-24 ENCOUNTER — HOSPITAL ENCOUNTER (OUTPATIENT)
Dept: INFUSION THERAPY | Facility: MEDICAL CENTER | Age: 60
Discharge: HOME | End: 2020-11-24
Payer: MEDICARE

## 2020-11-24 VITALS
RESPIRATION RATE: 16 BRPM | HEART RATE: 68 BPM | TEMPERATURE: 98.7 F | DIASTOLIC BLOOD PRESSURE: 87 MMHG | SYSTOLIC BLOOD PRESSURE: 130 MMHG

## 2020-11-24 DIAGNOSIS — E53.8 VITAMIN B12 DEFICIENCY: Primary | ICD-10-CM

## 2020-11-24 DIAGNOSIS — K90.9 IRON MALABSORPTION: ICD-10-CM

## 2020-11-24 PROCEDURE — 96372 THER/PROPH/DIAG INJ SC/IM: CPT

## 2020-11-24 PROCEDURE — 2580000003 HC RX 258: Performed by: INTERNAL MEDICINE

## 2020-11-24 PROCEDURE — 96365 THER/PROPH/DIAG IV INF INIT: CPT

## 2020-11-24 PROCEDURE — 6360000002 HC RX W HCPCS: Performed by: INTERNAL MEDICINE

## 2020-11-24 RX ORDER — SODIUM CHLORIDE 9 MG/ML
INJECTION, SOLUTION INTRAVENOUS CONTINUOUS
Status: ACTIVE | OUTPATIENT
Start: 2020-11-24 | End: 2020-11-24

## 2020-11-24 RX ORDER — METHYLPREDNISOLONE SODIUM SUCCINATE 125 MG/2ML
125 INJECTION, POWDER, LYOPHILIZED, FOR SOLUTION INTRAMUSCULAR; INTRAVENOUS ONCE
Status: CANCELLED | OUTPATIENT
Start: 2020-12-01

## 2020-11-24 RX ORDER — HEPARIN SODIUM (PORCINE) LOCK FLUSH IV SOLN 100 UNIT/ML 100 UNIT/ML
500 SOLUTION INTRAVENOUS PRN
Status: CANCELLED | OUTPATIENT
Start: 2020-12-01

## 2020-11-24 RX ORDER — SODIUM CHLORIDE 0.9 % (FLUSH) 0.9 %
10 SYRINGE (ML) INJECTION PRN
Status: CANCELLED | OUTPATIENT
Start: 2020-12-01

## 2020-11-24 RX ORDER — CYANOCOBALAMIN 1000 UG/ML
1000 INJECTION INTRAMUSCULAR; SUBCUTANEOUS ONCE
Status: COMPLETED
Start: 2020-11-24 | End: 2020-11-24

## 2020-11-24 RX ORDER — SODIUM CHLORIDE 9 MG/ML
INJECTION, SOLUTION INTRAVENOUS CONTINUOUS
Status: CANCELLED | OUTPATIENT
Start: 2020-12-01

## 2020-11-24 RX ORDER — SODIUM CHLORIDE 0.9 % (FLUSH) 0.9 %
5 SYRINGE (ML) INJECTION PRN
Status: CANCELLED | OUTPATIENT
Start: 2020-12-01

## 2020-11-24 RX ORDER — CYANOCOBALAMIN 1000 UG/ML
1000 INJECTION INTRAMUSCULAR; SUBCUTANEOUS ONCE
Status: CANCELLED
Start: 2020-12-22

## 2020-11-24 RX ORDER — DIPHENHYDRAMINE HYDROCHLORIDE 50 MG/ML
50 INJECTION INTRAMUSCULAR; INTRAVENOUS ONCE
Status: CANCELLED | OUTPATIENT
Start: 2020-12-01

## 2020-11-24 RX ADMIN — CYANOCOBALAMIN 1000 MCG: 1000 INJECTION, SOLUTION INTRAMUSCULAR at 11:29

## 2020-11-24 RX ADMIN — FERRIC CARBOXYMALTOSE INJECTION 750 MG: 50 INJECTION, SOLUTION INTRAVENOUS at 11:26

## 2020-11-24 RX ADMIN — SODIUM CHLORIDE: 9 INJECTION, SOLUTION INTRAVENOUS at 11:25

## 2020-11-24 NOTE — PROGRESS NOTES
Siobhan De Jesus arrives ambulatory alone  Order for injectafer infusion  Iv started with #24 cathlon to rt wrist area per policy   Good blood return noted  injectafer initiated and completed    No reaction noted  Iv line flushed    Iv line discontinued with needle intact  Pressure dressing applied  B12 injection given to lt upper arm  Band aid applied  Discharged per ambulatory

## 2020-12-02 ENCOUNTER — HOSPITAL ENCOUNTER (OUTPATIENT)
Dept: INFUSION THERAPY | Facility: MEDICAL CENTER | Age: 60
Discharge: HOME | End: 2020-12-02
Payer: MEDICARE

## 2020-12-02 VITALS
HEART RATE: 51 BPM | TEMPERATURE: 98.1 F | SYSTOLIC BLOOD PRESSURE: 112 MMHG | DIASTOLIC BLOOD PRESSURE: 68 MMHG | RESPIRATION RATE: 18 BRPM

## 2020-12-02 DIAGNOSIS — K90.9 IRON MALABSORPTION: ICD-10-CM

## 2020-12-02 DIAGNOSIS — E53.8 VITAMIN B12 DEFICIENCY: Primary | ICD-10-CM

## 2020-12-02 PROCEDURE — 2580000003 HC RX 258: Performed by: INTERNAL MEDICINE

## 2020-12-02 PROCEDURE — 6360000002 HC RX W HCPCS: Performed by: INTERNAL MEDICINE

## 2020-12-02 PROCEDURE — 96365 THER/PROPH/DIAG IV INF INIT: CPT

## 2020-12-02 PROCEDURE — 96374 THER/PROPH/DIAG INJ IV PUSH: CPT

## 2020-12-02 RX ORDER — SODIUM CHLORIDE 0.9 % (FLUSH) 0.9 %
5 SYRINGE (ML) INJECTION PRN
Status: CANCELLED | OUTPATIENT
Start: 2020-12-08

## 2020-12-02 RX ORDER — SODIUM CHLORIDE 9 MG/ML
INJECTION, SOLUTION INTRAVENOUS CONTINUOUS
Status: ACTIVE | OUTPATIENT
Start: 2020-12-02 | End: 2020-12-02

## 2020-12-02 RX ORDER — HEPARIN SODIUM (PORCINE) LOCK FLUSH IV SOLN 100 UNIT/ML 100 UNIT/ML
500 SOLUTION INTRAVENOUS PRN
Status: CANCELLED | OUTPATIENT
Start: 2020-12-08

## 2020-12-02 RX ORDER — SODIUM CHLORIDE 9 MG/ML
INJECTION, SOLUTION INTRAVENOUS CONTINUOUS
Status: CANCELLED | OUTPATIENT
Start: 2020-12-08

## 2020-12-02 RX ORDER — SODIUM CHLORIDE 0.9 % (FLUSH) 0.9 %
10 SYRINGE (ML) INJECTION PRN
Status: CANCELLED | OUTPATIENT
Start: 2020-12-08

## 2020-12-02 RX ORDER — METHYLPREDNISOLONE SODIUM SUCCINATE 125 MG/2ML
125 INJECTION, POWDER, LYOPHILIZED, FOR SOLUTION INTRAMUSCULAR; INTRAVENOUS ONCE
Status: CANCELLED | OUTPATIENT
Start: 2020-12-08

## 2020-12-02 RX ORDER — DIPHENHYDRAMINE HYDROCHLORIDE 50 MG/ML
50 INJECTION INTRAMUSCULAR; INTRAVENOUS ONCE
Status: CANCELLED | OUTPATIENT
Start: 2020-12-08

## 2020-12-02 RX ADMIN — FERRIC CARBOXYMALTOSE INJECTION 750 MG: 50 INJECTION, SOLUTION INTRAVENOUS at 10:36

## 2020-12-02 RX ADMIN — SODIUM CHLORIDE: 9 INJECTION, SOLUTION INTRAVENOUS at 10:35

## 2020-12-02 NOTE — FLOWSHEET NOTE
Situation:  Writer encountered and visited briefly with Patient in the infusion clinic. Assessment:  Patient told writer the reason she is coming for treatment. She expressed hopes that the medicine would work, noting that the last time it did not. She affirmed that prayer is helpful to her and was open to prayer. Intervention:  Writer provided supportive presence and explored Pt's coping and needs; offered words of encouragement and support; prayed for Pt. Outcome:  Patient thanked writer for the visit. 12/02/20 1430   Encounter Summary   Services provided to: Patient   Referral/Consult From: [facility] Unknown   Continue Visiting   (12/2/20)   Complexity of Encounter Moderate   Length of Encounter 15 minutes   Spiritual Assessment Completed Yes   Routine   Type Initial   Spiritual/Anabaptism   Type Spiritual support   Assessment Calm; Approachable   Intervention Active listening;Explored feelings, thoughts, concerns;Explored coping resources;Sustaining presence/ Ministry of presence;Prayer;Discussed illness/injury and it's impact   Outcome Coping;Expressed feelings/needs/concerns;Expressed gratitude     Electronically signed by Carolyn Aceves, Oncology Outpatient [facility], [address] [facility]  12/2/2020  2:31 PM

## 2020-12-02 NOTE — PROGRESS NOTES
Pt arrives per ambulatory per self and pt very stressed with home concerns and very talkative. NS infusing before and after injectafer and pt tolerated well and no reactions or complaints and blood return present throughout infusion. Pt given calendar with next appts and pt discharged per ambulatory per self.

## 2020-12-03 NOTE — TELEPHONE ENCOUNTER
Patient called back again in tears asking if someone could schedule her capsule test. She said that she feels like her stomach is \"tearing\".

## 2020-12-04 NOTE — TELEPHONE ENCOUNTER
Writer called patient. Appt is scheduled for 12/10/2020. Instructions are reviewed, patient confirms no implanted devices. All questions are answered. Prep instructions are also scanned and attached to pt message. Patient will call office if there is a problem with her getting instructions.

## 2020-12-04 NOTE — TELEPHONE ENCOUNTER
Patient returned call but was unable to hold due to her not knowing how many minutes are on her cell phone. She asked to be call back, thank you.

## 2020-12-07 NOTE — TELEPHONE ENCOUNTER
Pt was scheduled for [facility] on 12/10/20 by Shannan Pugh RN. Per Indira's verbal report, pt was given instructions for prep and reviewed with patient. Did observe on pt's chart that Ferrous Sulfate pills are noted. Did call pt and verify with her that she is not currently taking oral Iron for \"a while now\" and is only having infusions done.

## 2020-12-10 ENCOUNTER — NURSE ONLY (OUTPATIENT)
Dept: GASTROENTEROLOGY | Age: 60
End: 2020-12-10
Payer: MEDICARE

## 2020-12-10 VITALS — TEMPERATURE: 97.7 F

## 2020-12-10 DIAGNOSIS — D50.0 IRON DEFICIENCY ANEMIA DUE TO CHRONIC BLOOD LOSS: ICD-10-CM

## 2020-12-10 PROCEDURE — 91110 GI TRC IMG INTRAL ESOPH-ILE: CPT | Performed by: INTERNAL MEDICINE

## 2020-12-10 NOTE — PROGRESS NOTES
Patient presents for capsule endoscopy. She read and signed consent form. Reviewed procedure and diet restrictions, questions answered. Belt applied to abdomen, , transmitter attached, 6 drops simethicone given, patient swallowed capsule at 8:20am without difficulty. Patient will call with any questions and return at 4;00 pm. Patient instructed, no MRI until positive verification that they expell capsule. [employer] capsule lot # [id number], expiration date 10-.

## 2020-12-11 RX ORDER — BUPROPION HYDROCHLORIDE 300 MG/1
TABLET ORAL
Qty: 30 TABLET | Refills: 0 | Status: SHIPPED | OUTPATIENT
Start: 2020-12-11 | End: 2021-01-06 | Stop reason: SDUPTHER

## 2020-12-11 NOTE — TELEPHONE ENCOUNTER
Mark Obrien is calling to request a refill on the following medication(s):    Medication Request:  Requested Prescriptions     Pending Prescriptions Disp Refills    buPROPion (WELLBUTRIN XL) 300 MG extended release tablet 30 tablet 0     Sig: TAKE ONE TABLET BY MOUTH EVERY MORNING       Last Visit Date (If Applicable):  Visit date not found    Next Visit Date:    Visit date not found

## 2020-12-14 ENCOUNTER — HOSPITAL ENCOUNTER (OUTPATIENT)
Dept: LAB | Age: 60
Setting detail: SPECIMEN
Discharge: HOME | End: 2020-12-14
Payer: MEDICARE

## 2020-12-14 PROCEDURE — U0003 INFECTIOUS AGENT DETECTION BY NUCLEIC ACID (DNA OR RNA); SEVERE ACUTE RESPIRATORY SYNDROME CORONAVIRUS 2 (SARS-COV-2) (CORONAVIRUS DISEASE [COVID-19]), AMPLIFIED PROBE TECHNIQUE, MAKING USE OF HIGH THROUGHPUT TECHNOLOGIES AS DESCRIBED BY CMS-2020-01-R: HCPCS

## 2020-12-15 LAB
SARS-COV-2, RAPID: NORMAL
SARS-COV-2: NORMAL
SARS-COV-2: NOT DETECTED
SOURCE: NORMAL

## 2020-12-16 ENCOUNTER — TELEPHONE (OUTPATIENT)
Dept: FAMILY MEDICINE CLINIC | Age: 60
End: 2020-12-16

## 2020-12-18 ENCOUNTER — HOSPITAL ENCOUNTER (OUTPATIENT)
Age: 60
Setting detail: OUTPATIENT SURGERY
Discharge: HOME | End: 2020-12-18
Attending: SURGERY | Admitting: SURGERY
Payer: MEDICARE

## 2020-12-18 VITALS
OXYGEN SATURATION: 96 % | WEIGHT: 145 LBS | HEIGHT: 60 IN | SYSTOLIC BLOOD PRESSURE: 95 MMHG | BODY MASS INDEX: 28.47 KG/M2 | RESPIRATION RATE: 18 BRPM | TEMPERATURE: 98.4 F | HEART RATE: 61 BPM | DIASTOLIC BLOOD PRESSURE: 80 MMHG

## 2020-12-18 PROCEDURE — 6370000000 HC RX 637 (ALT 250 FOR IP): Performed by: STUDENT IN AN ORGANIZED HEALTH CARE EDUCATION/TRAINING PROGRAM

## 2020-12-18 PROCEDURE — 6370000000 HC RX 637 (ALT 250 FOR IP): Performed by: SURGERY

## 2020-12-18 PROCEDURE — 3609015500 HC GASTRIC/DUODENAL MOTILITY &/OR MANOMETRY STUDY: Performed by: SURGERY

## 2020-12-18 RX ORDER — LIDOCAINE HYDROCHLORIDE 20 MG/ML
SOLUTION OROPHARYNGEAL PRN
Status: DISCONTINUED | OUTPATIENT
Start: 2020-12-18 | End: 2020-12-18 | Stop reason: ALTCHOICE

## 2020-12-18 RX ORDER — ALPRAZOLAM 1 MG/1
1 TABLET ORAL ONCE
Status: COMPLETED | OUTPATIENT
Start: 2020-12-18 | End: 2020-12-18

## 2020-12-18 RX ADMIN — ALPRAZOLAM 1 MG: 1 TABLET ORAL at 08:31

## 2020-12-18 ASSESSMENT — PAIN DESCRIPTION - DESCRIPTORS: DESCRIPTORS: CONSTANT

## 2020-12-18 ASSESSMENT — PAIN - FUNCTIONAL ASSESSMENT: PAIN_FUNCTIONAL_ASSESSMENT: 0-10

## 2020-12-18 NOTE — OP NOTE
Tried to place manometry probe, just putting the probe in front of the patient's nose. The patient screams \" stop \". Patient requests honored.     At this point I have no way to further evaluate patient, will send to tertiary center

## 2020-12-26 NOTE — TELEPHONE ENCOUNTER
Per Dr Kirti Eagle, pt to complete hernia surgery and then will discuss colonoscopy. Pt reports no other bleeding since that clot yesterday. Pt agrees she will call if she has more bleeding. Per Dr Kirti Eagle pt to have CBC done as well. Pt states she will go have it done today.
Pt calling has seen dr Ely Grey but not schedule for surgery yet. Pt calling because she passed a rectal clot yesterday (Monday) and not sure what to do. Please call pt and let her know how to proceed.
Spoke to pt. She states she passed one clot yesterday, size of a quarter and the color of liver. Will discuss with Dr Lana Duran.
hd as per renal
hd as per renal
remains stable no further hd
no further hd
hd as per renal
DNR/DNI, MOLST in chart  goal for preventing and treating distressful symptoms   -Working with  for dispo planning. As per patient's niece ( also HCP), pt cannot go home.
hd as per renal
as per Renal,  repeat bladder scan post void to look for PVR.   - check urinalysis, urine electrolyte (Na, Cr, K, Cl), serum uric acid  - check renal/bladder u/s   - continue to hold aldactone for now, consider giving a dose of torsemide if hypervolemia worsens- repeat bladder scan post void to look for PVR.   - check urinalysis, urine electrolyte (Na, Cr, K, Cl), serum uric acid  - check renal/bladder u/s   - continue to hold aldactone for now, consider giving a dose of torsemide if hypervolemia worsens
as per Renal,  repeat bladder scan post void to look for PVR.   - check urinalysis, urine electrolyte (Na, Cr, K, Cl), serum uric acid  - check renal/bladder u/s   - continue to hold aldactone for now, consider giving a dose of torsemide if hypervolemia worsens- repeat bladder scan post void to look for PVR.   - check urinalysis, urine electrolyte (Na, Cr, K, Cl), serum uric acid  - check renal/bladder u/s   - continue to hold aldactone for now, consider giving a dose of torsemide if hypervolemia worsens
hd as per renal
poss hd after temp cath placement
poss hd after temp cath placement
.  Patient is DNR/DNI  -amenable to symptom-directed plan of care  -patient would be appropriate candidate for hospice, unclear if he has adequate support for home

## 2020-12-31 ENCOUNTER — HOSPITAL ENCOUNTER (OUTPATIENT)
Facility: MEDICAL CENTER | Age: 60
Discharge: HOME | End: 2020-12-31
Payer: MEDICARE

## 2020-12-31 DIAGNOSIS — E53.8 VITAMIN B12 DEFICIENCY: ICD-10-CM

## 2020-12-31 DIAGNOSIS — D50.8 IRON DEFICIENCY ANEMIA SECONDARY TO INADEQUATE DIETARY IRON INTAKE: ICD-10-CM

## 2020-12-31 DIAGNOSIS — K90.9 IRON MALABSORPTION: ICD-10-CM

## 2020-12-31 LAB
ABSOLUTE EOS #: 0.16 K/UL (ref 0–0.44)
ABSOLUTE IMMATURE GRANULOCYTE: 0.05 K/UL (ref 0–0.3)
ABSOLUTE LYMPH #: 0.92 K/UL (ref 1.1–3.7)
ABSOLUTE MONO #: 0.38 K/UL (ref 0.1–1.2)
BASOPHILS # BLD: 1 % (ref 0–2)
BASOPHILS ABSOLUTE: 0.05 K/UL (ref 0–0.2)
DIFFERENTIAL TYPE: ABNORMAL
EOSINOPHILS RELATIVE PERCENT: 3 % (ref 1–4)
FERRITIN: 685 UG/L (ref 13–150)
FOLATE: 10.1 NG/ML
HCT VFR BLD CALC: 42.9 % (ref 36.3–47.1)
HEMOGLOBIN: 13.3 G/DL (ref 11.9–15.1)
IMMATURE GRANULOCYTES: 1 %
IRON SATURATION: 52 % (ref 20–55)
IRON: 124 UG/DL (ref 37–145)
LYMPHOCYTES # BLD: 17 % (ref 24–43)
MCH RBC QN AUTO: 28.5 PG (ref 25.2–33.5)
MCHC RBC AUTO-ENTMCNC: 31 G/DL (ref 28.4–34.8)
MCV RBC AUTO: 92.1 FL (ref 82.6–102.9)
MONOCYTES # BLD: 7 % (ref 3–12)
MORPHOLOGY: ABNORMAL
NRBC AUTOMATED: 0 PER 100 WBC
PDW BLD-RTO: 22.5 % (ref 11.8–14.4)
PLATELET # BLD: 263 K/UL (ref 138–453)
PLATELET ESTIMATE: ABNORMAL
PMV BLD AUTO: 9.5 FL (ref 8.1–13.5)
RBC # BLD: 4.66 M/UL (ref 3.95–5.11)
RBC # BLD: ABNORMAL 10*6/UL
SEG NEUTROPHILS: 71 % (ref 36–65)
SEGMENTED NEUTROPHILS ABSOLUTE COUNT: 3.84 K/UL (ref 1.5–8.1)
TOTAL IRON BINDING CAPACITY: 238 UG/DL (ref 250–450)
UNSATURATED IRON BINDING CAPACITY: 114 UG/DL (ref 112–347)
VITAMIN B-12: 465 PG/ML (ref 232–1245)
WBC # BLD: 5.4 K/UL (ref 3.5–11.3)
WBC # BLD: ABNORMAL 10*3/UL

## 2020-12-31 PROCEDURE — 83550 IRON BINDING TEST: CPT

## 2020-12-31 PROCEDURE — 82746 ASSAY OF FOLIC ACID SERUM: CPT

## 2020-12-31 PROCEDURE — 36415 COLL VENOUS BLD VENIPUNCTURE: CPT

## 2020-12-31 PROCEDURE — 83540 ASSAY OF IRON: CPT

## 2020-12-31 PROCEDURE — 82728 ASSAY OF FERRITIN: CPT

## 2020-12-31 PROCEDURE — 85025 COMPLETE CBC W/AUTO DIFF WBC: CPT

## 2020-12-31 PROCEDURE — 82607 VITAMIN B-12: CPT

## 2021-01-04 ENCOUNTER — TELEPHONE (OUTPATIENT)
Dept: GASTROENTEROLOGY | Age: 61
End: 2021-01-04

## 2021-01-04 NOTE — TELEPHONE ENCOUNTER
Patient called and states she needs her appointment info. Returned her call and left her a message that she didn't have an appointment with us .  And to return our call and we could schedule an appointment

## 2021-01-06 ENCOUNTER — TELEPHONE (OUTPATIENT)
Dept: FAMILY MEDICINE CLINIC | Age: 61
End: 2021-01-06

## 2021-01-06 ENCOUNTER — VIRTUAL VISIT (OUTPATIENT)
Dept: FAMILY MEDICINE CLINIC | Age: 61
End: 2021-01-06
Payer: MEDICARE

## 2021-01-06 ENCOUNTER — HOSPITAL ENCOUNTER (OUTPATIENT)
Facility: MEDICAL CENTER | Age: 61
End: 2021-01-06
Payer: MEDICARE

## 2021-01-06 DIAGNOSIS — Z63.4 BEREAVEMENT: ICD-10-CM

## 2021-01-06 DIAGNOSIS — Z63.4 BEREAVEMENT: Primary | ICD-10-CM

## 2021-01-06 DIAGNOSIS — F32.A DEPRESSION, UNSPECIFIED DEPRESSION TYPE: ICD-10-CM

## 2021-01-06 DIAGNOSIS — F43.23 ADJUSTMENT DISORDER WITH MIXED ANXIETY AND DEPRESSED MOOD: Primary | ICD-10-CM

## 2021-01-06 DIAGNOSIS — F41.9 ANXIETY: ICD-10-CM

## 2021-01-06 PROCEDURE — 99443 PR PHYS/QHP TELEPHONE EVALUATION 21-30 MIN: CPT | Performed by: FAMILY MEDICINE

## 2021-01-06 RX ORDER — BUPROPION HYDROCHLORIDE 450 MG/1
450 TABLET, FILM COATED, EXTENDED RELEASE ORAL EVERY MORNING
Qty: 30 TABLET | Refills: 2 | Status: SHIPPED | OUTPATIENT
Start: 2021-01-06 | End: 2021-03-17 | Stop reason: SDUPTHER

## 2021-01-06 RX ORDER — HYDROXYZINE HYDROCHLORIDE 25 MG/1
TABLET, FILM COATED ORAL
Qty: 30 TABLET | Refills: 0 | Status: SHIPPED | OUTPATIENT
Start: 2021-01-06 | End: 2021-03-04 | Stop reason: ALTCHOICE

## 2021-01-06 SDOH — SOCIAL STABILITY - SOCIAL INSECURITY: DISSAPEARANCE AND DEATH OF FAMILY MEMBER: Z63.4

## 2021-01-06 NOTE — TELEPHONE ENCOUNTER
I have sent in a new script for Wellbutrin and have increased it from 300mg to 450mg daily. Would Arun Chambers like to schedule either a video visit or phone call visit to talk?  I'm very sorry to hear about her loss

## 2021-01-06 NOTE — TELEPHONE ENCOUNTER
Pt is calling to say that her mother passed away and she is not dealing well with it. She wants to know if one of her anti-depressants can be \"upped? 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

## 2021-01-07 ENCOUNTER — OFFICE VISIT (OUTPATIENT)
Dept: ONCOLOGY | Age: 61
End: 2021-01-07
Payer: MEDICARE

## 2021-01-07 ENCOUNTER — TELEPHONE (OUTPATIENT)
Dept: ONCOLOGY | Age: 61
End: 2021-01-07

## 2021-01-07 VITALS
HEART RATE: 67 BPM | TEMPERATURE: 97 F | RESPIRATION RATE: 18 BRPM | BODY MASS INDEX: 31.03 KG/M2 | DIASTOLIC BLOOD PRESSURE: 66 MMHG | WEIGHT: 158.9 LBS | SYSTOLIC BLOOD PRESSURE: 114 MMHG

## 2021-01-07 DIAGNOSIS — K90.9 IRON MALABSORPTION: ICD-10-CM

## 2021-01-07 DIAGNOSIS — E53.8 VITAMIN B12 DEFICIENCY: ICD-10-CM

## 2021-01-07 DIAGNOSIS — D50.0 IRON DEFICIENCY ANEMIA DUE TO CHRONIC BLOOD LOSS: Primary | ICD-10-CM

## 2021-01-07 PROCEDURE — 99214 OFFICE O/P EST MOD 30 MIN: CPT | Performed by: INTERNAL MEDICINE

## 2021-01-07 PROCEDURE — 99211 OFF/OP EST MAY X REQ PHY/QHP: CPT | Performed by: INTERNAL MEDICINE

## 2021-01-07 NOTE — TELEPHONE ENCOUNTER
[facility] ARRIVES AMBULATORY FOR MD VISIT  DR Alva Duenas IN TO SEE PATIENT  ORDERS RECEIVED  RV 3-4 MONTHS W/LABS BEFORE RV  LABS CDP VITAMIN B 12 IRON AND TIBC FERRITIN 4/15/21  MD VISIT 4/22/21 @4:15PM  AVS PRINTED AND GIVEN TO PATIENT WITH INSTRUCTIONS  PATIENT DISCHARGED AMBULATORY

## 2021-01-07 NOTE — PROGRESS NOTES
Jason Butterfield is a 57 y.o. female evaluated via telephone on 1/6/2021. Consent:  She and/or health care decision maker is aware that that she may receive a bill for this telephone service, depending on her insurance coverage, and has provided verbal consent to proceed: Yes      Documentation:  I communicated with the patient and/or health care decision maker about depression/bereavement. Details of this discussion including any medical advice provided:     Depression  Patient complains of depression. She complains of depressed mood, difficulty concentrating, feelings of worthlessness/guilt and hopelessness. Onset was approximately a few days ago. Symptoms have been rapidly worsening since that time. Current symptoms include: same as above. Patient denies SI/HI. Family history significant for anxiety and depression. Possible organic causes contributing are: none. Risk factors: negative life event mother passed away - she was main cargiver. Previous treatment includes individual therapy and medication. She complains of the following side effects from the treatment: none. Problem List Items Addressed This Visit     None      Visit Diagnoses     Adjustment disorder with mixed anxiety and depressed mood    -  Primary    Increase Wellbutrin XL to 450mg daily  Atarax 25mg prn - do not take with pain medication  Advised counseling    Bereavement        Motherly recently passed  Advised to call hospice counselor from mother's death            I affirm this is a Patient Initiated Episode with a Patient who has not had a related appointment within my department in the past 7 days or scheduled within the next 24 hours.     Patient identification was verified at the start of the visit: Yes    Total Time: minutes: 21-30 minutes    Note: not billable if this call serves to triage the patient into an appointment for the relevant concern      Rodrigo Diez

## 2021-01-07 NOTE — PROGRESS NOTES
_           Chief Complaint   Patient presents with    Follow-up    Discuss Labs     DIAGNOSIS:       Severe iron deficiency anemia  Vitamin B12 deficiency  2 episodes of probable GI blood loss February 2020 and late October 2020  Severe GERD and hiatal hernia  Past history of left leg DVT triggered by immobilization. Status post anticoagulation. CURRENT THERAPY:         Status post IV iron infusion November 2020  Status post blood transfusion    BRIEF CASE HISTORY:      Ms. Latanya Mckeon is a very pleasant 57 y.o. female with history of multiple co morbidities as listed. The patient is referred for further evaluation and management of severe iron deficiency anemia. The patient had normal blood work over the last several years up to 2019. She presented in February 2020 with severe anemia with hemoglobin down to 5.4. She was hospitalized and received blood transfusion and iron infusion at that time. She had colonoscopy which was not prepped so it was repeated in May 2020 and so far did not show any active bleeding. Patient had severe upper GI symptoms with GERD and hiatal hernia. She was hospitalized for the second time late October 2020 with severe anemia again and she received blood transfusion and iron infusion as well. She had surgery evaluation and plan for possible gastric surgery in few weeks. The patient's symptoms were typical for severe anemia. She was complaining of increasing weakness and fatigue and shortness of breath on minimal exertion and palpitation. Dizzy spells. She denies any active bleeding. No history of melena or hematochezia. No hematemesis. As stated above she had severe heartburn and acid reflux and she was maintained on Protonix. The patient also had history of left leg DVT.   She was maintained on anticoagulation for long duration which was discontinued at the time of her first admission for iron deficiency and possible GI bleeding. No history of pulmonary embolism. Her DVT was triggered by surgery and the cast in the leg. No family history of hypercoagulability. Patient quit smoking 31 years ago. Denies alcohol drinking. .     INTERIM HISTORY:   Patient seen for follow-up severe iron deficiency anemia. Patient had IV iron infusion with good results. Hemoglobin is now normal.  No weakness or fatigue. No dizziness. She continues to have episodes of black stool. She had GI work-up including capsule camera and these were negative. No other source of bleeding. PAST MEDICAL HISTORY: has a past medical history of Acid reflux, Adult ADHD, Asthma, Bipolar 1 disorder ([address] Broken foot, Chest pain of uncertain etiology, Depression, DVT of leg (deep venous thrombosis) ([facility] [address] Fracture, Helicobacter pylori (H. pylori) infection, Hematuria, Hiatal hernia, History of blood transfusion, History of DVT (deep vein thrombosis), Hx of blood clots, Hypertension, Internal hemorrhoids, Narcolepsy, and Osteoporosis. PAST SURGICAL HISTORY: has a past surgical history that includes  section; Nose surgery; Knee arthroscopy (Left); shoulder surgery (Left); Wrist surgery; Upper gastrointestinal endoscopy (2013); Colonoscopy (2013); Upper gastrointestinal endoscopy (2016); Foot surgery (Left, ); Breast enhancement surgery (Bilateral); Arm Surgery (Right); knee surgery (Right); vaginal dilatation; Ankle surgery (Left, 2018); pr office/outpt visit,procedure only (Left, 2018); Foot Tendon Surgery (Left, 2019); ligament repair (Left, 2019); Upper gastrointestinal endoscopy (N/A, 2/10/2020); Colonoscopy (N/A, 2/10/2020); Endoscopy, colon, diagnostic; Colonoscopy (N/A, 2020); esophageal motility study (N/A, 2020); esophageal motility study (9/10/2020); and esophageal motility study (N/A, 2020).      CURRENT MEDICATIONS:  has a current medication list which includes the following prescription(s): bupropion hcl er (xl), hydroxyzine, amphetamine-dextroamphetamine, oxycodone-acetaminophen, pantoprazole, cyclobenzaprine, vitamin d3, cyanocobalamin, albuterol sulfate hfa, propranolol, citalopram, albuterol, calcium carbonate, and alendronate. ALLERGIES:  is allergic to latex; prozac [fluoxetine hcl]; sulfa antibiotics; and codeine. FAMILY HISTORY: Negative for any hematological or oncological conditions. SOCIAL HISTORY:  reports that she quit smoking about 32 years ago. She has a 20.00 pack-year smoking history. She has never used smokeless tobacco. She reports current alcohol use. She reports that she does not use drugs. REVIEW OF SYSTEMS:     · General: Positive for weakness and fatigue. . No unanticipated weight loss or decreased appetite. No fever or chills. · Eyes: No blurred vision, eye pain or double vision. · Ears: No hearing problems or drainage. No tinnitus. · Throat: No sore throat, problems with swallowing or dysphagia. · Respiratory: No cough, sputum or hemoptysis. No shortness of breath. No pleuritic chest pain. · Cardiovascular: No chest pain, orthopnea or PND. No lower extremity edema. No palpitation. · Gastrointestinal: As above. · Genitourinary: No dysuria, hematuria, frequency or urgency. · Musculoskeletal: No muscle aches or pains. No limitation of movement. No back pain. No gait disturbance, No joint complaints. · Dermatologic: No skin rashes or pruritus. No skin lesions or discolorations. · Psychiatric: No depression, anxiety, or stress or signs of schizophrenia. No change in mood or affect. · Hematologic: No history of bleeding tendency. No bruises or ecchymosis. No history of clotting problems. · Infectious disease: No fever, chills or frequent infections. · Endocrine: No polydipsia or polyuria. No temperature intolerance. · Neurologic: No headaches or dizziness.  No weakness or numbness of the extremities. No changes in balance, coordination,  memory, mentation, behavior. · Allergic/Immunologic: No nasal congestion or hives. No repeated infections. PHYSICAL EXAM:  The patient is not in acute distress. Vital signs: Blood pressure 114/66, pulse 67, temperature 97 °F (36.1 °C), temperature source Temporal, resp. rate 18, weight 158 lb 14.4 oz (72.1 kg), not currently breastfeeding.      General appearance - well appearing, not in pain or distress  Mental status - good mood, alert and oriented  Eyes - pupils equal and reactive, extraocular eye movements intact  Ears - bilateral TM's and external ear canals normal  Nose - normal and patent, no erythema, discharge or polyps  Mouth - mucous membranes moist, pharynx normal without lesions  Neck - supple, no significant adenopathy  Lymphatics - no palpable lymphadenopathy, no hepatosplenomegaly  Chest - clear to auscultation, no wheezes, rales or rhonchi, symmetric air entry  Heart - normal rate, regular rhythm, normal S1, S2, no murmurs, rubs, clicks or gallops  Abdomen - soft, nontender, nondistended, no masses or organomegaly  Neurological - alert, oriented, normal speech, no focal findings or movement disorder noted  Musculoskeletal - no joint tenderness, deformity or swelling  Extremities - peripheral pulses normal, no pedal edema, no clubbing or cyanosis  Skin - normal coloration and turgor, no rashes, no suspicious skin lesions noted     Review of Diagnostic data:   Lab Results   Component Value Date    WBC 5.4 12/31/2020    HGB 13.3 12/31/2020    HCT 42.9 12/31/2020    MCV 92.1 12/31/2020     12/31/2020       Chemistry        Component Value Date/Time     10/30/2020 0746    K 3.4 (L) 10/30/2020 0746     (H) 10/30/2020 0746    CO2 24 10/30/2020 0746    BUN 9 10/30/2020 0746    CREATININE 0.98 (H) 10/30/2020 0746        Component Value Date/Time    CALCIUM 8.4 (L) 10/30/2020 0746    ALKPHOS 89 10/28/2020 2030    AST 16 10/28/2020 2030    ALT 11 10/28/2020 2030    BILITOT 0.12 (L) 10/28/2020 2030        Lab Results   Component Value Date    IRON 124 12/31/2020    TIBC 238 (L) 12/31/2020    FERRITIN 685 (H) 12/31/2020         IMPRESSION:   Severe iron deficiency anemia  Vitamin B12 deficiency  2 episodes of probable GI blood loss February 2020 and late October 2020  Severe GERD and hiatal hernia  Past history of left leg DVT triggered by immobilization. Status post anticoagulation. PLAN: I reviewed the labs as above and discussed with the patient. I explained to the patient the nature of this hematologic problem. I explained the significance of these abnormalities in layman language. Reviewing patient's labs obviously she had severe iron deficiency anemia in 2 occasions secondary to acute blood loss. She had blood transfusion and iron infusion during hospitalization. Most recent labs showed normocytic anemia. This is related to combination of iron deficiency and vitamin B12 deficiency. Considering patient's upper GI symptoms oral iron was discontinued and patient continued to have episodes of black stool. She has very good response to IV iron infusion. Hemoglobin is now normal.  Iron studies are normal.   If she continues to have problems with black stool, patient may need to have double-balloon enteroscopy. She is established with gastroenterology. She will also have follow-up with her surgeon for possibility of surgery for hiatal hernia. We will monitor labs closely and we will interfere as necessary. I discussed anticoagulation for her DVT. Obviously it was one event triggered by immobilization. There is no need for prolonged anticoagulation. This is especially so with her recent episodes of GI bleeding. Patient understands. Patient's questions were answered to the best of her satisfaction and she verbalized full understanding and agreement.                             Mark Alonso MD [facility]                            This note is created with the assistance of a speech recognition program.  While intending to generate a document that actually reflects the content of the visit, the document can still have some errors including those of syntax and sound a like substitutions which may escape proof reading. It such instances, actual meaning can be extrapolated by contextual diversion.

## 2021-01-09 NOTE — PATIENT INSTRUCTIONS
Patient Education        Grief (Actual/Anticipated): Care Instructions  Your Care Instructions     Grief is your emotional reaction to a major loss. The words \"sorrow\" and \"heartache\" often are used to describe feelings of grief. You feel grief when you lose a beloved person, pet, place, or thing. It is also natural to feel grief when you lose a valued way of life, such as a job, marriage, or good health. You may begin to grieve before a loss occurs. You may grieve for a loved one who is sick and dying. Children and adults often feel the pain of loss before a big move or divorce. This type of grief helps you get ready for a loss. Grief is different for each person. There is no \"normal\" or \"expected\" period of time for grieving. Some people adjust to their loss within a couple of months. Others may take 2 years or longer, especially if their lives were changed a lot or if the loss was sudden and shocking. Grieving can cause problems such as headaches, loss of appetite, and trouble with thinking or sleeping. You may withdraw from friends and family and behave in ways that are unusual for you. Grief may cause you to question your beliefs and views about life. Grief is natural and does not require medical treatment. But if you have trouble sleeping, it may help to take sleeping pills for a short time. It may help to talk with people who have been through or are going through similar losses. You may also want to talk to a counselor about your feelings. Talking about your loss, sharing your cares and concerns, and getting support from others are important parts of healthy grieving. Follow-up care is a key part of your treatment and safety. Be sure to make and go to all appointments, and call your doctor if you are having problems. It's also a good idea to know your test results and keep a list of the medicines you take. How can you care for yourself at home? · Get enough sleep. Your mind helps make sense of your life while you sleep. Missing sleep can lead to illness and make it harder for you to deal with your grief. · Eat healthy foods. Try to avoid eating only foods that give you comfort. Ask someone to join you for a meal if you do not like eating alone. Consider taking a multivitamin every day. · Get some exercise every day. Even a walk can help you deal with your grief. Other exercises, such as yoga, can also help you manage stress. · Comfort yourself. Take time to look at photos or use special items that make you feel better. · Stay involved in your life. Do not withdraw from the activities you enjoy. People you know at work, Worship, clubs, or other groups can help you get through your period of grief. · Think about joining a support group to help you deal with your grief. There are many support groups to help people recover from grief. When should you call for help? Call 911 anytime you think you may need emergency care. For example, call if:    · You feel you cannot stop from hurting yourself or someone else. Watch closely for changes in your health, and be sure to contact your doctor if:    · You think you may be depressed.     · You do not get better as expected. Where can you learn more? Go to https://[email or web address].WyzAnt.com. org and sign in to your [employer] account. Enter H249 in the [facility] box to learn more about \"Grief (Actual/Anticipated): Care Instructions. \"     If you do not have an account, please click on the \"Sign Up Now\" link. Current as of: December 9, 2019               Content Version: 12.6  © 2368-1911 [employer]. Care instructions adapted under license by [facility] ([facility]). If you have questions about a medical condition or this instruction, always ask your healthcare professional. [address] any warranty or liability for your use of this information.

## 2021-01-13 ENCOUNTER — APPOINTMENT (OUTPATIENT)
Dept: GENERAL RADIOLOGY | Age: 61
DRG: 206 | End: 2021-01-13
Payer: MEDICARE

## 2021-01-13 ENCOUNTER — APPOINTMENT (OUTPATIENT)
Dept: CT IMAGING | Age: 61
DRG: 206 | End: 2021-01-13
Payer: MEDICARE

## 2021-01-13 ENCOUNTER — HOSPITAL ENCOUNTER (INPATIENT)
Age: 61
LOS: 2 days | Discharge: HOME | DRG: 206 | End: 2021-01-15
Attending: EMERGENCY MEDICINE | Admitting: SURGERY
Payer: MEDICARE

## 2021-01-13 DIAGNOSIS — J94.2 HEMOTHORAX: ICD-10-CM

## 2021-01-13 DIAGNOSIS — S22.41XA CLOSED FRACTURE OF MULTIPLE RIBS OF RIGHT SIDE, INITIAL ENCOUNTER: Primary | ICD-10-CM
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
## 2021-01-13 LAB
ABSOLUTE EOS #: 0.16 K/UL (ref 0–0.44)
ABSOLUTE IMMATURE GRANULOCYTE: 0.03 K/UL (ref 0–0.3)
ABSOLUTE LYMPH #: 0.84 K/UL (ref 1.1–3.7)
ABSOLUTE MONO #: 0.56 K/UL (ref 0.1–1.2)
AMPHETAMINE SCREEN URINE: POSITIVE
ANION GAP SERPL CALCULATED.3IONS-SCNC: 12 MMOL/L (ref 9–17)
BARBITURATE SCREEN URINE: NEGATIVE
BASOPHILS # BLD: 1 % (ref 0–2)
BASOPHILS ABSOLUTE: 0.05 K/UL (ref 0–0.2)
BENZODIAZEPINE SCREEN, URINE: NEGATIVE
BUN BLDV-MCNC: 9 MG/DL (ref 8–23)
BUN/CREAT BLD: ABNORMAL (ref 9–20)
BUPRENORPHINE URINE: ABNORMAL
CALCIUM SERPL-MCNC: 9 MG/DL (ref 8.6–10.4)
CANNABINOID SCREEN URINE: NEGATIVE
CHLORIDE BLD-SCNC: 101 MMOL/L (ref 98–107)
CO2: 25 MMOL/L (ref 20–31)
COCAINE METABOLITE, URINE: NEGATIVE
CREAT SERPL-MCNC: 0.96 MG/DL (ref 0.5–0.9)
DIFFERENTIAL TYPE: ABNORMAL
EOSINOPHILS RELATIVE PERCENT: 2 % (ref 1–4)
GFR AFRICAN AMERICAN: >60 ML/MIN
GFR NON-AFRICAN AMERICAN: 59 ML/MIN
GFR SERPL CREATININE-BSD FRML MDRD: ABNORMAL ML/MIN/{1.73_M2}
GFR SERPL CREATININE-BSD FRML MDRD: ABNORMAL ML/MIN/{1.73_M2}
GLUCOSE BLD-MCNC: 98 MG/DL (ref 70–99)
HCT VFR BLD CALC: 42.1 % (ref 36.3–47.1)
HEMOGLOBIN: 13.2 G/DL (ref 11.9–15.1)
IMMATURE GRANULOCYTES: 0 %
INR BLD: 0.9
LYMPHOCYTES # BLD: 9 % (ref 24–43)
MCH RBC QN AUTO: 28.9 PG (ref 25.2–33.5)
MCHC RBC AUTO-ENTMCNC: 31.4 G/DL (ref 28.4–34.8)
MCV RBC AUTO: 92.1 FL (ref 82.6–102.9)
MDMA URINE: ABNORMAL
METHADONE SCREEN, URINE: NEGATIVE
METHAMPHETAMINE, URINE: ABNORMAL
MONOCYTES # BLD: 6 % (ref 3–12)
NRBC AUTOMATED: 0 PER 100 WBC
OPIATES, URINE: NEGATIVE
OXYCODONE SCREEN URINE: POSITIVE
PARTIAL THROMBOPLASTIN TIME: 23.4 SEC (ref 20.5–30.5)
PDW BLD-RTO: 19.2 % (ref 11.8–14.4)
PHENCYCLIDINE, URINE: NEGATIVE
PLATELET # BLD: 274 K/UL (ref 138–453)
PLATELET ESTIMATE: ABNORMAL
PMV BLD AUTO: 9.7 FL (ref 8.1–13.5)
POTASSIUM SERPL-SCNC: 4.4 MMOL/L (ref 3.7–5.3)
PROPOXYPHENE, URINE: ABNORMAL
PROTHROMBIN TIME: 9.4 SEC (ref 9–12)
RBC # BLD: 4.57 M/UL (ref 3.95–5.11)
RBC # BLD: ABNORMAL 10*6/UL
SARS-COV-2, RAPID: NOT DETECTED
SARS-COV-2: NORMAL
SARS-COV-2: NORMAL
SEG NEUTROPHILS: 82 % (ref 36–65)
SEGMENTED NEUTROPHILS ABSOLUTE COUNT: 7.26 K/UL (ref 1.5–8.1)
SODIUM BLD-SCNC: 138 MMOL/L (ref 135–144)
SOURCE: NORMAL
TEST INFORMATION: ABNORMAL
TRICYCLIC ANTIDEPRESSANTS, UR: ABNORMAL
TROPONIN INTERP: NORMAL
TROPONIN T: NORMAL NG/ML
TROPONIN, HIGH SENSITIVITY: 7 NG/L (ref 0–14)
WBC # BLD: 8.9 K/UL (ref 3.5–11.3)
WBC # BLD: ABNORMAL 10*3/UL

## 2021-01-13 PROCEDURE — 96374 THER/PROPH/DIAG INJ IV PUSH: CPT

## 2021-01-13 PROCEDURE — 84484 ASSAY OF TROPONIN QUANT: CPT

## 2021-01-13 PROCEDURE — 2580000003 HC RX 258: Performed by: STUDENT IN AN ORGANIZED HEALTH CARE EDUCATION/TRAINING PROGRAM

## 2021-01-13 PROCEDURE — 6360000004 HC RX CONTRAST MEDICATION: Performed by: EMERGENCY MEDICINE

## 2021-01-13 PROCEDURE — 3209999900 CT THORACIC SPINE TRAUMA RECONSTRUCTION

## 2021-01-13 PROCEDURE — 6370000000 HC RX 637 (ALT 250 FOR IP): Performed by: STUDENT IN AN ORGANIZED HEALTH CARE EDUCATION/TRAINING PROGRAM

## 2021-01-13 PROCEDURE — 80307 DRUG TEST PRSMV CHEM ANLYZR: CPT

## 2021-01-13 PROCEDURE — U0002 COVID-19 LAB TEST NON-CDC: HCPCS

## 2021-01-13 PROCEDURE — 80048 BASIC METABOLIC PNL TOTAL CA: CPT

## 2021-01-13 PROCEDURE — 6360000002 HC RX W HCPCS: Performed by: STUDENT IN AN ORGANIZED HEALTH CARE EDUCATION/TRAINING PROGRAM

## 2021-01-13 PROCEDURE — 96375 TX/PRO/DX INJ NEW DRUG ADDON: CPT

## 2021-01-13 PROCEDURE — 85025 COMPLETE CBC W/AUTO DIFF WBC: CPT

## 2021-01-13 PROCEDURE — 96372 THER/PROPH/DIAG INJ SC/IM: CPT

## 2021-01-13 PROCEDURE — 72125 CT NECK SPINE W/O DYE: CPT

## 2021-01-13 PROCEDURE — 2060000002 HC BURN ICU INTERMEDIATE R&B

## 2021-01-13 PROCEDURE — 73060 X-RAY EXAM OF HUMERUS: CPT

## 2021-01-13 PROCEDURE — 85610 PROTHROMBIN TIME: CPT

## 2021-01-13 PROCEDURE — 71260 CT THORAX DX C+: CPT

## 2021-01-13 PROCEDURE — 71045 X-RAY EXAM CHEST 1 VIEW: CPT

## 2021-01-13 PROCEDURE — 99285 EMERGENCY DEPT VISIT HI MDM: CPT

## 2021-01-13 PROCEDURE — 70450 CT HEAD/BRAIN W/O DYE: CPT

## 2021-01-13 PROCEDURE — 2700000000 HC OXYGEN THERAPY PER DAY

## 2021-01-13 PROCEDURE — 3209999900 CT LUMBAR SPINE TRAUMA RECONSTRUCTION

## 2021-01-13 PROCEDURE — 93005 ELECTROCARDIOGRAM TRACING: CPT | Performed by: STUDENT IN AN ORGANIZED HEALTH CARE EDUCATION/TRAINING PROGRAM

## 2021-01-13 PROCEDURE — 73030 X-RAY EXAM OF SHOULDER: CPT

## 2021-01-13 PROCEDURE — 85730 THROMBOPLASTIN TIME PARTIAL: CPT

## 2021-01-13 RX ORDER — LORAZEPAM 2 MG/ML
1 INJECTION INTRAMUSCULAR ONCE
Status: COMPLETED | OUTPATIENT
Start: 2021-01-13 | End: 2021-01-13

## 2021-01-13 RX ORDER — FENTANYL CITRATE 50 UG/ML
25 INJECTION, SOLUTION INTRAMUSCULAR; INTRAVENOUS
Status: DISCONTINUED | OUTPATIENT
Start: 2021-01-13 | End: 2021-01-14

## 2021-01-13 RX ORDER — PHENOL 1.4 %
600 AEROSOL, SPRAY (ML) MUCOUS MEMBRANE DAILY
Status: DISCONTINUED | OUTPATIENT
Start: 2021-01-13 | End: 2021-01-15 | Stop reason: HOSPADM

## 2021-01-13 RX ORDER — OXYCODONE HYDROCHLORIDE 5 MG/1
10 TABLET ORAL ONCE
Status: DISCONTINUED | OUTPATIENT
Start: 2021-01-13 | End: 2021-01-13

## 2021-01-13 RX ORDER — LIDOCAINE 4 G/G
2 PATCH TOPICAL DAILY
Status: DISCONTINUED | OUTPATIENT
Start: 2021-01-13 | End: 2021-01-15 | Stop reason: HOSPADM

## 2021-01-13 RX ORDER — 0.9 % SODIUM CHLORIDE 0.9 %
1000 INTRAVENOUS SOLUTION INTRAVENOUS ONCE
Status: COMPLETED | OUTPATIENT
Start: 2021-01-13 | End: 2021-01-13

## 2021-01-13 RX ORDER — KETOROLAC TROMETHAMINE 15 MG/ML
15 INJECTION, SOLUTION INTRAMUSCULAR; INTRAVENOUS ONCE
Status: COMPLETED | OUTPATIENT
Start: 2021-01-13 | End: 2021-01-13

## 2021-01-13 RX ORDER — KETOROLAC TROMETHAMINE 15 MG/ML
15 INJECTION, SOLUTION INTRAMUSCULAR; INTRAVENOUS EVERY 6 HOURS
Status: DISCONTINUED | OUTPATIENT
Start: 2021-01-13 | End: 2021-01-15 | Stop reason: HOSPADM

## 2021-01-13 RX ORDER — ACETAMINOPHEN 500 MG
1000 TABLET ORAL ONCE
Status: COMPLETED | OUTPATIENT
Start: 2021-01-13 | End: 2021-01-13

## 2021-01-13 RX ORDER — GABAPENTIN 300 MG/1
300 CAPSULE ORAL EVERY 8 HOURS
Status: DISCONTINUED | OUTPATIENT
Start: 2021-01-13 | End: 2021-01-15 | Stop reason: HOSPADM

## 2021-01-13 RX ORDER — OXYCODONE HYDROCHLORIDE 5 MG/1
5 TABLET ORAL ONCE
Status: COMPLETED | OUTPATIENT
Start: 2021-01-13 | End: 2021-01-13

## 2021-01-13 RX ORDER — FENTANYL CITRATE 50 UG/ML
100 INJECTION, SOLUTION INTRAMUSCULAR; INTRAVENOUS ONCE
Status: COMPLETED | OUTPATIENT
Start: 2021-01-13 | End: 2021-01-13

## 2021-01-13 RX ORDER — SODIUM CHLORIDE 0.9 % (FLUSH) 0.9 %
10 SYRINGE (ML) INJECTION EVERY 12 HOURS SCHEDULED
Status: DISCONTINUED | OUTPATIENT
Start: 2021-01-13 | End: 2021-01-15 | Stop reason: HOSPADM

## 2021-01-13 RX ORDER — ONDANSETRON 2 MG/ML
4 INJECTION INTRAMUSCULAR; INTRAVENOUS EVERY 6 HOURS PRN
Status: DISCONTINUED | OUTPATIENT
Start: 2021-01-13 | End: 2021-01-15 | Stop reason: HOSPADM

## 2021-01-13 RX ORDER — DEXTROAMPHETAMINE SACCHARATE, AMPHETAMINE ASPARTATE MONOHYDRATE, DEXTROAMPHETAMINE SULFATE AND AMPHETAMINE SULFATE 2.5; 2.5; 2.5; 2.5 MG/1; MG/1; MG/1; MG/1
30 CAPSULE, EXTENDED RELEASE ORAL 2 TIMES DAILY
Status: DISCONTINUED | OUTPATIENT
Start: 2021-01-14 | End: 2021-01-15 | Stop reason: HOSPADM

## 2021-01-13 RX ORDER — ONDANSETRON 4 MG/1
4 TABLET, ORALLY DISINTEGRATING ORAL EVERY 8 HOURS PRN
Status: DISCONTINUED | OUTPATIENT
Start: 2021-01-13 | End: 2021-01-15 | Stop reason: HOSPADM

## 2021-01-13 RX ORDER — CHOLECALCIFEROL (VITAMIN D3) 125 MCG
1000 CAPSULE ORAL DAILY
Status: DISCONTINUED | OUTPATIENT
Start: 2021-01-13 | End: 2021-01-15 | Stop reason: HOSPADM

## 2021-01-13 RX ORDER — PROPRANOLOL HYDROCHLORIDE 80 MG/1
80 CAPSULE, EXTENDED RELEASE ORAL DAILY
Status: DISCONTINUED | OUTPATIENT
Start: 2021-01-13 | End: 2021-01-15 | Stop reason: HOSPADM

## 2021-01-13 RX ORDER — BUPROPION HYDROCHLORIDE 150 MG/1
450 TABLET ORAL EVERY MORNING
Status: DISCONTINUED | OUTPATIENT
Start: 2021-01-14 | End: 2021-01-15 | Stop reason: HOSPADM

## 2021-01-13 RX ORDER — POLYETHYLENE GLYCOL 3350 17 G/17G
17 POWDER, FOR SOLUTION ORAL DAILY
Status: DISCONTINUED | OUTPATIENT
Start: 2021-01-13 | End: 2021-01-15 | Stop reason: HOSPADM

## 2021-01-13 RX ORDER — SODIUM CHLORIDE 0.9 % (FLUSH) 0.9 %
10 SYRINGE (ML) INJECTION PRN
Status: DISCONTINUED | OUTPATIENT
Start: 2021-01-13 | End: 2021-01-15 | Stop reason: HOSPADM

## 2021-01-13 RX ORDER — ALBUTEROL SULFATE 2.5 MG/3ML
2.5 SOLUTION RESPIRATORY (INHALATION) EVERY 6 HOURS PRN
Status: DISCONTINUED | OUTPATIENT
Start: 2021-01-13 | End: 2021-01-15 | Stop reason: HOSPADM

## 2021-01-13 RX ORDER — LIDOCAINE 4 G/G
1 PATCH TOPICAL DAILY
Status: DISCONTINUED | OUTPATIENT
Start: 2021-01-13 | End: 2021-01-15 | Stop reason: HOSPADM

## 2021-01-13 RX ORDER — ACETAMINOPHEN 500 MG
1000 TABLET ORAL EVERY 8 HOURS SCHEDULED
Status: DISCONTINUED | OUTPATIENT
Start: 2021-01-13 | End: 2021-01-15 | Stop reason: HOSPADM

## 2021-01-13 RX ORDER — HYDROXYZINE HYDROCHLORIDE 25 MG/1
25 TABLET, FILM COATED ORAL 3 TIMES DAILY PRN
Status: DISCONTINUED | OUTPATIENT
Start: 2021-01-13 | End: 2021-01-15 | Stop reason: HOSPADM

## 2021-01-13 RX ORDER — ONDANSETRON 2 MG/ML
4 INJECTION INTRAMUSCULAR; INTRAVENOUS ONCE
Status: COMPLETED | OUTPATIENT
Start: 2021-01-13 | End: 2021-01-13

## 2021-01-13 RX ORDER — FENTANYL CITRATE 50 UG/ML
50 INJECTION, SOLUTION INTRAMUSCULAR; INTRAVENOUS
Status: DISCONTINUED | OUTPATIENT
Start: 2021-01-13 | End: 2021-01-14

## 2021-01-13 RX ORDER — CITALOPRAM 20 MG/1
40 TABLET ORAL DAILY
Status: DISCONTINUED | OUTPATIENT
Start: 2021-01-13 | End: 2021-01-15 | Stop reason: HOSPADM

## 2021-01-13 RX ORDER — METHOCARBAMOL 500 MG/1
750 TABLET, FILM COATED ORAL EVERY 6 HOURS
Status: DISCONTINUED | OUTPATIENT
Start: 2021-01-13 | End: 2021-01-15 | Stop reason: HOSPADM

## 2021-01-13 RX ORDER — OXYCODONE HYDROCHLORIDE 5 MG/1
5 TABLET ORAL EVERY 6 HOURS PRN
Status: DISCONTINUED | OUTPATIENT
Start: 2021-01-13 | End: 2021-01-15 | Stop reason: HOSPADM

## 2021-01-13 RX ORDER — PANTOPRAZOLE SODIUM 40 MG/1
40 TABLET, DELAYED RELEASE ORAL
Status: DISCONTINUED | OUTPATIENT
Start: 2021-01-14 | End: 2021-01-15 | Stop reason: HOSPADM

## 2021-01-13 RX ADMIN — POLYETHYLENE GLYCOL 3350 17 G: 17 POWDER, FOR SOLUTION ORAL at 21:59

## 2021-01-13 RX ADMIN — ACETAMINOPHEN 1000 MG: 500 TABLET ORAL at 18:46

## 2021-01-13 RX ADMIN — OXYCODONE HYDROCHLORIDE 5 MG: 5 TABLET ORAL at 18:46

## 2021-01-13 RX ADMIN — SODIUM CHLORIDE 1000 ML: 9 INJECTION, SOLUTION INTRAVENOUS at 15:47

## 2021-01-13 RX ADMIN — IOPAMIDOL 75 ML: 755 INJECTION, SOLUTION INTRAVENOUS at 17:18

## 2021-01-13 RX ADMIN — ENOXAPARIN SODIUM 30 MG: 30 INJECTION SUBCUTANEOUS at 21:58

## 2021-01-13 RX ADMIN — Medication 1000 MCG: at 21:58

## 2021-01-13 RX ADMIN — KETOROLAC TROMETHAMINE 15 MG: 15 INJECTION, SOLUTION INTRAMUSCULAR; INTRAVENOUS at 19:08

## 2021-01-13 RX ADMIN — Medication 10 ML: at 23:49

## 2021-01-13 RX ADMIN — PROPRANOLOL HYDROCHLORIDE 80 MG: 80 CAPSULE, EXTENDED RELEASE ORAL at 21:59

## 2021-01-13 RX ADMIN — BISACODYL 5 MG: 5 TABLET, COATED ORAL at 21:58

## 2021-01-13 RX ADMIN — GABAPENTIN 300 MG: 300 CAPSULE ORAL at 21:58

## 2021-01-13 RX ADMIN — CITALOPRAM 40 MG: 20 TABLET, FILM COATED ORAL at 21:58

## 2021-01-13 RX ADMIN — METHOCARBAMOL TABLETS 750 MG: 500 TABLET, COATED ORAL at 21:59

## 2021-01-13 RX ADMIN — FENTANYL CITRATE 100 MCG: 50 INJECTION, SOLUTION INTRAMUSCULAR; INTRAVENOUS at 17:15

## 2021-01-13 RX ADMIN — ONDANSETRON 4 MG: 2 INJECTION INTRAMUSCULAR; INTRAVENOUS at 15:47

## 2021-01-13 RX ADMIN — LORAZEPAM 1 MG: 2 INJECTION INTRAMUSCULAR; INTRAVENOUS at 15:49

## 2021-01-13 RX ADMIN — Medication 600 MG: at 21:58

## 2021-01-13 RX ADMIN — HYDROXYZINE HYDROCHLORIDE 25 MG: 25 TABLET ORAL at 21:58

## 2021-01-13 RX ADMIN — FENTANYL CITRATE 100 MCG: 50 INJECTION, SOLUTION INTRAMUSCULAR; INTRAVENOUS at 15:29

## 2021-01-13 ASSESSMENT — ENCOUNTER SYMPTOMS
COLOR CHANGE: 0
SHORTNESS OF BREATH: 0
ABDOMINAL PAIN: 0
EYE REDNESS: 0
EYE DISCHARGE: 0
BACK PAIN: 1

## 2021-01-13 ASSESSMENT — PAIN DESCRIPTION - FREQUENCY
FREQUENCY: CONTINUOUS
FREQUENCY: CONTINUOUS

## 2021-01-13 ASSESSMENT — PAIN SCALES - GENERAL
PAINLEVEL_OUTOF10: 8
PAINLEVEL_OUTOF10: 10
PAINLEVEL_OUTOF10: 4
PAINLEVEL_OUTOF10: 4

## 2021-01-13 ASSESSMENT — PAIN DESCRIPTION - PAIN TYPE
TYPE: ACUTE PAIN
TYPE: ACUTE PAIN;CHRONIC PAIN

## 2021-01-13 ASSESSMENT — PAIN DESCRIPTION - ORIENTATION: ORIENTATION: RIGHT;MID

## 2021-01-13 ASSESSMENT — PAIN DESCRIPTION - DESCRIPTORS: DESCRIPTORS: CONSTANT;ACHING

## 2021-01-13 ASSESSMENT — PAIN DESCRIPTION - PROGRESSION: CLINICAL_PROGRESSION: GRADUALLY IMPROVING

## 2021-01-13 ASSESSMENT — PAIN DESCRIPTION - LOCATION: LOCATION: BACK

## 2021-01-13 NOTE — H&P
at T9-11   Fentanyl 25 mcg q4 PRN   General diet  PT/OT         CONSULT SERVICES    [] Neurosurgery     [] Orthopedic Surgery    [] Cardiothoracic     [] Facial Trauma    [] Plastic Surgery (Burn)    [] Pediatric Surgery     [] Internal Medicine    [] Pulmonary Medicine       HISTORY:     Chief Complaint:  \"Fall\"    INJURY SUMMARY  Right posterior 9-11 rib fractures    If intracranial hemorrhage is present, is it a BIG 1 category: [] YES  []NO    GENERAL DATA  Age 57 y.o.  female   Patient information was obtained from patient. History/Exam limitations: none. Patient presented to the Emergency Department by private vehicle. Injury Date: 1/13/2021   Approximate Injury Time: yesterday      Transport mode:   []Ambulance      [] Helicopter     []Car       [] Other  Referring Hospital: [address] [place] [address], (e.g., home, farm, industry, street)  Specific Details of Location (e.g., bedroom, kitchen, garage): Home, basement  Type of Residence (if occurred in home setting) (e.g., apartment, mobile home, single family home):      MECHANISM OF INJURY    [] Motor Vehicle Collision   Specific vehicle type involved (e.g., sedan, minivan, SUV, pickup truck):   Collision with (e.g., type of vehicle, building, barn, ditch, tree):     Type of collision  [] Single Vehicle Collision  []Multiple Vehicle Collision  [] unknown collision type    Mechanism considerations  [] Fatality in Same Vehicle      []Ejected       []Rollover          []Extricated    Internal Compartment   []                      []Passenger:      []Front Seat        []Rear Seat     Personal Restraints  [] Unrestrained   []Lap Belt Only Restrained   [] Shoulder Belt Only Restrained  [] 3 Point Restrained  [] unknown     Air Bags  [] Front Air Bag  []Side Air Bag  []Curtain Airbag []Air Bag Not Deployed    []No Air Bag equipped in vehicle      Pediatric Consideration:      [] Booster Seat  []Infant Car Seat  [] Child Car Seat      [] Motorcycle Collision Wearing Helmet     []Yes     []No    []Unknown    [] ATV crash  Wearing Helmet     []Yes     []No    []Unknown    [] Bicycle Collision Wearing Helmet     []Yes     []No    []Unknown    [] Pedestrian Struck         [x] Fall    []From Standing     []From Height  Ft     []Down Stairs __7_steps    [] Assault    [] Gunshot  Specify caliber / type of gun: ____________________________    [] Stabbing  Specify weapon type, size: _____________________________    [] Burn  []Flame   []Scald   []Electrical   []Chemical  []Inhalation   []House fire    [] Other ______________________________________________________    [] Other protective devices used / worn ___________________________    HISTORY:     Marylene Ebbs is a 57 y.o. female that presented to the Emergency Department following fall down 7 steps while carrying her laundry yesterday. Patient admits to hitting her head. Unknown loss of consciousness. Her boyfriend found her after an unknown time, but she believes it was immediately. Pain was increasing over the day so she came to the Emergency Department. CT imaging revealed 9-11 posterior right rib fractures. IS 900ml in ED. Patient has history of bipolar, HTN, and GERD. She is undergoing evaluation of esophagitis and has manometry scheduled for later this month. Previous DVT in 2014, no longer on anticoagulation. Loss of Consciousness []No   []Yes Duration(min)       [x] Unknown      Total Fluids Given Prior To Arrival 0 mL    MEDICATIONS:   []  None     []  Information not available due to exam limitations documented above  Prior to Admission medications    Medication Sig Start Date End Date Taking?  Authorizing Provider   buPROPion 450 MG TB24 Take 450 mg by mouth every morning 1/6/21   Jennifer Gonzalez DO   hydrOXYzine (ATARAX) 25 MG tablet Take 1/2 to 1 tablet by mouth three times daily as needed for anxiety 1/6/21   Jennifer Gonzalez DO   amphetamine-dextroamphetamine (ADDERALL XR) 30 MG extended release capsule Take 1 capsule by mouth 2 times daily for 30 days. 1/7/21 2/6/21  Silvia Kingston MD   oxyCODONE-acetaminophen (PERCOCET) 5-325 MG per tablet Take 1 tablet by mouth every 8 hours as needed for Pain for up to 30 days.  1/7/21 2/6/21  Renee Walker MD   pantoprazole (PROTONIX) 40 MG tablet TAKE ONE TABLET BY MOUTH EVERY MORNING BEFORE BREAKFAST 11/2/20   Ankit Walker MD   cyclobenzaprine (FLEXERIL) 10 MG tablet TAKE 1/2 TO 1 TABLET BY MOUTH THREE TIMES A DAY AS NEEDED FOR MUSCLE SPASMS 11/2/20   Ankit Walker MD   alendronate (FOSAMAX) 70 MG tablet TAKE 1 TABLET BY MOUTH ONCE WEEKLY BEFORE BREAKFAST, ON AN EMPTY STOMACH: REMAIN UPRIGHT FOR 30 MINUTES:TAKE WITH 8 OUNCES OF WATER  Patient not taking: Reported on 1/7/2021 11/2/20   Silvia Kingston MD   Cholecalciferol (VITAMIN D3) 25 MCG TABS Take 1 tablet by mouth daily 10/31/20 1/7/21  Bernell Number, DO   cyanocobalamin 1000 MCG tablet Take 1 tablet by mouth daily 10/31/20   Bernell Number, DO   albuterol sulfate HFA (VENTOLIN HFA) 108 (90 Base) MCG/ACT inhaler INHALE TWO PUFFS BY MOUTH EVERY 4 HOURS AS NEEDED FOR WHEEZING 10/28/20   Ankit Walker MD   propranolol (INDERAL LA) 80 MG extended release capsule TAKE ONE CAPSULE BY MOUTH DAILY 10/19/20   Ankit Walker MD   citalopram (CELEXA) 40 MG tablet TAKE ONE TABLET BY MOUTH DAILY 4/8/20   Ankit Walker MD   albuterol (PROVENTIL) (2.5 MG/3ML) 0.083% nebulizer solution Take 2.5 mg by nebulization every 6 hours as needed for Wheezing or Shortness of Breath    Historical Provider, MD   Calcium Carbonate (CALCIUM 600 PO) Take 600 mg by mouth daily    Historical Provider, MD       ALLERGIES:   []  None    []   Information not available due to exam limitations documented above   Latex, Prozac [fluoxetine hcl], Sulfa antibiotics, and Codeine    PAST MEDICAL HISTORY: []  None   []   Information not available due to exam limitations documented above    has a past medical history of Acid reflux, Adult ADHD, Asthma, Bipolar 1 disorder ([facility] [address] Broken foot, Chest pain of uncertain etiology, Depression, DVT of leg (deep venous thrombosis) ([facility] [address] Fracture, Helicobacter pylori (H. pylori) infection, Hematuria, Hiatal hernia, History of blood transfusion, History of DVT (deep vein thrombosis), Hx of blood clots, Hypertension, Internal hemorrhoids, Narcolepsy, and Osteoporosis. has a past surgical history that includes  section; Nose surgery; Knee arthroscopy (Left); shoulder surgery (Left); Wrist surgery; Upper gastrointestinal endoscopy (2013); Colonoscopy (2013); Upper gastrointestinal endoscopy (2016); Foot surgery (Left, 2015); Breast enhancement surgery (Bilateral); Arm Surgery (Right); knee surgery (Right); vaginal dilatation; Ankle surgery (Left, 2018); pr office/outpt visit,procedure only (Left, 2018); Foot Tendon Surgery (Left, 2019); ligament repair (Left, 2019); Upper gastrointestinal endoscopy (N/A, 2/10/2020); Colonoscopy (N/A, 2/10/2020); Endoscopy, colon, diagnostic; Colonoscopy (N/A, 2020); esophageal motility study (N/A, 2020); esophageal motility study (9/10/2020); and esophageal motility study (N/A, 2020). FAMILY HISTORY   []   Information not available due to exam limitations documented above    family history includes Cancer in her maternal aunt and mother; Heart Surgery in her father; Hypertension in her father and mother; Stroke in her mother. SOCIAL HISTORY  []   Information not available due to exam limitations documented above     reports that she quit smoking about 32 years ago. She has a 20.00 pack-year smoking history. She has never used smokeless tobacco.   reports current alcohol use. reports no history of drug use. PERTINENT SYSTEMIC REVIEW:    []   Information not available due to exam limitations documented above    Pertinent items are noted in HPI.     PHYSICAL EXAMINATION:     GLASCOW COMA SCALE  NEUROMUSCULAR BLOCKADE PRIOR TO ARRIVAL     [x]No        []Yes      Variable  Score   Variable  Score  Eye opening [x]Spontaneous 4 Verbal  [x]Oriented  5     []To voice  3   []Confused  4    []To pain  2   []Inapp words  3    []None  1   []Incomp words 2       []None  1   Motor   [x]Obeys  6    []Localizes pain 5    []Withdraws(pain) 4    []Flexion(pain) 3  []Extension(pain) 2    []None  1     GCS Total = 15    PHYSICAL EXAMINATION    VITAL SIGNS:   Vitals:    01/13/21 1801   BP: (!) 126/102   Pulse:    Resp:    Temp:    SpO2: 96%       Physical Exam  Constitutional:       General: She is in acute distress. HENT:      Head: Normocephalic. Right Ear: External ear normal.      Left Ear: External ear normal.      Nose: Nose normal.      Mouth/Throat:      Pharynx: Oropharynx is clear. Eyes:      Extraocular Movements: Extraocular movements intact. Conjunctiva/sclera: Conjunctivae normal.   Neck:      Musculoskeletal: Muscular tenderness present. Cardiovascular:      Rate and Rhythm: Normal rate and regular rhythm. Pulses: Normal pulses. Pulmonary:      Effort: Respiratory distress present. Chest:      Chest wall: Tenderness present. Abdominal:      Palpations: Abdomen is soft. Tenderness: There is no abdominal tenderness. There is no guarding. Musculoskeletal:         General: No swelling or tenderness. Skin:     General: Skin is warm. Capillary Refill: Capillary refill takes 2 to 3 seconds. Findings: Bruising present. Comments: Right flank approximately 15cm    Neurological:      Mental Status: She is alert and oriented to person, place, and time. Psychiatric:      Comments: Anxious          FOCUSED ABDOMINAL SONOGRAM FOR TRAUMA (FAST): A good  quality examination was performed by Dr. Donna Fitzpatrick and representative images were obtained.     [x] No free fluid in the abdomen   [] Free fluid in RUQ   [] Free fluid in LUQ  [] Free fluid in Pelvis  [] Pericardial fluid  [] Other: RADIOLOGY  CT THORACIC SPINE TRAUMA RECONSTRUCTION   Final Result   1. Fractures of the right posterior 9th through 11th ribs. Small associated   hemothorax with no pneumothorax. 2. Peribronchial thickening and peripheral airspace opacities in both lower   lobes. This may represent atelectasis or contusion given history of trauma. Bronchiolitis or developing pneumonitis may be considered clinically. 3. No evidence of traumatic injury in the abdomen or pelvis. 4. The thoracic spine shows no significant finding. CT LUMBAR SPINE TRAUMA RECONSTRUCTION   Final Result   Moderate disc space disease with advanced facet arthropathy. No evidence   acute fracture traumatic malalignment. CT CHEST ABDOMEN PELVIS W CONTRAST   Final Result   1. Fractures of the right posterior 9th through 11th ribs. Small associated   hemothorax with no pneumothorax. 2. Peribronchial thickening and peripheral airspace opacities in both lower   lobes. This may represent atelectasis or contusion given history of trauma. Bronchiolitis or developing pneumonitis may be considered clinically. 3. No evidence of traumatic injury in the abdomen or pelvis. 4. The thoracic spine shows no significant finding. CT HEAD WO CONTRAST   Final Result   No acute intracranial abnormality. CT CERVICAL SPINE WO CONTRAST   Final Result   Multilevel cervical spondylosis and degenerative disc disease. Mild retrolisthesis of C5 which appears chronic. Evidence of a left central and paracentral disc protrusion at C6-7. Follow-up MRI may be helpful for further evaluation on an outpatient basis. No acute bony abnormalities are noted         XR CHEST PORTABLE   Final Result   Stable mild cardiomegaly      Mild chronic obstructive lung changes with mild discoid atelectasis or   scarring along the lung bases which is less prominent. XR SHOULDER RIGHT (MIN 2 VIEWS)   Final Result   No acute findings. XR HUMERUS RIGHT (MIN 2 VIEWS)   Final Result   No acute findings.                LABS    Labs Reviewed   CBC WITH AUTO DIFFERENTIAL - Abnormal; Notable for the following components:       Result Value    RDW 19.2 (*)     Seg Neutrophils 82 (*)     Lymphocytes 9 (*)     Absolute Lymph # 0.84 (*)     All other components within normal limits   BASIC METABOLIC PANEL - Abnormal; Notable for the following components:    CREATININE 0.96 (*)     GFR Non- 59 (*)     All other components within normal limits   PROTIME-INR   APTT   COVID-19         Mary Brooks MD  1/13/21, 6:55 PM

## 2021-01-13 NOTE — ED PROVIDER NOTES
[facility]  ED  Emergency Department Encounter  Emergency Medicine Resident     Pt Name: Fauzia Gomez  MRN: [id number]  Rupeshgfindira 1960  Date of evaluation: 21  PCP:  Rina Khoury DO    CHIEF COMPLAINT       Chief Complaint   Patient presents with    Fall       HISTORY Josephsheree  (Location/Symptom, Timing/Onset, Context/Setting, Quality, Duration, Modifying Factors,Severity.)      Fauzia Gomez is a 57 y.o. female who presents with reported fall down 6 or 7 steps. States did not lose consciousness or strike her head. She states she was walking up the stairs with some laundry when she tripped and fell falling backwards. Complaining of right paraspinal and right rib pain. Worse with movement. Severe. Nonradiating. Denies any anticoagulant or antiplatelet use. PAST MEDICAL / SURGICAL / SOCIAL / FAMILY HISTORY      has a past medical history of Acid reflux, Adult ADHD, Asthma, Bipolar 1 disorder ([address] Broken foot, Chest pain of uncertain etiology, Depression, DVT of leg (deep venous thrombosis) ([address] Fracture, Helicobacter pylori (H. pylori) infection, Hematuria, Hiatal hernia, History of blood transfusion, History of DVT (deep vein thrombosis), Hx of blood clots, Hypertension, Internal hemorrhoids, Narcolepsy, and Osteoporosis. has a past surgical history that includes  section; Nose surgery; Knee arthroscopy (Left); shoulder surgery (Left); Wrist surgery; Upper gastrointestinal endoscopy (2013); Colonoscopy (2013); Upper gastrointestinal endoscopy (2016); Foot surgery (Left, ); Breast enhancement surgery (Bilateral); Arm Surgery (Right); knee surgery (Right); vaginal dilatation; Ankle surgery (Left, 2018); pr office/outpt visit,procedure only (Left, 2018); Foot Tendon Surgery (Left, 2019); ligament repair (Left, 2019); Upper gastrointestinal endoscopy (N/A, 2/10/2020);  Colonoscopy (N/A, 2/10/2020); Endoscopy, colon, diagnostic; Colonoscopy (N/A, 2020); esophageal motility study (N/A, 2020); esophageal motility study (9/10/2020); and esophageal motility study (N/A, 2020). Social History     Socioeconomic History    Marital status: Single     Spouse name: Not on file    Number of children: Not on file    Years of education: Not on file    Highest education level: Not on file   Occupational History    Not on file   Social Needs    Financial resource strain: Somewhat hard    Food insecurity     Worry: Sometimes true     Inability: Sometimes true    Transportation needs     Medical: No     Non-medical: No   Tobacco Use    Smoking status: Former Smoker     Packs/day: 1.00     Years: 20.00     Pack years: 20.00     Quit date:      Years since quittin.0    Smokeless tobacco: Never Used   Substance and Sexual Activity    Alcohol use: Yes     Comment: rare    Drug use: No    Sexual activity: Not Currently   Lifestyle    Physical activity     Days per week: 0 days     Minutes per session: 0 min    Stress: To some extent   Relationships    Social connections     Talks on phone:  Three times a week     Gets together: Once a week     Attends [employer] service: 1 to 4 times per year     Active member of club or organization: Yes     Attends meetings of clubs or organizations: 1 to 4 times per year     Relationship status:     Intimate partner violence     Fear of current or ex partner: Not on file     Emotionally abused: Not on file     Physically abused: Not on file     Forced sexual activity: Not on file   Other Topics Concern    Not on file   Social History Narrative    Not on file       Family History   Problem Relation Age of Onset    Cancer Mother     Hypertension Mother     Stroke Mother     Cancer Maternal Aunt     Hypertension Father     Heart Surgery Father     Diabetes Neg Hx        Allergies:  Latex, Prozac [fluoxetine hcl], Sulfa antibiotics, and Codeine    Home Medications:  Prior to Admission medications    Medication Sig Start Date End Date Taking? Authorizing Provider   buPROPion 450 MG TB24 Take 450 mg by mouth every morning 1/6/21   Ryan Mistry DO   hydrOXYzine (ATARAX) 25 MG tablet Take 1/2 to 1 tablet by mouth three times daily as needed for anxiety 1/6/21   Ryan Mistry DO   amphetamine-dextroamphetamine (ADDERALL XR) 30 MG extended release capsule Take 1 capsule by mouth 2 times daily for 30 days. 1/7/21 2/6/21  Joshua Conway MD   oxyCODONE-acetaminophen (PERCOCET) 5-325 MG per tablet Take 1 tablet by mouth every 8 hours as needed for Pain for up to 30 days.  1/7/21 2/6/21  Ankit Walker MD   pantoprazole (PROTONIX) 40 MG tablet TAKE ONE TABLET BY MOUTH EVERY MORNING BEFORE BREAKFAST 11/2/20   Ankit Walker MD   cyclobenzaprine (FLEXERIL) 10 MG tablet TAKE 1/2 TO 1 TABLET BY MOUTH THREE TIMES A DAY AS NEEDED FOR MUSCLE SPASMS 11/2/20   Ankit Walker MD   alendronate (FOSAMAX) 70 MG tablet TAKE 1 TABLET BY MOUTH ONCE WEEKLY BEFORE BREAKFAST, ON AN EMPTY STOMACH: REMAIN UPRIGHT FOR 30 MINUTES:TAKE WITH 8 OUNCES OF WATER  Patient not taking: Reported on 1/7/2021 11/2/20   Joshua Conway MD   Cholecalciferol (VITAMIN D3) 25 MCG TABS Take 1 tablet by mouth daily 10/31/20 1/7/21  Lucinda Paniagua DO   cyanocobalamin 1000 MCG tablet Take 1 tablet by mouth daily 10/31/20   Lucinda Paniagua DO   albuterol sulfate HFA (VENTOLIN HFA) 108 (90 Base) MCG/ACT inhaler INHALE TWO PUFFS BY MOUTH EVERY 4 HOURS AS NEEDED FOR WHEEZING 10/28/20   Ankit Walker MD   propranolol (INDERAL LA) 80 MG extended release capsule TAKE ONE CAPSULE BY MOUTH DAILY 10/19/20   Ankit Walker MD   citalopram (CELEXA) 40 MG tablet TAKE ONE TABLET BY MOUTH DAILY 4/8/20   Ankit Walker MD   albuterol (PROVENTIL) (2.5 MG/3ML) 0.083% nebulizer solution Take 2.5 mg by nebulization every 6 hours as needed for Wheezing or Shortness of Breath    Historical Provider, MD   Calcium Carbonate (CALCIUM 600 PO) Take 600 mg by mouth daily    Historical Provider, MD       REVIEW OF SYSTEMS    (2-9 systems for level 4, 10 or more for level 5)      Review of Systems   Constitutional: Negative for chills and fever. Eyes: Negative for discharge and redness. Respiratory: Negative for shortness of breath. Cardiovascular: Negative for chest pain. Gastrointestinal: Negative for abdominal pain. Genitourinary: Positive for flank pain. Musculoskeletal: Positive for back pain and myalgias. Skin: Negative for color change and rash. Allergic/Immunologic: Positive for environmental allergies. Neurological: Negative for headaches. Psychiatric/Behavioral: Negative for agitation and confusion. PHYSICAL EXAM   (up to 7 for level 4, 8 or more for level 5)     INITIAL VITALS:    height is 5' (1.524 m) and weight is 150 lb (68 kg). Her oral temperature is 98.9 °F (37.2 °C). Her blood pressure is 114/93 (abnormal) and her pulse is 62. Her respiration is 20 and oxygen saturation is 95%. Physical Exam  Vitals signs and nursing note reviewed. Constitutional:       Appearance: She is well-developed. Comments: Patient is alert however appearing to be enlargement of pain. Will not let me fully examine right back due to pain   HENT:      Head: Normocephalic and atraumatic. Nose: Nose normal.      Mouth/Throat:      Mouth: Mucous membranes are moist.   Eyes:      General: No scleral icterus. Conjunctiva/sclera: Conjunctivae normal.      Pupils: Pupils are equal, round, and reactive to light. Neck:      Musculoskeletal: Neck supple. Trachea: No tracheal deviation. Cardiovascular:      Rate and Rhythm: Normal rate and regular rhythm. Heart sounds: Normal heart sounds. No murmur. No friction rub. No gallop. Pulmonary:      Effort: Pulmonary effort is normal. No respiratory distress. Breath sounds: Normal breath sounds. No wheezing or rales. Abdominal:      General: Bowel sounds are normal. There is no distension. Palpations: Abdomen is soft. There is no mass. Tenderness: There is no abdominal tenderness. There is no guarding or rebound. Musculoskeletal: Normal range of motion. Comments: Bruising to right flank at the level of T11-T12, tender to palpation, pulses in all 4 extremities  Tender to palpation to midline spinal palpation. Pulses in all 4 extremities. Skin:     General: Skin is warm and dry. Findings: No erythema or rash. Neurological:      Mental Status: She is alert and oriented to person, place, and time.    Psychiatric:         Behavior: Behavior normal.         DIFFERENTIAL  DIAGNOSIS     PLAN (LABS / IMAGING / EKG):  Orders Placed This Encounter   Procedures    CT THORACIC SPINE TRAUMA RECONSTRUCTION    CT LUMBAR SPINE TRAUMA RECONSTRUCTION    CT HEAD WO CONTRAST    CT CERVICAL SPINE WO CONTRAST    XR CHEST PORTABLE    XR SHOULDER RIGHT (MIN 2 VIEWS)    XR HUMERUS RIGHT (MIN 2 VIEWS)    CT CHEST ABDOMEN PELVIS W CONTRAST    CBC Auto Differential    Basic Metabolic Panel    PROTIME-INR    APTT    COVID-19    Basic Metabolic Panel w/ Reflex to MG    Drug screen multi urine    Troponin    DIET GENERAL;    Vital signs per unit routine    Notify patient's primary care physician of admission    Place intermittent pneumatic compression device    Up with assistance    Incentive spirometry nursing    Full Code    Inpatient consult to Trauma Surgery    OT eval and treat    PT evaluation and treat    Initiate Oxygen Therapy Protocol    Heated/ Humidified High Flow Nasal Cannula    Speech language pathology evaluation    EKG 12 Lead    PATIENT STATUS (FROM ED OR OR/PROCEDURAL) Inpatient       MEDICATIONS ORDERED:  Orders Placed This Encounter   Medications    fentaNYL (SUBLIMAZE) injection 100 mcg    ondansetron (ZOFRAN) injection 4 mg    0.9 % sodium chloride bolus    LORazepam (ATIVAN) injection 1 mg    fentaNYL (SUBLIMAZE) injection 100 mcg    iopamidol (ISOVUE-370) 76 % injection 75 mL    DISCONTD: oxyCODONE (ROXICODONE) immediate release tablet 10 mg    acetaminophen (TYLENOL) tablet 1,000 mg    oxyCODONE (ROXICODONE) immediate release tablet 5 mg    ketorolac (TORADOL) injection 15 mg    lidocaine 4 % external patch 2 patch    sodium chloride flush 0.9 % injection 10 mL    sodium chloride flush 0.9 % injection 10 mL    acetaminophen (TYLENOL) tablet 1,000 mg    oxyCODONE (ROXICODONE) immediate release tablet 5 mg    OR Linked Order Group     fentaNYL (SUBLIMAZE) injection 25 mcg     fentaNYL (SUBLIMAZE) injection 50 mcg    gabapentin (NEURONTIN) capsule 300 mg    methocarbamol (ROBAXIN) tablet 750 mg    lidocaine 4 % external patch 1 patch    polyethylene glycol (GLYCOLAX) packet 17 g    bisacodyl (DULCOLAX) EC tablet 5 mg    OR Linked Order Group     ondansetron (ZOFRAN-ODT) disintegrating tablet 4 mg     ondansetron (ZOFRAN) injection 4 mg    enoxaparin (LOVENOX) injection 30 mg    albuterol (PROVENTIL) nebulizer solution 2.5 mg    amphetamine-dextroamphetamine (ADDERALL XR) extended release capsule 30 mg    buPROPion (WELLBUTRIN XL) extended release tablet 450 mg    calcium carbonate tablet 600 mg    citalopram (CELEXA) tablet 40 mg    vitamin B-12 (CYANOCOBALAMIN) tablet 1,000 mcg    hydrOXYzine (ATARAX) tablet 25 mg    pantoprazole (PROTONIX) tablet 40 mg    propranolol (INDERAL LA) extended release capsule 80 mg    ketorolac (TORADOL) injection 15 mg       DDX: Rib fracture versus lung contusion versus pneumothorax versus spinal fracture versus musculoskeletal strain    Initial MDM/Plan: 57 y.o. female who presents with extreme right flank pain after fall down 6-7 stairs. We will get pan scan of chest abdomen pelvis as well as head and C-spine as patient is unsure if she fell and is 57years old. Fentanyl for pain control. Basic labs.   Possible admission. DIAGNOSTIC RESULTS / EMERGENCY DEPARTMENT COURSE / MDM     LABS:  Labs Reviewed   CBC WITH AUTO DIFFERENTIAL - Abnormal; Notable for the following components:       Result Value    RDW 19.2 (*)     Seg Neutrophils 82 (*)     Lymphocytes 9 (*)     Absolute Lymph # 0.84 (*)     All other components within normal limits   BASIC METABOLIC PANEL - Abnormal; Notable for the following components:    CREATININE 0.96 (*)     GFR Non- 59 (*)     All other components within normal limits   URINE DRUG SCREEN - Abnormal; Notable for the following components:    Amphetamine Screen, Ur POSITIVE (*)     Oxycodone Screen, Ur POSITIVE (*)     All other components within normal limits   PROTIME-INR   APTT   COVID-19   TROPONIN   BASIC METABOLIC PANEL W/ REFLEX TO MG FOR LOW K         RADIOLOGY:  Xr Shoulder Right (min 2 Views)    Result Date: 1/13/2021  EXAMINATION: TWO XRAY VIEWS OF THE RIGHT HUMERUS; THREE XRAY VIEWS OF THE RIGHT SHOULDER 1/13/2021 4:37 pm COMPARISON: None. HISTORY: ORDERING SYSTEM PROVIDED HISTORY: Fall, pain TECHNOLOGIST PROVIDED HISTORY: Fall, pain Reason for Exam: rt side upper back pain, fall Acuity: Acute Type of Exam: Initial FINDINGS: No acute fracture. No shoulder dislocation. Small humeral head osteophyte. Mild acromioclavicular degenerative changes. No acute findings. Xr Humerus Right (min 2 Views)    Result Date: 1/13/2021  EXAMINATION: TWO XRAY VIEWS OF THE RIGHT HUMERUS; THREE XRAY VIEWS OF THE RIGHT SHOULDER 1/13/2021 4:37 pm COMPARISON: None. HISTORY: ORDERING SYSTEM PROVIDED HISTORY: Fall, pain TECHNOLOGIST PROVIDED HISTORY: Fall, pain Reason for Exam: rt side upper back pain, fall Acuity: Acute Type of Exam: Initial FINDINGS: No acute fracture. No shoulder dislocation. Small humeral head osteophyte. Mild acromioclavicular degenerative changes. No acute findings.      Ct Head Wo Contrast    Result Date: 1/13/2021  EXAMINATION: CT OF THE HEAD WITHOUT CONTRAST  1/13/2021 2:23 pm TECHNIQUE: CT of the head was performed without the administration of intravenous contrast. Dose modulation, iterative reconstruction, and/or weight based adjustment of the mA/kV was utilized to reduce the radiation dose to as low as reasonably achievable. COMPARISON: None. HISTORY: ORDERING SYSTEM PROVIDED HISTORY: Fall down multiple steps, extreme back and flank pain TECHNOLOGIST PROVIDED HISTORY: Fall down multiple steps, extreme back and flank pain FINDINGS: BRAIN/VENTRICLES: There is no acute intracranial hemorrhage, mass effect or midline shift. No abnormal extra-axial fluid collection. The gray-white differentiation is maintained without evidence of an acute infarct. There is no evidence of hydrocephalus. ORBITS: The visualized portion of the orbits demonstrate no acute abnormality. SINUSES: The visualized paranasal sinuses and mastoid air cells demonstrate no acute abnormality. SOFT TISSUES/SKULL:  No acute abnormality of the visualized skull or soft tissues. Vascular calcifications are seen compatible with atherosclerotic disease. No acute intracranial abnormality. Ct Cervical Spine Wo Contrast    Result Date: 1/13/2021  EXAMINATION: CT OF THE CERVICAL SPINE WITHOUT CONTRAST 1/13/2021 2:02 pm TECHNIQUE: CT of the cervical spine was performed without the administration of intravenous contrast. Multiplanar reformatted images are provided for review. Dose modulation, iterative reconstruction, and/or weight based adjustment of the mA/kV was utilized to reduce the radiation dose to as low as reasonably achievable. COMPARISON: None. HISTORY: ORDERING SYSTEM PROVIDED HISTORY: Fall down multiple steps, extreme back and flank pain TECHNOLOGIST PROVIDED HISTORY: Fall down multiple steps, extreme back and flank pain FINDINGS: The cervical spine demonstrates decreased mineralization with normal cervical lordosis.  There is no evidence of fracture there is mild retrolisthesis of C5..  There is loss of disc height with eburnation of the vertebral endplates at the Z9-8, H6-4, C6-7 and [id number]. Evidence of a left central and paracentral disc protrusion at C6-7. There are small marginal osteophytes at multiple levels. The central canal is grossly patent. There is bilateral facet hypertrophy at multiple levels throughout the cervical spine. The pedicles and posterior elements are otherwise intact. The prevertebral and paravertebral soft tissues are unremarkable. The atlanto-dens interval and dens are intact. The visualized lung apices are clear. Vascular calcifications are seen compatible with atherosclerotic disease. Multilevel cervical spondylosis and degenerative disc disease. Mild retrolisthesis of C5 which appears chronic. Evidence of a left central and paracentral disc protrusion at C6-7. Follow-up MRI may be helpful for further evaluation on an outpatient basis. No acute bony abnormalities are noted     Xr Chest Portable    Result Date: 1/13/2021  EXAMINATION: ONE XRAY VIEW OF THE CHEST 1/13/2021 4:37 pm COMPARISON: 01/13/2021 HISTORY: ORDERING SYSTEM PROVIDED HISTORY: back pain; fall TECHNOLOGIST PROVIDED HISTORY: back pain; fall Reason for Exam: rt side upper back pain, fall Acuity: Acute Type of Exam: Initial FINDINGS: The heart is mildly enlarged but unchanged. No consolidation or effusion is seen. The lungs are mildly hyperinflated with mild linear densities scattered along the lung bases which are less prominent. The bones are intact. Stable mild cardiomegaly Mild chronic obstructive lung changes with mild discoid atelectasis or scarring along the lung bases which is less prominent.      Ct Chest Abdomen Pelvis W Contrast    Result Date: 1/13/2021  EXAMINATION: CT OF THE CHEST, ABDOMEN, AND PELVIS WITH CONTRAST; CT OF THE THORACIC SPINE WITHOUT CONTRAST 1/13/2021 5:03 pm TECHNIQUE: CT of the chest, abdomen and pelvis was performed with the administration of intravenous contrast. Multiplanar reformatted images are provided for review. Dose modulation, iterative reconstruction, and/or weight based adjustment of the mA/kV was utilized to reduce the radiation dose to as low as reasonably achievable.; CT of the thoracic spine was performed without the administration of intravenous contrast. Multiplanar reformatted images are provided for review. Dose modulation, iterative reconstruction, and/or weight based adjustment of the mA/kV was utilized to reduce the radiation dose to as low as reasonably achievable. COMPARISON: 02/07/2020 and 04/05/2017 CT studies HISTORY: ORDERING SYSTEM PROVIDED HISTORY: Jodi [place] down approximately 7 steps, extreme right rib pain and thoracic and lumbar pain TECHNOLOGIST PROVIDED HISTORY: [place] down approximately 7 steps, extreme right rib pain and thoracic and lumbar pain ; ORDERING SYSTEM PROVIDED HISTORY: Jodi [place] at approximately 7 steps, extreme pain lumbar thoracic spine TECHNOLOGIST PROVIDED HISTORY: Jodi [place] at approximately 7 steps, extreme pain lumbar thoracic spine FINDINGS: Chest: Mediastinum: Borderline cardiomegaly. The aorta and pulmonary arteries are normal.  No lymphadenopathy. Thyroid is normal.  Moderate sliding-type hiatal hernia. Lungs/pleura: Peribronchial thickening is observed in the bilateral lower lobes. Small pleural effusion on the right posteriorly which may represent hemothorax given trauma and rib fractures. No pneumothorax. There is dense peripheral airspace opacification in the right lower lobe with additional bandlike opacities and atelectasis in the left lower lobe. Soft Tissues/Bones: Mildly displaced fracture of the right posterior 9th ribs with nondisplaced fractures of the posterior 10th and 11th ribs as well. No other skeletal abnormalities in the chest.  Dedicated imaging of the thoracic spine shows no evidence of fracture or subluxation. No spinal canal stenosis or obvious disc herniation.  Abdomen/Pelvis: Organs: The liver, gallbladder, biliary ducts, pancreas and spleen are normal. Kidneys and adrenal glands are normal. GI/Bowel: The stomach, duodenum and small bowel are normal. Nonvisualized appendix. The colon is normal. Pelvis: The bladder is unremarkable. The uterus and adnexa are normal. Peritoneum/Retroperitoneum: The aorta tapers normally. No lymph node enlargement. Bones/Soft Tissues: No significant skeletal abnormalities. 1. Fractures of the right posterior 9th through 11th ribs. Small associated hemothorax with no pneumothorax. 2. Peribronchial thickening and peripheral airspace opacities in both lower lobes. This may represent atelectasis or contusion given history of trauma. Bronchiolitis or developing pneumonitis may be considered clinically. 3. No evidence of traumatic injury in the abdomen or pelvis. 4. The thoracic spine shows no significant finding. Ct Lumbar Spine Trauma Reconstruction    Result Date: 1/13/2021  EXAMINATION: CT OF THE LUMBAR SPINE WITHOUT CONTRAST  1/13/2021 TECHNIQUE: CT of the lumbar spine was reconstructed from the CT abdomen/pelvis without the administration of intravenous contrast. Multiplanar reformatted images are provided for review. Dose modulation, iterative reconstruction, and/or weight based adjustment of the mA/kV was utilized to reduce the radiation dose to as low as reasonably achievable. COMPARISON: X-rays 10/20/2019 HISTORY: ORDERING SYSTEM PROVIDED HISTORY: Angela Gabriel at approximately 7 steps, extreme pain lumbar thoracic spine TECHNOLOGIST PROVIDED HISTORY: Angela Gabriel at approximately 7 steps, extreme pain lumbar thoracic spine FINDINGS: BONES/ALIGNMENT: The lumbar vertebral heights are maintained. Anterolisthesis L4 on L5 measuring 5 mm. This likely due to advanced facet arthropathy. There is uncovering posterior disc and moderate bilateral foraminal narrowing at this level. Otherwise no evidence acute fracture or traumatic malalignment.  DEGENERATIVE CHANGES: Moderate space disease L3-4 moderate disc space disease L4-5 and moderate space disease L5-S1. There is moderate neural foramina at L4-5 L5-S1. Moderate severe degenerate facet arthropathy identified throughout the lumbar spine most pronounced L3 through S1. SOFT TISSUES/RETROPERITONEUM: No paraspinal mass is seen. Moderate disc space disease with advanced facet arthropathy. No evidence acute fracture traumatic malalignment. Ct Thoracic Spine Trauma Reconstruction    Result Date: 1/13/2021  EXAMINATION: CT OF THE CHEST, ABDOMEN, AND PELVIS WITH CONTRAST; CT OF THE THORACIC SPINE WITHOUT CONTRAST 1/13/2021 5:03 pm TECHNIQUE: CT of the chest, abdomen and pelvis was performed with the administration of intravenous contrast. Multiplanar reformatted images are provided for review. Dose modulation, iterative reconstruction, and/or weight based adjustment of the mA/kV was utilized to reduce the radiation dose to as low as reasonably achievable.; CT of the thoracic spine was performed without the administration of intravenous contrast. Multiplanar reformatted images are provided for review. Dose modulation, iterative reconstruction, and/or weight based adjustment of the mA/kV was utilized to reduce the radiation dose to as low as reasonably achievable. COMPARISON: 02/07/2020 and 04/05/2017 CT studies HISTORY: ORDERING SYSTEM PROVIDED HISTORY: Cassandria Song down approximately 7 steps, extreme right rib pain and thoracic and lumbar pain TECHNOLOGIST PROVIDED HISTORY: Cassandria Song down approximately 7 steps, extreme right rib pain and thoracic and lumbar pain ; ORDERING SYSTEM PROVIDED HISTORY: Cassandria Song at approximately 7 steps, extreme pain lumbar thoracic spine TECHNOLOGIST PROVIDED HISTORY: Cassandria Song at approximately 7 steps, extreme pain lumbar thoracic spine FINDINGS: Chest: Mediastinum: Borderline cardiomegaly. The aorta and pulmonary arteries are normal.  No lymphadenopathy. Thyroid is normal.  Moderate sliding-type hiatal hernia. Lungs/pleura: Peribronchial thickening is observed in the bilateral lower lobes. Small pleural effusion on the right posteriorly which may represent hemothorax given trauma and rib fractures. No pneumothorax. There is dense peripheral airspace opacification in the right lower lobe with additional bandlike opacities and atelectasis in the left lower lobe. Soft Tissues/Bones: Mildly displaced fracture of the right posterior 9th ribs with nondisplaced fractures of the posterior 10th and 11th ribs as well. No other skeletal abnormalities in the chest.  Dedicated imaging of the thoracic spine shows no evidence of fracture or subluxation. No spinal canal stenosis or obvious disc herniation. Abdomen/Pelvis: Organs: The liver, gallbladder, biliary ducts, pancreas and spleen are normal. Kidneys and adrenal glands are normal. GI/Bowel: The stomach, duodenum and small bowel are normal.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

## 2021-01-13 NOTE — ED NOTES
Bed: 23  Expected date:   Expected time:   Means of arrival:   Comments:  Rob Adhikari RN  01/13/21 1526

## 2021-01-13 NOTE — ED NOTES
Pt able to stand at bedside with no assistance and transfer to bedside toilet. Pt then transferred back to bed and was able to lay back in bed with no assistance. Pt placed back on pulse ox and given call light. Physician updated.       Adriana White RN  01/13/21 0070

## 2021-01-13 NOTE — ED PROVIDER NOTES
[facility]     Emergency Department     Faculty Attestation    I performed a history and physical examination of the patient and discussed management with the resident. I reviewed the resident´s note and agree with the documented findings and plan of care. Any areas of disagreement are noted on the chart. I was personally present for the key portions of any procedures. I have documented in the chart those procedures where I was not present during the key portions. I have reviewed the emergency nurses triage note. I agree with the chief complaint, past medical history, past surgical history, allergies, medications, social and family history as documented unless otherwise noted below. For Physician Assistant/ Nurse Practitioner cases/documentation I have personally evaluated this patient and have completed at least one if not all key elements of the E/M (history, physical exam, and MDM). Additional findings are as noted. Patient states she fell down 6 or 7 steps, not sure she was unconscious, patient has bruising and point tender in the right posterior ribs, equal breath sounds, heart tones normal, good airway, pupils 3 mm and reactive, abdomen soft and nontender.      Carie Freire MD  01/13/21 1556         EKG Interpretation    Interpreted by emergency department physician    Rhythm: normal sinus   Rate: normal/61  Axis: normal -10  Ectopy: none  Conduction: normal  ST Segments: no acute change  T Waves: no acute change  Q Waves: none    Clinical Impression: Normal EKG    RONADL Jung MD  01/13/21 2030

## 2021-01-13 NOTE — ED TRIAGE NOTES
Pt reports that she fell backwards down 7 stairs after trying to carry the laundry upstairs at her house yesterday. Pt does not know if she had LOC. Pt has bruising to right mid back with pain radiating to right flank. Pt rates the pain a 10 out of 10. Pt is alert and oriented x4.

## 2021-01-13 NOTE — Clinical Note
Patient Class: Inpatient [101]   REQUIRED: Diagnosis: Fall at home, initial encounter [463240]   Estimated Length of Stay: Estimated stay of less than 2 midnights   Admitting Provider: Aida Guerrero [[id number]]   Telemetry Bed Required?: No

## 2021-01-13 NOTE — ED NOTES
Pt resting quietly in bed at this time. Pt placed on 2L/min O2 for O2 sat 90% on RA. Pt denies any needs. Will continue to monitor.       Shayy Nava RN  01/13/21 2251

## 2021-01-13 NOTE — ED NOTES
Pt refusing to move onto CT table without more pain medication,  at bedside     [facility]  01/13/21 7595

## 2021-01-14 PROBLEM — S27.321A CONTUSION OF RIGHT LUNG: Status: ACTIVE | Noted: 2021-01-14

## 2021-01-14 PROBLEM — K92.2 ACUTE UPPER GI BLEED: Status: RESOLVED | Noted: 2020-02-07 | Resolved: 2021-01-14

## 2021-01-14 PROBLEM — R10.13 ABDOMINAL PAIN, EPIGASTRIC: Status: RESOLVED | Noted: 2020-08-25 | Resolved: 2021-01-14

## 2021-01-14 PROBLEM — R10.13 EPIGASTRIC BURNING SENSATION: Status: RESOLVED | Noted: 2020-06-12 | Resolved: 2021-01-14

## 2021-01-14 PROBLEM — J94.2 HEMOTHORAX ON RIGHT: Status: ACTIVE | Noted: 2021-01-14

## 2021-01-14 PROBLEM — M76.60 ACHILLES TENDINITIS: Status: RESOLVED | Noted: 2018-07-05 | Resolved: 2021-01-14

## 2021-01-14 PROBLEM — D62 ACUTE ON CHRONIC BLOOD LOSS ANEMIA: Status: RESOLVED | Noted: 2020-02-08 | Resolved: 2021-01-14

## 2021-01-14 PROBLEM — M25.569 KNEE PAIN: Status: RESOLVED | Noted: 2018-07-05 | Resolved: 2021-01-14

## 2021-01-14 LAB
ANION GAP SERPL CALCULATED.3IONS-SCNC: 9 MMOL/L (ref 9–17)
BUN BLDV-MCNC: 9 MG/DL (ref 8–23)
BUN/CREAT BLD: ABNORMAL (ref 9–20)
CALCIUM SERPL-MCNC: 9 MG/DL (ref 8.6–10.4)
CHLORIDE BLD-SCNC: 106 MMOL/L (ref 98–107)
CO2: 24 MMOL/L (ref 20–31)
CREAT SERPL-MCNC: 1.07 MG/DL (ref 0.5–0.9)
EKG ATRIAL RATE: 61 BPM
EKG P AXIS: 42 DEGREES
EKG P-R INTERVAL: 150 MS
EKG Q-T INTERVAL: 466 MS
EKG QRS DURATION: 82 MS
EKG QTC CALCULATION (BAZETT): 469 MS
EKG R AXIS: -10 DEGREES
EKG T AXIS: 54 DEGREES
EKG VENTRICULAR RATE: 61 BPM
GFR AFRICAN AMERICAN: >60 ML/MIN
GFR NON-AFRICAN AMERICAN: 52 ML/MIN
GFR SERPL CREATININE-BSD FRML MDRD: ABNORMAL ML/MIN/{1.73_M2}
GFR SERPL CREATININE-BSD FRML MDRD: ABNORMAL ML/MIN/{1.73_M2}
GLUCOSE BLD-MCNC: 181 MG/DL (ref 65–105)
GLUCOSE BLD-MCNC: 95 MG/DL (ref 70–99)
POTASSIUM SERPL-SCNC: 3.7 MMOL/L (ref 3.7–5.3)
SODIUM BLD-SCNC: 139 MMOL/L (ref 135–144)

## 2021-01-14 PROCEDURE — 97530 THERAPEUTIC ACTIVITIES: CPT

## 2021-01-14 PROCEDURE — 97535 SELF CARE MNGMENT TRAINING: CPT

## 2021-01-14 PROCEDURE — 2700000000 HC OXYGEN THERAPY PER DAY

## 2021-01-14 PROCEDURE — 6370000000 HC RX 637 (ALT 250 FOR IP): Performed by: STUDENT IN AN ORGANIZED HEALTH CARE EDUCATION/TRAINING PROGRAM

## 2021-01-14 PROCEDURE — 80048 BASIC METABOLIC PNL TOTAL CA: CPT

## 2021-01-14 PROCEDURE — 2580000003 HC RX 258: Performed by: STUDENT IN AN ORGANIZED HEALTH CARE EDUCATION/TRAINING PROGRAM

## 2021-01-14 PROCEDURE — 97166 OT EVAL MOD COMPLEX 45 MIN: CPT

## 2021-01-14 PROCEDURE — 36415 COLL VENOUS BLD VENIPUNCTURE: CPT

## 2021-01-14 PROCEDURE — 2060000002 HC BURN ICU INTERMEDIATE R&B

## 2021-01-14 PROCEDURE — 6360000002 HC RX W HCPCS: Performed by: STUDENT IN AN ORGANIZED HEALTH CARE EDUCATION/TRAINING PROGRAM

## 2021-01-14 PROCEDURE — 97162 PT EVAL MOD COMPLEX 30 MIN: CPT

## 2021-01-14 PROCEDURE — 93010 ELECTROCARDIOGRAM REPORT: CPT | Performed by: INTERNAL MEDICINE

## 2021-01-14 PROCEDURE — 82947 ASSAY GLUCOSE BLOOD QUANT: CPT

## 2021-01-14 PROCEDURE — 92523 SPEECH SOUND LANG COMPREHEN: CPT

## 2021-01-14 PROCEDURE — 94761 N-INVAS EAR/PLS OXIMETRY MLT: CPT

## 2021-01-14 RX ORDER — GABAPENTIN 300 MG/1
300 CAPSULE ORAL EVERY 8 HOURS
Qty: 21 CAPSULE | Refills: 0 | Status: SHIPPED | OUTPATIENT
Start: 2021-01-14 | End: 2021-03-04 | Stop reason: ALTCHOICE

## 2021-01-14 RX ORDER — IBUPROFEN 600 MG/1
600 TABLET ORAL EVERY 8 HOURS PRN
Qty: 21 TABLET | Refills: 0 | Status: SHIPPED | OUTPATIENT
Start: 2021-01-14 | End: 2021-03-04 | Stop reason: SINTOL

## 2021-01-14 RX ORDER — SENNA PLUS 8.6 MG/1
1 TABLET ORAL 2 TIMES DAILY
Qty: 20 TABLET | Refills: 0 | COMMUNITY
Start: 2021-01-14 | End: 2021-01-24

## 2021-01-14 RX ORDER — LIDOCAINE 4 G/G
1 PATCH TOPICAL DAILY
Qty: 10 PATCH | Refills: 0 | Status: SHIPPED | OUTPATIENT
Start: 2021-01-14 | End: 2021-01-24

## 2021-01-14 RX ORDER — METHOCARBAMOL 750 MG/1
750 TABLET, FILM COATED ORAL EVERY 6 HOURS
Qty: 12 TABLET | Refills: 0 | Status: SHIPPED | OUTPATIENT
Start: 2021-01-14 | End: 2021-01-17

## 2021-01-14 RX ORDER — CETIRIZINE HYDROCHLORIDE 10 MG/1
5 TABLET ORAL DAILY
Status: DISCONTINUED | OUTPATIENT
Start: 2021-01-14 | End: 2021-01-15 | Stop reason: HOSPADM

## 2021-01-14 RX ADMIN — ENOXAPARIN SODIUM 30 MG: 30 INJECTION SUBCUTANEOUS at 09:10

## 2021-01-14 RX ADMIN — ACETAMINOPHEN 1000 MG: 500 TABLET ORAL at 05:20

## 2021-01-14 RX ADMIN — Medication 600 MG: at 09:09

## 2021-01-14 RX ADMIN — METHOCARBAMOL TABLETS 750 MG: 500 TABLET, COATED ORAL at 13:54

## 2021-01-14 RX ADMIN — ENOXAPARIN SODIUM 30 MG: 30 INJECTION SUBCUTANEOUS at 21:00

## 2021-01-14 RX ADMIN — ACETAMINOPHEN 1000 MG: 500 TABLET ORAL at 13:55

## 2021-01-14 RX ADMIN — GABAPENTIN 300 MG: 300 CAPSULE ORAL at 12:36

## 2021-01-14 RX ADMIN — KETOROLAC TROMETHAMINE 15 MG: 15 INJECTION, SOLUTION INTRAMUSCULAR; INTRAVENOUS at 01:31

## 2021-01-14 RX ADMIN — BISACODYL 5 MG: 5 TABLET, COATED ORAL at 09:09

## 2021-01-14 RX ADMIN — METHOCARBAMOL TABLETS 750 MG: 500 TABLET, COATED ORAL at 09:10

## 2021-01-14 RX ADMIN — KETOROLAC TROMETHAMINE 15 MG: 15 INJECTION, SOLUTION INTRAMUSCULAR; INTRAVENOUS at 19:56

## 2021-01-14 RX ADMIN — CETIRIZINE HYDROCHLORIDE 5 MG: 10 TABLET ORAL at 21:33

## 2021-01-14 RX ADMIN — DEXTROAMPHETAMINE SACCHARATE, AMPHETAMINE ASPARTATE MONOHYDRATE, DEXTROAMPHETAMINE SULFATE, AND AMPHETAMINE SULFATE 30 MG: 2.5; 2.5; 2.5; 2.5 CAPSULE, EXTENDED RELEASE ORAL at 10:44

## 2021-01-14 RX ADMIN — OXYCODONE HYDROCHLORIDE 5 MG: 5 TABLET ORAL at 05:21

## 2021-01-14 RX ADMIN — PROPRANOLOL HYDROCHLORIDE 80 MG: 80 CAPSULE, EXTENDED RELEASE ORAL at 09:09

## 2021-01-14 RX ADMIN — CITALOPRAM 40 MG: 20 TABLET, FILM COATED ORAL at 09:09

## 2021-01-14 RX ADMIN — KETOROLAC TROMETHAMINE 15 MG: 15 INJECTION, SOLUTION INTRAMUSCULAR; INTRAVENOUS at 06:08

## 2021-01-14 RX ADMIN — OXYCODONE HYDROCHLORIDE 5 MG: 5 TABLET ORAL at 23:15

## 2021-01-14 RX ADMIN — OXYCODONE HYDROCHLORIDE 5 MG: 5 TABLET ORAL at 15:58

## 2021-01-14 RX ADMIN — Medication 10 ML: at 19:58

## 2021-01-14 RX ADMIN — Medication 10 ML: at 09:19

## 2021-01-14 RX ADMIN — HYDROXYZINE HYDROCHLORIDE 25 MG: 25 TABLET ORAL at 21:00

## 2021-01-14 RX ADMIN — BUPROPION HYDROCHLORIDE 450 MG: 150 TABLET, EXTENDED RELEASE ORAL at 09:10

## 2021-01-14 RX ADMIN — DEXTROAMPHETAMINE SACCHARATE, AMPHETAMINE ASPARTATE MONOHYDRATE, DEXTROAMPHETAMINE SULFATE, AND AMPHETAMINE SULFATE 30 MG: 2.5; 2.5; 2.5; 2.5 CAPSULE, EXTENDED RELEASE ORAL at 15:57

## 2021-01-14 RX ADMIN — GABAPENTIN 300 MG: 300 CAPSULE ORAL at 19:57

## 2021-01-14 RX ADMIN — METHOCARBAMOL TABLETS 750 MG: 500 TABLET, COATED ORAL at 19:56

## 2021-01-14 RX ADMIN — KETOROLAC TROMETHAMINE 15 MG: 15 INJECTION, SOLUTION INTRAMUSCULAR; INTRAVENOUS at 12:36

## 2021-01-14 RX ADMIN — ACETAMINOPHEN 1000 MG: 500 TABLET ORAL at 21:00

## 2021-01-14 RX ADMIN — METHOCARBAMOL TABLETS 750 MG: 500 TABLET, COATED ORAL at 02:35

## 2021-01-14 RX ADMIN — Medication 1000 MCG: at 09:09

## 2021-01-14 RX ADMIN — GABAPENTIN 300 MG: 300 CAPSULE ORAL at 05:00

## 2021-01-14 RX ADMIN — PANTOPRAZOLE SODIUM 40 MG: 40 TABLET, DELAYED RELEASE ORAL at 06:08

## 2021-01-14 ASSESSMENT — PAIN DESCRIPTION - FREQUENCY: FREQUENCY: CONTINUOUS

## 2021-01-14 ASSESSMENT — PAIN - FUNCTIONAL ASSESSMENT
PAIN_FUNCTIONAL_ASSESSMENT: 0-10
PAIN_FUNCTIONAL_ASSESSMENT: PREVENTS OR INTERFERES SOME ACTIVE ACTIVITIES AND ADLS

## 2021-01-14 ASSESSMENT — PAIN SCALES - GENERAL
PAINLEVEL_OUTOF10: 5
PAINLEVEL_OUTOF10: 10
PAINLEVEL_OUTOF10: 4
PAINLEVEL_OUTOF10: 9
PAINLEVEL_OUTOF10: 4
PAINLEVEL_OUTOF10: 4
PAINLEVEL_OUTOF10: 9

## 2021-01-14 ASSESSMENT — PAIN DESCRIPTION - ORIENTATION: ORIENTATION: RIGHT

## 2021-01-14 ASSESSMENT — PAIN DESCRIPTION - PAIN TYPE
TYPE: ACUTE PAIN
TYPE: ACUTE PAIN;CHRONIC PAIN

## 2021-01-14 ASSESSMENT — PAIN DESCRIPTION - ONSET
ONSET: ON-GOING
ONSET: ON-GOING

## 2021-01-14 ASSESSMENT — PAIN DESCRIPTION - LOCATION
LOCATION: BACK;RIB CAGE
LOCATION: RIB CAGE
LOCATION: RIB CAGE

## 2021-01-14 ASSESSMENT — PAIN DESCRIPTION - DESCRIPTORS: DESCRIPTORS: SHARP;SHOOTING

## 2021-01-14 ASSESSMENT — PAIN DESCRIPTION - PROGRESSION: CLINICAL_PROGRESSION: GRADUALLY WORSENING

## 2021-01-14 NOTE — PLAN OF CARE
Problem: Falls - Risk of:  Goal: Will remain free from falls  Description: Will remain free from falls  1/14/2021 1108 by Abeba Aleman RN  Outcome: Completed  1/14/2021 1017 by Abeba Aleman RN  Outcome: Ongoing  Goal: Absence of physical injury  Description: Absence of physical injury  1/14/2021 1108 by Abeba Aleman RN  Outcome: Completed  1/14/2021 1017 by Abeba Aleman RN  Outcome: Ongoing     Problem: Pain:  Goal: Pain level will decrease  Description: Pain level will decrease  1/14/2021 1108 by Abeba Aleman RN  Outcome: Completed  1/14/2021 1017 by Abeba Aleman RN  Outcome: Ongoing  Goal: Control of acute pain  Description: Control of acute pain  1/14/2021 1108 by Abeba Aleman RN  Outcome: Completed  1/14/2021 1017 by Abeba Aleman RN  Outcome: Ongoing  Goal: Control of chronic pain  Description: Control of chronic pain  1/14/2021 1108 by Abeba Aleman RN  Outcome: Completed  1/14/2021 1017 by Abeba Aleman RN  Outcome: Ongoing

## 2021-01-14 NOTE — PROGRESS NOTES
Physician Progress Note      Desean Monreal  CSN #:                  [id number]  :                       1960  ADMIT DATE:       2021 3:23 PM  [address] [place] DATE:  RESPONDING  PROVIDER #:        Valerie Gipson MD          QUERY TEXT:    Patient admitted with rib fractures -11 , noted to have CT chest with   associated small hemothorax , but no pneumothorax. If possible, please   document in progress notes and discharge summary if you are evaluating and/or   treating any of the following: The medical record reflects the following:  Risk Factors: fall, rib fractures, small hemothorax  Clinical Indicators: CT chest -  -  Small associated hemothorax with no   pneumothorax. fracture of the right posterior . UDS- positive   amphetamines and oxycodone  Treatment: f/u, IS to be > 1000, monitor    thank you, Elinor Roderick, [facility] - office - [phone]  Options provided:  -- hemothorax  -- associated small hemothorax not clinically significant  -- Other - I will add my own diagnosis  -- Disagree - Not applicable / Not valid  -- Disagree - Clinically unable to determine / Unknown  -- Refer to Clinical Documentation Reviewer    PROVIDER RESPONSE TEXT:    Associated small right hemothorax is not clinically significant.     Query created by: Marco Antonio Mckeon on 2021 9:07 AM      Electronically signed by:  Valerie Gipson MD 2021 11:05 AM

## 2021-01-14 NOTE — PROGRESS NOTES
Speech Language Pathology  Facility/Department: [address] BURN UNIT  Initial Speech/Language/Cognitive Assessment    NAME: Fauzia Gomez  : 1960   MRN: [id number]  ADMISSION DATE: 2021  ADMITTING DIAGNOSIS: has Adult ADHD; Narcolepsy; Moderate persistent asthma without complication; History of DVT (deep vein thrombosis); Hiatal hernia; Dysphagia; Cervical radiculopathy; Ganglion cyst; Chronic pain of left ankle; Acquired trigger finger; Osteoarthritis of knee; Osteoarthritis of wrist; Pes anserinus bursitis; Plantar fasciitis; Flexion contracture of joint of foot, left; Essential hypertension; Chronic anticoagulation; Iron deficiency anemia due to chronic blood loss; GERD (gastroesophageal reflux disease); Absolute anemia; Anemia; Anxiety; Vitamin B12 deficiency; Iron malabsorption; Fall at home, initial encounter; Closed fracture of multiple ribs of right side; Hemothorax on right; and Contusion of right lung on their problem list.    Date of Eval: 2021   Evaluating Therapist: Tricia Russell        Primary Complaint:     Fauzia Gomez is a 57 y.o. female that presented to the Emergency Department following fall down 7 steps while carrying her laundry yesterday. Patient admits to hitting her head. Unknown loss of consciousness. Her boyfriend found her after an unknown time, but she believes it was immediately. Pain was increasing over the day so she came to the Emergency Department. CT imaging revealed 9-11 posterior right rib fractures. IS 900ml in ED. Patient has history of bipolar, HTN, and GERD. She is undergoing evaluation of esophagitis and has manometry scheduled for later this month. Previous DVT in , no longer on anticoagulation    Pain:  Pain Assessment  Pain Assessment: 0-10  Pain Level: 9    Assessment:  Pt presents with mild cognitive deficits characterized by impaired delayed recall. No other cognitive deficits noted at this time.    Pt. Presents with no dysarthria, no O/M deficits at this time. ST to follow up and provide treatment to address noted deficits. Education provided. Recommendations:  Requires SLP Intervention: Yes  Duration/Frequency of Treatment: 3-5x per week  D/C Recommendations: Further therapy recommended at discharge. Plan:   Goals:  Short-term Goals  Goal 1: Pt will recall 3-5 units with distractions with 90% accuracy. Goal 2: Pt will utilize memory compensatory strategies to aid in recall   Patient/family involved in developing goals and treatment plan: yes    Subjective:  General  Chart Reviewed: Yes  Family / Caregiver Present: No  Social/Functional History  Lives With: Significant other  Active : Yes  Mode of Transportation: Car  Occupation: On disability  Vision  Vision: Within Functional Limits  Hearing  Hearing: Within functional limits           Objective:     Oral/Motor  Oral Motor: Within functional limits    Auditory Comprehension  Comprehension: Within Functional Limits         Expression  Primary Mode of Expression: Verbal              Motor Speech  Motor Speech: Within Functional Limits         Cognition:      Orientation  Overall Orientation Status: Within Functional Limits  Attention  Attention: Within Functional Limits  Memory  Memory: Exceptions to [facility]  Short-term Memory: Mild(2/3)  Problem Solving  Problem Solving: Within Functional Limits  Safety/Judgement  Safety/Judgement: Within Functional Limits   Word Associations:  Within Functional Limits  Verbal Sequencing: Within Functional Limits  Word Generation: Within Functional Limits               Prognosis:  Speech Therapy Prognosis  Prognosis: Good  Individuals consulted  Consulted and agree with results and recommendations: Patient    Education:  Patient Education: yes          Therapy Time:   Individual Concurrent Group Co-treatment   Time In 31-70-28-28         Time Out 0940         Minutes 15                 Completed by: Gerry Waters  Clinician    Cosigned By: Susu Phelan A.CCC/SLP  1/14/2021 11:51 AM

## 2021-01-14 NOTE — PLAN OF CARE
ATELECTASIS     [x]  PREVENT ATELECTASIS  [x]   ASSESS BREATH SOUNDS  []   IMPLEMENT HYPERINFLATION PROTOCOL  [x]  INCENTIVE SPIROMETRY INSTRUCTION  [x]   PATIENT EDUCATION AS NEEDED    PROVIDE ADEQUATE OXYGENATION WITH ACCEPTABLE SP02/ABG'S    [x]  IDENTIFY APPROPRIATE OXYGEN THERAPY  [x]   MONITOR SP02/ABG'S AS NEEDED   [x]   PATIENT EDUCATION AS NEEDED

## 2021-01-14 NOTE — ED NOTES
Pt was found standing and walking in room without supervision. EKG done. Pt placed on 2L nasal canula. Pt instructed to use call light for assistance with ambulating.  Pt placed back on BP and O2 monitor     Bibiana Zimmer, [address] [place] [address]  01/13/21 0836

## 2021-01-14 NOTE — PROGRESS NOTES
Physical Therapy    Facility/Department: [address] BURN UNIT  Initial Assessment    NAME: Nazario Benitez  : 1960  MRN: [id number]    Chief Complaint   Patient presents with   Vitor James Fall       Date of Service: 2021    Discharge Recommendations:    Pt may benefit from further therapy at discharge, will CTA    PT Equipment Recommendations  Other: CTA    Assessment   Body structures, Functions, Activity limitations: Decreased functional mobility ; Increased pain  Assessment: PT grossly CGA to SBA, pain R ribs limiting, amb 3'x2 no AD CGA. Pt would benefit from continued acute PT to address deficits. Prognosis: Good  Decision Making: Medium Complexity  PT Education: Plan of Care;PT Role;General Safety  REQUIRES PT FOLLOW UP: Yes  Activity Tolerance  Activity Tolerance: Patient limited by pain       Patient Diagnosis(es): The primary encounter diagnosis was Closed fracture of multiple ribs of right side, initial encounter. A diagnosis of Hemothorax was also pertinent to this visit. has a past medical history of Acid reflux, Adult ADHD, Asthma, Bipolar 1 disorder ([address] Broken foot, Chest pain of uncertain etiology, Depression, DVT of leg (deep venous thrombosis) ([address] Fracture, Helicobacter pylori (H. pylori) infection, Hematuria, Hiatal hernia, History of blood transfusion, History of DVT (deep vein thrombosis), Hx of blood clots, Hypertension, Internal hemorrhoids, Narcolepsy, and Osteoporosis. Patient Currently in Pain: Yes  Pre Treatment Pain Screening  Intervention List: Patient able to continue with treatment    Orientation  Orientation  Overall Orientation Status: Within Functional Limits  Social/Functional History  Social/Functional History  Lives With: Significant other  Type of Home: House  Home Layout: Two level, Able to Live on Main level with bedroom/bathroom  Home Access: Stairs to enter without rails  Entrance Stairs - Number of Steps: 4(Uses the mailbox on R for leverage)  Bathroom Shower/Tub: Walk-in shower, Tub/Shower unit(Uses walk-in shower mainly. Tub/shower upstairs.)  Bathroom Toilet: Standard  Bathroom Equipment: Toilet raiser(Pt reports she sometimes uses a stool in shower. Has shower chair from her moms house she can use PRN.)  Bathroom Accessibility: Accessible  Home Equipment: Wheelchair-manual, Cane(Cane and WC from mothers house)  ADL Assistance: Independent  Homemaking Assistance: Independent(shares with boyfriend)  Homemaking Responsibilities: Yes  Ambulation Assistance: Independent(Pt wall walks sometimes)  Transfer Assistance: Independent  Active : Yes  Mode of Transportation: Car  Occupation: On disability  Leisure & Hobbies: walk dog, make candles, sudoku  Additional Comments: amb pushing cart at grocery  Cognition        Objective          AROM RLE (degrees)  RLE AROM: WFL  AROM LLE (degrees)  LLE AROM : WFL  AROM RUE (degrees)  RUE AROM : WFL  RUE General AROM: limited overhead d/t c/o pain in ribs  AROM LUE (degrees)  LUE AROM : WFL  Strength RLE  Strength RLE: WNL  Strength LLE  Strength LLE: WNL  Strength RUE  Comment: antigravity, pain limiting  Strength LUE  Comment: antigravity     Sensation  Overall Sensation Status: Impaired(Pt c/o numbness in L leg and arm)  Bed mobility  Comment: EOb upon arrival and exit.  pt states too painful to lay down  Transfers  Sit to Stand: Contact guard assistance  Stand to sit: Stand by assistance  Ambulation  Ambulation?: Yes

## 2021-01-14 NOTE — ED NOTES
Pt up to [facility]  Urine sample obtained, labeled, sent to lab  Pt back in bed with monitor on  No further needs at this time     Sophia Townsend RN  01/13/21 0378

## 2021-01-14 NOTE — PROGRESS NOTES
Occupational Therapy   Occupational Therapy Initial Assessment  Date: 2021   Patient Name: Luis Alberto Dejesus  MRN: [id number]     : 1960    Date of Service: 2021    Discharge Recommendations:  Patient would benefit from continued therapy after discharge, Continue to assess pending progress  OT Equipment Recommendations  Equipment Needed: No    Assessment   Performance deficits / Impairments: Decreased functional mobility ; Decreased endurance;Decreased high-level IADLs;Decreased balance;Decreased ADL status; Decreased ROM; Decreased strength  Assessment: Pt completed functional transfers with CGA-Min A, functional mobility with CGA and ADL task sitting bedside wtih Min A. Pt limited by significant pain. No AD required. Pt is expected to benefit from skilled OT services to maximize safety and increase independence in ADLs/IADLs and functinoal mobility tasks. Good support at home to assist PRN upon discharge. Prognosis: Good  Decision Making: Medium Complexity  Patient Education: OT role, OT poc, activity promotion, transfer training, safety during mobility-good return  REQUIRES OT FOLLOW UP: Yes  Activity Tolerance  Activity Tolerance: Patient Tolerated treatment well  Safety Devices  Safety Devices in place: Yes  Type of devices: All fall risk precautions in place;Call light within reach;Gait belt;Left in bed;Nurse notified  Restraints  Initially in place: No           Patient Diagnosis(es): The primary encounter diagnosis was Closed fracture of multiple ribs of right side, initial encounter. A diagnosis of Hemothorax was also pertinent to this visit. has a past medical history of Acid reflux, Adult ADHD, Asthma, Bipolar 1 disorder ([facility] [address] Broken foot, Chest pain of uncertain etiology, Depression, DVT of leg (deep venous thrombosis) ([facility] [address] Fracture, Helicobacter pylori (H. pylori) infection, Hematuria, Hiatal hernia, History of blood transfusion, History of DVT (deep vein thrombosis), Hx of blood clots, Hypertension, Internal hemorrhoids, Narcolepsy, and Osteoporosis. has a past surgical history that includes  section; Nose surgery; Knee arthroscopy (Left); shoulder surgery (Left); Wrist surgery; Upper gastrointestinal endoscopy (2013); Colonoscopy (2013); Upper gastrointestinal endoscopy (2016); Foot surgery (Left, 2015); Breast enhancement surgery (Bilateral); Arm Surgery (Right); knee surgery (Right); vaginal dilatation; Ankle surgery (Left, 2018); pr office/outpt visit,procedure only (Left, 2018); Foot Tendon Surgery (Left, 2019); ligament repair (Left, 2019); Upper gastrointestinal endoscopy (N/A, 2/10/2020); Colonoscopy (N/A, 2/10/2020); Endoscopy, colon, diagnostic; Colonoscopy (N/A, 2020); esophageal motility study (N/A, 2020); esophageal motility study (9/10/2020); and esophageal motility study (N/A, 2020). Restrictions  Restrictions/Precautions  Restrictions/Precautions: Seizure  Required Braces or Orthoses?: No  Position Activity Restriction  Other position/activity restrictions: Up with assistance; CTLS cleared per Isael Bodily, DO, through [facility]    Subjective   General  Patient assessed for rehabilitation services?: Yes  Family / Caregiver Present: No  General Comment  Comments: RN ok'd for therapy visit this date. Pt agreeable to session, pleasent/cooperative throughout.   Patient Currently in Pain: Yes  Pain Assessment  Pain Assessment: 0-10  Pain Level: 9  Pain Type: Acute pain  Pain Location: Rib cage  Vital Signs  Level of Consciousness: Alert (0) Patient Currently in Pain: Yes     Social/Functional History  Social/Functional History  Lives With: Significant other  Type of Home: House  Home Layout: Two level, Able to Live on Main level with bedroom/bathroom  Home Access: Stairs to enter without rails  Entrance Stairs - Number of Steps: 4(Uses the mailbox on R for leverage)  Bathroom Shower/Tub: Walk-in shower, Tub/Shower unit(Uses walk-in shower mainly. Tub/shower upstairs.)  Bathroom Toilet: Standard  Bathroom Equipment: Toilet raiser(Pt reports she sometimes uses a stool in shower. Has shower chair from her moms house she can use PRN.)  Bathroom Accessibility: Accessible  Home Equipment: Wheelchair-manual, Cane(Cane and WC from mothers house)  ADL Assistance: Independent  Homemaking Assistance: Independent(shares with boyfriend)  Homemaking Responsibilities: Yes  Ambulation Assistance: Independent(Pt wall walks sometimes)  Transfer Assistance: Independent  Active : Yes  Mode of Transportation: Car  Occupation: On disability  Leisure & Hobbies: walk dog, make candles, sudoku  Additional Comments: amb pushing cart at grocery       Objective   Vision: Impaired  Vision Exceptions: Wears glasses for reading;Wears glasses for distance  Hearing: Within functional limits    Orientation  Overall Orientation Status: Within Functional Limits     Balance  Sitting Balance: Stand by assistance(Pt sitting EOB and in bedside chair to brush  hair ~10 minutes)  Standing Balance: Contact guard assistance    Standing Balance  Time: ~3 minutes total  Activity: static standing, functional mobility bed<>bedside chair    Functional Mobility  Functional - Mobility Device: No device  Assist Level: Contact guard assistance  Functional Mobility Comments: Pt reports feeling slightly off balance. Pt completed mobility with no AD. Slightly unsteady, no LOB. Pt experiencing pain. ADL  Feeding: Setup; Independent  Grooming: Minimal assistance;Setup; Increased time to complete Current Treatment Recommendations: Safety Education & Training, Patient/Caregiver Education & Training, Self-Care / ADL, Functional Mobility Training, Equipment Evaluation, Education, & procurement, Home Management Training, Endurance Training, Balance Training      AM-PAC Score        AM-PAC Inpatient Daily Activity Raw Score: 19 (01/14/21 1152)  AM-PAC Inpatient ADL T-Scale Score : 40.22 (01/14/21 1152)  ADL Inpatient CMS 0-100% Score: 42.8 (01/14/21 1152)  ADL Inpatient CMS G-Code Modifier : CK (01/14/21 1152)    Goals  Short term goals  Time Frame for Short term goals: By discharge, pt will:  Short term goal 1: Demonstrate functional transfers and functional mobility independently  Short term goal 2: Demonstrate ADLs with Mod I, setup provided, use of DME +adaptive techniques PRN  Short term goal 3: Demonstrate +8 minutes of standing balance during functional task with SBA  Short term goal 4: Demonstrate +15 minutes of standing tolerance to increase engagement in ADLs/IADLs with supervision       Therapy Time   Individual Concurrent Group Co-treatment   Time In 0944         Time Out 1014         Minutes 30         Timed Code Treatment Minutes: 10 Minutes       Jose Luis Wooten OTR/L

## 2021-01-14 NOTE — PROGRESS NOTES
PROGRESS NOTE  Patient awake, alert, answering all questions upon my examination. Currently on 5 L NC, eating, pulling IS of 1500. Rib score of 6. Has been urinating, has not had a bowel movement. Current pain at 5/10, mostly in right ribs. Patient has been stable overnight, VSS, no acute distress.          PATIENT NAME: [id number] [facility] RECORD NO. [id number]  DATE: 1/14/2021  SURGEON: Dr. Meagan Tafoya: Omayra Paredes DO    HD: # 1    ASSESSMENT    Patient Active Problem List   Diagnosis    Adult ADHD    Narcolepsy    Moderate persistent asthma without complication    History of DVT (deep vein thrombosis)    DVT of leg (deep venous thrombosis) ([facility]    Closed displaced fracture of body of calcaneus    Hiatal hernia    Internal hemorrhoids    Helicobacter pylori (H. 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    Achilles tendinitis    Acquired trigger finger    Knee pain    Osteoarthritis of knee    Osteoarthritis of wrist    Pes anserinus bursitis    Plantar fasciitis    Sprain of shoulder and upper arm    Sprain of wrist    Other synovitis and tenosynovitis, unspecified hand    Flexion contracture of joint of foot, left    Peroneal tendon injury, left, subsequent encounter    Acute upper GI bleed    Acute on chronic blood loss anemia    Essential hypertension    Chronic anticoagulation    Iron deficiency anemia due to chronic blood loss    Gastrointestinal hemorrhage    GERD (gastroesophageal reflux disease)    Absolute anemia    Epigastric burning sensation    Abdominal pain, epigastric    Anemia    Anxiety    Vitamin B12 deficiency    Iron malabsorption    Fall at home, initial encounter    Closed fracture of multiple ribs of right side       MEDICAL DECISION MAKING AND PLAN    Right Posterior Rib Fractures   -Continue encouraging IS   -5 L NC, titrate as able per RT   -Multimodal pain regimen  -general diet  -DVT prophylaxis: Lovenox         Chief Complaint: \"Fall\"    SUBJECTIVE    Madi Thomson is has improved and is unchanged since yesterday. She is feeling better this am and has been able to tolerate oral intake. She has been urinating, has not had a bowel movement, is passing flatus. Patient is able to tolerate IS and is pulling 1500. Patient Rib score of 6 this am.       OBJECTIVE  VITALS: Temp: Temp: 97.6 °F (36.4 °C)Temp  Av °F (36.7 °C)  Min: 97.6 °F (36.4 °C)  Max: 98.9 °F (40.9 °C) BP Systolic (26JUP), JSL:704 , Min:94 , SAC:756   Diastolic (96MDX), AQQ:46, Min:62, Max:122   Pulse Pulse  Av.1  Min: 45  Max: 63 Resp Resp  Av.2  Min: 11  Max: 22 Pulse ox SpO2  Av.3 %  Min: 91 %  Max: 98 %  GENERAL: alert, no distress  NEURO: awake, alert, answering all questions appropriately, moving all extremities   HEENT: NC oxygen in place, no trauma noted to the nose or face   LUNGS: clear to ausculation, without wheezes, rales or rhonci  HEART: normal rate and regular rhythm  ABDOMEN: soft, non-tender, non-distended, and no guarding or peritoneal signs present  EXTREMITY: no cyanosis, clubbing or edema    I/O last 3 completed shifts: In: 1000 [IV Piggyback:1000]  Out: -     Drain/tube output:   In: 1000   Out: -     LAB:  CBC:   Recent Labs     21  1556   WBC 8.9   HGB 13.2   HCT 42.1   MCV 92.1        BMP:   Recent Labs     21  1556      K 4.4      CO2 25   BUN 9   CREATININE 0.96*   GLUCOSE 98     COAGS:   Recent Labs     21  1556   APTT 23.4   INR 0.9       RADIOLOGY:  CXR: No new imaging to review      Megan Nicholson DO  21, 8:57 AM         Attending Note      I have reviewed the above TECSS note(s) and I either performed the key elements of the medical history and physical exam or was present with the resident when the key elements of the medical history and physical exam were performed. I have discussed the findings, established the care plan and recommendations with Resident, NEDA RN, bedside nurse.     Chel Smith MD  1/14/2021  1:52 PM

## 2021-01-14 NOTE — PROGRESS NOTES
Trauma Tertiary Survey    Admit Date: 1/13/2021  Hospital day 0    Fall distance 6 stairs       Past Medical History:   Diagnosis Date    Acid reflux     Adult ADHD 8/1/2012    Asthma 8/1/2012    Bipolar 1 disorder ([facility]     Broken foot 2014    Left foot    Chest pain of uncertain etiology 78/24/2450    Last episode was in 2013 (Written 02/12/2019)    Depression     Under control per pt. on 6/15/18    DVT of leg (deep venous thrombosis) ([facility] 8/22/2014    Fracture     right foot / pt denies surgical intervention    Helicobacter pylori (H. pylori) infection 2013    per EGD    Hematuria 04/2018    Has a follow-up with Urologist 6/20    Hiatal hernia     History of blood transfusion     History of DVT (deep vein thrombosis) 8/1/2012    Hx of blood clots     Hypertension     Internal hemorrhoids     Narcolepsy 8/1/2012    Osteoporosis        Scheduled Meds:   lidocaine  2 patch Transdermal Daily    gabapentin  300 mg Oral Q8H    methocarbamol  750 mg Oral Q6H    ketorolac  15 mg Intravenous Q6H     Continuous Infusions:  PRN Meds:    Subjective:     Patient admitted for rib fractures w/rib score of 10 for smoking, age, small hemothorax, pain and weak cough. Pain currently well controlled, rates her pain 5/10. Denies any other areas of acute pain. IS 2000cc. Objective:     Patient Vitals for the past 8 hrs:   BP Temp Temp src Pulse Resp SpO2 Height Weight   01/13/21 1947 (!) 141/84 -- -- -- -- 96 % -- --   01/13/21 1801 (!) 126/102 -- -- -- -- 96 % -- --   01/13/21 1759 -- -- -- -- -- 97 % -- --   01/13/21 1750 -- -- -- -- -- 91 % -- --   01/13/21 1525 (!) 173/122 98.9 °F (37.2 °C) Oral 63 22 96 % 5' (1.524 m) 150 lb (68 kg)       No intake/output data recorded. I/O this shift:  In: 1000 [IV Piggyback:1000]  Out: -     Radiology:  Xr Shoulder Right (min 2 Views)    Result Date: 1/13/2021  No acute findings. Xr Humerus Right (min 2 Views)    Result Date: 1/13/2021  No acute findings. Ct Head Wo Contrast    Result Date: 1/13/2021  No acute intracranial abnormality. Ct Cervical Spine Wo Contrast    Result Date: 1/13/2021  Multilevel cervical spondylosis and degenerative disc disease. Mild retrolisthesis of C5 which appears chronic. Evidence of a left central and paracentral disc protrusion at C6-7. Follow-up MRI may be helpful for further evaluation on an outpatient basis. No acute bony abnormalities are noted     Xr Chest Portable    Result Date: 1/13/2021  Stable mild cardiomegaly Mild chronic obstructive lung changes with mild discoid atelectasis or scarring along the lung bases which is less prominent. Ct Chest Abdomen Pelvis W Contrast    Result Date: 1/13/2021  1. Fractures of the right posterior 9th through 11th ribs. Small associated hemothorax with no pneumothorax. 2. Peribronchial thickening and peripheral airspace opacities in both lower lobes. This may represent atelectasis or contusion given history of trauma. Bronchiolitis or developing pneumonitis may be considered clinically. 3. No evidence of traumatic injury in the abdomen or pelvis. 4. The thoracic spine shows no significant finding. Ct Lumbar Spine Trauma Reconstruction    Result Date: 1/13/2021  Moderate disc space disease with advanced facet arthropathy. No evidence acute fracture traumatic malalignment. Ct Thoracic Spine Trauma Reconstruction    Result Date: 1/13/2021  1. Fractures of the right posterior 9th through 11th ribs. Small associated hemothorax with no pneumothorax. 2. Peribronchial thickening and peripheral airspace opacities in both lower lobes. This may represent atelectasis or contusion given history of trauma. Bronchiolitis or developing pneumonitis may be considered clinically. 3. No evidence of traumatic injury in the abdomen or pelvis. 4. The thoracic spine shows no significant finding.       PHYSICAL EXAM:   GCS:  4 - Opens eyes on own   6 - Follows simple motor commands  5 - Alert hip absent absent normal   Right knee absent absent normal   Left knee absent absent normal   Right ankle absent absent normal   Left ankle absent absent normal   Right foot absent absent normal   Left foot absent absent normal           CONSULTS: None    PROCEDURES: None    INJURIES:        Patient Active Problem List   Diagnosis    Adult ADHD    Narcolepsy    Moderate persistent asthma without complication    History of DVT (deep vein thrombosis)    [facility]    Closed displaced fracture of body of calcaneus    Hiatal hernia    Internal hemorrhoids    Helicobacter pylori (H. 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    Achilles tendinitis    Acquired trigger finger    Knee pain    Osteoarthritis of knee    Osteoarthritis of wrist    Pes anserinus bursitis    Plantar fasciitis    Sprain of shoulder and upper arm    Sprain of wrist    Other synovitis and tenosynovitis, unspecified hand    Flexion contracture of joint of foot, left    Peroneal tendon injury, left, subsequent encounter    Acute upper GI bleed    Acute on chronic blood loss anemia    Essential hypertension    Chronic anticoagulation    Iron deficiency anemia due to chronic blood loss    Gastrointestinal hemorrhage    GERD (gastroesophageal reflux disease)    Absolute anemia    Epigastric burning sensation    Abdominal pain, epigastric    Anemia    Anxiety    Vitamin B12 deficiency    Iron malabsorption    Fall at home, initial encounter         Assessment/Plan:    R rib 9-11 fx  o Rib score of 10  o admitted to SDU  o Encourage IS  o mmPT     Available imaging reviewed, no other acute injuries identified     No new images ordered based on tertiary examination     Incidental findings discussed with patient:   o Moderate hiatal hernia, patient already aware and managing GERD sx at home w/PPI. Agrees to f/u w/PCP if sx worsen.

## 2021-01-15 VITALS
SYSTOLIC BLOOD PRESSURE: 105 MMHG | HEART RATE: 76 BPM | WEIGHT: 150 LBS | OXYGEN SATURATION: 94 % | RESPIRATION RATE: 17 BRPM | BODY MASS INDEX: 29.45 KG/M2 | DIASTOLIC BLOOD PRESSURE: 76 MMHG | HEIGHT: 60 IN | TEMPERATURE: 97.7 F

## 2021-01-15 PROCEDURE — 97129 THER IVNTJ 1ST 15 MIN: CPT

## 2021-01-15 PROCEDURE — 97110 THERAPEUTIC EXERCISES: CPT

## 2021-01-15 PROCEDURE — 6360000002 HC RX W HCPCS: Performed by: STUDENT IN AN ORGANIZED HEALTH CARE EDUCATION/TRAINING PROGRAM

## 2021-01-15 PROCEDURE — 97116 GAIT TRAINING THERAPY: CPT

## 2021-01-15 PROCEDURE — 6370000000 HC RX 637 (ALT 250 FOR IP): Performed by: STUDENT IN AN ORGANIZED HEALTH CARE EDUCATION/TRAINING PROGRAM

## 2021-01-15 PROCEDURE — 2580000003 HC RX 258: Performed by: STUDENT IN AN ORGANIZED HEALTH CARE EDUCATION/TRAINING PROGRAM

## 2021-01-15 PROCEDURE — 97130 THER IVNTJ EA ADDL 15 MIN: CPT

## 2021-01-15 RX ADMIN — OXYCODONE HYDROCHLORIDE 5 MG: 5 TABLET ORAL at 06:10

## 2021-01-15 RX ADMIN — ENOXAPARIN SODIUM 30 MG: 30 INJECTION SUBCUTANEOUS at 08:19

## 2021-01-15 RX ADMIN — KETOROLAC TROMETHAMINE 15 MG: 15 INJECTION, SOLUTION INTRAMUSCULAR; INTRAVENOUS at 08:30

## 2021-01-15 RX ADMIN — Medication 1000 MCG: at 08:17

## 2021-01-15 RX ADMIN — CETIRIZINE HYDROCHLORIDE 5 MG: 10 TABLET ORAL at 08:17

## 2021-01-15 RX ADMIN — PROPRANOLOL HYDROCHLORIDE 80 MG: 80 CAPSULE, EXTENDED RELEASE ORAL at 08:18

## 2021-01-15 RX ADMIN — Medication 10 ML: at 08:30

## 2021-01-15 RX ADMIN — BUPROPION HYDROCHLORIDE 450 MG: 150 TABLET, EXTENDED RELEASE ORAL at 08:18

## 2021-01-15 RX ADMIN — GABAPENTIN 300 MG: 300 CAPSULE ORAL at 03:50

## 2021-01-15 RX ADMIN — Medication 600 MG: at 08:18

## 2021-01-15 RX ADMIN — DEXTROAMPHETAMINE SACCHARATE, AMPHETAMINE ASPARTATE MONOHYDRATE, DEXTROAMPHETAMINE SULFATE, AND AMPHETAMINE SULFATE 30 MG: 2.5; 2.5; 2.5; 2.5 CAPSULE, EXTENDED RELEASE ORAL at 08:18

## 2021-01-15 RX ADMIN — PANTOPRAZOLE SODIUM 40 MG: 40 TABLET, DELAYED RELEASE ORAL at 06:08

## 2021-01-15 RX ADMIN — METHOCARBAMOL TABLETS 750 MG: 500 TABLET, COATED ORAL at 03:49

## 2021-01-15 RX ADMIN — KETOROLAC TROMETHAMINE 15 MG: 15 INJECTION, SOLUTION INTRAMUSCULAR; INTRAVENOUS at 03:49

## 2021-01-15 RX ADMIN — METHOCARBAMOL TABLETS 750 MG: 500 TABLET, COATED ORAL at 08:18

## 2021-01-15 RX ADMIN — CITALOPRAM 40 MG: 20 TABLET, FILM COATED ORAL at 08:17

## 2021-01-15 RX ADMIN — ACETAMINOPHEN 1000 MG: 500 TABLET ORAL at 06:08

## 2021-01-15 ASSESSMENT — PAIN SCALES - GENERAL
PAINLEVEL_OUTOF10: 7
PAINLEVEL_OUTOF10: 8
PAINLEVEL_OUTOF10: 7

## 2021-01-15 ASSESSMENT — PAIN DESCRIPTION - DESCRIPTORS: DESCRIPTORS: ACHING;SORE;DISCOMFORT

## 2021-01-15 NOTE — PROGRESS NOTES
PATIENT NAME: [id number] [facility] RECORD NO. [id number]  DATE: 1/15/2021  SURGEON: Dr. Keely Heart: Elba Briseno,     HD: # 2    ASSESSMENT    Patient Active Problem List   Diagnosis    Adult ADHD    Narcolepsy    Moderate persistent asthma without complication    History of DVT (deep vein thrombosis)    Hiatal hernia    Dysphagia    Cervical radiculopathy    Ganglion cyst    Chronic pain of left ankle    Acquired trigger finger    Osteoarthritis of knee    Osteoarthritis of wrist    Pes anserinus bursitis    Plantar fasciitis    Flexion contracture of joint of foot, left    Essential hypertension    Chronic anticoagulation    Iron deficiency anemia due to chronic blood loss    GERD (gastroesophageal reflux disease)    Absolute anemia    Anemia    Anxiety    Vitamin B12 deficiency    Iron malabsorption    Fall at home, initial encounter    Closed fracture of multiple ribs of right side    Hemothorax on right    Contusion of right lung       MEDICAL DECISION MAKING AND PLAN  Fall      Right Posterior 9-11 Rib Fractures    -Continue to encourage IS, >1000, ambulation    -SATing >90% off O2 this morning    -Multimodal pain regimen   -General diet   -PT/OT    -DVT prophylaxis: Lovenox BID   -Home meds resumed    -Possible discharge later today 1/15       Chief Complaint: \"Fall\"    SUBJECTIVE    Hansa Souza is seen and examined. Sitting up and in no distress. Afebrile, VSS. She is no longer requiring O2 and is maintaining sats >90%. She admits to some right sided chest pain that improved with pain regimen. Tolerating a diet. +BM. Urinating on her own without issues.        OBJECTIVE  VITALS: Temp: Temp: 98 °F (36.7 °C)Temp  Av °F (36.7 °C)  Min: 97.5 °F (36.4 °C)  Max: 99 °F (35.0 °C) BP Systolic (25LZN), ZDY:821 , Min:93 , BXJ:892   Diastolic (27EKJ), TUV:33, Min:67, Max:89   Pulse Pulse  Av.4  Min: 0  Max: 91 Resp Resp  Av.8  Min: 10 Max: 31 Pulse ox SpO2  Av.2 %  Min: 70 %  Max: 99 %     GENERAL: alert, oriented, no distress  NEURO: awake, alert, answering all questions appropriately, moving all extremities   HEENT: NCAT, trachea midline   LUNGS: normal effort on RA, no respiratory distress, no accessory muscle use   HEART: normal rate and regular rhythm  ABDOMEN: soft, non-tender, non-distended, and no guarding or peritoneal signs present  EXTREMITY: no cyanosis, clubbing or edema    I/O last 3 completed shifts: In: 56 [P.O.:480; I.V.:10]  Out: -     Drain/tube output:  No intake/output data recorded. LAB:  CBC:   Recent Labs     21  1556   WBC 8.9   HGB 13.2   HCT 42.1   MCV 92.1        BMP:   Recent Labs     21  1556 21  0909    139   K 4.4 3.7    106   CO2 25 24   BUN 9 9   CREATININE 0.96* 1.07*   GLUCOSE 98 95     COAGS:   Recent Labs     21  1556   APTT 23.4   INR 0.9       RADIOLOGY:  CXR: No new imaging to review      Nat Macdonald DO         Attending Note    Discharge home  I have reviewed the above [facility] note(s) and I either performed the key elements of the medical history and physical exam or was present with the resident when the key elements of the medical history and physical exam were performed. I have discussed the findings, established the care plan and recommendations with Resident, TECSS RN, bedside nurse.     Edmundo Le MD  1/15/2021  10:34 AM

## 2021-01-15 NOTE — PROGRESS NOTES
Physical Therapy  Facility/Department: [address] BURN UNIT  Daily Treatment Note  NAME: Edi Mcdonough  : 1960  MRN: [id number]    Date of Service: 1/15/2021    Discharge Recommendations:  Patient would benefit from continued therapy after discharge    Assessment   Body structures, Functions, Activity limitations: Decreased functional mobility ; Increased pain  Assessment: PT grossly CGA to SBA for bed mobs an transfer,  amb 120ft x2 without AD CGA. Performed 10 steps requiring MIN A . Pt is a fall risk and is unsafe to perfom ambulation without assist.. Pt would benefit from continued acute PT to address deficits. PT Education: Plan of Care;PT Role;General Safety;Gait Training;Functional Mobility Training;Transfer Training  REQUIRES PT FOLLOW UP: Yes  Activity Tolerance  Activity Tolerance: Patient limited by fatigue;Patient limited by pain     Patient Diagnosis(es): The primary encounter diagnosis was Closed fracture of multiple ribs of right side, initial encounter. A diagnosis of Hemothorax was also pertinent to this visit. has a past medical history of Acid reflux, Adult ADHD, Asthma, Bipolar 1 disorder ([address] Broken foot, Chest pain of uncertain etiology, Depression, DVT of leg (deep venous thrombosis) ([address] Fracture, Helicobacter pylori (H. pylori) infection, Hematuria, Hiatal hernia, History of blood transfusion, History of DVT (deep vein thrombosis), Hx of blood clots, Hypertension, Internal hemorrhoids, Narcolepsy, and Osteoporosis. has a past surgical history that includes  section; Nose surgery; Knee arthroscopy (Left); shoulder surgery (Left); Wrist surgery; Upper gastrointestinal endoscopy (2013); Colonoscopy (2013); Upper gastrointestinal endoscopy (2016); Foot surgery (Left, ); Breast enhancement surgery (Bilateral); Arm Surgery (Right); knee surgery (Right); vaginal dilatation;  Ankle surgery (Left, 2018); pr office/outpt visit,procedure only (Left, 6/29/2018); Foot Tendon Surgery (Left, 02/22/2019); ligament repair (Left, 2/22/2019); Upper gastrointestinal endoscopy (N/A, 2/10/2020); Colonoscopy (N/A, 2/10/2020); Endoscopy, colon, diagnostic; Colonoscopy (N/A, 5/18/2020); esophageal motility study (N/A, 8/12/2020); esophageal motility study (9/10/2020); and esophageal motility study (N/A, 12/18/2020). Restrictions  Restrictions/Precautions  Restrictions/Precautions: General Precautions, Seizure  Required Braces or Orthoses?: No  Position Activity Restriction  Other position/activity restrictions: Up with assistance; CTLS cleared per Toma Wooten, , through perfect serve  Subjective   General  Chart Reviewed: Yes  Response To Previous Treatment: Patient with no complaints from previous session. Family / Caregiver Present: No  Subjective  Subjective: RN and pt agreeable to PT. Pt slouch in bed upon arrival, pleaasnt and cooperative throughout  Pain Screening  Patient Currently in Pain: Yes  Pain Assessment  Pain Level: 7  Pain Location: Rib cage  Pain Orientation: Right  Pain Descriptors: Aching;Sore;Discomfort  Pain Frequency: Intermittent  Functional Pain Assessment: Prevents or interferes some active activities and ADLs  Vital Signs  Patient Currently in Pain: Yes       Orientation  Orientation  Overall Orientation Status: Within Functional Limits  Cognition      Objective   Bed mobility  Supine to Sit: Stand by assistance  Sit to Supine: Stand by assistance  Scooting: Stand by assistance  Comment: Increase pain with mobility .   Transfers  Sit to Stand: Stand by assistance  Stand to sit: Stand by assistance  Bed to Chair: Stand by assistance;Contact guard assistance  Comment: Transfer without AD  Ambulation  Ambulation?: Yes  Ambulation 1  Surface: level tile  Device: No Device  Assistance: Contact guard assistance;Stand by assistance  Quality of Gait: unsteady with Inconsitent (uncoordinated) steps,staggering throughout LOB x3 requiring CGA to correct  Gait Deviations: Decreased step length; Slow Nicki;Staggers; Deviated path  Distance: 120ft x2  Comments: Pt state she doen`t unstedatnd why she is walkeing like a drunk person. Stairs/Curb  Stairs?: Yes  Stairs  # Steps : 10  Stairs Height: 6\"  Rails: Right ascending  Device: No Device  Assistance: Minimal assistance  Comment: Pt descending side ways stating that is her bases line d/t previous Left ankle injury. Pt educated on safety and proper technique with steps  and pt demo with good return. Pt demo poor safety awareness. Balance  Posture: Good  Sitting - Dynamic: Good  Standing - Dynamic: Fair;+  Comments: no AD used  Exercises  Comments: Seated LE exercise program: Long Arc Quads, hip abduction/adduction, heel/toe raises, and marches.  Reps: x15    Goals  Short term goals  Time Frame for Short term goals: 14 visits  Short term goal 1: Pt will be Seun bed mobility  Short term goal 2: Pt will be Seun transfers  Short term goal 3: Pt will be Seun amb 240'  Short term goal 4: Pt will navigate 6 steps Seun R rail    Plan    Plan  Times per week: 5-6x/wk  Current Treatment Recommendations: Strengthening, Balance Training, Functional Mobility Training, Transfer Training, Gait Training, Endurance Training, Stair training, Home Exercise Program, Safety Education & Training, Patient/Caregiver Education & Training, Equipment Evaluation, Education, & procurement  Safety Devices  Type of devices: Call light within reach, Nurse notified, Gait belt, Patient at risk for falls, All fall risk precautions in place  Restraints  Initially in place: No     Therapy Time   Individual Concurrent Group Co-treatment   Time In 0830         Time Out 0900         Minutes 30         Timed Code Treatment Minutes: [address] [place] [address], [facility]

## 2021-01-15 NOTE — PROGRESS NOTES
Speech Language Pathology  Speech Language Pathology  [facility]    Cognitive Treatment Note    Date: 1/15/2021  Patients Name: Jayson Martinez  MRN: [id number]  Diagnosis:   Patient Active Problem List   Diagnosis Code    Adult ADHD F90.9    Narcolepsy G47.419    Moderate persistent asthma without complication S42.56    History of DVT (deep vein thrombosis) Z86.718    Hiatal hernia K44.9    Dysphagia R13.10    Cervical radiculopathy M54.12    Ganglion cyst M67.40    Chronic pain of left ankle M25.572, G89.29    Acquired trigger finger M65.30    Osteoarthritis of knee M17.10    Osteoarthritis of wrist M19.039    Pes anserinus bursitis M70.50    Plantar fasciitis M72.2    Flexion contracture of joint of foot, left M24.575    Essential hypertension I10    Chronic anticoagulation Z79.01    Iron deficiency anemia due to chronic blood loss D50.0    GERD (gastroesophageal reflux disease) K21.9    Absolute anemia D64.9    Anemia D64.9    Anxiety F41.9    Vitamin B12 deficiency E53.8    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 Lizzy Right, Y92.009    Closed fracture of multiple ribs of right side S22.41XA    Hemothorax on right J94.2    Contusion of right lung S27.321A       Pain: 0/10    Cognitive Treatment    Treatment time: 9:05-9:32    Subjective: [x] Alert [x] Cooperative     [] Confused     [] Agitated    [] Lethargic    Objective/Assessment:  Attention: maintained throughout session, distractions minimized     Recall: Pt provided education re: memory compensatory strategies to aid in recall. Written copy of strategies left at bedside. Delayed recall, 3 assoc. units: 1/3 increased to 2/3 x1 with mod-max verbal cues (10-minute delay)     2 assoc. units: 2/2 x1 independently (5-minute delay)    2 assoc.  units: 1/2 increased to 2/2 with mod semantic and phonemic cues   (5-minute delay)     Memory and mental manipulation, scrambled sentences: 4/7 increased to 7/7 with 1-2 repetitions     Problem Solving/Reasoning:   Stating Situational Problems: 9/10 increased to 10/10 with mod verbal cues     Plan:  [x] Continue ST services    [] Discharge from [address] [place] [address] Dr:      Discharge recommendations: Further therapy recommended at discharge    Treatment completed by: Kelle Phalen, M.S. CF-SLP

## 2021-01-15 NOTE — DISCHARGE SUMMARY
DISCHARGE SUMMARY:    PATIENT NAME:  Ching Bernard  YOB: 1960  MEDICAL RECORD NO. [id number]  DATE: 01/15/21  PRIMARY CARE PHYSICIAN: Ita Lazaro DO  ADMIT DATE:  1/13/2021    DISCHARGE DATE:  1/15/2021  DISPOSITION:  home  ADMITTING DIAGNOSIS:   Fall down steps 6-7 feet    DIAGNOSIS:   Patient Active Problem List   Diagnosis    Adult ADHD    Narcolepsy    Moderate persistent asthma without complication    History of DVT (deep vein thrombosis)    Hiatal hernia    Dysphagia    Cervical radiculopathy    Ganglion cyst    Chronic pain of left ankle    Acquired trigger finger    Osteoarthritis of knee    Osteoarthritis of wrist    Pes anserinus bursitis    Plantar fasciitis    Flexion contracture of joint of foot, left    Essential hypertension    Chronic anticoagulation    Iron deficiency anemia due to chronic blood loss    GERD (gastroesophageal reflux disease)    Absolute anemia    Anemia    Anxiety    Vitamin B12 deficiency    Iron malabsorption    Fall at home, initial encounter    Closed fracture of multiple ribs of right side    Hemothorax on right    Contusion of right lung       CONSULTANTS:  none    PROCEDURES:   none    HOSPITAL COURSE:   Ching Bernard is a 57 y.o. female who was admitted on 1/13/2021  Hospital Course:  Patient admitted for rib fractures w/rib score of 10 for smoking, age, small hemothorax, pain and weak cough. Her activity and pain improved, IS >1L. Participated in therapy. Weaned off oxygen sats>88%    Labs and imaging were followed daily. On day of discharge Ching Bernard  was tolerating a regular diet  had adequate analgeia on oral medications  had no signs of complication. She was deemed medically stable for discharged to Home        PHYSICAL EXAMINATION:        Discharge Vitals:  height is 5' (1.524 m) and weight is 150 lb (68 kg). Her oral temperature is 97.7 °F (36.5 °C). Her blood pressure is 105/76 and her pulse is 76. Her respiration is 17 and oxygen saturation is 94%. Exam on day of discharge:        MEDICAL DECISION MAKING AND PLAN     Right Posterior Rib Fractures   -Continue encouraging IS   -5 L NC, titrate as able per RT   -Multimodal pain regimen  -general diet  -DVT prophylaxis: Lovenox            Chief Complaint: \"Fall\"     SUBJECTIVE     Sahu Nunnery is has improved and is unchanged since yesterday. She is feeling better this am and has been able to tolerate oral intake. She has been urinating, has not had a bowel movement, is passing flatus. Patient is able to tolerate IS and is pulling 1500. Patient Rib score of 6 this am.         OBJECTIVE  VITALS: Temp: Temp: 97.6 °F (36.4 °C)Temp  Av °F (36.7 °C)  Min: 97.6 °F (36.4 °C)  Max: 98.9 °F (69.9 °C) BP Systolic (11ZRZ), USB:158 , Min:94 , AYV:039   Diastolic (05LRS), GVY:43, Min:62, Max:122   Pulse Pulse  Av.1  Min: 45  Max: 63 Resp Resp  Av.2  Min: 11  Max: 22 Pulse ox SpO2  Av.3 %  Min: 91 %  Max: 98 %  GENERAL: alert, no distress  NEURO: awake, alert, answering all questions appropriately, moving all extremities   HEENT: NC oxygen in place, no trauma noted to the nose or face   LUNGS: clear to ausculation, without wheezes, rales or rhonci  HEART: normal rate and regular rhythm  ABDOMEN: soft, non-tender, non-distended, and no guarding or peritoneal signs present  EXTREMITY: no cyanosis, clubbing or edema    LABS:     Recent Labs     21  1556 21  0909   WBC 8.9  --    HGB 13.2  --    HCT 42.1  --      --     139   K 4.4 3.7    106   CO2 25 24   BUN 9 9   CREATININE 0.96* 1.07*       DIAGNOSTIC TESTS:    Xr Shoulder Right (min 2 Views)    Result Date: 2021  EXAMINATION: TWO XRAY VIEWS OF THE RIGHT HUMERUS; THREE XRAY VIEWS OF THE RIGHT SHOULDER 2021 4:37 pm COMPARISON: None.  HISTORY: ORDERING SYSTEM PROVIDED HISTORY: Fall, pain TECHNOLOGIST PROVIDED HISTORY: Fall, pain Reason for Exam: rt side upper back pain, fall Acuity: Acute Type of Exam: Initial FINDINGS: No acute fracture. No shoulder dislocation. Small humeral head osteophyte. Mild acromioclavicular degenerative changes. No acute findings. Xr Humerus Right (min 2 Views)    Result Date: 1/13/2021  EXAMINATION: TWO XRAY VIEWS OF THE RIGHT HUMERUS; THREE XRAY VIEWS OF THE RIGHT SHOULDER 1/13/2021 4:37 pm COMPARISON: None. HISTORY: ORDERING SYSTEM PROVIDED HISTORY: Fall, pain TECHNOLOGIST PROVIDED HISTORY: Fall, pain Reason for Exam: rt side upper back pain, fall Acuity: Acute Type of Exam: Initial FINDINGS: No acute fracture. No shoulder dislocation. Small humeral head osteophyte. Mild acromioclavicular degenerative changes. No acute findings. Ct Head Wo Contrast    Result Date: 1/13/2021  EXAMINATION: CT OF THE HEAD WITHOUT CONTRAST  1/13/2021 2:23 pm TECHNIQUE: CT of the head was performed without the administration of intravenous contrast. Dose modulation, iterative reconstruction, and/or weight based adjustment of the mA/kV was utilized to reduce the radiation dose to as low as reasonably achievable. COMPARISON: None. HISTORY: ORDERING SYSTEM PROVIDED HISTORY: Fall down multiple steps, extreme back and flank pain TECHNOLOGIST PROVIDED HISTORY: Fall down multiple steps, extreme back and flank pain FINDINGS: BRAIN/VENTRICLES: There is no acute intracranial hemorrhage, mass effect or midline shift. No abnormal extra-axial fluid collection. The gray-white differentiation is maintained without evidence of an acute infarct. There is no evidence of hydrocephalus. ORBITS: The visualized portion of the orbits demonstrate no acute abnormality. SINUSES: The visualized paranasal sinuses and mastoid air cells demonstrate no acute abnormality. SOFT TISSUES/SKULL:  No acute abnormality of the visualized skull or soft tissues. Vascular calcifications are seen compatible with atherosclerotic disease. No acute intracranial abnormality.      Ct Cervical Spine Wo Contrast    Result Date: 1/13/2021  EXAMINATION: CT OF THE CERVICAL SPINE WITHOUT CONTRAST 1/13/2021 2:02 pm TECHNIQUE: CT of the cervical spine was performed without the administration of intravenous contrast. Multiplanar reformatted images are provided for review. Dose modulation, iterative reconstruction, and/or weight based adjustment of the mA/kV was utilized to reduce the radiation dose to as low as reasonably achievable. COMPARISON: None. HISTORY: ORDERING SYSTEM PROVIDED HISTORY: Fall down multiple steps, extreme back and flank pain TECHNOLOGIST PROVIDED HISTORY: Fall down multiple steps, extreme back and flank pain FINDINGS: The cervical spine demonstrates decreased mineralization with normal cervical lordosis. There is no evidence of fracture there is mild retrolisthesis of C5. Novoa Leisure There is loss of disc height with eburnation of the vertebral endplates at the G0-7, V7-6, C6-7 and C7-[id number]. Evidence of a left central and paracentral disc protrusion at C6-7. There are small marginal osteophytes at multiple levels. The central canal is grossly patent. There is bilateral facet hypertrophy at multiple levels throughout the cervical spine. The pedicles and posterior elements are otherwise intact. The prevertebral and paravertebral soft tissues are unremarkable. The atlanto-dens interval and dens are intact. The visualized lung apices are clear. Vascular calcifications are seen compatible with atherosclerotic disease. Multilevel cervical spondylosis and degenerative disc disease. Mild retrolisthesis of C5 which appears chronic. Evidence of a left central and paracentral disc protrusion at C6-7. Follow-up MRI may be helpful for further evaluation on an outpatient basis.  No acute bony abnormalities are noted     Xr Chest Portable    Result Date: 1/13/2021  EXAMINATION: ONE XRAY VIEW OF THE CHEST 1/13/2021 4:37 pm COMPARISON: 01/13/2021 HISTORY: ORDERING SYSTEM PROVIDED HISTORY: back pain; fall TECHNOLOGIST PROVIDED HISTORY: back pain; fall Reason for Exam: rt side upper back pain, fall Acuity: Acute Type of Exam: Initial FINDINGS: The heart is mildly enlarged but unchanged. No consolidation or effusion is seen. The lungs are mildly hyperinflated with mild linear densities scattered along the lung bases which are less prominent. The bones are intact. Stable mild cardiomegaly Mild chronic obstructive lung changes with mild discoid atelectasis or scarring along the lung bases which is less prominent. Ct Chest Abdomen Pelvis W Contrast    Result Date: 1/13/2021  EXAMINATION: CT OF THE CHEST, ABDOMEN, AND PELVIS WITH CONTRAST; CT OF THE THORACIC SPINE WITHOUT CONTRAST 1/13/2021 5:03 pm TECHNIQUE: CT of the chest, abdomen and pelvis was performed with the administration of intravenous contrast. Multiplanar reformatted images are provided for review. Dose modulation, iterative reconstruction, and/or weight based adjustment of the mA/kV was utilized to reduce the radiation dose to as low as reasonably achievable.; CT of the thoracic spine was performed without the administration of intravenous contrast. Multiplanar reformatted images are provided for review. Dose modulation, iterative reconstruction, and/or weight based adjustment of the mA/kV was utilized to reduce the radiation dose to as low as reasonably achievable. COMPARISON: 02/07/2020 and 04/05/2017 CT studies HISTORY: ORDERING SYSTEM PROVIDED HISTORY: Luvenia Lash down approximately 7 steps, extreme right rib pain and thoracic and lumbar pain TECHNOLOGIST PROVIDED HISTORY: Luvenia Lash down approximately 7 steps, extreme right rib pain and thoracic and lumbar pain ; ORDERING SYSTEM PROVIDED HISTORY: Luvenia Lash at approximately 7 steps, extreme pain lumbar thoracic spine TECHNOLOGIST PROVIDED HISTORY: Luvenia Lash at approximately 7 steps, extreme pain lumbar thoracic spine FINDINGS: Chest: Mediastinum: Borderline cardiomegaly.   The aorta and pulmonary arteries are normal.  No lymphadenopathy. Thyroid is normal.  Moderate sliding-type hiatal hernia. Lungs/pleura: Peribronchial thickening is observed in the bilateral lower lobes. Small pleural effusion on the right posteriorly which may represent hemothorax given trauma and rib fractures. No pneumothorax. There is dense peripheral airspace opacification in the right lower lobe with additional bandlike opacities and atelectasis in the left lower lobe. Soft Tissues/Bones: Mildly displaced fracture of the right posterior 9th ribs with nondisplaced fractures of the posterior 10th and 11th ribs as well. No other skeletal abnormalities in the chest.  Dedicated imaging of the thoracic spine shows no evidence of fracture or subluxation. No spinal canal stenosis or obvious disc herniation. Abdomen/Pelvis: Organs: The liver, gallbladder, biliary ducts, pancreas and spleen are normal. Kidneys and adrenal glands are normal. GI/Bowel: The stomach, duodenum and small bowel are normal. Nonvisualized appendix. The colon is normal. Pelvis: The bladder is unremarkable. The uterus and adnexa are normal. Peritoneum/Retroperitoneum: The aorta tapers normally. No lymph node enlargement. Bones/Soft Tissues: No significant skeletal abnormalities. 1. Fractures of the right posterior 9th through 11th ribs. Small associated hemothorax with no pneumothorax. 2. Peribronchial thickening and peripheral airspace opacities in both lower lobes. This may represent atelectasis or contusion given history of trauma. Bronchiolitis or developing pneumonitis may be considered clinically. 3. No evidence of traumatic injury in the abdomen or pelvis. 4. The thoracic spine shows no significant finding.      Ct Lumbar Spine Trauma Reconstruction    Result Date: 1/13/2021  EXAMINATION: CT OF THE LUMBAR SPINE WITHOUT CONTRAST  1/13/2021 TECHNIQUE: CT of the lumbar spine was reconstructed from the CT abdomen/pelvis without the administration of intravenous contrast. Multiplanar reformatted images are provided for review. Dose modulation, iterative reconstruction, and/or weight based adjustment of the mA/kV was utilized to reduce the radiation dose to as low as reasonably achievable. COMPARISON: X-rays 10/20/2019 HISTORY: ORDERING SYSTEM PROVIDED HISTORY: Chapis Acevedo at approximately 7 steps, extreme pain lumbar thoracic spine TECHNOLOGIST PROVIDED HISTORY: Chapis Acevedo at approximately 7 steps, extreme pain lumbar thoracic spine FINDINGS: BONES/ALIGNMENT: The lumbar vertebral heights are maintained. Anterolisthesis L4 on L5 measuring 5 mm. This likely due to advanced facet arthropathy. There is uncovering posterior disc and moderate bilateral foraminal narrowing at this level. Otherwise no evidence acute fracture or traumatic malalignment. DEGENERATIVE CHANGES: Moderate space disease L3-4 moderate disc space disease L4-5 and moderate space disease L5-S1. There is moderate neural foramina at L4-5 L5-S1. Moderate severe degenerate facet arthropathy identified throughout the lumbar spine most pronounced L3 through S1. SOFT TISSUES/RETROPERITONEUM: No paraspinal mass is seen. Moderate disc space disease with advanced facet arthropathy. No evidence acute fracture traumatic malalignment. Ct Thoracic Spine Trauma Reconstruction    Result Date: 1/13/2021  EXAMINATION: CT OF THE CHEST, ABDOMEN, AND PELVIS WITH CONTRAST; CT OF THE THORACIC SPINE WITHOUT CONTRAST 1/13/2021 5:03 pm TECHNIQUE: CT of the chest, abdomen and pelvis was performed with the administration of intravenous contrast. Multiplanar reformatted images are provided for review. Dose modulation, iterative reconstruction, and/or weight based adjustment of the mA/kV was utilized to reduce the radiation dose to as low as reasonably achievable.; CT of the thoracic spine was performed without the administration of intravenous contrast. Multiplanar reformatted images are provided for review.  Dose modulation, iterative reconstruction, and/or weight based adjustment of the mA/kV was utilized to reduce the radiation dose to as low as reasonably achievable. COMPARISON: 02/07/2020 and 04/05/2017 CT studies HISTORY: ORDERING SYSTEM PROVIDED HISTORY: Nyoka Sax down approximately 7 steps, extreme right rib pain and thoracic and lumbar pain TECHNOLOGIST PROVIDED HISTORY: Nyoka Sax down approximately 7 steps, extreme right rib pain and thoracic and lumbar pain ; ORDERING SYSTEM PROVIDED HISTORY: Nyoka Sax at approximately 7 steps, extreme pain lumbar thoracic spine TECHNOLOGIST PROVIDED HISTORY: Nyoka Sax at approximately 7 steps, extreme pain lumbar thoracic spine FINDINGS: Chest: Mediastinum: Borderline cardiomegaly. The aorta and pulmonary arteries are normal.  No lymphadenopathy. Thyroid is normal.  Moderate sliding-type hiatal hernia. Lungs/pleura: Peribronchial thickening is observed in the bilateral lower lobes. Small pleural effusion on the right posteriorly which may represent hemothorax given trauma and rib fractures. No pneumothorax. There is dense peripheral airspace opacification in the right lower lobe with additional bandlike opacities and atelectasis in the left lower lobe. Soft Tissues/Bones: Mildly displaced fracture of the right posterior 9th ribs with nondisplaced fractures of the posterior 10th and 11th ribs as well. No other skeletal abnormalities in the chest.  Dedicated imaging of the thoracic spine shows no evidence of fracture or subluxation. No spinal canal stenosis or obvious disc herniation. Abdomen/Pelvis: Organs: The liver, gallbladder, biliary ducts, pancreas and spleen are normal. Kidneys and adrenal glands are normal. GI/Bowel: The stomach, duodenum and small bowel are normal. Nonvisualized appendix. The colon is normal. Pelvis: The bladder is unremarkable. The uterus and adnexa are normal. Peritoneum/Retroperitoneum: The aorta tapers normally. No lymph node enlargement.  Bones/Soft Tissues: No significant skeletal abnormalities. 1. Fractures of the right posterior 9th through 11th ribs. Small associated hemothorax with no pneumothorax. 2. Peribronchial thickening and peripheral airspace opacities in both lower lobes. This may represent atelectasis or contusion given history of trauma. Bronchiolitis or developing pneumonitis may be considered clinically. 3. No evidence of traumatic injury in the abdomen or pelvis. 4. The thoracic spine shows no significant finding. DISCHARGE INSTRUCTIONS     Discharge Medications:        Medication List      START taking these medications    gabapentin 300 MG capsule  Commonly known as: NEURONTIN  Take 1 capsule by mouth every 8 hours for 7 days. ibuprofen 600 MG tablet  Commonly known as: ADVIL;MOTRIN  Take 1 tablet by mouth every 8 hours as needed for Pain     lidocaine 4 % external patch  Place 1 patch onto the skin daily for 10 days     methocarbamol 750 MG tablet  Commonly known as: ROBAXIN  Take 1 tablet by mouth every 6 hours for 3 days Do not take with flexeril     senna 8.6 MG tablet  Commonly known as: Senokot  Take 1 tablet by mouth 2 times daily for 10 days        CONTINUE taking these medications    * albuterol sulfate  (90 Base) MCG/ACT inhaler  Commonly known as: Ventolin HFA  INHALE TWO PUFFS BY MOUTH EVERY 4 HOURS AS NEEDED FOR WHEEZING     * albuterol (2.5 MG/3ML) 0.083% nebulizer solution  Commonly known as: PROVENTIL     alendronate 70 MG tablet  Commonly known as: FOSAMAX  TAKE 1 TABLET BY MOUTH ONCE WEEKLY BEFORE BREAKFAST, ON AN EMPTY STOMACH: REMAIN UPRIGHT FOR 30 MINUTES:TAKE WITH 8 OUNCES OF WATER     amphetamine-dextroamphetamine 30 MG extended release capsule  Commonly known as: Adderall XR  Take 1 capsule by mouth 2 times daily for 30 days.      buPROPion HCl ER (XL) 450 MG Tb24  Take 450 mg by mouth every morning     CALCIUM 600 PO     citalopram 40 MG tablet  Commonly known as: CELEXA  TAKE ONE TABLET BY MOUTH DAILY     cyanocobalamin 1000 MCG tablet  Take 1 tablet by mouth daily     cyclobenzaprine 10 MG tablet  Commonly known as: FLEXERIL  TAKE 1/2 TO 1 TABLET BY MOUTH THREE TIMES A DAY AS NEEDED FOR MUSCLE SPASMS     hydrOXYzine 25 MG tablet  Commonly known as: ATARAX  Take 1/2 to 1 tablet by mouth three times daily as needed for anxiety     oxyCODONE-acetaminophen 5-325 MG per tablet  Commonly known as: PERCOCET  Take 1 tablet by mouth every 8 hours as needed for Pain for up to 30 days. pantoprazole 40 MG tablet  Commonly known as: PROTONIX  TAKE ONE TABLET BY MOUTH EVERY MORNING BEFORE BREAKFAST     propranolol 80 MG extended release capsule  Commonly known as: INDERAL LA  TAKE ONE CAPSULE BY MOUTH DAILY     Vitamin D3 25 MCG Tabs  Take 1 tablet by mouth daily         * This list has 2 medication(s) that are the same as other medications prescribed for you. Read the directions carefully, and ask your doctor or other care provider to review them with you. Where to Get Your Medications      These medications were sent to [facility] [address] [facility], [address] [facility]  [address], [address] [place] [address]    Phone: [phone]   · gabapentin 300 MG capsule  · ibuprofen 600 MG tablet  · lidocaine 4 % external patch  · methocarbamol 750 MG tablet     You can get these medications from any pharmacy    You don't need a prescription for these medications  · senna 8.6 MG tablet       Diet: DIET GENERAL; diet as tolerated  Activity: As instructed WEIGHT BEARING STATUS: Weight bearing as tolerated  Wound Care: Daily and as needed.     DISPOSITION: Home    Follow-up:  Benito Montiel DO  [address]  [place] [address] [place] [address]  [phone]    Schedule an appointment as soon as possible for a visit in 1 week  PCP follow up re: this hospital visit    [facility]  [address]  [phone]  In 2 weeks  Follow up with the Theo Angel

## 2021-01-15 NOTE — CARE COORDINATION
Dispo planning     Met with the patient at the bedside. Discussed discharge home today. Pt already has a rolling walker at home and lives with her boyfriend that can be there 24/7 to assist her at home. Writer asked pt to confirm this with her boyfriend, due to being unsteady while walking with PT this morning. Pt agreed. Esequiel Bonds 45 boyfriend, Bro Tomas ([phone]). Bro Tomas confirmed that he will stay with the patient 24/7 and assist w/ transfers and ADLs as needed. He also states that he was his mothers care giver for many years and familiar with the process. No further question. Outpatient PT/OT ordered. Pt has all required DME equipment. Follow up instructions updated.
SBIRT- completed see below  Met with pt this date ,denies any alcohol or drug use  Pt also denies any suicidal ideations. Pt states her mother passed away on 12/20/21, and her dog passed away on 12/ 17/21  Pt has h/o depression states her PCP manages her antidepressants. Alcohol Screening and Brief Intervention        No results for input(s): ALC in the last 72 hours. Alcohol Pre-screening     (WOMEN ONLY) How many times in the past year have you had 4 or more drinks in a day?: None    Alcohol Screening Audit       Drug Pre-Screening   How many times in the past year have you used a recreational drug or used a prescription medication for nonmedical reasons?: None    Drug Screening DAST       Mood Pre-Screening (PHQ-2)  During the past two weeks, have you been bothered by little interest or pleasure in doing things?: No  During the past two weeks, have you been bothered by feeling down, depressed, or hopeless?: No    Mood Pre-Screening (PHQ-9)         I have interviewed Livier Hou, [id number] regarding  Her alcohol consumption/drug use and risk for excessive use. Screenings were negative. Patient  N/A intervention at this time.     Deferred []    Completed on: 1/14/2021   [address] [facility]
Pulses equal bilaterally, no edema present.

## 2021-01-15 NOTE — PROGRESS NOTES
Discharge instructions reviewed with pt, verbalized understanding. Medications delivered at bedside, no further needs.  Electronically signed by Pascual Brenner RN on 1/15/2021 at 11:28 AM

## 2021-01-15 NOTE — PROGRESS NOTES
CLINICAL PHARMACY NOTE: MEDS TO [address] Select Patient?: Yes  Total # of Prescriptions Filled: 3   The following medications were delivered to the patient:  · Gabapentin 300 mg cap  · Ibuprofen 600 mg tab  · Methocarbamol 750 mg tab  Total # of Interventions Completed: 1  Time Spent (min): 45    Additional Documentation:Medications delivered to the patient in her room(166).  @ 11:15am

## 2021-01-18 ENCOUNTER — TELEPHONE (OUTPATIENT)
Dept: FAMILY MEDICINE CLINIC | Age: 61
End: 2021-01-18

## 2021-01-18 DIAGNOSIS — K21.9 GASTROESOPHAGEAL REFLUX DISEASE, UNSPECIFIED WHETHER ESOPHAGITIS PRESENT: Primary | ICD-10-CM

## 2021-01-18 NOTE — TELEPHONE ENCOUNTER
Pt home from the hospital and the pain medication she was given is \"messing with my stomach\"   She wants to know if there is something you can give her to help?

## 2021-01-19 RX ORDER — RANITIDINE 150 MG/1
150 TABLET ORAL 2 TIMES DAILY
Qty: 60 TABLET | Refills: 0 | Status: SHIPPED | OUTPATIENT
Start: 2021-01-19 | End: 2021-01-22

## 2021-01-19 NOTE — TELEPHONE ENCOUNTER
Yes she should. That being said, right now I've heard that she is having GI issues and now wheezing after starting this pain medication. Has she had it before? Has she had this reaction before? I just want to make sure we aren't missing her having an anaphylactic allergic reaction.  If she has further trouble breathing she may need to call 911 or go to the ED

## 2021-01-19 NOTE — TELEPHONE ENCOUNTER
Pt has been allergic to cde most of her life.   It just causes her to itch a lot, so her previous doctor was aware

## 2021-01-19 NOTE — TELEPHONE ENCOUNTER
Patient advised,  She says she has not had this reaction before but she does remember that years she asked for something for her arthritis and Dr Sasha Mora told her he could not give her anything for it but she does not remember why.   All she remembers is that it was something to do with stomach

## 2021-01-22 DIAGNOSIS — D50.8 OTHER IRON DEFICIENCY ANEMIA: ICD-10-CM

## 2021-01-22 DIAGNOSIS — K21.9 GASTROESOPHAGEAL REFLUX DISEASE, UNSPECIFIED WHETHER ESOPHAGITIS PRESENT: Primary | ICD-10-CM

## 2021-01-22 PROCEDURE — 91110 GI TRC IMG INTRAL ESOPH-ILE: CPT | Performed by: INTERNAL MEDICINE

## 2021-01-22 RX ORDER — FAMOTIDINE 40 MG/1
40 TABLET, FILM COATED ORAL NIGHTLY PRN
Qty: 30 TABLET | Refills: 0 | Status: SHIPPED | OUTPATIENT
Start: 2021-01-22 | End: 2021-02-17

## 2021-01-27 DIAGNOSIS — I10 ESSENTIAL HYPERTENSION: Primary | ICD-10-CM

## 2021-01-27 RX ORDER — PROPRANOLOL HYDROCHLORIDE 80 MG/1
80 CAPSULE, EXTENDED RELEASE ORAL DAILY
Qty: 90 CAPSULE | Refills: 1 | Status: SHIPPED | OUTPATIENT
Start: 2021-01-27 | End: 2021-10-29 | Stop reason: SDUPTHER

## 2021-01-27 NOTE — TELEPHONE ENCOUNTER
Rk Cartwright is calling to request a refill on the following medication(s):    Medication Request:  Requested Prescriptions     Pending Prescriptions Disp Refills    propranolol (INDERAL LA) 80 MG extended release capsule 90 capsule 0     Sig: TAKE ONE CAPSULE BY MOUTH DAILY       Last Visit Date (If Applicable):  2/9/4559    Next Visit Date:    1/29/2021

## 2021-01-29 ENCOUNTER — OFFICE VISIT (OUTPATIENT)
Dept: FAMILY MEDICINE CLINIC | Age: 61
End: 2021-01-29
Payer: MEDICARE

## 2021-01-29 ENCOUNTER — HOSPITAL ENCOUNTER (OUTPATIENT)
Age: 61
Setting detail: SPECIMEN
Discharge: HOME | End: 2021-01-29
Payer: MEDICARE

## 2021-01-29 VITALS
TEMPERATURE: 97 F | OXYGEN SATURATION: 97 % | DIASTOLIC BLOOD PRESSURE: 68 MMHG | SYSTOLIC BLOOD PRESSURE: 116 MMHG | HEIGHT: 60 IN | WEIGHT: 148.2 LBS | BODY MASS INDEX: 29.09 KG/M2 | HEART RATE: 71 BPM

## 2021-01-29 DIAGNOSIS — S22.41XD CLOSED FRACTURE OF MULTIPLE RIBS OF RIGHT SIDE WITH ROUTINE HEALING, SUBSEQUENT ENCOUNTER: ICD-10-CM

## 2021-01-29 DIAGNOSIS — M54.12 CERVICAL RADICULOPATHY: ICD-10-CM

## 2021-01-29 DIAGNOSIS — J01.90 ACUTE SINUSITIS, RECURRENCE NOT SPECIFIED, UNSPECIFIED LOCATION: ICD-10-CM

## 2021-01-29 DIAGNOSIS — F41.9 ANXIETY: ICD-10-CM
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
## 2021-01-29 DIAGNOSIS — F90.9 ADULT ADHD: ICD-10-CM

## 2021-01-29 PROCEDURE — 1111F DSCHRG MED/CURRENT MED MERGE: CPT | Performed by: FAMILY MEDICINE

## 2021-01-29 PROCEDURE — 99214 OFFICE O/P EST MOD 30 MIN: CPT | Performed by: FAMILY MEDICINE

## 2021-01-29 RX ORDER — AMOXICILLIN AND CLAVULANATE POTASSIUM 875; 125 MG/1; MG/1
1 TABLET, FILM COATED ORAL 2 TIMES DAILY
Qty: 10 TABLET | Refills: 0 | Status: SHIPPED | OUTPATIENT
Start: 2021-01-29 | End: 2021-02-03

## 2021-01-29 RX ORDER — FLUTICASONE PROPIONATE 50 MCG
2 SPRAY, SUSPENSION (ML) NASAL DAILY
Qty: 1 BOTTLE | Refills: 0 | Status: SHIPPED
Start: 2021-01-29 | End: 2021-03-04

## 2021-01-29 ASSESSMENT — PATIENT HEALTH QUESTIONNAIRE - PHQ9
SUM OF ALL RESPONSES TO PHQ9 QUESTIONS 1 & 2: 0
2. FEELING DOWN, DEPRESSED OR HOPELESS: 0
SUM OF ALL RESPONSES TO PHQ QUESTIONS 1-9: 0
SUM OF ALL RESPONSES TO PHQ QUESTIONS 1-9: 0

## 2021-01-29 ASSESSMENT — ENCOUNTER SYMPTOMS
ALLERGIC/IMMUNOLOGIC NEGATIVE: 1
EYES NEGATIVE: 1
RESPIRATORY NEGATIVE: 1
GASTROINTESTINAL NEGATIVE: 1

## 2021-01-29 NOTE — ASSESSMENT & PLAN NOTE
Advised that she will need to establish with [facility] for refills of Adderall - will cover with refills  Med contract signed and UDS ordered

## 2021-01-29 NOTE — PROGRESS NOTES
Post-Discharge Transitional Care Management Services or Hospital Follow Up      Rob Cuevas   YOB: 1960    Date of Office Visit:  1/29/2021  Date of Hospital Admission: 1/13/21  Date of Hospital Discharge: 1/15/21  Readmission Risk Score(high >=14%.  Medium >=10%):Readmission Risk Score: 11      Care management risk score Rising risk (score 2-5) and Complex Care (Scores >=6): 9     Non face to face  following discharge, date last encounter closed (first attempt may have been earlier): *No documented post hospital discharge outreach found in the last 14 days *No documented post hospital discharge outreach found in the last 14 days    Call initiated 2 business days of discharge: *No response recorded in the last 14 days     Patient Active Problem List   Diagnosis    Adult ADHD    Narcolepsy    Moderate persistent asthma without complication    History of DVT (deep vein thrombosis)    Hiatal hernia    Dysphagia    Cervical radiculopathy    Ganglion cyst    Chronic pain of left ankle    Acquired trigger finger    Osteoarthritis of knee    Osteoarthritis of wrist    Pes anserinus bursitis    Plantar fasciitis    Flexion contracture of joint of foot, left    Essential hypertension    Chronic anticoagulation    Iron deficiency anemia due to chronic blood loss    GERD (gastroesophageal reflux disease)    Absolute anemia    Anemia    Anxiety    Vitamin B12 deficiency    Iron malabsorption    Fall at home, initial encounter    Closed fracture of multiple ribs of right side    Hemothorax on right    Contusion of right lung       Allergies   Allergen Reactions    Latex Itching    Prozac [Fluoxetine Hcl]     Sulfa Antibiotics      migraine    Codeine Itching       Medications listed as ordered at the time of discharge from hospital   [facility]"   Home Medication Instructions SYDNI:    Printed on:01/29/21 1036   Medication Information albuterol (PROVENTIL) (2.5 MG/3ML) 0.083% nebulizer solution  Take 2.5 mg by nebulization every 6 hours as needed for Wheezing or Shortness of Breath             albuterol sulfate HFA (VENTOLIN HFA) 108 (90 Base) MCG/ACT inhaler  INHALE TWO PUFFS BY MOUTH EVERY 4 HOURS AS NEEDED FOR WHEEZING             amphetamine-dextroamphetamine (ADDERALL XR) 30 MG extended release capsule  Take 1 capsule by mouth 2 times daily for 30 days. buPROPion 450 MG TB24  Take 450 mg by mouth every morning             Calcium Carbonate (CALCIUM 600 PO)  Take 600 mg by mouth daily             Cholecalciferol (VITAMIN D3) 25 MCG TABS  Take 1 tablet by mouth daily             citalopram (CELEXA) 40 MG tablet  TAKE ONE TABLET BY MOUTH DAILY             cyanocobalamin 1000 MCG tablet  Take 1 tablet by mouth daily             cyclobenzaprine (FLEXERIL) 10 MG tablet  TAKE 1/2 TO 1 TABLET BY MOUTH THREE TIMES A DAY AS NEEDED FOR MUSCLE SPASMS             famotidine (PEPCID) 40 MG tablet  Take 1 tablet by mouth nightly as needed (stomach upset)             gabapentin (NEURONTIN) 300 MG capsule  Take 1 capsule by mouth every 8 hours for 7 days. hydrOXYzine (ATARAX) 25 MG tablet  Take 1/2 to 1 tablet by mouth three times daily as needed for anxiety             ibuprofen (ADVIL;MOTRIN) 600 MG tablet  Take 1 tablet by mouth every 8 hours as needed for Pain             oxyCODONE-acetaminophen (PERCOCET) 5-325 MG per tablet  Take 1 tablet by mouth every 8 hours as needed for Pain for up to 30 days.              pantoprazole (PROTONIX) 40 MG tablet  TAKE ONE TABLET BY MOUTH EVERY MORNING BEFORE BREAKFAST             propranolol (INDERAL LA) 80 MG extended release capsule  Take 1 capsule by mouth daily                   Medications marked \"taking\" at this time  No outpatient medications have been marked as taking for the 1/29/21 encounter (Office Visit) with Cele Dobbins DO.        Medications patient taking as of now reconciled against medications ordered at time of hospital discharge: Yes    Chief Complaint   Patient presents with    Follow-Up from [address]:     PATIENT NAME:  Nazario Benitez  YOB: 1960  MEDICAL RECORD NO. [id number]  DATE: 01/15/21  PRIMARY CARE PHYSICIAN: Windy Soni DO  ADMIT DATE:  1/13/2021                    DISCHARGE DATE:  1/15/2021  DISPOSITION:  home  ADMITTING DIAGNOSIS:   Fall down steps 6-7 feet     DIAGNOSIS:   Patient Active Problem List  Diagnosis   Adult ADHD   Narcolepsy   Moderate persistent asthma without complication   History of DVT (deep vein thrombosis)   Hiatal hernia   Dysphagia   Cervical radiculopathy   Ganglion cyst   Chronic pain of left ankle   Acquired trigger finger   Osteoarthritis of knee   Osteoarthritis of wrist   Pes anserinus bursitis   Plantar fasciitis   Flexion contracture of joint of foot, left   Essential hypertension   Chronic anticoagulation   Iron deficiency anemia due to chronic blood loss   GERD (gastroesophageal reflux disease)   Absolute anemia   Anemia   Anxiety   Vitamin B12 deficiency   Iron malabsorption   Fall at home, initial encounter   Closed fracture of multiple ribs of right side   Hemothorax on right   Contusion of right lung        CONSULTANTS:  none     PROCEDURES:   none     HOSPITAL COURSE:  Nazario Benitez is a 57 y.o. female who was admitted on 1/13/2021  Hospital Course:  Patient admitted for rib fractures w/rib score of 10 for smoking, age, small hemothorax, pain and weak cough. Her activity and pain improved, IS >1L. Participated in therapy. Weaned off oxygen sats>88%     Labs and imaging were followed daily.       On day of discharge Nazario Benitez  was tolerating a regular diet  had adequate analgeia on oral medications  had no signs of complication.   She was deemed medically stable for discharged to Home           PHYSICAL EXAMINATION:       Discharge Vitals: ill-appearing, toxic-appearing or diaphoretic. HENT:      Head: Normocephalic and atraumatic. Right Ear: Tympanic membrane, ear canal and external ear normal. There is no impacted cerumen. Left Ear: Tympanic membrane, ear canal and external ear normal. There is no impacted cerumen. Nose: Nose normal. No congestion or rhinorrhea. Mouth/Throat:      Mouth: Mucous membranes are moist.      Pharynx: Oropharynx is clear. No oropharyngeal exudate or posterior oropharyngeal erythema. Eyes:      General: No scleral icterus. Right eye: No discharge. Left eye: No discharge. Extraocular Movements: Extraocular movements intact. Conjunctiva/sclera: Conjunctivae normal.      Pupils: Pupils are equal, round, and reactive to light. Neck:      Musculoskeletal: Normal range of motion and neck supple. Cardiovascular:      Rate and Rhythm: Normal rate and regular rhythm. Pulses: Normal pulses. Heart sounds: Normal heart sounds. No murmur. No friction rub. No gallop. Pulmonary:      Effort: Pulmonary effort is normal. No respiratory distress. Breath sounds: Normal breath sounds. No stridor. No wheezing, rhonchi or rales. Chest:      Chest wall: Tenderness present. Abdominal:      General: Abdomen is flat. Bowel sounds are normal. There is no distension. Palpations: Abdomen is soft. There is no mass. Tenderness: There is no abdominal tenderness. There is no right CVA tenderness, left CVA tenderness, guarding or rebound. Hernia: No hernia is present. Skin:     General: Skin is warm. Capillary Refill: Capillary refill takes less than 2 seconds. Coloration: Skin is not jaundiced or pale. Findings: No bruising, erythema, lesion or rash. Neurological:      General: No focal deficit present. Mental Status: She is alert and oriented to person, place, and time. Mental status is at baseline.       Cranial Nerves: No cranial nerve deficit. Sensory: No sensory deficit. Motor: No weakness. Coordination: Coordination normal.      Gait: Gait normal.      Deep Tendon Reflexes: Reflexes normal.   Psychiatric:         Mood and Affect: Mood normal.         Behavior: Behavior normal.         Thought Content:  Thought content normal.         Judgment: Judgment normal.             Assessment/Plan:  Problem List Items Addressed This Visit        Nervous and Auditory    Cervical radiculopathy     Advised that she will need to establish with Pain management for refills of pain med - will cover with refills  Med contract signed and UDS ordered         Relevant Orders    Rossy Pierce MD, Pain Management, [place]    DRUG SCREEN, PAIN       Musculoskeletal and Integument    Closed fracture of multiple ribs of right side     Reviewed hospital note  Educated         Relevant Orders    HI DISCHARGE MEDS RECONCILED W/ CURRENT OUTPATIENT MED LIST (Completed)       Other    Adult ADHD     Advised that she will need to establish with [facility] for refills of Adderall - will cover with refills  Med contract signed and UDS ordered         Relevant Orders    Amb External Referral To Psychiatry    Anxiety     Stable  F[facility]u with [facility]         Relevant Orders    Amb External Referral To Psychiatry      Other Visit Diagnoses     Fall, subsequent encounter    -  Primary    hospital note reviewed    Relevant Orders    HI DISCHARGE MEDS RECONCILED W/ CURRENT OUTPATIENT MED LIST (Completed)    Acute sinusitis, recurrence not specified, unspecified location        Flonase    Relevant Medications    fluticasone (FLONASE) 50 MCG/ACT nasal spray    amoxicillin-clavulanate (AUGMENTIN) 875-125 MG per tablet              Medical Decision Making: high complexity

## 2021-01-29 NOTE — LETTER
CONTROLLED SUBSTANCE MEDICATION AGREEMENT     Patient Name: Judy Black  Patient YOB: 1960   I understand, that controlled substance medications may be used to help better manage my symptoms and to improve my ability to function at home, work and in social settings. However, I also understand that these medications do have risks, which have been discussed with me, including possible development of physical or psychological dependence. I understand that successful treatment requires mutual trust and honesty between me and my provider. I understand and agree that following this Medication Agreement is necessary in continuing my provider-patient relationship and the success of my treatment plan. Explanation from my Provider: Benefits and Goals of Controlled Substance Medications: There are two potential goals for your treatment: (1) decreased pain and suffering (2) improved daily life functions. There are many possible treatments for your chronic condition(s). Alternatives such as physical therapy, yoga, massage, home daily exercise, meditation, relaxation techniques, injections, chiropractic manipulations, surgery, cognitive therapy, hypnosis and many medications that are not habit-forming may be used. Use of controlled substance medications may be helpful, but they are unlikely to resolve all symptoms or restore all function. Explanation from my Provider: Risks of Controlled Substance Medications:  Opioid pain medications: These medications can lead to problems such as addiction/dependence, sedation, lightheadedness/dizziness, memory issues, falls, constipation, nausea, or vomiting. They may also impair the ability to drive or operate machinery. Additionally, these medications may lower testosterone levels, leading to loss of bone strength, stamina and sex drive.   They may cause problems with breathing, sleep apnea and reduced coughing, which is especially dangerous for patients with lung disease. Overdose or dangerous interactions with alcohol and other medications may occur, leading to death. Hyperalgesia may develop, which means patients receiving opioids for the treatment of pain may become more sensitive to certain painful stimuli, and in some cases, experience pain from ordinarily non-painful stimuli. Women between the ages of 14-53 who could become pregnant should carefully weigh the risks and benefits of opioids with their physicians, as these medications increase the risk of pregnancy complications, including miscarriage,  delivery and stillbirth. It is also possible for babies to be born addicted to opioids. Opioid dependence withdrawal symptoms may include; feelings of uneasiness, increased pain, irritability, belly pain, diarrhea, sweats and goose-flesh. Benzodiazepines and non-benzodiazepine sleep medications: These medications can lead to problems such as addiction/dependence, sedation, fatigue, lightheadedness, dizziness, incoordination, falls, depression, hallucinations, and impaired judgment, memory and concentration. The ability to drive and operate machinery may also be affected. Abnormal sleep-related behaviors have been reported, including sleepwalking, driving, making telephone calls, eating, or having sex while not fully awake. These medications can suppress breathing and worsen sleep apnea, particularly when combined with alcohol or other sedating medications, potentially leading to death. Dependence withdrawal symptoms may include tremors, anxiety, hallucinations and seizures. Stimulants:  Common adverse effects include addiction/dependence, increased blood  pressure and heart rate, decreased appetite, nausea, involuntary weight loss, insomnia,                                                                                                                     Initials:_______   irritability, and headaches.   These risks may increase when these written agreement among other prescribers of controlled substances outlining the responsibility of the medications being prescribed.  I understand that the if the controlled medication is not helping to achieve goals, the dosage may be tapered and no longer prescribed. 3. MY RESPONSIBILITY FOR COMMUNICATION / PRESCRIPTION RENEWALS   I agree that all controlled substance medications that I take will be prescribed only by my provider. If another healthcare provider prescribes me medication in an emergency, I will notify my provider within seventy-two (72) hours.  I will arrange for refills at the prescribed interval ONLY during regular office hours. I will not ask for refills earlier than agreed, after-hours, on holidays or weekends. Refills may take up to 72 hours for processing and prescriptions to reach the pharmacy.  I will inform my other health care providers that I am taking these medications and of the existence of this [address]. In the event of an emergency, I will provide the same information to the emergency department prescribers.  I will keep my provider updated on the pharmacy I am using for controlled medication prescription filling. Initials:_______  4. MY RESPONSIBILITY FOR PROTECTING MEDICATIONS   I will protect my prescriptions and medications. I understand that lost or misplaced prescriptions will not be replaced.  I will keep medications only for my own use and will not share them with others. I will keep all medications away from children.  I agree that if my medications are adjusted or discontinued, I will properly dispose of any remaining medications. I understand that I will be required to dispose of any remaining controlled medications as, directed by my prescriber, prior to being provided with any prescriptions for other controlled medications.   Medication drop box locations can be found at: [employer]Prote[employer].dk    5. MY RESPONSIBILITY WITH ILLEGAL DRUGS    I will not use illegal or street drugs or another person's prescription medications not prescribed to me.  If there are identified addiction type symptoms, then referral to a program may be provided by my provider and I agree to follow through with this recommendation. 6. MY RESPONSIBILITY FOR COOPERATION WITH INVESTIGATIONS   I understand that my provider will comply with any applicable law and may discuss my use and/or possible misuse/abuse of controlled substances and alcohol, as appropriate, with any health care provider involved in my care, pharmacist, or legal authority.  I authorize my provider and pharmacy to cooperate fully with law enforcement agencies (as permitted by law) in the investigation of any possible misuse, sale, or other diversion of my controlled substances.  I agree to waive any applicable privilege or right of privacy or confidentiality with respect to these authorizations. 7. PROVIDERS RIGHT TO MONITOR FOR SAFETY: PRESCRIPTION MONITORING / DRUG TESTING   I consent to drug/toxicology screening and will submit to a drug screen upon my providers request to assure I am only taking the prescribed drugs for my safety monitoring. I understand that a drug screen is a laboratory test in which a sample of my urine, blood or saliva is checked to see what drugs I have been taking. This may entail an observed urine specimen, which means that a nurse or other health care provider may watch me provide urine, and I will cooperate if I am asked to provide an observed specimen.  I understand that my provider will check a copy of my State Prescription Monitoring Program () Report in order to safely prescribe medications.  Pill Counts: I consent to pill counts when requested.   I may be asked to bring all my prescribed controlled substance medications, in their original bottles, to all of my scheduled appointments. In addition, my provider may ask me to come to the practice at any time for a random pill count. 8. TERMINATION OF THIS AGREEMENT  For my safety, my prescriber has the right to stop prescribing controlled substance medications and may end this agreement. Initials:_______   Conditions that may result in termination of this agreement:  a. I do not show any improvement in pain, or my activity has not improved. b. I develop rapid tolerance or loss of improvement, as described in my treatment plan.  c. I develop significant side effects from the medication. d. My behavior is not consistent with the responsibilities outlined above, thereby causing safety concerns to continue prescribing controlled substance medications. e. I fail to follow the terms of this agreement. f. Other:____________________________       UNDERSTANDING THIS MEDICATION AGREEMENT:    I have read the above and have had all my questions answered. For chronic disease management, I know that my symptoms can be managed with many types of treatments. A chronic medication trial may be part of my treatment, but I must be an active participant in my care. Medication therapy is only one part of my symptom management plan. In some cases, there may be limited scientific evidence to support the chronic use of certain medications to improve symptoms and daily function. Furthermore, in certain circumstances, there may be scientific information that suggests that the use of chronic controlled substances may worsen my symptoms and increase my risk of unintentional death directly related to this medication therapy. I know that if my provider feels my risk from controlled medications is greater than my benefit, I will have my controlled substance medication(s) compassionately lowered or removed altogether.      I further agree to allow this office to contact my HIPAA contact if there are concerns about my safety and use of the controlled medications. I have agreed to use the prescribed controlled substance medications to me as instructed by my provider and as stated in this Medication Agreement. My initial on each page and my signature below shows that I have read each page and I have had the opportunity to ask questions with answers provided by my provider.     Patient Name (Printed): _____________________________________  Patient Signature:  ______________________   Date: _____________    Prescriber Name (Printed): ___________________________________  Prescriber Signature: _____________________  Date: _____________

## 2021-01-29 NOTE — PATIENT INSTRUCTIONS
Patient Education        Broken Rib: Care Instructions  Your Care Instructions     A broken rib is a crack or break in one of the bones of the rib cage. Breathing can be very painful because the muscles used for breathing pull on the rib. In most cases, a broken rib will heal on its own. You can take pain medicine while the rib mends. Pain relief allows you to take deep breaths. In the past, doctors recommended taping or wrapping broken ribs. This is no longer done because taping makes it hard for you to take deep breaths. Taking deep breaths may help prevent pneumonia or a partial collapse of a lung. Your rib will heal in about 6 weeks. You heal best when you take good care of yourself. Eat a variety of healthy foods, and don't smoke. Follow-up care is a key part of your treatment and safety. Be sure to make and go to all appointments, and call your doctor if you are having problems. It's also a good idea to know your test results and keep a list of the medicines you take. How can you care for yourself at home? · Be safe with medicines. Read and follow all instructions on the label. ? If the doctor gave you a prescription medicine for pain, take it as prescribed. ? If you are not taking a prescription pain medicine, ask your doctor if you can take an over-the-counter medicine. · Even if it hurts, try to cough or take the deepest breath you can at least once every hour. This will get air deeply into your lungs. This may reduce your chance of getting pneumonia or a partial collapse of a lung. Hold a pillow against your chest to make this less painful. · Put ice or a cold pack on the area for 10 to 20 minutes at a time. Put a thin cloth between the ice and your skin. When should you call for help? Call 911 anytime you think you may need emergency care. For example, call if:    · You have severe trouble breathing.    Call your doctor now or seek immediate medical care if:   · You have some trouble breathing.     · You have a fever.     · You have a new or worse cough. Watch closely for changes in your health, and be sure to contact your doctor if:    · You have pain even after taking your medicine.     · You do not get better as expected. Where can you learn more? Go to [email or web address] org and sign in to your [employer] account. Enter M135 in the [employer] box to learn more about \"Broken Rib: Care Instructions. \"     If you do not have an account, please click on the \"Sign Up Now\" link. Current as of: March 2, 2020               Content Version: 12.6  © 3791-8600 [employer]. Care instructions adapted under license by [facility] ([facility]). If you have questions about a medical condition or this instruction, always ask your healthcare professional. [address] any warranty or liability for your use of this information.

## 2021-02-03 ENCOUNTER — TELEPHONE (OUTPATIENT)
Dept: FAMILY MEDICINE CLINIC | Age: 61
End: 2021-02-03

## 2021-02-03 LAB
6-ACETYLMORPHINE, UR: NOT DETECTED
7-AMINOCLONAZEPAM, URINE: NOT DETECTED
ALPHA-OH-ALPRAZ, URINE: NOT DETECTED
ALPRAZOLAM, URINE: NOT DETECTED
AMPHETAMINES, URINE: PRESENT
BARBITURATES, URINE: NOT DETECTED
BENZOYLECGONINE, UR: NOT DETECTED
BUPRENORPHINE URINE: NOT DETECTED
CARISOPRODOL, UR: NOT DETECTED
CLONAZEPAM, URINE: NOT DETECTED
CODEINE, URINE: NOT DETECTED
CREATININE URINE: 121.3 MG/DL (ref 20–400)
DIAZEPAM, URINE: NOT DETECTED
DRUGS EXPECTED, UR: NORMAL
EER HI RES INTERP UR: NORMAL
ETHYL GLUCURONIDE UR: NOT DETECTED
FENTANYL URINE: NOT DETECTED
HYDROCODONE, URINE: NOT DETECTED
HYDROMORPHONE, URINE: NOT DETECTED
LORAZEPAM, URINE: NOT DETECTED
MARIJUANA METAB, UR: NOT DETECTED
MDA, UR: NOT DETECTED
MDEA, EVE, UR: NOT DETECTED
MDMA URINE: NOT DETECTED
MEPERIDINE METAB, UR: NOT DETECTED
METHADONE, URINE: NOT DETECTED
METHAMPHETAMINE, URINE: NOT DETECTED
METHYLPHENIDATE: NOT DETECTED
MIDAZOLAM, URINE: NOT DETECTED
MORPHINE URINE: NOT DETECTED
NORBUPRENORPHINE, URINE: NOT DETECTED
NORDIAZEPAM, URINE: NOT DETECTED
NORFENTANYL, URINE: NOT DETECTED
NORHYDROCODONE, URINE: NOT DETECTED
NOROXYCODONE, URINE: NOT DETECTED
NOROXYMORPHONE, URINE: NOT DETECTED
OXAZEPAM, URINE: NOT DETECTED
OXYCODONE URINE: NOT DETECTED
OXYMORPHONE, URINE: NOT DETECTED
PAIN MANAGEMENT DRUG PANEL INTERP, URINE: NORMAL
PAIN MGT DRUG PANEL, HI RES, UR: NORMAL
PCP,URINE: NOT DETECTED
PHENTERMINE, UR: NOT DETECTED
PROPOXYPHENE, URINE: NOT DETECTED
TAPENTADOL, URINE: NOT DETECTED
TAPENTADOL-O-SULFATE, URINE: NOT DETECTED
TEMAZEPAM, URINE: NOT DETECTED
TRAMADOL, URINE: NOT DETECTED
ZOLPIDEM, URINE: NOT DETECTED

## 2021-02-17 DIAGNOSIS — K21.9 GASTROESOPHAGEAL REFLUX DISEASE, UNSPECIFIED WHETHER ESOPHAGITIS PRESENT: ICD-10-CM

## 2021-02-17 RX ORDER — FAMOTIDINE 40 MG/1
TABLET, FILM COATED ORAL
Qty: 30 TABLET | Refills: 3 | Status: SHIPPED | OUTPATIENT
Start: 2021-02-17 | End: 2021-03-04 | Stop reason: ALTCHOICE

## 2021-02-17 NOTE — TELEPHONE ENCOUNTER
Diya Dumont is calling to request a refill on the following medication(s):    Medication Request:  Requested Prescriptions     Pending Prescriptions Disp Refills    famotidine (PEPCID) 40 MG tablet [Pharmacy Med Name: FAMOTIDINE 40 MG TABLET] 30 tablet 0     Sig: TAKE ONE TABLET BY MOUTH ONCE NIGHTLY AS NEEDED FOR STOMACH UPSET       Last Visit Date (If Applicable):  2/91/6852    Next Visit Date:    3/3/2021

## 2021-03-03 ENCOUNTER — HOSPITAL ENCOUNTER (OUTPATIENT)
Dept: GENERAL RADIOLOGY | Age: 61
Discharge: HOME | End: 2021-03-05
Payer: MEDICARE

## 2021-03-03 ENCOUNTER — OFFICE VISIT (OUTPATIENT)
Dept: FAMILY MEDICINE CLINIC | Age: 61
End: 2021-03-03
Payer: MEDICARE

## 2021-03-03 ENCOUNTER — HOSPITAL ENCOUNTER (OUTPATIENT)
Age: 61
Discharge: HOME | End: 2021-03-05
Payer: MEDICARE

## 2021-03-03 ENCOUNTER — TELEPHONE (OUTPATIENT)
Dept: PAIN MANAGEMENT | Age: 61
End: 2021-03-03

## 2021-03-03 VITALS
HEART RATE: 66 BPM | SYSTOLIC BLOOD PRESSURE: 120 MMHG | WEIGHT: 150.8 LBS | TEMPERATURE: 97.4 F | OXYGEN SATURATION: 95 % | DIASTOLIC BLOOD PRESSURE: 80 MMHG | BODY MASS INDEX: 29.45 KG/M2

## 2021-03-03 DIAGNOSIS — S22.41XD CLOSED FRACTURE OF MULTIPLE RIBS OF RIGHT SIDE WITH ROUTINE HEALING, SUBSEQUENT ENCOUNTER: Primary | ICD-10-CM

## 2021-03-03 DIAGNOSIS — F41.1 GENERALIZED ANXIETY DISORDER: ICD-10-CM

## 2021-03-03 DIAGNOSIS — F90.9 ADULT ADHD: ICD-10-CM

## 2021-03-03 DIAGNOSIS — M25.512 ACUTE PAIN OF LEFT SHOULDER: ICD-10-CM

## 2021-03-03 DIAGNOSIS — F90.9 ATTENTION DEFICIT HYPERACTIVITY DISORDER (ADHD), UNSPECIFIED ADHD TYPE: ICD-10-CM

## 2021-03-03 DIAGNOSIS — S22.41XD CLOSED FRACTURE OF MULTIPLE RIBS OF RIGHT SIDE WITH ROUTINE HEALING, SUBSEQUENT ENCOUNTER: ICD-10-CM

## 2021-03-03 PROBLEM — G47.30 SLEEP APNEA: Status: ACTIVE | Noted: 2021-03-03

## 2021-03-03 PROBLEM — M25.579 PAIN IN JOINT INVOLVING ANKLE AND FOOT: Status: ACTIVE | Noted: 2021-03-03

## 2021-03-03 PROBLEM — G93.32 CHRONIC FATIGUE SYNDROME: Status: ACTIVE | Noted: 2021-03-03

## 2021-03-03 PROCEDURE — 71101 X-RAY EXAM UNILAT RIBS/CHEST: CPT

## 2021-03-03 PROCEDURE — 99214 OFFICE O/P EST MOD 30 MIN: CPT | Performed by: FAMILY MEDICINE

## 2021-03-03 PROCEDURE — 73030 X-RAY EXAM OF SHOULDER: CPT

## 2021-03-03 RX ORDER — DEXTROAMPHETAMINE SACCHARATE, AMPHETAMINE ASPARTATE MONOHYDRATE, DEXTROAMPHETAMINE SULFATE AND AMPHETAMINE SULFATE 7.5; 7.5; 7.5; 7.5 MG/1; MG/1; MG/1; MG/1
30 CAPSULE, EXTENDED RELEASE ORAL 2 TIMES DAILY
Qty: 60 CAPSULE | Refills: 0 | Status: CANCELLED | OUTPATIENT
Start: 2021-03-03 | End: 2021-04-02

## 2021-03-03 RX ORDER — DEXTROAMPHETAMINE SACCHARATE, AMPHETAMINE ASPARTATE, DEXTROAMPHETAMINE SULFATE AND AMPHETAMINE SULFATE 5; 5; 5; 5 MG/1; MG/1; MG/1; MG/1
20 TABLET ORAL 2 TIMES DAILY
Qty: 60 TABLET | Refills: 0 | Status: SHIPPED | OUTPATIENT
Start: 2021-03-10 | End: 2021-03-11

## 2021-03-03 RX ORDER — OXYCODONE HYDROCHLORIDE AND ACETAMINOPHEN 5; 325 MG/1; MG/1
TABLET ORAL
COMMUNITY
Start: 2021-02-08 | End: 2021-03-05 | Stop reason: SDUPTHER

## 2021-03-03 ASSESSMENT — PATIENT HEALTH QUESTIONNAIRE - PHQ9
SUM OF ALL RESPONSES TO PHQ QUESTIONS 1-9: 0
SUM OF ALL RESPONSES TO PHQ QUESTIONS 1-9: 0
2. FEELING DOWN, DEPRESSED OR HOPELESS: 0
SUM OF ALL RESPONSES TO PHQ QUESTIONS 1-9: 0

## 2021-03-03 NOTE — TELEPHONE ENCOUNTER
Marylene Ebbs is calling to request a refill on the following medication(s):    Medication Request:  Requested Prescriptions     Pending Prescriptions Disp Refills    amphetamine-dextroamphetamine (ADDERALL XR) 30 MG extended release capsule 60 capsule 0     Sig: Take 1 capsule by mouth 2 times daily for 30 days.        Last Visit Date (If Applicable):  2/2/7747    Next Visit Date:    3/17/2021

## 2021-03-03 NOTE — PATIENT INSTRUCTIONS
Patient Education        Shoulder Stretches: Exercises  Introduction  Here are some examples of exercises for you to try. The exercises may be suggested for a condition or for rehabilitation. Start each exercise slowly. Ease off the exercises if you start to have pain. You will be told when to start these exercises and which ones will work best for you. How to do the exercises  Shoulder stretch   1.  a doorway and place one arm against the door frame. Your elbow should be a little higher than your shoulder. 2. Relax your shoulders as you lean forward, allowing your chest and shoulder muscles to stretch. You can also turn your body slightly away from your arm to stretch the muscles even more. 3. Hold for 15 to 30 seconds. 4. Repeat 2 to 4 times with each arm. Shoulder and chest stretch   1. Shoulder and chest stretch  2. While sitting, relax your upper body so you slump slightly in your chair. 3. As you breathe in, straighten your back and open your arms out to the sides. 4. Gently pull your shoulder blades back and downward. 5. Hold for 15 to 30 seconds as your breathe normally. 6. Repeat 2 to 4 times. Overhead stretch   1. Reach up over your head with both arms. 2. Hold for 15 to 30 seconds. 3. Repeat 2 to 4 times. Follow-up care is a key part of your treatment and safety. Be sure to make and go to all appointments, and call your doctor if you are having problems. It's also a good idea to know your test results and keep a list of the medicines you take. Where can you learn more? Go to [email or web address] org and sign in to your [employer] account. Enter S254 in the [employer] logo box to learn more about \"Shoulder Stretches: Exercises. \"     If you do not have an account, please click on the \"Sign Up Now\" link. Current as of: March 2, 2020               Content Version: 12.6  © 3609-5451 [employer].

## 2021-03-04 ENCOUNTER — TELEPHONE (OUTPATIENT)
Dept: FAMILY MEDICINE CLINIC | Age: 61
End: 2021-03-04

## 2021-03-04 ENCOUNTER — INITIAL CONSULT (OUTPATIENT)
Dept: PAIN MANAGEMENT | Age: 61
End: 2021-03-04
Payer: MEDICARE

## 2021-03-04 ENCOUNTER — HOSPITAL ENCOUNTER (OUTPATIENT)
Age: 61
Setting detail: SPECIMEN
Discharge: HOME | End: 2021-03-04
Payer: MEDICARE

## 2021-03-04 VITALS
BODY MASS INDEX: 29.45 KG/M2 | HEART RATE: 73 BPM | HEIGHT: 60 IN | TEMPERATURE: 98.3 F | SYSTOLIC BLOOD PRESSURE: 149 MMHG | DIASTOLIC BLOOD PRESSURE: 100 MMHG

## 2021-03-04 DIAGNOSIS — G89.29 OTHER CHRONIC PAIN: ICD-10-CM

## 2021-03-04 DIAGNOSIS — Z79.891 ENCOUNTER FOR LONG-TERM OPIATE ANALGESIC USE: ICD-10-CM

## 2021-03-04 DIAGNOSIS — M47.812 CERVICAL SPONDYLOSIS: ICD-10-CM

## 2021-03-04 DIAGNOSIS — M25.512 CHRONIC LEFT SHOULDER PAIN: ICD-10-CM

## 2021-03-04 DIAGNOSIS — M47.817 LUMBOSACRAL SPONDYLOSIS WITHOUT MYELOPATHY: Primary | ICD-10-CM

## 2021-03-04 DIAGNOSIS — G89.29 CHRONIC LEFT SHOULDER PAIN: ICD-10-CM

## 2021-03-04 PROCEDURE — 99204 OFFICE O/P NEW MOD 45 MIN: CPT | Performed by: PAIN MEDICINE

## 2021-03-04 ASSESSMENT — ENCOUNTER SYMPTOMS
HEMOPTYSIS: 0
SPUTUM PRODUCTION: 0
PERSISTENT INFECTIONS: 1
ABDOMINAL PAIN: 0
NAUSEA: 0
BLOATING: 0
TROUBLE SWALLOWING: 0
EYES NEGATIVE: 1
RESPIRATORY NEGATIVE: 1
VISUAL CHANGE: 0
BACK PAIN: 1
HEMATOCHEZIA: 1
CHANGE IN BOWEL HABIT: 1
POOR WOUND HEALING: 1
ABDOMINAL PAIN: 1
BLURRED VISION: 1
ORTHOPNEA: 0
GASTROINTESTINAL NEGATIVE: 1
COUGH: 0
VOMITING: 0
BACK PAIN: 1
BOWEL INCONTINENCE: 0
PHOTOPHOBIA: 0
SHORTNESS OF BREATH: 0
WHEEZING: 1
ALLERGIC/IMMUNOLOGIC NEGATIVE: 1

## 2021-03-04 NOTE — TELEPHONE ENCOUNTER
Pt says she saw pain management today, and she will  Need a refill of pain med on the 8th. She said you told her to call.  She will call to remind us

## 2021-03-04 NOTE — PROGRESS NOTES
Chronic pain has been ongoing for some time. CT scan of the cervical spine with multilevel degenerative changes and disc bulging. Discussed lumbar spinal degenerative changes. She was taking Percocet as needed from her primary care physician who is no longer practicing. Patient denies any new neurological symptoms. No bowel or bladder incontinence, no weakness, and no falling. Review of OARRS does not show any aberrant prescription behavior. Medication is helping the patient stay active. Patient denies any side effects and reports adequate analgesia. No sign of misuse/abuse. Past Medical History:   Diagnosis Date    Acid reflux     Adult ADHD 2012    Asthma 2012    Bipolar 1 disorder ([facility]     Broken foot     Left foot    Chest pain of uncertain etiology 6279    Last episode was in  (Written 2019)    Depression     Under control per pt.  on 6/15/18    DVT of leg (deep venous thrombosis) ([facility] [address] [facility] 2014    Fracture     right foot / pt denies surgical intervention    Helicobacter pylori (H. pylori) infection     per EGD    Hematuria 2018    Has a follow-up with Urologist     Hiatal hernia     History of blood transfusion     History of DVT (deep vein thrombosis) 2012    Hx of blood clots     Hypertension     Internal hemorrhoids     Narcolepsy 2012    Osteoporosis        Past Surgical History:   Procedure Laterality Date    ANKLE SURGERY Left 2018    mass excision    ARM SURGERY Right     Pt states she has had 13 right arm surgeries    BREAST ENHANCEMENT SURGERY Bilateral      SECTION      x 3    COLONOSCOPY  2013    sigmoid diverticula, prominent large internal hemorrhoid    COLONOSCOPY N/A 2/10/2020    COLONOSCOPY DIAGNOSTIC performed by Pau Parisi MD at [facility] 2020    COLORECTAL CANCER SCREENING, NOT HIGH RISK performed by Teena Morley MD at [address] [place] [address] Rd, COLON, DIAGNOSTIC  ESOPHAGEAL MOTILITY STUDY N/A 8/12/2020    PES RN - ESOPHAGEAL MOTILITY STUDY performed by Aurora Elizalde DO at [facility]    ESOPHAGEAL MOTILITY STUDY  9/10/2020    ESOPHAGEAL MOTILITY STUDY performed by Aurora Elizalde DO at [facility]    ESOPHAGEAL MOTILITY STUDY N/A 12/18/2020    ESOPHAGEAL MOTILITY STUDY ATTEMPTED PER DR. Obdulia Crews WITHOUT SUCCESS performed by Aurora Elizalde DO at [facility]    FOOT SURGERY Left 2015    FOOT TENDON SURGERY Left 02/22/2019    LEFT PERONEAL TENDON REPAIR WITH POSSIBLE LEFT TENDON TRANSFER (Left )    KNEE ARTHROSCOPY Left     Two surgeries on left knee per pt    KNEE SURGERY Right     Three surgeries per pt    LIGAMENT REPAIR Left 2/22/2019    LEFT PERONEAL TENDON REPAIR performed by Km Maher DPM at [facility] OFFICE/OUTPT VISIT,PROCEDURE ONLY Left 6/29/2018    RESECTION/REMOVAL BONY NEOPLASM LEFT FIBULA, LEFT SOFT TISSUE MASS EXCISION WITH BONE BIOPSY LEFT ANKLE FIBULA performed by Km Maher DPM at [address] Left     UPPER GASTROINTESTINAL ENDOSCOPY  5/23/2013    tertiary contractures esophagus, 3 to 4 cm hiatal hernia, antral gastritis, + H Pylori, mild active chronic esophagitis    UPPER GASTROINTESTINAL ENDOSCOPY  4/6/2016    one biopsy    UPPER GASTROINTESTINAL ENDOSCOPY N/A 2/10/2020    EGD BIOPSY performed by Carolynn Srinivasan MD at [facility]         Allergies   Allergen Reactions    Latex Itching    Prozac [Fluoxetine Hcl]     Sulfa Antibiotics      migraine    Codeine Itching         Current Outpatient Medications:     oxyCODONE-acetaminophen (PERCOCET) 5-325 MG per tablet, , Disp: , Rfl:     [START ON 3/10/2021] amphetamine-dextroamphetamine (ADDERALL, 20MG,) 20 MG tablet, Take 1 tablet by mouth 2 times daily for 30 days. , Disp: 60 tablet, Rfl: 0   propranolol (INDERAL LA) 80 MG extended release capsule, Take 1 capsule by mouth daily, Disp: 90 capsule, Rfl: 1    buPROPion 450 MG TB24, Take 450 mg by mouth every morning, Disp: 30 tablet, Rfl: 2    pantoprazole (PROTONIX) 40 MG tablet, TAKE ONE TABLET BY MOUTH EVERY MORNING BEFORE BREAKFAST, Disp: 90 tablet, Rfl: 0    albuterol sulfate HFA (VENTOLIN HFA) 108 (90 Base) MCG/ACT inhaler, INHALE TWO PUFFS BY MOUTH EVERY 4 HOURS AS NEEDED FOR WHEEZING, Disp: 1 Inhaler, Rfl: 5    citalopram (CELEXA) 40 MG tablet, TAKE ONE TABLET BY MOUTH DAILY, Disp: 90 tablet, Rfl: 3    albuterol (PROVENTIL) (2.5 MG/3ML) 0.083% nebulizer solution, Take 2.5 mg by nebulization every 6 hours as needed for Wheezing or Shortness of Breath, Disp: , Rfl:     Calcium Carbonate (CALCIUM 600 PO), Take 600 mg by mouth daily, Disp: , Rfl:     cyclobenzaprine (FLEXERIL) 10 MG tablet, TAKE 1/2 TO 1 TABLET BY MOUTH THREE TIMES A DAY AS NEEDED FOR MUSCLE SPASMS (Patient not taking: Reported on 3/4/2021), Disp: 30 tablet, Rfl: 1    cyanocobalamin 1000 MCG tablet, Take 1 tablet by mouth daily (Patient not taking: Reported on 3/4/2021), Disp: 30 tablet, Rfl: 3    Family History   Problem Relation Age of Onset    Cancer Mother     Hypertension Mother     Stroke Mother     Cancer Maternal Aunt     Hypertension Father     Heart Surgery Father     Diabetes Neg Hx        Social History     Socioeconomic History    Marital status: Single     Spouse name: Not on file    Number of children: Not on file    Years of education: Not on file    Highest education level: Not on file   Occupational History    Not on file   Social Needs    Financial resource strain: Somewhat hard    Food insecurity     Worry: Sometimes true     Inability: Sometimes true    Transportation needs     Medical: No     Non-medical: No   Tobacco Use    Smoking status: Former Smoker     Packs/day: 1.00     Years: 20.00     Pack years: 20.00     Quit date: 1989 Years since quittin.1    Smokeless tobacco: Never Used   Substance and Sexual Activity    Alcohol use: Yes     Comment: rare    Drug use: No    Sexual activity: Not Currently   Lifestyle    Physical activity     Days per week: 0 days     Minutes per session: 0 min    Stress: To some extent   Relationships    Social connections     Talks on phone: Three times a week     Gets together: Once a week     Attends Holiness service: 1 to 4 times per year     Active member of club or organization: Yes     Attends meetings of clubs or organizations: 1 to 4 times per year     Relationship status:     Intimate partner violence     Fear of current or ex partner: Not on file     Emotionally abused: Not on file     Physically abused: Not on file     Forced sexual activity: Not on file   Other Topics Concern    Not on file   Social History Narrative    Not on file       Review of Systems:  Review of Systems   Constitution: Positive for malaise/fatigue. Negative for fever and weight loss. HENT: Positive for congestion. Negative for trouble swallowing. Eyes: Positive for blurred vision. Negative for photophobia. Cardiovascular: Negative. Negative for chest pain and syncope. Respiratory: Positive for wheezing. Endocrine: Negative. Hematologic/Lymphatic: Positive for bleeding problem. Skin: Positive for poor wound healing. Musculoskeletal: Positive for back pain, falls and neck pain. Gastrointestinal: Positive for abdominal pain, change in bowel habit and hematochezia. Negative for bloating and bowel incontinence. Genitourinary: Negative. Negative for bladder incontinence, dysuria and pelvic pain. Neurological: Positive for headaches, numbness and weakness. Negative for paresthesias and tingling. Psychiatric/Behavioral: The patient is nervous/anxious. Allergic/Immunologic: Positive for environmental allergies and persistent infections.        Physical Exam: BP (!) 149/100   Pulse 73   Temp 98.3 °F (36.8 °C) (Infrared)   Ht 5' (1.524 m)   BMI 29.45 kg/m²     Physical Exam  Constitutional:       Appearance: Normal appearance. Pulmonary:      Effort: Pulmonary effort is normal.   Neurological:      Mental Status: She is alert. Psychiatric:         Attention and Perception: Attention and perception normal.         Mood and Affect: Mood and affect normal.         Record/Diagnostics Review:    As above, I did review the imaging    Orders:  Orders Placed This Encounter   Procedures    MRI CERVICAL SPINE WO CONTRAST    MRI LUMBAR SPINE WO CONTRAST    XR LUMBAR SPINE FLEXION AND EXTENSION ONLY    DRUG SCREEN, PAIN       Assessment:  1. Lumbosacral spondylosis without myelopathy    2. Cervical spondylosis    3. Other chronic pain    4. Encounter for long-term opiate analgesic use    5. Chronic left shoulder pain        Treatment Plan:  DISCUSSION: Treatment options discussed with patient and all questions answered to patient's satisfaction. OARRS Review: Reviewed and acceptable for medications prescribed. TREATMENT OPTIONS:     Discussed different treatment options including continued conservative care such as physical therapy, chiropractic care, acupuncture. Discussed different interventional options such as epidural steroids or medial branch blocks. Also discussed neuromodulation in the form of spinal cord stimulation. Also discussed surgical evaluation. MRI Cervical spine. MRI Lumbar spine. X-ray flexion-extension. Urine drug screen today, for standard monitoring purposes. [facility] for opioid risk stratification prior to consideration for opioid therapy. Geoffrey Metz M.D. I have reviewed the chief complaint and history of present illness (including ROS and PFSH) and vital documentation by my staff and I agree with their documentation and have added where applicable.

## 2021-03-04 NOTE — PROGRESS NOTES
[facility] Progress Note    Date:3/4/2021         Patient Name:Latisha Prasad     YOB: 1960     Age:61 y.o. Assessment/Plan        Problem List Items Addressed This Visit        Musculoskeletal and Integument    Closed fracture of multiple ribs of right side - Primary     Worsening pain  Xray  Continue other treatments         Relevant Medications    oxyCODONE-acetaminophen (PERCOCET) 5-325 MG per tablet    Other Relevant Orders    XR RIBS RIGHT INCLUDE CHEST (MIN 3 VIEWS) (Completed)       Other    Adult ADHD     On Adderall  Has been referred to psych         Generalized anxiety disorder     Not at goal  Encouraged to follow up with psych           Other Visit Diagnoses     Acute pain of left shoulder        Worsening  Xray    Relevant Orders    XR SHOULDER LEFT (MIN 2 VIEWS) (Completed)        Declines mammo    Return in about 2 weeks (around 3/17/2021) for left knee injection. Electronically signed by Jaz Read DO on 3/4/21         Subjective     Tequila Swain is a 57 y.o. female presenting today for   Chief Complaint   Patient presents with    1 Month Follow-Up   . ADD/ADHD:  Current treatment: Adderall- , which has been effective. Residual symptoms: none. Medication side effects: None. Patient denies anorexia, involuntary weight loss and palpitations. Tequila Swain is a 57 y.o. female who presents for follow up of anxiety disorder. Current symptoms: difficulty concentrating, irritable, racing thoughts. She denies current suicidal and homicidal ideation. She complains of the following side effects from the treatment: none. Chest Pain   This is a new problem. The current episode started 1 to 4 weeks ago. The onset quality is sudden. The problem occurs constantly. The problem has been gradually worsening. The pain is present in the lateral region. The pain is moderate. The quality of the pain is described as sharp. The pain does not radiate.  Associated symptoms include back pain and malaise/fatigue. Pertinent negatives include no abdominal pain, claudication, cough, diaphoresis, dizziness, exertional chest pressure, fever, headaches, hemoptysis, irregular heartbeat, leg pain, lower extremity edema, nausea, near-syncope, numbness, orthopnea, palpitations, PND, shortness of breath, sputum production, syncope, vomiting or weakness. She has tried analgesics and rest for the symptoms. The treatment provided mild relief. Shoulder Pain   The pain is present in the left shoulder. This is a new problem. The current episode started more than 1 month ago. The problem occurs constantly. The problem has been gradually worsening. The quality of the pain is described as aching. The pain is moderate. Associated symptoms include a limited range of motion. Pertinent negatives include no fever, inability to bear weight, itching, joint locking, joint swelling, numbness, stiffness or tingling. She has tried nothing for the symptoms. The treatment provided no relief. Review of Systems   Review of Systems   Constitutional: Positive for malaise/fatigue. Negative for diaphoresis and fever. HENT: Negative. Eyes: Negative. Respiratory: Negative. Negative for cough, hemoptysis, sputum production and shortness of breath. Cardiovascular: Positive for chest pain. Negative for palpitations, orthopnea, claudication, syncope, PND and near-syncope. Gastrointestinal: Negative. Negative for abdominal pain, nausea and vomiting. Endocrine: Negative. Genitourinary: Negative. Musculoskeletal: Positive for back pain. Negative for stiffness. Skin: Negative. Negative for itching. Allergic/Immunologic: Negative. Neurological: Negative. Negative for dizziness, tingling, weakness, numbness and headaches. Hematological: Negative. Psychiatric/Behavioral: Negative. All other systems reviewed and are negative.       Medications     Current Outpatient Medications   Medication reports current alcohol use. She reports that she does not use drugs.      Family History:   Family History   Problem Relation Age of Onset   [facility] Cancer Mother     Hypertension Mother     Stroke Mother     Cancer Maternal Aunt     Hypertension Father     Heart Surgery Father     Diabetes Neg Hx        Surgical History:   Past Surgical History:   Procedure Laterality Date    ANKLE SURGERY Left 2018    mass excision    ARM SURGERY Right     Pt states she has had 13 right arm surgeries    BREAST ENHANCEMENT SURGERY Bilateral      SECTION      x 3    COLONOSCOPY  2013    sigmoid diverticula, prominent large internal hemorrhoid    COLONOSCOPY N/A 2/10/2020    COLONOSCOPY DIAGNOSTIC performed by Fareed Echols MD at [facility] 2020    COLORECTAL CANCER SCREENING, NOT HIGH RISK performed by Floresita Hernandez MD at [place] [address], COLON, DIAGNOSTIC      ESOPHAGEAL MOTILITY STUDY N/A 2020    PES RN - ESOPHAGEAL MOTILITY STUDY performed by Yasmin Moffett DO at [address] [place] [address] STUDY  9/10/2020    ESOPHAGEAL MOTILITY STUDY performed by Yasmin Moffett DO at [facility]    ESOPHAGEAL MOTILITY STUDY N/A 2020    ESOPHAGEAL MOTILITY STUDY ATTEMPTED PER DR. Elizabeth Branch WITHOUT SUCCESS performed by Yasmin Moffett DO at [facility]    FOOT SURGERY Left     FOOT TENDON SURGERY Left 2019    LEFT PERONEAL TENDON REPAIR WITH POSSIBLE LEFT TENDON TRANSFER (Left )    KNEE ARTHROSCOPY Left     Two surgeries on left knee per pt    KNEE SURGERY Right     Three surgeries per pt    LIGAMENT REPAIR Left 2019    LEFT PERONEAL TENDON REPAIR performed by Brandee Flores DPM at [address] [place] [address] OFFICE/OUTPT VISIT,PROCEDURE ONLY Left 2018    RESECTION/REMOVAL BONY NEOPLASM LEFT FIBULA, LEFT SOFT TISSUE MASS EXCISION WITH BONE BIOPSY LEFT ANKLE FIBULA performed by Brandee Flores DPM at [address] [place] [address] SHOULDER SURGERY Left     UPPER GASTROINTESTINAL ENDOSCOPY  5/23/2013    tertiary contractures esophagus, 3 to 4 cm hiatal hernia, antral gastritis, + H Pylori, mild active chronic esophagitis    UPPER GASTROINTESTINAL ENDOSCOPY  4/6/2016    one biopsy    UPPER GASTROINTESTINAL ENDOSCOPY N/A 2/10/2020    EGD BIOPSY performed by Rand Villalobos MD at [facility]          Physical Examination      Vitals:  /80   Pulse 66   Temp 97.4 °F (36.3 °C)   Wt 150 lb 12.8 oz (68.4 kg)   SpO2 95%   BMI 29.45 kg/m²     Physical Exam  Vitals signs and nursing note reviewed. Constitutional:       General: She is not in acute distress. Appearance: Normal appearance. She is obese. She is not ill-appearing, toxic-appearing or diaphoretic. HENT:      Head: Normocephalic and atraumatic. Right Ear: Tympanic membrane, ear canal and external ear normal. There is no impacted cerumen. Left Ear: Tympanic membrane, ear canal and external ear normal. There is no impacted cerumen. Nose: Nose normal. No congestion or rhinorrhea. Mouth/Throat:      Mouth: Mucous membranes are moist.      Pharynx: Oropharynx is clear. No oropharyngeal exudate or posterior oropharyngeal erythema. Eyes:      General: No scleral icterus. Right eye: No discharge. Left eye: No discharge. Extraocular Movements: Extraocular movements intact. Conjunctiva/sclera: Conjunctivae normal.      Pupils: Pupils are equal, round, and reactive to light. Neck:      Musculoskeletal: Normal range of motion and neck supple. Cardiovascular:      Rate and Rhythm: Normal rate and regular rhythm. Pulses: Normal pulses. Heart sounds: Normal heart sounds. No murmur. No friction rub. No gallop. Pulmonary:      Effort: Pulmonary effort is normal. No respiratory distress. Breath sounds: Normal breath sounds. No stridor. No wheezing, rhonchi or rales.    Chest:      Chest wall: Tenderness present. Abdominal:      General: Abdomen is flat. Bowel sounds are normal. There is no distension. Palpations: Abdomen is soft. There is no mass. Tenderness: There is no abdominal tenderness. There is no right CVA tenderness, left CVA tenderness, guarding or rebound. Hernia: No hernia is present. Musculoskeletal:      Left shoulder: She exhibits decreased range of motion, tenderness, pain and decreased strength. She exhibits no bony tenderness, no swelling, no crepitus, no deformity, no laceration, no spasm and normal pulse. Skin:     General: Skin is warm. Capillary Refill: Capillary refill takes less than 2 seconds. Coloration: Skin is not jaundiced or pale. Findings: No bruising, erythema, lesion or rash. Neurological:      General: No focal deficit present. Mental Status: She is alert and oriented to person, place, and time. Mental status is at baseline. Cranial Nerves: No cranial nerve deficit. Sensory: No sensory deficit. Motor: No weakness. Coordination: Coordination normal.      Gait: Gait normal.      Deep Tendon Reflexes: Reflexes normal.   Psychiatric:         Mood and Affect: Mood normal.         Behavior: Behavior normal.         Thought Content: Thought content normal.         Judgment: Judgment normal.         Labs/Imaging/Diagnostics   Labs:  No results found for: CMPWITHGFR, CBCAUTODIF, TSHFT4, LABA1C, LIPIDPAN    Imaging Last 24 Hours:  XR RIBS RIGHT INCLUDE CHEST (MIN 3 VIEWS)  Narrative: EXAMINATION:  2 XRAY VIEWS OF THE RIGHT RIBS WITH FRONTAL XRAY VIEW OF THE CHEST    3/3/2021 12:04 pm    COMPARISON:  CT January 13, 2021    HISTORY:  ORDERING SYSTEM PROVIDED HISTORY: Closed fracture of multiple ribs of right  side with routine healing, subsequent encounter  TECHNOLOGIST PROVIDED HISTORY:  see icd 10  Acuity: Chronic  Type of Exam: Unknown    FINDINGS:  Cardiomediastinal silhouette within normal limits.  Lungs and costophrenic  sulci are clear. No pneumothorax or subdiaphragmatic free air. No acute  osseous abnormality identified. Normal alignment of the right lower ribs. Impression: No radiographic evidence of acute cardiopulmonary disease. Right 9th through 11th rib fractures are not appreciated but no interval  displacement is identified. XR SHOULDER LEFT (MIN 2 VIEWS)  Narrative: EXAMINATION:  TWO XRAY VIEWS OF THE LEFT SHOULDER    3/3/2021 12:04 pm    COMPARISON:  Plain film imaging of the left shoulder dated May 10, 2018. HISTORY:  ORDERING SYSTEM PROVIDED HISTORY: Acute pain of left shoulder  TECHNOLOGIST PROVIDED HISTORY:  see icd10  Acuity: Chronic  Type of Exam: Unknown    FINDINGS:  The bones of the left shoulder are intact. Normal alignment. There are  degenerative changes in the head of the humerus and in the glenoid with some  osteophytic spurring/buttressing at the humeral head articulating margins and  some degenerative changes in the superolateral humeral head. Minimal  degenerative changes at the acromioclavicular articulating margins. The  visualized portions of the left hemithorax appear unremarkable. The thoracic  aorta appears redundant. There is some degenerative changes in the  visualized portions of the cervical spine. Impression: Degenerative changes in the bones of the left shoulder. No acute abnormality  identified.

## 2021-03-05 DIAGNOSIS — M25.572 CHRONIC PAIN OF LEFT ANKLE: ICD-10-CM

## 2021-03-05 DIAGNOSIS — G89.29 CHRONIC PAIN OF LEFT ANKLE: ICD-10-CM

## 2021-03-05 DIAGNOSIS — M54.12 CERVICAL RADICULOPATHY: Primary | ICD-10-CM

## 2021-03-05 DIAGNOSIS — S22.41XD CLOSED FRACTURE OF MULTIPLE RIBS OF RIGHT SIDE WITH ROUTINE HEALING, SUBSEQUENT ENCOUNTER: ICD-10-CM

## 2021-03-05 RX ORDER — OXYCODONE HYDROCHLORIDE AND ACETAMINOPHEN 5; 325 MG/1; MG/1
1 TABLET ORAL EVERY 8 HOURS PRN
Qty: 90 TABLET | Refills: 0 | Status: SHIPPED | OUTPATIENT
Start: 2021-03-10 | End: 2021-04-06 | Stop reason: SDUPTHER

## 2021-03-10 DIAGNOSIS — F90.9 ATTENTION DEFICIT HYPERACTIVITY DISORDER (ADHD), UNSPECIFIED ADHD TYPE: ICD-10-CM

## 2021-03-10 RX ORDER — DEXTROAMPHETAMINE SACCHARATE, AMPHETAMINE ASPARTATE, DEXTROAMPHETAMINE SULFATE AND AMPHETAMINE SULFATE 7.5; 7.5; 7.5; 7.5 MG/1; MG/1; MG/1; MG/1
30 TABLET ORAL 2 TIMES DAILY
Qty: 60 TABLET | Refills: 0 | OUTPATIENT
Start: 2021-03-10 | End: 2021-04-09

## 2021-03-10 NOTE — TELEPHONE ENCOUNTER
Pt says the 20 mg rx was sent in instead of the 30 mg she always gets            Fela Dense is calling to request a refill on the following medication(s):    Medication Request:  Requested Prescriptions     Pending Prescriptions Disp Refills    amphetamine-dextroamphetamine (ADDERALL) 30 MG tablet 60 tablet 0     Sig: Take 1 tablet by mouth 2 times daily for 30 days.        Last Visit Date (If Applicable):  9/8/8593    Next Visit Date:    3/17/2021

## 2021-03-10 NOTE — TELEPHONE ENCOUNTER
I will refill 30mg tablets but only if Radha Michaels has made an appointment with psych (who I referred her to in January) as they are going to be taking over management of this medication.  I do not normally prescribe adderall above 20mg twice daily so I will bridge the gap with refills of her original dosing if and when she gets appt to establish care with psych as directed previously

## 2021-03-11 LAB
6-ACETYLMORPHINE, UR: NOT DETECTED
7-AMINOCLONAZEPAM, URINE: NOT DETECTED
ALPHA-OH-ALPRAZ, URINE: NOT DETECTED
ALPHA-OH-MIDAZOLAM, URINE: NOT DETECTED
ALPRAZOLAM, URINE: NOT DETECTED
AMPHETAMINES, URINE: PRESENT
BARBITURATES, URINE: NOT DETECTED
BENZOYLECGONINE, UR: NOT DETECTED
BUPRENORPHINE URINE: NOT DETECTED
CARISOPRODOL, UR: NOT DETECTED
CLONAZEPAM, URINE: NOT DETECTED
CODEINE, URINE: NOT DETECTED
CREATININE URINE: 186.8 MG/DL (ref 20–400)
DIAZEPAM, URINE: NOT DETECTED
DRUGS EXPECTED, UR: NORMAL
EER HI RES INTERP UR: NORMAL
ETHYL GLUCURONIDE UR: NOT DETECTED
FENTANYL URINE: NOT DETECTED
GABAPENTIN: NOT DETECTED
HYDROCODONE, URINE: NOT DETECTED
HYDROMORPHONE, URINE: NOT DETECTED
LORAZEPAM, URINE: NOT DETECTED
MARIJUANA METAB, UR: NOT DETECTED
MDA, UR: NOT DETECTED
MDEA, EVE, UR: NOT DETECTED
MDMA URINE: NOT DETECTED
MEPERIDINE METAB, UR: NOT DETECTED
METHADONE, URINE: NOT DETECTED
METHAMPHETAMINE, URINE: NOT DETECTED
METHYLPHENIDATE: NOT DETECTED
MIDAZOLAM, URINE: NOT DETECTED
MORPHINE URINE: NOT DETECTED
NALOXONE URINE: NOT DETECTED
NORBUPRENORPHINE, URINE: NOT DETECTED
NORDIAZEPAM, URINE: NOT DETECTED
NORFENTANYL, URINE: NOT DETECTED
NORHYDROCODONE, URINE: NOT DETECTED
NOROXYCODONE, URINE: PRESENT
NOROXYMORPHONE, URINE: NOT DETECTED
OXAZEPAM, URINE: NOT DETECTED
OXYCODONE URINE: PRESENT
OXYMORPHONE, URINE: PRESENT
PAIN MANAGEMENT DRUG PANEL INTERP, URINE: NORMAL
PAIN MGT DRUG PANEL, HI RES, UR: NORMAL
PCP,URINE: NOT DETECTED
PHENTERMINE, UR: NOT DETECTED
PREGABALIN: NOT DETECTED
TAPENTADOL, URINE: NOT DETECTED
TAPENTADOL-O-SULFATE, URINE: NOT DETECTED
TEMAZEPAM, URINE: NOT DETECTED
TRAMADOL, URINE: NOT DETECTED
ZOLPIDEM METABOLITE (ZCA), URINE: NOT DETECTED
ZOLPIDEM, URINE: NOT DETECTED

## 2021-03-11 RX ORDER — DEXTROAMPHETAMINE SACCHARATE, AMPHETAMINE ASPARTATE MONOHYDRATE, DEXTROAMPHETAMINE SULFATE AND AMPHETAMINE SULFATE 7.5; 7.5; 7.5; 7.5 MG/1; MG/1; MG/1; MG/1
60 CAPSULE, EXTENDED RELEASE ORAL DAILY
Qty: 60 CAPSULE | Refills: 0 | Status: SHIPPED | OUTPATIENT
Start: 2021-03-11 | End: 2021-10-28

## 2021-03-17 ENCOUNTER — OFFICE VISIT (OUTPATIENT)
Dept: FAMILY MEDICINE CLINIC | Age: 61
End: 2021-03-17
Payer: MEDICARE

## 2021-03-17 ENCOUNTER — HOSPITAL ENCOUNTER (OUTPATIENT)
Dept: GENERAL RADIOLOGY | Age: 61
Discharge: HOME | End: 2021-03-19
Payer: MEDICARE

## 2021-03-17 ENCOUNTER — HOSPITAL ENCOUNTER (OUTPATIENT)
Age: 61
Discharge: HOME | End: 2021-03-17
Payer: MEDICARE

## 2021-03-17 ENCOUNTER — HOSPITAL ENCOUNTER (OUTPATIENT)
Age: 61
Discharge: HOME | End: 2021-03-19
Payer: MEDICARE

## 2021-03-17 VITALS
TEMPERATURE: 96.9 F | BODY MASS INDEX: 29.64 KG/M2 | WEIGHT: 151 LBS | OXYGEN SATURATION: 97 % | SYSTOLIC BLOOD PRESSURE: 130 MMHG | HEIGHT: 60 IN | HEART RATE: 62 BPM | DIASTOLIC BLOOD PRESSURE: 80 MMHG

## 2021-03-17 DIAGNOSIS — F32.A DEPRESSION, UNSPECIFIED DEPRESSION TYPE: ICD-10-CM

## 2021-03-17 DIAGNOSIS — M19.042 ARTHRITIS OF LEFT HAND: ICD-10-CM

## 2021-03-17 DIAGNOSIS — E66.3 OVERWEIGHT (BMI 25.0-29.9): ICD-10-CM

## 2021-03-17 DIAGNOSIS — Z82.61 FAMILY HISTORY OF RHEUMATOID ARTHRITIS: ICD-10-CM

## 2021-03-17 DIAGNOSIS — M54.12 CERVICAL RADICULOPATHY: ICD-10-CM

## 2021-03-17 DIAGNOSIS — M19.041 ARTHRITIS OF RIGHT HAND: ICD-10-CM

## 2021-03-17 DIAGNOSIS — Z00.00 ROUTINE GENERAL MEDICAL EXAMINATION AT A HEALTH CARE FACILITY: Primary | ICD-10-CM

## 2021-03-17 DIAGNOSIS — S22.41XD CLOSED FRACTURE OF MULTIPLE RIBS OF RIGHT SIDE WITH ROUTINE HEALING, SUBSEQUENT ENCOUNTER: ICD-10-CM

## 2021-03-17 DIAGNOSIS — F90.9 ATTENTION DEFICIT HYPERACTIVITY DISORDER (ADHD), UNSPECIFIED ADHD TYPE: ICD-10-CM

## 2021-03-17 DIAGNOSIS — Z63.4 BEREAVEMENT: ICD-10-CM

## 2021-03-17 PROBLEM — Z12.31 ENCOUNTER FOR SCREENING MAMMOGRAM FOR BREAST CANCER: Status: RESOLVED | Noted: 2021-03-17 | Resolved: 2021-03-17

## 2021-03-17 PROBLEM — Z12.31 ENCOUNTER FOR SCREENING MAMMOGRAM FOR BREAST CANCER: Status: ACTIVE | Noted: 2021-03-17

## 2021-03-17 LAB
C-REACTIVE PROTEIN: <3 MG/L (ref 0–5)
RHEUMATOID FACTOR: <10 IU/ML
SEDIMENTATION RATE, ERYTHROCYTE: 4 MM (ref 0–30)

## 2021-03-17 PROCEDURE — 86431 RHEUMATOID FACTOR QUANT: CPT

## 2021-03-17 PROCEDURE — 85652 RBC SED RATE AUTOMATED: CPT

## 2021-03-17 PROCEDURE — 86038 ANTINUCLEAR ANTIBODIES: CPT

## 2021-03-17 PROCEDURE — G0439 PPPS, SUBSEQ VISIT: HCPCS | Performed by: FAMILY MEDICINE

## 2021-03-17 PROCEDURE — 99214 OFFICE O/P EST MOD 30 MIN: CPT | Performed by: FAMILY MEDICINE

## 2021-03-17 PROCEDURE — 73130 X-RAY EXAM OF HAND: CPT

## 2021-03-17 PROCEDURE — 36415 COLL VENOUS BLD VENIPUNCTURE: CPT

## 2021-03-17 PROCEDURE — 86140 C-REACTIVE PROTEIN: CPT

## 2021-03-17 PROCEDURE — 86200 CCP ANTIBODY: CPT

## 2021-03-17 RX ORDER — BUPROPION HYDROCHLORIDE 450 MG/1
450 TABLET, FILM COATED, EXTENDED RELEASE ORAL EVERY MORNING
Qty: 30 TABLET | Refills: 2 | Status: SHIPPED | OUTPATIENT
Start: 2021-03-17 | End: 2021-08-30

## 2021-03-17 SDOH — SOCIAL STABILITY - SOCIAL INSECURITY: DISSAPEARANCE AND DEATH OF FAMILY MEMBER: Z63.4

## 2021-03-17 ASSESSMENT — ENCOUNTER SYMPTOMS
RESPIRATORY NEGATIVE: 1
GASTROINTESTINAL NEGATIVE: 1
EYES NEGATIVE: 1
ALLERGIC/IMMUNOLOGIC NEGATIVE: 1

## 2021-03-17 ASSESSMENT — PATIENT HEALTH QUESTIONNAIRE - PHQ9
2. FEELING DOWN, DEPRESSED OR HOPELESS: 1
SUM OF ALL RESPONSES TO PHQ QUESTIONS 1-9: 2
SUM OF ALL RESPONSES TO PHQ QUESTIONS 1-9: 2
SUM OF ALL RESPONSES TO PHQ9 QUESTIONS 1 & 2: 2

## 2021-03-17 NOTE — PROGRESS NOTES
APSO Progress Note    Date:3/17/2021         Patient Name:Latisha Masterson     YOB: 1960     Age:61 y.o. Assessment/Plan        Problem List Items Addressed This Visit        Nervous and Auditory    Cervical radiculopathy     Has pain management set up         Relevant Medications    buPROPion HCl ER, XL, 450 MG TB24       Musculoskeletal and Integument    Closed fracture of multiple ribs of right side     Improving            Other    Attention deficit hyperactivity disorder (ADHD)     Has appt with psych set up           Other Visit Diagnoses     Routine general medical examination at a health care facility    -  Primary    Bereavement        Relevant Medications    buPROPion HCl ER, XL, 450 MG TB24    Depression, unspecified depression type        Relevant Medications    buPROPion HCl ER, XL, 450 MG TB24    Arthritis of left hand        Relevant Orders    XR HAND LEFT (MIN 3 VIEWS) (Completed)    JUAN Screen with Reflex    C-Reactive Protein    Cyclic Citrul Peptide Antibody, IgG    Sedimentation rate, automated (Completed)    Rheumatoid Factor    Arthritis of right hand        Relevant Orders    XR HAND RIGHT (MIN 3 VIEWS) (Completed)    JUAN Screen with Reflex    C-Reactive Protein    Cyclic Citrul Peptide Antibody, IgG    Sedimentation rate, automated (Completed)    Rheumatoid Factor    Family history of rheumatoid arthritis        Relevant Orders    JUAN Screen with Reflex    C-Reactive Protein    Cyclic Citrul Peptide Antibody, IgG    Sedimentation rate, automated (Completed)    Rheumatoid Factor    Overweight (BMI 25.0-29. 9)        Relevant Orders    [address]    Return in 3 months (on 6/17/2021). Electronically signed by Jennifer Gonzalez DO on 3/16/21         Subjective Marylene Ebbs is a 57 y.o. female presenting today for   Chief Complaint   Patient presents with    Other     checkup    Medicare AWV   .     ADD/ADHD: Current treatment: Adderall- , which has been effective. Residual symptoms: none. Medication side effects: None. Patient denies anorexia, involuntary weight loss and palpitations. Has psych set up    Luis Alberto Dejesus is a 57 y.o. female who presents for follow up of anxiety disorder. Current symptoms: difficulty concentrating, irritable, racing thoughts. She denies current suicidal and homicidal ideation. She complains of the following side effects from the treatment: none. Has psych set up    Luis Alberto Dejesus is an 57 y.o. female who presents with arthralgias that began several months ago. Pain is located in both MCP(s): 1st, 2nd, 3rd, 4th and 5th, both PIP(s): 1st, 2nd, 3rd, 4th and 5th and both DIP(s): 1st, 2nd, 3rd, 4th and 5th. The pain is described as constant, aching. Associated symptoms include: crepitation, decreased range of motion and deformity. Chronic pain - has pain appt set up    Shoulder Pain   The pain is present in the left shoulder. This is a new problem. The current episode started more than 1 month ago. The problem occurs constantly. The problem has been gradually worsening. The quality of the pain is described as aching. The pain is moderate. Associated symptoms include a limited range of motion. Pertinent negatives include no inability to bear weight, itching, joint locking, joint swelling, stiffness or tingling. She has tried nothing for the symptoms. The treatment provided no relief. Review of Systems   Review of Systems   HENT: Negative. Eyes: Negative. Respiratory: Negative. Gastrointestinal: Negative. Endocrine: Negative. Genitourinary: Negative. Musculoskeletal: Negative for stiffness. Skin: Negative. Negative for itching. Allergic/Immunologic: Negative. Neurological: Negative. Negative for tingling. Hematological: Negative. Psychiatric/Behavioral: Negative. All other systems reviewed and are negative.       Medications     Current Outpatient Medications   Medication Sig Dispense Refill    buPROPion HCl ER, XL, 450 MG TB24 Take 450 mg by mouth every morning 30 tablet 2    amphetamine-dextroamphetamine (ADDERALL XR) 30 MG extended release capsule Take 2 capsules by mouth daily for 30 days. 60 capsule 0    oxyCODONE-acetaminophen (PERCOCET) 5-325 MG per tablet Take 1 tablet by mouth every 8 hours as needed for Pain for up to 30 days. 90 tablet 0    propranolol (INDERAL LA) 80 MG extended release capsule Take 1 capsule by mouth daily 90 capsule 1    pantoprazole (PROTONIX) 40 MG tablet TAKE ONE TABLET BY MOUTH EVERY MORNING BEFORE BREAKFAST 90 tablet 0    albuterol sulfate HFA (VENTOLIN HFA) 108 (90 Base) MCG/ACT inhaler INHALE TWO PUFFS BY MOUTH EVERY 4 HOURS AS NEEDED FOR WHEEZING 1 Inhaler 5    citalopram (CELEXA) 40 MG tablet TAKE ONE TABLET BY MOUTH DAILY 90 tablet 3    albuterol (PROVENTIL) (2.5 MG/3ML) 0.083% nebulizer solution Take 2.5 mg by nebulization every 6 hours as needed for Wheezing or Shortness of Breath      Calcium Carbonate (CALCIUM 600 PO) Take 600 mg by mouth daily       No current facility-administered medications for this visit. Past History    Past Medical History:   has a past medical history of Acid reflux, Adult ADHD, Asthma, Bipolar 1 disorder ([address] Broken foot, Chest pain of uncertain etiology, Depression, DVT of leg (deep venous thrombosis) ([facility] [address] Fracture, Helicobacter pylori (H. pylori) infection, Hematuria, Hiatal hernia, History of blood transfusion, History of DVT (deep vein thrombosis), Hx of blood clots, Hypertension, Internal hemorrhoids, Narcolepsy, and Osteoporosis. Social History:   reports that she quit smoking about 32 years ago. She has a 20.00 pack-year smoking history. She has never used smokeless tobacco. She reports current alcohol use. She reports that she does not use drugs.      Family History:   Family History   Problem Relation Age of Onset    Cancer Mother    [facility] Hypertension Mother     Stroke Mother     Cancer Maternal Aunt     Hypertension Father     Heart Surgery Father     Diabetes Neg Hx        Surgical History:   Past Surgical History:   Procedure Laterality Date    ANKLE SURGERY Left 2018    mass excision    ARM SURGERY Right     Pt states she has had 13 right arm surgeries    BREAST ENHANCEMENT SURGERY Bilateral      SECTION      x 3    COLONOSCOPY  2013    sigmoid diverticula, prominent large internal hemorrhoid    COLONOSCOPY N/A 2/10/2020    COLONOSCOPY DIAGNOSTIC performed by Irina Bashir MD at [facility] 2020    COLORECTAL CANCER SCREENING, NOT HIGH RISK performed by Dorothea Monet MD at [address] [place] [address] Rd, COLON, DIAGNOSTIC      ESOPHAGEAL MOTILITY STUDY N/A 2020    PES RN - ESOPHAGEAL MOTILITY STUDY performed by Joni Argueta DO at [address] [place] [address] Road STUDY  9/10/2020    ESOPHAGEAL MOTILITY STUDY performed by Joni Argueta DO at [facility]    ESOPHAGEAL MOTILITY STUDY N/A 2020    ESOPHAGEAL MOTILITY STUDY ATTEMPTED PER DR. Tamar Davis WITHOUT SUCCESS performed by Joni Argueta DO at [facility]    FOOT SURGERY Left     FOOT TENDON SURGERY Left 2019    LEFT PERONEAL TENDON REPAIR WITH POSSIBLE LEFT TENDON TRANSFER (Left )    KNEE ARTHROSCOPY Left     Two surgeries on left knee per pt    KNEE SURGERY Right     Three surgeries per pt    LIGAMENT REPAIR Left 2019    LEFT PERONEAL TENDON REPAIR performed by Radha Hunt DPM at [address] [place] [address] OFFICE/OUTPT VISIT,PROCEDURE ONLY Left 2018    RESECTION/REMOVAL BONY NEOPLASM LEFT FIBULA, LEFT SOFT TISSUE MASS EXCISION WITH BONE BIOPSY LEFT ANKLE FIBULA performed by Radha Hunt DPM at [address] Left    [facility] ENDOSCOPY  2013    tertiary contractures esophagus, 3 to 4 cm hiatal hernia, antral gastritis, + H Pylori, mild active chronic esophagitis    UPPER GASTROINTESTINAL ENDOSCOPY  4/6/2016    one biopsy    UPPER GASTROINTESTINAL ENDOSCOPY N/A 2/10/2020    EGD BIOPSY performed by Fredi Hernandez MD at [address]          Physical Examination      Vitals:  /80   Pulse 62   Temp 96.9 °F (36.1 °C)   Ht 5' (1.524 m)   Wt 151 lb (68.5 kg)   SpO2 97%   BMI 29.49 kg/m²     Physical Exam  Vitals signs and nursing note reviewed. Constitutional:       General: She is not in acute distress. Appearance: Normal appearance. She is obese. She is not ill-appearing, toxic-appearing or diaphoretic. HENT:      Head: Normocephalic and atraumatic. Eyes:      General: No scleral icterus. Right eye: No discharge. Left eye: No discharge. Extraocular Movements: Extraocular movements intact. Conjunctiva/sclera: Conjunctivae normal.   Cardiovascular:      Rate and Rhythm: Normal rate and regular rhythm. Pulses: Normal pulses. Heart sounds: Normal heart sounds. No murmur. No friction rub. No gallop. Pulmonary:      Effort: Pulmonary effort is normal. No respiratory distress. Breath sounds: Normal breath sounds. No stridor. No wheezing, rhonchi or rales. Chest:      Chest wall: No tenderness. Musculoskeletal:      Comments: Most finger joints enlarged and deviated fingertips   Neurological:      Mental Status: She is alert and oriented to person, place, and time. Mental status is at baseline. Psychiatric:         Mood and Affect: Mood normal.         Behavior: Behavior normal.         Thought Content:  Thought content normal.         Judgment: Judgment normal.         Labs/Imaging/Diagnostics   Labs:  No results found for: CMPWITHGFR, CBCAUTODIF, TSHFT4, LABA1C, LIPIDPAN    Imaging Last 24 Hours:  XR HAND RIGHT (MIN 3 VIEWS)  Narrative: EXAMINATION:  THREE XRAY VIEWS OF THE LEFT HAND; THREE XRAY VIEWS OF THE RIGHT HAND    3/17/2021 1:01 pm    COMPARISON:  None. HISTORY:  ORDERING SYSTEM PROVIDED HISTORY: Arthritis of left hand  TECHNOLOGIST PROVIDED HISTORY:  see icd10  Reason for Exam: arthritis in both hands. patient states there is no pain at  this time. Acuity: Unknown  Type of Exam: Unknown; ORDERING SYSTEM PROVIDED HISTORY: Arthritis of right  hand  TECHNOLOGIST PROVIDED HISTORY:  see icd10  Reason for Exam: arthritis in both hands. patient states there is no pain at  this time. Acuity: Unknown  Type of Exam: Unknown    FINDINGS:  Left hand demonstrates no fracture or erosion. Mild bony demineralization. Age-indeterminate widening of the scapholunate interval is noted. Mild  degenerative changes at the articulation of the lunate and hamate. Moderate  degenerative changes 1st carpometacarpal joint and 1st MCP joint and 2nd DIP  joint. Subluxation at the 2nd DIP joint. Mild degenerative changes about  the other DIP joints. Developing SLAC wrist changes with advancement of the  capitate. Right hand demonstrates fusion of the wrist by dorsal plate extending from  the forearm dorsally, to the dorsal aspect of the 3rd metacarpal.  Hardware  appears intact. Mild bony demineralization is noted. Intercarpal spaces  appear fused to the extent visualized. Moderate degenerative changes about  the 1st, 2nd and 3rd MCP joints with spurring. Moderate degenerative changes  of the 3rd and 4th MCP joints. Less severe changes elsewhere. Impression: Left hand: No fracture or erosion appreciated. Degenerative changes are  noted as described above. Age-indeterminate widening of the scapholunate  interval with developing SLAC wrist changes. Right hand demonstrates dorsal fusion as described above. Degenerative  changes are present. No fracture or erosion identified.   XR HAND LEFT (MIN 3 VIEWS)  Narrative: EXAMINATION:  THREE XRAY VIEWS OF THE LEFT HAND; THREE XRAY VIEWS OF THE RIGHT HAND    3/17/2021 1:01 pm    COMPARISON:  None. HISTORY:  ORDERING SYSTEM PROVIDED HISTORY: Arthritis of left hand  TECHNOLOGIST PROVIDED HISTORY:  see icd10  Reason for Exam: arthritis in both hands. patient states there is no pain at  this time. Acuity: Unknown  Type of Exam: Unknown; ORDERING SYSTEM PROVIDED HISTORY: Arthritis of right  hand  TECHNOLOGIST PROVIDED HISTORY:  see icd10  Reason for Exam: arthritis in both hands. patient states there is no pain at  this time. Acuity: Unknown  Type of Exam: Unknown    FINDINGS:  Left hand demonstrates no fracture or erosion. Mild bony demineralization. Age-indeterminate widening of the scapholunate interval is noted. Mild  degenerative changes at the articulation of the lunate and hamate. Moderate  degenerative changes 1st carpometacarpal joint and 1st MCP joint and 2nd DIP  joint. Subluxation at the 2nd DIP joint. Mild degenerative changes about  the other DIP joints. Developing SLAC wrist changes with advancement of the  capitate. Right hand demonstrates fusion of the wrist by dorsal plate extending from  the forearm dorsally, to the dorsal aspect of the 3rd metacarpal.  Hardware  appears intact. Mild bony demineralization is noted. Intercarpal spaces  appear fused to the extent visualized. Moderate degenerative changes about  the 1st, 2nd and 3rd MCP joints with spurring. Moderate degenerative changes  of the 3rd and 4th MCP joints. Less severe changes elsewhere. Impression: Left hand: No fracture or erosion appreciated. Degenerative changes are  noted as described above. Age-indeterminate widening of the scapholunate  interval with developing SLAC wrist changes. Right hand demonstrates dorsal fusion as described above. Degenerative  changes are present. No fracture or erosion identified. Medicare Annual Wellness Visit  Name: Emely Pardo Date: 3/17/2021   MRN: [id number] Sex: Female   Age: 57 y.o.  Ethnicity: Non-/Non    : Last episode was in  (Written 2019)    Depression     Under control per pt.  on 6/15/18    DVT of leg (deep venous thrombosis) ([address] 2014    Fracture     right foot / pt denies surgical intervention    Helicobacter pylori (H. pylori) infection     per EGD    Hematuria 2018    Has a follow-up with Urologist     Hiatal hernia     History of blood transfusion     History of DVT (deep vein thrombosis) 2012    Hx of blood clots     Hypertension     Internal hemorrhoids     Narcolepsy 2012    Osteoporosis        Past Surgical History:   Procedure Laterality Date    ANKLE SURGERY Left 2018    mass excision    ARM SURGERY Right     Pt states she has had 13 right arm surgeries    BREAST ENHANCEMENT SURGERY Bilateral      SECTION      x 3    COLONOSCOPY  2013    sigmoid diverticula, prominent large internal hemorrhoid    COLONOSCOPY N/A 2/10/2020    COLONOSCOPY DIAGNOSTIC performed by Gamal Miller MD at [facility] 2020    COLORECTAL CANCER SCREENING, NOT HIGH RISK performed by Juanito Alba MD at [facility], COLON, DIAGNOSTIC      ESOPHAGEAL MOTILITY STUDY N/A 2020    PES RN - ESOPHAGEAL MOTILITY STUDY performed by Mili Morales DO at [address] STUDY  9/10/2020    ESOPHAGEAL MOTILITY STUDY performed by Mili Morales DO at [facility]    ESOPHAGEAL MOTILITY STUDY N/A 2020    ESOPHAGEAL MOTILITY STUDY ATTEMPTED PER DR. Sourav Santos WITHOUT SUCCESS performed by Mili Morales DO at [facility]    FOOT SURGERY Left     FOOT TENDON SURGERY Left 2019    LEFT PERONEAL TENDON REPAIR WITH POSSIBLE LEFT TENDON TRANSFER (Left )    KNEE ARTHROSCOPY Left     Two surgeries on left knee per pt    KNEE SURGERY Right     Three surgeries per pt    LIGAMENT REPAIR Left 2019    LEFT PERONEAL TENDON REPAIR performed by Lucinda Nevarez DPM at [facility] SURGERY      HI OFFICE/OUTPT VISIT,PROCEDURE ONLY Left 6/29/2018    RESECTION/REMOVAL BONY NEOPLASM LEFT FIBULA, LEFT SOFT TISSUE MASS EXCISION WITH BONE BIOPSY LEFT ANKLE FIBULA performed by Katerine Noriega DPM at [address] Left     UPPER GASTROINTESTINAL ENDOSCOPY  5/23/2013    tertiary contractures esophagus, 3 to 4 cm hiatal hernia, antral gastritis, + H Pylori, mild active chronic esophagitis    UPPER GASTROINTESTINAL ENDOSCOPY  4/6/2016    one biopsy    UPPER GASTROINTESTINAL ENDOSCOPY N/A 2/10/2020    EGD BIOPSY performed by Rand Villalobos MD at [facility]           Family History   Problem Relation Age of Onset    Cancer Mother     Hypertension Mother     Stroke Mother     Cancer Maternal Aunt     Hypertension Father     Heart Surgery Father     Diabetes Neg Hx        CareTeam (Including outside providers/suppliers regularly involved in providing care):   Patient Care Team:  Rodrigo Diez DO as PCP - General (Family Medicine)  Rodrigo Diez DO as PCP - [facility] Empaneled Provider  Sary Singh MD as Consulting Physician (Gastroenterology)  Lauro Knight MD as Surgeon (Orthopedic Surgery)  Diane Hernandez as Surgeon (Podiatry)  Katerine Noriega DPM as Physician (Podiatry)    Wt Readings from Last 3 Encounters:   03/17/21 151 lb (68.5 kg)   03/03/21 150 lb 12.8 oz (68.4 kg)   01/29/21 148 lb 3.2 oz (67.2 kg)     Vitals:    03/17/21 1044   BP: 130/80   Pulse: 62   Temp: 96.9 °F (36.1 °C)   SpO2: 97%   Weight: 151 lb (68.5 kg)   Height: 5' (1.524 m)     Body mass index is 29.49 kg/m². Based upon direct observation of the patient, evaluation of cognition reveals recent and remote memory intact. Patient's complete Health Risk Assessment and screening values have been reviewed and are found in Flowsheets.  The following problems were reviewed today and where indicated follow up appointments were made and/or referrals ordered. Positive Risk Factor Screenings with Interventions:     Fall Risk:  2 or more falls in past year?: (!) yes  Fall with injury in past year?: (!) yes  Fall Risk Interventions:    · Home safety tips provided  · Home exercises provided to promote strength and balance          General Health and ACP:  General  In general, how would you say your health is?: Fair  In the past 7 days, have you experienced any of the following?  New or Increased Pain, New or Increased Fatigue, Loneliness, Social Isolation, Stress or Anger?: (!) New or Increased Pain  Do you get the social and emotional support that you need?: Yes  Do you have a Living Will?: Yes  Advance Directives     Power of  Living Will ACP-Advance Directive ACP-Power of     Not on File Not on File Not on File Not on File      General Health Risk Interventions:  · none    Health Habits/Nutrition:  Health Habits/Nutrition  Do you exercise for at least 20 minutes 2-3 times per week?: Yes  Have you lost any weight without trying in the past 3 months?: No  Do you eat only one meal per day?: No  Have you seen the dentist within the past year?: (!) No  Body mass index: (!) 29.49  Health Habits/Nutrition Interventions:  · Dental exam overdue:  patient encouraged to make appointment with his/her dentist       Personalized Preventive Plan   Current Health Maintenance Status  Immunization History   Administered Date(s) Administered    Pneumococcal Polysaccharide ([id number]) 03/28/2014    Td, unspecified formulation 07/18/2003    Tdap (Boostrix, Adacel) 04/05/2019        Health Maintenance   Topic Date Due    COVID-19 Vaccine (1) Never done    Shingles Vaccine (1 of 2) Never done    Cervical cancer screen  02/02/2019    Annual Wellness Visit (AWV)  Never done    Flu vaccine (1) Never done    Breast cancer screen  11/15/2020    Potassium monitoring  01/14/2022    Creatinine monitoring  01/14/2022    Lipid screen  11/15/2023    DTaP/Tdap/Td vaccine (2 - Td) 04/05/2029    Colon cancer screen colonoscopy  05/18/2030    Pneumococcal 0-64 years Vaccine  Completed    Hepatitis C screen  Completed    HIV screen  Completed    Hepatitis A vaccine  Aged Out    Hepatitis B vaccine  Aged Out    Hib vaccine  Aged Out    Meningococcal (ACWY) vaccine  Aged Out     Recommendations for [employer] Due: see orders and patient instructions/AVS.  . Recommended screening schedule for the next 5-10 years is provided to the patient in written form: see Patient Alex Whiting was seen today for other and medicare awv. Diagnoses and all orders for this visit:    Routine general medical examination at a [facility] facility    Bereavement  -     buPROPion HCl ER, XL, 450 MG TB24; Take 450 mg by mouth every morning    Depression, unspecified depression type  -     buPROPion HCl ER, XL, 450 MG TB24; Take 450 mg by mouth every morning    Attention deficit hyperactivity disorder (ADHD), unspecified ADHD type    Cervical radiculopathy    Closed fracture of multiple ribs of right side with routine healing, subsequent encounter    Arthritis of left hand  -     XR HAND LEFT (MIN 3 VIEWS); Future  -     JUAN Screen with Reflex; Future  -     C-Reactive Protein; Future  -     Cyclic Citrul Peptide Antibody, IgG; Future  -     Sedimentation rate, automated; Future  -     Rheumatoid Factor; Future    Arthritis of right hand  -     XR HAND RIGHT (MIN 3 VIEWS); Future  -     JUAN Screen with Reflex; Future  -     C-Reactive Protein; Future  -     Cyclic Citrul Peptide Antibody, IgG; Future  -     Sedimentation rate, automated; Future  -     Rheumatoid Factor; Future    Family history of rheumatoid arthritis  -     JUAN Screen with Reflex; Future  -     C-Reactive Protein; Future  -     Cyclic Citrul Peptide Antibody, IgG; Future  -     Sedimentation rate, automated; Future  -     Rheumatoid Factor;  Future    Overweight (BMI 25.0-29.9)  -     [facility] [address] [facility]

## 2021-03-17 NOTE — PATIENT INSTRUCTIONS
Personalized Preventive Plan for Jessica Pacheco - 3/17/2021  Medicare offers a range of preventive health benefits. Some of the tests and screenings are paid in full while other may be subject to a deductible, co-insurance, and/or copay. Some of these benefits include a comprehensive review of your medical history including lifestyle, illnesses that may run in your family, and various assessments and screenings as appropriate. After reviewing your medical record and screening and assessments performed today your provider may have ordered immunizations, labs, imaging, and/or referrals for you. A list of these orders (if applicable) as well as your Preventive Care list are included within your After Visit Summary for your review. Other Preventive Recommendations:    · A preventive eye exam performed by an eye specialist is recommended every 1-2 years to screen for glaucoma; cataracts, macular degeneration, and other eye disorders. · A preventive dental visit is recommended every 6 months. · Try to get at least 150 minutes of exercise per week or 10,000 steps per day on a pedometer . · Order or download the FREE \"Exercise & Physical Activity: Your Everyday Guide\" from [employer]. Call 8-170.845.3978 or search [employer] online. · You need 3816-2769 mg of calcium and 2856-1244 IU of vitamin D per day. It is possible to meet your calcium requirement with diet alone, but a vitamin D supplement is usually necessary to meet this goal.  · When exposed to the sun, use a sunscreen that protects against both UVA and UVB radiation with an SPF of 30 or greater. Reapply every 2 to 3 hours or after sweating, drying off with a towel, or swimming. · Always wear a seat belt when traveling in a car. Always wear a helmet when riding a bicycle or motorcycle.

## 2021-03-18 ENCOUNTER — TELEPHONE (OUTPATIENT)
Dept: FAMILY MEDICINE CLINIC | Age: 61
End: 2021-03-18

## 2021-03-18 DIAGNOSIS — M85.80 OSTEOPENIA, UNSPECIFIED LOCATION: ICD-10-CM

## 2021-03-18 LAB
ANTI-NUCLEAR ANTIBODY (ANA): NEGATIVE
CCP IGG ANTIBODIES: 1.3 U/ML (ref 0–7)

## 2021-03-18 RX ORDER — PANTOPRAZOLE SODIUM 40 MG/1
TABLET, DELAYED RELEASE ORAL
Qty: 90 TABLET | Refills: 0 | Status: SHIPPED | OUTPATIENT
Start: 2021-03-18 | End: 2021-05-14

## 2021-03-18 RX ORDER — ALENDRONATE SODIUM 70 MG/1
TABLET ORAL
Qty: 12 TABLET | Refills: 10 | Status: SHIPPED | OUTPATIENT
Start: 2021-03-18 | End: 2022-05-17 | Stop reason: SDUPTHER

## 2021-03-18 NOTE — TELEPHONE ENCOUNTER
pt says that dosage on protonix by the \"stomach caitlyn\" was changed to 2 tablets daily.  I did not change the sig on the protonix, because I wanted you to know about it first              Wilbur Wiley is calling to request a refill on the following medication(s):    Medication Request:  Requested Prescriptions     Pending Prescriptions Disp Refills    pantoprazole (PROTONIX) 40 MG tablet 90 tablet 0     Sig: TAKE ONE TABLET BY MOUTH EVERY MORNING BEFORE BREAKFAST    alendronate (FOSAMAX) 70 MG tablet 12 tablet 10     Sig: TAKE 1 TABLET BY MOUTH ONCE WEEKLY BEFORE BREAKFAST, ON AN EMPTY STOMACH: REMAIN UPRIGHT FOR 30 MINUTES:TAKE WITH 8 OUNCES OF WATER       Last Visit Date (If Applicable):  9/12/0385    Next Visit Date:    6/17/2021

## 2021-03-22 ENCOUNTER — HOSPITAL ENCOUNTER (OUTPATIENT)
Dept: MRI IMAGING | Age: 61
Discharge: HOME | End: 2021-03-24
Payer: MEDICARE

## 2021-03-22 DIAGNOSIS — M47.812 CERVICAL SPONDYLOSIS: ICD-10-CM

## 2021-03-22 DIAGNOSIS — M47.817 LUMBOSACRAL SPONDYLOSIS WITHOUT MYELOPATHY: ICD-10-CM

## 2021-03-22 PROCEDURE — 72141 MRI NECK SPINE W/O DYE: CPT

## 2021-03-22 PROCEDURE — 72148 MRI LUMBAR SPINE W/O DYE: CPT

## 2021-03-23 ASSESSMENT — ENCOUNTER SYMPTOMS
PHOTOPHOBIA: 0
BOWEL INCONTINENCE: 0
VISUAL CHANGE: 0
BACK PAIN: 1
ABDOMINAL PAIN: 0
TROUBLE SWALLOWING: 0

## 2021-03-23 NOTE — PROGRESS NOTES
Pt here today to review imaging done and is discussing worsening lumbar pain. She has stress care appt on Monday for opioid risk stratification prior to consideration for opioid therapy. Recent JANIE was appropriate for percocet prescribed by PCP. She states this is the last month that office will prescribe. She is also currently using tens unit. Imagine was reviewed with pt and injections discussed but pt does admit to osteoporosis and currently taking Fosamax and Calcium. HPI:     Back Pain  This is a chronic problem. The current episode started more than 1 year ago. The problem occurs constantly. The problem has been gradually worsening since onset. The pain is present in the lumbar spine and sacro-iliac. The quality of the pain is described as stabbing. The pain does not radiate. The pain is at a severity of 7/10. The pain is moderate. The pain is worse during the night. The symptoms are aggravated by sitting. Stiffness is present all day. Associated symptoms include headaches and numbness. Pertinent negatives include no abdominal pain, bladder incontinence, bowel incontinence, chest pain, dysuria, fever, leg pain, paresis, paresthesias, pelvic pain, perianal numbness, tingling, weakness or weight loss. Treatments tried: TENS unit, medications. The treatment provided mild relief. Neck Pain   This is a chronic problem. The current episode started more than 1 year ago. The problem occurs constantly. The problem has been unchanged. The pain is associated with an unknown factor. The pain is present in the midline, left side and right side. The quality of the pain is described as aching. The pain is at a severity of 7/10. The pain is moderate. The symptoms are aggravated by position. The pain is same all the time. Stiffness is present all day. Associated symptoms include headaches and numbness.  Pertinent negatives include no chest pain, fever, leg pain, pain with swallowing, paresis, photophobia, syncope, tingling, trouble swallowing, visual change, weakness or weight loss. She has tried neck support (TENS unit) for the symptoms. Patient denies any new neurological symptoms. Nobowel or bladder incontinence, no weakness, and no falling. Review of OARRS does not show any aberrant prescription behavior. Medication is helping the patient stay active. Patient denies any side effects and reports adequate analgesia. No sign of misuse/abuse. Past Medical History:   Diagnosis Date    Acid reflux     Adult ADHD 2012    Asthma 2012    [facility] [address]     Broken foot     Left foot    Chest pain of uncertain etiology     Last episode was in  (Written 2019)    Depression     Under control per pt.  on 6/15/18    DVT of leg (deep venous thrombosis) ([facility] [address] 2014    Fracture     right foot / pt denies surgical intervention    Helicobacter pylori (H. pylori) infection     per EGD    Hematuria 2018    Has a follow-up with Urologist     Hiatal hernia     History of blood transfusion     History of DVT (deep vein thrombosis) 2012    Hx of blood clots     Hypertension     Internal hemorrhoids     Narcolepsy 2012    Osteoporosis        Past Surgical History:   Procedure Laterality Date    ANKLE SURGERY Left 2018    mass excision    ARM SURGERY Right     Pt states she has had 13 right arm surgeries    BREAST ENHANCEMENT SURGERY Bilateral      SECTION      x 3    COLONOSCOPY  2013    sigmoid diverticula, prominent large internal hemorrhoid    COLONOSCOPY N/A 2/10/2020    COLONOSCOPY DIAGNOSTIC performed by Gamal Miller MD at [facility] N/A 2020    COLORECTAL CANCER SCREENING, NOT HIGH RISK performed by Juanito Alba MD at [address], COLON, DIAGNOSTIC      ESOPHAGEAL MOTILITY STUDY N/A 2020    PES RN - ESOPHAGEAL MOTILITY STUDY performed by Mili Morales DO at [facility]    ESOPHAGEAL MOTILITY STUDY  9/10/2020    ESOPHAGEAL MOTILITY STUDY performed by Nanda Marshall DO at [facility]    ESOPHAGEAL MOTILITY STUDY N/A 12/18/2020    ESOPHAGEAL MOTILITY STUDY ATTEMPTED PER DR. Jose Greenberg WITHOUT SUCCESS performed by Nanda Marshall DO at [facility]    FOOT SURGERY Left 2015    FOOT TENDON SURGERY Left 02/22/2019    LEFT PERONEAL TENDON REPAIR WITH POSSIBLE LEFT TENDON TRANSFER (Left )    KNEE ARTHROSCOPY Left     Two surgeries on left knee per pt    KNEE SURGERY Right     Three surgeries per pt    LIGAMENT REPAIR Left 2/22/2019    LEFT PERONEAL TENDON REPAIR performed by Kasandra Jordan DPM at [address] [place] [address] OFFICE/OUTPT VISIT,PROCEDURE ONLY Left 6/29/2018    RESECTION/REMOVAL BONY NEOPLASM LEFT FIBULA, LEFT SOFT TISSUE MASS EXCISION WITH BONE BIOPSY LEFT ANKLE FIBULA performed by Kasandra Jordan DPM at [facility] Left     UPPER GASTROINTESTINAL ENDOSCOPY  5/23/2013    tertiary contractures esophagus, 3 to 4 cm hiatal hernia, antral gastritis, + H Pylori, mild active chronic esophagitis    UPPER GASTROINTESTINAL ENDOSCOPY  4/6/2016    one biopsy    UPPER GASTROINTESTINAL ENDOSCOPY N/A 2/10/2020    EGD BIOPSY performed by Arik Suárez MD at [facility]         Allergies   Allergen Reactions    Latex Itching    Prozac [Fluoxetine Hcl]     Sulfa Antibiotics      migraine    Codeine Itching         Current Outpatient Medications:     pantoprazole (PROTONIX) 40 MG tablet, TAKE ONE TABLET BY MOUTH EVERY MORNING BEFORE BREAKFAST, Disp: 90 tablet, Rfl: 0    alendronate (FOSAMAX) 70 MG tablet, TAKE 1 TABLET BY MOUTH ONCE WEEKLY BEFORE BREAKFAST, ON AN EMPTY STOMACH: REMAIN UPRIGHT FOR 30 MINUTES:TAKE WITH 8 OUNCES OF WATER, Disp: 12 tablet, Rfl: 10    buPROPion HCl ER, XL, 450 MG TB24, Take 450 mg by mouth every morning, Disp: 30 tablet, Rfl: 2    amphetamine-dextroamphetamine (ADDERALL XR) 30 MG extended release capsule, Take 2 capsules by mouth daily for 30 days. , Disp: 60 capsule, Rfl: 0    oxyCODONE-acetaminophen (PERCOCET) 5-325 MG per tablet, Take 1 tablet by mouth every 8 hours as needed for Pain for up to 30 days. , Disp: 90 tablet, Rfl: 0    propranolol (INDERAL LA) 80 MG extended release capsule, Take 1 capsule by mouth daily, Disp: 90 capsule, Rfl: 1    albuterol sulfate HFA (VENTOLIN HFA) 108 (90 Base) MCG/ACT inhaler, INHALE TWO PUFFS BY MOUTH EVERY 4 HOURS AS NEEDED FOR WHEEZING, Disp: 1 Inhaler, Rfl: 5    citalopram (CELEXA) 40 MG tablet, TAKE ONE TABLET BY MOUTH DAILY, Disp: 90 tablet, Rfl: 3    albuterol (PROVENTIL) (2.5 MG/3ML) 0.083% nebulizer solution, Take 2.5 mg by nebulization every 6 hours as needed for Wheezing or Shortness of Breath, Disp: , Rfl:     Calcium Carbonate (CALCIUM 600 PO), Take 600 mg by mouth daily, Disp: , Rfl:     Family History   Problem Relation Age of Onset    Cancer Mother     Hypertension Mother     Stroke Mother     Cancer Maternal Aunt     Hypertension Father     Heart Surgery Father     Diabetes Neg Hx        Social History     Socioeconomic History    Marital status: Single     Spouse name: Not on file    Number of children: Not on file    Years of education: Not on file    Highest education level: Not on file   Occupational History    Not on file   Social Needs    Financial resource strain: Somewhat hard    Food insecurity     Worry: Sometimes true     Inability: Sometimes true    Transportation needs     Medical: No     Non-medical: No   Tobacco Use    Smoking status: Former Smoker     Packs/day: 1.00     Years: 20.00     Pack years: 20.00     Quit date:      Years since quittin.2    Smokeless tobacco: Never Used   Substance and Sexual Activity    Alcohol use: Yes     Comment: rare    Drug use: No    Sexual activity: Not Currently   Lifestyle    Physical activity     Days per week: 0 days     Minutes per session: 0 min    Stress: To some extent   Relationships    Social connections     Talks on phone: Three times a week     Gets together: Once a week     Attends [employer] service: 1 to 4 times per year     Active member of club or organization: Yes     Attends meetings of clubs or organizations: 1 to 4 times per year     Relationship status:     Intimate partner violence     Fear of current or ex partner: Not on file     Emotionally abused: Not on file     Physically abused: Not on file     Forced sexual activity: Not on file   Other Topics Concern    Not on file   Social History Narrative    Not on file       Review of Systems:  Review of Systems   Constitution: Negative for fever and weight loss. HENT: Negative for trouble swallowing. Eyes: Negative for photophobia. Cardiovascular: Negative for chest pain and syncope. Musculoskeletal: Positive for back pain and neck pain. Gastrointestinal: Negative for abdominal pain and bowel incontinence. Genitourinary: Negative for bladder incontinence, dysuria and pelvic pain. Neurological: Positive for headaches and numbness. Negative for paresthesias, tingling and weakness. Physical Exam:  BP (!) 142/97   Pulse 63   Ht 5' (1.524 m)   Wt 150 lb (68 kg)   BMI 29.29 kg/m²     Physical Exam  Cardiovascular:      Rate and Rhythm: Normal rate. Pulmonary:      Effort: Pulmonary effort is normal.   Musculoskeletal:      Cervical back: She exhibits decreased range of motion and tenderness. Lumbar back: She exhibits decreased range of motion and tenderness. Skin:     General: Skin is warm and dry. Neurological:      Mental Status: She is alert and oriented to person, place, and time. Record/Diagnostics Review:    As above, I did review the imaging    Orders:  No orders of the defined types were placed in this encounter. No orders of the defined types were placed in this encounter. Assessment:  1.  Cervical radiculopathy 2. Lumbosacral spondylosis without myelopathy        Treatment Plan:  DISCUSSION: Treatment options discussed with patient and all questions answered to patient's satisfaction. Pt to make appt for one month for f/u         I have reviewed the chief complaint and history of present illness (including ROS and PFSH) and vital documentation by my staff and I agree with their documentation and have added where applicable.

## 2021-03-25 ENCOUNTER — TELEPHONE (OUTPATIENT)
Dept: PAIN MANAGEMENT | Age: 61
End: 2021-03-25

## 2021-03-25 ENCOUNTER — OFFICE VISIT (OUTPATIENT)
Dept: PAIN MANAGEMENT | Age: 61
End: 2021-03-25
Payer: MEDICARE

## 2021-03-25 VITALS
SYSTOLIC BLOOD PRESSURE: 142 MMHG | DIASTOLIC BLOOD PRESSURE: 97 MMHG | WEIGHT: 150 LBS | HEART RATE: 63 BPM | HEIGHT: 60 IN | BODY MASS INDEX: 29.45 KG/M2

## 2021-03-25 DIAGNOSIS — M54.12 CERVICAL RADICULOPATHY: Primary | ICD-10-CM

## 2021-03-25 DIAGNOSIS — M47.817 LUMBOSACRAL SPONDYLOSIS WITHOUT MYELOPATHY: ICD-10-CM

## 2021-03-25 PROCEDURE — 99213 OFFICE O/P EST LOW 20 MIN: CPT | Performed by: NURSE PRACTITIONER

## 2021-03-26 ENCOUNTER — TELEPHONE (OUTPATIENT)
Dept: PAIN MANAGEMENT | Age: 61
End: 2021-03-26

## 2021-03-26 NOTE — TELEPHONE ENCOUNTER
Left another message requesting the patient to call the office back to schedule her next follow up appointment before 4/9/21 and to cancel 4/26/21 appointment.

## 2021-03-29 ENCOUNTER — HOSPITAL ENCOUNTER (OUTPATIENT)
Age: 61
Discharge: HOME | End: 2021-03-31
Payer: MEDICARE

## 2021-03-29 ENCOUNTER — HOSPITAL ENCOUNTER (OUTPATIENT)
Dept: GENERAL RADIOLOGY | Age: 61
Discharge: HOME | End: 2021-03-31
Payer: MEDICARE

## 2021-03-29 DIAGNOSIS — M47.817 LUMBOSACRAL SPONDYLOSIS WITHOUT MYELOPATHY: ICD-10-CM

## 2021-03-29 PROCEDURE — 72120 X-RAY BEND ONLY L-S SPINE: CPT

## 2021-04-06 ENCOUNTER — OFFICE VISIT (OUTPATIENT)
Dept: PAIN MANAGEMENT | Age: 61
End: 2021-04-06
Payer: MEDICARE

## 2021-04-06 VITALS
DIASTOLIC BLOOD PRESSURE: 103 MMHG | HEIGHT: 60 IN | SYSTOLIC BLOOD PRESSURE: 141 MMHG | WEIGHT: 149.8 LBS | BODY MASS INDEX: 29.41 KG/M2

## 2021-04-06 DIAGNOSIS — S22.41XD CLOSED FRACTURE OF MULTIPLE RIBS OF RIGHT SIDE WITH ROUTINE HEALING, SUBSEQUENT ENCOUNTER: ICD-10-CM

## 2021-04-06 DIAGNOSIS — Z79.891 ENCOUNTER FOR LONG-TERM OPIATE ANALGESIC USE: ICD-10-CM

## 2021-04-06 DIAGNOSIS — G89.29 CHRONIC PAIN OF LEFT ANKLE: ICD-10-CM

## 2021-04-06 DIAGNOSIS — M25.572 CHRONIC PAIN OF LEFT ANKLE: ICD-10-CM

## 2021-04-06 DIAGNOSIS — M54.12 CERVICAL RADICULOPATHY: Primary | ICD-10-CM

## 2021-04-06 PROCEDURE — 99213 OFFICE O/P EST LOW 20 MIN: CPT | Performed by: NURSE PRACTITIONER

## 2021-04-06 RX ORDER — OXYCODONE HYDROCHLORIDE AND ACETAMINOPHEN 5; 325 MG/1; MG/1
1 TABLET ORAL EVERY 8 HOURS PRN
Qty: 90 TABLET | Refills: 0 | Status: SHIPPED | OUTPATIENT
Start: 2021-04-09 | End: 2021-05-03 | Stop reason: SDUPTHER

## 2021-04-06 ASSESSMENT — ENCOUNTER SYMPTOMS
COUGH: 0
BACK PAIN: 1
CONSTIPATION: 0
SHORTNESS OF BREATH: 0

## 2021-04-06 NOTE — PROGRESS NOTES
Patient is here today to review medication contract. Chief Complaint:  Neck pain    PMH     She had stress care appt for opioid risk stratification prior to consideration for opioid therapy with notes scanned into chart. She also has eval with [place] this month to start regular counseling. Recent JANIE was appropriate for percocet prescribed by PCP. Her main complaint today is neck pain that is a constant ache that worsens with activity. She does get numbness and tingling in her hands at times. She is currently using tens unit and percocet to help with pain control. Imagine was reviewed with pt and injections discussed but pt does admit to osteoporosis and currently taking Fosamax and Calcium. Would like to talk with MD londono risk and possibility of injections or even just TPI      HPI:     Neck Pain   This is a chronic problem. The current episode started more than 1 year ago. The problem occurs constantly. The problem has been unchanged. The pain is associated with nothing. The pain is present in the left side, midline and right side. The quality of the pain is described as cramping and aching. The pain is at a severity of 4/10. The pain is mild. The symptoms are aggravated by bending, coughing, sneezing and swallowing. The pain is worse during the night. Stiffness is present all day. Associated symptoms include headaches, leg pain, numbness, syncope and tingling. Pertinent negatives include no chest pain, fever, paresis or weakness. She has tried heat, muscle relaxants and home exercises (TENS unit) for the symptoms. The treatment provided mild relief. Patient denies any new neurological symptoms. No bowel or bladder incontinence, no weakness, and no falling.     Pill count: 4/9 reminded to bring to next appt    Morphine equivalent: 22.5    Controlled Substance Monitoring:    Acute and Chronic Pain Monitoring:   RX Monitoring 4/6/2021   Attestation -   Periodic Controlled Substance Monitoring Possible medication side effects, risk of tolerance/dependence & alternative treatments discussed. ;No signs of potential drug abuse or diversion identified. ;Assessed functional status. ;Obtaining appropriate analgesic effect of treatment. Chronic Pain > 80 MEDD -   Chronic Pain > 120 MEDD -       Periodic Controlled Substance Monitoring: Possible medication side effects, risk of tolerance/dependence & alternative treatments discussed., No signs of potential drug abuse or diversion identified. , Assessed functional status., Obtaining appropriate analgesic effect of treatment. Mellissa Richardson, APRN - CNP)      Past Medical History:   Diagnosis Date    Acid reflux     Adult ADHD 2012    Asthma 2012    B[facility] 1 disorder ([facility] [address]     Broken foot     Left foot    Chest pain of uncertain etiology     Last episode was in  (Written 2019)    Depression     Under control per pt.  on 6/15/18    DVT of leg (deep venous thrombosis) ([facility] [address] 2014    Fracture     right foot / pt denies surgical intervention    Helicobacter pylori (H. pylori) infection     per EGD    Hematuria 2018    Has a follow-up with Urologist     Hiatal hernia     History of blood transfusion     History of DVT (deep vein thrombosis) 2012    Hx of blood clots     Hypertension     Internal hemorrhoids     Narcolepsy 2012    Osteoporosis        Past Surgical History:   Procedure Laterality Date    ANKLE SURGERY Left 2018    mass excision    ARM SURGERY Right     Pt states she has had 13 right arm surgeries    BREAST ENHANCEMENT SURGERY Bilateral      SECTION      x 3    COLONOSCOPY  2013    sigmoid diverticula, prominent large internal hemorrhoid    COLONOSCOPY N/A 2/10/2020    COLONOSCOPY DIAGNOSTIC performed by Abe Del Rio MD at [facility] 2020    COLORECTAL CANCER SCREENING, NOT HIGH RISK performed by Travis Coello MD at [address] [place] [address] DIAGNOSTIC      ESOPHAGEAL MOTILITY STUDY N/A 8/12/2020    PES RN - ESOPHAGEAL MOTILITY STUDY performed by Kaylee Bruce DO at [facility]    ESOPHAGEAL MOTILITY STUDY  9/10/2020    ESOPHAGEAL MOTILITY STUDY performed by Kaylee Bruce DO at [facility]    ESOPHAGEAL MOTILITY STUDY N/A 12/18/2020    ESOPHAGEAL MOTILITY STUDY ATTEMPTED PER DR. Leona Ivory WITHOUT SUCCESS performed by Kaylee Bruce DO at [facility]    FOOT SURGERY Left 2015    FOOT TENDON SURGERY Left 02/22/2019    LEFT PERONEAL TENDON REPAIR WITH POSSIBLE LEFT TENDON TRANSFER (Left )    KNEE ARTHROSCOPY Left     Two surgeries on left knee per pt    KNEE SURGERY Right     Three surgeries per pt    LIGAMENT REPAIR Left 2/22/2019    LEFT PERONEAL TENDON REPAIR performed by Sherice Child DPM at [facility] OFFICE/OUTPT VISIT,PROCEDURE ONLY Left 6/29/2018    RESECTION/REMOVAL BONY NEOPLASM LEFT FIBULA, LEFT SOFT TISSUE MASS EXCISION WITH BONE BIOPSY LEFT ANKLE FIBULA performed by Sherice Child DPM at [address] Left     UPPER GASTROINTESTINAL ENDOSCOPY  5/23/2013    tertiary contractures esophagus, 3 to 4 cm hiatal hernia, antral gastritis, + H Pylori, mild active chronic esophagitis    UPPER GASTROINTESTINAL ENDOSCOPY  4/6/2016    one biopsy    UPPER GASTROINTESTINAL ENDOSCOPY N/A 2/10/2020    EGD BIOPSY performed by Lyudmila Butler MD at [facility]         Allergies   Allergen Reactions    Latex Itching    Prozac [Fluoxetine Hcl]     Sulfa Antibiotics      migraine    Codeine Itching         Current Outpatient Medications:     pantoprazole (PROTONIX) 40 MG tablet, TAKE ONE TABLET BY MOUTH EVERY MORNING BEFORE BREAKFAST, Disp: 90 tablet, Rfl: 0    alendronate (FOSAMAX) 70 MG tablet, TAKE 1 TABLET BY MOUTH ONCE WEEKLY BEFORE BREAKFAST, ON AN EMPTY STOMACH: REMAIN UPRIGHT FOR 30 MINUTES:TAKE WITH 8 OUNCES OF WATER, Disp: 12 tablet, Rfl: 10    buPROPion HCl ER, XL, 450 MG TB24, Take 450 mg by mouth every morning, Disp: 30 tablet, Rfl: 2    amphetamine-dextroamphetamine (ADDERALL XR) 30 MG extended release capsule, Take 2 capsules by mouth daily for 30 days. , Disp: 60 capsule, Rfl: 0    oxyCODONE-acetaminophen (PERCOCET) 5-325 MG per tablet, Take 1 tablet by mouth every 8 hours as needed for Pain for up to 30 days. , Disp: 90 tablet, Rfl: 0    propranolol (INDERAL LA) 80 MG extended release capsule, Take 1 capsule by mouth daily, Disp: 90 capsule, Rfl: 1    albuterol sulfate HFA (VENTOLIN HFA) 108 (90 Base) MCG/ACT inhaler, INHALE TWO PUFFS BY MOUTH EVERY 4 HOURS AS NEEDED FOR WHEEZING, Disp: 1 Inhaler, Rfl: 5    citalopram (CELEXA) 40 MG tablet, TAKE ONE TABLET BY MOUTH DAILY, Disp: 90 tablet, Rfl: 3    albuterol (PROVENTIL) (2.5 MG/3ML) 0.083% nebulizer solution, Take 2.5 mg by nebulization every 6 hours as needed for Wheezing or Shortness of Breath, Disp: , Rfl:     Calcium Carbonate (CALCIUM 600 PO), Take 600 mg by mouth daily, Disp: , Rfl:     Family History   Problem Relation Age of Onset    Cancer Mother     Hypertension Mother     Stroke Mother     Cancer Maternal Aunt     Hypertension Father     Heart Surgery Father     Diabetes Neg Hx        Social History     Socioeconomic History    Marital status: Single     Spouse name: Not on file    Number of children: Not on file    Years of education: Not on file    Highest education level: Not on file   Occupational History    Not on file   Social Needs    Financial resource strain: Somewhat hard    Food insecurity     Worry: Sometimes true     Inability: Sometimes true    Transportation needs     Medical: No     Non-medical: No   Tobacco Use    Smoking status: Former Smoker     Packs/day: 1.00     Years: 20.00     Pack years: 20.00     Quit date:      Years since quittin.2    Smokeless tobacco: Never Used   Substance and Sexual Activity    Alcohol Treatment Plan:  Patient relates current medications are helping the pain. Patient reports taking pain medications as prescribed, denies obtaining medications from different sources and denies use of illegal drugs. Patient denies side effects from medications like nausea, vomiting, constipation or drowsiness. Patient reports current activities of daily living are possible due to medications and would like to continue them. As always, we encourage daily stretching and strengthening exercises, and recommend minimizing use of pain medications unless patient cannot get through daily activities due to pain. Contract requirements met. Continue opioid therapy.  Script written for percocet  Follow up appointment made for 4 weeks with MD to discuss options

## 2021-04-15 ENCOUNTER — HOSPITAL ENCOUNTER (OUTPATIENT)
Age: 61
Discharge: HOME | End: 2021-04-15
Payer: MEDICARE

## 2021-04-15 ENCOUNTER — HOSPITAL ENCOUNTER (OUTPATIENT)
Facility: MEDICAL CENTER | Age: 61
End: 2021-04-15
Payer: MEDICARE

## 2021-04-15 ENCOUNTER — HOSPITAL ENCOUNTER (OUTPATIENT)
Facility: MEDICAL CENTER | Age: 61
Discharge: HOME | End: 2021-04-15
Payer: MEDICARE

## 2021-04-15 DIAGNOSIS — K90.9 IRON MALABSORPTION: ICD-10-CM

## 2021-04-15 DIAGNOSIS — E53.8 VITAMIN B12 DEFICIENCY: ICD-10-CM

## 2021-04-15 DIAGNOSIS — D50.8 IRON DEFICIENCY ANEMIA SECONDARY TO INADEQUATE DIETARY IRON INTAKE: ICD-10-CM

## 2021-04-15 LAB
ABSOLUTE EOS #: 0.11 K/UL (ref 0–0.44)
ABSOLUTE IMMATURE GRANULOCYTE: <0.03 K/UL (ref 0–0.3)
ABSOLUTE LYMPH #: 0.9 K/UL (ref 1.1–3.7)
ABSOLUTE MONO #: 0.45 K/UL (ref 0.1–1.2)
BASOPHILS # BLD: 1 % (ref 0–2)
BASOPHILS ABSOLUTE: 0.04 K/UL (ref 0–0.2)
DIFFERENTIAL TYPE: ABNORMAL
EOSINOPHILS RELATIVE PERCENT: 3 % (ref 1–4)
FERRITIN: 209 UG/L (ref 13–150)
FOLATE: 8 NG/ML
HCT VFR BLD CALC: 41.9 % (ref 36.3–47.1)
HEMOGLOBIN: 13.3 G/DL (ref 11.9–15.1)
IMMATURE GRANULOCYTES: 0 %
IRON SATURATION: 37 % (ref 20–55)
IRON: 92 UG/DL (ref 37–145)
LYMPHOCYTES # BLD: 20 % (ref 24–43)
MCH RBC QN AUTO: 30.9 PG (ref 25.2–33.5)
MCHC RBC AUTO-ENTMCNC: 31.7 G/DL (ref 28.4–34.8)
MCV RBC AUTO: 97.4 FL (ref 82.6–102.9)
MONOCYTES # BLD: 10 % (ref 3–12)
NRBC AUTOMATED: 0 PER 100 WBC
PDW BLD-RTO: 13.5 % (ref 11.8–14.4)
PLATELET # BLD: 278 K/UL (ref 138–453)
PLATELET ESTIMATE: ABNORMAL
PMV BLD AUTO: 10.7 FL (ref 8.1–13.5)
RBC # BLD: 4.3 M/UL (ref 3.95–5.11)
RBC # BLD: ABNORMAL 10*6/UL
SEG NEUTROPHILS: 66 % (ref 36–65)
SEGMENTED NEUTROPHILS ABSOLUTE COUNT: 2.97 K/UL (ref 1.5–8.1)
TOTAL IRON BINDING CAPACITY: 249 UG/DL (ref 250–450)
UNSATURATED IRON BINDING CAPACITY: 157 UG/DL (ref 112–347)
VITAMIN B-12: 253 PG/ML (ref 232–1245)
WBC # BLD: 4.5 K/UL (ref 3.5–11.3)
WBC # BLD: ABNORMAL 10*3/UL

## 2021-04-15 PROCEDURE — 83550 IRON BINDING TEST: CPT

## 2021-04-15 PROCEDURE — 85025 COMPLETE CBC W/AUTO DIFF WBC: CPT

## 2021-04-15 PROCEDURE — 82728 ASSAY OF FERRITIN: CPT

## 2021-04-15 PROCEDURE — 83540 ASSAY OF IRON: CPT

## 2021-04-15 PROCEDURE — 36415 COLL VENOUS BLD VENIPUNCTURE: CPT

## 2021-04-15 PROCEDURE — 82746 ASSAY OF FOLIC ACID SERUM: CPT

## 2021-04-15 PROCEDURE — 82607 VITAMIN B-12: CPT

## 2021-04-19 ENCOUNTER — HOSPITAL ENCOUNTER (OUTPATIENT)
Dept: NUTRITION | Age: 61
Setting detail: THERAPIES SERIES
Discharge: HOME | End: 2021-04-19
Payer: MEDICARE

## 2021-04-19 PROCEDURE — 97802 MEDICAL NUTRITION INDIV IN: CPT | Performed by: DIETITIAN, REGISTERED

## 2021-04-19 NOTE — PROGRESS NOTES
OUTPATIENT CONSULTATION NUTRITION NOTE      Teetee Louis is a 57 y.o.  female     Reason for Counseling: Weight Loss, Low iron levels, low B12 levels      Nutrition Evaluation/ Recommendations  · Name and Office phone number given for reference. · Verbally reviewed information with patient   · Educated on High iron high vitamin C rich foods, High Vitamin B12 rich foods, low sodium nutrition therapy, weight loss, and managing weight cooking tips  · Patient goals: Lose 20-25# to goal weight: 125#  · Written educational materials provided           Objective/Subjective/Current Data:  Patient seen today for weight loss. Ideally patient would like to  Lose 20-25# for goal weight: 125#. Patient mentions she is unable to workout right now due to having bad ankles/knees/and back. Patient reports only eating 1 sandwich (turkey, pickles, lettuce) with 1 brownie yesterday and today having 2 eggs with 2 slices whole wheat bread and 2 sausages. Patient reporting \"grazing\" when eating. She drinks pop (7-up, Peaches from [employer]) or Anatoly-aid everyday. No water intake. Patient does not eat much fruits/vegetables. Pt stated being on food stamps and not being able to afford a lot of food. Provided cost effective meal/food options and patient was disinterested. Pt did state she ate crackers for a whole week due to not having enough money. Financial difficulty seems to be a big barrier for patient and eating healthier/weight loss  Patient reports not wanting to cook her own meals due to not wanting to do dishes. Patient seemed to not be receptive to information and tips writer provided regarding snacking healthier and choosing healthier options. Patient wanted a written meal plan however, meal plan would not be helpful at this stage as patient is only eating 1 meal/day and does not want to choose fruits/vegetables/protein. Patient does not seem willing to increase water intake throughout the day and limit pop intake.   Pt reports having hx: problem losing weight due to inability to workout, blood transfusions, and having H.Pylori bacteria ~1 year. Patient does not take MVI. Takes calcium 600 mg daily  Reviewed educational materials and encouraged to include nutrient dense items when snacking. Encouraged patient to increase protein intake (Iron + B12). Encouraged water intake and including frozen fruits and vegetables as they are the most cost effective. Past Medical History:  Past Medical History:   Diagnosis Date    Acid reflux     Adult ADHD 2012    Asthma 2012    Bipolar 1 disorder ([facility] [address]     Broken foot     Left foot    Chest pain of uncertain etiology     Last episode was in  (Written 2019)    Depression     Under control per pt.  on 6/15/18    DVT of leg (deep venous thrombosis) ([facility] [address] 2014    Fracture     right foot / pt denies surgical intervention    Helicobacter pylori (H. pylori) infection     per EGD    Hematuria 2018    Has a follow-up with Urologist     Hiatal hernia     History of blood transfusion     History of DVT (deep vein thrombosis) 2012    Hx of blood clots     Hypertension     Internal hemorrhoids     Narcolepsy 2012    Osteoporosis      Past Surgical History:   Procedure Laterality Date    ANKLE SURGERY Left 2018    mass excision    ARM SURGERY Right     Pt states she has had 13 right arm surgeries    BREAST ENHANCEMENT SURGERY Bilateral      SECTION      x 3    COLONOSCOPY  2013    sigmoid diverticula, prominent large internal hemorrhoid    COLONOSCOPY N/A 2/10/2020    COLONOSCOPY DIAGNOSTIC performed by Liam Orozco MD at [address] [place] [address] N/A 2020    COLORECTAL CANCER SCREENING, NOT HIGH RISK performed by Catrachito Harris MD at [facility], COLON, DIAGNOSTIC      ESOPHAGEAL MOTILITY STUDY N/A 2020    PES RN - ESOPHAGEAL MOTILITY STUDY performed by Stephanie Varma DO at [facility]  ESOPHAGEAL MOTILITY STUDY  9/10/2020    ESOPHAGEAL MOTILITY STUDY performed by Janet Valderrama DO at [facility]    ESOPHAGEAL MOTILITY STUDY N/A 12/18/2020    ESOPHAGEAL MOTILITY STUDY ATTEMPTED PER DR. Thermon Cushing WITHOUT SUCCESS performed by Janet Valderrama DO at [facility]    FOOT SURGERY Left 2015    FOOT TENDON SURGERY Left 02/22/2019    LEFT PERONEAL TENDON REPAIR WITH POSSIBLE LEFT TENDON TRANSFER (Left )    KNEE ARTHROSCOPY Left     Two surgeries on left knee per pt    KNEE SURGERY Right     Three surgeries per pt    LIGAMENT REPAIR Left 2/22/2019    LEFT PERONEAL TENDON REPAIR performed by Deirdre Appiah DPM at [address] [place] [address] OFFICE/OUTPT VISIT,PROCEDURE ONLY Left 6/29/2018    RESECTION/REMOVAL BONY NEOPLASM LEFT FIBULA, LEFT SOFT TISSUE MASS EXCISION WITH BONE BIOPSY LEFT ANKLE FIBULA performed by Deirdre Appiah DPM at [place] [address] Left     UPPER GASTROINTESTINAL ENDOSCOPY  5/23/2013    tertiary contractures esophagus, 3 to 4 cm hiatal hernia, antral gastritis, + H Pylori, mild active chronic esophagitis    UPPER GASTROINTESTINAL ENDOSCOPY  4/6/2016    one biopsy    UPPER GASTROINTESTINAL ENDOSCOPY N/A 2/10/2020    EGD BIOPSY performed by Drake Ferguson MD at [address]:    Chemistry CBC/Coags Misc. No results for input(s): NA, K, CL, CO2, BUN, CREATININE, GLUCOSE in the last 72 hours. Invalid input(s): CA  No results for input(s): PHOS in the last 72 hours. No results for input(s): WBC, RBC, HGB, HCT, MCV, MCH, MCHC, RDW, PLT, MPV in the last 72 hours. No results for input(s): LABALBU in the last 72 hours.     Invalid input(s):  PREALBUMIN  No results found for: LABA1C       Other labs: TIBC 249 (L); GFR 52 (L); CRE 1.07 (H)    Anthropometric Measures:  Height: 5'  Weight: 149lb 12.8oz (67.9 kg)  Ideal Body Weight: 100lb; 45.5 kg  Ideal Body Weight %: 149%  Adjusted Body Weight: No adjusted weight      BMI: 29.26 kg/m2  which is classified as Overweight    Less than 18.5 Underweight  18.5-24.9 Normal Weight  25-29.9 Overweight  30-34.9 Obese Class I  35-39.9 Obese Class II  Greater than or equal to 40 Obese Class III.   Wt Readings from Last 20 Encounters:   04/06/21 149 lb 12.8 oz (67.9 kg)   03/23/21 150 lb (68 kg)   03/17/21 151 lb (68.5 kg)   03/03/21 150 lb 12.8 oz (68.4 kg)   01/29/21 148 lb 3.2 oz (67.2 kg)   01/13/21 150 lb (68 kg)   01/07/21 158 lb 14.4 oz (72.1 kg)   12/18/20 145 lb (65.8 kg)   11/18/20 147 lb 9.6 oz (67 kg)   11/12/20 147 lb 3.2 oz (66.8 kg)   11/05/20 145 lb (65.8 kg)   10/29/20 140 lb 3.2 oz (63.6 kg)   10/28/20 148 lb 3.2 oz (67.2 kg)   10/22/20 149 lb (67.6 kg)   09/17/20 145 lb (65.8 kg)   09/10/20 145 lb (65.8 kg)   08/28/20 147 lb 6.4 oz (66.9 kg)   08/25/20 149 lb 9.6 oz (67.9 kg)   08/20/20 148 lb 6.4 oz (67.3 kg)   08/12/20 145 lb (65.8 kg)       Assessment and Plan:    Nutritional Requirements:  Estimated Energy Needs:  Weight Used: 67.9kg   Method Used: [place] [address] [place]/Kcal/kg  Estimated kcal Needs: 1509 kcal per day (weight maintenance); 0.5lb/week weight loss: 1300 kcal  Protein Needs:  Weight used: 67.9 kg  68-82 g/kg = 1.0-1.2 g per day  Amputations: None      Nutrition Diagnosis and Goal  Problem: Food and nutrition related knowledge deficit  Etiology/related to: lack of prior exposure to accurate nutrition related topics  Symptoms/Signs/as evidenced by: verbalizes inaccurate information, demonstrates inability to apply food and nutrition related information (choosing foods high in salt and sugar; not willing to read nutrition facts label), dx: ADHD on Adderall and having no hunger      Goal: Include high iron high vitamin C rich foods into snacking throughout the day  Progress towards goal: unchanged        Sheldon Eller, [address]  Office Number: [phone]

## 2021-04-22 ENCOUNTER — OFFICE VISIT (OUTPATIENT)
Dept: ONCOLOGY | Age: 61
End: 2021-04-22
Payer: MEDICARE

## 2021-04-22 ENCOUNTER — TELEPHONE (OUTPATIENT)
Dept: ONCOLOGY | Age: 61
End: 2021-04-22

## 2021-04-22 VITALS
WEIGHT: 152 LBS | BODY MASS INDEX: 29.69 KG/M2 | TEMPERATURE: 97.6 F | SYSTOLIC BLOOD PRESSURE: 151 MMHG | HEART RATE: 57 BPM | RESPIRATION RATE: 18 BRPM | DIASTOLIC BLOOD PRESSURE: 98 MMHG

## 2021-04-22 DIAGNOSIS — D50.0 IRON DEFICIENCY ANEMIA DUE TO CHRONIC BLOOD LOSS: Primary | ICD-10-CM

## 2021-04-22 PROCEDURE — 99214 OFFICE O/P EST MOD 30 MIN: CPT | Performed by: INTERNAL MEDICINE

## 2021-04-22 PROCEDURE — 99211 OFF/OP EST MAY X REQ PHY/QHP: CPT | Performed by: INTERNAL MEDICINE

## 2021-04-22 NOTE — PROGRESS NOTES
_           Chief Complaint   Patient presents with    Follow-up    Discuss Labs     DIAGNOSIS:       Severe iron deficiency anemia  Vitamin B12 deficiency  2 episodes of probable GI blood loss February 2020 and late October 2020  Severe GERD and hiatal hernia  Past history of left leg DVT triggered by immobilization. Status post anticoagulation. CURRENT THERAPY:         Status post IV iron infusion November 2020  Status post blood transfusion    BRIEF CASE HISTORY:      Ms. Neo Paris is a very pleasant 57 y.o. female with history of multiple co morbidities as listed. The patient is referred for further evaluation and management of severe iron deficiency anemia. The patient had normal blood work over the last several years up to 2019. She presented in February 2020 with severe anemia with hemoglobin down to 5.4. She was hospitalized and received blood transfusion and iron infusion at that time. She had colonoscopy which was not prepped so it was repeated in May 2020 and so far did not show any active bleeding. Patient had severe upper GI symptoms with GERD and hiatal hernia. She was hospitalized for the second time late October 2020 with severe anemia again and she received blood transfusion and iron infusion as well. She had surgery evaluation and plan for possible gastric surgery in few weeks. The patient's symptoms were typical for severe anemia. She was complaining of increasing weakness and fatigue and shortness of breath on minimal exertion and palpitation. Dizzy spells. She denies any active bleeding. No history of melena or hematochezia. No hematemesis. As stated above she had severe heartburn and acid reflux and she was maintained on Protonix. The patient also had history of left leg DVT.   She was maintained on anticoagulation for long duration which was discontinued at the time of her first admission for iron deficiency and possible GI bleeding. No history of pulmonary embolism. Her DVT was triggered by surgery and the cast in the leg. No family history of hypercoagulability. Patient quit smoking 31 years ago. Denies alcohol drinking. .     INTERIM HISTORY:   Patient seen for follow-up severe iron deficiency anemia. Patient had IV iron infusion with good results. Hemoglobin is now normal.  No weakness or fatigue. No dizziness. She continues to have episodes of black stool. She had GI work-up including capsule camera and these were negative. No other source of bleeding. PAST MEDICAL HISTORY: has a past medical history of Acid reflux, Adult ADHD, Asthma, Bipolar 1 disorder ([address] Broken foot, Chest pain of uncertain etiology, Depression, DVT of leg (deep venous thrombosis) ([facility] [address] Fracture, Helicobacter pylori (H. pylori) infection, Hematuria, Hiatal hernia, History of blood transfusion, History of DVT (deep vein thrombosis), Hx of blood clots, Hypertension, Internal hemorrhoids, Narcolepsy, and Osteoporosis. PAST SURGICAL HISTORY: has a past surgical history that includes  section; Nose surgery; Knee arthroscopy (Left); shoulder surgery (Left); Wrist surgery; Upper gastrointestinal endoscopy (2013); Colonoscopy (2013); Upper gastrointestinal endoscopy (2016); Foot surgery (Left, ); Breast enhancement surgery (Bilateral); Arm Surgery (Right); knee surgery (Right); vaginal dilatation; Ankle surgery (Left, 2018); pr office/outpt visit,procedure only (Left, 2018); Foot Tendon Surgery (Left, 2019); ligament repair (Left, 2019); Upper gastrointestinal endoscopy (N/A, 2/10/2020); Colonoscopy (N/A, 2/10/2020); Endoscopy, colon, diagnostic; Colonoscopy (N/A, 2020); esophageal motility study (N/A, 2020); esophageal motility study (9/10/2020); and esophageal motility study (N/A, 2020).      CURRENT MEDICATIONS:  has a current medication list which includes the following prescription(s): oxycodone-acetaminophen, pantoprazole, alendronate, bupropion hcl er (xl), amphetamine-dextroamphetamine, propranolol, albuterol sulfate hfa, citalopram, calcium carbonate, and albuterol. ALLERGIES:  is allergic to latex; prozac [fluoxetine hcl]; sulfa antibiotics; and codeine. FAMILY HISTORY: Negative for any hematological or oncological conditions. SOCIAL HISTORY:  reports that she quit smoking about 32 years ago. She has a 20.00 pack-year smoking history. She has never used smokeless tobacco. She reports current alcohol use. She reports that she does not use drugs. REVIEW OF SYSTEMS:     · General: Positive for weakness and fatigue. . No unanticipated weight loss or decreased appetite. No fever or chills. · Eyes: No blurred vision, eye pain or double vision. · Ears: No hearing problems or drainage. No tinnitus. · Throat: No sore throat, problems with swallowing or dysphagia. · Respiratory: No cough, sputum or hemoptysis. No shortness of breath. No pleuritic chest pain. · Cardiovascular: No chest pain, orthopnea or PND. No lower extremity edema. No palpitation. · Gastrointestinal: As above. · Genitourinary: No dysuria, hematuria, frequency or urgency. · Musculoskeletal: No muscle aches or pains. No limitation of movement. No back pain. No gait disturbance, No joint complaints. · Dermatologic: No skin rashes or pruritus. No skin lesions or discolorations. · Psychiatric: No depression, anxiety, or stress or signs of schizophrenia. No change in mood or affect. · Hematologic: No history of bleeding tendency. No bruises or ecchymosis. No history of clotting problems. · Infectious disease: No fever, chills or frequent infections. · Endocrine: No polydipsia or polyuria. No temperature intolerance. · Neurologic: No headaches or dizziness. No weakness or numbness of the extremities.  No changes in balance, coordination,  memory, mentation, behavior. · Allergic/Immunologic: No nasal congestion or hives. No repeated infections. PHYSICAL EXAM:  The patient is not in acute distress. Vital signs: Blood pressure (!) 151/98, pulse 57, temperature 97.6 °F (36.4 °C), temperature source Temporal, resp. rate 18, weight 152 lb (68.9 kg), not currently breastfeeding.      General appearance - well appearing, not in pain or distress  Mental status - good mood, alert and oriented  Eyes - pupils equal and reactive, extraocular eye movements intact  Ears - bilateral TM's and external ear canals normal  Nose - normal and patent, no erythema, discharge or polyps  Mouth - mucous membranes moist, pharynx normal without lesions  Neck - supple, no significant adenopathy  Lymphatics - no palpable lymphadenopathy, no hepatosplenomegaly  Chest - clear to auscultation, no wheezes, rales or rhonchi, symmetric air entry  Heart - normal rate, regular rhythm, normal S1, S2, no murmurs, rubs, clicks or gallops  Abdomen - soft, nontender, nondistended, no masses or organomegaly  Neurological - alert, oriented, normal speech, no focal findings or movement disorder noted  Musculoskeletal - no joint tenderness, deformity or swelling  Extremities - peripheral pulses normal, no pedal edema, no clubbing or cyanosis  Skin - normal coloration and turgor, no rashes, no suspicious skin lesions noted     Review of Diagnostic data:   Lab Results   Component Value Date    WBC 4.5 04/15/2021    HGB 13.3 04/15/2021    HCT 41.9 04/15/2021    MCV 97.4 04/15/2021     04/15/2021       Chemistry        Component Value Date/Time     01/14/2021 0909    K 3.7 01/14/2021 0909     01/14/2021 0909    CO2 24 01/14/2021 0909    BUN 9 01/14/2021 0909    CREATININE 1.07 (H) 01/14/2021 0909        Component Value Date/Time    CALCIUM 9.0 01/14/2021 0909    ALKPHOS 89 10/28/2020 2030    AST 16 10/28/2020 2030    ALT 11 10/28/2020 2030    BILITOT 0.12 (L) 10/28/2020 2030 Lab Results   Component Value Date    IRON 92 04/15/2021    TIBC 249 (L) 04/15/2021    FERRITIN 209 (H) 04/15/2021         IMPRESSION:   Severe iron deficiency anemia  Vitamin B12 deficiency  2 episodes of probable GI blood loss February 2020 and late October 2020  Severe GERD and hiatal hernia  Past history of left leg DVT triggered by immobilization. Status post anticoagulation. PLAN: I reviewed the labs as above and discussed with the patient. I explained to the patient the nature of this hematologic problem. I explained the significance of these abnormalities in layman language. Reviewing patient's labs obviously she had severe iron deficiency anemia in 2 occasions secondary to acute blood loss. She had blood transfusion and iron infusion during hospitalization. Most recent labs showed normocytic anemia. This is related to combination of iron deficiency and vitamin B12 deficiency. Considering patient's upper GI symptoms oral iron was discontinued and patient continued to have episodes of black stool. She has very good response to IV iron infusion. Hemoglobin is now normal.  Iron studies are normal.   If she continues to have problems with black stool, patient may need to have double-balloon enteroscopy. She is established with gastroenterology. She will also have follow-up with her surgeon for possibility of surgery for hiatal hernia. We will monitor labs closely and we will interfere as necessary. I discussed anticoagulation for her DVT. Obviously it was one event triggered by immobilization. There is no need for prolonged anticoagulation. This is especially so with her recent episodes of GI bleeding. Patient understands. Patient's questions were answered to the best of her satisfaction and she verbalized full understanding and agreement.                             6 [facility]                            This note is created with the assistance of a speech recognition program.  While intending to generate a document that actually reflects the content of the visit, the document can still have some errors including those of syntax and sound a like substitutions which may escape proof reading. It such instances, actual meaning can be extrapolated by contextual diversion.

## 2021-05-03 ENCOUNTER — OFFICE VISIT (OUTPATIENT)
Dept: PAIN MANAGEMENT | Age: 61
End: 2021-05-03
Payer: MEDICARE

## 2021-05-03 VITALS
HEIGHT: 60 IN | SYSTOLIC BLOOD PRESSURE: 125 MMHG | DIASTOLIC BLOOD PRESSURE: 84 MMHG | WEIGHT: 152 LBS | BODY MASS INDEX: 29.84 KG/M2

## 2021-05-03 DIAGNOSIS — Z79.891 ENCOUNTER FOR LONG-TERM OPIATE ANALGESIC USE: ICD-10-CM

## 2021-05-03 DIAGNOSIS — G89.29 OTHER CHRONIC PAIN: ICD-10-CM

## 2021-05-03 DIAGNOSIS — M47.812 CERVICAL SPONDYLOSIS: ICD-10-CM

## 2021-05-03 DIAGNOSIS — M47.817 LUMBOSACRAL SPONDYLOSIS WITHOUT MYELOPATHY: Primary | ICD-10-CM

## 2021-05-03 PROCEDURE — 99214 OFFICE O/P EST MOD 30 MIN: CPT | Performed by: PAIN MEDICINE

## 2021-05-03 RX ORDER — OXYCODONE HYDROCHLORIDE AND ACETAMINOPHEN 5; 325 MG/1; MG/1
1 TABLET ORAL EVERY 8 HOURS PRN
Qty: 90 TABLET | Refills: 0 | Status: SHIPPED | OUTPATIENT
Start: 2021-05-03 | End: 2021-06-02

## 2021-05-03 ASSESSMENT — ENCOUNTER SYMPTOMS: BACK PAIN: 1

## 2021-05-03 NOTE — PROGRESS NOTES
HPI:     Shoulder Pain   The pain is present in the left shoulder and right shoulder. This is a chronic problem. The current episode started more than 1 year ago. There has been no history of extremity trauma. The problem occurs constantly. The problem has been unchanged. The quality of the pain is described as aching. The pain is at a severity of 4/10. Associated symptoms include stiffness. The symptoms are aggravated by cold and heat. She has tried heat (TENS unit) for the symptoms. The treatment provided mild relief. Back Pain  This is a chronic problem. The current episode started more than 1 year ago. The problem occurs constantly. The problem has been gradually worsening since onset. The pain is present in the lumbar spine. The quality of the pain is described as aching. The pain is moderate. The pain is the same all the time. The symptoms are aggravated by position. Stiffness is present all day. MRI cervical spine with multilevel degenerative changes. MRI lumbar spine L4-5 spondylolisthesis as well as multilevel degenerative changes. Takes Percocet as needed for the pain. Patient denies any new neurological symptoms. Nobowel or bladder incontinence, no weakness, and no falling. Review of OARRS does not show any aberrant prescription behavior. Medication is helping the patient stay active. Patient denies any side effects and reports adequate analgesia. No sign of misuse/abuse. Past Medical History:   Diagnosis Date    Acid reflux     Adult ADHD 8/1/2012    Asthma 8/1/2012    Bipolar 1 disorder ([address]     Broken foot 2014    Left foot    Chest pain of uncertain etiology 34/41/3651    Last episode was in 2013 (Written 02/12/2019)    Depression     Under control per pt.  on 6/15/18    DVT of leg (deep venous thrombosis) ([address] 8/22/2014    Fracture     right foot / pt denies surgical intervention    Helicobacter pylori (H. pylori) infection 2013    per EGD    Hematuria 04/2018    Has a follow-up with Urologist     Hiatal hernia     History of blood transfusion     History of DVT (deep vein thrombosis) 2012    Hx of blood clots     Hypertension     Internal hemorrhoids     Narcolepsy 2012    Osteoporosis        Past Surgical History:   Procedure Laterality Date    ANKLE SURGERY Left 2018    mass excision    ARM SURGERY Right     Pt states she has had 13 right arm surgeries    BREAST ENHANCEMENT SURGERY Bilateral      SECTION      x 3    COLONOSCOPY  2013    sigmoid diverticula, prominent large internal hemorrhoid    COLONOSCOPY N/A 2/10/2020    COLONOSCOPY DIAGNOSTIC performed by Lyle Huggins MD at [facility] 2020    COLORECTAL CANCER SCREENING, NOT HIGH RISK performed by Mary Bautista MD at [address] [place] [address] Rd, COLON, DIAGNOSTIC      ESOPHAGEAL MOTILITY STUDY N/A 2020    PES RN - ESOPHAGEAL MOTILITY STUDY performed by Delma Carpenter DO at [facility] STUDY  9/10/2020    ESOPHAGEAL MOTILITY STUDY performed by Delma Carpenter DO at [facility]    ESOPHAGEAL MOTILITY STUDY N/A 2020    ESOPHAGEAL MOTILITY STUDY ATTEMPTED PER DR. Nikki Snyder WITHOUT SUCCESS performed by Delma Carpenter DO at [facility]    FOOT SURGERY Left     FOOT TENDON SURGERY Left 2019    LEFT PERONEAL TENDON REPAIR WITH POSSIBLE LEFT TENDON TRANSFER (Left )    KNEE ARTHROSCOPY Left     Two surgeries on left knee per pt    KNEE SURGERY Right     Three surgeries per pt    LIGAMENT REPAIR Left 2019    LEFT PERONEAL TENDON REPAIR performed by Prachi Mendenhall DPM at [facility] OFFICE/OUTPT VISIT,PROCEDURE ONLY Left 2018    RESECTION/REMOVAL BONY NEOPLASM LEFT FIBULA, LEFT SOFT TISSUE MASS EXCISION WITH BONE BIOPSY LEFT ANKLE FIBULA performed by Prachi Mendenhall DPM at [facility] Left     UPPER GASTROINTESTINAL ENDOSCOPY  2013 tertiary contractures esophagus, 3 to 4 cm hiatal hernia, antral gastritis, + H Pylori, mild active chronic esophagitis    UPPER GASTROINTESTINAL ENDOSCOPY  4/6/2016    one biopsy    UPPER GASTROINTESTINAL ENDOSCOPY N/A 2/10/2020    EGD BIOPSY performed by Wilver Mishra MD at [facility]         Allergies   Allergen Reactions    Latex Itching    Prozac [Fluoxetine Hcl]     Sulfa Antibiotics      migraine    Codeine Itching         Current Outpatient Medications:     oxyCODONE-acetaminophen (PERCOCET) 5-325 MG per tablet, Take 1 tablet by mouth every 8 hours as needed for Pain for up to 30 days. , Disp: 90 tablet, Rfl: 0    pantoprazole (PROTONIX) 40 MG tablet, TAKE ONE TABLET BY MOUTH EVERY MORNING BEFORE BREAKFAST (Patient taking differently: 2 times daily TAKE ONE TABLET BY MOUTH EVERY MORNING BEFORE BREAKFAST), Disp: 90 tablet, Rfl: 0    alendronate (FOSAMAX) 70 MG tablet, TAKE 1 TABLET BY MOUTH ONCE WEEKLY BEFORE BREAKFAST, ON AN EMPTY STOMACH: REMAIN UPRIGHT FOR 30 MINUTES:TAKE WITH 8 OUNCES OF WATER, Disp: 12 tablet, Rfl: 10    buPROPion HCl ER, XL, 450 MG TB24, Take 450 mg by mouth every morning, Disp: 30 tablet, Rfl: 2    amphetamine-dextroamphetamine (ADDERALL XR) 30 MG extended release capsule, Take 2 capsules by mouth daily for 30 days. , Disp: 60 capsule, Rfl: 0    propranolol (INDERAL LA) 80 MG extended release capsule, Take 1 capsule by mouth daily, Disp: 90 capsule, Rfl: 1    albuterol sulfate HFA (VENTOLIN HFA) 108 (90 Base) MCG/ACT inhaler, INHALE TWO PUFFS BY MOUTH EVERY 4 HOURS AS NEEDED FOR WHEEZING, Disp: 1 Inhaler, Rfl: 5    citalopram (CELEXA) 40 MG tablet, TAKE ONE TABLET BY MOUTH DAILY, Disp: 90 tablet, Rfl: 3    Calcium Carbonate (CALCIUM 600 PO), Take 600 mg by mouth daily, Disp: , Rfl:     albuterol (PROVENTIL) (2.5 MG/3ML) 0.083% nebulizer solution, Take 2.5 mg by nebulization every 6 hours as needed for Wheezing or Shortness of Breath, Disp: , Rfl:     Family History   Problem Relation Age of Onset   [facility] Cancer Mother     Hypertension Mother     Stroke Mother     Cancer Maternal Aunt     Hypertension Father     Heart Surgery Father     Diabetes Neg Hx        Social History     Socioeconomic History    Marital status: Single     Spouse name: Not on file    Number of children: Not on file    Years of education: Not on file    Highest education level: Not on file   Occupational History    Not on file   Social Needs    Financial resource strain: Somewhat hard    Food insecurity     Worry: Sometimes true     Inability: Sometimes true    Transportation needs     Medical: No     Non-medical: No   Tobacco Use    Smoking status: Former Smoker     Packs/day: 1.00     Years: 20.00     Pack years: 20.00     Quit date:      Years since quittin.3    Smokeless tobacco: Never Used   Substance and Sexual Activity    Alcohol use: Yes     Comment: rare    Drug use: No    Sexual activity: Not Currently   Lifestyle    Physical activity     Days per week: 0 days     Minutes per session: 0 min    Stress: To some extent   Relationships    Social connections     Talks on phone: Three times a week     Gets together: Once a week     Attends Restoration[employer] service: 1 to 4 times per year     Active member of club or organization: Yes     Attends meetings of clubs or organizations: 1 to 4 times per year     Relationship status:     Intimate partner violence     Fear of current or ex partner: Not on file     Emotionally abused: Not on file     Physically abused: Not on file     Forced sexual activity: Not on file   Other Topics Concern    Not on file   Social History Narrative    Not on file       Review of Systems:  Review of Systems   Musculoskeletal: Positive for back pain and stiffness.        Physical Exam:  /84   Ht 5' (1.524 m)   Wt 152 lb (68.9 kg)   BMI 29.69 kg/m²     Physical Exam  Constitutional:       Appearance: Normal appearance. Pulmonary:      Effort: Pulmonary effort is normal.   Musculoskeletal:      Lumbar back: She exhibits tenderness. Neurological:      Mental Status: She is alert. Psychiatric:         Attention and Perception: Attention and perception normal.         Mood and Affect: Mood and affect normal.         Record/Diagnostics Review:    As above, I did review the imaging    Orders:  Orders Placed This Encounter   Procedures    Ambulatory referral to Physical Therapy    OK INJ DX/THER AGNT PARAVERT FACET JOINT, LUMBAR/SAC, 1ST LEVEL    OK INJ DX/THER AGNT PARAVERT FACET JOINT, LUMBAR/SAC, 2ND LEVEL       Assessment:  1. Lumbosacral spondylosis without myelopathy    2. Cervical spondylosis    3. Other chronic pain    4. Encounter for long-term opiate analgesic use        Treatment Plan:  DISCUSSION: Treatment options discussed with patient and all questions answered to patient's satisfaction. OARRS Review: Reviewed and acceptable for medications prescribed. TREATMENT OPTIONS:     Discussed different treatment options including continued conservative care such as physical therapy, chiropractic care, acupuncture. Discussed different interventional options such as epidural steroids or medial branch blocks. Also discussed neuromodulation in the form of spinal cord stimulation. Also discussed surgical evaluation - declines    Has failed conservative options, significant axial low back pain with degenerative facet changes on MRI, good candidate for diagnostic lumbar facets at bilateral L4-5 and L5-S1 to see if pain is facet mediated, if this is beneficial would be a candidate for radiofrequency ablation. Due to the high risk nature of this patient's pain medication close monitoring is required. Medication risk and benefits discussed. Continue current medication management, has been stable and compliant. Kannan Martinez M.D.         I have reviewed the chief complaint and history of present illness (including ROS and PFSH) and vital documentation by my staff and I agree with their documentation and have added where applicable.

## 2021-05-08 DIAGNOSIS — K21.9 GASTROESOPHAGEAL REFLUX DISEASE, UNSPECIFIED WHETHER ESOPHAGITIS PRESENT: ICD-10-CM

## 2021-05-10 RX ORDER — FAMOTIDINE 40 MG/1
TABLET, FILM COATED ORAL
Qty: 30 TABLET | Refills: 2 | Status: SHIPPED | OUTPATIENT
Start: 2021-05-10 | End: 2021-07-15 | Stop reason: ALTCHOICE

## 2021-05-14 RX ORDER — PANTOPRAZOLE SODIUM 40 MG/1
TABLET, DELAYED RELEASE ORAL
Qty: 90 TABLET | Refills: 1 | Status: SHIPPED | OUTPATIENT
Start: 2021-05-14 | End: 2021-06-17 | Stop reason: SDUPTHER

## 2021-05-20 ENCOUNTER — OFFICE VISIT (OUTPATIENT)
Dept: FAMILY MEDICINE CLINIC | Age: 61
End: 2021-05-20
Payer: MEDICARE

## 2021-05-20 VITALS
DIASTOLIC BLOOD PRESSURE: 68 MMHG | SYSTOLIC BLOOD PRESSURE: 120 MMHG | OXYGEN SATURATION: 96 % | WEIGHT: 146.2 LBS | BODY MASS INDEX: 28.55 KG/M2 | HEART RATE: 63 BPM

## 2021-05-20 DIAGNOSIS — R21 SKIN RASH: Primary | ICD-10-CM

## 2021-05-20 PROCEDURE — 99213 OFFICE O/P EST LOW 20 MIN: CPT | Performed by: FAMILY MEDICINE

## 2021-05-20 SDOH — ECONOMIC STABILITY: FOOD INSECURITY: WITHIN THE PAST 12 MONTHS, YOU WORRIED THAT YOUR FOOD WOULD RUN OUT BEFORE YOU GOT MONEY TO BUY MORE.: NEVER TRUE

## 2021-05-20 ASSESSMENT — ENCOUNTER SYMPTOMS
COUGH: 0
VOMITING: 0
SHORTNESS OF BREATH: 0
RHINORRHEA: 0
SORE THROAT: 0
DIARRHEA: 0
NAIL CHANGES: 0
EYE PAIN: 0

## 2021-05-20 ASSESSMENT — SOCIAL DETERMINANTS OF HEALTH (SDOH): HOW HARD IS IT FOR YOU TO PAY FOR THE VERY BASICS LIKE FOOD, HOUSING, MEDICAL CARE, AND HEATING?: NOT HARD AT ALL

## 2021-05-20 NOTE — PATIENT INSTRUCTIONS
· You do not get better after 1 week. Where can you learn more? Go to [email or web address] and sign in to your [email or web address] account. Enter V614 in the [employer] box to learn more about \"Rash: Care Instructions. \"     If you do not have an account, please click on the \"Sign Up Now\" link. Current as of: July 2, 2020               Content Version: 12.8  © 2006-2021 Healthwise, Incorporated. Care instructions adapted under license by [facility] ([facility]). If you have questions about a medical condition or this instruction, always ask your healthcare professional. [address] any warranty or liability for your use of this information.

## 2021-05-20 NOTE — PROGRESS NOTES
[facility] Progress Note    Date:5/20/2021         Patient Name:Latisha Hill     YOB: 1960     Age:61 y.o. Assessment/Plan        Problem List Items Addressed This Visit     None      Visit Diagnoses     Skin rash    -  Primary    On face around mouth  Possibly from picking    Relevant Medications    mupirocin (BACTROBAN) 2 % ointment       Advised to f/u with psych about picking    Return if symptoms worsen or fail to improve. Electronically signed by Megan Polo DO on 5/20/21         Christa     Francisca Rodriguez is a 57 y.o. female presenting today for   Chief Complaint   Patient presents with    Blister     all over body   . Rash  This is a new problem. The current episode started in the past 7 days. The problem has been gradually worsening since onset. The affected locations include the face. The rash is characterized by blistering, redness and itchiness. She was exposed to nothing. Pertinent negatives include no anorexia, congestion, cough, diarrhea, eye pain, facial edema, fatigue, fever, joint pain, nail changes, rhinorrhea, shortness of breath, sore throat or vomiting. Past treatments include anti-itch cream. The treatment provided no relief. Review of Systems   Review of Systems   Constitutional: Negative for fatigue and fever. HENT: Negative for congestion, rhinorrhea and sore throat. Eyes: Negative for pain. Respiratory: Negative for cough and shortness of breath. Gastrointestinal: Negative for anorexia, diarrhea and vomiting. Musculoskeletal: Negative for joint pain. Skin: Positive for rash. Negative for nail changes. All other systems reviewed and are negative. Medications     Current Outpatient Medications   Medication Sig Dispense Refill    mupirocin (BACTROBAN) 2 % ointment Apply 3 times daily.  15 g 0    pantoprazole (PROTONIX) 40 MG tablet TAKE ONE TABLET BY MOUTH EVERY MORNING BEFORE BREAKFAST 90 tablet 1    famotidine (PEPCID) 40 MG tablet TAKE ONE TABLET BY MOUTH ONCE NIGHTLY AS NEEDED FOR STOMACH UPSET 30 tablet 2    oxyCODONE-acetaminophen (PERCOCET) 5-325 MG per tablet Take 1 tablet by mouth every 8 hours as needed for Pain for up to 30 days. 90 tablet 0    alendronate (FOSAMAX) 70 MG tablet TAKE 1 TABLET BY MOUTH ONCE WEEKLY BEFORE BREAKFAST, ON AN EMPTY STOMACH: REMAIN UPRIGHT FOR 30 MINUTES:TAKE WITH 8 OUNCES OF WATER 12 tablet 10    buPROPion HCl ER, XL, 450 MG TB24 Take 450 mg by mouth every morning 30 tablet 2    amphetamine-dextroamphetamine (ADDERALL XR) 30 MG extended release capsule Take 2 capsules by mouth daily for 30 days. 60 capsule 0    propranolol (INDERAL LA) 80 MG extended release capsule Take 1 capsule by mouth daily 90 capsule 1    albuterol sulfate HFA (VENTOLIN HFA) 108 (90 Base) MCG/ACT inhaler INHALE TWO PUFFS BY MOUTH EVERY 4 HOURS AS NEEDED FOR WHEEZING 1 Inhaler 5    citalopram (CELEXA) 40 MG tablet TAKE ONE TABLET BY MOUTH DAILY 90 tablet 3    albuterol (PROVENTIL) (2.5 MG/3ML) 0.083% nebulizer solution Take 2.5 mg by nebulization every 6 hours as needed for Wheezing or Shortness of Breath      Calcium Carbonate (CALCIUM 600 PO) Take 600 mg by mouth daily       No current facility-administered medications for this visit. Past History    Past Medical History:   has a past medical history of Acid reflux, Adult ADHD, Asthma, Bipolar 1 disorder ([address] Broken foot, Chest pain of uncertain etiology, Depression, DVT of leg (deep venous thrombosis) ([facility] Fracture, Helicobacter pylori (H. pylori) infection, Hematuria, Hiatal hernia, History of blood transfusion, History of DVT (deep vein thrombosis), Hx of blood clots, Hypertension, Internal hemorrhoids, Narcolepsy, and Osteoporosis. Social History:   reports that she quit smoking about 32 years ago. She has a 20.00 pack-year smoking history. She has never used smokeless tobacco. She reports current alcohol use. She reports that she does not use drugs. Family History:   Family History   Problem Relation Age of Onset   Street Cancer Mother     Hypertension Mother     Stroke Mother     Cancer Maternal Aunt     Hypertension Father     Heart Surgery Father     Diabetes Neg Hx        Surgical History:   Past Surgical History:   Procedure Laterality Date    ANKLE SURGERY Left 2018    mass excision    ARM SURGERY Right     Pt states she has had 13 right arm surgeries    BREAST ENHANCEMENT SURGERY Bilateral      SECTION      x 3    COLONOSCOPY  2013    sigmoid diverticula, prominent large internal hemorrhoid    COLONOSCOPY N/A 2/10/2020    COLONOSCOPY DIAGNOSTIC performed by Flavia Chauhan MD at [facility] 2020    COLORECTAL CANCER SCREENING, NOT HIGH RISK performed by Gwendolyn Campuzano MD at [facility], COLON, DIAGNOSTIC      ESOPHAGEAL MOTILITY STUDY N/A 2020    PES RN - ESOPHAGEAL MOTILITY STUDY performed by Cash Aldridge DO at [address] [place] [address] STUDY  9/10/2020    ESOPHAGEAL MOTILITY STUDY performed by Cash Aldridge DO at [facility]    ESOPHAGEAL MOTILITY STUDY N/A 2020    ESOPHAGEAL MOTILITY STUDY ATTEMPTED PER DR. Jackson Gu WITHOUT SUCCESS performed by Cash Aldridge DO at [facility]    FOOT SURGERY Left 2015    FOOT TENDON SURGERY Left 2019    LEFT PERONEAL TENDON REPAIR WITH POSSIBLE LEFT TENDON TRANSFER (Left )    KNEE ARTHROSCOPY Left     Two surgeries on left knee per pt    KNEE SURGERY Right     Three surgeries per pt    LIGAMENT REPAIR Left 2019    LEFT PERONEAL TENDON REPAIR performed by Elinor Manjarrez DPM at [facility] OFFICE/OUTPT VISIT,PROCEDURE ONLY Left 2018    RESECTION/REMOVAL BONY NEOPLASM LEFT FIBULA, LEFT SOFT TISSUE MASS EXCISION WITH BONE BIOPSY LEFT ANKLE FIBULA performed by Elinor Manjarrez DPM at [address] [place] [address] Left     UPPER GASTROINTESTINAL ENDOSCOPY  2013 tertiary contractures esophagus, 3 to 4 cm hiatal hernia, antral gastritis, + H Pylori, mild active chronic esophagitis    UPPER GASTROINTESTINAL ENDOSCOPY  4/6/2016    one biopsy    UPPER GASTROINTESTINAL ENDOSCOPY N/A 2/10/2020    EGD BIOPSY performed by Arpit Navarrete MD at [address]          Physical Examination      Vitals:  /68 (Site: Left Upper Arm, Position: Sitting, Cuff Size: Medium Adult)   Pulse 63   Wt 146 lb 3.2 oz (66.3 kg)   SpO2 96%   BMI 28.55 kg/m²     Physical Exam  Vitals and nursing note reviewed. Constitutional:       General: She is not in acute distress. Appearance: Normal appearance. She is normal weight. She is not ill-appearing, toxic-appearing or diaphoretic. HENT:      Head: Normocephalic and atraumatic. Eyes:      General: No scleral icterus. Right eye: No discharge. Left eye: No discharge. Extraocular Movements: Extraocular movements intact. Conjunctiva/sclera: Conjunctivae normal.   Cardiovascular:      Rate and Rhythm: Normal rate and regular rhythm. Pulses: Normal pulses. Heart sounds: Normal heart sounds. No murmur heard. No friction rub. No gallop. Pulmonary:      Effort: Pulmonary effort is normal. No respiratory distress. Breath sounds: Normal breath sounds. No stridor. No wheezing, rhonchi or rales. Chest:      Chest wall: No tenderness. Skin:     Findings: Rash present. Rash is crusting and pustular. Neurological:      Mental Status: She is alert and oriented to person, place, and time. Mental status is at baseline. Psychiatric:         Mood and Affect: Mood normal.         Behavior: Behavior normal.         Thought Content:  Thought content normal.         Judgment: Judgment normal.         Labs/Imaging/Diagnostics   Labs:  No results found for: CMPWITHGFR, CBCAUTODIF, TSHFT4, LABA1C, LIPIDPAN    Imaging Last 24 Hours:  XR LUMBAR SPINE FLEXION AND EXTENSION ONLY  Narrative: EXAMINATION:  XRAY VIEWS OF THE LUMBAR SPINE    3/29/2021 2:59 pm    COMPARISON:  Plain film imaging of the lumbar spine dated October 23, 2019. HISTORY:  ORDERING SYSTEM PROVIDED HISTORY: Lumbosacral spondylosis without myelopathy  TECHNOLOGIST PROVIDED HISTORY:  Reason for Exam: lower back pain for many years. patient tought [facility]  for 35 years and believes the pain is from many years of wear and tear,  Acuity: Unknown  Type of Exam: Unknown    FINDINGS:  There is some exaggeration of the thoracolumbar curve. There is approximately  8 mm anterolisthesis L4 on L5. There is narrowing of the L4-5 and L5-S1 disc  spaces. (On the study of October 23, 2019 6 lumbar-type vertebral bodies were  noted and were without ribs.)    No significant change noted between neutral lateral and flexion lateral.  Suggestion that there is some degree of motion movement on extension lateral.    There are spondylitic endplate degenerative changes throughout. Some  degenerative changes are present in the facets. Impression: Degenerative changes in a transitional lumbar spine. Decreased range of motion.

## 2021-06-01 ENCOUNTER — TELEPHONE (OUTPATIENT)
Dept: PAIN MANAGEMENT | Age: 61
End: 2021-06-01

## 2021-06-01 ENCOUNTER — HOSPITAL ENCOUNTER (OUTPATIENT)
Dept: LAB | Age: 61
Setting detail: SPECIMEN
Discharge: HOME | End: 2021-06-01
Payer: MEDICARE

## 2021-06-01 DIAGNOSIS — Z01.818 PREOP TESTING: Primary | ICD-10-CM

## 2021-06-01 PROCEDURE — U0005 INFEC AGEN DETEC AMPLI PROBE: HCPCS

## 2021-06-01 PROCEDURE — U0003 INFECTIOUS AGENT DETECTION BY NUCLEIC ACID (DNA OR RNA); SEVERE ACUTE RESPIRATORY SYNDROME CORONAVIRUS 2 (SARS-COV-2) (CORONAVIRUS DISEASE [COVID-19]), AMPLIFIED PROBE TECHNIQUE, MAKING USE OF HIGH THROUGHPUT TECHNOLOGIES AS DESCRIBED BY CMS-2020-01-R: HCPCS

## 2021-06-01 NOTE — TELEPHONE ENCOUNTER
Reminded the patient of her COVID test scheduled for 6/1 and her 6/4 procedure and pre-procedure instructions.

## 2021-06-02 ENCOUNTER — ANESTHESIA EVENT (OUTPATIENT)
Dept: OPERATING ROOM | Age: 61
End: 2021-06-02
Payer: MEDICARE

## 2021-06-02 LAB
SARS-COV-2: NORMAL
SARS-COV-2: NOT DETECTED
SOURCE: NORMAL

## 2021-06-04 ENCOUNTER — HOSPITAL ENCOUNTER (OUTPATIENT)
Age: 61
Setting detail: OUTPATIENT SURGERY
Discharge: HOME | End: 2021-06-04
Attending: PAIN MEDICINE | Admitting: PAIN MEDICINE
Payer: MEDICARE

## 2021-06-04 ENCOUNTER — TELEPHONE (OUTPATIENT)
Dept: PAIN MANAGEMENT | Age: 61
End: 2021-06-04

## 2021-06-04 ENCOUNTER — APPOINTMENT (OUTPATIENT)
Dept: GENERAL RADIOLOGY | Age: 61
End: 2021-06-04
Attending: PAIN MEDICINE
Payer: MEDICARE

## 2021-06-04 ENCOUNTER — ANESTHESIA (OUTPATIENT)
Dept: OPERATING ROOM | Age: 61
End: 2021-06-04
Payer: MEDICARE

## 2021-06-04 VITALS
RESPIRATION RATE: 12 BRPM | TEMPERATURE: 96.8 F | DIASTOLIC BLOOD PRESSURE: 82 MMHG | HEIGHT: 60 IN | OXYGEN SATURATION: 97 % | HEART RATE: 60 BPM | SYSTOLIC BLOOD PRESSURE: 113 MMHG | WEIGHT: 149 LBS | BODY MASS INDEX: 29.25 KG/M2

## 2021-06-04 VITALS
SYSTOLIC BLOOD PRESSURE: 90 MMHG | DIASTOLIC BLOOD PRESSURE: 56 MMHG | RESPIRATION RATE: 14 BRPM | OXYGEN SATURATION: 100 %

## 2021-06-04 PROCEDURE — 2709999900 HC NON-CHARGEABLE SUPPLY: Performed by: PAIN MEDICINE

## 2021-06-04 PROCEDURE — 3700000000 HC ANESTHESIA ATTENDED CARE: Performed by: PAIN MEDICINE

## 2021-06-04 PROCEDURE — 3600000012 HC SURGERY LEVEL 2 ADDTL 15MIN: Performed by: PAIN MEDICINE

## 2021-06-04 PROCEDURE — 64493 INJ PARAVERT F JNT L/S 1 LEV: CPT | Performed by: PAIN MEDICINE

## 2021-06-04 PROCEDURE — 64494 INJ PARAVERT F JNT L/S 2 LEV: CPT | Performed by: PAIN MEDICINE

## 2021-06-04 PROCEDURE — 7100000010 HC PHASE II RECOVERY - FIRST 15 MIN: Performed by: PAIN MEDICINE

## 2021-06-04 PROCEDURE — 2500000003 HC RX 250 WO HCPCS: Performed by: PAIN MEDICINE

## 2021-06-04 PROCEDURE — 3600000002 HC SURGERY LEVEL 2 BASE: Performed by: PAIN MEDICINE

## 2021-06-04 PROCEDURE — 3700000001 HC ADD 15 MINUTES (ANESTHESIA): Performed by: PAIN MEDICINE

## 2021-06-04 PROCEDURE — 7100000011 HC PHASE II RECOVERY - ADDTL 15 MIN: Performed by: PAIN MEDICINE

## 2021-06-04 PROCEDURE — 6360000002 HC RX W HCPCS: Performed by: ANESTHESIOLOGY

## 2021-06-04 PROCEDURE — 3209999900 FLUORO FOR SURGICAL PROCEDURES

## 2021-06-04 RX ORDER — FENTANYL CITRATE 50 UG/ML
INJECTION, SOLUTION INTRAMUSCULAR; INTRAVENOUS PRN
Status: DISCONTINUED | OUTPATIENT
Start: 2021-06-04 | End: 2021-06-04 | Stop reason: SDUPTHER

## 2021-06-04 RX ORDER — HYDRALAZINE HYDROCHLORIDE 20 MG/ML
5 INJECTION INTRAMUSCULAR; INTRAVENOUS EVERY 10 MIN PRN
Status: DISCONTINUED | OUTPATIENT
Start: 2021-06-04 | End: 2021-06-04 | Stop reason: HOSPADM

## 2021-06-04 RX ORDER — BUPIVACAINE HYDROCHLORIDE 2.5 MG/ML
INJECTION, SOLUTION INFILTRATION; PERINEURAL PRN
Status: DISCONTINUED | OUTPATIENT
Start: 2021-06-04 | End: 2021-06-04 | Stop reason: ALTCHOICE

## 2021-06-04 RX ORDER — FENTANYL CITRATE 50 UG/ML
25 INJECTION, SOLUTION INTRAMUSCULAR; INTRAVENOUS EVERY 5 MIN PRN
Status: DISCONTINUED | OUTPATIENT
Start: 2021-06-04 | End: 2021-06-04 | Stop reason: HOSPADM

## 2021-06-04 RX ORDER — MIDAZOLAM HYDROCHLORIDE 1 MG/ML
INJECTION INTRAMUSCULAR; INTRAVENOUS PRN
Status: DISCONTINUED | OUTPATIENT
Start: 2021-06-04 | End: 2021-06-04 | Stop reason: SDUPTHER

## 2021-06-04 RX ORDER — SODIUM CHLORIDE 0.9 % (FLUSH) 0.9 %
10 SYRINGE (ML) INJECTION EVERY 12 HOURS SCHEDULED
Status: DISCONTINUED | OUTPATIENT
Start: 2021-06-04 | End: 2021-06-04 | Stop reason: HOSPADM

## 2021-06-04 RX ORDER — ONDANSETRON 2 MG/ML
4 INJECTION INTRAMUSCULAR; INTRAVENOUS
Status: DISCONTINUED | OUTPATIENT
Start: 2021-06-04 | End: 2021-06-04 | Stop reason: HOSPADM

## 2021-06-04 RX ORDER — PROMETHAZINE HYDROCHLORIDE 25 MG/ML
6.25 INJECTION, SOLUTION INTRAMUSCULAR; INTRAVENOUS
Status: DISCONTINUED | OUTPATIENT
Start: 2021-06-04 | End: 2021-06-04 | Stop reason: HOSPADM

## 2021-06-04 RX ORDER — DIPHENHYDRAMINE HYDROCHLORIDE 50 MG/ML
12.5 INJECTION INTRAMUSCULAR; INTRAVENOUS
Status: DISCONTINUED | OUTPATIENT
Start: 2021-06-04 | End: 2021-06-04 | Stop reason: HOSPADM

## 2021-06-04 RX ORDER — SODIUM CHLORIDE 0.9 % (FLUSH) 0.9 %
10 SYRINGE (ML) INJECTION PRN
Status: DISCONTINUED | OUTPATIENT
Start: 2021-06-04 | End: 2021-06-04 | Stop reason: HOSPADM

## 2021-06-04 RX ORDER — MEPERIDINE HYDROCHLORIDE 50 MG/ML
12.5 INJECTION INTRAMUSCULAR; INTRAVENOUS; SUBCUTANEOUS EVERY 5 MIN PRN
Status: DISCONTINUED | OUTPATIENT
Start: 2021-06-04 | End: 2021-06-04 | Stop reason: HOSPADM

## 2021-06-04 RX ORDER — SODIUM CHLORIDE 9 MG/ML
25 INJECTION, SOLUTION INTRAVENOUS PRN
Status: DISCONTINUED | OUTPATIENT
Start: 2021-06-04 | End: 2021-06-04 | Stop reason: HOSPADM

## 2021-06-04 RX ADMIN — FENTANYL CITRATE 100 MCG: 50 INJECTION, SOLUTION INTRAMUSCULAR; INTRAVENOUS at 11:16

## 2021-06-04 RX ADMIN — MIDAZOLAM HYDROCHLORIDE 2 MG: 1 INJECTION, SOLUTION INTRAMUSCULAR; INTRAVENOUS at 11:16

## 2021-06-04 ASSESSMENT — PULMONARY FUNCTION TESTS
PIF_VALUE: 1

## 2021-06-04 ASSESSMENT — PAIN - FUNCTIONAL ASSESSMENT: PAIN_FUNCTIONAL_ASSESSMENT: 0-10

## 2021-06-04 ASSESSMENT — PAIN DESCRIPTION - FREQUENCY: FREQUENCY: CONTINUOUS

## 2021-06-04 ASSESSMENT — PAIN DESCRIPTION - LOCATION: LOCATION: BACK;NECK;SHOULDER

## 2021-06-04 ASSESSMENT — PAIN SCALES - GENERAL: PAINLEVEL_OUTOF10: 6

## 2021-06-04 ASSESSMENT — PAIN DESCRIPTION - PROGRESSION: CLINICAL_PROGRESSION: GRADUALLY IMPROVING

## 2021-06-04 ASSESSMENT — PAIN DESCRIPTION - PAIN TYPE: TYPE: CHRONIC PAIN

## 2021-06-04 NOTE — OP NOTE
Lumbar Facet Nerve Block Injection:  Surgeon: Primitivo Pantoja MD     PRE-OP DIAGNOSIS: M47.817 (lumbosacral spondylosis), M54.5 (low back pain)    POST-OP DIAGNOSIS: Same. PROCEDURE PERFORMED: Lumbar Facet Nerve Block Multiple Levels  Bilateral L4 - 5 and L5 - S1. Physician confirmed and marked the surgical site. EBL: minimal      CONSENT: Patient has undergone the educational process with this procedure, is aware and fully understands the risks involved: potential damage to any and all body organs including possible bleeding, infection and nerve injury, allergic reaction and headache. Patient also understands that the procedure will be undertaken in a safe, controlled, and monitored setting. Patient recognizes that the benefits include relief from pain and reduction in the oral use of medications. Patient agreed to proceed. The patient was counseled at length about the risks of lizette Covid-19 during their perioperative period and any recovery window from their procedure. The patient was made aware that lizette Covid-19  may worsen their prognosis for recovering from their procedure  and lend to a higher morbidity and/or mortality risk. All material risks, benefits, and reasonable alternatives including postponing the procedure were discussed. The patient does wish to proceed with the procedure at this time. PREP: Timeout was performed prior to starting the procedure. The patient's back was prepped with chloroprep and draped appropriately. 5ml of 0.5% lidocaine was used to anesthetize the skin and subcutaneous tissue. PROCEDURE NOTE: A 22 gauge 3.5 inch spinal needle was advanced under  fluoroscopic guidance to the appropriate anatomic location for the medial branches corresponding to the facets at the base of the appropriate superior articular process and/or sacral ala . Aspiration was negative for blood, CSF and producing pain.  1 ml of 0.25% marcaine was then injected at each

## 2021-06-04 NOTE — H&P
Pain Pre-Op H&P Note    Car Oh MD    HPI: Luna Veloz  presents with back pain. Past Medical History:   Diagnosis Date    Acid reflux     Adult ADHD 2012    Asthma 2012    Bipolar 1 disorder ([facility] [address]     Broken foot     Left foot    Chest pain of uncertain etiology     Last episode was in  (Written 2019)    Depression     Under control per pt.  on 6/15/18    DVT of leg (deep venous thrombosis) ([facility] [address] 2014    Fracture     right foot / pt denies surgical intervention    Helicobacter pylori (H. pylori) infection     per EGD    Hematuria 2018    Has a follow-up with Urologist     Hiatal hernia     History of blood transfusion     History of DVT (deep vein thrombosis) 2012    Hx of blood clots     Hypertension     Internal hemorrhoids     Narcolepsy 2012    Osteoporosis        Past Surgical History:   Procedure Laterality Date    ANKLE SURGERY Left 2018    mass excision    ARM SURGERY Right     Pt states she has had 13 right arm surgeries    BREAST ENHANCEMENT SURGERY Bilateral      SECTION      x 3    COLONOSCOPY  2013    sigmoid diverticula, prominent large internal hemorrhoid    COLONOSCOPY N/A 2/10/2020    COLONOSCOPY DIAGNOSTIC performed by Drake Ferguson MD at [place] [address] N/A 2020    COLORECTAL CANCER SCREENING, NOT HIGH RISK performed by Zach Rizo MD at [facility], DIAGNOSTIC      ESOPHAGEAL MOTILITY STUDY N/A 2020    PES RN - ESOPHAGEAL MOTILITY STUDY performed by Janet Valderrama DO at [address] STUDY  9/10/2020    ESOPHAGEAL MOTILITY STUDY performed by Janet Valderrama DO at [facility]    ESOPHAGEAL MOTILITY STUDY N/A 2020    ESOPHAGEAL MOTILITY STUDY ATTEMPTED PER DR. Thermon Cushing WITHOUT SUCCESS performed by Janet Valderrama DO at [facility]    FOOT SURGERY Left 2015    FOOT TENDON SURGERY Left 02/22/2019    LEFT PERONEAL TENDON REPAIR WITH POSSIBLE LEFT TENDON TRANSFER (Left )    KNEE ARTHROSCOPY Left     Two surgeries on left knee per pt    KNEE SURGERY Right     Three surgeries per pt    LIGAMENT REPAIR Left 2/22/2019    LEFT PERONEAL TENDON REPAIR performed by Oswaldo Chavez DPM at [facility] OFFICE/OUTPT VISIT,PROCEDURE ONLY Left 6/29/2018    RESECTION/REMOVAL BONY NEOPLASM LEFT FIBULA, LEFT SOFT TISSUE MASS EXCISION WITH BONE BIOPSY LEFT ANKLE FIBULA performed by Oswaldo Chavez DPM at [address] Left    [facility] ENDOSCOPY  5/23/2013    tertiary contractures esophagus, 3 to 4 cm hiatal hernia, antral gastritis, + H Pylori, mild active chronic esophagitis    UPPER GASTROINTESTINAL ENDOSCOPY  4/6/2016    one biopsy    UPPER GASTROINTESTINAL ENDOSCOPY N/A 2/10/2020    EGD BIOPSY performed by Matt Harmon MD at [address]         Family History   Problem Relation Age of Onset    Cancer Mother     Hypertension Mother     Stroke Mother     Cancer Maternal Aunt     Hypertension Father     Heart Surgery Father     Diabetes Neg Hx        Allergies   Allergen Reactions    Latex Itching    Prozac [Fluoxetine Hcl]     Sulfa Antibiotics      migraine    Codeine Itching         Current Facility-Administered Medications:     sodium chloride flush 0.9 % injection 10 mL, 10 mL, Intravenous, 2 times per day, Johnny Willis MD    sodium chloride flush 0.9 % injection 10 mL, 10 mL, Intravenous, PRN, Johnny Willis MD    0.9 % sodium chloride infusion, 25 mL, Intravenous, PRN, Johnny Willis MD    bupivacaine (MARCAINE) 0.25 % injection, , , PRN, Anjelica Moreno MD, 10 mL at 06/04/21 1043    meperidine (DEMEROL) injection 12.5 mg, 12.5 mg, Intravenous, Q5 Min PRN, Johnny Willis MD    fentaNYL (SUBLIMAZE) injection 25 mcg, 25 mcg, Intravenous, Q5 Min PRN, Johnny Willis MD    ondansetron [facility]) injection 4 mg, 4 mg, Intravenous, Once PRN, Criselda Clark MD    promethazine [facility]) injection 6.25 mg, 6.25 mg, Intramuscular, Once PRN, Criselda Clark MD    diphenhydrAMINE (BENADRYL) injection 12.5 mg, 12.5 mg, Intravenous, Once PRN, Criselda Clark MD    hydrALAZINE (APRESOLINE) injection 5 mg, 5 mg, Intravenous, Q10 Min PRN, Criselda Clark MD    Social History     Tobacco Use    Smoking status: Former Smoker     Packs/day: 1.00     Years: 20.00     Pack years: 20.00     Quit date:      Years since quittin.4    Smokeless tobacco: Never Used   Substance Use Topics    Alcohol use: Yes     Comment: rare       Review of Systems:   Focused review of systems was performed, and negative as pertinent to diagnosis, except as stated in HPI. Physical Exam  Constitutional:       Appearance: Normal appearance. Pulmonary:      Effort: Pulmonary effort is normal.   Neurological:      Mental Status: He is alert. Psychiatric:         Attention and Perception: Attention and perception normal.         Mood and Affect: Mood and affect normal.         Patient's current physical status, medications, medical history, and HPI have been reviewed and updated as appropriate on this date: 21    Risk/Benefit(s): The risks, benefits, alternatives, and potential complications have been discussed with the patient/family and informed consent has been obtained for the procedure/sedation.     Diagnosis:   M47.817 LUMBOSACRAL SPONDYLOSIS WITHOUT MYELOPATHY      Plan: lumbar bb        Fiorella Tilley MD

## 2021-06-04 NOTE — ANESTHESIA PRE PROCEDURE
Department of Anesthesiology  Preprocedure Note       Name:  Meaghan Cancer   Age:  57 y.o.  :  1960                                          MRN:  [id number]         Date:  2021      Surgeon: Osmin Ritter):  Mark Leal MD    Procedure: Procedure(s):  BILATERAL L4/5, L5/S1 LUMBAR FACET    Medications prior to admission:   Prior to Admission medications    Medication Sig Start Date End Date Taking? Authorizing Provider   pantoprazole (PROTONIX) 40 MG tablet TAKE ONE TABLET BY MOUTH EVERY MORNING BEFORE BREAKFAST 21  Yes Josselyn Newell DO   famotidine (PEPCID) 40 MG tablet TAKE ONE TABLET BY MOUTH ONCE NIGHTLY AS NEEDED FOR STOMACH UPSET 5/10/21  Yes Josselyn Newell DO   alendronate (FOSAMAX) 70 MG tablet TAKE 1 TABLET BY MOUTH ONCE WEEKLY BEFORE BREAKFAST, ON AN EMPTY STOMACH: REMAIN UPRIGHT FOR 30 MINUTES:TAKE WITH 8 OUNCES OF WATER 3/18/21  Yes Josselyn Newell DO   buPROPion HCl ER, XL, 450 MG TB24 Take 450 mg by mouth every morning 3/17/21  Yes Josselyn Newell DO   amphetamine-dextroamphetamine (ADDERALL XR) 30 MG extended release capsule Take 2 capsules by mouth daily for 30 days.  3/11/21 6/4/21 Yes Josselyn Newell DO   propranolol (INDERAL LA) 80 MG extended release capsule Take 1 capsule by mouth daily 21  Yes Josselyn Newell DO   albuterol sulfate HFA (VENTOLIN HFA) 108 (90 Base) MCG/ACT inhaler INHALE TWO PUFFS BY MOUTH EVERY 4 HOURS AS NEEDED FOR WHEEZING 10/28/20  Yes Ankit Walker MD   citalopram (CELEXA) 40 MG tablet TAKE ONE TABLET BY MOUTH DAILY 20  Yes Ankit Walker MD   Calcium Carbonate (CALCIUM 600 PO) Take 600 mg by mouth daily   Yes Historical Provider, MD   albuterol (PROVENTIL) (2.5 MG/3ML) 0.083% nebulizer solution Take 2.5 mg by nebulization every 6 hours as needed for Wheezing or Shortness of Breath    Historical Provider, MD       Current medications:    Current Facility-Administered Medications   Medication Dose Route Frequency Provider Last Rate Last Admin    sodium chloride flush 0.9 % injection 10 mL  10 mL Intravenous 2 times per day Imani Weinstein MD        sodium chloride flush 0.9 % injection 10 mL  10 mL Intravenous PRN Imani Weinstein MD        0.9 % sodium chloride infusion  25 mL Intravenous PRN Imani Weinstein MD        bupivacaine (MARCAINE) 0.25 % injection    PRN Michelle Ward MD   10 mL at 06/04/21 1043       Allergies: Allergies   Allergen Reactions    Latex Itching    Prozac [Fluoxetine Hcl]     Sulfa Antibiotics      migraine    Codeine Itching       Problem List:    Patient Active Problem List   Diagnosis Code    Attention deficit hyperactivity disorder (ADHD) F90.9    Narcolepsy G47.419    Moderate persistent asthma without complication L54.01    History of DVT (deep vein thrombosis) Z86.718    Hiatal hernia K44.9    Dysphagia R13.10    Cervical radiculopathy M54.12    Ganglion cyst M67.40    Chronic pain of left ankle M25.572, G89.29    Acquired trigger finger M65.30    Osteoarthritis of knee M17.10    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 Shahla Morales, Y92.009    Closed fracture of multiple ribs of right side S22.41XA    Hemothorax on right J94.2    Contusion of right lung S27.321A    Chronic fatigue syndrome R53.82    Sleep apnea G47.30    Pain in joint involving ankle and foot M25.579    Lumbosacral spondylosis without myelopathy M47.817       Past Medical History:        Diagnosis Date    Acid reflux     Adult ADHD 8/1/2012    Asthma 2012    [facility] [address]     Broken foot     Left foot    Chest pain of uncertain etiology     Last episode was in  (Written 2019)    Depression     Under control per pt.  on 6/15/18    DVT of leg (deep venous thrombosis) ([facility] [address] 2014    Fracture     right foot / pt denies surgical intervention    Helicobacter pylori (H. pylori) infection     per EGD    Hematuria 2018    Has a follow-up with Urologist     Hiatal hernia     History of blood transfusion     History of DVT (deep vein thrombosis) 2012    Hx of blood clots     Hypertension     Internal hemorrhoids     Narcolepsy 2012    Osteoporosis        Past Surgical History:        Procedure Laterality Date    ANKLE SURGERY Left 2018    mass excision    ARM SURGERY Right     Pt states she has had 13 right arm surgeries    BREAST ENHANCEMENT SURGERY Bilateral      SECTION      x 3    COLONOSCOPY  2013    sigmoid diverticula, prominent large internal hemorrhoid    COLONOSCOPY N/A 2/10/2020    COLONOSCOPY DIAGNOSTIC performed by Wilver Mishra MD at [facility] 2020    COLORECTAL CANCER SCREENING, NOT HIGH RISK performed by Tono Diaz MD at [place] [address], COLON, DIAGNOSTIC      ESOPHAGEAL MOTILITY STUDY N/A 2020    PES RN - ESOPHAGEAL MOTILITY STUDY performed by Manoj Gruber DO at [facility] STUDY  9/10/2020    ESOPHAGEAL MOTILITY STUDY performed by Manoj Gruber DO at [facility]    ESOPHAGEAL MOTILITY STUDY N/A 2020    ESOPHAGEAL MOTILITY STUDY ATTEMPTED PER DR. Alyssa Pugh WITHOUT SUCCESS performed by Manoj Gruber DO at [facility]    FOOT SURGERY Left 2015    FOOT TENDON SURGERY Left 2019    LEFT PERONEAL TENDON REPAIR WITH POSSIBLE LEFT TENDON TRANSFER (Left )    KNEE ARTHROSCOPY Left     Two surgeries on left knee per pt    KNEE SURGERY Right     Three surgeries per pt    LIGAMENT REPAIR Left 2019    LEFT PERONEAL TENDON REPAIR performed by Anita Bah DPM at [facility] OFFICE/OUTPT VISIT,PROCEDURE ONLY Left 2018    RESECTION/REMOVAL BONY NEOPLASM LEFT FIBULA, LEFT SOFT TISSUE MASS EXCISION WITH BONE BIOPSY LEFT ANKLE FIBULA performed by Anita Bah DPM at [address] Left    [facility] ENDOSCOPY  2013    tertiary contractures esophagus, 3 to 4 cm hiatal hernia, antral gastritis, + H Pylori, mild active chronic esophagitis    UPPER GASTROINTESTINAL ENDOSCOPY  2016    one biopsy    UPPER GASTROINTESTINAL ENDOSCOPY N/A 2/10/2020    EGD BIOPSY performed by Wilver Mishra MD at [address]         Social History:    Social History     Tobacco Use    Smoking status: Former Smoker     Packs/day: 1.00     Years: 20.00     Pack years: 20.00     Quit date:      Years since quittin.4    Smokeless tobacco: Never Used   Substance Use Topics    Alcohol use: Yes     Comment: rare                                Counseling given: Not Answered      Vital Signs (Current):   Vitals:    21 1027 21 1034   BP:  122/83   Pulse:  53   Resp:  16   Temp: 98.2 °F (36.8 °C) 98.2 °F (36.8 °C)   TempSrc:  Infrared   SpO2:  97%   Weight: 149 lb (67.6 kg)    Height: 5' (1.524 m)                                               BP Readings from Last 3 Encounters:   21 122/83   21 120/68   21 125/84       NPO Status: Time of last liquid consumption:                         Time of last solid consumption:                         Date of last liquid consumption: 21                        Date of last solid food consumption: 21    BMI:   Wt Readings from Last 3 Encounters:   21 149 lb (67.6 kg)   21 146 lb 3.2 oz (66.3 kg)   21 152 lb (68.9 kg)     Body mass index is 29.1 kg/m².     CBC:   Lab Results Component Value Date    WBC 4.5 04/15/2021    RBC 4.30 04/15/2021    HGB 13.3 04/15/2021    HCT 41.9 04/15/2021    MCV 97.4 04/15/2021    RDW 13.5 04/15/2021     04/15/2021       CMP:   Lab Results   Component Value Date     01/14/2021    K 3.7 01/14/2021     01/14/2021    CO2 24 01/14/2021    BUN 9 01/14/2021    CREATININE 1.07 01/14/2021    GFRAA >60 01/14/2021    LABGLOM 52 01/14/2021    GLUCOSE 95 01/14/2021    PROT 6.3 10/28/2020    CALCIUM 9.0 01/14/2021    BILITOT 0.12 10/28/2020    ALKPHOS 89 10/28/2020    AST 16 10/28/2020    ALT 11 10/28/2020       POC Tests: No results for input(s): POCGLU, POCNA, POCK, POCCL, POCBUN, POCHEMO, POCHCT in the last 72 hours.     Coags:   Lab Results   Component Value Date    PROTIME 9.4 01/13/2021    INR 0.9 01/13/2021    APTT 23.4 01/13/2021       HCG (If Applicable): No results found for: PREGTESTUR, PREGSERUM, HCG, HCGQUANT     ABGs: No results found for: PHART, PO2ART, PPP5KJE, TYB6RNE, BEART, J0XSYYPW     Type & Screen (If Applicable):  No results found for: LABABO, LABRH    Drug/Infectious Status (If Applicable):  Lab Results   Component Value Date    HEPCAB NONREACTIVE 04/27/2018       COVID-19 Screening (If Applicable):   Lab Results   Component Value Date    COVID19 Not Detected 06/01/2021    COVID19 Not Detected 09/25/2020    COVID19 Not Detected 07/02/2020           Anesthesia Evaluation  Patient summary reviewed and Nursing notes reviewed no history of anesthetic complications:   Airway: Mallampati: II  TM distance: >3 FB   Neck ROM: full  Mouth opening: > = 3 FB Dental:    (+) upper dentures      Pulmonary:normal exam  breath sounds clear to auscultation  (+) sleep apnea:  asthma:                            Cardiovascular:    (+) hypertension: no interval change,         Rhythm: regular  Rate: normal                    Neuro/Psych:   (+) neuromuscular disease:, psychiatric history:             ROS comment: Cervical radiculopathy GI/Hepatic/Renal:   (+) hiatal hernia, GERD: no interval change,           Endo/Other:    (+) : arthritis: OA and no interval change. , . Abdominal:       Abdomen: soft. Vascular: negative vascular ROS. ROS comment: History of DVT (deep vein thrombosis). Anesthesia Plan      MAC     ASA 2             Anesthetic plan and risks discussed with patient. Plan discussed with CRNA.                   Alla Chery MD   6/4/2021

## 2021-06-04 NOTE — ANESTHESIA POSTPROCEDURE EVALUATION
Department of Anesthesiology  Postprocedure Note    Patient: Valerie Herring  MRN: [id number]  YOB: 1960  Date of evaluation: 6/4/2021  Time:  11:27 AM     Procedure Summary     Date: 06/04/21 Room / Location: [address] [place] [address] N / [address] [facility]    Anesthesia Start: 7516 Anesthesia Stop:     Procedure: BILATERAL L4/5, L5/S1 LUMBAR FACET (Bilateral ) Diagnosis: (M47.817 LUMBOSACRAL SPONDYLOSIS WITHOUT MYELOPATHY)    Surgeons: Bucky Feng MD Responsible Provider: Kymberly García MD    Anesthesia Type: MAC ASA Status: 2          Anesthesia Type: No value filed. Hong Phase I: Hong Score: 10    Hong Phase II:      Last vitals: Reviewed and per EMR flowsheets.        Anesthesia Post Evaluation    Patient location during evaluation: PACU  Patient participation: complete - patient participated  Level of consciousness: awake and alert  Airway patency: patent  Nausea & Vomiting: no nausea and no vomiting  Complications: no  Cardiovascular status: hemodynamically stable  Respiratory status: room air and spontaneous ventilation  Hydration status: euvolemic

## 2021-06-04 NOTE — TELEPHONE ENCOUNTER
I left a voicemail reminding the patient of their COVID test on Monday 6/7 and about their procedure on 6/11-as well as pre-procedure instructions.

## 2021-06-07 ENCOUNTER — TELEPHONE (OUTPATIENT)
Dept: PAIN MANAGEMENT | Age: 61
End: 2021-06-07

## 2021-06-07 ENCOUNTER — HOSPITAL ENCOUNTER (OUTPATIENT)
Dept: LAB | Age: 61
Setting detail: SPECIMEN
Discharge: HOME | End: 2021-06-07
Payer: MEDICARE

## 2021-06-07 DIAGNOSIS — Z01.818 PREOP TESTING: Primary | ICD-10-CM

## 2021-06-07 PROCEDURE — U0003 INFECTIOUS AGENT DETECTION BY NUCLEIC ACID (DNA OR RNA); SEVERE ACUTE RESPIRATORY SYNDROME CORONAVIRUS 2 (SARS-COV-2) (CORONAVIRUS DISEASE [COVID-19]), AMPLIFIED PROBE TECHNIQUE, MAKING USE OF HIGH THROUGHPUT TECHNOLOGIES AS DESCRIBED BY CMS-2020-01-R: HCPCS

## 2021-06-07 PROCEDURE — U0005 INFEC AGEN DETEC AMPLI PROBE: HCPCS

## 2021-06-08 ENCOUNTER — TELEPHONE (OUTPATIENT)
Dept: PAIN MANAGEMENT | Age: 61
End: 2021-06-08

## 2021-06-08 LAB
SARS-COV-2: NORMAL
SARS-COV-2: NOT DETECTED
SOURCE: NORMAL

## 2021-06-08 NOTE — TELEPHONE ENCOUNTER
Patient called in that she needs a refill on her pain medication, cancel last appointment, does not have another set up, states she does not have the co-pay, but she does have a procedure on Friday.

## 2021-06-10 ENCOUNTER — ANESTHESIA EVENT (OUTPATIENT)
Dept: OPERATING ROOM | Age: 61
End: 2021-06-10
Payer: MEDICARE

## 2021-06-11 ENCOUNTER — APPOINTMENT (OUTPATIENT)
Dept: GENERAL RADIOLOGY | Age: 61
End: 2021-06-11
Attending: PAIN MEDICINE
Payer: MEDICARE

## 2021-06-11 ENCOUNTER — HOSPITAL ENCOUNTER (OUTPATIENT)
Age: 61
Setting detail: OUTPATIENT SURGERY
Discharge: HOME | End: 2021-06-11
Attending: PAIN MEDICINE | Admitting: PAIN MEDICINE
Payer: MEDICARE

## 2021-06-11 ENCOUNTER — ANESTHESIA (OUTPATIENT)
Dept: OPERATING ROOM | Age: 61
End: 2021-06-11
Payer: MEDICARE

## 2021-06-11 VITALS
SYSTOLIC BLOOD PRESSURE: 114 MMHG | RESPIRATION RATE: 17 BRPM | TEMPERATURE: 96.8 F | OXYGEN SATURATION: 99 % | DIASTOLIC BLOOD PRESSURE: 70 MMHG

## 2021-06-11 VITALS
DIASTOLIC BLOOD PRESSURE: 80 MMHG | HEIGHT: 65 IN | RESPIRATION RATE: 16 BRPM | HEART RATE: 58 BPM | SYSTOLIC BLOOD PRESSURE: 101 MMHG | WEIGHT: 144 LBS | OXYGEN SATURATION: 95 % | TEMPERATURE: 97.1 F | BODY MASS INDEX: 23.99 KG/M2

## 2021-06-11 PROCEDURE — 2709999900 HC NON-CHARGEABLE SUPPLY: Performed by: PAIN MEDICINE

## 2021-06-11 PROCEDURE — 2500000003 HC RX 250 WO HCPCS: Performed by: PAIN MEDICINE

## 2021-06-11 PROCEDURE — 64493 INJ PARAVERT F JNT L/S 1 LEV: CPT | Performed by: PAIN MEDICINE

## 2021-06-11 PROCEDURE — 7100000010 HC PHASE II RECOVERY - FIRST 15 MIN: Performed by: PAIN MEDICINE

## 2021-06-11 PROCEDURE — 7100000011 HC PHASE II RECOVERY - ADDTL 15 MIN: Performed by: PAIN MEDICINE

## 2021-06-11 PROCEDURE — 64494 INJ PARAVERT F JNT L/S 2 LEV: CPT | Performed by: PAIN MEDICINE

## 2021-06-11 PROCEDURE — 3209999900 FLUORO FOR SURGICAL PROCEDURES

## 2021-06-11 PROCEDURE — 6360000002 HC RX W HCPCS: Performed by: NURSE ANESTHETIST, CERTIFIED REGISTERED

## 2021-06-11 PROCEDURE — 3600000002 HC SURGERY LEVEL 2 BASE: Performed by: PAIN MEDICINE

## 2021-06-11 PROCEDURE — 3700000000 HC ANESTHESIA ATTENDED CARE: Performed by: PAIN MEDICINE

## 2021-06-11 RX ORDER — LIDOCAINE HYDROCHLORIDE 10 MG/ML
1 INJECTION, SOLUTION EPIDURAL; INFILTRATION; INTRACAUDAL; PERINEURAL
Status: DISCONTINUED | OUTPATIENT
Start: 2021-06-11 | End: 2021-06-11 | Stop reason: HOSPADM

## 2021-06-11 RX ORDER — SODIUM CHLORIDE, SODIUM LACTATE, POTASSIUM CHLORIDE, CALCIUM CHLORIDE 600; 310; 30; 20 MG/100ML; MG/100ML; MG/100ML; MG/100ML
INJECTION, SOLUTION INTRAVENOUS CONTINUOUS
Status: DISCONTINUED | OUTPATIENT
Start: 2021-06-11 | End: 2021-06-11 | Stop reason: HOSPADM

## 2021-06-11 RX ORDER — SODIUM CHLORIDE 0.9 % (FLUSH) 0.9 %
10 SYRINGE (ML) INJECTION PRN
Status: DISCONTINUED | OUTPATIENT
Start: 2021-06-11 | End: 2021-06-11 | Stop reason: HOSPADM

## 2021-06-11 RX ORDER — MORPHINE SULFATE 2 MG/ML
2 INJECTION, SOLUTION INTRAMUSCULAR; INTRAVENOUS EVERY 5 MIN PRN
Status: DISCONTINUED | OUTPATIENT
Start: 2021-06-11 | End: 2021-06-11 | Stop reason: HOSPADM

## 2021-06-11 RX ORDER — SODIUM CHLORIDE 9 MG/ML
25 INJECTION, SOLUTION INTRAVENOUS PRN
Status: DISCONTINUED | OUTPATIENT
Start: 2021-06-11 | End: 2021-06-11 | Stop reason: HOSPADM

## 2021-06-11 RX ORDER — 0.9 % SODIUM CHLORIDE 0.9 %
500 INTRAVENOUS SOLUTION INTRAVENOUS
Status: DISCONTINUED | OUTPATIENT
Start: 2021-06-11 | End: 2021-06-11 | Stop reason: HOSPADM

## 2021-06-11 RX ORDER — PROMETHAZINE HYDROCHLORIDE 25 MG/ML
12.5 INJECTION, SOLUTION INTRAMUSCULAR; INTRAVENOUS
Status: DISCONTINUED | OUTPATIENT
Start: 2021-06-11 | End: 2021-06-11 | Stop reason: HOSPADM

## 2021-06-11 RX ORDER — SODIUM CHLORIDE 0.9 % (FLUSH) 0.9 %
10 SYRINGE (ML) INJECTION EVERY 12 HOURS SCHEDULED
Status: DISCONTINUED | OUTPATIENT
Start: 2021-06-11 | End: 2021-06-11 | Stop reason: HOSPADM

## 2021-06-11 RX ORDER — MEPERIDINE HYDROCHLORIDE 50 MG/ML
12.5 INJECTION INTRAMUSCULAR; INTRAVENOUS; SUBCUTANEOUS EVERY 5 MIN PRN
Status: DISCONTINUED | OUTPATIENT
Start: 2021-06-11 | End: 2021-06-11 | Stop reason: HOSPADM

## 2021-06-11 RX ORDER — DIPHENHYDRAMINE HYDROCHLORIDE 50 MG/ML
12.5 INJECTION INTRAMUSCULAR; INTRAVENOUS
Status: DISCONTINUED | OUTPATIENT
Start: 2021-06-11 | End: 2021-06-11 | Stop reason: HOSPADM

## 2021-06-11 RX ORDER — FENTANYL CITRATE 50 UG/ML
INJECTION, SOLUTION INTRAMUSCULAR; INTRAVENOUS PRN
Status: DISCONTINUED | OUTPATIENT
Start: 2021-06-11 | End: 2021-06-11 | Stop reason: SDUPTHER

## 2021-06-11 RX ORDER — BUPIVACAINE HYDROCHLORIDE 2.5 MG/ML
INJECTION, SOLUTION INFILTRATION; PERINEURAL PRN
Status: DISCONTINUED | OUTPATIENT
Start: 2021-06-11 | End: 2021-06-11 | Stop reason: ALTCHOICE

## 2021-06-11 RX ORDER — LABETALOL 20 MG/4 ML (5 MG/ML) INTRAVENOUS SYRINGE
10 EVERY 10 MIN PRN
Status: DISCONTINUED | OUTPATIENT
Start: 2021-06-11 | End: 2021-06-11 | Stop reason: HOSPADM

## 2021-06-11 RX ORDER — ONDANSETRON 2 MG/ML
4 INJECTION INTRAMUSCULAR; INTRAVENOUS
Status: DISCONTINUED | OUTPATIENT
Start: 2021-06-11 | End: 2021-06-11 | Stop reason: HOSPADM

## 2021-06-11 RX ORDER — MIDAZOLAM HYDROCHLORIDE 1 MG/ML
INJECTION INTRAMUSCULAR; INTRAVENOUS PRN
Status: DISCONTINUED | OUTPATIENT
Start: 2021-06-11 | End: 2021-06-11 | Stop reason: SDUPTHER

## 2021-06-11 RX ORDER — OXYCODONE HYDROCHLORIDE AND ACETAMINOPHEN 5; 325 MG/1; MG/1
2 TABLET ORAL PRN
Status: DISCONTINUED | OUTPATIENT
Start: 2021-06-11 | End: 2021-06-11 | Stop reason: HOSPADM

## 2021-06-11 RX ORDER — OXYCODONE HYDROCHLORIDE AND ACETAMINOPHEN 5; 325 MG/1; MG/1
1 TABLET ORAL PRN
Status: DISCONTINUED | OUTPATIENT
Start: 2021-06-11 | End: 2021-06-11 | Stop reason: HOSPADM

## 2021-06-11 RX ADMIN — FENTANYL CITRATE 100 MCG: 50 INJECTION, SOLUTION INTRAMUSCULAR; INTRAVENOUS at 11:40

## 2021-06-11 RX ADMIN — MIDAZOLAM HYDROCHLORIDE 2 MG: 1 INJECTION, SOLUTION INTRAMUSCULAR; INTRAVENOUS at 11:43

## 2021-06-11 RX ADMIN — MIDAZOLAM HYDROCHLORIDE 2 MG: 1 INJECTION, SOLUTION INTRAMUSCULAR; INTRAVENOUS at 11:41

## 2021-06-11 ASSESSMENT — PULMONARY FUNCTION TESTS
PIF_VALUE: 0

## 2021-06-11 ASSESSMENT — PAIN DESCRIPTION - DESCRIPTORS: DESCRIPTORS: ACHING

## 2021-06-11 ASSESSMENT — PAIN SCALES - GENERAL: PAINLEVEL_OUTOF10: 0

## 2021-06-11 ASSESSMENT — PAIN - FUNCTIONAL ASSESSMENT: PAIN_FUNCTIONAL_ASSESSMENT: 0-10

## 2021-06-11 NOTE — OP NOTE
site to block medial branch nerve innervating the  Bilateral L4 - 5 and L5 - S1 facet joints. Patient tolerated the procedure well, no complications occurred. At the end of the injection the physician withdrew the needle and the nurse applied a sterile bandage to the site. Patient transferred to the recovery room in satisfactory condition. Appropriate written discharge instructions were given to the patient. If good results are obtained, Patient would be a candidate for Radiofrequency Ablation.       Esther Erickson MD

## 2021-06-11 NOTE — ANESTHESIA PRE PROCEDURE
Department of Anesthesiology  Preprocedure Note       Name:  Brendan Lan   Age:  57 y.o.  :  1960                                          MRN:  [id number]         Date:  2021      Surgeon: Martha Friend):  Rehana Lopez MD    Procedure: Procedure(s):  BILATERAL L4/5, L5/S1 NERVE BLOCK    Medications prior to admission:   Prior to Admission medications    Medication Sig Start Date End Date Taking? Authorizing Provider   pantoprazole (PROTONIX) 40 MG tablet TAKE ONE TABLET BY MOUTH EVERY MORNING BEFORE BREAKFAST 21   Lacey Allen DO   famotidine (PEPCID) 40 MG tablet TAKE ONE TABLET BY MOUTH ONCE NIGHTLY AS NEEDED FOR STOMACH UPSET 5/10/21   Lacey Allen DO   alendronate (FOSAMAX) 70 MG tablet TAKE 1 TABLET BY MOUTH ONCE WEEKLY BEFORE BREAKFAST, ON AN EMPTY STOMACH: REMAIN UPRIGHT FOR 30 MINUTES:TAKE WITH 8 OUNCES OF WATER 3/18/21   Lacey Allen DO   buPROPion HCl ER, XL, 450 MG TB24 Take 450 mg by mouth every morning 3/17/21   Lacey Allen DO   amphetamine-dextroamphetamine (ADDERALL XR) 30 MG extended release capsule Take 2 capsules by mouth daily for 30 days. 3/11/21 6/4/21  Lacey Allen DO   propranolol (INDERAL LA) 80 MG extended release capsule Take 1 capsule by mouth daily 21   Lacey Allen DO   albuterol sulfate HFA (VENTOLIN HFA) 108 (90 Base) MCG/ACT inhaler INHALE TWO PUFFS BY MOUTH EVERY 4 HOURS AS NEEDED FOR WHEEZING 10/28/20   Ankit Walker MD   citalopram (CELEXA) 40 MG tablet TAKE ONE TABLET BY MOUTH DAILY 20   Truong Low MD   albuterol (PROVENTIL) (2.5 MG/3ML) 0.083% nebulizer solution Take 2.5 mg by nebulization every 6 hours as needed for Wheezing or Shortness of Breath    Historical Provider, MD   Calcium Carbonate (CALCIUM 600 PO) Take 600 mg by mouth daily    Historical Provider, MD       Current medications:    No current facility-administered medications for this encounter. Allergies:     Allergies Allergen Reactions    Latex Itching    Prozac [Fluoxetine Hcl]     Sulfa Antibiotics      migraine    Codeine Itching       Problem List:    Patient Active Problem List   Diagnosis Code    Attention deficit hyperactivity disorder (ADHD) F90.9    Narcolepsy G47.419    Moderate persistent asthma without complication K08.38    History of DVT (deep vein thrombosis) Z86.718    Hiatal hernia K44.9    Dysphagia R13.10    Cervical radiculopathy M54.12    Ganglion cyst M67.40    Chronic pain of left ankle M25.572, G89.29    Acquired trigger finger M65.30    Osteoarthritis of knee M17.10    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 Carl Shawn, Y92.009    Closed fracture of multiple ribs of right side S22.41XA    Hemothorax on right J94.2    Contusion of right lung S27.321A    Chronic fatigue syndrome R53.82    Sleep apnea G47.30    Pain in joint involving ankle and foot M25.579    Lumbosacral spondylosis without myelopathy M47.817       Past Medical History:        Diagnosis Date    Acid reflux     Adult ADHD 8/1/2012    Asthma 8/1/2012    B[facility] 1 disorder ([facility] [address]     Broken foot 2014    Left foot    Chest pain of uncertain etiology 69/16/1517    Last episode was in 2013 (Written 02/12/2019)    Depression     Under control per pt.  on 6/15/18    DVT of leg (deep venous thrombosis) ([address] 8/22/2014    Fracture     right foot / pt denies surgical intervention    Helicobacter pylori (H. pylori) infection 2013    per EGD    Hematuria 04/2018 RESECTION/REMOVAL BONY NEOPLASM LEFT FIBULA, LEFT SOFT TISSUE MASS EXCISION WITH BONE BIOPSY LEFT ANKLE FIBULA performed by Jina Reeves DPM at [address] Left     UPPER GASTROINTESTINAL ENDOSCOPY  2013    tertiary contractures esophagus, 3 to 4 cm hiatal hernia, antral gastritis, + H Pylori, mild active chronic esophagitis    UPPER GASTROINTESTINAL ENDOSCOPY  2016    one biopsy    UPPER GASTROINTESTINAL ENDOSCOPY N/A 2/10/2020    EGD BIOPSY performed by Peña Alonzo MD at [facility]         Social History:    Social History     Tobacco Use    Smoking status: Former Smoker     Packs/day: 1.00     Years: 20.00     Pack years: 20.00     Quit date:      Years since quittin.4    Smokeless tobacco: Never Used   Substance Use Topics    Alcohol use: Yes     Comment: rare                                Counseling given: Not Answered      Vital Signs (Current):   Vitals:    21 1038 21 1047   BP:  125/80   Pulse:  62   Resp:  15   Temp:  97 °F (36.1 °C)   SpO2:  97%   Weight: 144 lb (65.3 kg)    Height: 5' 5\" (1.651 m)                                               BP Readings from Last 3 Encounters:   21 125/80   21 (!) 90/56   21 113/82       NPO Status: Time of last liquid consumption:                         Time of last solid consumption:                         Date of last liquid consumption: 06/10/21                        Date of last solid food consumption: 06/10/21    BMI:   Wt Readings from Last 3 Encounters:   21 144 lb (65.3 kg)   21 149 lb (67.6 kg)   21 146 lb 3.2 oz (66.3 kg)     Body mass index is 23.96 kg/m².     CBC:   Lab Results   Component Value Date    WBC 4.5 04/15/2021    RBC 4.30 04/15/2021    HGB 13.3 04/15/2021    HCT 41.9 04/15/2021    MCV 97.4 04/15/2021    RDW 13.5 04/15/2021     04/15/2021       CMP:   Lab Results   Component Value Date     01/14/2021    K 3.7 01/14/2021     01/14/2021    CO2 24 01/14/2021    BUN 9 01/14/2021    CREATININE 1.07 01/14/2021    GFRAA >60 01/14/2021    LABGLOM 52 01/14/2021    GLUCOSE 95 01/14/2021    PROT 6.3 10/28/2020    CALCIUM 9.0 01/14/2021    BILITOT 0.12 10/28/2020    ALKPHOS 89 10/28/2020    AST 16 10/28/2020    ALT 11 10/28/2020       POC Tests: No results for input(s): POCGLU, POCNA, POCK, POCCL, POCBUN, POCHEMO, POCHCT in the last 72 hours. Coags:   Lab Results   Component Value Date    PROTIME 9.4 01/13/2021    INR 0.9 01/13/2021    APTT 23.4 01/13/2021       HCG (If Applicable): No results found for: PREGTESTUR, PREGSERUM, HCG, HCGQUANT     ABGs: No results found for: PHART, PO2ART, PVD6VPS, BFQ2QTD, BEART, X6MGCXHV     Type & Screen (If Applicable):  No results found for: LABABO, LABRH    Drug/Infectious Status (If Applicable):  Lab Results   Component Value Date    HEPCAB NONREACTIVE 04/27/2018       COVID-19 Screening (If Applicable):   Lab Results   Component Value Date    COVID19 Not Detected 06/07/2021    COVID19 Not Detected 09/25/2020    COVID19 Not Detected 07/02/2020           Anesthesia Evaluation  Patient summary reviewed and Nursing notes reviewed  Airway: Mallampati: III  TM distance: >3 FB   Neck ROM: full  Mouth opening: > = 3 FB Dental:      Comment: -UPPER EDENTULOUS  -MISSING SOME LOWER LEFT TEETH    Pulmonary:normal exam    (+) sleep apnea: on noncompliant,  asthma:                           ROS comment: -ASTHMA WELL CONTROLLED  -[address]   Cardiovascular:    (+) hypertension:,       ECG reviewed                     ROS comment: -EKG - SR @ 61     Neuro/Psych:                ROS comment: -OSTEOPOROSIS GI/Hepatic/Renal:   (+) hiatal hernia, GERD: poorly controlled,           Endo/Other:    (+) : arthritis:., .                  ROS comment: -NPO AFTER MIDNIGHT  -ALLERGIES - LATEX, PROZAC, SULFA Abdominal:           Vascular:           ROS comment: -ANEMIA. Anesthesia Plan      MAC     ASA 3       Induction: intravenous. MIPS: Postoperative opioids intended and Prophylactic antiemetics administered. Anesthetic plan and risks discussed with patient. Plan discussed with CRNA.     Attending anesthesiologist reviewed and agrees with Pre Eval content              Marsha Love MD   6/11/2021

## 2021-06-11 NOTE — H&P
Pain Pre-Op H&P Note    Finn Carter MD    HPI: Myra Marin  presents with back pain. Past Medical History:   Diagnosis Date    Acid reflux     Adult ADHD 2012    Asthma 2012    B[facility] 1 disorder ([facility] [address]     Broken foot     Left foot    Chest pain of uncertain etiology     Last episode was in  (Written 2019)    Depression     Under control per pt.  on 6/15/18    DVT of leg (deep venous thrombosis) ([facility] [address] 2014    Fracture     right foot / pt denies surgical intervention    Helicobacter pylori (H. pylori) infection     per EGD    Hematuria 2018    Has a follow-up with Urologist     Hiatal hernia     History of blood transfusion     History of DVT (deep vein thrombosis) 2012    Hx of blood clots     Hypertension     Internal hemorrhoids     Narcolepsy 2012    Osteoporosis        Past Surgical History:   Procedure Laterality Date    ANKLE SURGERY Left 2018    mass excision    ARM SURGERY Right     Pt states she has had 13 right arm surgeries    BREAST ENHANCEMENT SURGERY Bilateral      SECTION      x 3    COLONOSCOPY  2013    sigmoid diverticula, prominent large internal hemorrhoid    COLONOSCOPY N/A 2/10/2020    COLONOSCOPY DIAGNOSTIC performed by Nila Newby MD at [facility] N/A 2020    COLORECTAL CANCER SCREENING, NOT HIGH RISK performed by Gunjan Allen MD at [address], COLON, DIAGNOSTIC      ESOPHAGEAL MOTILITY STUDY N/A 2020    PES RN - ESOPHAGEAL MOTILITY STUDY performed by Lakshmi Daily, DO at [address] STUDY  9/10/2020    ESOPHAGEAL MOTILITY STUDY performed by Lakshmi Daily, DO at [facility]    ESOPHAGEAL MOTILITY STUDY N/A 2020    ESOPHAGEAL MOTILITY STUDY ATTEMPTED PER DR. Fernando Mahan WITHOUT SUCCESS performed by Lakshmi Daily, DO at [facility]    FOOT SURGERY Left 2015    FOOT TENDON SURGERY Left 2019    LEFT PERONEAL TENDON REPAIR WITH POSSIBLE LEFT TENDON TRANSFER (Left )    KNEE ARTHROSCOPY Left     Two surgeries on left knee per pt    KNEE SURGERY Right     Three surgeries per pt    LIGAMENT REPAIR Left 2019    LEFT PERONEAL TENDON REPAIR performed by Tori Dejesus DPM at [address] Bilateral 2021     BILATERAL L4/5, L5/S1 LUMBAR FACET (Bilateral )    NOSE SURGERY      PAIN MANAGEMENT PROCEDURE Bilateral 2021    BILATERAL L4/5, L5/S1 LUMBAR FACET performed by Esther Erickson MD at [address] [facility] OFFICE/OUTPT [facility] Left 2018    RESECTION/REMOVAL BONY NEOPLASM LEFT FIBULA, LEFT SOFT TISSUE MASS EXCISION WITH BONE BIOPSY LEFT ANKLE FIBULA performed by Tori Dejesus DPM at [facility] Left     UPPER GASTROINTESTINAL ENDOSCOPY  2013    tertiary contractures esophagus, 3 to 4 cm hiatal hernia, antral gastritis, + H Pylori, mild active chronic esophagitis    UPPER GASTROINTESTINAL ENDOSCOPY  2016    one biopsy    UPPER GASTROINTESTINAL ENDOSCOPY N/A 2/10/2020    EGD BIOPSY performed by Sundeep Flood MD at [address]         Family History   Problem Relation Age of Onset    Cancer Mother     Hypertension Mother     Stroke Mother     Cancer Maternal Aunt     Hypertension Father     Heart Surgery Father     Diabetes Neg Hx        Allergies   Allergen Reactions    Latex Itching    Prozac [Fluoxetine Hcl]     Sulfa Antibiotics      migraine    Codeine Itching       No current facility-administered medications for this encounter.     Social History     Tobacco Use    Smoking status: Former Smoker     Packs/day: 1.00     Years: 20.00     Pack years: 20.00     Quit date:      Years since quittin.4    Smokeless tobacco: Never Used   Substance Use Topics    Alcohol use: Yes     Comment: rare       Review of Systems:   Focused review of systems was performed, and negative as pertinent to diagnosis, except as stated in HPI. Physical Exam  Constitutional:       Appearance: Normal appearance. Pulmonary:      Effort: Pulmonary effort is normal.   Neurological:      Mental Status: He is alert. Psychiatric:         Attention and Perception: Attention and perception normal.         Mood and Affect: Mood and affect normal.         Patient's current physical status, medications, medical history, and HPI have been reviewed and updated as appropriate on this date: 06/11/21    Risk/Benefit(s): The risks, benefits, alternatives, and potential complications have been discussed with the patient/family and informed consent has been obtained for the procedure/sedation.     Diagnosis:   M47.817 LUMBOSACRAL SPONDYLOSIS WITHOUT MYELOPATHY      Plan: lumbar mbely Hays MD

## 2021-06-11 NOTE — ANESTHESIA POSTPROCEDURE EVALUATION
Department of Anesthesiology  Postprocedure Note    Patient: Alexandra Pleitez  MRN: [id number]  [place]: 1960  Date of evaluation: 6/11/2021  Time:  11:56 AM     Procedure Summary     Date: 06/11/21 Room / Location: [address]    Anesthesia Start: 0824 Anesthesia Stop: 1152    Procedure: BILATERAL L4/5, L5/S1 NERVE BLOCK (Bilateral ) Diagnosis: (M47.817 LUMBOSACRAL SPONDYLOSIS WITHOUT MYELOPATHY)    Surgeons: Surendra Gill MD Responsible Provider: Marla Swartz MD    Anesthesia Type: MAC ASA Status: 3          Anesthesia Type: MAC    Hong Phase I: Hong Score: 10    Hong Phase II:      Last vitals: Reviewed and per EMR flowsheets.        Anesthesia Post Evaluation    Patient location during evaluation: PACU  Patient participation: complete - patient participated  Level of consciousness: awake and alert  Airway patency: patent  Nausea & Vomiting: no nausea and no vomiting  Complications: no  Cardiovascular status: hemodynamically stable  Respiratory status: nasal cannula and spontaneous ventilation  Hydration status: euvolemic

## 2021-06-14 ENCOUNTER — TELEPHONE (OUTPATIENT)
Dept: PAIN MANAGEMENT | Age: 61
End: 2021-06-14

## 2021-06-14 DIAGNOSIS — M47.817 LUMBOSACRAL SPONDYLOSIS WITHOUT MYELOPATHY: Primary | ICD-10-CM

## 2021-06-14 RX ORDER — OXYCODONE HYDROCHLORIDE AND ACETAMINOPHEN 5; 325 MG/1; MG/1
1 TABLET ORAL EVERY 8 HOURS PRN
Qty: 9 TABLET | Refills: 0 | Status: SHIPPED | OUTPATIENT
Start: 2021-06-14 | End: 2021-06-17 | Stop reason: SDUPTHER

## 2021-06-16 ASSESSMENT — ENCOUNTER SYMPTOMS: BACK PAIN: 1

## 2021-06-16 NOTE — PROGRESS NOTES
history of trauma Gara Olyphant down the steps). The problem occurs daily. The problem has been rapidly worsening. The quality of the pain is described as aching. The pain is at a severity of 8/10. The pain is moderate. Associated symptoms include tingling. Pertinent negatives include no fever. The symptoms are aggravated by activity (Lifting). Treatments tried: Physical Therapy. The treatment provided no relief. Her past medical history is significant for osteoarthritis. Patient denies any new neurological symptoms. No bowel or bladder incontinence, no weakness, and no falling. Pill count: appropriate due 6/18    Morphine equivalent: 22.5    Controlled Substance Monitoring:    Acute and Chronic Pain Monitoring:   RX Monitoring 6/17/2021   Attestation -   Periodic Controlled Substance Monitoring Possible medication side effects, risk of tolerance/dependence & alternative treatments discussed. ;No signs of potential drug abuse or diversion identified.;Obtaining appropriate analgesic effect of treatment. Chronic Pain > 80 MEDD -   Chronic Pain > 120 MEDD -           Past Medical History:   Diagnosis Date    Acid reflux     Adult ADHD 8/1/2012    Asthma 8/1/2012    Bipolar 1 disorder ([address]     Broken foot 2014    Left foot    Chest pain of uncertain etiology 02/59/4253    Last episode was in 2013 (Written 02/12/2019)    Depression     Under control per pt.  on 6/15/18    DVT of leg (deep venous thrombosis) ([address] 8/22/2014    Fracture     right foot / pt denies surgical intervention    Helicobacter pylori (H. pylori) infection 2013    per EGD    Hematuria 04/2018    Has a follow-up with Urologist 6/20    Hiatal hernia     History of blood transfusion     History of DVT (deep vein thrombosis) 8/1/2012    Hx of blood clots     Hypertension     Internal hemorrhoids     Narcolepsy 8/1/2012    Osteoporosis        Past Surgical History:   Procedure Laterality Date    ANKLE SURGERY Left 06/29/2018    mass excision    ARM SURGERY Right     Pt states she has had 13 right arm surgeries    BREAST ENHANCEMENT SURGERY Bilateral      SECTION      x 3    COLONOSCOPY  2013    sigmoid diverticula, prominent large internal hemorrhoid    COLONOSCOPY N/A 2/10/2020    COLONOSCOPY DIAGNOSTIC performed by Cristobal Heimlich, MD at [facility] 2020    COLORECTAL CANCER SCREENING, NOT HIGH RISK performed by Patricia Payan MD at [address] [place], COLON, DIAGNOSTIC      ESOPHAGEAL MOTILITY STUDY N/A 2020    PES RN - ESOPHAGEAL MOTILITY STUDY performed by Mame Garcia DO at [address] [facility] [address] STUDY  9/10/2020    ESOPHAGEAL MOTILITY STUDY performed by Mame Garcia DO at [facility]    ESOPHAGEAL MOTILITY STUDY N/A 2020    ESOPHAGEAL MOTILITY STUDY ATTEMPTED PER DR. Kenan Nunez WITHOUT SUCCESS performed by Mame Garcia DO at [facility]    FOOT SURGERY Left     FOOT TENDON SURGERY Left 2019    LEFT PERONEAL TENDON REPAIR WITH POSSIBLE LEFT TENDON TRANSFER (Left )    KNEE ARTHROSCOPY Left     Two surgeries on left knee per pt    KNEE SURGERY Right     Three surgeries per pt    LIGAMENT REPAIR Left 2019    LEFT PERONEAL TENDON REPAIR performed by Fernando Grubbs DPM at [address] Bilateral 2021     BILATERAL L4/5, L5/S1 LUMBAR FACET (Bilateral )    NOSE SURGERY      PAIN MANAGEMENT PROCEDURE Bilateral 2021    BILATERAL L4/5, L5/S1 LUMBAR FACET performed by Hedy Allan MD at [address] [facility]  2021    BILATERAL L4/5, L5/S1 NERVE BLOCK - Bilateral    PAIN MANAGEMENT PROCEDURE Bilateral 2021    BILATERAL L4/5, L5/S1 NERVE BLOCK performed by Hedy Allan MD at [address] OFFICE/OUTPT [address] [facility] Left 2018    RESECTION/REMOVAL BONY NEOPLASM LEFT FIBULA, LEFT SOFT TISSUE MASS EXCISION WITH BONE BIOPSY LEFT ANKLE FIBULA performed by Mark Wong DPM at [address] Left     UPPER GASTROINTESTINAL ENDOSCOPY  5/23/2013    tertiary contractures esophagus, 3 to 4 cm hiatal hernia, antral gastritis, + H Pylori, mild active chronic esophagitis    UPPER GASTROINTESTINAL ENDOSCOPY  4/6/2016    one biopsy    UPPER GASTROINTESTINAL ENDOSCOPY N/A 2/10/2020    EGD BIOPSY performed by Angel Gallagher MD at [address]         Allergies   Allergen Reactions    Latex Itching    Prozac [Fluoxetine Hcl]     Sulfa Antibiotics      migraine    Codeine Itching         Current Outpatient Medications:     oxyCODONE-acetaminophen (PERCOCET) 5-325 MG per tablet, Take 1 tablet by mouth every 8 hours as needed for Pain for up to 3 days. , Disp: 9 tablet, Rfl: 0    pantoprazole (PROTONIX) 40 MG tablet, TAKE ONE TABLET BY MOUTH EVERY MORNING BEFORE BREAKFAST, Disp: 90 tablet, Rfl: 1    alendronate (FOSAMAX) 70 MG tablet, TAKE 1 TABLET BY MOUTH ONCE WEEKLY BEFORE BREAKFAST, ON AN EMPTY STOMACH: REMAIN UPRIGHT FOR 30 MINUTES:TAKE WITH 8 OUNCES OF WATER, Disp: 12 tablet, Rfl: 10    buPROPion HCl ER, XL, 450 MG TB24, Take 450 mg by mouth every morning, Disp: 30 tablet, Rfl: 2    propranolol (INDERAL LA) 80 MG extended release capsule, Take 1 capsule by mouth daily, Disp: 90 capsule, Rfl: 1    albuterol sulfate HFA (VENTOLIN HFA) 108 (90 Base) MCG/ACT inhaler, INHALE TWO PUFFS BY MOUTH EVERY 4 HOURS AS NEEDED FOR WHEEZING, Disp: 1 Inhaler, Rfl: 5    citalopram (CELEXA) 40 MG tablet, TAKE ONE TABLET BY MOUTH DAILY, Disp: 90 tablet, Rfl: 3    albuterol (PROVENTIL) (2.5 MG/3ML) 0.083% nebulizer solution, Take 2.5 mg by nebulization every 6 hours as needed for Wheezing or Shortness of Breath, Disp: , Rfl:     Calcium Carbonate (CALCIUM 600 PO), Take 600 mg by mouth daily, Disp: , Rfl:     famotidine (PEPCID) 40 MG tablet, TAKE ONE TABLET BY MOUTH ONCE NIGHTLY AS NEEDED FOR STOMACH UPSET (Patient not taking: Reported on 2021), Disp: 30 tablet, Rfl: 2    amphetamine-dextroamphetamine (ADDERALL XR) 30 MG extended release capsule, Take 2 capsules by mouth daily for 30 days. (Patient not taking: Reported on 2021), Disp: 60 capsule, Rfl: 0    Family History   Problem Relation Age of Onset   Bianka Narvaez Cancer Mother     Hypertension Mother     Stroke Mother     Cancer Maternal Aunt     Hypertension Father     Heart Surgery Father     Diabetes Neg Hx        Social History     Socioeconomic History    Marital status: Single     Spouse name: Not on file    Number of children: Not on file    Years of education: Not on file    Highest education level: Not on file   Occupational History    Not on file   Tobacco Use    Smoking status: Former Smoker     Packs/day: 1.00     Years: 20.00     Pack years: 20.00     Quit date:      Years since quittin.4    Smokeless tobacco: Never Used   Vaping Use    Vaping Use: Never used   Substance and Sexual Activity    Alcohol use: Yes     Comment: rare    Drug use: No    Sexual activity: Not Currently   Other Topics Concern    Not on file   Social History Narrative    Not on file     Social Determinants of Health     Financial Resource Strain: Low Risk     Difficulty of Paying Living Expenses: Not hard at all   Food Insecurity: No Food Insecurity    Worried About [address] [employer] in the Last Year: Never true    [address]  in the Last Year: Never true   Transportation Needs:     Lack of Transportation (Medical):      Lack of Transportation (Non-Medical):    Physical Activity:     Days of Exercise per Week:     Minutes of Exercise per Session:    Stress:     Feeling of Stress :    Social Connections:     Frequency of Communication with Friends and Family:     Frequency of Social Gatherings with Friends and Family:     Attends Bahai Services:     Active Member of Clubs or Organizations:     Attends Club or Organization Meetings:    Bianka Narvaez Marital Status:    Intimate Partner Violence:     Fear of Current or Ex-Partner:     Emotionally Abused:     Physically Abused:     Sexually Abused:        Review of Systems:  Review of Systems   Constitutional: Negative for chills and fever. Cardiovascular: Negative for chest pain and palpitations. Respiratory: Negative for cough and shortness of breath. Musculoskeletal: Positive for back pain and neck pain. Gastrointestinal: Negative for constipation. Neurological: Positive for tingling and weakness. Negative for disturbances in coordination and loss of balance. Physical Exam:  Ht 5' (1.524 m)   Wt 143 lb (64.9 kg)   Breastfeeding No   BMI 27.93 kg/m²     Physical Exam  HENT:      Head: Normocephalic. Pulmonary:      Effort: Pulmonary effort is normal.   Musculoskeletal:         General: Normal range of motion. Cervical back: Normal range of motion. Tenderness present. Lumbar back: Tenderness present. Skin:     General: Skin is warm and dry. Neurological:      Mental Status: She is alert and oriented to person, place, and time. Record/Diagnostics Review:    Last angie 3/21 and was appropriate     Assessment:  Problem List Items Addressed This Visit     Cervical radiculopathy - Primary    Lumbosacral spondylosis without myelopathy    Relevant Medications    oxyCODONE-acetaminophen (PERCOCET) 5-325 MG per tablet (Start on 6/18/2021)    Other Relevant Orders    FACET JOINT L/S SINGLE    RADIOFREQUENCY L/S ADDITIONAL      Other Visit Diagnoses     Chronic bilateral low back pain, unspecified whether sciatica present        Relevant Medications    oxyCODONE-acetaminophen (PERCOCET) 5-325 MG per tablet (Start on 6/18/2021)    Other Relevant Orders    FACET JOINT L/S SINGLE    RADIOFREQUENCY L/S ADDITIONAL             Treatment Plan:  Patient relates current medications are helping the pain.  Patient reports taking pain medications as prescribed, denies obtaining medications from different sources and denies use of illegal drugs. Patient denies side effects from medications like nausea, vomiting, constipation or drowsiness. Patient reports current activities of daily living are possible due to medications and would like to continue them. As always, we encourage daily stretching and strengthening exercises, and recommend minimizing use of pain medications unless patient cannot get through daily activities due to pain. Contract requirements met. Continue opioid therapy.  Script written for percocet  Significant benefit following lumbar MBB  She would like to schedule lumbar RFA  Follow up appointment made for 4 weeks

## 2021-06-17 ENCOUNTER — OFFICE VISIT (OUTPATIENT)
Dept: PAIN MANAGEMENT | Age: 61
End: 2021-06-17
Payer: MEDICARE

## 2021-06-17 ENCOUNTER — OFFICE VISIT (OUTPATIENT)
Dept: FAMILY MEDICINE CLINIC | Age: 61
End: 2021-06-17
Payer: MEDICARE

## 2021-06-17 ENCOUNTER — HOSPITAL ENCOUNTER (OUTPATIENT)
Dept: GENERAL RADIOLOGY | Age: 61
Discharge: HOME | End: 2021-06-19
Payer: MEDICARE

## 2021-06-17 ENCOUNTER — HOSPITAL ENCOUNTER (OUTPATIENT)
Age: 61
Discharge: HOME | End: 2021-06-19
Payer: MEDICARE

## 2021-06-17 VITALS
BODY MASS INDEX: 27.97 KG/M2 | OXYGEN SATURATION: 98 % | WEIGHT: 143.2 LBS | DIASTOLIC BLOOD PRESSURE: 80 MMHG | HEART RATE: 70 BPM | SYSTOLIC BLOOD PRESSURE: 120 MMHG

## 2021-06-17 VITALS
BODY MASS INDEX: 28.07 KG/M2 | WEIGHT: 143 LBS | DIASTOLIC BLOOD PRESSURE: 89 MMHG | HEIGHT: 60 IN | HEART RATE: 66 BPM | SYSTOLIC BLOOD PRESSURE: 115 MMHG

## 2021-06-17 DIAGNOSIS — M47.817 LUMBOSACRAL SPONDYLOSIS WITHOUT MYELOPATHY: ICD-10-CM

## 2021-06-17 DIAGNOSIS — G89.29 CHRONIC BILATERAL LOW BACK PAIN WITH BILATERAL SCIATICA: Primary | ICD-10-CM

## 2021-06-17 DIAGNOSIS — F41.1 GENERALIZED ANXIETY DISORDER: ICD-10-CM

## 2021-06-17 DIAGNOSIS — G89.29 CHRONIC BILATERAL LOW BACK PAIN, UNSPECIFIED WHETHER SCIATICA PRESENT: ICD-10-CM

## 2021-06-17 DIAGNOSIS — M54.12 CERVICAL RADICULOPATHY: Primary | ICD-10-CM

## 2021-06-17 DIAGNOSIS — M79.632 LEFT FOREARM PAIN: ICD-10-CM

## 2021-06-17 DIAGNOSIS — M54.50 CHRONIC BILATERAL LOW BACK PAIN, UNSPECIFIED WHETHER SCIATICA PRESENT: ICD-10-CM

## 2021-06-17 DIAGNOSIS — I10 ESSENTIAL HYPERTENSION: ICD-10-CM

## 2021-06-17 DIAGNOSIS — M54.41 CHRONIC BILATERAL LOW BACK PAIN WITH BILATERAL SCIATICA: Primary | ICD-10-CM

## 2021-06-17 DIAGNOSIS — M54.42 CHRONIC BILATERAL LOW BACK PAIN WITH BILATERAL SCIATICA: Primary | ICD-10-CM

## 2021-06-17 DIAGNOSIS — K21.9 GASTROESOPHAGEAL REFLUX DISEASE, UNSPECIFIED WHETHER ESOPHAGITIS PRESENT: ICD-10-CM

## 2021-06-17 PROCEDURE — 73090 X-RAY EXAM OF FOREARM: CPT

## 2021-06-17 PROCEDURE — 99214 OFFICE O/P EST MOD 30 MIN: CPT | Performed by: FAMILY MEDICINE

## 2021-06-17 PROCEDURE — 99213 OFFICE O/P EST LOW 20 MIN: CPT | Performed by: NURSE PRACTITIONER

## 2021-06-17 RX ORDER — OXYCODONE HYDROCHLORIDE AND ACETAMINOPHEN 5; 325 MG/1; MG/1
1 TABLET ORAL EVERY 8 HOURS PRN
Qty: 90 TABLET | Refills: 0 | Status: SHIPPED | OUTPATIENT
Start: 2021-06-18 | End: 2021-07-15 | Stop reason: SDUPTHER

## 2021-06-17 RX ORDER — CYCLOBENZAPRINE HCL 10 MG
10 TABLET ORAL 3 TIMES DAILY PRN
Qty: 30 TABLET | Refills: 0 | Status: SHIPPED | OUTPATIENT
Start: 2021-06-17 | End: 2021-06-27

## 2021-06-17 RX ORDER — PANTOPRAZOLE SODIUM 40 MG/1
40 TABLET, DELAYED RELEASE ORAL DAILY
Qty: 90 TABLET | Refills: 1 | Status: SHIPPED | OUTPATIENT
Start: 2021-06-17 | End: 2021-09-15 | Stop reason: SDUPTHER

## 2021-06-17 ASSESSMENT — ENCOUNTER SYMPTOMS
BACK PAIN: 1
SHORTNESS OF BREATH: 0
CONSTIPATION: 0
COUGH: 0

## 2021-06-17 ASSESSMENT — PATIENT HEALTH QUESTIONNAIRE - PHQ9
2. FEELING DOWN, DEPRESSED OR HOPELESS: 0
SUM OF ALL RESPONSES TO PHQ QUESTIONS 1-9: 0
SUM OF ALL RESPONSES TO PHQ9 QUESTIONS 1 & 2: 0
SUM OF ALL RESPONSES TO PHQ QUESTIONS 1-9: 0
1. LITTLE INTEREST OR PLEASURE IN DOING THINGS: 0
SUM OF ALL RESPONSES TO PHQ QUESTIONS 1-9: 0

## 2021-06-17 NOTE — PATIENT INSTRUCTIONS
cyclobenzaprine and call your doctor at once if you have:  · fast or irregular heartbeats;  · chest pain or pressure, pain spreading to your jaw or shoulder; or  · sudden numbness or weakness (especially on one side of the body), slurred speech, balance problems. Seek medical attention right away if you have symptoms of serotonin syndrome, such as: agitation, hallucinations, fever, sweating, shivering, fast heart rate, muscle stiffness, twitching, loss of coordination, nausea, vomiting, or diarrhea. Serious side effects may be more likely in older adults. Common side effects may include:  · drowsiness, tiredness;  · headache, dizziness;  · dry mouth; or  · upset stomach, nausea, constipation. This is not a complete list of side effects and others may occur. Call your doctor for medical advice about side effects. You may report side effects to FDA at 1-581-FDA-2839. What other drugs will affect cyclobenzaprine? Using cyclobenzaprine with other drugs that make you drowsy can worsen this effect. Ask your doctor before using opioid medication, a sleeping pill, a muscle relaxer, or medicine for anxiety or seizures. Tell your doctor about all your other medicines, especially:  · bupropion (Zyban, for smoking cessation);  · meperidine;  · tramadol;  · verapamil;  · cold or allergy medicine that contains an antihistamine (Benadryl and others);  · medicine to treat Parkinson's disease;  · medicine to treat excess stomach acid, stomach ulcer, motion sickness, or irritable bowel syndrome;  · medicine to treat overactive bladder; or  · bronchodilator asthma medication. This list is not complete. Other drugs may affect cyclobenzaprine, including prescription and over-the-counter medicines, vitamins, and herbal products. Not all possible drug interactions are listed here. Where can I get more information? Your pharmacist can provide more information about cyclobenzaprine.   Remember, keep this and all other medicines out

## 2021-06-17 NOTE — PROGRESS NOTES
[facility] Progress Note    Date:6/17/2021         Patient Name:Latisha Anand     YOB: 1960     Age:61 y.o. Assessment/Plan        Problem List Items Addressed This Visit        Circulatory    Essential hypertension      Well-controlled, continue current medications, medication adherence emphasized and lifestyle modifications recommended            Digestive    GERD (gastroesophageal reflux disease)      Well-controlled, continue current medications, medication adherence emphasized and lifestyle modifications recommended         Relevant Medications    pantoprazole (PROTONIX) 40 MG tablet       Nervous and Auditory    Chronic bilateral low back pain with bilateral sciatica - Primary      Borderline controlled, continue current treatment plan, medication adherence emphasized and lifestyle modifications recommended           Other Visit Diagnoses     Left forearm pain        Xrays    Relevant Orders    XR RADIUS ULNA LEFT (2 VIEWS) (Completed)           Return in about 3 months (around 9/17/2021). Electronically signed by Demetra Haque DO on 6/17/21         Christa     Isadora Carlson is a 57 y.o. female presenting today for   Chief Complaint   Patient presents with    3 Month Follow-Up    Arm Pain     left   . ADD/ADHD:  Current treatment: Adderall- , which has been effective. Residual symptoms: none. Medication side effects: None. Patient denies anorexia, involuntary weight loss and palpitations. Has psych set up    Isadora Carlson is a 57 y.o. female who presents for follow up of anxiety disorder. Current symptoms: difficulty concentrating, irritable, racing thoughts. She denies current suicidal and homicidal ideation. She complains of the following side effects from the treatment: none. Has psych set up    Isadora Carlson is an 57 y.o. female who presents with arthralgias that began several months ago.  Pain is located in both MCP(s): 1st, 2nd, 3rd, 4th and 5th, both PIP(s): 1st, 2nd, 3rd, 4th and 5th and both DIP(s): 1st, 2nd, 3rd, 4th and 5th. The pain is described as constant, aching. Associated symptoms include: crepitation, decreased range of motion and deformity. Arm Pain   The pain is present in the left forearm. Pertinent negatives include no chest pain. Back Pain  Pertinent negatives include no abdominal pain, chest pain, headaches or weight loss. Hypertension  This is a chronic problem. The current episode started more than 1 year ago. The problem has been gradually improving since onset. The problem is controlled. Associated symptoms include anxiety. Pertinent negatives include no blurred vision, chest pain, headaches, malaise/fatigue, neck pain, orthopnea, palpitations, peripheral edema, PND, shortness of breath or sweats. Past treatments include beta blockers. The current treatment provides significant improvement. Gastroesophageal Reflux  She complains of heartburn. She reports no abdominal pain, no belching, no chest pain, no choking, no coughing, no dysphagia, no early satiety, no globus sensation, no hoarse voice, no nausea, no sore throat, no stridor, no tooth decay, no water brash or no wheezing. This is a chronic problem. The current episode started more than 1 year ago. The problem occurs frequently. The problem has been waxing and waning. The symptoms are aggravated by certain foods. Pertinent negatives include no anemia, fatigue, melena, muscle weakness, orthopnea or weight loss. She has tried a histamine-2 antagonist for the symptoms. The treatment provided significant relief. Review of Systems   Review of Systems   Constitutional: Negative. Negative for fatigue, malaise/fatigue and weight loss. HENT: Negative. Negative for hoarse voice and sore throat. Eyes: Negative. Negative for blurred vision. Respiratory: Negative. Negative for cough, choking, shortness of breath and wheezing. Cardiovascular: Negative.   Negative for chest pain, Calcium Carbonate (CALCIUM 600 PO) Take 600 mg by mouth daily       No current facility-administered medications for this visit. Past History    Past Medical History:   has a past medical history of Acid reflux, Adult ADHD, Asthma, Bipolar 1 disorder ([facility] [address] Broken foot, Chest pain of uncertain etiology, Depression, DVT of leg (deep venous thrombosis) ([facility] [address] Fracture, Helicobacter pylori (H. pylori) infection, Hematuria, Hiatal hernia, History of blood transfusion, History of DVT (deep vein thrombosis), Hx of blood clots, Hypertension, Internal hemorrhoids, Narcolepsy, and Osteoporosis. Social History:   reports that she quit smoking about 32 years ago. She has a 20.00 pack-year smoking history. She has never used smokeless tobacco. She reports current alcohol use. She reports that she does not use drugs.      Family History:   Family History   Problem Relation Age of Onset   Romeo Zimbabwean Cancer Mother     Hypertension Mother     Stroke Mother     Cancer Maternal Aunt     Hypertension Father     Heart Surgery Father     Diabetes Neg Hx        Surgical History:   Past Surgical History:   Procedure Laterality Date    ANKLE SURGERY Left 2018    mass excision    ARM SURGERY Right     Pt states she has had 13 right arm surgeries    BREAST ENHANCEMENT SURGERY Bilateral      SECTION      x 3    COLONOSCOPY  2013    sigmoid diverticula, prominent large internal hemorrhoid    COLONOSCOPY N/A 2/10/2020    COLONOSCOPY DIAGNOSTIC performed by Tanner Berrios MD at [facility] N/A 2020    COLORECTAL CANCER SCREENING, NOT HIGH RISK performed by Ziyad Paris MD at [address], COLON, DIAGNOSTIC      ESOPHAGEAL MOTILITY STUDY N/A 2020    PES RN - ESOPHAGEAL MOTILITY STUDY performed by Sammi Erickson DO at [facility]    ESOPHAGEAL MOTILITY STUDY  9/10/2020    ESOPHAGEAL MOTILITY STUDY performed by Sammi Erickson DO at [facility]    ESOPHAGEAL MOTILITY Normal appearance. She is normal weight. She is not ill-appearing, toxic-appearing or diaphoretic. HENT:      Head: Normocephalic and atraumatic. Eyes:      General: No scleral icterus. Right eye: No discharge. Left eye: No discharge. Extraocular Movements: Extraocular movements intact. Conjunctiva/sclera: Conjunctivae normal.   Cardiovascular:      Rate and Rhythm: Normal rate and regular rhythm. Pulses: Normal pulses. Heart sounds: Normal heart sounds. No murmur heard. No friction rub. No gallop. Pulmonary:      Effort: Pulmonary effort is normal. No respiratory distress. Breath sounds: Normal breath sounds. No stridor. No wheezing, rhonchi or rales. Chest:      Chest wall: No tenderness. Musculoskeletal:      Comments: Most finger joints enlarged and deviated fingertips   Neurological:      Mental Status: She is alert and oriented to person, place, and time. Mental status is at baseline. Psychiatric:         Mood and Affect: Mood normal.         Behavior: Behavior normal.         Thought Content: Thought content normal.         Judgment: Judgment normal.         Labs/Imaging/Diagnostics   Labs:  No results found for: CMPWITHGFR, CBCAUTODIF, TSHFT4, LABA1C, LIPIDPAN    Imaging Last 24 Hours:  FLUORO FOR SURGICAL PROCEDURES  Radiology exam is complete. No Radiologist dictation. Please follow up   with ordering provider.

## 2021-06-18 ENCOUNTER — TELEPHONE (OUTPATIENT)
Dept: FAMILY MEDICINE CLINIC | Age: 61
End: 2021-06-18

## 2021-06-18 NOTE — TELEPHONE ENCOUNTER
Patient called wanting results of X-ray from 6/17/21 - In chart  Patient states she is a little concerned because it looks like there is a bone or something sticking out.

## 2021-06-22 DIAGNOSIS — M54.50 CHRONIC LOW BACK PAIN, UNSPECIFIED BACK PAIN LATERALITY, UNSPECIFIED WHETHER SCIATICA PRESENT: ICD-10-CM

## 2021-06-22 DIAGNOSIS — G89.29 CHRONIC LOW BACK PAIN, UNSPECIFIED BACK PAIN LATERALITY, UNSPECIFIED WHETHER SCIATICA PRESENT: ICD-10-CM

## 2021-06-22 DIAGNOSIS — M47.817 LUMBOSACRAL SPONDYLOSIS WITHOUT MYELOPATHY: Primary | ICD-10-CM

## 2021-06-29 PROBLEM — K63.89 NARCOTIC BOWEL SYNDROME (HCC): Status: ACTIVE | Noted: 2021-06-29

## 2021-06-29 PROBLEM — T40.601A NARCOTIC BOWEL SYNDROME (HCC): Status: ACTIVE | Noted: 2021-06-29

## 2021-06-29 ASSESSMENT — ENCOUNTER SYMPTOMS
WHEEZING: 0
GLOBUS SENSATION: 0
BLURRED VISION: 0
COUGH: 0
NAUSEA: 0
HEARTBURN: 1
HOARSE VOICE: 0
CHOKING: 0
STRIDOR: 0
ABDOMINAL PAIN: 0
SHORTNESS OF BREATH: 0
ORTHOPNEA: 0
SORE THROAT: 0
EYES NEGATIVE: 1
BELCHING: 0
RESPIRATORY NEGATIVE: 1
ALLERGIC/IMMUNOLOGIC NEGATIVE: 1
WATER BRASH: 0

## 2021-06-29 NOTE — ASSESSMENT & PLAN NOTE
Borderline controlled, continue current treatment plan, medication adherence emphasized and lifestyle modifications recommended

## 2021-07-15 ENCOUNTER — OFFICE VISIT (OUTPATIENT)
Dept: PAIN MANAGEMENT | Age: 61
End: 2021-07-15
Payer: MEDICARE

## 2021-07-15 VITALS — SYSTOLIC BLOOD PRESSURE: 133 MMHG | BODY MASS INDEX: 27.97 KG/M2 | DIASTOLIC BLOOD PRESSURE: 94 MMHG | HEIGHT: 60 IN

## 2021-07-15 DIAGNOSIS — Z79.891 ENCOUNTER FOR LONG-TERM OPIATE ANALGESIC USE: Chronic | ICD-10-CM

## 2021-07-15 DIAGNOSIS — M54.41 CHRONIC BILATERAL LOW BACK PAIN WITH BILATERAL SCIATICA: Primary | ICD-10-CM

## 2021-07-15 DIAGNOSIS — M47.817 LUMBOSACRAL SPONDYLOSIS WITHOUT MYELOPATHY: ICD-10-CM

## 2021-07-15 DIAGNOSIS — M54.12 CERVICAL RADICULOPATHY: ICD-10-CM

## 2021-07-15 DIAGNOSIS — M54.42 CHRONIC BILATERAL LOW BACK PAIN WITH BILATERAL SCIATICA: Primary | ICD-10-CM

## 2021-07-15 DIAGNOSIS — G89.29 CHRONIC BILATERAL LOW BACK PAIN WITH BILATERAL SCIATICA: Primary | ICD-10-CM

## 2021-07-15 PROCEDURE — 99213 OFFICE O/P EST LOW 20 MIN: CPT | Performed by: NURSE PRACTITIONER

## 2021-07-15 RX ORDER — OXYCODONE HYDROCHLORIDE AND ACETAMINOPHEN 5; 325 MG/1; MG/1
1 TABLET ORAL EVERY 8 HOURS PRN
Qty: 90 TABLET | Refills: 0 | Status: SHIPPED | OUTPATIENT
Start: 2021-07-18 | End: 2021-08-12 | Stop reason: SDUPTHER

## 2021-07-15 RX ORDER — VITAMIN E 268 MG
400 CAPSULE ORAL DAILY
COMMUNITY
End: 2021-10-28

## 2021-07-15 RX ORDER — CYCLOBENZAPRINE HCL 10 MG
10 TABLET ORAL NIGHTLY PRN
Qty: 30 TABLET | Refills: 0 | Status: SHIPPED | OUTPATIENT
Start: 2021-07-15 | End: 2021-08-14

## 2021-07-15 ASSESSMENT — ENCOUNTER SYMPTOMS
BOWEL INCONTINENCE: 0
CONSTIPATION: 0
SHORTNESS OF BREATH: 0
BACK PAIN: 1
COUGH: 0

## 2021-07-15 NOTE — PROGRESS NOTES
PMH: Patient complains of neck and back pain. MRI with Foraminal narrowing, moderate at left C5-6, mild-to-moderate at bilateral C6-7 and left C7-T1, mild at bilateral C4-5, right C5-6 and right C7-T1. Spinal canal narrowing, moderate at L4-5, minimal at L2-3 and L3-4. Foraminal narrowing, moderate at right L5-S1, mild to moderate at right L4-5 and left L5-S1, mild at left L4-5. She has not seen a surgeon - does not want surgery. Pt had Lumbar Facet Nerve Block Multiple Levels  Bilateral L4 - 5 and L5 - S1 on 6/11 and reports no pain - 100% relief -  for 3 hours after procedure and then pain gradually returned. She is scheduled for RFA later this month. She takes percocet as needed for the pain. She complains of increased cramping in her legs at times. Back Pain  This is a chronic problem. The current episode started more than 1 year ago. The problem occurs constantly. The problem is unchanged. The pain is present in the lumbar spine and thoracic spine. The quality of the pain is described as aching, burning, cramping, shooting and stabbing. Radiates to: bilateral. The pain is at a severity of 5/10. The pain is moderate. The pain is worse during the day. The symptoms are aggravated by standing, twisting and position (walking). Stiffness is present all day. Associated symptoms include headaches, leg pain, numbness, tingling and weakness. Pertinent negatives include no bladder incontinence, bowel incontinence, chest pain, dysuria or fever. Risk factors include history of osteoporosis. She has tried analgesics and bed rest (Physical therapy, nerve blocks) for the symptoms. The treatment provided mild relief. Patient denies any new neurological symptoms. No bowel or bladder incontinence, no weakness, and no falling. Review of [place]ARRS does not show any aberrant prescription behavior. Medication is helping the patient stay active. Patient denies any side effects and reports adequate analgesia.  No sign of misuse/abuse. Pill count: appropriate due     MED: 22.5    Controlled Substance Monitoring:    Acute and Chronic Pain Monitoring:   RX Monitoring 7/15/2021   Attestation -   Periodic Controlled Substance Monitoring Possible medication side effects, risk of tolerance/dependence & alternative treatments discussed. ;No signs of potential drug abuse or diversion identified.;Obtaining appropriate analgesic effect of treatment. Chronic Pain > 80 MEDD -   Chronic Pain > 120 MEDD -         Past Medical History:   Diagnosis Date    Acid reflux     Adult ADHD 2012    Asthma 2012    Bipolar 1 disorder ([facility] [address]     Broken foot     Left foot    Chest pain of uncertain etiology     Last episode was in  (Written 2019)    Depression     Under control per pt.  on 6/15/18    DVT of leg (deep venous thrombosis) ([facility] [address] 2014    Fracture     right foot / pt denies surgical intervention    Helicobacter pylori (H. pylori) infection     per EGD    Hematuria 2018    Has a follow-up with Urologist     Hiatal hernia     History of blood transfusion     History of DVT (deep vein thrombosis) 2012    Hx of blood clots     Hypertension     Internal hemorrhoids     Narcolepsy 2012    Osteoporosis        Past Surgical History:   Procedure Laterality Date    ANKLE SURGERY Left 2018    mass excision    ARM SURGERY Right     Pt states she has had 13 right arm surgeries    BREAST ENHANCEMENT SURGERY Bilateral      SECTION      x 3    COLONOSCOPY  2013    sigmoid diverticula, prominent large internal hemorrhoid    COLONOSCOPY N/A 2/10/2020    COLONOSCOPY DIAGNOSTIC performed by Lonell Simmonds, MD at [facility] 2020    COLORECTAL CANCER SCREENING, NOT HIGH RISK performed by Alanna Muro MD at [facility], COLON, DIAGNOSTIC      ESOPHAGEAL MOTILITY STUDY N/A 2020    PES RN - ESOPHAGEAL MOTILITY STUDY performed by Regan Juarez DO at [facility]    ESOPHAGEAL MOTILITY STUDY  9/10/2020    ESOPHAGEAL MOTILITY STUDY performed by Regan Juarez DO at [facility]    ESOPHAGEAL MOTILITY STUDY N/A 12/18/2020    ESOPHAGEAL MOTILITY STUDY ATTEMPTED PER DR. Alecia Lisa WITHOUT SUCCESS performed by Regan Juarez DO at [facility]    FOOT SURGERY Left 2015    FOOT TENDON SURGERY Left 02/22/2019    LEFT PERONEAL TENDON REPAIR WITH POSSIBLE LEFT TENDON TRANSFER (Left )    KNEE ARTHROSCOPY Left     Two surgeries on left knee per pt    KNEE SURGERY Right     Three surgeries per pt    LIGAMENT REPAIR Left 2/22/2019    LEFT PERONEAL TENDON REPAIR performed by Kamron Bess DPM at [facility] Bilateral 06/04/2021     BILATERAL L4/5, L5/S1 LUMBAR FACET (Bilateral )    NOSE SURGERY      PAIN MANAGEMENT PROCEDURE Bilateral 6/4/2021    BILATERAL L4/5, L5/S1 LUMBAR FACET performed by Mariluz Solis MD at [facility]  06/11/2021    BILATERAL L4/5, L5/S1 NERVE BLOCK - Bilateral    PAIN MANAGEMENT PROCEDURE Bilateral 6/11/2021    BILATERAL L4/5, L5/S1 NERVE BLOCK performed by Mariluz Solis MD at [address] OFFICE/OUTPT [facility] Left 6/29/2018    RESECTION/REMOVAL BONY NEOPLASM LEFT FIBULA, LEFT SOFT TISSUE MASS EXCISION WITH BONE BIOPSY LEFT ANKLE FIBULA performed by Kamron Bess DPM at [address] Left     UPPER GASTROINTESTINAL ENDOSCOPY  5/23/2013    tertiary contractures esophagus, 3 to 4 cm hiatal hernia, antral gastritis, + H Pylori, mild active chronic esophagitis    UPPER GASTROINTESTINAL ENDOSCOPY  4/6/2016    one biopsy    UPPER GASTROINTESTINAL ENDOSCOPY N/A 2/10/2020    EGD BIOPSY performed by Jimmy Duran MD at [facility]         Allergies   Allergen Reactions    Latex Itching    Prozac [Fluoxetine Hcl]     Sulfa Antibiotics      migraine    Codeine Itching         Current Outpatient Medications:     vitamin E 400 UNIT capsule, Take 400 Units by mouth daily, Disp: , Rfl:     oxyCODONE-acetaminophen (PERCOCET) 5-325 MG per tablet, Take 1 tablet by mouth every 8 hours as needed for Pain for up to 30 days. , Disp: 90 tablet, Rfl: 0    pantoprazole (PROTONIX) 40 MG tablet, Take 1 tablet by mouth daily, Disp: 90 tablet, Rfl: 1    alendronate (FOSAMAX) 70 MG tablet, TAKE 1 TABLET BY MOUTH ONCE WEEKLY BEFORE BREAKFAST, ON AN EMPTY STOMACH: REMAIN UPRIGHT FOR 30 MINUTES:TAKE WITH 8 OUNCES OF WATER, Disp: 12 tablet, Rfl: 10    buPROPion HCl ER, XL, 450 MG TB24, Take 450 mg by mouth every morning, Disp: 30 tablet, Rfl: 2    propranolol (INDERAL LA) 80 MG extended release capsule, Take 1 capsule by mouth daily, Disp: 90 capsule, Rfl: 1    albuterol sulfate HFA (VENTOLIN HFA) 108 (90 Base) MCG/ACT inhaler, INHALE TWO PUFFS BY MOUTH EVERY 4 HOURS AS NEEDED FOR WHEEZING, Disp: 1 Inhaler, Rfl: 5    citalopram (CELEXA) 40 MG tablet, TAKE ONE TABLET BY MOUTH DAILY, Disp: 90 tablet, Rfl: 3    Calcium Carbonate (CALCIUM 600 PO), Take 600 mg by mouth daily, Disp: , Rfl:     amphetamine-dextroamphetamine (ADDERALL XR) 30 MG extended release capsule, Take 2 capsules by mouth daily for 30 days.  (Patient not taking: Reported on 6/16/2021), Disp: 60 capsule, Rfl: 0    albuterol (PROVENTIL) (2.5 MG/3ML) 0.083% nebulizer solution, Take 2.5 mg by nebulization every 6 hours as needed for Wheezing or Shortness of Breath (Patient not taking: Reported on 7/15/2021), Disp: , Rfl:     Family History   Problem Relation Age of Onset    Cancer Mother     Hypertension Mother     Stroke Mother     Cancer Maternal Aunt     Hypertension Father     Heart Surgery Father     Diabetes Neg Hx        Social History     Socioeconomic History    Marital status: Single     Spouse name: Not on file    Number of children: Not on file    Years of education: Not on file    Highest education level: Not on file   Occupational History    Not on file   Tobacco Use    Smoking status: Former Smoker     Packs/day: 1.00     Years: 20.00     Pack years: 20.00     Quit date:      Years since quittin.5    Smokeless tobacco: Never Used   Vaping Use    Vaping Use: Never used   Substance and Sexual Activity    Alcohol use: Yes     Comment: rare    Drug use: No    Sexual activity: Not Currently   Other Topics Concern    Not on file   Social History Narrative    Not on file     Social Determinants of Health     Financial Resource Strain: Low Risk     Difficulty of Paying Living Expenses: Not hard at all   Food Insecurity: No Food Insecurity    Worried About [address] in the Last Year: Never true    [address] in the Last Year: Never true   Transportation Needs:     Lack of Transportation (Medical):  Lack of Transportation (Non-Medical):    Physical Activity:     Days of Exercise per Week:     Minutes of Exercise per Session:    Stress:     Feeling of Stress :    Social Connections:     Frequency of Communication with Friends and Family:     Frequency of Social Gatherings with Friends and Family:     Attends [employer] Services:     Active Member of Clubs or Organizations:     Attends Club or Organization Meetings:     Marital Status:    Intimate Partner Violence:     Fear of Current or Ex-Partner:     Emotionally Abused:     Physically Abused:     Sexually Abused:        Review of Systems:  Review of Systems   Constitutional: Negative for chills and fever. Cardiovascular: Negative for chest pain and palpitations. Respiratory: Negative for cough and shortness of breath. Musculoskeletal: Positive for back pain. Gastrointestinal: Negative for bowel incontinence and constipation. Genitourinary: Negative for bladder incontinence and dysuria. Neurological: Positive for headaches, numbness, tingling and weakness.  Negative for disturbances in coordination and loss of balance. Physical Exam:  Ht 5' (1.524 m)   BMI 27.97 kg/m²     Physical Exam  HENT:      Head: Normocephalic. Pulmonary:      Effort: Pulmonary effort is normal.   Musculoskeletal:         General: Normal range of motion. Cervical back: Normal range of motion. Thoracic back: Tenderness present. Lumbar back: Tenderness present. Skin:     General: Skin is warm and dry. Neurological:      Mental Status: She is alert and oriented to person, place, and time. Record/Diagnostics Review:    As above, I did review the imaging    Problem List Items Addressed This Visit     Encounter for long-term opiate analgesic use (Chronic)    Cervical radiculopathy    Lumbosacral spondylosis without myelopathy    Relevant Medications    oxyCODONE-acetaminophen (PERCOCET) 5-325 MG per tablet (Start on 7/18/2021)    Chronic bilateral low back pain with bilateral sciatica - Primary    Relevant Medications    oxyCODONE-acetaminophen (PERCOCET) 5-325 MG per tablet (Start on 7/18/2021)            Treatment Plan    Patient relates current medications are helping the pain. Patient reports taking pain medications as prescribed, denies obtaining medications from different sources and denies use of illegal drugs. Patient denies side effects from medications like nausea, vomiting, constipation or drowsiness. Patient reports current activities of daily living are possible due to medications and would like to continue them.     As always, we encourage daily stretching and strengthening exercises, and recommend minimizing use of pain medications unless patient cannot get through daily activities due to pain.     Contract requirements met. Continue opioid therapy.  Script written for percocet  Significant benefit following lumbar MBB  Scheduled for RFA later this month  Start flexeril 10 mg HS PRN  Follow up appointment made for 4 weeks

## 2021-07-19 ENCOUNTER — TELEPHONE (OUTPATIENT)
Dept: FAMILY MEDICINE CLINIC | Age: 61
End: 2021-07-19

## 2021-07-19 ENCOUNTER — HOSPITAL ENCOUNTER (OUTPATIENT)
Dept: LAB | Age: 61
Setting detail: SPECIMEN
Discharge: HOME | End: 2021-07-19
Payer: MEDICARE

## 2021-07-19 DIAGNOSIS — Z01.818 PREOP TESTING: Primary | ICD-10-CM

## 2021-07-19 PROCEDURE — U0005 INFEC AGEN DETEC AMPLI PROBE: HCPCS

## 2021-07-19 PROCEDURE — U0003 INFECTIOUS AGENT DETECTION BY NUCLEIC ACID (DNA OR RNA); SEVERE ACUTE RESPIRATORY SYNDROME CORONAVIRUS 2 (SARS-COV-2) (CORONAVIRUS DISEASE [COVID-19]), AMPLIFIED PROBE TECHNIQUE, MAKING USE OF HIGH THROUGHPUT TECHNOLOGIES AS DESCRIBED BY CMS-2020-01-R: HCPCS

## 2021-07-19 NOTE — TELEPHONE ENCOUNTER
Pt got her first covid vaccine 2 weeks ago. She says it has been 2 weeks, and she still can't keep her eyes open. She had the pfizer vaccine. She wants to know if this is normal, and should she skip the second one?  Please advise

## 2021-07-19 NOTE — TELEPHONE ENCOUNTER
That is not normal but unlikely from the vaccine. I think she should be fine to get the second vaccine.  She can schedule an appointment later this week if she would like an evaluation

## 2021-07-20 LAB
SARS-COV-2: NORMAL
SARS-COV-2: NOT DETECTED
SOURCE: NORMAL

## 2021-07-21 ENCOUNTER — ANESTHESIA EVENT (OUTPATIENT)
Dept: OPERATING ROOM | Age: 61
End: 2021-07-21
Payer: MEDICARE

## 2021-07-23 ENCOUNTER — HOSPITAL ENCOUNTER (OUTPATIENT)
Age: 61
Setting detail: OUTPATIENT SURGERY
Discharge: HOME | End: 2021-07-23
Attending: PAIN MEDICINE | Admitting: PAIN MEDICINE
Payer: MEDICARE

## 2021-07-23 ENCOUNTER — APPOINTMENT (OUTPATIENT)
Dept: GENERAL RADIOLOGY | Age: 61
End: 2021-07-23
Attending: PAIN MEDICINE
Payer: MEDICARE

## 2021-07-23 ENCOUNTER — ANESTHESIA (OUTPATIENT)
Dept: OPERATING ROOM | Age: 61
End: 2021-07-23
Payer: MEDICARE

## 2021-07-23 VITALS
OXYGEN SATURATION: 95 % | RESPIRATION RATE: 18 BRPM | TEMPERATURE: 97.6 F | WEIGHT: 143.2 LBS | HEIGHT: 62 IN | SYSTOLIC BLOOD PRESSURE: 116 MMHG | HEART RATE: 64 BPM | BODY MASS INDEX: 26.35 KG/M2 | DIASTOLIC BLOOD PRESSURE: 85 MMHG

## 2021-07-23 VITALS
OXYGEN SATURATION: 100 % | SYSTOLIC BLOOD PRESSURE: 112 MMHG | DIASTOLIC BLOOD PRESSURE: 79 MMHG | TEMPERATURE: 96.8 F | RESPIRATION RATE: 13 BRPM

## 2021-07-23 LAB — GLUCOSE BLD-MCNC: 120 MG/DL (ref 65–105)

## 2021-07-23 PROCEDURE — 82947 ASSAY GLUCOSE BLOOD QUANT: CPT

## 2021-07-23 PROCEDURE — 7100000011 HC PHASE II RECOVERY - ADDTL 15 MIN: Performed by: PAIN MEDICINE

## 2021-07-23 PROCEDURE — 6360000002 HC RX W HCPCS: Performed by: ANESTHESIOLOGY

## 2021-07-23 PROCEDURE — 64636 DESTROY L/S FACET JNT ADDL: CPT | Performed by: PAIN MEDICINE

## 2021-07-23 PROCEDURE — 3600000012 HC SURGERY LEVEL 2 ADDTL 15MIN: Performed by: PAIN MEDICINE

## 2021-07-23 PROCEDURE — 3700000000 HC ANESTHESIA ATTENDED CARE: Performed by: PAIN MEDICINE

## 2021-07-23 PROCEDURE — 3209999900 FLUORO FOR SURGICAL PROCEDURES

## 2021-07-23 PROCEDURE — 2709999900 HC NON-CHARGEABLE SUPPLY: Performed by: PAIN MEDICINE

## 2021-07-23 PROCEDURE — 64635 DESTROY LUMB/SAC FACET JNT: CPT | Performed by: PAIN MEDICINE

## 2021-07-23 PROCEDURE — 3700000001 HC ADD 15 MINUTES (ANESTHESIA): Performed by: PAIN MEDICINE

## 2021-07-23 PROCEDURE — 3600000002 HC SURGERY LEVEL 2 BASE: Performed by: PAIN MEDICINE

## 2021-07-23 PROCEDURE — 2500000003 HC RX 250 WO HCPCS: Performed by: PAIN MEDICINE

## 2021-07-23 PROCEDURE — 7100000010 HC PHASE II RECOVERY - FIRST 15 MIN: Performed by: PAIN MEDICINE

## 2021-07-23 RX ORDER — DIPHENHYDRAMINE HYDROCHLORIDE 50 MG/ML
12.5 INJECTION INTRAMUSCULAR; INTRAVENOUS
Status: DISCONTINUED | OUTPATIENT
Start: 2021-07-23 | End: 2021-07-23 | Stop reason: HOSPADM

## 2021-07-23 RX ORDER — MIDAZOLAM HYDROCHLORIDE 1 MG/ML
INJECTION INTRAMUSCULAR; INTRAVENOUS PRN
Status: DISCONTINUED | OUTPATIENT
Start: 2021-07-23 | End: 2021-07-23 | Stop reason: SDUPTHER

## 2021-07-23 RX ORDER — BUPIVACAINE HYDROCHLORIDE 2.5 MG/ML
INJECTION, SOLUTION EPIDURAL; INFILTRATION; INTRACAUDAL PRN
Status: DISCONTINUED | OUTPATIENT
Start: 2021-07-23 | End: 2021-07-23 | Stop reason: ALTCHOICE

## 2021-07-23 RX ORDER — MEPERIDINE HYDROCHLORIDE 50 MG/ML
12.5 INJECTION INTRAMUSCULAR; INTRAVENOUS; SUBCUTANEOUS EVERY 5 MIN PRN
Status: DISCONTINUED | OUTPATIENT
Start: 2021-07-23 | End: 2021-07-23 | Stop reason: HOSPADM

## 2021-07-23 RX ORDER — HYDRALAZINE HYDROCHLORIDE 20 MG/ML
5 INJECTION INTRAMUSCULAR; INTRAVENOUS EVERY 10 MIN PRN
Status: DISCONTINUED | OUTPATIENT
Start: 2021-07-23 | End: 2021-07-23 | Stop reason: HOSPADM

## 2021-07-23 RX ORDER — SODIUM CHLORIDE 9 MG/ML
25 INJECTION, SOLUTION INTRAVENOUS PRN
Status: DISCONTINUED | OUTPATIENT
Start: 2021-07-23 | End: 2021-07-23 | Stop reason: HOSPADM

## 2021-07-23 RX ORDER — PROMETHAZINE HYDROCHLORIDE 25 MG/ML
6.25 INJECTION, SOLUTION INTRAMUSCULAR; INTRAVENOUS
Status: DISCONTINUED | OUTPATIENT
Start: 2021-07-23 | End: 2021-07-23 | Stop reason: HOSPADM

## 2021-07-23 RX ORDER — FENTANYL CITRATE 50 UG/ML
25 INJECTION, SOLUTION INTRAMUSCULAR; INTRAVENOUS EVERY 5 MIN PRN
Status: DISCONTINUED | OUTPATIENT
Start: 2021-07-23 | End: 2021-07-23 | Stop reason: HOSPADM

## 2021-07-23 RX ORDER — FENTANYL CITRATE 50 UG/ML
INJECTION, SOLUTION INTRAMUSCULAR; INTRAVENOUS PRN
Status: DISCONTINUED | OUTPATIENT
Start: 2021-07-23 | End: 2021-07-23 | Stop reason: SDUPTHER

## 2021-07-23 RX ORDER — SODIUM CHLORIDE 0.9 % (FLUSH) 0.9 %
10 SYRINGE (ML) INJECTION PRN
Status: DISCONTINUED | OUTPATIENT
Start: 2021-07-23 | End: 2021-07-23 | Stop reason: HOSPADM

## 2021-07-23 RX ORDER — SODIUM CHLORIDE 0.9 % (FLUSH) 0.9 %
10 SYRINGE (ML) INJECTION EVERY 12 HOURS SCHEDULED
Status: DISCONTINUED | OUTPATIENT
Start: 2021-07-23 | End: 2021-07-23 | Stop reason: HOSPADM

## 2021-07-23 RX ORDER — ONDANSETRON 2 MG/ML
4 INJECTION INTRAMUSCULAR; INTRAVENOUS
Status: DISCONTINUED | OUTPATIENT
Start: 2021-07-23 | End: 2021-07-23 | Stop reason: HOSPADM

## 2021-07-23 RX ADMIN — MIDAZOLAM HYDROCHLORIDE 2 MG: 1 INJECTION, SOLUTION INTRAMUSCULAR; INTRAVENOUS at 11:39

## 2021-07-23 RX ADMIN — FENTANYL CITRATE 100 MCG: 50 INJECTION, SOLUTION INTRAMUSCULAR; INTRAVENOUS at 11:39

## 2021-07-23 ASSESSMENT — PULMONARY FUNCTION TESTS
PIF_VALUE: 1
PIF_VALUE: 3
PIF_VALUE: 1
PIF_VALUE: 2
PIF_VALUE: 1
PIF_VALUE: 1

## 2021-07-23 ASSESSMENT — PAIN DESCRIPTION - ORIENTATION
ORIENTATION: LOWER
ORIENTATION: LOWER

## 2021-07-23 ASSESSMENT — PAIN - FUNCTIONAL ASSESSMENT
PAIN_FUNCTIONAL_ASSESSMENT: PREVENTS OR INTERFERES WITH MANY ACTIVE NOT PASSIVE ACTIVITIES
PAIN_FUNCTIONAL_ASSESSMENT: 0-10

## 2021-07-23 ASSESSMENT — PAIN DESCRIPTION - LOCATION
LOCATION: BACK

## 2021-07-23 ASSESSMENT — PAIN DESCRIPTION - PAIN TYPE
TYPE: CHRONIC PAIN

## 2021-07-23 ASSESSMENT — PAIN DESCRIPTION - DESCRIPTORS: DESCRIPTORS: SHARP;ACHING

## 2021-07-23 ASSESSMENT — PAIN SCALES - GENERAL
PAINLEVEL_OUTOF10: 4
PAINLEVEL_OUTOF10: 2
PAINLEVEL_OUTOF10: 4

## 2021-07-23 NOTE — H&P
Pain Pre-Op H&P Note    Yovanny Phipps MD    HPI: Charlie Rm  presents with back pain. Past Medical History:   Diagnosis Date    Acid reflux     Adult ADHD 2012    Asthma 2012    Bipolar 1 disorder ([facility] [place] [address]     Broken foot     Left foot    Chest pain of uncertain etiology 9611    Last episode was in  (Written 2019)    Depression     Under control per pt.  on 6/15/18    DVT of leg (deep venous thrombosis) ([address] 2014    Fracture     right foot / pt denies surgical intervention    Helicobacter pylori (H. pylori) infection     per EGD    Hematuria 2018    Has a follow-up with Urologist     Hiatal hernia     History of blood transfusion     History of DVT (deep vein thrombosis) 2012    Hx of blood clots     Hypertension     Internal hemorrhoids     Narcolepsy 2012    Osteoporosis        Past Surgical History:   Procedure Laterality Date    ANKLE SURGERY Left 2018    mass excision    ARM SURGERY Right     Pt states she has had 13 right arm surgeries    BREAST ENHANCEMENT SURGERY Bilateral      SECTION      x 3    COLONOSCOPY  2013    sigmoid diverticula, prominent large internal hemorrhoid    COLONOSCOPY N/A 2/10/2020    COLONOSCOPY DIAGNOSTIC performed by Юлия Larkin MD at [address] [facility] N/A 2020    COLORECTAL CANCER SCREENING, NOT HIGH RISK performed by Shaheen Garcia MD at [address], COLON, DIAGNOSTIC      ESOPHAGEAL MOTILITY STUDY N/A 2020    PES RN - ESOPHAGEAL MOTILITY STUDY performed by Rukhsana Dockery DO at [address] STUDY  9/10/2020    ESOPHAGEAL MOTILITY STUDY performed by Rukhsana Dockery DO at [facility]    ESOPHAGEAL MOTILITY STUDY N/A 2020    ESOPHAGEAL MOTILITY STUDY ATTEMPTED PER DR. Bella Machuca WITHOUT SUCCESS performed by Rukhsana Dockery DO at [facility]    FOOT SURGERY Left 2015    FOOT TENDON SURGERY Left 02/22/2019    LEFT PERONEAL TENDON REPAIR WITH POSSIBLE LEFT TENDON TRANSFER (Left )    KNEE ARTHROSCOPY Left     Two surgeries on left knee per pt    KNEE SURGERY Right     Three surgeries per pt    LIGAMENT REPAIR Left 2/22/2019    LEFT PERONEAL TENDON REPAIR performed by Cornelia Machuca DPM at [facility] Bilateral 06/04/2021     BILATERAL L4/5, L5/S1 LUMBAR FACET (Bilateral )    NOSE SURGERY      PAIN MANAGEMENT PROCEDURE Bilateral 6/4/2021    BILATERAL L4/5, L5/S1 LUMBAR FACET performed by Yovanny Phipps MD at [facility]  06/11/2021    BILATERAL L4/5, L5/S1 NERVE BLOCK - Bilateral    PAIN MANAGEMENT PROCEDURE Bilateral 6/11/2021    BILATERAL L4/5, L5/S1 NERVE BLOCK performed by Yovanny Phipps MD at [address] [facility] OFFICE/OUTPT [address] [facility] Left 6/29/2018    RESECTION/REMOVAL BONY NEOPLASM LEFT FIBULA, LEFT SOFT TISSUE MASS EXCISION WITH BONE BIOPSY LEFT ANKLE FIBULA performed by Cornelia Machuca DPM at [address] [facility] Left     UPPER GASTROINTESTINAL ENDOSCOPY  5/23/2013    tertiary contractures esophagus, 3 to 4 cm hiatal hernia, antral gastritis, + H Pylori, mild active chronic esophagitis    UPPER GASTROINTESTINAL ENDOSCOPY  4/6/2016    one biopsy    UPPER GASTROINTESTINAL ENDOSCOPY N/A 2/10/2020    EGD BIOPSY performed by Юлия Larkin MD at [address]         Family History   Problem Relation Age of Onset    Cancer Mother     Hypertension Mother     Stroke Mother     Cancer Maternal Aunt     Hypertension Father     Heart Surgery Father     Diabetes Neg Hx        Allergies   Allergen Reactions    Latex Itching    Prozac [Fluoxetine Hcl]     Sulfa Antibiotics      migraine    Codeine Itching         Current Facility-Administered Medications:     sodium chloride flush 0.9 % injection 10 mL, 10 mL, Intravenous, 2 times per day, Terrence Oseguera MD    sodium chloride flush 0.9 % injection 10 mL, 10 mL, Intravenous, PRN, Tanya Rizo MD    0.9 % sodium chloride infusion, 25 mL, Intravenous, PRN, Tanya Rizo MD    meperidine (DEMEROL) injection 12.5 mg, 12.5 mg, Intravenous, Q5 Min PRN, Tanya Rizo MD    fentaNYL (SUBLIMAZE) injection 25 mcg, 25 mcg, Intravenous, Q5 Min PRN, Tanya Rizo MD    ondansetron [facility]) injection 4 mg, 4 mg, Intravenous, Once PRN, Tanya Rizo MD    [facility]) injection 6.25 mg, 6.25 mg, Intramuscular, Once PRN, Tanya Rizo MD    diphenhydrAMINE (BENADRYL) injection 12.5 mg, 12.5 mg, Intravenous, Once PRN, Tanya Rizo MD    hydrALAZINE (APRESOLINE) injection 5 mg, 5 mg, Intravenous, Q10 Min PRN, Tanya Rizo MD    Social History     Tobacco Use    Smoking status: Former Smoker     Packs/day: 1.00     Years: 20.00     Pack years: 20.00     Quit date:      Years since quittin.5    Smokeless tobacco: Never Used   Substance Use Topics    Alcohol use: Yes     Comment: rare       Review of Systems:   Focused review of systems was performed, and negative as pertinent to diagnosis, except as stated in HPI. Physical Exam  Constitutional:       Appearance: Normal appearance. Pulmonary:      Effort: Pulmonary effort is normal.   Neurological:      Mental Status: He is alert. Psychiatric:         Attention and Perception: Attention and perception normal.         Mood and Affect: Mood and affect normal.         Patient's current physical status, medications, medical history, and HPI have been reviewed and updated as appropriate on this date: 21    Risk/Benefit(s): The risks, benefits, alternatives, and potential complications have been discussed with the patient/family and informed consent has been obtained for the procedure/sedation.     Diagnosis:   M47.817 LUMBOSACRAL SPONDYLOSIS WITHOUT MYELOPATHY  M54.5,G89.29 CHRONIC BILATERAL LOW BACK PAIN, UNSPECIFIED WHETHER SCIATICA PRESENT      Plan: lumbar rfa        Bob Mascorro MD

## 2021-07-23 NOTE — ANESTHESIA PRE PROCEDURE
Department of Anesthesiology  Preprocedure Note       Name:  Selena Kang   Age:  57 y.o.  :  1960                                          MRN:  [id number]         Date:  2021      Surgeon: Eryn Eduardo):  Loraine Nash MD    Procedure: Procedure(s):  BILAT L4/5, L5/S1 NERVE RADIOFREQUENCY ABLATION    Medications prior to admission:   Prior to Admission medications    Medication Sig Start Date End Date Taking? Authorizing Provider   vitamin E 400 UNIT capsule Take 400 Units by mouth daily   Yes Historical Provider, MD   oxyCODONE-acetaminophen (PERCOCET) 5-325 MG per tablet Take 1 tablet by mouth every 8 hours as needed for Pain for up to 30 days. 21 Yes PIETER Browning CNP   cyclobenzaprine (FLEXERIL) 10 MG tablet Take 1 tablet by mouth nightly as needed for Muscle spasms 7/15/21 8/14/21 Yes PIETER Browning CNP   pantoprazole (PROTONIX) 40 MG tablet Take 1 tablet by mouth daily 21  Yes Terese Cross,    buPROPion HCl ER, XL, 450 MG TB24 Take 450 mg by mouth every morning 3/17/21  Yes Terese Cross DO   amphetamine-dextroamphetamine (ADDERALL XR) 30 MG extended release capsule Take 2 capsules by mouth daily for 30 days.  3/11/21 7/23/21 Yes Terese Cross DO   propranolol (INDERAL LA) 80 MG extended release capsule Take 1 capsule by mouth daily 21  Yes Terese Cross DO   albuterol sulfate HFA (VENTOLIN HFA) 108 (90 Base) MCG/ACT inhaler INHALE TWO PUFFS BY MOUTH EVERY 4 HOURS AS NEEDED FOR WHEEZING 10/28/20  Yes Ankit Walker MD   citalopram (CELEXA) 40 MG tablet TAKE ONE TABLET BY MOUTH DAILY 20  Yes Ankit Walker MD   Calcium Carbonate (CALCIUM 600 PO) Take 600 mg by mouth daily   Yes Historical Provider, MD   alendronate (FOSAMAX) 70 MG tablet TAKE 1 TABLET BY MOUTH ONCE WEEKLY BEFORE BREAKFAST, ON AN EMPTY STOMACH: REMAIN UPRIGHT FOR 30 MINUTES:TAKE WITH 8 OUNCES OF WATER 3/18/21   Terese Cross,  albuterol (PROVENTIL) (2.5 MG/3ML) 0.083% nebulizer solution Take 2.5 mg by nebulization every 6 hours as needed for Wheezing or Shortness of Breath  Patient not taking: Reported on 7/15/2021    Historical Provider, MD       Current medications:    Current Facility-Administered Medications   Medication Dose Route Frequency Provider Last Rate Last Admin    sodium chloride flush 0.9 % injection 10 mL  10 mL Intravenous 2 times per day Nica Aguilar MD        sodium chloride flush 0.9 % injection 10 mL  10 mL Intravenous PRN Nica Aguilar MD        0.9 % sodium chloride infusion  25 mL Intravenous PRN Nica Aguilar MD           Allergies: Allergies   Allergen Reactions    Latex Itching    Prozac [Fluoxetine Hcl]     Sulfa Antibiotics      migraine    Codeine Itching       Problem List:    Patient Active Problem List   Diagnosis Code    Attention deficit hyperactivity disorder (ADHD) F90.9    Narcolepsy G47.419    Moderate persistent asthma without complication G88.73    History of DVT (deep vein thrombosis) Z86.718    Hiatal hernia K44.9    Dysphagia R13.10    Cervical radiculopathy M54.12    Ganglion cyst M67.40    Chronic pain of left ankle M25.572, G89.29    Acquired trigger finger M65.30    Osteoarthritis of knee M17.10    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 [place] , Y92.009    Closed fracture of multiple ribs of right side S22.41XA    Hemothorax on right J94.2    Contusion of right lung S27.321A    Chronic fatigue syndrome R53.82    Sleep apnea G47.30    Pain in joint involving ankle and foot M25.579    Lumbosacral spondylosis without myelopathy M47.817    Chronic bilateral low back pain with bilateral sciatica M54.42, M54.41, G89.29    Narcotic bowel syndrome ([facility] [address] K63.89, T40.601A    Encounter for long-term opiate analgesic use Z79.891       Past Medical History:        Diagnosis Date    Acid reflux     Adult ADHD 2012    Asthma 2012    Bipolar 1 disorder ([facility] [address]     Broken foot     Left foot    Chest pain of uncertain etiology     Last episode was in  (Written 2019)    Depression     Under control per pt.  on 6/15/18    DVT of leg (deep venous thrombosis) ([facility] [address] [facility] 2014    Fracture     right foot / pt denies surgical intervention    Helicobacter pylori (H. pylori) infection     per EGD    Hematuria 2018    Has a follow-up with Urologist     Hiatal hernia     History of blood transfusion     History of DVT (deep vein thrombosis) 2012    Hx of blood clots     Hypertension     Internal hemorrhoids     Narcolepsy 2012    Osteoporosis        Past Surgical History:        Procedure Laterality Date    ANKLE SURGERY Left 2018    mass excision    ARM SURGERY Right     Pt states she has had 13 right arm surgeries    BREAST ENHANCEMENT SURGERY Bilateral      SECTION      x 3    COLONOSCOPY  2013    sigmoid diverticula, prominent large internal hemorrhoid    COLONOSCOPY N/A 2/10/2020    COLONOSCOPY DIAGNOSTIC performed by Dori Varma MD at [address] [facility] [address] N/A 2020    COLORECTAL CANCER SCREENING, NOT HIGH RISK performed by Luanna Hamman, MD at [address], COLON, DIAGNOSTIC      ESOPHAGEAL MOTILITY STUDY N/A 2020    PES RN - ESOPHAGEAL MOTILITY STUDY performed by Kera Hagan DO at [address] [facility] [address] [facility] [address] STUDY  9/10/2020    ESOPHAGEAL MOTILITY STUDY performed by Kera Hagan DO at [facility]    ESOPHAGEAL MOTILITY STUDY N/A 2020    ESOPHAGEAL MOTILITY STUDY ATTEMPTED PER DR. Raad Alvarez WITHOUT SUCCESS performed by Alfreda Rutledge DO at [facility]    FOOT SURGERY Left     FOOT TENDON SURGERY Left 2019    LEFT PERONEAL TENDON REPAIR WITH POSSIBLE LEFT TENDON TRANSFER (Left )    KNEE ARTHROSCOPY Left     Two surgeries on left knee per pt    KNEE SURGERY Right     Three surgeries per pt    LIGAMENT REPAIR Left 2019    LEFT PERONEAL TENDON REPAIR performed by Shaw Gerardo DPM at [address] Bilateral 2021     BILATERAL L4/5, L5/S1 LUMBAR FACET (Bilateral )    NOSE SURGERY      PAIN MANAGEMENT PROCEDURE Bilateral 2021    BILATERAL L4/5, L5/S1 LUMBAR FACET performed by Anne Marie Pina MD at [address] [facility]  2021    BILATERAL L4/5, L5/S1 NERVE BLOCK - Bilateral    PAIN MANAGEMENT PROCEDURE Bilateral 2021    BILATERAL L4/5, L5/S1 NERVE BLOCK performed by Anne Marie Pina MD at [address] [facility] OFFICE/OUTPT [facility] Left 2018    RESECTION/REMOVAL BONY NEOPLASM LEFT FIBULA, LEFT SOFT TISSUE MASS EXCISION WITH BONE BIOPSY LEFT ANKLE FIBULA performed by Shaw Gerardo DPM at [address] Left     UPPER GASTROINTESTINAL ENDOSCOPY  2013    tertiary contractures esophagus, 3 to 4 cm hiatal hernia, antral gastritis, + H Pylori, mild active chronic esophagitis    UPPER GASTROINTESTINAL ENDOSCOPY  2016    one biopsy    UPPER GASTROINTESTINAL ENDOSCOPY N/A 2/10/2020    EGD BIOPSY performed by Arash Mauro MD at [address]         Social History:    Social History     Tobacco Use    Smoking status: Former Smoker     Packs/day: 1.00     Years: 20.00     Pack years: 20.00     Quit date:      Years since quittin.5    Smokeless tobacco: Never Used   Substance Use Topics    Alcohol use: Yes     Comment: rare Counseling given: Not Answered      Vital Signs (Current): There were no vitals filed for this visit. BP Readings from Last 3 Encounters:   07/15/21 (!) 133/94   06/17/21 120/80   06/16/21 115/89       NPO Status: Time of last liquid consumption: 2330                        Time of last solid consumption: 2330                        Date of last liquid consumption: 07/22/21                        Date of last solid food consumption: 07/22/21    BMI:   Wt Readings from Last 3 Encounters:   06/17/21 143 lb 3.2 oz (65 kg)   06/16/21 143 lb (64.9 kg)   06/11/21 144 lb (65.3 kg)     There is no height or weight on file to calculate BMI.    CBC:   Lab Results   Component Value Date    WBC 4.5 04/15/2021    RBC 4.30 04/15/2021    HGB 13.3 04/15/2021    HCT 41.9 04/15/2021    MCV 97.4 04/15/2021    RDW 13.5 04/15/2021     04/15/2021       CMP:   Lab Results   Component Value Date     01/14/2021    K 3.7 01/14/2021     01/14/2021    CO2 24 01/14/2021    BUN 9 01/14/2021    CREATININE 1.07 01/14/2021    GFRAA >60 01/14/2021    LABGLOM 52 01/14/2021    GLUCOSE 95 01/14/2021    PROT 6.3 10/28/2020    CALCIUM 9.0 01/14/2021    BILITOT 0.12 10/28/2020    ALKPHOS 89 10/28/2020    AST 16 10/28/2020    ALT 11 10/28/2020       POC Tests: No results for input(s): POCGLU, POCNA, POCK, POCCL, POCBUN, POCHEMO, POCHCT in the last 72 hours.     Coags:   Lab Results   Component Value Date    PROTIME 9.4 01/13/2021    INR 0.9 01/13/2021    APTT 23.4 01/13/2021       HCG (If Applicable): No results found for: PREGTESTUR, PREGSERUM, HCG, HCGQUANT     ABGs: No results found for: PHART, PO2ART, TZD5EGR, MUH2NEF, BEART, N8SMZABU     Type & Screen (If Applicable):  No results found for: LABABO, LABRH    Drug/Infectious Status (If Applicable):  Lab Results   Component Value Date    HEPCAB NONREACTIVE 04/27/2018       COVID-19 Screening (If Applicable):   Lab Results Component Value Date    COVID19 Not Detected 07/19/2021    COVID19 Not Detected 09/25/2020    COVID19 Not Detected 07/02/2020           Anesthesia Evaluation  Patient summary reviewed and Nursing notes reviewed no history of anesthetic complications:   Airway: Mallampati: II  TM distance: >3 FB   Neck ROM: full  Mouth opening: > = 3 FB Dental: normal exam         Pulmonary:normal exam  breath sounds clear to auscultation  (+) sleep apnea:  asthma:                            Cardiovascular:    (+) hypertension: no interval change,         Rhythm: regular  Rate: normal                    Neuro/Psych:   (+) neuromuscular disease:, psychiatric history:             ROS comment: Cervical radiculopathy GI/Hepatic/Renal:   (+) hiatal hernia, GERD: no interval change,           Endo/Other:    (+) : arthritis: OA and no interval change. , . Abdominal:       Abdomen: soft. Vascular: negative vascular ROS. ROS comment: History of DVT (deep vein thrombosis. Other Findings:             Anesthesia Plan      MAC     ASA 3             Anesthetic plan and risks discussed with patient. Plan discussed with CRNA.                   Mariella Wolf MD   7/23/2021

## 2021-07-23 NOTE — ANESTHESIA POSTPROCEDURE EVALUATION
Department of Anesthesiology  Postprocedure Note    Patient: Charlie Rm  MRN: [id number]  YOB: 1960  Date of evaluation: 7/23/2021  Time:  12:12 PM     Procedure Summary     Date: 07/23/21 Room / Location: [address] [place] [address] / [facility]    Anesthesia Start: 9314 Anesthesia Stop: 5725    Procedure: BILAT L4/5, L5/S1 NERVE RADIOFREQUENCY ABLATION (Bilateral ) Diagnosis:       (M47.817 LUMBOSACRAL SPONDYLOSIS WITHOUT MYELOPATHY)      (M54.5,G89.29 CHRONIC BILATERAL LOW BACK PAIN, UNSPECIFIED WHETHER SCIATICA PRESENT)    Surgeons: Yovanny Phipps MD Responsible Provider: Howie Chu MD    Anesthesia Type: MAC ASA Status: 3          Anesthesia Type: MAC    Hong Phase I: Hong Score: 10    Hong Phase II: Hong Score: 9    Last vitals: Reviewed and per EMR flowsheets.        Anesthesia Post Evaluation    Patient location during evaluation: PACU  Patient participation: complete - patient participated  Level of consciousness: awake and alert  Airway patency: patent  Nausea & Vomiting: no nausea and no vomiting  Complications: no  Cardiovascular status: hemodynamically stable  Respiratory status: room air and spontaneous ventilation  Hydration status: euvolemic

## 2021-07-23 NOTE — OP NOTE
Lumbar Radiofrequency Ablation:  SURGEON: Edwin Cramer MD    PRE-OP DIAGNOSIS: M47.817 (lumbosacral spondylosis), M54.5 (low back pain)    POST-OP DIAGNOSIS: Same. PROCEDURE PERFORMED: Radiofrequency Ablation Medial Branch Nerve at Right L4 - 5 and L5 - S1 facet joint(s). HISTORY AND INDICATIONS: Satisfactory response with previous RFA/ medial branch blocks at the indicated levels. Recurrence of painful symptoms attributed to the above diagnosis. The planned treatment is medically necessary to relieve pain, restore function and or reduce reliance on pain medication. EBL: minimal    CONSENT: Patient has undergone the educational process with this procedure, is aware and fully understands the risks involved: potential damage to any and all body organs including possible bleeding, infection, nerve injury, paralysis, allergic reaction and headache. Patient also understands that the procedure will be undertaken in a safe, controlled and monitored setting. Patient recognizes that the benefits may include relief from pain and reduction in the oral use of medications. Patient agreed to proceed. The patient was counseled at length about the risks of lizette Covid-19 during their perioperative period and any recovery window from their procedure. The patient was made aware that lizette Covid-19  may worsen their prognosis for recovering from their procedure  and lend to a higher morbidity and/or mortality risk. All material risks, benefits, and reasonable alternatives including postponing the procedure were discussed. The patient does wish to proceed with the procedure at this time. MONITORS INSTITUTED INCLUDE but not limited to:   Pulse Oximetry  Non-invasive blood pressure monitoring    PROCEDURE NOTE: The patient was taken to the procedure room and placed prone with the appropriate padding and positioning to assure patient comfort and physician access to the procedure site.  Time out was performed prior to starting the procedure. Fluoroscopic evaluation was utilized to target the appropriate treatment areas. The skin was prepped with antiseptic solution and draped sterilely. Then 0.5% lidocaine was used to anesthetize the skin and subcutaneous tissue. Under fluoroscopic guidance a 20 gauge x 10cm x 10mm active tip was advanced to the medial branch nerve at the indicated levels below to innervate the following facet joints Right L4 - 5 and L5 - S1 . The needles were placed sequentially. Position confirmed radiographically with the fluoroscope. Active tip corresponding at the base of the superior articulating process and/or sacral ala for the lumbar RFA. Motor stimulation checked at 2hz up to 3V and confirmed negative for radicular stimulation. After confirmation of the needle placement the patient received 1 ml of 0.25% marcaine to provide anesthesia. Radiofrequency was delivered to the lumbar region at 80 degrees for a limit of 90 seconds in length with no ill effect. The patient tolerated the procedure well and was transported to the recovery room where the patient was monitored with no complications and with stable vital signs. Patient was discharged with appropriate written discharge instructions. Follow-up discussed.       Ashley Resendiz MD

## 2021-08-02 ENCOUNTER — HOSPITAL ENCOUNTER (OUTPATIENT)
Dept: LAB | Age: 61
Setting detail: SPECIMEN
Discharge: HOME | End: 2021-08-02
Payer: MEDICARE

## 2021-08-02 DIAGNOSIS — Z01.818 PREOP TESTING: Primary | ICD-10-CM

## 2021-08-02 PROCEDURE — U0003 INFECTIOUS AGENT DETECTION BY NUCLEIC ACID (DNA OR RNA); SEVERE ACUTE RESPIRATORY SYNDROME CORONAVIRUS 2 (SARS-COV-2) (CORONAVIRUS DISEASE [COVID-19]), AMPLIFIED PROBE TECHNIQUE, MAKING USE OF HIGH THROUGHPUT TECHNOLOGIES AS DESCRIBED BY CMS-2020-01-R: HCPCS

## 2021-08-02 PROCEDURE — U0005 INFEC AGEN DETEC AMPLI PROBE: HCPCS

## 2021-08-03 LAB
SARS-COV-2: NORMAL
SARS-COV-2: NOT DETECTED
SOURCE: NORMAL

## 2021-08-04 ENCOUNTER — ANESTHESIA EVENT (OUTPATIENT)
Dept: OPERATING ROOM | Age: 61
End: 2021-08-04
Payer: MEDICARE

## 2021-08-04 RX ORDER — SODIUM CHLORIDE 9 MG/ML
25 INJECTION, SOLUTION INTRAVENOUS PRN
Status: CANCELLED | OUTPATIENT
Start: 2021-08-04

## 2021-08-04 RX ORDER — SODIUM CHLORIDE 0.9 % (FLUSH) 0.9 %
10 SYRINGE (ML) INJECTION EVERY 12 HOURS SCHEDULED
Status: CANCELLED | OUTPATIENT
Start: 2021-08-04

## 2021-08-04 RX ORDER — SODIUM CHLORIDE 0.9 % (FLUSH) 0.9 %
10 SYRINGE (ML) INJECTION PRN
Status: CANCELLED | OUTPATIENT
Start: 2021-08-04

## 2021-08-06 ENCOUNTER — ANESTHESIA (OUTPATIENT)
Dept: OPERATING ROOM | Age: 61
End: 2021-08-06
Payer: MEDICARE

## 2021-08-06 ENCOUNTER — HOSPITAL ENCOUNTER (OUTPATIENT)
Age: 61
Setting detail: OUTPATIENT SURGERY
Discharge: HOME | End: 2021-08-06
Attending: PAIN MEDICINE | Admitting: PAIN MEDICINE
Payer: MEDICARE

## 2021-08-06 ENCOUNTER — APPOINTMENT (OUTPATIENT)
Dept: GENERAL RADIOLOGY | Age: 61
End: 2021-08-06
Attending: PAIN MEDICINE
Payer: MEDICARE

## 2021-08-06 VITALS
WEIGHT: 148.6 LBS | HEIGHT: 60 IN | DIASTOLIC BLOOD PRESSURE: 81 MMHG | OXYGEN SATURATION: 94 % | BODY MASS INDEX: 29.17 KG/M2 | HEART RATE: 67 BPM | SYSTOLIC BLOOD PRESSURE: 120 MMHG | TEMPERATURE: 96.1 F | RESPIRATION RATE: 20 BRPM

## 2021-08-06 VITALS — OXYGEN SATURATION: 99 % | TEMPERATURE: 96.8 F | SYSTOLIC BLOOD PRESSURE: 128 MMHG | DIASTOLIC BLOOD PRESSURE: 75 MMHG

## 2021-08-06 PROCEDURE — 64635 DESTROY LUMB/SAC FACET JNT: CPT | Performed by: PAIN MEDICINE

## 2021-08-06 PROCEDURE — 3600000012 HC SURGERY LEVEL 2 ADDTL 15MIN: Performed by: PAIN MEDICINE

## 2021-08-06 PROCEDURE — 2709999900 HC NON-CHARGEABLE SUPPLY: Performed by: PAIN MEDICINE

## 2021-08-06 PROCEDURE — 6360000002 HC RX W HCPCS: Performed by: ANESTHESIOLOGY

## 2021-08-06 PROCEDURE — 2500000003 HC RX 250 WO HCPCS: Performed by: PAIN MEDICINE

## 2021-08-06 PROCEDURE — 3700000000 HC ANESTHESIA ATTENDED CARE: Performed by: PAIN MEDICINE

## 2021-08-06 PROCEDURE — 7100000011 HC PHASE II RECOVERY - ADDTL 15 MIN: Performed by: PAIN MEDICINE

## 2021-08-06 PROCEDURE — 64636 DESTROY L/S FACET JNT ADDL: CPT | Performed by: PAIN MEDICINE

## 2021-08-06 PROCEDURE — 3700000001 HC ADD 15 MINUTES (ANESTHESIA): Performed by: PAIN MEDICINE

## 2021-08-06 PROCEDURE — 3600000002 HC SURGERY LEVEL 2 BASE: Performed by: PAIN MEDICINE

## 2021-08-06 PROCEDURE — 7100000010 HC PHASE II RECOVERY - FIRST 15 MIN: Performed by: PAIN MEDICINE

## 2021-08-06 PROCEDURE — 3209999900 FLUORO FOR SURGICAL PROCEDURES

## 2021-08-06 RX ORDER — PROMETHAZINE HYDROCHLORIDE 25 MG/ML
6.25 INJECTION, SOLUTION INTRAMUSCULAR; INTRAVENOUS
Status: DISCONTINUED | OUTPATIENT
Start: 2021-08-06 | End: 2021-08-06 | Stop reason: HOSPADM

## 2021-08-06 RX ORDER — HYDRALAZINE HYDROCHLORIDE 20 MG/ML
5 INJECTION INTRAMUSCULAR; INTRAVENOUS EVERY 10 MIN PRN
Status: DISCONTINUED | OUTPATIENT
Start: 2021-08-06 | End: 2021-08-06 | Stop reason: HOSPADM

## 2021-08-06 RX ORDER — FENTANYL CITRATE 50 UG/ML
INJECTION, SOLUTION INTRAMUSCULAR; INTRAVENOUS PRN
Status: DISCONTINUED | OUTPATIENT
Start: 2021-08-06 | End: 2021-08-06 | Stop reason: SDUPTHER

## 2021-08-06 RX ORDER — DIPHENHYDRAMINE HYDROCHLORIDE 50 MG/ML
12.5 INJECTION INTRAMUSCULAR; INTRAVENOUS
Status: DISCONTINUED | OUTPATIENT
Start: 2021-08-06 | End: 2021-08-06 | Stop reason: HOSPADM

## 2021-08-06 RX ORDER — MIDAZOLAM HYDROCHLORIDE 1 MG/ML
INJECTION INTRAMUSCULAR; INTRAVENOUS PRN
Status: DISCONTINUED | OUTPATIENT
Start: 2021-08-06 | End: 2021-08-06 | Stop reason: SDUPTHER

## 2021-08-06 RX ORDER — ONDANSETRON 2 MG/ML
4 INJECTION INTRAMUSCULAR; INTRAVENOUS
Status: DISCONTINUED | OUTPATIENT
Start: 2021-08-06 | End: 2021-08-06 | Stop reason: HOSPADM

## 2021-08-06 RX ORDER — MEPERIDINE HYDROCHLORIDE 50 MG/ML
12.5 INJECTION INTRAMUSCULAR; INTRAVENOUS; SUBCUTANEOUS EVERY 5 MIN PRN
Status: DISCONTINUED | OUTPATIENT
Start: 2021-08-06 | End: 2021-08-06 | Stop reason: HOSPADM

## 2021-08-06 RX ORDER — FENTANYL CITRATE 50 UG/ML
25 INJECTION, SOLUTION INTRAMUSCULAR; INTRAVENOUS EVERY 5 MIN PRN
Status: DISCONTINUED | OUTPATIENT
Start: 2021-08-06 | End: 2021-08-06 | Stop reason: HOSPADM

## 2021-08-06 RX ORDER — BUPIVACAINE HYDROCHLORIDE 2.5 MG/ML
INJECTION, SOLUTION EPIDURAL; INFILTRATION; INTRACAUDAL PRN
Status: DISCONTINUED | OUTPATIENT
Start: 2021-08-06 | End: 2021-08-06 | Stop reason: ALTCHOICE

## 2021-08-06 RX ADMIN — MIDAZOLAM HYDROCHLORIDE 2 MG: 1 INJECTION, SOLUTION INTRAMUSCULAR; INTRAVENOUS at 12:35

## 2021-08-06 RX ADMIN — FENTANYL CITRATE 100 MCG: 50 INJECTION, SOLUTION INTRAMUSCULAR; INTRAVENOUS at 12:30

## 2021-08-06 RX ADMIN — MIDAZOLAM HYDROCHLORIDE 2 MG: 1 INJECTION, SOLUTION INTRAMUSCULAR; INTRAVENOUS at 12:30

## 2021-08-06 ASSESSMENT — PULMONARY FUNCTION TESTS
PIF_VALUE: 0
PIF_VALUE: 1
PIF_VALUE: 0

## 2021-08-06 ASSESSMENT — PAIN SCALES - GENERAL
PAINLEVEL_OUTOF10: 0

## 2021-08-06 ASSESSMENT — PAIN - FUNCTIONAL ASSESSMENT: PAIN_FUNCTIONAL_ASSESSMENT: 0-10

## 2021-08-06 ASSESSMENT — PAIN DESCRIPTION - DESCRIPTORS: DESCRIPTORS: CONSTANT;SHARP;ACHING;BURNING

## 2021-08-06 NOTE — ANESTHESIA PRE PROCEDURE
Department of Anesthesiology  Preprocedure Note       Name:  David Zapata   Age:  57 y.o.  :  1960                                          MRN:  [id number]         Date:  2021      Surgeon: Zeina Banda):  Issac Pete MD    Procedure: Procedure(s):  BILAT L4/5, L5/S1 NERVE RADIOFREQUENCY ABLATION    Medications prior to admission:   Prior to Admission medications    Medication Sig Start Date End Date Taking? Authorizing Provider   vitamin E 400 UNIT capsule Take 400 Units by mouth daily    Historical Provider, MD   oxyCODONE-acetaminophen (PERCOCET) 5-325 MG per tablet Take 1 tablet by mouth every 8 hours as needed for Pain for up to 30 days. 21  PIETER Qureshi CNP   cyclobenzaprine (FLEXERIL) 10 MG tablet Take 1 tablet by mouth nightly as needed for Muscle spasms 7/15/21 8/14/21  PIETER Qureshi CNP   pantoprazole (PROTONIX) 40 MG tablet Take 1 tablet by mouth daily 21   Zuhair Mclaughlin DO   alendronate (FOSAMAX) 70 MG tablet TAKE 1 TABLET BY MOUTH ONCE WEEKLY BEFORE BREAKFAST, ON AN EMPTY STOMACH: REMAIN UPRIGHT FOR 30 MINUTES:TAKE WITH 8 OUNCES OF WATER 3/18/21   Zuhair Mclaughlin DO   buPROPion HCl ER, XL, 450 MG TB24 Take 450 mg by mouth every morning 3/17/21   Zuhair Mclaughlin DO   amphetamine-dextroamphetamine (ADDERALL XR) 30 MG extended release capsule Take 2 capsules by mouth daily for 30 days.  3/11/21 7/23/21  Zuhair Mclaughlin DO   propranolol (INDERAL LA) 80 MG extended release capsule Take 1 capsule by mouth daily 21   Zuhair Mclaughlin DO   albuterol sulfate HFA (VENTOLIN HFA) 108 (90 Base) MCG/ACT inhaler INHALE TWO PUFFS BY MOUTH EVERY 4 HOURS AS NEEDED FOR WHEEZING 10/28/20   Ankit Walker MD   citalopram (CELEXA) 40 MG tablet TAKE ONE TABLET BY MOUTH DAILY 20   Nehal Miranda MD   albuterol (PROVENTIL) (2.5 MG/3ML) 0.083% nebulizer solution Take 2.5 mg by nebulization every 6 hours as needed for Wheezing or Shortness of Breath  Patient not taking: Reported on 7/15/2021    Historical Provider, MD   Calcium Carbonate (CALCIUM 600 PO) Take 600 mg by mouth daily    Historical Provider, MD       Current medications:    No current facility-administered medications for this encounter. Allergies: Allergies   Allergen Reactions    Latex Itching    Prozac [Fluoxetine Hcl]     Sulfa Antibiotics      migraine    Codeine Itching       Problem List:    Patient Active Problem List   Diagnosis Code    Attention deficit hyperactivity disorder (ADHD) F90.9    Narcolepsy G47.419    Moderate persistent asthma without complication I58.72    History of DVT (deep vein thrombosis) Z86.718    Hiatal hernia K44.9    Dysphagia R13.10    Cervical radiculopathy M54.12    Ganglion cyst M67.40    Chronic pain of left ankle M25.572, G89.29    Acquired trigger finger M65.30    Osteoarthritis of knee M17.10    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  on 6/15/18    DVT of leg (deep venous thrombosis) ([address] 2014    Fracture     right foot / pt denies surgical intervention    Helicobacter pylori (H. pylori) infection     per EGD    Hematuria 2018    Has a follow-up with Urologist     Hiatal hernia     History of blood transfusion     History of DVT (deep vein thrombosis) 2012    Hx of blood clots     Hypertension     Internal hemorrhoids     Narcolepsy 2012    Osteoporosis        Past Surgical History:        Procedure Laterality Date    ANKLE SURGERY Left 2018    mass excision    ARM SURGERY Right     Pt states she has had 13 right arm surgeries    BREAST ENHANCEMENT SURGERY Bilateral      SECTION      x 3    COLONOSCOPY  2013    sigmoid diverticula, prominent large internal hemorrhoid    COLONOSCOPY N/A 2/10/2020    COLONOSCOPY DIAGNOSTIC performed by Trupti Lang MD at [facility] 2020    COLORECTAL CANCER SCREENING, NOT HIGH RISK performed by Brock Marie MD at [address] [place], COLON, DIAGNOSTIC      ESOPHAGEAL MOTILITY STUDY N/A 2020    PES RN - ESOPHAGEAL MOTILITY STUDY performed by Alex Dean DO at [facility] STUDY  9/10/2020    ESOPHAGEAL MOTILITY STUDY performed by Alex Dean DO at [facility]    ESOPHAGEAL MOTILITY STUDY N/A 2020    ESOPHAGEAL MOTILITY STUDY ATTEMPTED PER DR. Nathalia Fulton WITHOUT SUCCESS performed by Alex Dean DO at [facility]    FOOT SURGERY Left 2015    FOOT TENDON SURGERY Left 2019    LEFT PERONEAL TENDON REPAIR WITH POSSIBLE LEFT TENDON TRANSFER (Left )    KNEE ARTHROSCOPY Left Two surgeries on left knee per pt    KNEE SURGERY Right     Three surgeries per pt    LIGAMENT REPAIR Left 2019    LEFT PERONEAL TENDON REPAIR performed by Bud Olmstead DPM at [address] Bilateral 2021     BILATERAL L4/5, L5/S1 LUMBAR FACET (Bilateral )    NOSE SURGERY      PAIN MANAGEMENT PROCEDURE Bilateral 2021    BILATERAL L4/5, L5/S1 LUMBAR FACET performed by Marine Higuera MD at [address] [place] [address]  2021    BILATERAL L4/5, L5/S1 NERVE BLOCK - Bilateral    PAIN MANAGEMENT PROCEDURE Bilateral 2021    BILATERAL L4/5, L5/S1 NERVE BLOCK performed by Marine Higuera MD at [address] [facility] Right 2021    L4/5, L5/S1 NERVE RADIOFREQUENCY ABLATION     PAIN MANAGEMENT PROCEDURE Right 2021    right  L4/5, L5/S1 NERVE RADIOFREQUENCY ABLATION performed by Marine Higuera MD at [address] [facility] OFFICE/OUTPT [address] [facility] Left 2018    RESECTION/REMOVAL BONY NEOPLASM LEFT FIBULA, LEFT SOFT TISSUE MASS EXCISION WITH BONE BIOPSY LEFT ANKLE FIBULA performed by Bud Olmstead DPM at [address] Left     UPPER GASTROINTESTINAL ENDOSCOPY  2013    tertiary contractures esophagus, 3 to 4 cm hiatal hernia, antral gastritis, + H Pylori, mild active chronic esophagitis    UPPER GASTROINTESTINAL ENDOSCOPY  2016    one biopsy    UPPER GASTROINTESTINAL ENDOSCOPY N/A 2/10/2020    EGD BIOPSY performed by Karthik Cline MD at [facility]         Social History:    Social History     Tobacco Use    Smoking status: Former Smoker     Packs/day: 1.00     Years: 20.00     Pack years: 20.00     Quit date:      Years since quittin.6    Smokeless tobacco: Never Used   Substance Use Topics    Alcohol use: Yes     Comment: rare                                Counseling given: Not Answered      Vital Signs (Current):  There were no vitals filed for this visit. BP Readings from Last 3 Encounters:   07/23/21 112/79   07/23/21 116/85   07/15/21 (!) 133/94       NPO Status:                                                                                 BMI:   Wt Readings from Last 3 Encounters:   07/23/21 143 lb 3.2 oz (65 kg)   06/17/21 143 lb 3.2 oz (65 kg)   06/16/21 143 lb (64.9 kg)     There is no height or weight on file to calculate BMI.    CBC:   Lab Results   Component Value Date    WBC 4.5 04/15/2021    RBC 4.30 04/15/2021    HGB 13.3 04/15/2021    HCT 41.9 04/15/2021    MCV 97.4 04/15/2021    RDW 13.5 04/15/2021     04/15/2021       CMP:   Lab Results   Component Value Date     01/14/2021    K 3.7 01/14/2021     01/14/2021    CO2 24 01/14/2021    BUN 9 01/14/2021    CREATININE 1.07 01/14/2021    GFRAA >60 01/14/2021    LABGLOM 52 01/14/2021    GLUCOSE 95 01/14/2021    PROT 6.3 10/28/2020    CALCIUM 9.0 01/14/2021    BILITOT 0.12 10/28/2020    ALKPHOS 89 10/28/2020    AST 16 10/28/2020    ALT 11 10/28/2020       POC Tests: No results for input(s): POCGLU, POCNA, POCK, POCCL, POCBUN, POCHEMO, POCHCT in the last 72 hours.     Coags:   Lab Results   Component Value Date    PROTIME 9.4 01/13/2021    INR 0.9 01/13/2021    APTT 23.4 01/13/2021       HCG (If Applicable): No results found for: PREGTESTUR, PREGSERUM, HCG, HCGQUANT     ABGs: No results found for: PHART, PO2ART, XIE8JNS, BCG0IHC, BEART, M7QRSXQC     Type & Screen (If Applicable):  No results found for: LABABO, LABRH    Drug/Infectious Status (If Applicable):  Lab Results   Component Value Date    HEPCAB NONREACTIVE 04/27/2018       COVID-19 Screening (If Applicable):   Lab Results   Component Value Date    COVID19 Not Detected 08/02/2021    COVID19 Not Detected 09/25/2020    COVID19 Not Detected 07/02/2020           Anesthesia Evaluation  Patient summary reviewed and Nursing notes reviewed no history of anesthetic complications:   Airway: Mallampati: II  TM distance: >3 FB   Neck ROM: full  Mouth opening: > = 3 FB Dental: normal exam         Pulmonary:normal exam  breath sounds clear to auscultation  (+) sleep apnea:  asthma:                            Cardiovascular:    (+) hypertension: no interval change,         Rhythm: regular  Rate: normal                    Neuro/Psych:   (+) neuromuscular disease:, psychiatric history:depression/anxiety              ROS comment: Cervical radiculopathy GI/Hepatic/Renal:   (+) hiatal hernia, GERD: no interval change,           Endo/Other:                     Abdominal:       Abdomen: soft. Vascular:           ROS comment: History of DVT (deep vein thrombosis). Other Findings:           Anesthesia Plan      MAC     ASA 3             Anesthetic plan and risks discussed with patient. Plan discussed with CRNA.                   Ranjeet Carrasco MD   8/6/2021

## 2021-08-06 NOTE — OP NOTE
Lumbar Radiofrequency Ablation:  SURGEON: Ashley Resendiz MD    PRE-OP DIAGNOSIS: M47.817 (lumbosacral spondylosis), M54.5 (low back pain)    POST-OP DIAGNOSIS: Same. PROCEDURE PERFORMED: Radiofrequency Ablation Medial Branch Nerve at Left L4 - 5 and L5 - S1 facet joint(s). HISTORY AND INDICATIONS: Satisfactory response with previous RFA/ medial branch blocks at the indicated levels. Recurrence of painful symptoms attributed to the above diagnosis. The planned treatment is medically necessary to relieve pain, restore function and or reduce reliance on pain medication. EBL: minimal    CONSENT: Patient has undergone the educational process with this procedure, is aware and fully understands the risks involved: potential damage to any and all body organs including possible bleeding, infection, nerve injury, paralysis, allergic reaction and headache. Patient also understands that the procedure will be undertaken in a safe, controlled and monitored setting. Patient recognizes that the benefits may include relief from pain and reduction in the oral use of medications. Patient agreed to proceed. The patient was counseled at length about the risks of lizette Covid-19 during their perioperative period and any recovery window from their procedure. The patient was made aware that lizette Covid-19  may worsen their prognosis for recovering from their procedure  and lend to a higher morbidity and/or mortality risk. All material risks, benefits, and reasonable alternatives including postponing the procedure were discussed. The patient does wish to proceed with the procedure at this time. MONITORS INSTITUTED INCLUDE but not limited to:   Pulse Oximetry  Non-invasive blood pressure monitoring    PROCEDURE NOTE: The patient was taken to the procedure room and placed prone with the appropriate padding and positioning to assure patient comfort and physician access to the procedure site.  Time out was performed prior to starting the procedure. Fluoroscopic evaluation was utilized to target the appropriate treatment areas. The skin was prepped with antiseptic solution and draped sterilely. Then 0.5% lidocaine was used to anesthetize the skin and subcutaneous tissue. Under fluoroscopic guidance a 20 gauge x 10cm x 10mm active tip was advanced to the medial branch nerve at the indicated levels below to innervate the following facet joints Left L4 - 5 and L5 - S1 . The needles were placed sequentially. Position confirmed radiographically with the fluoroscope. Active tip corresponding at the base of the superior articulating process and/or sacral ala for the lumbar RFA. Motor stimulation checked at 2hz up to 3V and confirmed negative for radicular stimulation. After confirmation of the needle placement the patient received 1 ml of 0.25% marcaine to provide anesthesia. Radiofrequency was delivered to the lumbar region at 80 degrees for a limit of 90 seconds in length with no ill effect. The patient tolerated the procedure well and was transported to the recovery room where the patient was monitored with no complications and with stable vital signs. Patient was discharged with appropriate written discharge instructions. Follow-up discussed.       Sheridan Rodriguez MD

## 2021-08-06 NOTE — H&P
Pain Pre-Op H&P Note    Ta Tony MD    HPI: Tate Winston  presents with back pain. Past Medical History:   Diagnosis Date    Acid reflux     Adult ADHD 2012    Asthma 2012    [facility] disorder ([facility] [address]     Broken foot     Left foot    Chest pain of uncertain etiology     Last episode was in  (Written 2019)    Depression     Under control per pt.  on 6/15/18    DVT of leg (deep venous thrombosis) ([facility] [address] 2014    Fracture     right foot / pt denies surgical intervention    Helicobacter pylori (H. pylori) infection     per EGD    Hematuria 2018    Has a follow-up with Urologist     Hiatal hernia     History of blood transfusion     History of DVT (deep vein thrombosis) 2012    Hx of blood clots     Hypertension     Internal hemorrhoids     Narcolepsy 2012    Osteoporosis        Past Surgical History:   Procedure Laterality Date    ANKLE SURGERY Left 2018    mass excision    ARM SURGERY Right     Pt states she has had 13 right arm surgeries    BREAST ENHANCEMENT SURGERY Bilateral      SECTION      x 3    COLONOSCOPY  2013    sigmoid diverticula, prominent large internal hemorrhoid    COLONOSCOPY N/A 2/10/2020    COLONOSCOPY DIAGNOSTIC performed by Hermann Gonsales MD at [address] [facility] N/A 2020    COLORECTAL CANCER SCREENING, NOT HIGH RISK performed by Asim Parisi MD at [address], COLON, DIAGNOSTIC      ESOPHAGEAL MOTILITY STUDY N/A 2020    JOSE FRANCISCO RN - ESOPHAGEAL MOTILITY STUDY performed by Maddi Arnold DO at [address] STUDY  9/10/2020    ESOPHAGEAL MOTILITY STUDY performed by Maddi Arnold DO at [facility]    ESOPHAGEAL MOTILITY STUDY N/A 2020    ESOPHAGEAL MOTILITY STUDY ATTEMPTED PER DR. Mario Carty WITHOUT SUCCESS performed by Maddi Arnold DO at [facility]    FOOT SURGERY Left 2015    FOOT TENDON SURGERY Left 02/22/2019    LEFT PERONEAL TENDON REPAIR WITH POSSIBLE LEFT TENDON TRANSFER (Left )    KNEE ARTHROSCOPY Left     Two surgeries on left knee per pt    KNEE SURGERY Right     Three surgeries per pt    LIGAMENT REPAIR Left 2/22/2019    LEFT PERONEAL TENDON REPAIR performed by Kyle Lang DPM at [address] Bilateral 06/04/2021     BILATERAL L4/5, L5/S1 LUMBAR FACET (Bilateral )    NOSE SURGERY      PAIN MANAGEMENT PROCEDURE Bilateral 6/4/2021    BILATERAL L4/5, L5/S1 LUMBAR FACET performed by Jocelyn Aldana MD at [address] [place] [address]  06/11/2021    BILATERAL L4/5, L5/S1 NERVE BLOCK - Bilateral    PAIN MANAGEMENT PROCEDURE Bilateral 6/11/2021    BILATERAL L4/5, L5/S1 NERVE BLOCK performed by Jocelyn Aldana MD at [address] [place] [address] Right 07/23/2021    L4/5, L5/S1 NERVE RADIOFREQUENCY ABLATION     PAIN MANAGEMENT PROCEDURE Right 7/23/2021    right  L4/5, L5/S1 NERVE RADIOFREQUENCY ABLATION performed by Jocelyn Aldana MD at [address] [facility] OFFICE/OUTPT [address] [facility] Left 6/29/2018    RESECTION/REMOVAL BONY NEOPLASM LEFT FIBULA, LEFT SOFT TISSUE MASS EXCISION WITH BONE BIOPSY LEFT ANKLE FIBULA performed by Kyle Lang DPM at [address] [facility] Left     UPPER GASTROINTESTINAL ENDOSCOPY  5/23/2013    tertiary contractures esophagus, 3 to 4 cm hiatal hernia, antral gastritis, + H Pylori, mild active chronic esophagitis    UPPER GASTROINTESTINAL ENDOSCOPY  4/6/2016    one biopsy    UPPER GASTROINTESTINAL ENDOSCOPY N/A 2/10/2020    EGD BIOPSY performed by Tanner Tejada MD at [address]         Family History   Problem Relation Age of Onset    Cancer Mother     Hypertension Mother     Stroke Mother     Cancer Maternal Aunt     Hypertension Father     Heart Surgery Father     Diabetes Neg Hx        Allergies   Allergen Reactions    Latex Itching    Prozac [Fluoxetine Hcl]     Sulfa Antibiotics      migraine    Codeine Itching         Current Facility-Administered Medications:     meperidine (DEMEROL) injection 12.5 mg, 12.5 mg, Intravenous, Q5 Min PRN, Ranjeet Carrasco MD    fentaNYL (SUBLIMAZE) injection 25 mcg, 25 mcg, Intravenous, Q5 Min PRN, Ranjeet Carrasco MD    ondansetron [facility]) injection 4 mg, 4 mg, Intravenous, Once PRN, Ranjeet Carrasco MD    prometh[facility]a [facility]) injection 6.25 mg, 6.25 mg, Intramuscular, Once PRN, Ranjeet Carrasco MD    diphenhydrAMINE (BENADRYL) injection 12.5 mg, 12.5 mg, Intravenous, Once PRN, Ranjeet Carrasco MD    hydrALAZINE (APRESOLINE) injection 5 mg, 5 mg, Intravenous, Q10 Min PRN, Ranjeet Carrasco MD    Social History     Tobacco Use    Smoking status: Former Smoker     Packs/day: 1.00     Years: 20.00     Pack years: 20.00     Quit date:      Years since quittin.6    Smokeless tobacco: Never Used   Substance Use Topics    Alcohol use: Yes     Comment: rare       Review of Systems:   Focused review of systems was performed, and negative as pertinent to diagnosis, except as stated in HPI. Physical Exam  Constitutional:       Appearance: Normal appearance. Pulmonary:      Effort: Pulmonary effort is normal.   Neurological:      Mental Status: He is alert. Psychiatric:         Attention and Perception: Attention and perception normal.         Mood and Affect: Mood and affect normal.         Patient's current physical status, medications, medical history, and HPI have been reviewed and updated as appropriate on this date: 21    Risk/Benefit(s): The risks, benefits, alternatives, and potential complications have been discussed with the patient/family and informed consent has been obtained for the procedure/sedation.     Diagnosis:   M47.817 LUMBOSACRAL SPONDYLOSIS WITHOUT MYELOPATHY  M54.5,G89.29 CHRONIC BILATERAL LOW BACK PAIN, UNSPECIFIED WHETHER SCIATICA PRESENT      Plan: lumbar rfa        Wesley France MD

## 2021-08-12 ENCOUNTER — VIRTUAL VISIT (OUTPATIENT)
Dept: PAIN MANAGEMENT | Age: 61
End: 2021-08-12
Payer: MEDICARE

## 2021-08-12 DIAGNOSIS — M54.12 CERVICAL RADICULOPATHY: ICD-10-CM

## 2021-08-12 DIAGNOSIS — Z79.891 ENCOUNTER FOR LONG-TERM OPIATE ANALGESIC USE: Primary | ICD-10-CM

## 2021-08-12 DIAGNOSIS — M47.817 LUMBOSACRAL SPONDYLOSIS WITHOUT MYELOPATHY: ICD-10-CM

## 2021-08-12 DIAGNOSIS — M54.41 CHRONIC BILATERAL LOW BACK PAIN WITH BILATERAL SCIATICA: ICD-10-CM

## 2021-08-12 DIAGNOSIS — G89.29 CHRONIC BILATERAL LOW BACK PAIN WITH BILATERAL SCIATICA: ICD-10-CM

## 2021-08-12 DIAGNOSIS — M54.42 CHRONIC BILATERAL LOW BACK PAIN WITH BILATERAL SCIATICA: ICD-10-CM

## 2021-08-12 PROCEDURE — 99213 OFFICE O/P EST LOW 20 MIN: CPT | Performed by: NURSE PRACTITIONER

## 2021-08-12 RX ORDER — OXYCODONE HYDROCHLORIDE AND ACETAMINOPHEN 5; 325 MG/1; MG/1
1 TABLET ORAL EVERY 8 HOURS PRN
Qty: 90 TABLET | Refills: 0 | Status: SHIPPED | OUTPATIENT
Start: 2021-08-17 | End: 2021-09-13 | Stop reason: SDUPTHER

## 2021-08-12 ASSESSMENT — ENCOUNTER SYMPTOMS
BACK PAIN: 1
SHORTNESS OF BREATH: 0
COUGH: 0
CONSTIPATION: 0

## 2021-08-12 NOTE — PROGRESS NOTES
Morphine equivalent: 22.5    Controlled Substance Monitoring:    Acute and Chronic Pain Monitoring:   RX Monitoring 2021   Attestation -   Periodic Controlled Substance Monitoring Possible medication side effects, risk of tolerance/dependence & alternative treatments discussed. ;No signs of potential drug abuse or diversion identified.;Obtaining appropriate analgesic effect of treatment. Chronic Pain > 80 MEDD -   Chronic Pain > 120 MEDD -           Past Medical History:   Diagnosis Date    Acid reflux     Adult ADHD 2012    Asthma 2012    [facility]     Broken foot     Left foot    Chest pain of uncertain etiology 1008    Last episode was in  (Written 2019)    Depression     Under control per pt.  on 6/15/18    DVT of leg (deep venous thrombosis) ([facility] 2014    Fracture     right foot / pt denies surgical intervention    Helicobacter pylori (H. pylori) infection     per EGD    Hematuria 2018    Has a follow-up with Urologist     Hiatal hernia     History of blood transfusion     History of DVT (deep vein thrombosis) 2012    Hx of blood clots     Hypertension     Internal hemorrhoids     Narcolepsy 2012    Osteoporosis        Past Surgical History:   Procedure Laterality Date    ANKLE SURGERY Left 2018    mass excision    ARM SURGERY Right     Pt states she has had 13 right arm surgeries    BREAST ENHANCEMENT SURGERY Bilateral      SECTION      x 3    COLONOSCOPY  2013    sigmoid diverticula, prominent large internal hemorrhoid    COLONOSCOPY N/A 2/10/2020    COLONOSCOPY DIAGNOSTIC performed by Domitila Caldwell MD at [place] [address] N/A 2020    COLORECTAL CANCER SCREENING, NOT HIGH RISK performed by Chaim Gonzalez MD at [address] [place] [address], DIAGNOSTIC      ESOPHAGEAL MOTILITY STUDY N/A 2020    PES RN - ESOPHAGEAL MOTILITY STUDY performed by Lynette Curry DO at [facility]    ESOPHAGEAL MOTILITY STUDY  9/10/2020    ESOPHAGEAL MOTILITY STUDY performed by Dyan Dickens DO at [facility]    ESOPHAGEAL MOTILITY STUDY N/A 12/18/2020    ESOPHAGEAL MOTILITY STUDY ATTEMPTED PER DR. John Milligan WITHOUT SUCCESS performed by Dyan Dickens DO at [facility]    FOOT SURGERY Left 2015    FOOT TENDON SURGERY Left 02/22/2019    LEFT PERONEAL TENDON REPAIR WITH POSSIBLE LEFT TENDON TRANSFER (Left )    KNEE ARTHROSCOPY Left     Two surgeries on left knee per pt    KNEE SURGERY Right     Three surgeries per pt    LIGAMENT REPAIR Left 2/22/2019    LEFT PERONEAL TENDON REPAIR [facility] Bilateral 06/04/2021     BILATERAL L4/5, L5/S1 LUMBAR FACET (Bilateral )    NOSE SURGERY      PAIN MANAGEMENT PROCEDURE Bilateral 6/4/2021    BILATERAL L4/5, L5/S1 LUMBAR FACET performed by Patti Gordon MD at [address] [place] [address]  06/11/2021    BILATERAL L4/5, L5/S1 NERVE BLOCK - Bilateral    PAIN MANAGEMENT PROCEDURE Bilateral 6/11/2021    BILATERAL L4/5, L5/S1 NERVE BLOCK performed by Patti Gordon MD at [address] [place] [address] Right 07/23/2021    L4/5, L5/S1 NERVE RADIOFREQUENCY ABLATION     PAIN MANAGEMENT PROCEDURE Right 7/23/2021    right  L4/5, L5/S1 NERVE RADIOFREQUENCY ABLATION performed by Patti Gordon MD at [address] [place] [address] Left 08/06/2021    left  L4/5, L5/S1 NERVE RADIOFREQUENCY ABLATION (Left )    PAIN MANAGEMENT PROCEDURE Left 8/6/2021    left  L4/5, L5/S1 NERVE RADIOFREQUENCY ABLATION performed by Patti Gordon MD at [address] [facility]/[address]PT [address] [facility] Left 6/29/2018    RESECTION/REMOVAL BONY NEOPLASM LEFT FIBULA, LEFT SOFT TISSUE MASS EXCISION WITH BONE BIOPSY LEFT ANKLE FIBULA performed by Lucina Petersen DPM at [place] [address] Left     UPPER GASTROINTESTINAL ENDOSCOPY 5/23/2013    tertiary contractures esophagus, 3 to 4 cm hiatal hernia, antral gastritis, + H Pylori, mild active chronic esophagitis    UPPER GASTROINTESTINAL ENDOSCOPY  4/6/2016    one biopsy    UPPER GASTROINTESTINAL ENDOSCOPY N/A 2/10/2020    EGD BIOPSY performed by Karthik Cline MD at [facility]         Allergies   Allergen Reactions    Latex Itching    Prozac [Fluoxetine Hcl]     Sulfa Antibiotics      migraine    Codeine Itching         Current Outpatient Medications:     vitamin E 400 UNIT capsule, Take 400 Units by mouth daily, Disp: , Rfl:     oxyCODONE-acetaminophen (PERCOCET) 5-325 MG per tablet, Take 1 tablet by mouth every 8 hours as needed for Pain for up to 30 days. , Disp: 90 tablet, Rfl: 0    cyclobenzaprine (FLEXERIL) 10 MG tablet, Take 1 tablet by mouth nightly as needed for Muscle spasms, Disp: 30 tablet, Rfl: 0    pantoprazole (PROTONIX) 40 MG tablet, Take 1 tablet by mouth daily, Disp: 90 tablet, Rfl: 1    alendronate (FOSAMAX) 70 MG tablet, TAKE 1 TABLET BY MOUTH ONCE WEEKLY BEFORE BREAKFAST, ON AN EMPTY STOMACH: REMAIN UPRIGHT FOR 30 MINUTES:TAKE WITH 8 OUNCES OF WATER, Disp: 12 tablet, Rfl: 10    buPROPion HCl ER, XL, 450 MG TB24, Take 450 mg by mouth every morning, Disp: 30 tablet, Rfl: 2    amphetamine-dextroamphetamine (ADDERALL XR) 30 MG extended release capsule, Take 2 capsules by mouth daily for 30 days. , Disp: 60 capsule, Rfl: 0    propranolol (INDERAL LA) 80 MG extended release capsule, Take 1 capsule by mouth daily, Disp: 90 capsule, Rfl: 1    albuterol sulfate HFA (VENTOLIN HFA) 108 (90 Base) MCG/ACT inhaler, INHALE TWO PUFFS BY MOUTH EVERY 4 HOURS AS NEEDED FOR WHEEZING, Disp: 1 Inhaler, Rfl: 5    citalopram (CELEXA) 40 MG tablet, TAKE ONE TABLET BY MOUTH DAILY, Disp: 90 tablet, Rfl: 3    albuterol (PROVENTIL) (2.5 MG/3ML) 0.083% nebulizer solution, Take 2.5 mg by nebulization every 6 hours as needed for Wheezing or Shortness of Breath , Disp: , Rfl:     Calcium Carbonate (CALCIUM 600 PO), Take 600 mg by mouth daily, Disp: , Rfl:     Family History   Problem Relation Age of Onset    Cancer Mother     Hypertension Mother     Stroke Mother     Cancer Maternal Aunt     Hypertension Father     Heart Surgery Father     Diabetes Neg Hx        Social History     Socioeconomic History    Marital status: Single     Spouse name: Not on file    Number of children: Not on file    Years of education: Not on file    Highest education level: Not on file   Occupational History    Not on file   Tobacco Use    Smoking status: Former Smoker     Packs/day: 1.00     Years: 20.00     Pack years: 20.00     Quit date:      Years since quittin.6    Smokeless tobacco: Never Used   Vaping Use    Vaping Use: Never used   Substance and Sexual Activity    Alcohol use: Yes     Comment: rare    Drug use: No    Sexual activity: Not Currently   Other Topics Concern    Not on file   Social History Narrative    Not on file     Social Determinants of Health     Financial Resource Strain: Low Risk     Difficulty of Paying Living Expenses: Not hard at all   Food Insecurity: No Food Insecurity    Worried About [address] in the Last Year: Never true    [address] [employer] in the Last Year: Never true   Transportation Needs:     Lack of Transportation (Medical):      Lack of Transportation (Non-Medical):    Physical Activity:     Days of Exercise per Week:     Minutes of Exercise per Session:    Stress:     Feeling of Stress :    Social Connections:     Frequency of Communication with Friends and Family:     Frequency of Social Gatherings with Friends and Family:     Attends Buddhist Services:     Active Member of Clubs or Organizations:     Attends Club or Organization Meetings:     Marital Status:    Intimate Partner Violence:     Fear of Current or Ex-Partner:     Emotionally Abused:     Physically Abused:     Sexually Abused:        Review of Systems:  Review of Systems   Constitutional: Negative for chills and fever. Cardiovascular: Negative for chest pain and palpitations. Respiratory: Negative for cough and shortness of breath. Musculoskeletal: Positive for back pain and neck pain. Gastrointestinal: Negative for constipation. Neurological: Positive for headaches. Negative for disturbances in coordination and loss of balance. Physical Exam:  There were no vitals taken for this visit. Physical Exam  HENT:      Head: Normocephalic. Pulmonary:      Effort: Pulmonary effort is normal.   Neurological:      Mental Status: She is alert. Psychiatric:         Mood and Affect: Mood normal.         Behavior: Behavior normal.         Record/Diagnostics Review:    Last angie 3/2021 and was appropriate     Assessment:  Problem List Items Addressed This Visit     Encounter for long-term opiate analgesic use - Primary (Chronic)    Relevant Orders    Drug Screen, Pain    Cervical radiculopathy    Lumbosacral spondylosis without myelopathy    Relevant Medications    oxyCODONE-acetaminophen (PERCOCET) 5-325 MG per tablet (Start on 8/17/2021)    Other Relevant Orders    Drug Screen, Pain    Chronic bilateral low back pain with bilateral sciatica    Relevant Medications    oxyCODONE-acetaminophen (PERCOCET) 5-325 MG per tablet (Start on 8/17/2021)             Treatment Plan:  Patient relates current medications are helping the pain. Patient reports taking pain medications as prescribed, denies obtaining medications from different sources and denies use of illegal drugs. Patient denies side effects from medications like nausea, vomiting, constipation or drowsiness. Patient reports current activities of daily living are possible due to medications and would like to continue them.      As always, we encourage daily stretching and strengthening exercises, and recommend minimizing use of pain medications unless patient cannot get through daily activities due to pain. Contract requirements met. Continue opioid therapy. Script written for percocet  Did discuss cervical injections - she would like to hold off at this time   At this time, PT would cause a financial burden for her - has a $40 copay each visit  UDS for standard monitoring purposes  Follow up appointment made for 4 weeks    Todd Rutledge, was evaluated through a synchronous (real-time) audio-video encounter. The patient (or guardian if applicable) is aware that this is a billable service. Verbal consent to proceed has been obtained within the past 12 months. The visit was conducted pursuant to the emergency declaration under the [facility], [address] [place] [address] authority and the [employer] Act. Patient identification was verified, and a caregiver was present when appropriate. The patient was located in a state where the provider was credentialed to provide care. Total time spent for this encounter: Not billed by time    --PIETER Recinos CNP on 8/12/2021 at 11:29 AM    An electronic signature was used to authenticate this note.

## 2021-08-13 ENCOUNTER — TELEPHONE (OUTPATIENT)
Dept: PAIN MANAGEMENT | Age: 61
End: 2021-08-13

## 2021-08-13 ENCOUNTER — HOSPITAL ENCOUNTER (OUTPATIENT)
Age: 61
Discharge: HOME | End: 2021-08-13
Payer: MEDICARE

## 2021-08-13 DIAGNOSIS — Z79.891 ENCOUNTER FOR LONG-TERM OPIATE ANALGESIC USE: ICD-10-CM

## 2021-08-13 DIAGNOSIS — M47.817 LUMBOSACRAL SPONDYLOSIS WITHOUT MYELOPATHY: ICD-10-CM

## 2021-08-13 PROCEDURE — 80307 DRUG TEST PRSMV CHEM ANLYZR: CPT

## 2021-08-13 NOTE — TELEPHONE ENCOUNTER
Called pt to schedule next appointment from [facility] 1237 on 08/12/2021. Patient did not answer, left msg. *Patient next appointment is a VV on Monday 09/13/2021 at 9:45 with Dr. Neptali Swartz.

## 2021-08-20 LAB
6-ACETYLMORPHINE, UR: NOT DETECTED
7-AMINOCLONAZEPAM, URINE: NOT DETECTED
ALPHA-OH-ALPRAZ, URINE: NOT DETECTED
ALPHA-OH-MIDAZOLAM, URINE: NOT DETECTED
ALPRAZOLAM, URINE: NOT DETECTED
AMPHETAMINES, URINE: PRESENT
BARBITURATES, URINE: NOT DETECTED
BENZOYLECGONINE, UR: NOT DETECTED
BUPRENORPHINE URINE: NOT DETECTED
CARISOPRODOL, UR: NOT DETECTED
CLONAZEPAM, URINE: NOT DETECTED
CODEINE, URINE: NOT DETECTED
CREATININE URINE: >400 MG/DL (ref 20–400)
DIAZEPAM, URINE: NOT DETECTED
DRUGS EXPECTED, UR: ABNORMAL
EER HI RES INTERP UR: ABNORMAL
ETHYL GLUCURONIDE UR: PRESENT
FENTANYL URINE: NOT DETECTED
GABAPENTIN: NOT DETECTED
HYDROCODONE, URINE: NOT DETECTED
HYDROMORPHONE, URINE: NOT DETECTED
LORAZEPAM, URINE: NOT DETECTED
MARIJUANA METAB, UR: NOT DETECTED
MDA, UR: NOT DETECTED
MDEA, EVE, UR: NOT DETECTED
MDMA URINE: NOT DETECTED
MEPERIDINE METAB, UR: NOT DETECTED
METHADONE, URINE: NOT DETECTED
METHAMPHETAMINE, URINE: NOT DETECTED
METHYLPHENIDATE: NOT DETECTED
MIDAZOLAM, URINE: NOT DETECTED
MORPHINE URINE: NOT DETECTED
NALOXONE URINE: NOT DETECTED
NORBUPRENORPHINE, URINE: NOT DETECTED
NORDIAZEPAM, URINE: NOT DETECTED
NORFENTANYL, URINE: PRESENT
NORHYDROCODONE, URINE: NOT DETECTED
NOROXYCODONE, URINE: PRESENT
NOROXYMORPHONE, URINE: NOT DETECTED
OXAZEPAM, URINE: NOT DETECTED
OXYCODONE URINE: PRESENT
OXYMORPHONE, URINE: PRESENT
PAIN MANAGEMENT DRUG PANEL INTERP, URINE: ABNORMAL
PAIN MGT DRUG PANEL, HI RES, UR: ABNORMAL
PCP,URINE: NOT DETECTED
PHENTERMINE, UR: NOT DETECTED
PREGABALIN: NOT DETECTED
TAPENTADOL, URINE: NOT DETECTED
TAPENTADOL-O-SULFATE, URINE: NOT DETECTED
TEMAZEPAM, URINE: NOT DETECTED
TRAMADOL, URINE: NOT DETECTED
ZOLPIDEM METABOLITE (ZCA), URINE: NOT DETECTED
ZOLPIDEM, URINE: NOT DETECTED

## 2021-08-25 ENCOUNTER — TELEPHONE (OUTPATIENT)
Dept: ONCOLOGY | Age: 61
End: 2021-08-25

## 2021-08-25 ENCOUNTER — NURSE TRIAGE (OUTPATIENT)
Dept: OTHER | Facility: CLINIC | Age: 61
End: 2021-08-25

## 2021-08-25 ENCOUNTER — HOSPITAL ENCOUNTER (EMERGENCY)
Age: 61
Discharge: HOME | End: 2021-08-25
Attending: EMERGENCY MEDICINE
Payer: MEDICARE

## 2021-08-25 VITALS
BODY MASS INDEX: 30.04 KG/M2 | TEMPERATURE: 98 F | HEART RATE: 61 BPM | DIASTOLIC BLOOD PRESSURE: 90 MMHG | SYSTOLIC BLOOD PRESSURE: 151 MMHG | HEIGHT: 60 IN | WEIGHT: 153 LBS | OXYGEN SATURATION: 99 % | RESPIRATION RATE: 17 BRPM

## 2021-08-25 DIAGNOSIS — M79.662 PAIN OF LEFT CALF: Primary | ICD-10-CM

## 2021-08-25 LAB
ABSOLUTE EOS #: 0.08 K/UL (ref 0–0.44)
ABSOLUTE IMMATURE GRANULOCYTE: 0.01 K/UL (ref 0–0.3)
ABSOLUTE LYMPH #: 0.96 K/UL (ref 1.1–3.7)
ABSOLUTE MONO #: 0.38 K/UL (ref 0.1–1.2)
ANION GAP SERPL CALCULATED.3IONS-SCNC: 10 MMOL/L (ref 9–17)
BASOPHILS # BLD: 1 % (ref 0–2)
BASOPHILS ABSOLUTE: 0.04 K/UL (ref 0–0.2)
BUN BLDV-MCNC: 9 MG/DL (ref 8–23)
BUN/CREAT BLD: 9 (ref 9–20)
CALCIUM SERPL-MCNC: 9.4 MG/DL (ref 8.6–10.4)
CHLORIDE BLD-SCNC: 103 MMOL/L (ref 98–107)
CO2: 24 MMOL/L (ref 20–31)
CREAT SERPL-MCNC: 0.97 MG/DL (ref 0.5–0.9)
D-DIMER QUANTITATIVE: 0.42 MG/L FEU (ref 0–0.59)
DIFFERENTIAL TYPE: ABNORMAL
EOSINOPHILS RELATIVE PERCENT: 2 % (ref 1–4)
GFR AFRICAN AMERICAN: >60 ML/MIN
GFR NON-AFRICAN AMERICAN: 58 ML/MIN
GFR SERPL CREATININE-BSD FRML MDRD: ABNORMAL ML/MIN/{1.73_M2}
GFR SERPL CREATININE-BSD FRML MDRD: ABNORMAL ML/MIN/{1.73_M2}
GLUCOSE BLD-MCNC: 130 MG/DL (ref 70–99)
HCT VFR BLD CALC: 41.2 % (ref 36.3–47.1)
HEMOGLOBIN: 13 G/DL (ref 11.9–15.1)
IMMATURE GRANULOCYTES: 0 %
LYMPHOCYTES # BLD: 18 % (ref 24–43)
MCH RBC QN AUTO: 30.8 PG (ref 25.2–33.5)
MCHC RBC AUTO-ENTMCNC: 31.6 G/DL (ref 28.4–34.8)
MCV RBC AUTO: 97.6 FL (ref 82.6–102.9)
MONOCYTES # BLD: 7 % (ref 3–12)
NRBC AUTOMATED: 0 PER 100 WBC
PDW BLD-RTO: 13.4 % (ref 11.8–14.4)
PLATELET # BLD: 247 K/UL (ref 138–453)
PLATELET ESTIMATE: ABNORMAL
PMV BLD AUTO: 9.8 FL (ref 8.1–13.5)
POTASSIUM SERPL-SCNC: 4.2 MMOL/L (ref 3.7–5.3)
RBC # BLD: 4.22 M/UL (ref 3.95–5.11)
RBC # BLD: ABNORMAL 10*6/UL
SEG NEUTROPHILS: 72 % (ref 36–65)
SEGMENTED NEUTROPHILS ABSOLUTE COUNT: 3.76 K/UL (ref 1.5–8.1)
SODIUM BLD-SCNC: 137 MMOL/L (ref 135–144)
WBC # BLD: 5.2 K/UL (ref 3.5–11.3)
WBC # BLD: ABNORMAL 10*3/UL

## 2021-08-25 PROCEDURE — 85025 COMPLETE CBC W/AUTO DIFF WBC: CPT

## 2021-08-25 PROCEDURE — 85379 FIBRIN DEGRADATION QUANT: CPT

## 2021-08-25 PROCEDURE — 93971 EXTREMITY STUDY: CPT

## 2021-08-25 PROCEDURE — 80048 BASIC METABOLIC PNL TOTAL CA: CPT

## 2021-08-25 PROCEDURE — 99283 EMERGENCY DEPT VISIT LOW MDM: CPT

## 2021-08-25 ASSESSMENT — ENCOUNTER SYMPTOMS
ABDOMINAL PAIN: 0
COLOR CHANGE: 0
BACK PAIN: 0
SHORTNESS OF BREATH: 1
COUGH: 0

## 2021-08-25 ASSESSMENT — PAIN DESCRIPTION - DESCRIPTORS: DESCRIPTORS: ACHING;CRAMPING

## 2021-08-25 ASSESSMENT — PAIN SCALES - GENERAL: PAINLEVEL_OUTOF10: 5

## 2021-08-25 ASSESSMENT — PAIN DESCRIPTION - PAIN TYPE: TYPE: ACUTE PAIN

## 2021-08-25 ASSESSMENT — PAIN DESCRIPTION - LOCATION: LOCATION: LEG

## 2021-08-25 ASSESSMENT — PAIN DESCRIPTION - ORIENTATION: ORIENTATION: LEFT

## 2021-08-25 NOTE — ED PROVIDER NOTES
[facility] ED  eMERGENCY dEPARTMENT eNCOUnter      Pt Name: Solis Holley  MRN: [id number]  [place] 1960  Date of evaluation: 8/25/2021  Provider: PIETER Vázquez [id number]       Chief Complaint   Patient presents with    Leg Pain     pt states leg pain to the left calf x 4 days, pt states she called PCP and was advised to come in          HISTORY OF PRESENT ILLNESS  (Location/Symptom, Timing/Onset, Context/Setting, Quality, Duration, Modifying Factors, Severity.)   Solis Holley is a 57 y.o. female who presents to the emergency department via private auto for left calf pain. Onset was 4 days ago. Denies fever, chills, weakness, injury. She has a history of DVTs. Denies fever, chills, dizziness, chest pain. She has some SOB with exertion. Reports a history of anemia. Rates her pain 5/10. Nursing Notes were reviewed. ALLERGIES     Latex, Prozac [fluoxetine hcl], Sulfa antibiotics, and Codeine    CURRENT MEDICATIONS       Previous Medications    ALBUTEROL (PROVENTIL) (2.5 MG/3ML) 0.083% NEBULIZER SOLUTION    Take 2.5 mg by nebulization every 6 hours as needed for Wheezing or Shortness of Breath     ALBUTEROL SULFATE HFA (VENTOLIN HFA) 108 (90 BASE) MCG/ACT INHALER    INHALE TWO PUFFS BY MOUTH EVERY 4 HOURS AS NEEDED FOR WHEEZING    ALENDRONATE (FOSAMAX) 70 MG TABLET    TAKE 1 TABLET BY MOUTH ONCE WEEKLY BEFORE BREAKFAST, ON AN EMPTY STOMACH: REMAIN UPRIGHT FOR 30 MINUTES:TAKE WITH 8 OUNCES OF WATER    AMPHETAMINE-DEXTROAMPHETAMINE (ADDERALL XR) 30 MG EXTENDED RELEASE CAPSULE    Take 2 capsules by mouth daily for 30 days.     BUPROPION HCL ER, XL, 450 MG TB24    Take 450 mg by mouth every morning    CALCIUM CARBONATE (CALCIUM 600 PO)    Take 600 mg by mouth daily    CITALOPRAM (CELEXA) 40 MG TABLET    TAKE ONE TABLET BY MOUTH DAILY    OXYCODONE-ACETAMINOPHEN (PERCOCET) 5-325 MG PER TABLET    Take 1 tablet by mouth every 8 hours as needed for Pain for up to 30 days.    PANTOPRAZOLE (PROTONIX) 40 MG TABLET    Take 1 tablet by mouth daily    PROPRANOLOL (INDERAL LA) 80 MG EXTENDED RELEASE CAPSULE    Take 1 capsule by mouth daily    VITAMIN E 400 UNIT CAPSULE    Take 400 Units by mouth daily       PAST MEDICAL HISTORY         Diagnosis Date    Acid reflux     Adult ADHD 2012    Asthma 2012    [facility] 1 disorder ([facility]     Broken foot     Left foot    Chest pain of uncertain etiology     Last episode was in  (Written 2019)    Depression     Under control per pt.  on 6/15/18    DVT of leg (deep venous thrombosis) ([facility] 2014    Fracture     right foot / pt denies surgical intervention    Helicobacter pylori (H. pylori) infection     per EGD    Hematuria 2018    Has a follow-up with Urologist     Hiatal hernia     History of blood transfusion     History of DVT (deep vein thrombosis) 2012    Hx of blood clots     Hypertension     Internal hemorrhoids     Narcolepsy 2012    Osteoporosis        SURGICAL HISTORY           Procedure Laterality Date    ANKLE SURGERY Left 2018    mass excision    ARM SURGERY Right     Pt states she has had 13 right arm surgeries    BREAST ENHANCEMENT SURGERY Bilateral      SECTION      x 3    COLONOSCOPY  2013    sigmoid diverticula, prominent large internal hemorrhoid    COLONOSCOPY N/A 2/10/2020    COLONOSCOPY DIAGNOSTIC performed by Fritz Gastelum MD at [facility] 2020    COLORECTAL CANCER SCREENING, NOT HIGH RISK performed by Hal Romberg, MD at [address] [place] [address], COLON, DIAGNOSTIC      ESOPHAGEAL MOTILITY STUDY N/A 2020    PES RN - ESOPHAGEAL MOTILITY STUDY performed by Edith Jesus DO at [facility]    ESOPHAGEAL MOTILITY STUDY  9/10/2020    ESOPHAGEAL MOTILITY STUDY performed by dEith Jesus DO at [facility]    ESOPHAGEAL MOTILITY STUDY N/A 2020    ESOPHAGEAL MOTILITY STUDY ATTEMPTED PER DR. Merline Gonzalez WITHOUT SUCCESS performed by Ruth Louis DO at [facility]    FOOT SURGERY Left 2015    FOOT TENDON SURGERY Left 02/22/2019    LEFT PERONEAL TENDON REPAIR WITH POSSIBLE LEFT TENDON TRANSFER (Left )    KNEE ARTHROSCOPY Left     Two surgeries on left knee per pt    KNEE SURGERY Right     Three surgeries per pt    LIGAMENT REPAIR Left 2/22/2019    LEFT PERONEAL TENDON REPAIR performed by Ayan Ivy DPM at [facility] Bilateral 06/04/2021     BILATERAL L4/5, L5/S1 LUMBAR FACET (Bilateral )    NOSE SURGERY      PAIN MANAGEMENT PROCEDURE Bilateral 6/4/2021    BILATERAL L4/5, L5/S1 LUMBAR FACET performed by Yunier Marsh MD at [address] [place] [address]  06/11/2021    BILATERAL L4/5, L5/S1 NERVE BLOCK - Bilateral    PAIN MANAGEMENT PROCEDURE Bilateral 6/11/2021    BILATERAL L4/5, L5/S1 NERVE BLOCK performed by Yunier Marsh MD at [address] [place] [address] Right 07/23/2021    L4/5, L5/S1 NERVE RADIOFREQUENCY ABLATION     PAIN MANAGEMENT PROCEDURE Right 7/23/2021    right  L4/5, L5/S1 NERVE RADIOFREQUENCY ABLATION performed by Yunier Marsh MD at [address] [place] [address] Left 08/06/2021    left  L4/5, L5/S1 NERVE RADIOFREQUENCY ABLATION (Left )    PAIN MANAGEMENT PROCEDURE Left 8/6/2021    left  L4/5, L5/S1 NERVE RADIOFREQUENCY ABLATION performed by Yunier Marsh MD at [address] [facility]/OUTPT [facility] Left 6/29/2018    RESECTION/REMOVAL BONY NEOPLASM LEFT FIBULA, LEFT SOFT TISSUE MASS EXCISION WITH BONE BIOPSY LEFT ANKLE FIBULA performed by Ayan Ivy DPM at [address] Left     UPPER GASTROINTESTINAL ENDOSCOPY  5/23/2013    tertiary contractures esophagus, 3 to 4 cm hiatal hernia, antral gastritis, + H Pylori, mild active chronic esophagitis    UPPER GASTROINTESTINAL ENDOSCOPY  4/6/2016    one biopsy    UPPER GASTROINTESTINAL ENDOSCOPY N/A 2/10/2020    EGD BIOPSY performed by Luz Michaels MD at [facility] HISTORY           Problem Relation Age of Onset    Cancer Mother     Hypertension Mother     Stroke Mother     Cancer Maternal Aunt     Hypertension Father     Heart Surgery Father     Diabetes Neg Hx      Family Status   Relation Name Status    Mother  (Not Specified)   [facility]  (Not Specified)    Father  (Not Specified)    Neg Hx  (Not Specified)        SOCIAL HISTORY      reports that she quit smoking about 32 years ago. She has a 20.00 pack-year smoking history. She has never used smokeless tobacco. She reports current alcohol use. She reports that she does not use drugs. REVIEW OF SYSTEMS    (2-9 systems for level 4, 10 or more for level 5)     Review of Systems   Constitutional: Negative for chills, diaphoresis, fatigue and fever. Respiratory: Positive for shortness of breath. Negative for cough. Cardiovascular: Negative for chest pain, palpitations and leg swelling. Gastrointestinal: Negative for abdominal pain. Musculoskeletal: Positive for myalgias. Negative for arthralgias and back pain. Skin: Negative for color change, rash and wound. Neurological: Negative for dizziness, weakness, light-headedness, numbness and headaches. Except as noted above the remainder of the review of systems was reviewed and negative. PHYSICAL EXAM    (up to 7 for level 4, 8 or more for level 5)     ED Triage Vitals [08/25/21 1216]   BP Temp Temp Source Pulse Resp SpO2 Height Weight   (!) 151/90 98 °F (36.7 °C) Oral 61 17 99 % 5' (1.524 m) 153 lb (69.4 kg)     Physical Exam  Vitals reviewed. Constitutional:       General: She is not in acute distress. Appearance: She is well-developed. She is not diaphoretic. Eyes:      General: No scleral icterus. Conjunctiva/sclera: Conjunctivae normal.   Cardiovascular:      Rate and Rhythm: Normal rate. Pulses: Normal pulses. Pulmonary:      Effort: Pulmonary effort is normal. No respiratory distress. Breath sounds: No stridor. Musculoskeletal:      Left knee: No swelling or deformity. Normal range of motion. No tenderness. Right lower leg: No edema. Left lower leg: Tenderness present. No swelling, deformity or bony tenderness. No edema. Left ankle: No swelling or deformity. No tenderness. Normal range of motion. Normal pulse. Skin:     General: Skin is warm and dry. Capillary Refill: Capillary refill takes less than 2 seconds. Findings: No rash. Neurological:      Mental Status: She is alert and oriented to person, place, and time. Psychiatric:         Behavior: Behavior normal.           DIAGNOSTIC RESULTS     RADIOLOGY:   Non-plain film images such as CT, Ultrasound and MRI are read by the radiologist. Plain radiographic images are visualized and preliminarily interpreted by the emergency physician with the below findings:    Interpretation per the Radiologist below, if available at the time of this note:    Venous doppler of the LLE was negative. LABS:  Labs Reviewed   CBC WITH AUTO DIFFERENTIAL - Abnormal; Notable for the following components:       Result Value    Seg Neutrophils 72 (*)     Lymphocytes 18 (*)     Absolute Lymph # 0.96 (*)     All other components within normal limits   BASIC METABOLIC PANEL - Abnormal; Notable for the following components:    Glucose 130 (*)     CREATININE 0.97 (*)     GFR Non- 58 (*)     All other components within normal limits   D-DIMER, QUANTITATIVE       All other labs were within normal range or not returned as of this dictation.     EMERGENCY DEPARTMENT COURSE and DIFFERENTIAL DIAGNOSIS/MDM:   Vitals:    Vitals:    08/25/21 1216   BP: (!) 151/90   Pulse: 61   Resp: 17   Temp: 98 °F (36.7 °C)   TempSrc: Oral   SpO2: 99%   Weight: 153 lb (69.4 kg)   Height: 5' (1.524 m)       CLINICAL DECISION MAKING:  The patient presented alert with a nontoxic appearance and was seen in conjunction with Dr. Khadar Bourgeois. Laboratory studies were unremarkable, including Hgb. Venous doppler was negative for DVT. Follow up with pcp for further evaluation and treatment. Evaluation and treatment course in the ED, and plan of care upon discharge was discussed in length with the patient. Patient had no further questions prior to being discharged and was instructed to return to the ED for new or worsening symptoms. FINAL IMPRESSION      1. 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  Efforts were made to edit the dictations but occasionally words are mis-transcribed.)    PIETER Boland - PIETER Santiago CNP  08/25/21 4828

## 2021-08-25 NOTE — TELEPHONE ENCOUNTER
Received call from Leticia Marie at [facility] with [employer]. Brief description of triage: thinks she has a blood clot to her leg     Triage indicates for patient to Go ED now if anything  Worsens to call 911 she is going to call for transportation to the hospital now and if no transportation found she will call 911     Care advice provided, patient verbalizes understanding; denies any other questions or concerns; instructed to call back for any new or worsening symptoms. Attention Provider: Thank you for allowing me to participate in the care of your patient. The patient was connected to triage in response to information provided to the [facility]. Please do not respond through this encounter as the response is not directed to a shared pool. Reason for Disposition   Thigh or calf pain in only one leg and present > 1 hour    Answer Assessment - Initial Assessment Questions  1. ONSET: \"When did the pain start? \"       Four days ago     2. LOCATION: \"Where is the pain located? \"       Had grant horse the last four days and it is worsening and feels like it is traveling she has had blood clots three times to this leg in the past   Left leg calf     3. PAIN: \"How bad is the pain? \"    (Scale 1-10; or mild, moderate, severe)    -  MILD (1-3): doesn't interfere with normal activities     -  MODERATE (4-7): interferes with normal activities (e.g., work or school) or awakens from sleep, limping     -  SEVERE (8-10): excruciating pain, unable to do any normal activities, unable to walk      2 if still and if she moves it is a 7 out of 10     4. WORK OR EXERCISE: \"Has there been any recent work or exercise that involved this part of the body? \"       Na     5. CAUSE: \"What do you think is causing the leg pain? \"      Blood clots     6. OTHER SYMPTOMS: \"Do you have any other symptoms? \" (e.g., chest pain, back pain, breathing difficulty, swelling, rash, fever, numbness, weakness)      She is not able to see the calf for swelling but no difficulty breathing or chest pain very tender to touch   7. PREGNANCY: \"Is there any chance you are pregnant? \" \"When was your last menstrual period? \"      Na    Protocols used: LEG PAIN-ADULT-[place]

## 2021-08-25 NOTE — TELEPHONE ENCOUNTER
RECEIVED PHONE MESSAGES AT [phone] THIS AM INTO TRIAGE VM WITH CONCERN OF POSSIBLE RECURRING BLOOD CLOT TO LEG. NOTE WRITER WORKING IN [facility] AND RETRIEVED PHONE MESSAGES  [phone]. UPON REVIEW OF CHART PT IS CURRENTLY IN ER AT [facility] FOR EVALUATION OF COMPLAINTS.

## 2021-08-26 ENCOUNTER — TELEPHONE (OUTPATIENT)
Dept: FAMILY MEDICINE CLINIC | Age: 61
End: 2021-08-26

## 2021-08-26 NOTE — TELEPHONE ENCOUNTER
----- Message from Emmy James sent at 8/26/2021 12:41 PM EDT -----  Subject: Message to Provider    QUESTIONS  Information for Provider? Pt's dog has ringworm and she now has it and was   wondering what she should do about treating it on her. She would like a   call back from Betzy Ash.   ---------------------------------------------------------------------------  --------------  [address]  What is the best way for the office to contact you? OK to leave message on   voicemail  Preferred Call Back Phone Number? [phone]  ---------------------------------------------------------------------------  --------------  SCRIPT ANSWERS  Relationship to Patient?  Self

## 2021-08-27 ENCOUNTER — OFFICE VISIT (OUTPATIENT)
Dept: FAMILY MEDICINE CLINIC | Age: 61
End: 2021-08-27
Payer: MEDICARE

## 2021-08-27 VITALS
WEIGHT: 145.2 LBS | HEART RATE: 69 BPM | DIASTOLIC BLOOD PRESSURE: 70 MMHG | OXYGEN SATURATION: 96 % | SYSTOLIC BLOOD PRESSURE: 124 MMHG | BODY MASS INDEX: 28.36 KG/M2

## 2021-08-27 DIAGNOSIS — I10 ESSENTIAL HYPERTENSION: ICD-10-CM

## 2021-08-27 DIAGNOSIS — G89.29 CHRONIC BILATERAL LOW BACK PAIN WITH BILATERAL SCIATICA: ICD-10-CM

## 2021-08-27 DIAGNOSIS — M79.662 PAIN OF LEFT CALF: Primary | ICD-10-CM

## 2021-08-27 DIAGNOSIS — M54.42 CHRONIC BILATERAL LOW BACK PAIN WITH BILATERAL SCIATICA: ICD-10-CM

## 2021-08-27 DIAGNOSIS — M54.41 CHRONIC BILATERAL LOW BACK PAIN WITH BILATERAL SCIATICA: ICD-10-CM

## 2021-08-27 DIAGNOSIS — B35.9 RINGWORM: ICD-10-CM

## 2021-08-27 PROBLEM — K63.89 NARCOTIC BOWEL SYNDROME (HCC): Status: RESOLVED | Noted: 2021-06-29 | Resolved: 2021-08-27

## 2021-08-27 PROBLEM — T40.601A NARCOTIC BOWEL SYNDROME (HCC): Status: RESOLVED | Noted: 2021-06-29 | Resolved: 2021-08-27

## 2021-08-27 PROCEDURE — 99214 OFFICE O/P EST MOD 30 MIN: CPT | Performed by: FAMILY MEDICINE

## 2021-08-27 PROCEDURE — 1111F DSCHRG MED/CURRENT MED MERGE: CPT | Performed by: FAMILY MEDICINE

## 2021-08-27 RX ORDER — CLOTRIMAZOLE AND BETAMETHASONE DIPROPIONATE 10; .64 MG/G; MG/G
CREAM TOPICAL
Qty: 45 G | Refills: 0 | Status: SHIPPED | OUTPATIENT
Start: 2021-08-27 | End: 2021-09-13 | Stop reason: ALTCHOICE

## 2021-08-27 ASSESSMENT — ENCOUNTER SYMPTOMS
RESPIRATORY NEGATIVE: 1
EYES NEGATIVE: 1
GASTROINTESTINAL NEGATIVE: 1
ALLERGIC/IMMUNOLOGIC NEGATIVE: 1

## 2021-08-27 ASSESSMENT — PATIENT HEALTH QUESTIONNAIRE - PHQ9
SUM OF ALL RESPONSES TO PHQ QUESTIONS 1-9: 0
SUM OF ALL RESPONSES TO PHQ QUESTIONS 1-9: 0
2. FEELING DOWN, DEPRESSED OR HOPELESS: 0
1. LITTLE INTEREST OR PLEASURE IN DOING THINGS: 0
SUM OF ALL RESPONSES TO PHQ9 QUESTIONS 1 & 2: 0
SUM OF ALL RESPONSES TO PHQ QUESTIONS 1-9: 0

## 2021-08-27 NOTE — PROGRESS NOTES
Post-Discharge Transitional Care Management Services or Hospital Follow Up      Jacquelin Mena   YOB: 1960    Date of Office Visit:  8/27/2021  Date of Hospital Admission: 8/25/21  Date of Hospital Discharge: 8/25/21  Readmission Risk Score(high >=14%.  Medium >=10%):Readmission Risk Score: 11      Care management risk score Rising risk (score 2-5) and Complex Care (Scores >=6): 5     Non face to face  following discharge, date last encounter closed (first attempt may have been earlier): *No documented post hospital discharge outreach found in the last 14 days *No documented post hospital discharge outreach found in the last 14 days    Call initiated 2 business days of discharge: *No response recorded in the last 14 days     Patient Active Problem List   Diagnosis    Attention deficit hyperactivity disorder (ADHD)    Narcolepsy    Moderate persistent asthma without complication    History of DVT (deep vein thrombosis)    Hiatal hernia    Dysphagia    Cervical radiculopathy    Ganglion cyst    Chronic pain of left ankle    Acquired trigger finger    Osteoarthritis of knee    Osteoarthritis of wrist    Pes anserinus bursitis    Plantar fasciitis    Flexion contracture of joint of foot, left    Essential hypertension    Chronic anticoagulation    Iron deficiency anemia due to chronic blood loss    GERD (gastroesophageal reflux disease)    Absolute anemia    Anemia    Generalized anxiety disorder    Vitamin B12 deficiency    Iron malabsorption    Fall at home, initial encounter    Closed fracture of multiple ribs of right side    Hemothorax on right    Contusion of right lung    Chronic fatigue syndrome    Sleep apnea    Pain in joint involving ankle and foot    Lumbosacral spondylosis without myelopathy    Chronic bilateral low back pain with bilateral sciatica    Encounter for long-term opiate analgesic use       Allergies   Allergen Reactions    Latex Itching    Medications patient taking as of now reconciled against medications ordered at time of hospital discharge: Yes    Chief Complaint   Patient presents with    Rash     left upper arm    ED Follow-up       [address] [facility]       Pt Name: Charlie Rm  MRN: [id number]  [place] 1960  Date of evaluation: 8/25/2021  Provider: PIETER Nelson CNP     CHIEF COMPLAINT       Chief Complaint  Patient presents with   Leg Pain      pt states leg pain to the left calf x 4 days, pt states she called PCP and was advised to come in            HISTORY OF PRESENT ILLNESS  (Location/Symptom, Timing/Onset, Context/Setting, Quality, Duration, Modifying Factors, Severity.)  Charlie Rm is a 57 y.o. female who presents to the emergency department via private auto for left calf pain. Onset was 4 days ago. Denies fever, chills, weakness, injury. She has a history of DVTs. Denies fever, chills, dizziness, chest pain. She has some SOB with exertion. Reports a history of anemia. Rates her pain 5/10. CLINICAL DECISION MAKING:  The patient presented alert with a nontoxic appearance and was seen in conjunction with Dr. Edis Diaz. Laboratory studies were unremarkable, including Hgb. Venous doppler was negative for DVT. Follow up with pcp for further evaluation and treatment. Evaluation and treatment course in the ED, and plan of care upon discharge was discussed in length with the patient. Elise Pugh had no further questions prior to being discharged and was instructed to return to the ED for new or worsening symptoms. Tinea  Patient complains of probable tinea. Lesion is located on the left bicep. Symptoms include rash located same area. Symptoms have been ongoing for about a few days. Previous evaluation and treatment has included none with poor improvement. Patient denies alcohol overuse, fever, hematemesis and melena.       Inpatient course: Discharge summary reviewed- see person, place, and time. Mental status is at baseline. Psychiatric:         Mood and Affect: Mood normal.         Behavior: Behavior normal.         Thought Content:  Thought content normal.         Judgment: Judgment normal.             Assessment/Plan:  Problem List Items Addressed This Visit        Circulatory    Essential hypertension      Well-controlled, continue current medications, continue current treatment plan, medication adherence emphasized and lifestyle modifications recommended            Nervous and Auditory    Chronic bilateral low back pain with bilateral sciatica      Monitored by specialist- no acute findings meriting change in the plan           Other Visit Diagnoses     Pain of left calf    -  Primary    Reviewed ED notes and imaging    Relevant Orders    [place] DISCHARGE MEDS RECONCILED W/ CURRENT OUTPATIENT MED LIST (Completed)    Ringworm        Relevant Medications    clotrimazole-betamethasone (LOTRISONE) 1-0.05 % cream              Medical Decision Making: moderate complexity

## 2021-08-27 NOTE — PATIENT INSTRUCTIONS
Patient Education        Ringworm: Care Instructions  Your Care Instructions  Ringworm is a fungus infection of the skin. It is not caused by a worm. Ringworm causes a round, scaly rash that may crack and itch. The rash can spread over a wide area. One type of fungus that causes ringworm is often found in locker rooms and swimming pools. It grows well in warm, moist areas of the skin, such as in skin folds. You can get ringworm by sharing towels, clothing, and sports equipment. You can also get it by touching someone who has ringworm. Ringworm is treated with cream that kills the fungus. If the rash is widespread, you may need pills to get rid of it. Ringworm often comes back after treatment. If the rash becomes infected with bacteria, you may need antibiotics. Follow-up care is a key part of your treatment and safety. Be sure to make and go to all appointments, and call your doctor if you are having problems. It's also a good idea to know your test results and keep a list of the medicines you take. How can you care for yourself at home? · Take your medicines exactly as prescribed. Call your doctor if you have any problems with your medicine. · Wash the rash with soap and water, remove flaky skin, and dry thoroughly. · Try an over-the-counter antifungal cream. Spread the cream beyond the edge or border of the rash. Follow the directions on the package. Do not stop using the medicine just because your skin clears up. You will probably need to continue treatment for 2 to 4 weeks or longer. · To avoid spreading it, wash your hands well after treating or touching the rash. · To keep from getting another infection:  ? Do not go barefoot in public places such as gyms or locker rooms. Avoid sharing towels and clothes. Use flip-flops or some other type of shoe in the shower. ? Do not wear tight clothes or let your skin stay damp for long periods, such as by staying in a wet bathing suit or sweaty clothes.   When should you call for help? Call your doctor now or seek immediate medical care if:    · You have signs of infection such as:  ? Pain, warmth, or swelling in your skin. ? Red streaks near a wound in the skin. ? Pus coming from the rash on your skin. ? A fever. Watch closely for changes in your health, and be sure to contact your doctor if:    · Your ringworm does not improve after 2 weeks of treatment.     · You do not get better as expected. Where can you learn more? Go to [email or web address] and sign in to your [employer] account. Enter U973 in the ShieldEffect box to learn more about \"Ringworm: Care Instructions. \"     If you do not have an account, please click on the \"Sign Up Now\" link. Current as of: March 3, 2021               Content Version: 12.9  © 5413-5532 Healthwise, Incorporated. Care instructions adapted under license by [facility] ([facility]). If you have questions about a medical condition or this instruction, always ask your healthcare professional. [address] any warranty or liability for your use of this information.

## 2021-08-30 DIAGNOSIS — F32.A DEPRESSION, UNSPECIFIED DEPRESSION TYPE: ICD-10-CM

## 2021-08-30 DIAGNOSIS — Z63.4 BEREAVEMENT: ICD-10-CM

## 2021-08-30 SDOH — SOCIAL STABILITY - SOCIAL INSECURITY: DISSAPEARANCE AND DEATH OF FAMILY MEMBER: Z63.4

## 2021-08-30 NOTE — TELEPHONE ENCOUNTER
Alana Mcneil is calling to request a refill on the following medication(s):    Medication Request:  Requested Prescriptions     Pending Prescriptions Disp Refills    buPROPion HCl ER, XL, 450 MG TB24 [Pharmacy Med Name: buPROPion HCL  MG TABLET] 30 tablet 5     Sig: TAKE ONE TABLET BY MOUTH EVERY MORNING       Last Visit Date (If Applicable):  9/21/4304    Next Visit Date:    9/15/2021

## 2021-08-31 RX ORDER — BUPROPION HYDROCHLORIDE 450 MG/1
TABLET, FILM COATED, EXTENDED RELEASE ORAL
Qty: 30 TABLET | Refills: 5 | Status: SHIPPED | OUTPATIENT
Start: 2021-08-31 | End: 2022-05-11

## 2021-09-08 ENCOUNTER — TELEPHONE (OUTPATIENT)
Dept: FAMILY MEDICINE CLINIC | Age: 61
End: 2021-09-08

## 2021-09-08 NOTE — TELEPHONE ENCOUNTER
----- Message from Sandor Izaguirre sent at 9/8/2021  1:56 PM EDT -----  Subject: Message to Provider    QUESTIONS  Information for Provider? Patient calling to request a medication for a   sinus infection. Please call patient to advise [facility] [address], [address] [facility], [address] [place] [address] Phone? [phone]  Fax? [phone]  ---------------------------------------------------------------------------  --------------  Peggy RUELAS  What is the best way for the office to contact you? OK to leave message on   voicemail  Preferred Call Back Phone Number? [phone]  ---------------------------------------------------------------------------  --------------  SCRIPT ANSWERS  Relationship to Patient?  Self

## 2021-09-09 NOTE — TELEPHONE ENCOUNTER
Manasa Alonso needs to be seen in the office for evaluation. Can she come in today by chance?  If so, please come and see me so we can discuss a time to fit her in DISPLAY PLAN FREE TEXT

## 2021-09-13 ENCOUNTER — VIRTUAL VISIT (OUTPATIENT)
Dept: PAIN MANAGEMENT | Age: 61
End: 2021-09-13
Payer: MEDICARE

## 2021-09-13 DIAGNOSIS — M25.512 PAIN OF BOTH SHOULDER JOINTS: ICD-10-CM

## 2021-09-13 DIAGNOSIS — M54.12 CERVICAL RADICULOPATHY: Primary | ICD-10-CM

## 2021-09-13 DIAGNOSIS — M25.511 PAIN OF BOTH SHOULDER JOINTS: ICD-10-CM

## 2021-09-13 DIAGNOSIS — M47.817 LUMBOSACRAL SPONDYLOSIS WITHOUT MYELOPATHY: ICD-10-CM

## 2021-09-13 PROCEDURE — 99213 OFFICE O/P EST LOW 20 MIN: CPT | Performed by: PAIN MEDICINE

## 2021-09-13 RX ORDER — OXYCODONE HYDROCHLORIDE AND ACETAMINOPHEN 5; 325 MG/1; MG/1
1 TABLET ORAL EVERY 8 HOURS PRN
Qty: 90 TABLET | Refills: 0 | Status: SHIPPED | OUTPATIENT
Start: 2021-09-13 | End: 2021-10-11 | Stop reason: SDUPTHER

## 2021-09-13 ASSESSMENT — ENCOUNTER SYMPTOMS: BACK PAIN: 1

## 2021-09-13 NOTE — PROGRESS NOTES
HPI:     Back Pain  This is a chronic problem. The current episode started more than 1 year ago. The problem occurs constantly. The problem has been gradually worsening since onset. The pain is present in the lumbar spine. The quality of the pain is described as aching. The pain is moderate. The pain is the same all the time. The symptoms are aggravated by position. Stiffness is present all day. Chronic neck and back pain. Imaging with changes and varying levels of gnosis. She had a recent RFA for her low back with some benefit, or on the right than the left. Is also complaining of bilateral shoulder pain. Dependent for pain control. Takes Percocet 3 times a day. Has been stable and compliant. Patient denies any new neurological symptoms. Nobowel or bladder incontinence, no weakness, and no falling. Review of OARRS does not show any aberrant prescription behavior. Medication is helping the patient stay active. Patient denies any side effects and reports adequate analgesia. No sign of misuse/abuse. Past Medical History:   Diagnosis Date    Acid reflux     Adult ADHD 8/1/2012    Asthma 8/1/2012    Bipolar 1 disorder ([facility] [address]     Broken foot 2014    Left foot    Chest pain of uncertain etiology 85/55/1272    Last episode was in 2013 (Written 02/12/2019)    Depression     Under control per pt.  on 6/15/18    DVT of leg (deep venous thrombosis) ([address] 8/22/2014    Fracture     right foot / pt denies surgical intervention    Helicobacter pylori (H. pylori) infection 2013    per EGD    Hematuria 04/2018    Has a follow-up with Urologist 6/20    Hiatal hernia     History of blood transfusion     History of DVT (deep vein thrombosis) 8/1/2012    Hx of blood clots     Hypertension     Internal hemorrhoids     Narcolepsy 8/1/2012    Osteoporosis        Past Surgical History:   Procedure Laterality Date    ANKLE SURGERY Left 06/29/2018    mass excision    ARM SURGERY Right     Pt states she has had 13 right arm surgeries    BREAST ENHANCEMENT SURGERY Bilateral      SECTION      x 3    COLONOSCOPY  2013    sigmoid diverticula, prominent large internal hemorrhoid    COLONOSCOPY N/A 2/10/2020    COLONOSCOPY DIAGNOSTIC performed by Bernard Etienne MD at [facility] 2020    COLORECTAL CANCER SCREENING, NOT HIGH RISK performed by Dionisio Perez MD at [address], COLON, DIAGNOSTIC      ESOPHAGEAL MOTILITY STUDY N/A 2020    PES RN - ESOPHAGEAL MOTILITY STUDY performed by Alana Gomez DO at [address] STUDY  9/10/2020    ESOPHAGEAL MOTILITY STUDY performed by Alana Gomez DO at [facility]    ESOPHAGEAL MOTILITY STUDY N/A 2020    ESOPHAGEAL MOTILITY STUDY ATTEMPTED PER DR. Lynch Sportsman WITHOUT SUCCESS performed by Alana Gomez DO at [facility]    FOOT SURGERY Left 2015    FOOT TENDON SURGERY Left 2019    LEFT PERONEAL TENDON REPAIR WITH POSSIBLE LEFT TENDON TRANSFER (Left )    KNEE ARTHROSCOPY Left     Two surgeries on left knee per pt    KNEE SURGERY Right     Three surgeries per pt    LIGAMENT REPAIR Left 2019    LEFT PERONEAL TENDON REPAIR performed by Tyler Duran DPM at [facility] Bilateral 2021     BILATERAL L4/5, L5/S1 LUMBAR FACET (Bilateral )    NOSE SURGERY      PAIN MANAGEMENT PROCEDURE Bilateral 2021    BILATERAL L4/5, L5/S1 LUMBAR FACET performed by Shyam Evans MD at [address] [place] [address]  2021    BILATERAL L4/5, L5/S1 NERVE BLOCK - Bilateral    PAIN MANAGEMENT PROCEDURE Bilateral 2021    BILATERAL L4/5, L5/S1 NERVE BLOCK performed by Shyam Evans MD at [address] [place] [address] Right 2021    L4/5, L5/S1 NERVE RADIOFREQUENCY ABLATION     PAIN MANAGEMENT PROCEDURE Right 2021    right  L4/5, L5/S1 NERVE RADIOFREQUENCY ABLATION performed by Shyam Evans MD at [facility] Left 08/06/2021    left  L4/5, L5/S1 NERVE RADIOFREQUENCY ABLATION (Left )    PAIN MANAGEMENT PROCEDURE Left 8/6/2021    left  L4/5, L5/S1 NERVE RADIOFREQUENCY ABLATION performed by Thony Lau MD at [address] OFFICE/OUTPT [facility] Left 6/29/2018    RESECTION/REMOVAL BONY NEOPLASM LEFT FIBULA, LEFT SOFT TISSUE MASS EXCISION WITH BONE BIOPSY LEFT ANKLE FIBULA performed by Yanira Billings DPM at [address] Left     UPPER GASTROINTESTINAL ENDOSCOPY  5/23/2013    tertiary contractures esophagus, 3 to 4 cm hiatal hernia, antral gastritis, + H Pylori, mild active chronic esophagitis    UPPER GASTROINTESTINAL ENDOSCOPY  4/6/2016    one biopsy    UPPER GASTROINTESTINAL ENDOSCOPY N/A 2/10/2020    EGD BIOPSY performed by Dallin Knott MD at [address]         Allergies   Allergen Reactions    Latex Itching    Prozac [Fluoxetine Hcl]     Sulfa Antibiotics      migraine    Codeine Itching         Current Outpatient Medications:     oxyCODONE-acetaminophen (PERCOCET) 5-325 MG per tablet, Take 1 tablet by mouth every 8 hours as needed for Pain for up to 30 days. , Disp: 90 tablet, Rfl: 0    buPROPion HCl ER, XL, 450 MG TB24, TAKE ONE TABLET BY MOUTH EVERY MORNING, Disp: 30 tablet, Rfl: 5    vitamin E 400 UNIT capsule, Take 400 Units by mouth daily, Disp: , Rfl:     pantoprazole (PROTONIX) 40 MG tablet, Take 1 tablet by mouth daily, Disp: 90 tablet, Rfl: 1    alendronate (FOSAMAX) 70 MG tablet, TAKE 1 TABLET BY MOUTH ONCE WEEKLY BEFORE BREAKFAST, ON AN EMPTY STOMACH: REMAIN UPRIGHT FOR 30 MINUTES:TAKE WITH 8 OUNCES OF WATER, Disp: 12 tablet, Rfl: 10    amphetamine-dextroamphetamine (ADDERALL XR) 30 MG extended release capsule, Take 2 capsules by mouth daily for 30 days. , Disp: 60 capsule, Rfl: 0    propranolol (INDERAL LA) 80 MG extended release capsule, Take 1 capsule by mouth daily, Disp: 90 capsule, Rfl: 1    albuterol sulfate HFA (VENTOLIN HFA) 108 (90 Base) MCG/ACT inhaler, INHALE TWO PUFFS BY MOUTH EVERY 4 HOURS AS NEEDED FOR WHEEZING, Disp: 1 Inhaler, Rfl: 5    citalopram (CELEXA) 40 MG tablet, TAKE ONE TABLET BY MOUTH DAILY, Disp: 90 tablet, Rfl: 3    albuterol (PROVENTIL) (2.5 MG/3ML) 0.083% nebulizer solution, Take 2.5 mg by nebulization every 6 hours as needed for Wheezing or Shortness of Breath , Disp: , Rfl:     Calcium Carbonate (CALCIUM 600 PO), Take 600 mg by mouth daily, Disp: , Rfl:     Family History   Problem Relation Age of Onset    Cancer Mother     Hypertension Mother     Stroke Mother     Cancer Maternal Aunt     Hypertension Father     Heart Surgery Father     Diabetes Neg Hx        Social History     Socioeconomic History    Marital status: Single     Spouse name: Not on file    Number of children: Not on file    Years of education: Not on file    Highest education level: Not on file   Occupational History    Not on file   Tobacco Use    Smoking status: Former Smoker     Packs/day: 1.00     Years: 20.00     Pack years: 20.00     Quit date:      Years since quittin.7    Smokeless tobacco: Never Used   Vaping Use    Vaping Use: Never used   Substance and Sexual Activity    Alcohol use: Yes     Comment: rare    Drug use: No    Sexual activity: Not Currently   Other Topics Concern    Not on file   Social History Narrative    Not on file     Social Determinants of Health     Financial Resource Strain: Low Risk     Difficulty of Paying Living Expenses: Not hard at all   Food Insecurity: No Food Insecurity    Worried About Running Out of Food in the Last Year: Never true    Brigid of Food in the Last Year: Never true   Transportation Needs:     Lack of Transportation (Medical):      Lack of Transportation (Non-Medical):    Physical Activity:     Days of Exercise per Week:     Minutes of Exercise per Session:    Stress:     Feeling of Stress :    Social Connections:     Frequency of Communication with Friends and Family:     Frequency of Social Gatherings with Friends and Family:     Attends Druze Services:     Active Member of Clubs or Organizations:     Attends Club or Organization Meetings:     Marital Status:    Intimate Partner Violence:     Fear of Current or Ex-Partner:     Emotionally Abused:     Physically Abused:     Sexually Abused:        Review of Systems:  Review of Systems   Musculoskeletal: Positive for back pain. Physical Exam:  There were no vitals taken for this visit. Physical Exam  Constitutional:       Appearance: Normal appearance. Pulmonary:      Effort: Pulmonary effort is normal.   Neurological:      Mental Status: She is alert. Psychiatric:         Attention and Perception: Attention and perception normal.         Mood and Affect: Mood and affect normal.         Record/Diagnostics Review:    As above, I did review the imaging    Orders:  Orders Placed This Encounter   Procedures    Ambulatory referral to Neurosurgery    Ambulatory referral to Orthopedic Surgery     Orders Placed This Encounter   Medications    oxyCODONE-acetaminophen (PERCOCET) 5-325 MG per tablet     Sig: Take 1 tablet by mouth every 8 hours as needed for Pain for up to 30 days. Dispense:  90 tablet     Refill:  0     Reduce doses taken as pain becomes manageable       Assessment:  1. Cervical radiculopathy    2. Lumbosacral spondylosis without myelopathy    3. Pain of both shoulder joints        Treatment Plan:  DISCUSSION: Treatment options discussed with patient and all questions answered to patient's satisfaction. OARRS Review: Reviewed and acceptable for medications prescribed. TREATMENT OPTIONS:     Discussed different treatment options including continued conservative care such as physical therapy, chiropractic care, acupuncture.   Discussed different interventional options such as epidural steroids or medial branch blocks. Also discussed neuromodulation in the form of spinal cord stimulation. Also discussed surgical evaluation. Ortho for the shoulders. Neurosurgery for the cervical and lumbar radiculopathy. Consider further interventions in the future. Due to the high risk nature of this patient's pain medication close monitoring is required. Medication risk and benefits discussed. Continue current medication management, has been stable and compliant. Kelly Castellanos M.D. I have reviewed the chief complaint and history of present illness (including ROS and PFSH) and vital documentation by my staff and I agree with their documentation and have added where applicable.

## 2021-09-15 ENCOUNTER — OFFICE VISIT (OUTPATIENT)
Dept: FAMILY MEDICINE CLINIC | Age: 61
End: 2021-09-15
Payer: MEDICARE

## 2021-09-15 VITALS
DIASTOLIC BLOOD PRESSURE: 66 MMHG | OXYGEN SATURATION: 97 % | HEART RATE: 64 BPM | WEIGHT: 146.2 LBS | BODY MASS INDEX: 28.55 KG/M2 | SYSTOLIC BLOOD PRESSURE: 126 MMHG

## 2021-09-15 DIAGNOSIS — I10 ESSENTIAL HYPERTENSION: Primary | ICD-10-CM

## 2021-09-15 DIAGNOSIS — F90.9 ATTENTION DEFICIT HYPERACTIVITY DISORDER (ADHD), UNSPECIFIED ADHD TYPE: ICD-10-CM

## 2021-09-15 DIAGNOSIS — G89.29 CHRONIC LEFT SHOULDER PAIN: ICD-10-CM

## 2021-09-15 DIAGNOSIS — R09.82 POST-NASAL DRIP: ICD-10-CM

## 2021-09-15 DIAGNOSIS — M25.512 CHRONIC LEFT SHOULDER PAIN: ICD-10-CM

## 2021-09-15 DIAGNOSIS — K21.9 GASTROESOPHAGEAL REFLUX DISEASE, UNSPECIFIED WHETHER ESOPHAGITIS PRESENT: ICD-10-CM

## 2021-09-15 DIAGNOSIS — F41.1 GENERALIZED ANXIETY DISORDER: ICD-10-CM

## 2021-09-15 PROCEDURE — 99214 OFFICE O/P EST MOD 30 MIN: CPT | Performed by: FAMILY MEDICINE

## 2021-09-15 RX ORDER — PANTOPRAZOLE SODIUM 40 MG/1
40 TABLET, DELAYED RELEASE ORAL DAILY
Qty: 90 TABLET | Refills: 1 | Status: SHIPPED | OUTPATIENT
Start: 2021-09-15 | End: 2022-07-27 | Stop reason: SDUPTHER

## 2021-09-15 RX ORDER — FLUTICASONE PROPIONATE 50 MCG
2 SPRAY, SUSPENSION (ML) NASAL DAILY
Qty: 16 G | Refills: 0 | Status: SHIPPED | OUTPATIENT
Start: 2021-09-15 | End: 2021-10-28 | Stop reason: ALTCHOICE

## 2021-09-15 RX ORDER — LORATADINE 10 MG/1
10 TABLET ORAL DAILY
Qty: 30 TABLET | Refills: 0 | Status: SHIPPED | OUTPATIENT
Start: 2021-09-15 | End: 2021-10-15

## 2021-09-15 ASSESSMENT — ENCOUNTER SYMPTOMS
NAUSEA: 0
ALLERGIC/IMMUNOLOGIC NEGATIVE: 1
CHOKING: 0
SHORTNESS OF BREATH: 0
BLURRED VISION: 0
SORE THROAT: 0
COUGH: 0
BELCHING: 0
WATER BRASH: 0
HEARTBURN: 1
RESPIRATORY NEGATIVE: 1
HOARSE VOICE: 0
WHEEZING: 0
GLOBUS SENSATION: 0
STRIDOR: 0
ORTHOPNEA: 0
EYES NEGATIVE: 1

## 2021-09-15 ASSESSMENT — PATIENT HEALTH QUESTIONNAIRE - PHQ9
2. FEELING DOWN, DEPRESSED OR HOPELESS: 0
SUM OF ALL RESPONSES TO PHQ QUESTIONS 1-9: 0
SUM OF ALL RESPONSES TO PHQ QUESTIONS 1-9: 0
1. LITTLE INTEREST OR PLEASURE IN DOING THINGS: 0
SUM OF ALL RESPONSES TO PHQ QUESTIONS 1-9: 0
SUM OF ALL RESPONSES TO PHQ9 QUESTIONS 1 & 2: 0

## 2021-09-15 NOTE — PATIENT INSTRUCTIONS
Patient Education        DASH Diet: Care Instructions  Your Care Instructions     The DASH diet is an eating plan that can help lower your blood pressure. DASH stands for Dietary Approaches to Stop Hypertension. Hypertension is high blood pressure. The DASH diet focuses on eating foods that are high in calcium, potassium, and magnesium. These nutrients can lower blood pressure. The foods that are highest in these nutrients are fruits, vegetables, low-fat dairy products, nuts, seeds, and legumes. But taking calcium, potassium, and magnesium supplements instead of eating foods that are high in those nutrients does not have the same effect. The DASH diet also includes whole grains, fish, and poultry. The DASH diet is one of several lifestyle changes your doctor may recommend to lower your high blood pressure. Your doctor may also want you to decrease the amount of sodium in your diet. Lowering sodium while following the DASH diet can lower blood pressure even further than just the DASH diet alone. Follow-up care is a key part of your treatment and safety. Be sure to make and go to all appointments, and call your doctor if you are having problems. It's also a good idea to know your test results and keep a list of the medicines you take. How can you care for yourself at home? Following the DASH diet  · Eat 4 to 5 servings of fruit each day. A serving is 1 medium-sized piece of fruit, ½ cup chopped or canned fruit, 1/4 cup dried fruit, or 4 ounces (½ cup) of fruit juice. Choose fruit more often than fruit juice. · Eat 4 to 5 servings of vegetables each day. A serving is 1 cup of lettuce or raw leafy vegetables, ½ cup of chopped or cooked vegetables, or 4 ounces (½ cup) of vegetable juice. Choose vegetables more often than vegetable juice. · Get 2 to 3 servings of low-fat and fat-free dairy each day. A serving is 8 ounces of milk, 1 cup of yogurt, or 1 ½ ounces of cheese. · Eat 6 to 8 servings of grains each day.  A low-fat dip as an appetizer instead of chips and dip. ? Sprinkle sunflower seeds or chopped almonds over salads. Or try adding chopped walnuts or almonds to cooked vegetables. ? Try some vegetarian meals using beans and peas. Add garbanzo or kidney beans to salads. Make burritos and tacos with mashed dewey beans or black beans. Where can you learn more? Go to https://[employer]. org and sign in to your [employer] account. Enter J650 in the Colto box to learn more about \"DASH Diet: Care Instructions. \"     If you do not have an account, please click on the \"Sign Up Now\" link. Current as of: August 31, 2020               Content Version: 12.9  © 2121-0514 [employer]wise, Incorporated. Care instructions adapted under license by [facility] [address] If you have questions about a medical condition or this instruction, always ask your healthcare professional. [address] any warranty or liability for your use of this information.

## 2021-09-15 NOTE — PROGRESS NOTES
[facility] Progress Note    Date:9/15/2021         Patient Name:Latisha Olsen     YOB: 1960     Age:61 y.o. Assessment/Plan        Problem List Items Addressed This Visit        Circulatory    Essential hypertension - Primary      Well-controlled, continue current medications, continue current treatment plan, medication adherence emphasized and lifestyle modifications recommended            Digestive    GERD (gastroesophageal reflux disease)      Well-controlled, continue current medications, continue current treatment plan, medication adherence emphasized and lifestyle modifications recommended         Relevant Medications    pantoprazole (PROTONIX) 40 MG tablet       Other    Attention deficit hyperactivity disorder (ADHD)      Monitored by specialist- no acute findings meriting change in the plan         Generalized anxiety disorder      Borderline controlled, continue current medications and continue current treatment plan           Other Visit Diagnoses     Post-nasal drip        Relevant Medications    fluticasone (FLONASE) 50 MCG/ACT nasal spray    loratadine (CLARITIN) 10 MG tablet    Chronic left shoulder pain        Seeing ortho           Return in about 3 months (around 12/15/2021). Electronically signed by Osito Richter DO on 9/15/21         Subjective     Tate Winston is a 57 y.o. female presenting today for   Chief Complaint   Patient presents with    3 Month Follow-Up   . ADD/ADHD:  Current treatment: Adderall- , which has been effective. Residual symptoms: none. Medication side effects: None. Patient denies anorexia, involuntary weight loss and palpitations. Has psych set up    Tate Winston is a 57 y.o. female who presents for follow up of anxiety disorder. Current symptoms: difficulty concentrating, irritable, racing thoughts. She denies current suicidal and homicidal ideation. She complains of the following side effects from the treatment: none.  Has psych set up      Hypertension  This is a chronic problem. The current episode started more than 1 year ago. The problem has been gradually improving since onset. The problem is controlled. Associated symptoms include anxiety. Pertinent negatives include no blurred vision, malaise/fatigue, neck pain, orthopnea, palpitations, peripheral edema, PND, shortness of breath or sweats. Past treatments include beta blockers. The current treatment provides significant improvement. Gastroesophageal Reflux  She complains of heartburn. She reports no belching, no choking, no coughing, no dysphagia, no early satiety, no globus sensation, no hoarse voice, no nausea, no sore throat, no stridor, no tooth decay, no water brash or no wheezing. This is a chronic problem. The current episode started more than 1 year ago. The problem occurs frequently. The problem has been waxing and waning. The symptoms are aggravated by certain foods. Pertinent negatives include no anemia, fatigue, melena, muscle weakness or orthopnea. She has tried a histamine-2 antagonist for the symptoms. The treatment provided significant relief. Review of Systems   Review of Systems   Constitutional: Negative. Negative for fatigue and malaise/fatigue. HENT: Negative. Negative for hoarse voice and sore throat. Eyes: Negative. Negative for blurred vision. Respiratory: Negative. Negative for cough, choking, shortness of breath and wheezing. Cardiovascular: Negative. Negative for palpitations, orthopnea and PND. Gastrointestinal: Positive for heartburn. Negative for dysphagia, melena and nausea. Endocrine: Negative. Genitourinary: Negative. Musculoskeletal: Negative. Negative for muscle weakness and neck pain. Skin: Negative. Allergic/Immunologic: Negative. Neurological: Negative. Hematological: Negative. Psychiatric/Behavioral: Negative. All other systems reviewed and are negative.       Medications     Current Outpatient Medications   Medication Sig Dispense Refill    fluticasone (FLONASE) 50 MCG/ACT nasal spray 2 sprays by Each Nostril route daily 16 g 0    loratadine (CLARITIN) 10 MG tablet Take 1 tablet by mouth daily 30 tablet 0    pantoprazole (PROTONIX) 40 MG tablet Take 1 tablet by mouth daily 90 tablet 1    oxyCODONE-acetaminophen (PERCOCET) 5-325 MG per tablet Take 1 tablet by mouth every 8 hours as needed for Pain for up to 30 days. 90 tablet 0    buPROPion HCl ER, XL, 450 MG TB24 TAKE ONE TABLET BY MOUTH EVERY MORNING 30 tablet 5    vitamin E 400 UNIT capsule Take 400 Units by mouth daily      alendronate (FOSAMAX) 70 MG tablet TAKE 1 TABLET BY MOUTH ONCE WEEKLY BEFORE BREAKFAST, ON AN EMPTY STOMACH: REMAIN UPRIGHT FOR 30 MINUTES:TAKE WITH 8 OUNCES OF WATER 12 tablet 10    propranolol (INDERAL LA) 80 MG extended release capsule Take 1 capsule by mouth daily 90 capsule 1    albuterol sulfate HFA (VENTOLIN HFA) 108 (90 Base) MCG/ACT inhaler INHALE TWO PUFFS BY MOUTH EVERY 4 HOURS AS NEEDED FOR WHEEZING 1 Inhaler 5    citalopram (CELEXA) 40 MG tablet TAKE ONE TABLET BY MOUTH DAILY 90 tablet 3    albuterol (PROVENTIL) (2.5 MG/3ML) 0.083% nebulizer solution Take 2.5 mg by nebulization every 6 hours as needed for Wheezing or Shortness of Breath       Calcium Carbonate (CALCIUM 600 PO) Take 600 mg by mouth daily      amphetamine-dextroamphetamine (ADDERALL XR) 30 MG extended release capsule Take 2 capsules by mouth daily for 30 days. 60 capsule 0     No current facility-administered medications for this visit.        Past History    Past Medical History:   has a past medical history of Acid reflux, Adult ADHD, Asthma, Bipolar 1 disorder ([address] Broken foot, Chest pain of uncertain etiology, Depression, DVT of leg (deep venous thrombosis) ([address] Fracture, Helicobacter pylori (H. pylori) infection, Hematuria, Hiatal hernia, History of blood transfusion, History of DVT (deep vein thrombosis), Hx of blood clots, Hypertension, Internal hemorrhoids, Narcolepsy, and Osteoporosis. Social History:   reports that she quit smoking about 32 years ago. She has a 20.00 pack-year smoking history. She has never used smokeless tobacco. She reports current alcohol use. She reports that she does not use drugs.      Family History:   Family History   Problem Relation Age of Onset   Lenny Loser Cancer Mother     Hypertension Mother     Stroke Mother     Cancer Maternal Aunt     Hypertension Father     Heart Surgery Father     Diabetes Neg Hx        Surgical History:   Past Surgical History:   Procedure Laterality Date    ANKLE SURGERY Left 2018    mass excision    ARM SURGERY Right     Pt states she has had 13 right arm surgeries    BREAST ENHANCEMENT SURGERY Bilateral      SECTION      x 3    COLONOSCOPY  2013    sigmoid diverticula, prominent large internal hemorrhoid    COLONOSCOPY N/A 2/10/2020    COLONOSCOPY DIAGNOSTIC performed by Arash Mauro MD at [facility] 2020    COLORECTAL CANCER SCREENING, NOT HIGH RISK performed by Naldo Leach MD at [address], COLON, DIAGNOSTIC      ESOPHAGEAL MOTILITY STUDY N/A 2020    PES RN - ESOPHAGEAL MOTILITY STUDY performed by Alfreda Rutledge DO at [facility] STUDY  9/10/2020    ESOPHAGEAL MOTILITY STUDY performed by Alfreda Rutledge DO at [facility]    ESOPHAGEAL MOTILITY STUDY N/A 2020    ESOPHAGEAL MOTILITY STUDY ATTEMPTED PER DR. Raad Alvarez WITHOUT SUCCESS performed by Alfreda Rutledge DO at [facility]    FOOT SURGERY Left     FOOT TENDON SURGERY Left 2019    LEFT PERONEAL TENDON REPAIR WITH POSSIBLE LEFT TENDON TRANSFER (Left )    KNEE ARTHROSCOPY Left     Two surgeries on left knee per pt    KNEE SURGERY Right     Three surgeries per pt    LIGAMENT REPAIR Left 2019    LEFT PERONEAL TENDON REPAIR performed by Shaw Gerardo DPM at [facility] Bilateral 06/04/2021     BILATERAL L4/5, L5/S1 LUMBAR FACET (Bilateral )    NOSE SURGERY      PAIN MANAGEMENT PROCEDURE Bilateral 6/4/2021    BILATERAL L4/5, L5/S1 LUMBAR FACET performed by Kinsey Calvo MD at [address] [place] [address]  06/11/2021    BILATERAL L4/5, L5/S1 NERVE BLOCK - Bilateral    PAIN MANAGEMENT PROCEDURE Bilateral 6/11/2021    BILATERAL L4/5, L5/S1 NERVE BLOCK performed by Kinsey Calvo MD at [address] [place] [address] Right 07/23/2021    L4/5, L5/S1 NERVE RADIOFREQUENCY ABLATION     PAIN MANAGEMENT PROCEDURE Right 7/23/2021    right  L4/5, L5/S1 NERVE RADIOFREQUENCY ABLATION performed by Kinsey Calvo MD at [address] [place] [address] Left 08/06/2021    left  L4/5, L5/S1 NERVE RADIOFREQUENCY ABLATION (Left )    PAIN MANAGEMENT PROCEDURE Left 8/6/2021    left  L4/5, L5/S1 NERVE RADIOFREQUENCY ABLATION performed by Kinsey Calvo MD at [address] OFFICE/OUTPT [facility] Left 6/29/2018    RESECTION/REMOVAL BONY NEOPLASM LEFT FIBULA, LEFT SOFT TISSUE MASS EXCISION WITH BONE BIOPSY LEFT ANKLE FIBULA performed by Radha Saleem DPM at [address] Left     UPPER GASTROINTESTINAL ENDOSCOPY  5/23/2013    tertiary contractures esophagus, 3 to 4 cm hiatal hernia, antral gastritis, + H Pylori, mild active chronic esophagitis    UPPER GASTROINTESTINAL ENDOSCOPY  4/6/2016    one biopsy    UPPER GASTROINTESTINAL ENDOSCOPY N/A 2/10/2020    EGD BIOPSY performed by Nikita Fields MD at [address]          Physical Examination      Vitals:  /66 (Site: Left Upper Arm, Position: Sitting, Cuff Size: Small Adult)   Pulse 64   Wt 146 lb 3.2 oz (66.3 kg)   SpO2 97%   BMI 28.55 kg/m²     Physical Exam  Vitals and nursing note reviewed. Constitutional:       General: She is not in acute distress. Appearance: Normal appearance.  She is normal weight. She is not ill-appearing, toxic-appearing or diaphoretic. HENT:      Head: Normocephalic and atraumatic. Eyes:      General: No scleral icterus. Right eye: No discharge. Left eye: No discharge. Extraocular Movements: Extraocular movements intact. Conjunctiva/sclera: Conjunctivae normal.   Cardiovascular:      Rate and Rhythm: Normal rate and regular rhythm. Pulses: Normal pulses. Heart sounds: Normal heart sounds. No murmur heard. No friction rub. No gallop. Pulmonary:      Effort: Pulmonary effort is normal. No respiratory distress. Breath sounds: Normal breath sounds. No stridor. No wheezing, rhonchi or rales. Chest:      Chest wall: No tenderness. Musculoskeletal:      Left shoulder: Tenderness present. Decreased range of motion. Decreased strength. Neurological:      Mental Status: She is alert and oriented to person, place, and time. Mental status is at baseline. Psychiatric:         Mood and Affect: Mood normal.         Behavior: Behavior normal.         Thought Content:  Thought content normal.         Judgment: Judgment normal.         Labs/Imaging/Diagnostics   Labs:  No results found for: CMPWITHGFR, CBCAUTODIF, TSHFT4, LABA1C, LIPIDPAN    Imaging Last 24 Hours:  VL DUP LOWER EXTREMITY VENOUS LEFT      [address]   Vascular Lower Extremities DVT Study Procedure      Patient Name  [facility]   Date of Study           08/25/2021                 DERIAN AMRIT      Date of Birth 1960  Gender                  Female      Age           57 year(s)  Race                          Room Number   JANNA      Corporate ID  [id number]   #      Patient Paras  [de-identified]   #      MR #          [id number]     Sonographer             YennyBindu      Accession #   [id number]  Interpreting Physician  Ermias Morley      Referring                 Referring Physician     ADARSH AMIN *   Nurse                                             Jennifer Acevedo Man   Practitioner     Procedure  Type of Study:      Veins: Lower Extremities DVT Study, Venous Scan Lower Left. Indications for Study:R/O DVT. Patient Status:ER. Technical Quality:Good visualization. Conclusions      Summary      No evidence of superficial or deep venous thrombosis in the left lower   extremity. Signature      ----------------------------------------------------------------   Electronically signed by Bindu Ramon(Sonographer) on   08/25/2021 03:13 PM   ----------------------------------------------------------------      ----------------------------------------------------------------   Electronically signed by Ermias Morley(Interpreting physician)   on 08/25/2021 08:19 PM   ----------------------------------------------------------------       Left Impression:    The common femoral, femoral, popliteal, tibials and saphenous veins are    compressible with normal doppler responses. Velocities are measured in cm/s ; Diameters are measured in cm    Left Lower Extremities DVT Study Measurements  Left 2D Measurements  +------------------------------------+----------+---------------+----------+  ! Location                            ! Visualized! Compressibility! Thrombosis! +------------------------------------+----------+---------------+----------+  ! Common Femoral                      !Yes       ! Yes            ! None      !  +------------------------------------+----------+---------------+----------+  ! Prox Femoral                        !Yes       ! Yes            ! None      !  +------------------------------------+----------+---------------+----------+  ! Mid Femoral                         !Yes       ! Yes            ! None      !  +------------------------------------+----------+---------------+----------+  ! Dist Femoral                        !Yes       ! Yes            ! None      !  +------------------------------------+----------+---------------+----------+  ! Deep Femoral !Yes       !Yes            ! None      !  +------------------------------------+----------+---------------+----------+  ! Popliteal                           !Yes       ! Yes            ! None      !  +------------------------------------+----------+---------------+----------+  ! Sapheno Femoral Junction            ! Yes       ! Yes            ! None      !  +------------------------------------+----------+---------------+----------+  ! PTV                                 ! Yes       ! Yes            ! None      !  +------------------------------------+----------+---------------+----------+  ! Peroneal                            !Yes       ! Yes            ! None      !  +------------------------------------+----------+---------------+----------+  ! Gastroc                             ! Yes       ! Yes            ! None      !  +------------------------------------+----------+---------------+----------+  ! GSV Thigh                           ! Yes       ! Yes            ! None      !  +------------------------------------+----------+---------------+----------+  ! GSV Knee                            ! Yes       ! Yes            ! None      !  +------------------------------------+----------+---------------+----------+  ! GSV Ankle                           ! Yes       ! Yes            ! None      !  +------------------------------------+----------+---------------+----------+  ! SSV                                 ! Yes       ! Yes            ! None      !  +------------------------------------+----------+---------------+----------+    Left Doppler Measurements  +---------------------------+------+------+--------------------------------+  ! Location                   ! Signal!Reflux! Reflux (msec)                   ! +---------------------------+------+------+--------------------------------+  ! Common Femoral             !Phasic!      ! !  +---------------------------+------+------+--------------------------------+  ! Prox Femoral               !Phasic!      !                                !  +---------------------------+------+------+--------------------------------+  ! Popliteal                  !Phasic!      !                                !  +---------------------------+------+------+--------------------------------+

## 2021-09-16 ENCOUNTER — TELEPHONE (OUTPATIENT)
Dept: FAMILY MEDICINE CLINIC | Age: 61
End: 2021-09-16

## 2021-09-16 NOTE — TELEPHONE ENCOUNTER
Pt saw you in the office yesterday and she has found out now that both her sons are covid positive  And she feels really bad and hopes she didn't bring anything into the office.

## 2021-09-17 ENCOUNTER — TELEPHONE (OUTPATIENT)
Dept: PAIN MANAGEMENT | Age: 61
End: 2021-09-17

## 2021-09-17 DIAGNOSIS — M47.817 LUMBOSACRAL SPONDYLOSIS WITHOUT MYELOPATHY: ICD-10-CM

## 2021-09-17 NOTE — TELEPHONE ENCOUNTER
Patient called in and stated that her pharmacy told her they can not get any more of her medication in until next Thursday and would like a new script sent to [facility] on [address]. Please advise.

## 2021-09-20 RX ORDER — OXYCODONE HYDROCHLORIDE AND ACETAMINOPHEN 5; 325 MG/1; MG/1
1 TABLET ORAL EVERY 8 HOURS PRN
Qty: 90 TABLET | Refills: 0 | Status: CANCELLED | OUTPATIENT
Start: 2021-09-20 | End: 2021-10-20

## 2021-10-04 NOTE — PROGRESS NOTES
Romana Erickson  [facility] [address], [address] [facility] [address]  Dept: [phone]    Patient:  Dawson Lucio  YOB: 1960  Date: 10/4/21    The patient is a 57 y.o. female who presents today for consult of the following problems:     Chief Complaint   Patient presents with    Neck Pain    Back Pain    Numbness     left arm and leg         HPI:     Dawson Lucio is a 57 y.o. female on whom neurosurgical consultation was requested by Kenia Rm DO for management of back and leg pain. Pt has had ongoing issues with neck and back pain as well as varying numbness and tingling to extremities particularly left upper extremity and left lower extremity as well as right middle finger intermittently. Painful numbness and tingling is nondermatomal in distribution, intermittent in nature and in various locations. Patient has completed physical therapy in the past, nothing recent secondary to financial constraints. Has been following with pain specialist, has undergone recent radiofrequency ablations of the lumbar with approximately 75% improvement in axial low back pain. Does also have a history of shoulder pain, including left shoulder surgery remotely. Has been referred for orthopedic evaluation as well. States that her right middle finger will at times become numb and white. Does report limited mobility to both neck lower back shoulders and hips. Has a history of martial arts teaching, relates that she used to be very flexible. No longer backs her car out because she is not able to turn her head far enough to safely reverse. Groin pain: No  Saddle anesthesia: No  Hip Pain: Yes  Bowel/bladder incontinence: No  Constipation or Urinary retention: No    LIMITATIONS    Pain significantly limiting from a daily standpoint.    Assistive devices: none  Daily pharmacologic pain control include: percocet  Back versus le/50    MANAGEMENT     Prior Surgery: No  Prior to 1yr ago: In the last year:    Physical Therapy: No   Chiropractic Interventions: No   Injections: Yes  Improvement: Approximately 75% relief following RFA    Types of injections/responses:   2021: left L4/5; L5/S1 RFA  2021: right L4/5, L5/S1 RFA    History:     Past Medical History:   Diagnosis Date    Acid reflux     Adult ADHD 2012    Asthma 2012    [facility]     Broken foot     Left foot    Chest pain of uncertain etiology 26/15/8545    Last episode was in  (Written 2019)    Depression     Under control per pt.  on 6/15/18    DVT of leg (deep venous thrombosis) ([facility] 2014    Fracture     right foot / pt denies surgical intervention    Helicobacter pylori (H. pylori) infection     per EGD    Hematuria 2018    Has a follow-up with Urologist     Hiatal hernia     History of blood transfusion     History of DVT (deep vein thrombosis) 2012    Hx of blood clots     Hypertension     Internal hemorrhoids     Narcolepsy 2012    Osteoporosis      Past Surgical History:   Procedure Laterality Date    ANKLE SURGERY Left 2018    mass excision    ARM SURGERY Right     Pt states she has had 13 right arm surgeries    BREAST ENHANCEMENT SURGERY Bilateral      SECTION      x 3    COLONOSCOPY  2013    sigmoid diverticula, prominent large internal hemorrhoid    COLONOSCOPY N/A 2/10/2020    COLONOSCOPY DIAGNOSTIC performed by Angela Sandoval MD at [address] [facility] N/A 2020    COLORECTAL CANCER SCREENING, NOT HIGH RISK performed by Nery Mario MD at [address], COLON, DIAGNOSTIC      ESOPHAGEAL MOTILITY STUDY N/A 2020    PES RN - ESOPHAGEAL MOTILITY STUDY performed by Zane Edwards DO at [address] STUDY  9/10/2020    ESOPHAGEAL MOTILITY STUDY performed by Zane Edwards DO at [facility]  ESOPHAGEAL MOTILITY STUDY N/A 12/18/2020    ESOPHAGEAL MOTILITY STUDY ATTEMPTED PER DR. Shahida Huffman WITHOUT SUCCESS performed by Agus Wing DO at [facility]    FOOT SURGERY Left 2015    FOOT TENDON SURGERY Left 02/22/2019    LEFT PERONEAL TENDON REPAIR WITH POSSIBLE LEFT TENDON TRANSFER (Left )    KNEE ARTHROSCOPY Left     Two surgeries on left knee per pt    KNEE SURGERY Right     Three surgeries per pt    LIGAMENT REPAIR Left 2/22/2019    LEFT PERONEAL TENDON REPAIR performed by Yuliet Godinez DPM at [facility] Bilateral 06/04/2021     BILATERAL L4/5, L5/S1 LUMBAR FACET (Bilateral )    NOSE SURGERY      PAIN MANAGEMENT PROCEDURE Bilateral 6/4/2021    BILATERAL L4/5, L5/S1 LUMBAR FACET performed by Ginger Fournier MD at [address] [place] [address]  06/11/2021    BILATERAL L4/5, L5/S1 NERVE BLOCK - Bilateral    PAIN MANAGEMENT PROCEDURE Bilateral 6/11/2021    BILATERAL L4/5, L5/S1 NERVE BLOCK performed by Ginger Fournier MD at [address] [place] [address] Right 07/23/2021    L4/5, L5/S1 NERVE RADIOFREQUENCY ABLATION     PAIN MANAGEMENT PROCEDURE Right 7/23/2021    right  L4/5, L5/S1 NERVE RADIOFREQUENCY ABLATION performed by Ginger Fournier MD at [address] [place] [address] Left 08/06/2021    left  L4/5, L5/S1 NERVE RADIOFREQUENCY ABLATION (Left )    PAIN MANAGEMENT PROCEDURE Left 8/6/2021    left  L4/5, L5/S1 NERVE RADIOFREQUENCY ABLATION performed by Ginger Fournier MD at [address] [facility] Left 6/29/2018    RESECTION/REMOVAL BONY NEOPLASM LEFT FIBULA, LEFT SOFT TISSUE MASS EXCISION WITH BONE BIOPSY LEFT ANKLE FIBULA performed by Yuliet Godinez DPM at [address] [place] [address] Left     UPPER GASTROINTESTINAL ENDOSCOPY  5/23/2013    tertiary contractures esophagus, 3 to 4 cm hiatal hernia, antral gastritis, + H Pylori, mild active chronic esophagitis    UPPER GASTROINTESTINAL ENDOSCOPY  4/6/2016    one biopsy    UPPER GASTROINTESTINAL ENDOSCOPY N/A 2/10/2020    EGD BIOPSY performed by Nicky Alcazar MD at [facility]       Family History   Problem Relation Age of Onset    Cancer Mother     Hypertension Mother     Stroke Mother     Cancer Maternal Aunt     Hypertension Father     Heart Surgery Father     Diabetes Neg Hx      Current Outpatient Medications on File Prior to Visit   Medication Sig Dispense Refill    buPROPion (WELLBUTRIN XL) 150 MG extended release tablet       amphetamine-dextroamphetamine (ADDERALL) 30 MG tablet       loratadine (CLARITIN) 10 MG tablet Take 1 tablet by mouth daily 30 tablet 0    pantoprazole (PROTONIX) 40 MG tablet Take 1 tablet by mouth daily 90 tablet 1    oxyCODONE-acetaminophen (PERCOCET) 5-325 MG per tablet Take 1 tablet by mouth every 8 hours as needed for Pain for up to 30 days.  90 tablet 0    buPROPion HCl ER, XL, 450 MG TB24 TAKE ONE TABLET BY MOUTH EVERY MORNING 30 tablet 5    vitamin E 400 UNIT capsule Take 400 Units by mouth daily      alendronate (FOSAMAX) 70 MG tablet TAKE 1 TABLET BY MOUTH ONCE WEEKLY BEFORE BREAKFAST, ON AN EMPTY STOMACH: REMAIN UPRIGHT FOR 30 MINUTES:TAKE WITH 8 OUNCES OF WATER 12 tablet 10    propranolol (INDERAL LA) 80 MG extended release capsule Take 1 capsule by mouth daily 90 capsule 1    albuterol sulfate HFA (VENTOLIN HFA) 108 (90 Base) MCG/ACT inhaler INHALE TWO PUFFS BY MOUTH EVERY 4 HOURS AS NEEDED FOR WHEEZING 1 Inhaler 5    citalopram (CELEXA) 40 MG tablet TAKE ONE TABLET BY MOUTH DAILY 90 tablet 3    albuterol (PROVENTIL) (2.5 MG/3ML) 0.083% nebulizer solution Take 2.5 mg by nebulization every 6 hours as needed for Wheezing or Shortness of Breath       Calcium Carbonate (CALCIUM 600 PO) Take 600 mg by mouth daily      fluticasone (FLONASE) 50 MCG/ACT nasal spray 2 sprays by Each Nostril route daily (Patient not taking: Reported on 10/5/2021) 16 g 0    amphetamine-dextroamphetamine (ADDERALL XR) 30 MG extended release capsule Take 2 capsules by mouth daily for 30 days. 60 capsule 0     No current facility-administered medications on file prior to visit. Social History     Tobacco Use    Smoking status: Former Smoker     Packs/day: 1.00     Years: 20.00     Pack years: 20.00     Quit date:      Years since quittin.7    Smokeless tobacco: Never Used   Vaping Use    Vaping Use: Never used   Substance Use Topics    Alcohol use: Yes     Comment: rare    Drug use: No       Allergies   Allergen Reactions    Latex Itching    Prozac [Fluoxetine Hcl]     Sulfa Antibiotics      migraine    Codeine Itching       Review of Systems  Constitutional: Negative for activity change and appetite change. HENT: Negative for ear pain and facial swelling. Eyes: Negative for discharge and itching. Respiratory: Negative for choking and chest tightness. Cardiovascular: Negative for chest pain and leg swelling. Gastrointestinal: Negative for nausea and abdominal pain. Endocrine: Negative for cold intolerance and heat intolerance. Genitourinary: Negative for frequency and flank pain. Musculoskeletal: Negative for myalgias and joint swelling. Skin: Negative for rash and wound. Allergic/Immunologic: Negative for environmental allergies and food allergies. Hematological: Negative for adenopathy. Does not bruise/bleed easily. Psychiatric/Behavioral: Negative for self-injury. The patient is not nervous/anxious. Physical Exam:      /88 (Site: Left Upper Arm, Position: Sitting, Cuff Size: Medium Adult)   Pulse 76   Ht 5' (1.524 m)   Wt 152 lb (68.9 kg)   BMI 29.69 kg/m²   Estimated body mass index is 29.69 kg/m² as calculated from the following:    Height as of this encounter: 5' (1.524 m). Weight as of this encounter: 152 lb (68.9 kg).     General:  Makenna Bryant is a 57y.o. year old female who appears her stated age. HEENT: Normocephalic atraumatic. Neck supple. Chest: regular rate; pulses equal  Abdomen: Soft nontender nondistended. Ext: DP and PT pulses 2+, good cap refill  Neuro    Mentation  Appropriate affect  Registration intact  Orientation intact    Cranial Nerves:   Pupils equal and reactive to light  Extraocular motion intact  Face and shrug symmetric  Tongue midline  No dysarthria  v1-3 sensation symmetric, masseter tone symmetric  Hearing symmetric and intact    Sensation: Intact on exam    Motor  L deltoid 5/5; R deltoid 5/5  L biceps 5/5; R biceps 5/5  L triceps 5/5; R triceps 5/5  L wrist extension 5/5; R wrist extension 5/5  L intrinsics 5/5; R intrinsics 5/5     L iliopsoas 5/5 , R iliopsoas 5/5  L quadriceps 5/5; R quadriceps 5/5  L Dorsiflexion 5/5; R dorsiflexion 5/5  L Plantarflexion 5/5; R plantarflexion 5/5  L EHL 5/5; R EHL 5/5    Reflexes  L Brachioradialis 2+/4; R brachioradialis 2+/4  L Biceps 2+/4; R Biceps 2+/4  L Triceps 2+/4; R Triceps 2+/4  L Patellar 2+/4: R Patellar 2+/4  L Achilles 2+/4; R Achilles 2+/4    hoffmans L: neg  hoffmans R: neg  Clonus L: neg  Clonus R: neg  Babinski L: neg  Babinski R: neg    ELIZABETH: Positive  Straight leg raise: Negative  SI joint tenderness: Denies change in visual acuity    Studies Review:     MRI lumbar spine 3/22/2021 (images reviewed):   FINDINGS:   BONES/ALIGNMENT: There is grade 1 L4 on L5 anterolisthesis.  The vertebral   body heights are maintained.  No evidence of fracture.  There is nonspecific   heterogeneous bone marrow signal, may be related to anemia.  There is   multilevel degenerative disc disease with disc desiccation, disc space   narrowing and endplate changes.  There is congenitally narrow lumbar spinal   canal.       SPINAL CORD: The conus terminates normally.       SOFT TISSUES: No paraspinal mass identified.       L1-L2: There is no significant disc herniation, spinal canal stenosis or   neural foraminal narrowing.     L2-L3: There is disc bulge, mild bilateral facet arthrosis, mild bilateral   ligamentum flavum hypertrophy, contributing to minimal spinal canal narrowing   and minimal bilateral foraminal narrowing.       L3-L4: There is disc bulge, mild bilateral facet arthrosis, with minimal   spinal canal narrowing and minimal bilateral foraminal narrowing.       L4-L5: There is anterolisthesis, disc bulge, severe bilateral facet   arthrosis, moderate bilateral ligamentum flavum hypertrophy, contributing to   moderate spinal canal narrowing, mild left and mild-to-moderate right   foraminal narrowing.       L5-S1: There is disc bulge, moderate left and mild right facet arthrosis,   with mild-to-moderate left and moderate right foraminal narrowing.  No spinal   canal narrowing. MRI cervical spine 3/22/2021 (images reviewed):   FINDINGS:   BONES/ALIGNMENT: There is 1-2 mm C4 on C5 anterolisthesis, 2 mm C7 on T1   anterolisthesis, 2 mm T1 on T2 anterolisthesis.  The vertebral body heights   are maintained.  No fracture or destructive osseous lesion.  There is   degenerative disc disease with disc desiccation, disc space narrowing and   endplate changes.  There is congenitally narrow cervical spinal canal.       SPINAL CORD: No abnormal cord signal is seen.       SOFT TISSUES: No paraspinal mass identified.       C2-C3: There is disc bulge with minimal spinal canal narrowing without   foraminal narrowing.  Stable.       C3-C4: There is disc bulge with mild spinal canal narrowing without foraminal   narrowing.  Stable.       C4-C5: There is disc bulge with mild-to-moderate bilateral facet arthrosis,   with mild bilateral foraminal narrowing.  No spinal canal narrowing.  Stable.       C5-C6: There is disc bulge with mild-to-moderate bilateral facet arthrosis,   with minimal spinal canal narrowing, moderate left and mild right foraminal   narrowing.  Stable.       C6-C7: There is disc bulge with mild spinal canal narrowing, and mild-to-moderate bilateral foraminal narrowing.  Stable.       C7-T1: There is disc bulge, mild bilateral facet arthrosis, with   mild-to-moderate left and mild right foraminal narrowing.  No spinal canal   narrowing.  Stable. X-ray lumbar spine flexion-extension 3/29/2021 (images reviewed): FINDINGS:   There is some exaggeration of the thoracolumbar curve. There is approximately   8 mm anterolisthesis L4 on L5. There is narrowing of the L4-5 and L5-S1 disc   spaces. (On the study of October 23, 2019 6 lumbar-type vertebral bodies were   noted and were without ribs.)       No significant change noted between neutral lateral and flexion lateral.   Suggestion that there is some degree of motion movement on extension lateral.       There are spondylitic endplate degenerative changes throughout. Some   degenerative changes are present in the facets. Pain management notes reviewed    Assessment and Plan:      1. Numbness and tingling of left arm and leg    2. Cervical spondylosis with radiculopathy    3. Lumbar spondylosis          Plan: Patient with multiple concerns regarding neck and lower back pain as well as intermittent numbness and tingling to primarily left arm and leg in nondermatomal distribution. MRI cervical and lumbar spine with multilevel degenerative changes, varying degrees of central and foraminal stenosis. Recommend obtaining cervical flexion/extension x-rays to evaluate for any instability. We will also obtain EMG upper and lower extremities to further elucidate etiology of painful numbness and tingling. Recommend follow-up appointment with pain management for consideration of cervical injections. Physical therapy recommended, however it is presently cost prohibitive for the patient. We will see back in 6-8 weeks for reevaluation. Followup: Return in about 6 weeks (around 11/16/2021), or if symptoms worsen or fail to improve.     Prescriptions Ordered:  No orders of the defined types

## 2021-10-05 ENCOUNTER — HOSPITAL ENCOUNTER (OUTPATIENT)
Dept: GENERAL RADIOLOGY | Age: 61
Discharge: HOME | End: 2021-10-07
Payer: MEDICARE

## 2021-10-05 ENCOUNTER — HOSPITAL ENCOUNTER (OUTPATIENT)
Age: 61
Discharge: HOME | End: 2021-10-07
Payer: MEDICARE

## 2021-10-05 ENCOUNTER — OFFICE VISIT (OUTPATIENT)
Dept: NEUROSURGERY | Age: 61
End: 2021-10-05
Payer: MEDICARE

## 2021-10-05 VITALS
DIASTOLIC BLOOD PRESSURE: 88 MMHG | HEART RATE: 76 BPM | HEIGHT: 60 IN | SYSTOLIC BLOOD PRESSURE: 136 MMHG | WEIGHT: 152 LBS | BODY MASS INDEX: 29.84 KG/M2

## 2021-10-05 DIAGNOSIS — R20.0 NUMBNESS AND TINGLING OF LEFT ARM AND LEG: Primary | ICD-10-CM

## 2021-10-05 DIAGNOSIS — R20.2 NUMBNESS AND TINGLING OF LEFT ARM AND LEG: Primary | ICD-10-CM

## 2021-10-05 DIAGNOSIS — M47.22 CERVICAL SPONDYLOSIS WITH RADICULOPATHY: ICD-10-CM

## 2021-10-05 DIAGNOSIS — M47.816 LUMBAR SPONDYLOSIS: ICD-10-CM

## 2021-10-05 PROCEDURE — 72040 X-RAY EXAM NECK SPINE 2-3 VW: CPT

## 2021-10-05 PROCEDURE — 99204 OFFICE O/P NEW MOD 45 MIN: CPT | Performed by: NURSE PRACTITIONER

## 2021-10-05 RX ORDER — BUPROPION HYDROCHLORIDE 150 MG/1
TABLET ORAL
COMMUNITY
Start: 2021-09-23 | End: 2021-10-28

## 2021-10-05 RX ORDER — DEXTROAMPHETAMINE SACCHARATE, AMPHETAMINE ASPARTATE, DEXTROAMPHETAMINE SULFATE AND AMPHETAMINE SULFATE 7.5; 7.5; 7.5; 7.5 MG/1; MG/1; MG/1; MG/1
30 TABLET ORAL 2 TIMES DAILY
COMMUNITY
Start: 2021-09-27

## 2021-10-06 ENCOUNTER — OFFICE VISIT (OUTPATIENT)
Dept: ORTHOPEDIC SURGERY | Age: 61
End: 2021-10-06
Payer: MEDICARE

## 2021-10-06 VITALS — WEIGHT: 150 LBS | HEIGHT: 60 IN | BODY MASS INDEX: 29.45 KG/M2

## 2021-10-06 DIAGNOSIS — M19.012 ARTHRITIS OF LEFT SHOULDER REGION: Primary | ICD-10-CM

## 2021-10-06 PROCEDURE — 99203 OFFICE O/P NEW LOW 30 MIN: CPT | Performed by: PHYSICIAN ASSISTANT

## 2021-10-06 ASSESSMENT — ENCOUNTER SYMPTOMS
COUGH: 0
COLOR CHANGE: 0
VOMITING: 0

## 2021-10-06 NOTE — LETTER
Griselda He PA-C, [facility]  [facility]  [facility]  [address]  Dept: [phone]  Dept Fax: [phone]  10/6/21    Patient: Chauncey Flores  YOB: 1960    Dear Luisana Robins DO,    I had the pleasure of seeing one of your patients, Court Booker today in the office. Below are the relevant portions of my assessment and plan of care. IMPRESSION:  1. Arthritis of left shoulder region      PLAN:  Patient is here for chronic left shoulder pain. After reviewing the patient's x-rays from today there is significant arthritis within the left shoulder. Patient has previously failed outpatient physical therapy, oral anti-inflammatories and corticosteroid injections within the left shoulder. I discussed with the patient that she may be a candidate for a left total shoulder arthroplasty. An appointment was made for the patient to meet with Dr. Joseph Moy DO to discuss surgical options in regards the patient's chronic  left shoulder discomfort. The appointment is scheduled for 10/18/2021. Until then she was instructed to limit activities that elicit pain within the left shoulder. She can continue to take over-the-counter anti-inflammatories for her breakthrough pain. Otherwise the patient was instructed to call our office with any questions or concerns prior to her next appointment. She voiced her understanding. Thank you for allowing me to participate in the care of this patient. I will keep you updated on this patient's follow up and I look forward to serving you and your patients again in the future.           Griselda He PA-C

## 2021-10-06 NOTE — PROGRESS NOTES
[facility]  [facility]  [address] [place]  [id number]  Dept: [phone]    Ambulatory Orthopedic Consult      CHIEF COMPLAINT:    Chief Complaint   Patient presents with    New Patient     left shoulder pain       HISTORY OF PRESENT ILLNESS:      The patient is a 57 y.o. female who is being seen at the request of  Clayton Regan DO for consultation and evaluation of  Left shoulder pain. Kofi Angulo  is a 57 y.o. Right hand dominant  female who has had left shoulder pain for > 5 years. The patient has not any trauma to the shoulder recently. Patient notes that approximately 30 years ago she did have rotator cuff surgery. She does not recall when she had surgery with. The pain is  worse at night and when doing overhead activities. Weakness of the shoulder has been noted. The pain restricts activities such as lifting, reaching, pushing, pulling with the left upper extremity. The pain has not improved with time. The pain is  exacerbated at night. The patient does notice loss in ROM of the shoulder. The pain is improved with rest, limited left upper extremity activity. Physical therapy has been tried. The patient notes that physical therapy only exacerbated her left shoulder pain. Corticosteriod injection has been administered by her previous PCP, Dr. Marci Sims, the patient notes she had 3 separate steroid injections over the last 2 years. Patient notes that she is currently taking Percocet 5-325 mg, 3 tabs daily as prescribed by her pain management provider Dr. Stan Eckert. She notes that she will intermittently take ibuprofen or Aleve for breakthrough pain, which very rarely improves her shoulder discomfort. Patient has multiple medical comorbidities including: anxiety, narcolepsy, history of DVT, cervical radiculopathy, hypertension, severe iron deficiency anemia requiring transfusion, B12 deficiency, sleep apnea, chronic low back pain.     Past Medical History:    Past Medical History:   Diagnosis Date    Acid reflux     Adult ADHD 2012    Asthma 2012    Bipolar 1 disorder ([facility]     Broken foot     Left foot    Chest pain of uncertain etiology     Last episode was in  (Written 2019)    Depression     Under control per pt.  on 6/15/18    DVT of leg (deep venous thrombosis) ([facility] 2014    Fracture     right foot / pt denies surgical intervention    Helicobacter pylori (H. pylori) infection     per EGD    Hematuria 2018    Has a follow-up with Urologist     Hiatal hernia     History of blood transfusion     History of DVT (deep vein thrombosis) 2012    Hx of blood clots     Hypertension     Internal hemorrhoids     Narcolepsy 2012    Osteoporosis        Past Surgical History:    Past Surgical History:   Procedure Laterality Date    ANKLE SURGERY Left 2018    mass excision    ARM SURGERY Right     Pt states she has had 13 right arm surgeries    BREAST ENHANCEMENT SURGERY Bilateral      SECTION      x 3    COLONOSCOPY  2013    sigmoid diverticula, prominent large internal hemorrhoid    COLONOSCOPY N/A 2/10/2020    COLONOSCOPY DIAGNOSTIC performed by Aimee Brown MD at [facility] 2020    COLORECTAL CANCER SCREENING, NOT HIGH RISK performed by Apryl Eric MD at [address] [place] [address] Rd, COLON, DIAGNOSTIC      ESOPHAGEAL MOTILITY STUDY N/A 2020    PES RN - ESOPHAGEAL MOTILITY STUDY performed by Richa Cosby DO at [address] [place] [address] Road STUDY  9/10/2020    ESOPHAGEAL MOTILITY STUDY performed by Richa Cosby DO at [facility]    ESOPHAGEAL MOTILITY STUDY N/A 2020    ESOPHAGEAL MOTILITY STUDY ATTEMPTED PER DR. Sebastian Charles WITHOUT SUCCESS performed by Richa Cosby DO at [facility]    FOOT SURGERY Left 2015    FOOT TENDON SURGERY Left 2019    LEFT PERONEAL TENDON REPAIR WITH POSSIBLE LEFT TENDON TRANSFER (Left )    KNEE ARTHROSCOPY Left     Two surgeries on left knee per pt    KNEE SURGERY Right     Three surgeries per pt    LIGAMENT REPAIR Left 2/22/2019    LEFT PERONEAL TENDON REPAIR performed by Dipika Lechuga DPM at [facility] Bilateral 06/04/2021     BILATERAL L4/5, L5/S1 LUMBAR FACET (Bilateral )    NOSE SURGERY      PAIN MANAGEMENT PROCEDURE Bilateral 6/4/2021    BILATERAL L4/5, L5/S1 LUMBAR FACET performed by Ravi Pink MD at [address] [place] [address]  06/11/2021    BILATERAL L4/5, L5/S1 NERVE BLOCK - Bilateral    PAIN MANAGEMENT PROCEDURE Bilateral 6/11/2021    BILATERAL L4/5, L5/S1 NERVE BLOCK performed by Ravi Pink MD at [address] [place] [address] Right 07/23/2021    L4/5, L5/S1 NERVE RADIOFREQUENCY ABLATION     PAIN MANAGEMENT PROCEDURE Right 7/23/2021    right  L4/5, L5/S1 NERVE RADIOFREQUENCY ABLATION performed by Ravi Pink MD at [address] [place] [address] Left 08/06/2021    left  L4/5, L5/S1 NERVE RADIOFREQUENCY ABLATION (Left )    PAIN MANAGEMENT PROCEDURE Left 8/6/2021    left  L4/5, L5/S1 NERVE RADIOFREQUENCY ABLATION performed by Ravi Pink MD at [address] [facility] OFFICE/OUTPT [address] [facility] Left 6/29/2018    RESECTION/REMOVAL BONY NEOPLASM LEFT FIBULA, LEFT SOFT TISSUE MASS EXCISION WITH BONE BIOPSY LEFT ANKLE FIBULA performed by Dipika Lechuga DPM at [facility] Left     UPPER GASTROINTESTINAL ENDOSCOPY  5/23/2013    tertiary contractures esophagus, 3 to 4 cm hiatal hernia, antral gastritis, + H Pylori, mild active chronic esophagitis    UPPER GASTROINTESTINAL ENDOSCOPY  4/6/2016    one biopsy    UPPER GASTROINTESTINAL ENDOSCOPY N/A 2/10/2020    EGD BIOPSY performed by Todd Daley MD at [facility]         Current Medications:   Current Outpatient Medications   Medication Sig Dispense Refill    buPROPion (WELLBUTRIN XL) 150 MG extended release tablet       amphetamine-dextroamphetamine (ADDERALL) 30 MG tablet       loratadine (CLARITIN) 10 MG tablet Take 1 tablet by mouth daily 30 tablet 0    pantoprazole (PROTONIX) 40 MG tablet Take 1 tablet by mouth daily 90 tablet 1    oxyCODONE-acetaminophen (PERCOCET) 5-325 MG per tablet Take 1 tablet by mouth every 8 hours as needed for Pain for up to 30 days. 90 tablet 0    buPROPion HCl ER, XL, 450 MG TB24 TAKE ONE TABLET BY MOUTH EVERY MORNING 30 tablet 5    vitamin E 400 UNIT capsule Take 400 Units by mouth daily      alendronate (FOSAMAX) 70 MG tablet TAKE 1 TABLET BY MOUTH ONCE WEEKLY BEFORE BREAKFAST, ON AN EMPTY STOMACH: REMAIN UPRIGHT FOR 30 MINUTES:TAKE WITH 8 OUNCES OF WATER 12 tablet 10    propranolol (INDERAL LA) 80 MG extended release capsule Take 1 capsule by mouth daily 90 capsule 1    albuterol sulfate HFA (VENTOLIN HFA) 108 (90 Base) MCG/ACT inhaler INHALE TWO PUFFS BY MOUTH EVERY 4 HOURS AS NEEDED FOR WHEEZING 1 Inhaler 5    citalopram (CELEXA) 40 MG tablet TAKE ONE TABLET BY MOUTH DAILY 90 tablet 3    albuterol (PROVENTIL) (2.5 MG/3ML) 0.083% nebulizer solution Take 2.5 mg by nebulization every 6 hours as needed for Wheezing or Shortness of Breath       Calcium Carbonate (CALCIUM 600 PO) Take 600 mg by mouth daily      fluticasone (FLONASE) 50 MCG/ACT nasal spray 2 sprays by Each Nostril route daily (Patient not taking: Reported on 10/5/2021) 16 g 0    amphetamine-dextroamphetamine (ADDERALL XR) 30 MG extended release capsule Take 2 capsules by mouth daily for 30 days. 60 capsule 0     No current facility-administered medications for this visit.        Allergies:    Latex, Prozac [fluoxetine hcl], Sulfa antibiotics, and Codeine    Social History:   Social History     Socioeconomic History    Marital status: Single     Spouse name: Not on file    Number of children: Not on file    Years of education: Not on file  Highest education level: Not on file   Occupational History    Not on file   Tobacco Use    Smoking status: Former Smoker     Packs/day: 1.00     Years: 20.00     Pack years: 20.00     Quit date:      Years since quittin.7    Smokeless tobacco: Never Used   Vaping Use    Vaping Use: Never used   Substance and Sexual Activity    Alcohol use: Yes     Comment: rare    Drug use: No    Sexual activity: Not Currently   Other Topics Concern    Not on file   Social History Narrative    Not on file     Social Determinants of Health     Financial Resource Strain: Low Risk     Difficulty of Paying Living Expenses: Not hard at all   Food Insecurity: No Food Insecurity    Worried About [employer] in the Last Year: Never true    [employer] in the Last Year: Never true   Transportation Needs:     Lack of Transportation (Medical):  Lack of Transportation (Non-Medical):    Physical Activity:     Days of Exercise per Week:     Minutes of Exercise per Session:    Stress:     Feeling of Stress :    Social Connections:     Frequency of Communication with Friends and Family:     Frequency of Social Gatherings with Friends and Family:     Attends Zoroastrian Services:     Active Member of Clubs or Organizations:     Attends Club or Organization Meetings:     Marital Status:    Intimate Partner Violence:     Fear of Current or Ex-Partner:     Emotionally Abused:     Physically Abused:     Sexually Abused:        Family History:  Family History   Problem Relation Age of Onset    Cancer Mother     Hypertension Mother     Stroke Mother     Cancer Maternal Aunt     Hypertension Father     Heart Surgery Father     Diabetes Neg Hx          REVIEW OF SYSTEMS:  Review of Systems   Constitutional: Negative for activity change and fever. HENT: Negative for sneezing. Respiratory: Negative for cough. Cardiovascular: Negative for chest pain. Gastrointestinal: Negative for vomiting. Musculoskeletal: Positive for arthralgias (left shoulder). Negative for joint swelling and myalgias. Skin: Negative for color change. Neurological: Negative for weakness and numbness. Psychiatric/Behavioral: Positive for sleep disturbance (d/t left shoulder pain). PHYSICAL EXAM:  Ht 5' (1.524 m)   Wt 150 lb (68 kg)   BMI 29.29 kg/m²  Body mass index is 29.29 kg/m². Physical Exam  Gen: alert and oriented to person and place. Psych:  Appropriate affect; Appropriate knowledge base; Appropriate mood; No hallucinations; Head: normocephalic, atraumatic   Chest: symmetric chest excursion; nonlabored respiratory effort. Pelvis: stable; no obvious pelvis deformity  Ortho Exam  Extremity: Evaluation of the Left shoulder reveals mild shoulder girdle muscle atrophy. Well-healed surgical scar on the anterior aspect of the left shoulder. There is no erythema, warmth, skin lesions, signs of infection. Tenderness to the anterolateral aspect of the shoulder is appreciated. No palpable deformity is noted. Active flexion is 80 degrees. Active functional reach is  ls junction  . Active abduction is 75 degrees. Palpable crepitus is appreciated with all range of motion testing of the left shoulder. Significant bony block appreciated in passive external rotation. Patient has full internal rotation in a neutral position. Significant resistance appreciated with internal rotation in a horizontal abducted position. A  Positive Cohen test is appreciated. Positive supraspinatus test is appreciated. A positive impingement test is noted. No gross shoulder instability is appreciated. A negative apprehension test is noted. Negative Creek's test is appreciated. Rotator cuff muscle strength testing shows weakness with internal and external rotation strength testing. Sensation to C5, C6, C7, C8, T1 dermatomes appears to be intact and symmetric bilaterally.     The patient has full range of motion of the left elbow. Pain is elicited with cervical flexion and extension due to cervical stenosis. Radiology:     Please refer to radiology read from 10/6/2021 for most recent imaging result. ASSESSMENT:     1. Arthritis of left shoulder region         PLAN:      Patient is here for chronic left shoulder pain. After reviewing the patient's x-rays from today there is significant arthritis within the left shoulder. Patient has previously failed outpatient physical therapy, oral anti-inflammatories and corticosteroid injections within the left shoulder. I discussed with the patient that she may be a candidate for a left total shoulder arthroplasty. An appointment was made for the patient to meet with Dr. Peggy Heart DO to discuss surgical options in regards the patient's chronic  left shoulder discomfort. The appointment is scheduled for 10/18/2021. Until then she was instructed to limit activities that elicit pain within the left shoulder. She can continue to take over-the-counter anti-inflammatories for her breakthrough pain. Otherwise the patient was instructed to call our office with any questions or concerns prior to her next appointment. She voiced her understanding. Return in about 12 days (around 10/18/2021) for discuss surgical intervention for her left shoulder. No orders of the defined types were placed in this encounter. No orders of the defined types were placed in this encounter. This note is created with the assistance of a speech recognition program.  While intending to generate a document that actually reflects the content of the visit, the document can still have some errors including those of syntax and sound a like substitutions which may escape proof reading. In such instances, actual meaning can be extrapolated by contextual diversion.      Electronically signed by Randy Gardner PA-C on 10/6/2021 at 10:14 PM

## 2021-10-11 ENCOUNTER — OFFICE VISIT (OUTPATIENT)
Dept: PAIN MANAGEMENT | Age: 61
End: 2021-10-11
Payer: MEDICARE

## 2021-10-11 ENCOUNTER — HOSPITAL ENCOUNTER (OUTPATIENT)
Age: 61
Setting detail: SPECIMEN
Discharge: HOME | End: 2021-10-11
Payer: MEDICARE

## 2021-10-11 VITALS — WEIGHT: 150 LBS | HEIGHT: 60 IN | BODY MASS INDEX: 29.45 KG/M2

## 2021-10-11 DIAGNOSIS — M47.817 LUMBOSACRAL SPONDYLOSIS WITHOUT MYELOPATHY: ICD-10-CM

## 2021-10-11 DIAGNOSIS — Z79.891 ENCOUNTER FOR LONG-TERM OPIATE ANALGESIC USE: Primary | ICD-10-CM

## 2021-10-11 DIAGNOSIS — Z79.891 ENCOUNTER FOR LONG-TERM OPIATE ANALGESIC USE: ICD-10-CM

## 2021-10-11 PROCEDURE — 99213 OFFICE O/P EST LOW 20 MIN: CPT | Performed by: PAIN MEDICINE

## 2021-10-11 RX ORDER — BUPROPION HYDROCHLORIDE 300 MG/1
TABLET ORAL
COMMUNITY
Start: 2021-09-23 | End: 2021-10-28

## 2021-10-11 RX ORDER — OXYCODONE HYDROCHLORIDE AND ACETAMINOPHEN 5; 325 MG/1; MG/1
1 TABLET ORAL EVERY 8 HOURS PRN
Qty: 90 TABLET | Refills: 0 | Status: SHIPPED
Start: 2021-10-11 | End: 2021-10-11 | Stop reason: CLARIF

## 2021-10-11 ASSESSMENT — ENCOUNTER SYMPTOMS
BOWEL INCONTINENCE: 0
BACK PAIN: 1

## 2021-10-11 NOTE — PROGRESS NOTES
HPI:     Back Pain  This is a chronic problem. The current episode started more than 1 year ago. The problem occurs constantly. The problem is unchanged. The pain is present in the lumbar spine and thoracic spine. The quality of the pain is described as stabbing and aching. The pain is at a severity of 6/10. The pain is moderate. The symptoms are aggravated by sitting (daily activities). Associated symptoms include numbness, tingling and weakness. Pertinent negatives include no bladder incontinence or bowel incontinence. She has tried heat (tens unit, physical/aquatic therapy, injections) for the symptoms. Neck Pain   This is a chronic problem. The current episode started more than 1 year ago. The problem occurs constantly. The problem has been unchanged. The pain is associated with nothing. The pain is present in the left side and right side. The quality of the pain is described as aching. The pain is at a severity of 7/10. The pain is moderate. The symptoms are aggravated by twisting and position. Stiffness is present at night. Associated symptoms include numbness, tingling and weakness. She has tried heat (physical therapy) for the symptoms. Chronic neck and back pain. Following with neurosurgery. RFA with some benefit. Percocet as needed with some benefit. Last urine drug screen inappropriate. Patient denies any new neurological symptoms. Nobowel or bladder incontinence, no weakness, and no falling. Review of OARRS does not show any aberrant prescription behavior. Medication is helping the patient stay active. Patient denies any side effects and reports adequate analgesia. No sign of misuse/abuse.     Past Medical History:   Diagnosis Date    Acid reflux     Adult ADHD 8/1/2012    Asthma 8/1/2012    [facility]     Broken foot 2014    Left foot    Chest pain of uncertain etiology 07/77/7488    Last episode was in 2013 (Written 02/12/2019)    Depression     Under control per pt. on 6/15/18    DVT of leg (deep venous thrombosis) ([address] 2014    Fracture     right foot / pt denies surgical intervention    Helicobacter pylori (H. pylori) infection     per EGD    Hematuria 2018    Has a follow-up with Urologist     Hiatal hernia     History of blood transfusion     History of DVT (deep vein thrombosis) 2012    Hx of blood clots     Hypertension     Internal hemorrhoids     Narcolepsy 2012    Osteoporosis        Past Surgical History:   Procedure Laterality Date    ANKLE SURGERY Left 2018    mass excision    ARM SURGERY Right     Pt states she has had 13 right arm surgeries    BREAST ENHANCEMENT SURGERY Bilateral      SECTION      x 3    COLONOSCOPY  2013    sigmoid diverticula, prominent large internal hemorrhoid    COLONOSCOPY N/A 2/10/2020    COLONOSCOPY DIAGNOSTIC performed by Taj Atwood MD at [facility] 2020    COLORECTAL CANCER SCREENING, NOT HIGH RISK performed by Arturo Muse MD at [address] [facility], COLON, DIAGNOSTIC      ESOPHAGEAL MOTILITY STUDY N/A 2020    PES RN - ESOPHAGEAL MOTILITY STUDY performed by Bucky Rodriges DO at [address] STUDY  9/10/2020    ESOPHAGEAL MOTILITY STUDY performed by Bucky Rodriges DO at \[facility]"" Endoscopy    ESOPHAGEAL MOTILITY STUDY N/A 2020    ESOPHAGEAL MOTILITY STUDY ATTEMPTED PER DR. Osmin Duron WITHOUT SUCCESS performed by Bucky Rodriges DO at [facility]    FOOT SURGERY Left     FOOT TENDON SURGERY Left 2019    LEFT PERONEAL TENDON REPAIR WITH POSSIBLE LEFT TENDON TRANSFER (Left )    KNEE ARTHROSCOPY Left     Two surgeries on left knee per pt    KNEE SURGERY Right     Three surgeries per pt    LIGAMENT REPAIR Left 2019    LEFT PERONEAL TENDON REPAIR performed by Kiera Kevin DPM at [address] [facility] Bilateral 2021     BILATERAL L4/5, L5/S1 LUMBAR FACET (Bilateral )    NOSE SURGERY      PAIN MANAGEMENT PROCEDURE Bilateral 6/4/2021    BILATERAL L4/5, L5/S1 LUMBAR FACET performed by Blaine Enriquez MD at [address] [place] [address]  06/11/2021    BILATERAL L4/5, L5/S1 NERVE BLOCK - Bilateral    PAIN MANAGEMENT PROCEDURE Bilateral 6/11/2021    BILATERAL L4/5, L5/S1 NERVE BLOCK performed by Blaine Enriquez MD at [address] [place] [address] Right 07/23/2021    L4/5, L5/S1 NERVE RADIOFREQUENCY ABLATION     PAIN MANAGEMENT PROCEDURE Right 7/23/2021    right  L4/5, L5/S1 NERVE RADIOFREQUENCY ABLATION performed by Blaine Enriquez MD at [address] [place] [address] Left 08/06/2021    left  L4/5, L5/S1 NERVE RADIOFREQUENCY ABLATION (Left )    PAIN MANAGEMENT PROCEDURE Left 8/6/2021    left  L4/5, L5/S1 NERVE RADIOFREQUENCY ABLATION performed by Blaine Enriquez MD at [address] OFFICE/OUTPT [facility] Left 6/29/2018    RESECTION/REMOVAL BONY NEOPLASM LEFT FIBULA, LEFT SOFT TISSUE MASS EXCISION WITH BONE BIOPSY LEFT ANKLE FIBULA performed by Jomar Redmond DPM at [address] [facility] Left     UPPER GASTROINTESTINAL ENDOSCOPY  5/23/2013    tertiary contractures esophagus, 3 to 4 cm hiatal hernia, antral gastritis, + H Pylori, mild active chronic esophagitis    UPPER GASTROINTESTINAL ENDOSCOPY  4/6/2016    one biopsy    UPPER GASTROINTESTINAL ENDOSCOPY N/A 2/10/2020    EGD BIOPSY performed by Rhonda Alcantara MD at [facility]         Allergies   Allergen Reactions    Latex Itching    Prozac [Fluoxetine Hcl]     Sulfa Antibiotics      migraine    Codeine Itching         Current Outpatient Medications:     buPROPion (WELLBUTRIN XL) 300 MG extended release tablet, , Disp: , Rfl:     buPROPion (WELLBUTRIN XL) 150 MG extended release tablet, , Disp: , Rfl:     amphetamine-dextroamphetamine (ADDERALL) 30 MG tablet, , Disp: , Rfl:     fluticasone (FLONASE) 50 MCG/ACT nasal spray, 2 sprays by Each Nostril route daily, Disp: 16 g, Rfl: 0    loratadine (CLARITIN) 10 MG tablet, Take 1 tablet by mouth daily, Disp: 30 tablet, Rfl: 0    pantoprazole (PROTONIX) 40 MG tablet, Take 1 tablet by mouth daily, Disp: 90 tablet, Rfl: 1    buPROPion HCl ER, XL, 450 MG TB24, TAKE ONE TABLET BY MOUTH EVERY MORNING, Disp: 30 tablet, Rfl: 5    vitamin E 400 UNIT capsule, Take 400 Units by mouth daily, Disp: , Rfl:     alendronate (FOSAMAX) 70 MG tablet, TAKE 1 TABLET BY MOUTH ONCE WEEKLY BEFORE BREAKFAST, ON AN EMPTY STOMACH: REMAIN UPRIGHT FOR 30 MINUTES:TAKE WITH 8 OUNCES OF WATER, Disp: 12 tablet, Rfl: 10    propranolol (INDERAL LA) 80 MG extended release capsule, Take 1 capsule by mouth daily, Disp: 90 capsule, Rfl: 1    albuterol sulfate HFA (VENTOLIN HFA) 108 (90 Base) MCG/ACT inhaler, INHALE TWO PUFFS BY MOUTH EVERY 4 HOURS AS NEEDED FOR WHEEZING, Disp: 1 Inhaler, Rfl: 5    citalopram (CELEXA) 40 MG tablet, TAKE ONE TABLET BY MOUTH DAILY, Disp: 90 tablet, Rfl: 3    albuterol (PROVENTIL) (2.5 MG/3ML) 0.083% nebulizer solution, Take 2.5 mg by nebulization every 6 hours as needed for Wheezing or Shortness of Breath , Disp: , Rfl:     Calcium Carbonate (CALCIUM 600 PO), Take 600 mg by mouth daily, Disp: , Rfl:     amphetamine-dextroamphetamine (ADDERALL XR) 30 MG extended release capsule, Take 2 capsules by mouth daily for 30 days. , Disp: 60 capsule, Rfl: 0    Family History   Problem Relation Age of Onset   [facility] Cancer Mother     Hypertension Mother     Stroke Mother     Cancer Maternal Aunt     Hypertension Father     Heart Surgery Father     Diabetes Neg Hx        Social History     Socioeconomic History    Marital status: Single     Spouse name: Not on file    Number of children: Not on file    Years of education: Not on file    Highest education level: Not on file   Occupational History    Not on file   Tobacco Use    Smoking status: Former Smoker Packs/day: 1.00     Years: 20.00     Pack years: 20.00     Quit date: 45     Years since quittin.7    Smokeless tobacco: Never Used   Vaping Use    Vaping Use: Never used   Substance and Sexual Activity    Alcohol use: Yes     Comment: rare    Drug use: No    Sexual activity: Not Currently   Other Topics Concern    Not on file   Social History Narrative    Not on file     Social Determinants of Health     Financial Resource Strain: Low Risk     Difficulty of Paying Living Expenses: Not hard at all   Food Insecurity: No Food Insecurity    Worried About [address] in the Last Year: Never true    [facility] [employer] in the Last Year: Never true   Transportation Needs:     Lack of Transportation (Medical):  Lack of Transportation (Non-Medical):    Physical Activity:     Days of Exercise per Week:     Minutes of Exercise per Session:    Stress:     Feeling of Stress :    Social Connections:     Frequency of Communication with Friends and Family:     Frequency of Social Gatherings with Friends and Family:     Attends [employer] Services:     Active Member of Clubs or Organizations:     Attends Club or Organization Meetings:     Marital Status:    Intimate Partner Violence:     Fear of Current or Ex-Partner:     Emotionally Abused:     Physically Abused:     Sexually Abused:        Review of Systems:  Review of Systems   Musculoskeletal: Positive for back pain and neck pain. Gastrointestinal: Negative for bowel incontinence. Genitourinary: Negative for bladder incontinence. Neurological: Positive for numbness, tingling and weakness. Physical Exam:  Ht 5' (1.524 m)   Wt 150 lb (68 kg)   BMI 29.29 kg/m²     Physical Exam  Constitutional:       Appearance: Normal appearance. Pulmonary:      Effort: Pulmonary effort is normal.   Neurological:      Mental Status: She is alert.    Psychiatric:         Attention and Perception: Attention and perception normal.         Mood and Affect: Mood and affect normal.         Record/Diagnostics Review:    As above, I did review the imaging    Orders:  Orders Placed This Encounter   Procedures    DRUG SCREEN, PAIN     Orders Placed This Encounter   Medications    DISCONTD: oxyCODONE-acetaminophen (PERCOCET) 5-325 MG per tablet     Sig: Take 1 tablet by mouth every 8 hours as needed for Pain for up to 30 days. Dispense:  90 tablet     Refill:  0     Reduce doses taken as pain becomes manageable       Assessment:  1. Encounter for long-term opiate analgesic use    2. Lumbosacral spondylosis without myelopathy        Treatment Plan:  DISCUSSION: Treatment options discussed with patient and all questions answered to patient's satisfaction. OARRS Review: Reviewed and acceptable for medications prescribed. TREATMENT OPTIONS:     Discussed different treatment options including continued conservative care such as physical therapy, chiropractic care, acupuncture. Discussed different interventional options such as epidural steroids or medial branch blocks. Also discussed neuromodulation in the form of spinal cord stimulation. Also discussed surgical evaluation. Continue ortho f/u  Continue neurosurgery f/u    Abnormal UDS, repeat  Will need results of the urine drug screen prior to consideration of refilling her opioid medications. Lacey Patel M.D. I have reviewed the chief complaint and history of present illness (including ROS and PFSH) and vital documentation by my staff and I agree with their documentation and have added where applicable.

## 2021-10-17 LAB
6-ACETYLMORPHINE, UR: NOT DETECTED
7-AMINOCLONAZEPAM, URINE: NOT DETECTED
ALPHA-OH-ALPRAZ, URINE: NOT DETECTED
ALPHA-OH-MIDAZOLAM, URINE: NOT DETECTED
ALPRAZOLAM, URINE: NOT DETECTED
AMPHETAMINES, URINE: PRESENT
BARBITURATES, URINE: NOT DETECTED
BENZOYLECGONINE, UR: NOT DETECTED
BUPRENORPHINE URINE: NOT DETECTED
CARISOPRODOL, UR: NOT DETECTED
CLONAZEPAM, URINE: NOT DETECTED
CODEINE, URINE: NOT DETECTED
CREATININE URINE: 199.9 MG/DL (ref 20–400)
DIAZEPAM, URINE: NOT DETECTED
DRUGS EXPECTED, UR: NORMAL
EER HI RES INTERP UR: NORMAL
ETHYL GLUCURONIDE UR: NOT DETECTED
FENTANYL URINE: NOT DETECTED
GABAPENTIN: NOT DETECTED
HYDROCODONE, URINE: NOT DETECTED
HYDROMORPHONE, URINE: NOT DETECTED
LORAZEPAM, URINE: NOT DETECTED
MARIJUANA METAB, UR: NOT DETECTED
MDA, UR: NOT DETECTED
MDEA, EVE, UR: NOT DETECTED
MDMA URINE: NOT DETECTED
MEPERIDINE METAB, UR: NOT DETECTED
METHADONE, URINE: NOT DETECTED
METHAMPHETAMINE, URINE: NOT DETECTED
METHYLPHENIDATE: NOT DETECTED
MIDAZOLAM, URINE: NOT DETECTED
MORPHINE URINE: NOT DETECTED
NALOXONE URINE: NOT DETECTED
NORBUPRENORPHINE, URINE: NOT DETECTED
NORDIAZEPAM, URINE: NOT DETECTED
NORFENTANYL, URINE: NOT DETECTED
NORHYDROCODONE, URINE: NOT DETECTED
NOROXYCODONE, URINE: NOT DETECTED
NOROXYMORPHONE, URINE: NOT DETECTED
OXAZEPAM, URINE: NOT DETECTED
OXYCODONE URINE: PRESENT
OXYMORPHONE, URINE: PRESENT
PAIN MANAGEMENT DRUG PANEL INTERP, URINE: NORMAL
PAIN MGT DRUG PANEL, HI RES, UR: NORMAL
PCP,URINE: NOT DETECTED
PHENTERMINE, UR: NOT DETECTED
PREGABALIN: NOT DETECTED
TAPENTADOL, URINE: NOT DETECTED
TAPENTADOL-O-SULFATE, URINE: NOT DETECTED
TEMAZEPAM, URINE: NOT DETECTED
TRAMADOL, URINE: NOT DETECTED
ZOLPIDEM METABOLITE (ZCA), URINE: NOT DETECTED
ZOLPIDEM, URINE: NOT DETECTED

## 2021-10-21 ENCOUNTER — TELEPHONE (OUTPATIENT)
Dept: PAIN MANAGEMENT | Age: 61
End: 2021-10-21

## 2021-10-21 NOTE — TELEPHONE ENCOUNTER
Patient called in stating that she was seen in the ER a week before her UDS for broken ribs and they had given her Fentanyl, pt states she was at [place]. Vs. Patient states she had another UDS done and would like to know if she is going to be getting a refill on her medication. Please advise.

## 2021-10-22 DIAGNOSIS — M47.817 LUMBOSACRAL SPONDYLOSIS WITHOUT MYELOPATHY: ICD-10-CM

## 2021-10-22 RX ORDER — OXYCODONE HYDROCHLORIDE AND ACETAMINOPHEN 5; 325 MG/1; MG/1
1 TABLET ORAL EVERY 8 HOURS PRN
Qty: 90 TABLET | Refills: 0 | Status: SHIPPED | OUTPATIENT
Start: 2021-10-23 | End: 2021-11-18 | Stop reason: SDUPTHER

## 2021-10-25 ENCOUNTER — HOSPITAL ENCOUNTER (OUTPATIENT)
Facility: MEDICAL CENTER | Age: 61
End: 2021-10-25

## 2021-10-27 ENCOUNTER — HOSPITAL ENCOUNTER (OUTPATIENT)
Age: 61
Discharge: HOME | End: 2021-10-27
Payer: MEDICARE

## 2021-10-27 DIAGNOSIS — D50.0 IRON DEFICIENCY ANEMIA DUE TO CHRONIC BLOOD LOSS: Primary | ICD-10-CM

## 2021-10-27 DIAGNOSIS — E53.8 VITAMIN B12 DEFICIENCY: ICD-10-CM

## 2021-10-27 DIAGNOSIS — D50.0 IRON DEFICIENCY ANEMIA DUE TO CHRONIC BLOOD LOSS: ICD-10-CM

## 2021-10-27 DIAGNOSIS — D50.8 IRON DEFICIENCY ANEMIA SECONDARY TO INADEQUATE DIETARY IRON INTAKE: ICD-10-CM

## 2021-10-27 DIAGNOSIS — K90.9 IRON MALABSORPTION: ICD-10-CM

## 2021-10-27 LAB
ABSOLUTE EOS #: 0.11 K/UL (ref 0–0.44)
ABSOLUTE IMMATURE GRANULOCYTE: 0.02 K/UL (ref 0–0.3)
ABSOLUTE LYMPH #: 1.23 K/UL (ref 1.1–3.7)
ABSOLUTE MONO #: 0.62 K/UL (ref 0.1–1.2)
ALBUMIN SERPL-MCNC: 4.3 G/DL (ref 3.5–5.2)
ALBUMIN/GLOBULIN RATIO: ABNORMAL (ref 1–2.5)
ALP BLD-CCNC: 99 U/L (ref 35–104)
ALT SERPL-CCNC: 16 U/L (ref 5–33)
ANION GAP SERPL CALCULATED.3IONS-SCNC: 12 MMOL/L (ref 9–17)
AST SERPL-CCNC: 24 U/L
BASOPHILS # BLD: 1 % (ref 0–2)
BASOPHILS ABSOLUTE: 0.04 K/UL (ref 0–0.2)
BILIRUB SERPL-MCNC: 0.2 MG/DL (ref 0.3–1.2)
BUN BLDV-MCNC: 24 MG/DL (ref 8–23)
BUN/CREAT BLD: 19 (ref 9–20)
CALCIUM SERPL-MCNC: 8.8 MG/DL (ref 8.6–10.4)
CHLORIDE BLD-SCNC: 105 MMOL/L (ref 98–107)
CO2: 24 MMOL/L (ref 20–31)
CREAT SERPL-MCNC: 1.28 MG/DL (ref 0.5–0.9)
DIFFERENTIAL TYPE: ABNORMAL
EOSINOPHILS RELATIVE PERCENT: 2 % (ref 1–4)
FERRITIN: 65 UG/L (ref 13–150)
FOLATE: 12.5 NG/ML
GFR AFRICAN AMERICAN: 51 ML/MIN
GFR NON-AFRICAN AMERICAN: 42 ML/MIN
GFR SERPL CREATININE-BSD FRML MDRD: ABNORMAL ML/MIN/{1.73_M2}
GFR SERPL CREATININE-BSD FRML MDRD: ABNORMAL ML/MIN/{1.73_M2}
GLUCOSE BLD-MCNC: 82 MG/DL (ref 70–99)
HCT VFR BLD CALC: 38.5 % (ref 36.3–47.1)
HEMOGLOBIN: 12.1 G/DL (ref 11.9–15.1)
IMMATURE GRANULOCYTES: 0 %
IRON SATURATION: 16 % (ref 20–55)
IRON: 43 UG/DL (ref 37–145)
LYMPHOCYTES # BLD: 20 % (ref 24–43)
MCH RBC QN AUTO: 30 PG (ref 25.2–33.5)
MCHC RBC AUTO-ENTMCNC: 31.4 G/DL (ref 28.4–34.8)
MCV RBC AUTO: 95.5 FL (ref 82.6–102.9)
MONOCYTES # BLD: 10 % (ref 3–12)
NRBC AUTOMATED: 0 PER 100 WBC
PDW BLD-RTO: 12.9 % (ref 11.8–14.4)
PLATELET # BLD: 263 K/UL (ref 138–453)
PLATELET ESTIMATE: ABNORMAL
PMV BLD AUTO: 9.4 FL (ref 8.1–13.5)
POTASSIUM SERPL-SCNC: 3.8 MMOL/L (ref 3.7–5.3)
RBC # BLD: 4.03 M/UL (ref 3.95–5.11)
RBC # BLD: ABNORMAL 10*6/UL
SEG NEUTROPHILS: 67 % (ref 36–65)
SEGMENTED NEUTROPHILS ABSOLUTE COUNT: 4.22 K/UL (ref 1.5–8.1)
SODIUM BLD-SCNC: 141 MMOL/L (ref 135–144)
TOTAL IRON BINDING CAPACITY: 275 UG/DL (ref 250–450)
TOTAL PROTEIN: 6.7 G/DL (ref 6.4–8.3)
UNSATURATED IRON BINDING CAPACITY: 232 UG/DL (ref 112–347)
VITAMIN B-12: >2000 PG/ML (ref 232–1245)
WBC # BLD: 6.2 K/UL (ref 3.5–11.3)
WBC # BLD: ABNORMAL 10*3/UL

## 2021-10-27 PROCEDURE — 82728 ASSAY OF FERRITIN: CPT

## 2021-10-27 PROCEDURE — 85025 COMPLETE CBC W/AUTO DIFF WBC: CPT

## 2021-10-27 PROCEDURE — 82607 VITAMIN B-12: CPT

## 2021-10-27 PROCEDURE — 82746 ASSAY OF FOLIC ACID SERUM: CPT

## 2021-10-27 PROCEDURE — 80053 COMPREHEN METABOLIC PANEL: CPT

## 2021-10-27 PROCEDURE — 83540 ASSAY OF IRON: CPT

## 2021-10-27 PROCEDURE — 83550 IRON BINDING TEST: CPT

## 2021-10-27 PROCEDURE — 36415 COLL VENOUS BLD VENIPUNCTURE: CPT

## 2021-10-28 ENCOUNTER — OFFICE VISIT (OUTPATIENT)
Dept: ONCOLOGY | Age: 61
End: 2021-10-28
Payer: MEDICARE

## 2021-10-28 ENCOUNTER — TELEPHONE (OUTPATIENT)
Dept: ONCOLOGY | Age: 61
End: 2021-10-28

## 2021-10-28 VITALS
SYSTOLIC BLOOD PRESSURE: 154 MMHG | TEMPERATURE: 97.3 F | BODY MASS INDEX: 29.28 KG/M2 | WEIGHT: 149.9 LBS | RESPIRATION RATE: 18 BRPM | HEART RATE: 80 BPM | DIASTOLIC BLOOD PRESSURE: 101 MMHG

## 2021-10-28 DIAGNOSIS — K90.9 IRON MALABSORPTION: ICD-10-CM

## 2021-10-28 DIAGNOSIS — E53.8 VITAMIN B12 DEFICIENCY: ICD-10-CM

## 2021-10-28 DIAGNOSIS — D50.0 IRON DEFICIENCY ANEMIA DUE TO CHRONIC BLOOD LOSS: Primary | ICD-10-CM

## 2021-10-28 PROCEDURE — 99214 OFFICE O/P EST MOD 30 MIN: CPT | Performed by: INTERNAL MEDICINE

## 2021-10-28 PROCEDURE — 99211 OFF/OP EST MAY X REQ PHY/QHP: CPT

## 2021-10-28 RX ORDER — BIOTIN 10 MG
TABLET ORAL DAILY
COMMUNITY
End: 2022-01-25 | Stop reason: ALTCHOICE

## 2021-10-28 NOTE — TELEPHONE ENCOUNTER
[facility] ARRIVES AMBULATORY FOR MD VISIT  DR Gayatri Gee IN TO SEE PATIENT  ORDERS RECEIVED  RV IN 12 MONTHS WITH LABS BEFORE  LABS CDP FE TIBC FERRITIN TO BE DONE 10/20/22  MD VISIT 10/27/22 @11AM  AVS PRINTED AND GIVEN TO PATIENT WITH INSTRUCTIONS  PATIENT DISCHARGED AMBULATORY

## 2021-10-28 NOTE — PROGRESS NOTES
_           Chief Complaint   Patient presents with    Follow-up    Discuss Labs     DIAGNOSIS:       Severe iron deficiency anemia  Vitamin B12 deficiency  2 episodes of probable GI blood loss February 2020 and late October 2020  Severe GERD and hiatal hernia  Past history of left leg DVT triggered by immobilization. Status post anticoagulation. CURRENT THERAPY:         Status post IV iron infusion November 2020  Status post blood transfusion    BRIEF CASE HISTORY:      Ms. Ciara Flores is a very pleasant 57 y.o. female with history of multiple co morbidities as listed. The patient is referred for further evaluation and management of severe iron deficiency anemia. The patient had normal blood work over the last several years up to 2019. She presented in February 2020 with severe anemia with hemoglobin down to 5.4. She was hospitalized and received blood transfusion and iron infusion at that time. She had colonoscopy which was not prepped so it was repeated in May 2020 and so far did not show any active bleeding. Patient had severe upper GI symptoms with GERD and hiatal hernia. She was hospitalized for the second time late October 2020 with severe anemia again and she received blood transfusion and iron infusion as well. She had surgery evaluation and plan for possible gastric surgery in few weeks. The patient's symptoms were typical for severe anemia. She was complaining of increasing weakness and fatigue and shortness of breath on minimal exertion and palpitation. Dizzy spells. She denies any active bleeding. No history of melena or hematochezia. No hematemesis. As stated above she had severe heartburn and acid reflux and she was maintained on Protonix. The patient also had history of left leg DVT.   She was maintained on anticoagulation for long duration which was discontinued at the time of her first admission for iron deficiency and possible GI bleeding. No history of pulmonary embolism. Her DVT was triggered by surgery and the cast in the leg. No family history of hypercoagulability. Patient quit smoking 31 years ago. Denies alcohol drinking. .     INTERIM HISTORY:   Patient seen for follow-up severe iron deficiency anemia. Patient had IV iron infusion with good results. Hemoglobin is now normal.  No weakness or fatigue. No dizziness. She continues to have episodes of black stool. She had GI work-up including capsule camera and these were negative. No other source of bleeding. PAST MEDICAL HISTORY: has a past medical history of Acid reflux, Adult ADHD, Asthma, Bipolar 1 disorder ([address] Broken foot, Chest pain of uncertain etiology, Depression, DVT of leg (deep venous thrombosis) ([facility] Fracture, Helicobacter pylori (H. pylori) infection, Hematuria, Hiatal hernia, History of blood transfusion, History of DVT (deep vein thrombosis), Hx of blood clots, Hypertension, Internal hemorrhoids, Narcolepsy, and Osteoporosis. PAST SURGICAL HISTORY: has a past surgical history that includes  section; Nose surgery; Knee arthroscopy (Left); shoulder surgery (Left); Wrist surgery; Upper gastrointestinal endoscopy (2013); Colonoscopy (2013); Upper gastrointestinal endoscopy (2016); Foot surgery (Left, ); Breast enhancement surgery (Bilateral); Arm Surgery (Right); knee surgery (Right); vaginal dilatation; Ankle surgery (Left, 2018); pr office/outpt visit,procedure only (Left, 2018); Foot Tendon Surgery (Left, 2019); ligament repair (Left, 2019); Upper gastrointestinal endoscopy (N/A, 2/10/2020); Colonoscopy (N/A, 2/10/2020); Endoscopy, colon, diagnostic; Colonoscopy (N/A, 2020); esophageal motility study (N/A, 2020); esophageal motility study (9/10/2020); esophageal motility study (N/A, 2020); Nerve Block (Bilateral, 2021);  Pain management · Hematologic: No history of bleeding tendency. No bruises or ecchymosis. No history of clotting problems. · Infectious disease: No fever, chills or frequent infections. · Endocrine: No polydipsia or polyuria. No temperature intolerance. · Neurologic: No headaches or dizziness. No weakness or numbness of the extremities. No changes in balance, coordination,  memory, mentation, behavior. · Allergic/Immunologic: No nasal congestion or hives. No repeated infections. PHYSICAL EXAM:  The patient is not in acute distress. Vital signs: Blood pressure (!) 154/101, pulse 80, temperature 97.3 °F (36.3 °C), temperature source Temporal, resp. rate 18, weight 149 lb 14.4 oz (68 kg), not currently breastfeeding.      General appearance - well appearing, not in pain or distress  Mental status - good mood, alert and oriented  Eyes - pupils equal and reactive, extraocular eye movements intact  Ears - bilateral TM's and external ear canals normal  Nose - normal and patent, no erythema, discharge or polyps  Mouth - mucous membranes moist, pharynx normal without lesions  Neck - supple, no significant adenopathy  Lymphatics - no palpable lymphadenopathy, no hepatosplenomegaly  Chest - clear to auscultation, no wheezes, rales or rhonchi, symmetric air entry  Heart - normal rate, regular rhythm, normal S1, S2, no murmurs, rubs, clicks or gallops  Abdomen - soft, nontender, nondistended, no masses or organomegaly  Neurological - alert, oriented, normal speech, no focal findings or movement disorder noted  Musculoskeletal - no joint tenderness, deformity or swelling  Extremities - peripheral pulses normal, no pedal edema, no clubbing or cyanosis  Skin - normal coloration and turgor, no rashes, no suspicious skin lesions noted     Review of Diagnostic data:   Lab Results   Component Value Date    WBC 6.2 10/27/2021    HGB 12.1 10/27/2021    HCT 38.5 10/27/2021    MCV 95.5 10/27/2021     10/27/2021       Chemistry Component Value Date/Time     10/27/2021 0903    K 3.8 10/27/2021 0903     10/27/2021 0903    CO2 24 10/27/2021 0903    BUN 24 (H) 10/27/2021 0903    CREATININE 1.28 (H) 10/27/2021 0903        Component Value Date/Time    CALCIUM 8.8 10/27/2021 0903    ALKPHOS 99 10/27/2021 0903    AST 24 10/27/2021 0903    ALT 16 10/27/2021 0903    BILITOT 0.20 (L) 10/27/2021 0903        Lab Results   Component Value Date    IRON 43 10/27/2021    TIBC 275 10/27/2021    FERRITIN 65 10/27/2021         IMPRESSION:   Severe iron deficiency anemia  Vitamin B12 deficiency  2 episodes of probable GI blood loss February 2020 and late October 2020  Severe GERD and hiatal hernia  Past history of left leg DVT triggered by immobilization. Status post anticoagulation. PLAN: I reviewed the labs as above and discussed with the patient. I explained to the patient the nature of this hematologic problem. I explained the significance of these abnormalities in layman language. Reviewing patient's labs obviously she had severe iron deficiency anemia in 2 occasions secondary to acute blood loss. She had blood transfusion and iron infusion during hospitalization. Most recent labs showed normocytic anemia. This is related to combination of iron deficiency and vitamin B12 deficiency. Considering patient's upper GI symptoms oral iron was discontinued and patient continued to have episodes of black stool. She has very good response to IV iron infusion. Hemoglobin is now normal.  Iron studies are normal.   If she continues to have problems with black stool, patient may need to have double-balloon enteroscopy. She is established with gastroenterology. She will also have follow-up with her surgeon for possibility of surgery for hiatal hernia. We will monitor labs closely and we will interfere as necessary. I discussed anticoagulation for her DVT. Obviously it was one event triggered by immobilization.   There is no need for prolonged anticoagulation. This is especially so with her recent episodes of GI bleeding. Patient understands. Patient's questions were answered to the best of her satisfaction and she verbalized full understanding and agreement. [facility]                            This note is created with the assistance of a speech recognition program.  While intending to generate a document that actually reflects the content of the visit, the document can still have some errors including those of syntax and sound a like substitutions which may escape proof reading. It such instances, actual meaning can be extrapolated by contextual diversion.

## 2021-10-29 ENCOUNTER — TELEPHONE (OUTPATIENT)
Dept: FAMILY MEDICINE CLINIC | Age: 61
End: 2021-10-29

## 2021-10-29 DIAGNOSIS — I10 ESSENTIAL HYPERTENSION: ICD-10-CM

## 2021-10-29 RX ORDER — PROPRANOLOL HYDROCHLORIDE 80 MG/1
80 CAPSULE, EXTENDED RELEASE ORAL DAILY
Qty: 90 CAPSULE | Refills: 1 | Status: SHIPPED | OUTPATIENT
Start: 2021-10-29 | End: 2022-07-19

## 2021-10-29 NOTE — TELEPHONE ENCOUNTER
Tonio Lisa is calling to request a refill on the following medication(s):    Medication Request:  Requested Prescriptions     Pending Prescriptions Disp Refills    propranolol (INDERAL LA) 80 MG extended release capsule 90 capsule 1     Sig: Take 1 capsule by mouth daily       Last Visit Date (If Applicable):  0/99/0475 In office    Next Visit Date:    12/15/2021

## 2021-10-29 NOTE — TELEPHONE ENCOUNTER
Patient called stating she was at Oncology yesterday and was talking to them about her pain. They suggested that she get a referral from us for rheumatology. She is also having a productive cough with some rib pain.  I advised her to go to  for eval and chest XR

## 2021-11-01 ENCOUNTER — OFFICE VISIT (OUTPATIENT)
Dept: ORTHOPEDIC SURGERY | Age: 61
End: 2021-11-01
Payer: MEDICARE

## 2021-11-01 VITALS — BODY MASS INDEX: 29.25 KG/M2 | WEIGHT: 149 LBS | HEIGHT: 60 IN

## 2021-11-01 DIAGNOSIS — M19.012 ARTHRITIS OF LEFT SHOULDER REGION: Primary | ICD-10-CM

## 2021-11-01 PROCEDURE — 99213 OFFICE O/P EST LOW 20 MIN: CPT | Performed by: ORTHOPAEDIC SURGERY

## 2021-11-01 ASSESSMENT — ENCOUNTER SYMPTOMS
VOMITING: 0
APNEA: 0
ABDOMINAL PAIN: 0
COUGH: 0
CHEST TIGHTNESS: 0

## 2021-11-01 NOTE — PROGRESS NOTES
[facility]  [facility]  [address] [facility] [address]  Dept: [phone]  Dept Fax: [phone]        Ambulatory Follow Up      Subjective:   Tonio Lisa is a 57y.o. year old female who presents to our office today for routine followup regarding her   1. Arthritis of left shoulder region    . Chief Complaint   Patient presents with    Follow-up     discuss LEFT SHOULDER SURGICAL OPTIONS       HPI- Tonio Lisa  is a 57 y.o. Right hand dominant  female who presents today in follow for left shoulder arthritis. The patient was last seen on 10/6/2021 and underwent treatment in the form of activity modification and discussion about shoulder replacement. The patient is in the office today with me to discuss a total shoulder replacement. The patient feels like she is not able to do any regular activities. She cannot do any overhead work at this time. She feels like she has enough pain to discuss this replacement. Cannot take antiinflammatories due to stomach ulcers. Has had history of rotator cuff repair to the left shoulder more than 30 years ago. Review of Systems   Constitutional: Negative for chills and fever. Respiratory: Negative for apnea, cough and chest tightness. Cardiovascular: Negative for chest pain and palpitations. Gastrointestinal: Negative for abdominal pain and vomiting. Genitourinary: Negative for difficulty urinating. Musculoskeletal: Positive for arthralgias (left shoulder). Negative for gait problem, joint swelling and myalgias. Neurological: Negative for dizziness, weakness and numbness. I have reviewed the CC, HPI, ROS, PMH, FHX, Social History, and if not present in this note, I have reviewed in the patient's chart. I agree with the documentation provided by other staff and have reviewed their documentation prior to providing my signature indicating agreement.     Objective :   Ht 5' (1.524 m)   Wt 149 lb (67.6 kg)   BMI 29.10 kg/m²  Body mass index is 29.1 kg/m². General: Lizandro Gore is a 57 y.o. female who is alert and oriented and sitting comfortably in our office. Ortho Exam  MS:  F=90 degrees left shoulder, AB = 45 degrees left shoulder, Mild ER weakness noted to the left upper extremity. Well healed strap incision scar left shoulder. No gross instability of the left shoulder is appreciated. No outward deformity of the left shoulder is noted otherwise. Motor, sensory, vascular examination of the left upper extremity is grossly intact without focal deficits. Neuro: alert and oriented to person and place. Eyes: Extra-ocular muscles intact  Mouth: Oral mucosa moist. No perioral lesions  Pulm: Respirations unlabored and regular. Symmetric chest excursion without outward deformity is noted. Skin: warm, well perfused  Psych:   Patient has good fund of knowledge and displays understanging of exam, diagnosis, and plan. Radiology:     XR SHOULDER LEFT (MIN 2 VIEWS)    Result Date: 10/7/2021  History: Left shoulder pain Findings: AP, scapular, axial view x-rays left shoulder in the office today shows advanced degenerative changes with complete loss of joint space at the glenohumeral joint, para-articular osteophytosis, joint line sclerosis, subchondral cystic changes. Advanced degenerative changes of the acromioclavicular joint with joint space narrowing, periarticular osteophytosis is appreciated. Humeral head is well-maintained within the glenoid. No evidence of fracture, subluxation, dislocation, radiopaque foreign body, radiopaque tumors noted. Impression: Advanced degenerative changes left shoulder as described above.     XR CERVICAL SPINE FLEXION AND EXTENSION    Result Date: 10/6/2021  EXAMINATION: 2 X-RAY VIEWS OF THE CERVICAL SPINE 10/5/2021 9:23 am COMPARISON: MRI cervical spine dated 03/22/2021 HISTORY: ORDERING SYSTEM PROVIDED HISTORY: Cervical spondylosis with radiculopathy TECHNOLOGIST PROVIDED HISTORY: evaluate for instability Reason for Exam: No recent trauma/neck pain for years-/ erect. Flex.-Ext. Acuity: Unknown Type of Exam: Unknown FINDINGS: There is moderate degenerative disc disease at C5-6 and C6-7. There is mild to moderate multilevel facet degenerative change. There is mild anterolisthesis of C2 on C3 and C4 on C5 with flexion. On extension this reduces at C2-3 and partially reduces at C4-5. The cervical spine is not visualized below the C6 level. The predental space remains normal on flexion and extension. Mild anterolisthesis of C2 on C3 and C4 on C5 with flexion. This reduces at C2-3 on extension and partially reduces at C4-5. Moderate degenerative change, most pronounced at C5-6 and C6-7. Assessment:      1. Arthritis of left shoulder region       Plan:      Reviewed prior imaging with the patient today . Discussed risks and benefits of a anatomic total shoulder arthroplasty versus reverse total shoulder arthroplasty to the left shoulder. All questions and concerns were reviewed with the patient today in the office. The patient would like to go forward with total shoulder arthroplasty. The patient to follow up in the office 2 weeks after surgery. We discussed range of motion restrictions and also weight lifting restrictions postoperatively. Patient notes understanding. Follow up:Return for Two weeks postoperatively. No orders of the defined types were placed in this encounter. No orders of the defined types were placed in this encounter. Alea Burgos LPN am scribing for and in the presence of Dr. Linh Agudelo  11/1/2021 1:58 PM      I have reviewed and made changes accordingly to the work scribed by Maximiliano Roman LPN. The documentation accurately reflects work and decisions made by me. I have also reviewed documentation completed by clinical staff.     Linh Agudelo, DO, [address] Medicine  11/1/2021 1:59 PM    This note is created with the assistance of a speech recognition program.  While intending to generate a document that actually reflects the content of the visit, the document can still have some errors including those of syntax and sound a like substitutions which may escape proof reading.  In such instances, actual meaning can be extrapolated by contextual diversion      All details of the procedure, as well as risks, benefits and alternatives, including the option of non operative versus operative treatment were discussed. The patient understands that risks of the surgery include but are not limited to: bleeding, malunion/nonunion, loss of fixation, loss of reduction, hardware failure, angular or rotational deformity, length discrepancy, limp, transfusion, skin blistering or breakdown, progressive post traumatic degenerative joint disease, possible need for further surgery, bone grafting, infection, nerve injury, paralysis, numbness, blood vessel injury, excessive scaring, wound complication or breakdown, failure of symptoms to improve or actual deterioration in condition, significant acute and/or chronic pain, possible need for amputation, permanent loss of motion, and permanent loss of function. As well as the general complications of anesthesia, which include but are not limited to: myocardial infarction and/or heart attack, stroke, multi organ system failure or even possible death, prolonged hospital stay, blood clots, pulmonary embolism, abnormal reaction to medication, visual and neurological disturbances, constipation, ischemic bowel, bowel obstruction, bowel perforation, ileus and mental status changes. No guarantees were made.       Electronically signed by Makayla Francisco DO, FAOAO on 11/1/2021 at 1:58 PM

## 2021-11-17 ENCOUNTER — PATIENT MESSAGE (OUTPATIENT)
Dept: ORTHOPEDIC SURGERY | Age: 61
End: 2021-11-17

## 2021-11-18 ENCOUNTER — VIRTUAL VISIT (OUTPATIENT)
Dept: PAIN MANAGEMENT | Age: 61
End: 2021-11-18
Payer: MEDICARE

## 2021-11-18 DIAGNOSIS — M54.42 CHRONIC BILATERAL LOW BACK PAIN WITH BILATERAL SCIATICA: ICD-10-CM

## 2021-11-18 DIAGNOSIS — M54.41 CHRONIC BILATERAL LOW BACK PAIN WITH BILATERAL SCIATICA: ICD-10-CM

## 2021-11-18 DIAGNOSIS — F11.99 OPIOID USE, UNSPECIFIED WITH UNSPECIFIED OPIOID-INDUCED DISORDER (HCC): Primary | ICD-10-CM

## 2021-11-18 DIAGNOSIS — G89.29 CHRONIC BILATERAL LOW BACK PAIN WITH BILATERAL SCIATICA: ICD-10-CM

## 2021-11-18 DIAGNOSIS — M54.12 CERVICAL RADICULOPATHY: ICD-10-CM

## 2021-11-18 DIAGNOSIS — M47.817 LUMBOSACRAL SPONDYLOSIS WITHOUT MYELOPATHY: ICD-10-CM

## 2021-11-18 PROCEDURE — 99213 OFFICE O/P EST LOW 20 MIN: CPT | Performed by: NURSE PRACTITIONER

## 2021-11-18 RX ORDER — OXYCODONE HYDROCHLORIDE AND ACETAMINOPHEN 5; 325 MG/1; MG/1
1 TABLET ORAL EVERY 8 HOURS PRN
Qty: 90 TABLET | Refills: 0 | Status: SHIPPED | OUTPATIENT
Start: 2021-11-24 | End: 2021-12-23 | Stop reason: SDUPTHER

## 2021-11-18 ASSESSMENT — ENCOUNTER SYMPTOMS
SHORTNESS OF BREATH: 0
CONSTIPATION: 0
COUGH: 0
BACK PAIN: 1

## 2021-11-18 NOTE — PROGRESS NOTES
Patient completed a video visit today to review medication contract. Chief Complaint: neck and back pain    [facility]   Patient complains of neck and back pain. MRI with Foraminal narrowing, moderate at left C5-6, mild-to-moderate at bilateral C6-7 and left C7-T1, mild at bilateral C4-5, right C5-6 and right C7-T1. Spinal canal narrowing, moderate at L4-5, minimal at L2-3 and L3-4. Foraminal narrowing, moderate at right L5-S1, mild to moderate at right L4-5 and left L5-S1, mild at left L4-5.  Pt had Lumbar RFA and reports significant relief. She takes percocet as needed for the pain. She complains of increased cramping in her legs at times.        She has seen NS - EMGs ordered and she will follow up 11/30    She has seen orthopedic surgeon for left shoulder - will have replacement but needs dental clearance. Neck Pain   This is a chronic problem. The current episode started more than 1 year ago. The problem occurs constantly. The problem has been unchanged. The pain is present in the midline, left side and right side. The quality of the pain is described as aching. The pain is at a severity of 9/10. The pain is moderate. The symptoms are aggravated by position and twisting. Pertinent negatives include no chest pain or fever. She has tried bed rest and oral narcotics for the symptoms. The treatment provided mild relief. Back Pain  This is a chronic problem. The current episode started more than 1 year ago. The problem occurs constantly. The problem is unchanged. The pain is present in the lumbar spine. The quality of the pain is described as aching. The pain is at a severity of 3/10. The pain is mild. The symptoms are aggravated by position and standing. Pertinent negatives include no chest pain or fever. She has tried analgesics and bed rest (RFA) for the symptoms. The treatment provided moderate relief. Patient denies any new neurological symptoms.  No bowel or bladder incontinence, no weakness, and no falling. Pill count: appropriate due     Morphine equivalent: 22.5    Controlled Substance Monitoring:    Acute and Chronic Pain Monitoring:   RX Monitoring 2021   Attestation -   Periodic Controlled Substance Monitoring Possible medication side effects, risk of tolerance/dependence & alternative treatments discussed. ;No signs of potential drug abuse or diversion identified.;Obtaining appropriate analgesic effect of treatment. Chronic Pain > 80 MEDD -   Chronic Pain > 120 MEDD -         Past Medical History:   Diagnosis Date    Acid reflux     Adult ADHD 2012    Asthma 2012    Bipolar 1 disorder ([facility]     Broken foot     Left foot    Chest pain of uncertain etiology 43/15/7242    Last episode was in  (Written 2019)    Depression     Under control per pt.  on 6/15/18    DVT of leg (deep venous thrombosis) ([facility] 2014    Fracture     right foot / pt denies surgical intervention    Helicobacter pylori (H. pylori) infection     per EGD    Hematuria 2018    Has a follow-up with Urologist     Hiatal hernia     History of blood transfusion     History of DVT (deep vein thrombosis) 2012    Hx of blood clots     Hypertension     Internal hemorrhoids     Narcolepsy 2012    Osteoporosis        Past Surgical History:   Procedure Laterality Date    ANKLE SURGERY Left 2018    mass excision    ARM SURGERY Right     Pt states she has had 13 right arm surgeries    BREAST ENHANCEMENT SURGERY Bilateral      SECTION      x 3    COLONOSCOPY  2013    sigmoid diverticula, prominent large internal hemorrhoid    COLONOSCOPY N/A 2/10/2020    COLONOSCOPY DIAGNOSTIC performed by Shawna Hdz MD at [facility] 2020    COLORECTAL CANCER SCREENING, NOT HIGH RISK performed by Luis Vaughn MD at [address] [place] [address] [facility], COLON, DIAGNOSTIC      ESOPHAGEAL MOTILITY STUDY N/A 2020    PES RN - ESOPHAGEAL MOTILITY STUDY performed by Judith Potts DO at [address] [facility] STUDY  9/10/2020    ESOPHAGEAL MOTILITY STUDY performed by Judith Potts DO at [facility]    ESOPHAGEAL MOTILITY STUDY N/A 12/18/2020    ESOPHAGEAL MOTILITY STUDY ATTEMPTED PER DR. Eli Beatty WITHOUT SUCCESS performed by Judith Potts DO at [facility]    FOOT SURGERY Left 2015    FOOT TENDON SURGERY Left 02/22/2019    LEFT PERONEAL TENDON REPAIR WITH POSSIBLE LEFT TENDON TRANSFER (Left )    KNEE ARTHROSCOPY Left     Two surgeries on left knee per pt    KNEE SURGERY Right     Three surgeries per pt    LIGAMENT REPAIR Left 2/22/2019    LEFT PERONEAL TENDON REPAIR performed by Dipika Lechuga DPM at [address] [facility] Bilateral 06/04/2021     BILATERAL L4/5, L5/S1 LUMBAR FACET (Bilateral )    NOSE SURGERY      PAIN MANAGEMENT PROCEDURE Bilateral 6/4/2021    BILATERAL L4/5, L5/S1 LUMBAR FACET performed by Ravi Pink MD at [address] [place] [address]  06/11/2021    BILATERAL L4/5, L5/S1 NERVE BLOCK - Bilateral    PAIN MANAGEMENT PROCEDURE Bilateral 6/11/2021    BILATERAL L4/5, L5/S1 NERVE BLOCK performed by Ravi Pink MD at [address] [place] [address] Right 07/23/2021    L4/5, L5/S1 NERVE RADIOFREQUENCY ABLATION     PAIN MANAGEMENT PROCEDURE Right 7/23/2021    right  L4/5, L5/S1 NERVE RADIOFREQUENCY ABLATION performed by Ravi Pink MD at [address] [place] [address] Left 08/06/2021    left  L4/5, L5/S1 NERVE RADIOFREQUENCY ABLATION (Left )    PAIN MANAGEMENT PROCEDURE Left 8/6/2021    left  L4/5, L5/S1 NERVE RADIOFREQUENCY ABLATION performed by Ravi Pink MD at [address] [facility] OFFICE/OUTPT [facility] Left 6/29/2018    RESECTION/REMOVAL BONY NEOPLASM LEFT FIBULA, LEFT SOFT TISSUE MASS EXCISION WITH BONE BIOPSY LEFT ANKLE FIBULA performed by Dipika Lechuga DPM at [address] [facility] Left  UPPER GASTROINTESTINAL ENDOSCOPY  5/23/2013    tertiary contractures esophagus, 3 to 4 cm hiatal hernia, antral gastritis, + H Pylori, mild active chronic esophagitis    UPPER GASTROINTESTINAL ENDOSCOPY  4/6/2016    one biopsy    UPPER GASTROINTESTINAL ENDOSCOPY N/A 2/10/2020    EGD BIOPSY performed by Todd Daley MD at [facility]         Allergies   Allergen Reactions    Latex Itching    Prozac [Fluoxetine Hcl]     Sulfa Antibiotics      migraine    Codeine Itching         Current Outpatient Medications:     propranolol (INDERAL LA) 80 MG extended release capsule, Take 1 capsule by mouth daily, Disp: 90 capsule, Rfl: 1    Cyanocobalamin (VITAMIN B-12) 3000 MCG SUBL, Place under the tongue daily Unsure of mg, Disp: , Rfl:     oxyCODONE-acetaminophen (PERCOCET) 5-325 MG per tablet, Take 1 tablet by mouth every 8 hours as needed for Pain for up to 30 days. , Disp: 90 tablet, Rfl: 0    amphetamine-dextroamphetamine (ADDERALL) 30 MG tablet, , Disp: , Rfl:     pantoprazole (PROTONIX) 40 MG tablet, Take 1 tablet by mouth daily, Disp: 90 tablet, Rfl: 1    buPROPion HCl ER, XL, 450 MG TB24, TAKE ONE TABLET BY MOUTH EVERY MORNING, Disp: 30 tablet, Rfl: 5    alendronate (FOSAMAX) 70 MG tablet, TAKE 1 TABLET BY MOUTH ONCE WEEKLY BEFORE BREAKFAST, ON AN EMPTY STOMACH: REMAIN UPRIGHT FOR 30 MINUTES:TAKE WITH 8 OUNCES OF WATER, Disp: 12 tablet, Rfl: 10    albuterol sulfate HFA (VENTOLIN HFA) 108 (90 Base) MCG/ACT inhaler, INHALE TWO PUFFS BY MOUTH EVERY 4 HOURS AS NEEDED FOR WHEEZING, Disp: 1 Inhaler, Rfl: 5    citalopram (CELEXA) 40 MG tablet, TAKE ONE TABLET BY MOUTH DAILY, Disp: 90 tablet, Rfl: 3    albuterol (PROVENTIL) (2.5 MG/3ML) 0.083% nebulizer solution, Take 2.5 mg by nebulization every 6 hours as needed for Wheezing or Shortness of Breath , Disp: , Rfl:     Calcium Carbonate (CALCIUM 600 PO), Take 600 mg by mouth daily, Disp: , Rfl:     Family History Problem Relation Age of Onset    Cancer Mother     Hypertension Mother     Stroke Mother     Cancer Maternal Aunt     Hypertension Father     Heart Surgery Father     Diabetes Neg Hx        Social History     Socioeconomic History    Marital status: Single     Spouse name: Not on file    Number of children: Not on file    Years of education: Not on file    Highest education level: Not on file   Occupational History    Not on file   Tobacco Use    Smoking status: Former Smoker     Packs/day: 1.00     Years: 20.00     Pack years: 20.00     Quit date:      Years since quittin.9    Smokeless tobacco: Never Used   Vaping Use    Vaping Use: Never used   Substance and Sexual Activity    Alcohol use: Yes     Comment: rare    Drug use: No    Sexual activity: Not Currently   Other Topics Concern    Not on file   Social History Narrative    Not on file     Social Determinants of Health     Financial Resource Strain: Low Risk     Difficulty of Paying Living Expenses: Not hard at all   Food Insecurity: No Food Insecurity    Worried About [address] [employer] in the Last Year: Never true    [address] in the Last Year: Never true   Transportation Needs:     Lack of Transportation (Medical): Not on file    Lack of Transportation (Non-Medical):  Not on file   Physical Activity:     Days of Exercise per Week: Not on file    Minutes of Exercise per Session: Not on file   Stress:     Feeling of Stress : Not on file   Social Connections:     Frequency of Communication with Friends and Family: Not on file    Frequency of Social Gatherings with Friends and Family: Not on file    Attends Yarsani Services: Not on file    Active Member of Clubs or Organizations: Not on file    Attends Club or Organization Meetings: Not on file    Marital Status: Not on file   Intimate Partner Violence:     Fear of Current or Ex-Partner: Not on file    Emotionally Abused: Not on file    Physically Abused: Not on file    Sexually Abused: Not on file   Housing Stability:     Unable to Pay for Housing in the Last Year: Not on file    Number of Places Lived in the Last Year: Not on file    Unstable Housing in the Last Year: Not on file       Review of Systems:  Review of Systems   Constitutional: Negative for chills and fever. Cardiovascular: Negative for chest pain and palpitations. Respiratory: Negative for cough and shortness of breath. Musculoskeletal: Positive for back pain, joint pain and neck pain. Gastrointestinal: Negative for constipation. Neurological: Negative for disturbances in coordination and loss of balance. Physical Exam:  There were no vitals taken for this visit. Physical Exam  HENT:      Head: Normocephalic. Pulmonary:      Effort: Pulmonary effort is normal.   Neurological:      Mental Status: She is alert. Psychiatric:         Mood and Affect: Mood normal.         Behavior: Behavior normal.         Record/Diagnostics Review:    Last angie 10/2021 and was appropriate     Assessment:  Problem List Items Addressed This Visit     Cervical radiculopathy    Lumbosacral spondylosis without myelopathy    Relevant Medications    oxyCODONE-acetaminophen (PERCOCET) 5-325 MG per tablet (Start on 11/24/2021)    Chronic bilateral low back pain with bilateral sciatica    Relevant Medications    oxyCODONE-acetaminophen (PERCOCET) 5-325 MG per tablet (Start on 11/24/2021)    Opioid use, unspecified with unspecified opioid-induced disorder - Primary             Treatment Plan:  Patient relates current medications are helping the pain. Patient reports taking pain medications as prescribed, denies obtaining medications from different sources and denies use of illegal drugs. Patient denies side effects from medications like nausea, vomiting, constipation or drowsiness. Patient reports current activities of daily living are possible due to medications and would like to continue them.      As always, we encourage daily stretching and strengthening exercises, and recommend minimizing use of pain medications unless patient cannot get through daily activities due to pain. Contract requirements met. Continue opioid therapy. Script written for percocet  Continues to follow with neurosurgery - EMG scheduled for next month  Will need shoulder replacement - continues to follow with Dr. Ronit Santos  Follow up appointment made for 4 weeks    Dawson Esquivelbarry, was evaluated through a synchronous (real-time) audio-video encounter. The patient (or guardian if applicable) is aware that this is a billable service. Verbal consent to proceed has been obtained within the past 12 months. The visit was conducted pursuant to the emergency declaration under the [address] [place] [address], [address] [place] [address] authority and the [employer] General Act. Patient identification was verified, and a caregiver was present when appropriate. The patient was located in a state where the provider was credentialed to provide care. Total time spent for this encounter: Not billed by time    --PIETER Taylor CNP on 11/18/2021 at 11:32 AM    An electronic signature was used to authenticate this note.

## 2021-11-18 NOTE — TELEPHONE ENCOUNTER
From: Brain Castro  To: Dr. Dima Almonte  Sent: 11/17/2021 4:55 PM EST  Subject: schdl surgery? Sorry to bother you but no one has called me yet,or my phone messed up again, regarding the shoulder surgery. i wouldn't bother you but the pain is getting worse and now the right one is giving me trouble too. Do you think I could get someone to shoot it with cortisone or something. Not sure if I should ask you or Dr Eleazar Diana about this or even at all. It just Im already on pain killers 3x a day & I really dont want a bunch more pills but that shoulder is not just hurting , its locking up and kills me to straighten it out. Not sure if I explained that right. But I've had a cortisone shot in my knee & neck before. It helped, for awhile anyhow. Please let me know if I screwed up & missed the call or not.  Kalyn Jarvis [place], [phone]

## 2021-11-26 ENCOUNTER — NURSE TRIAGE (OUTPATIENT)
Dept: OTHER | Facility: CLINIC | Age: 61
End: 2021-11-26

## 2021-11-26 NOTE — TELEPHONE ENCOUNTER
Reason for Disposition   Patient is confused or is an unreliable provider of information (e.g., dementia, severe intellectual disability, alcohol intoxication)    Additional Information   Negative: Large swelling or bruise > 2 inches (5 cm)     Unsure- doesn't want to touch area   Negative: Skin is split open or gaping  (or length > 1/2 inch or 12 mm)     Unsure    Answer Assessment - Initial Assessment Questions  1. MECHANISM: \"How did the injury happen? \" For falls, ask: \"What height did you fall from? \" and \"What surface did you fall against?\"      Hit head on open cabinet    2. ONSET: \"When did the injury happen? \" (Minutes or hours ago)       Yesterday    3. NEUROLOGIC SYMPTOMS: \"Was there any loss of consciousness? \" \"Are there any other neurological symptoms? \"      Denies    4. MENTAL STATUS: \"Does the person know who he is, who you are, and where he is? \"       A/o right now    5. LOCATION: \"What part of the head was hit? \"       Top    6. SCALP APPEARANCE: \"What does the scalp look like? Is it bleeding now? \" If so, ask: \"Is it difficult to stop? \"       Unsure of laceration, bleeding on and off, forehead area swollen to eyebrows    7. SIZE: For cuts, bruises, or swelling, ask: \"How large is it? \" (e.g., inches or centimeters)       Unsure    8. PAIN: \"Is there any pain? \" If so, ask: \"How bad is it? \"  (e.g., Scale 1-10; or mild, moderate, severe)      7/8 per pt    9. TETANUS: For any breaks in the skin, ask: \"When was the last tetanus booster? \"        10. OTHER SYMPTOMS: \"Do you have any other symptoms? \" (e.g., neck pain, vomiting)        Neck pain (chronic but worse)    11. PREGNANCY: \"Is there any chance you are pregnant? \" \"When was your last menstrual period? \"        N/a due to age    Protocols used: HEAD INJURY-ADULT-    Received call from [facility] [place] at [facility] with [employer].     Brief description of triage: Pt called with concern of scalp laceration (unsure of size), that has been intermittently bleeding w/ pain in back of head/neck since hitting had on cabinet door yesterday. Denies neurologic symptoms. Pt poor historian. Triage indicates for patient to see HCP within 4 hours. Care advice provided, patient verbalizes understanding; denies any other questions or concerns; instructed to call back for any new or worsening symptoms. Attention Provider: Thank you for allowing me to participate in the care of your patient. The patient was connected to triage in response to information provided to the ECC/PSC. Please do not respond through this encounter as the response is not directed to a shared pool.

## 2021-11-29 NOTE — TELEPHONE ENCOUNTER
Talked to patient 11/18/21 and she needs to make appointment with dentist and she will call me back to schedule surgery.

## 2021-12-01 ENCOUNTER — TELEPHONE (OUTPATIENT)
Dept: PAIN MANAGEMENT | Age: 61
End: 2021-12-01

## 2021-12-23 ENCOUNTER — VIRTUAL VISIT (OUTPATIENT)
Dept: PAIN MANAGEMENT | Age: 61
End: 2021-12-23
Payer: MEDICARE

## 2021-12-23 DIAGNOSIS — M54.41 CHRONIC BILATERAL LOW BACK PAIN WITH BILATERAL SCIATICA: ICD-10-CM

## 2021-12-23 DIAGNOSIS — G89.29 CHRONIC BILATERAL LOW BACK PAIN WITH BILATERAL SCIATICA: ICD-10-CM

## 2021-12-23 DIAGNOSIS — M47.817 LUMBOSACRAL SPONDYLOSIS WITHOUT MYELOPATHY: ICD-10-CM

## 2021-12-23 DIAGNOSIS — M54.12 CERVICAL RADICULOPATHY: ICD-10-CM

## 2021-12-23 DIAGNOSIS — Z79.891 ENCOUNTER FOR LONG-TERM OPIATE ANALGESIC USE: Primary | Chronic | ICD-10-CM

## 2021-12-23 DIAGNOSIS — M54.42 CHRONIC BILATERAL LOW BACK PAIN WITH BILATERAL SCIATICA: ICD-10-CM

## 2021-12-23 PROCEDURE — 99442 PR PHYS/QHP TELEPHONE EVALUATION 11-20 MIN: CPT | Performed by: NURSE PRACTITIONER

## 2021-12-23 RX ORDER — BUPROPION HYDROCHLORIDE 300 MG/1
TABLET ORAL
COMMUNITY
Start: 2021-12-06 | End: 2022-01-25 | Stop reason: SDUPTHER

## 2021-12-23 RX ORDER — OXYCODONE HYDROCHLORIDE AND ACETAMINOPHEN 5; 325 MG/1; MG/1
1 TABLET ORAL EVERY 8 HOURS PRN
Qty: 90 TABLET | Refills: 0 | Status: SHIPPED | OUTPATIENT
Start: 2021-12-24 | End: 2022-01-20 | Stop reason: SDUPTHER

## 2021-12-23 ASSESSMENT — ENCOUNTER SYMPTOMS
BACK PAIN: 1
BOWEL INCONTINENCE: 0
CONSTIPATION: 0
COUGH: 0
SHORTNESS OF BREATH: 0

## 2021-12-23 NOTE — PROGRESS NOTES
pain is moderate. The pain is worse during the night. The symptoms are aggravated by twisting, bending, standing and sitting. Associated symptoms include headaches and weakness. Pertinent negatives include no bladder incontinence, bowel incontinence, chest pain, dysuria, fever or numbness. Risk factors include history of osteoporosis. She has tried heat, analgesics and muscle relaxant (Tens unit) for the symptoms. The treatment provided mild relief. Patient denies any new neurological symptoms. No bowel or bladder incontinence, no weakness, and no falling. Pill count: appropriate due 12/24    Morphine equivalent: 22.5    Controlled Substance Monitoring:    Acute and Chronic Pain Monitoring:   RX Monitoring 12/23/2021   Attestation -   Periodic Controlled Substance Monitoring Possible medication side effects, risk of tolerance/dependence & alternative treatments discussed. ;No signs of potential drug abuse or diversion identified.;Obtaining appropriate analgesic effect of treatment. Chronic Pain > 80 MEDD -   Chronic Pain > 120 MEDD -           Past Medical History:   Diagnosis Date    Acid reflux     Adult ADHD 8/1/2012    Asthma 8/1/2012    Bipolar 1 disorder ([facility] [address]     Broken foot 2014    Left foot    Chest pain of uncertain etiology 01/45/0299    Last episode was in 2013 (Written 02/12/2019)    Depression     Under control per pt.  on 6/15/18    DVT of leg (deep venous thrombosis) ([address] 8/22/2014    Fracture     right foot / pt denies surgical intervention    Helicobacter pylori (H. pylori) infection 2013    per EGD    Hematuria 04/2018    Has a follow-up with Urologist 6/20    Hiatal hernia     History of blood transfusion     History of DVT (deep vein thrombosis) 8/1/2012    Hx of blood clots     Hypertension     Internal hemorrhoids     Narcolepsy 8/1/2012    Osteoporosis        Past Surgical History:   Procedure Laterality Date    ANKLE SURGERY Left 06/29/2018    mass excision    ARM SURGERY Right     Pt states she has had 13 right arm surgeries    BREAST ENHANCEMENT SURGERY Bilateral      SECTION      x 3    COLONOSCOPY  2013    sigmoid diverticula, prominent large internal hemorrhoid    COLONOSCOPY N/A 2/10/2020    COLONOSCOPY DIAGNOSTIC performed by Marta Milan MD at [facility] 2020    COLORECTAL CANCER SCREENING, NOT HIGH RISK performed by Babak Quiroz MD at [address], COLON, DIAGNOSTIC      ESOPHAGEAL MOTILITY STUDY N/A 2020    PES RN - ESOPHAGEAL MOTILITY STUDY performed by Umberto Verdugo DO at [address] STUDY  9/10/2020    ESOPHAGEAL MOTILITY STUDY performed by Umberto Verdugo DO at [facility]    ESOPHAGEAL MOTILITY STUDY N/A 2020    ESOPHAGEAL MOTILITY STUDY ATTEMPTED PER DR. Francisca Lin WITHOUT SUCCESS performed by Umberto Verdugo DO at [facility]    FOOT SURGERY Left     FOOT TENDON SURGERY Left 2019    LEFT PERONEAL TENDON REPAIR WITH POSSIBLE LEFT TENDON TRANSFER (Left )    KNEE ARTHROSCOPY Left     Two surgeries on left knee per pt    KNEE SURGERY Right     Three surgeries per pt    LIGAMENT REPAIR Left 2019    LEFT PERONEAL TENDON REPAIR performed by Divya Wood DPM at [address] [place] [address] Bilateral 2021     BILATERAL L4/5, L5/S1 LUMBAR FACET (Bilateral )    NOSE SURGERY      PAIN MANAGEMENT PROCEDURE Bilateral 2021    BILATERAL L4/5, L5/S1 LUMBAR FACET performed by Jamarcus Jasso MD at [address] [place] [address]  2021    BILATERAL L4/5, L5/S1 NERVE BLOCK - Bilateral    PAIN MANAGEMENT PROCEDURE Bilateral 2021    BILATERAL L4/5, L5/S1 NERVE BLOCK performed by Jamarcus Jasso MD at [address] [place] [address] Right 2021    L4/5, L5/S1 NERVE RADIOFREQUENCY ABLATION     PAIN MANAGEMENT PROCEDURE Right 2021    right  L4/5, L5/S1 NERVE RADIOFREQUENCY ABLATION performed by Prince Sigala MD at [facility] Left 08/06/2021    left  L4/5, L5/S1 NERVE RADIOFREQUENCY ABLATION (Left )    PAIN MANAGEMENT PROCEDURE Left 8/6/2021    left  L4/5, L5/S1 NERVE RADIOFREQUENCY ABLATION performed by Prince Sigala MD at [address] [facility] [address] OFFICE/OUTPT [address] Left 6/29/2018    RESECTION/REMOVAL BONY NEOPLASM LEFT FIBULA, LEFT SOFT TISSUE MASS EXCISION WITH BONE BIOPSY LEFT ANKLE FIBULA performed by Anahy Maddox DPM at [address] [facility] Left     UPPER GASTROINTESTINAL ENDOSCOPY  5/23/2013    tertiary contractures esophagus, 3 to 4 cm hiatal hernia, antral gastritis, + H Pylori, mild active chronic esophagitis    UPPER GASTROINTESTINAL ENDOSCOPY  4/6/2016    one biopsy    UPPER GASTROINTESTINAL ENDOSCOPY N/A 2/10/2020    EGD BIOPSY performed by Sharon Marc MD at [address]         Allergies   Allergen Reactions    Latex Itching    Prozac [Fluoxetine Hcl]     Sulfa Antibiotics      migraine    Codeine Itching         Current Outpatient Medications:     oxyCODONE-acetaminophen (PERCOCET) 5-325 MG per tablet, Take 1 tablet by mouth every 8 hours as needed for Pain for up to 30 days. , Disp: 90 tablet, Rfl: 0    propranolol (INDERAL LA) 80 MG extended release capsule, Take 1 capsule by mouth daily, Disp: 90 capsule, Rfl: 1    Cyanocobalamin (VITAMIN B-12) 3000 MCG SUBL, Place under the tongue daily Unsure of mg, Disp: , Rfl:     amphetamine-dextroamphetamine (ADDERALL) 30 MG tablet, , Disp: , Rfl:     pantoprazole (PROTONIX) 40 MG tablet, Take 1 tablet by mouth daily, Disp: 90 tablet, Rfl: 1    buPROPion HCl ER, XL, 450 MG TB24, TAKE ONE TABLET BY MOUTH EVERY MORNING, Disp: 30 tablet, Rfl: 5    alendronate (FOSAMAX) 70 MG tablet, TAKE 1 TABLET BY MOUTH ONCE WEEKLY BEFORE BREAKFAST, ON AN EMPTY STOMACH: REMAIN UPRIGHT FOR 30 MINUTES:TAKE WITH 8 OUNCES OF WATER, Disp: 12 tablet, Rfl: 10    albuterol sulfate HFA (VENTOLIN HFA) 108 (90 Base) MCG/ACT inhaler, INHALE TWO PUFFS BY MOUTH EVERY 4 HOURS AS NEEDED FOR WHEEZING, Disp: 1 Inhaler, Rfl: 5    citalopram (CELEXA) 40 MG tablet, TAKE ONE TABLET BY MOUTH DAILY, Disp: 90 tablet, Rfl: 3    albuterol (PROVENTIL) (2.5 MG/3ML) 0.083% nebulizer solution, Take 2.5 mg by nebulization every 6 hours as needed for Wheezing or Shortness of Breath , Disp: , Rfl:     Calcium Carbonate (CALCIUM 600 PO), Take 600 mg by mouth daily, Disp: , Rfl:     buPROPion (WELLBUTRIN XL) 300 MG extended release tablet, , Disp: , Rfl:     Family History   Problem Relation Age of Onset    Cancer Mother     Hypertension Mother     Stroke Mother     Cancer Maternal Aunt     Hypertension Father     Heart Surgery Father     Diabetes Neg Hx        Social History     Socioeconomic History    Marital status: Single     Spouse name: Not on file    Number of children: Not on file    Years of education: Not on file    Highest education level: Not on file   Occupational History    Not on file   Tobacco Use    Smoking status: Former Smoker     Packs/day: 1.00     Years: 20.00     Pack years: 20.00     Quit date:      Years since quittin.9    Smokeless tobacco: Never Used   Vaping Use    Vaping Use: Never used   Substance and Sexual Activity    Alcohol use: Yes     Comment: rare    Drug use: No    Sexual activity: Not Currently   Other Topics Concern    Not on file   Social History Narrative    Not on file     Social Determinants of Health     Financial Resource Strain: Low Risk     Difficulty of Paying Living Expenses: Not hard at all   Food Insecurity: No Food Insecurity    Worried About Running Out of Food in the Last Year: Never true    Brigid of Food in the Last Year: Never true   Transportation Needs:     Lack of Transportation (Medical): Not on file    Lack of Transportation (Non-Medical):  Not on file   Physical Activity:  Days of Exercise per Week: Not on file    Minutes of Exercise per Session: Not on file   Stress:     Feeling of Stress : Not on file   Social Connections:     Frequency of Communication with Friends and Family: Not on file    Frequency of Social Gatherings with Friends and Family: Not on file    Attends Zoroastrianism Services: Not on file    Active Member of [address] [place] [address] or Organizations: Not on file    Attends Club or Organization Meetings: Not on file    Marital Status: Not on file   Intimate Partner Violence:     Fear of Current or Ex-Partner: Not on file    Emotionally Abused: Not on file    Physically Abused: Not on file    Sexually Abused: Not on file   Housing Stability:     Unable to Pay for Housing in the Last Year: Not on file    Number of [place] in the Last Year: Not on file    Unstable Housing in the Last Year: Not on file       Review of Systems:  Review of Systems   Constitutional: Negative for chills and fever. Cardiovascular: Negative for chest pain and palpitations. Respiratory: Negative for cough and shortness of breath. Musculoskeletal: Positive for back pain and neck pain. Gastrointestinal: Negative for bowel incontinence and constipation. Genitourinary: Negative for bladder incontinence and dysuria. Neurological: Positive for headaches and weakness. Negative for disturbances in coordination, loss of balance and numbness. Physical Exam:  There were no vitals taken for this visit. Physical Exam  Neurological:      Mental Status: She is alert.    Psychiatric:         Mood and Affect: Mood normal.         Record/Diagnostics Review:    Last angie 10/2021 and was appropriate     Assessment:  Problem List Items Addressed This Visit     Encounter for long-term opiate analgesic use - Primary (Chronic)    Cervical radiculopathy    Relevant Medications    buPROPion (WELLBUTRIN XL) 300 MG extended release tablet    Lumbosacral spondylosis without myelopathy    Relevant Medications    oxyCODONE-acetaminophen (PERCOCET) 5-325 MG per tablet (Start on 12/24/2021)    Chronic bilateral low back pain with bilateral sciatica    Relevant Medications    buPROPion (WELLBUTRIN XL) 300 MG extended release tablet    oxyCODONE-acetaminophen (PERCOCET) 5-325 MG per tablet (Start on 12/24/2021)             Treatment Plan:  Patient relates current medications are helping the pain. Patient reports taking pain medications as prescribed, denies obtaining medications from different sources and denies use of illegal drugs. Patient denies side effects from medications like nausea, vomiting, constipation or drowsiness. Patient reports current activities of daily living are possible due to medications and would like to continue them. As always, we encourage daily stretching and strengthening exercises, and recommend minimizing use of pain medications unless patient cannot get through daily activities due to pain. Contract requirements met. Continue opioid therapy. Script written for percocet  Continues to follow with neurosurgery - EMG scheduled for next month  Will need shoulder replacement - continues to follow with Dr. Colt Eng - needs dental clearance for surgery  Follow up appointment made for 4 weeks    Whitley Gross, was evaluated through a synchronous (real-time) audio-video encounter. The patient (or guardian if applicable) is aware that this is a billable service. Verbal consent to proceed has been obtained within the past 12 months. The visit was conducted pursuant to the emergency declaration under the [facility] [place], [address] [place] [address] authority and the [employer] and [employer] General Act. Patient identification was verified, and a caregiver was present when appropriate. The patient was located in a state where the provider was credentialed to provide care.     Total time spent for this encounter: 15 minutes    --Janie Lawrence Freddie Michelle CNP on 12/23/2021 at 9:53 AM    An electronic signature was used to authenticate this note.

## 2021-12-30 ENCOUNTER — TELEPHONE (OUTPATIENT)
Dept: ORTHOPEDIC SURGERY | Age: 61
End: 2021-12-30

## 2021-12-30 DIAGNOSIS — Z01.818 PRE-OP TESTING: Primary | ICD-10-CM

## 2022-01-03 ENCOUNTER — TELEPHONE (OUTPATIENT)
Dept: FAMILY MEDICINE CLINIC | Age: 62
End: 2022-01-03

## 2022-01-03 DIAGNOSIS — F41.1 GENERALIZED ANXIETY DISORDER: Primary | ICD-10-CM

## 2022-01-03 RX ORDER — HYDROXYZINE 50 MG/1
50 TABLET, FILM COATED ORAL EVERY 8 HOURS PRN
Qty: 30 TABLET | Refills: 0 | Status: SHIPPED | OUTPATIENT
Start: 2022-01-03 | End: 2022-02-16

## 2022-01-03 NOTE — TELEPHONE ENCOUNTER
Pt has to make an emergency dental appt today to have her 2 bottom molars extracted. She wants to know if you will send in something like a tranquilizer because she is very nervous and upset.

## 2022-01-06 ENCOUNTER — APPOINTMENT (OUTPATIENT)
Dept: GENERAL RADIOLOGY | Age: 62
End: 2022-01-06
Payer: MEDICARE

## 2022-01-06 ENCOUNTER — HOSPITAL ENCOUNTER (EMERGENCY)
Age: 62
Discharge: HOME | End: 2022-01-06
Attending: STUDENT IN AN ORGANIZED HEALTH CARE EDUCATION/TRAINING PROGRAM
Payer: MEDICARE

## 2022-01-06 VITALS
TEMPERATURE: 98.2 F | HEIGHT: 60 IN | WEIGHT: 150 LBS | BODY MASS INDEX: 29.45 KG/M2 | RESPIRATION RATE: 16 BRPM | HEART RATE: 71 BPM | SYSTOLIC BLOOD PRESSURE: 139 MMHG | DIASTOLIC BLOOD PRESSURE: 88 MMHG | OXYGEN SATURATION: 95 %

## 2022-01-06 DIAGNOSIS — S52.502A CLOSED FRACTURE OF DISTAL END OF LEFT RADIUS, UNSPECIFIED FRACTURE MORPHOLOGY, INITIAL ENCOUNTER: Primary | ICD-10-CM

## 2022-01-06 DIAGNOSIS — S52.102A CLOSED FRACTURE OF PROXIMAL END OF LEFT RADIUS, UNSPECIFIED FRACTURE MORPHOLOGY, INITIAL ENCOUNTER: ICD-10-CM

## 2022-01-06 PROCEDURE — 6360000002 HC RX W HCPCS: Performed by: STUDENT IN AN ORGANIZED HEALTH CARE EDUCATION/TRAINING PROGRAM

## 2022-01-06 PROCEDURE — 73090 X-RAY EXAM OF FOREARM: CPT

## 2022-01-06 PROCEDURE — 96372 THER/PROPH/DIAG INJ SC/IM: CPT

## 2022-01-06 PROCEDURE — 99283 EMERGENCY DEPT VISIT LOW MDM: CPT

## 2022-01-06 PROCEDURE — 29105 APPLICATION LONG ARM SPLINT: CPT

## 2022-01-06 PROCEDURE — 73110 X-RAY EXAM OF WRIST: CPT

## 2022-01-06 PROCEDURE — 73130 X-RAY EXAM OF HAND: CPT

## 2022-01-06 RX ORDER — MORPHINE SULFATE 2 MG/ML
2 INJECTION, SOLUTION INTRAMUSCULAR; INTRAVENOUS ONCE
Status: COMPLETED | OUTPATIENT
Start: 2022-01-06 | End: 2022-01-06

## 2022-01-06 RX ORDER — OXYCODONE HYDROCHLORIDE 5 MG/1
5 TABLET ORAL EVERY 6 HOURS PRN
Qty: 20 TABLET | Refills: 0 | Status: SHIPPED | OUTPATIENT
Start: 2022-01-06 | End: 2022-01-11

## 2022-01-06 RX ADMIN — MORPHINE SULFATE 2 MG: 2 INJECTION, SOLUTION INTRAMUSCULAR; INTRAVENOUS at 22:07

## 2022-01-06 ASSESSMENT — ENCOUNTER SYMPTOMS
COLOR CHANGE: 0
SHORTNESS OF BREATH: 0
EYE REDNESS: 0
ABDOMINAL PAIN: 0
EYE DISCHARGE: 0

## 2022-01-06 ASSESSMENT — PAIN SCALES - GENERAL
PAINLEVEL_OUTOF10: 9
PAINLEVEL_OUTOF10: 9

## 2022-01-06 ASSESSMENT — PAIN DESCRIPTION - DESCRIPTORS: DESCRIPTORS: BURNING;PRESSURE

## 2022-01-06 ASSESSMENT — PAIN DESCRIPTION - ORIENTATION: ORIENTATION: LEFT

## 2022-01-06 ASSESSMENT — PAIN DESCRIPTION - FREQUENCY: FREQUENCY: CONTINUOUS

## 2022-01-06 ASSESSMENT — PAIN DESCRIPTION - LOCATION: LOCATION: WRIST;HAND

## 2022-01-07 ENCOUNTER — TELEPHONE (OUTPATIENT)
Dept: PAIN MANAGEMENT | Age: 62
End: 2022-01-07

## 2022-01-07 NOTE — ED PROVIDER NOTES
[facility] ED  Emergency Department Encounter     Pt Name: Laura Olmos  MRN: [id number]  [place] 1960  Date of evaluation: 22  PCP:  Sandro Barbosa DO    CHIEF COMPLAINT       Chief Complaint   Patient presents with    Hand Injury     left fell today    Wrist Injury     left       HISTORY OFPRESENT ILLNESS  (Location/Symptom, Timing/Onset, Context/Setting, Quality, Duration, Modifying Factors,Severity.)      Laura Olmos is a 60 y.o. female who presents with left wrist and hand pain after fall   pain is worse with movement and pain is rated as severe. Pain is located over the left wrist and left hand and left distal forearm and radiates to the left hand. Pain has been constant and worsening since injury. Movement is making pain worse. Does have history of left shoulder impingement syndrome is scheduled for surgery with Dr. Stan Dumont in approximately a month. No worse shoulder pain the normal and denies elbow pain. States she was walking tripped and fell landing on her left arm. Denies striking her head or loss consciousness. Does take chronic Percocet. PAST MEDICAL / SURGICAL / SOCIAL / FAMILY HISTORY      has a past medical history of Acid reflux, Adult ADHD, Asthma, Bipolar 1 disorder ([address] Broken foot, Chest pain of uncertain etiology, Depression, DVT of leg (deep venous thrombosis) ([address] Fracture, Helicobacter pylori (H. pylori) infection, Hematuria, Hiatal hernia, History of blood transfusion, History of DVT (deep vein thrombosis), Hx of blood clots, Hypertension, Internal hemorrhoids, Narcolepsy, and Osteoporosis. has a past surgical history that includes  section; Nose surgery; Knee arthroscopy (Left); shoulder surgery (Left); Wrist surgery; Upper gastrointestinal endoscopy (2013); Colonoscopy (2013); Upper gastrointestinal endoscopy (2016); Foot surgery (Left, ); Breast enhancement surgery (Bilateral);  Arm Surgery (Right); knee surgery (Right); vaginal dilatation; Ankle surgery (Left, 2018); pr office/outpt visit,procedure only (Left, 2018); Foot Tendon Surgery (Left, 2019); ligament repair (Left, 2019); Upper gastrointestinal endoscopy (N/A, 2/10/2020); Colonoscopy (N/A, 2/10/2020); Endoscopy, colon, diagnostic; Colonoscopy (N/A, 2020); esophageal motility study (N/A, 2020); esophageal motility study (9/10/2020); esophageal motility study (N/A, 2020); Nerve Block (Bilateral, 2021); Pain management procedure (Bilateral, 2021); Pain management procedure (2021); Pain management procedure (Bilateral, 2021); Pain management procedure (Right, 2021); Pain management procedure (Right, 2021); Pain management procedure (Left, 2021); and Pain management procedure (Left, 2021). Social History     Socioeconomic History    Marital status: Single     Spouse name: Not on file    Number of children: Not on file    Years of education: Not on file    Highest education level: Not on file   Occupational History    Not on file   Tobacco Use    Smoking status: Former Smoker     Packs/day: 1.00     Years: 20.00     Pack years: 20.00     Quit date:      Years since quittin.0    Smokeless tobacco: Never Used   Vaping Use    Vaping Use: Never used   Substance and Sexual Activity    Alcohol use: Yes     Comment: rare    Drug use: No    Sexual activity: Not Currently   Other Topics Concern    Not on file   Social History Narrative    Not on file     Social Determinants of Health     Financial Resource Strain: Low Risk     Difficulty of Paying Living Expenses: Not hard at all   Food Insecurity: No Food Insecurity    Worried About [address] in the Last Year: Never true    [address] in the Last Year: Never true   Transportation Needs:     Lack of Transportation (Medical): Not on file    Lack of Transportation (Non-Medical):  Not on file   Physical Activity:     Days of Exercise per Week: Not on file    Minutes of Exercise per Session: Not on file   Stress:     Feeling of Stress : Not on file   Social Connections:     Frequency of Communication with Friends and Family: Not on file    Frequency of Social Gatherings with Friends and Family: Not on file    Attends L[employer] day Services: Not on file    Active Member of [address] [place] [address] or Organizations: Not on file    Attends Club or Organization Meetings: Not on file    Marital Status: Not on file   Intimate Partner Violence:     Fear of Current or Ex-Partner: Not on file    Emotionally Abused: Not on file    Physically Abused: Not on file    Sexually Abused: Not on file   Housing Stability:     Unable to Pay for Housing in the Last Year: Not on file    Number of [place] in the Last Year: Not on file    Unstable Housing in the Last Year: Not on file       Family History   Problem Relation Age of Onset    Cancer Mother     Hypertension Mother     Stroke Mother     Cancer Maternal Aunt     Hypertension Father     Heart Surgery Father     Diabetes Neg Hx        Allergies:  Latex, Prozac [fluoxetine hcl], Sulfa antibiotics, and Codeine    Home Medications:  Prior to Admission medications    Medication Sig Start Date End Date Taking? Authorizing Provider   oxyCODONE (ROXICODONE) 5 MG immediate release tablet Take 1 tablet by mouth every 6 hours as needed for Pain for up to 5 days. 1/6/22 1/11/22 Yes Angela Roberto,    oxyCODONE-acetaminophen (PERCOCET) 5-325 MG per tablet Take 1 tablet by mouth every 8 hours as needed for Pain for up to 30 days.  12/24/21 1/23/22 Yes Kenia Sanderson APRN - CNP   propranolol (INDERAL LA) 80 MG extended release capsule Take 1 capsule by mouth daily 10/29/21  Yes Jaime Cline DO   Cyanocobalamin (VITAMIN B-12) 3000 MCG SUBL Place under the tongue daily Unsure of mg   Yes Historical Provider, MD   amphetamine-dextroamphetamine (ADDERALL) 30 MG tablet 30 mg 2 times daily. 9/27/21  Yes Historical Provider, MD   pantoprazole (PROTONIX) 40 MG tablet Take 1 tablet by mouth daily 9/15/21  Yes Alysia Garcia DO   buPROPion HCl ER, XL, 450 MG TB24 TAKE ONE TABLET BY MOUTH EVERY MORNING 8/31/21  Yes Alysia Garcia DO   citalopram (CELEXA) 40 MG tablet TAKE ONE TABLET BY MOUTH DAILY 4/8/20  Yes Ankit Walker MD   Calcium Carbonate (CALCIUM 600 PO) Take 600 mg by mouth daily   Yes Historical Provider, MD   hydrOXYzine (ATARAX) 50 MG tablet Take 1 tablet by mouth every 8 hours as needed for Anxiety 1/3/22 1/13/22  Alysia Garcia DO   buPROPion (WELLBUTRIN XL) 300 MG extended release tablet  12/6/21   Historical Provider, MD   alendronate (FOSAMAX) 70 MG tablet TAKE 1 TABLET BY MOUTH ONCE WEEKLY BEFORE BREAKFAST, ON AN EMPTY STOMACH: REMAIN UPRIGHT FOR 30 MINUTES:TAKE WITH 8 OUNCES OF WATER 3/18/21   Alysia Garcia DO   albuterol sulfate HFA (VENTOLIN HFA) 108 (90 Base) MCG/ACT inhaler INHALE TWO PUFFS BY MOUTH EVERY 4 HOURS AS NEEDED FOR WHEEZING 10/28/20   Ankit Walker MD   albuterol (PROVENTIL) (2.5 MG/3ML) 0.083% nebulizer solution Take 2.5 mg by nebulization every 6 hours as needed for Wheezing or Shortness of Breath     Historical Provider, MD       REVIEW OF SYSTEMS    (2-9 systems for level 4, 10 or more for level 5)      Review of Systems   Constitutional: Negative for chills and fever. Eyes: Negative for discharge and redness. Respiratory: Negative for shortness of breath. Cardiovascular: Negative for chest pain. Gastrointestinal: Negative for abdominal pain. Genitourinary: Negative for flank pain. Musculoskeletal: Positive for arthralgias. Skin: Negative for color change and rash. Allergic/Immunologic: Negative for environmental allergies. Neurological: Negative for headaches. Psychiatric/Behavioral: Negative for agitation and confusion.        PHYSICAL EXAM   (up to 7 for level 4, 8 or more for level 5)     INITIAL VITALS:    height is 5' (1.524 m) and weight is 150 lb (68 kg). Her oral temperature is 98.2 °F (36.8 °C). Her blood pressure is 139/88 and her pulse is 71. Her respiration is 16 and oxygen saturation is 95%. Physical Exam  Vitals and nursing note reviewed. Constitutional:       Appearance: She is well-developed. HENT:      Head: Normocephalic and atraumatic. Eyes:      General: No scleral icterus. Conjunctiva/sclera: Conjunctivae normal.      Pupils: Pupils are equal, round, and reactive to light. Cardiovascular:      Rate and Rhythm: Normal rate and regular rhythm. Heart sounds: Normal heart sounds. No murmur heard. No friction rub. No gallop. Pulmonary:      Effort: Pulmonary effort is normal. No respiratory distress. Breath sounds: Normal breath sounds. No wheezing or rales. Musculoskeletal:      Comments: Painful range of motion of left wrist, left hand with ice pack in place, pulses intact bilaterally, cap refill 2 to 3 seconds, skin cool markedly tender to left wrist palpation up to mid radius ulna   Skin:     General: Skin is warm and dry. Findings: No erythema or rash. Neurological:      Mental Status: She is alert and oriented to person, place, and time. Psychiatric:         Behavior: Behavior normal.         DIFFERENTIAL  DIAGNOSIS     PLAN (LABS / IMAGING / EKG):  Orders Placed This Encounter   Procedures    XR WRIST LEFT (MIN 3 VIEWS)    XR HAND LEFT (MIN 3 VIEWS)    XR RADIUS ULNA LEFT (2 VIEWS)    Application long arm splint    [employer] Arm Sling, Left; L       MEDICATIONS ORDERED:  Orders Placed This Encounter   Medications    morphine injection 2 mg    oxyCODONE (ROXICODONE) 5 MG immediate release tablet     Sig: Take 1 tablet by mouth every 6 hours as needed for Pain for up to 5 days. Dispense:  20 tablet     Refill:  0       DDX: Contusion versus sprain versus fracture    Initial MDM/Plan: 60 y.o. female who presents with history of chronic shoulder pain with new fall. Will get x-rays of mid radius ulna distally rule out fracture. Pain control. DIAGNOSTIC RESULTS / EMERGENCY DEPARTMENT COURSE / MDM     LABS:  Labs Reviewed - No data to display      RADIOLOGY:  XR RADIUS ULNA LEFT (2 VIEWS)    Result Date: 1/6/2022  EXAMINATION: 3 XRAY VIEWS OF THE LEFT WRIST; THREE XRAY VIEWS OF THE LEFT HAND; TWO XRAY VIEWS OF THE LEFT FOREARM 1/6/2022 9:39 pm COMPARISON: None. HISTORY: ORDERING SYSTEM PROVIDED HISTORY: Fall and outstretched wrists, wrist pain TECHNOLOGIST PROVIDED HISTORY: Fall and outstretched wrists, wrist pain Reason for Exam: Pain, fall today; ORDERING SYSTEM PROVIDED HISTORY: Fall on outstretched wrist, wrist pain TECHNOLOGIST PROVIDED HISTORY: Fall on outstretched wrist, wrist pain Reason for Exam: Pain, fall today; ORDERING SYSTEM PROVIDED HISTORY: Fall, wrist, mid radius ulna pain TECHNOLOGIST PROVIDED HISTORY: Fall, wrist, mid radius ulna pain Reason for Exam: Pain, fall today FINDINGS: Hand and wrist. There is a subtle lucency through the distal radius worrisome for nondisplaced fracture with evidence of intra-articular extension. Degenerate changes. Volar wrist soft tissue swelling. .  No radiopaque foreign bodies. There are mild degenerate change involving the intercarpal joints and the carpal joints and the interphalangeal joints. Mild degenerate changes of the distal radioulnar joint. There is a subtle lucency through the radial head at the level of the elbow suspicious for nondisplaced fracture. The remainder of the radius and ulna are otherwise intact. Findings suspicious for radial head fracture level of the elbow. Findings suspicious for distal radial fracture at the level of the wrist. No additional fracture involving the wrist or the hand. Otherwise mild degenerate change.      XR WRIST LEFT (MIN 3 VIEWS)    Result Date: 1/6/2022  EXAMINATION: 3 XRAY VIEWS OF THE LEFT WRIST; THREE XRAY VIEWS OF THE LEFT HAND; TWO XRAY VIEWS OF THE LEFT FOREARM 1/6/2022 9:39 pm COMPARISON: None. HISTORY: ORDERING SYSTEM PROVIDED HISTORY: Fall and outstretched wrists, wrist pain TECHNOLOGIST PROVIDED HISTORY: Fall and outstretched wrists, wrist pain Reason for Exam: Pain, fall today; ORDERING SYSTEM PROVIDED HISTORY: Fall on outstretched wrist, wrist pain TECHNOLOGIST PROVIDED HISTORY: Fall on outstretched wrist, wrist pain Reason for Exam: Pain, fall today; ORDERING SYSTEM PROVIDED HISTORY: Fall, wrist, mid radius ulna pain TECHNOLOGIST PROVIDED HISTORY: Fall, wrist, mid radius ulna pain Reason for Exam: Pain, fall today FINDINGS: Hand and wrist. There is a subtle lucency through the distal radius worrisome for nondisplaced fracture with evidence of intra-articular extension. Degenerate changes. Volar wrist soft tissue swelling. .  No radiopaque foreign bodies. There are mild degenerate change involving the intercarpal joints and the carpal joints and the interphalangeal joints. Mild degenerate changes of the distal radioulnar joint. There is a subtle lucency through the radial head at the level of the elbow suspicious for nondisplaced fracture. The remainder of the radius and ulna are otherwise intact. Findings suspicious for radial head fracture level of the elbow. Findings suspicious for distal radial fracture at the level of the wrist. No additional fracture involving the wrist or the hand. Otherwise mild degenerate change. XR HAND LEFT (MIN 3 VIEWS)    Result Date: 1/6/2022  EXAMINATION: 3 XRAY VIEWS OF THE LEFT WRIST; THREE XRAY VIEWS OF THE LEFT HAND; TWO XRAY VIEWS OF THE LEFT FOREARM 1/6/2022 9:39 pm COMPARISON: None.  HISTORY: ORDERING SYSTEM PROVIDED HISTORY: Fall and outstretched wrists, wrist pain TECHNOLOGIST PROVIDED HISTORY: Fall and outstretched wrists, wrist pain Reason for Exam: Pain, fall today; ORDERING SYSTEM PROVIDED HISTORY: Fall on outstretched wrist, wrist pain TECHNOLOGIST PROVIDED HISTORY: Fall on outstretched wrist, wrist pain Reason for Exam: Pain, fall today; ORDERING SYSTEM PROVIDED HISTORY: Fall, wrist, mid radius ulna pain TECHNOLOGIST PROVIDED HISTORY: Fall, wrist, mid radius ulna pain Reason for Exam: Pain, fall today FINDINGS: Hand and wrist. There is a subtle lucency through the distal radius worrisome for nondisplaced fracture with evidence of intra-articular extension. Degenerate changes. Volar wrist soft tissue swelling. .  No radiopaque foreign bodies. There are mild degenerate change involving the intercarpal joints and the carpal joints and the interphalangeal joints. Mild degenerate changes of the distal radioulnar joint. There is a subtle lucency through the radial head at the level of the elbow suspicious for nondisplaced fracture. The remainder of the radius and ulna are otherwise intact. Findings suspicious for radial head fracture level of the elbow. Findings suspicious for distal radial fracture at the level of the wrist. No additional fracture involving the wrist or the hand. Otherwise mild degenerate change. EMERGENCY DEPARTMENT COURSE:  ED Course as of 01/07/22 0344   u Jan 06, 2022   2211 Findings suspicious for radial head fracture level of the elbow. Findings  suspicious for distal radial fracture at the level of the wrist. [MS]      ED Course User Index  [MS] Verdis Apley, DO     X-ray with questionable lucency at the distal radial head as well as proximal radial head. Minimal pain at the elbow doubting proximal radial head fracture however the significant pain to distal radius palpation. Will place in sugar tong out of abundance of caution. Follow-up with Dr. Jean Mckeon for reassessment and repeat x-rays. PMS intact prior to and after splint application. Given sling for comfort. Discussed importance of not wearing sling for extended period of time and to take it off overnight.   Voiced understanding plan.      · Based on the low acuity of concerning symptoms and improvement of symptoms, patient will be discharged with follow up and prescription information listed in the Disposition section. · Patient states they will follow-up with primary care physician and/or return to the emergency department should they experience a change or worsening of symptoms. · Patient will be discharged with resources: summary of visit, instructions, follow-up information, prescriptions if necessary. · Patient/ family instructed to read discharge paperwork. All of their questions and concerns were addressed. · Suspicion for any acute life-threatening processes is low. Patient voices understanding of plan. PROCEDURES:  None    CONSULTS:  None    CRITICAL CARE:  0    FINAL IMPRESSION      1. Closed fracture of distal end of left radius, unspecified fracture morphology, initial encounter    2. Closed fracture of proximal end of left radius, unspecified fracture morphology, initial encounter          DISPOSITION / PLAN     DISPOSITION Decision To Discharge 01/06/2022 10:25:27 PM    Discharge    PATIENTREFERRED TO:  [facility] [address]  [phone]    Schedule an appointment as soon as possible for a visit in 1 week        DISCHARGE MEDICATIONS:  Discharge Medication List as of 1/6/2022 10:25 PM      START taking these medications    Details   oxyCODONE (ROXICODONE) 5 MG immediate release tablet Take 1 tablet by mouth every 6 hours as needed for Pain for up to 5 days. , Disp-20 tablet, R-0Print             Mariah Tee DO  EmergencyMedicine Attending    (Please note that portions of this note were completed with a voice recognition program.  Efforts were made to edit the dictations but occasionally words are mis-transcribed.)       Mariah Tee DO  01/07/22 0344

## 2022-01-07 NOTE — TELEPHONE ENCOUNTER
Patient left a msg stating that she was in the ER yesterday, she broke her arm. She stated that they gave her Oxycodone, the same medication she gets from our office, as a 5 day supply. Patient would like to know what she is to do in this situation. Please advise.

## 2022-01-10 ENCOUNTER — HOSPITAL ENCOUNTER (OUTPATIENT)
Dept: NEUROLOGY | Age: 62
Discharge: HOME | End: 2022-01-10
Payer: MEDICARE

## 2022-01-10 DIAGNOSIS — R20.0 NUMBNESS AND TINGLING OF LEFT ARM AND LEG: ICD-10-CM

## 2022-01-10 DIAGNOSIS — R20.2 NUMBNESS AND TINGLING OF LEFT ARM AND LEG: ICD-10-CM

## 2022-01-10 PROCEDURE — 95910 NRV CNDJ TEST 7-8 STUDIES: CPT | Performed by: PHYSICAL MEDICINE & REHABILITATION

## 2022-01-10 NOTE — TELEPHONE ENCOUNTER
Patient advised to bring medication with her to the appt, she is also bringing the paper script for the medication from the ER.

## 2022-01-10 NOTE — TELEPHONE ENCOUNTER
Patient called back and stated that the first couple days she was taking her medication every 6 hours instead of every 8 hours, so she took 2 extra from what she was given from here, and is now back to every 8 hours SHE DID NOT FILL THE SCRIPT FROM THE ER, please advise.

## 2022-01-12 ENCOUNTER — OFFICE VISIT (OUTPATIENT)
Dept: ORTHOPEDIC SURGERY | Age: 62
End: 2022-01-12
Payer: MEDICARE

## 2022-01-12 DIAGNOSIS — S52.572A OTHER CLOSED INTRA-ARTICULAR FRACTURE OF DISTAL END OF LEFT RADIUS, INITIAL ENCOUNTER: Primary | ICD-10-CM

## 2022-01-12 DIAGNOSIS — S52.125A CLOSED NONDISPLACED FRACTURE OF HEAD OF LEFT RADIUS, INITIAL ENCOUNTER: ICD-10-CM

## 2022-01-12 PROCEDURE — G8417 CALC BMI ABV UP PARAM F/U: HCPCS | Performed by: PHYSICIAN ASSISTANT

## 2022-01-12 PROCEDURE — G8427 DOCREV CUR MEDS BY ELIG CLIN: HCPCS | Performed by: PHYSICIAN ASSISTANT

## 2022-01-12 PROCEDURE — 3017F COLORECTAL CA SCREEN DOC REV: CPT | Performed by: PHYSICIAN ASSISTANT

## 2022-01-12 PROCEDURE — G8484 FLU IMMUNIZE NO ADMIN: HCPCS | Performed by: PHYSICIAN ASSISTANT

## 2022-01-12 PROCEDURE — 99214 OFFICE O/P EST MOD 30 MIN: CPT | Performed by: PHYSICIAN ASSISTANT

## 2022-01-12 PROCEDURE — 1036F TOBACCO NON-USER: CPT | Performed by: PHYSICIAN ASSISTANT

## 2022-01-13 ASSESSMENT — ENCOUNTER SYMPTOMS
SHORTNESS OF BREATH: 0
COUGH: 0
VOMITING: 0
COLOR CHANGE: 0

## 2022-01-20 ENCOUNTER — OFFICE VISIT (OUTPATIENT)
Dept: PAIN MANAGEMENT | Age: 62
End: 2022-01-20
Payer: MEDICARE

## 2022-01-20 VITALS
BODY MASS INDEX: 29.45 KG/M2 | SYSTOLIC BLOOD PRESSURE: 111 MMHG | DIASTOLIC BLOOD PRESSURE: 80 MMHG | WEIGHT: 150 LBS | HEIGHT: 60 IN

## 2022-01-20 DIAGNOSIS — S52.125A CLOSED NONDISPLACED FRACTURE OF HEAD OF LEFT RADIUS, INITIAL ENCOUNTER: Primary | ICD-10-CM

## 2022-01-20 DIAGNOSIS — S52.572A OTHER CLOSED INTRA-ARTICULAR FRACTURE OF DISTAL END OF LEFT RADIUS, INITIAL ENCOUNTER: ICD-10-CM

## 2022-01-20 DIAGNOSIS — M47.817 LUMBOSACRAL SPONDYLOSIS WITHOUT MYELOPATHY: ICD-10-CM

## 2022-01-20 DIAGNOSIS — M54.12 CERVICAL RADICULOPATHY: ICD-10-CM

## 2022-01-20 DIAGNOSIS — M54.42 CHRONIC BILATERAL LOW BACK PAIN WITH BILATERAL SCIATICA: Primary | ICD-10-CM

## 2022-01-20 DIAGNOSIS — M54.41 CHRONIC BILATERAL LOW BACK PAIN WITH BILATERAL SCIATICA: Primary | ICD-10-CM

## 2022-01-20 DIAGNOSIS — Z79.891 ENCOUNTER FOR LONG-TERM OPIATE ANALGESIC USE: Chronic | ICD-10-CM

## 2022-01-20 DIAGNOSIS — G89.29 CHRONIC BILATERAL LOW BACK PAIN WITH BILATERAL SCIATICA: Primary | ICD-10-CM

## 2022-01-20 PROCEDURE — 99213 OFFICE O/P EST LOW 20 MIN: CPT | Performed by: NURSE PRACTITIONER

## 2022-01-20 RX ORDER — OXYCODONE HYDROCHLORIDE AND ACETAMINOPHEN 5; 325 MG/1; MG/1
1 TABLET ORAL EVERY 8 HOURS PRN
Qty: 90 TABLET | Refills: 0 | Status: ON HOLD
Start: 2022-01-23 | End: 2022-02-08 | Stop reason: HOSPADM

## 2022-01-20 ASSESSMENT — ENCOUNTER SYMPTOMS
BOWEL INCONTINENCE: 0
BACK PAIN: 1

## 2022-01-20 NOTE — PROGRESS NOTES
Chief Complaint   Patient presents with    Back Pain    Neck Pain    Medication Refill         [facility] Patient complains of neck and back pain. MRI with Foraminal narrowing, moderate at left C5-6, mild-to-moderate at bilateral C6-7 and left C7-T1, mild at bilateral C4-5, right C5-6 and right C7-T1. Spinal canal narrowing, moderate at L4-5, minimal at L2-3 and L3-4. Foraminal narrowing, moderate at right L5-S1, mild to moderate at right L4-5 and left L5-S1, mild at left L4-5.  Pt had Lumbar RFA and reports significant relief. She takes percocet as needed for the pain. She complains of increased cramping in her legs at times.         She has seen NS - EMGs ordered and scheduled for 1/10 - was scheduled to see Makeda Garcia 1/11 has rescheduled this to 1/28     She has seen orthopedic surgeon for left shoulder - will have shoulder replacement 2/8. Recent ER visit on 1/6/22 following fall at home with closed fracture of distal end of left radius. - still plans to have shoulder replacement. Back Pain  This is a chronic problem. The current episode started more than 1 year ago. The problem occurs constantly. The problem is unchanged. The pain is present in the lumbar spine. The quality of the pain is described as stabbing. The pain radiates to the left thigh, left knee and left foot. The pain is at a severity of 5/10. The pain is moderate. The symptoms are aggravated by sitting, standing and bending. Associated symptoms include headaches, leg pain and numbness. Pertinent negatives include no bladder incontinence, bowel incontinence, chest pain, fever, tingling or weakness. She has tried heat, analgesics and bed rest (tens unit) for the symptoms. The treatment provided mild relief. Neck Pain   This is a chronic problem. The current episode started more than 1 year ago. The problem occurs constantly. The problem has been gradually worsening. The pain is associated with nothing.  The pain is present in the left side, midline and right side. The quality of the pain is described as burning. The pain is at a severity of 5/10. The symptoms are aggravated by bending, position and twisting. Associated symptoms include headaches, leg pain and numbness. Pertinent negatives include no chest pain, fever, tingling or weakness. She has tried heat, oral narcotics and bed rest (tens unit, topical creams) for the symptoms. The treatment provided mild relief. Patient denies any new neurological symptoms. No bowel or bladder incontinence, no weakness, and no falling. Pill count: appropriate due 1/23    Morphine equivalent: 22.5    Controlled Substance Monitoring:    Acute and Chronic Pain Monitoring:   RX Monitoring 1/20/2022   Attestation -   Periodic Controlled Substance Monitoring Possible medication side effects, risk of tolerance/dependence & alternative treatments discussed. ;No signs of potential drug abuse or diversion identified.;Obtaining appropriate analgesic effect of treatment. Chronic Pain > 80 MEDD -   Chronic Pain > 120 MEDD -           Past Medical History:   Diagnosis Date    Acid reflux     Adult ADHD 8/1/2012    Asthma 8/1/2012    Bipolar 1 disorder ([facility] [address]     Broken foot 2014    Left foot    Chest pain of uncertain etiology 72/29/1125    Last episode was in 2013 (Written 02/12/2019)    Depression     Under control per pt.  on 6/15/18    DVT of leg (deep venous thrombosis) ([address] 8/22/2014    Fracture     right foot / pt denies surgical intervention    Helicobacter pylori (H. pylori) infection 2013    per EGD    Hematuria 04/2018    Has a follow-up with Urologist 6/20    Hiatal hernia     History of blood transfusion     History of DVT (deep vein thrombosis) 8/1/2012    Hx of blood clots     Hypertension     Internal hemorrhoids     Narcolepsy 8/1/2012    Osteoporosis        Past Surgical History:   Procedure Laterality Date    ANKLE SURGERY Left 06/29/2018    mass excision    ARM SURGERY Right     Pt states she has had 13 right arm surgeries    BREAST ENHANCEMENT SURGERY Bilateral      SECTION      x 3    COLONOSCOPY  2013    sigmoid diverticula, prominent large internal hemorrhoid    COLONOSCOPY N/A 2/10/2020    COLONOSCOPY DIAGNOSTIC performed by Jasper Manzo MD at [facility] 2020    COLORECTAL CANCER SCREENING, NOT HIGH RISK performed by Yair Dumont MD at [facility], COLON, DIAGNOSTIC      ESOPHAGEAL MOTILITY STUDY N/A 2020    PES RN - ESOPHAGEAL MOTILITY STUDY [facility] Kerry Wayne DO [facility] [address] STUDY  9/10/2020    ESOPHAGEAL MOTILITY STUDY performed by Kerry Wayne DO at [facility]    ESOPHAGEAL MOTILITY STUDY N/A 2020    ESOPHAGEAL MOTILITY STUDY ATTEMPTED PER DR. Roque To WITHOUT SUCCESS performed by Kerry Wayne DO at [facility]    FOOT SURGERY Left     FOOT TENDON SURGERY Left 2019    LEFT PERONEAL TENDON REPAIR WITH POSSIBLE LEFT TENDON TRANSFER (Left )    KNEE ARTHROSCOPY Left     Two surgeries on left knee per pt    KNEE SURGERY Right     Three surgeries per pt    LIGAMENT REPAIR Left 2019    LEFT PERONEAL TENDON REPAIR performed by James Ruth DPM at [facility] Bilateral 2021     BILATERAL L4/5, L5/S1 LUMBAR FACET (Bilateral )    NOSE SURGERY      PAIN MANAGEMENT PROCEDURE Bilateral 2021    BILATERAL L4/5, L5/S1 LUMBAR FACET performed by Josh Calixto MD at [address] [place] [address]  2021    BILATERAL L4/5, L5/S1 NERVE BLOCK - Bilateral    PAIN MANAGEMENT PROCEDURE Bilateral 2021    BILATERAL L4/5, L5/S1 NERVE BLOCK performed by Josh Calixto MD at [address] [place] [address] Right 2021    L4/5, L5/S1 NERVE RADIOFREQUENCY ABLATION     PAIN MANAGEMENT PROCEDURE Right 2021    right  L4/5, L5/S1 NERVE RADIOFREQUENCY ABLATION performed by Dori Ocampo MD at [facility] Left 08/06/2021    left  L4/5, L5/S1 NERVE RADIOFREQUENCY ABLATION (Left )    PAIN MANAGEMENT PROCEDURE Left 8/6/2021    left  L4/5, L5/S1 NERVE RADIOFREQUENCY ABLATION performed by Dori Ocampo MD at [address] [facility] OFFICE/OUTPT [facility] Left 6/29/2018    RESECTION/REMOVAL BONY NEOPLASM LEFT FIBULA, LEFT SOFT TISSUE MASS EXCISION WITH BONE BIOPSY LEFT ANKLE FIBULA performed by Daniel Ace DPM at [facility] Left     UPPER GASTROINTESTINAL ENDOSCOPY  5/23/2013    tertiary contractures esophagus, 3 to 4 cm hiatal hernia, antral gastritis, + H Pylori, mild active chronic esophagitis    UPPER GASTROINTESTINAL ENDOSCOPY  4/6/2016    one biopsy    UPPER GASTROINTESTINAL ENDOSCOPY N/A 2/10/2020    EGD BIOPSY performed by Jon Gale MD at [address]         Allergies   Allergen Reactions    Latex Itching    Prozac [Fluoxetine Hcl]     Sulfa Antibiotics      migraine    Codeine Itching         Current Outpatient Medications:     buPROPion (WELLBUTRIN XL) 300 MG extended release tablet, , Disp: , Rfl:     oxyCODONE-acetaminophen (PERCOCET) 5-325 MG per tablet, Take 1 tablet by mouth every 8 hours as needed for Pain for up to 30 days. , Disp: 90 tablet, Rfl: 0    propranolol (INDERAL LA) 80 MG extended release capsule, Take 1 capsule by mouth daily, Disp: 90 capsule, Rfl: 1    Cyanocobalamin (VITAMIN B-12) 3000 MCG SUBL, Place under the tongue daily Unsure of mg, Disp: , Rfl:     amphetamine-dextroamphetamine (ADDERALL) 30 MG tablet, 30 mg 2 times daily.  , Disp: , Rfl:     pantoprazole (PROTONIX) 40 MG tablet, Take 1 tablet by mouth daily, Disp: 90 tablet, Rfl: 1    buPROPion HCl ER, XL, 450 MG TB24, TAKE ONE TABLET BY MOUTH EVERY MORNING, Disp: 30 tablet, Rfl: 5    alendronate (FOSAMAX) 70 MG tablet, TAKE 1 TABLET BY MOUTH ONCE WEEKLY BEFORE BREAKFAST, ON AN EMPTY STOMACH: REMAIN UPRIGHT FOR 30 MINUTES:TAKE WITH 8 OUNCES OF WATER, Disp: 12 tablet, Rfl: 10    albuterol sulfate HFA (VENTOLIN HFA) 108 (90 Base) MCG/ACT inhaler, INHALE TWO PUFFS BY MOUTH EVERY 4 HOURS AS NEEDED FOR WHEEZING, Disp: 1 Inhaler, Rfl: 5    citalopram (CELEXA) 40 MG tablet, TAKE ONE TABLET BY MOUTH DAILY, Disp: 90 tablet, Rfl: 3    albuterol (PROVENTIL) (2.5 MG/3ML) 0.083% nebulizer solution, Take 2.5 mg by nebulization every 6 hours as needed for Wheezing or Shortness of Breath , Disp: , Rfl:     Calcium Carbonate (CALCIUM 600 PO), Take 600 mg by mouth daily, Disp: , Rfl:     Family History   Problem Relation Age of Onset    Cancer Mother     Hypertension Mother     Stroke Mother     Cancer Maternal Aunt     Hypertension Father     Heart Surgery Father     Diabetes Neg Hx        Social History     Socioeconomic History    Marital status: Single     Spouse name: Not on file    Number of children: Not on file    Years of education: Not on file    Highest education level: Not on file   Occupational History    Not on file   Tobacco Use    Smoking status: Former Smoker     Packs/day: 1.00     Years: 20.00     Pack years: 20.00     Quit date:      Years since quittin.0    Smokeless tobacco: Never Used   Vaping Use    Vaping Use: Never used   Substance and Sexual Activity    Alcohol use: Yes     Comment: rare    Drug use: No    Sexual activity: Not Currently   Other Topics Concern    Not on file   Social History Narrative    Not on file     Social Determinants of Health     Financial Resource Strain: Low Risk     Difficulty of Paying Living Expenses: Not hard at all   Food Insecurity: No Food Insecurity    Worried About Running Out of Food in the Last Year: Never true    Brigid of Food in the Last Year: Never true   Transportation Needs:     Lack of Transportation (Medical): Not on file    Lack of Transportation (Non-Medical):  Not on file Physical Activity:     Days of Exercise per Week: Not on file    Minutes of Exercise per Session: Not on file   Stress:     Feeling of Stress : Not on file   Social Connections:     Frequency of Communication with Friends and Family: Not on file    Frequency of Social Gatherings with Friends and Family: Not on file    Attends [employer] Services: Not on file    Active Member of [address] [place] [address] or Organizations: Not on file    Attends Club or Organization Meetings: Not on file    Marital Status: Not on file   Intimate Partner Violence:     Fear of Current or Ex-Partner: Not on file    Emotionally Abused: Not on file    Physically Abused: Not on file    Sexually Abused: Not on file   Housing Stability:     Unable to Pay for Housing in the Last Year: Not on file    Number of [place] in the Last Year: Not on file    Unstable Housing in the Last Year: Not on file       Review of Systems:  Review of Systems   Constitutional: Negative for fever. Cardiovascular: Negative for chest pain and palpitations. Musculoskeletal: Positive for back pain and neck pain. Gastrointestinal: Negative for bowel incontinence. Genitourinary: Negative for bladder incontinence. Neurological: Positive for headaches and numbness. Negative for disturbances in coordination, loss of balance, tingling and weakness. Physical Exam:  /80   Ht 5' (1.524 m)   Wt 150 lb (68 kg)   BMI 29.29 kg/m²     Physical Exam  HENT:      Head: Normocephalic. Pulmonary:      Effort: Pulmonary effort is normal.   Musculoskeletal:         General: Normal range of motion. Arms:       Cervical back: Normal range of motion. Tenderness present. Lumbar back: Tenderness present. Back:    Skin:     General: Skin is warm and dry. Neurological:      Mental Status: She is alert and oriented to person, place, and time.          Record/Diagnostics Review:    Last angie 10/2021 and was appropriate     Assessment:  Problem List Items Addressed This Visit     Encounter for long-term opiate analgesic use (Chronic)    Cervical radiculopathy    Lumbosacral spondylosis without myelopathy    Relevant Medications    oxyCODONE-acetaminophen (PERCOCET) 5-325 MG per tablet (Start on 1/23/2022)    Chronic bilateral low back pain with bilateral sciatica - Primary    Relevant Medications    oxyCODONE-acetaminophen (PERCOCET) 5-325 MG per tablet (Start on 1/23/2022)             Treatment Plan:  Patient relates current medications are helping the pain. Patient reports taking pain medications as prescribed, denies obtaining medications from different sources and denies use of illegal drugs. Patient denies side effects from medications like nausea, vomiting, constipation or drowsiness. Patient reports current activities of daily living are possible due to medications and would like to continue them. As always, we encourage daily stretching and strengthening exercises, and recommend minimizing use of pain medications unless patient cannot get through daily activities due to pain. Contract requirements met. Continue opioid therapy.  Script written for oxycodone  Recent non-displaced left radial head fracture and a non-displaced left distal radius fracture - continues to follow with Dr. Ricardo Tamez   Pt scheduled to have left shoulder replaced 2/8  Pt very anxious today - encouraged her to move her appt up with her counselor - scheduled for 1/31 - she will call today to get in sooner  Follow up appointment made for 4 weeks

## 2022-01-24 ENCOUNTER — OFFICE VISIT (OUTPATIENT)
Dept: ORTHOPEDIC SURGERY | Age: 62
End: 2022-01-24
Payer: MEDICARE

## 2022-01-24 VITALS — BODY MASS INDEX: 29.45 KG/M2 | WEIGHT: 150 LBS | HEIGHT: 60 IN

## 2022-01-24 DIAGNOSIS — S52.572A OTHER CLOSED INTRA-ARTICULAR FRACTURE OF DISTAL END OF LEFT RADIUS, INITIAL ENCOUNTER: Primary | ICD-10-CM

## 2022-01-24 PROCEDURE — G8484 FLU IMMUNIZE NO ADMIN: HCPCS | Performed by: ORTHOPAEDIC SURGERY

## 2022-01-24 PROCEDURE — 99213 OFFICE O/P EST LOW 20 MIN: CPT | Performed by: ORTHOPAEDIC SURGERY

## 2022-01-24 PROCEDURE — G8417 CALC BMI ABV UP PARAM F/U: HCPCS | Performed by: ORTHOPAEDIC SURGERY

## 2022-01-24 PROCEDURE — 1036F TOBACCO NON-USER: CPT | Performed by: ORTHOPAEDIC SURGERY

## 2022-01-24 PROCEDURE — G8427 DOCREV CUR MEDS BY ELIG CLIN: HCPCS | Performed by: ORTHOPAEDIC SURGERY

## 2022-01-24 PROCEDURE — 3017F COLORECTAL CA SCREEN DOC REV: CPT | Performed by: ORTHOPAEDIC SURGERY

## 2022-01-24 RX ORDER — SODIUM CHLORIDE, SODIUM LACTATE, POTASSIUM CHLORIDE, CALCIUM CHLORIDE 600; 310; 30; 20 MG/100ML; MG/100ML; MG/100ML; MG/100ML
1000 INJECTION, SOLUTION INTRAVENOUS CONTINUOUS
Status: CANCELLED | OUTPATIENT
Start: 2022-01-24

## 2022-01-24 ASSESSMENT — ENCOUNTER SYMPTOMS
NAUSEA: 0
COUGH: 0
CONSTIPATION: 0
DIARRHEA: 0

## 2022-01-24 NOTE — PROGRESS NOTES
HISTORY: Fall and outstretched wrists, wrist pain Reason for Exam: Pain, fall today; ORDERING SYSTEM PROVIDED HISTORY: Fall on outstretched wrist, wrist pain TECHNOLOGIST PROVIDED HISTORY: Fall on outstretched wrist, wrist pain Reason for Exam: Pain, fall today; ORDERING SYSTEM PROVIDED HISTORY: Fall, wrist, mid radius ulna pain TECHNOLOGIST PROVIDED HISTORY: Fall, wrist, mid radius ulna pain Reason for Exam: Pain, fall today FINDINGS: Hand and wrist. There is a subtle lucency through the distal radius worrisome for nondisplaced fracture with evidence of intra-articular extension. Degenerate changes. Volar wrist soft tissue swelling. .  No radiopaque foreign bodies. There are mild degenerate change involving the intercarpal joints and the carpal joints and the interphalangeal joints. Mild degenerate changes of the distal radioulnar joint. There is a subtle lucency through the radial head at the level of the elbow suspicious for nondisplaced fracture. The remainder of the radius and ulna are otherwise intact. Findings suspicious for radial head fracture level of the elbow. Findings suspicious for distal radial fracture at the level of the wrist. No additional fracture involving the wrist or the hand. Otherwise mild degenerate change. XR WRIST LEFT (MIN 3 VIEWS)    Result Date: 1/25/2022  History: Left wrist fracture Findings: AP, lateral, oblique x-rays of the left wrist in the office today reveals no current evidence of fracture, subluxation, dislocation, radiopaque foreign body, radiopaque tumor. Impression: Left distal radius fracture well healed as described above. XR WRIST LEFT (MIN 3 VIEWS)    Result Date: 1/19/2022  History: Left wrist pain. Comparison: 1/6/2022. Findings: Possible vertical hairline fracture noted through the distal radius, which may be a nondisplaced fracture with possible intra-articular extension. Joint spaces are well-maintained. No change in radial height or inclination. There is no further evidence of fracture, subluxation, dislocation, radiopaque foreign body or radiopaque tumor noted. Severe degenerative changes noted at the left thumb carpometacarpal joint and the left thumb metacarpal phalangeal joint. No change when compared to images from 1/6/2022. Impression: Possible nondisplaced hairline/vertical fracture noted through distal radius with possible intra-articular extension as described above. XR WRIST LEFT (MIN 3 VIEWS)    Result Date: 1/6/2022  EXAMINATION: 3 XRAY VIEWS OF THE LEFT WRIST; THREE XRAY VIEWS OF THE LEFT HAND; TWO XRAY VIEWS OF THE LEFT FOREARM 1/6/2022 9:39 pm COMPARISON: None. HISTORY: ORDERING SYSTEM PROVIDED HISTORY: Fall and outstretched wrists, wrist pain TECHNOLOGIST PROVIDED HISTORY: Fall and outstretched wrists, wrist pain Reason for Exam: Pain, fall today; ORDERING SYSTEM PROVIDED HISTORY: Fall on outstretched wrist, wrist pain TECHNOLOGIST PROVIDED HISTORY: Fall on outstretched wrist, wrist pain Reason for Exam: Pain, fall today; ORDERING SYSTEM PROVIDED HISTORY: Fall, wrist, mid radius ulna pain TECHNOLOGIST PROVIDED HISTORY: Fall, wrist, mid radius ulna pain Reason for Exam: Pain, fall today FINDINGS: Hand and wrist. There is a subtle lucency through the distal radius worrisome for nondisplaced fracture with evidence of intra-articular extension. Degenerate changes. Volar wrist soft tissue swelling. .  No radiopaque foreign bodies. There are mild degenerate change involving the intercarpal joints and the carpal joints and the interphalangeal joints. Mild degenerate changes of the distal radioulnar joint. There is a subtle lucency through the radial head at the level of the elbow suspicious for nondisplaced fracture. The remainder of the radius and ulna are otherwise intact. Findings suspicious for radial head fracture level of the elbow.   Findings suspicious for distal radial fracture at the level of the wrist. No additional fracture involving the wrist or the hand. Otherwise mild degenerate change. XR HAND LEFT (MIN 3 VIEWS)    Result Date: 1/6/2022  EXAMINATION: 3 XRAY VIEWS OF THE LEFT WRIST; THREE XRAY VIEWS OF THE LEFT HAND; TWO XRAY VIEWS OF THE LEFT FOREARM 1/6/2022 9:39 pm COMPARISON: None. HISTORY: ORDERING SYSTEM PROVIDED HISTORY: Fall and outstretched wrists, wrist pain TECHNOLOGIST PROVIDED HISTORY: Fall and outstretched wrists, wrist pain Reason for Exam: Pain, fall today; ORDERING SYSTEM PROVIDED HISTORY: Fall on outstretched wrist, wrist pain TECHNOLOGIST PROVIDED HISTORY: Fall on outstretched wrist, wrist pain Reason for Exam: Pain, fall today; ORDERING SYSTEM PROVIDED HISTORY: Fall, wrist, mid radius ulna pain TECHNOLOGIST PROVIDED HISTORY: Fall, wrist, mid radius ulna pain Reason for Exam: Pain, fall today FINDINGS: Hand and wrist. There is a subtle lucency through the distal radius worrisome for nondisplaced fracture with evidence of intra-articular extension. Degenerate changes. Volar wrist soft tissue swelling. .  No radiopaque foreign bodies. There are mild degenerate change involving the intercarpal joints and the carpal joints and the interphalangeal joints. Mild degenerate changes of the distal radioulnar joint. There is a subtle lucency through the radial head at the level of the elbow suspicious for nondisplaced fracture. The remainder of the radius and ulna are otherwise intact. Findings suspicious for radial head fracture level of the elbow. Findings suspicious for distal radial fracture at the level of the wrist. No additional fracture involving the wrist or the hand. Otherwise mild degenerate change. Assessment:      1. Other closed intra-articular fracture of distal end of left radius, initial encounter       Plan:      X-rays reviewed with the patient in the office today. Patient to continue with the left wrist brace. As far as the left shoulder, we will follow post op for pain.  Pain management will follow after. Follow up:Return for Two weeks post op will evaluate for post op shoulder and left wrist and elbow. , x-rays. No orders of the defined types were placed in this encounter. No orders of the defined types were placed in this encounter. Miriam Allen LPN am scribing for and in the presence of Dr. Mary Carmen Andrade  1/25/2022 5:45 PM  I have reviewed and made changes accordingly to the work scribed by Nancy Nielson LPN. The documentation accurately reflects work and decisions made by me. I have also reviewed documentation completed by clinical staff.     Mary Carmen Andrade DO, [facility]  1/25/2022 5:45 PM    This note is created with the assistance of a speech recognition program.  While intending to generate a document that actually reflects the content of the visit, the document can still have some errors including those of syntax and sound a like substitutions which may escape proof reading.  In such instances, actual meaning can be extrapolated by contextual diversion      Electronically signed by Mohinder Arriaga DO, FAOAO on 1/25/2022 at 5:45 PM

## 2022-01-25 ENCOUNTER — OFFICE VISIT (OUTPATIENT)
Dept: FAMILY MEDICINE CLINIC | Age: 62
End: 2022-01-25
Payer: MEDICARE

## 2022-01-25 VITALS
SYSTOLIC BLOOD PRESSURE: 136 MMHG | BODY MASS INDEX: 28.9 KG/M2 | DIASTOLIC BLOOD PRESSURE: 84 MMHG | WEIGHT: 148 LBS | OXYGEN SATURATION: 95 % | HEART RATE: 64 BPM

## 2022-01-25 DIAGNOSIS — M25.512 CHRONIC LEFT SHOULDER PAIN: ICD-10-CM

## 2022-01-25 DIAGNOSIS — J45.40 MODERATE PERSISTENT ASTHMA WITHOUT COMPLICATION: ICD-10-CM

## 2022-01-25 DIAGNOSIS — Z01.818 PREOPERATIVE EXAMINATION: Primary | ICD-10-CM

## 2022-01-25 DIAGNOSIS — S52.125D CLOSED NONDISPLACED FRACTURE OF HEAD OF LEFT RADIUS WITH ROUTINE HEALING, SUBSEQUENT ENCOUNTER: ICD-10-CM

## 2022-01-25 DIAGNOSIS — G89.29 CHRONIC LEFT SHOULDER PAIN: ICD-10-CM

## 2022-01-25 DIAGNOSIS — I10 ESSENTIAL HYPERTENSION: ICD-10-CM

## 2022-01-25 PROBLEM — F11.99 OPIOID USE, UNSPECIFIED WITH UNSPECIFIED OPIOID-INDUCED DISORDER (HCC): Status: RESOLVED | Noted: 2021-11-18 | Resolved: 2022-01-25

## 2022-01-25 PROCEDURE — G8417 CALC BMI ABV UP PARAM F/U: HCPCS | Performed by: FAMILY MEDICINE

## 2022-01-25 PROCEDURE — G8484 FLU IMMUNIZE NO ADMIN: HCPCS | Performed by: FAMILY MEDICINE

## 2022-01-25 PROCEDURE — 3017F COLORECTAL CA SCREEN DOC REV: CPT | Performed by: FAMILY MEDICINE

## 2022-01-25 PROCEDURE — G8427 DOCREV CUR MEDS BY ELIG CLIN: HCPCS | Performed by: FAMILY MEDICINE

## 2022-01-25 PROCEDURE — 99214 OFFICE O/P EST MOD 30 MIN: CPT | Performed by: FAMILY MEDICINE

## 2022-01-25 PROCEDURE — 1036F TOBACCO NON-USER: CPT | Performed by: FAMILY MEDICINE

## 2022-01-25 RX ORDER — DIAZEPAM 5 MG/1
TABLET ORAL
COMMUNITY
Start: 2022-01-21 | End: 2022-02-07

## 2022-01-25 NOTE — PROGRESS NOTES
Subjective:      Tre Zamarripa is a 60 y.o. female who presents to the office today for a preoperative consultation at the request of surgeon Dr. Hali Aviles who plans on performing left total shoulder replacement vs reverse on February 8. This consultation is requested for the specific conditions prompting preoperative evaluation (i.e. because of potential affect on operative risk): h/o blood clot. Planned anesthesia is General.  The patient has the following known anesthesia issues: none  Patient has a bleeding risk of : no recent abnormal bleeding  Patient does not have objection to receiving blood products if needed. Patient's medications, allergies, past medical, surgical, social and family histories were reviewed and updated as appropriate. Review of Systems  Pertinent items are noted in HPI.       Objective:      /84   Pulse 64   Wt 148 lb (67.1 kg)   SpO2 95%   BMI 28.90 kg/m²     General Appearance:  Alert, cooperative, no distress, appears stated age   Head:  Normocephalic, without obvious abnormality, atraumatic   Eyes:  PERRL, conjunctiva/corneas clear, EOM's intact, fundi benign, both eyes   Ears:  Normal TM's and external ear canals, both ears   Nose: Nares normal, septum midline,mucosa normal, no drainage or sinus tenderness   Throat: Lips, mucosa, and tongue normal; teeth and gums normal   Neck: Supple, symmetrical, trachea midline, no adenopathy;  thyroid: not enlarged, symmetric, no tenderness/mass/nodules; no carotid bruit or JVD   Back:   Symmetric, no curvature, ROM normal, no CVA tenderness   Lungs:   Clear to auscultation bilaterally, respirations unlabored   Breasts:  No masses or tenderness   Heart:  Regular rate and rhythm, S1 and S2 normal, no murmur, rub, or gallop   Abdomen:   Soft, non-tender, bowel sounds active all four quadrants,  no masses, no organomegaly   Pelvic: Deferred   Extremities: Extremities normal, atraumatic, no cyanosis or edema   Pulses: 2+ and symmetric Skin: Skin color, texture, turgor normal, no rashes or lesions   Lymph nodes: Cervical, supraclavicular, and axillary nodes normal   Neurologic: Normal         Predictors of intubation difficulty:     Cardiographics  ECG: no change since previous ECG dated see chart  Echocardiogram: not done    Imaging  Chest X-Ray: no acute findings     Lab Review   No visits with results within 2 Month(s) from this visit.    Latest known visit with results is:   Hospital Outpatient Visit on 10/27/2021   Component Date Value    Glucose 10/27/2021 82     BUN 10/27/2021 24*    CREATININE 10/27/2021 1.28*    Bun/Cre Ratio 10/27/2021 19     Calcium 10/27/2021 8.8     Sodium 10/27/2021 141     Potassium 10/27/2021 3.8     Chloride 10/27/2021 105     CO2 10/27/2021 24     Anion Gap 10/27/2021 12     Alkaline Phosphatase 10/27/2021 99     ALT 10/27/2021 16     AST 10/27/2021 24     Total Bilirubin 10/27/2021 0.20*    Total Protein 10/27/2021 6.7     Albumin 10/27/2021 4.3     Albumin/Globulin Ratio 10/27/2021 NOT REPORTED     GFR Non- 10/27/2021 42*    GFR  10/27/2021 51*    GFR Comment 10/27/2021          GFR Staging 10/27/2021 NOT REPORTED     Ferritin 10/27/2021 65     Iron 10/27/2021 43     TIBC 10/27/2021 275     Iron Saturation 10/27/2021 16*    UIBC 10/27/2021 232     Vitamin B-12 10/27/2021 >2000*    Folate 10/27/2021 12.5     WBC 10/27/2021 6.2     RBC 10/27/2021 4.03     Hemoglobin 10/27/2021 12.1     Hematocrit 10/27/2021 38.5     MCV 10/27/2021 95.5     MCH 10/27/2021 30.0     MCHC 10/27/2021 31.4     RDW 10/27/2021 12.9     Platelets 55/63/2363 263     MPV 10/27/2021 9.4     NRBC Automated 10/27/2021 0.0     Differential Type 10/27/2021 NOT REPORTED     Seg Neutrophils 10/27/2021 67*    Lymphocytes 10/27/2021 20*    Monocytes 10/27/2021 10     Eosinophils % 10/27/2021 2     Basophils 10/27/2021 1     Immature Granulocytes 10/27/2021 0     Segs Absolute 10/27/2021 4.22     Absolute Lymph # 10/27/2021 1.23     Absolute Mono # 10/27/2021 0.62     Absolute Eos # 10/27/2021 0.11     Basophils Absolute 10/27/2021 0.04     Absolute Immature Granul* 10/27/2021 0.02     WBC Morphology 10/27/2021 NOT REPORTED     RBC Morphology 10/27/2021 NOT REPORTED     Platelet Estimate 50/01/8852 NOT REPORTED          Assessment:        60 y.o. female with planned surgery as above. Known risk factors for perioperative complications: None    Difficulty with intubation is not anticipated. Revised Cardiac Risk Index for Pre-Operative Risk  Patients ? 39years old (or 20-44 years old with significant cardiovascular disease) undergoing elective non-cardiac surgery or urgent/semi-urgent (non-emergent) non-cardiac surgery. High-risk surgery (Intraperitoneal; intrathoracic; suprainguinal vascular)? No  History of ischemic heart disease (History of myocardial infarction (MI); history of positive exercise test; current chest pain considered due to myocardial ischemia; use of nitrate therapy or ECG with pathological Q waves)? No  History of congestive heart failure (Pulmonary edema, bilateral rales or S3 gallop; paroxysmal nocturnal dyspnea; chest x-ray (CXR) showing pulmonary vascular redistribution)? No  History of cerebrovascular disease (Prior transient ischemic attack (TIA) or stroke)? No  Pre-operative treatment with insulin? No  Pre-operative creatinine >2 mg/dL / 176.8 µmol/L? No    Total Score (number of Yes responses) = 0    RCRI Score - Risk of major cardiac event (95% CI)*  0 = 3.9% (2.8-5.4%)  1 = 6.0% (4.9-7.4%)  2 = 10.1% (8.1-12.6%)  ? 3 = 15% (11.1-20.0%)      Current medications which may produce withdrawal symptoms if withheld perioperatively: percocet       Plan:      1. Preoperative workup as follows chest x-ray, ECG, hemoglobin, hematocrit, electrolytes, creatinine, glucose, liver function studies, urinalysis (urinary tract instrumentation planned)  2. Change in medication regimen before surgery: none, continue med regimen including morning of surgery, w/sip of water  3. Prophylaxis for cardiac events with perioperative beta-blockers: not indicated  4. Invasive hemodynamic monitoring perioperatively: at the discretion of anesthesiologist  5. Deep vein thrombosis prophylaxis postoperatively:regimen to be chosen by surgical team  6. Surveillance for postoperative MI with ECG immediately postoperatively and on postoperative days 1 and 2 AND troponin levels 24 hours postoperatively and on day 4 or hospital discharge (whichever comes first): at the discretion of anesthesiologist  7.  Other measures: cleared for surgery

## 2022-01-25 NOTE — PATIENT INSTRUCTIONS
Patient Education        Shoulder Replacement: Before Your Surgery  What is shoulder replacement surgery? In shoulder replacement surgery, a doctor removes the end of the upper arm bone. Often they also take out the end of the shoulder bone. The ends are replaced with plastic or metal pieces. Your doctor will let you know if you will stay in the hospital or if you can go home the day of surgery. Most people stay in the hospital 1 to 3 days. Your rehabilitation program (rehab) starts when you are in the hospital. You will do this rehab for about 3 months or longer. After surgery and rehab, you probably will have much less pain than before. And you should be able to return to your usual activities. But your doctor may advise you not to do activities that put stress on that shoulder, such as weight lifting or tennis. Follow-up care is a key part of your treatment and safety. Be sure to make and go to all appointments, and call your doctor if you are having problems. It's also a good idea to know your test results and keep a list of the medicines you take. How do you prepare for surgery? Surgery can be stressful. This information will help you understand what you can expect. And it will help you safely prepare for surgery. Preparing for surgery    · Be sure to have extra help at home. This is most important if you live alone or care for another person.     · Be sure you have someone to take you home. Anesthesia and pain medicine will make it unsafe for you to drive or get home on your own.     · Understand exactly what surgery is planned, along with the risks, benefits, and other options.     · If you take aspirin or some other blood thinner, ask your doctor if you should stop taking it before your surgery. Make sure that you understand exactly what your doctor wants you to do.  These medicines increase the risk of bleeding.     · Tell your doctor ALL the medicines, vitamins, supplements, and herbal remedies you take. Some may increase the risk of problems during your surgery. Your doctor will tell you if you should stop taking any of them before the surgery and how soon to do it.     · Make sure your doctor and the hospital have a copy of your advance directive. If you don't have one, you may want to prepare one. It lets others know your health care wishes. It's a good thing to have before any type of surgery or procedure. What happens on the day of surgery? · Follow the instructions exactly about when to stop eating and drinking. If you don't, your surgery may be canceled. If your doctor told you to take your medicines on the day of surgery, take them with only a sip of water.     · Take a bath or shower before you come in for your surgery. Do not apply lotions, perfumes, deodorants, or nail polish.     · Do not shave the surgical site yourself.     · Take off all jewelry and piercings. And take out contact lenses, if you wear them. At the hospital or surgery center   · Bring a picture ID.     · The area for surgery is often marked to make sure there are no errors.     · You will be kept comfortable and safe by your anesthesia provider. The anesthesia may make you sleep. Or it may just numb the area being worked on. When should you call your doctor? · You have questions or concerns.     · You don't understand how to prepare for your surgery.     · You become ill before the surgery (such as fever, flu, or a cold).     · You need to reschedule or have changed your mind about having the surgery. Where can you learn more? Go to [email or web address] and sign in to your [employer] account. Enter U012 in the [employer] box to learn more about \"Shoulder Replacement: Before Your Surgery. \"     If you do not have an account, please click on the \"Sign Up Now\" link. Current as of: July 1, 2021               Content Version: 13.1  © 1907-1104 Healthwise, Incorporated.    Care instructions adapted under license by [facility] ([facility]). If you have questions about a medical condition or this instruction, always ask your healthcare professional. [address] any warranty or liability for your use of this information.

## 2022-01-26 ENCOUNTER — HOSPITAL ENCOUNTER (OUTPATIENT)
Dept: PREADMISSION TESTING | Age: 62
Discharge: HOME | End: 2022-01-30
Payer: MEDICARE

## 2022-01-26 ENCOUNTER — HOSPITAL ENCOUNTER (OUTPATIENT)
Dept: GENERAL RADIOLOGY | Age: 62
Discharge: HOME | End: 2022-01-28
Payer: MEDICARE

## 2022-01-26 VITALS
DIASTOLIC BLOOD PRESSURE: 79 MMHG | HEIGHT: 60 IN | OXYGEN SATURATION: 96 % | TEMPERATURE: 97.7 F | HEART RATE: 62 BPM | SYSTOLIC BLOOD PRESSURE: 117 MMHG | WEIGHT: 149 LBS | BODY MASS INDEX: 29.25 KG/M2 | RESPIRATION RATE: 18 BRPM

## 2022-01-26 DIAGNOSIS — Z01.818 PRE-OP TESTING: ICD-10-CM

## 2022-01-26 LAB
-: NORMAL
ALBUMIN SERPL-MCNC: 3.8 G/DL (ref 3.5–5.2)
ALBUMIN/GLOBULIN RATIO: 1.5 (ref 1–2.5)
ALP BLD-CCNC: 94 U/L (ref 35–104)
ALT SERPL-CCNC: 16 U/L (ref 5–33)
AMORPHOUS: NORMAL
ANION GAP SERPL CALCULATED.3IONS-SCNC: 10 MMOL/L (ref 9–17)
AST SERPL-CCNC: 23 U/L
BACTERIA: NORMAL
BILIRUB SERPL-MCNC: 0.23 MG/DL (ref 0.3–1.2)
BILIRUBIN URINE: NEGATIVE
BUN BLDV-MCNC: 20 MG/DL (ref 8–23)
BUN/CREAT BLD: ABNORMAL (ref 9–20)
CALCIUM SERPL-MCNC: 9.3 MG/DL (ref 8.6–10.4)
CASTS UA: NORMAL /LPF (ref 0–8)
CHLORIDE BLD-SCNC: 105 MMOL/L (ref 98–107)
CO2: 24 MMOL/L (ref 20–31)
COLOR: YELLOW
COMMENT UA: ABNORMAL
CREAT SERPL-MCNC: 0.94 MG/DL (ref 0.5–0.9)
CRYSTALS, UA: NORMAL /HPF
EPITHELIAL CELLS UA: NORMAL /HPF (ref 0–5)
ESTIMATED AVERAGE GLUCOSE: 134 MG/DL
GFR AFRICAN AMERICAN: >60 ML/MIN
GFR NON-AFRICAN AMERICAN: >60 ML/MIN
GFR SERPL CREATININE-BSD FRML MDRD: ABNORMAL ML/MIN/{1.73_M2}
GFR SERPL CREATININE-BSD FRML MDRD: ABNORMAL ML/MIN/{1.73_M2}
GLUCOSE BLD-MCNC: 73 MG/DL (ref 70–99)
GLUCOSE URINE: NEGATIVE
HBA1C MFR BLD: 6.3 % (ref 4–6)
HCT VFR BLD CALC: 39 % (ref 36.3–47.1)
HEMOGLOBIN: 12.3 G/DL (ref 11.9–15.1)
KETONES, URINE: NEGATIVE
LEUKOCYTE ESTERASE, URINE: ABNORMAL
MCH RBC QN AUTO: 29.4 PG (ref 25.2–33.5)
MCHC RBC AUTO-ENTMCNC: 31.5 G/DL (ref 28.4–34.8)
MCV RBC AUTO: 93.3 FL (ref 82.6–102.9)
MUCUS: NORMAL
NITRITE, URINE: NEGATIVE
NRBC AUTOMATED: 0 PER 100 WBC
OTHER OBSERVATIONS UA: NORMAL
PDW BLD-RTO: 14.2 % (ref 11.8–14.4)
PH UA: 5 (ref 5–8)
PLATELET # BLD: 271 K/UL (ref 138–453)
PMV BLD AUTO: 10 FL (ref 8.1–13.5)
POTASSIUM SERPL-SCNC: 4 MMOL/L (ref 3.7–5.3)
PROTEIN UA: NEGATIVE
RBC # BLD: 4.18 M/UL (ref 3.95–5.11)
RBC UA: NORMAL /HPF (ref 0–4)
RENAL EPITHELIAL, UA: NORMAL /HPF
SODIUM BLD-SCNC: 139 MMOL/L (ref 135–144)
SPECIFIC GRAVITY UA: 1.02 (ref 1–1.03)
TOTAL PROTEIN: 6.4 G/DL (ref 6.4–8.3)
TRICHOMONAS: NORMAL
TURBIDITY: CLEAR
URINE HGB: NEGATIVE
UROBILINOGEN, URINE: NORMAL
WBC # BLD: 4.9 K/UL (ref 3.5–11.3)
WBC UA: NORMAL /HPF (ref 0–5)
YEAST: NORMAL

## 2022-01-26 PROCEDURE — 80053 COMPREHEN METABOLIC PANEL: CPT

## 2022-01-26 PROCEDURE — 81001 URINALYSIS AUTO W/SCOPE: CPT

## 2022-01-26 PROCEDURE — 85027 COMPLETE CBC AUTOMATED: CPT

## 2022-01-26 PROCEDURE — 36415 COLL VENOUS BLD VENIPUNCTURE: CPT

## 2022-01-26 PROCEDURE — 71046 X-RAY EXAM CHEST 2 VIEWS: CPT

## 2022-01-26 PROCEDURE — 93005 ELECTROCARDIOGRAM TRACING: CPT | Performed by: ORTHOPAEDIC SURGERY

## 2022-01-26 PROCEDURE — 83036 HEMOGLOBIN GLYCOSYLATED A1C: CPT

## 2022-01-26 PROCEDURE — 87641 MR-STAPH DNA AMP PROBE: CPT

## 2022-01-26 RX ORDER — ACETAMINOPHEN 325 MG/1
650 TABLET ORAL EVERY 6 HOURS PRN
Status: ON HOLD | COMMUNITY
End: 2022-02-08 | Stop reason: HOSPADM

## 2022-01-26 ASSESSMENT — PAIN DESCRIPTION - PAIN TYPE: TYPE: CHRONIC PAIN

## 2022-01-26 ASSESSMENT — PAIN SCALES - GENERAL: PAINLEVEL_OUTOF10: 5

## 2022-01-26 ASSESSMENT — PAIN DESCRIPTION - LOCATION: LOCATION: HEAD;BACK;NECK

## 2022-01-26 NOTE — PROGRESS NOTES
Anesthesia Focused Assessment    Hx of anesthesia complications:  Myasthenia gravis, diagnosed 30+ years ago, early emergence. Family hx of anesthesia complications:  no      Prior + Covid-19 test? No        STOP-BANG Sleep Apnea Questionnaire    SNORE loudly (heard through closed doors)? Yes  TIRED, fatigued, sleepy during daytime? Yes  OBSERVED stopping breathing during sleep? No  High blood PRESSURE or being treated? Yes    BMI over 35? No  AGE over 48? Yes  NECK circumference over 16\"? No  GENDER (male)? No             Total 4  High risk 5-8  Intermediate risk 3-4  Low risk 0-2    ----------------------------------------------------------------------------------------------------------------------  KHALIDA                              Yes  If yes, machine? No    DM1                                            No  DM2                   No    Coronary Artery Disease      No  HTN         Yes  Defib/AICD/Pacemaker               No             Renal Failure                   No  If yes, on dialysis           Active smoker? No  Drinks alcohol? Yes, rare  Illicit drugs? No  Dentition? Wears dentures      Past Medical History:   Diagnosis Date    Acid reflux     Adult ADHD 08/01/2012    monthly visits @ [facility]    Asthma 08/01/2012    seasonal    Bipolar 1 disorder ([facility]     monthly visits with [facility]    Broken foot 2014    Left foot    Chest pain of uncertain etiology 10/72/5830    Last episode was in 2013 (Written 02/12/2019)    Depression     Under control per pt.  on 6/15/18    DVT of leg (deep venous thrombosis) ([facility] [address] 08/22/2014    Fracture     right foot / pt denies surgical intervention    Fractured nose     x 9 times    Helicobacter pylori (H. pylori) infection 2013    per EGD    Hematuria 04/2018    Has a follow-up with Urologist 6/20    Hiatal hernia     History of blood transfusion     no reactions    History of DVT (deep vein Patient called back and was informed of below, she did verbalize understanding and agreement. Will leave second sample as requested. thrombosis) 08/01/2012    History of myasthenia gravis     diagnosed around age 26    Hx of blood clots     L leg post knee surgery    Hypertension     managed by Dr. Meme Benjamin PCP   [facility] Internal hemorrhoids     Narcolepsy 08/01/2012    Dr. Una Perea [facility]    Osteoporosis     Under care of team 10/2021    Dr. Kavon Moyer Under care of team     Pain Management Dr. Guy Luna last visit 1/2022         Patient was evaluated in PAT & anesthesia guidelines were applied. NPO guidelines, medication instructions and scheduled arrival time were reviewed with patient.                                                                                                                        Anesthesia contacted:   no    Medical or cardiac clearance ordered: PIETER Heredia CNP   1/26/22  11:57 AM

## 2022-01-26 NOTE — H&P (VIEW-ONLY)
History and Physical    Pt Name: Nam Grande  MRN: [id number]  YOB: 1960  Date of evaluation: 1/26/2022  Primary Care Physician: Chandrika Minor DO    SUBJECTIVE:   History of Chief Complaint:    Nam Grande is a 60 y.o. female who presents for PAT appointment. Patient is a vague historian but states she has had pain to her left shoulder for years, as she used to  martial arts for 25 years. About 2 weeks ago she lost her balance and fell, causing fracture to her left arm and further injury to her left shoulder. She denies any other injuries, did not hit her head or lose consciousness at any time. Patient reports she has limited range of motion to her left arm at the shoulder, which limits her activities of daily living. Patient reports pain daily since the fall and uses Percocet as needed for pain management which does help. Patient has been scheduled for TOTAL SHOULDER ARTHROPLASTY VS. REVERSE TOTAL SHOULDER ARTHROPLASTY  (BIOMET/JESSICA, INTERSCALENE BLOCK PRE-OP, SPIDER TABLE, SEMIT-SITTING) - Left  Allergies  is allergic to latex, prozac [fluoxetine hcl], sulfa antibiotics, and codeine. Medications  Prior to Admission medications    Medication Sig Start Date End Date Taking? Authorizing Provider   oxyCODONE-acetaminophen (PERCOCET) 5-325 MG per tablet Take 1 tablet by mouth every 8 hours as needed for Pain for up to 30 days. 1/23/22 2/22/22 Yes PIETER Wooten - CNP   propranolol (INDERAL LA) 80 MG extended release capsule Take 1 capsule by mouth daily 10/29/21  Yes Chandrika Minor DO   amphetamine-dextroamphetamine (ADDERALL) 30 MG tablet 30 mg 2 times daily.   9/27/21  Yes Historical Provider, MD   pantoprazole (PROTONIX) 40 MG tablet Take 1 tablet by mouth daily 9/15/21  Yes Chandrika Minor DO   buPROPion HCl ER, XL, 450 MG TB24 TAKE ONE TABLET BY MOUTH EVERY MORNING 8/31/21  Yes Chandrika Minor DO   alendronate (FOSAMAX) 70 MG tablet TAKE 1 TABLET BY MOUTH ONCE WEEKLY BEFORE BREAKFAST, ON AN EMPTY STOMACH: REMAIN UPRIGHT FOR 30 MINUTES:TAKE WITH 8 OUNCES OF WATER 3/18/21  Yes Jennifer Marie DO   citalopram (CELEXA) 40 MG tablet TAKE ONE TABLET BY MOUTH DAILY 20  Yes Ankit Walker MD   Calcium Carbonate (CALCIUM 600 PO) Take 600 mg by mouth daily   Yes Historical Provider, MD   acetaminophen (TYLENOL) 325 MG tablet Take 650 mg by mouth every 6 hours as needed for Pain    Historical Provider, MD   diazePAM (VALIUM) 5 MG tablet  22   Historical Provider, MD   albuterol sulfate HFA (VENTOLIN HFA) 108 (90 Base) MCG/ACT inhaler INHALE TWO PUFFS BY MOUTH EVERY 4 HOURS AS NEEDED FOR WHEEZING 10/28/20   Ankit Walker MD   albuterol (PROVENTIL) (2.5 MG/3ML) 0.083% nebulizer solution Take 2.5 mg by nebulization every 6 hours as needed for Wheezing or Shortness of Breath     Historical Provider, MD     Past Medical History    has a past medical history of Acid reflux, Adult ADHD, Asthma, Bipolar 1 disorder ([address] Broken foot, Chest pain of uncertain etiology, Depression, DVT of leg (deep venous thrombosis) ([address] Fracture, Fractured nose, Helicobacter pylori (H. pylori) infection, Hematuria, Hiatal hernia, History of blood transfusion, History of DVT (deep vein thrombosis), History of myasthenia gravis, Hx of blood clots, Hypertension, Internal hemorrhoids, Narcolepsy, Osteoporosis, Sleep apnea, Under care of team, and Under care of team.  Past Surgical History   has a past surgical history that includes  section; Nose surgery; Knee arthroscopy (Left); shoulder surgery (Left); Wrist surgery; Upper gastrointestinal endoscopy (2013); Colonoscopy (2013); Upper gastrointestinal endoscopy (2016); Foot surgery (Left, ); Arm Surgery (Right); knee surgery (Right); vaginal dilatation; Ankle surgery (Left, 2018); pr office/outpt visit,procedure only (Left, 2018);  Foot Tendon Surgery (Left, 2019); ligament repair (Left, 2019); Upper gastrointestinal endoscopy (N/A, 2/10/2020); Colonoscopy (N/A, 2/10/2020); Endoscopy, colon, diagnostic; Colonoscopy (N/A, 2020); esophageal motility study (N/A, 2020); esophageal motility study (9/10/2020); esophageal motility study (N/A, 2020); Nerve Block (Bilateral, 2021); Pain management procedure (Bilateral, 2021); Pain management procedure (2021); Pain management procedure (Bilateral, 2021); Pain management procedure (Right, 2021); Pain management procedure (Right, 2021); Pain management procedure (Left, 2021); Pain management procedure (Left, 2021); and Breast enhancement surgery (Bilateral). Social History   reports that she quit smoking about 33 years ago. She has a 20.00 pack-year smoking history. She has never used smokeless tobacco.    reports previous alcohol use. reports no history of drug use. Marital Status single  Children 3  Occupation disability  Family History  Family Status   Relation Name Status    Mother     [facility]  (Not Specified)    Father      Neg   (Not Specified)     family history includes Alzheimer's Disease in her father; Cancer in her maternal aunt and mother; Dementia in her mother; Heart Surgery in her father; Hypertension in her father and mother; Stroke in her mother. Review of Systems:  CONSTITUTIONAL:   negative for fevers, chills, fatigue and malaise    EYES:   negative for double vision, blurred vision and photophobia    HEENT:   negative for tinnitus, epistaxis and sore throat     RESPIRATORY:   negative for cough, shortness of breath, wheezing     CARDIOVASCULAR:   negative for chest pain, palpitations, syncope, edema     GASTROINTESTINAL:   negative for nausea, vomiting     GENITOURINARY:   negative for incontinence     MUSCULOSKELETAL:   Reports left shoulder pain which radiates to neck and down left arm.     NEUROLOGICAL:   Negative for weakness and tingling  negative for headaches and dizziness     PSYCHIATRIC:   negative for anxiety       OBJECTIVE:   VITALS:  height is 5' (1.524 m) and weight is 149 lb (67.6 kg). Her infrared temperature is 97.7 °F (36.5 °C). Her blood pressure is 117/79 and her pulse is 62. Her respiration is 18 and oxygen saturation is 96%. CONSTITUTIONAL:alert & oriented x 3, no acute distress. Calm and pleasant. SKIN:  Warm and dry, no rashes to exposed areas of skin. HEAD:  Normocephalic, atraumatic. EYES: PERRL. EOMs intact. Wearing glasses. EARS:  Intact and equal bilaterally. No edema or thickening, without lumps, lesions, or discharge. Hearing grossly WNL. NOSE:  Nares patent. No rhinorrhea   MOUTH/THROAT:  Mucous membranes pink and moist, uvula midline, teeth appear to be intact, wearing full upper dentures. NECK:supple, no lymphadenopathy  LUNGS: Respirations even and non-labored. Clear to auscultation bilaterally, no wheezes, rales, or rhonchi. CARDIOVASCULAR: Regular rate and rhythm, no murmurs/rubs/gallops   ABDOMEN: soft, non-tender, non-distended, bowel sounds active x 4   EXTREMITIES: No edema to bilateral lower extremities. No varicosities to bilateral lower extremities. Left arm without brace or splint; states she was told she could remove it. Limited ROM to left arm at shoulder, can only lift shoulder about 45 degrees laterally. NEUROLOGIC: CN II-XII are grossly intact. Gait is rhythmic and effortless. Testing:   [id number]  Labs pending: drawn 2022   Chest XRay:  2022  MRSA nasal swab: 2022  IMPRESSIONS:   Left shoulder arthritis.   PLANS:   TOTAL SHOULDER ARTHROPLASTY VS. REVERSE TOTAL SHOULDER ARTHROPLASTY  (BIOMET/JESSICA, INTERSCALENE BLOCK PRE-OP, SPIDER TABLE, SEMIT-SITTING) - Left    PIETER Osman - CNP  Electronically signed 2022 at 11:59 AM

## 2022-01-26 NOTE — H&P
History and Physical    Pt Name: Vivian Humphrey  MRN: [id number]  YOB: 1960  Date of evaluation: 1/26/2022  Primary Care Physician: Danielle Gonzalez DO    SUBJECTIVE:   History of Chief Complaint:    Vivian Humphrey is a 60 y.o. female who presents for PAT appointment. Patient is a vague historian but states she has had pain to her left shoulder for years, as she used to  martial arts for 25 years. About 2 weeks ago she lost her balance and fell, causing fracture to her left arm and further injury to her left shoulder. She denies any other injuries, did not hit her head or lose consciousness at any time. Patient reports she has limited range of motion to her left arm at the shoulder, which limits her activities of daily living. Patient reports pain daily since the fall and uses Percocet as needed for pain management which does help. Patient has been scheduled for TOTAL SHOULDER ARTHROPLASTY VS. REVERSE TOTAL SHOULDER ARTHROPLASTY  (BIOMET/JESSICA, INTERSCALENE BLOCK PRE-OP, SPIDER TABLE, SEMIT-SITTING) - Left  Allergies  is allergic to latex, prozac [fluoxetine hcl], sulfa antibiotics, and codeine. Medications  Prior to Admission medications    Medication Sig Start Date End Date Taking? Authorizing Provider   oxyCODONE-acetaminophen (PERCOCET) 5-325 MG per tablet Take 1 tablet by mouth every 8 hours as needed for Pain for up to 30 days. 1/23/22 2/22/22 Yes PIETER Krueger - CNP   propranolol (INDERAL LA) 80 MG extended release capsule Take 1 capsule by mouth daily 10/29/21  Yes Danielle Gonzalez DO   amphetamine-dextroamphetamine (ADDERALL) 30 MG tablet 30 mg 2 times daily.   9/27/21  Yes Historical Provider, MD   pantoprazole (PROTONIX) 40 MG tablet Take 1 tablet by mouth daily 9/15/21  Yes Danielle Gonzalez DO   buPROPion HCl ER, XL, 450 MG TB24 TAKE ONE TABLET BY MOUTH EVERY MORNING 8/31/21  Yes Danielle Gonzalez DO   alendronate (FOSAMAX) 70 MG tablet TAKE 1 TABLET BY MOUTH ONCE WEEKLY BEFORE BREAKFAST, ON AN EMPTY STOMACH: REMAIN UPRIGHT FOR 30 MINUTES:TAKE WITH 8 OUNCES OF WATER 3/18/21  Yes Vinayak Dies, DO   citalopram (CELEXA) 40 MG tablet TAKE ONE TABLET BY MOUTH DAILY 20  Yes Ankit Walker MD   Calcium Carbonate (CALCIUM 600 PO) Take 600 mg by mouth daily   Yes Historical Provider, MD   acetaminophen (TYLENOL) 325 MG tablet Take 650 mg by mouth every 6 hours as needed for Pain    Historical Provider, MD   diazePAM (VALIUM) 5 MG tablet  22   Historical Provider, MD   albuterol sulfate HFA (VENTOLIN HFA) 108 (90 Base) MCG/ACT inhaler INHALE TWO PUFFS BY MOUTH EVERY 4 HOURS AS NEEDED FOR WHEEZING 10/28/20   Ankit Walker MD   albuterol (PROVENTIL) (2.5 MG/3ML) 0.083% nebulizer solution Take 2.5 mg by nebulization every 6 hours as needed for Wheezing or Shortness of Breath     Historical Provider, MD     Past Medical History    has a past medical history of Acid reflux, Adult ADHD, Asthma, Bipolar 1 disorder ([address] Broken foot, Chest pain of uncertain etiology, Depression, DVT of leg (deep venous thrombosis) ([address] Fracture, Fractured nose, Helicobacter pylori (H. pylori) infection, Hematuria, Hiatal hernia, History of blood transfusion, History of DVT (deep vein thrombosis), History of myasthenia gravis, Hx of blood clots, Hypertension, Internal hemorrhoids, Narcolepsy, Osteoporosis, Sleep apnea, Under care of team, and Under care of team.  Past Surgical History   has a past surgical history that includes  section; Nose surgery; Knee arthroscopy (Left); shoulder surgery (Left); Wrist surgery; Upper gastrointestinal endoscopy (2013); Colonoscopy (2013); Upper gastrointestinal endoscopy (2016); Foot surgery (Left, ); Arm Surgery (Right); knee surgery (Right); vaginal dilatation; Ankle surgery (Left, 2018); pr office/outpt visit,procedure only (Left, 2018);  Foot Tendon Surgery (Left, 2019); ligament repair (Left, 2019); Upper gastrointestinal endoscopy (N/A, 2/10/2020); Colonoscopy (N/A, 2/10/2020); Endoscopy, colon, diagnostic; Colonoscopy (N/A, 2020); esophageal motility study (N/A, 2020); esophageal motility study (9/10/2020); esophageal motility study (N/A, 2020); Nerve Block (Bilateral, 2021); Pain management procedure (Bilateral, 2021); Pain management procedure (2021); Pain management procedure (Bilateral, 2021); Pain management procedure (Right, 2021); Pain management procedure (Right, 2021); Pain management procedure (Left, 2021); Pain management procedure (Left, 2021); and Breast enhancement surgery (Bilateral). Social History   reports that she quit smoking about 33 years ago. She has a 20.00 pack-year smoking history. She has never used smokeless tobacco.    reports previous alcohol use. reports no history of drug use. Marital Status single  Children 3  Occupation disability  Family History  Family Status   Relation Name Status    Mother     [facility]  (Not Specified)    Father      Neg   (Not Specified)     family history includes Alzheimer's Disease in her father; Cancer in her maternal aunt and mother; Dementia in her mother; Heart Surgery in her father; Hypertension in her father and mother; Stroke in her mother. Review of Systems:  CONSTITUTIONAL:   negative for fevers, chills, fatigue and malaise    EYES:   negative for double vision, blurred vision and photophobia    HEENT:   negative for tinnitus, epistaxis and sore throat     RESPIRATORY:   negative for cough, shortness of breath, wheezing     CARDIOVASCULAR:   negative for chest pain, palpitations, syncope, edema     GASTROINTESTINAL:   negative for nausea, vomiting     GENITOURINARY:   negative for incontinence     MUSCULOSKELETAL:   Reports left shoulder pain which radiates to neck and down left arm.     NEUROLOGICAL:   Negative for weakness and tingling  negative for headaches and dizziness     PSYCHIATRIC:   negative for anxiety       OBJECTIVE:   VITALS:  height is 5' (1.524 m) and weight is 149 lb (67.6 kg). Her infrared temperature is 97.7 °F (36.5 °C). Her blood pressure is 117/79 and her pulse is 62. Her respiration is 18 and oxygen saturation is 96%. CONSTITUTIONAL:alert & oriented x 3, no acute distress. Calm and pleasant. SKIN:  Warm and dry, no rashes to exposed areas of skin. HEAD:  Normocephalic, atraumatic. EYES: PERRL. EOMs intact. Wearing glasses. EARS:  Intact and equal bilaterally. No edema or thickening, without lumps, lesions, or discharge. Hearing grossly WNL. NOSE:  Nares patent. No rhinorrhea   MOUTH/THROAT:  Mucous membranes pink and moist, uvula midline, teeth appear to be intact, wearing full upper dentures. NECK:supple, no lymphadenopathy  LUNGS: Respirations even and non-labored. Clear to auscultation bilaterally, no wheezes, rales, or rhonchi. CARDIOVASCULAR: Regular rate and rhythm, no murmurs/rubs/gallops   ABDOMEN: soft, non-tender, non-distended, bowel sounds active x 4   EXTREMITIES: No edema to bilateral lower extremities. No varicosities to bilateral lower extremities. Left arm without brace or splint; states she was told she could remove it. Limited ROM to left arm at shoulder, can only lift shoulder about 45 degrees laterally. NEUROLOGIC: CN II-XII are grossly intact. Gait is rhythmic and effortless. Testing:   [id number]  Labs pending: drawn 2022   Chest XRay:  2022  MRSA nasal swab: 2022  IMPRESSIONS:   Left shoulder arthritis.   PLANS:   TOTAL SHOULDER ARTHROPLASTY VS. REVERSE TOTAL SHOULDER ARTHROPLASTY  (BIOMET/JESSICA, INTERSCALENE BLOCK PRE-OP, SPIDER TABLE, SEMIT-SITTING) - Left    PIETER Garcia - ALBERT  Electronically signed 2022 at 11:59 AM

## 2022-01-27 LAB
EKG ATRIAL RATE: 55 BPM
EKG P AXIS: 63 DEGREES
EKG P-R INTERVAL: 146 MS
EKG Q-T INTERVAL: 456 MS
EKG QRS DURATION: 86 MS
EKG QTC CALCULATION (BAZETT): 436 MS
EKG R AXIS: 30 DEGREES
EKG T AXIS: 68 DEGREES
EKG VENTRICULAR RATE: 55 BPM
MRSA, DNA, NASAL: NEGATIVE
SPECIMEN DESCRIPTION: NORMAL

## 2022-01-27 PROCEDURE — 93010 ELECTROCARDIOGRAM REPORT: CPT | Performed by: INTERNAL MEDICINE

## 2022-01-31 DIAGNOSIS — M19.012 ARTHRITIS OF LEFT SHOULDER REGION: Primary | ICD-10-CM

## 2022-02-08 ENCOUNTER — APPOINTMENT (OUTPATIENT)
Dept: GENERAL RADIOLOGY | Age: 62
End: 2022-02-08
Attending: ORTHOPAEDIC SURGERY
Payer: MEDICARE

## 2022-02-08 ENCOUNTER — ANESTHESIA (OUTPATIENT)
Dept: OPERATING ROOM | Age: 62
End: 2022-02-08
Payer: MEDICARE

## 2022-02-08 ENCOUNTER — ANESTHESIA EVENT (OUTPATIENT)
Dept: OPERATING ROOM | Age: 62
End: 2022-02-08
Payer: MEDICARE

## 2022-02-08 ENCOUNTER — HOSPITAL ENCOUNTER (OUTPATIENT)
Age: 62
Setting detail: OUTPATIENT SURGERY
Discharge: HOME | End: 2022-02-08
Attending: ORTHOPAEDIC SURGERY | Admitting: ORTHOPAEDIC SURGERY
Payer: MEDICARE

## 2022-02-08 VITALS
RESPIRATION RATE: 16 BRPM | HEART RATE: 85 BPM | OXYGEN SATURATION: 95 % | TEMPERATURE: 97.2 F | DIASTOLIC BLOOD PRESSURE: 92 MMHG | SYSTOLIC BLOOD PRESSURE: 104 MMHG

## 2022-02-08 VITALS — TEMPERATURE: 96.8 F | SYSTOLIC BLOOD PRESSURE: 150 MMHG | DIASTOLIC BLOOD PRESSURE: 99 MMHG | OXYGEN SATURATION: 99 %

## 2022-02-08 DIAGNOSIS — Z96.612 STATUS POST REVERSE TOTAL REPLACEMENT OF LEFT SHOULDER: Primary | ICD-10-CM

## 2022-02-08 PROCEDURE — 3700000000 HC ANESTHESIA ATTENDED CARE: Performed by: ORTHOPAEDIC SURGERY

## 2022-02-08 PROCEDURE — 2580000003 HC RX 258: Performed by: ORTHOPAEDIC SURGERY

## 2022-02-08 PROCEDURE — C1776 JOINT DEVICE (IMPLANTABLE): HCPCS | Performed by: ORTHOPAEDIC SURGERY

## 2022-02-08 PROCEDURE — 2580000003 HC RX 258: Performed by: SPECIALIST

## 2022-02-08 PROCEDURE — 7100000040 HC SPAR PHASE II RECOVERY - FIRST 15 MIN: Performed by: ORTHOPAEDIC SURGERY

## 2022-02-08 PROCEDURE — 6360000002 HC RX W HCPCS: Performed by: ANESTHESIOLOGY

## 2022-02-08 PROCEDURE — 3600000014 HC SURGERY LEVEL 4 ADDTL 15MIN: Performed by: ORTHOPAEDIC SURGERY

## 2022-02-08 PROCEDURE — 7100000041 HC SPAR PHASE II RECOVERY - ADDTL 15 MIN: Performed by: ORTHOPAEDIC SURGERY

## 2022-02-08 PROCEDURE — 2500000003 HC RX 250 WO HCPCS: Performed by: SPECIALIST

## 2022-02-08 PROCEDURE — 6360000002 HC RX W HCPCS: Performed by: STUDENT IN AN ORGANIZED HEALTH CARE EDUCATION/TRAINING PROGRAM

## 2022-02-08 PROCEDURE — 7100000001 HC PACU RECOVERY - ADDTL 15 MIN: Performed by: ORTHOPAEDIC SURGERY

## 2022-02-08 PROCEDURE — A4217 STERILE WATER/SALINE, 500 ML: HCPCS | Performed by: ORTHOPAEDIC SURGERY

## 2022-02-08 PROCEDURE — 3600000004 HC SURGERY LEVEL 4 BASE: Performed by: ORTHOPAEDIC SURGERY

## 2022-02-08 PROCEDURE — 23472 RECONSTRUCT SHOULDER JOINT: CPT | Performed by: ORTHOPAEDIC SURGERY

## 2022-02-08 PROCEDURE — 6360000002 HC RX W HCPCS

## 2022-02-08 PROCEDURE — 6370000000 HC RX 637 (ALT 250 FOR IP): Performed by: STUDENT IN AN ORGANIZED HEALTH CARE EDUCATION/TRAINING PROGRAM

## 2022-02-08 PROCEDURE — 7100000000 HC PACU RECOVERY - FIRST 15 MIN: Performed by: ORTHOPAEDIC SURGERY

## 2022-02-08 PROCEDURE — 2580000003 HC RX 258: Performed by: ANESTHESIOLOGY

## 2022-02-08 PROCEDURE — 64415 NJX AA&/STRD BRCH PLXS IMG: CPT | Performed by: ANESTHESIOLOGY

## 2022-02-08 PROCEDURE — 2720000010 HC SURG SUPPLY STERILE: Performed by: ORTHOPAEDIC SURGERY

## 2022-02-08 PROCEDURE — 73030 X-RAY EXAM OF SHOULDER: CPT

## 2022-02-08 PROCEDURE — 6360000002 HC RX W HCPCS: Performed by: ORTHOPAEDIC SURGERY

## 2022-02-08 PROCEDURE — 6360000002 HC RX W HCPCS: Performed by: SPECIALIST

## 2022-02-08 PROCEDURE — 3700000001 HC ADD 15 MINUTES (ANESTHESIA): Performed by: ORTHOPAEDIC SURGERY

## 2022-02-08 PROCEDURE — 2709999900 HC NON-CHARGEABLE SUPPLY: Performed by: ORTHOPAEDIC SURGERY

## 2022-02-08 DEVICE — SPHERE GLEN DIA36MM REG STD CO CHROM TAPR FIT FOR COMPHSVE: Type: IMPLANTABLE DEVICE | Site: SHOULDER | Status: FUNCTIONAL

## 2022-02-08 DEVICE — SCREW BNE L15MM DIA4.75MM HD DIA3.5MM CORT FIX ANG: Type: IMPLANTABLE DEVICE | Site: SHOULDER | Status: FUNCTIONAL

## 2022-02-08 DEVICE — IMPLANTABLE DEVICE: Type: IMPLANTABLE DEVICE | Site: SHOULDER | Status: FUNCTIONAL

## 2022-02-08 DEVICE — BEARING HUM STD 36 MM SHLDR VIVACIT-E PROLONG COMPHSVE: Type: IMPLANTABLE DEVICE | Site: SHOULDER | Status: FUNCTIONAL

## 2022-02-08 DEVICE — SHOULDR S3 TOT ADV REVRS IMPL CAPPED S3: Type: IMPLANTABLE DEVICE | Site: SHOULDER | Status: FUNCTIONAL

## 2022-02-08 DEVICE — SCREW BNE L25MM DIA6.5MM HEX HD DIA3.5MM FOR COMPHSVE CONV: Type: IMPLANTABLE DEVICE | Site: SHOULDER | Status: FUNCTIONAL

## 2022-02-08 DEVICE — STEM HUM 12MM MINI SHLDR CO CHROM COMPHSVE REV PRI CEM: Type: IMPLANTABLE DEVICE | Site: SHOULDER | Status: FUNCTIONAL

## 2022-02-08 DEVICE — SCREW BNE L30MM DIA4.75MM HD DIA3.5MM G TI VAR ANG: Type: IMPLANTABLE DEVICE | Site: SHOULDER | Status: FUNCTIONAL

## 2022-02-08 RX ORDER — MIDAZOLAM HYDROCHLORIDE 2 MG/2ML
0.5 INJECTION, SOLUTION INTRAMUSCULAR; INTRAVENOUS ONCE
Status: COMPLETED | OUTPATIENT
Start: 2022-02-08 | End: 2022-02-08

## 2022-02-08 RX ORDER — DOCUSATE SODIUM 100 MG/1
100 CAPSULE, LIQUID FILLED ORAL 2 TIMES DAILY PRN
Qty: 20 CAPSULE | Refills: 0 | Status: SHIPPED | OUTPATIENT
Start: 2022-02-08 | End: 2022-05-11

## 2022-02-08 RX ORDER — FENTANYL CITRATE 50 UG/ML
50 INJECTION, SOLUTION INTRAMUSCULAR; INTRAVENOUS PRN
Status: COMPLETED | OUTPATIENT
Start: 2022-02-08 | End: 2022-02-08

## 2022-02-08 RX ORDER — PREGABALIN 75 MG/1
75 CAPSULE ORAL ONCE
Status: COMPLETED | OUTPATIENT
Start: 2022-02-08 | End: 2022-02-08

## 2022-02-08 RX ORDER — SODIUM CHLORIDE, SODIUM LACTATE, POTASSIUM CHLORIDE, CALCIUM CHLORIDE 600; 310; 30; 20 MG/100ML; MG/100ML; MG/100ML; MG/100ML
INJECTION, SOLUTION INTRAVENOUS CONTINUOUS PRN
Status: DISCONTINUED | OUTPATIENT
Start: 2022-02-08 | End: 2022-02-08 | Stop reason: SDUPTHER

## 2022-02-08 RX ORDER — MIDAZOLAM HYDROCHLORIDE 2 MG/2ML
0.5 INJECTION, SOLUTION INTRAMUSCULAR; INTRAVENOUS 4 TIMES DAILY PRN
Status: DISCONTINUED | OUTPATIENT
Start: 2022-02-08 | End: 2022-02-08 | Stop reason: HOSPADM

## 2022-02-08 RX ORDER — NAPROXEN 250 MG/1
250 TABLET ORAL ONCE
Status: COMPLETED | OUTPATIENT
Start: 2022-02-08 | End: 2022-02-08

## 2022-02-08 RX ORDER — ONDANSETRON 2 MG/ML
INJECTION INTRAMUSCULAR; INTRAVENOUS PRN
Status: DISCONTINUED | OUTPATIENT
Start: 2022-02-08 | End: 2022-02-08 | Stop reason: SDUPTHER

## 2022-02-08 RX ORDER — FENTANYL CITRATE 50 UG/ML
INJECTION, SOLUTION INTRAMUSCULAR; INTRAVENOUS PRN
Status: DISCONTINUED | OUTPATIENT
Start: 2022-02-08 | End: 2022-02-08 | Stop reason: SDUPTHER

## 2022-02-08 RX ORDER — PROPOFOL 10 MG/ML
INJECTION, EMULSION INTRAVENOUS PRN
Status: DISCONTINUED | OUTPATIENT
Start: 2022-02-08 | End: 2022-02-08 | Stop reason: SDUPTHER

## 2022-02-08 RX ORDER — VANCOMYCIN HYDROCHLORIDE 1 G/20ML
INJECTION, POWDER, LYOPHILIZED, FOR SOLUTION INTRAVENOUS PRN
Status: DISCONTINUED | OUTPATIENT
Start: 2022-02-08 | End: 2022-02-08 | Stop reason: ALTCHOICE

## 2022-02-08 RX ORDER — MAGNESIUM HYDROXIDE 1200 MG/15ML
LIQUID ORAL CONTINUOUS PRN
Status: COMPLETED | OUTPATIENT
Start: 2022-02-08 | End: 2022-02-08

## 2022-02-08 RX ORDER — SODIUM CHLORIDE, SODIUM LACTATE, POTASSIUM CHLORIDE, CALCIUM CHLORIDE 600; 310; 30; 20 MG/100ML; MG/100ML; MG/100ML; MG/100ML
1000 INJECTION, SOLUTION INTRAVENOUS CONTINUOUS
Status: DISCONTINUED | OUTPATIENT
Start: 2022-02-08 | End: 2022-02-08 | Stop reason: HOSPADM

## 2022-02-08 RX ORDER — NAPROXEN 500 MG/1
500 TABLET ORAL 2 TIMES DAILY WITH MEALS
Qty: 28 TABLET | Refills: 1 | Status: SHIPPED | OUTPATIENT
Start: 2022-02-08 | End: 2022-03-07

## 2022-02-08 RX ORDER — BUPIVACAINE HYDROCHLORIDE 5 MG/ML
40 INJECTION, SOLUTION EPIDURAL; INTRACAUDAL ONCE
Status: DISCONTINUED | OUTPATIENT
Start: 2022-02-08 | End: 2022-02-08 | Stop reason: HOSPADM

## 2022-02-08 RX ORDER — LABETALOL HYDROCHLORIDE 5 MG/ML
5 INJECTION, SOLUTION INTRAVENOUS EVERY 10 MIN PRN
Status: DISCONTINUED | OUTPATIENT
Start: 2022-02-08 | End: 2022-02-08 | Stop reason: HOSPADM

## 2022-02-08 RX ORDER — ROCURONIUM BROMIDE 10 MG/ML
INJECTION, SOLUTION INTRAVENOUS PRN
Status: DISCONTINUED | OUTPATIENT
Start: 2022-02-08 | End: 2022-02-08 | Stop reason: SDUPTHER

## 2022-02-08 RX ORDER — FENTANYL CITRATE 50 UG/ML
25 INJECTION, SOLUTION INTRAMUSCULAR; INTRAVENOUS EVERY 5 MIN PRN
Status: COMPLETED | OUTPATIENT
Start: 2022-02-08 | End: 2022-02-08

## 2022-02-08 RX ORDER — LIDOCAINE HYDROCHLORIDE 10 MG/ML
INJECTION, SOLUTION EPIDURAL; INFILTRATION; INTRACAUDAL; PERINEURAL PRN
Status: DISCONTINUED | OUTPATIENT
Start: 2022-02-08 | End: 2022-02-08 | Stop reason: SDUPTHER

## 2022-02-08 RX ORDER — OXYCODONE HYDROCHLORIDE 5 MG/1
5 TABLET ORAL EVERY 6 HOURS PRN
Qty: 28 TABLET | Refills: 0 | Status: SHIPPED | OUTPATIENT
Start: 2022-02-08 | End: 2022-02-15

## 2022-02-08 RX ORDER — MIDAZOLAM HYDROCHLORIDE 1 MG/ML
INJECTION INTRAMUSCULAR; INTRAVENOUS
Status: COMPLETED
Start: 2022-02-08 | End: 2022-02-08

## 2022-02-08 RX ORDER — DEXAMETHASONE SODIUM PHOSPHATE 10 MG/ML
10 INJECTION INTRAMUSCULAR; INTRAVENOUS ONCE
Status: COMPLETED | OUTPATIENT
Start: 2022-02-08 | End: 2022-02-08

## 2022-02-08 RX ORDER — TRANEXAMIC ACID 10 MG/ML
INJECTION, SOLUTION INTRAVENOUS PRN
Status: DISCONTINUED | OUTPATIENT
Start: 2022-02-08 | End: 2022-02-08 | Stop reason: SDUPTHER

## 2022-02-08 RX ORDER — PHENYLEPHRINE HCL IN 0.9% NACL 1 MG/10 ML
SYRINGE (ML) INTRAVENOUS PRN
Status: DISCONTINUED | OUTPATIENT
Start: 2022-02-08 | End: 2022-02-08 | Stop reason: SDUPTHER

## 2022-02-08 RX ORDER — ACETAMINOPHEN 500 MG
1000 TABLET ORAL ONCE
Status: COMPLETED | OUTPATIENT
Start: 2022-02-08 | End: 2022-02-08

## 2022-02-08 RX ORDER — ACETAMINOPHEN 500 MG
1000 TABLET ORAL EVERY 6 HOURS PRN
Qty: 112 TABLET | Refills: 0 | Status: SHIPPED | OUTPATIENT
Start: 2022-02-08 | End: 2022-05-11

## 2022-02-08 RX ORDER — FENTANYL CITRATE 50 UG/ML
INJECTION, SOLUTION INTRAMUSCULAR; INTRAVENOUS
Status: COMPLETED
Start: 2022-02-08 | End: 2022-02-08

## 2022-02-08 RX ORDER — TRAMADOL HYDROCHLORIDE 50 MG/1
50 TABLET ORAL ONCE
Status: COMPLETED | OUTPATIENT
Start: 2022-02-08 | End: 2022-02-08

## 2022-02-08 RX ORDER — ONDANSETRON 2 MG/ML
4 INJECTION INTRAMUSCULAR; INTRAVENOUS
Status: DISCONTINUED | OUTPATIENT
Start: 2022-02-08 | End: 2022-02-08 | Stop reason: HOSPADM

## 2022-02-08 RX ORDER — DIPHENHYDRAMINE HYDROCHLORIDE 50 MG/ML
12.5 INJECTION INTRAMUSCULAR; INTRAVENOUS
Status: DISCONTINUED | OUTPATIENT
Start: 2022-02-08 | End: 2022-02-08 | Stop reason: HOSPADM

## 2022-02-08 RX ADMIN — SODIUM CHLORIDE, POTASSIUM CHLORIDE, SODIUM LACTATE AND CALCIUM CHLORIDE 1000 ML: 600; 310; 30; 20 INJECTION, SOLUTION INTRAVENOUS at 07:00

## 2022-02-08 RX ADMIN — MIDAZOLAM HYDROCHLORIDE 2 MG: 1 INJECTION, SOLUTION INTRAMUSCULAR; INTRAVENOUS at 08:05

## 2022-02-08 RX ADMIN — Medication 50 MCG: at 11:04

## 2022-02-08 RX ADMIN — FENTANYL CITRATE 25 MCG: 50 INJECTION, SOLUTION INTRAMUSCULAR; INTRAVENOUS at 12:20

## 2022-02-08 RX ADMIN — Medication 50 MCG: at 10:40

## 2022-02-08 RX ADMIN — Medication 100 MCG: at 10:26

## 2022-02-08 RX ADMIN — FENTANYL CITRATE 25 MCG: 50 INJECTION, SOLUTION INTRAMUSCULAR; INTRAVENOUS at 12:30

## 2022-02-08 RX ADMIN — ROCURONIUM BROMIDE 50 MG: 10 INJECTION INTRAVENOUS at 09:19

## 2022-02-08 RX ADMIN — SUGAMMADEX 200 MG: 100 INJECTION, SOLUTION INTRAVENOUS at 10:59

## 2022-02-08 RX ADMIN — TRAMADOL HYDROCHLORIDE 50 MG: 50 TABLET, COATED ORAL at 07:07

## 2022-02-08 RX ADMIN — Medication 100 MCG: at 10:05

## 2022-02-08 RX ADMIN — Medication 100 MCG: at 10:18

## 2022-02-08 RX ADMIN — MIDAZOLAM HYDROCHLORIDE 2 MG: 2 INJECTION, SOLUTION INTRAMUSCULAR; INTRAVENOUS at 08:05

## 2022-02-08 RX ADMIN — PROPOFOL 200 MG: 10 INJECTION, EMULSION INTRAVENOUS at 09:19

## 2022-02-08 RX ADMIN — Medication 100 MCG: at 09:51

## 2022-02-08 RX ADMIN — FENTANYL CITRATE 100 MCG: 50 INJECTION, SOLUTION INTRAMUSCULAR; INTRAVENOUS at 08:05

## 2022-02-08 RX ADMIN — ONDANSETRON 4 MG: 2 INJECTION INTRAMUSCULAR; INTRAVENOUS at 10:58

## 2022-02-08 RX ADMIN — TRANEXAMIC ACID 1 G: 10 INJECTION, SOLUTION INTRAVENOUS at 09:43

## 2022-02-08 RX ADMIN — ACETAMINOPHEN 1000 MG: 500 TABLET ORAL at 07:07

## 2022-02-08 RX ADMIN — FENTANYL CITRATE 50 MCG: 50 INJECTION, SOLUTION INTRAMUSCULAR; INTRAVENOUS at 09:18

## 2022-02-08 RX ADMIN — ROCURONIUM BROMIDE 10 MG: 10 INJECTION INTRAVENOUS at 09:45

## 2022-02-08 RX ADMIN — Medication 200 MCG: at 09:39

## 2022-02-08 RX ADMIN — Medication 50 MCG: at 10:54

## 2022-02-08 RX ADMIN — Medication 50 MCG: at 11:12

## 2022-02-08 RX ADMIN — TRANEXAMIC ACID 1 G: 10 INJECTION, SOLUTION INTRAVENOUS at 10:53

## 2022-02-08 RX ADMIN — FENTANYL CITRATE 50 MCG: 50 INJECTION, SOLUTION INTRAMUSCULAR; INTRAVENOUS at 12:03

## 2022-02-08 RX ADMIN — Medication 50 MCG: at 10:45

## 2022-02-08 RX ADMIN — Medication 100 MCG: at 10:33

## 2022-02-08 RX ADMIN — FENTANYL CITRATE 50 MCG: 50 INJECTION, SOLUTION INTRAMUSCULAR; INTRAVENOUS at 11:32

## 2022-02-08 RX ADMIN — CEFAZOLIN 2000 MG: 10 INJECTION, POWDER, FOR SOLUTION INTRAVENOUS at 09:40

## 2022-02-08 RX ADMIN — MIDAZOLAM HYDROCHLORIDE 0.5 MG: 1 INJECTION, SOLUTION INTRAMUSCULAR; INTRAVENOUS at 12:33

## 2022-02-08 RX ADMIN — LIDOCAINE HYDROCHLORIDE 50 MG: 10 INJECTION, SOLUTION EPIDURAL; INFILTRATION; INTRACAUDAL; PERINEURAL at 09:19

## 2022-02-08 RX ADMIN — DEXAMETHASONE SODIUM PHOSPHATE 10 MG: 10 INJECTION INTRAMUSCULAR; INTRAVENOUS at 07:07

## 2022-02-08 RX ADMIN — PREGABALIN 75 MG: 75 CAPSULE ORAL at 07:07

## 2022-02-08 RX ADMIN — SODIUM CHLORIDE, POTASSIUM CHLORIDE, SODIUM LACTATE AND CALCIUM CHLORIDE: 600; 310; 30; 20 INJECTION, SOLUTION INTRAVENOUS at 09:11

## 2022-02-08 RX ADMIN — FENTANYL CITRATE 25 MCG: 50 INJECTION, SOLUTION INTRAMUSCULAR; INTRAVENOUS at 12:25

## 2022-02-08 RX ADMIN — NAPROXEN 250 MG: 250 TABLET ORAL at 07:07

## 2022-02-08 RX ADMIN — Medication 50 MCG: at 10:58

## 2022-02-08 RX ADMIN — FENTANYL CITRATE 25 MCG: 50 INJECTION, SOLUTION INTRAMUSCULAR; INTRAVENOUS at 12:15

## 2022-02-08 ASSESSMENT — PULMONARY FUNCTION TESTS
PIF_VALUE: 16
PIF_VALUE: 17
PIF_VALUE: 16
PIF_VALUE: 17
PIF_VALUE: 16
PIF_VALUE: 17
PIF_VALUE: 16
PIF_VALUE: 19
PIF_VALUE: 23
PIF_VALUE: 16
PIF_VALUE: 17
PIF_VALUE: 17
PIF_VALUE: 16
PIF_VALUE: 16
PIF_VALUE: 19
PIF_VALUE: 2
PIF_VALUE: 17
PIF_VALUE: 16
PIF_VALUE: 6
PIF_VALUE: 15
PIF_VALUE: 17
PIF_VALUE: 16
PIF_VALUE: 19
PIF_VALUE: 16
PIF_VALUE: 20
PIF_VALUE: 16
PIF_VALUE: 23
PIF_VALUE: 17
PIF_VALUE: 16
PIF_VALUE: 16
PIF_VALUE: 15
PIF_VALUE: 26
PIF_VALUE: 12
PIF_VALUE: 17
PIF_VALUE: 18
PIF_VALUE: 16
PIF_VALUE: 18
PIF_VALUE: 17
PIF_VALUE: 16
PIF_VALUE: 11
PIF_VALUE: 17
PIF_VALUE: 17
PIF_VALUE: 16
PIF_VALUE: 17
PIF_VALUE: 17
PIF_VALUE: 16
PIF_VALUE: 17
PIF_VALUE: 13
PIF_VALUE: 19
PIF_VALUE: 19
PIF_VALUE: 17
PIF_VALUE: 2
PIF_VALUE: 15
PIF_VALUE: 17
PIF_VALUE: 19
PIF_VALUE: 16
PIF_VALUE: 17
PIF_VALUE: 16
PIF_VALUE: 17
PIF_VALUE: 16
PIF_VALUE: 18
PIF_VALUE: 19
PIF_VALUE: 16
PIF_VALUE: 19
PIF_VALUE: 2
PIF_VALUE: 19
PIF_VALUE: 17
PIF_VALUE: 16
PIF_VALUE: 19
PIF_VALUE: 19
PIF_VALUE: 16
PIF_VALUE: 19
PIF_VALUE: 17
PIF_VALUE: 16
PIF_VALUE: 16
PIF_VALUE: 19
PIF_VALUE: 17
PIF_VALUE: 17
PIF_VALUE: 16
PIF_VALUE: 19
PIF_VALUE: 16
PIF_VALUE: 19
PIF_VALUE: 16
PIF_VALUE: 15
PIF_VALUE: 17
PIF_VALUE: 17
PIF_VALUE: 16
PIF_VALUE: 17
PIF_VALUE: 15
PIF_VALUE: 16
PIF_VALUE: 25
PIF_VALUE: 16
PIF_VALUE: 17
PIF_VALUE: 16
PIF_VALUE: 18
PIF_VALUE: 16
PIF_VALUE: 17
PIF_VALUE: 17
PIF_VALUE: 15
PIF_VALUE: 16
PIF_VALUE: 16
PIF_VALUE: 17
PIF_VALUE: 17
PIF_VALUE: 18
PIF_VALUE: 19
PIF_VALUE: 19
PIF_VALUE: 17
PIF_VALUE: 16
PIF_VALUE: 17
PIF_VALUE: 17
PIF_VALUE: 26
PIF_VALUE: 17
PIF_VALUE: 16
PIF_VALUE: 17
PIF_VALUE: 20
PIF_VALUE: 17
PIF_VALUE: 10
PIF_VALUE: 16
PIF_VALUE: 2
PIF_VALUE: 16
PIF_VALUE: 16
PIF_VALUE: 17

## 2022-02-08 ASSESSMENT — PAIN SCALES - GENERAL
PAINLEVEL_OUTOF10: 8
PAINLEVEL_OUTOF10: 0
PAINLEVEL_OUTOF10: 8
PAINLEVEL_OUTOF10: 0
PAINLEVEL_OUTOF10: 8

## 2022-02-08 ASSESSMENT — PAIN - FUNCTIONAL ASSESSMENT: PAIN_FUNCTIONAL_ASSESSMENT: 0-10

## 2022-02-08 ASSESSMENT — PAIN DESCRIPTION - PAIN TYPE: TYPE: ACUTE PAIN;SURGICAL PAIN

## 2022-02-08 ASSESSMENT — LIFESTYLE VARIABLES: SMOKING_STATUS: 0

## 2022-02-08 NOTE — ANESTHESIA POSTPROCEDURE EVALUATION
Department of Anesthesiology  Postprocedure Note    Patient: Lalo Rodrigez  MRN: [id number]  [place]: 1960  Date of evaluation: 2/8/2022  Time:  1:44 PM     Procedure Summary     Date: 02/08/22 Room / Location: [address]    Anesthesia Start: 0911 Anesthesia Stop: 3781    Procedure: REVERSE TOTAL SHOULDER ARTHROPLASTY (Left ) Diagnosis: (LEFT SHOULDER ARTHRITIS)    Surgeons: Chris Queen DO Responsible Provider: Chata Green MD    Anesthesia Type: regional, general ASA Status: 4          Anesthesia Type: regional, general    Hong Phase I: Hong Score: 8    Hong Phase II: Hong Score: 10    Last vitals: Reviewed and per EMR flowsheets.        Anesthesia Post Evaluation    Patient location during evaluation: PACU  Patient participation: complete - patient participated  Level of consciousness: awake  Pain score: 8 (sameas preop)  Nausea & Vomiting: no nausea  Cardiovascular status: hemodynamically stable  Respiratory status: room air  Comments: Fingers are numb

## 2022-02-08 NOTE — ANESTHESIA PRE PROCEDURE
Department of Anesthesiology  Preprocedure Note       Name:  Vitor Olivares   Age:  60 y.o.  :  1960                                          MRN:  [id number]         Date:  2022      Surgeon: Vy Ragsdale):  Gilmar Riley DO    Procedure: Procedure(s):  TOTAL SHOULDER ARTHROPLASTY VS. REVERSE TOTAL SHOULDER ARTHROPLASTY  (BIOMET/JESSICA, INTERSCALENE BLOCK PRE-OP, SPIDER TABLE, SEMI-SITTING)    Medications prior to admission:   Prior to Admission medications    Medication Sig Start Date End Date Taking? Authorizing Provider   acetaminophen (TYLENOL) 325 MG tablet Take 650 mg by mouth every 6 hours as needed for Pain    Historical Provider, MD   oxyCODONE-acetaminophen (PERCOCET) 5-325 MG per tablet Take 1 tablet by mouth every 8 hours as needed for Pain for up to 30 days. 22  Charleen Sanderson APRN - CNP   propranolol (INDERAL LA) 80 MG extended release capsule Take 1 capsule by mouth daily 10/29/21   Chacho Navas DO   amphetamine-dextroamphetamine (ADDERALL) 30 MG tablet 30 mg 2 times daily.   21   Historical Provider, MD   pantoprazole (PROTONIX) 40 MG tablet Take 1 tablet by mouth daily 9/15/21   Chacho Navas DO   buPROPion HCl ER, XL, 450 MG TB24 TAKE ONE TABLET BY MOUTH EVERY MORNING 21   Chacho Navas DO   alendronate (FOSAMAX) 70 MG tablet TAKE 1 TABLET BY MOUTH ONCE WEEKLY BEFORE BREAKFAST, ON AN EMPTY STOMACH: REMAIN UPRIGHT FOR 30 MINUTES:TAKE WITH 8 OUNCES OF WATER 3/18/21   Chacho Navas DO   albuterol sulfate HFA (VENTOLIN HFA) 108 (90 Base) MCG/ACT inhaler INHALE TWO PUFFS BY MOUTH EVERY 4 HOURS AS NEEDED FOR WHEEZING 10/28/20   Ankit Walker MD   citalopram (CELEXA) 40 MG tablet TAKE ONE TABLET BY MOUTH DAILY 20   Winston Walker MD   albuterol (PROVENTIL) (2.5 MG/3ML) 0.083% nebulizer solution Take 2.5 mg by nebulization every 6 hours as needed for Wheezing or Shortness of Breath     Historical Provider, MD Calcium Carbonate (CALCIUM 600 PO) Take 600 mg by mouth daily    Historical Provider, MD       Current medications:    Current Facility-Administered Medications   Medication Dose Route Frequency Provider Last Rate Last Admin    ceFAZolin (ANCEF) 2000 mg in dextrose 5 % 50 mL IVPB  2,000 mg IntraVENous On Call to [address], DO        tranexamic acid-NaCl 1,000 mg IVPB  1,000 mg IntraVENous Once Francesca Fuentes, DO        tranexamic acid-NaCl 1,000 mg IVPB  1,000 mg IntraVENous Once Francesca Fuentes, DO        lactated ringers infusion 1,000 mL  1,000 mL IntraVENous Continuous Bobby Matias MD 50 mL/hr at 02/08/22 0700 1,000 mL at 02/08/22 0700       Allergies: Allergies   Allergen Reactions    Latex Itching    Prozac [Fluoxetine Hcl] Other (See Comments)     violent    Sulfa Antibiotics      migraine    Codeine Itching       Problem List:    Patient Active Problem List   Diagnosis Code    Attention deficit hyperactivity disorder (ADHD) F90.9    Narcolepsy G47.419    Moderate persistent asthma without complication I47.44    History of DVT (deep vein thrombosis) Z86.718    Hiatal hernia K44.9    Cervical radiculopathy M54.12    Ganglion cyst M67.40    Chronic pain of left ankle M25.572, G89.29    Acquired trigger finger M65.30    Osteoarthritis of knee M17.10    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 XXXA, Y92.009    Closed fracture of multiple ribs of right side S22.41XA    Hemothorax on right J94.2    Contusion of right lung S27.321A    Chronic fatigue syndrome R53.82    Sleep apnea G47.30    Pain in joint involving ankle and foot M25.579    Lumbosacral spondylosis without myelopathy M47.817    Chronic bilateral low back pain with bilateral sciatica M54.42, M54.41, G89.29    Encounter for long-term opiate analgesic use Z79.891       Past Medical History:        Diagnosis Date    Acid reflux     Adult ADHD 2012    monthly visits @ [place]    Asthma 2012    seasonal    Bipolar 1 disorder ([facility]     monthly visits with [facility]    Broken foot     Left foot    Chest pain of uncertain etiology     Last episode was in  (Written 2019)    Depression     Under control per pt.  on 6/15/18    DVT of leg (deep venous thrombosis) ([facility] 2014    Fracture     right foot / pt denies surgical intervention    Fractured nose     x 9 times    Helicobacter pylori (H. pylori) infection     per EGD    Hematuria 2018    Has a follow-up with Urologist     Hiatal hernia     History of blood transfusion     no reactions    History of DVT (deep vein thrombosis) 2012    History of myasthenia gravis     diagnosed around age 26    Hx of blood clots     L leg post knee surgery    Hypertension     managed by Dr. Nazario Alba PCP   Radames Perla Internal hemorrhoids     Narcolepsy 2012    Dr. Niharika Boswell Neurology .     Osteoporosis     Sleep apnea     no machine    Under care of team 10/2021    Dr. Annette Barlow Under care of team     Pain Management Dr. Levander Brunner last visit 2022       Past Surgical History:        Procedure Laterality Date    ANKLE SURGERY Left 2018    mass excision    ARM SURGERY Right     Pt states she has had 13 right arm surgeries    BREAST ENHANCEMENT SURGERY Bilateral      SECTION      x 3    COLONOSCOPY  2013    sigmoid diverticula, prominent large internal hemorrhoid    COLONOSCOPY N/A 2/10/2020    COLONOSCOPY DIAGNOSTIC performed by Matt Harmon MD at [facility] 5/18/2020    COLORECTAL CANCER SCREENING, NOT HIGH RISK performed by Pina Nash MD at [facility], COLON, DIAGNOSTIC      ESOPHAGEAL MOTILITY STUDY N/A 8/12/2020    PES RN - ESOPHAGEAL MOTILITY STUDY performed by Amee Rodriguez DO at [address] STUDY  9/10/2020    ESOPHAGEAL MOTILITY STUDY performed by Amee Rodriguez DO at [facility]    ESOPHAGEAL MOTILITY STUDY N/A 12/18/2020    ESOPHAGEAL MOTILITY STUDY ATTEMPTED PER DR. Ingrid Engel WITHOUT SUCCESS performed by Amee Rodriguez DO at [facility]    FOOT SURGERY Left 2015    FOOT TENDON SURGERY Left 02/22/2019    LEFT PERONEAL TENDON REPAIR WITH POSSIBLE LEFT TENDON TRANSFER (Left )    KNEE ARTHROSCOPY Left     Two surgeries on left knee per pt    KNEE SURGERY Right     Three surgeries per pt    LIGAMENT REPAIR Left 2/22/2019    LEFT PERONEAL TENDON REPAIR performed by Oswaldo Chavez DPM at [address] Bilateral 06/04/2021     BILATERAL L4/5, L5/S1 LUMBAR FACET (Bilateral )    NOSE SURGERY      PAIN MANAGEMENT PROCEDURE Bilateral 6/4/2021    BILATERAL L4/5, L5/S1 LUMBAR FACET performed by Anjelica Moreno MD at [address] [place] [address]  06/11/2021    BILATERAL L4/5, L5/S1 NERVE BLOCK - Bilateral    PAIN MANAGEMENT PROCEDURE Bilateral 6/11/2021    BILATERAL L4/5, L5/S1 NERVE BLOCK performed by Anjelica Moreno MD at [address] [place] [address] Right 07/23/2021    L4/5, L5/S1 NERVE RADIOFREQUENCY ABLATION     PAIN MANAGEMENT PROCEDURE Right 7/23/2021    right  L4/5, L5/S1 NERVE RADIOFREQUENCY ABLATION performed by Anjelica Moreno MD at [address] [place] [address] Left 08/06/2021    left  L4/5, L5/S1 NERVE RADIOFREQUENCY ABLATION (Left )    PAIN MANAGEMENT PROCEDURE Left 8/6/2021    left  L4/5, L5/S1 NERVE RADIOFREQUENCY ABLATION performed by Esther Erickson MD at [address] [facility] OFFICE/OUTPT [address] Left 2018    RESECTION/REMOVAL BONY NEOPLASM LEFT FIBULA, LEFT SOFT TISSUE MASS EXCISION WITH BONE BIOPSY LEFT ANKLE FIBULA performed by Tori Dejesus DPM at [address] Left    [facility] ENDOSCOPY  2013    tertiary contractures esophagus, 3 to 4 cm hiatal hernia, antral gastritis, + H Pylori, mild active chronic esophagitis    UPPER GASTROINTESTINAL ENDOSCOPY  2016    one biopsy    UPPER GASTROINTESTINAL ENDOSCOPY N/A 2/10/2020    EGD BIOPSY performed by Sundeep Flood MD at [address]         Social History:    Social History     Tobacco Use    Smoking status: Former Smoker     Packs/day: 1.00     Years: 20.00     Pack years: 20.00     Quit date:      Years since quittin.1    Smokeless tobacco: Never Used   Substance Use Topics    Alcohol use: Not Currently     Comment: maybe oncee a year                                Counseling given: Not Answered      Vital Signs (Current):   Vitals:    22 0656   BP: (!) 138/98   Pulse: 75   Resp: 16   Temp: 97 °F (36.1 °C)   TempSrc: Temporal   SpO2: 98%                                              BP Readings from Last 3 Encounters:   22 (!) 138/98   22 117/79   22 136/84       NPO Status: Time of last liquid consumption:                         Time of last solid consumption:                         Date of last liquid consumption: 22                        Date of last solid food consumption: 22    BMI:   Wt Readings from Last 3 Encounters:   22 149 lb (67.6 kg)   22 148 lb (67.1 kg)   22 150 lb (68 kg)     There is no height or weight on file to calculate BMI.    CBC:   Lab Results   Component Value Date    WBC 4.9 2022    RBC 4.18 2022    HGB 12.3 2022    HCT 39.0 01/26/2022    MCV 93.3 01/26/2022    RDW 14.2 01/26/2022     01/26/2022       CMP:   Lab Results   Component Value Date     01/26/2022    K 4.0 01/26/2022     01/26/2022    CO2 24 01/26/2022    BUN 20 01/26/2022    CREATININE 0.94 01/26/2022    GFRAA >60 01/26/2022    LABGLOM >60 01/26/2022    GLUCOSE 73 01/26/2022    PROT 6.4 01/26/2022    CALCIUM 9.3 01/26/2022    BILITOT 0.23 01/26/2022    ALKPHOS 94 01/26/2022    AST 23 01/26/2022    ALT 16 01/26/2022       POC Tests: No results for input(s): POCGLU, POCNA, POCK, POCCL, POCBUN, POCHEMO, POCHCT in the last 72 hours. Coags:   Lab Results   Component Value Date    PROTIME 9.4 01/13/2021    INR 0.9 01/13/2021    APTT 23.4 01/13/2021       HCG (If Applicable): No results found for: PREGTESTUR, PREGSERUM, HCG, HCGQUANT     ABGs: No results found for: PHART, PO2ART, TNE4PYU, NAM2JLX, BEART, J1ERIOGJ     Type & Screen (If Applicable):  No results found for: LABABO, LABRH    Drug/Infectious Status (If Applicable):  Lab Results   Component Value Date    HEPCAB NONREACTIVE 04/27/2018       COVID-19 Screening (If Applicable):   Lab Results   Component Value Date    COVID19 Not Detected 08/02/2021    COVID19 Not Detected 09/25/2020    COVID19 Not Detected 07/02/2020           Anesthesia Evaluation   no history of anesthetic complications:   Airway: Mallampati: II     Neck ROM: full   Dental:    (+) upper dentures      Pulmonary:   (+) sleep apnea:  asthma:     (-) not a current smoker                          ROS comment: Hx DVT   Cardiovascular:    (+) hypertension:,                   Neuro/Psych:   (+) neuromuscular disease: myasthenia gravis, psychiatric history:             ROS comment: EMILIA,ADHD,narcolepsy GI/Hepatic/Renal:   (+) hiatal hernia, GERD:,           Endo/Other:    (+) blood dyscrasia: anemia:., .                 Abdominal:             Vascular:           Other Findings:             Anesthesia Plan      regional and general     ASA 4 PS=8    Teto Loredo MD   2/8/2022

## 2022-02-08 NOTE — INTERVAL H&P NOTE
Pt Name: Sly Goodman  MRN: [id number]  YOB: 1960  Date of evaluation: 2/8/2022    I have reviewed the patient's history and physical examination completed in pre-admission testing.     Changes to history or on examination, if any, are as follows:  none    Zuhair Townsend PA-C  2/8/22  7:02 AM

## 2022-02-08 NOTE — FLOWSHEET NOTE
Dr Jono Mcneil to the bedside, time out performed @ 0804 , Pt monitored, 02, interscalene nerve block completed using 30 mL 0.5% Bupivacaine,   pt tolerated procedure well,  Site CDI, (see charting)  Versed Given: 2 mg  Fentanyl Given: 100 mcg     Procedure Start: 0805  Procedure End: 0810    3 mL 1% lidocaine used as local

## 2022-02-08 NOTE — ANESTHESIA PROCEDURE NOTES
Peripheral Block    Patient location during procedure: pre-op  Start time: 2/8/2022 8:05 AM  End time: 2/8/2022 8:10 AM  Staffing  Anesthesiologist: Janel Hernandez MD  Preanesthetic Checklist  Completed: patient identified, site marked, risks and benefits discussed, surgical consent, monitors and equipment checked, pre-op evaluation, timeout performed, anesthesia consent given, oxygen available and patient being monitored  Peripheral Block  Patient position: sitting  Prep: ChloraPrep  Patient monitoring: cardiac monitor, continuous pulse ox, frequent blood pressure checks and IV access  Block type: Brachial plexus  Laterality: left  Injection technique: single-shot  Guidance: ultrasound guided  Local infiltration: lidocaine  Infiltration strength: 1 %  Dose: 2 mL  Interscalene  Provider prep: mask and sterile gloves  Local infiltration: lidocaine  Needle  Needle type: short-bevel   Needle gauge: 20 G  Needle length: 10 cm  Needle localization: ultrasound guidance  Assessment  Injection assessment: negative aspiration for heme, no paresthesia on injection and local visualized surrounding nerve on ultrasound  Slow fractionated injection: yes  Hemodynamics: stable  Additional Notes  Versed 2 mg,fent 2 cc marcaine . 5% PF marcaine DD,neg asp 30 cc  Reason for block: post-op pain management and at surgeon's request

## 2022-02-08 NOTE — BRIEF OP NOTE
Brief Postoperative Note      Patient: Juan Charles  YOB: 1960  MRN: [id number]    Date of Procedure: 2/8/2022    Pre-Op Diagnosis: LEFT SHOULDER GLENOHUMERAL OSTEOARTHRITIS    Post-Op Diagnosis: Same       Procedure(s):  LEFT REVERSE TOTAL SHOULDER ARTHROPLASTY    Surgeon(s):  Tawanda Friend DO    Assistant:  Surgical Assistant: Sangeetha Ordaz  Resident: Tien Powers DO    Anesthesia: General, regional    Estimated Blood Loss (mL): 50    Fluids: 800cc crystalloid    Complications: None    Specimens:   * No specimens in log *    Implants:  Implant Name Type Inv. Item Serial No.  Lot No. LRB No. Used Action   BASEPLATE SHAYY [id number] UNIV LUTZ FOR COMPHSVE REV SHLDR SYS - [id number]  BASEPLATE SHAYY [id number] UNIV HA FOR COMPHSVE REV SHLDR SYS  [employer]ESSICA [employer]- [id number] Left 1 Implanted   ADAPTER HUM HD STD TAPR FOR COMPHSVE REV SHLDR SYS - [id number]  ADAPTER HUM HD STD TAPR FOR COMPHSVE REV SHLDR SYS  [employer]ESSICA [employer]- [id number] Left 1 Implanted   SCREW BNE L25MM DIA6. 5MM HEX HD DIA3. 5MM FOR COMPHSVE CONV - [id number]  SCREW BNE L25MM DIA6. 5MM HEX HD DIA3. 5MM FOR COMPHSVE CONV  JESSICA [employer]- [id number] Left 1 Implanted   SPHERE SHAYY [id number] REG STD CO CHROM TAPR FIT FOR COMPHSVE - [id number]  SPHERE SHAYY [id number] REG STD CO CHROM TAPR FIT FOR COMPHSVE  Dayna [employer]- [id number] Left 1 Implanted   SCREW BNE L30MM DIA4. 75MM HD DIA3. 5MM G TI PABLITO ANG - [id number]  SCREW BNE L30MM DIA4. 75MM HD DIA3. 5MM G TI PABLITO ANG  JESSICA [employer]- [id number] Left 1 Implanted   SCREW BNE L15MM DIA4. 75MM HD DIA3.5MM CHANDNI FIX ANG - [id number]  SCREW BNE L15MM DIA4. 75MM HD DIA3.5MM CHANDNI FIX ANG  JESSICA [employer]- [id number] Left 1 Implanted   SCREW BNE L30MM DIA4. 75MM HD DIA3. 5MM G TI PABLITO ANG - [id number]  SCREW BNE L30MM DIA4. 75MM HD DIA3. 5MM G TI PABLITO ANG  [employer]ESSICA [employer]- [id number] Left 1 Implanted   STEM HUM 12MM MINI SHVICTOR MANUELR CO ANGÉLICA COMPHSVE REV DEBRA TIESHA - [id number]  STEM HUM 12MM MINI SHLDR CO CHROM COMPHSVE REV DEBRA TIESHA  Dontrellzay Gram[employer]-WD [id number] Left 1 Implanted   BEARING HUM STD 39 MM SHLDR VIVACIT-E PROLONG COMPHSVE - [id number]  BEARING HUM STD 39 MM SHLDR VIVACIT-E PROLONG COMPHSVE  JESSICA BIOMET ETEX JAKI-WD [id number] Left 1 Implanted   TRAY HUM STD FOR COMPHSVE REV SHLDR SYS - [id number]  TRAY HUM STD FOR COMPHSVE REV SHLDR SYS  Tracykrystle Gramajo ETEX JAKI-WD [id number] Left 1 Implanted         Drains: * No LDAs found *    Findings: See op note    Electronically signed by David Newman DO on 2/8/2022 at 11:23 AM

## 2022-02-09 NOTE — OP NOTE
Operative Note      Patient: Gricel Stringer  YOB: 1960  MRN: [id number]     Date of Procedure: 2/8/2022     Pre-Op Diagnosis: LEFT SHOULDER GLENOHUMERAL OSTEOARTHRITIS     Post-Op Diagnosis: Same       Procedure(s):  LEFT REVERSE TOTAL SHOULDER ARTHROPLASTY     Surgeon(s):  Martita Milan DO     Assistant:  Surgical Assistant: Katerina Parekh  Resident: Mckenna Marquis DO     Anesthesia: General, regional     Estimated Blood Loss (mL): 50     Fluids: 800cc crystalloid     Complications: None     Specimens:   * No specimens in log *     Implants:  Implant Name Type Inv. Item Serial No.  Lot No. LRB No. Used Action   BASEPLATE SHAYY [id number] UNIV LUTZ FOR COMPHSVE REV SHLDR SYS - [id number]   BASEPLATE SHAYY [id number] UNIV LUTZ FOR COMPHSVE REV SHLDR SYS   [employer]- [id number] Left 1 Implanted   ADAPTER HUM HD STD TAPR FOR COMPHSVE REV SHLDR SYS - [id number]   ADAPTER HUM HD STD TAPR FOR COMPHSVE REV SHLDR SYS   [employer]- [id number] Left 1 Implanted   SCREW BNE L25MM DIA6. 5MM HEX HD DIA3. 5MM FOR COMPHSVE CONV - [id number]   SCREW BNE L25MM DIA6. 5MM HEX HD DIA3. 5MM FOR COMPHSVE CONV   [employer]- [id number] Left 1 Implanted   SPHERE SHAYY [id number] REG STD CO CHROM TAPR FIT FOR COMPHSVE - [id number]   SPHERE SHAYY [id number] REG STD CO CHROM TAPR FIT FOR COMPHSVE   Molly [employer]ame[employer]- [id number] Left 1 Implanted   SCREW BNE L30MM DIA4. 75MM HD DIA3. 5MM G TI PABLITO ANG - [id number]   SCREW BNE L30MM DIA4. 75MM HD DIA3. 5MM G TI PABLITO ANG   [employer]- [id number] Left 1 Implanted   SCREW BNE L15MM DIA4. 75MM HD DIA3.5MM CHANDNI FIX ANG - [id number]   SCREW BNE L15MM DIA4. 75MM HD DIA3.5MM CHANDNI FIX ANG   [employer]- [id number] Left 1 Implanted   SCREW BNE L30MM DIA4. 75MM HD DIA3. 5MM G TI PABLITO ANG - [id number]   SCREW BNE L30MM DIA4. 75MM HD DIA3. 5MM G TI PABLITO ANG   [employer]- [id number] Left 1 Implanted   STEM HUM 12[facility] CHROM COMPHSVE REV DEBRA TIESHA - [id number]   STEM HUM 12MM MINI SHLDR CO CHROM COMPHSVE REV DEBRA TIESHA   [employer]-WD [id number] Left 1 Implanted   BEARING HUM STD 36 MM SHLDR VIVACIT-E PROLONG COMPHSVE - [id number]   BEARING HUM STD 36 MM SHLDR VIVACIT-E PROLONG COMPHSVE   JESSICA [employer] ETEX JAKI-WD [id number] Left 1 Implanted   TRAY HUM STD FOR COMPHSVE REV SHLDR SYS - [id number]   TRAY HUM STD FOR COMPHSVE REV SHLDR SYS   JESSICA Rajesh Tien JAKI-WD [id number] Left 1 Implanted          Indications:   Wilda Lucero is a 60 y.o. old female who presents with severe functionally disabling left shoulder pain despite non-operative treatment. Recent radiographs revealed severe glenohumeral arthritis. Having failed attempts at conservative management and following a discussion with regards to her treatment options including continued non-surgical treatment and operative intervention, she elected to forgo continued attempts at conservative treatment and proceed with surgery by way of a anatomic vs reverse shoulder replacement. All details of the procedure, as well as risks, benefits and alternatives, including the option of non operative versus operative treatment were discussed. The patient understands that risks of the surgery include but are not limited to: bleeding, malunion/nonunion, loss of fixation, loss of reduction, hardware failure, angular or rotational deformity, length discrepancy, limp, transfusion, skin blistering or breakdown, progressive post traumatic degenerative joint disease, possible need for further surgery, bone grafting, infection, nerve injury, paralysis, numbness, blood vessel injury, excessive scaring, wound complication or breakdown, failure of symptoms to improve or actual deterioration in condition, significant acute and/or chronic pain, possible need for amputation, permanent loss of motion, and permanent loss of function.   As well as the general complications of anesthesia, which include but are not limited to: myocardial infarction and/or heart attack, stroke, multi organ system failure or even possible death, prolonged hospital stay, blood clots, pulmonary embolism, abnormal reaction to medication, visual and neurological disturbances, constipation, ischemic bowel, bowel obstruction, bowel perforation, ileus and mental status changes. No guarantees were made. The patient understood risks, alternatives, complications, & post-operative limitations and wishes to proceed. Procedure:  Patient was met in pre-op holding area. H&P and consent were reviewed. Operative site marked. All questions were answered. The anesthesia team administered a left interscalene nerve block and the patient was taken back to the OR and transferred onto the operative table. The anesthesia team administered a general anesthetic and established/secured her airway without issue. The patient was carefully positioned and secured in the semi beach-chair position, padding all prominences. The left upper extremity was prepped and draped in the standard sterile fashion. The patient finished a course of pre-operative antibiotics . A time out was performed during which verification of surgical site, patient identification, and surgical procedure planned were confirmed by the circulating nurse and anesthetic team.    A 6 cm deltopectoral incision was made. The cephalic vein was identified and protected laterally. Of note, later in the procedure, the vein was damaged and coagulated. The subdeltoid, subcoracoid and subacromial spaces were developed and released. The biceps had diffuse ballooning tenosynovitis with diffuse tearing. The biceps tendon was tenodesed to the pectoralis major tendon using one number 5 ethibond sutures. The subscapularis was released off of the lesser tuberosity after tenotomy leaving 1cm cuff of tendon, and the humeral head was atraumatically dislocated.  Hypertrophic osteophytes were removed from the proximal humerus. A free hand osteotomy of the remaining humeral head along anatomic neck was made. At this time, we analyzed rotator cuff insertions which were intact but appeared attenuated. We felt they were at risk for near future failure, thus proceeded with reverse total shoulder arthroplasty. Hand reamers were then sequentially used up to size 12mm. Metaphyseal broaches were then used starting at 10mm up to size 12. These were in 30 degrees of retroversion. Calcar planar was used. Trial was then kept in place as we moved to glenoid. Attention was directed to the glenoid. A 360 degree andreas-glenoid release was then performed. There was significant severe all glenoid bone wear. The position for the centering hole was identified along the inferior half of the glenoid using the anatomic center line and drilled using a 2.5mm drill bit.  was then used. Sequential cannulated glenoid reamers were then used to prepare the glenoid, obtaining 100% coverage. The baseplate was inserted to achieve maximum compression on the glenoid. Center screw was then placed 25mm x 6.5mm. 3 additional 4.75 mm  screws were placed peripherally in baseplate. Superior and inferior screws were non-locking, anterior screw was locking. A 36mm glenosphere component was then impacted onto the baseplate morise taper. The morise taper was then tested and ensured to be properly engaged. Attention was turned back to humerus. Standard trial liner was placed, however we couldn't reduce shoulder. Rotator cuff tendon insertions were removed with bovie. Neck was re-cut removing 2 more mm of bone. Trial broach placed. Standard poly liner placed and arm was reduced and felt to be appropriately stable and with excellent ROM. The trial was removed, wound irrigated with sterile saline and irricept. Final humeral stem and poly were placed. The subscapularis was not repaired.  Copious amounts of irrigation was used to irrigate the shoulder as well as irricept. 1g vanc powder placed. The deltopectoral interval was closed loosely with 0 vicryl. Subcutaneous closure with 0 stratafix. The skin was closed with a 2-0 subcuticular stratafix and dermabon. Sterile dressings were applied with optifoam. The patient's arm was placed in a simple sling. she was awoken, transferred to her bed and taken to recovery room in stable condition. Dr. Janel Tinsley was present for all aspects of the case.      Implants: [employer] size 28 cementless glenoid baseplate with 3 peripheral screws including 2 non-locking and 1 locking, 36 glenosphere, size 12 cementless mini humeral stem, standard poly liner,     Electronically signed by Jose Townsend DO on 2/9/2022 at 7:04 AM

## 2022-02-10 ENCOUNTER — TELEPHONE (OUTPATIENT)
Dept: PAIN MANAGEMENT | Age: 62
End: 2022-02-10

## 2022-02-10 NOTE — TELEPHONE ENCOUNTER
Patient left a msg stating that she had a shoulder replacement on 02/08/2022 and stated that the pain is just getting worse. Next appt is 02/17/2022. Please advise.

## 2022-02-11 ENCOUNTER — HOSPITAL ENCOUNTER (EMERGENCY)
Age: 62
Discharge: HOME | End: 2022-02-11
Attending: EMERGENCY MEDICINE
Payer: MEDICARE

## 2022-02-11 ENCOUNTER — APPOINTMENT (OUTPATIENT)
Dept: GENERAL RADIOLOGY | Age: 62
End: 2022-02-11
Payer: MEDICARE

## 2022-02-11 ENCOUNTER — HOSPITAL ENCOUNTER (OUTPATIENT)
Dept: PHYSICAL THERAPY | Age: 62
Setting detail: THERAPIES SERIES
Discharge: HOME | End: 2022-02-11
Payer: MEDICARE

## 2022-02-11 VITALS
HEART RATE: 54 BPM | TEMPERATURE: 96.2 F | WEIGHT: 150 LBS | DIASTOLIC BLOOD PRESSURE: 71 MMHG | BODY MASS INDEX: 29.29 KG/M2 | OXYGEN SATURATION: 90 % | RESPIRATION RATE: 23 BRPM | SYSTOLIC BLOOD PRESSURE: 114 MMHG

## 2022-02-11 DIAGNOSIS — R53.1 GENERALIZED WEAKNESS: Primary | ICD-10-CM

## 2022-02-11 LAB
-: NORMAL
ABSOLUTE EOS #: 0.33 K/UL (ref 0–0.44)
ABSOLUTE IMMATURE GRANULOCYTE: 0.04 K/UL (ref 0–0.3)
ABSOLUTE LYMPH #: 1.32 K/UL (ref 1.1–3.7)
ABSOLUTE MONO #: 0.78 K/UL (ref 0.1–1.2)
ALBUMIN SERPL-MCNC: 3.4 G/DL (ref 3.5–5.2)
ALBUMIN/GLOBULIN RATIO: 1.2 (ref 1–2.5)
ALP BLD-CCNC: 121 U/L (ref 35–104)
ALT SERPL-CCNC: 21 U/L (ref 5–33)
AMORPHOUS: NORMAL
ANION GAP SERPL CALCULATED.3IONS-SCNC: 9 MMOL/L (ref 9–17)
AST SERPL-CCNC: 36 U/L
BACTERIA: NORMAL
BASOPHILS # BLD: 1 % (ref 0–2)
BASOPHILS ABSOLUTE: 0.05 K/UL (ref 0–0.2)
BILIRUB SERPL-MCNC: 0.19 MG/DL (ref 0.3–1.2)
BILIRUBIN URINE: NEGATIVE
BUN BLDV-MCNC: 12 MG/DL (ref 8–23)
BUN/CREAT BLD: ABNORMAL (ref 9–20)
CALCIUM SERPL-MCNC: 9.4 MG/DL (ref 8.6–10.4)
CASTS UA: NORMAL /LPF (ref 0–8)
CHLORIDE BLD-SCNC: 105 MMOL/L (ref 98–107)
CO2: 23 MMOL/L (ref 20–31)
COLOR: YELLOW
COMMENT UA: ABNORMAL
CREAT SERPL-MCNC: 0.91 MG/DL (ref 0.5–0.9)
CRYSTALS, UA: NORMAL /HPF
DIFFERENTIAL TYPE: ABNORMAL
EOSINOPHILS RELATIVE PERCENT: 4 % (ref 1–4)
EPITHELIAL CELLS UA: NORMAL /HPF (ref 0–5)
GFR AFRICAN AMERICAN: >60 ML/MIN
GFR NON-AFRICAN AMERICAN: >60 ML/MIN
GFR SERPL CREATININE-BSD FRML MDRD: ABNORMAL ML/MIN/{1.73_M2}
GFR SERPL CREATININE-BSD FRML MDRD: ABNORMAL ML/MIN/{1.73_M2}
GLUCOSE BLD-MCNC: 86 MG/DL (ref 70–99)
GLUCOSE URINE: NEGATIVE
HCT VFR BLD CALC: 38.4 % (ref 36.3–47.1)
HEMOGLOBIN: 12.1 G/DL (ref 11.9–15.1)
IMMATURE GRANULOCYTES: 1 %
INR BLD: 0.9
KETONES, URINE: NEGATIVE
LEUKOCYTE ESTERASE, URINE: ABNORMAL
LYMPHOCYTES # BLD: 16 % (ref 24–43)
MCH RBC QN AUTO: 30.1 PG (ref 25.2–33.5)
MCHC RBC AUTO-ENTMCNC: 31.5 G/DL (ref 28.4–34.8)
MCV RBC AUTO: 95.5 FL (ref 82.6–102.9)
MONOCYTES # BLD: 10 % (ref 3–12)
MUCUS: NORMAL
NITRITE, URINE: NEGATIVE
NRBC AUTOMATED: 0 PER 100 WBC
OTHER OBSERVATIONS UA: NORMAL
PARTIAL THROMBOPLASTIN TIME: 21.1 SEC (ref 20.5–30.5)
PDW BLD-RTO: 15 % (ref 11.8–14.4)
PH UA: 5 (ref 5–8)
PLATELET # BLD: 268 K/UL (ref 138–453)
PLATELET ESTIMATE: ABNORMAL
PMV BLD AUTO: 10.1 FL (ref 8.1–13.5)
POTASSIUM SERPL-SCNC: 4.5 MMOL/L (ref 3.7–5.3)
PROTEIN UA: NEGATIVE
PROTHROMBIN TIME: 10.1 SEC (ref 9.1–12.3)
RBC # BLD: 4.02 M/UL (ref 3.95–5.11)
RBC # BLD: ABNORMAL 10*6/UL
RBC UA: NORMAL /HPF (ref 0–4)
RENAL EPITHELIAL, UA: NORMAL /HPF
SARS-COV-2, RAPID: NOT DETECTED
SEG NEUTROPHILS: 68 % (ref 36–65)
SEGMENTED NEUTROPHILS ABSOLUTE COUNT: 5.58 K/UL (ref 1.5–8.1)
SODIUM BLD-SCNC: 137 MMOL/L (ref 135–144)
SPECIFIC GRAVITY UA: 1.02 (ref 1–1.03)
SPECIMEN DESCRIPTION: NORMAL
TOTAL PROTEIN: 6.2 G/DL (ref 6.4–8.3)
TRICHOMONAS: NORMAL
TROPONIN INTERP: NORMAL
TROPONIN T: NORMAL NG/ML
TROPONIN, HIGH SENSITIVITY: 8 NG/L (ref 0–14)
TURBIDITY: CLEAR
URINE HGB: NEGATIVE
UROBILINOGEN, URINE: NORMAL
WBC # BLD: 8.1 K/UL (ref 3.5–11.3)
WBC # BLD: ABNORMAL 10*3/UL
WBC UA: NORMAL /HPF (ref 0–5)
YEAST: NORMAL

## 2022-02-11 PROCEDURE — 71045 X-RAY EXAM CHEST 1 VIEW: CPT

## 2022-02-11 PROCEDURE — 80053 COMPREHEN METABOLIC PANEL: CPT

## 2022-02-11 PROCEDURE — 2580000003 HC RX 258: Performed by: STUDENT IN AN ORGANIZED HEALTH CARE EDUCATION/TRAINING PROGRAM

## 2022-02-11 PROCEDURE — 84484 ASSAY OF TROPONIN QUANT: CPT

## 2022-02-11 PROCEDURE — 85610 PROTHROMBIN TIME: CPT

## 2022-02-11 PROCEDURE — 6370000000 HC RX 637 (ALT 250 FOR IP): Performed by: STUDENT IN AN ORGANIZED HEALTH CARE EDUCATION/TRAINING PROGRAM

## 2022-02-11 PROCEDURE — 87635 SARS-COV-2 COVID-19 AMP PRB: CPT

## 2022-02-11 PROCEDURE — 85025 COMPLETE CBC W/AUTO DIFF WBC: CPT

## 2022-02-11 PROCEDURE — 99284 EMERGENCY DEPT VISIT MOD MDM: CPT

## 2022-02-11 PROCEDURE — 93005 ELECTROCARDIOGRAM TRACING: CPT | Performed by: STUDENT IN AN ORGANIZED HEALTH CARE EDUCATION/TRAINING PROGRAM

## 2022-02-11 PROCEDURE — 85730 THROMBOPLASTIN TIME PARTIAL: CPT

## 2022-02-11 PROCEDURE — 81001 URINALYSIS AUTO W/SCOPE: CPT

## 2022-02-11 PROCEDURE — 96360 HYDRATION IV INFUSION INIT: CPT

## 2022-02-11 RX ORDER — 0.9 % SODIUM CHLORIDE 0.9 %
1000 INTRAVENOUS SOLUTION INTRAVENOUS ONCE
Status: COMPLETED | OUTPATIENT
Start: 2022-02-11 | End: 2022-02-11

## 2022-02-11 RX ORDER — LORAZEPAM 0.5 MG/1
1 TABLET ORAL ONCE
Status: COMPLETED | OUTPATIENT
Start: 2022-02-11 | End: 2022-02-11

## 2022-02-11 RX ADMIN — SODIUM CHLORIDE 1000 ML: 9 INJECTION, SOLUTION INTRAVENOUS at 14:56

## 2022-02-11 RX ADMIN — LORAZEPAM 1 MG: 0.5 TABLET ORAL at 14:57

## 2022-02-11 ASSESSMENT — ENCOUNTER SYMPTOMS
SHORTNESS OF BREATH: 1
ABDOMINAL PAIN: 0

## 2022-02-11 NOTE — ED PROVIDER NOTES
[facility]  Emergency Department  Faculty Attestation     I performed a history and physical examination of the patient and discussed management with the resident. I reviewed the residents note and agree with the documented findings and plan of care. Any areas of disagreement are noted on the chart. I was personally present for the key portions of any procedures. I have documented in the chart those procedures where I was not present during the key portions. I have reviewed the emergency nurses triage note. I agree with the chief complaint, past medical history, past surgical history, allergies, medications, social and family history as documented unless otherwise noted below. For Physician Assistant/ Nurse Practitioner cases/documentation I have personally evaluated this patient and have completed at least one if not all key elements of the E/M (history, physical exam, and MDM). Additional findings are as noted. Primary Care Physician:  Rachell Rizzo DO    Screenings:  [unfilled]    CHIEF COMPLAINT       Chief Complaint   Patient presents with    Fatigue     son states hgb may be low       RECENT VITALS:   Temp: 96.2 °F (35.7 °C),  Pulse: 55, Resp: 16, BP: (!) 117/90    LABS:  Labs Reviewed   CBC WITH AUTO DIFFERENTIAL - Abnormal; Notable for the following components:       Result Value    RDW 15.0 (*)     Seg Neutrophils 68 (*)     Lymphocytes 16 (*)     Immature Granulocytes 1 (*)     All other components within normal limits   COVID-19, RAPID   PROTIME-INR   TROPONIN   APTT   COMPREHENSIVE METABOLIC PANEL   URINE RT REFLEX TO CULTURE       Radiology  XR CHEST PORTABLE   Final Result   Status post recent left reverse shoulder arthroplasty. No acute   cardiopulmonary disease. Mild basilar atelectasis or scarring. Moderate   hiatus hernia.              CRITICAL CARE: There was a high probability of clinically significant/life threatening deterioration in this patient's condition which required my urgent intervention. Total critical care time was none minutes. This excludes any time for separately reportable procedures. EKG:   EKG Interpretation    Interpreted by me    Rhythm: normal sinus   Rate: Bradycardia  Axis: Left  Ectopy: none  Conduction: normal  ST Segments: no acute change  T Waves: no acute change  Q Waves: none    Clinical Impression: Bradycardia, no acute changes    Attending Physician Additional  Notes    Patient is 3 days status post total left shoulder replacement. She had recent shoulder injury including injury to her forearm. She is chronically on analgesics. Recently she has been having blood in her stool and blood in her urine. No UTI symptoms. Today she felt faint and looked pale. Son was concerned about anemia. She was sweaty and nauseated prior to her legs giving out. No chest pain or shortness of breath. On exam she is uncomfortable due to pain in her left shoulder and and forearm, normotensive, bradycardic to 55, sinus bradycardia, initial saturation 92% but with better pulse oximeter reading 96%. No respiratory stress. Lungs are clear. Skin is warm and dry. No pallor. Abdomen is benign. The trace bilateral edema. Impression is vagal episode, postoperative pain, gross hematuria, blood in stools, consider volume depletion. Plan is laboratory studies, fluids, analgesics, reassess. Cindy Fu.  Rubén Read MD, [address] [facility],[address]  Attending Emergency  Physician                Justo Hui MD  02/11/22 0371       Justo Hui MD  02/11/22 1227

## 2022-02-11 NOTE — FLOWSHEET NOTE
[x] [facility]  [address]    [phone]  F: [phone]          Physical Therapy Cancel/No Show note    Date: 2022  Patient: Rolf Hoskins  : 1960  MRN: [id number]    Cancels/No Shows to date:     For today's appointment patient:    [x]  Cancelled    [] Rescheduled appointment    [] No-show     Reason given by patient:    [x]  Patient ill    []  Conflicting appointment    [] No transportation      [] Conflict with work    [] No reason given    [] Weather related    [] COVID-19    [x] Other:      Comments:  Pt arrived for appt, complaining of severe SOB. Pt also reports legs giving out on her, states symptoms are similar to when she has had low hemoglobin in past.  Pt has male family member present w/her. Clinic staff transported Pt to ER. Cancelled Pt for initial evaluation for Physical Therapy s/p L Reverse total shoulder arthroplasty. Pt will need to reschedule appt.           [] Next appointment was confirmed    Electronically signed by: Lossie Cockayne, PT

## 2022-02-11 NOTE — ED PROVIDER NOTES
[facility] ED  Emergency Department Encounter  EmergencyMedicine Resident     Pt Name:Latisha Norris  MRN: [id number]  [place] 1960  Date of evaluation: 2/11/22  PCP:  Yoshi Suarez DO    This patient was evaluated in the Emergency Department for symptoms described in the history of present illness. The patient was evaluated in the context of the global COVID-19 pandemic, which necessitated consideration that the patient might be at risk for infection with the SARS-CoV-2 virus that causes COVID-19. Institutional protocols and algorithms that pertain to the evaluation of patients at risk for COVID-19 are in a state of rapid change based on information released by regulatory bodies including the CDC and federal and state organizations. These policies and algorithms were followed during the patient's care in the ED. CHIEF COMPLAINT       Chief Complaint   Patient presents with    Fatigue     son states hgb may be low       HISTORY OF PRESENT ILLNESS  (Location/Symptom, Timing/Onset, Context/Setting, Quality, Duration, Modifying Factors, Severity.)      Haseeb Patel is a 60 y.o. female who presents with sudden onset weakness and fatigue. Patient underwent a left shoulder replacement several days ago she is presenting today with her son for physical therapy and became weak patient was concerned that it might be represent anemia patient has had low hemoglobin episodes in the past states that she chronically has blood in her urine blood in her stool she describes melanotic stools she is not boosted against COVID-19 she had some shortness of breath with this episode as well.     PAST MEDICAL / SURGICAL / SOCIAL / FAMILY HISTORY      has a past medical history of Acid reflux, Adult ADHD, Asthma, Bipolar 1 disorder ([address] Broken foot, Chest pain of uncertain etiology, Depression, DVT of leg (deep venous thrombosis) ([address] Fracture, Fractured nose, Helicobacter pylori (H. pylori) Smokeless tobacco: Never Used   Vaping Use    Vaping Use: Never used   Substance and Sexual Activity    Alcohol use: Not Currently     Comment: maybe oncee a year    Drug use: No    Sexual activity: Not Currently   Other Topics Concern    Not on file   Social History Narrative    Not on file     Social Determinants of Health     Financial Resource Strain: Low Risk     Difficulty of Paying Living Expenses: Not hard at all   Food Insecurity: No Food Insecurity    Worried About [address] in the Last Year: Never true    [facility] in the Last Year: Never true   Transportation Needs:     Lack of Transportation (Medical): Not on file    Lack of Transportation (Non-Medical):  Not on file   Physical Activity:     Days of Exercise per Week: Not on file    Minutes of Exercise per Session: Not on file   Stress:     Feeling of Stress : Not on file   Social Connections:     Frequency of Communication with Friends and Family: Not on file    Frequency of Social Gatherings with Friends and Family: Not on file    Attends [employer] Services: Not on file    Active Member of [address] [place] [address] or Organizations: Not on file    Attends Club or Organization Meetings: Not on file    Marital Status: Not on file   Intimate Partner Violence:     Fear of Current or Ex-Partner: Not on file    Emotionally Abused: Not on file    Physically Abused: Not on file    Sexually Abused: Not on file   Housing Stability:     Unable to Pay for Housing in the Last Year: Not on file    Number of [place] in the Last Year: Not on file    Unstable Housing in the Last Year: Not on file       Family History   Problem Relation Age of Onset    Cancer Mother     Hypertension Mother     Stroke Mother     Dementia Mother     Cancer Maternal Aunt     Hypertension Father     Heart Surgery Father     Alzheimer's Disease Father     Diabetes Neg Hx        Allergies:  Latex, Prozac [fluoxetine hcl], Sulfa antibiotics, and Codeine    Home Medications:  Prior to Admission medications    Medication Sig Start Date End Date Taking? Authorizing Provider   oxyCODONE (ROXICODONE) 5 MG immediate release tablet Take 1 tablet by mouth every 6 hours as needed for Pain (use as a last resort when all other prescribed medications have failed to adequately control pain) for up to 7 days. 2/8/22 2/15/22  Jose Townsend,    docusate sodium (COLACE) 100 MG capsule Take 1 capsule by mouth 2 times daily as needed for Constipation 2/8/22   Jose Townsend, DO   acetaminophen (TYLENOL) 500 MG tablet Take 2 tablets by mouth every 6 hours as needed for Pain 2/8/22   Jose Townsend, DO   naproxen (NAPROSYN) 500 MG tablet Take 1 tablet by mouth 2 times daily (with meals) 2/8/22   Jose Townsend, DO   propranolol (INDERAL LA) 80 MG extended release capsule Take 1 capsule by mouth daily 10/29/21   Jaime Cline,    amphetamine-dextroamphetamine (ADDERALL) 30 MG tablet 30 mg 2 times daily.   9/27/21   Historical Provider, MD   pantoprazole (PROTONIX) 40 MG tablet Take 1 tablet by mouth daily 9/15/21   Jaime Cline,    buPROPion HCl ER, XL, 450 MG TB24 TAKE ONE TABLET BY MOUTH EVERY MORNING 8/31/21   Jaime Cline,    alendronate (FOSAMAX) 70 MG tablet TAKE 1 TABLET BY MOUTH ONCE WEEKLY BEFORE BREAKFAST, ON AN EMPTY STOMACH: REMAIN UPRIGHT FOR 30 MINUTES:TAKE WITH 8 OUNCES OF WATER 3/18/21   Jaime Cline DO   albuterol sulfate HFA (VENTOLIN HFA) 108 (90 Base) MCG/ACT inhaler INHALE TWO PUFFS BY MOUTH EVERY 4 HOURS AS NEEDED FOR WHEEZING 10/28/20   Ankit Walker MD   citalopram (CELEXA) 40 MG tablet TAKE ONE TABLET BY MOUTH DAILY 4/8/20   Janette Novoa MD   albuterol (PROVENTIL) (2.5 MG/3ML) 0.083% nebulizer solution Take 2.5 mg by nebulization every 6 hours as needed for Wheezing or Shortness of Breath     Historical Provider, MD   Calcium Carbonate (CALCIUM 600 PO) Take 600 mg by mouth daily Historical Provider, MD       REVIEW OF SYSTEMS    (2-9 systems for level 4, 10 or more for level 5)      Review of Systems   Constitutional: Positive for fatigue. Negative for fever. HENT: Negative for congestion. Respiratory: Positive for shortness of breath. Cardiovascular: Negative for chest pain. Gastrointestinal: Negative for abdominal pain. Genitourinary: Negative for flank pain. Skin: Negative for pallor. Allergic/Immunologic: Negative for environmental allergies. Neurological: Positive for weakness and light-headedness. Negative for numbness. Psychiatric/Behavioral: Positive for agitation, behavioral problems and dysphoric mood. The patient is nervous/anxious. PHYSICAL EXAM   (up to 7 for level 4, 8 or more for level 5)      INITIAL VITALS:   /71   Pulse 54   Temp 96.2 °F (35.7 °C)   Resp 23   Wt 150 lb (68 kg)   SpO2 90%   BMI 29.29 kg/m²     Physical Exam  Vitals and nursing note reviewed. Constitutional:       General: She is not in acute distress. Appearance: Normal appearance. She is not ill-appearing, toxic-appearing or diaphoretic. HENT:      Head: Normocephalic and atraumatic. Right Ear: External ear normal.      Left Ear: External ear normal.      Nose: Nose normal.      Mouth/Throat:      Mouth: Mucous membranes are moist.   Eyes:      General: No scleral icterus. Conjunctiva/sclera: Conjunctivae normal.   Cardiovascular:      Rate and Rhythm: Regular rhythm. Bradycardia present. Pulses: Normal pulses. Pulmonary:      Effort: Pulmonary effort is normal.   Abdominal:      Palpations: Abdomen is soft. Musculoskeletal:         General: Normal range of motion. Cervical back: Normal range of motion. Right lower leg: No edema. Left lower leg: No edema. Comments: Reduced rom of left upper extremity   Skin:     General: Skin is warm. Capillary Refill: Capillary refill takes less than 2 seconds.    Neurological: Mental Status: She is alert and oriented to person, place, and time. DIFFERENTIAL  DIAGNOSIS     PLAN (LABS / IMAGING / EKG):  Orders Placed This Encounter   Procedures    COVID-19, Rapid    XR CHEST PORTABLE    CBC WITH AUTO DIFFERENTIAL    COMPREHENSIVE METABOLIC PANEL    PROTIME-INR    Troponin    Urinalysis Reflex to Culture    APTT    Microscopic Urinalysis    EKG 12 Lead       MEDICATIONS ORDERED:  Orders Placed This Encounter   Medications    0.9 % sodium chloride bolus    LORazepam (ATIVAN) tablet 1 mg       DDX: anemia, dehydration, uti, acs, viral illness    DIAGNOSTIC RESULTS / EMERGENCY DEPARTMENT COURSE / MDM   LAB RESULTS:  Results for orders placed or performed during the hospital encounter of 02/11/22   COVID-19, Rapid    Specimen: Nasopharyngeal Swab   Result Value Ref Range    Specimen Description . NASOPHARYNGEAL SWAB     SARS-CoV-2, Rapid Not Detected Not Detected   CBC WITH AUTO DIFFERENTIAL   Result Value Ref Range    WBC 8.1 3.5 - 11.3 k/uL    RBC 4.02 3.95 - 5.11 m/uL    Hemoglobin 12.1 11.9 - 15.1 g/dL    Hematocrit 38.4 36.3 - 47.1 %    MCV 95.5 82.6 - 102.9 fL    MCH 30.1 25.2 - 33.5 pg    MCHC 31.5 28.4 - 34.8 g/dL    RDW 15.0 (H) 11.8 - 14.4 %    Platelets 198 859 - 777 k/uL    MPV 10.1 8.1 - 13.5 fL    NRBC Automated 0.0 0.0 per 100 WBC    Differential Type NOT REPORTED     Seg Neutrophils 68 (H) 36 - 65 %    Lymphocytes 16 (L) 24 - 43 %    Monocytes 10 3 - 12 %    Eosinophils % 4 1 - 4 %    Basophils 1 0 - 2 %    Immature Granulocytes 1 (H) 0 %    Segs Absolute 5.58 1.50 - 8.10 k/uL    Absolute Lymph # 1.32 1.10 - 3.70 k/uL    Absolute Mono # 0.78 0.10 - 1.20 k/uL    Absolute Eos # 0.33 0.00 - 0.44 k/uL    Basophils Absolute 0.05 0.00 - 0.20 k/uL    Absolute Immature Granulocyte 0.04 0.00 - 0.30 k/uL    WBC Morphology NOT REPORTED     RBC Morphology ANISOCYTOSIS PRESENT     Platelet Estimate NOT REPORTED    COMPREHENSIVE METABOLIC PANEL   Result Value Ref Range Glucose 86 70 - 99 mg/dL    BUN 12 8 - 23 mg/dL    CREATININE 0.91 (H) 0.50 - 0.90 mg/dL    Bun/Cre Ratio NOT REPORTED 9 - 20    Calcium 9.4 8.6 - 10.4 mg/dL    Sodium 137 135 - 144 mmol/L    Potassium 4.5 3.7 - 5.3 mmol/L    Chloride 105 98 - 107 mmol/L    CO2 23 20 - 31 mmol/L    Anion Gap 9 9 - 17 mmol/L    Alkaline Phosphatase 121 (H) 35 - 104 U/L    ALT 21 5 - 33 U/L    AST 36 (H) <32 U/L    Total Bilirubin 0.19 (L) 0.3 - 1.2 mg/dL    Total Protein 6.2 (L) 6.4 - 8.3 g/dL    Albumin 3.4 (L) 3.5 - 5.2 g/dL    Albumin/Globulin Ratio 1.2 1.0 - 2.5    GFR Non-African American >60 >60 mL/min    GFR African American >60 >60 mL/min    GFR Comment          GFR Staging NOT REPORTED    PROTIME-INR   Result Value Ref Range    Protime 10.1 9.1 - 12.3 sec    INR 0.9    Troponin   Result Value Ref Range    Troponin, High Sensitivity 8 0 - 14 ng/L    Troponin T NOT REPORTED <0.03 ng/mL    Troponin Interp NOT REPORTED    Urinalysis Reflex to Culture    Specimen: Urine, clean catch   Result Value Ref Range    Color, UA Yellow Yellow    Turbidity UA Clear Clear    Glucose, Ur NEGATIVE NEGATIVE    Bilirubin Urine NEGATIVE NEGATIVE    Ketones, Urine NEGATIVE NEGATIVE    Specific Gravity, UA 1.016 1.005 - 1.030    Urine Hgb NEGATIVE NEGATIVE    pH, UA 5.0 5.0 - 8.0    Protein, UA NEGATIVE NEGATIVE    Urobilinogen, Urine Normal Normal    Nitrite, Urine NEGATIVE NEGATIVE    Leukocyte Esterase, Urine SMALL (A) NEGATIVE    Urinalysis Comments NOT REPORTED    APTT   Result Value Ref Range    PTT 21.1 20.5 - 30.5 sec   Microscopic Urinalysis   Result Value Ref Range    -          WBC, UA 2 TO 5 0 - 5 /HPF    RBC, UA 0 TO 2 0 - 4 /HPF    Casts UA NOT REPORTED 0 - 8 /LPF    Crystals, UA NOT REPORTED None /HPF    Epithelial Cells UA 0 TO 2 0 - 5 /HPF    Renal Epithelial, UA NOT REPORTED 0 /HPF    Bacteria, UA NOT REPORTED None    Mucus, UA NOT REPORTED None    Trichomonas, UA NOT REPORTED None    Amorphous, UA NOT REPORTED None    Other Observations UA NOT REPORTED NOT REQ. Yeast, UA NOT REPORTED None       IMPRESSION: Patient is an alert oriented anxious appearing 58-year-old female postop day 3 from a left shoulder replacement. She presented with fatigue clinically labs imaging Covid testing, cardiac workup, IV fluids urinalysis and reevaluation      RADIOLOGY:  XR CHEST PORTABLE    Result Date: 2/11/2022  EXAMINATION: ONE XRAY VIEW OF THE CHEST 2/11/2022 3:07 pm COMPARISON: Two-view chest from 01/26/2022 HISTORY: ORDERING SYSTEM PROVIDED HISTORY: sob fatigue TECHNOLOGIST PROVIDED HISTORY: sob fatigue History of asthma, sleep apnea, hypertension, and GERD. 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 Overlying ECG monitor leads. Mildly enlarged but stable appearing cardiac silhouette. Mediastinal structures midline, again with some calcification aortic knob, and a moderate-sized hiatus hernia. Persistent elevation right hemidiaphragm and slight basilar atelectasis or scarring bilaterally, similar by comparison. No new pulmonary or significant pleural abnormality. No pneumothorax. Moderate DJD spine. Status post recent left reverse shoulder arthroplasty. No acute cardiopulmonary disease. Mild basilar atelectasis or scarring. Moderate hiatus hernia. EKG  Sinus bradycardia rate of 59 there is leftward axis normal R wave progression appropriate T wave balance unremarkable ECG. All EKG's are interpreted by the Emergency Department Physician who either signs or Co-signs this chart in the absence of a cardiologist.    EMERGENCY DEPARTMENT COURSE:  ED Course as of 02/11/22 1743   Fri Feb 11, 2022   1509 Xr reviewed [BG]   [phone] Re-evaluated.   Comfortably updated on results thus far patient feeling much better disposition pending EKG and urinalysis [BG] ED Course User Index  [BG] Ce Tee DO         PROCEDURES:      CONSULTS:  None    CRITICAL CARE:      FINAL IMPRESSION      1.  Generalized weakness          DISPOSITION / PLAN     DISPOSITION  dc      PATIENT REFERRED TO:  [facility] ED  [address] [id number] [phone]  Go to   As needed, If symptoms worsen    Tristan Lilly DO  [address]  [address] [place]    Call today        DISCHARGE MEDICATIONS:  New Prescriptions    No medications on file       Ce Tee DO  Emergency Medicine Resident    (Please note that portions of thisnote were completed with a voice recognition program.  Efforts were made to edit the dictations but occasionally words are mis-transcribed.)        Ce Tee DO  Resident  02/11/22 4643

## 2022-02-11 NOTE — TELEPHONE ENCOUNTER
unspecified hand     Flexion contracture of joint of foot, left     Peroneal tendon injury, left, subsequent encounter Renal transplant recipient

## 2022-02-11 NOTE — ED NOTES
Pt. To ER room 1 from triage via wheelchair  Pt. Presents with left shoulder pain and concern for low hemoglobin; pt states she was at an appointment and became very fatigued and her legs began to give out  Pt. Extremely anxious on arrival, crying in pain, does not tolerate IV insertion or EKG well; pt unable to tolerate IV as she is unable to be consoled   Pt. Crying out, states \"Dinh, just get me out of here! \" her son is at the bedside  Pt. States she has had low hemoglobin in the past from a suspected GI bleed  Pt. Had left shoulder replacement on Tuesday, 2/8; pt states pain is not controlled at this time  Pt.  On full cardiac monitor, IV access established, labs drawn  Call light given  Awaiting orders   Will continue to monitor and reassess       Ardyce Claude, RN  02/11/22 0051

## 2022-02-14 LAB
EKG ATRIAL RATE: 59 BPM
EKG P AXIS: 45 DEGREES
EKG P-R INTERVAL: 146 MS
EKG Q-T INTERVAL: 474 MS
EKG QRS DURATION: 86 MS
EKG QTC CALCULATION (BAZETT): 469 MS
EKG R AXIS: 0 DEGREES
EKG T AXIS: 31 DEGREES
EKG VENTRICULAR RATE: 59 BPM

## 2022-02-14 PROCEDURE — 93010 ELECTROCARDIOGRAM REPORT: CPT | Performed by: INTERNAL MEDICINE

## 2022-02-15 DIAGNOSIS — G89.18 ACUTE POST-OPERATIVE PAIN: ICD-10-CM

## 2022-02-15 DIAGNOSIS — M19.012 ARTHRITIS OF LEFT SHOULDER REGION: Primary | ICD-10-CM

## 2022-02-15 DIAGNOSIS — Z96.612 STATUS POST REVERSE TOTAL REPLACEMENT OF LEFT SHOULDER: ICD-10-CM

## 2022-02-15 RX ORDER — OXYCODONE HYDROCHLORIDE 5 MG/1
5 TABLET ORAL EVERY 6 HOURS PRN
Qty: 28 TABLET | Refills: 0 | Status: SHIPPED | OUTPATIENT
Start: 2022-02-16 | End: 2022-02-28 | Stop reason: SDUPTHER

## 2022-02-15 NOTE — TELEPHONE ENCOUNTER
Patient would like a refill on pain medication. DOS:2/8/2022-left reverse total shoulder    Please advise.

## 2022-02-16 DIAGNOSIS — F41.1 GENERALIZED ANXIETY DISORDER: ICD-10-CM

## 2022-02-16 RX ORDER — HYDROXYZINE 50 MG/1
TABLET, FILM COATED ORAL
Qty: 30 TABLET | Refills: 0 | Status: SHIPPED | OUTPATIENT
Start: 2022-02-16 | End: 2022-10-05

## 2022-02-16 NOTE — TELEPHONE ENCOUNTER
Cristian Quick is calling to request a refill on the following medication(s):    Medication Request:  Requested Prescriptions     Pending Prescriptions Disp Refills    hydrOXYzine (ATARAX) 50 MG tablet [Pharmacy Med Name: hydrOXYzine HCL 50 MG TABLET] 30 tablet 0     Sig: TAKE ONE TABLET BY MOUTH EVERY 8 HOURS AS NEEDED FOR ANXIETY       Last Visit Date (If Applicable):  2/13/6369    Next Visit Date:    3/18/2022

## 2022-02-17 ENCOUNTER — HOSPITAL ENCOUNTER (OUTPATIENT)
Dept: PHYSICAL THERAPY | Age: 62
Setting detail: THERAPIES SERIES
Discharge: HOME | End: 2022-02-17
Payer: MEDICARE

## 2022-02-17 ENCOUNTER — TELEMEDICINE (OUTPATIENT)
Dept: PAIN MANAGEMENT | Age: 62
End: 2022-02-17
Payer: MEDICARE

## 2022-02-17 DIAGNOSIS — M54.41 CHRONIC BILATERAL LOW BACK PAIN WITH BILATERAL SCIATICA: Primary | ICD-10-CM

## 2022-02-17 DIAGNOSIS — M19.012 ARTHRITIS OF LEFT SHOULDER REGION: Primary | ICD-10-CM

## 2022-02-17 DIAGNOSIS — M54.42 CHRONIC BILATERAL LOW BACK PAIN WITH BILATERAL SCIATICA: Primary | ICD-10-CM

## 2022-02-17 DIAGNOSIS — Z96.612 STATUS POST REVERSE TOTAL REPLACEMENT OF LEFT SHOULDER: ICD-10-CM

## 2022-02-17 DIAGNOSIS — Z79.891 ENCOUNTER FOR LONG-TERM OPIATE ANALGESIC USE: Chronic | ICD-10-CM

## 2022-02-17 DIAGNOSIS — G89.29 CHRONIC BILATERAL LOW BACK PAIN WITH BILATERAL SCIATICA: Primary | ICD-10-CM

## 2022-02-17 DIAGNOSIS — M47.817 LUMBOSACRAL SPONDYLOSIS WITHOUT MYELOPATHY: ICD-10-CM

## 2022-02-17 DIAGNOSIS — M54.12 CERVICAL RADICULOPATHY: ICD-10-CM

## 2022-02-17 PROCEDURE — 97110 THERAPEUTIC EXERCISES: CPT

## 2022-02-17 PROCEDURE — 97162 PT EVAL MOD COMPLEX 30 MIN: CPT

## 2022-02-17 PROCEDURE — 99213 OFFICE O/P EST LOW 20 MIN: CPT | Performed by: NURSE PRACTITIONER

## 2022-02-17 ASSESSMENT — ENCOUNTER SYMPTOMS
BACK PAIN: 1
SHORTNESS OF BREATH: 0
CONSTIPATION: 0
COUGH: 0

## 2022-02-17 NOTE — FLOWSHEET NOTE
Bud Fall Risk Assessment    Patient Name:  Wilda Lucero  : 1960      Risk Factor Scale  Score   History of Falls [x] Yes  [] No 25  0 25   Secondary Diagnosis [] Yes  [x] No 15  0 0   Ambulatory Aid [] Furniture  [] Crutches/cane/walker  [x] None/bedrest/wheelchair/nurse 30  15  0 0   IV/Heparin Lock [] Yes  [x] No 20  0 0   Gait/Transferring [] Impaired  [] Weak  [x] Normal/bedrest/immobile 20  10  0 0   Mental Status [] Forgets limitations  [x] Oriented to own ability 15  0 0      Total:25     Based on the Assessment score: check the appropriate box.     []  No intervention needed   Low =   Score of 0-24    [x]  Use standard prevention interventions Moderate =  Score of 24-44   [x] Give patient handout and discuss fall prevention strategies   [x] Establish goal of education for patient/family RE: fall prevention strategies    []  Use high risk prevention interventions High = Score of 45 and higher   [] Give patient handout and discuss fall prevention strategies   [] Establish goal of education for patient/family Re: fall prevention strategies   [] Discuss lifeline / other resources    Electronically signed by:   Aryan Fish PT  Date: 2022

## 2022-02-17 NOTE — CONSULTS
[x] [facility]  [address]  [phone]  F: [phone] [] [address]  [address]   [address]  P: [phone]  F: [phone] [] [place]  [address]  P: [phone]  F: [phone] [] [address]  P: [phone]  F: [phone] [] [address]  [facility]   [address]   [place]: [phone]  F: [phone]      Physical Therapy Upper Extremity Evaluation    Date:  2022  Patient: Brianne Flores  : 1960  MRN: [id number]  Physician: Yuval Martinez DO   Insurance: [facility] Medicare Duel Complete (BMN)  Medical Diagnosis: M19.012 (ICD-10-CM) - Arthritis of left shoulder region   Rehab Codes: Pain M54.2, M25.512, stiffness M25.612, weakness M62.512, posture R29.3, myofascial M79.1, M62.838  Onset Date: 22   Next 's appt: 22    Subjective:   CC: States pain in neck, upper trap, mid trap, chest and upper arm. Pt wearing standard sling - adjusted for better fit. Restricted to no lifting per patient. HPI: (22) Reverse Total Shoulder surgery. In standard sling since.  Previously -States fell and fx left forearm 22    PMHx: [] Unremarkable [] Diabetes [x] HTN  [] Pacemaker   [] MI/Heart Problems [] Cancer [x] Arthritis [] Other:              [x] Refer to full medical chart  In EPIC       Comorbidities:   [] Obesity [] Dialysis  [] N/A   [] Asthma/COPD [] Dementia [x] Other: bipolar, anxiety, panic attack   [] Stroke [] Sleep apnea [] Other:   [] Vascular disease [] Rheumatic disease [] Other:     Tests: [x] X-Ray:   Findings suspicious for radial head fracture level of the elbow.  Findings   suspicious for distal radial fracture at the level of the wrist.       No additional fracture involving the wrist or the hand.  Otherwise mild   degenerate change. [] MRI:  [] Other:    Medications: [x] Refer to full medical record [] None [] Other:  Allergies:      [x] Refer to full medical record [] None [] Other:    Function:  Hand Dominance  [x] Right  [] Left  Patient lives with: Boyfriend   In what type of home []  One story   [x] Two story   [] Split level   Number of stairs to enter 3   With handrail on the []  Right to enter   [] Left to enter   Bathroom has a []  Tub only  [] Tub/shower combo   [x] Walk in shower    []  Grab bars   Washing machine is on []  Main level   [] Second level   [] Basement   Employer    Job Status []  Normal duty   [] Light duty   [] Off due to condition    []  Retired   [] Not employed   [x] Disability  [] Other:  []  Return to work:    Work activities/duties         ADL/IADL Previous level of function Current level of function Who currently assists the patient with task   Bathing  [x] Independent  [] Assist [] Independent  [x] Assist sons   Dress/grooming [x] Independent  [] Assist [] Independent  [x] Assist sons   Transfer/mobility [x] Independent  [] Assist [x] Independent  [] Assist    Feeding [x] Independent  [] Assist [x] Independent  [] Assist    Toileting [x] Independent  [] Assist [x] Independent  [] Assist    Driving [x] Independent  [] Assist [] Independent  [x] Assist sons   Housekeeping [x] Independent  [] Assist [] Independent  [x] Assist cj   Grocery shop/meal prep [x] Independent  [] Assist [] Independent  [x] Assist sons     Gait Prior level of function Current level of function    [x] Independent  [] Assist [x] Independent  [] Assist   Device: [x] Independent [x] Independent    [] Straight Cane [] Quad cane [] Straight Cane [] Quad cane    [] Standard walker [] Rolling walker   [] 4 wheeled walker [] Standard walker [] Rolling walker   [] 4 wheeled walker    [] Wheelchair [] Wheelchair     Pain:  [x] Yes  [] No Location: neck shoulder on left Pain Rating: (0-10 scale) 6/10  Pain altered Tx:  [] Yes  [x] No  Action:    Symptoms:  [x] Improving [] Worsening [] Same  Better:  [] AM    [] PM    [] Sit    [] Rise/Sit    []Stand    [] Walk    [] Lying    [x] Other:meds  Worse: [] AM    [] PM    [] Sit    [] Rise/Sit    []Stand    [] Walk    [] Lying    [] Bend                      [] Valsalva    [x] Other: movement  Sleep: [] OK    [x] Disturbed    Objective:     PROM    END FEEL STRENGTH TESTS (+/-) Left Right Not Tested    Left Right  Left Right Drop Arm   [x]   Sit Shld Flex  WFL all    Sulcus Sign   [x]   Sit Shld Abd      Apprehension   [x]   Sit Shld IR      Yergasons   [x]   Shoulder Flex 50 p   N/T all 4+-5 all Speeds   [x]   Ext      Neer   [x]   ABD 50 p     Cohen    [x]   ER @ 0 45 90 neut-  ral     Painful Arc   [x]   IR      Tinel   [x]   Supraspinatus            Infraspinatus            Serratus Ant            Pectoralis            Lats            Mid Trap            Lower Trap            Elbow Flex. WFL           Elbow Ext. -10           Pronation            Supination            Wrist Flex. fused          Wrist Ext.   fused          Radial Deviation            Ulnar Deviation                            OBSERVATION No Deficit Deficit Not Tested Comments   Forward Head [] [x] []    Rounded Shoulders [] [x] []    Kyphosis [] [x] []    Scapular Height/Position [] [x] [] Hikes left shld   Winging [x] [] []    Scapulohumeral Rhythm [] [] [x]    INSPECTION/PALPATION    Very tender in upper trap neck, pect, shoulder, upper arm , bicep, triceps, wrist    SC/AC Joint [x] [] []    Supraspinatus [x] [] []    Biceps tendon/groove [x] [] []    Posterior shld [x] [] []    Subscapularis [x] [] []    NEUROLOGICAL       Cervical ROM/Quadrant [] [x] [] Tight left neck muscles   Reflexes [] [] []    Compression/Distraction [] [] []    Sensation [] [] []      Functional Test:UEFI Score: 10% function/90% impaired   Comments:    Assessment: Patient would benefit from skilled physical therapy services in order to: reduce pain with modalities and exercise, reduce muscle spasms and trigger points with manual, learn proper posture for optimal healing and function, improve ROM and strength as able. Problems:    [x] ? Pain:  [x] ? ROM:  [x] ? Strength:  [x] ? Function:  [x] Other: tight/spasm muscles, posture     STG: (to be met in 12 treatments)  1. ? Pain: Keep pain at 4/10 or less most times  2. ? ROM: PROM shoulder elevation increases to 90 degrees+  3. ? Strength:Start active motion when cleared by Dr.   3. ? Function:Start functional activities when cleared by DrWill and able to reach head. 5. Notes minimal tight muscles/spasms and able to manage independently if they return. 6. Demonstrate Knowledge of fall prevention  LTG: (to be met in 24 treatments)  1. LEFS improves to 50% or better function         2. Patient to be independent with home exercise program as demonstrated by performance with correct form without cues. Patient goals: Lift arm up half way to shoulder    Rehab Potential:  [x] Good  [] Fair  [] Poor   Suggested Professional Referral:  [x] No  [] Yes:  Barriers to Goal Achievement:  [x] No  [] Yes:  Domestic Concerns:  [x] No  [] Yes:    Pt. Education:  [x] Plans/Goals, Risks/Benefits discussed  [] Home exercise program  Method of Education: [x] Verbal  [x] Demo  [] Written  Comprehension of Education:  [x] Verbalizes understanding. [x] Demonstrates understanding. [] Needs Review. [] Demonstrates/verbalizes understanding of HEP/Ed previously given.     Treatment Plan:  [x] Therapeutic Exercise   85285  [] Iontophoresis: 4 mg/mL Dexamethasone Sodium Phosphate  mAmin  05684   [x] Therapeutic Activity  38216 [x] Vasopneumatic cold with compression  84457    [] Gait Training   16612 [] Ultrasound   64426   [] Neuromuscular Re-education  63627 [] Electrical Stimulation Unattended  04745   [x] Manual Therapy  01401 [] Electrical Stimulation Attended  [id number]   [x] Instruction in HEP  [] Lumbar/Cervical Traction  [id number]   [] Aquatic Therapy   [id number] [x] Cold/hotpack    [] Massage   [id number]      [] Dry Needling, 1 or 2 muscles  08346   [] Biofeedback, first 15 minutes   58030  [] Biofeedback, additional 15 minutes   37986 [] Dry Needling, 3 or more muscles  54959     []  Medication allergies reviewed for use of    Dexamethasone Sodium Phosphate 4mg/ml     with iontophoresis treatments. Pt is not allergic. Frequency: 2 x/week for 24 visits    Todays Treatment:  Modalities:   Precautions: healing forearm fracture left   Exercises:  Exercise Reps/ Time Weight/ Level Comments   posture x  Educated and instructed in sitting with lumbar roll and why this aligned position is beneficial with retracted scapula and chin retracted,  (Thera act)    Wrist AROM 4 ways 10 x     Supinate/pronate 10 x     Elbow flex/ext 10 x           Trial codmans x  Unable to relax shoulder to hang down   Scapular gentle retraction 10 x                 Other:    Specific Instructions for next treatment:Posture, Gentle PROM, Myofascial work on neck, trap, pect muscles, gradually work through protocol.      Evaluation Complexity:  History (Personal factors, comorbidities) [] 0 [] 1-2 [x] 3+   Exam (limitations, restrictions) [] 1-2 [x] 3 [] 4+   Clinical presentation (progression) [] Stable [x] Evolving  [] Unstable   Decision Making [] Low [x] Moderate [] High    [] Low Complexity [x] Moderate Complexity [] High Complexity       Treatment Charges: Mins Units   [x] Evaluation       []  Low       [x]  Moderate       []  High 40 1   []  Modalities     [x]  Ther Exercise 12 1   []  Manual Therapy     []  Ther Activities     []  Aquatics     []  Vasocompression     []  Other       TOTAL TREATMENT TIME: 52    Time in: 2:00 pm   Time Out: 3:00 pm    Electronically signed by: Shan Greenfield, PT        Physician Signature:________________________________Date:__________________  By signing above or cosigning this note, I have reviewed this plan of care and certify a need for medically necessary rehabilitation services.      *PLEASE SIGN ABOVE AND FAX BACK ALL PAGES*

## 2022-02-17 NOTE — PROGRESS NOTES
Chief Complaint: neck and back pain    [facility] Patient complains of neck and back pain. MRI with Foraminal narrowing, moderate at left C5-6, mild-to-moderate at bilateral C6-7 and left C7-T1, mild at bilateral C4-5, right C5-6 and right C7-T1. Spinal canal narrowing, moderate at L4-5, minimal at L2-3 and L3-4. Foraminal narrowing, moderate at right L5-S1, mild to moderate at right L4-5 and left L5-S1, mild at left L4-5.  Pt had Lumbar RFA and reports significant relief. She takes percocet as needed for the pain. She complains of increased cramping in her legs at times.         She has seen NS - EMGs ordered and is scheduled to see Ibis Thrasher 2/23.      She had shoulder replacement 2/8 and continues to follow with Dr. Dima Tidwell. She states she is healing well.      Recent ER visit on 2/11/22 for fatigue. Neck Pain   This is a chronic problem. The current episode started more than 1 year ago. The problem occurs constantly. The problem has been unchanged. The pain is present in the midline. The quality of the pain is described as aching. The pain is at a severity of 8/10. The pain is moderate. The symptoms are aggravated by twisting and position. Associated symptoms include headaches and numbness. Pertinent negatives include no chest pain or fever. She has tried oral narcotics for the symptoms. The treatment provided mild relief. Back Pain  This is a chronic problem. The current episode started more than 1 year ago. The problem occurs constantly. The problem is unchanged. The pain is present in the lumbar spine. The quality of the pain is described as aching. The pain does not radiate. The pain is at a severity of 4/10. The pain is mild. The symptoms are aggravated by position and standing. Associated symptoms include headaches and numbness. Pertinent negatives include no chest pain or fever. She has tried analgesics and bed rest for the symptoms. The treatment provided mild relief.      Patient denies any new neurological symptoms. No bowel or bladder incontinence, no weakness, and no falling. Pill count: appropriate was due 2/22 - had prescription from surgeon following shoulder replacement for 7 days/#28 oxycodone plus one refill. Pt will call us when she is ready for a refill. Morphine equivalent: 22.5    Controlled Substance Monitoring:    Acute and Chronic Pain Monitoring:   RX Monitoring 2/17/2022   Attestation -   Periodic Controlled Substance Monitoring Possible medication side effects, risk of tolerance/dependence & alternative treatments discussed. ;No signs of potential drug abuse or diversion identified.;Obtaining appropriate analgesic effect of treatment. Chronic Pain > 80 MEDD -   Chronic Pain > 120 MEDD -           Past Medical History:   Diagnosis Date    Acid reflux     Adult ADHD 08/01/2012    monthly visits @ [place]    Asthma 08/01/2012    seasonal    Bipolar 1 disorder ([facility]     monthly visits with [facility]    Broken foot 2014    Left foot    Chest pain of uncertain etiology 19/00/5811    Last episode was in 2013 (Written 02/12/2019)    Depression     Under control per pt.  on 6/15/18    DVT of leg (deep venous thrombosis) ([facility] [address] 08/22/2014    Fracture     right foot / pt denies surgical intervention    Fractured nose     x 9 times    Helicobacter pylori (H. pylori) infection 2013    per EGD    Hematuria 04/2018    Has a follow-up with Urologist 6/20    Hiatal hernia     History of blood transfusion     no reactions    History of DVT (deep vein thrombosis) 08/01/2012    History of myasthenia gravis     diagnosed around age 26    Hx of blood clots     L leg post knee surgery    Hypertension     managed by Dr. Jayy Tamayo PCP    Internal hemorrhoids     Narcolepsy 08/01/2012    Dr. [facility] Jimenez [facility]    Osteoporosis     Sleep apnea     no machine    Under care of team 10/2021    Dr. Garcia Bruce Under care of team     Pain Management Dr. Dallin Leonard [address] [facility] last visit 2022       Past Surgical History:   Procedure Laterality Date    ANKLE SURGERY Left 2018    mass excision    ARM SURGERY Right     Pt states she has had 13 right arm surgeries    BREAST ENHANCEMENT SURGERY Bilateral      SECTION      x 3    COLONOSCOPY  2013    sigmoid diverticula, prominent large internal hemorrhoid    COLONOSCOPY N/A 2/10/2020    COLONOSCOPY DIAGNOSTIC performed by Jodi Monreal MD at [facility] 2020    COLORECTAL CANCER SCREENING, NOT HIGH RISK performed by Marguerite Brennan MD at [facility] 22, COLON, DIAGNOSTIC      ESOPHAGEAL MOTILITY STUDY N/A 2020    PES RN - ESOPHAGEAL MOTILITY STUDY performed by Liss No DO at [address] STUDY  9/10/2020    ESOPHAGEAL MOTILITY STUDY performed by Liss No DO at [facility]    ESOPHAGEAL MOTILITY STUDY N/A 2020    ESOPHAGEAL MOTILITY STUDY ATTEMPTED PER DR. Abdirizak Sim WITHOUT SUCCESS performed by Liss No DO at [facility]    FOOT SURGERY Left     FOOT TENDON SURGERY Left 2019    LEFT PERONEAL TENDON REPAIR WITH POSSIBLE LEFT TENDON TRANSFER (Left )    KNEE ARTHROSCOPY Left     Two surgeries on left knee per pt    KNEE SURGERY Right     Three surgeries per pt    LIGAMENT REPAIR Left 2019    LEFT PERONEAL TENDON REPAIR performed by Trae Pruett DPM at [address] Bilateral 2021     BILATERAL L4/5, L5/S1 LUMBAR FACET (Bilateral )    NOSE SURGERY      PAIN MANAGEMENT PROCEDURE Bilateral 2021    BILATERAL L4/5, L5/S1 LUMBAR FACET performed by Dina Johnson MD at [address] [place] [address]  2021    BILATERAL L4/5, L5/S1 NERVE BLOCK - Bilateral    PAIN MANAGEMENT PROCEDURE Bilateral 2021    BILATERAL L4/5, L5/S1 NERVE BLOCK performed by Dina Johnson MD at [address] [place] [address] Right 07/23/2021    L4/5, L5/S1 NERVE RADIOFREQUENCY ABLATION     PAIN MANAGEMENT PROCEDURE Right 7/23/2021    right  L4/5, L5/S1 NERVE RADIOFREQUENCY ABLATION performed by Maribel Bautista MD at [facility] Left 08/06/2021    left  L4/5, L5/S1 NERVE RADIOFREQUENCY ABLATION (Left )    PAIN MANAGEMENT PROCEDURE Left 8/6/2021    left  L4/5, L5/S1 NERVE RADIOFREQUENCY ABLATION performed by Maribel Bautista MD at [address] OFFICE/OUTPT [facility] Left 6/29/2018    RESECTION/REMOVAL BONY NEOPLASM LEFT FIBULA, LEFT SOFT TISSUE MASS EXCISION WITH BONE BIOPSY LEFT ANKLE FIBULA performed by Rosales Wang DPM at [facility] Left     SHOULDER SURGERY Left 02/08/2022    REVERSE TOTAL SHOULDER ARTHROPLASTY - Left    SHOULDER SURGERY Left 2/8/2022    REVERSE TOTAL SHOULDER ARTHROPLASTY [facility] DO [facility]  5/23/2013    tertiary contractures esophagus, 3 to 4 cm hiatal hernia, antral gastritis, + H Pylori, mild active chronic esophagitis    UPPER GASTROINTESTINAL ENDOSCOPY  4/6/2016    one biopsy    UPPER GASTROINTESTINAL ENDOSCOPY N/A 2/10/2020    EGD BIOPSY performed by Colby Bennett MD at [facility]         Allergies   Allergen Reactions    Latex Itching    Prozac [Fluoxetine Hcl] Other (See Comments)     violent    Sulfa Antibiotics      migraine    Codeine Itching         Current Outpatient Medications:     hydrOXYzine (ATARAX) 50 MG tablet, TAKE ONE TABLET BY MOUTH EVERY 8 HOURS AS NEEDED FOR ANXIETY, Disp: 30 tablet, Rfl: 0    oxyCODONE (ROXICODONE) 5 MG immediate release tablet, Take 1 tablet by mouth every 6 hours as needed for Pain for up to 7 days. , Disp: 28 tablet, Rfl: 0    docusate sodium (COLACE) 100 MG capsule, Take 1 capsule by mouth 2 times daily as needed for Constipation, Disp: 20 capsule, Rfl: 0    acetaminophen Pack years: 20.00     Quit date:      Years since quittin.1    Smokeless tobacco: Never Used   Vaping Use    Vaping Use: Never used   Substance and Sexual Activity    Alcohol use: Not Currently     Comment: maybe oncee a year    Drug use: No    Sexual activity: Not Currently   Other Topics Concern    Not on file   Social History Narrative    Not on file     Social Determinants of Health     Financial Resource Strain: Low Risk     Difficulty of Paying Living Expenses: Not hard at all   Food Insecurity: No Food Insecurity    Worried About [address] in the Last Year: Never true    [facility] in the Last Year: Never true   Transportation Needs:     Lack of Transportation (Medical): Not on file    Lack of Transportation (Non-Medical): Not on file   Physical Activity:     Days of Exercise per Week: Not on file    Minutes of Exercise per Session: Not on file   Stress:     Feeling of Stress : Not on file   Social Connections:     Frequency of Communication with Friends and Family: Not on file    Frequency of Social Gatherings with Friends and Family: Not on file    Attends Church Services: Not on file    Active Member of [address] [place] [address] or Organizations: Not on file    Attends Club or Organization Meetings: Not on file    Marital Status: Not on file   Intimate Partner Violence:     Fear of Current or Ex-Partner: Not on file    Emotionally Abused: Not on file    Physically Abused: Not on file    Sexually Abused: Not on file   Housing Stability:     Unable to Pay for Housing in the Last Year: Not on file    Number of [place] in the Last Year: Not on file    Unstable Housing in the Last Year: Not on file       Review of Systems:  Review of Systems   Constitutional: Negative for chills and fever. Cardiovascular: Negative for chest pain and palpitations. Respiratory: Negative for cough and shortness of breath. Musculoskeletal: Positive for back pain and neck pain. Gastrointestinal: Negative for constipation. Neurological: Positive for headaches and numbness. Negative for disturbances in coordination and loss of balance. Physical Exam:  There were no vitals taken for this visit. Physical Exam  HENT:      Head: Normocephalic. Pulmonary:      Effort: Pulmonary effort is normal.   Neurological:      Mental Status: She is alert. Psychiatric:         Mood and Affect: Mood normal.         Behavior: Behavior normal.         Record/Diagnostics Review:    Last angie 10/2021 and was appropriate     Assessment:  Problem List Items Addressed This Visit     Encounter for long-term opiate analgesic use (Chronic)    Cervical radiculopathy    Lumbosacral spondylosis without myelopathy    Chronic bilateral low back pain with bilateral sciatica - Primary             Treatment Plan:  Patient relates current medications are helping the pain. Patient reports taking pain medications as prescribed, denies obtaining medications from different sources and denies use of illegal drugs. Patient denies side effects from medications like nausea, vomiting, constipation or drowsiness. Patient reports current activities of daily living are possible due to medications and would like to continue them. As always, we encourage daily stretching and strengthening exercises, and recommend minimizing use of pain medications unless patient cannot get through daily activities due to pain. Contract requirements met. Continue opioid therapy. Pt does not need a refill until March 8th - has medication form surgeon  Follow up appointment made for 4 weeks    Gricel Stringer, was evaluated through a synchronous (real-time) audio-video encounter. The patient (or guardian if applicable) is aware that this is a billable service, which includes applicable co-pays. This Virtual Visit was conducted with patient's (and/or legal guardian's) consent.  The visit was conducted pursuant to the emergency declaration under the [facility], [address] [place] [address] waiver authority and the [place] and [employer] General Act. Patient identification was verified, and a caregiver was present when appropriate. The patient was located at home in a state where the provider was licensed to provide care. Total time spent for this encounter: Not billed by time    --PIETER Andrade CNP on 2/17/2022 at 9:54 AM    An electronic signature was used to authenticate this note.

## 2022-02-21 ENCOUNTER — OFFICE VISIT (OUTPATIENT)
Dept: ORTHOPEDIC SURGERY | Age: 62
End: 2022-02-21

## 2022-02-21 VITALS — WEIGHT: 150 LBS | HEIGHT: 60 IN | BODY MASS INDEX: 29.45 KG/M2

## 2022-02-21 DIAGNOSIS — M19.012 ARTHRITIS OF LEFT SHOULDER REGION: Primary | ICD-10-CM

## 2022-02-21 DIAGNOSIS — Z96.612 STATUS POST REVERSE TOTAL REPLACEMENT OF LEFT SHOULDER: ICD-10-CM

## 2022-02-21 PROCEDURE — 99024 POSTOP FOLLOW-UP VISIT: CPT | Performed by: PHYSICIAN ASSISTANT

## 2022-02-21 ASSESSMENT — ENCOUNTER SYMPTOMS
VOMITING: 0
COUGH: 0
SHORTNESS OF BREATH: 0
COLOR CHANGE: 0

## 2022-02-21 NOTE — PROGRESS NOTES
[address]  [address] [facility] Sheree Bernstein  Dept: [phone]  Dept Fax: [phone]        Postoperative follow-up note    Subjective:   Wade Rabago is a 60y.o. year old female who presents to our office today for postoperative followup regarding her   1. Arthritis of left shoulder region    2. Status post reverse total replacement of left shoulder        Chief Complaint   Patient presents with    Post-Op Check     LEFT REVERSE TOTAL SHOULDER ARTHROPLASTY       Date of Surgery: 2/8/2022     Wade Rabago  is a 60y.o. year old female who presents to our office today for postoperative follow up after having undergone a left reverse total shoulder arthroplasty completed by Dr. Waldemar Olivera DO on 2/8/2022. The patient denies fevers, chills, nausea, vomiting, diarrhea. The patient had her initial outpatient physical therapy evaluation and states that she starts physical therapy tomorrow. Patient notes that she has been taking Roxicodone for postoperative pain management. She denies numbness and or tingling in the left upper extremity at this time. Review of Systems   Constitutional: Negative for activity change and fever. HENT: Negative for sneezing. Respiratory: Negative for cough and shortness of breath. Cardiovascular: Negative for chest pain. Gastrointestinal: Negative for vomiting. Musculoskeletal: Positive for arthralgias (Left shoulder). Negative for joint swelling and myalgias. Skin: Negative for color change. Neurological: Negative for weakness and numbness. Psychiatric/Behavioral: Negative for sleep disturbance. Objective :   General: Wade Rabago is a 60 y.o. female who is alert and oriented and sitting comfortably in our office. Ortho Exam  MS:   Patient arrived wearing a sling on the left upper extremity.   After removal of the sling evaluation of the left shoulder reveals a surgical incision along the anterior aspect of the left shoulder with mild eschar and superficial skin glue intact. There is no skin erythema, ecchymosis, wound dehiscence or signs of infection noted to the left upper extremity. Patient has nearly full range of motion of the left elbow wrist and hand. Patient is able to work on pendulum exercises, but is unable to actively forward flex the left shoulder at this time due to pain. Sensation is intact to light touch of the left upper extremity without focal deficits present. The skin is noted to be warm with brisk capillary refill distally on the left hand. Radial pulse 2+ on the left. Neuro: alert. oriented  Eyes: Extra-ocular muscles intact  Mouth: Oral mucosa moist. No perioral lesions  Pulm: Respirations unlabored and regular. Skin: warm, well perfused  Psych:   Patient has good fund of knowledge and displays understanging of exam, diagnosis, and plan. Radiology:    XR SHOULDER LEFT (MIN 2 VIEWS)    Result Date: 2/21/2022  History: Status post left reverse total shoulder arthroplasty. Comparison: 2/8/2022. Findings: AP, scapular Y, axillary views of the left shoulder obtained today in office reveal orthopedic hardware status post reverse total shoulder arthroplasty. Hardware appears intact with appropriate anatomical position. There is no further evidence of fracture, subluxation, dislocation, radiopaque foreign body or radiopaque tumor appreciated. Severe acromioclavicular degenerative changes appreciated. Images unchanged when compared to x-rays from 2/8/2022. Impression: Status post left reverse total shoulder arthroplasty with hardware in appropriate position as described above.     XR SHOULDER LEFT (MIN 2 VIEWS)    Result Date: 2/8/2022  EXAMINATION: 3 XRAY VIEWS OF THE LEFT SHOULDER 2/8/2022 12:50 pm COMPARISON: Previous three-view study of the left shoulder from 10/06/2021 HISTORY: ORDERING SYSTEM PROVIDED HISTORY: post op, PACU please TECHNOLOGIST PROVIDED HISTORY: AP, scap Y, axillary post op, PACU please Reason for Exam: post op port  at 1250pm FINDINGS: Orthopedic hardware now in place status post reverse left shoulder arthroplasty; orthopedic hardware appears to be in satisfactory position. Surrounding air in the joint space and soft tissues noted. Status post reversed total left shoulder arthroplasty; hardware appears satisfactory. Assessment:      1. Arthritis of left shoulder region    2. Status post reverse total replacement of left shoulder         Plan:      Patient is currently 2 weeks postoperative after having a left reverse total shoulder arthroplasty completed by Dr. Helga Goel DO on 2/8/2022. Patient was instructed to begin outpatient physical therapy working on active range of motion of the left upper extremity only. She was instructed not to let the physical therapy staff work on passive range of motion. Patient was instructed to continue her prescribed oxycodone for her left upper extremity discomfort and to decrease the number of doses that she takes per day with hopes to transition to over-the-counter Tylenol and/or ibuprofen over the next 3 to 4 weeks. Patient's most recent pain medication refill was filled on 2/16/2022. Patient was instructed to continue working on Codman and pendulum exercises at home. She may remove the sling to work on left elbow wrist and hand range of motion as well. Patient will follow up in 4 weeks with Dr. Helga Goel DO. She was instructed to call our office with any questions or concerns prior to her next appointment. She voiced her understanding. Follow up: Return in about 4 weeks (around 3/21/2022) for post-operative follow up with Dr. Dale Dixon. .    No orders of the defined types were placed in this encounter. No orders of the defined types were placed in this encounter.       This note is created with the assistance of a speech recognition program.  While intending to generate a document that actually reflects the content of the visit, the document can still have some errors including those of syntax and sound a like substitutions which may escape proof reading. In such instances, actual meaning can be extrapolated by contextual diversion.      Electronically signed by Chao Saldana PA-C on 2/21/2022 at 5:07 PM

## 2022-02-22 ENCOUNTER — HOSPITAL ENCOUNTER (OUTPATIENT)
Dept: PHYSICAL THERAPY | Age: 62
Setting detail: THERAPIES SERIES
Discharge: HOME | End: 2022-02-22
Payer: MEDICARE

## 2022-02-22 PROCEDURE — 97110 THERAPEUTIC EXERCISES: CPT

## 2022-02-22 PROCEDURE — 97016 VASOPNEUMATIC DEVICE THERAPY: CPT

## 2022-02-22 NOTE — PRE-CERTIFICATION NOTE
Medicare Cap   [x] Physical Therapy  [] Speech Therapy  [] Occupational therapy  *PT and Speech caps combine      $2150 Limit for PT and Speech combined  $2150 Limit for OT individually  At the beginning of the month where you expect to go over $2150, please add the [address] modifier      Patient Name: Deborah Fraser  YOB: 1960    Note:  This is an estimate of charges billed.      Date of [address] Name # units/ charge $$$ charge Daily Total Charge Ongoing Total $$$   2/22/22 Therex/Vaso 3/1 29.21+22.84+22.84+9.21 84.10 84.10

## 2022-02-22 NOTE — FLOWSHEET NOTE
[x] [facility]  [address]  [phone]  F: [phone]     Physical Therapy Daily Treatment Note    Date:  2022  Patient Name:  Hero Sandhu      :  1960    MRN: [id number]  Physician: Phong Eagle DO                                    Insurance: [facility] Medicare Duel Complete (BMN)  Medical Diagnosis: M19.012 (ICD-10-CM) - Arthritis of left shoulder region            Rehab Codes: Pain M54.2, M25.512, stiffness M25.612, weakness M62.512, posture R29.3, myofascial M79.1, M62.838  Onset Date: 22                  Next 's appt: 22      Visit# / total visits:    Cancels/No Shows: 0/0  Progress note for Medicare patient due at visit 10    Subjective:    Pain:  [x] Yes  [] No Location: neck shoulder on left   Pain Rating: (0-10 scale) 5/10  Pain altered Tx:  [x] No  [] Yes  Action:  Comments: Pt says she got a new sling and had pain meds before treatment.  Patient reports elbow and wrist worse since eval.    Objective:  Modalities: Vaso low compression 40 deg x15 min at end of treatment - seated with pillow under arm    Precautions: healing forearm fracture left, flexion  deg, ER 30 deg, no IR, no active elbow flex    Exercises:  Exercise Reps/ Time Weight/ Level Comments   posture x  Educated and instructed in sitting with lumbar roll and why this aligned position is beneficial with retracted scapula and chin retracted,  (Thera act)         Wrist AROM 4 ways 20x  Added reps 2022   Supinate/pronate 20x  Added reps 2022   Elbow flex/ext 10x  HELD 2022         Codmans 20x ea  Difficulty relaxing shoulder  A/P, M/L, CW/CCW   Scapular gentle retraction 10x     CROM 10x5\" ea  Flex, ext, lat flex, rotation  Added 2022   Shoulder Shrug 20x  Added 2022   Shoulder depression 20x  Added 2022   Submax iso shoulder hold 10x  Abd, add, IR, ext  Added 2022         Supine      Cane       Shoulder flex 2x10 AAROM V/c for breathing  Added 2/22/2022   Chest press 10x AAROM V/c for breathing  Added 2/22/2022               Education 3 min  Educated on post surgical protocols                                 Other:      Treatment Charges: Mins Units   []  Modalities     [x]  Ther Exercise 43 3   []  Manual Therapy     []  Ther Activities     []  Aquatics     [x]  Vasocompression 15 1   []  Other     Total Treatment time 58 4       Assessment: [x] Progressing toward goals. Good progression of reps and exercises at this date 2/22/2022. Pt required no rest breaks throughout treatment. Educated on post surgical protocol to ensure appropriate healing of L shoulder. Pt required v/c for breathing during cane shoulder flex and chest press. Added vaso after treatment to reduce pain in L shoulder. [] No change. [] Other:  [x] Patient would continue to benefit from skilled physical therapy services in order to: reduce pain with modalities and exercise, reduce muscle spasms and trigger points with manual, learn proper posture for optimal healing and function, improve ROM and strength as able. STG: (to be met in 12 treatments)  1. ? Pain: Keep pain at 4/10 or less most times  2. ? ROM: PROM shoulder elevation increases to 90 degrees+  3. ? Strength:Start active motion when cleared by Dr.   3. ? Function:Start functional activities when cleared by  and able to reach head. 5. Notes minimal tight muscles/spasms and able to manage independently if they return. 6. Demonstrate Knowledge of fall prevention    LTG: (to be met in 24 treatments)  1. LEFS improves to 50% or better function         2. Patient to be independent with home exercise program as demonstrated by performance with correct form without cues. Patient goals: Lift arm up half way to shoulder    Pt.  Education:  [x] Yes  [] No  [] Reviewed Prior HEP/Ed  Method of Education: [x] Verbal  [x] Demo  [x] Written  Comprehension of Education:  [x] Verbalizes understanding. [] Demonstrates understanding. [x] Needs review. [] Demonstrates/verbalizes HEP/Ed previously given. Given 2/22/2022  Wrist flex/ext  Wrist supination/pronation  CROM all planes  Pendulums  Shoulder shrugs  Scapular retractions     Plan: [x] Continue current frequency toward long and short term goals. [x]  Frequency: 2 x/week for 24 visits   [x] Specific Instructions for subsequent treatments: Posture, Gentle PROM, Myofascial work on neck, trap, pect muscles, gradually work through protocol.        Time In:   9560         Time Out: 1265    Electronically signed by:  ANTELMO Jimenez    Treatment under the supervision of, and documentation reviewed by: Mary Tidwell, PTA

## 2022-02-23 ENCOUNTER — OFFICE VISIT (OUTPATIENT)
Dept: NEUROSURGERY | Age: 62
End: 2022-02-23
Payer: MEDICARE

## 2022-02-23 VITALS
HEART RATE: 85 BPM | WEIGHT: 150 LBS | TEMPERATURE: 98.2 F | DIASTOLIC BLOOD PRESSURE: 81 MMHG | BODY MASS INDEX: 29.45 KG/M2 | HEIGHT: 60 IN | SYSTOLIC BLOOD PRESSURE: 135 MMHG | OXYGEN SATURATION: 100 %

## 2022-02-23 DIAGNOSIS — Z82.0 FAMILY HISTORY OF ALZHEIMER'S DISEASE: ICD-10-CM

## 2022-02-23 DIAGNOSIS — R20.0 NUMBNESS AND TINGLING OF LEFT ARM AND LEG: ICD-10-CM

## 2022-02-23 DIAGNOSIS — M47.22 CERVICAL SPONDYLOSIS WITH RADICULOPATHY: Primary | ICD-10-CM

## 2022-02-23 DIAGNOSIS — R41.3 MEMORY PROBLEM: ICD-10-CM

## 2022-02-23 DIAGNOSIS — R20.2 NUMBNESS AND TINGLING OF LEFT ARM AND LEG: ICD-10-CM

## 2022-02-23 PROCEDURE — G8417 CALC BMI ABV UP PARAM F/U: HCPCS | Performed by: NURSE PRACTITIONER

## 2022-02-23 PROCEDURE — 3017F COLORECTAL CA SCREEN DOC REV: CPT | Performed by: NURSE PRACTITIONER

## 2022-02-23 PROCEDURE — G8427 DOCREV CUR MEDS BY ELIG CLIN: HCPCS | Performed by: NURSE PRACTITIONER

## 2022-02-23 PROCEDURE — G8484 FLU IMMUNIZE NO ADMIN: HCPCS | Performed by: NURSE PRACTITIONER

## 2022-02-23 PROCEDURE — 99214 OFFICE O/P EST MOD 30 MIN: CPT | Performed by: NURSE PRACTITIONER

## 2022-02-23 PROCEDURE — 1036F TOBACCO NON-USER: CPT | Performed by: NURSE PRACTITIONER

## 2022-02-23 NOTE — PROGRESS NOTES
[address]  [facility] [place] [address] [facility] [address]  Dept: [phone]    Patient:  Jesu Turcios  YOB: 1960  Date: 10/4/21    The patient is a 60 y.o. female who presents today for consult of the following problems:     Chief Complaint   Patient presents with    Follow-up         HPI:     Jesu Turcios is a 60 y.o. female on whom neurosurgical consultation was requested by Tristan Lilly DO for management of back and leg pain. Pt has had ongoing issues with neck and back pain as well as varying numbness and tingling to extremities particularly left upper extremity and left lower extremity as well as right middle finger intermittently. Painful numbness and tingling is nondermatomal in distribution, intermittent in nature and in various locations. Patient has completed physical therapy in the past, nothing recent secondary to financial constraints. Has been following with pain specialist, has undergone recent radiofrequency ablations of the lumbar with approximately 75% improvement in axial low back pain. Does also have a history of shoulder pain, including left shoulder surgery remotely. Has been referred for orthopedic evaluation as well. States that her right middle finger will at times become numb and white. Does report limited mobility to both neck lower back shoulders and hips. Has a history of martial arts teaching, relates that she used to be very flexible. No longer backs her car out because she is not able to turn her head far enough to safely reverse. 2/23/2022:  Since last office visit, underwent reverse total shoulder arthroplasty, maintained in sling for immobilization. Predominance of symptoms at last office visit were on the left side particularly left arm, very difficult to evaluate today secondary to recent surgery.   Patient reports pain is 4/10 on average to her cervical spine. No distinct new numbness tingling or weakness. Recently started therapy for her shoulder. Was able to complete lower extremity EMG, but not upper extremity EMG secondary to left arm injury. Additionally expresses concerns regarding her memory, mentions a strong family history of Alzheimer's and dementia and is requesting referral to be evaluated for this. Groin pain: No  Saddle anesthesia: No  Hip Pain: Yes  Bowel/bladder incontinence: No  Constipation or Urinary retention: No    LIMITATIONS    Pain significantly limiting from a daily standpoint. Assistive devices: none  Daily pharmacologic pain control include: percocet  Back versus le/50    MANAGEMENT     Prior Surgery: No  Prior to 1yr ago: In the last year:    Physical Therapy: No   Chiropractic Interventions: No   Injections: Yes  Improvement: Approximately 75% relief following RFA    Types of injections/responses:   2021: left L4/5; L5/S1 RFA   2021: right L4/5, L5/S1 RFA    History:     Past Medical History:   Diagnosis Date    Acid reflux     Adult ADHD 2012    monthly visits @ [facility] [address] Asthma 2012    seasonal    Bipolar 1 disorder ([facility]     monthly visits with [place]    Broken foot     Left foot    Chest pain of uncertain etiology     Last episode was in  (Written 2019)    Depression     Under control per pt.  on 6/15/18    [facility]    Fracture     right foot / pt denies surgical intervention    Fractured nose     x 9 times    Helicobacter pylori (H. pylori) infection     per EGD    Hematuria 2018    Has a follow-up with Urologist     Hiatal hernia     History of blood transfusion     no reactions    History of DVT (deep vein thrombosis) 2012    History of myasthenia gravis     diagnosed around age 26    Hx of blood clots     L leg post knee surgery    Hypertension managed by Dr. Shima Herman PCP   Josh Glover Internal hemorrhoids     Narcolepsy 2012    Dr. Cranford Hodgkins [facility]    Osteoporosis     Sleep apnea     no machine    Under care of team 10/2021    Dr. Cameron Lewis Under care of team     Pain Management Dr. Dawson Frank last visit 2022     Past Surgical History:   Procedure Laterality Date    ANKLE SURGERY Left 2018    mass excision    ARM SURGERY Right     Pt states she has had 13 right arm surgeries    BREAST ENHANCEMENT SURGERY Bilateral      SECTION      x 3    COLONOSCOPY  2013    sigmoid diverticula, prominent large internal hemorrhoid    COLONOSCOPY N/A 2/10/2020    COLONOSCOPY DIAGNOSTIC performed by Bahman Fonseca MD at [facility] 2020    COLORECTAL CANCER SCREENING, NOT HIGH RISK performed by Cira Whitt MD at [address] [facility], COLON, DIAGNOSTIC     [address] [facility] STUDY N/A 2020    PES RN - ESOPHAGEAL MOTILITY STUDY performed by Igor Rasmussen DO at [address] STUDY  9/10/2020    ESOPHAGEAL MOTILITY STUDY performed by Igor Rasmussen DO at [facility]    ESOPHAGEAL MOTILITY STUDY N/A 2020    ESOPHAGEAL MOTILITY STUDY ATTEMPTED PER DR. Lucy Lisa WITHOUT SUCCESS performed by Igor Rasmussen DO at [facility]    FOOT SURGERY Left     FOOT TENDON SURGERY Left 2019    LEFT PERONEAL TENDON REPAIR WITH POSSIBLE LEFT TENDON TRANSFER (Left )    KNEE ARTHROSCOPY Left     Two surgeries on left knee per pt    KNEE SURGERY Right     Three surgeries per pt    LIGAMENT REPAIR Left 2019    LEFT PERONEAL TENDON REPAIR performed by Emily Carreon DPM at [address] [facility] Bilateral 2021     BILATERAL L4/5, L5/S1 LUMBAR FACET (Bilateral )    NOSE SURGERY      PAIN MANAGEMENT PROCEDURE Bilateral 2021    BILATERAL L4/5, L5/S1 LUMBAR FACET performed by Payton Hickman MD at [facility]    PAIN MANAGEMENT PROCEDURE  06/11/2021    BILATERAL L4/5, L5/S1 NERVE BLOCK - Bilateral    PAIN MANAGEMENT PROCEDURE Bilateral 6/11/2021    BILATERAL L4/5, L5/S1 NERVE BLOCK performed by Mark Leal MD at [address] [place] [address] Right 07/23/2021    L4/5, L5/S1 NERVE RADIOFREQUENCY ABLATION     PAIN MANAGEMENT PROCEDURE Right 7/23/2021    right  L4/5, L5/S1 NERVE RADIOFREQUENCY ABLATION performed by Mark Leal MD at [address] [place] [address] Left 08/06/2021    left  L4/5, L5/S1 NERVE RADIOFREQUENCY ABLATION (Left )    PAIN MANAGEMENT PROCEDURE Left 8/6/2021    left  L4/5, L5/S1 NERVE RADIOFREQUENCY ABLATION performed by Mark Leal MD at [address] OFFICE/OUTPT [address] [facility] Left 6/29/2018    RESECTION/REMOVAL BONY NEOPLASM LEFT FIBULA, LEFT SOFT TISSUE MASS EXCISION WITH BONE BIOPSY LEFT ANKLE FIBULA performed by Sherice Child DPM at [address] Left     SHOULDER SURGERY Left 02/08/2022    REVERSE TOTAL SHOULDER ARTHROPLASTY - Left    SHOULDER SURGERY Left 2/8/2022    REVERSE TOTAL SHOULDER ARTHROPLASTY performed by Claus Mc DO at [address]  5/23/2013    tertiary contractures esophagus, 3 to 4 cm hiatal hernia, antral gastritis, + H Pylori, mild active chronic esophagitis    UPPER GASTROINTESTINAL ENDOSCOPY  4/6/2016    one biopsy    UPPER GASTROINTESTINAL ENDOSCOPY N/A 2/10/2020    EGD BIOPSY performed by Lyudmila Butler MD at [facility]       Family History   Problem Relation Age of Onset    Cancer Mother     Hypertension Mother     Stroke Mother     Dementia Mother     Cancer Maternal Aunt     Hypertension Father     Heart Surgery Father     Alzheimer's Disease Father     Diabetes Neg Hx      Current Outpatient Medications on File Prior to Visit   Medication Sig Dispense Refill    Currently     Comment: maybe oncee a year    Drug use: No       Allergies   Allergen Reactions    Latex Itching    Prozac [Fluoxetine Hcl] Other (See Comments)     violent    Sulfa Antibiotics      migraine    Codeine Itching       Review of Systems  Constitutional: Negative for activity change and appetite change. HENT: Negative for ear pain and facial swelling. Eyes: Negative for discharge and itching. Respiratory: Negative for choking and chest tightness. Cardiovascular: Negative for chest pain and leg swelling. Gastrointestinal: Negative for nausea and abdominal pain. Endocrine: Negative for cold intolerance and heat intolerance. Genitourinary: Negative for frequency and flank pain. Musculoskeletal: Negative for myalgias and joint swelling. Skin: Negative for rash and wound. Allergic/Immunologic: Negative for environmental allergies and food allergies. Hematological: Negative for adenopathy. Does not bruise/bleed easily. Psychiatric/Behavioral: Negative for self-injury. The patient is not nervous/anxious. Physical Exam:      /81   Pulse 85   Temp 98.2 °F (36.8 °C) (Temporal)   Ht 5' (1.524 m)   Wt 150 lb (68 kg)   SpO2 100%   BMI 29.29 kg/m²   Estimated body mass index is 29.29 kg/m² as calculated from the following:    Height as of this encounter: 5' (1.524 m). Weight as of this encounter: 150 lb (68 kg). General:  Dnaiel Newell is a 60y.o. year old female who appears her stated age. HEENT: Normocephalic atraumatic. Neck supple. Chest: regular rate; pulses equal  Abdomen: Soft nontender nondistended.    Ext: DP and PT pulses 2+, good cap refill  Neuro    Mentation  Appropriate affect  Registration intact  Orientation intact    Cranial Nerves:   Pupils equal and reactive to light  Extraocular motion intact  Face and shrug symmetric  Tongue midline  No dysarthria  v1-3 sensation symmetric, masseter tone symmetric  Hearing symmetric and intact    Sensation: Intact on exam    Motor  Right upper extremity 5/5; left upper extremity deferred secondary to recent surgery    L iliopsoas 5/5 , R iliopsoas 5/5  L quadriceps 5/5; R quadriceps 5/5  L Dorsiflexion 5/5; R dorsiflexion 5/5  L Plantarflexion 5/5; R plantarflexion 5/5  L EHL 5/5; R EHL 5/5    Reflexes  L Brachioradialis: Not tested; R brachioradialis 2+/4  L Biceps: Not tested; R Biceps 2+/4  L Triceps: Not tested; R Triceps 2+/4  L Patellar 2+/4: R Patellar 2+/4  L Achilles 2+/4; R Achilles 2+/4    hoffmans L: neg  hoffmans R: neg  Clonus L: neg  Clonus R: neg  Babinski L: neg  Babinski R: neg    ELIZABETH: Positive  Straight leg raise: Negative  SI joint tenderness: Negative    Studies Review:     EMG 1/10/2022:    MRI cervical spine 3/22/2021 (images reviewed):   FINDINGS:   BONES/ALIGNMENT: There is 1-2 mm C4 on C5 anterolisthesis, 2 mm C7 on T1   anterolisthesis, 2 mm T1 on T2 anterolisthesis.  The vertebral body heights   are maintained.  No fracture or destructive osseous lesion.  There is   degenerative disc disease with disc desiccation, disc space narrowing and   endplate changes.  There is congenitally narrow cervical spinal canal.       SPINAL CORD: No abnormal cord signal is seen.       SOFT TISSUES: No paraspinal mass identified.       C2-C3: There is disc bulge with minimal spinal canal narrowing without   foraminal narrowing.  Stable.       C3-C4: There is disc bulge with mild spinal canal narrowing without foraminal   narrowing.  Stable.       C4-C5: There is disc bulge with mild-to-moderate bilateral facet arthrosis,   with mild bilateral foraminal narrowing.  No spinal canal narrowing.  Stable.       C5-C6: There is disc bulge with mild-to-moderate bilateral facet arthrosis,   with minimal spinal canal narrowing, moderate left and mild right foraminal   narrowing.  Stable.       C6-C7: There is disc bulge with mild spinal canal narrowing, and   mild-to-moderate bilateral foraminal narrowing.  Stable.       C7-T1: There is disc bulge, mild bilateral facet arthrosis, with   mild-to-moderate left and mild right foraminal narrowing.  No spinal canal   narrowing.  Stable. X-ray cervical spine flexion-extension 10/5/2021 (images reviewed): FINDINGS:   There is moderate degenerative disc disease at C5-6 and C6-7.  There is mild   to moderate multilevel facet degenerative change.  There is mild   anterolisthesis of C2 on C3 and C4 on C5 with flexion.  On extension this   reduces at C2-3 and partially reduces at C4-5.  The cervical spine is not   visualized below the C6 level.  The predental space remains normal on flexion   and extension.         Orthopedic notes reviewed    Assessment and Plan:      1. Cervical spondylosis with radiculopathy    2. Numbness and tingling of left arm and leg    3. Memory problem    4. Family history of Alzheimer's disease          Plan: Patient with multiple concerns regarding neck and lower back pain as well as intermittent numbness and tingling to primarily left arm and leg in nondermatomal distribution. MRI cervical and lumbar spine with multilevel degenerative changes, varying degrees of central and foraminal stenosis. EMG lower extremities without evidence of radiculopathy, possible peroneal neuropathy. Upper extremities have not yet been completed. Currently recovering from left shoulder reverse arthroplasty, therefore limited evaluation of left upper extremity where predominance of her symptoms previously were. Recommend follow-up in approximately 12 weeks after she has fully recovered from her shoulder surgery to see what symptoms remain. Further recommendations at that time. Referral provided for neurology evaluation for her concerns regarding her memory. Followup: Return in about 3 months (around 5/23/2022), or if symptoms worsen or fail to improve. Prescriptions Ordered:  No orders of the defined types were placed in this encounter. Orders Placed:  Orders Placed This Encounter   Procedures   [facility] ALBERT Mullen, Neurology, [address]     Referral Priority:   Routine     Referral Type:   Eval and Treat     Referral Reason:   Specialty Services Required     Referred to Provider:   PIETER Molina CNP     Requested Specialty:   Neurology     Number of Visits Requested:   1        Electronically signed by [facility] [address] [place], [place] [address]PIETER CNP on 2/23/2022 at 1:00 PM    Please note that this chart was generated using voice recognition Dragon dictation software. Although every effort was made to ensure the accuracy of this automated transcription, some errors in transcription may have occurred.

## 2022-02-24 ENCOUNTER — HOSPITAL ENCOUNTER (OUTPATIENT)
Dept: PHYSICAL THERAPY | Age: 62
Setting detail: THERAPIES SERIES
Discharge: HOME | End: 2022-02-24
Payer: MEDICARE

## 2022-02-24 PROCEDURE — 97110 THERAPEUTIC EXERCISES: CPT

## 2022-02-24 PROCEDURE — 97016 VASOPNEUMATIC DEVICE THERAPY: CPT

## 2022-02-24 NOTE — FLOWSHEET NOTE
[x] [facility] &  Therapy  [address]  [phone]  F: [phone]     Physical Therapy Daily Treatment Note    Date:  2022  Patient Name:  Hero Sandhu      :  1960    MRN: [id number]  Physician: Phong Eagle DO                                    Insurance: [facility] Medicare Duel Complete (BMN)  Medical Diagnosis: M19.012 (ICD-10-CM) - Arthritis of left shoulder region            Rehab Codes: Pain M54.2, M25.512, stiffness M25.612, weakness M62.512, posture R29.3, myofascial M79.1, M62.838  Onset Date: 22                  Next 's appt: 22      Visit# / total visits: 3/24   Cancels/No Shows: 0/0  Progress note for Medicare patient due at visit 10    Subjective:    Pain:  [x] Yes  [] No Location: neck shoulder on left   Pain Rating: (0-10 scale) 6-7/10  Pain altered Tx:  [x] No  [] Yes  Action:  Comments: Patient reports some increased pain upon arrival. States that she may have been moving too much.       Objective:  Modalities: Vaso low compression 40 deg x15 min at end of treatment - seated with pillow under arm    Precautions: healing forearm fracture left, flexion  deg, ER 30 deg, no IR, no active elbow flex    Exercises:  Exercise Reps/ Time Weight/ Level Comments   Standing      Codman's 20x ea  A/P, M/L, CW/CCW         Seated      Wrist AROM 4 ways 20x  Added reps 2022   Supinate/pronate 20x  Added reps 2022   Elbow flex/ext 10x  HELD 2022               Scapular gentle retraction 20x     CROM 10x5\" ea  Flex, ext, lat flex, rotation  Added 2022   Shoulder Shrug 20x  Added 2022   Shoulder depression 20x  Added 2022   Submax iso shoulder hold 10x  Abd, add, IR, ext  Added 2022         Supine      Cane       Shoulder flex 2x10 AAROM V/c for breathing  Added 2022   Chest press 10x AAROM V/c for breathing  Added 2022               Education 3 min  Educated on post surgical protocols Other:      Treatment Charges: Mins Units   []  Modalities     [x]  Ther Exercise 32 2   []  Manual Therapy     []  Ther Activities     []  Aquatics     [x]  Vasocompression 15 1   []  Other     Total Treatment time 47 3       Assessment: [x] Progressing toward goals. Reviewed surgical precautions again to avoid overworking shoulder and creating increased pain/inflammatory response. Cues to stay on task, as pt tends to talk a lot and get sidetracked. Education to allow L UE to move as able and keep in pain tolerable threshold. [] No change. [] Other:  [x] Patient would continue to benefit from skilled physical therapy services in order to: reduce pain with modalities and exercise, reduce muscle spasms and trigger points with manual, learn proper posture for optimal healing and function, improve ROM and strength as able. STG: (to be met in 12 treatments)  1. ? Pain: Keep pain at 4/10 or less most times  2. ? ROM: PROM shoulder elevation increases to 90 degrees+  3. ? Strength:Start active motion when cleared by Dr.   3. ? Function:Start functional activities when cleared by  and able to reach head. 5. Notes minimal tight muscles/spasms and able to manage independently if they return. 6. Demonstrate Knowledge of fall prevention    LTG: (to be met in 24 treatments)  1. LEFS improves to 50% or better function         2. Patient to be independent with home exercise program as demonstrated by performance with correct form without cues. Patient goals: Lift arm up half way to shoulder    Pt. Education:  [x] Yes  [] No  [x] Reviewed Prior HEP/Ed  Method of Education: [x] Verbal  [x] Demo  [] Written  Comprehension of Education:  [x] Verbalizes understanding. [x] Demonstrates understanding. [] Needs review. [x] Demonstrates/verbalizes HEP/Ed previously given.    Given 2/22/2022  Wrist flex/ext  Wrist supination/pronation  CROM all planes  Pendulums  Shoulder shrugs  Scapular retractions     Plan: [x] Continue current frequency toward long and short term goals. [x]  Frequency: 2 x/week for 24 visits   [x] Specific Instructions for subsequent treatments: Posture, Gentle PROM, Myofascial work on neck, trap, pect muscles, gradually work through protocol.          Time In: 1350          Time Out: 6612      Electronically signed by:  Maxim Merida PTA

## 2022-02-28 DIAGNOSIS — Z96.612 STATUS POST REVERSE TOTAL REPLACEMENT OF LEFT SHOULDER: ICD-10-CM

## 2022-02-28 DIAGNOSIS — G89.18 ACUTE POST-OPERATIVE PAIN: ICD-10-CM

## 2022-02-28 RX ORDER — OXYCODONE HYDROCHLORIDE 5 MG/1
5 TABLET ORAL EVERY 8 HOURS PRN
Qty: 21 TABLET | Refills: 0 | Status: SHIPPED | OUTPATIENT
Start: 2022-02-28 | End: 2022-03-07

## 2022-03-01 ENCOUNTER — HOSPITAL ENCOUNTER (OUTPATIENT)
Dept: PHYSICAL THERAPY | Age: 62
Setting detail: THERAPIES SERIES
Discharge: HOME | End: 2022-03-01
Payer: MEDICARE

## 2022-03-01 NOTE — FLOWSHEET NOTE
[x] [facility]  [address]    [phone]  F: [phone]     Physical Therapy Cancel/No Show note    Date: 3/1/2022  Patient: Sly Goodman  : 1960  MRN: [id number]    Cancels/No Shows to date:     For today's appointment patient:    [x]  Cancelled    [] Rescheduled appointment    [] No-show     Reason given by patient:    [x]  Patient ill \"sick and dizzy\"     []  Conflicting appointment    [] No transportation      [] Conflict with work    [] No reason given    [] Weather related    [] [id number]    [] Other:      Comments:        [x] Next appointment was confirmed      Electronically signed by: Marichuy Garcia PTA

## 2022-03-03 ENCOUNTER — HOSPITAL ENCOUNTER (OUTPATIENT)
Dept: PHYSICAL THERAPY | Age: 62
Setting detail: THERAPIES SERIES
Discharge: HOME | End: 2022-03-03
Payer: MEDICARE

## 2022-03-03 PROCEDURE — 97016 VASOPNEUMATIC DEVICE THERAPY: CPT

## 2022-03-03 PROCEDURE — 97110 THERAPEUTIC EXERCISES: CPT

## 2022-03-03 NOTE — FLOWSHEET NOTE
[x] [facility] &  Therapy  [address]  [phone]  F: [phone]     Physical Therapy Daily Treatment Note    Date:  3/3/2022  Patient Name:  Andres Gross      :  1960    MRN: [id number]  Physician: Geovanny Nunez DO                                    Insurance: [facility] Medicare Duel Complete (BMN)  Medical Diagnosis: M19.012 (ICD-10-CM) - Arthritis of left shoulder region            Rehab Codes: Pain M54.2, M25.512, stiffness M25.612, weakness M62.512, posture R29.3, myofascial M79.1, M62.838  Onset Date: 22                  Next 's appt: 22      Visit# / total visits:    Cancels/No Shows: 0/0  Progress note for Medicare patient due at visit 10    Subjective:    Pain:  [x] Yes  [] No Location: neck shoulder on left   Pain Rating: (0-10 scale) 6-7/10  Pain altered Tx:  [x] No  [] Yes  Action:  Comments: Patient reports that her shoulder has a spot where she thinks there is still a stitch, because it is poking her.  Upon inspection, there was a partial suture remaining, but too painful for patient to attempt removal.      Objective:  Modalities: Vaso low compression 40 deg x15 min at end of treatment - seated with pillow under arm    Precautions: healing forearm fracture left, flexion  deg, ER 30 deg, no IR, no active elbow flex    Exercises:  Exercise Reps/ Time Weight/ Level Comments   Standing      Codman's 20x ea  A/P, M/L, CW/CCW         Seated      Wrist AROM 4 ways 20x  Added reps 2022   Supinate/pronate 20x  Added reps 2022   Elbow flex/ext 10x  HELD 2022   Gripper 20x 2 red bands          Scapular Retraction 20x     CROM 10x5\" ea  Flex, ext, lat flex, rotation  Added 2022   Shoulder Shrug 20x  Added 2022   Shoulder depression 20x  Added 2022   Submax iso shoulder hold 10x  Abd, add, IR, ext  Added 2022         Supine      Cane       Shoulder flex 2x10 AAROM V/c for breathing  Added 2/22/2022   Chest press 20x AAROM V/c for breathing  Added 2/22/2022   ER 20x           Education 3 min  Educated on post surgical protocols                                 Other:      Treatment Charges: Mins Units   []  Modalities     [x]  Ther Exercise 35 2   []  Manual Therapy     []  Ther Activities     []  Aquatics     [x]  Vasocompression 15 1   []  Other     Total Treatment time 50 3       Assessment: [x] Progressing toward goals: 10-90 degrees AAROM noted during cane exercises this date. Patient with difficulty laying arm flat on table. This is the most ROM the patient has displayed by at least 50 degrees. Overall improved tolerance with Tx. Continued vaso at end of Tx for edema and pain control. [] No change. [] Other:  [x] Patient would continue to benefit from skilled physical therapy services in order to: reduce pain with modalities and exercise, reduce muscle spasms and trigger points with manual, learn proper posture for optimal healing and function, improve ROM and strength as able. STG: (to be met in 12 treatments)  1. ? Pain: Keep pain at 4/10 or less most times  2. ? ROM: PROM shoulder elevation increases to 90 degrees+  3. ? Strength:Start active motion when cleared by Dr.   3. ? Function:Start functional activities when cleared by  and able to reach head. 5. Notes minimal tight muscles/spasms and able to manage independently if they return. 6. Demonstrate Knowledge of fall prevention    LTG: (to be met in 24 treatments)  1. LEFS improves to 50% or better function         2. Patient to be independent with home exercise program as demonstrated by performance with correct form without cues. Patient goals: Lift arm up half way to shoulder    Pt. Education:  [x] Yes  [] No  [x] Reviewed Prior HEP/Ed  Method of Education: [x] Verbal  [x] Demo  [] Written  Comprehension of Education:  [x] Verbalizes understanding. [x] Demonstrates understanding. [] Needs review.   [x] Demonstrates/verbalizes HEP/Ed previously given. Given 2/22/2022  Wrist flex/ext  Wrist supination/pronation  CROM all planes  Pendulums  Shoulder shrugs  Scapular retractions     Plan: [x] Continue current frequency toward long and short term goals. [x]  Frequency: 2 x/week for 24 visits   [x] Specific Instructions for subsequent treatments: Posture, Gentle PROM, Myofascial work on neck, trap, pect muscles, gradually work through protocol.          Time In: 1350          Time Out: 4751      Electronically signed by:  Desi Shipman PTA

## 2022-03-04 ENCOUNTER — TELEPHONE (OUTPATIENT)
Dept: ORTHOPEDIC SURGERY | Age: 62
End: 2022-03-04

## 2022-03-04 ENCOUNTER — OFFICE VISIT (OUTPATIENT)
Dept: ORTHOPEDIC SURGERY | Age: 62
End: 2022-03-04

## 2022-03-04 VITALS — WEIGHT: 151 LBS | BODY MASS INDEX: 29.64 KG/M2 | HEIGHT: 60 IN

## 2022-03-04 DIAGNOSIS — Z96.612 STATUS POST REVERSE TOTAL REPLACEMENT OF LEFT SHOULDER: Primary | ICD-10-CM

## 2022-03-04 PROCEDURE — 99024 POSTOP FOLLOW-UP VISIT: CPT | Performed by: PHYSICIAN ASSISTANT

## 2022-03-04 NOTE — TELEPHONE ENCOUNTER
Patient calls answering service and states she had shoulder replacement on 02/08/22. Patient states she tripped this morning and she fell onto her knee, but her shoulder hit the ground too. Patient states she also hit her head on the washing machine. Patient states pain in shoulder is 8/10. Patient states fall happened this morning about a couple of hours ago. Patient states she has been putting ice on shoulder. Patient denies being on blood thinners. Patient denies any numbness or tingling in her hand or arm. Patient states she wants to make an appointment to be seen. Writer informed caller to go to the ED to be evaluated. Patient states she does not want to go to the ED and she will wait until the office opens to schedule an appointment. Writer informs caller to go to the nearest ED if her symptoms worsen before the office opens and patient verbalizes understanding.

## 2022-03-04 NOTE — PROGRESS NOTES
[address]  [address] [facility] Sheree Bernstein  Dept: [phone]  Dept Fax: [phone]        Postoperative follow-up note    Subjective:   Malgorzata Patient is a 60y.o. year old female who presents to our office today for postoperative followup regarding her   1. Status post reverse total replacement of left shoulder    . Chief Complaint   Patient presents with    Post-Op Check     L reverse total shoulder 2/8/2022        Date of Surgery: 2/8/2022    Malgorzata Patient  is a 60y.o. year old female who presents to our office today for postoperative follow up after having undergone a left reverse total shoulder arthroplasty completed by Dr. Matthew Pardo on 2/8/2022. Patient is here today because she fell on 3/4/2022 and wanted to make sure that she did not have issues with her hardware. She notes that she she has a 3 inch platform near her washer and dryer due to the fact that she is unable to reach into the washer while standing on the floor. She tripped over the 3 inch platform and knocked her right shoulder into the wall. She denies increase in pain. She denies numbness and or tingling in the left upper extremity. Review of Systems   Constitutional: Negative for activity change and fever. HENT: Negative for sneezing. Respiratory: Negative for cough and shortness of breath. Cardiovascular: Negative for chest pain. Gastrointestinal: Negative for vomiting. Musculoskeletal: Negative for arthralgias, joint swelling and myalgias. Skin: Negative for color change. Neurological: Negative for weakness and numbness. Psychiatric/Behavioral: Negative for sleep disturbance. Objective :   General: Malgorzata Patient is a 60 y.o. female who is alert and oriented and sitting comfortably in our office. Ortho Exam  MS:  Patient arrived wearing a sling on the left upper extremity.   After removal of the sling evaluation of the left shoulder reveals a well healed surgical incision along the anterior aspect of the left shoulder   There is no skin erythema, ecchymosis, wound dehiscence or signs of infection noted to the left upper extremity. Patient has nearly full range of motion of the left elbow wrist and hand. Patient is able to work on pendulum exercises. AROM left shoulder: F= 50, AB=45. Sensation is intact to light touch of the left upper extremity without focal deficits present. The skin is noted to be warm with brisk capillary refill distally on the left hand. Radial pulse 2+ on the left. Neuro: alert. oriented  Eyes: Extra-ocular muscles intact  Mouth: Oral mucosa moist. No perioral lesions  Pulm: Respirations unlabored and regular. Skin: warm, well perfused  Psych:   Patient has good fund of knowledge and displays understanging of exam, diagnosis, and plan. Radiology:     XR SHOULDER LEFT (MIN 2 VIEWS)    Result Date: 3/4/2022  History: Status post left reverse total shoulder arthroplasty. Comparison: 2/21/2022. Findings: AP, scapular Y, axillary views of the left shoulder obtained today in office reveals orthopedic hardware status post reverse total shoulder arthroplasty. Hardware appears intact with appropriate anatomical position. There is no evidence of periprosthetic fracture, subluxation, dislocation, radiopaque foreign body or radiopaque tumor appreciated. Of note there is severe acromioclavicular degenerative changes appreciated. No change in hardware alignment when compared to images from 2/21/2022. Impression: Left shoulder status post reverse total shoulder arthroplasty with hardware in appropriate position as described above. XR SHOULDER LEFT (MIN 2 VIEWS)    Result Date: 3/2/2022  History: Status post left reverse total shoulder arthroplasty. Comparison: 2/8/2022.  Findings: AP, scapular Y, axillary views of the left shoulder obtained today in office reveal orthopedic hardware status post reverse total shoulder arthroplasty. Hardware appears intact with appropriate anatomical position. There is no further evidence of fracture, subluxation, dislocation, radiopaque foreign body or radiopaque tumor appreciated. Severe acromioclavicular degenerative changes appreciated. Images unchanged when compared to x-rays from 2/8/2022. Impression: Status post left reverse total shoulder arthroplasty with hardware in appropriate position as described above. Assessment:      1. Status post reverse total replacement of left shoulder         Plan:       Patient is currently 3.5 weeks postoperative after undergoing a left shoulder reverse total shoulder arthroplasty completed on 2/8/2022 by Dr. Silva Garcia DO. Patient is here today after falling from a standing height on 2/4/2022. I personally reviewed her left shoulder x-rays from today there is no signs of periprosthetic fracture or hardware loosening. At this time the patient is to continue with outpatient physical therapy working on active range of motion of the left shoulder only. She will follow-up 3/21/2022 with Dr. Silva Garcia DO. She was instructed to call our office with any questions or concerns prior to her next appointment. She voiced her understanding. Follow up: Return in about 17 days (around 3/21/2022) for post-operative follow up. No orders of the defined types were placed in this encounter.       Orders Placed This Encounter   Procedures    XR SHOULDER LEFT (MIN 2 VIEWS)     Standing Status:   Future     Number of Occurrences:   1     Standing Expiration Date:   3/4/2023     Order Specific Question:   Reason for exam:     Answer:   fall s/p surgery       This note is created with the assistance of a speech recognition program.  While intending to generate a document that actually reflects the content of the visit, the document can still have some errors including those of syntax and sound a like substitutions which may escape proof reading. In such instances, actual meaning can be extrapolated by contextual diversion.      Electronically signed by Justin Hunt PA-C on 3/6/2022 at 10:29 AM

## 2022-03-06 ASSESSMENT — ENCOUNTER SYMPTOMS
VOMITING: 0
SHORTNESS OF BREATH: 0
COUGH: 0
COLOR CHANGE: 0

## 2022-03-07 RX ORDER — NAPROXEN 500 MG/1
TABLET ORAL
Qty: 28 TABLET | Refills: 1 | Status: SHIPPED | OUTPATIENT
Start: 2022-03-07 | End: 2022-04-13

## 2022-03-08 ENCOUNTER — HOSPITAL ENCOUNTER (OUTPATIENT)
Dept: PHYSICAL THERAPY | Age: 62
Setting detail: THERAPIES SERIES
Discharge: HOME | End: 2022-03-08
Payer: MEDICARE

## 2022-03-08 PROCEDURE — 97016 VASOPNEUMATIC DEVICE THERAPY: CPT

## 2022-03-08 PROCEDURE — 97110 THERAPEUTIC EXERCISES: CPT

## 2022-03-08 NOTE — FLOWSHEET NOTE
[x] [facility] &  Therapy  [address]  [phone]  F: [phone]     Physical Therapy Daily Treatment Note    Date:  3/8/2022  Patient Name:  Doris Darnell      :  1960    MRN: [id number]  Physician: Raman Marie DO                                    Insurance: [facility] Medicare Duel Complete (BMN)  Medical Diagnosis: M19.012 (ICD-10-CM) - Arthritis of left shoulder region            Rehab Codes: Pain M54.2, M25.512, stiffness M25.612, weakness M62.512, posture R29.3, myofascial M79.1, M62.838  Onset Date: 22                  Next 's appt: 22      Visit# / total visits:    Cancels/No Shows: 0/0  Progress note for Medicare patient due at visit 10    Subjective:    Pain:  [x] Yes  [] No Location: neck shoulder on left   Pain Rating: (0-10 scale) 7/10  Pain altered Tx:  [x] No  [] Yes  Action:  Comments: patient states that on 3/4/22 she tripped over a step she has by her washer. Fell into the wall and smacked her shoulder. Notes she saw ortho and had an Xray done same day, which was negative for trauma to her shoulder replacement.          Objective:   Modalities: Vaso low compression 40 deg x15 min at end of treatment - seated with pillow under arm    Precautions: healing forearm fracture left, flexion  deg, ER 30 deg, no IR, no active elbow flex    Exercises:  Exercise Reps/ Time Weight/ Level Comments   Pulleys - Flexion 3 min  Added 3/8/22         Standing      Codman's 20x ea  A/P, M/L, CW/CCW         Seated      Wrist AROM 4 ways 20x  Added reps 2022   Supinate/pronate 20x  Added reps 2022   Gripper 20x 2 red bands          Scapular Retraction 20x     CROM 10x5\" ea  Flex, ext, lat flex, rotation  Added 2022   Shoulder Shrug 20x  Added 2022   Shoulder depression 20x  Added 2022   Submax iso shoulder hold 20x5\"  Abd, add, IR, ext  Added 2022         Table slides 20x ea  Flexion/scaption  Added 3/8/22 Supine      Cane:      - Shoulder flex 2x10 AAROM V/c for breathing  Added 2/22/2022   - Chest press 20x AAROM V/c for breathing  Added 2/22/2022   - ER 20x           Education 3 min  Educated on post surgical protocols                                 Other:      Treatment Charges: Mins Units   []  Modalities     [x]  Ther Exercise 42 3   []  Manual Therapy     []  Ther Activities     []  Aquatics     [x]  Vasocompression 15 1   []  Other     Total Treatment time 57 4       Assessment: [x] Progressing toward goals: 10-90 degrees AAROM this date again with cane ex's, which is good considering the pt's recent set back with her fall. Added pulleys and table slides this date for improved AAROM of UE. Educated patient to begin sitting with 1-2 pillows propped under her arm to gradually improve abduction tolerance. Vaso continued for pain control post exercise, which patient notes helps a lot. [] No change. [] Other:  [x] Patient would continue to benefit from skilled physical therapy services in order to: reduce pain with modalities and exercise, reduce muscle spasms and trigger points with manual, learn proper posture for optimal healing and function, improve ROM and strength as able. STG: (to be met in 12 treatments)  1. ? Pain: Keep pain at 4/10 or less most times  2. ? ROM: PROM shoulder elevation increases to 90 degrees+  3. ? Strength:Start active motion when cleared by .   3. ? Function:Start functional activities when cleared by  and able to reach head. 5. Notes minimal tight muscles/spasms and able to manage independently if they return. 6. Demonstrate Knowledge of fall prevention    LTG: (to be met in 24 treatments)  1. LEFS improves to 50% or better function         2. Patient to be independent with home exercise program as demonstrated by performance with correct form without cues. Patient goals: Lift arm up half way to shoulder    Pt.  Education:  [x]

## 2022-03-10 ENCOUNTER — HOSPITAL ENCOUNTER (OUTPATIENT)
Dept: PHYSICAL THERAPY | Age: 62
Setting detail: THERAPIES SERIES
Discharge: HOME | End: 2022-03-10
Payer: MEDICARE

## 2022-03-10 ENCOUNTER — TELEPHONE (OUTPATIENT)
Dept: ORTHOPEDIC SURGERY | Age: 62
End: 2022-03-10

## 2022-03-10 PROCEDURE — 97016 VASOPNEUMATIC DEVICE THERAPY: CPT

## 2022-03-10 PROCEDURE — 97110 THERAPEUTIC EXERCISES: CPT

## 2022-03-10 NOTE — FLOWSHEET NOTE
[x] [facility] &  Therapy  [address]  [phone]  F: [phone]     Physical Therapy Daily Treatment Note    Date:  3/10/2022  Patient Name:  Rolf Hoskins      :  1960    MRN: [id number]  Physician: Zen Gu DO                                    Insurance: [facility] Medicare Duel Complete (BMN)  Medical Diagnosis: M19.012 (ICD-10-CM) - Arthritis of left shoulder region            Rehab Codes: Pain M54.2, M25.512, stiffness M25.612, weakness M62.512, posture R29.3, myofascial M79.1, M62.838  Onset Date: 22                  Next 's appt: 22      Visit# / total visits:    Cancels/No Shows: 0/0  Progress note for Medicare patient due at visit 10    Subjective:    Pain:  [x] Yes  [] No Location: neck shoulder on left   Pain Rating: (0-10 scale) 4/10  Pain altered Tx:  [x] No  [] Yes  Action:  Comments: Patient reports she is on valium from dentist appt and also had pain medicine today, so shoulder is feeling pretty good at start of Rx. Earlier today pain got up to 8-9/10 at home after holding video game in hand for a while.       Objective:   Modalities: Vaso low compression 40 deg x15 min at end of treatment - seated with pillow under arm    Precautions: healing forearm fracture left, flexion  deg, ER 30 deg, no IR, no active elbow flex    Exercises:  Exercise Reps/ Time Weight/ Level Comments   Pulleys - Flexion 3 min           Standing      Codman's 20x ea  A/P, M/L, CW/CCW         Seated      Wrist AROM 4 ways 20x 1 lb Added weight 3/10   Supinate/pronate 20x 1 lb Added weight 3/10   Gripper 20x 2 red bands-20 lbs          Scapular Retraction 20x     CROM 10x5\" ea  Flex, ext, lat flex, rotation   Shoulder Shrug 20x     Shoulder depression 20x     Submax iso shoulder hold 20x5\"  Abd, add, ext         Table slides 20x ea  Flexion/scaption                     Supine      Cane:      - Shoulder flex 17x AAROM V/c for breathing, only able to tolerate 17 reps 3/10   - Chest press 20x AAROM V/c for breathing     - ER 20x           Education --  Educated on post surgical protocols                                 Other:      Treatment Charges: Mins Units   []  Modalities     [x]  Ther Exercise 56 4   []  Manual Therapy     []  Ther Activities     []  Aquatics     [x]  Vasocompression 15 1   []  Other     Total Treatment time 71 min        Assessment: [x] Progressing toward goals:  Pt able to perform wrist ex with weight this date. Pt very talkative this date, note Pt performing extra reps most ex, frequent cues to stay on track with ex. Cont game ready at end of Rx to decrease pain and edema. [] No change. [] Other:  [x] Patient would continue to benefit from skilled physical therapy services in order to: reduce pain with modalities and exercise, reduce muscle spasms and trigger points with manual, learn proper posture for optimal healing and function, improve ROM and strength as able. STG: (to be met in 12 treatments)  1. ? Pain: Keep pain at 4/10 or less most times  2. ? ROM: PROM shoulder elevation increases to 90 degrees+  3. ? Strength:Start active motion when cleared by Dr.   3. ? Function:Start functional activities when cleared by  and able to reach head. 5. Notes minimal tight muscles/spasms and able to manage independently if they return. 6. Demonstrate Knowledge of fall prevention    LTG: (to be met in 24 treatments)  1. LEFS improves to 50% or better function         2. Patient to be independent with home exercise program as demonstrated by performance with correct form without cues. Patient goals: Lift arm up half way to shoulder    Pt. Education:  [x] Yes  [] No  [x] Reviewed Prior HEP/Ed  Method of Education: [x] Verbal  [x] Demo  [] Written  Comprehension of Education:  [x] Verbalizes understanding. [x] Demonstrates understanding. [] Needs review.   [x] Demonstrates/verbalizes HEP/Ed previously given. 3/10 HEP-Access Code: [email or web address]  URL: [email or web address]  Table slides flexion - 2-3 x daily - 7 x weekly - 20 reps  Seated Shoulder Scaption Slide at Table Top with Forearm in Neutral - 2-3 x daily - 7 x weekly - 20 reps  Supine Shoulder Flexion Extension AAROM with Dowel - 2-3 x daily - 7 x weekly - 20 reps  Supine Shoulder Press AAROM in Abduction with Dowel - 2-3 x daily - 7 x weekly - 20 reps       Given 2/22/2022  Wrist flex/ext  Wrist supination/pronation  CROM all planes  Pendulums  Shoulder shrugs  Scapular retractions     Plan: [x] Continue current frequency toward long and short term goals. [x]  Frequency: 2 x/week for 24 visits   [x] Specific Instructions for subsequent treatments: Posture, Gentle PROM, Myofascial work on neck, trap, pect muscles, gradually work through protocol.          Time In: 1340          Time Out: 1505      Electronically signed by:  Maurizio Muller PT

## 2022-03-10 NOTE — TELEPHONE ENCOUNTER
Simón from [address] [place] [address],[address] is calling in today to see if pt is ok to get some teeth extracted pt was schedule to get these done before her shoulder surgery will cintron but pt was unable to sit through the procedure due to the shoulder pain.  simón can be reached at [phone]     reverse total shoulder 2/8/2022

## 2022-03-10 NOTE — TELEPHONE ENCOUNTER
Contacted Dr. Joseph Parks office and let them know she needed to wait 3 months and then needs to be treated 1 hour before.

## 2022-03-15 ENCOUNTER — HOSPITAL ENCOUNTER (OUTPATIENT)
Dept: PHYSICAL THERAPY | Age: 62
Setting detail: THERAPIES SERIES
Discharge: HOME | End: 2022-03-15
Payer: MEDICARE

## 2022-03-15 PROCEDURE — 97016 VASOPNEUMATIC DEVICE THERAPY: CPT

## 2022-03-15 PROCEDURE — 97110 THERAPEUTIC EXERCISES: CPT

## 2022-03-15 NOTE — FLOWSHEET NOTE
[x] [facility] &  Therapy  [address]  [phone]  F: [phone]     Physical Therapy Daily Treatment Note    Date:  3/15/2022  Patient Name:  Jimmy Sandoval      :  1960    MRN: [id number]  Physician: Eric Young DO                                    Insurance: [facility] Medicare Duel Complete (BMN)  Medical Diagnosis: M19.012 (ICD-10-CM) - Arthritis of left shoulder region            Rehab Codes: Pain M54.2, M25.512, stiffness M25.612, weakness M62.512, posture R29.3, myofascial M79.1, M62.838  Onset Date: 22                  Next 's appt: 22      Visit# / total visits:    Cancels/No Shows: 0/0  Progress note for Medicare patient due at visit 10    Subjective:    Pain:  [x] Yes  [] No Location: neck shoulder on left   Pain Rating: (0-10 scale) 4/10  Pain altered Tx:  [x] No  [] Yes  Action:  Comments: Patient states that she was walking a bit throughout her house without her sling on. Neck and back are hurting mor leia her shoulder currently.       Objective:   Modalities: Vaso low compression 40 deg x15 min at end of treatment - seated with pillow under arm    Precautions: healing forearm fracture left, flexion  deg, ER 30 deg, no IR,     Exercises:  Exercise Reps/ Time Weight/ Level Comments   Pulleys - Flexion 3 min           Standing      Codman's 20x ea  A/P, M/L, CW/CCW   Elbow Flexion/Extension            Seated      Wrist AROM 4 ways 20x 1 lb Added weight 3/10   Supinate/pronate 20x 1 lb Added weight 3/10   Gripper 20x 2 red bands-20 lbs          Scapular Retraction 20x     CROM 10x5\" ea  Flex, ext, lat flex, rotation   Shoulder Shrug 20x     Shoulder depression 20x     Submax iso shoulder hold 20x5\"  Abd, add, ext         Table slides 20x ea  Flexion/scaption                     Supine      Cane:      - Shoulder flex 20x AAROM V/c for breathing   - Chest press 20x AAROM V/c for breathing     - ER 20x           Education --  Educated on post surgical protocols                                 Other: 5 weeks post op 3/15/22      Treatment Charges: Mins Units   []  Modalities     [x]  Ther Exercise 40 3   []  Manual Therapy     []  Ther Activities     []  Aquatics     [x]  Vasocompression 15 1   []  Other     Total Treatment time 55 4       Assessment: [x] Progressing toward goals: patient is now 5 weeks post op and AROM of elbow was initiated per surgical protocol. Educated patient that should wait until her surgeon clears her for removal of sling and should still be taking caution on a daily basis. patient continues to get side tracked very easily due to talking and cues to stay on task. Overall good tolerance with improved mobility noted with cane ex's. Still very guarded with abduction and was instructed to begin propping arm up on couch whilst sitting at home and gradually increasing elevation per her tolerance. [] No change. [] Other:  [x] Patient would continue to benefit from skilled physical therapy services in order to: reduce pain with modalities and exercise, reduce muscle spasms and trigger points with manual, learn proper posture for optimal healing and function, improve ROM and strength as able. STG: (to be met in 12 treatments)  1. ? Pain: Keep pain at 4/10 or less most times  2. ? ROM: PROM shoulder elevation increases to 90 degrees+  3. ? Strength:Start active motion when cleared by Dr. Katerina Saldivar. ? Function:Start functional activities when cleared by  and able to reach head. 5. Notes minimal tight muscles/spasms and able to manage independently if they return. 6. Demonstrate Knowledge of fall prevention    LTG: (to be met in 24 treatments)  1. LEFS improves to 50% or better function         2. Patient to be independent with home exercise program as demonstrated by performance with correct form without cues. Patient goals: Lift arm up half way to shoulder    Pt.  Education:  [x] Yes  [] No  [x] Reviewed Prior HEP/Ed  Method of Education: [x] Verbal  [x] Demo  [] Written  Comprehension of Education:  [x] Verbalizes understanding. [x] Demonstrates understanding. [] Needs review. [x] Demonstrates/verbalizes HEP/Ed previously given. 3/10 HEP-Access Code: [email or web address]  URL: [email or web address]/  Table slides flexion - 2-3 x daily - 7 x weekly - 20 reps  Seated Shoulder Scaption Slide at Table Top with Forearm in Neutral - 2-3 x daily - 7 x weekly - 20 reps  Supine Shoulder Flexion Extension AAROM with Dowel - 2-3 x daily - 7 x weekly - 20 reps  Supine Shoulder Press AAROM in Abduction with Dowel - 2-3 x daily - 7 x weekly - 20 reps       Given 2/22/2022  Wrist flex/ext  Wrist supination/pronation  CROM all planes  Pendulums  Shoulder shrugs  Scapular retractions     Plan: [x] Continue current frequency toward long and short term goals. [x]  Frequency: 2 x/week for 24 visits   [x] Specific Instructions for subsequent treatments: Posture, Gentle PROM, Myofascial work on neck, trap, pect muscles, gradually work through protocol.          Time In: 1346          Time Out: [id number] Alexis Sarah,Toby 200      Electronically signed by:  Libra Raymond PTA

## 2022-03-17 ENCOUNTER — HOSPITAL ENCOUNTER (OUTPATIENT)
Dept: PHYSICAL THERAPY | Age: 62
Setting detail: THERAPIES SERIES
Discharge: HOME | End: 2022-03-17
Payer: MEDICARE

## 2022-03-17 ENCOUNTER — TELEMEDICINE (OUTPATIENT)
Dept: PAIN MANAGEMENT | Age: 62
End: 2022-03-17
Payer: MEDICARE

## 2022-03-17 ENCOUNTER — HOSPITAL ENCOUNTER (OUTPATIENT)
Age: 62
Setting detail: SPECIMEN
Discharge: HOME | End: 2022-03-17
Payer: MEDICARE

## 2022-03-17 DIAGNOSIS — F11.99 OPIOID USE, UNSPECIFIED WITH UNSPECIFIED OPIOID-INDUCED DISORDER (HCC): ICD-10-CM

## 2022-03-17 DIAGNOSIS — M54.41 CHRONIC BILATERAL LOW BACK PAIN WITH BILATERAL SCIATICA: ICD-10-CM

## 2022-03-17 DIAGNOSIS — M54.12 CERVICAL RADICULOPATHY: ICD-10-CM

## 2022-03-17 DIAGNOSIS — Z79.891 ENCOUNTER FOR LONG-TERM OPIATE ANALGESIC USE: Chronic | ICD-10-CM

## 2022-03-17 DIAGNOSIS — M54.42 CHRONIC BILATERAL LOW BACK PAIN WITH BILATERAL SCIATICA: ICD-10-CM

## 2022-03-17 DIAGNOSIS — M54.12 CERVICAL RADICULOPATHY: Primary | ICD-10-CM

## 2022-03-17 DIAGNOSIS — G89.29 CHRONIC BILATERAL LOW BACK PAIN WITH BILATERAL SCIATICA: ICD-10-CM

## 2022-03-17 DIAGNOSIS — M47.817 LUMBOSACRAL SPONDYLOSIS WITHOUT MYELOPATHY: ICD-10-CM

## 2022-03-17 PROCEDURE — 97110 THERAPEUTIC EXERCISES: CPT

## 2022-03-17 PROCEDURE — 80307 DRUG TEST PRSMV CHEM ANLYZR: CPT

## 2022-03-17 PROCEDURE — 99213 OFFICE O/P EST LOW 20 MIN: CPT | Performed by: NURSE PRACTITIONER

## 2022-03-17 PROCEDURE — 97016 VASOPNEUMATIC DEVICE THERAPY: CPT

## 2022-03-17 RX ORDER — OXYCODONE HYDROCHLORIDE AND ACETAMINOPHEN 5; 325 MG/1; MG/1
1 TABLET ORAL EVERY 8 HOURS PRN
Qty: 90 TABLET | Refills: 0 | Status: SHIPPED | OUTPATIENT
Start: 2022-03-17 | End: 2022-04-16

## 2022-03-17 ASSESSMENT — ENCOUNTER SYMPTOMS
SHORTNESS OF BREATH: 0
BACK PAIN: 1
CONSTIPATION: 0
COUGH: 0

## 2022-03-17 NOTE — PROGRESS NOTES
Chief Complaint: neck and back pain    [facility]  Patient complains of neck and back pain. MRI with Foraminal narrowing, moderate at left C5-6, mild-to-moderate at bilateral C6-7 and left C7-T1, mild at bilateral C4-5, right C5-6 and right C7-T1. Spinal canal narrowing, moderate at L4-5, minimal at L2-3 and L3-4. Foraminal narrowing, moderate at right L5-S1, mild to moderate at right L4-5 and left L5-S1, mild at left L4-5.  Pt had Lumbar RFA and reports significant relief. She takes percocet as needed for the pain. She complains of increased cramping in her legs at times.     She had shoulder replacement 2/8 and continues to follow with Dr. Marci Holliday. She states she is healing well. She did see see neurosurgery team 2/23 with recommendation to follow up in 3 months when shoulder is completely healed. EMG lower extremities without evidence of radiculopathy, possible peroneal neuropathy. Upper extremities not completed.      Neck Pain   This is a chronic problem. The current episode started more than 1 year ago. The problem occurs constantly. The problem has been unchanged. The pain is present in the midline. The pain is at a severity of 7/10. The pain is moderate. The symptoms are aggravated by position and twisting. Pertinent negatives include no chest pain, fever, numbness or tingling. She has tried oral narcotics and bed rest for the symptoms. The treatment provided mild relief. Back Pain  This is a chronic problem. The current episode started more than 1 year ago. The problem occurs constantly. The problem is unchanged. The pain is present in the lumbar spine. The quality of the pain is described as aching. The pain does not radiate. The pain is at a severity of 4/10. The pain is mild. The symptoms are aggravated by position and standing (walking). Pertinent negatives include no chest pain, fever, numbness, paresthesias or tingling. She has tried analgesics and bed rest for the symptoms.  The treatment provided mild relief. Patient denies any new neurological symptoms. No bowel or bladder incontinence, no weakness, and no falling. Pill count: appropriate due last refill from pain clinic was 1/23 - has had pain medication prescribed by ortho following shoulder surgery - last prescription was 2/28 for 7 days    Morphine equivalent: 22.5    Controlled Substance Monitoring:    Acute and Chronic Pain Monitoring:   RX Monitoring 3/17/2022   Attestation -   Periodic Controlled Substance Monitoring Possible medication side effects, risk of tolerance/dependence & alternative treatments discussed. ;No signs of potential drug abuse or diversion identified.;Obtaining appropriate analgesic effect of treatment. Chronic Pain > 80 MEDD -   Chronic Pain > 120 MEDD -         Past Medical History:   Diagnosis Date    Acid reflux     Adult ADHD 08/01/2012    monthly visits @ [facility]    Asthma 08/01/2012    seasonal    Bipolar 1 disorder ([facility] [address]     monthly visits with [facility]    Broken foot 2014    Left foot    Chest pain of uncertain etiology 22/82/6264    Last episode was in 2013 (Written 02/12/2019)    Depression     Under control per pt.  on 6/15/18    DVT of leg (deep venous thrombosis) ([facility] [address] 08/22/2014    Fracture     right foot / pt denies surgical intervention    Fractured nose     x 9 times    Helicobacter pylori (H. pylori) infection 2013    per EGD    Hematuria 04/2018    Has a follow-up with Urologist 6/20    Hiatal hernia     History of blood transfusion     no reactions    History of DVT (deep vein thrombosis) 08/01/2012    History of myasthenia gravis     diagnosed around age 26    Hx of blood clots     L leg post knee surgery    Hypertension     managed by Dr. Nunu Rios PCP   Street Internal hemorrhoids     Narcolepsy 08/01/2012    Dr. Valeria Villalobos [facility]    Osteoporosis     Sleep apnea     no machine    Under care of team 10/2021    Dr. Imani Trujillo Under care of team     Pain Management Dr. Felix Neal last visit 2022       Past Surgical History:   Procedure Laterality Date    ANKLE SURGERY Left 2018    mass excision    ARM SURGERY Right     Pt states she has had 13 right arm surgeries    BREAST ENHANCEMENT SURGERY Bilateral      SECTION      x 3    COLONOSCOPY  2013    sigmoid diverticula, prominent large internal hemorrhoid    COLONOSCOPY N/A 2/10/2020    COLONOSCOPY DIAGNOSTIC performed by Angel Gallagher MD at [facility] 2020    COLORECTAL CANCER SCREENING, NOT HIGH RISK performed by James Grullon MD at [address], COLON, DIAGNOSTIC      ESOPHAGEAL MOTILITY STUDY N/A 2020    PES RN - ESOPHAGEAL MOTILITY STUDY performed by Lakeisha Bowman DO at [address] STUDY  9/10/2020    ESOPHAGEAL MOTILITY STUDY performed by Lakeisha Bowman DO at [facility]    ESOPHAGEAL MOTILITY STUDY N/A 2020    ESOPHAGEAL MOTILITY STUDY ATTEMPTED PER DR. Estefanía Lepe WITHOUT SUCCESS performed by Lakeisha Bowman DO at [facility]    FOOT SURGERY Left     FOOT TENDON SURGERY Left 2019    LEFT PERONEAL TENDON REPAIR WITH POSSIBLE LEFT TENDON TRANSFER (Left )    KNEE ARTHROSCOPY Left     Two surgeries on left knee per pt    KNEE SURGERY Right     Three surgeries per pt    LIGAMENT REPAIR Left 2019    LEFT PERONEAL TENDON REPAIR performed by Mark Wong DPM at [address] Bilateral 2021     BILATERAL L4/5, L5/S1 LUMBAR FACET (Bilateral )    NOSE SURGERY      PAIN MANAGEMENT PROCEDURE Bilateral 2021    BILATERAL L4/5, L5/S1 LUMBAR FACET performed by Washington Aldridge MD at [address] [place] [address]  2021    BILATERAL L4/5, L5/S1 NERVE BLOCK - Bilateral    PAIN MANAGEMENT PROCEDURE Bilateral 2021    BILATERAL L4/5, L5/S1 NERVE BLOCK performed by Washington Aldridge MD at [address] [place] [address] MANAGEMENT PROCEDURE Right 07/23/2021    L4/5, L5/S1 NERVE RADIOFREQUENCY ABLATION     PAIN MANAGEMENT PROCEDURE Right 7/23/2021    right  L4/5, L5/S1 NERVE RADIOFREQUENCY ABLATION performed by Jayy Crain MD at [address] [facility] Left 08/06/2021    left  L4/5, L5/S1 NERVE RADIOFREQUENCY ABLATION (Left )    PAIN MANAGEMENT PROCEDURE Left 8/6/2021    left  L4/5, L5/S1 NERVE RADIOFREQUENCY ABLATION performed by Jayy Crain MD at [address] OFFICE/OUTPT [address] [facility] Left 6/29/2018    RESECTION/REMOVAL BONY NEOPLASM LEFT FIBULA, LEFT SOFT TISSUE MASS EXCISION WITH BONE BIOPSY LEFT ANKLE FIBULA performed by Indira Garcia DPM at [address] [facility] Left     SHOULDER SURGERY Left 02/08/2022    REVERSE TOTAL SHOULDER ARTHROPLASTY - Left    SHOULDER SURGERY Left 2/8/2022    REVERSE TOTAL SHOULDER ARTHROPLASTY performed by John Olivas DO at [facility]  5/23/2013    tertiary contractures esophagus, 3 to 4 cm hiatal hernia, antral gastritis, + H Pylori, mild active chronic esophagitis    UPPER GASTROINTESTINAL ENDOSCOPY  4/6/2016    one biopsy    UPPER GASTROINTESTINAL ENDOSCOPY N/A 2/10/2020    EGD BIOPSY performed by Jami Bear MD at [facility]         Allergies   Allergen Reactions    Latex Itching    Prozac [Fluoxetine Hcl] Other (See Comments)     violent    Sulfa Antibiotics      migraine    Codeine Itching         Current Outpatient Medications:     naproxen (NAPROSYN) 500 MG tablet, TAKE ONE TABLET BY MOUTH TWICE A DAY WITH MEALS, Disp: 28 tablet, Rfl: 1    hydrOXYzine (ATARAX) 50 MG tablet, TAKE ONE TABLET BY MOUTH EVERY 8 HOURS AS NEEDED FOR ANXIETY, Disp: 30 tablet, Rfl: 0    docusate sodium (COLACE) 100 MG capsule, Take 1 capsule by mouth 2 times daily as needed for Constipation, Disp: 20 capsule, Rfl: 0    acetaminophen (TYLENOL) 500 MG tablet, Take 2 tablets by mouth every 6 hours as needed for Pain, Disp: 112 tablet, Rfl: 0    propranolol (INDERAL LA) 80 MG extended release capsule, Take 1 capsule by mouth daily, Disp: 90 capsule, Rfl: 1    amphetamine-dextroamphetamine (ADDERALL) 30 MG tablet, 30 mg 2 times daily.  , Disp: , Rfl:     pantoprazole (PROTONIX) 40 MG tablet, Take 1 tablet by mouth daily, Disp: 90 tablet, Rfl: 1    buPROPion HCl ER, XL, 450 MG TB24, TAKE ONE TABLET BY MOUTH EVERY MORNING, Disp: 30 tablet, Rfl: 5    alendronate (FOSAMAX) 70 MG tablet, TAKE 1 TABLET BY MOUTH ONCE WEEKLY BEFORE BREAKFAST, ON AN EMPTY STOMACH: REMAIN UPRIGHT FOR 30 MINUTES:TAKE WITH 8 OUNCES OF WATER, Disp: 12 tablet, Rfl: 10    albuterol sulfate HFA (VENTOLIN HFA) 108 (90 Base) MCG/ACT inhaler, INHALE TWO PUFFS BY MOUTH EVERY 4 HOURS AS NEEDED FOR WHEEZING, Disp: 1 Inhaler, Rfl: 5    citalopram (CELEXA) 40 MG tablet, TAKE ONE TABLET BY MOUTH DAILY, Disp: 90 tablet, Rfl: 3    albuterol (PROVENTIL) (2.5 MG/3ML) 0.083% nebulizer solution, Take 2.5 mg by nebulization every 6 hours as needed for Wheezing or Shortness of Breath , Disp: , Rfl:     Calcium Carbonate (CALCIUM 600 PO), Take 600 mg by mouth daily, Disp: , Rfl:     Family History   Problem Relation Age of Onset    Cancer Mother     Hypertension Mother     Stroke Mother     Dementia Mother     Cancer Maternal Aunt     Hypertension Father     Heart Surgery Father     Alzheimer's Disease Father     Diabetes Neg Hx        Social History     Socioeconomic History    Marital status: Single     Spouse name: Not on file    Number of children: Not on file    Years of education: Not on file    Highest education level: Not on file   Occupational History    Not on file   Tobacco Use    Smoking status: Former Smoker     Packs/day: 1.00     Years: 20.00     Pack years: 20.00     Quit date:      Years since quittin.2    Smokeless tobacco: Never Used   Vaping Use    Vaping Use: Never used   Substance and Sexual Activity    Alcohol use: Not Currently     Comment: maybe oncee a year    Drug use: No    Sexual activity: Not Currently   Other Topics Concern    Not on file   Social History Narrative    Not on file     Social Determinants of Health     Financial Resource Strain: Low Risk     Difficulty of Paying Living Expenses: Not hard at all   Food Insecurity: No Food Insecurity    Worried About [address] in the Last Year: Never true    [address] in the Last Year: Never true   Transportation Needs:     Lack of Transportation (Medical): Not on file    Lack of Transportation (Non-Medical): Not on file   Physical Activity:     Days of Exercise per Week: Not on file    Minutes of Exercise per Session: Not on file   Stress:     Feeling of Stress : Not on file   Social Connections:     Frequency of Communication with Friends and Family: Not on file    Frequency of Social Gatherings with Friends and Family: Not on file    Attends Scientologist Services: Not on file    Active Member of [address] [place] [address] or Organizations: Not on file    Attends Club or Organization Meetings: Not on file    Marital Status: Not on file   Intimate Partner Violence:     Fear of Current or Ex-Partner: Not on file    Emotionally Abused: Not on file    Physically Abused: Not on file    Sexually Abused: Not on file   Housing Stability:     Unable to Pay for Housing in the Last Year: Not on file    Number of [place] in the Last Year: Not on file    Unstable Housing in the Last Year: Not on file       Review of Systems:  Review of Systems   Constitutional: Negative for chills and fever. Cardiovascular: Negative for chest pain and palpitations. Respiratory: Negative for cough and shortness of breath. Musculoskeletal: Positive for back pain and neck pain. Gastrointestinal: Negative for constipation.    Neurological: Negative for disturbances in coordination, loss of balance, numbness, paresthesias and tingling. Physical Exam:  There were no vitals taken for this visit. Physical Exam  HENT:      Head: Normocephalic. Pulmonary:      Effort: Pulmonary effort is normal.   Musculoskeletal:         General: Normal range of motion. Cervical back: Normal range of motion. Tenderness present. Lumbar back: Tenderness present. Skin:     General: Skin is warm and dry. Neurological:      Mental Status: She is alert and oriented to person, place, and time. Record/Diagnostics Review:    Last angie 10/2021 and was appropriate     Assessment:  Problem List Items Addressed This Visit     Encounter for long-term opiate analgesic use (Chronic)    Cervical radiculopathy - Primary    Relevant Orders    DRUG SCREEN, PAIN    Lumbosacral spondylosis without myelopathy    Relevant Medications    oxyCODONE-acetaminophen (PERCOCET) 5-325 MG per tablet    Chronic bilateral low back pain with bilateral sciatica    Relevant Medications    oxyCODONE-acetaminophen (PERCOCET) 5-325 MG per tablet    Other Relevant Orders    DRUG SCREEN, PAIN    Opioid use, unspecified with unspecified opioid-induced disorder             Treatment Plan:  Patient relates current medications are helping the pain. Patient reports taking pain medications as prescribed, denies obtaining medications from different sources and denies use of illegal drugs. Patient denies side effects from medications like nausea, vomiting, constipation or drowsiness. Patient reports current activities of daily living are possible due to medications and would like to continue them. As always, we encourage daily stretching and strengthening exercises, and recommend minimizing use of pain medications unless patient cannot get through daily activities due to pain. Contract requirements met. Continue opioid therapy.  Script written for percocet  Pt continues to follow with ortho for shoulder replacement - currently in PT for this as well  UDS for standard monitoring purposes  Follow up appointment made for 4 weeks    Alex Gu, was evaluated through a synchronous (real-time) audio-video encounter. The patient (or guardian if applicable) is aware that this is a billable service, which includes applicable co-pays. This Virtual Visit was conducted with patient's (and/or legal guardian's) consent. The visit was conducted pursuant to the emergency declaration under the [address] [place] [address] authority and the [employer] General Act. Patient identification was verified, and a caregiver was present when appropriate. The patient was located at home in a state where the provider was licensed to provide care. Total time spent for this encounter: Not billed by time    --PIETER Adams CNP on 3/17/2022 at 12:06 PM    An electronic signature was used to authenticate this note.

## 2022-03-17 NOTE — FLOWSHEET NOTE
[x] [facility]  [address]  [phone]  F: [phone]     Physical Therapy Daily Treatment Note    Date:  3/17/2022  Patient Name:  Deborah Fraser      :  1960    MRN: [id number]  Physician: Idania Farias DO                                    Insurance: [facility] Medicare Duel Complete (BMN)  Medical Diagnosis: Z15.367 (ICD-10-CM) - Arthritis of left shoulder region            Rehab Codes: Pain M54.2, M25.512, stiffness M25.612, weakness M62.512, posture R29.3, myofascial M79.1, M62.838  Onset Date: 22                  Next 's appt: 3/30/22      Visit# / total visits:    Cancels/No Shows: 0/0  Progress note for Medicare patient due at visit 10    Subjective:    Pain:  [x] Yes  [] No Location: neck shoulder on left   Pain Rating: (0-10 scale) 310  Pain altered Tx:  [x] No  [] Yes  Action:  Comments: patient arrives with increased reports of chronic neck pain. Overall shoudler pain is not bad in comparison to her current neck pain.      Objective:   Modalities: Vaso low compression 40 deg x15 min at end of treatment - seated with pillow under arm    Precautions: healing forearm fracture left, flexion  deg, ER 30 deg, no IR,     Exercises completed marked with \"X\" 22  Exercise Reps/ Time Weight/ Level Comments    Pulleys - Flexion 3 min   X          Standing       Codman's 20x ea  A/P, M/L, CW/CCW    Elbow Flexion/Extension 20x    X          Seated       Wrist AROM 4 ways 20x 1 lb Added weight 3/10 X   Supinate/pronate 20x 1 lb Added weight 3/10 X   Gripper 20x 2 red bands-20 lbs            Scapular Retraction 20x   X   CROM 10x5\" ea  Flex, ext, lat flex, rotation    Shoulder Shrug 20x   X   Shoulder depression 20x   X   Submax iso shoulder hold 20x5\"  Abd, add, ext/flexion, IR/ER X          Table slides 20x ea  Flexion/scaption X                        Supine       Cane:       - Shoulder flex 20x AAROM V/c for breathing X - Chest press 20x AAROM V/c for breathing   X   - ER 20x   X          Education --  Educated on post surgical protocols                                       Other: 5 weeks post op 3/15/22      Treatment Charges: Mins Units   []  Modalities     [x]  Ther Exercise 30 2   []  Manual Therapy     []  Ther Activities     []  Aquatics     [x]  Vasocompression 15 1   []  Other     Total Treatment time 45 3       Assessment: [x] Progressing toward goals: Patient still demonstrating approximately 90 degrees of shoulder flexion with supine cane ex's. Patient is now able to rest her arm on table whilst in supine position - whereas she has previously been unable to. Overall limitation due to neck pain this date, but able to complete charted ex's as noted. Patient requested to hold CROM ex's this date. [] No change. [] Other:  [x] Patient would continue to benefit from skilled physical therapy services in order to: reduce pain with modalities and exercise, reduce muscle spasms and trigger points with manual, learn proper posture for optimal healing and function, improve ROM and strength as able. STG: (to be met in 12 treatments)  1. ? Pain: Keep pain at 4/10 or less most times  2. ? ROM: PROM shoulder elevation increases to 90 degrees+  3. ? Strength:Start active motion when cleared by Dr.   3. ? Function:Start functional activities when cleared by  and able to reach head. 5. Notes minimal tight muscles/spasms and able to manage independently if they return. 6. Demonstrate Knowledge of fall prevention    LTG: (to be met in 24 treatments)  1. LEFS improves to 50% or better function         2. Patient to be independent with home exercise program as demonstrated by performance with correct form without cues. Patient goals: Lift arm up half way to shoulder    Pt.  Education:  [x] Yes  [] No  [x] Reviewed Prior HEP/Ed  Method of Education: [x] Verbal  [x] Demo  [] Written  Comprehension of Education:  [x] Verbalizes understanding. [x] Demonstrates understanding. [] Needs review. [x] Demonstrates/verbalizes HEP/Ed previously given. 3/10 HEP-Access Code: [email or web address]   URL: [email or web address]/  Table slides flexion - 2-3 x daily - 7 x weekly - 20 reps  Seated Shoulder Scaption Slide at Table Top with Forearm in Neutral - 2-3 x daily - 7 x weekly - 20 reps  Supine Shoulder Flexion Extension AAROM with Dowel - 2-3 x daily - 7 x weekly - 20 reps  Supine Shoulder Press AAROM in Abduction with Dowel - 2-3 x daily - 7 x weekly - 20 reps       Given 2/22/2022  Wrist flex/ext  Wrist supination/pronation  CROM all planes  Pendulums  Shoulder shrugs  Scapular retractions     Plan: [x] Continue current frequency toward long and short term goals. [x]  Frequency: 2 x/week for 24 visits   [x] Specific Instructions for subsequent treatments: Posture, Gentle PROM, Myofascial work on neck, trap, pect muscles, gradually work through protocol.          Time In: 1345          Time Out: 1438      Electronically signed by:  Mary Tidwell PTA

## 2022-03-21 LAB
6-ACETYLMORPHINE, UR: NOT DETECTED
7-AMINOCLONAZEPAM, URINE: NOT DETECTED
ALPHA-OH-ALPRAZ, URINE: NOT DETECTED
ALPHA-OH-MIDAZOLAM, URINE: NOT DETECTED
ALPRAZOLAM, URINE: NOT DETECTED
AMPHETAMINES, URINE: PRESENT
BARBITURATES, URINE: NOT DETECTED
BENZOYLECGONINE, UR: NOT DETECTED
BUPRENORPHINE URINE: NOT DETECTED
CARISOPRODOL, UR: NOT DETECTED
CLONAZEPAM, URINE: NOT DETECTED
CODEINE, URINE: NOT DETECTED
CREATININE URINE: >400 MG/DL (ref 20–400)
DIAZEPAM, URINE: NOT DETECTED
DRUGS EXPECTED, UR: ABNORMAL
EER HI RES INTERP UR: ABNORMAL
ETHYL GLUCURONIDE UR: PRESENT
FENTANYL URINE: NOT DETECTED
GABAPENTIN: NOT DETECTED
HYDROCODONE, URINE: NOT DETECTED
HYDROMORPHONE, URINE: NOT DETECTED
LORAZEPAM, URINE: NOT DETECTED
MARIJUANA METAB, UR: NOT DETECTED
MDA, UR: NOT DETECTED
MDEA, EVE, UR: NOT DETECTED
MDMA URINE: NOT DETECTED
MEPERIDINE METAB, UR: NOT DETECTED
METHADONE, URINE: NOT DETECTED
METHAMPHETAMINE, URINE: NOT DETECTED
METHYLPHENIDATE: NOT DETECTED
MIDAZOLAM, URINE: NOT DETECTED
MORPHINE URINE: NOT DETECTED
NALOXONE URINE: NOT DETECTED
NORBUPRENORPHINE, URINE: NOT DETECTED
NORDIAZEPAM, URINE: PRESENT
NORFENTANYL, URINE: NOT DETECTED
NORHYDROCODONE, URINE: NOT DETECTED
NOROXYCODONE, URINE: PRESENT
NOROXYMORPHONE, URINE: NOT DETECTED
OXAZEPAM, URINE: PRESENT
OXYCODONE URINE: PRESENT
OXYMORPHONE, URINE: PRESENT
PAIN MANAGEMENT DRUG PANEL INTERP, URINE: ABNORMAL
PAIN MGT DRUG PANEL, HI RES, UR: ABNORMAL
PCP,URINE: NOT DETECTED
PHENTERMINE, UR: NOT DETECTED
PREGABALIN: NOT DETECTED
TAPENTADOL, URINE: NOT DETECTED
TAPENTADOL-O-SULFATE, URINE: NOT DETECTED
TEMAZEPAM, URINE: PRESENT
TRAMADOL, URINE: NOT DETECTED
ZOLPIDEM METABOLITE (ZCA), URINE: NOT DETECTED
ZOLPIDEM, URINE: NOT DETECTED

## 2022-03-22 ENCOUNTER — HOSPITAL ENCOUNTER (OUTPATIENT)
Dept: PHYSICAL THERAPY | Age: 62
Setting detail: THERAPIES SERIES
Discharge: HOME | End: 2022-03-22
Payer: MEDICARE

## 2022-03-22 NOTE — FLOWSHEET NOTE
[x] [facility]  [address]    [phone]  F: [phone]     Physical Therapy Cancel/No Show note    Date: 3/22/2022  Patient: Haseeb Patel  : 1960  MRN: [id number]    Cancels/No Shows to date:     For today's appointment patient:    [x]  Cancelled    [] Rescheduled appointment    [] No-show     Reason given by patient:    []  Patient ill     []  Conflicting appointment    [] No transportation      [] Conflict with work    [] No reason given    [] Weather related    [] [id number]    [x] Other:      Comments: \" back is hurting really bad\"        [x] Next appointment was confirmed      Electronically signed by: Mary Tidwell PTA

## 2022-03-24 ENCOUNTER — HOSPITAL ENCOUNTER (OUTPATIENT)
Dept: PHYSICAL THERAPY | Age: 62
Setting detail: THERAPIES SERIES
Discharge: HOME | End: 2022-03-24
Payer: MEDICARE

## 2022-03-24 PROCEDURE — 97110 THERAPEUTIC EXERCISES: CPT

## 2022-03-24 PROCEDURE — 97016 VASOPNEUMATIC DEVICE THERAPY: CPT

## 2022-03-24 NOTE — FLOWSHEET NOTE
[x] [facility]  Therapy  [address]  [phone]  F: [phone]     Physical Therapy Daily Treatment Note    Date:  3/24/2022  Patient Name:  Haseeb Patel      :  1960    MRN: [id number]  Physician: Zayda Bernal DO                                    Insurance: [facility] Medicare Duel Complete (BMN)  Medical Diagnosis: F87.284 (ICD-10-CM) - Arthritis of left shoulder region            Rehab Codes: Pain M54.2, M25.512, stiffness M25.612, weakness M62.512, posture R29.3, myofascial M79.1, M62.838  Onset Date: 22                  Next 's appt: 3/30/22      Visit# / total visits:    Cancels/No Shows: 2/0  Progress note for Medicare patient due at visit 10    Subjective:    Pain:  [x] Yes  [] No Location: neck shoulder on left   Pain Rating: (0-10 scale) 2/10  Pain altered Tx:  [x] No  [] Yes  Action:  Comments: Patient notes her neck and back are feeling better today, which is why she had to cancel her last. Shoulder is doing relatively well, with little pain upon arrival this date.       Objective:   Modalities: Vaso low compression 40 deg x15 min at end of treatment - seated with pillow under arm    Precautions: healing forearm fracture left, flexion  deg, ER 30 deg, no IR,     Exercises completed marked with \"X\" 22  Exercise Reps/ Time Weight/ Level Comments    Pulleys - Flexion 3 min   X          Standing       Codman's 20x ea  A/P, M/L, CW/CCW X   Elbow Flexion/Extension 20x    X          Seated       Wrist AROM 4 ways 20x 1 lb Added weight 3/10 X   Supinate/pronate 20x 1 lb Added weight 3/10 X   Gripper 20x 2 red bands-20 lbs            Scapular Retraction 20x   X   CROM 10x5\" ea  Flex, ext, lat flex, rotation    Shoulder Shrug 20x   X   Shoulder depression 20x   X   Submax iso shoulder hold 20x5\"  Abd, add, ext/flexion, IR/ER X          Table slides 20x ea  Flexion/scaption  Wash cloth on slide board X Supine       Cane:       - Shoulder flex 20x AAROM V/c for breathing X   - Chest press 20x AAROM V/c for breathing   X   - ER 20x   X   Rhythmic Stabilization  2x1 min  Added 3/24/22 X          Other: 6 weeks post op 3/22/22      Treatment Charges: Mins Units   []  Modalities     [x]  Ther Exercise 40 3   []  Manual Therapy     []  Ther Activities     []  Aquatics     [x]  Vasocompression 15 1   []  Other     Total Treatment time 55 4       Assessment: [x] Progressing toward goals: Added supine rhythmic stabilization this date to improve shoulder stability and help begin strengthening of shoulder. Patient is doing fairly well overall, in regards to pain. ROM still remains somewhat limited. Progress note done same date for goal and ROM assessment - refer to separate note for additional assessment/objective information. [] No change. [] Other:  [x] Patient would continue to benefit from skilled physical therapy services in order to: reduce pain with modalities and exercise, reduce muscle spasms and trigger points with manual, learn proper posture for optimal healing and function, improve ROM and strength as able. STG: (to be met in 12 treatments)  1. ? Pain: Keep pain at 4/10 or less most times  2. ? ROM: PROM shoulder elevation increases to 90 degrees+  3. ? Strength:Start active motion when cleared by Dr.   3. ? Function:Start functional activities when cleared by  and able to reach head. 5. Notes minimal tight muscles/spasms and able to manage independently if they return. 6. Demonstrate Knowledge of fall prevention    LTG: (to be met in 24 treatments)  1. LEFS improves to 50% or better function         2. Patient to be independent with home exercise program as demonstrated by performance with correct form without cues. Patient goals: Lift arm up half way to shoulder    Pt.  Education:  [x] Yes  [] No  [x] Reviewed Prior HEP/Ed  Method of Education: [x] Verbal  [x] Demo  [] Written  Comprehension of Education:  [x] Verbalizes understanding. [x] Demonstrates understanding. [] Needs review. [x] Demonstrates/verbalizes HEP/Ed previously given. 3/10 HEP-Access Code: [email or web address]   URL: [email or web address] [employer]/  Table slides flexion - 2-3 x daily - 7 x weekly - 20 reps  Seated Shoulder Scaption Slide at Table Top with Forearm in Neutral - 2-3 x daily - 7 x weekly - 20 reps  Supine Shoulder Flexion Extension AAROM with Dowel - 2-3 x daily - 7 x weekly - 20 reps  Supine Shoulder Press AAROM in Abduction with Dowel - 2-3 x daily - 7 x weekly - 20 reps       Given 2/22/2022  Wrist flex/ext  Wrist supination/pronation  CROM all planes  Pendulums  Shoulder shrugs  Scapular retractions     Plan: [x] Continue current frequency toward long and short term goals. [x]  Frequency: 2 x/week for 24 visits   [x] Specific Instructions for subsequent treatments: Posture, Gentle PROM, Myofascial work on neck, trap, pect muscles, gradually work through protocol.          Time In: 0175          Time Out: 0978      Electronically signed by:  Dayday Briceno PTA

## 2022-03-24 NOTE — PROGRESS NOTES
[x] [facility]) [facility] &  Therapy  [address]  [phone]  F: [phone] [] [address]  [address]   [address]  P: [phone]  F: [phone] [] [address] &  Therapy  [address]  P: [phone]  F: [phone] [] [address]  P: [phone]  F: [phone] [] [address]  [facility]   [address]   [place]: [phone]  F: [phone]      Physical Therapy Progress Note    Date: 3/24/2022      Patient: Trisha Zepeda  : 1960  MRN: [id number]    [address]: [facility] Medicare Duel Complete (BMN)  Medical Diagnosis: M19.012 (ICD-10-CM) - Arthritis of left shoulder region            Rehab Codes: Pain M54.2, M25.512, stiffness M25.612, weakness M62.512, posture R29.3, myofascial M79.1, M62.838  Onset Date: 22                                 Next 's appt: 3/30/22                    Total visits attended:  Cancels/No shows: 0/0  Date range of services:22  to 3-24-22      Subjective:   Pain:  [x]? Yes  []? No   Location: neck shoulder on left             Pain Rating: (0-10 scale) 2/10  Pain altered Tx:  [x]? No  []? Yes  Action:  Comments: Patient notes her neck and back are feeling better today, which is why she had to cancel her last. Shoulder is doing relatively well, with little pain upon arrival this date    Objective:  Test Measurements: PROM Lt shoulder flex 105 pain, abd 70 pain, ER 30 pain  Function: Cont.  To wear sling and follow instructions    Assessment:   STG: (to be met in 12 treatments)  1. ? Pain: Keep pain at 4/10 or less most times  2. ? ROM: PROM shoulder elevation increases to 90 degrees+- Met for flex, not abd  3. ? Strength:Start active motion when cleared by    4. ? Function:Start functional activities when cleared by  and able to reach head. 5. Notes minimal tight muscles/spasms and able to manage independently if they return.   6. Demonstrate Knowledge of fall prevention     LTG: (to be met in 24 treatments)  1. LEFS improves to 50% or better function         2.  Patient to be independent with home exercise program as demonstrated by performance with correct form without cues.                Patient goals: Lift arm up half way to shoulder  Treatment Plan:  [x] Therapeutic Exercise   23190  [] Iontophoresis: 4 mg/mL Dexamethasone Sodium Phosphate  mAmin  56396   [x] Therapeutic Activity  18757 [x] Vasopneumatic cold with compression  99743    [] Gait Training   13977 [] Ultrasound   70202   [] Neuromuscular Re-education  06042 [] Electrical Stimulation Unattended  62135   [x] Manual Therapy  24380 [] Electrical Stimulation Attended  64231   [x] Instruction in HEP  [] Lumbar/Cervical Traction  51077   [] Aquatic Therapy   68182 [] Cold/hotpack    [] Massage   37159      [] Dry Needling, 1 or 2 muscles  78442   [] Biofeedback, first 15 minutes   35630  [] Biofeedback, additional 15 minutes   54799 [] Dry Needling, 3 or more muscles  53938       Patient Status:     [x] Continue per initial plan of care. [] Additional visits necessary. [] Other:     Electronically signed by Zeeshan Dobbins PT on 3/24/2022 at 4:54 PM      If you have any questions or concerns, please don't hesitate to call. Thank you for your referral.    Physician Signature:________________________________Date:__________________  By signing above or cosigning this note, I have reviewed this plan of care and certify a need for medically necessary rehabilitation services.      *PLEASE SIGN ABOVE AND FAX BACK ALL PAGES*

## 2022-03-29 ENCOUNTER — HOSPITAL ENCOUNTER (OUTPATIENT)
Dept: PHYSICAL THERAPY | Age: 62
Setting detail: THERAPIES SERIES
Discharge: HOME | End: 2022-03-29
Payer: MEDICARE

## 2022-03-29 PROCEDURE — 97016 VASOPNEUMATIC DEVICE THERAPY: CPT

## 2022-03-29 PROCEDURE — 97110 THERAPEUTIC EXERCISES: CPT

## 2022-03-29 NOTE — FLOWSHEET NOTE
[x] [facility] &  Therapy  [address]  [phone]  F: [phone]     Physical Therapy Daily Treatment Note    Date:  3/29/2022  Patient Name:  Wade Rabago      :  1960    MRN: [id number]  Physician: Waldemar Olivera DO                                    Insurance: [facility] Medicare Duel Complete (BMN)  Medical Diagnosis: A57.975 (ICD-10-CM) - Arthritis of left shoulder region            Rehab Codes: Pain M54.2, M25.512, stiffness M25.612, weakness M62.512, posture R29.3, myofascial M79.1, M62.838  Onset Date: 22                  Next 's appt: 3/30/22      Visit# / total visits: 10/24   Cancels/No Shows: 2/0  Progress note for Medicare patient due at visit 10    Subjective:    Pain:  [x] Yes  [] No Location: neck shoulder on left   Pain Rating: (0-10 scale) 2/10  Pain altered Tx:  [x] No  [] Yes  Action:  Comments: Patient states that her shoulder pain has not been too bad. She just notes she tends to bump her arm into things daily.        Objective:   Modalities: Vaso low compression 40 deg x15 min at end of treatment - seated with pillow under arm    Precautions: healing forearm fracture left, flexion  deg, ER 30 deg, no IR,     Exercises completed marked with \"X\" 22  Exercise Reps/ Time Weight/ Level Comments    Treadmill 8 min 1.5 mph Added  X          Pulleys - Flexion 3 min   X          Standing       Codman's 20x ea  A/P, M/L, CW/CCW X   Elbow Flexion/Extension 20x    X          Seated       Wrist AROM 4 ways 20x 1 lb Added weight 3/10 X   Supinate/pronate 20x 1 lb Added weight 3/10 X   Gripper 20x 2 red bands-20 lbs            Scapular Retraction 20x   X   CROM 10x5\" ea  Flex, ext, lat flex, rotation    Shoulder Shrug 20x   X   Shoulder depression 20x   X   Submax iso shoulder hold 20x5\"  Abd, add, ext/flexion, IR/ER X          Table slides 20x ea  Flexion/scaption  Wash cloth on slide board X   ER Stretch 20x5\"   X Supine       Cane:       - Shoulder flex 20x AAROM Seated 3/29/22 X   - Chest press 20x AAROM Seated 3/29/22 X   Horizontal Abduction/Adduction  20x ea AAROM     - ER 20x AAROM  X   Shoulder Flexion 20x AROM Added 3/29/22    Rhythmic Stabilization  2x1 min  Added 3/24/22 X          Other: 7 weeks post op 3/22/22      Treatment Charges: Mins Units   []  Modalities     [x]  Ther Exercise 40 3   []  Manual Therapy     []  Ther Activities     []  Aquatics     [x]  Vasocompression 15 1   []  Other     Total Treatment time 55 4       Assessment: [x] Progressing toward goals: Progressed cane flexion and chest press to seated today to improve AAROM of shoulder in gravity resisted position - considerable tactile cueing and manual depression of scapula required. Supine AROM of shoulder added as well to improve shoulder mobility and light mm activation. [] No change. [x] Other: ROM is still limited and difficulty achieving greater than 90 degrees elevation. [x] Patient would continue to benefit from skilled physical therapy services in order to: reduce pain with modalities and exercise, reduce muscle spasms and trigger points with manual, learn proper posture for optimal healing and function, improve ROM and strength as able. STG: (to be met in 12 treatments)  1. ? Pain: Keep pain at 4/10 or less most times  2. ? ROM: PROM shoulder elevation increases to 90 degrees+  3. ? Strength:Start active motion when cleared by Dr.   3. ? Function:Start functional activities when cleared by  and able to reach head. 5. Notes minimal tight muscles/spasms and able to manage independently if they return. 6. Demonstrate Knowledge of fall prevention    LTG: (to be met in 24 treatments)  1. LEFS improves to 50% or better function         2. Patient to be independent with home exercise program as demonstrated by performance with correct form without cues.                  Patient goals: Lift arm up half way to shoulder    Pt. Education:  [x] Yes  [] No  [x] Reviewed Prior HEP/Ed  Method of Education: [x] Verbal  [x] Demo  [] Written  Comprehension of Education:  [x] Verbalizes understanding. [x] Demonstrates understanding. [] Needs review. [x] Demonstrates/verbalizes HEP/Ed previously given. 3/10 HEP-Access Code: [email or web address]   URL: [email or web address]/  Table slides flexion - 2-3 x daily - 7 x weekly - 20 reps  Seated Shoulder Scaption Slide at Table Top with Forearm in Neutral - 2-3 x daily - 7 x weekly - 20 reps  Supine Shoulder Flexion Extension AAROM with Dowel - 2-3 x daily - 7 x weekly - 20 reps  Supine Shoulder Press AAROM in Abduction with Dowel - 2-3 x daily - 7 x weekly - 20 reps       Given 2/22/2022  Wrist flex/ext  Wrist supination/pronation  CROM all planes  Pendulums  Shoulder shrugs  Scapular retractions     Plan: [x] Continue current frequency toward long and short term goals. [x]  Frequency: 2 x/week for 24 visits   [x] Specific Instructions for subsequent treatments: Posture, Gentle PROM, Myofascial work on neck, trap, pect muscles, gradually work through protocol.          Time In: 9100         Time Out: 1450      Electronically signed by:  Mirtha Lucio PTA

## 2022-03-30 ENCOUNTER — OFFICE VISIT (OUTPATIENT)
Dept: ORTHOPEDIC SURGERY | Age: 62
End: 2022-03-30

## 2022-03-30 VITALS — WEIGHT: 151 LBS | RESPIRATION RATE: 16 BRPM | BODY MASS INDEX: 29.64 KG/M2 | HEIGHT: 60 IN

## 2022-03-30 DIAGNOSIS — Z96.612 STATUS POST REVERSE TOTAL REPLACEMENT OF LEFT SHOULDER: Primary | ICD-10-CM

## 2022-03-30 PROCEDURE — 99024 POSTOP FOLLOW-UP VISIT: CPT | Performed by: PHYSICIAN ASSISTANT

## 2022-03-30 ASSESSMENT — ENCOUNTER SYMPTOMS
DIARRHEA: 0
CONSTIPATION: 0
ABDOMINAL PAIN: 0
RESPIRATORY NEGATIVE: 1
SHORTNESS OF BREATH: 0
APNEA: 0
COLOR CHANGE: 0
CHEST TIGHTNESS: 0
COUGH: 0
NAUSEA: 0
VOMITING: 0
ABDOMINAL DISTENTION: 0

## 2022-03-30 NOTE — PROGRESS NOTES
[address]  [facility]  [address]  [facility] [address]  Dept: [phone]  Dept Fax: [phone]        Post Operative Follow Up    Subjective:     Chief Complaint   Patient presents with    Shoulder Pain     L reverse total shoulder 2/8/22     Post Op Surgery:     The patient is here for a follow up after having a left total shoulder replacement. DOS: 2/8/2022  She is 7 weeks post op. She is going to physical therapy 2x/week. She is taking Percocet from pain management. Patient states her shoulder feels better than it has in years. Review of Systems   Constitutional: Positive for activity change. Negative for appetite change, fatigue and fever. Respiratory: Negative. Negative for apnea, cough, chest tightness and shortness of breath. Cardiovascular: Negative. Negative for chest pain, palpitations and leg swelling. Gastrointestinal: Negative for abdominal distention, abdominal pain, constipation, diarrhea, nausea and vomiting. Genitourinary: Negative for difficulty urinating, dysuria and hematuria. Musculoskeletal: Positive for arthralgias. Negative for gait problem, joint swelling and myalgias. Skin: Negative for color change and rash. Neurological: Negative for dizziness, weakness, numbness and headaches. Psychiatric/Behavioral: Positive for sleep disturbance. I have reviewed the CC, HPI, ROS, PMH, FHX, Social History, and if not present in this note, I have reviewed in the patient's chart. I agree with the documentation provided by other staff and have reviewed their documentation prior to providing my signature indicating agreement. Vitals:   Resp 16   Ht 5' (1.524 m)   Wt 151 lb (68.5 kg)   BMI 29.49 kg/m²  Body mass index is 29.49 kg/m². Physical Examination:     Orthopedics:    GENERAL: Alert and oriented X3 in no acute distress.   SKIN: Intact without lesions or ulcerations. Incision is healing well with no signs of infection. NEURO: Intact to sensory and motor testing. VASC: Capillary refill is less than 3 seconds. Post Op Exam:    LOCATION: Left shoulder  SITE: Distal neurocirculatory status is intact. EXAM: Sensation is intact to light touch, there is full motor function of the extremity. ROM: 45 degrees active/90 degrees passive degrees of forward elevation, 60 degrees of external rotation and abduction, 20 degrees of external rotation in neutral, internal rotation to side pocket    Radiology:   No results found. Assessment:     1. Status post reverse total replacement of left shoulder      Plan:   Post Op Treatment : Patient had the treatment regimen reviewed today 7 weeks status post reverse total shoulder replacement. I reviewed the films with the patient. During today's visit, we discussed that the patient is very happy with her shoulder replacement. She has less pain than she had prior to surgery. She also states her shoulder moves better than it did before surgery. I explained to her that by 3 months we would like her to be able to reach the top of her head and her back pocket. The patient agrees that she should be doing a better job of doing her exercises on her own. We discussed some of the choices that she could do. The patient then stated that they understand the plan and at this time, the patient has opted continuing physical therapy. She can also discontinue the sling and when she is in a be in a crowd. Patient should return to the office in 5 weeks to f/u with Dr. Miguel Newton. The patient will call the office immediately with any problems that may arise. No orders of the defined types were placed in this encounter. No orders of the defined types were placed in this encounter.         Electronically signed by Raquel Christianson PA-C, on 3/30/2022 at 3:26 PM

## 2022-03-30 NOTE — PATIENT INSTRUCTIONS
Patient Education        Neck Arthritis: Exercises  Introduction  Here are some examples of exercises for you to try. The exercises may be suggested for a condition or for rehabilitation. Start each exercise slowly. Ease off the exercises if you start to have pain. You will be told when to start these exercises and which ones will work bestfor you. How to do the exercises  Neck stretches to the side    1. This stretch works best if you keep your shoulder down as you lean away from it. To help you remember to do this, start by relaxing your shoulders and lightly holding on to your thighs or your chair. 2. Tilt your head toward your shoulder and hold for 15 to 30 seconds. Let the weight of your head stretch your muscles. 3. Repeat 2 to 4 times toward each shoulder. Chin tuck    1. Lie on the floor with a rolled-up towel under your neck. Your head should be touching the floor. 2. Slowly bring your chin toward your chest.  3. Hold for a count of 6, and then relax for up to 10 seconds. 4. Repeat 8 to 12 times. Active cervical rotation    1. Sit in a firm chair, or stand up straight. 2. Keeping your chin level, turn your head to the right, and hold for 15 to 30 seconds. 3. Turn your head to the left and hold for 15 to 30 seconds. 4. Repeat 2 to 4 times to each side. Shoulder blade squeeze    1. While standing, squeeze your shoulder blades together. 2. Do not raise your shoulders up as you are squeezing. 3. Hold for 6 seconds. 4. Repeat 8 to 12 times. Shoulder rolls    1. Sit comfortably with your feet shoulder-width apart. You can also do this exercise standing up. 2. Roll your shoulders up, then back, and then down in a smooth, circular motion. 3. Repeat 2 to 4 times. Follow-up care is a key part of your treatment and safety. Be sure to make and go to all appointments, and call your doctor if you are having problems.  It's also a good idea to know your test results and keep alist of the medicines you take.  Where can you learn more? Go to [email or web address] and sign in to your [employer] account. Enter B538 in the Responsa box to learn more about \"Neck Arthritis: Exercises. \"     If you do not have an account, please click on the \"Sign Up Now\" link. Current as of: July 1, 2021               Content Version: 13.2  © 2006-2022 [employer]wise[employer]. Care instructions adapted under license by [facility] ([facility]). If you have questions about a medical condition or this instruction, always ask your healthcare professional. [address] any warranty or liability for your use of this information.

## 2022-03-31 ENCOUNTER — HOSPITAL ENCOUNTER (OUTPATIENT)
Dept: PHYSICAL THERAPY | Age: 62
Setting detail: THERAPIES SERIES
Discharge: HOME | End: 2022-03-31
Payer: MEDICARE

## 2022-03-31 PROCEDURE — 97016 VASOPNEUMATIC DEVICE THERAPY: CPT

## 2022-03-31 PROCEDURE — 97110 THERAPEUTIC EXERCISES: CPT

## 2022-03-31 NOTE — FLOWSHEET NOTE
[x] [facility]  Therapy  [address]  [phone]  F: [phone]     Physical Therapy Daily Treatment Note    Date:  3/31/2022  Patient Name:  Ariane Putnam      :  1960    MRN: [id number]  Physician: Trevor Lopez DO                                    Insurance: [facility] Medicare Duel Complete (BMN)  Medical Diagnosis: H08.297 (ICD-10-CM) - Arthritis of left shoulder region            Rehab Codes: Pain M54.2, M25.512, stiffness M25.612, weakness M62.512, posture R29.3, myofascial M79.1, M62.838  Onset Date: 22                  Next 's appt: 22      Visit# / total visits:    Cancels/No Shows: 2/0  Progress note for Medicare patient due at visit 10    Subjective:    Pain:  [x] Yes  [] No Location: neck shoulder on left   Pain Rating: (0-10 scale) 0/10  Pain altered Tx:  [x] No  [] Yes  Action:  Comments: patient arrives without sling this date, stating she was told she no longer needs to wear it. Patient had follow up with Ortho yesterday and was told they would like to see more ROM from her shoulder and be able to reach behind her head at her next visit with them.      Objective:   Modalities: Vaso low compression 40 deg x15 min at end of treatment - seated with pillow under arm    Precautions: healing forearm fracture left, flexion  deg, ER 30 deg, no IR,     Exercises completed marked with \"X\" 22  Exercise Reps/ Time Weight/ Level Comments    Treadmill 8 min 1.5 mph Added  X          Pulleys - Flexion 3 min   X          Standing       Codman's 20x ea  A/P, M/L, CW/CCW X   Elbow Flexion/Extension 20x    X   Gentle Shoulder Extension w/cane 20x  AAROM Added 3/31/22 X          Seated       Wrist AROM 4 ways HEP 1 lb Added weight 3/10    Supinate/pronate HEP 1 lb Added weight 3/10    Gripper discontinue             Scapular Retraction 20x   X   CROM 10x5\" ea  Flex, ext, lat flex, rotation    Shoulder Shrug 20x   X   Shoulder depression 20x   X   Submax iso shoulder hold 20x5\"  Abd, add, ext/flexion, IR/ER X          Table slides 20x ea  Flexion/scaption  Wash cloth on slide board X   ER Stretch 20x5\"   X                 Supine       Cane:       - Shoulder flex 20x AAROM  X   - Chest press 20x AAROM  X   Horizontal Abduction/Adduction  20x ea AAROM     - ER 20x AAROM  X   Shoulder Flexion 20x AROM Added 3/29/22    Rhythmic Stabilization  2x1 min  Added 3/24/22 X          Other: 8 weeks post op 3/22/22      Treatment Charges: Mins Units   []  Modalities     [x]  Ther Exercise 42 3   []  Manual Therapy     []  Ther Activities     []  Aquatics     [x]  Vasocompression 15 1   []  Other     Total Treatment time 57 4       Assessment: [x] Progressing toward goals: light AAROM shoulder extension added this date with cane to improve reciprocal arm swing during gait and improve overall shoulder mobility. Continued difficulty with cane flexion in seated position, but this is still a new exercise for the patient. Fatigue with supine cane flexion and rhythmic stabilization. [] No change. [x] Other:     [x] Patient would continue to benefit from skilled physical therapy services in order to: reduce pain with modalities and exercise, reduce muscle spasms and trigger points with manual, learn proper posture for optimal healing and function, improve ROM and strength as able. STG: (to be met in 12 treatments)  1. ? Pain: Keep pain at 4/10 or less most times  2. ? ROM: PROM shoulder elevation increases to 90 degrees+  3. ? Strength:Start active motion when cleared by DrWill   3. ? Function:Start functional activities when cleared by  and able to reach head. 5. Notes minimal tight muscles/spasms and able to manage independently if they return. 6. Demonstrate Knowledge of fall prevention    LTG: (to be met in 24 treatments)  1. LEFS improves to 50% or better function         2.   Patient to be independent with home exercise program as demonstrated by performance with correct form without cues. Patient goals: Lift arm up half way to shoulder    Pt. Education:  [x] Yes  [] No  [x] Reviewed Prior HEP/Ed  Method of Education: [x] Verbal  [x] Demo  [] Written  Comprehension of Education:  [x] Verbalizes understanding. [x] Demonstrates understanding. [] Needs review. [x] Demonstrates/verbalizes HEP/Ed previously given. Given 2/22/2022  Wrist flex/ext  Wrist supination/pronation  CROM all planes  Pendulums  Shoulder shrugs  Scapular retractions   3/10 HEP-Access Code: [email or web address]   URL: [email or web address]  Table slides flexion - 2-3 x daily - 7 x weekly - 20 reps  Seated Shoulder Scaption Slide at Table Top with Forearm in Neutral - 2-3 x daily - 7 x weekly - 20 reps  Supine Shoulder Flexion Extension AAROM with Dowel - 2-3 x daily - 7 x weekly - 20 reps  Supine Shoulder Press AAROM in Abduction with Dowel - 2-3 x daily - 7 x weekly - 20 reps      Plan: [x] Continue current frequency toward long and short term goals. [x]  Frequency: 2 x/week for 24 visits   [x] Specific Instructions for subsequent treatments: Posture, Gentle PROM, Myofascial work on neck, trap, pect muscles, gradually work through protocol.          Time In: 1342         Time Out: 1443      Electronically signed by:  Liam Pyle PTA

## 2022-04-05 ENCOUNTER — HOSPITAL ENCOUNTER (OUTPATIENT)
Dept: PHYSICAL THERAPY | Age: 62
Setting detail: THERAPIES SERIES
Discharge: HOME | End: 2022-04-05
Payer: MEDICARE

## 2022-04-05 PROCEDURE — 97110 THERAPEUTIC EXERCISES: CPT

## 2022-04-05 PROCEDURE — 97016 VASOPNEUMATIC DEVICE THERAPY: CPT

## 2022-04-05 NOTE — FLOWSHEET NOTE
[x] [facility]  Therapy  [address]  [phone]  F: [phone]     Physical Therapy Daily Treatment Note    Date:  2022  Patient Name:  Montse Espinosa      :  1960    MRN: [id number]  Physician: Radha Pichardo DO                                    Insurance: [facility] Medicare Duel Complete (BMN)  Medical Diagnosis: M19.012 (ICD-10-CM) - Arthritis of left shoulder region            Rehab Codes: Pain M54.2, M25.512, stiffness M25.612, weakness M62.512, posture R29.3, myofascial M79.1, M62.838  Onset Date: 22                  Next 's appt: 22      Visit# / total visits:    Cancels/No Shows: 2/0  Progress note for Medicare patient due at visit 19    Subjective:    Pain:  [x] Yes  [] No Location: neck shoulder on left   Pain Rating: (0-10 scale) 3/10  Pain altered Tx:  [x] No  [] Yes  Action:  Comments: Patient arrives to clinic this date, tired - noting that she got picked up over an hour early for her appt. A little pain in her shoulder currently from doing house chores.       Objective:   Modalities: Vaso low compression 40 deg x15 min at end of treatment - seated with pillow under arm    Precautions: healing forearm fracture left, flexion  deg, ER 30 deg, no IR,     Exercises completed marked with \"X\" 22  Exercise Reps/ Time Weight/ Level Comments    Treadmill 8 min 1.5 mph Added            Pulleys - Flexion 3 min   X          Standing       Codman's 20x ea 2 lb A/P, M/L, CW/CCW  Added weight 22 X   Bicep Curls 20x  2 lb Added weight 22 X   Gentle Shoulder Extension w/cane 20x  AAROM Added 3/31/22 X          Band Isometrics Walkout:       - Extension 10x Lime Green Added 22 X   - Flexion 10x [place] Added 22 X   - IR 10x [place] Added 22 X   - ER 10x [place] Added 22 X          Finger Ladder 8x 7th rung Added 22 X          Seated       Scapular Retraction 20x   X   CROM 10x5\" ea  Flex, ext, lat flex, rotation X   Shoulder Shrug 20x   X   Shoulder depression 20x   X          Table slides 20x ea  Flexion/scaption  Wash cloth on slide board X   ER Stretch 20x5\"   X   Cane Flexion 20x   X   Cane Chest Press 20x   X          Supine       Cane:       - Shoulder flex 20x AAROM  X   - Chest press 20x AAROM  X   Horizontal Abduction/Adduction  20x ea AAROM     - ER 20x AAROM     Shoulder Flexion 20x AROM Added 3/29/22    Rhythmic Stabilization  2x1 min  Added 3/24/22           Other: 10 weeks post op 3/22/22      Treatment Charges: Mins Units   []  Modalities     [x]  Ther Exercise 40 3   []  Manual Therapy     []  Ther Activities     []  Aquatics     [x]  Vasocompression 15 1   []  Other     Total Treatment time 55 4       Assessment: [x] Progressing toward goals:          [] No change. [x] Other: patient became tearful whilst completing seated cane exercises following addition of finger ladder this date. Advised patient not to push herself to the point of tearful pain, but patient states she just wants her shoulder to get better. Remaining supine exercises held this date due to increased pain noted. Pain improved following vaso. [x] Patient would continue to benefit from skilled physical therapy services in order to: reduce pain with modalities and exercise, reduce muscle spasms and trigger points with manual, learn proper posture for optimal healing and function, improve ROM and strength as able. STG: (to be met in 12 treatments)  1. ? Pain: Keep pain at 4/10 or less most times  2. ? ROM: PROM shoulder elevation increases to 90 degrees+  3. ? Strength:Start active motion when cleared by    3. ? Function:Start functional activities when cleared by  and able to reach head. 5. Notes minimal tight muscles/spasms and able to manage independently if they return. 6. Demonstrate Knowledge of fall prevention    LTG: (to be met in 24 treatments)  1.  LEFS improves to 50% or better function 2.  Patient to be independent with home exercise program as demonstrated by performance with correct form without cues. Patient goals: Lift arm up half way to shoulder    Pt. Education:  [x] Yes  [] No  [x] Reviewed Prior HEP/Ed  Method of Education: [x] Verbal  [x] Demo  [] Written  Comprehension of Education:  [x] Verbalizes understanding. [x] Demonstrates understanding. [] Needs review. [x] Demonstrates/verbalizes HEP/Ed previously given. Given 2/22/2022  Wrist flex/ext  Wrist supination/pronation  CROM all planes  Pendulums  Shoulder shrugs  Scapular retractions   3/10 HEP-Access Code: [email or web address]   URL: [email or web address]  Table slides flexion - 2-3 x daily - 7 x weekly - 20 reps  Seated Shoulder Scaption Slide at Table Top with Forearm in Neutral - 2-3 x daily - 7 x weekly - 20 reps  Supine Shoulder Flexion Extension AAROM with Dowel - 2-3 x daily - 7 x weekly - 20 reps  Supine Shoulder Press AAROM in Abduction with Dowel - 2-3 x daily - 7 x weekly - 20 reps      Plan: [x] Continue current frequency toward long and short term goals. [x]  Frequency: 2 x/week for 24 visits   [x] Specific Instructions for subsequent treatments: Myofascial work on neck, trap, pect muscles, gradually work through protocol.          Time In: 0900       Time Out: 9904      Electronically signed by:  Dom Husain PTA

## 2022-04-07 ENCOUNTER — HOSPITAL ENCOUNTER (OUTPATIENT)
Dept: PHYSICAL THERAPY | Age: 62
Setting detail: THERAPIES SERIES
Discharge: HOME | End: 2022-04-07
Payer: MEDICARE

## 2022-04-07 PROCEDURE — 97016 VASOPNEUMATIC DEVICE THERAPY: CPT

## 2022-04-07 PROCEDURE — 97110 THERAPEUTIC EXERCISES: CPT

## 2022-04-07 NOTE — FLOWSHEET NOTE
[x] [facility] &  Therapy  [address]  [phone]  F: [phone]     Physical Therapy Daily Treatment Note    Date:  2022  Patient Name:  Nehal Randhawa      :  1960    MRN: [id number]  Physician: Reno Shelby DO                                    Insurance: [facility] Medicare Duel Complete (BMN)  Medical Diagnosis: F24.885 (ICD-10-CM) - Arthritis of left shoulder region            Rehab Codes: Pain M54.2, M25.512, stiffness M25.612, weakness M62.512, posture R29.3, myofascial M79.1, M62.838  Onset Date: 22                  Next 's appt: 22      Visit# / total visits:    Cancels/No Shows: 2/0  Progress note for Medicare patient due at visit 19    Subjective:    Pain:  [x] Yes  [] No Location: neck shoulder on left   Pain Rating: (0-10 scale) 2/10  Pain altered Tx:  [x] No  [] Yes  Action:  Comments: Patient states that after her appt on Tuesday, she couldn't get a ride home and ended up walking home and it took over 2 hours to get home. Notes some increased soreness from this and from her walk to [place] yesterday.        Objective:   Modalities: Vaso low compression 40 deg x15 min at end of treatment - seated with pillow under arm    Precautions: healing forearm fracture left, flexion  deg, ER 30 deg, no IR,     Exercises completed marked with \"X\" 22  Exercise Reps/ Time Weight/ Level Comments    Treadmill 8 min 1.5 mph Added            Pulleys - Flexion 3 min   X          Standing       Codman's 20x ea 2 lb A/P, M/L, CW/CCW  Added weight 22 X   Bicep Curls 20x  2 lb Added weight 22 X   Gentle Shoulder Extension w/cane 20x  AAROM Added 3/31/22 X          Band Isometrics Walkout:       - Extension 10x Lime Green Added 22 X   - Flexion 10x L[place] Added 22 X   - IR 10x [place] Added 22 X   - ER 10x [place] Added 22 X          Finger Ladder 8x 8th rung Added 22 X          Seated Scapular Retraction 20x   X   CROM 10x5\" ea  Flex, ext, lat flex, rotation X   Shoulder Shrug 20x   X   Shoulder depression 20x   X          Table slides 20x ea  Flexion/scaption  Wash cloth on slide board X   ER Stretch 20x5\"   X   Cane Flexion 20x   X   Cane Chest Press 20x   X          Supine       Cane:       - Shoulder flex 20x AAROM  X   - Chest press 20x AAROM  X   Horizontal Abduction/Adduction  20x ea AAROM  X   - ER 20x AAROM     Shoulder Flexion 20x AROM Added 3/29/22 X   Rhythmic Stabilization  2x1 min  Added 3/24/22 X          Other: 10 weeks post op 3/22/22      Treatment Charges: Mins Units   []  Modalities     [x]  Ther Exercise 40 3   []  Manual Therapy     []  Ther Activities     []  Aquatics     [x]  Vasocompression 15 1   []  Other     Total Treatment time 55 4       Assessment: [x] Progressing toward goals:          [x] No change. Overall ROM remains diminished at this point, with continued difficulty obtaining greater then 95 degrees AAROM. [x] Other: patient able to complete most exercises this date, unable to complete ER with cane due to increased pain towards end of Tx. [x] Patient would continue to benefit from skilled physical therapy services in order to: reduce pain with modalities and exercise, reduce muscle spasms and trigger points with manual, learn proper posture for optimal healing and function, improve ROM and strength as able. STG: (to be met in 12 treatments)  1. ? Pain: Keep pain at 4/10 or less most times  2. ? ROM: PROM shoulder elevation increases to 90 degrees+  3. ? Strength:Start active motion when cleared by    3. ? Function:Start functional activities when cleared by  and able to reach head. 5. Notes minimal tight muscles/spasms and able to manage independently if they return. 6. Demonstrate Knowledge of fall prevention    LTG: (to be met in 24 treatments)  1. LEFS improves to 50% or better function         2.   Patient to be independent with home exercise program as demonstrated by performance with correct form without cues. Patient goals: Lift arm up half way to shoulder    Pt. Education:  [x] Yes  [] No  [x] Reviewed Prior HEP/Ed  Method of Education: [x] Verbal  [x] Demo  [] Written  Comprehension of Education:  [x] Verbalizes understanding. [x] Demonstrates understanding. [] Needs review. [x] Demonstrates/verbalizes HEP/Ed previously given. Given 2/22/2022  Wrist flex/ext  Wrist supination/pronation  CROM all planes  Pendulums  Shoulder shrugs  Scapular retractions   3/10 HEP-Access Code: [email or web address]   URL: [email or web address]  Table slides flexion - 2-3 x daily - 7 x weekly - 20 reps  Seated Shoulder Scaption Slide at Table Top with Forearm in Neutral - 2-3 x daily - 7 x weekly - 20 reps  Supine Shoulder Flexion Extension AAROM with Dowel - 2-3 x daily - 7 x weekly - 20 reps  Supine Shoulder Press AAROM in Abduction with Dowel - 2-3 x daily - 7 x weekly - 20 reps      Plan: [x] Continue current frequency toward long and short term goals. [x]  Frequency: 2 x/week for 24 visits   [x] Specific Instructions for subsequent treatments: Myofascial work on neck, trap, pect muscles, gradually work through protocol.          Time In: 5290       Time Out: 9474      Electronically signed by:  Reyna Benites PTA

## 2022-04-13 RX ORDER — NAPROXEN 500 MG/1
TABLET ORAL
Qty: 28 TABLET | Refills: 1 | Status: SHIPPED
Start: 2022-04-13 | End: 2022-07-15

## 2022-04-14 ENCOUNTER — APPOINTMENT (OUTPATIENT)
Dept: MRI IMAGING | Age: 62
DRG: 065 | End: 2022-04-14
Payer: MEDICARE

## 2022-04-14 ENCOUNTER — HOSPITAL ENCOUNTER (INPATIENT)
Age: 62
LOS: 2 days | Discharge: HOME | DRG: 065 | End: 2022-04-16
Attending: EMERGENCY MEDICINE | Admitting: PSYCHIATRY & NEUROLOGY
Payer: MEDICARE

## 2022-04-14 ENCOUNTER — HOSPITAL ENCOUNTER (OUTPATIENT)
Dept: PHYSICAL THERAPY | Age: 62
Setting detail: THERAPIES SERIES
Discharge: HOME | End: 2022-04-14
Payer: MEDICARE

## 2022-04-14 ENCOUNTER — APPOINTMENT (OUTPATIENT)
Dept: CT IMAGING | Age: 62
DRG: 065 | End: 2022-04-14
Payer: MEDICARE

## 2022-04-14 ENCOUNTER — APPOINTMENT (OUTPATIENT)
Dept: GENERAL RADIOLOGY | Age: 62
DRG: 065 | End: 2022-04-14
Payer: MEDICARE

## 2022-04-14 DIAGNOSIS — I63.9 ISCHEMIC STROKE (HCC): Primary | ICD-10-CM

## 2022-04-14 DIAGNOSIS — R29.90 STROKE-LIKE SYMPTOMS: ICD-10-CM

## 2022-04-14 LAB
ABSOLUTE EOS #: 0.13 K/UL (ref 0–0.44)
ABSOLUTE IMMATURE GRANULOCYTE: <0.03 K/UL (ref 0–0.3)
ABSOLUTE LYMPH #: 1.38 K/UL (ref 1.1–3.7)
ABSOLUTE MONO #: 0.53 K/UL (ref 0.1–1.2)
AMMONIA: 25 UMOL/L (ref 11–51)
ANION GAP SERPL CALCULATED.3IONS-SCNC: 12 MMOL/L (ref 9–17)
BASOPHILS # BLD: 1 % (ref 0–2)
BASOPHILS ABSOLUTE: 0.06 K/UL (ref 0–0.2)
BUN BLDV-MCNC: 13 MG/DL (ref 8–23)
CALCIUM SERPL-MCNC: 10.2 MG/DL (ref 8.6–10.4)
CHLORIDE BLD-SCNC: 101 MMOL/L (ref 98–107)
CO2: 26 MMOL/L (ref 20–31)
CREAT SERPL-MCNC: 1.22 MG/DL (ref 0.5–0.9)
EOSINOPHILS RELATIVE PERCENT: 2 % (ref 1–4)
GFR AFRICAN AMERICAN: 54 ML/MIN
GFR NON-AFRICAN AMERICAN: 45 ML/MIN
GFR SERPL CREATININE-BSD FRML MDRD: ABNORMAL ML/MIN/{1.73_M2}
GLUCOSE BLD-MCNC: 103 MG/DL (ref 70–99)
HCT VFR BLD CALC: 47.1 % (ref 36.3–47.1)
HEMOGLOBIN: 14.8 G/DL (ref 11.9–15.1)
IMMATURE GRANULOCYTES: 0 %
INR BLD: 0.9
LYMPHOCYTES # BLD: 19 % (ref 24–43)
MCH RBC QN AUTO: 29.5 PG (ref 25.2–33.5)
MCHC RBC AUTO-ENTMCNC: 31.4 G/DL (ref 28.4–34.8)
MCV RBC AUTO: 94 FL (ref 82.6–102.9)
MONOCYTES # BLD: 7 % (ref 3–12)
MYOGLOBIN: 36 NG/ML (ref 25–58)
NRBC AUTOMATED: 0 PER 100 WBC
PARTIAL THROMBOPLASTIN TIME: 22.2 SEC (ref 20.5–30.5)
PDW BLD-RTO: 12.9 % (ref 11.8–14.4)
PLATELET # BLD: 322 K/UL (ref 138–453)
PMV BLD AUTO: 10.1 FL (ref 8.1–13.5)
POTASSIUM SERPL-SCNC: 4 MMOL/L (ref 3.7–5.3)
PRO-BNP: 232 PG/ML
PROTHROMBIN TIME: 10.1 SEC (ref 9.1–12.3)
RBC # BLD: 5.01 M/UL (ref 3.95–5.11)
SEG NEUTROPHILS: 71 % (ref 36–65)
SEGMENTED NEUTROPHILS ABSOLUTE COUNT: 5.18 K/UL (ref 1.5–8.1)
SODIUM BLD-SCNC: 139 MMOL/L (ref 135–144)
TOTAL CK: 62 U/L (ref 26–192)
TROPONIN, HIGH SENSITIVITY: 12 NG/L (ref 0–14)
TSH SERPL DL<=0.05 MIU/L-ACNC: 2.22 UIU/ML (ref 0.3–5)
WBC # BLD: 7.3 K/UL (ref 3.5–11.3)

## 2022-04-14 PROCEDURE — 99223 1ST HOSP IP/OBS HIGH 75: CPT | Performed by: PSYCHIATRY & NEUROLOGY

## 2022-04-14 PROCEDURE — 6370000000 HC RX 637 (ALT 250 FOR IP): Performed by: STUDENT IN AN ORGANIZED HEALTH CARE EDUCATION/TRAINING PROGRAM

## 2022-04-14 PROCEDURE — 84484 ASSAY OF TROPONIN QUANT: CPT

## 2022-04-14 PROCEDURE — 82803 BLOOD GASES ANY COMBINATION: CPT

## 2022-04-14 PROCEDURE — 85014 HEMATOCRIT: CPT

## 2022-04-14 PROCEDURE — 85730 THROMBOPLASTIN TIME PARTIAL: CPT

## 2022-04-14 PROCEDURE — 80051 ELECTROLYTE PANEL: CPT

## 2022-04-14 PROCEDURE — 6360000004 HC RX CONTRAST MEDICATION: Performed by: STUDENT IN AN ORGANIZED HEALTH CARE EDUCATION/TRAINING PROGRAM

## 2022-04-14 PROCEDURE — 70496 CT ANGIOGRAPHY HEAD: CPT

## 2022-04-14 PROCEDURE — 2580000003 HC RX 258: Performed by: STUDENT IN AN ORGANIZED HEALTH CARE EDUCATION/TRAINING PROGRAM

## 2022-04-14 PROCEDURE — 97110 THERAPEUTIC EXERCISES: CPT

## 2022-04-14 PROCEDURE — 85025 COMPLETE CBC W/AUTO DIFF WBC: CPT

## 2022-04-14 PROCEDURE — 84443 ASSAY THYROID STIM HORMONE: CPT

## 2022-04-14 PROCEDURE — 71045 X-RAY EXAM CHEST 1 VIEW: CPT

## 2022-04-14 PROCEDURE — 83874 ASSAY OF MYOGLOBIN: CPT

## 2022-04-14 PROCEDURE — 82550 ASSAY OF CK (CPK): CPT

## 2022-04-14 PROCEDURE — 82330 ASSAY OF CALCIUM: CPT

## 2022-04-14 PROCEDURE — 82553 CREATINE MB FRACTION: CPT

## 2022-04-14 PROCEDURE — 2060000002 HC BURN ICU INTERMEDIATE R&B

## 2022-04-14 PROCEDURE — 99448 NTRPROF PH1/NTRNET/EHR 21-30: CPT | Performed by: PSYCHIATRY & NEUROLOGY

## 2022-04-14 PROCEDURE — 82565 ASSAY OF CREATININE: CPT

## 2022-04-14 PROCEDURE — 82140 ASSAY OF AMMONIA: CPT

## 2022-04-14 PROCEDURE — 70450 CT HEAD/BRAIN W/O DYE: CPT

## 2022-04-14 PROCEDURE — 83605 ASSAY OF LACTIC ACID: CPT

## 2022-04-14 PROCEDURE — 99285 EMERGENCY DEPT VISIT HI MDM: CPT

## 2022-04-14 PROCEDURE — 6360000002 HC RX W HCPCS: Performed by: STUDENT IN AN ORGANIZED HEALTH CARE EDUCATION/TRAINING PROGRAM

## 2022-04-14 PROCEDURE — 80048 BASIC METABOLIC PNL TOTAL CA: CPT

## 2022-04-14 PROCEDURE — 70551 MRI BRAIN STEM W/O DYE: CPT

## 2022-04-14 PROCEDURE — 85610 PROTHROMBIN TIME: CPT

## 2022-04-14 PROCEDURE — 93005 ELECTROCARDIOGRAM TRACING: CPT | Performed by: STUDENT IN AN ORGANIZED HEALTH CARE EDUCATION/TRAINING PROGRAM

## 2022-04-14 PROCEDURE — 83880 ASSAY OF NATRIURETIC PEPTIDE: CPT

## 2022-04-14 RX ORDER — ALBUTEROL SULFATE 90 UG/1
1 AEROSOL, METERED RESPIRATORY (INHALATION) EVERY 6 HOURS PRN
Status: DISCONTINUED | OUTPATIENT
Start: 2022-04-14 | End: 2022-04-16 | Stop reason: HOSPADM

## 2022-04-14 RX ORDER — ALENDRONATE SODIUM 5 MG
5 TABLET ORAL
Status: DISCONTINUED | OUTPATIENT
Start: 2022-04-15 | End: 2022-04-14

## 2022-04-14 RX ORDER — PROPRANOLOL HYDROCHLORIDE 80 MG/1
80 CAPSULE, EXTENDED RELEASE ORAL DAILY
Status: DISCONTINUED | OUTPATIENT
Start: 2022-04-14 | End: 2022-04-16 | Stop reason: HOSPADM

## 2022-04-14 RX ORDER — BUPROPION HYDROCHLORIDE 150 MG/1
450 TABLET ORAL DAILY
Status: DISCONTINUED | OUTPATIENT
Start: 2022-04-14 | End: 2022-04-16 | Stop reason: HOSPADM

## 2022-04-14 RX ORDER — ASPIRIN 81 MG/1
81 TABLET ORAL DAILY
Status: DISCONTINUED | OUTPATIENT
Start: 2022-04-15 | End: 2022-04-14

## 2022-04-14 RX ORDER — CITALOPRAM 20 MG/1
40 TABLET ORAL DAILY
Status: DISCONTINUED | OUTPATIENT
Start: 2022-04-14 | End: 2022-04-16 | Stop reason: HOSPADM

## 2022-04-14 RX ORDER — CLOPIDOGREL BISULFATE 75 MG/1
75 TABLET ORAL DAILY
Status: DISCONTINUED | OUTPATIENT
Start: 2022-04-14 | End: 2022-04-14

## 2022-04-14 RX ORDER — ONDANSETRON 4 MG/1
4 TABLET, ORALLY DISINTEGRATING ORAL EVERY 8 HOURS PRN
Status: DISCONTINUED | OUTPATIENT
Start: 2022-04-14 | End: 2022-04-16 | Stop reason: HOSPADM

## 2022-04-14 RX ORDER — HYDROXYZINE HYDROCHLORIDE 10 MG/1
10 TABLET, FILM COATED ORAL DAILY
Status: DISCONTINUED | OUTPATIENT
Start: 2022-04-14 | End: 2022-04-16 | Stop reason: HOSPADM

## 2022-04-14 RX ORDER — SODIUM CHLORIDE 9 MG/ML
INJECTION, SOLUTION INTRAVENOUS CONTINUOUS
Status: DISCONTINUED | OUTPATIENT
Start: 2022-04-14 | End: 2022-04-16 | Stop reason: HOSPADM

## 2022-04-14 RX ORDER — ONDANSETRON 2 MG/ML
4 INJECTION INTRAMUSCULAR; INTRAVENOUS EVERY 6 HOURS PRN
Status: DISCONTINUED | OUTPATIENT
Start: 2022-04-14 | End: 2022-04-16 | Stop reason: HOSPADM

## 2022-04-14 RX ORDER — NAPROXEN 250 MG/1
500 TABLET ORAL 2 TIMES DAILY WITH MEALS
Status: DISCONTINUED | OUTPATIENT
Start: 2022-04-14 | End: 2022-04-16 | Stop reason: HOSPADM

## 2022-04-14 RX ORDER — ATORVASTATIN CALCIUM 80 MG/1
80 TABLET, FILM COATED ORAL NIGHTLY
Status: DISCONTINUED | OUTPATIENT
Start: 2022-04-14 | End: 2022-04-16 | Stop reason: HOSPADM

## 2022-04-14 RX ORDER — POLYETHYLENE GLYCOL 3350 17 G/17G
17 POWDER, FOR SOLUTION ORAL DAILY PRN
Status: DISCONTINUED | OUTPATIENT
Start: 2022-04-14 | End: 2022-04-16 | Stop reason: HOSPADM

## 2022-04-14 RX ORDER — ASPIRIN 81 MG/1
81 TABLET, CHEWABLE ORAL DAILY
Status: DISCONTINUED | OUTPATIENT
Start: 2022-04-14 | End: 2022-04-16 | Stop reason: HOSPADM

## 2022-04-14 RX ORDER — ALBUTEROL SULFATE 2.5 MG/3ML
2.5 SOLUTION RESPIRATORY (INHALATION) EVERY 6 HOURS PRN
Status: DISCONTINUED | OUTPATIENT
Start: 2022-04-14 | End: 2022-04-16 | Stop reason: HOSPADM

## 2022-04-14 RX ORDER — CLOPIDOGREL BISULFATE 75 MG/1
75 TABLET ORAL DAILY
Status: DISCONTINUED | OUTPATIENT
Start: 2022-04-14 | End: 2022-04-16 | Stop reason: HOSPADM

## 2022-04-14 RX ORDER — CLOPIDOGREL BISULFATE 75 MG/1
75 TABLET ORAL DAILY
Status: DISCONTINUED | OUTPATIENT
Start: 2022-04-15 | End: 2022-04-14

## 2022-04-14 RX ORDER — ASPIRIN 81 MG/1
81 TABLET, CHEWABLE ORAL DAILY
Status: DISCONTINUED | OUTPATIENT
Start: 2022-04-14 | End: 2022-04-14

## 2022-04-14 RX ORDER — DOCUSATE SODIUM 100 MG/1
100 CAPSULE, LIQUID FILLED ORAL 2 TIMES DAILY PRN
Status: DISCONTINUED | OUTPATIENT
Start: 2022-04-14 | End: 2022-04-16 | Stop reason: HOSPADM

## 2022-04-14 RX ADMIN — Medication 600 MG: at 21:31

## 2022-04-14 RX ADMIN — NAPROXEN 500 MG: 250 TABLET ORAL at 22:44

## 2022-04-14 RX ADMIN — CLOPIDOGREL BISULFATE 75 MG: 75 TABLET ORAL at 17:36

## 2022-04-14 RX ADMIN — IOPAMIDOL 90 ML: 755 INJECTION, SOLUTION INTRAVENOUS at 15:29

## 2022-04-14 RX ADMIN — ATORVASTATIN CALCIUM 80 MG: 80 TABLET, FILM COATED ORAL at 21:31

## 2022-04-14 RX ADMIN — HYDROXYZINE HYDROCHLORIDE 10 MG: 10 TABLET ORAL at 22:44

## 2022-04-14 RX ADMIN — ASPIRIN 81 MG: 81 TABLET, CHEWABLE ORAL at 17:36

## 2022-04-14 RX ADMIN — SODIUM CHLORIDE: 9 INJECTION, SOLUTION INTRAVENOUS at 20:29

## 2022-04-14 RX ADMIN — ENOXAPARIN SODIUM 40 MG: 40 INJECTION SUBCUTANEOUS at 22:44

## 2022-04-14 RX ADMIN — BUPROPION HYDROCHLORIDE 450 MG: 150 TABLET, EXTENDED RELEASE ORAL at 21:31

## 2022-04-14 RX ADMIN — CITALOPRAM 40 MG: 20 TABLET, FILM COATED ORAL at 21:32

## 2022-04-14 ASSESSMENT — PAIN DESCRIPTION - ONSET: ONSET: GRADUAL

## 2022-04-14 ASSESSMENT — ENCOUNTER SYMPTOMS
BACK PAIN: 0
SHORTNESS OF BREATH: 0
ABDOMINAL PAIN: 0
NAUSEA: 0
VOMITING: 0
COUGH: 0
SORE THROAT: 0

## 2022-04-14 ASSESSMENT — PAIN DESCRIPTION - LOCATION: LOCATION: SHOULDER

## 2022-04-14 ASSESSMENT — PAIN SCALES - GENERAL
PAINLEVEL_OUTOF10: 0
PAINLEVEL_OUTOF10: 1

## 2022-04-14 ASSESSMENT — PAIN DESCRIPTION - PAIN TYPE: TYPE: CHRONIC PAIN

## 2022-04-14 ASSESSMENT — PAIN DESCRIPTION - DESCRIPTORS: DESCRIPTORS: SORE

## 2022-04-14 ASSESSMENT — PAIN DESCRIPTION - ORIENTATION: ORIENTATION: LEFT

## 2022-04-14 ASSESSMENT — PAIN - FUNCTIONAL ASSESSMENT: PAIN_FUNCTIONAL_ASSESSMENT: PREVENTS OR INTERFERES SOME ACTIVE ACTIVITIES AND ADLS

## 2022-04-14 NOTE — ED NOTES
Pt walked out of unit and stated to triage RN that she was leaving. Pt found in cafeteria by writer. Pt states, \"my boyfriend hasnt checked on me so I am leaving. I am going to find him. I dont want to stay here. \"  Pt escorted back to room by Shayne Mask. Pt agrees to stay and Resident notified of situation. Pt respirations are even and unlabored, pt is oriented X 4, speaking in complete sentences, bed is in the lowest position, call light is within reach. Will continue to monitor.        Amrit Johnson RN  04/14/22 0928

## 2022-04-14 NOTE — CARE COORDINATION
Case Management Initial Discharge Plan  Kelsey Sparrow,             Met with:patient to discuss discharge plans. Information verified: address, contacts, phone number, , insurance Yes  Insurance Provider: [facility] Medicare    Emergency Contact/Next of Kin name & number: son Juanita Harmon  Who are involved in patient's support system? Son and boyfriend of 21 years Cecilia Ludwin    PCP: Ghazala Brown DO  Date of last visit: today      Discharge Planning    Living Arrangements:    lives with boyfriend    Home has 2 stories  3 stairs to climb to get into front door, glight of stairs to climb to reach second floor  Location of bedroom/bathroom in home     Patient able to perform ADL's:Independent    Current Services (outpatient & in home) DME  DME equipment: none  DME provider: na    Is patient receiving oral anticoagulation therapy? No    If indicated:   Physician managing anticoagulation treatment: na  Where does patient obtain lab work for ATC treatment? na    Does patient have any issues/concerns obtaining medications? No  If yes, what are patient's concerns? Is there a preferred Pharmacy after hours or on weekends? No    If yes, which pharmacy? Potential Assistance Needed:       Patient agreeable to home care: No  [place] of choice provided:  n/a    Prior SNF/Rehab Placement and Facility: none  Agreeable to SNF/Rehab: No  [place] of choice provided: n/a     Evaluation: n/a    Expected Discharge date:       Patient expects to be discharged to:    home  If home: is the family and/or caregiver wiling & able to provide support at home? yes  Who will be providing this support?  boyfrienjarett    Follow Up Appointment: Best Day/ Time:      Transportation provider: mima  Transportation arrangements needed for discharge: No    Readmission Risk              Risk of Unplanned Readmission:  0             Does patient have a readmission risk score greater than 14?: No  If yes, follow-up appointment must

## 2022-04-14 NOTE — ED PROVIDER NOTES
Faculty Sign-Out Attestation  Handoff taken on the following patient from prior Attending Physician: Danica Betts    I was available and discussed any additional care issues that arose and coordinated the management plans with the resident(s) caring for the patient during my duty period. Any areas of disagreement with residents documentation of care or procedures are noted on the chart. I was personally present for the key portions of any/all procedures during my duty period. I have documented in the chart those procedures where I was not present during the key portions. 59-year-old female presenting with altered mental status, mild right facial droop, confusion. Patient was seen last week at PT, is newly confused, brought in by physical therapist today. States that she missed her Tuesday PT appointment because she could not remember where it was, then slept for approximately 3 days. Denies trauma. CT/CTA pending, stroke team consulted.     Chapincito Tafoya MD  Attending Physician       Chapincito Tafoya MD  04/14/22 3345

## 2022-04-14 NOTE — CONSULTS
Department of Endovascular Neurosurgery                                         Resident Consult Note    Reason for Consult: CVA    Endovascular Neurosurgeon:   []Dr. Esposito Staff  []Dr. Thuy Hernandez  []Dr. Trevon Brown  []Dr. Nic Roman  [x]Dr. My Lopez    History Obtained From:  patient    CHIEF COMPLAINT:       Impaired speech    HISTORY OF PRESENT ILLNESS:       The patient is a 60 y.o. female who presents with impaired speech and a right facial droop. According to the patient, she was last known well approximately 4 days ago. Her NIH stroke scale is 2 for expressive aphasia and a facial droop. She does not display any additional focal deficits. She states she is having difficult times with her words coming out and this has been going on for several days. She does not know why she did not come to the hospital earlier. She does have a PMH of narcolepsy and right shoulder total replacement. She also has a PMH of DVT, hypertension. Unclear if she has a history of myasthenia but it is listed in her chart. She is outside the window for any TPA or intervention. CT head showed a left internal capsule ischemic stroke which was confirmed by MRI. Patient was loaded with aspirin and Plavix. Etiology currently is unclear. Apparently patient was at the physical therapist when there was a noted change in her mentation. This was a change from approximately a week ago. She did not know why she is in the ED but was sent by PT. Apparently patient attempted to leave, she was found by the nurse in the cafeteria and states that her boyfriend has not checked up on her so I am going to leave and go find him. LKW: 4/10 5/20/ 2022  TPA: Not indicated  Endovascular:   Not indicated  CT WO contrast: Left posterior capsule stroke  NIHSS: 2  ABCD2:  4  Anticoagulation: None  LDL: Pending  A1C: Pending  At home, patient was on:    PMH: Bipolar, history of DVT, history of myasthenia? Hiatal hernia.   Helicobacter infection       PAST MEDICAL HISTORY :       Past Medical History:        Diagnosis Date    Acid reflux     Adult ADHD 2012    monthly visits @ [facility]    Asthma 2012    seasonal    Bipolar 1 disorder ([facility]     monthly visits with [facility]    Broken foot     Left foot    Chest pain of uncertain etiology     Last episode was in  (Written 2019)    Depression     Under control per pt.  on 6/15/18    DVT of leg (deep venous thrombosis) ([facility] 2014    Fracture     right foot / pt denies surgical intervention    Fractured nose     x 9 times    Helicobacter pylori (H. pylori) infection     per EGD    Hematuria 2018    Has a follow-up with Urologist     Hiatal hernia     History of blood transfusion     no reactions    History of DVT (deep vein thrombosis) 2012    History of myasthenia gravis     diagnosed around age 26    Hx of blood clots     L leg post knee surgery    Hypertension     managed by Dr. Toney Andrade PCP   Street Internal hemorrhoids     Narcolepsy 2012    Dr. Matthew [facility]    Osteoporosis     Sleep apnea     no machine    Under care of team 10/2021    Dr. Wilfredo Villalobos Under care of team     Pain Management Dr. Shila Pitt last visit 2022       Past Surgical History:        Procedure Laterality Date    ANKLE SURGERY Left 2018    mass excision    ARM SURGERY Right     Pt states she has had 13 right arm surgeries    BREAST ENHANCEMENT SURGERY Bilateral      SECTION      x 3    COLONOSCOPY  2013    sigmoid diverticula, prominent large internal hemorrhoid    COLONOSCOPY N/A 2/10/2020    COLONOSCOPY DIAGNOSTIC performed by Rosa Sanchez MD at [facility] 2020    COLORECTAL CANCER SCREENING, NOT HIGH RISK performed by Edna Rodrigues MD at [address] [place] [address] Rd, COLON, DIAGNOSTIC      ESOPHAGEAL MOTILITY STUDY N/A 2020    JOSE FRANCISCO RN - ESOPHAGEAL MOTILITY STUDY performed by Sherice Stoner DO at [facility]    ESOPHAGEAL MOTILITY STUDY  9/10/2020    ESOPHAGEAL MOTILITY STUDY performed by Sherice Stoner DO at [facility]    ESOPHAGEAL MOTILITY STUDY N/A 12/18/2020    ESOPHAGEAL MOTILITY STUDY ATTEMPTED PER DR. Anil Fuchs WITHOUT SUCCESS performed by Sherice Stoner DO at [facility]    FOOT SURGERY Left 2015    FOOT TENDON SURGERY Left 02/22/2019    LEFT PERONEAL TENDON REPAIR WITH POSSIBLE LEFT TENDON TRANSFER (Left )    KNEE ARTHROSCOPY Left     Two surgeries on left knee per pt    KNEE SURGERY Right     Three surgeries per pt    LIGAMENT REPAIR Left 2/22/2019    LEFT PERONEAL TENDON REPAIR performed by Allison Covington DPM at [facility] Bilateral 06/04/2021     BILATERAL L4/5, L5/S1 LUMBAR FACET (Bilateral )    NOSE SURGERY      PAIN MANAGEMENT PROCEDURE Bilateral 6/4/2021    BILATERAL L4/5, L5/S1 LUMBAR FACET performed by Ya Kitchen MD at [address] [place] [address]  06/11/2021    BILATERAL L4/5, L5/S1 NERVE BLOCK - Bilateral    PAIN MANAGEMENT PROCEDURE Bilateral 6/11/2021    BILATERAL L4/5, L5/S1 NERVE BLOCK performed by Ya Kitchen MD at [address] [place] [address] Right 07/23/2021    L4/5, L5/S1 NERVE RADIOFREQUENCY ABLATION     PAIN MANAGEMENT PROCEDURE Right 7/23/2021    right  L4/5, L5/S1 NERVE RADIOFREQUENCY ABLATION performed by Ya Kitchen MD at [address] [place] [address] Left 08/06/2021    left  L4/5, L5/S1 NERVE RADIOFREQUENCY ABLATION (Left )    PAIN MANAGEMENT PROCEDURE Left 8/6/2021    left  L4/5, L5/S1 NERVE RADIOFREQUENCY ABLATION performed by Ya Ktichen MD at [address] OFFICE/OUTPT [facility] Left 6/29/2018    RESECTION/REMOVAL BONY NEOPLASM LEFT FIBULA, LEFT SOFT TISSUE MASS EXCISION WITH BONE BIOPSY LEFT ANKLE FIBULA performed by Allison Covington DPM at [address] Left    [address] [facility],[address] [facility], [facility] SURGERY Left 2022    REVERSE TOTAL SHOULDER ARTHROPLASTY - Left    SHOULDER SURGERY Left 2022    REVERSE TOTAL SHOULDER ARTHROPLASTY performed by Naila Alvarado DO at [address]  2013    tertiary contractures esophagus, 3 to 4 cm hiatal hernia, antral gastritis, + H Pylori, mild active chronic esophagitis    UPPER GASTROINTESTINAL ENDOSCOPY  2016    one biopsy    UPPER GASTROINTESTINAL ENDOSCOPY N/A 2/10/2020    EGD BIOPSY performed by Prince Tobar MD at [address]         Social History:   Social History     Socioeconomic History    Marital status: Single     Spouse name: Not on file    Number of children: Not on file    Years of education: Not on file    Highest education level: Not on file   Occupational History    Not on file   Tobacco Use    Smoking status: Former Smoker     Packs/day: 1.00     Years: 20.00     Pack years: 20.00     Quit date:      Years since quittin.3    Smokeless tobacco: Never Used   Vaping Use    Vaping Use: Never used   Substance and Sexual Activity    Alcohol use: Not Currently     Comment: maybe oncee a year    Drug use: No    Sexual activity: Not Currently   Other Topics Concern    Not on file   Social History Narrative    Not on file     Social Determinants of Health     Financial Resource Strain: Low Risk     Difficulty of Paying Living Expenses: Not hard at all   Food Insecurity: No Food Insecurity    Worried About Running Out of Food in the Last Year: Never true    Brigid of Food in the Last Year: Never true   Transportation Needs:     Lack of Transportation (Medical): Not on file    Lack of Transportation (Non-Medical):  Not on file   Physical Activity:     Days of Exercise per Week: Not on file    Minutes of Exercise per Session: Not on file   Stress:     Feeling of Stress : Not on file   Social Connections:     Frequency of Communication with Friends and Family: Not on file    Frequency of Social Gatherings with Friends and Family: Not on file    Attends Anabaptism Services: Not on file    Active Member of Clubs or Organizations: Not on file    Attends Club or Organization Meetings: Not on file    Marital Status: Not on file   Intimate Partner Violence:     Fear of Current or Ex-Partner: Not on file    Emotionally Abused: Not on file    Physically Abused: Not on file    Sexually Abused: Not on file   Housing Stability:     Unable to Pay for Housing in the Last Year: Not on file    Number of [place] in the Last Year: Not on file    Unstable Housing in the Last Year: Not on file       Family History:       Problem Relation Age of Onset    Cancer Mother     Hypertension Mother     Stroke Mother     Dementia Mother     Cancer Maternal Aunt     Hypertension Father     Heart Surgery Father     Alzheimer's Disease Father     Diabetes Neg Hx        Allergies:  Latex, Prozac [fluoxetine hcl], Sulfa antibiotics, and Codeine    Home Medications:  Prior to Admission medications    Medication Sig Start Date End Date Taking? Authorizing Provider   naproxen (NAPROSYN) 500 MG tablet TAKE ONE TABLET BY MOUTH TWICE A DAY WITH MEALS 4/13/22   Eric Espinoza DO   oxyCODONE-acetaminophen (PERCOCET) 5-325 MG per tablet Take 1 tablet by mouth every 8 hours as needed for Pain for up to 30 days.  3/17/22 4/16/22  PIETER Brown - CNP   hydrOXYzine (ATARAX) 50 MG tablet TAKE ONE TABLET BY MOUTH EVERY 8 HOURS AS NEEDED FOR ANXIETY 2/16/22   Macy Andrade DO   docusate sodium (COLACE) 100 MG capsule Take 1 capsule by mouth 2 times daily as needed for Constipation 2/8/22   Eric Espinoza DO   acetaminophen (TYLENOL) 500 MG tablet Take 2 tablets by mouth every 6 hours as needed for Pain 2/8/22   Eric Espinoza DO   propranolol (INDERAL LA) 80 MG extended release capsule Take 1 capsule by mouth daily 10/29/21   Ned Major Kareen Sin,    amphetamine-dextroamphetamine (ADDERALL) 30 MG tablet 30 mg 2 times daily. 9/27/21   Historical Provider, MD   pantoprazole (PROTONIX) 40 MG tablet Take 1 tablet by mouth daily 9/15/21   Rainer Covington DO   buPROPion HCl ER, XL, 450 MG TB24 TAKE ONE TABLET BY MOUTH EVERY MORNING 8/31/21   Rainer Covington DO   alendronate (FOSAMAX) 70 MG tablet TAKE 1 TABLET BY MOUTH ONCE WEEKLY BEFORE BREAKFAST, ON AN EMPTY STOMACH: REMAIN UPRIGHT FOR 30 MINUTES:TAKE WITH 8 OUNCES OF WATER 3/18/21   Rainer Covington DO   albuterol sulfate HFA (VENTOLIN HFA) 108 (90 Base) MCG/ACT inhaler INHALE TWO PUFFS BY MOUTH EVERY 4 HOURS AS NEEDED FOR WHEEZING 10/28/20   Ankit Walker MD   citalopram (CELEXA) 40 MG tablet TAKE ONE TABLET BY MOUTH DAILY 4/8/20   Virgen Walker MD   albuterol (PROVENTIL) (2.5 MG/3ML) 0.083% nebulizer solution Take 2.5 mg by nebulization every 6 hours as needed for Wheezing or Shortness of Breath     Historical Provider, MD   Calcium Carbonate (CALCIUM 600 PO) Take 600 mg by mouth daily    Historical Provider, MD       Current Medications:   No current facility-administered medications for this encounter. REVIEW OF SYSTEMS:       CONSTITUTIONAL: negative for fatigue and malaise   EYES: negative for double vision and photophobia    HEENT: negative for tinnitus and sore throat   RESPIRATORY: negative for cough, shortness of breath   CARDIOVASCULAR: negative for chest pain, palpitations   GASTROINTESTINAL: negative for nausea, vomiting   GENITOURINARY: negative for incontinence   MUSCULOSKELETAL: negative for neck or back pain   NEUROLOGICAL: negative for seizures   PSYCHIATRIC: negative for fatigue     Review of systems otherwise negative.     PHYSICAL EXAM:       BP (!) 108/59   Pulse 56   Temp 98.2 °F (36.8 °C) (Oral)   Resp 15   Ht 5' (1.524 m)   Wt 145 lb (65.8 kg)   SpO2 94%   BMI 28.32 kg/m²     CONSTITUTIONAL:  Well developed, well nourished, alert and oriented x 3, in no acute distress. GCS 15, nontoxic. Expressive aphasia, no dysarthria   HEAD:  normocephalic, atraumatic    EYES:  PERRLA, EOMI. no gaze deviation   ENT:  moist mucous membranes   NECK:  supple, symmetric, no midline tenderness to palpation    BACK:  without midline tenderness, step-offs or deformities    LUNGS:  Equal air entry bilaterally   CARDIOVASCULAR:  normal s1 / s2   ABDOMEN:  Soft, no rigidity   NEUROLOGIC:  Mental Status:  A & O x3,awake             Cranial Nerves:    cranial nerves II-XII are grossly intact except right facial droop    Motor Exam:    Drift: Absent  Tone:  normal    Motor exam is symmetrical 5 out of 5 all extremities bilaterally    Sensory:    Touch:    Right Upper Extremity:  normal  Left Upper Extremity:  normal  Right Lower Extremity:  normal  Left Lower Extremity:  normal    Deep Tendon Reflexes:    Right Bicep:  2+  Left Bicep:  2+  Right Knee:  2+  Left Knee:  2+    Plantar Response:  Right:  equivocal  Left:  equivocal    Clonus:  absent  Pulliam's:  absent    Coordination/Dysmetria:  Heel to Shin:  Right:  normal  Left:  normal  Finger to Nose:   No dysmetria    Gait: Deferred    INITIAL NIH STROKE SCALE:    Time Performed: 215 PM     1a. Level of consciousness:  0 - alert; keenly responsive  1b. Level of consciousness questions:  0 - answers both questions correctly  1c. Level of consciousness questions:  0 - performs both tasks correctly  2. Best Gaze:  0 - normal  3. Visual:  0 - no visual loss  4. Facial Palsy:  1  5a. Motor left arm:  0 - no drift, limb holds 90 (or 45) degrees for full 10 seconds  5b. Motor right arm:  0 - no drift, limb holds 90 (or 45) degrees for full 10 seconds  6a. Motor left le - no drift; leg holds 30 degree position for full 5 seconds  6b. Motor right le - no drift; leg holds 30 degree position for full 5 seconds  7. Limb Ataxia:  0 - absent  8. Sensory:  0 - normal; no sensory loss  9.     Best Language:  1 - mild to moderate aphasia; some obvious loss of fluency or facility of comprehension without significant limitation on ideas expressed or form of expression. Reduction of speech and/or comprehension, however, makes conversation about provided materials difficult or impossible. For example, in conversation about provided materials, examiner can identify picture or naming card content from patient's response. 10.  Dysarthria:  0 - normal  11.   Extinction and Inattention:  0 - no abnormality    TOTAL:  2     SKIN:  no rash      LABS AND IMAGING:     CBC with Differential:    Lab Results   Component Value Date    WBC 7.3 04/14/2022    RBC 5.01 04/14/2022    HGB 14.8 04/14/2022    HCT 47.1 04/14/2022     04/14/2022    MCV 94.0 04/14/2022    MCH 29.5 04/14/2022    MCHC 31.4 04/14/2022    RDW 12.9 04/14/2022    NRBC 1 10/28/2020    LYMPHOPCT 19 04/14/2022    MONOPCT 7 04/14/2022    BASOPCT 1 04/14/2022    MONOSABS 0.53 04/14/2022    LYMPHSABS 1.38 04/14/2022    EOSABS 0.13 04/14/2022    BASOSABS 0.06 04/14/2022    DIFFTYPE NOT REPORTED 02/11/2022     BMP:    Lab Results   Component Value Date    NA PENDING 04/14/2022    K PENDING 04/14/2022    CL PENDING 04/14/2022    CO2 PENDING 04/14/2022    BUN PENDING 04/14/2022    LABALBU 3.4 02/11/2022    CREATININE PENDING 04/14/2022    CALCIUM PENDING 04/14/2022    GFRAA PENDING 04/14/2022    LABGLOM PENDING 04/14/2022    GLUCOSE PENDING 04/14/2022       Radiology Review: MRI-left internal capsule stroke      ASSESSMENT AND PLAN:       Patient Active Problem List   Diagnosis    Attention deficit hyperactivity disorder (ADHD)    Narcolepsy    Moderate persistent asthma without complication    History of DVT (deep vein thrombosis)    Hiatal hernia    Cervical radiculopathy    Ganglion cyst    Chronic pain of left ankle    Acquired trigger finger    Osteoarthritis of knee    Osteoarthritis of wrist    Pes anserinus bursitis    Plantar fasciitis  Flexion contracture of joint of foot, left    Essential hypertension    Chronic anticoagulation    Iron deficiency anemia due to chronic blood loss    GERD (gastroesophageal reflux disease)    Absolute anemia    Anemia    Generalized anxiety disorder    Vitamin B12 deficiency    Iron malabsorption    Fall at home, initial encounter    Closed fracture of multiple ribs of right side    Hemothorax on right    Contusion of right lung    Chronic fatigue syndrome    Sleep apnea    Pain in joint involving ankle and foot    Lumbosacral spondylosis without myelopathy    Chronic bilateral low back pain with bilateral sciatica    Encounter for long-term opiate analgesic use    Arthritis of left shoulder region    Opioid use, unspecified with unspecified opioid-induced disorder       60 y.o. female who was last known well 4 days ago for presentation and was apparently at PT for her shoulder injury and they noticed a change in speech. She presents with a NIH of 2, expressive aphasia with a left facial droop. CT head and subsequently MRI showed a left internal capsule infarct. Loaded with aspirin Plavix along with stroke work-up ongoing. Vitals have thus far been stable. Does have a history of smoking 1 pack daily. Neurological-ischemic stroke  -Continue to monitor for any neurological changes  -Aspirin and Plavix loading dose x1  -Followed by aspirin and Plavix x21 days and then a singular agent  -High-dose statin therapy  -DVT prophylaxis  -Stroke work-up including TSH/A1c/lipid profile  -Echocardiogram with bubble study  -PT/OT  -P.o. diet as tolerating            Additional recommendations may follow    Please contact [facility] with any changes in patients neurologic status.            Marlyn Villalpando MD   4/14/2022  3:10 PM

## 2022-04-14 NOTE — ED NOTES
Radiologist notified me about MRI finding. Findings relayed off to neuro resident.      Kamryn Ty RN  04/14/22 [id number]

## 2022-04-14 NOTE — Clinical Note
Discharge Plan[de-identified] Other/[facility])   Telemetry/Cardiac Monitoring Required?: Yes   Bed request comments: mala neg

## 2022-04-14 NOTE — ED PROVIDER NOTES
[facility] ED  Emergency Department Encounter  EmergencyMedicine Resident     Pt Name:Carol Nolon Goodell  MRN: [id number]  [place] 1960  Date of evaluation: 22  PCP:  Francisco Mclaughlin DO    CHIEF COMPLAINT       Chief Complaint   Patient presents with    Altered Mental Status       HISTORY OF PRESENT ILLNESS  (Location/Symptom, Timing/Onset, Context/Setting, Quality, Duration, Modifying Factors, Severity.)      Say Chavez is a 60 y.o. female who presents with altered mental status. Patient arrives from [address] [place] [address] physical therapy for right shoulder total replacement. There the therapist noticed a change in her mentation. Unable to say where she was or what she was doing. Reports this is a change in her baseline from a week ago. Patient endorses confusion she does not really understand why she is in the emergency room, does state that it is difficult to get words out, unsure if she has dysarthria. She does answer questions appropriately however, is alert and oriented x3, denies alcohol or drug use. States that she does not know when her last known well is. States she woke up yesterday after 3 days of sleeping. Does has a history of narcolepsy but states is abnormal for her. Denies history of sleep apnea    PAST MEDICAL / SURGICAL / SOCIAL / FAMILY HISTORY      has a past medical history of Acid reflux, Adult ADHD, Asthma, Bipolar 1 disorder ([address] Broken foot, Chest pain of uncertain etiology, Depression, DVT of leg (deep venous thrombosis) ([address] Fracture, Fractured nose, Helicobacter pylori (H. pylori) infection, Hematuria, Hiatal hernia, History of blood transfusion, History of DVT (deep vein thrombosis), History of myasthenia gravis, Hx of blood clots, Hypertension, Internal hemorrhoids, Narcolepsy, Osteoporosis, Sleep apnea, Under care of team, and Under care of team.     has a past surgical history that includes  section;  Nose surgery; Knee arthroscopy (Left); shoulder surgery (Left); Wrist surgery; Upper gastrointestinal endoscopy (2013); Colonoscopy (2013); Upper gastrointestinal endoscopy (2016); Foot surgery (Left, ); Arm Surgery (Right); knee surgery (Right); vaginal dilatation; Ankle surgery (Left, 2018); pr office/outpt visit,procedure only (Left, 2018); Foot Tendon Surgery (Left, 2019); ligament repair (Left, 2019); Upper gastrointestinal endoscopy (N/A, 2/10/2020); Colonoscopy (N/A, 2/10/2020); Endoscopy, colon, diagnostic; Colonoscopy (N/A, 2020); esophageal motility study (N/A, 2020); esophageal motility study (9/10/2020); esophageal motility study (N/A, 2020); Nerve Block (Bilateral, 2021); Pain management procedure (Bilateral, 2021); Pain management procedure (2021); Pain management procedure (Bilateral, 2021); Pain management procedure (Right, 2021); Pain management procedure (Right, 2021); Pain management procedure (Left, 2021); Pain management procedure (Left, 2021); Breast enhancement surgery (Bilateral); shoulder surgery (Left, 2022); and shoulder surgery (Left, 2022).     Social History     Socioeconomic History    Marital status: Single     Spouse name: Not on file    Number of children: Not on file    Years of education: Not on file    Highest education level: Not on file   Occupational History    Not on file   Tobacco Use    Smoking status: Former Smoker     Packs/day: 1.00     Years: 20.00     Pack years: 20.00     Quit date:      Years since quittin.3    Smokeless tobacco: Never Used   Vaping Use    Vaping Use: Never used   Substance and Sexual Activity    Alcohol use: Not Currently     Comment: maybe oncee a year    Drug use: No    Sexual activity: Not Currently   Other Topics Concern    Not on file   Social History Narrative    Not on file     Social Determinants of Health     Financial Resource Strain: Low Risk     Difficulty of Paying Living Expenses: Not hard at all   Food Insecurity: No Food Insecurity    Worried About Running Out of Food in the Last Year: Never true    Brigid of Food in the Last Year: Never true   Transportation Needs:     Lack of Transportation (Medical): Not on file    Lack of Transportation (Non-Medical): Not on file   Physical Activity:     Days of Exercise per Week: Not on file    Minutes of Exercise per Session: Not on file   Stress:     Feeling of Stress : Not on file   Social Connections:     Frequency of Communication with Friends and Family: Not on file    Frequency of Social Gatherings with Friends and Family: Not on file    Attends Yazidism Services: Not on file    Active Member of [address] [place] [address] [employer] or Organizations: Not on file    Attends Club or Organization Meetings: Not on file    Marital Status: Not on file   Intimate Partner Violence:     Fear of Current or Ex-Partner: Not on file    Emotionally Abused: Not on file    Physically Abused: Not on file    Sexually Abused: Not on file   Housing Stability:     Unable to Pay for Housing in the Last Year: Not on file    Number of [place] in the Last Year: Not on file    Unstable Housing in the Last Year: Not on file       Family History   Problem Relation Age of Onset    Cancer Mother     Hypertension Mother     Stroke Mother     Dementia Mother     Cancer Maternal Aunt     Hypertension Father     Heart Surgery Father     Alzheimer's Disease Father     Diabetes Neg Hx        Allergies:  Latex, Prozac [fluoxetine hcl], Sulfa antibiotics, and Codeine    Home Medications:  Prior to Admission medications    Medication Sig Start Date End Date Taking?  Authorizing Provider   naproxen (NAPROSYN) 500 MG tablet TAKE ONE TABLET BY MOUTH TWICE A DAY WITH MEALS 4/13/22   Eileen Veronica DO   oxyCODONE-acetaminophen (PERCOCET) 5-325 MG per tablet Take 1 tablet by mouth every 8 hours as needed for Pain for up to 30 days. 3/17/22 4/16/22  Ernesto Sanderson APRN - CNP   hydrOXYzine (ATARAX) 50 MG tablet TAKE ONE TABLET BY MOUTH EVERY 8 HOURS AS NEEDED FOR ANXIETY 2/16/22   Deedee Prasad DO   docusate sodium (COLACE) 100 MG capsule Take 1 capsule by mouth 2 times daily as needed for Constipation 2/8/22   Lisbet Nailer, DO   acetaminophen (TYLENOL) 500 MG tablet Take 2 tablets by mouth every 6 hours as needed for Pain 2/8/22   Lisbet Nailer, DO   propranolol (INDERAL LA) 80 MG extended release capsule Take 1 capsule by mouth daily 10/29/21   Deedee Prasad DO   amphetamine-dextroamphetamine (ADDERALL) 30 MG tablet 30 mg 2 times daily. 9/27/21   Historical Provider, MD   pantoprazole (PROTONIX) 40 MG tablet Take 1 tablet by mouth daily 9/15/21   Deedee Prasad DO   buPROPion HCl ER, XL, 450 MG TB24 TAKE ONE TABLET BY MOUTH EVERY MORNING 8/31/21   Deedee Prasad DO   alendronate (FOSAMAX) 70 MG tablet TAKE 1 TABLET BY MOUTH ONCE WEEKLY BEFORE BREAKFAST, ON AN EMPTY STOMACH: REMAIN UPRIGHT FOR 30 MINUTES:TAKE WITH 8 OUNCES OF WATER 3/18/21   Deedee Prasad DO   albuterol sulfate HFA (VENTOLIN HFA) 108 (90 Base) MCG/ACT inhaler INHALE TWO PUFFS BY MOUTH EVERY 4 HOURS AS NEEDED FOR WHEEZING 10/28/20   Ankit Walker MD   citalopram (CELEXA) 40 MG tablet TAKE ONE TABLET BY MOUTH DAILY 4/8/20   Maria T Walker MD   albuterol (PROVENTIL) (2.5 MG/3ML) 0.083% nebulizer solution Take 2.5 mg by nebulization every 6 hours as needed for Wheezing or Shortness of Breath     Historical Provider, MD   Calcium Carbonate (CALCIUM 600 PO) Take 600 mg by mouth daily    Historical Provider, MD       REVIEW OF SYSTEMS    (2-9 systems for level 4, 10 or more for level 5)      Review of Systems   Constitutional: Negative for chills and fever. HENT: Negative for congestion and sore throat. Eyes: Negative for visual disturbance. Respiratory: Negative for cough and shortness of breath. Cardiovascular: Negative for chest pain. Gastrointestinal: Negative for abdominal pain, nausea and vomiting. Genitourinary: Negative for dysuria and frequency. Musculoskeletal: Negative for back pain, neck pain and neck stiffness. Skin: Negative for rash. Neurological: Positive for speech difficulty. Negative for dizziness, facial asymmetry, weakness, numbness and headaches. Psychiatric/Behavioral: Positive for confusion. PHYSICAL EXAM   (up to 7 for level 4, 8 or more for level 5)      INITIAL VITALS:   /77   Pulse 50   Temp 98.2 °F (36.8 °C) (Oral)   Resp 21   Ht 5' (1.524 m)   Wt 145 lb (65.8 kg)   SpO2 98%   BMI 28.32 kg/m²      Vitals:    04/14/22 1456 04/14/22 1505 04/14/22 1510 04/14/22 1518   BP: 113/77 122/83 122/86 114/77   Pulse: 52 54 51 50   Resp: 15 20 17 21   Temp:       TempSrc:       SpO2:  97% 99% 98%   Weight:       Height:            Physical Exam  Vitals reviewed. Constitutional:       General: She is not in acute distress. Appearance: She is well-developed. She is not ill-appearing. HENT:      Head: Normocephalic and atraumatic. Eyes:      General: No visual field deficit. Pupils: Pupils are equal, round, and reactive to light. Cardiovascular:      Rate and Rhythm: Normal rate and regular rhythm. Pulmonary:      Effort: Pulmonary effort is normal.      Breath sounds: Normal breath sounds. Abdominal:      General: There is no distension. Palpations: Abdomen is soft. Tenderness: There is no abdominal tenderness. Musculoskeletal:         General: Normal range of motion. Cervical back: Normal range of motion and neck supple. Skin:     General: Skin is warm and dry. Neurological:      Mental Status: She is alert and oriented to person, place, and time. GCS: GCS eye subscore is 4. GCS verbal subscore is 4. GCS motor subscore is 6. Cranial Nerves: Dysarthria and facial asymmetry present. Motor: No weakness. Coordination: Finger-Nose-Finger Test normal.      Comments: Initial NIH stroke scale of 3 due to dysarthria, mild right-sided facial droop, mild aphasia.        DIFFERENTIAL  DIAGNOSIS     PLAN (LABS / IMAGING / EKG):  Orders Placed This Encounter   Procedures    CT HEAD WO CONTRAST    CTA HEAD NECK W CONTRAST    XR CHEST PORTABLE    MRI BRAIN WO CONTRAST    STROKE PANEL    Ammonia    TSH with Reflex    Urinalysis with Reflex to Culture    Brain Natriuretic Peptide    Neuro checks    Vital signs    Telemetry monitoring - continuous duration    Inpatient consult to Neurology    EKG 12 Lead    ADMIT TO INPATIENT       MEDICATIONS ORDERED:  Orders Placed This Encounter   Medications    iopamidol (ISOVUE-370) 76 % injection 90 mL       DIAGNOSTIC RESULTS / EMERGENCY DEPARTMENT COURSE / MDM   LAB RESULTS:  Results for orders placed or performed during the hospital encounter of 04/14/22   STROKE PANEL   Result Value Ref Range    Glucose 103 (H) 70 - 99 mg/dL    BUN 13 8 - 23 mg/dL    CREATININE 1.22 (H) 0.50 - 0.90 mg/dL    Calcium 10.2 8.6 - 10.4 mg/dL    Sodium 139 135 - 144 mmol/L    Potassium 4.0 3.7 - 5.3 mmol/L    Chloride 101 98 - 107 mmol/L    CO2 26 20 - 31 mmol/L    Anion Gap 12 9 - 17 mmol/L    GFR Non-African American 45 (L) >60 mL/min    GFR  54 (L) >60 mL/min    GFR Comment          WBC 7.3 3.5 - 11.3 k/uL    RBC 5.01 3.95 - 5.11 m/uL    Hemoglobin 14.8 11.9 - 15.1 g/dL    Hematocrit 47.1 36.3 - 47.1 %    MCV 94.0 82.6 - 102.9 fL    MCH 29.5 25.2 - 33.5 pg    MCHC 31.4 28.4 - 34.8 g/dL    RDW 12.9 11.8 - 14.4 %    Platelets 234 719 - 756 k/uL    MPV 10.1 8.1 - 13.5 fL    NRBC Automated 0.0 0.0 per 100 WBC    Total CK 62 26 - 192 U/L    Seg Neutrophils 71 (H) 36 - 65 %    Lymphocytes 19 (L) 24 - 43 %    Monocytes 7 3 - 12 %    Eosinophils % 2 1 - 4 %    Basophils 1 0 - 2 %    Immature Granulocytes 0 0 %    Segs Absolute 5.18 1.50 - 8.10 k/uL    Absolute Lymph # 1.38 1.10 - 3.70 k/uL    Absolute Mono # 0.53 0.10 - 1.20 k/uL    Absolute Eos # 0.13 0.00 - 0.44 k/uL    Basophils Absolute 0.06 0.00 - 0.20 k/uL    Absolute Immature Granulocyte <0.03 0.00 - 0.30 k/uL    Myoglobin 36 25 - 58 ng/mL    Protime 10.1 9.1 - 12.3 sec    INR 0.9     PTT 22.2 20.5 - 30.5 sec    Troponin, High Sensitivity 12 0 - 14 ng/L   Ammonia   Result Value Ref Range    Ammonia 25 11 - 51 umol/L   TSH with Reflex   Result Value Ref Range    TSH 2.22 0.30 - 5.00 uIU/mL   Brain Natriuretic Peptide   Result Value Ref Range    Pro- <300 pg/mL         RADIOLOGY:  CT HEAD WO CONTRAST   Final Result   Mild low-attenuation changes within the posterior aspect of the left basal   ganglia near the posterior limb of the left internal capsule. Acute ischemia   is suspected. MRI is suggested for further evaluation. The findings were sent to the Radiology Results [address] at 3:54   pm on 4/14/2022to be communicated to a licensed caregiver. CTA HEAD NECK W CONTRAST   Final Result   Unremarkable CTA of the head and neck. Low-attenuation changes within the region of the posterior limb of the left   internal capsule are suspicious for acute ischemia. MRI of the brain is   suggested for further evaluation. The findings were sent to the Radiology Results [address] at 4:18   pm on 4/14/2022to be communicated to a licensed caregiver. XR CHEST PORTABLE   Final Result   Chronic changes. No evidence of acute cardiopulmonary process.          MRI BRAIN WO CONTRAST    (Results Pending)        EKG  EKG Interpretation    Interpreted by me    Rhythm: normal sinus   Rate: 57  Axis: normal  Ectopy: none  Conduction: normal  ST Segments: no acute change  T Waves: no acute change  Q Waves: none    Clinical Impression: Sinus bradycardia otherwise unremarkable    All EKG's are interpreted by the Emergency Department Physician who either signs or Co-signs this chart in the absence of a cardiologist.        Tracy:    Patient arrives for evaluation of altered mental status and strokelike symptoms. On examination she is in no distress, vital signs show sinus bradycardia rate of 50-60, otherwise vital signs are unremarkable. Initial neurological examination shows an NIH stroke scale score of 3. Given unclear last known well we obtained a stroke consult. CT head and CTA are unremarkable. Cardiac work-up, metabolic work-up and heme work-up negative. TSH is normal.  Patient is admitted to neurology for further work-up and management, MRI of the brain. ED Course as of 04/14/22 1645   Thu Apr 14, 2022   1521 Creatinine(!): 1.22  Mild elevation [CS]   1521 TSH: 2.22 [CS]   1422 AMMONIA, PLASMA, [id number]: 25 [CS]   1521 Troponin, High Sensitivity: 12 [CS]   2069 Admitted to neurology under obs status [CS]   1640 While boarded in the emergency room. Patient eloped from the emergency department. Walked all the way to the cafeteria looking for boyfriend although boyfriend is not there. Was able to find her and get her back to her room. Walked with a steady gait, no focal weakness. Still confused, speech abnormal. [CS]      ED Course User Index  [CS] Serena Blackwood DO         PROCEDURES:      CONSULTS:  IP CONSULT TO NEUROLOGY    CRITICAL CARE:  Please see attending note    FINAL IMPRESSION      1. Stroke-like symptoms          DISPOSITION / PLAN     DISPOSITION Admitted 04/14/2022 03:33:38 PM      PATIENT REFERRED TO:  No follow-up provider specified.     DISCHARGE MEDICATIONS:  New Prescriptions    No medications on file       Serena Blackwood DO  Emergency Medicine Resident    (Please note that portions of thisnote were completed with a voice recognition program.  Efforts were made to edit the dictations but occasionally words are mis-transcribed.)        Serena Blackwood DO  Resident  04/14/22 [id number]

## 2022-04-14 NOTE — ED PROVIDER NOTES
[facility]  Emergency Department  Faculty Attestation     I performed a history and physical examination of the patient and discussed management with the resident. I reviewed the residents note and agree with the documented findings and plan of care. Any areas of disagreement are noted on the chart. I was personally present for the key portions of any procedures. I have documented in the chart those procedures where I was not present during the key portions. I have reviewed the emergency nurses triage note. I agree with the chief complaint, past medical history, past surgical history, allergies, medications, social and family history as documented unless otherwise noted below. For Physician Assistant/ Nurse Practitioner cases/documentation I have personally evaluated this patient and have completed at least one if not all key elements of the E/M (history, physical exam, and MDM). Additional findings are as noted. Primary Care Physician:  Bernard Nagel,     Screenings:  [unfilled]    CHIEF COMPLAINT       Chief Complaint   Patient presents with    Altered Mental Status       RECENT VITALS:   Temp: 98.2 °F (36.8 °C),  Pulse: 58, Resp: 21, BP: (!) 136/104    LABS:  Labs Reviewed   STROKE PANEL       Radiology  CT HEAD WO CONTRAST    (Results Pending)   CTA HEAD NECK W CONTRAST    (Results Pending)       CRITICAL CARE: There was a high probability of clinically significant/life threatening deterioration in this patient's condition which required my urgent intervention. Total critical care time was 5 minutes. This excludes any time for separately reportable procedures.      EKG:   EKG Interpretation    Interpreted by me    Rhythm: normal sinus   Rate: Bradycardia  Axis: Left  Ectopy: none  Conduction: normal  ST Segments: no acute change  T Waves: no acute change  Q Waves: none    Clinical Impression: Bradycardia, no acute ischemic changes    Attending Physician Additional  Notes    Patient is brought from the physical therapy office for altered mentation. She was seen last week and had no neurologic issues. Patient states that she slept for 3 days recently for uncertain reason. She does have history of narcolepsy. No new meds or exposures to drugs. She is uncertain when her last normal was but she has been having difficulty focusing and speaking. She tried to get to her therapy appointment 2 days ago but forgot where it was and this is unusual for her. She is having a hard time finding words though no dysarthria. No headache or chest pain. No weakness or diplopia or vision changes. No history of stroke or known atrial fibrillation. No polyuria polydipsia. No fevers. She does not wear his CPAP. On exam she is in no distress, slightly bradycardic and minimally hypertensive. GCS is 15. She tilts her head to the right. Normal pupils and extraocular movements. She has subtle right facial droop with loss of nasolabial fold though with smiling she is symmetrical.  No drift. Normal finger-nose movements. Neck is supple. Skin is warm and dry. She follows commands. She is able to name items. Impression is possible stroke, consider other causes such as metabolic hypoxia, encephalopathy, postictal, abnormality of blood sugar sodium or calcium. Plan is stroke consultation, CT brain, CT angiogram, IV fluids, laboratory studies, reassess. Yi Cotto MD, [address]  Attending Emergency  Physician                Kathleen Rey MD  04/14/22 8542

## 2022-04-14 NOTE — H&P
Neurology H &P Note        History Obtained From:  patient    CHIEF COMPLAINT:       Impaired speech    HISTORY OF PRESENT ILLNESS:       The patient is a 60 y.o. female who presents with impaired speech and a right facial droop. According to the patient, she was last known well approximately 4 days ago. Her NIH stroke scale is 2 for expressive aphasia and a facial droop. She does not display any additional focal deficits. She states she is having difficult times with her words coming out and this has been going on for several days. She does not know why she did not come to the hospital earlier. She does have a PMH of narcolepsy and right shoulder total replacement. She also has a PMH of DVT, hypertension. Unclear if she has a history of myasthenia but it is listed in her chart. She is outside the window for any TPA or intervention. CT head showed a left internal capsule ischemic stroke which was confirmed by MRI. Patient was loaded with aspirin and Plavix. Etiology currently is unclear. Apparently patient was at the physical therapist when there was a noted change in her mentation. This was a change from approximately a week ago. She did not know why she is in the ED but was sent by PT. Apparently patient attempted to leave, she was found by the nurse in the cafeteria and states that her boyfriend has not checked up on her so I am going to leave and go find him. LKW: 4/10 5/20/ 2022  TPA: Not indicated  Endovascular:   Not indicated  CT WO contrast: Left posterior capsule stroke  NIHSS: 2  ABCD2:  4  Anticoagulation: None  LDL: Pending  A1C: Pending  At home, patient was on:    PMH: Bipolar, history of DVT, history of myasthenia? Hiatal hernia.   Helicobacter infection       PAST MEDICAL HISTORY :       Past Medical History:        Diagnosis Date    Acid reflux     Adult ADHD 08/01/2012    monthly visits @ [address] Asthma 08/01/2012    seasonal  ESOPHAGEAL MOTILITY STUDY N/A 12/18/2020    ESOPHAGEAL MOTILITY STUDY ATTEMPTED PER DR. Amy German WITHOUT SUCCESS performed by Pita Petersen DO at [facility]    FOOT SURGERY Left 2015    FOOT TENDON SURGERY Left 02/22/2019    LEFT PERONEAL TENDON REPAIR WITH POSSIBLE LEFT TENDON TRANSFER (Left )    KNEE ARTHROSCOPY Left     Two surgeries on left knee per pt    KNEE SURGERY Right     Three surgeries per pt    LIGAMENT REPAIR Left 2/22/2019    LEFT PERONEAL TENDON REPAIR performed by Aide Hamm DPM at [facility] Bilateral 06/04/2021     BILATERAL L4/5, L5/S1 LUMBAR FACET (Bilateral )    NOSE SURGERY      PAIN MANAGEMENT PROCEDURE Bilateral 6/4/2021    BILATERAL L4/5, L5/S1 LUMBAR FACET performed by Rajni Guzman MD at [address] [place] [address]  06/11/2021    BILATERAL L4/5, L5/S1 NERVE BLOCK - Bilateral    PAIN MANAGEMENT PROCEDURE Bilateral 6/11/2021    BILATERAL L4/5, L5/S1 NERVE BLOCK performed by Rajni Guzman MD at [address] [place] [address] Right 07/23/2021    L4/5, L5/S1 NERVE RADIOFREQUENCY ABLATION     PAIN MANAGEMENT PROCEDURE Right 7/23/2021    right  L4/5, L5/S1 NERVE RADIOFREQUENCY ABLATION performed by Rajni Guzman MD at [address] [place] [address] Left 08/06/2021    left  L4/5, L5/S1 NERVE RADIOFREQUENCY ABLATION (Left )    PAIN MANAGEMENT PROCEDURE Left 8/6/2021    left  L4/5, L5/S1 NERVE RADIOFREQUENCY ABLATION performed by Rajni Guzman MD at [address] [facility]/OUTPT [facility] Left 6/29/2018    RESECTION/REMOVAL BONY NEOPLASM LEFT FIBULA, LEFT SOFT TISSUE MASS EXCISION WITH BONE BIOPSY LEFT ANKLE FIBULA performed by Aide Hamm DPM at [address] Left     SHOULDER SURGERY Left 02/08/2022    REVERSE TOTAL SHOULDER ARTHROPLASTY - Left    SHOULDER SURGERY Left 2/8/2022    REVERSE TOTAL SHOULDER ARTHROPLASTY performed by Gregory Bear Rambo Simpson DO at [facility] [address] ENDOSCOPY  2013    tertiary contractures esophagus, 3 to 4 cm hiatal hernia, antral gastritis, + H Pylori, mild active chronic esophagitis    UPPER GASTROINTESTINAL ENDOSCOPY  2016    one biopsy    UPPER GASTROINTESTINAL ENDOSCOPY N/A 2/10/2020    EGD BIOPSY performed by Miladys Walker MD at [address]         Social History:   Social History     Socioeconomic History    Marital status: Single     Spouse name: Not on file    Number of children: Not on file    Years of education: Not on file    Highest education level: Not on file   Occupational History    Not on file   Tobacco Use    Smoking status: Former Smoker     Packs/day: 1.00     Years: 20.00     Pack years: 20.00     Quit date:      Years since quittin.3    Smokeless tobacco: Never Used   Vaping Use    Vaping Use: Never used   Substance and Sexual Activity    Alcohol use: Not Currently     Comment: maybe oncee a year    Drug use: No    Sexual activity: Not Currently   Other Topics Concern    Not on file   Social History Narrative    Not on file     Social Determinants of Health     Financial Resource Strain: Low Risk     Difficulty of Paying Living Expenses: Not hard at all   Food Insecurity: No Food Insecurity    Worried About Running Out of Food in the Last Year: Never true    Brigid of Food in the Last Year: Never true   Transportation Needs:     Lack of Transportation (Medical): Not on file    Lack of Transportation (Non-Medical):  Not on file   Physical Activity:     Days of Exercise per Week: Not on file    Minutes of Exercise per Session: Not on file   Stress:     Feeling of Stress : Not on file   Social Connections:     Frequency of Communication with Friends and Family: Not on file    Frequency of Social Gatherings with Friends and Family: Not on file    Attends Mor[employer] Services: Not on file   [employer] of Clubs or Organizations: Not on file    Attends Club or Organization Meetings: Not on file    Marital Status: Not on file   Intimate Partner Violence:     Fear of Current or Ex-Partner: Not on file    Emotionally Abused: Not on file    Physically Abused: Not on file    Sexually Abused: Not on file   Housing Stability:     Unable to Pay for Housing in the Last Year: Not on file    Number of [place] in the Last Year: Not on file    Unstable Housing in the Last Year: Not on file       Family History:       Problem Relation Age of Onset    Cancer Mother     Hypertension Mother     Stroke Mother     Dementia Mother     Cancer Maternal Aunt     Hypertension Father     Heart Surgery Father     Alzheimer's Disease Father     Diabetes Neg Hx        Allergies:  Latex, Prozac [fluoxetine hcl], Sulfa antibiotics, and Codeine    Home Medications:  Prior to Admission medications    Medication Sig Start Date End Date Taking? Authorizing Provider   naproxen (NAPROSYN) 500 MG tablet TAKE ONE TABLET BY MOUTH TWICE A DAY WITH MEALS 4/13/22   Madelyn Ramírez,    oxyCODONE-acetaminophen (PERCOCET) 5-325 MG per tablet Take 1 tablet by mouth every 8 hours as needed for Pain for up to 30 days. 3/17/22 4/16/22  Sandra Sanderson, APRN - CNP   hydrOXYzine (ATARAX) 50 MG tablet TAKE ONE TABLET BY MOUTH EVERY 8 HOURS AS NEEDED FOR ANXIETY 2/16/22   Lucinda Bass,    docusate sodium (COLACE) 100 MG capsule Take 1 capsule by mouth 2 times daily as needed for Constipation 2/8/22   Madelyn Ramírez DO   acetaminophen (TYLENOL) 500 MG tablet Take 2 tablets by mouth every 6 hours as needed for Pain 2/8/22   Madelyn Ramírez DO   propranolol (INDERAL LA) 80 MG extended release capsule Take 1 capsule by mouth daily 10/29/21   Lucinda Bass,    amphetamine-dextroamphetamine (ADDERALL) 30 MG tablet 30 mg 2 times daily.   9/27/21   Historical Provider, MD   pantoprazole (PROTONIX) 40 MG tablet Take 1 tablet by mouth daily 9/15/21   Zuhair Mclaughlin DO   buPROPion HCl ER, XL, 450 MG TB24 TAKE ONE TABLET BY MOUTH EVERY MORNING 8/31/21   Zuhair Mclaughlin DO   alendronate (FOSAMAX) 70 MG tablet TAKE 1 TABLET BY MOUTH ONCE WEEKLY BEFORE BREAKFAST, ON AN EMPTY STOMACH: REMAIN UPRIGHT FOR 30 MINUTES:TAKE WITH 8 OUNCES OF WATER 3/18/21   Zuhair Mclaughlin DO   albuterol sulfate HFA (VENTOLIN HFA) 108 (90 Base) MCG/ACT inhaler INHALE TWO PUFFS BY MOUTH EVERY 4 HOURS AS NEEDED FOR WHEEZING 10/28/20   Ankit Walker MD   citalopram (CELEXA) 40 MG tablet TAKE ONE TABLET BY MOUTH DAILY 4/8/20   Nehal Miranda MD   albuterol (PROVENTIL) (2.5 MG/3ML) 0.083% nebulizer solution Take 2.5 mg by nebulization every 6 hours as needed for Wheezing or Shortness of Breath     Historical Provider, MD   Calcium Carbonate (CALCIUM 600 PO) Take 600 mg by mouth daily    Historical Provider, MD       Current Medications:   Current Facility-Administered Medications: aspirin chewable tablet 81 mg, 81 mg, Oral, Daily  clopidogrel (PLAVIX) tablet 75 mg, 75 mg, Oral, Daily    REVIEW OF SYSTEMS:       CONSTITUTIONAL: negative for fatigue and malaise   EYES: negative for double vision and photophobia    HEENT: negative for tinnitus and sore throat   RESPIRATORY: negative for cough, shortness of breath   CARDIOVASCULAR: negative for chest pain, palpitations   GASTROINTESTINAL: negative for nausea, vomiting   GENITOURINARY: negative for incontinence   MUSCULOSKELETAL: negative for neck or back pain   NEUROLOGICAL: negative for seizures   PSYCHIATRIC: negative for fatigue     Review of systems otherwise negative. PHYSICAL EXAM:       /86   Pulse 50   Temp 98.2 °F (36.8 °C) (Oral)   Resp 13   Ht 5' (1.524 m)   Wt 145 lb (65.8 kg)   SpO2 95%   BMI 28.32 kg/m²     CONSTITUTIONAL:  Well developed, well nourished, alert and oriented x 3, in no acute distress. GCS 15, nontoxic.   Expressive aphasia, no dysarthria   HEAD: normocephalic, atraumatic    EYES:  PERRLA, EOMI. no gaze deviation   ENT:  moist mucous membranes   NECK:  supple, symmetric, no midline tenderness to palpation    BACK:  without midline tenderness, step-offs or deformities    LUNGS:  Equal air entry bilaterally   CARDIOVASCULAR:  normal s1 / s2   ABDOMEN:  Soft, no rigidity   NEUROLOGIC:  Mental Status:  A & O x3,awake             Cranial Nerves:    cranial nerves II-XII are grossly intact except right facial droop    Motor Exam:    Drift: Absent  Tone:  normal    Motor exam is symmetrical 5 out of 5 all extremities bilaterally    Sensory:    Touch:    Right Upper Extremity:  normal  Left Upper Extremity:  normal  Right Lower Extremity:  normal  Left Lower Extremity:  normal    Deep Tendon Reflexes:    Right Bicep:  2+  Left Bicep:  2+  Right Knee:  2+  Left Knee:  2+    Plantar Response:  Right:  equivocal  Left:  equivocal    Clonus:  absent  Pulliam's:  absent    Coordination/Dysmetria:  Heel to Shin:  Right:  normal  Left:  normal  Finger to Nose:   No dysmetria    Gait: Deferred    INITIAL NIH STROKE SCALE:    Time Performed: 215 PM     1a. Level of consciousness:  0 - alert; keenly responsive  1b. Level of consciousness questions:  0 - answers both questions correctly  1c. Level of consciousness questions:  0 - performs both tasks correctly  2. Best Gaze:  0 - normal  3. Visual:  0 - no visual loss  4. Facial Palsy:  1  5a. Motor left arm:  0 - no drift, limb holds 90 (or 45) degrees for full 10 seconds  5b. Motor right arm:  0 - no drift, limb holds 90 (or 45) degrees for full 10 seconds  6a. Motor left le - no drift; leg holds 30 degree position for full 5 seconds  6b. Motor right le - no drift; leg holds 30 degree position for full 5 seconds  7. Limb Ataxia:  0 - absent  8. Sensory:  0 - normal; no sensory loss  9.     Best Language:  1 - mild to moderate aphasia; some obvious loss of fluency or facility of comprehension without significant limitation on ideas expressed or form of expression. Reduction of speech and/or comprehension, however, makes conversation about provided materials difficult or impossible. For example, in conversation about provided materials, examiner can identify picture or naming card content from patient's response. 10.  Dysarthria:  0 - normal  11.   Extinction and Inattention:  0 - no abnormality    TOTAL:  2     SKIN:  no rash      LABS AND IMAGING:     CBC with Differential:    Lab Results   Component Value Date    WBC 7.3 04/14/2022    RBC 5.01 04/14/2022    HGB 14.8 04/14/2022    HCT 47.1 04/14/2022     04/14/2022    MCV 94.0 04/14/2022    MCH 29.5 04/14/2022    MCHC 31.4 04/14/2022    RDW 12.9 04/14/2022    NRBC 1 10/28/2020    LYMPHOPCT 19 04/14/2022    MONOPCT 7 04/14/2022    BASOPCT 1 04/14/2022    MONOSABS 0.53 04/14/2022    LYMPHSABS 1.38 04/14/2022    EOSABS 0.13 04/14/2022    BASOSABS 0.06 04/14/2022    DIFFTYPE NOT REPORTED 02/11/2022     BMP:    Lab Results   Component Value Date     04/14/2022    K 4.0 04/14/2022     04/14/2022    CO2 26 04/14/2022    BUN 13 04/14/2022    LABALBU 3.4 02/11/2022    CREATININE 1.22 04/14/2022    CALCIUM 10.2 04/14/2022    GFRAA 54 04/14/2022    LABGLOM 45 04/14/2022    GLUCOSE 103 04/14/2022       Radiology Review: MRI-left internal capsule stroke      ASSESSMENT AND PLAN:       Patient Active Problem List   Diagnosis    Attention deficit hyperactivity disorder (ADHD)    Narcolepsy    Moderate persistent asthma without complication    History of DVT (deep vein thrombosis)    Hiatal hernia    Cervical radiculopathy    Ganglion cyst    Chronic pain of left ankle    Acquired trigger finger    Osteoarthritis of knee    Osteoarthritis of wrist    Pes anserinus bursitis    Plantar fasciitis    Flexion contracture of joint of foot, left    Essential hypertension    Chronic anticoagulation    Iron deficiency anemia due to chronic blood loss    GERD (gastroesophageal reflux disease)    Absolute anemia    Anemia    Generalized anxiety disorder    Vitamin B12 deficiency    Iron malabsorption    Fall at home, initial encounter    Closed fracture of multiple ribs of right side    Hemothorax on right    Contusion of right lung    Chronic fatigue syndrome    Sleep apnea    Pain in joint involving ankle and foot    Lumbosacral spondylosis without myelopathy    Chronic bilateral low back pain with bilateral sciatica    Encounter for long-term opiate analgesic use    Arthritis of left shoulder region    Opioid use, unspecified with unspecified opioid-induced disorder    Stroke-like symptoms    Ischemic stroke ([facility]       60 y.o. female who was last known well 4 days ago for presentation and was apparently at PT for her shoulder injury and they noticed a change in speech. She presents with a NIH of 2, expressive aphasia with a left facial droop. CT head and subsequently MRI showed a left internal capsule infarct. Loaded with aspirin Plavix along with stroke work-up ongoing. Vitals have thus far been stable. Does have a history of smoking 1 pack daily. Will admit to neurology. Neurological-ischemic stroke  -Continue to monitor for any neurological changes  -Aspirin and Plavix loading dose x1  -MRI shows left internal capsule ischemic stroke  -Followed by aspirin and Plavix x21 days and then a singular agent  -High-dose statin therapy  -DVT prophylaxis  -Stroke work-up including TSH/A1c/lipid profile  -Echocardiogram with bubble study  -PT/OT  -P.o. diet as tolerating            Additional recommendations may follow    Please contact [facility] with any changes in patients neurologic status.            Lou Arenas MD   4/14/2022  5:58 PM

## 2022-04-14 NOTE — FLOWSHEET NOTE
[x] [address] [facility]  [address]  [phone]  F: [phone]     Physical Therapy Daily Treatment Note    Date:  2022  Patient Name:  Radha Torres      :  1960    MRN: [id number]  Physician: Vivienne Isaacs DO                                    Insurance: [facility] Medicare Duel Complete (BMN)  Medical Diagnosis: K18.744 (ICD-10-CM) - Arthritis of left shoulder region            Rehab Codes: Pain M54.2, M25.512, stiffness M25.612, weakness M62.512, posture R29.3, myofascial M79.1, M62.838  Onset Date: 22                  Next 's appt: 22      Visit# / total visits:    Cancels/No Shows: 2/0  Progress note for Medicare patient due at visit 19    Subjective:    Pain:  [x] Yes  [] No Location: neck shoulder on left   Pain Rating: (0-10 scale) 0/10  Pain altered Tx:  [x] No  [] Yes  Action:  Comments: Patient arrives without reports of shoulder pain. States she feels confused and slept for the past 3 days.         Objective:   Modalities: Vaso low compression 40 deg x15 min at end of treatment - seated with pillow under arm    Precautions: healing forearm fracture left, flexion  deg, ER 30 deg, no IR,     Exercises completed marked with \"X\" 22  Exercise Reps/ Time Weight/ Level Comments    UBE  Level 1 Added 22           Pulleys - Flexion 3 min   X          Standing       Codman's 20x ea 2 lb A/P, M/L, CW/CCW  Added weight 22 X   Bicep Curls 20x  2 lb Added weight 22 X   Gentle Shoulder Extension w/cane 20x  AAROM Added 3/31/22 X          Band Isometrics Walkout:       - Extension 10x Double [place] Added 22 X   - Flexion 10x [place] Added 22 X   - IR 10x [place] Added 22 X   - ER 10x [place] Added 22 X          Finger Ladder 8x 8th rung Added 22 X          Seated       Scapular Retraction HEP      CROM HEP  Flex, ext, lat flex, rotation    Shoulder Shrug HEP      Shoulder depression HEP             Table slides 20x ea  Flexion/scaption  Wash cloth on slide board    ER Stretch 20x5\"      Cane Flexion 20x      Cane Chest Press 20x             Supine       Cane:       - Shoulder flex 20x AAROM     - Chest press 20x AAROM     Horizontal Abduction/Adduction  20x ea AAROM     - ER 20x AAROM     Shoulder Flexion 20x AROM Added 3/29/22    Rhythmic Stabilization  2x1 min  Added 3/24/22           Other: 10 weeks post op 3/22/22      Treatment Charges: Mins Units   []  Modalities     [x]  Ther Exercise 25 2   []  Manual Therapy     []  Ther Activities     []  Aquatics     [x]  Vasocompression     []  Other     Total Treatment time 25 2       Assessment: [x] Progressing toward goals:          [x] No change. [x] Other: Throughout completion of identified exercises in chart, patient became increasingly difficult to understand un regards to her speaking. Patient unable to describe her symptoms when prompted and states she feels foggy and not able to tell Alhaji Rad is what\". Patient [facility] to [facility] me [facility] patient unable to give current time and states it was October currently. Through these orientations questions, patient significant difficulty with staying coherent appeared aphasic. Given these symptoms and overall change in the patient's mannerisms, behavior and potential stroke like symptoms - she was escorted to the ED. Upon arrival to ED, patient was immediately escorted back and registering tech, instructed me to go back as well, to inform attending physician of pt's current situation. I informed response team that Lili Wynne was a known patient to me and her current symptoms were atypical and I suspected stroke symptoms. After they obtained the crucial information, I exited the ED and returned to the OP PT Dept.        [x] Patient would continue to benefit from skilled physical therapy services in order to: reduce pain with modalities and exercise, reduce muscle spasms and trigger points with manual, learn proper posture for optimal healing and function, improve ROM and strength as able. STG: (to be met in 12 treatments)  1. ? Pain: Keep pain at 4/10 or less most times  2. ? ROM: PROM shoulder elevation increases to 90 degrees+  3. ? Strength:Start active motion when cleared by Dr.   3. ? Function:Start functional activities when cleared by Dr. and able to reach head. 5. Notes minimal tight muscles/spasms and able to manage independently if they return. 6. Demonstrate Knowledge of fall prevention    LTG: (to be met in 24 treatments)  1. LEFS improves to 50% or better function         2. Patient to be independent with home exercise program as demonstrated by performance with correct form without cues. Patient goals: Lift arm up half way to shoulder    Pt. Education:  [x] Yes  [] No  [x] Reviewed Prior HEP/Ed  Method of Education: [x] Verbal  [x] Demo  [] Written  Comprehension of Education:  [x] Verbalizes understanding. [x] Demonstrates understanding. [] Needs review. [x] Demonstrates/verbalizes HEP/Ed previously given. Given 2/22/2022  Wrist flex/ext  Wrist supination/pronation  CROM all planes  Pendulums  Shoulder shrugs  Scapular retractions   3/10 HEP-Access Code: [email or web address]   URL: [email or web address]  Table slides flexion - 2-3 x daily - 7 x weekly - 20 reps  Seated Shoulder Scaption Slide at Table Top with Forearm in Neutral - 2-3 x daily - 7 x weekly - 20 reps  Supine Shoulder Flexion Extension AAROM with Dowel - 2-3 x daily - 7 x weekly - 20 reps  Supine Shoulder Press AAROM in Abduction with Dowel - 2-3 x daily - 7 x weekly - 20 reps      Plan: [x] Continue current frequency toward long and short term goals. [x]  Frequency: 2 x/week for 24 visits   [x] Specific Instructions for subsequent treatments: Myofascial work on neck, trap, pect muscles, gradually work through protocol.          Time In: 8116        Time Out: 3601      Electronically signed by:  Olegario Tilley PTA

## 2022-04-14 NOTE — ED TRIAGE NOTES
Pt was wheeled into room 23 from triage being brought in by PT. they state that she has has slurred speech, altered mental status, and pt states she is having a hard time having words come out right. Pt is not currently on blood thinners.

## 2022-04-15 LAB
ANION GAP SERPL CALCULATED.3IONS-SCNC: 9 MMOL/L (ref 9–17)
ANION GAP: 10 MMOL/L (ref 7–16)
BUN BLDV-MCNC: 15 MG/DL (ref 8–23)
CALCIUM SERPL-MCNC: 9.5 MG/DL (ref 8.6–10.4)
CHLORIDE BLD-SCNC: 104 MMOL/L (ref 98–107)
CHOLESTEROL/HDL RATIO: 4.3
CHOLESTEROL: 197 MG/DL
CO2: 26 MMOL/L (ref 20–31)
CREAT SERPL-MCNC: 1.11 MG/DL (ref 0.5–0.9)
EKG ATRIAL RATE: 57 BPM
EKG P AXIS: 55 DEGREES
EKG P-R INTERVAL: 138 MS
EKG Q-T INTERVAL: 470 MS
EKG QRS DURATION: 80 MS
EKG QTC CALCULATION (BAZETT): 457 MS
EKG R AXIS: -17 DEGREES
EKG T AXIS: 67 DEGREES
EKG VENTRICULAR RATE: 57 BPM
ESTIMATED AVERAGE GLUCOSE: 117 MG/DL
GFR AFRICAN AMERICAN: >60 ML/MIN
GFR NON-AFRICAN AMERICAN: 46 ML/MIN
GFR NON-AFRICAN AMERICAN: 50 ML/MIN
GFR SERPL CREATININE-BSD FRML MDRD: 56 ML/MIN
GFR SERPL CREATININE-BSD FRML MDRD: ABNORMAL ML/MIN/{1.73_M2}
GFR SERPL CREATININE-BSD FRML MDRD: ABNORMAL ML/MIN/{1.73_M2}
GLUCOSE BLD-MCNC: 96 MG/DL (ref 70–99)
GLUCOSE BLD-MCNC: NORMAL MG/DL (ref 74–100)
HBA1C MFR BLD: 5.7 % (ref 4–6)
HCO3 VENOUS: 31 MMOL/L (ref 22–29)
HCT VFR BLD CALC: 42.1 % (ref 36.3–47.1)
HDLC SERPL-MCNC: 46 MG/DL
HEMOGLOBIN: 13.2 G/DL (ref 11.9–15.1)
LDL CHOLESTEROL: 130 MG/DL (ref 0–130)
MCH RBC QN AUTO: 29.7 PG (ref 25.2–33.5)
MCHC RBC AUTO-ENTMCNC: 31.4 G/DL (ref 28.4–34.8)
MCV RBC AUTO: 94.8 FL (ref 82.6–102.9)
NRBC AUTOMATED: 0 PER 100 WBC
O2 SAT, VEN: 18 % (ref 60–85)
PCO2, VEN: 65.6 MM HG (ref 41–51)
PDW BLD-RTO: 12.7 % (ref 11.8–14.4)
PH VENOUS: 7.28 (ref 7.32–7.43)
PLATELET # BLD: 249 K/UL (ref 138–453)
PMV BLD AUTO: 10.3 FL (ref 8.1–13.5)
PO2, VEN: 16.6 MM HG (ref 30–50)
POC BUN: NORMAL MG/DL (ref 8–26)
POC CHLORIDE: 103 MMOL/L (ref 98–107)
POC CREATININE: 1.18 MG/DL (ref 0.51–1.19)
POC HEMATOCRIT: 50 % (ref 36–46)
POC HEMOGLOBIN: 16.9 G/DL (ref 12–16)
POC IONIZED CALCIUM: 1.33 MMOL/L (ref 1.15–1.33)
POC LACTIC ACID: 1.66 MMOL/L (ref 0.56–1.39)
POC POTASSIUM: 4 MMOL/L (ref 3.5–4.5)
POC SODIUM: 144 MMOL/L (ref 138–146)
POC TCO2: 32 MMOL/L (ref 22–30)
POSITIVE BASE EXCESS, VEN: 2 (ref 0–3)
POTASSIUM SERPL-SCNC: 4.2 MMOL/L (ref 3.7–5.3)
RBC # BLD: 4.44 M/UL (ref 3.95–5.11)
SODIUM BLD-SCNC: 139 MMOL/L (ref 135–144)
TRIGL SERPL-MCNC: 103 MG/DL
WBC # BLD: 5.9 K/UL (ref 3.5–11.3)

## 2022-04-15 PROCEDURE — 83516 IMMUNOASSAY NONANTIBODY: CPT

## 2022-04-15 PROCEDURE — 93010 ELECTROCARDIOGRAM REPORT: CPT | Performed by: INTERNAL MEDICINE

## 2022-04-15 PROCEDURE — 36415 COLL VENOUS BLD VENIPUNCTURE: CPT

## 2022-04-15 PROCEDURE — 85027 COMPLETE CBC AUTOMATED: CPT

## 2022-04-15 PROCEDURE — 99222 1ST HOSP IP/OBS MODERATE 55: CPT | Performed by: STUDENT IN AN ORGANIZED HEALTH CARE EDUCATION/TRAINING PROGRAM

## 2022-04-15 PROCEDURE — 6370000000 HC RX 637 (ALT 250 FOR IP): Performed by: STUDENT IN AN ORGANIZED HEALTH CARE EDUCATION/TRAINING PROGRAM

## 2022-04-15 PROCEDURE — 83519 RIA NONANTIBODY: CPT

## 2022-04-15 PROCEDURE — 2060000002 HC BURN ICU INTERMEDIATE R&B

## 2022-04-15 PROCEDURE — 97161 PT EVAL LOW COMPLEX 20 MIN: CPT

## 2022-04-15 PROCEDURE — 83036 HEMOGLOBIN GLYCOSYLATED A1C: CPT

## 2022-04-15 PROCEDURE — 86255 FLUORESCENT ANTIBODY SCREEN: CPT

## 2022-04-15 PROCEDURE — 81003 URINALYSIS AUTO W/O SCOPE: CPT

## 2022-04-15 PROCEDURE — 6360000002 HC RX W HCPCS: Performed by: STUDENT IN AN ORGANIZED HEALTH CARE EDUCATION/TRAINING PROGRAM

## 2022-04-15 PROCEDURE — 2580000003 HC RX 258: Performed by: STUDENT IN AN ORGANIZED HEALTH CARE EDUCATION/TRAINING PROGRAM

## 2022-04-15 PROCEDURE — 97535 SELF CARE MNGMENT TRAINING: CPT

## 2022-04-15 PROCEDURE — 92523 SPEECH SOUND LANG COMPREHEN: CPT

## 2022-04-15 PROCEDURE — 97530 THERAPEUTIC ACTIVITIES: CPT

## 2022-04-15 PROCEDURE — 82607 VITAMIN B-12: CPT

## 2022-04-15 PROCEDURE — 97166 OT EVAL MOD COMPLEX 45 MIN: CPT

## 2022-04-15 PROCEDURE — 80307 DRUG TEST PRSMV CHEM ANLYZR: CPT

## 2022-04-15 PROCEDURE — 82746 ASSAY OF FOLIC ACID SERUM: CPT

## 2022-04-15 PROCEDURE — 80048 BASIC METABOLIC PNL TOTAL CA: CPT

## 2022-04-15 PROCEDURE — 80061 LIPID PANEL: CPT

## 2022-04-15 PROCEDURE — 99233 SBSQ HOSP IP/OBS HIGH 50: CPT | Performed by: PSYCHIATRY & NEUROLOGY

## 2022-04-15 RX ORDER — ACETAMINOPHEN 325 MG/1
650 TABLET ORAL EVERY 6 HOURS PRN
Status: DISCONTINUED | OUTPATIENT
Start: 2022-04-15 | End: 2022-04-16 | Stop reason: HOSPADM

## 2022-04-15 RX ADMIN — NAPROXEN 500 MG: 250 TABLET ORAL at 08:38

## 2022-04-15 RX ADMIN — BUPROPION HYDROCHLORIDE 450 MG: 150 TABLET, EXTENDED RELEASE ORAL at 08:39

## 2022-04-15 RX ADMIN — ATORVASTATIN CALCIUM 80 MG: 80 TABLET, FILM COATED ORAL at 19:58

## 2022-04-15 RX ADMIN — Medication 600 MG: at 08:40

## 2022-04-15 RX ADMIN — CLOPIDOGREL 75 MG: 75 TABLET, FILM COATED ORAL at 08:39

## 2022-04-15 RX ADMIN — ASPIRIN 81 MG: 81 TABLET, CHEWABLE ORAL at 08:39

## 2022-04-15 RX ADMIN — SODIUM CHLORIDE: 9 INJECTION, SOLUTION INTRAVENOUS at 11:05

## 2022-04-15 RX ADMIN — ENOXAPARIN SODIUM 40 MG: 40 INJECTION SUBCUTANEOUS at 08:39

## 2022-04-15 RX ADMIN — NAPROXEN 500 MG: 250 TABLET ORAL at 18:40

## 2022-04-15 RX ADMIN — CITALOPRAM 40 MG: 20 TABLET, FILM COATED ORAL at 08:40

## 2022-04-15 RX ADMIN — HYDROXYZINE HYDROCHLORIDE 10 MG: 10 TABLET ORAL at 08:40

## 2022-04-15 ASSESSMENT — ENCOUNTER SYMPTOMS
TROUBLE SWALLOWING: 0
CHEST TIGHTNESS: 0
BLURRED VISION: 0
BACK PAIN: 0
WHEEZING: 0
SHORTNESS OF BREATH: 0
PHOTOPHOBIA: 0
VOMITING: 0
DOUBLE VISION: 0
VOICE CHANGE: 1
CONSTIPATION: 0
ABDOMINAL PAIN: 0
DIARRHEA: 0

## 2022-04-15 ASSESSMENT — PAIN DESCRIPTION - FREQUENCY
FREQUENCY: INTERMITTENT
FREQUENCY: INTERMITTENT

## 2022-04-15 ASSESSMENT — PAIN DESCRIPTION - LOCATION
LOCATION: SHOULDER
LOCATION: SHOULDER

## 2022-04-15 ASSESSMENT — PAIN SCALES - GENERAL
PAINLEVEL_OUTOF10: 5
PAINLEVEL_OUTOF10: 0
PAINLEVEL_OUTOF10: 5
PAINLEVEL_OUTOF10: 5
PAINLEVEL_OUTOF10: 0

## 2022-04-15 ASSESSMENT — PAIN DESCRIPTION - ORIENTATION
ORIENTATION: LEFT
ORIENTATION: LEFT

## 2022-04-15 ASSESSMENT — PAIN DESCRIPTION - PAIN TYPE
TYPE: CHRONIC PAIN
TYPE: CHRONIC PAIN

## 2022-04-15 ASSESSMENT — PAIN DESCRIPTION - DESCRIPTORS
DESCRIPTORS: DISCOMFORT
DESCRIPTORS: SORE;DISCOMFORT

## 2022-04-15 NOTE — FLOWSHEET NOTE
[facility] [address]     Emergency/Trauma Note    PATIENT NAME: Mu Fam    Shift date: 04/14/2022  Shift day: Thursday   Shift # 2    Room # 7451/9656-64   Name: Mu Fam            Age: 60 y.o. Gender: female          Religious: [address] [employer] [place]:     Trauma/Incident type: Adult Trauma Consult  Admit Date & Time: 4/14/2022  2:06 PM  TRAUMA NAME: Consult    ADVANCE DIRECTIVES IN CHART? No    NAME OF DECISION MAKER: Unknown    RELATIONSHIP OF DECISION MAKER TO PATIENT: Unknown    PATIENT/EVENT DESCRIPTION:  Mu Fam is a 60 y.o. female who arrived in the ED after experiencing stroke like symptoms. Pt to be admitted to 0165/0165-01. SPIRITUAL ASSESSMENT/INTERVENTION:  Pt. Found resting in bed and talking with family n her cell phone. Pt. Is able to relay situation to  and is concerned about her health condition. Pt. Will be admitted to hospital for at least two days for observation. Pt. Reports having good support from family and friends and appreciates spiritual support. PATIENT BELONGINGS:  With patient    ANY BELONGINGS OF SIGNIFICANT VALUE NOTED:  None    REGISTRATION STAFF NOTIFIED? No      WHAT IS YOUR SPIRITUAL CARE PLAN FOR THIS PATIENT?:   Provide spiritual support for pt. And family during stay at [facility]). Electronically signed by Saturnino Redd on 4/14/2022 at 9:01 PM.  [facility]  [phone]     04/14/22 2032   Encounter Summary   Services provided to: Patient   Referral/Consult From: Multi-disciplinary team   Support System Significant other;Family members   [address]   (04/14/2022)   Complexity of Encounter Moderate   Length of Encounter 15 minutes   Spiritual Assessment Completed Yes   Crisis   Type Trauma   Assessment Calm; Approachable;Coping   Intervention Active listening;Explored feelings, thoughts, concerns;Explored coping resources;Nurtured hope;Prayer;Sustaining presence/ Ministry of presence   Outcome Comfort;Expressed gratitude;Engaged in conversation;Expressed feelings/needs/concerns;Coping

## 2022-04-15 NOTE — PROGRESS NOTES
Occupational Therapy   Occupational Therapy Initial Assessment    Date: 4/15/2022   Patient Name: David Zapata  MRN: [id number]     : 1960    Date of Service: 4/15/2022    Chief Complaint   Patient presents with    Altered Mental Status     Discharge Recommendations:  Patient would benefit from continued therapy after discharge,24 hour supervision or assist     OT Equipment Recommendations  Equipment Needed: Yes  Mobility Devices: ADL Assistive Devices  ADL Assistive Devices: Emergency Alert System; Reacher;Grab Bars - shower; Shower Chair with back;Grab Bars - toilet    Assessment   Performance deficits / Impairments: Decreased functional mobility ; Decreased endurance;Decreased coordination;Decreased ADL status; Decreased balance;Decreased safe awareness;Decreased high-level IADLs;Decreased cognition  Assessment: Pt is motivated to regain functional independence and safety with daily tasks and rndurance. She currently has deficits / impairments which impact her ability to return to prior level of functioning, but will benefit from continued participation in Acute and Post-Acute OT services to improve those skills / functions. Prognosis: Good  Decision Making: Medium Complexity  OT Education: OT Role;Energy Conservation;Transfer Training;ADL Adaptive Strategies; Plan of Care  Barriers to Learning: Fair return  REQUIRES OT FOLLOW UP: Yes  Activity Tolerance  Activity Tolerance: Patient Tolerated treatment well;Patient limited by fatigue;Patient limited by pain  Safety Devices  Safety Devices in place: Yes  Type of devices: All fall risk precautions in place; Left in bed;Bed alarm in place;Call light within reach;Nurse notified;Gait belt  Restraints  Initially in place: No         Patient Diagnosis(es): The encounter diagnosis was Stroke-like symptoms.    has a past medical history of Acid reflux, Adult ADHD, Asthma, Bipolar 1 disorder ([facility] [address] Broken foot, Chest pain of uncertain etiology, Depression, DVT of leg reports no restrictions from TSA    Subjective   General  Patient assessed for rehabilitation services?: Yes  Family / Caregiver Present: No  Diagnosis: Left Internal Capsule Ischemic Stroke. Subjective  Subjective: RN approved Pt to be seen for OT evaluation. Pt was agreeable and cooperative. Patient Currently in Pain: Yes  Pain Assessment  Pain Assessment: 0-10  Pain Level: 5  Patient's Stated Pain Goal: No pain  Pain Type: Chronic pain  Pain Location: Shoulder  Pain Orientation: Left  Pain Descriptors: Discomfort  Pain Frequency: Intermittent  Non-Pharmaceutical Pain Intervention(s): Repositioned;Distraction  Response to Pain Intervention: Patient Satisfied  Pre Treatment Pain Screening  Intervention List: Patient able to continue with treatment;Nurse/Physician notified  Vital Signs  Patient Currently in Pain: Yes     Social/Functional History  Social/Functional History  Lives With: Significant other  Type of Home: House  Home Layout: Two level,Able to Live on Main level with bedroom/bathroom,Performs ADL's on one level  Home Access: Stairs to enter with rails  Entrance Stairs - Number of Steps: 3  Entrance Stairs - Rails: Right  Bathroom Shower/Tub:  (1st: walk-in shower. 2nd: walk-in shower. No additional DME.)  Bathroom Toilet: Standard  Home Equipment: 4 wheeled walker  Receives Help From: Family (pt sons)  ADL Assistance: Independent  Homemaking Assistance: Independent  Homemaking Responsibilities: Yes (cooking, cleaning and laundry)  Ambulation Assistance: Independent  Transfer Assistance: Independent  Active : No  Patient's  Info: pt reports significant other was driving her but now is working, sons not able to drive  Occupation: Retired  Leisure & Hobbies: watch tv  Additional Comments: Pt reports significant other works 3 days/week but is home all other days and could assist as needed.      Objective   Vision: Impaired  Vision Exceptions: Wears glasses for reading  Hearing: Within functional limits       Observation/Palpation  Posture: Good  Balance  Sitting Balance: Supervision  Standing Balance: Contact guard assistance  Standing Balance  Time: Several trials of standing balance (~1-2 mins each). Activity: Static and Dynamic standing balance. Comment: Without DME - CGA. No LOB. Functional Mobility  Functional - Mobility Device: No device  Activity: To/from bathroom  Assist Level: Contact guard assistance  Functional Mobility Comments: In-room functional mobility, CGA, no use of DME. Mod VCs for safe / technique. Toilet Transfers  Toilet - Technique: Ambulating  Equipment Used: Grab bars  Toilet Transfer: Contact guard assistance  Toilet Transfers Comments: With Bilateral Grab Bars DME - CGA for balance. Mod VCs for task initiation, sequencing. ADL  Grooming: Contact guard assistance; Increased time to complete;Verbal cueing (standing at the sink without DME)  UE Dressing: Minimal assistance; Increased time to complete;Verbal cueing (To doff / don gown (like robe))  LE Dressing: Minimal assistance; Increased time to complete;Verbal cueing;Stand by assistance;Contact guard assistance (SBA seated (EOB) to doff and don socks. Min Assist pants (initiation of) while sitting EOB, then CGA for stdg balance to pull pants / LB clothing over hips.)  Toileting: Contact guard assistance;Stand by assistance; Increased time to complete (SBA seated toilet hygiene. CGA for stdg balance to pull pants / LB clothing over hips.)  Additional Comments: Mod V/T Cues for task initiation / sequencing / redirection to task (d/t impulsivity and decreased insight). Bed mobility  Supine to Sit: Supervision  Sit to Supine: Supervision (pt retired to recliner upon session end)  Scooting: Supervision  Comment: HOB elevated ~30=45 degrees. Min VCs for task initiation and sequencing. Transfers  Sit to stand: Contact guard assistance  Stand to sit: Contact guard assistance  Transfer Comments:  Without DME - CGA for balance. Mod VCs for task initiation, sequencing. Cognition  Overall Cognitive Status: Exceptions  Attention Span: Attends with cues to redirect  Safety Judgement: Decreased awareness of need for assistance  Problem Solving: Assistance required to generate solutions;Assistance required to implement solutions;Decreased awareness of errors;Assistance required to correct errors made;Assistance required to identify errors made  Insights: Decreased awareness of deficits  Initiation: Requires cues for some  Sequencing: Requires cues for some  Cognition Comment: Pt follows 1 and 2-step commands accurately. She does require some re-direction / reminders (d/t distractability), is impulsive at times (demonstrating decreased insight to deficits and decreased safety awareness). Has some expressive aphasia, reports frustration related to this. Sensation  Overall Sensation Status: WFL      LUE AROM (degrees)  LUE AROM : Exceptions  L Shoulder Flexion 0-180: Left Shoulder Impaired but not formally Assesed d/t Recent Left Shoulder Surgery. L Shoulder Extension 0-45: Left Shoulder Impaired but not formally Assesed d/t Recent Left Shoulder Surgery. L Shoulder ABduction 0-180: Left Shoulder Impaired but not formally Assesed d/t Recent Left Shoulder Surgery.   L Elbow Flexion 0-145: WFL  L Elbow Extension 145-0: WFL  L Forearm Pron 0-90: WFL  L Forearm Supination  0-90: WFL  L Wrist Flexion 0-80: WFL  L Wrist Extension 0-70: WFL  Left Hand AROM (degrees)  Left Hand AROM: WFL  RUE AROM (degrees)  RUE AROM : WFL  Right Hand AROM (degrees)  Right Hand AROM: WFL  LUE Strength  LUE Strength Comment: DNT formally d/t Left shoulder surgery  RUE Strength  Gross RUE Strength: WFL  R Hand General: 4/5     Plan   Plan  Times per week: 3-5x/week  Current Treatment Recommendations: Patient/Caregiver Education & Training,Home Management Training,Equipment Evaluation, Education, & procurement,Balance Training,Neuromuscular

## 2022-04-15 NOTE — PROGRESS NOTES
Physical Therapy    Facility/Department: [address] [facility]URN UN[facility]  Initial Assessment    NAME: Nitza Fulton  : 1960  MRN: [id number]    Date of Service: 4/15/2022   Chief Complaint   Patient presents with    Altered Mental Status   CVA    Discharge Recommendations: Further therapy recommended at discharge. PT Equipment Recommendations  Equipment Needed: No (pt does not use AD at baseline)    Assessment   Body structures, Functions, Activity limitations: Decreased balance;Decreased safe awareness;Decreased strength  Assessment: Pt pt requires supervision for bed mobility and CGA for transfers, and ambulating 150 ft, no device. the pt was independent prior to this admission and would benefit from continued therapy to address balance deficits and promote full return to prior level of functioning. Prognosis: Good  Decision Making: Low Complexity  PT Education: Goals; General Safety;Gait Training;PT Role;Plan of Care;Transfer Training  Barriers to Learning: none  REQUIRES PT FOLLOW UP: Yes  Activity Tolerance  Activity Tolerance: Patient Tolerated treatment well       Patient Diagnosis(es): The encounter diagnosis was Stroke-like symptoms. has a past medical history of Acid reflux, Adult ADHD, Asthma, Bipolar 1 disorder ([address] Broken foot, Chest pain of uncertain etiology, Depression, DVT of leg (deep venous thrombosis) ([address] Fracture, Fractured nose, Helicobacter pylori (H. pylori) infection, Hematuria, Hiatal hernia, History of blood transfusion, History of DVT (deep vein thrombosis), History of myasthenia gravis, Hx of blood clots, Hypertension, Internal hemorrhoids, Narcolepsy, Osteoporosis, Sleep apnea, Under care of team, and Under care of team.   has a past surgical history that includes  section; Nose surgery; Knee arthroscopy (Left); shoulder surgery (Left); Wrist surgery; Upper gastrointestinal endoscopy (2013); Colonoscopy (2013); Upper gastrointestinal endoscopy (2016);  Foot surgery (Left, 2015); Arm Surgery (Right); knee surgery (Right); vaginal dilatation; Ankle surgery (Left, 06/29/2018); pr office/outpt visit,procedure only (Left, 6/29/2018); Foot Tendon Surgery (Left, 02/22/2019); ligament repair (Left, 2/22/2019); Upper gastrointestinal endoscopy (N/A, 2/10/2020); Colonoscopy (N/A, 2/10/2020); Endoscopy, colon, diagnostic; Colonoscopy (N/A, 5/18/2020); esophageal motility study (N/A, 8/12/2020); esophageal motility study (9/10/2020); esophageal motility study (N/A, 12/18/2020); Nerve Block (Bilateral, 06/04/2021); Pain management procedure (Bilateral, 6/4/2021); Pain management procedure (06/11/2021); Pain management procedure (Bilateral, 6/11/2021); Pain management procedure (Right, 07/23/2021); Pain management procedure (Right, 7/23/2021); Pain management procedure (Left, 08/06/2021); Pain management procedure (Left, 8/6/2021); Breast enhancement surgery (Bilateral); shoulder surgery (Left, 02/08/2022); and shoulder surgery (Left, 2/8/2022). Restrictions  Restrictions/Precautions  Restrictions/Precautions: General Precautions,Fall Risk,Up as Tolerated  Required Braces or Orthoses?: No  Position Activity Restriction  Other position/activity restrictions: expressive aphasia; hx of L reverse total shoulder arthroplasty 2/8/22; pt reports no restrictions from TSA  Vision/Hearing  Vision: Impaired  Vision Exceptions: Wears glasses for reading  Hearing: Within functional limits     Subjective  General  Patient assessed for rehabilitation services?: Yes  Response To Previous Treatment: Not applicable  Family / Caregiver Present: No  Follows Commands: Within Functional Limits  Subjective  Subjective: Pt and RN agreeable to physical therapy this date. Pt pleasant and cooperative throughout.   Pain Screening  Patient Currently in Pain: Denies  Pain Assessment  Pain Type: Chronic pain  Pain Location: Shoulder  Pain Orientation: Left  Pain Descriptors: Sore;Discomfort  Pain Frequency: Intermittent  Non-Pharmaceutical Pain Intervention(s): Repositioned; Ambulation/Increased Activity  Vital Signs  Patient Currently in Pain: Denies       Orientation  Orientation  Overall Orientation Status: Within Functional Limits  Social/Functional History  Social/Functional History  Lives With: Significant other  Type of Home: House  Home Layout: Two level,Able to Live on Main level with bedroom/bathroom,Performs ADL's on one level  Home Access: Stairs to enter with rails  Entrance Stairs - Number of Steps: 3  Entrance Stairs - Rails: Right  Bathroom Shower/Tub: Walk-in shower  Bathroom Toilet: Standard  Home Equipment: 4 wheeled walker  Receives Help From: Family (pt sons)  ADL Assistance: Independent  Homemaking Assistance: Independent  Homemaking Responsibilities: Yes (cooking, cleaning and laundry)  Ambulation Assistance: Independent  Transfer Assistance: Independent  Active : No  Patient's  Info: pt reports significant other was driving her but now is working, sons not able to drive  Occupation: Retired  Leisure & Hobbies: watch tv  Additional Comments: Pt reports significant other works 3 days/week but is home all other days and could assist as needed. Cognition   Cognition  Overall Cognitive Status: WFL  Cognition Comment: pt follows all commands and verbalizes and demonstrates understanding. Pt demonstrates some aphasia and incorrect word substitutions.     Objective     Observation/Palpation  Posture: Good    Joint Mobility  Spine: WFL  ROM RLE: WFL  ROM LLE: WFL  ROM RUE: WFL  ROM LUE: AROM shoulder flexion to 30 degrees; elbow and wrist WFL  Strength RLE  Strength RLE: WFL  Comment: grossly 4/5  Strength LLE  Strength LLE: WFL  Comment: grossly 4/5  Strength RUE  Strength RUE: WFL  Comment: grossly 4/5  Strength LUE  Strength LUE: WFL  Comment: grossly 4/5  Tone RLE  RLE Tone: Normotonic  Tone LLE  LLE Tone: Normotonic  Motor Control  Gross Motor?: WNL  Sensation  Overall Sensation Status: WFL (denies numbness/tingling)  Bed mobility  Rolling to Right: Supervision  Supine to Sit: Supervision  Sit to Supine:  (pt retired to recliner upon session end)  Scooting: Supervision  Transfers  Sit to Stand: Contact guard assistance  Stand to sit: Contact guard assistance  Bed to Chair: Contact guard assistance  Comment: no device used for transfers  Ambulation  Ambulation?: Yes  Ambulation 1  Surface: level tile  Device: No Device  Assistance: Contact guard assistance  Quality of Gait: pt demonstrates normalized gait pattern  Gait Deviations: Increased TATO  Distance: 150 ft  Comments: 1 LOB noted during ambulation with distraction; which pt was able to self-correct  Stairs/Curb  Stairs?: Yes  Stairs  # Steps : 4  Stairs Height: 4\"  Rails: Right ascending  Device: No Device  Assistance: Contact guard assistance  Comment: pt able to perform stair without difficulty or LOB     Balance  Posture: Good  Sitting - Static: Good;-  Sitting - Dynamic: Fair;+ (pt able to don 1 sock sitting EOB; required help to don L sock)  Standing - Static: Fair;+  Standing - Dynamic: Fair;+  Comments: no device used for balance assessment        Plan   Plan  Times per week: 3-5x/week  Current Treatment Recommendations: Strengthening,Balance Training,Endurance Training,Functional Mobility Training,Stair training,Gait Training,Home Exercise Program,Safety Education & Training,Patient/Caregiver Education & Training,Equipment Evaluation, Education, & procurement  Safety Devices  Type of devices:  All fall risk precautions in place,Left in chair,Nurse notified,Call light within reach,Chair alarm in place,Gait belt,Patient at risk for falls  Restraints  Initially in place: No      AM-PAC Score  AM-PAC Inpatient Mobility Raw Score : 21 (04/15/22 [facility])  AM-PAC Inpatient T-Scale Score : 50.25 (04/15/22 1353)  Mobility Inpatient CMS 0-100% Score: 28.97 (04/15/22 [facility])  Mobility Inpatient CMS G-Code Modifier : Gregory Mcguire (04/15/22 1353) Goals  Short term goals  Time Frame for Short term goals: 6 visits  Short term goal 1: Pt will perform bed mobility independently  Short term goal 2: Pt will perform functional mobility independently  Short term goal 3: Pt will ambulate 300 ft, no device and no LOB, supervision  Short term goal 4: Pt will ascend/decend 10 steps with R HR, SBA  Short term goal 5: Pt will improve dynamic standing balance to GOOD to decrase risk of falls       Therapy Time   Individual Concurrent Group Co-treatment   Time In 1004         Time Out 1037         Minutes 33         Timed Code Treatment Minutes: 28 Minutes       Melissa Rothman, SPT   Evaluation/treatment performed by Student PT under the supervision of co-signing PT who agrees with all evaluation/treatment and documentation.

## 2022-04-15 NOTE — PROGRESS NOTES
Attending Physician Statement  I have discussed the case of Zen Nicely including pertinent history and exam findings with the resident/ CNP. I have seen and examined the patient and the key elements of the encounter have been performed by me. I agree with the assessment, plan and orders as documented by the resident or CNP with changes made to the note. Briefly, this is a  60 y.o. female  was admitted on 4/14/2022 with confusion. Patient was unable to provide any details. Most of the history with regards to presentation is obtained from medical record. Patient is going through physical therapy and physical therapist noticed that her to be confused for which she was referred to ED for immediate eval.     As per medical records; patient had a history of hypertension, left arm fracture after a fall when she tripped over a ladder and also has history of myasthenia gravis. Patient had arthritis of left shoulder region and has had left shoulder arthroplasty on 2/18/2022.     /82   Pulse 51   Temp 97.9 °F (36.6 °C) (Temporal)   Resp 17   Ht 5' (1.524 m)   Wt 145 lb (65.8 kg)   SpO2 96%   BMI 28.32 kg/m²     Blood pressure range: Systolic (87PXH), HOJ:155 , Min:112 , RFP:403   ; Diastolic (21MNN), TNL:74, Min:72, Max:95       neuro exam significant for alert awake oriented to self and place but has difficulty with recalling recent events; also has difficulty with naming but able to repeat sentences well; followed two-step commands only; slight difficulty with three-step commands; dysarthric speech noted;  right nasolabial fold flattening noted; motor exam significant for mild weakness of grade 4+/5 in right upper and right lower extremities with extensor plantar response with intact sensation.     CT head: Mild low-attenuation changes within the posterior aspect of the left basal ganglia near the posterior limb of the left internal capsule.  Acute ischemia is suspected.  MRI is suggested for further evaluation     CTA head and neck: Unremarkable CTA of the head and neck. MRI brain 4/14/2022: Acute lacunar infarct within left internal capsule/lentiform nucleus.            Impression and Plan: Ms. Solis Holley is a 60 y.o. female with   Acute encephalopathy; acute left internal capsular ischemic infarct; right-sided weakness secondary to above; MMSE done on today's visit and she scored 18/30; will review with patient's  regarding baseline cognitive status and proceed accordingly. Also to get urine analysis, urine tox, B12, folate, TSH level. Also to get EEG after reviewing with patient's  about her baseline cognitive status. Patient never on antiplatelets; she is started on aspirin and Plavix for stroke prevention; also to get PT/OT/speech therapy. Recent left shoulder arthroplasty on 2/18/2022  Comorbid diagnosis of myasthenia gravis; but not on any meds; will get myasthenia gravis panel. Care plan is discussed with patient and her nurse at bedside.           Cortland Jeans, MD 4/15/2022 2:55 PM

## 2022-04-15 NOTE — CONSULTS
[facility]   [address] [place] [address]    Progress Note             Date:   4/15/2022  Patient name:  Bibi Mahoney  Date of admission:  4/14/2022  2:06 PM  MRN:   [id number]  Account:  [de-identified]  YOB: 1960  PCP:    Karine Quispe DO  Room:   [address] [place] [address]  Code Status:    Prior    Chief Complaint:     Chief Complaint   Patient presents with    Altered Mental Status       Brief History of Present Illness:     23-year-old female with past medical history of asthma, bipolar disorder, hypertension, narcolepsy, history of myasthenia gravis osteoporosis, left shoulder arthritis s/p left shoulder arthroplasty on 2/18/2022, former smoker (1 pack a day quit 30 years ago) presented with complaint of speech difficulty and right-sided facial droop with last known well 4 days ago. On presentation NIH scale was 2 (expressive aphasia and facial droop). Patient was going through physical therapy when physical therapist noted her to be confused and sent her to emergency department for immediate evaluation. On arrival to the ED CT head showed a left internal capsule ischemic stroke. Unremarkable CTA of the head and neck. Low-attenuation changes within the region of the posterior limb of the left internal capsule are suspicious for acute ischemia. Patient was never on antiplatelets and was started on aspirin 81 mg and Plavix 75 mg for secondary stroke prevention. Started on Lipitor 80 mg. MRI brain wo contrast showed Acute lacunar infarct within the left internal capsule/lentiform nucleus. 4/15: Cholesterol 197, HDL 46, , triglyceride 103. PM&R consulted. Pt had right sided facial droop, mild dysathria and mild aphasia.        Past Medical History:     Past Medical History:   Diagnosis Date    Acid reflux     Adult ADHD 08/01/2012    monthly visits @ [facility]    Asthma 08/01/2012    seasonal    Bipolar 1 disorder ([facility]     monthly visits with [facility]  Broken foot     Left foot    Chest pain of uncertain etiology     Last episode was in  (Written 2019)    Depression     Under control per pt.  on 6/15/18    DVT of leg (deep venous thrombosis) ([address] 2014    Fracture     right foot / pt denies surgical intervention    Fractured nose     x 9 times    Helicobacter pylori (H. pylori) infection     per EGD    Hematuria 2018    Has a follow-up with Urologist     Hiatal hernia     History of blood transfusion     no reactions    History of DVT (deep vein thrombosis) 2012    History of myasthenia gravis     diagnosed around age 26    Hx of blood clots     L leg post knee surgery    Hypertension     managed by Dr. Brant Maravilla PCP   Karime Pierre Internal hemorrhoids     Narcolepsy 2012    Dr. Drew Najera [facility]    Osteoporosis     Sleep apnea     no machine    Under care of team 10/2021    Dr. Kayla Valdez Under care of team     Pain Management Dr. Godfrey Ulrich last visit 2022        Past Surgical History:     Past Surgical History:   Procedure Laterality Date    ANKLE SURGERY Left 2018    mass excision    ARM SURGERY Right     Pt states she has had 13 right arm surgeries    BREAST ENHANCEMENT SURGERY Bilateral      SECTION      x 3    COLONOSCOPY  2013    sigmoid diverticula, prominent large internal hemorrhoid    COLONOSCOPY N/A 2/10/2020    COLONOSCOPY DIAGNOSTIC performed by Margret Quiñones MD at [facility] 2020    COLORECTAL CANCER SCREENING, NOT HIGH RISK performed by Edouard Bello MD at [address], COLON, DIAGNOSTIC     [facility] STUDY N/A 2020    PES RN - ESOPHAGEAL MOTILITY STUDY performed by Ruth Louis DO at [address] [facility] STUDY  9/10/2020    ESOPHAGEAL MOTILITY STUDY performed by Ruth Louis DO at [facility]    ESOPHAGEAL MOTILITY STUDY N/A 12/18/2020    ESOPHAGEAL MOTILITY STUDY ATTEMPTED PER DR. Amilcar Wallace WITHOUT SUCCESS performed by Marichuy Hernández DO at [facility]    FOOT SURGERY Left 2015    FOOT TENDON SURGERY Left 02/22/2019    LEFT PERONEAL TENDON REPAIR WITH POSSIBLE LEFT TENDON TRANSFER (Left )    KNEE ARTHROSCOPY Left     Two surgeries on left knee per pt    KNEE SURGERY Right     Three surgeries per pt    LIGAMENT REPAIR Left 2/22/2019    LEFT PERONEAL TENDON REPAIR performed by Supa Allen DPM at [address] Bilateral 06/04/2021     BILATERAL L4/5, L5/S1 LUMBAR FACET (Bilateral )    NOSE SURGERY      PAIN MANAGEMENT PROCEDURE Bilateral 6/4/2021    BILATERAL L4/5, L5/S1 LUMBAR FACET performed by Ana Salazar MD at [address] [place] [id number]  06/11/2021    BILATERAL L4/5, L5/S1 NERVE BLOCK - Bilateral    PAIN MANAGEMENT PROCEDURE Bilateral 6/11/2021    BILATERAL L4/5, L5/S1 NERVE BLOCK performed by Ana Salazar MD at [address] [place] [id number] Right 07/23/2021    L4/5, L5/S1 NERVE RADIOFREQUENCY ABLATION     PAIN MANAGEMENT PROCEDURE Right 7/23/2021    right  L4/5, L5/S1 NERVE RADIOFREQUENCY ABLATION performed by Ana Salazar MD at [address] [place] [id number] Left 08/06/2021    left  L4/5, L5/S1 NERVE RADIOFREQUENCY ABLATION (Left )    PAIN MANAGEMENT PROCEDURE Left 8/6/2021    left  L4/5, L5/S1 NERVE RADIOFREQUENCY ABLATION performed by Ana Salazar MD at [address] [facility] OFFICE/OUTPT [address] [facility] Left 6/29/2018    RESECTION/REMOVAL BONY NEOPLASM LEFT FIBULA, LEFT SOFT TISSUE MASS EXCISION WITH BONE BIOPSY LEFT ANKLE FIBULA performed by Supa Allen DPM at [address] [facility] Left     SHOULDER SURGERY Left 02/08/2022    REVERSE TOTAL SHOULDER ARTHROPLASTY - Left    SHOULDER SURGERY Left 2/8/2022    REVERSE TOTAL SHOULDER ARTHROPLASTY performed by Rancho Riggins DO at [address] [facility] UPPER GASTROINTESTINAL ENDOSCOPY  5/23/2013    tertiary contractures esophagus, 3 to 4 cm hiatal hernia, antral gastritis, + H Pylori, mild active chronic esophagitis    UPPER GASTROINTESTINAL ENDOSCOPY  4/6/2016    one biopsy    UPPER GASTROINTESTINAL ENDOSCOPY N/A 2/10/2020    EGD BIOPSY performed by Sherley Simpson MD at [facility]          Medications Prior to Admission:     Prior to Admission medications    Medication Sig Start Date End Date Taking? Authorizing Provider   naproxen (NAPROSYN) 500 MG tablet TAKE ONE TABLET BY MOUTH TWICE A DAY WITH MEALS 4/13/22   Naila Holding, DO   oxyCODONE-acetaminophen (PERCOCET) 5-325 MG per tablet Take 1 tablet by mouth every 8 hours as needed for Pain for up to 30 days. 3/17/22 4/16/22  Juventino Ormond Vriezelaar, APRN - CNP   hydrOXYzine (ATARAX) 50 MG tablet TAKE ONE TABLET BY MOUTH EVERY 8 HOURS AS NEEDED FOR ANXIETY 2/16/22   Algie Records, DO   docusate sodium (COLACE) 100 MG capsule Take 1 capsule by mouth 2 times daily as needed for Constipation 2/8/22   Naila Holding, DO   acetaminophen (TYLENOL) 500 MG tablet Take 2 tablets by mouth every 6 hours as needed for Pain 2/8/22   Naila Holding, DO   propranolol (INDERAL LA) 80 MG extended release capsule Take 1 capsule by mouth daily 10/29/21   Algie Records, DO   amphetamine-dextroamphetamine (ADDERALL) 30 MG tablet 30 mg 2 times daily.   9/27/21   Historical Provider, MD   pantoprazole (PROTONIX) 40 MG tablet Take 1 tablet by mouth daily 9/15/21   Algie Records, DO   buPROPion HCl ER, XL, 450 MG TB24 TAKE ONE TABLET BY MOUTH EVERY MORNING 8/31/21   Algie Records, DO   alendronate (FOSAMAX) 70 MG tablet TAKE 1 TABLET BY MOUTH ONCE WEEKLY BEFORE BREAKFAST, ON AN EMPTY STOMACH: REMAIN UPRIGHT FOR 30 MINUTES:TAKE WITH 8 OUNCES OF WATER 3/18/21   Algie Records, DO   albuterol sulfate HFA (VENTOLIN HFA) 108 (90 Base) MCG/ACT inhaler INHALE TWO PUFFS BY MOUTH EVERY 4 HOURS AS NEEDED FOR WHEEZING 10/28/20   Ankit Walker MD   citalopram (CELEXA) 40 MG tablet TAKE ONE TABLET BY MOUTH DAILY 4/8/20   Ankit Walker MD   albuterol (PROVENTIL) (2.5 MG/3ML) 0.083% nebulizer solution Take 2.5 mg by nebulization every 6 hours as needed for Wheezing or Shortness of Breath     Historical Provider, MD   Calcium Carbonate (CALCIUM 600 PO) Take 600 mg by mouth daily    Historical Provider, MD        Allergies:     Latex, Prozac [fluoxetine hcl], Sulfa antibiotics, and Codeine    Social History:     Tobacco:    reports that she quit smoking about 33 years ago. She has a 20.00 pack-year smoking history. She has never used smokeless tobacco.  Alcohol:      reports previous alcohol use. Drug Use:  reports no history of drug use. Family History:     Family History   Problem Relation Age of Onset   Street Cancer Mother     Hypertension Mother     Stroke Mother     Dementia Mother     Cancer Maternal Aunt     Hypertension Father     Heart Surgery Father     Alzheimer's Disease Father     Diabetes Neg Hx        Review of Systems:     Review of Systems   Constitutional: Negative for fatigue. HENT: Positive for voice change. Negative for trouble swallowing. Eyes: Negative for photophobia and visual disturbance. Respiratory: Negative for chest tightness, shortness of breath and wheezing. Cardiovascular: Negative for chest pain and palpitations. Gastrointestinal: Negative for constipation, diarrhea and vomiting. Endocrine: Negative for polyuria. Genitourinary: Negative for dysuria. Musculoskeletal: Negative for back pain. Skin: Negative for wound. Neurological: Positive for facial asymmetry, speech difficulty and weakness. Negative for seizures, syncope, light-headedness, numbness and headaches. Psychiatric/Behavioral: Negative for confusion and decreased concentration. The patient is not nervous/anxious.         Physical Exam:   BP 128/87   Pulse (!) 45   Temp 97.5 °F (36.4 °C) (Oral)   Resp 21   Ht 5' (1.524 m)   Wt 145 lb (65.8 kg)   SpO2 95%   BMI 28.32 kg/m²   Temp (24hrs), Av.9 °F (36.6 °C), Min:97.5 °F (36.4 °C), Max:98.2 °F (36.8 °C)    No results for input(s): POCGLU in the last 72 hours. Intake/Output Summary (Last 24 hours) at 4/15/2022 0801  Last data filed at 4/15/2022 0500  Gross per 24 hour   Intake 758.39 ml   Output 150 ml   Net 608.39 ml         Neurologic Exam     Mental Status   Oriented to person, place, and time. Recall at 5 minutes: recalls 1 of 3 objects. Attention: normal. Concentration: normal.   Speech: slurred   Level of consciousness: alert  Mild dysarthria     Cranial Nerves   Cranial nerves II through XII intact. CN VII   Right facial weakness: central  Right nasolabial fold flattening. Mild dysarthria.  Word parapharasing and mild aphasia     Motor Exam   Muscle bulk: normal  Overall muscle tone: normal  Right leg tone: normal  Left leg tone: normal    Strength   Right neck flexion: 5/5  Left neck flexion: 5/5  Right neck extension: 5/5  Left neck extension: 5/5  Right deltoid: 5/5  Left deltoid: 5/5  Right biceps: 5/5  Left biceps: 5/5  Right triceps: 5/5  Left triceps: 5/5  Right wrist flexion: 5/5  Left wrist flexion: 5/5  Right wrist extension: 5/5  Left wrist extension: 5/5  Right interossei: 5/5  Left interossei: 5/5  Right abdominals: 5/5  Left abdominals: 5/5  Right iliopsoas: 5/5  Left iliopsoas: 5/5  Right quadriceps: 5/5  Left quadriceps: 5/5  Right hamstrin/5  Left hamstrin/5  Right glutei: 5/5  Left glutei: 5/5  Right anterior tibial: 5/5  Left anterior tibial: 5/5  Right posterior tibial: 5/5  Left posterior tibial: 5/5  Right peroneal: 5/5  Left peroneal: 5/5  Right gastroc: 5/5  Left gastroc: 5/5    Sensory Exam   Light touch normal.     Gait, Coordination, and Reflexes     Coordination   Finger to nose coordination: normal  Heel to shin coordination: normal    Tremor Resting tremor: absent  Intention tremor: absent    Reflexes   Right brachioradialis: 2+  Left brachioradialis: 2+  Right patellar: 2+  Left patellar: 2+  Right achilles: 2+  Left achilles: 2+         Investigations:      Laboratory Testing:  Recent Results (from the past 24 hour(s))   EKG 12 Lead    Collection Time: 04/14/22  2:13 PM   Result Value Ref Range    Ventricular Rate 57 BPM    Atrial Rate 57 BPM    P-R Interval 138 ms    QRS Duration 80 ms    Q-T Interval 470 ms    QTc Calculation (Bazett) 457 ms    P Axis 55 degrees    R Axis -17 degrees    T Axis 67 degrees   STROKE PANEL    Collection Time: 04/14/22  2:19 PM   Result Value Ref Range    Glucose 103 (H) 70 - 99 mg/dL    BUN 13 8 - 23 mg/dL    CREATININE 1.22 (H) 0.50 - 0.90 mg/dL    Calcium 10.2 8.6 - 10.4 mg/dL    Sodium 139 135 - 144 mmol/L    Potassium 4.0 3.7 - 5.3 mmol/L    Chloride 101 98 - 107 mmol/L    CO2 26 20 - 31 mmol/L    Anion Gap 12 9 - 17 mmol/L    GFR Non-African American 45 (L) >60 mL/min    GFR  54 (L) >60 mL/min    GFR Comment          WBC 7.3 3.5 - 11.3 k/uL    RBC 5.01 3.95 - 5.11 m/uL    Hemoglobin 14.8 11.9 - 15.1 g/dL    Hematocrit 47.1 36.3 - 47.1 %    MCV 94.0 82.6 - 102.9 fL    MCH 29.5 25.2 - 33.5 pg    MCHC 31.4 28.4 - 34.8 g/dL    RDW 12.9 11.8 - 14.4 %    Platelets 678 218 - 601 k/uL    MPV 10.1 8.1 - 13.5 fL    NRBC Automated 0.0 0.0 per 100 WBC    Total CK 62 26 - 192 U/L    Seg Neutrophils 71 (H) 36 - 65 %    Lymphocytes 19 (L) 24 - 43 %    Monocytes 7 3 - 12 %    Eosinophils % 2 1 - 4 %    Basophils 1 0 - 2 %    Immature Granulocytes 0 0 %    Segs Absolute 5.18 1.50 - 8.10 k/uL    Absolute Lymph # 1.38 1.10 - 3.70 k/uL    Absolute Mono # 0.53 0.10 - 1.20 k/uL    Absolute Eos # 0.13 0.00 - 0.44 k/uL    Basophils Absolute 0.06 0.00 - 0.20 k/uL    Absolute Immature Granulocyte <0.03 0.00 - 0.30 k/uL    Myoglobin 36 25 - 58 ng/mL    Protime 10.1 9.1 - 12.3 sec    INR 0.9     PTT 22.2 20.5 - 30.5 sec Troponin, High Sensitivity 12 0 - 14 ng/L   Ammonia    Collection Time: 04/14/22  2:19 PM   Result Value Ref Range    Ammonia 25 11 - 51 umol/L   TSH with Reflex    Collection Time: 04/14/22  2:19 PM   Result Value Ref Range    TSH 2.22 0.30 - 5.00 uIU/mL   Brain Natriuretic Peptide    Collection Time: 04/14/22  2:19 PM   Result Value Ref Range    Pro- <300 pg/mL   CBC    Collection Time: 04/15/22  5:06 AM   Result Value Ref Range    WBC 5.9 3.5 - 11.3 k/uL    RBC 4.44 3.95 - 5.11 m/uL    Hemoglobin 13.2 11.9 - 15.1 g/dL    Hematocrit 42.1 36.3 - 47.1 %    MCV 94.8 82.6 - 102.9 fL    MCH 29.7 25.2 - 33.5 pg    MCHC 31.4 28.4 - 34.8 g/dL    RDW 12.7 11.8 - 14.4 %    Platelets 322 336 - 896 k/uL    MPV 10.3 8.1 - 13.5 fL    NRBC Automated 0.0 0.0 per 100 WBC   Lipid panel - fasting    Collection Time: 04/15/22  5:06 AM   Result Value Ref Range    Cholesterol 197 <200 mg/dL    HDL 46 >40 mg/dL    LDL Cholesterol 130 0 - 130 mg/dL    Chol/HDL Ratio 4.3 <5    Triglycerides 103 <150 mg/dL     Recent Labs     04/15/22  0506   WBC 5.9   RBC 4.44   HGB 13.2   HCT 42.1   MCV 94.8   MCH 29.7   MCHC 31.4   RDW 12.7      MPV 10.3     Recent Labs     04/14/22  1419      K 4.0      CO2 26   BUN 13   CREATININE 1.22*   GLUCOSE 103*   CALCIUM 10.2     Hemoglobin A1C   Date Value Ref Range Status   01/26/2022 6.3 (H) 4.0 - 6.0 % Final           Assessment :      Primary Problem  Stroke-like symptoms    Active Hospital Problems    Diagnosis Date Noted    Stroke-like symptoms [R29.90] 04/14/2022    Ischemic stroke [facility]) [I63.9] 04/14/2022       68-year-old female with past medical history of asthma, bipolar disorder, hypertension, narcolepsy, history of myasthenia gravis osteoporosis, left shoulder arthritis s/p left shoulder arthroplasty on 2/18/2022, former smoker (1 pack a day quit 30 years ago) presented with complaint of speech difficulty and right-sided facial droop with last known well 4 days ago.    Right-sided facial asymmetry and dysarthria 2/2 acute lacunar infarct within the left internal capsule        Plan:     · CT head showed a left internal capsule ischemic stroke. · Unremarkable CTA of the head and neck. Low-attenuation changes within the region of the posterior limb of the left internal capsule are suspicious for acute ischemia. No LVO or stenosis  · MRI brain wo contrast showed Acute lacunar infarct within the left internal capsule/lentiform nucleus. · On aspirin 81 mg daily and Plavix 75 mg daily  · On Lipitor 80 mg oral nightly. · 2D echo pending. · Hemoglobin A1c 5.7  · MMSE: 18/30. · - B12 folate and TSH pending. · Due to hx of myasthenia gravis in the past and not being on meds. Discussed to get myasthenia gravis panel. · Lipid panel: Cholesterol 197, HDL 46, , triglyceride 103. · PT OT and speech therapy consulted  · PM&R consulted    Home medications: Propranolol 80 mg oral daily, naproxen 500 mg oral twice daily, Celexa 40 mg oral daily, Wellbutrin 450 mg oral daily and hydroxyzine 10 mg oral daily resumed. Discharge planning: Awaiting recommendations      Follow-up further recommendations after discussing the case with attending  The plan was discussed with the patient, patient's family and the medical staff. Consultations:   IP CONSULT TO NEUROLOGY    Patient is admitted as inpatient status because of co-morbidities listed above, severity of signs and symptoms as outlined, requirement for current medical therapies and most importantly because of direct risk to patient if care not provided in a hospital setting.     Kylie Saavedra MD  4/15/2022  8:01 AM    Copy sent to Dr. Mei Singh DO

## 2022-04-15 NOTE — CONSULTS
Physical Medicine & Rehabilitation  Consult Note      Admitting Physician:  Deepak Galicia,*    Primary Care Provider:  Nicolas Aguirre DO     Reason for Consult:  Acute Inpatient Rehabilitation    Chief Complaint:  Speech changes, right facial droop    History of Present Illness:  Referring Provider is requesting an evaluation for appropriate placement upon discharge from acute care. History from chart review and patient. Mark Obrien is a 60 y.o. female with history of myasthenia gravis, HTN, asthma, DVT, GERD, narcolepsy, bipolar disorder, and ADHD admitted to [facility] on 4/14/2022. She initially presented with speech changes and right facial droop for 4 days. NIHSS 2. MRI brain showed acute lacunar infarct within the left internal capsule/lentiform nucleus. Of note, she also had a recent left reverse total shoulder replacement on 2/8/22 for which she has been doing outpatient therapy. She reports ongoing difficulty with speech; she states that she is able to come up with the words she wants to say but has difficulty expressing them. She otherwise denies any headache, changes in vision, numbness/tingling, focal weakness, and changes in bladder or bowel control. Review of Systems:  Review of Systems   Constitutional: Negative for fever. HENT: Negative for hearing loss. Eyes: Negative for blurred vision and double vision. Respiratory: Negative for shortness of breath. Cardiovascular: Negative for chest pain. Gastrointestinal: Negative for abdominal pain. No change in bowel control   Genitourinary:        No change in bladder control   Musculoskeletal: Negative for back pain. Skin: Negative for rash. Neurological: Positive for speech change. Negative for sensory change, focal weakness and headaches.       Premorbid function:  Independent    Current function:    PT:  Restrictions/Precautions: General Precautions,Fall Risk,Up as Tolerated  Other position/activity restrictions: expressive aphasia; hx of L reverse total shoulder arthroplasty 2/8/22 (doing outpatient therapy since); pt reports no restrictions from TSA   Transfers  Sit to Stand: Contact guard assistance  Stand to sit: Contact guard assistance  Bed to Chair: Contact guard assistance  Comment: no device used for transfers  Ambulation 1  Surface: level tile  Device: No Device  Assistance: Contact guard assistance  Quality of Gait: pt demonstrates normalized gait pattern  Gait Deviations: Increased TATO  Distance: 150 ft  Comments: 1 LOB noted during ambulation with distraction; which pt was able to self-correct    Transfers  Sit to Stand: Contact guard assistance  Stand to sit: Contact guard assistance  Bed to Chair: Contact guard assistance  Comment: no device used for transfers  Ambulation  Ambulation?: Yes  Ambulation 1  Surface: level tile  Device: No Device  Assistance: Contact guard assistance  Quality of Gait: pt demonstrates normalized gait pattern  Gait Deviations: Increased TATO  Distance: 150 ft  Comments: 1 LOB noted during ambulation with distraction; which pt was able to self-correct    Surface: level tile  Ambulation 1  Surface: level tile  Device: No Device  Assistance: Contact guard assistance  Quality of Gait: pt demonstrates normalized gait pattern  Gait Deviations: Increased TATO  Distance: 150 ft  Comments: 1 LOB noted during ambulation with distraction; which pt was able to self-correct      OT:   ADL  Grooming: Contact guard assistance,Increased time to complete,Verbal cueing (standing at the sink without DME)  UE Dressing: Minimal assistance,Increased time to complete,Verbal cueing (To doff / don gown (like robe))  LE Dressing: Minimal assistance,Increased time to complete,Verbal cueing,Stand by assistance,Contact guard assistance (SBA seated (EOB) to doff and don socks.   Min Assist pants (initiation of) while sitting EOB, then CGA for stdg balance to pull pants / LB clothing over hips.)  Toileting: Contact guard assistance,Stand by assistance,Increased time to complete (SBA seated toilet hygiene. CGA for stdg balance to pull pants / LB clothing over hips.)  Additional Comments: Mod V/T Cues for task initiation / sequencing / redirection to task (d/t impulsivity and decreased insight). Balance  Sitting Balance: Supervision  Standing Balance: Contact guard assistance   Standing Balance  Time: Several trials of standing balance (~1-2 mins each). Activity: Static and Dynamic standing balance. Comment: Without DME - CGA. No LOB. Functional Mobility  Functional - Mobility Device: No device  Activity: To/from bathroom  Assist Level: Contact guard assistance  Functional Mobility Comments: In-room functional mobility, CGA, no use of DME. Mod VCs for safe / technique. Bed mobility  Rolling to Right: Supervision  Supine to Sit: Supervision  Sit to Supine: Supervision (pt retired to recliner upon session end)  Scooting: Supervision  Comment: HOB elevated ~30=45 degrees. Min VCs for task initiation and sequencing. Transfers  Sit to stand: Contact guard assistance  Stand to sit: Contact guard assistance  Transfer Comments: Without DME - CGA for balance. Mod VCs for task initiation, sequencing. Toilet Transfers  Toilet - Technique: Ambulating  Equipment Used: Grab bars  Toilet Transfer: Contact guard assistance  Toilet Transfers Comments: With Bilateral Grab Bars DME - CGA for balance. Mod VCs for task initiation, sequencing. SLP:    Assessment:      Pt presents with mod-severe cognitive deficits characterized by difficulties with orientation, delayed recall, verbal reasoning, deductive reasoning, and divergent thinking. Pt presents w/ expressive aphasia characterized by telegraphic speech, anomia, and verbal paraphasias. Pt. Presents with mild dysarthria, and displays O/M deficits characterized by right sided asymmetry.  ST to follow up and provide treatment to address noted deficits. Education provided. Past Medical History:        Diagnosis Date    Acid reflux     Adult ADHD 2012    monthly visits @ [facility]    Asthma 2012    seasonal    Bipolar 1 disorder ([facility]     monthly visits with [facility]    Broken foot     Left foot    Chest pain of uncertain etiology     Last episode was in  (Written 2019)    Depression     Under control per pt.  on 6/15/18    DVT of leg (deep venous thrombosis) ([facility] [facility] 2014    Fracture     right foot / pt denies surgical intervention    Fractured nose     x 9 times    Helicobacter pylori (H. pylori) infection     per EGD    Hematuria 2018    Has a follow-up with Urologist     Hiatal hernia     History of blood transfusion     no reactions    History of DVT (deep vein thrombosis) 2012    History of myasthenia gravis     diagnosed around age 26    Hx of blood clots     L leg post knee surgery    Hypertension     managed by Dr. Shira Epley PCP   Street Internal hemorrhoids     Narcolepsy 2012    Dr. Nicholas Godwin [facility]    Osteoporosis     Sleep apnea     no machine    Under care of team 10/2021    Dr. Ton Benitez Under care of team     Pain Management Dr. Mary You last visit 2022       Past Surgical History:        Procedure Laterality Date    ANKLE SURGERY Left 2018    mass excision    ARM SURGERY Right     Pt states she has had 13 right arm surgeries    BREAST ENHANCEMENT SURGERY Bilateral      SECTION      x 3    COLONOSCOPY  2013    sigmoid diverticula, prominent large internal hemorrhoid    COLONOSCOPY N/A 2/10/2020    COLONOSCOPY DIAGNOSTIC performed by Prince Tobar MD at [facility] 2020    COLORECTAL CANCER SCREENING, NOT HIGH RISK performed by Thee Meier MD at [address] [place] [address] Rd, COLON, DIAGNOSTIC     [address] [place] [address] STUDY N/A 2020    JOSE FRANCISCO RN - ESOPHAGEAL MOTILITY STUDY performed by Pita Petersen DO at [facility]    ESOPHAGEAL MOTILITY STUDY  9/10/2020    ESOPHAGEAL MOTILITY STUDY performed by Pita Petersen DO at [facility]    ESOPHAGEAL MOTILITY STUDY N/A 12/18/2020    ESOPHAGEAL MOTILITY STUDY ATTEMPTED PER DR. Amy German WITHOUT SUCCESS performed by Pita Petersen DO at [facility]    FOOT SURGERY Left 2015    FOOT TENDON SURGERY Left 02/22/2019    LEFT PERONEAL TENDON REPAIR WITH POSSIBLE LEFT TENDON TRANSFER (Left )    KNEE ARTHROSCOPY Left     Two surgeries on left knee per pt    KNEE SURGERY Right     Three surgeries per pt    LIGAMENT REPAIR Left 2/22/2019    LEFT PERONEAL TENDON REPAIR performed by Aide Hamm DPM at [address] [place] [address] Bilateral 06/04/2021     BILATERAL L4/5, L5/S1 LUMBAR FACET (Bilateral )    NOSE SURGERY      PAIN MANAGEMENT PROCEDURE Bilateral 6/4/2021    BILATERAL L4/5, L5/S1 LUMBAR FACET performed by Rajni Guzman MD at [address] [place] [address]  06/11/2021    BILATERAL L4/5, L5/S1 NERVE BLOCK - Bilateral    PAIN MANAGEMENT PROCEDURE Bilateral 6/11/2021    BILATERAL L4/5, L5/S1 NERVE BLOCK performed by Rajni Guzman MD at [address] [place] [address] Right 07/23/2021    L4/5, L5/S1 NERVE RADIOFREQUENCY ABLATION     PAIN MANAGEMENT PROCEDURE Right 7/23/2021    right  L4/5, L5/S1 NERVE RADIOFREQUENCY ABLATION performed by Rajni Guzman MD at [address] [place] [address] Left 08/06/2021    left  L4/5, L5/S1 NERVE RADIOFREQUENCY ABLATION (Left )    PAIN MANAGEMENT PROCEDURE Left 8/6/2021    left  L4/5, L5/S1 NERVE RADIOFREQUENCY ABLATION performed by Rajni Guzman MD at [address] [facility] OFFICE/OUTPT [address] Left 6/29/2018    RESECTION/REMOVAL BONY NEOPLASM LEFT FIBULA, LEFT SOFT TISSUE MASS EXCISION WITH BONE BIOPSY LEFT ANKLE FIBULA performed by Aide Hamm DPM at [address] [facility]  SHOULDER SURGERY Left     SHOULDER SURGERY Left 02/08/2022    REVERSE TOTAL SHOULDER ARTHROPLASTY - Left    SHOULDER SURGERY Left 2/8/2022    REVERSE TOTAL SHOULDER ARTHROPLASTY performed by Aquilino Hoyos DO at [place] [address]  5/23/2013    tertiary contractures esophagus, 3 to 4 cm hiatal hernia, antral gastritis, + H Pylori, mild active chronic esophagitis    UPPER GASTROINTESTINAL ENDOSCOPY  4/6/2016    one biopsy    UPPER GASTROINTESTINAL ENDOSCOPY N/A 2/10/2020    EGD BIOPSY performed by Miladys Walker MD at [facility]         Allergies:     Allergies   Allergen Reactions    Latex Itching    Prozac [Fluoxetine Hcl] Other (See Comments)     violent    Sulfa Antibiotics      migraine    Codeine Itching        Current Medications:   Current Facility-Administered Medications: ondansetron (ZOFRAN-ODT) disintegrating tablet 4 mg, 4 mg, Oral, Q8H PRN **OR** ondansetron (ZOFRAN) injection 4 mg, 4 mg, IntraVENous, Q6H PRN  polyethylene glycol (GLYCOLAX) packet 17 g, 17 g, Oral, Daily PRN  perflutren lipid microspheres (DEFINITY) injection 1.65 mg, 1.5 mL, IntraVENous, ONCE PRN  atorvastatin (LIPITOR) tablet 80 mg, 80 mg, Oral, Nightly  0.9 % sodium chloride infusion, , IntraVENous, Continuous  enoxaparin (LOVENOX) injection 40 mg, 40 mg, SubCUTAneous, Daily  albuterol (PROVENTIL) nebulizer solution 2.5 mg, 2.5 mg, Nebulization, Q6H PRN  albuterol sulfate  (90 Base) MCG/ACT inhaler 1 puff, 1 puff, Inhalation, Q6H PRN  buPROPion (WELLBUTRIN XL) extended release tablet 450 mg, 450 mg, Oral, Daily  calcium carbonate tablet 600 mg, 600 mg, Oral, Daily  citalopram (CELEXA) tablet 40 mg, 40 mg, Oral, Daily  docusate sodium (COLACE) capsule 100 mg, 100 mg, Oral, BID PRN  hydrOXYzine (ATARAX) tablet 10 mg, 10 mg, Oral, Daily  naproxen (NAPROSYN) tablet 500 mg, 500 mg, Oral, BID WC  propranolol (INDERAL LA) extended release capsule 80 mg, 80 mg, Oral, Daily  clopidogrel (PLAVIX) tablet 75 mg, 75 mg, Oral, Daily  aspirin chewable tablet 81 mg, 81 mg, Oral, Daily    Family History:       Problem Relation Age of Onset    Cancer Mother     Hypertension Mother     Stroke Mother     Dementia Mother     Cancer Maternal Aunt     Hypertension Father     Heart Surgery Father     Alzheimer's Disease Father     Diabetes Neg Hx        Social History:  Lives With: Significant other  Type of Home: House  Home Layout: Two level,Able to Live on Main level with bedroom/bathroom,Performs ADL's on one level  Home Access: Stairs to enter with rails  Entrance Stairs - Number of Steps: 3  Entrance Stairs - Rails: Right  Bathroom Shower/Tub: Walk-in shower  Bathroom Toilet: Standard  Home Equipment: 4 wheeled walker  Receives Help From: Family (pt sons)  ADL Assistance: Independent  Homemaking Assistance: Independent  Homemaking Responsibilities: Yes (cooking, cleaning and laundry)  Ambulation Assistance: Independent  Transfer Assistance: Independent  Active : No  Patient's  Info: pt reports significant other was driving her but now is working, sons not able to drive  Occupation: Retired  Leisure & Hobbies: watch tv  Additional Comments: Pt reports significant other works 3 days/week but is home all other days and could assist as needed.   Social History     Socioeconomic History    Marital status: Single     Spouse name: None    Number of children: None    Years of education: None    Highest education level: None   Occupational History    None   Tobacco Use    Smoking status: Former Smoker     Packs/day: 1.00     Years: 20.00     Pack years: 20.00     Quit date:      Years since quittin.3    Smokeless tobacco: Never Used   Vaping Use    Vaping Use: Never used   Substance and Sexual Activity    Alcohol use: Not Currently     Comment: maybe oncee a year    Drug use: No    Sexual activity: Not Currently   Other Topics Concern    None   Social History Narrative    None     Social Determinants of Health     Financial Resource Strain: Low Risk     Difficulty of Paying Living Expenses: Not hard at all   Food Insecurity: No Food Insecurity    Worried About Running Out of Food in the Last Year: Never true    Brigid of Food in the Last Year: Never true   Transportation Needs:     Lack of Transportation (Medical): Not on file    Lack of Transportation (Non-Medical): Not on file   Physical Activity:     Days of Exercise per Week: Not on file    Minutes of Exercise per Session: Not on file   Stress:     Feeling of Stress : Not on file   Social Connections:     Frequency of Communication with Friends and Family: Not on file    Frequency of Social Gatherings with Friends and Family: Not on file    Attends Scientologist Services: Not on file    Active Member of [address] [place] [address] [employer] or Organizations: Not on file    Attends Club or Organization Meetings: Not on file    Marital Status: Not on file   Intimate Partner Violence:     Fear of Current or Ex-Partner: Not on file    Emotionally Abused: Not on file    Physically Abused: Not on file    Sexually Abused: Not on file   Housing Stability:     Unable to Pay for Housing in the Last Year: Not on file    Number of [place] in the Last Year: Not on file    Unstable Housing in the Last Year: Not on file       Physical Exam:  BP (!) 119/92   Pulse 63   Temp 98.3 °F (36.8 °C) (Temporal)   Resp 16   Ht 5' (1.524 m)   Wt 145 lb (65.8 kg)   SpO2 96%   BMI 28.32 kg/m²     GEN: Well developed, well nourished, no acute distress  HEENT: NCAT. EOMI. Hearing grossly intact. Mucous membranes pink and moist.  RESP: Normal breath sounds with no wheezing, rales, or rhonchi. Respirations WNL and unlabored. CV: Regular rate and rhythm. No murmurs, rubs, or gallops. ABD: Soft, non-distended, BS+ and equal.  NEURO: Alert. Speech with expressive aphasia. No facial droop. Symmetrical shoulder shrug. Midline tongue protrusion. Sensation to light touch intact. MSK:  Muscle tone and bulk are normal bilaterally. Strength 4+/5 in bilateral upper limbs. Strength 4+/5 with bilateral ankle dorsiflexion and plantarflexion. Able to lift the bilateral lower limbs at least partially off of bed against gravity. LIMBS: No edema in bilateral lower limbs. SKIN: Warm and dry with good turgor. PSYCH: Mood WNL. Affect WNL. Appropriately interactive. Diagnostics:    CBC:   Recent Labs     04/14/22  1419 04/15/22  0506   WBC 7.3 5.9   RBC 5.01 4.44   HGB 14.8 13.2   HCT 47.1 42.1   MCV 94.0 94.8   RDW 12.9 12.7    249     BMP:   Recent Labs     04/14/22  1415 04/14/22  1419 04/15/22  0925   NA  --  139 139   K  --  4.0 4.2   CL  --  101 104   CO2  --  26 26   BUN  --  13 15   CREATININE 1.18 1.22* 1.11*   GLUCOSE  --  103* 96      HbA1c:   Lab Results   Component Value Date    LABA1C 5.7 04/15/2022     BNP: No results for input(s): BNP in the last 72 hours. PT/INR:   Recent Labs     04/14/22 1419   PROTIME 10.1   INR 0.9     APTT:   Recent Labs     04/14/22 1419   APTT 22.2     CARDIAC ENZYMES: No results for input(s): CKMB, CKMBINDEX, TROPONINT in the last 72 hours. Invalid input(s): CKTOTAL;3  FASTING LIPID PANEL:  Lab Results   Component Value Date    CHOL 197 04/15/2022    HDL 46 04/15/2022    TRIG 103 04/15/2022     LIVER PROFILE: No results for input(s): AST, ALT, ALB, BILIDIR, BILITOT, ALKPHOS in the last 72 hours. Radiology:  MRI BRAIN WO CONTRAST   Final Result   Acute lacunar infarct within the left internal capsule/lentiform nucleus. The findings were sent to the Radiology Results [address] at 5:16   pm on 4/14/2022to be communicated to a licensed caregiver. CT HEAD WO CONTRAST   Final Result   Mild low-attenuation changes within the posterior aspect of the left basal   ganglia near the posterior limb of the left internal capsule. Acute ischemia   is suspected.   MRI is suggested for further evaluation. The findings were sent to the Radiology Results [address] at 3:54   pm on 4/14/2022to be communicated to a licensed caregiver. CTA HEAD NECK W CONTRAST   Final Result   Unremarkable CTA of the head and neck. Low-attenuation changes within the region of the posterior limb of the left   internal capsule are suspicious for acute ischemia. MRI of the brain is   suggested for further evaluation. The findings were sent to the Radiology Results [address] at 4:18   pm on 4/14/2022to be communicated to a licensed caregiver. XR CHEST PORTABLE   Final Result   Chronic changes. No evidence of acute cardiopulmonary process. Impression:    1. Left-sided CVA  2. Expressive aphasia  3. Cognitive impairment  4. Myasthenia gravis  5. HTN  6. Asthma  7. History of DVT  8. GERD  9. Narcolepsy  10. Bipolar disorder  11. ADHD    Recommendations:    1. Diagnosis:  Left-sided CVA  2. Therapy: Has PT/OT/SLP needs  3. Medical Necessity: As above  4. Support: Lives with significant other  5. Rehab Recommendation:  The patient does not meet criteria for acute inpatient rehabilitation at this time, as she is too high-functioning. 6. DVT Prophylaxis: Lovenox    It was my pleasure to evaluate Kirstie Starks today. Please call with questions.     Chi Manzanares MD

## 2022-04-15 NOTE — PLAN OF CARE
Problem: Safety:  Goal: Ability to chew and swallow food without choking will improve  Description: Ability to chew and swallow food without choking will improve  Outcome: Ongoing  Goal: Ability to demonstrate good, daily oral hygiene techniques will improve  Description: Ability to demonstrate good, daily oral hygiene techniques will improve  Outcome: Ongoing  Goal: Maintenance of upright position during and after feeding  Description: Maintenance of upright position during and after feeding  Outcome: Ongoing     Problem: HEMODYNAMIC STATUS  Goal: Patient has stable vital signs and fluid balance  Outcome: Ongoing

## 2022-04-15 NOTE — PROGRESS NOTES
Speech Language Pathology  Facility/Department: [address] [facility]  Initial Speech/Language/Cognitive Assessment    NAME: Solis Holley  : 1960   MRN: [id number]  ADMISSION DATE: 2022  ADMITTING DIAGNOSIS: has Attention deficit hyperactivity disorder (ADHD); Narcolepsy; Moderate persistent asthma without complication; History of DVT (deep vein thrombosis); Hiatal hernia; Cervical radiculopathy; Ganglion cyst; Chronic pain of left ankle; Acquired trigger finger; Osteoarthritis of knee; Osteoarthritis of wrist; Pes anserinus bursitis; Plantar fasciitis; Flexion contracture of joint of foot, left; Essential hypertension; Chronic anticoagulation; Iron deficiency anemia due to chronic blood loss; GERD (gastroesophageal reflux disease); Absolute anemia; Anemia; Generalized anxiety disorder; Vitamin B12 deficiency; Iron malabsorption; Fall at home, initial encounter; Closed fracture of multiple ribs of right side; Hemothorax on right; Contusion of right lung; Chronic fatigue syndrome; Sleep apnea; Pain in joint involving ankle and foot; Lumbosacral spondylosis without myelopathy; Chronic bilateral low back pain with bilateral sciatica; Encounter for long-term opiate analgesic use; Arthritis of left shoulder region; Opioid use, unspecified with unspecified opioid-induced disorder; Stroke-like symptoms; and Ischemic stroke ([facility] [address] on their problem list.    Date of Eval: 4/15/2022   Evaluating Therapist: Elen Young    RECENT RESULTS  CT OF HEAD/MRI:  Acute lacunar infarct within the left internal capsule/lentiform nucleus. Primary Complaint:   The patient is a 60 y.o. female who presents with impaired speech and a right facial droop. According to the patient, she was last known well approximately 4 days ago. Her NIH stroke scale is 2 for expressive aphasia and a facial droop. She does not display any additional focal deficits.   She states she is having difficult times with her words coming out and this has been going on for several days. She does not know why she did not come to the hospital earlier. She does have a PMH of narcolepsy and right shoulder total replacement. She also has a PMH of DVT, hypertension. Unclear if she has a history of myasthenia but it is listed in her chart. She is outside the window for any TPA or intervention. CT head showed a left internal capsule ischemic stroke which was confirmed by MRI. Patient was loaded with aspirin and Plavix. Etiology currently is unclear.     Apparently patient was at the physical therapist when there was a noted change in her mentation. This was a change from approximately a week ago. She did not know why she is in the ED but was sent by PT.     Apparently patient attempted to leave, she was found by the nurse in the cafeteria and states that her boyfriend has not checked up on her so I am going to leave and go find him    Pain:  Pain Assessment  Pain Assessment: 0-10  Pain Level: 5    Assessment:      Pt presents with mod-severe cognitive deficits characterized by difficulties with orientation, delayed recall, verbal reasoning, deductive reasoning, and divergent thinking. Pt presents w/ expressive aphasia characterized by telegraphic speech, anomia, and verbal paraphasias. Pt. Presents with mild dysarthria, and displays O/M deficits characterized by right sided asymmetry. ST to follow up and provide treatment to address noted deficits. Education provided. Recommendations:  Requires SLP Intervention: Yes  Duration/Frequency of Treatment: 3-5x weekly  D/C Recommendations: Further therapy recommended at discharge. Goals:  Short-term Goals  Goal 1: Pt will recall 3 units w/ distractions w/ 90% accuracy. Goal 2: Pt will complete verbal reasoning tasks w/ 90% accuracy. Goal 3: Pt will generate 6-8 units within a category to aid in word finding w/ 90% accuracy. Goal 4: Pt will complete deductive reasoning tasks w/ 90% accuracy. Patient/family involved in developing goals and treatment plan: yes    Subjective:  General  Chart Reviewed: Yes  Family / Caregiver Present: No  Social/Functional History  Lives With: Spouse  Active : Yes  Vision  Vision: Within Functional Limits  Hearing  Hearing: Within functional limits           Objective:     Oral/Motor  Oral Motor: Exceptions to [facility]  Labial ROM: Reduced right  Labial Symmetry: Abnormal symmetry right      Motor Speech  Motor Speech: Mild dysarthria    Cognition:      Orientation  Overall Orientation Status: Impaired (Oriented to all except year.)  Attention  Attention: Within Functional Limits  Memory  Memory: Exceptions to [facility]  Short-term Memory: Severe (0/3 increased to 3/3 w/ max cues. 0/3 increased to 3/3 w/ max cues.)  Problem Solving  Problem Solving: Exceptions to [facility]  Verbal Reasoning Skills: Moderate (Antonyms: 1/3)  Abstract Reasoning  Abstract Reasoning: Exceptions to [facility]  Divergent Thinking: Moderate (5 animals w/ repetitions and formal paraphasias)  Safety/Judgement  Safety/Judgement: Within Functional Limits  Deductive Reasoning: Severe (0/4). Prognosis:  Speech Therapy Prognosis  Prognosis: Good  Individuals consulted  Consulted and agree with results and recommendations: Patient    Education:  Patient Education: yes  Patient Education Response: Verbalizes understanding          Therapy Time:   Individual Concurrent Group Co-treatment   Time In 0912         Time Out 9973         Minutes 16               Completed by: Carolyn Middleton  Clinician    Cosigned By: Sven Treadwell S.CCC/SLP  4/15/2022 10:22 AM

## 2022-04-15 NOTE — CARE COORDINATION
PHQ-9  Pt presents with impaired speech and right facial droop. Met with pt to assess for support and needs  Pt states she lives with S.SHAGGY. Nicolás   Has 3 sons that live in the area. Family is very supportive. Pt denies having any feelings of depression or suicidal ideations. Patient Health Questionnaire-9 (PHQ-9)    Over the last 2 weeks, how often have you been bothered by any of the following problems? 1. Little Interest or pleasure in doing things? [x] Not at all  [] Several Days  [] More than half the day  []  Nearly every day    2. Feeling down, depressed or hopeless? [x] Not at all  [] Several Days  [] More than half the day  []  Nearly every day    3. Trouble falling or staying asleep, or sleeping too much? [] Not at all  [x] Several Days  [] More than half the day  []  Nearly every day    4. Feeling tired or having little energy? [] Not at all  [x] Several Days  [] More than half the day  []  Nearly every day    5. Poor apettite or overeating? [x] Not at all  [] Several Days  [] More than half the day  []  Nearly every day    6. Feeling bad about yourself-or that you are a failure or have let yourself or your family down? [x] Not at all  [] Several Days  [] More than half the day  []  Nearly every day    7. Trouble concentrating on things, such as reading the newspaper or watching television? [x] Not at all  [] Several Days  [] More than half the day  []  Nearly every day    8. Moving or speaking so slowly that other people could have noticed? Or the opposite-being so fidgety or restless that you have been moving around a lot more than usual?   [x] Not at all  [] Several Days  [] More than half the day  []  Nearly every day    9. Thoughts that you would be better off dead or of hurting yourself in some way?    [x] Not at all  [] Several Days  [] More than half the day  []  Nearly every day    Total Score: 2    If you checked off any problems, how difficult have these problems made it for you to do your work, take care of things at home, or get along with other people?    [x] Not difficult at all  [] Somewhat Difficult  [] Very Difficult  []  Extremely Difficult

## 2022-04-15 NOTE — PLAN OF CARE
Problem: Falls - Risk of:  Goal: Will remain free from falls  Description: Will remain free from falls  4/15/2022 0005 by Xiang Cardona RN  Outcome: Ongoing  4/14/2022 2351 by Xiang Cardona RN  Outcome: Ongoing

## 2022-04-15 NOTE — PLAN OF CARE
Problem: Nutritional:  Goal: Ability to tolerate tube feedings without aspirating will improve  Description: Ability to tolerate tube feedings without aspirating will improve  Outcome: Ongoing  Goal: Consumption of food in small portions  Description: Consumption of food in small portions  Outcome: Ongoing  Goal: Consumption of liquid of appropriate consistency  Description: Consumption of liquid of appropriate consistency  Outcome: Ongoing     Problem: Respiratory:  Goal: Ability to maintain a clear airway will improve  Description: Ability to maintain a clear airway will improve  Outcome: Ongoing  Goal: Will remain free from infection  Description: Will remain free from infection  Outcome: Ongoing  Goal: Absence of aspiration  Description: Absence of aspiration  Outcome: Ongoing     Problem: Safety:  Goal: Ability to chew and swallow food without choking will improve  Description: Ability to chew and swallow food without choking will improve  Outcome: Ongoing  Goal: Ability to demonstrate good, daily oral hygiene techniques will improve  Description: Ability to demonstrate good, daily oral hygiene techniques will improve  Outcome: Ongoing  Goal: Maintenance of upright position during and after feeding  Description: Maintenance of upright position during and after feeding  Outcome: Ongoing     Problem: HEMODYNAMIC STATUS  Goal: Patient has stable vital signs and fluid balance  Outcome: Ongoing     Problem: ACTIVITY INTOLERANCE/IMPAIRED MOBILITY  Goal: Mobility/activity is maintained at optimum level for patient  Outcome: Ongoing     Problem: COMMUNICATION IMPAIRMENT  Goal: Ability to express needs and understand communication  Outcome: Ongoing     Problem: Pain:  Goal: Pain level will decrease  Description: Pain level will decrease  Outcome: Ongoing  Goal: Control of acute pain  Description: Control of acute pain  Outcome: Ongoing  Goal: Control of chronic pain  Description: Control of chronic pain  Outcome: Ongoing     Problem: Falls - Risk of:  Goal: Will remain free from falls  Description: Will remain free from falls  Outcome: Ongoing  Goal: Absence of physical injury  Description: Absence of physical injury  Outcome: Ongoing

## 2022-04-16 VITALS
DIASTOLIC BLOOD PRESSURE: 83 MMHG | HEART RATE: 71 BPM | OXYGEN SATURATION: 95 % | BODY MASS INDEX: 28.47 KG/M2 | WEIGHT: 145 LBS | SYSTOLIC BLOOD PRESSURE: 120 MMHG | RESPIRATION RATE: 22 BRPM | TEMPERATURE: 98.4 F | HEIGHT: 60 IN

## 2022-04-16 LAB
AMPHETAMINE SCREEN URINE: NEGATIVE
BARBITURATE SCREEN URINE: NEGATIVE
BENZODIAZEPINE SCREEN, URINE: NEGATIVE
BILIRUBIN URINE: NEGATIVE
CANNABINOID SCREEN URINE: NEGATIVE
COCAINE METABOLITE, URINE: NEGATIVE
COLOR: YELLOW
COMMENT UA: NORMAL
FOLATE: 11.7 NG/ML
GLUCOSE URINE: NEGATIVE
KETONES, URINE: NEGATIVE
LEUKOCYTE ESTERASE, URINE: NEGATIVE
LV EF: 64 %
LVEF MODALITY: NORMAL
METHADONE SCREEN, URINE: NEGATIVE
NITRITE, URINE: NEGATIVE
OPIATES, URINE: NEGATIVE
OXYCODONE SCREEN URINE: NEGATIVE
PH UA: 6 (ref 5–8)
PHENCYCLIDINE, URINE: NEGATIVE
PROTEIN UA: NEGATIVE
SPECIFIC GRAVITY UA: 1.01 (ref 1–1.03)
TEST INFORMATION: NORMAL
TURBIDITY: CLEAR
URINE HGB: NEGATIVE
UROBILINOGEN, URINE: NORMAL
VITAMIN B-12: 495 PG/ML (ref 232–1245)

## 2022-04-16 PROCEDURE — 99239 HOSP IP/OBS DSCHRG MGMT >30: CPT | Performed by: PSYCHIATRY & NEUROLOGY

## 2022-04-16 PROCEDURE — 93306 TTE W/DOPPLER COMPLETE: CPT

## 2022-04-16 PROCEDURE — 2580000003 HC RX 258: Performed by: STUDENT IN AN ORGANIZED HEALTH CARE EDUCATION/TRAINING PROGRAM

## 2022-04-16 PROCEDURE — 6370000000 HC RX 637 (ALT 250 FOR IP): Performed by: STUDENT IN AN ORGANIZED HEALTH CARE EDUCATION/TRAINING PROGRAM

## 2022-04-16 PROCEDURE — 97129 THER IVNTJ 1ST 15 MIN: CPT

## 2022-04-16 PROCEDURE — 6360000002 HC RX W HCPCS: Performed by: STUDENT IN AN ORGANIZED HEALTH CARE EDUCATION/TRAINING PROGRAM

## 2022-04-16 RX ORDER — ASPIRIN 81 MG/1
81 TABLET, CHEWABLE ORAL DAILY
Qty: 30 TABLET | Refills: 3 | Status: SHIPPED | OUTPATIENT
Start: 2022-04-17

## 2022-04-16 RX ORDER — CLOPIDOGREL BISULFATE 75 MG/1
75 TABLET ORAL DAILY
Qty: 30 TABLET | Refills: 3 | Status: SHIPPED | OUTPATIENT
Start: 2022-04-17 | End: 2022-10-05

## 2022-04-16 RX ORDER — ATORVASTATIN CALCIUM 80 MG/1
80 TABLET, FILM COATED ORAL NIGHTLY
Qty: 30 TABLET | Refills: 3 | Status: SHIPPED | OUTPATIENT
Start: 2022-04-16 | End: 2022-07-27 | Stop reason: SDUPTHER

## 2022-04-16 RX ADMIN — BUPROPION HYDROCHLORIDE 450 MG: 150 TABLET, EXTENDED RELEASE ORAL at 08:24

## 2022-04-16 RX ADMIN — CLOPIDOGREL 75 MG: 75 TABLET, FILM COATED ORAL at 08:23

## 2022-04-16 RX ADMIN — HYDROXYZINE HYDROCHLORIDE 10 MG: 10 TABLET ORAL at 08:23

## 2022-04-16 RX ADMIN — SODIUM CHLORIDE: 9 INJECTION, SOLUTION INTRAVENOUS at 12:54

## 2022-04-16 RX ADMIN — CITALOPRAM 40 MG: 20 TABLET, FILM COATED ORAL at 08:24

## 2022-04-16 RX ADMIN — NAPROXEN 500 MG: 250 TABLET ORAL at 17:09

## 2022-04-16 RX ADMIN — ASPIRIN 81 MG: 81 TABLET, CHEWABLE ORAL at 08:24

## 2022-04-16 RX ADMIN — ENOXAPARIN SODIUM 40 MG: 40 INJECTION SUBCUTANEOUS at 08:24

## 2022-04-16 RX ADMIN — Medication 600 MG: at 08:23

## 2022-04-16 RX ADMIN — NAPROXEN 500 MG: 250 TABLET ORAL at 08:23

## 2022-04-16 ASSESSMENT — PAIN SCALES - GENERAL
PAINLEVEL_OUTOF10: 5
PAINLEVEL_OUTOF10: 6

## 2022-04-16 NOTE — PROGRESS NOTES
Speech Language Pathology  Speech Language Pathology  [facility]    Cognitive Treatment Note    Date: 4/16/2022  Patients Name: Isha Lynn  MRN: [id number]  Diagnosis:   Patient Active Problem List   Diagnosis Code    Attention deficit hyperactivity disorder (ADHD) F90.9    Narcolepsy G47.419    Moderate persistent asthma without complication P74.63    History of DVT (deep vein thrombosis) Z86.718    Hiatal hernia K44.9    Cervical radiculopathy M54.12    Ganglion cyst M67.40    Chronic pain of left ankle M25.572, G89.29    Acquired trigger finger M65.30    Osteoarthritis of knee M17.10    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 Cydny Blinks, Y92.009    Closed fracture of multiple ribs of right side S22.41XA    Hemothorax on right J94.2    Contusion of right lung S27.321A    Chronic fatigue syndrome R53.82    Sleep apnea G47.30    Pain in joint involving ankle and foot M25.579    Lumbosacral spondylosis without myelopathy M47.817    Chronic bilateral low back pain with bilateral sciatica M54.42, M54.41, G89.29    Encounter for long-term opiate analgesic use Z79.891    Arthritis of left shoulder region M19.012    Opioid use, unspecified with unspecified opioid-induced disorder F11.99    Stroke-like symptoms R29.90    Ischemic stroke (HCC) I63.9       Pain: 0/10    Cognitive Treatment    Treatment time: 3722-1512    Subjective: [x] Alert [x] Cooperative     [] Confused     [] Agitated    [] Lethargic    Objective/Assessment:    Recall:   Memory and Mental Manipulation (size, 3 units): 8/10 increased to 10/10 w/ min (repetition). Delayed Recall 1 (3 units, related): 2/3 increased to 3/3 w/ min cues (semantic). Delayed Recall 2 (3 units, related): 2/3 increased to 3/3 w/ min cues (semantic). Problem Solving/Reasoning:  Wrong Category (concrete): 4/7 increased to 7/7 w/ min cues (semantic). Opposites: 7/10 increased to 10/10 w/ min cues (semantic, phonemic). Deductive Reasonin/6 increased to 6/6 w/ min-mod (phonemic, semantic). Other: When asked about Pt's anomia, they reported that they noticed less word finding difficulties than yesterday. Goals: Continue current goals:  Goal 1: Pt will recall 3 units w/ distractions w/ 90% accuracy. Goal 2: Pt will complete verbal reasoning tasks w/ 90% accuracy. Goal 3: Pt will generate 6-8 units within a category to aid in word finding w/ 90% accuracy. Goal 4: Pt will complete deductive reasoning tasks w/ 90% accuracy. Plan:  [x] Continue ST services    [] Discharge from ST:      Discharge recommendations: []  Further therapy recommended at discharge. The patient should be able to tolerate at least 3 hours of therapy per day over 5 days or 15 hours over 7 days. [x] Further therapy recommended at discharge. [] No therapy recommended at discharge. Completed by: Elen Young  Clinician    Cosigned By: Christianna Duverney. S.CCC/SLP

## 2022-04-16 NOTE — PROGRESS NOTES
Attending Physician Statement  I have discussed the case of Abel Bowers including pertinent history and exam findings with the resident/ CNP. I have seen and examined the patient and the key elements of the encounter have been performed by me. I agree with the assessment, plan and orders as documented by the resident or CNP with changes made to the note. Briefly, this is a  57 y. o. female  was admitted on 4/14/2022 with confusion.  Patient was unable to provide any details.  Most of the history with regards to presentation is obtained from medical record. Patient is going through physical therapy and physical therapist noticed that her to be confused for which she was referred to ED for immediate eval.     As per medical records; patient had a history of hypertension, left arm fracture after a fall when she tripped over a ladder and also has history of myasthenia gravis.  Patient had arthritis of left shoulder region and has had left shoulder arthroplasty on 2/18/2022.       /83   Pulse 71   Temp 98.4 °F (36.9 °C) (Oral)   Resp 22   Ht 5' (1.524 m)   Wt 145 lb (65.8 kg)   SpO2 95%   BMI 28.32 kg/m²     Blood pressure range: Systolic (04GEX), LRV:856 , Min:120 , ROBYN:151   ; Diastolic (85NFS), ROSELIA:54, Min:80, Max:92    neuro exam significant for alert awake oriented to self and place but has difficulty with recalling recent events; also has difficulty with naming but able to repeat sentences well; followed two-step commands only; slight difficulty with three-step commands; dysarthric speech noted;  right nasolabial fold flattening noted; motor exam significant for mild weakness of grade 4+/5 in right upper and right lower extremities with extensor plantar response with intact sensation.     CT head: Mild low-attenuation changes within the posterior aspect of the left basal ganglia near the posterior limb of the left internal capsule.  Acute ischemia is suspected.  MRI is suggested for further evaluation     CTA head and neck: Unremarkable CTA of the head and neck.     MRI brain 4/14/2022: Acute lacunar infarct within left internal capsule/lentiform nucleus. Impression and Plan: Ms. Jayna Chamberlain is a 60 y.o. female with   Acute encephalopathy; acute left internal capsular ischemic infarct; right-sided weakness secondary to above; because of encephalopathy; urinalysis, urine tox, B12, folate, TSH levels were done and those were unremarkable. Patient never on antiplatelets; she is started on aspirin and Plavix for stroke prevention. PT/OT/speech therapy done; cleared for home therapy. Recent left shoulder arthroplasty on 2/18/2022  Comorbid diagnosis of myasthenia gravis; but not on any meds; myasthenia gravis panel sent. .   Care plan is discussed with patient and her nurse at bedside.     I personally spent total of 35 minutes with > 50% of time spent face-to-face, counseling, reviewing labs and images with residents; coordinating care and examining the patient.   Little Chapman MD 4/16/2022 3:41 PM

## 2022-04-16 NOTE — PLAN OF CARE
Problem: Nutritional:  Goal: Ability to tolerate tube feedings without aspirating will improve  Description: Ability to tolerate tube feedings without aspirating will improve  Outcome: Ongoing  Goal: Consumption of food in small portions  Description: Consumption of food in small portions  Outcome: Ongoing  Goal: Consumption of liquid of appropriate consistency  Description: Consumption of liquid of appropriate consistency  Outcome: Ongoing     Problem: Respiratory:  Goal: Ability to maintain a clear airway will improve  Description: Ability to maintain a clear airway will improve  Outcome: Ongoing  Goal: Will remain free from infection  Description: Will remain free from infection  Outcome: Ongoing  Goal: Absence of aspiration  Description: Absence of aspiration  Outcome: Ongoing     Problem: Safety:  Goal: Ability to chew and swallow food without choking will improve  Description: Ability to chew and swallow food without choking will improve  Outcome: Ongoing  Goal: Ability to demonstrate good, daily oral hygiene techniques will improve  Description: Ability to demonstrate good, daily oral hygiene techniques will improve  Outcome: Ongoing  Goal: Maintenance of upright position during and after feeding  Description: Maintenance of upright position during and after feeding  Outcome: Ongoing     Problem: HEMODYNAMIC STATUS  Goal: Patient has stable vital signs and fluid balance  4/16/2022 1646 by Allison Dumont RN  Outcome: Ongoing  4/16/2022 0324 by Darlyn Vieyra RN  Outcome: Ongoing     Problem: ACTIVITY INTOLERANCE/IMPAIRED MOBILITY  Goal: Mobility/activity is maintained at optimum level for patient  4/16/2022 1646 by Allison Dumont RN  Outcome: Ongoing  4/16/2022 0324 by Darlyn Vieyra RN  Outcome: Ongoing     Problem: COMMUNICATION IMPAIRMENT  Goal: Ability to express needs and understand communication  4/16/2022 1646 by Allison Dumont RN  Outcome: Ongoing  4/16/2022 0324 by Darlyn Vieyra RN  Outcome: Ongoing     Problem: Pain:  Goal: Pain level will decrease  Description: Pain level will decrease  Outcome: Ongoing  Goal: Control of acute pain  Description: Control of acute pain  Outcome: Ongoing  Goal: Control of chronic pain  Description: Control of chronic pain  Outcome: Ongoing     Problem: Falls - Risk of:  Goal: Will remain free from falls  Description: Will remain free from falls  Outcome: Ongoing  Goal: Absence of physical injury  Description: Absence of physical injury  Outcome: Ongoing     Problem: Neurological  Goal: Maximum potential motor/sensory/cognitive function  Outcome: Ongoing

## 2022-04-16 NOTE — PROGRESS NOTES
RN went over discharge instructions with patient. RN educated pt on stroke education, follow-up appointments, and medication instructions. RN answered all questions in full and pt verbalized understanding. Pt left with all items and personal belongings and was safely discharged to front entrance via wheelchair by aide.

## 2022-04-16 NOTE — PROGRESS NOTES
[facility]   [address] [facility]    Progress Note             Date:   4/16/2022  Patient name:  Tate Winston  Date of admission:  4/14/2022  2:06 PM  MRN:   [id number]  Account:  [de-identified]  YOB: 1960  PCP:    Osito Richter DO  Room:   71/0076-52  Code Status:    Full Code    Chief Complaint:     Chief Complaint   Patient presents with    Altered Mental Status       Brief History of Present Illness:     27-year-old female with past medical history of asthma, bipolar disorder, hypertension, narcolepsy, history of myasthenia gravis osteoporosis, left shoulder arthritis s/p left shoulder arthroplasty on 2/18/2022, former smoker (1 pack a day quit 30 years ago) presented with complaint of speech difficulty and right-sided facial droop with last known well 4 days ago. On presentation NIH scale was 2 (expressive aphasia and facial droop). Patient was going through physical therapy when physical therapist noted her to be confused and sent her to emergency department for immediate evaluation.     On arrival to the ED CT head showed a left internal capsule ischemic stroke. Unremarkable CTA of the head and neck. Low-attenuation changes within the region of the posterior limb of the left internal capsule are suspicious for acute ischemia. Patient was never on antiplatelets and was started on aspirin 81 mg and Plavix 75 mg for secondary stroke prevention. Started on Lipitor 80 mg. MRI brain wo contrast showed Acute lacunar infarct within the left internal capsule/lentiform nucleus.      4/15: Cholesterol 197, HDL 46, , triglyceride 103. PM&R consulted. Pt had right sided facial droop, mild dysathria and mild aphasia. Echo completed. UDS and UA negative.        Past Medical History:     Past Medical History:   Diagnosis Date    Acid reflux     Adult ADHD 08/01/2012    monthly visits @ [place]    Asthma 08/01/2012    seasonal    Bipolar 1 disorder ([facility]     [facility]ly visits with [facility] Broken foot     Left foot    Chest pain of uncertain etiology     Last episode was in  (Written 2019)    Depression     Under control per pt.  on 6/15/18    DVT of leg (deep venous thrombosis) ([facility] 2014    Fracture     right foot / pt denies surgical intervention    Fractured nose     x 9 times    Helicobacter pylori (H. pylori) infection     per EGD    Hematuria 2018    Has a follow-up with Urologist     Hiatal hernia     History of blood transfusion     no reactions    History of DVT (deep vein thrombosis) 2012    History of myasthenia gravis     diagnosed around age 26    Hx of blood clots     L leg post knee surgery    Hypertension     managed by Dr. Pee Zuñiga PCP   [facility] Internal hemorrhoids     Narcolepsy 2012    Dr. Nicky Ruizbus [facility]    Osteoporosis     Sleep apnea     no machine    Under care of team 10/2021    Dr. Ace Mendez Under care of team     Pain Management Dr. Natasha Harmon last visit 2022        Past Surgical History:     Past Surgical History:   Procedure Laterality Date    ANKLE SURGERY Left 2018    mass excision    ARM SURGERY Right     Pt states she has had 13 right arm surgeries    BREAST ENHANCEMENT SURGERY Bilateral      SECTION      x 3    COLONOSCOPY  2013    sigmoid diverticula, prominent large internal hemorrhoid    COLONOSCOPY N/A 2/10/2020    COLONOSCOPY DIAGNOSTIC performed by [facility]ik Cline [facility] at [facility]    COLORECTAL CANCER SCREENING, NOT HIGH RISK performed by Dean Ayala MD at [address], COLON, DIAGNOSTIC     [address] [facility] STUDY N/A 2020    JOSE FRANCISCO RN - ESOPHAGEAL MOTILITY STUDY performed by Cele Gerardo DO at [address] [facility] STUDY  9/10/2020    ESOPHAGEAL MOTILITY STUDY performed by Cele Gerardo DO at [facility] Endoscopy    ESOPHAGEAL MOTILITY STUDY N/A 12/18/2020    ESOPHAGEAL MOTILITY STUDY ATTEMPTED PER DR. Farzad Mcwilliams WITHOUT SUCCESS performed by Lynette Curry DO at \[facility]"" Endoscopy    FOOT SURGERY Left 2015    FOOT TENDON SURGERY Left 02/22/2019    LEFT PERONEAL TENDON REPAIR WITH POSSIBLE LEFT TENDON TRANSFER (Left )    KNEE ARTHROSCOPY Left     Two surgeries on left knee per pt    KNEE SURGERY Right     Three surgeries per pt    LIGAMENT REPAIR Left 2/22/2019    LEFT PERONEAL TENDON REPAIR performed by Franky Vidal DPM at [facility] Bilateral 06/04/2021     BILATERAL L4/5, L5/S1 LUMBAR FACET (Bilateral )    NOSE SURGERY      PAIN MANAGEMENT PROCEDURE Bilateral 6/4/2021    BILATERAL L4/5, L5/S1 LUMBAR FACET performed by Indu Pettit MD at [address] [place] [id number]  06/11/2021    BILATERAL L4/5, L5/S1 NERVE BLOCK - Bilateral    PAIN MANAGEMENT PROCEDURE Bilateral 6/11/2021    BILATERAL L4/5, L5/S1 NERVE BLOCK performed by Indu Pettit MD at [address] [place] [id number] Right 07/23/2021    L4/5, L5/S1 NERVE RADIOFREQUENCY ABLATION     PAIN MANAGEMENT PROCEDURE Right 7/23/2021    right  L4/5, L5/S1 NERVE RADIOFREQUENCY ABLATION performed by Indu Pettit MD at [address] [place] [id number] Left 08/06/2021    left  L4/5, L5/S1 NERVE RADIOFREQUENCY ABLATION (Left )    PAIN MANAGEMENT PROCEDURE Left 8/6/2021    left  L4/5, L5/S1 NERVE RADIOFREQUENCY ABLATION performed by Indu Pettit MD at [address] [facility]/OUTPT [facility] Left 6/29/2018    RESECTION/REMOVAL BONY NEOPLASM LEFT FIBULA, LEFT SOFT TISSUE MASS EXCISION WITH BONE BIOPSY LEFT ANKLE FIBULA performed by Franky Vidal DPM at [facility] Left     SHOULDER SURGERY Left 02/08/2022    REVERSE TOTAL SHOULDER ARTHROPLASTY - Left    SHOULDER SURGERY Left 2/8/2022    REVERSE TOTAL SHOULDER ARTHROPLASTY performed (VENTOLIN HFA) 108 (90 Base) MCG/ACT inhaler INHALE TWO PUFFS BY MOUTH EVERY 4 HOURS AS NEEDED FOR WHEEZING 10/28/20   Ankit Walker MD   citalopram (CELEXA) 40 MG tablet TAKE ONE TABLET BY MOUTH DAILY 4/8/20   Ankit Walker MD   albuterol (PROVENTIL) (2.5 MG/3ML) 0.083% nebulizer solution Take 2.5 mg by nebulization every 6 hours as needed for Wheezing or Shortness of Breath     Historical Provider, MD   Calcium Carbonate (CALCIUM 600 PO) Take 600 mg by mouth daily    Historical Provider, MD        Allergies:     Latex, Prozac [fluoxetine hcl], Sulfa antibiotics, and Codeine    Social History:     Tobacco:    reports that she quit smoking about 33 years ago. She has a 20.00 pack-year smoking history. She has never used smokeless tobacco.  Alcohol:      reports previous alcohol use. Drug Use:  reports no history of drug use. Family History:     Family History   Problem Relation Age of Onset   Street Cancer Mother     Hypertension Mother     Stroke Mother     Dementia Mother     Cancer Maternal Aunt     Hypertension Father     Heart Surgery Father     Alzheimer's Disease Father     Diabetes Neg Hx        Review of Systems:     Review of Systems    Review of Systems   Constitutional: Negative for fatigue. HENT: Positive for voice change. Negative for trouble swallowing. Eyes: Negative for photophobia and visual disturbance. Respiratory: Negative for chest tightness, shortness of breath and wheezing. Cardiovascular: Negative for chest pain and palpitations. Gastrointestinal: Negative for constipation, diarrhea and vomiting. Endocrine: Negative for polyuria. Genitourinary: Negative for dysuria. Musculoskeletal: Negative for back pain. Skin: Negative for wound. Neurological: Positive for facial asymmetry, speech difficulty and weakness. Negative for seizures, syncope, light-headedness, numbness and headaches.    Psychiatric/Behavioral: Negative for confusion and decreased     Physical Exam:   /83   Pulse 71   Temp 98.4 °F (36.9 °C) (Oral)   Resp 22   Ht 5' (1.524 m)   Wt 145 lb (65.8 kg)   SpO2 95%   BMI 28.32 kg/m²   Temp (24hrs), Av °F (36.7 °C), Min:97.7 °F (36.5 °C), Max:98.4 °F (36.9 °C)    Recent Labs     22  1415   POCGLU Result not available. Intake/Output Summary (Last 24 hours) at 2022 1548  Last data filed at 2022 0558  Gross per 24 hour   Intake  ml   Output --   Net  ml         Neurologic Exam     Mental Status   Oriented to person, place, and time. Follows 3 step commands. Attention: normal. Concentration: normal.   Speech: slurred   Level of consciousness: alert    Cranial Nerves   Cranial nerves II through XII intact.    Except Right facial drrop  Slurred speech  parapharasic errors     Motor Exam   Muscle bulk: normal  Overall muscle tone: normal  Right leg tone: normal  Left leg tone: normal    Strength   Right neck flexion: 5/5  Left neck flexion: 5/5  Right neck extension: 5/5  Left neck extension: 5/5  Right deltoid: 5/5  Left deltoid: 5/5  Right biceps: 5/5  Left biceps: 5/5  Right triceps: 5/5  Left triceps: 5/5  Right wrist flexion: 5/5  Left wrist flexion: 5/5  Right wrist extension: 5/5  Left wrist extension: 5/5  Right interossei: 5/5  Left interossei: 5/5  Right abdominals: 5/5  Left abdominals: 5/5  Right iliopsoas: 5/5  Left iliopsoas: 5/5  Right quadriceps: 5/5  Left quadriceps: 5/5  Right hamstrin/5  Left hamstrin/5  Right glutei: 5/5  Left glutei: 5/5  Right anterior tibial: 5/5  Left anterior tibial: 5/5  Right posterior tibial: 5/5  Left posterior tibial: 5/5  Right peroneal: 5/5  Left peroneal: 5/5  Right gastroc: 5/5  Left gastroc: 5/5    Sensory Exam   Light touch normal.     Gait, Coordination, and Reflexes     Gait  Gait: normal    Coordination   Finger to nose coordination: normal  Heel to shin coordination: normal    Tremor   Resting tremor: absent  Intention tremor: absent  Action tremor: absent    Reflexes   Right triceps: 2+  Left triceps: 2+  Right patellar: 2+  Left patellar: 2+       Investigations:      Laboratory Testing:  Recent Results (from the past 24 hour(s))   Vitamin B12 & Folate    Collection Time: 04/15/22  8:39 PM   Result Value Ref Range    Vitamin B-12 495 232 - 1245 pg/mL    Folate 11.7 >4.8 ng/mL     Recent Labs     04/15/22  0506   WBC 5.9   RBC 4.44   HGB 13.2   HCT 42.1   MCV 94.8   MCH 29.7   MCHC 31.4   RDW 12.7      MPV 10.3     Recent Labs     04/15/22  0925      K 4.2      CO2 26   BUN 15   CREATININE 1.11*   GLUCOSE 96   CALCIUM 9.5     Hemoglobin A1C   Date Value Ref Range Status   04/15/2022 5.7 4.0 - 6.0 % Final       Assessment :      Primary Problem  Stroke-like symptoms    Active Hospital Problems    Diagnosis Date Noted    Stroke-like symptoms [R29.90] 04/14/2022    Ischemic stroke [facility]) [I63.9] 04/14/2022       70-year-old female with past medical history of asthma, bipolar disorder, hypertension, narcolepsy, history of myasthenia gravis osteoporosis, left shoulder arthritis s/p left shoulder arthroplasty on 2/18/2022, former smoker (1 pack a day quit 30 years ago) presented with complaint of speech difficulty and right-sided facial droop with last known well 4 days ago. Right-sided facial asymmetry and dysarthria 2/2 acute lacunar infarct within the left internal capsule        Plan:     · CT head showed a left internal capsule ischemic stroke. · Unremarkable CTA of the head and neck. Low-attenuation changes within the region of the posterior limb of the left internal capsule are suspicious for acute ischemia. No LVO or stenosis  · MRI brain wo contrast showed Acute lacunar infarct within the left internal capsule/lentiform nucleus. · On aspirin 81 mg daily and Plavix 75 mg daily  · On Lipitor 80 mg oral nightly. · 2D echo completed pending report. · Hemoglobin A1c 5.7  · MMSE: 18/30. · - B12 folate 495 and 11.7.  TSH 2.22  · Due to hx of myasthenia gravis in the past and not being on meds. Discussed to get myasthenia gravis panel. · UDS and UA negative. · Lipid panel: Cholesterol 197, HDL 46, , triglyceride 103. · PT: Further therapy recommended at discharge. No equipment needed. · OT: Patient would benefit from continued therapy after discharge,24 hour supervision or assist.  speech therapy consulted: Further therapy recommended at discharge. · PM&R consulted: The patient does not meet criteria for acute inpatient rehabilitation at this time, as she is too high-functioning. · Patient can be dc with outpatient f/u with neurology in 4 to 6 weeks.     Home medications: Propranolol 80 mg oral daily, naproxen 500 mg oral twice daily, Celexa 40 mg oral daily, Wellbutrin 450 mg oral daily and hydroxyzine 10 mg oral daily resumed. Discharge planning: Awaiting recommendations      Follow-up further recommendations after discussing the case with attending  The plan was discussed with the patient, patient's family and the medical staff. Consultations:   IP CONSULT TO NEUROLOGY  IP CONSULT TO PHYSICAL MEDICINE REHAB    Patient is admitted as inpatient status because of co-morbidities listed above, severity of signs and symptoms as outlined, requirement for current medical therapies and most importantly because of direct risk to patient if care not provided in a hospital setting.     Dionna Gamino MD  4/16/2022  3:48 PM    Copy sent to Dr. Sally Cox DO

## 2022-04-16 NOTE — PLAN OF CARE
Problem: Safety:  Goal: Ability to chew and swallow food without choking will improve  Description: Ability to chew and swallow food without choking will improve  4/15/2022 1836 by Ortiz Ward RN  Outcome: Ongoing  Goal: Ability to demonstrate good, daily oral hygiene techniques will improve  Description: Ability to demonstrate good, daily oral hygiene techniques will improve  4/15/2022 1836 by Ortiz Ward RN  Outcome: Ongoing  Goal: Maintenance of upright position during and after feeding  Description: Maintenance of upright position during and after feeding  4/15/2022 1836 by Ortiz Ward RN  Outcome: Ongoing     Problem: HEMODYNAMIC STATUS  Goal: Patient has stable vital signs and fluid balance  4/16/2022 0324 by Franc Flood RN  Outcome: Ongoing  4/15/2022 1836 by Ortiz Ward RN  Outcome: Ongoing     Problem: ACTIVITY INTOLERANCE/IMPAIRED MOBILITY  Goal: Mobility/activity is maintained at optimum level for patient  Outcome: Ongoing     Problem: COMMUNICATION IMPAIRMENT  Goal: Ability to express needs and understand communication  Outcome: Ongoing

## 2022-04-17 NOTE — PROGRESS NOTES
Physician Progress Note      PATIENT:               Corry Dasilva  CSN #:                  [id number]  :                       1960  ADMIT DATE:       2022 2:06 PM  [address] [place] DATE:        2022 6:49 PM  RESPONDING  PROVIDER #:        Lucas Vera MD          QUERY TEXT:    Pt admitted with left internal capsule ischemic stroke and has encephalopathy   documented. If possible, please document in progress notes and discharge   summary further specificity regarding the type of encephalopathy:    The medical record reflects the following:  Risk Factors:  left internal capsule ischemic stroke, myasthenia gravis  Clinical Indicators: -MRI shows left internal capsule ischemic stroke, per H&P   ;  alert awake oriented to self and place but has difficulty with recalling   recent events; also has difficulty with naming but able to repeat sentences   well; followed two-step commands only; slight difficulty with three-step   commands; dysarthric speech noted;  right nasolabial fold flattening noted;   motor exam significant for mild weakness of grade 4+/5 in right upper and   right lower extremities with extensor plantar response with intact sensation  Treatment: -Aspirin and Plavix , High-dose statin therapy, CT, MRI, CTA,   monitoring    Thank you, Please call if questions  Patricia Loomis RN [facility]  [phone]  Options provided:  -- Encephalopathy due to  left internal capsule ischemic stroke  -- Encephalopathy due to, # .  -- Encephalopathy ruled out  -- Other - I will add my own diagnosis  -- Disagree - Not applicable / Not valid  -- Disagree - Clinically unable to determine / Unknown  -- Refer to Clinical Documentation Reviewer    PROVIDER RESPONSE TEXT:    encephalopathy due to ischemic process involving thalamus    Query created by: Talat Urias on 4/15/2022 10:22 AM      Electronically signed by:  Lucas Vera MD 2022 5:49 PM

## 2022-04-18 NOTE — DISCHARGE SUMMARY
be discharged. Procedures:  none    Any Hospital Acquired Infections: none    Discharge Functional Status:  stable    Disposition: home    Patient Instructions: Followup in clinic in 6 weeks. Discharge Medications:  Discharge Medication List as of 4/16/2022  6:17 PM      START taking these medications    Details   aspirin 81 MG chewable tablet Take 1 tablet by mouth daily, Disp-30 tablet, R-3Normal      clopidogrel (PLAVIX) 75 MG tablet Take 1 tablet by mouth daily for 19 doses, Disp-30 tablet, R-3Normal      atorvastatin (LIPITOR) 80 MG tablet Take 1 tablet by mouth nightly, Disp-30 tablet, R-3Normal         CONTINUE these medications which have NOT CHANGED    Details   naproxen (NAPROSYN) 500 MG tablet TAKE ONE TABLET BY MOUTH TWICE A DAY WITH MEALS, Disp-28 tablet, R-1Normal      oxyCODONE-acetaminophen (PERCOCET) 5-325 MG per tablet Take 1 tablet by mouth every 8 hours as needed for Pain for up to 30 days. , Disp-90 tablet, R-0Normal      hydrOXYzine (ATARAX) 50 MG tablet TAKE ONE TABLET BY MOUTH EVERY 8 HOURS AS NEEDED FOR ANXIETY, Disp-30 tablet, R-0Normal      docusate sodium (COLACE) 100 MG capsule Take 1 capsule by mouth 2 times daily as needed for Constipation, Disp-20 capsule, R-0Print      acetaminophen (TYLENOL) 500 MG tablet Take 2 tablets by mouth every 6 hours as needed for Pain, Disp-112 tablet, R-0Print      propranolol (INDERAL LA) 80 MG extended release capsule Take 1 capsule by mouth daily, Disp-90 capsule, R-1Normal      amphetamine-dextroamphetamine (ADDERALL) 30 MG tablet 30 mg 2 times daily.  Historical Med      pantoprazole (PROTONIX) 40 MG tablet Take 1 tablet by mouth daily, Disp-90 tablet, R-1Normal      buPROPion HCl ER, XL, 450 MG TB24 TAKE ONE TABLET BY MOUTH EVERY MORNING, Disp-30 tablet, R-5Normal      alendronate (FOSAMAX) 70 MG tablet TAKE 1 TABLET BY MOUTH ONCE WEEKLY BEFORE BREAKFAST, ON AN EMPTY STOMACH: REMAIN UPRIGHT FOR 30 MINUTES:TAKE WITH 8 OUNCES OF WATER, Disp-12 tablet, R-10Normal      albuterol sulfate HFA (VENTOLIN HFA) 108 (90 Base) MCG/ACT inhaler INHALE TWO PUFFS BY MOUTH EVERY 4 HOURS AS NEEDED FOR WHEEZING, Disp-1 Inhaler,R-5Normal      citalopram (CELEXA) 40 MG tablet TAKE ONE TABLET BY MOUTH DAILY, Disp-90 tablet, R-3Normal      albuterol (PROVENTIL) (2.5 MG/3ML) 0.083% nebulizer solution Take 2.5 mg by nebulization every 6 hours as needed for Wheezing or Shortness of Breath Historical Med      Calcium Carbonate (CALCIUM 600 PO) Take 600 mg by mouth dailyHistorical Med             Activity: activity as tolerated    Restrictions: No restriction. Diet: low fat, low cholesterol diet    Follow-up:    Karley Benoit MD  [address] [place] [address] Rd  [phone]    In 6 weeks  Post hospital follow up. Post stroke follow up.       Follow up labs: none    Follow up imaging: none    Note that over 30 minutes was spent in preparing discharge papers, discussing discharge with patient, medication review, etc.      Karley Benoit MD,  Department of Neurology  [address]  4/18/2022, 3:54 PM

## 2022-04-18 NOTE — CARE COORDINATION
.Stroke Follow Up Phone Call    Called phone number on record - No answer. [x]Attempt #1 Went straight to voice mail but not give name. No message left.    []Attempt #2 (final attempt)      Electronically signed by Suellen Mishra RN on 4/18/22 at 9:59 AM EDT

## 2022-04-19 ENCOUNTER — TELEPHONE (OUTPATIENT)
Dept: FAMILY MEDICINE CLINIC | Age: 62
End: 2022-04-19

## 2022-04-19 ENCOUNTER — HOSPITAL ENCOUNTER (OUTPATIENT)
Dept: PHYSICAL THERAPY | Age: 62
Setting detail: THERAPIES SERIES
Discharge: HOME | End: 2022-04-19
Payer: MEDICARE

## 2022-04-19 LAB
ACETYLCHOL BLOCK AB: 23 % (ref 0–26)
ACETYLCHOLINE BINDING ANTIBODY: 0 NMOL/L (ref 0–0.4)
STRIATED MUSCLE AB, IGG SCREEN: NORMAL
TITIN ANTIBODY: <0.09 IV (ref 0–0.45)

## 2022-04-19 PROCEDURE — 97110 THERAPEUTIC EXERCISES: CPT

## 2022-04-19 NOTE — CARE COORDINATION
..Stroke Follow Up Phone Call    Called phone number on record - No answer.      []Attempt #1    [x]Attempt #2 (final attempt)      Electronically signed by Tahira Werner RN on 4/19/22 at 9:46 AM EDT

## 2022-04-19 NOTE — FLOWSHEET NOTE
[x] [facility]  [address]  [phone]  F: [phone]     Physical Therapy Daily Treatment Note    Date:  2022  Patient Name:  Mu Fam      :  1960    MRN: [id number]  Physician: Antonio Smith DO                                    Insurance: [facility] Medicare Duel Complete (BMN)  Medical Diagnosis: O34.231 (ICD-10-CM) - Arthritis of left shoulder region            Rehab Codes: Pain M54.2, M25.512, stiffness M25.612, weakness M62.512, posture R29.3, myofascial M79.1, M62.838  Onset Date: 22                  Next 's appt: 22      Visit# / total visits: 15/24   Cancels/No Shows: 2/0  Progress note for Medicare patient due at visit 19    Subjective:    Pain:  [x] Yes  [] No Location: neck shoulder on left   Pain Rating: (0-10 scale) 0/10  Pain altered Tx:  [x] No  [] Yes  Action:  Comments: patient arrives to clinic this date with no reports of shoulder pain. States that she is still having some recall and verbal trouble, with finding the right words to describe how she is feeling.          Objective:   Modalities: Vaso low compression 40 deg x15 min at end of treatment - seated with pillow under arm    Precautions: healing forearm fracture left, flexion  deg, ER 30 deg, no IR,     Exercises completed marked with \"X\" 22  Exercise Reps/ Time Weight/ Level Comments    UBE 3/3 Level 1 Added 22 X          Pulleys - Flexion 3 min   X          Standing       Codman's 20x ea 2 lb A/P, M/L, CW/CCW  Added weight 22    Bicep Curls 20x  2 lb Added weight 22    Gentle Shoulder Extension w/cane 20x  AAROM Added 3/31/22 X          Band       - Extension 20x [place] Added 22 X   - Rows 20x [place] Added 22 X   - IR 20x [place] Added 22 X   - ER Iso Step out 10x [place] Added 22 X          Finger Ladder 8x 9th rung Added 22 X          Seated       Scapular Retraction HEP      CROM HEP  Flex, ext, lat flex, rotation    Shoulder Shrug HEP      Shoulder depression HEP             Table slides 20x ea  Flexion/scaption  Wash cloth on slide board X   ER Stretch 20x5\"      Cane Flexion 20x      Cane Chest Press 20x             Supine       Cane:       - Shoulder flex 20x AAROM  X   - Chest press 20x AAROM     Horizontal Abduction/Adduction  20x ea AAROM     - ER 20x AAROM     Shoulder Flexion 20x AROM Added 3/29/22    Rhythmic Stabilization  2x1 min  Added 3/24/22           Other: 10 weeks post op 4/19/22      Treatment Charges: Mins Units   []  Modalities     [x]  Ther Exercise 40 3   []  Manual Therapy     []  Ther Activities     []  Aquatics     [x]  Vasocompression     []  Other     Total Treatment time 40 3       Assessment: [x] Progressing toward goals: overall Tx limited due to patient feeling lethargic and stating she is tired. Patient with difficulty articulating her thoughts and became frustrated multiple times. Patient notes some pain in shoulder during finger ladder, but persistent to complete. Patient demonstrated 113° AAROM of flexion during pulleys. [x] No change. [x] Other:       [x] Patient would continue to benefit from skilled physical therapy services in order to: reduce pain with modalities and exercise, reduce muscle spasms and trigger points with manual, learn proper posture for optimal healing and function, improve ROM and strength as able. STG: (to be met in 12 treatments)  1. ? Pain: Keep pain at 4/10 or less most times  2. ? ROM: PROM shoulder elevation increases to 90 degrees+  3. ? Strength:Start active motion when cleared by    3. ? Function:Start functional activities when cleared by  and able to reach head. 5. Notes minimal tight muscles/spasms and able to manage independently if they return. 6. Demonstrate Knowledge of fall prevention    LTG: (to be met in 24 treatments)  1. LEFS improves to 50% or better function         2.   Patient to be independent with home exercise program as demonstrated by performance with correct form without cues. Patient goals: Lift arm up half way to shoulder    Pt. Education:  [x] Yes  [] No  [x] Reviewed Prior HEP/Ed  Method of Education: [x] Verbal  [x] Demo  [] Written  Comprehension of Education:  [x] Verbalizes understanding. [x] Demonstrates understanding. [] Needs review. [x] Demonstrates/verbalizes HEP/Ed previously given. Given 2/22/2022  Wrist flex/ext  Wrist supination/pronation  CROM all planes  Pendulums  Shoulder shrugs  Scapular retractions   3/10 HEP-Access Code: [email or web address]   URL: [email or web address]  Table slides flexion - 2-3 x daily - 7 x weekly - 20 reps  Seated Shoulder Scaption Slide at Table Top with Forearm in Neutral - 2-3 x daily - 7 x weekly - 20 reps  Supine Shoulder Flexion Extension AAROM with Dowel - 2-3 x daily - 7 x weekly - 20 reps  Supine Shoulder Press AAROM in Abduction with Dowel - 2-3 x daily - 7 x weekly - 20 reps      Plan: [x] Continue current frequency toward long and short term goals. [x]  Frequency: 2 x/week for 24 visits   [x] Specific Instructions for subsequent treatments: Myofascial work on neck, trap, pect muscles, gradually work through protocol.          Time In: 1425        Time Out: 1431      Electronically signed by:  Terra Almanzar PTA

## 2022-04-19 NOTE — TELEPHONE ENCOUNTER
[facility] Transitions Initial Follow Up Call    Outreach made within 2 business days of discharge:     Patient: Ariane Putnam Patient : 1960   MRN: [id number]  Reason for Admission: There are no discharge diagnoses documented for the most recent discharge.   Discharge Date: 22       Spoke with: Left message     Discharge department/facility: [facility]        Scheduled appointment with PCP within 7-14 days    Follow Up  Future Appointments   Date Time Provider Naya Roger   2022  1:45 PM Lucia Chi, [place] [facility]   2022  1:45 PM Lucia Chi, PTA [facility]   2022  9:40 AM Ronen Patel APRN - CNP [facility]   2022  1:45 PM Lucia Chi, PTA [facility]   2022  9:30 AM DO SOLITARIO [place] [facility]CIA [facility]   2022 11:30 AM [address], APRN - CNP [facility]   10/27/2022 11:00 AM Sharona Villatoro MD [facility]       Clyde Rose, [address]

## 2022-04-21 ENCOUNTER — HOSPITAL ENCOUNTER (OUTPATIENT)
Dept: PHYSICAL THERAPY | Age: 62
Setting detail: THERAPIES SERIES
Discharge: HOME | End: 2022-04-21
Payer: MEDICARE

## 2022-04-21 PROCEDURE — 97110 THERAPEUTIC EXERCISES: CPT

## 2022-04-21 NOTE — FLOWSHEET NOTE
[x] [facility]  [address]  [phone]  F: [phone]     Physical Therapy Daily Treatment Note    Date:  2022  Patient Name:  Cristian Mendez      :  1960    MRN: [id number]  Physician: James Mendez DO                                    Insurance: [facility] Medicare Duel Complete (BMN)  Medical Diagnosis: M19.012 (ICD-10-CM) - Arthritis of left shoulder region            Rehab Codes: Pain M54.2, M25.512, stiffness M25.612, weakness M62.512, posture R29.3, myofascial M79.1, M62.838  Onset Date: 22                  Next 's appt: 22      Visit# / total visits:    Cancels/No Shows: 2/0  Progress note for Medicare patient due at visit 19    Subjective:    Pain:  [x] Yes  [] No Location: L shoulder  Pain Rating: (0-10 scale) 10/10  Pain altered Tx:  [] No  [x] Yes  Action: held exercise progressions this date and focused on supine exercises. Comments: Patient arrives to therapy today reporting a \"10/10 sharp pain in her L shoulder. Patient stated that she continues to struggle with fatigue, generalized weakness in her whole body, and potential medication side effects since her CVA. She also continues to struggle finding the right words and describing her pain in her shoulder. She also reported that she has had \"dark, bloody stools\", however she could not remember when it occurred. Patient noted during the treatment session that as the day goes on it becomes increasingly difficult to remember what she did that day. Provided patient's boyfriend education this date regarding monitoring the patients symptoms and provided a number for the neuro department.   Objective:   Modalities: Vaso low compression 40 deg x15 min at end of treatment - seated with pillow under arm - held 22    Precautions: healing forearm fracture left, flexion  deg, ER 30 deg, no IR,     Exercises completed marked with \"X\" 22  Exercise Reps/ with exercises she hasn't in prior treatment sessions. Provided patient education on addressing her concerns regarding her medication side effects and symptoms after her CVA with her PCP. [] No change. [] Other:       [x] Patient would continue to benefit from skilled physical therapy services in order to: reduce pain with modalities and exercise, reduce muscle spasms and trigger points with manual, learn proper posture for optimal healing and function, improve ROM and strength as able. STG: (to be met in 12 treatments)  1. ? Pain: Keep pain at 4/10 or less most times  2. ? ROM: PROM shoulder elevation increases to 90 degrees+  3. ? Strength:Start active motion when cleared by Dr.   3. ? Function:Start functional activities when cleared by DrWill and able to reach head. 5. Notes minimal tight muscles/spasms and able to manage independently if they return. 6. Demonstrate Knowledge of fall prevention    LTG: (to be met in 24 treatments)  1. LEFS improves to 50% or better function         2. Patient to be independent with home exercise program as demonstrated by performance with correct form without cues. Patient goals: Lift arm up half way to shoulder    Pt. Education:  [x] Yes  [] No  [x] Reviewed Prior HEP/Ed  Method of Education: [x] Verbal  [x] Demo  [] Written  Comprehension of Education:  [x] Verbalizes understanding. [x] Demonstrates understanding. [] Needs review. [x] Demonstrates/verbalizes HEP/Ed previously given. Given 2/22/2022  Wrist flex/ext  Wrist supination/pronation  CROM all planes  Pendulums  Shoulder shrugs  Scapular retractions   3/10 HEP-Access Code: [email or web address]   URL: [email or web address]  Table slides flexion - 2-3 x daily - 7 x weekly - 20 reps  Seated Shoulder Scaption Slide at Table Top with Forearm in Neutral - 2-3 x daily - 7 x weekly - 20 reps  Supine Shoulder Flexion Extension AAROM with Dowel - 2-3 x daily - 7 x weekly - 20 reps  Supine Shoulder Press AAROM in Abduction with Dowel - 2-3 x daily - 7 x weekly - 20 reps      Plan: [x] Continue current frequency toward long and short term goals. [x]  Frequency: 2 x/week for 24 visits   [x] Specific Instructions for subsequent treatments: Myofascial work on neck, trap, pect muscles, gradually work through protocol. Time In: 1:47        Time Out: 3:37      Electronically signed by:  Opal RODRIGES  Treatment under the supervision and documentation reviewed by, Kenia Paredes PTA.

## 2022-04-22 ENCOUNTER — TELEPHONE (OUTPATIENT)
Dept: NEUROLOGY | Age: 62
End: 2022-04-22

## 2022-04-22 DIAGNOSIS — I63.9 ISCHEMIC STROKE (HCC): Primary | ICD-10-CM

## 2022-04-22 NOTE — TELEPHONE ENCOUNTER
PT called they are request a order for Speech therapy for this patient. 2 PM: Its ordered now.  Thank you

## 2022-04-26 ENCOUNTER — HOSPITAL ENCOUNTER (OUTPATIENT)
Dept: PHYSICAL THERAPY | Age: 62
Setting detail: THERAPIES SERIES
Discharge: HOME | End: 2022-04-26
Payer: MEDICARE

## 2022-04-26 NOTE — FLOWSHEET NOTE
[x] [facility]  [address]    [phone]  F: [phone]     Physical Therapy Cancel/No Show note    Date: 2022  Patient: Gabriela Diez  : 1960  MRN: [id number]    Cancels/No Shows to date:  last 27 days    For today's appointment patient:    [x]  Cancelled    [] Rescheduled appointment    [] No-show     Reason given by patient:    []  Patient ill     []  Conflicting appointment    [] No transportation      [] Conflict with work    [] No reason given    [] Weather related    [] [id number]    [x] Other:      Comments: \" tooth is hurting really bad\"        [x] Next appointment was confirmed for 22 at 1400      Electronically signed by: Amy Brown PTA

## 2022-04-28 ENCOUNTER — HOSPITAL ENCOUNTER (OUTPATIENT)
Dept: PHYSICAL THERAPY | Age: 62
Setting detail: THERAPIES SERIES
Discharge: HOME | End: 2022-04-28
Payer: MEDICARE

## 2022-04-28 ENCOUNTER — APPOINTMENT (OUTPATIENT)
Dept: PHYSICAL THERAPY | Age: 62
End: 2022-04-28
Payer: MEDICARE

## 2022-04-28 ENCOUNTER — HOSPITAL ENCOUNTER (OUTPATIENT)
Dept: SPEECH THERAPY | Age: 62
Setting detail: THERAPIES SERIES
Discharge: HOME | End: 2022-04-28
Payer: MEDICARE

## 2022-04-28 ENCOUNTER — OFFICE VISIT (OUTPATIENT)
Dept: PAIN MANAGEMENT | Age: 62
End: 2022-04-28
Payer: MEDICARE

## 2022-04-28 VITALS — BODY MASS INDEX: 27.96 KG/M2 | WEIGHT: 142.4 LBS | HEIGHT: 60 IN

## 2022-04-28 DIAGNOSIS — Z79.891 ENCOUNTER FOR LONG-TERM OPIATE ANALGESIC USE: Chronic | ICD-10-CM

## 2022-04-28 DIAGNOSIS — M54.41 CHRONIC BILATERAL LOW BACK PAIN WITH BILATERAL SCIATICA: Primary | ICD-10-CM

## 2022-04-28 DIAGNOSIS — M54.42 CHRONIC BILATERAL LOW BACK PAIN WITH BILATERAL SCIATICA: Primary | ICD-10-CM

## 2022-04-28 DIAGNOSIS — M54.12 CERVICAL RADICULOPATHY: ICD-10-CM

## 2022-04-28 DIAGNOSIS — G89.29 CHRONIC BILATERAL LOW BACK PAIN WITH BILATERAL SCIATICA: Primary | ICD-10-CM

## 2022-04-28 PROCEDURE — 97161 PT EVAL LOW COMPLEX 20 MIN: CPT

## 2022-04-28 PROCEDURE — 92523 SPEECH SOUND LANG COMPREHEN: CPT

## 2022-04-28 PROCEDURE — 99213 OFFICE O/P EST LOW 20 MIN: CPT | Performed by: NURSE PRACTITIONER

## 2022-04-28 RX ORDER — AMOXICILLIN 500 MG/1
CAPSULE ORAL
Status: ON HOLD | COMMUNITY
Start: 2022-04-26 | End: 2022-07-15 | Stop reason: HOSPADM

## 2022-04-28 RX ORDER — DIAZEPAM 5 MG/1
TABLET ORAL
COMMUNITY
Start: 2022-04-26 | End: 2022-05-11

## 2022-04-28 RX ORDER — BUPROPION HYDROCHLORIDE 300 MG/1
450 TABLET ORAL DAILY
COMMUNITY
Start: 2022-04-11

## 2022-04-28 ASSESSMENT — ENCOUNTER SYMPTOMS
ABDOMINAL PAIN: 0
BACK PAIN: 1
BOWEL INCONTINENCE: 0
TROUBLE SWALLOWING: 1
VISUAL CHANGE: 1

## 2022-04-28 NOTE — PROGRESS NOTES
Speech Language Pathology  Facility/Department: [facility] THERAPY  Initial Speech/Language/Cognitive Assessment    NAME: Mark Obrien  : 1960   MRN: [id number]  ADMISSION DATE: 2022  ADMITTING DIAGNOSIS: has Attention deficit hyperactivity disorder (ADHD); Narcolepsy; Moderate persistent asthma without complication; History of DVT (deep vein thrombosis); Hiatal hernia; Cervical radiculopathy; Ganglion cyst; Chronic pain of left ankle; Acquired trigger finger; Osteoarthritis of knee; Osteoarthritis of wrist; Pes anserinus bursitis; Plantar fasciitis; Flexion contracture of joint of foot, left; Essential hypertension; Chronic anticoagulation; Iron deficiency anemia due to chronic blood loss; GERD (gastroesophageal reflux disease); Absolute anemia; Anemia; Generalized anxiety disorder; Vitamin B12 deficiency; Iron malabsorption; Fall at home, initial encounter; Closed fracture of multiple ribs of right side; Hemothorax on right; Contusion of right lung; Chronic fatigue syndrome; Sleep apnea; Pain in joint involving ankle and foot; Lumbosacral spondylosis without myelopathy; Chronic bilateral low back pain with bilateral sciatica; Encounter for long-term opiate analgesic use; Arthritis of left shoulder region; Opioid use, unspecified with unspecified opioid-induced disorder; Stroke-like symptoms; Ischemic stroke ([facility] [address] and Acute encephalopathy on their problem list.    Date of Eval: 2022   Evaluating Therapist: UBALDO North    RECENT RESULTS  CT OF HEAD/MRI: 2022    Impression   Acute lacunar infarct within the left internal capsule/lentiform nucleus. Primary Complaint:   Pt is a pleasant 61 y/o female who presents for OP ST evaluation d/t c/o difficulty finding words during conversation. Per chart and per pt, pt w/ recent CVA (\"acute lacunar infarct w/in LIC\").  When s/s of stroke occurred, pt was at PT session when PT(s) noticed symptoms consistent w/ aphasia and AMS and was admitted to hospital from ED for stroke w/u. Pt w/ resolved mild R-side facial droop and mild dysarthria. However, pt w/ residual expressive aphasia characterized by word retrieval difficulty and occasional paraphasias. Assessment:  Pt presents with mild-moderate cognitive deficits characterized by difficulties with recall of orientation information, recall w/ distractions of 3 units, and difficulty w/ inductive and deductive reasoning tasks and generative naming tasks. Pt. Presents with no dysarthria, no O/M deficits at this time. Pt w/ expressive aphasia characterized by difficulty w/ word retrieval during conversation and occasional paraphasias & perseverations. Pt most frustrated w/ aphasia/anomia post-CVA. Pt endorses mild difficulty w/ memory prior to CVA. ST to follow up and provide treatment to address noted deficits. Education provided. ST recommends OP ST 2x per week for 45 minutes for approx 8 sessions at this time.     Recommendations:  Requires SLP Intervention: Yes  Duration of Treatment: 45 minutes  D/C Recommendations: No follow up therapy recommended post discharge    Plan:   Goals:  Short-term Goals  Goal 1: Pt will recall 3-5 units with distractions with 90% accuracy  Goal 2: Pt will recall orientation information w/ 90% accuracy  Goal 3: Pt will complete problem solving/reasoning tasks with 90% accuracy  Goal 4: Pt will utilize word retrieval strategies to facilitate word finding deficits in 4/5 opportunities during conversation  Goal 5: Pt will complete naming tasks (e.g. responsive, generative) w/ 90% accuracy   Patient/family involved in developing goals and treatment plan: yes    Subjective:   Previous level of function and limitations:   General  Chart Reviewed: Yes  Family / Caregiver Present: No     Vision  Vision: Impaired  Vision Exceptions: Wears glasses for distance;Wears glasses for reading  Hearing  Hearing: Within functional limits     Objective:  Oral Motor: WFL  Motor Speech: [facility]    Cognition:   Orientation: Pt oriented to name, , month, and place.  Disoriented to year and age    Memory:   Recall w/ Distractions, 3 units: 0/3, 1/3   Immediate recall, 3 units: 3/3, 3/3; 5 units: 5/5  Word Associations: [facility]  Verbal Sequencing: [facility]  Task Insight: [facility]  Thought Flexibility: Mild (1/3)   Generative Naming: +2 in 30 seconds   Inductive Reasoning: Mild (2/4)  Compare/Contrast: [facility]  Deductive Reasoning: Mild (2/4)   Antonyms: WFL    Prognosis:  Speech Therapy Prognosis  Prognosis: Fair  Individuals consulted  Consulted and agree with results and recommendations: Patient    Education:  Patient Education: yes  Patient Education Response: Verbalizes understanding          Therapy Time:   Individual Concurrent Group Co-treatment   Time In  1500         Time Out  1530         Minutes 30                  [place], [place]  2022 4:06 PM

## 2022-04-28 NOTE — CONSULTS
[x] [facility]  [address]  [phone]  F: [phone] [] [address]  [address]   [address]  P: [phone]  F: [phone] [] [place]  [address]  P: [phone]  F: [phone] [] [address]  P: [phone]  F: [phone] [] [address]  [facility]   [address]   [place]: [phone]  F: [phone]      Physical Therapy Neurological Evaluation    Date:  2022  Patient: Reyna Shell  : 1960  MRN: [id number]  Physician:  Tai Steiner MD   Insurance: [facility] Medicare Duel Complete (BMN)  Medical Diagnosis: I63.9 (ICD-10-CM) - Ischemic stroke ([facility] [address]    Rehab Codes: Onset date: 22  Next 's appt. :  ?    Subjective:   CC: States she has whole body weakness feeling. HPI: (22) Was Admitted to [place] [id number] with confusion and memory issues. Has CT abd MRI as below. Acute encephalopathy with ischemic process involving thalamus; other etiologies for encephalopathy ruled out with negative urine analysis and negative urine toxicology; normal B12/folate and normal TSH levels. Speech therapy evaluated the patient and advised home therapy. Is currently a PT patient for left shoulder therapy.      PMHx: [] Unremarkable [] Diabetes [x] HTN  [] Pacemaker   [] MI/Heart Problems [] Cancer [x] Arthritis [] Other:              [x] Refer to full medical chart  In EPIC       Comorbidities:   [] Obesity [] Dialysis  [] N/A   [] Asthma/COPD [] Dementia [x] Other:bipolar, anxiety, panic attack   [] Stroke [] Sleep apnea [] Other:   [] Vascular disease [] Rheumatic disease [] Other:     Tests: [] X-Ray: [x] MRI: MRI brain 2022: Acute lacunar infarct within left internal capsule/lentiform nucleus. [x] Other: CT head: Mild low-attenuation changes within the posterior aspect of the left basal ganglia near the posterior limb of the left internal capsule.  Acute ischemia is suspected.  MRI is suggested for further evaluation  Medications: [x] Refer to full medical record [] None [] Other:  Allergies:      [x] Refer to full medical record [] None [] Other:    See previous Evaluation on 2-17-22 for the following information. Function:  Hand Dominance  [] Right  [] Left  Patient lives with: In what type of home []  One story   [] Two story   [] Split level   Number of stairs to enter    With handrail on the []  Right to enter   [] Left to enter   Bathroom has a []  Tub only  [] Tub/shower combo   [] Walk in shower    []  Grab bars   Washing machine is on []  Main level   [] Second level   [] Basement   Employer    Job Status []  Normal duty   [] Light duty   [] Off due to condition    []  Retired   [] Not employed   [] Disability  [] Other:  []  Return to work:    Work activities/duties        ADL/IADL Previous level of function Current level of function Who currently assists the patient with task   Bathing  [] Independent  [] Assist [] Independent  [] Assist    Dress/grooming [] Independent  [] Assist [] Independent  [] Assist    Transfer/mobility [] Independent  [] Assist [] Independent  [] Assist    Feeding [] Independent  [] Assist [] Independent  [] Assist    Toileting [] Independent  [] Assist [] Independent  [] Assist    Driving [] Independent  [] Assist [] Independent  [] Assist    Housekeeping [] Independent  [] Assist [] Independent  [] Assist    Grocery shop/meal prep [] Independent  [] Assist [] Independent  [] Assist      Gait Prior level of function Current level of function    [x] Independent  [] Assist [x] Independent  [] Assist   Device: [x] Independent [x] Independent    [] Straight Cane [] Quad cane [] Straight Cane [] Quad cane    [] Standard walker [] Rolling walker   [] 4 wheeled walker [] Standard walker [] Rolling walker   [] 4 wheeled walker    [] Wheelchair [] Wheelchair     Pain:  [x] Yes  [] No Location:left shoulder Pain Rating: (0-10 scale)4/10  Pain altered Tx:  [] Yes  [] No  Action:    Symptoms:  [] Improving [] Worsening [x] Same  Better:  [] AM    [] PM    [] Sit    [] Rise/Sit    []Stand    [] Walk    [] Lying    [] Other:  Worse: [] AM    [] PM    [] Sit    [] Rise/Sit    []Stand    [x] Walk    [] Lying    [] Bend                      [] Valsalva    [] Other:  Sleep: [x] OK    [] Disturbed    Objective:    ROM  °A/P STRENGTH POSTURE No deficit Deficit Not Tested    Left Right Left Right Kyphosis [] [] []   Shoulder Flex     Scoliosis [] [] []   Abd     Forward Head [] [] []   Elbow Flex     Rounded Shldrs [] [] []   Ext     Slumped Sitting [] [] []   Wrist Flex     Skin Integrity [] [] []   Ext           Hand      NEUROLOGICAL      Hip Flex   5  5 Reflexes [] [] []   Ext   5 5 Sensation [] [] []   Abd   5 5 Bladder/Bowel [x] [] []   Knee Flex   5 5 Coordination [x] [] []   Ext   5  5 Tone [x] [] []   Ankle DF   5 5 Clonus [x] [] []   PF   5 5 Tremors [x] [] []     FUNCTION INDEP MIN ASSIST MOD ASSIST MAX ASSIST NOT TESTED   Bed Mobility             -rolling [x]  []  []  []  []    -sit to supine [x]  []  []  []  []    -supine to sit [x]  []  []  []  []    Transfers             -sit to stand [x]  []  []  []  []    -sliding board []  []  []  []  []    -pivot [x]  []  []  []  []    Balance             -sitting static []  []  []  []  []    -dynamic []  []  []  []  []    -standing static []  []  []  []  []    -dynamic []  []  []  []  []    Ambulation []  []  []  []  []    Distance/Device:    Analysis:     W/C Mobility []  []  []  []  []    Groom/Dress [x]  []  []  []  []         Special Testing: [x] TUG Test: 9.37 sec = normal under 10 sec  [x] Tinetti Test: 16/16 Balance,Gait 11/12, Total 27/28 = Low risk for falling    Functional Test: Optimal Score: 2% functionally impaired   Comments:    Assessment:  Patient would benefit from skilled physical therapy services in order to: nothing needed for this diagnosis for PT or OT at this time  (She is currently being seen for left shoulder PT)    Problems:    [] ? Pain:  [] ? ROM:  [] ? Strength:  [] ? Function:  [] Other:          Rehab Potential:  [x] Good  [] Fair  [] Poor   Suggested Professional Referral:  [x] No  [] Yes:  Barriers to Goal Achievement:  [x] No  [] Yes:  Domestic Concerns:  [x] No  [] Yes:    Pt. Education:  [x] Plans/Goals, Risks/Benefits discussed  [] Home exercise program  Method of Education: [] Verbal  [] Demo  [] Written  Comprehension of Education:  [] Verbalizes understanding. [] Demonstrates understanding. [] Needs Review. [] Demonstrates/verbalizes understanding of HEP/Ed previously given. Treatment Plan:   None Needed  [] Therapeutic Exercise   02239  [] Iontophoresis: 4 mg/mL Dexamethasone Sodium Phosphate  mAmin  91778   [] Therapeutic Activity  99726 [] Vasopneumatic cold with compression  62125    [] Gait Training   34023 [] Ultrasound   05527   [] Neuromuscular Re-education  03361 [] Electrical Stimulation Unattended  68649   [] Manual Therapy  28384 [] Electrical Stimulation Attended  57999   [] Instruction in HEP  [] Lumbar/Cervical Traction  83172   [] Aquatic Therapy   89702 [] Cold/hotpack    [] Massage   17004      [] Dry Needling, 1 or 2 muscles  39266   [] Biofeedback, first 15 minutes   20165  [] Biofeedback, additional 15 minutes   01902 [] Dry Needling, 3 or more muscles  96512     []  Medication allergies reviewed for use of    Dexamethasone Sodium Phosphate 4mg/ml     with iontophoresis treatments. Pt is not allergic.       Frequency: 0 x/weeks for 0 visits    Todays Treatment:  Modalities:   Precautions:   Exercises:  Exercise Reps/ Time Weight/ Level Comments                                 Other:    Specific Instructions for next treatment: none needed    Evaluation Complexity:  History (Personal factors, comorbidities) [] 0 [x] 1-2 [] 3+   Exam (limitations, restrictions) [x] 1-2 [] 3 [] 4+   Clinical presentation (progression) [x] Stable [] Evolving  [] Unstable   Decision Making [x] Low [] Moderate [] High    [x] Low Complexity [] Moderate Complexity [] High Complexity       Treatment Charges: Mins Units   [x] Evaluation       [x]  Low       []  Moderate       []  High 40 1   []  Modalities     []  Ther Exercise     []  Manual Therapy     []  Ther Activities     []  Aquatics     []  Vasocompression     []  Other       TOTAL TREATMENT TIME: 40    Time in:2:05pm      Time out: 2:50pm    Electronically signed by: Christophe Brownlee PT        Physician Signature:________________________________Date:__________________  By signing above or cosigning this note, I have reviewed this plan of care and certify a need for medically necessary rehabilitation services.      *PLEASE SIGN ABOVE AND FAX BACK ALL PAGES*

## 2022-04-28 NOTE — PROGRESS NOTES
Chief Complaint   Patient presents with    Back Pain    Neck Pain         [facility]   Patient complains of neck and back pain. MRI with Foraminal narrowing, moderate at left C5-6, mild-to-moderate at bilateral C6-7 and left C7-T1, mild at bilateral C4-5, right C5-6 and right C7-T1. Spinal canal narrowing, moderate at L4-5, minimal at L2-3 and L3-4. Foraminal narrowing, moderate at right L5-S1, mild to moderate at right L4-5 and left L5-S1, mild at left L4-5.  Pt had Lumbar RFA and reports significant relief. She takes percocet as needed for the pain. She complains of increased cramping in her legs at times.    She had shoulder replacement 2/8 and continues to follow with Dr. Yuly Marsh. She states she is healing well.   She did see see neurosurgery team 2/23 with recommendation to follow up in 3 months when shoulder is completely healed. EMG lower extremities without evidence of radiculopathy, possible peroneal neuropathy. Upper extremities not completed. She had a stroke 4/14/22. She does have a follow up coming up with neurosurgery. Back Pain  This is a chronic problem. The current episode started more than 1 year ago. The problem occurs constantly. The problem is unchanged. The pain is present in the thoracic spine, lumbar spine and sacro-iliac. The quality of the pain is described as aching and stabbing. The pain radiates to the left thigh. The pain is at a severity of 10/10. The pain is severe. The pain is the same all the time. The symptoms are aggravated by stress and coughing. Stiffness is present all day. Associated symptoms include bladder incontinence, leg pain, numbness, pelvic pain, tingling and weakness. Pertinent negatives include no abdominal pain, bowel incontinence, chest pain, fever or headaches. She has tried walking, bed rest and home exercises for the symptoms. The treatment provided mild relief. Neck Pain   This is a chronic problem.  The current episode started more than 1 year ago. The problem occurs constantly. The problem has been unchanged. The pain is associated with nothing. The pain is present in the anterior neck. The quality of the pain is described as aching and shooting. The pain is at a severity of 10/10. The pain is severe. Nothing aggravates the symptoms. The pain is same all the time. Stiffness is present all day. Associated symptoms include leg pain, numbness, tingling, trouble swallowing, a visual change and weakness. Pertinent negatives include no chest pain, fever or headaches. She has tried home exercises and bed rest for the symptoms. The treatment provided no relief. Patient denies any new neurological symptoms. No bowel or bladder incontinence, no weakness, and no falling. Pill count: appropriate was due 4/16    Morphine equivalent: 22.5    Controlled Substance Monitoring:    Acute and Chronic Pain Monitoring:   RX Monitoring 4/28/2022   Attestation -   Periodic Controlled Substance Monitoring Possible medication side effects, risk of tolerance/dependence & alternative treatments discussed. ;No signs of potential drug abuse or diversion identified.;Obtaining appropriate analgesic effect of treatment. Chronic Pain > 80 MEDD -   Chronic Pain > 120 MEDD -                Past Medical History:   Diagnosis Date    Acid reflux     Adult ADHD 08/01/2012    monthly visits @ [facility]    Asthma 08/01/2012    seasonal    Bipolar 1 disorder ([facility]     monthly visits with [facility]    Broken foot 2014    Left foot    Chest pain of uncertain etiology 59/69/0881    Last episode was in 2013 (Written 02/12/2019)    Depression     Under control per pt.  on 6/15/18    DVT of leg (deep venous thrombosis) ([facility] [address] 08/22/2014    Fracture     right foot / pt denies surgical intervention    Fractured nose     x 9 times    Helicobacter pylori (H. pylori) infection 2013    per EGD    Hematuria 04/2018    Has a follow-up with Urologist 6/20    Hiatal hernia     History of blood transfusion     no reactions    History of DVT (deep vein thrombosis) 2012    History of myasthenia gravis     diagnosed around age 26    Hx of blood clots     L leg post knee surgery    Hypertension     managed by Dr. Stanislav Nava PCP   Street Internal hemorrhoids     Narcolepsy 2012    Dr. Doug [facility]    Osteoporosis     Sleep apnea     no machine    Under care of team 10/2021    Dr. Adonay García Under care of team     Pain Management Dr. Bin Hay last visit 2022       Past Surgical History:   Procedure Laterality Date    ANKLE SURGERY Left 2018    mass excision    ARM SURGERY Right     Pt states she has had 13 right arm surgeries    BREAST ENHANCEMENT SURGERY Bilateral      SECTION      x 3    COLONOSCOPY  2013    sigmoid diverticula, prominent large internal hemorrhoid    COLONOSCOPY N/A 2/10/2020    COLONOSCOPY DIAGNOSTIC performed by Nisha Meza MD at [facility] 2020    COLORECTAL CANCER SCREENING, NOT HIGH RISK performed by Jonathan Rothman MD at [address] [place] [address] Rd, COLON, DIAGNOSTIC     [facility] STUDY N/A 2020    PES RN - ESOPHAGEAL MOTILITY STUDY performed by Sonny Carter DO at [address] STUDY  9/10/2020    ESOPHAGEAL MOTILITY STUDY performed by Sonny Carter DO at [facility]    ESOPHAGEAL MOTILITY STUDY N/A 2020    ESOPHAGEAL MOTILITY STUDY ATTEMPTED PER DR. Chip Kumar WITHOUT SUCCESS performed by Sonny Carter DO at [facility]    FOOT SURGERY Left 2015    FOOT TENDON SURGERY Left 2019    LEFT PERONEAL TENDON REPAIR WITH POSSIBLE LEFT TENDON TRANSFER (Left )    KNEE ARTHROSCOPY Left     Two surgeries on left knee per pt    KNEE SURGERY Right     Three surgeries per pt    LIGAMENT REPAIR Left 2019    LEFT PERONEAL TENDON REPAIR performed by Liz Eisenberg DPM at [facility] 06/04/2021     BILATERAL L4/5, L5/S1 LUMBAR FACET (Bilateral )    NOSE SURGERY      PAIN MANAGEMENT PROCEDURE Bilateral 6/4/2021    BILATERAL L4/5, L5/S1 LUMBAR FACET performed by Ta Tony MD at [address] [place] [address]  06/11/2021    BILATERAL L4/5, L5/S1 NERVE BLOCK - Bilateral    PAIN MANAGEMENT PROCEDURE Bilateral 6/11/2021    BILATERAL L4/5, L5/S1 NERVE BLOCK performed by Ta Tony MD at [address] [place] [address] Right 07/23/2021    L4/5, L5/S1 NERVE RADIOFREQUENCY ABLATION     PAIN MANAGEMENT PROCEDURE Right 7/23/2021    right  L4/5, L5/S1 NERVE RADIOFREQUENCY ABLATION performed by Ta Tony MD at [address] [place] [address] Left 08/06/2021    left  L4/5, L5/S1 NERVE RADIOFREQUENCY ABLATION (Left )    PAIN MANAGEMENT PROCEDURE Left 8/6/2021    left  L4/5, L5/S1 NERVE RADIOFREQUENCY ABLATION performed by Ta Tony MD at [address] OFFICE/OUTPT [facility] Left 6/29/2018    RESECTION/REMOVAL BONY NEOPLASM LEFT FIBULA, LEFT SOFT TISSUE MASS EXCISION WITH BONE BIOPSY LEFT ANKLE FIBULA performed by Manuel Guajardo DPM at [place] [address] Left     SHOULDER SURGERY Left 02/08/2022    REVERSE TOTAL SHOULDER ARTHROPLASTY - Left    SHOULDER SURGERY Left 2/8/2022    REVERSE TOTAL SHOULDER ARTHROPLASTY performed by Oscar Dunbar DO at [place] [address]  5/23/2013    tertiary contractures esophagus, 3 to 4 cm hiatal hernia, antral gastritis, + H Pylori, mild active chronic esophagitis    UPPER GASTROINTESTINAL ENDOSCOPY  4/6/2016    one biopsy    UPPER GASTROINTESTINAL ENDOSCOPY N/A 2/10/2020    EGD BIOPSY performed by Hermann Gonsales MD at [facility]         Allergies   Allergen Reactions    Latex Itching    Prozac [Fluoxetine Hcl] Other (See Comments)     violent    Sulfa Antibiotics      migraine  Codeine Itching         Current Outpatient Medications:     aspirin 81 MG chewable tablet, Take 1 tablet by mouth daily, Disp: 30 tablet, Rfl: 3    clopidogrel (PLAVIX) 75 MG tablet, Take 1 tablet by mouth daily for 19 doses, Disp: 30 tablet, Rfl: 3    atorvastatin (LIPITOR) 80 MG tablet, Take 1 tablet by mouth nightly, Disp: 30 tablet, Rfl: 3    naproxen (NAPROSYN) 500 MG tablet, TAKE ONE TABLET BY MOUTH TWICE A DAY WITH MEALS, Disp: 28 tablet, Rfl: 1    hydrOXYzine (ATARAX) 50 MG tablet, TAKE ONE TABLET BY MOUTH EVERY 8 HOURS AS NEEDED FOR ANXIETY, Disp: 30 tablet, Rfl: 0    docusate sodium (COLACE) 100 MG capsule, Take 1 capsule by mouth 2 times daily as needed for Constipation, Disp: 20 capsule, Rfl: 0    acetaminophen (TYLENOL) 500 MG tablet, Take 2 tablets by mouth every 6 hours as needed for Pain, Disp: 112 tablet, Rfl: 0    propranolol (INDERAL LA) 80 MG extended release capsule, Take 1 capsule by mouth daily, Disp: 90 capsule, Rfl: 1    amphetamine-dextroamphetamine (ADDERALL) 30 MG tablet, 30 mg 2 times daily.  , Disp: , Rfl:     pantoprazole (PROTONIX) 40 MG tablet, Take 1 tablet by mouth daily, Disp: 90 tablet, Rfl: 1    buPROPion HCl ER, XL, 450 MG TB24, TAKE ONE TABLET BY MOUTH EVERY MORNING, Disp: 30 tablet, Rfl: 5    alendronate (FOSAMAX) 70 MG tablet, TAKE 1 TABLET BY MOUTH ONCE WEEKLY BEFORE BREAKFAST, ON AN EMPTY STOMACH: REMAIN UPRIGHT FOR 30 MINUTES:TAKE WITH 8 OUNCES OF WATER, Disp: 12 tablet, Rfl: 10    albuterol sulfate HFA (VENTOLIN HFA) 108 (90 Base) MCG/ACT inhaler, INHALE TWO PUFFS BY MOUTH EVERY 4 HOURS AS NEEDED FOR WHEEZING, Disp: 1 Inhaler, Rfl: 5    citalopram (CELEXA) 40 MG tablet, TAKE ONE TABLET BY MOUTH DAILY, Disp: 90 tablet, Rfl: 3    albuterol (PROVENTIL) (2.5 MG/3ML) 0.083% nebulizer solution, Take 2.5 mg by nebulization every 6 hours as needed for Wheezing or Shortness of Breath , Disp: , Rfl:     Calcium Carbonate (CALCIUM 600 PO), Take 600 mg by mouth daily, Disp: , Rfl:     Family History   Problem Relation Age of Onset   Street Cancer Mother     Hypertension Mother     Stroke Mother     Dementia Mother     Cancer Maternal Aunt     Hypertension Father     Heart Surgery Father     Alzheimer's Disease Father     Diabetes Neg Hx        Social History     Socioeconomic History    Marital status: Single     Spouse name: Not on file    Number of children: Not on file    Years of education: Not on file    Highest education level: Not on file   Occupational History    Not on file   Tobacco Use    Smoking status: Former Smoker     Packs/day: 1.00     Years: 20.00     Pack years: 20.00     Quit date:      Years since quittin.3    Smokeless tobacco: Never Used   Vaping Use    Vaping Use: Never used   Substance and Sexual Activity    Alcohol use: Not Currently     Comment: maybe oncee a year    Drug use: No    Sexual activity: Not Currently   Other Topics Concern    Not on file   Social History Narrative    Not on file     Social Determinants of Health     Financial Resource Strain: Low Risk     Difficulty of Paying Living Expenses: Not hard at all   Food Insecurity: No Food Insecurity    Worried About [address] [employer] in the Last Year: Never true    [address] in the Last Year: Never true   Transportation Needs:     Lack of Transportation (Medical): Not on file    Lack of Transportation (Non-Medical):  Not on file   Physical Activity:     Days of Exercise per Week: Not on file    Minutes of Exercise per Session: Not on file   Stress:     Feeling of Stress : Not on file   Social Connections:     Frequency of Communication with Friends and Family: Not on file    Frequency of Social Gatherings with Friends and Family: Not on file    Attends Shinto Services: Not on file    Active Member of Clubs or Organizations: Not on file    Attends Club or Organization Meetings: Not on file    Marital Status: Not on file   Intimate Partner Violence:     Fear of Current or Ex-Partner: Not on file    Emotionally Abused: Not on file    Physically Abused: Not on file    Sexually Abused: Not on file   Housing Stability:     Unable to Pay for Housing in the Last Year: Not on file    Number of Places Lived in the Last Year: Not on file    Unstable Housing in the Last Year: Not on file       Review of Systems:  Review of Systems   Constitutional: Negative for fever. HENT: Positive for trouble swallowing. Cardiovascular: Negative for chest pain. Musculoskeletal: Positive for back pain and neck pain. Gastrointestinal: Negative for abdominal pain and bowel incontinence. Genitourinary: Positive for bladder incontinence and pelvic pain. Neurological: Positive for numbness, tingling and weakness. Negative for headaches. Physical Exam:  Ht 5' (1.524 m)   Wt 142 lb 6.4 oz (64.6 kg)   BMI 27.81 kg/m²     Physical Exam  HENT:      Head: Normocephalic. Pulmonary:      Effort: Pulmonary effort is normal.   Musculoskeletal:         General: Normal range of motion. Cervical back: Normal range of motion. Lumbar back: Tenderness present. Skin:     General: Skin is warm and dry. Neurological:      Mental Status: She is alert and oriented to person, place, and time.          Record/Diagnostics Review:    Last angie 3/2022 and was NOT appropriate +ETOH - this is the second inappropriate UDS for this patient    Assessment:  Problem List Items Addressed This Visit     Encounter for long-term opiate analgesic use (Chronic)    Cervical radiculopathy    Relevant Medications    diazePAM (VALIUM) 5 MG tablet    buPROPion (WELLBUTRIN XL) 300 MG extended release tablet    Chronic bilateral low back pain with bilateral sciatica - Primary    Relevant Medications    diazePAM (VALIUM) 5 MG tablet    buPROPion (WELLBUTRIN XL) 300 MG extended release tablet             Treatment Plan:  Medication contract discontinued due to failed UDS x2  Patient advised she can continue pain management here but we will no longer be able to write prescriptions for opioids. She verbalizes understanding  Follow up PRN      I have reviewed the chief complaint and history of present illness (including ROS and PFSH) and vital documentation by my staff and I agree with their documentation and have added where applicable.

## 2022-05-02 ENCOUNTER — OFFICE VISIT (OUTPATIENT)
Dept: ORTHOPEDIC SURGERY | Age: 62
End: 2022-05-02

## 2022-05-02 VITALS — HEIGHT: 60 IN | BODY MASS INDEX: 28.27 KG/M2 | WEIGHT: 144 LBS

## 2022-05-02 DIAGNOSIS — Z96.612 STATUS POST REVERSE TOTAL REPLACEMENT OF LEFT SHOULDER: Primary | ICD-10-CM

## 2022-05-02 PROCEDURE — 99024 POSTOP FOLLOW-UP VISIT: CPT | Performed by: ORTHOPAEDIC SURGERY

## 2022-05-02 ASSESSMENT — ENCOUNTER SYMPTOMS
ROS SKIN COMMENTS: NEGATIVE FOR RASH
ABDOMINAL PAIN: 0
EYE DISCHARGE: 0
SINUS PRESSURE: 1
SHORTNESS OF BREATH: 0

## 2022-05-02 NOTE — PROGRESS NOTES
[address]  [address] [facility] [address]  Dept: [phone]  Dept Fax: [phone]        Ambulatory Follow Up      Subjective:   Solis Holley is a 60y.o. year old female who presents to our office today for routine followup regarding her   1. Status post reverse total replacement of left shoulder    . Chief Complaint   Patient presents with    Post-Op Check     2/8/2022 LEFT REVERSE TOTAL SHOULDER ARTHROPLASTY       HPI     [employer] is a 50-year-old female who presents to the office today for recheck of her left reverse total shoulder replacement done on 2/8/2022. She was last seen by Wm Luis PA-C on 3/30/2022 and noted significant improvement in her shoulder pain. 2 weeks ago she sustained a cerebrovascular accident with resultant mild left-sided weakness both upper and lower extremity as well as an expressive aphasia. Patient has been placed on Plavix and continues to follow-up with neurology. She is also taking aspirin. Patient does have a history of DVT previously. Review of Systems   Constitutional: Positive for activity change. Negative for fever. HENT: Positive for sinus pressure. Negative for dental problem. Eyes: Negative for discharge. Respiratory: Negative for shortness of breath. Cardiovascular: Negative for chest pain. Gastrointestinal: Negative for abdominal pain. Genitourinary: Negative. Musculoskeletal: Positive for arthralgias. Skin:        Negative for rash   Neurological: Positive for weakness. Psychiatric/Behavioral: Negative for confusion. I have reviewed the CC, HPI, ROS, PMH, FHX, Social History, and if not present in this note, I have reviewed in the patient's chart. I agree with the documentation provided by other staff and have reviewed their documentation prior to providing my signature indicating agreement.     Objective :   Ht 5' (1.524 m)   Wt 144 lb (65.3 kg)   BMI 28.12 kg/m²  Body mass index is 28.12 kg/m². General: Gabriela Diez is a 60 y.o. female who is alert and oriented and sitting comfortably in our office. Ortho Exam  MS: Left shoulder incisions healing well without signs of infection. Range of motion left shoulder actively is to 90 degrees of forward elevation and abduction. Motor, sensory, vascular examination of the left upper extremity is grossly intact without focal deficits. Patient ambulates without antalgia or short weightbearing phase to the left lower extremity. Neuro: alert and oriented to person and place. Eyes: Extra-ocular muscles intact  Mouth: Oral mucosa moist. No perioral lesions  Pulm: Respirations unlabored and regular. Symmetric chest excursion without outward deformity is noted. Skin: warm, well perfused  Psych:   Patient has good fund of knowledge and displays understanging of exam, diagnosis, and plan.     Radiology: ECHO Complete 2D W Doppler W Color    Result Date: 4/16/2022  Transthoracic Echocardiography Report (TTE)  Patient Name [facility]   Date of Study             04/16/2022               DERIAN A   Date of      1960  Gender                    Female  Birth   Age          60 year(s)  Race                         Room Number  0012        Height:                   60 inch, 152.4 cm   Corporate ID [id number]    Weight:                   145 pounds, 65.8 kg  #   Patient Acct [de-identified]   BSA:         1.63 m^2     BMI:        28.32  #                                                              kg/m^2   MR #         [id number]     Sonographer               Sunni Joshuaisis   Accession #  [id number]  Interpreting Physician    [facility]   Fellow                   Referring Nurse                           Practitioner   Interpreting             Referring Physician       Heidi Olmstead  Type of Study   TTE procedure:2D Echocardiogram, M-Mode, Doppler, Color Doppler, Bubble  Study. Procedure Date Date: 04/16/2022 Start: 02:21 PM Study Location: [address] [place] [address]. History / Tech. Comments: Stroke-Like Symptoms Patient Status: Inpatient Height: 60 inches Weight: 145 pounds BSA: 1.63 m^2 BMI: 28.32 kg/m^2 CONCLUSIONS Summary Negative bubble study. (no right to left intra-cardiac shunt via agitated saline ). Left ventricle is normal in size with normal systolic function globally. Calculated ejection fraction by bi-plane Ngo's method is 64% Normal right ventricular size and function. Mild tricuspid regurgitation. Estimated right ventricular systolic pressure is 35 mmHg. No significant pericardial effusion is seen. Signature ----------------------------------------------------------------------------  Electronically signed by Virginia Herrera(Sonographer) on 04/16/2022 03:18  PM ---------------------------------------------------------------------------- ----------------------------------------------------------------------------  Electronically signed by Claudia Decker(Interpreting physician) on  04/17/2022 11:47 AM ---------------------------------------------------------------------------- FINDINGS Left Atrium Upper limits of normal in size. Increased left atrial volume. Left Ventricle Left ventricle is normal in size with normal systolic function globally. Calculated ejection fraction by bi-plane Ngo's method is 64% Right Atrium Right atrium is normal in size. Right Ventricle Normal right ventricular size and function. TAPSE measures 1.7 Mitral Valve Mild thickening of the mitral leaflets without stenosis. No mitral regurgitation was noted. Aortic Valve Aortic valve is trileaflet and opens normally. No aortic insufficiency. Tricuspid Valve Tricuspid valve structure is normal. Mild tricuspid regurgitation. Estimated right ventricular systolic pressure is 35 mmHg. Pulmonic Valve The pulmonic valve is normal in structure. Pericardial Effusion No significant pericardial effusion is seen. Miscellaneous Normal aortic root dimension. Bubble study was negative. E/E' average = 8.  M-mode / 2D Measurements & Calculations:   LVIDd:4.3 cm(3.7 - 5.6 cm)       Diastolic CJBIQN:13.6 ml  DMMSU:0.88 cm(2.2 - 4.0 cm)      Systolic TSCSRW:82.2 ml  RYNH:3.8 cm(0.6 - 1.1 cm)        Aortic Root:3.3 cm(2.0 - 3.7 cm)  LVPWd:0.9 cm(0.6 - 1.1 cm)       LA Dimension: 3.9 cm(1.9 - 4.0 cm)  Fractional Shortenin.56 %    LA volume/Index: 92.97 ml /57m^2  Calculated LVEF (%): 64.22 %     LVOT:1.9 cm                                   RVDd:2.3 cm   Mitral:                                 Aortic   Valve Area (P1/2-Time): 2.78 cm^2       Peak Velocity: 1.88 m/s  Peak E-Wave: 0.82 m/s                   Mean Velocity: 1.23 m/s  Peak A-Wave: 1.20 m/s                   Peak Gradient: 14.14 mmHg  E/A Ratio: 0.69                         Mean Gradient: 7 mmHg  Peak Gradient: 2.71 mmHg  Mean Gradient: 2 mmHg  Deceleration Time: 262 msec             Area (continuity): 2.26 cm^2  P1/2t: 79 msec                          AV VTI: 42.5 cm   Area (continuity): 2.7 cm^2  Mean Velocity: 0.60 m/s   Tricuspid:                              Pulmonic:   Estimated RVSP: 35 mmHg                 Peak Velocity: 0.90 m/s  Peak TR Velocity: 2.54 m/s              Peak Gradient: 3.21 mmHg  Peak TR Gradient: 25.8064 mmHg  Estimated RA Pressure: 10 mmHg                                           Estimated PASP: 35.81 mmHg  Diastology / Tissue Doppler Septal Wall E' velocity:0.05 m/s Septal Wall E/E':15 Lateral Wall E' velocity:0.07 m/s Lateral Wall E/E':11    CT HEAD WO CONTRAST    Result Date: 2022  EXAMINATION: CT OF THE HEAD WITHOUT CONTRAST  2022 3:18 pm TECHNIQUE: CT of the head was performed without the administration of intravenous contrast. Dose modulation, iterative reconstruction, and/or weight based adjustment of the mA/kV was utilized to reduce the radiation dose to as low as reasonably achievable. COMPARISON: None. HISTORY: ORDERING SYSTEM PROVIDED HISTORY: R facial droop. confusion. aphasia. LKW 3d ago ? ?? TECHNOLOGIST PROVIDED HISTORY: R facial droop. confusion. aphasia. LKW 3d ago ??? Decision Support Exception - unselect if not a suspected or confirmed emergency medical condition->Emergency Medical Condition (MA) Reason for Exam: RT FACIAL DROOP, CONFUSION FINDINGS: BRAIN/VENTRICLES: There is no acute intracranial hemorrhage, mass effect or midline shift. No abnormal extra-axial fluid collection. The gray-white differentiation is maintained without evidence of an acute infarct. There is no evidence of hydrocephalus. Mild low-attenuation change identified within the posterior aspect of the left basal ganglia and the posterior limb of the internal capsule. This finding may represent acute ischemia. ORBITS: The visualized portion of the orbits demonstrate no acute abnormality. SINUSES: The visualized paranasal sinuses and mastoid air cells demonstrate no acute abnormality. SOFT TISSUES/SKULL:  No acute abnormality of the visualized skull or soft tissues. Mild low-attenuation changes within the posterior aspect of the left basal ganglia near the posterior limb of the left internal capsule. Acute ischemia is suspected. MRI is suggested for further evaluation. The findings were sent to the Radiology Results [id number] at 3:54 pm on 4/14/2022to be communicated to a licensed caregiver. XR CHEST PORTABLE    Result Date: 4/14/2022  EXAMINATION: ONE XRAY VIEW OF THE CHEST 4/14/2022 3:00 pm COMPARISON: Plain film imaging of the chest February 11, 2022 and January 26, 2022. HISTORY: ORDERING SYSTEM PROVIDED HISTORY: confusion. AMS. TECHNOLOGIST PROVIDED HISTORY: confusion. AMS. Reason for Exam: uprt port chest FINDINGS: There is mild hypoinflation. Heart is borderline enlarged with generalized configuration. No evidence of pneumothorax or pleural effusion. No evidence of infiltrate or abnormal lung mass eventration of a portion of the right hemidiaphragm. Some calcification is apparent within the aorta. Questionable hiatal hernia. Stigmata of previous left shoulder arthroplasty. Chronic changes. No evidence of acute cardiopulmonary process. CTA HEAD NECK W CONTRAST    Result Date: 4/14/2022  EXAMINATION: CTA OF THE HEAD AND NECK WITH CONTRAST 4/14/2022 3:18 pm: TECHNIQUE: CTA of the head and neck was performed with the administration of intravenous contrast. Multiplanar reformatted images are provided for review. MIP images are provided for review. Stenosis of the internal carotid arteries measured using NASCET criteria. Dose modulation, iterative reconstruction, and/or weight based adjustment of the mA/kV was utilized to reduce the radiation dose to as low as reasonably achievable. COMPARISON: None. HISTORY: ORDERING SYSTEM PROVIDED HISTORY: R facial droop. confusion. aphasia. LKW 3d ago ? ?? TECHNOLOGIST PROVIDED HISTORY: R facial droop. confusion. aphasia. LKW 3d ago ??? Decision Support Exception - unselect if not a suspected or confirmed emergency medical condition->Emergency Medical Condition (MA) Reason for Exam: RT FACIAL DROOP, CONFUSION FINDINGS: CTA NECK: AORTIC ARCH/ARCH VESSELS: No dissection or arterial injury. No significant stenosis of the brachiocephalic or subclavian arteries. CAROTID ARTERIES: No dissection, arterial injury, or hemodynamically significant stenosis by NASCET criteria. VERTEBRAL ARTERIES: No dissection, arterial injury, or significant stenosis. SOFT TISSUES: The lung apices are clear. No cervical or superior mediastinal lymphadenopathy. The larynx and pharynx are unremarkable. No acute abnormality of the salivary and thyroid glands. BONES: No acute osseous abnormality. CTA HEAD: ANTERIOR CIRCULATION: No significant stenosis of the intracranial internal carotid, anterior cerebral, or middle cerebral arteries.  No aneurysm. POSTERIOR CIRCULATION: Mild narrowing is identified within the distal P1 segment of the left posterior cerebral artery. The right PCA is intact. The basilar and vertebral arteries are unremarkable. OTHER: No dural venous sinus thrombosis on this non-dedicated study. BRAIN: No mass effect or midline shift. No extra-axial fluid collection. The gray-white differentiation is maintained. Unremarkable CTA of the head and neck. Low-attenuation changes within the region of the posterior limb of the left internal capsule are suspicious for acute ischemia. MRI of the brain is suggested for further evaluation. The findings were sent to the Radiology Results [id number] at 4:18 pm on 4/14/2022to be communicated to a licensed caregiver. MRI BRAIN WO CONTRAST    Result Date: 4/14/2022  EXAMINATION: MRI OF THE BRAIN WITHOUT CONTRAST  4/14/2022 4:53 pm TECHNIQUE: Multiplanar multisequence MRI of the brain was performed without the administration of intravenous contrast. COMPARISON: None. HISTORY: ORDERING SYSTEM PROVIDED HISTORY: stroke protocol TECHNOLOGIST PROVIDED HISTORY: stroke protocol Decision Support Exception - unselect if not a suspected or confirmed emergency medical condition->Emergency Medical Condition (MA) Reason for Exam: Altered Mental Status - Stroke Protocol. FINDINGS: INTRACRANIAL STRUCTURES/VENTRICLES: There is an acute lacunar infarct within the left internal capsule, partially extending into the ipsilateral lentiform nucleus. Mild chronic white matter microvascular ischemic changes are present. No acute intracranial mass, shift, or bleed is identified. There is a chronic lacunar infarct within the left head of caudate. ORBITS: The visualized portion of the orbits demonstrate no acute abnormality. SINUSES: The visualized paranasal sinuses and mastoid air cells demonstrate no acute abnormality.  BONES/SOFT TISSUES: The bone marrow signal intensity appears normal. The soft tissues demonstrate no acute abnormality. Acute lacunar infarct within the left internal capsule/lentiform nucleus. The findings were sent to the Radiology Results [id number] at 5:16 pm on 4/14/2022to be communicated to a licensed caregiver. Assessment:      1. Status post reverse total replacement of left shoulder       Plan:      She continues to improve the shoulder. Unfortunately she has had a little bit of a setback with a recent CVA which she is recovering well from so far. We encouraged her to continue to follow-up with neurology for her Plavix and other recommendations. We are in to continue physical therapy for the shoulder and see her back in 3 months or sooner as necessary. Follow up:Return in about 3 months (around 8/2/2022). No orders of the defined types were placed in this encounter. No orders of the defined types were placed in this encounter.       This note is created with the assistance of a speech recognition program.  While intending to generate a document that actually reflects the content of the visit, the document can still have some errors including those of syntax and sound a like substitutions which may escape proof reading.  In such instances, actual meaning can be extrapolated by contextual diversion      Electronically signed by Chasity Levin on 5/2/2022 at 10:00 AM

## 2022-05-03 ENCOUNTER — HOSPITAL ENCOUNTER (OUTPATIENT)
Dept: PHYSICAL THERAPY | Age: 62
Setting detail: THERAPIES SERIES
Discharge: HOME | End: 2022-05-03
Payer: MEDICARE

## 2022-05-03 ENCOUNTER — HOSPITAL ENCOUNTER (OUTPATIENT)
Dept: SPEECH THERAPY | Age: 62
Setting detail: THERAPIES SERIES
Discharge: HOME | End: 2022-05-03
Payer: MEDICARE

## 2022-05-03 PROCEDURE — 97129 THER IVNTJ 1ST 15 MIN: CPT

## 2022-05-03 PROCEDURE — 97130 THER IVNTJ EA ADDL 15 MIN: CPT

## 2022-05-03 PROCEDURE — 92507 TX SP LANG VOICE COMM INDIV: CPT

## 2022-05-03 PROCEDURE — 97110 THERAPEUTIC EXERCISES: CPT

## 2022-05-03 NOTE — FLOWSHEET NOTE
[x] [facility]  [address]  [phone]  F: [phone]     Physical Therapy Daily Treatment Note    Date:  5/3/2022  Patient Name:  Cristian Mendez      :  1960    MRN: [id number]  Physician: James Mendez DO                                    Insurance: [facility] Medicare Duel Complete (BMN)  Medical Diagnosis: X64.134 (ICD-10-CM) - Arthritis of left shoulder region            Rehab Codes: Pain M54.2, M25.512, stiffness M25.612, weakness M62.512, posture R29.3, myofascial M79.1, M62.838  Onset Date: 22                  Next 's appt: 22      Visit# / total visits:    Cancels/No Shows: 2/0  Progress note for Medicare patient due at visit 19    Subjective:    Pain:  [x] Yes  [] No Location: L shoulder  Pain Rating: (0-10 scale) ?/10  Pain altered Tx:  [] No  [x] Yes  Action: held exercise progressions this date and focused on supine exercises. Comments: Upon inquiry of patient pain levels this date the patient had trouble describing her pain levels, however she verbally explained how the pain was not increased to the point where she could not participate in therapy. Patient reports not being HEP compliant as of late due to being busy. Patient on multiple occasions noted that she has been experiencing random bruising on her extremities.   Objective:   Modalities: Vaso low compression 40 deg x15 min at end of treatment - seated with pillow under arm - held 22    Precautions: healing forearm fracture left, flexion  deg, ER 30 deg, no IR,     Exercises completed marked with \"X\" 22  Exercise Reps/ Time Weight/ Level Comments    UBE 3/3 Level 1 Added 22 X          Pulleys - Flexion 3 min   x          Standing       Codman's 20x ea 2 lb A/P, M/L, CW/CCW  Added weight 22    Bicep Curls 20x  2 lb Added weight 22 X   Gentle Shoulder Extension w/cane 20x  AAROM Added 3/31/22 X          Band       - Extension 20x [place] Added 4/19/22 X   - Rows 20x [place] Added 4/19/22 X   - IR 20x [place] Added 4/19/22 X   - ER Iso Step out 10x [place] Added 4/5/22 X          Finger Ladder 8x 9th rung Added 4/5/22 X   Wall slides x20 AAROM  x   Seated       Scapular Retraction HEP      CROM HEP  Flex, ext, lat flex, rotation    Shoulder Shrug HEP      Shoulder depression HEP             Table slides 20x ea  Flexion/scaption  Wash cloth on slide board X   ER Stretch 20x5\"      Cane Flexion 20x      Cane Chest Press 20x             Supine       Cane:       - Shoulder flex 20x AAROM (Standing 5/3/22) X   - Chest press 20x AAROM  x   Horizontal Abduction/Adduction  20x ea AAROM  X   - ER 20x AAROM     Shoulder Flexion 20x AROM Added 3/29/22    Rhythmic Stabilization  2x1 min  Added 3/24/22 x          Other: 10 weeks post op 4/19/22        Treatment Charges: Mins Units   []  Modalities     [x]  Ther Exercise 40 3   []  Manual Therapy     []  Ther Activities     []  Aquatics     []  Vasocompression     []  Other     Total Treatment time 40 3       Assessment: [x] Progressing toward goals: Patient tolerated exercise progressions well this date with reports of minimal muscle fatigue during the treatment session. Provided patient education on communicating with her PCP regarding her concerns with random bruising on her extremities. Provided patient education regarding the importance of completing her HEP with patient verbally demonstrating a good understanding. [] No change. [] Other:       [x] Patient would continue to benefit from skilled physical therapy services in order to: reduce pain with modalities and exercise, reduce muscle spasms and trigger points with manual, learn proper posture for optimal healing and function, improve ROM and strength as able.     STG: (to be met in 12 treatments)  1. ? Pain: Keep pain at 4/10 or less most times  2. ? ROM: PROM shoulder elevation increases to 90 degrees+  3. ? Strength:Start active motion when cleared by    4. ? Function:Start functional activities when cleared by  and able to reach head. 5. Notes minimal tight muscles/spasms and able to manage independently if they return. 6. Demonstrate Knowledge of fall prevention    LTG: (to be met in 24 treatments)  1. LEFS improves to 50% or better function         2. Patient to be independent with home exercise program as demonstrated by performance with correct form without cues. Patient goals: Lift arm up half way to shoulder    Pt. Education:  [x] Yes  [] No  [x] Reviewed Prior HEP/Ed  Method of Education: [x] Verbal  [x] Demo  [] Written  Comprehension of Education:  [x] Verbalizes understanding. [x] Demonstrates understanding. [] Needs review. [x] Demonstrates/verbalizes HEP/Ed previously given. Given 2/22/2022  Wrist flex/ext  Wrist supination/pronation  CROM all planes  Pendulums  Shoulder shrugs  Scapular retractions   3/10 HEP-Access Code: [email or web address]   URL: [email or web address]  Table slides flexion - 2-3 x daily - 7 x weekly - 20 reps  Seated Shoulder Scaption Slide at Table Top with Forearm in Neutral - 2-3 x daily - 7 x weekly - 20 reps  Supine Shoulder Flexion Extension AAROM with Dowel - 2-3 x daily - 7 x weekly - 20 reps  Supine Shoulder Press AAROM in Abduction with Dowel - 2-3 x daily - 7 x weekly - 20 reps      Plan: [x] Continue current frequency toward long and short term goals. [x]  Frequency: 2 x/week for 24 visits   [x] Specific Instructions for subsequent treatments: Myofascial work on neck, trap, pect muscles, gradually work through protocol. Time In: 2:03        Time Out: 2:51      Electronically signed by:  Christ Zuluaga  Treatment under the supervision and documentation reviewed byPili PTA.

## 2022-05-03 NOTE — PROGRESS NOTES
Speech Language Pathology  Speech Language Pathology  [facility]    Cognitive Treatment Note    Date: 5/3/2022  Patients Name: Robyn Hoang  MRN: [id number]  Diagnosis:   Patient Active Problem List   Diagnosis Code    Attention deficit hyperactivity disorder (ADHD) F90.9    Narcolepsy G47.419    Moderate persistent asthma without complication W77.98    History of DVT (deep vein thrombosis) Z86.718    Hiatal hernia K44.9    Cervical radiculopathy M54.12    Ganglion cyst M67.40    Chronic pain of left ankle M25.572, G89.29    Acquired trigger finger M65.30    Osteoarthritis of knee M17.10    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 Aimee Trivedi, Y92.009    Closed fracture of multiple ribs of right side S22.41XA    Hemothorax on right J94.2    Contusion of right lung S27.321A    Chronic fatigue syndrome R53.82    Sleep apnea G47.30    Pain in joint involving ankle and foot M25.579    Lumbosacral spondylosis without myelopathy M47.817    Chronic bilateral low back pain with bilateral sciatica M54.42, M54.41, G89.29    Encounter for long-term opiate analgesic use Z79.891    Arthritis of left shoulder region M19.012    Opioid use, unspecified with unspecified opioid-induced disorder F11.99    Stroke-like symptoms R29.90    Ischemic stroke (HCC) I63.9    Acute encephalopathy G93.40     Pain: 0/10    Cognitive Treatment    Treatment time: 5968-9541    Subjective: [x] Alert [x] Cooperative     [] Confused     [] Agitated [] Lethargic    Objective/Assessment:    Goal 1: Pt will recall 3-5 units with distractions with 90% accuracy  Recall w/ Distractions, 1 functional unit (year): 0/1 x2 increased to 1/1 x2 w/ mod verbal cues     Picture Retention, Fishin% independently     Goal 2: Pt will recall orientation information w/ 90% accuracy  Pt oriented to name, birthday, month, and place. Disoriented to year and age (reoriented w/ forced choice cues)     Goal 3: Pt will complete problem solving/reasoning tasks with 90% accuracy  NT this date. Goal 4: Pt will utilize word retrieval strategies to facilitate word finding deficits in 4/5 opportunities during conversation  Pt and ST extensively reviewed strategies to facilitate word retrieval (e.g. gestural facilitation, increased time/patience, coming back to it later, circumlocution, etc.). Pt utilized gestural facilitation, increased time, and circumlocution independently during ST session following discussion. Written education provided. Goal 5: Pt will complete naming tasks (e.g. responsive, generative) w/ 90% accuracy  Who Questions:  increased to 24/24 w/ min-mod verbal cues. Pt requesting to \"skip\" some questions and come back to them to work through them with success. Naming Pictures: 100% independently     Category Members w/ first letter:  increased to 24/24 w/ min verbal cues. Pt independently requesting to \"skip\" questions and come back to them to allow for increased time. Other: Pt frequently frustrated and avoidant to tasks or word retrieval activities that were difficult- pt changing subject during these moments, though encouraged to allow herself patience and to work through word retrieval difficulty. Pt able to be redirected w/ min verbal cues. Plan:  [x] Continue ST services    [] Discharge from ST:      Discharge recommendations: []  Further therapy recommended at discharge. The patient should be able to tolerate at least 3 hours of therapy per day over 5 days or 15 hours over 7 days. [] Further therapy recommended at discharge. [x] No therapy recommended at discharge/Pending POC.     Treatment completed by: Clotilde Gunter, SLP

## 2022-05-10 ENCOUNTER — HOSPITAL ENCOUNTER (OUTPATIENT)
Dept: SPEECH THERAPY | Age: 62
Setting detail: THERAPIES SERIES
Discharge: HOME | End: 2022-05-10
Payer: MEDICARE

## 2022-05-10 ENCOUNTER — HOSPITAL ENCOUNTER (OUTPATIENT)
Dept: PHYSICAL THERAPY | Age: 62
Setting detail: THERAPIES SERIES
Discharge: HOME | End: 2022-05-10
Payer: MEDICARE

## 2022-05-10 PROCEDURE — 97110 THERAPEUTIC EXERCISES: CPT

## 2022-05-10 PROCEDURE — 97130 THER IVNTJ EA ADDL 15 MIN: CPT

## 2022-05-10 PROCEDURE — 97129 THER IVNTJ 1ST 15 MIN: CPT

## 2022-05-10 PROCEDURE — 92507 TX SP LANG VOICE COMM INDIV: CPT

## 2022-05-10 NOTE — FLOWSHEET NOTE
[x] [facility]  [address]  [phone]  F: [phone]     Physical Therapy Daily Treatment Note    Date:  5/10/2022  Patient Name:  Raad Rocha      :  1960    MRN: [id number]  Physician: Silas Townsend DO                                    Insurance: [facility] Medicare Duel Complete (BMN)  Medical Diagnosis: M19.012 (ICD-10-CM) - Arthritis of left shoulder region            Rehab Codes: Pain M54.2, M25.512, stiffness M25.612, weakness M62.512, posture R29.3, myofascial M79.1, M62.838  Onset Date: 22                  Next 's appt: 22      Visit# / total visits:    Cancels/No Shows: 2/0  Progress note for Medicare patient due at visit 19    Subjective:    Pain:  [x] Yes  [] No Location: L shoulder  Pain Rating: (0-10 scale) 2/10  Pain altered Tx:  [] No  [x] Yes  Action: held exercise progressions this date and focused on supine exercises. Comments: Patient reported a 2/10 pain in her L shoulder at rest and a \"10/10\" pain with movement. Patient noted that she still could participate in therapy even though she reported a \"10/10\" pain level with movement. Patient arrived to therapy this date flustered due to putting her credit card in the cash dispenser of the vending machine. Patient noted they are having trouble managing their medications due to having multiple for her recent stroke and self reported anti biotics for her \"tooth infection\".     Objective:   Modalities: Vaso low compression 40 deg x15 min at end of treatment - seated with pillow under arm - held 05/10/22    Precautions: healing forearm fracture left, flexion  deg, ER 30 deg, no IR,     Exercises completed marked with \"X\" 05/10/22  Exercise Reps/ Time Weight/ Level Comments    UBE 2/2 Level 1 Added 22 X          Pulleys - Flexion 3 min             Standing       Codman's 20x ea 2 lb A/P, M/L, CW/CCW  Added weight 22    Bicep Curls 20x  2 lb Added weight 4/5/22    Gentle Shoulder Extension w/cane 20x  AAROM Added 3/31/22 x          Band       - Extension 20x [place] Added 4/19/22 X   - Rows 20x [place] Added 4/19/22 X   - IR  20x [place] Added 4/19/22 X   - ER Iso Step out 10x [place] Added 4/5/22 X          Finger Ladder 8x 9th rung Added 4/5/22 X   Wall slides x20 AAROM RUE help x   Seated       Scapular Retraction HEP      CROM HEP  Flex, ext, lat flex, rotation    Shoulder Shrug HEP      Shoulder depression HEP             Table slides 20x ea  Flexion/scaption  Wash cloth on slide board X   ER Stretch 20x5\"      Cane Flexion 20x      Cane Chest Press 20x             Supine       Cane:       - Shoulder flex 20x AAROM  X   - Chest press 20x AAROM  x   Horizontal Abduction/Adduction  20x ea AAROM  X   - ER 20x AAROM     Shoulder Flexion 20x AROM Added 3/29/22 x   Rhythmic Stabilization  2x1 min  Added 3/24/22 x          Other: 10 weeks post op 4/19/22        Treatment Charges: Mins Units   []  Modalities     [x]  Ther Exercise 24 2:17-2:45, 2   []  Manual Therapy     []  Ther Activities     []  Aquatics     []  Vasocompression     []  Other     Total Treatment time 24 2   Patient not billed for time discussing credit card situation and UBE warm up    Assessment: [x] Progressing toward goals: Exercise progressions held to a minimum this date secondary to patient reporting elevated pain levels with L shoulder movement as well as events at the beginning of the treatment session. Patient progress continues to be difficult to assess secondary to symptoms of expressive aphasia. PT within [facility] helped patient navigate her credit card situation, including calling and speaking on the phone with vending machine representatives. PT called patients 's office and left message regarding her self reported tooth infection secondary to patient being concerned of a potential infection of her L shoulder replacement. [] No change.       [] Other:  PT contacted PCP regarding patient managing her medications    [x] Patient would continue to benefit from skilled physical therapy services in order to: reduce pain with modalities and exercise, reduce muscle spasms and trigger points with manual, learn proper posture for optimal healing and function, improve ROM and strength as able. STG: (to be met in 12 treatments)  1. ? Pain: Keep pain at 4/10 or less most times  2. ? ROM: PROM shoulder elevation increases to 90 degrees+ - MET  3. ? Strength:Start active motion when cleared by DrWill   3. ? Function:Start functional activities when cleared by  and able to reach head. 5. Notes minimal tight muscles/spasms and able to manage independently if they return. 6. Demonstrate Knowledge of fall prevention     LTG: (to be met in 24 treatments)  1. LEFS improves to 50% or better function         2. Patient to be independent with home exercise program as demonstrated by performance with correct form without cues. Patient goals: Lift arm up half way to shoulder    Pt. Education:  [x] Yes  [] No  [x] Reviewed Prior HEP/Ed  Method of Education: [x] Verbal  [x] Demo  [] Written  Comprehension of Education:  [x] Verbalizes understanding. [x] Demonstrates understanding. [] Needs review. [x] Demonstrates/verbalizes HEP/Ed previously given. Given 2/22/2022  Wrist flex/ext  Wrist supination/pronation  CROM all planes  Pendulums  Shoulder shrugs  Scapular retractions   3/10 HEP-Access Code: [email or web address]   URL: [email or web address]  Table slides flexion - 2-3 x daily - 7 x weekly - 20 reps  Seated Shoulder Scaption Slide at Table Top with Forearm in Neutral - 2-3 x daily - 7 x weekly - 20 reps  Supine Shoulder Flexion Extension AAROM with Dowel - 2-3 x daily - 7 x weekly - 20 reps  Supine Shoulder Press AAROM in Abduction with Dowel - 2-3 x daily - 7 x weekly - 20 reps      Plan: [x] Continue current frequency toward long and short term goals.     [x]  Frequency: 2 x/week for 24 visits   [x] Specific Instructions for subsequent treatments: Myofascial work on neck, trap, pect muscles, gradually work through protocol. Time In: 1:57        Time Out: 2:45      Electronically signed by:  Adriana Montana  Treatment under the supervision and documentation reviewed by, Vipul Gutierrez PTA.

## 2022-05-10 NOTE — PROGRESS NOTES
Speech Language Pathology  Speech Language Pathology  [facility]    Cognitive/SPeech Language Treatment Note    Date: 5/10/2022  Patients Name: Mark Obrien  MRN: [id number]  Diagnosis:   Patient Active Problem List   Diagnosis Code    Attention deficit hyperactivity disorder (ADHD) F90.9    Narcolepsy G47.419    Moderate persistent asthma without complication T75.65    History of DVT (deep vein thrombosis) Z86.718    Hiatal hernia K44.9    Cervical radiculopathy M54.12    Ganglion cyst M67.40    Chronic pain of left ankle M25.572, G89.29    Acquired trigger finger M65.30    Osteoarthritis of knee M17.10    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 Luiz Ruth, Y92.009    Closed fracture of multiple ribs of right side S22.41XA    Hemothorax on right J94.2    Contusion of right lung S27.321A    Chronic fatigue syndrome R53.82    Sleep apnea G47.30    Pain in joint involving ankle and foot M25.579    Lumbosacral spondylosis without myelopathy M47.817    Chronic bilateral low back pain with bilateral sciatica M54.42, M54.41, G89.29    Encounter for long-term opiate analgesic use Z79.891    Arthritis of left shoulder region M19.012    Opioid use, unspecified with unspecified opioid-induced disorder F11.99    Stroke-like symptoms R29.90    Ischemic stroke (HCC) I63.9    Acute encephalopathy G93.40     Pain: 0/10    Cognitive Treatment    Treatment time: 5558-6357    Subjective: [x] Alert [x] Cooperative     [] Confused [] Agitated    [] Lethargic    Objective/Assessment:    Goal 1: Pt will recall 3-5 units with distractions with 90% accuracy  Recall w/ Distractions, 1 functional unit (year): 0/1 x3 increased to 1/1 x3 w/ mod verbal cues     Pt arrives reporting difficulty w/ remembering to take medications. Pt and ST extensively reviewed strategies to facilitate recall of medications including when to take them, dosage, how many times daily, etc. ST strongly encouraged pt to have family assist w/ medications; pt states \"I don't want to ask them. \" ST emphasized pt can ask for assistance w/ necessary things such as medications whilst still being independent. Pt wrote in her notebook \"ask kids to help w/ medications\" as reminder for later. Pt reports she does have pill organizer; however would benefit from written information/chart/checklist to facilitate independence w/ taking medications and refilling pill organizer. Goal 2: Pt will recall orientation information w/ 90% accuracy  Pt oriented to name, birthday, month, and place. Disoriented to year and age (reoriented w/ forced choice cues)      Goal 3: Pt will complete problem solving/reasoning tasks with 90% accuracy  Functional Problem Solvin/7 increased to 7/7 w/ min-mod verbal cues      Goal 4: Pt will utilize word retrieval strategies to facilitate word finding deficits in 4/5 opportunities during conversation  Pt and ST extensively reviewed strategies to facilitate word retrieval (e.g. gestural facilitation, increased time/patience, coming back to it later, circumlocution, etc.). Pt utilized gestural facilitation, increased time, and circumlocution independently during ST session following discussion. Written education provided.       Goal 5: Pt will complete naming tasks (e.g. responsive, generative) w/ 90% accuracy    Category Members Grid: 6 increased to 9/9 w/ min-mod verbal cues.  Pt independently requesting to \"skip\" questions and come back to them to allow for increased time.      Naming Pictures: 8/10 increased to 10/10 with mod verbal cues     Name Brands: 23/24 increased to 24/24 w/ max verbal cues     Story Titles: 14/16 increased to 16/16 w/ min-mod verbal cues     Other: Pt arrived to PT session reportedly upset over vending machine taking credit card. PT assisted pt in calling [employer] [address], told pt to go speak to security office of hospital. At end of session, ST assisted pt to security office, however security states pt needs to call vending machine company as security does not have control over vending machine. Maintenance may be called if vending machine company does not have any additional suggestions/advice to retrieve card. Encouraged pt to call vending machine company again. Pt noted to have improvement w/ word retrieval during conversation this date. Plan:  [x] Continue ST services    [] Discharge from ST:      Discharge recommendations: []  Further therapy recommended at discharge. The patient should be able to tolerate at least 3 hours of therapy per day over 5 days or 15 hours over 7 days. [] Further therapy recommended at discharge. [x] No therapy recommended at discharge/Pending POC.     Treatment completed by: Magaly Rich, SLP

## 2022-05-11 ENCOUNTER — OFFICE VISIT (OUTPATIENT)
Dept: FAMILY MEDICINE CLINIC | Age: 62
End: 2022-05-11
Payer: MEDICARE

## 2022-05-11 VITALS
WEIGHT: 143.6 LBS | TEMPERATURE: 97.2 F | BODY MASS INDEX: 28.19 KG/M2 | HEIGHT: 60 IN | OXYGEN SATURATION: 98 % | DIASTOLIC BLOOD PRESSURE: 76 MMHG | HEART RATE: 56 BPM | SYSTOLIC BLOOD PRESSURE: 108 MMHG

## 2022-05-11 DIAGNOSIS — M54.42 CHRONIC BILATERAL LOW BACK PAIN WITH BILATERAL SCIATICA: ICD-10-CM

## 2022-05-11 DIAGNOSIS — I63.9 ISCHEMIC STROKE (HCC): Primary | ICD-10-CM

## 2022-05-11 DIAGNOSIS — N18.30 STAGE 3 CHRONIC KIDNEY DISEASE, UNSPECIFIED WHETHER STAGE 3A OR 3B CKD (HCC): ICD-10-CM

## 2022-05-11 DIAGNOSIS — I10 ESSENTIAL HYPERTENSION: ICD-10-CM

## 2022-05-11 DIAGNOSIS — Z09 HOSPITAL DISCHARGE FOLLOW-UP: ICD-10-CM

## 2022-05-11 DIAGNOSIS — M54.41 CHRONIC BILATERAL LOW BACK PAIN WITH BILATERAL SCIATICA: ICD-10-CM

## 2022-05-11 DIAGNOSIS — G89.29 CHRONIC BILATERAL LOW BACK PAIN WITH BILATERAL SCIATICA: ICD-10-CM

## 2022-05-11 PROCEDURE — 3017F COLORECTAL CA SCREEN DOC REV: CPT | Performed by: FAMILY MEDICINE

## 2022-05-11 PROCEDURE — 1111F DSCHRG MED/CURRENT MED MERGE: CPT | Performed by: FAMILY MEDICINE

## 2022-05-11 PROCEDURE — 1036F TOBACCO NON-USER: CPT | Performed by: FAMILY MEDICINE

## 2022-05-11 PROCEDURE — 99214 OFFICE O/P EST MOD 30 MIN: CPT | Performed by: FAMILY MEDICINE

## 2022-05-11 PROCEDURE — G8427 DOCREV CUR MEDS BY ELIG CLIN: HCPCS | Performed by: FAMILY MEDICINE

## 2022-05-11 PROCEDURE — G8417 CALC BMI ABV UP PARAM F/U: HCPCS | Performed by: FAMILY MEDICINE

## 2022-05-11 RX ORDER — CITALOPRAM 40 MG/1
TABLET ORAL
COMMUNITY
End: 2022-05-11

## 2022-05-11 ASSESSMENT — PATIENT HEALTH QUESTIONNAIRE - PHQ9
8. MOVING OR SPEAKING SO SLOWLY THAT OTHER PEOPLE COULD HAVE NOTICED. OR THE OPPOSITE, BEING SO FIGETY OR RESTLESS THAT YOU HAVE BEEN MOVING AROUND A LOT MORE THAN USUAL: 3
SUM OF ALL RESPONSES TO PHQ QUESTIONS 1-9: 18
6. FEELING BAD ABOUT YOURSELF - OR THAT YOU ARE A FAILURE OR HAVE LET YOURSELF OR YOUR FAMILY DOWN: 3
2. FEELING DOWN, DEPRESSED OR HOPELESS: 2
10. IF YOU CHECKED OFF ANY PROBLEMS, HOW DIFFICULT HAVE THESE PROBLEMS MADE IT FOR YOU TO DO YOUR WORK, TAKE CARE OF THINGS AT HOME, OR GET ALONG WITH OTHER PEOPLE: 2
4. FEELING TIRED OR HAVING LITTLE ENERGY: 3
5. POOR APPETITE OR OVEREATING: 3
SUM OF ALL RESPONSES TO PHQ QUESTIONS 1-9: 18
7. TROUBLE CONCENTRATING ON THINGS, SUCH AS READING THE NEWSPAPER OR WATCHING TELEVISION: 3
3. TROUBLE FALLING OR STAYING ASLEEP: 1
9. THOUGHTS THAT YOU WOULD BE BETTER OFF DEAD, OR OF HURTING YOURSELF: 0

## 2022-05-11 ASSESSMENT — ENCOUNTER SYMPTOMS
GASTROINTESTINAL NEGATIVE: 1
EYES NEGATIVE: 1
BLURRED VISION: 0
RESPIRATORY NEGATIVE: 1
ALLERGIC/IMMUNOLOGIC NEGATIVE: 1
SHORTNESS OF BREATH: 0
ORTHOPNEA: 0

## 2022-05-11 NOTE — PROGRESS NOTES
Post-Discharge Transitional Care Follow Up      Solis Holley   YOB: 1960    Date of Office Visit:  5/11/2022  Date of Hospital Admission: 4/14/22  Date of Hospital Discharge: 4/16/22  Readmission Risk Score (high >=14%. Medium >=10%):Readmission Risk Score: 11.8 ( )      Care management risk score Rising risk (score 2-5) and Complex Care (Scores >=6): 5     Non face to face  following discharge, date last encounter closed (first attempt may have been earlier): *No documented post hospital discharge outreach found in the last 14 days     Call initiated 2 business days of discharge: *No response recorded in the last 14 days     Ischemic stroke [facility])  Assessment & Plan:   Monitored by specialist- no acute findings meriting change in the plan  Orders:  -     Nilesh Low MD, Neurology, [facility]  -     CA DISCHARGE MEDS RECONCILED W/ CURRENT OUTPATIENT MED LIST  Stage 3 chronic kidney disease, unspecified whether stage 3a or 3b CKD ([facility] [address]  Assessment & Plan:   Asymptomatic, continue current medications and continue current treatment plan  Chronic bilateral low back pain with bilateral sciatica  Assessment & Plan:   Monitored by specialist- no acute findings meriting change in the plan  Orders:  -     External Referral To [address] discharge follow-up  -     CA DISCHARGE MEDS RECONCILED W/ CURRENT OUTPATIENT MED LIST  Essential hypertension  Assessment & Plan:   Well-controlled, continue current medications, continue current treatment plan, medication adherence emphasized and lifestyle modifications recommended      Medical Decision Making: high complexity  Return in about 1 month (around 6/11/2022). On this date 5/11/2022 I have spent 40 minutes reviewing previous notes, test results and face to face with the patient discussing the diagnosis and importance of compliance with the treatment plan as well as documenting on the day of the visit.        Subjective:   MILLER MA[facility]      Department of Neurology     INPATIENT DISCHARGE SUMMARY           Patient Identification:  Isha Lynn is a 60 y.o. female. :  1960  MRN: [id number]                                     Acct: [de-identified]   Admit Date:  2022  Discharge date and time: 2022  6:49 PM   Attending Provider: Dakota Washington                                  Admission Diagnoses:   Stroke-like symptoms (R29.90)  Ischemic stroke ([address] (I63.9)     Discharge Diagnoses:   Principal Problem:    Stroke-like symptoms  Active Problems:    Ischemic stroke ([address]  Resolved Problems:    * No resolved hospital problems. *        Consults:   Stroke and physical medicine and rehab.     Brief Inpatient course:     56-year-old female with past medical history of asthma, bipolar disorder, hypertension, narcolepsy, history of myasthenia gravis osteoporosis, left shoulder arthritis s/p left shoulder arthroplasty on 2022, former smoker (1 pack a day quit 30 years ago) presented with complaint of speech difficulty and right-sided facial droop with last known well 4 days ago. CT head showed a left internal capsule ischemic stroke. Unremarkable CTA of the head and neck. Low-attenuation changes within the region of the posterior limb of the left internal capsule are suspicious for acute ischemia. started on aspirin 81 mg and Plavix 75 mg for secondary stroke prevention.  Started on Lipitor 80 mg. MRI brain wo contrast showed Acute lacunar infarct within the left internal capsule/lentiform nucleus. Hemoglobin a1c 5.7. Cholesterol 197, HDL 46, , triglyceride 103. UA negative for any infection and UDS negative. MMSE 18/30 (aphasia component). Vitamin b12 and folate was normal. Patient also had history of myasthenia gravis but not has been on any treatment plan to repeat panel again.      High functioning for [employer] therefore didn't qualify for ARU.  Sent with outpatient PT and OT.         Patient is stable from neurological standpoint to be discharged. Hypertension  This is a chronic problem. The current episode started more than 1 year ago. The problem has been gradually improving since onset. The problem is controlled. Associated symptoms include anxiety. Pertinent negatives include no blurred vision, malaise/fatigue, neck pain, orthopnea, palpitations, peripheral edema, PND, shortness of breath or sweats. Past treatments include beta blockers. The current treatment provides significant improvement. Inpatient course: Discharge summary reviewed- see chart.     Interval history/Current status: stable    Patient Active Problem List   Diagnosis    Attention deficit hyperactivity disorder (ADHD)    Narcolepsy    Moderate persistent asthma without complication    History of DVT (deep vein thrombosis)    Hiatal hernia    Cervical radiculopathy    Ganglion cyst    Chronic pain of left ankle    Acquired trigger finger    Osteoarthritis of knee    Osteoarthritis of wrist    Pes anserinus bursitis    Plantar fasciitis    Flexion contracture of joint of foot, left    Essential hypertension    Chronic anticoagulation    Iron deficiency anemia due to chronic blood loss    GERD (gastroesophageal reflux disease)    Absolute anemia    Anemia    Generalized anxiety disorder    Vitamin B12 deficiency    Iron malabsorption    Fall at home, initial encounter    Closed fracture of multiple ribs of right side    Hemothorax on right    Contusion of right lung    Chronic fatigue syndrome    Sleep apnea    Pain in joint involving ankle and foot    Lumbosacral spondylosis without myelopathy    Chronic bilateral low back pain with bilateral sciatica    Encounter for long-term opiate analgesic use    Arthritis of left shoulder region    Opioid use, unspecified with unspecified opioid-induced disorder    Stroke-like symptoms    Ischemic stroke ([address]    Acute encephalopathy    Chronic renal disease, stage III [facility]) [id number]       Medications listed as ordered at the time of discharge from hospital     Medication List          Accurate as of May 11, 2022  2:01 PM. If you have any questions, ask your nurse or doctor. CONTINUE taking these medications    * albuterol sulfate  (90 Base) MCG/ACT inhaler  Commonly known as: Ventolin HFA  INHALE TWO PUFFS BY MOUTH EVERY 4 HOURS AS NEEDED FOR WHEEZING     * albuterol (2.5 MG/3ML) 0.083% nebulizer solution  Commonly known as: PROVENTIL     alendronate 70 MG tablet  Commonly known as: FOSAMAX  TAKE 1 TABLET BY MOUTH ONCE WEEKLY BEFORE BREAKFAST, ON AN EMPTY STOMACH: REMAIN UPRIGHT FOR 30 MINUTES:TAKE WITH 8 OUNCES OF WATER     amoxicillin 500 MG capsule  Commonly known as: AMOXIL     amphetamine-dextroamphetamine 30 MG tablet  Commonly known as: ADDERALL     aspirin 81 MG chewable tablet  Take 1 tablet by mouth daily     atorvastatin 80 MG tablet  Commonly known as: LIPITOR  Take 1 tablet by mouth nightly     buPROPion 300 MG extended release tablet  Commonly known as: WELLBUTRIN XL     CALCIUM 600 PO     citalopram 40 MG tablet  Commonly known as: CELEXA  TAKE ONE TABLET BY MOUTH DAILY     clopidogrel 75 MG tablet  Commonly known as: PLAVIX  Take 1 tablet by mouth daily for 19 doses     hydrOXYzine 50 MG tablet  Commonly known as: ATARAX  TAKE ONE TABLET BY MOUTH EVERY 8 HOURS AS NEEDED FOR ANXIETY     naproxen 500 MG tablet  Commonly known as: NAPROSYN  TAKE ONE TABLET BY MOUTH TWICE A DAY WITH MEALS     pantoprazole 40 MG tablet  Commonly known as: PROTONIX  Take 1 tablet by mouth daily     propranolol 80 MG extended release capsule  Commonly known as: INDERAL LA  Take 1 capsule by mouth daily         * This list has 2 medication(s) that are the same as other medications prescribed for you. Read the directions carefully, and ask your doctor or other care provider to review them with you.                  Medications marked \"taking\" at this time  Outpatient Medications Marked as Taking for the 5/11/22 encounter (Office Visit) with Osito DO Neelam   Medication Sig Dispense Refill    amoxicillin (AMOXIL) 500 MG capsule       buPROPion (WELLBUTRIN XL) 300 MG extended release tablet       aspirin 81 MG chewable tablet Take 1 tablet by mouth daily 30 tablet 3    atorvastatin (LIPITOR) 80 MG tablet Take 1 tablet by mouth nightly 30 tablet 3    naproxen (NAPROSYN) 500 MG tablet TAKE ONE TABLET BY MOUTH TWICE A DAY WITH MEALS 28 tablet 1    hydrOXYzine (ATARAX) 50 MG tablet TAKE ONE TABLET BY MOUTH EVERY 8 HOURS AS NEEDED FOR ANXIETY 30 tablet 0    propranolol (INDERAL LA) 80 MG extended release capsule Take 1 capsule by mouth daily 90 capsule 1    amphetamine-dextroamphetamine (ADDERALL) 30 MG tablet 30 mg 2 times daily.  pantoprazole (PROTONIX) 40 MG tablet Take 1 tablet by mouth daily 90 tablet 1    alendronate (FOSAMAX) 70 MG tablet TAKE 1 TABLET BY MOUTH ONCE WEEKLY BEFORE BREAKFAST, ON AN EMPTY STOMACH: REMAIN UPRIGHT FOR 30 MINUTES:TAKE WITH 8 OUNCES OF WATER 12 tablet 10    albuterol sulfate HFA (VENTOLIN HFA) 108 (90 Base) MCG/ACT inhaler INHALE TWO PUFFS BY MOUTH EVERY 4 HOURS AS NEEDED FOR WHEEZING 1 Inhaler 5    citalopram (CELEXA) 40 MG tablet TAKE ONE TABLET BY MOUTH DAILY 90 tablet 3    albuterol (PROVENTIL) (2.5 MG/3ML) 0.083% nebulizer solution Take 2.5 mg by nebulization every 6 hours as needed for Wheezing or Shortness of Breath       Calcium Carbonate (CALCIUM 600 PO) Take 600 mg by mouth daily          Medications patient taking as of now reconciled against medications ordered at time of hospital discharge: Yes    Review of Systems   Constitutional: Negative. Negative for malaise/fatigue. HENT: Negative. Eyes: Negative. Negative for blurred vision. Respiratory: Negative. Negative for shortness of breath. Cardiovascular: Negative. Negative for palpitations, orthopnea and PND.    Gastrointestinal: Negative. Endocrine: Negative. Genitourinary: Negative. Musculoskeletal: Negative. Negative for neck pain. Skin: Negative. Allergic/Immunologic: Negative. Neurological: Negative. Hematological: Negative. Psychiatric/Behavioral: Negative. All other systems reviewed and are negative. Objective:    /76 (Site: Left Upper Arm, Position: Sitting, Cuff Size: Large Adult)   Pulse 56   Temp 97.2 °F (36.2 °C) (Temporal)   Ht 5' (1.524 m)   Wt 143 lb 9.6 oz (65.1 kg)   SpO2 98%   BMI 28.04 kg/m²   Physical Exam  Vitals and nursing note reviewed. Constitutional:       General: She is not in acute distress. Appearance: Normal appearance. She is overweight. She is not ill-appearing, toxic-appearing or diaphoretic. HENT:      Head: Normocephalic and atraumatic. Eyes:      General: No scleral icterus. Right eye: No discharge. Left eye: No discharge. Extraocular Movements: Extraocular movements intact. Conjunctiva/sclera: Conjunctivae normal.   Cardiovascular:      Rate and Rhythm: Normal rate and regular rhythm. Pulses: Normal pulses. Heart sounds: Normal heart sounds. No murmur heard. No friction rub. No gallop. Pulmonary:      Effort: Pulmonary effort is normal. No respiratory distress. Breath sounds: Normal breath sounds. No stridor. No wheezing, rhonchi or rales. Chest:      Chest wall: No tenderness. Musculoskeletal:      Left shoulder: Tenderness present. Decreased range of motion. Arms:    Neurological:      Mental Status: She is alert and oriented to person, place, and time. Mental status is at baseline. Psychiatric:         Mood and Affect: Mood normal.         Behavior: Behavior normal.         Thought Content: Thought content normal.         Judgment: Judgment normal.         An electronic signature was used to authenticate this note.   --Deedee Prasad DO

## 2022-05-11 NOTE — PATIENT INSTRUCTIONS
Patient Education        Learning About [facility] for Stroke  What is long-term care for stroke? Long-term care for stroke is care for people who are leaving the hospital after a stroke but who still need some medical care or other help while they work on getting better. There are many types of long-term care facilities. They include a long-term acute hospital, skilled nursing facility, assisted living center, and home health care services. Long-term care can also include strokerehabilitation. Choosing the right type of long-term care is a very personal decision. It's important to look carefully at your options. You want to be sure that the levelof care is right and that you will feel comfortable. Your doctor or other members of your medical team can help you find which typeof long-term care would be best for you. What are the types of long-term care for stroke patients? Each type of care center offers a different level of care. These centers [facility] shared or private rooms. Here are some examples. An assisted living center or residential care center:    Has a range of services. These may include meals, cleaning, and laundry. And they may offer help with personal needs like bathing, grooming, and dressing.  Often includes oversight by a nurse. You may be able to get help with basic care, such as getting medicines. A skilled nursing facility:    Offers nursing care up to 24 hours a day.  Provides meals and laundry.  Provides help with dressing, bathing, using the toilet, and other daily tasks.  Offers rehab therapy. A long-term acute care hospital:   [facility] Is for stroke patients who have special medical problems. This may include things like not being able to breathe on your own. Follow-up care is a key part of your treatment and safety. Be sure to make and go to all appointments, and call your doctor if you are having problems.  It's also a good idea to know your test results and keep alist of the medicines you take. Where can you learn more? Go to [email or web address] and sign in to your [employer] account. Enter K955 in the [facility] box to learn more about \"Learning About Long-Term Care for Stroke. \"     If you do not have an account, please click on the \"Sign Up Now\" link. Current as of: July 6, 2021               Content Version: 13.2  © 2006-2022 Healthwise, Incorporated. Care instructions adapted under license by [facility] ([facility]). If you have questions about a medical condition or this instruction, always ask your healthcare professional. [address] any warranty or liability for your use of this information.

## 2022-05-12 ENCOUNTER — HOSPITAL ENCOUNTER (OUTPATIENT)
Dept: PHYSICAL THERAPY | Age: 62
Setting detail: THERAPIES SERIES
Discharge: HOME | End: 2022-05-12
Payer: MEDICARE

## 2022-05-12 ENCOUNTER — HOSPITAL ENCOUNTER (OUTPATIENT)
Dept: SPEECH THERAPY | Age: 62
Setting detail: THERAPIES SERIES
Discharge: HOME | End: 2022-05-12
Payer: MEDICARE

## 2022-05-12 PROCEDURE — 97130 THER IVNTJ EA ADDL 15 MIN: CPT

## 2022-05-12 PROCEDURE — 97129 THER IVNTJ 1ST 15 MIN: CPT

## 2022-05-12 PROCEDURE — 97110 THERAPEUTIC EXERCISES: CPT

## 2022-05-12 PROCEDURE — 92507 TX SP LANG VOICE COMM INDIV: CPT

## 2022-05-12 NOTE — PROGRESS NOTES
[x] [phone]  - [facility] [place] [facility]EL[facility] &  Therapy  [address]  [phone]  F: [phone] [] [address]  [address]   [address]  P: [phone]  F: [phone] [] [address] &  Therapy  [address]  P: [phone]  F: [phone] [] [address]  P: [phone]  F: [phone] [] [address]  [facility]   [address]   [place]: [phone]  F: [phone]      Physical Therapy Progress Note    Date: 2022      Patient: Radha Torres  : 1960  MRN: [id number]    [address]: [facility] Medicare Duel Complete (BMN)  Medical Diagnosis: M19.012 (ICD-10-CM) - Arthritis of left shoulder region            Rehab Codes: Pain M54.2, M25.512, stiffness M25.612, weakness M62.512, posture R29.3, myofascial M79.1, M62.838  Onset Date: 22                                 Next 's appt: Total visits attended:   Cancels/No shows: 20  Date range of services:3-29-22  to 22      Subjective:    Pain:  [x]? Yes  []? No   Location: L shoulder  Pain Rating: (0-10 scale) 3/10 at rest, 8/10 with motion  Pain altered Tx:  []? No  [x]? Yes  Action: held exercise progressions this date and focused on supine exercises. Comments: Patient reports that her shoulder has been \"aching\" a lot the past couple of days secondary to her not having any pain pills. Patient reports being HEP compliant. Patient reports being sore after last treatment session.        Objective:  Test Measurements: A/PROM Lt shoulder flex 115/120 pain, abd 82/87 pain, ER 55/60 pain, IR 75/80 p  Function: States can carry light items, do her hair.  Has Difficulty with reaching high cupboards, lifting heavier items. Assessment:   STG: (to be met in 12 treatments)  1. ? Pain: Keep pain at 4/10 or less most times- Not Met off pain meds  2. ? ROM: PROM shoulder elevation increases to 90 degrees+- Met for flex, not abd  3. ? Strength:Start active motion when cleared by DrWill-OK for Active and light strengthening   4. ? Function:Start functional activities when cleared by DrWill and able to reach head. - MET  5. Notes minimal tight muscles/spasms and able to manage independently if they return. - Not Met  6. Demonstrate Knowledge of fall prevention- MET     LTG: (to be met in 24 treatments)  1. LEFS improves to 50% or better function         2.  Patient to be independent with home exercise program as demonstrated by performance with correct form without cues.                Patient goals: Lift arm up half way to shoulder  Treatment Plan:  [x] Therapeutic Exercise   48048  [] Iontophoresis: 4 mg/mL Dexamethasone Sodium Phosphate  mAmin  52958   [x] Therapeutic Activity  15368 [x] Vasopneumatic cold with compression  00435    [] Gait Training   20777 [x] Ultrasound - Want to trial  69430   [] Neuromuscular Re-education  60581 [] Electrical Stimulation Unattended  14943   [x] Manual Therapy  01887 [] Electrical Stimulation Attended  26281   [x] Instruction in HEP  [] Lumbar/Cervical Traction  01429   [] Aquatic Therapy   73885 [x] Cold/hotpack    [] Massage   71559      [] Dry Needling, 1 or 2 muscles  52254   [] Biofeedback, first 15 minutes   32138  [] Biofeedback, additional 15 minutes   61042 [] Dry Needling, 3 or more muscles  08261       Patient Status:     [x] Continue per initial plan of care. - has 4 more treatments on original POC    [x] Additional visits necessary. Request additional visits to progress goals 2x/week for 12    [] Other:     Electronically signed by Shaunna Nelson PT on 5/12/2022 at 2:06 PM      If you have any questions or concerns, please don't hesitate to call.   Thank you for your referral.     Physician Signature:________________________________Date:__________________  By signing above or cosigning this note, I have reviewed this plan of care and certify a need for medically necessary rehabilitation services.      *PLEASE SIGN ABOVE AND FAX BACK ALL PAGES*

## 2022-05-12 NOTE — FLOWSHEET NOTE
[x] [facility]  [address]  [phone]  F: [phone]     Physical Therapy Daily Treatment Note    Date:  2022  Patient Name:  Mark Obrien      :  1960    MRN: [id number]  Physician: Delma Pagan DO                                    Insurance: [facility] Medicare Duel Complete (BMN)  Medical Diagnosis: M19.012 (ICD-10-CM) - Arthritis of left shoulder region            Rehab Codes: Pain M54.2, M25.512, stiffness M25.612, weakness M62.512, posture R29.3, myofascial M79.1, M62.838  Onset Date: 22                  Next 's appt: 22      Visit# / total visits:    Cancels/No Shows:  Last 30 Days  Progress note for Medicare patient due at visit 19    Subjective:    Pain:  [x] Yes  [] No Location: L shoulder  Pain Rating: (0-10 scale) 3/10 at rest, 8/10 with motion  Pain altered Tx:  [] No  [x] Yes  Action: held exercise progressions this date and focused on supine exercises. Comments: Patient reports that her shoulder has been \"aching\" a lot the past couple of days secondary to her not having any pain pills. Patient reports being HEP compliant. Patient reports being sore after last treatment session.      Objective:   Modalities: Vaso low compression 40 deg x15 min at end of treatment - seated with pillow under arm - held 22    Precautions: healing forearm fracture left, flexion  deg, ER 30 deg, no IR,     Exercises completed marked with \"X\" 22  Exercise Reps/ Time Weight/ Level Comments    UBE 2/2 Level 2 Added 22 X          Pulleys - Flexion 3 min   x          Standing       Codman's 20x ea 2 lb A/P, M/L, CW/CCW  Added weight 22    Bicep Curls 20x  2 lb Added weight 22    Gentle Shoulder Extension w/cane 20x  AAROM Added 3/31/22           Band   Increased reps 22    - Extension 25x [place]  X   - Rows 25x [place]  X   - IR  25x [place]     - ER Iso Step out 25x [place]  X Finger Ladder 8x 9th rung Added 4/5/22 X   Wall slides x20 AAROM RUE help x   Seated       Scapular Retraction HEP      CROM HEP  Flex, ext, lat flex, rotation    Shoulder Shrug HEP      Shoulder depression HEP             Table slides 20x ea  Flexion/scaption  Wash cloth on slide board    ER Stretch 20x5\"      Cane Flexion 20x      Cane Chest Press 20x             Supine       Cane:       - Shoulder flex 20x AAROM Standing 5/12/22 X   - Chest press 20x AAROM  x   Horizontal Abduction/Adduction  20x ea AAROM  X   - ER 20x AAROM     Shoulder Flexion 20x AROM Added 3/29/22 x   Rhythmic Stabilization  2x1 min  Added 3/24/22           Other:         Treatment Charges: Mins Units   []  Modalities     [x]  Ther Exercise 40 3   []  Manual Therapy     []  Ther Activities     []  Aquatics     []  Vasocompression     []  Other     Total Treatment time 40 3       Assessment: [x] Progressing toward goals:  Continued to limit exercise progressions this date secondary to patient continuing to note discomfort with L shoulder ROM. Patient required multiple verbal cues this date on avoiding trunk and UT compensation for L shoulder ROM with fair carry over. Provided multiple cues this date to stay within pain free ROM, with patient reporting that it is hard because she always has pain. Patient noted after the treatment session when she stopped exercising, her L shoulder pain decreased. [] No change. [x] Other: During the treatment session multiple pieces of the patients tooth broke/chipped off, as she was pulling pieces out of her mouth. She reported that this has been happening when she eats as of the past couple of days. Provided patient education on contacting her Dentist or her PCP regarding her teeth. Patient continued to note this date, \"random\" bruises she has on her extremities. Upon inquiry patient denies being a victim of any domestic violence.         [x] Patient would continue to benefit from skilled physical therapy services in order to: reduce pain with modalities and exercise, reduce muscle spasms and trigger points with manual, learn proper posture for optimal healing and function, improve ROM and strength as able. STG: (to be met in 12 treatments)  1. ? Pain: Keep pain at 4/10 or less most times  2. ? ROM: PROM shoulder elevation increases to 90 degrees+ - MET  3. ? Strength:Start active motion when cleared by Dr.   3. ? Function:Start functional activities when cleared by Dr. and able to reach head. 5. Notes minimal tight muscles/spasms and able to manage independently if they return. 6. Demonstrate Knowledge of fall prevention     LTG: (to be met in 24 treatments)  1. LEFS improves to 50% or better function         2. Patient to be independent with home exercise program as demonstrated by performance with correct form without cues. Patient goals: Lift arm up half way to shoulder    Pt. Education:  [x] Yes  [] No  [x] Reviewed Prior HEP/Ed  Method of Education: [x] Verbal  [x] Demo  [] Written  Comprehension of Education:  [x] Verbalizes understanding. [x] Demonstrates understanding. [] Needs review. [x] Demonstrates/verbalizes HEP/Ed previously given. Given 2/22/2022  Wrist flex/ext  Wrist supination/pronation  CROM all planes  Pendulums  Shoulder shrugs  Scapular retractions   3/10 HEP-Access Code: [email or web address]   URL: [email or web address]  Table slides flexion - 2-3 x daily - 7 x weekly - 20 reps  Seated Shoulder Scaption Slide at Table Top with Forearm in Neutral - 2-3 x daily - 7 x weekly - 20 reps  Supine Shoulder Flexion Extension AAROM with Dowel - 2-3 x daily - 7 x weekly - 20 reps  Supine Shoulder Press AAROM in Abduction with Dowel - 2-3 x daily - 7 x weekly - 20 reps      Plan: [x] Continue current frequency toward long and short term goals.     [x]  Frequency: 2 x/week for 24 visits   [x] Specific Instructions for subsequent treatments: Myofascial work on neck, trap, pect muscles, gradually work through protocol. Time In: 1:43        Time Out: 2:33    Electronically signed by:  Palma Goel  Treatment under the supervision and documentation reviewed by, Jeanette Payne PTA.

## 2022-05-17 ENCOUNTER — TELEPHONE (OUTPATIENT)
Dept: FAMILY MEDICINE CLINIC | Age: 62
End: 2022-05-17

## 2022-05-17 DIAGNOSIS — M85.80 OSTEOPENIA, UNSPECIFIED LOCATION: ICD-10-CM

## 2022-05-17 RX ORDER — ALENDRONATE SODIUM 70 MG/1
TABLET ORAL
Qty: 12 TABLET | Refills: 10 | Status: SHIPPED | OUTPATIENT
Start: 2022-05-17

## 2022-05-17 NOTE — TELEPHONE ENCOUNTER
Neo Butt called in stating that weekly vitamin was never sent to pharmacy. He could not tell me the name of it. Requesting this script be sent to [facility]. Thank you.

## 2022-05-18 ENCOUNTER — OFFICE VISIT (OUTPATIENT)
Dept: NEUROSURGERY | Age: 62
End: 2022-05-18
Payer: MEDICARE

## 2022-05-18 VITALS
WEIGHT: 143 LBS | HEIGHT: 60 IN | OXYGEN SATURATION: 97 % | BODY MASS INDEX: 28.07 KG/M2 | HEART RATE: 76 BPM | DIASTOLIC BLOOD PRESSURE: 76 MMHG | SYSTOLIC BLOOD PRESSURE: 112 MMHG

## 2022-05-18 DIAGNOSIS — Z96.612 STATUS POST REVERSE ARTHROPLASTY OF LEFT SHOULDER: ICD-10-CM

## 2022-05-18 DIAGNOSIS — I63.9 ISCHEMIC STROKE (HCC): ICD-10-CM

## 2022-05-18 DIAGNOSIS — M47.812 CERVICAL SPONDYLOSIS: Primary | ICD-10-CM

## 2022-05-18 PROCEDURE — 99214 OFFICE O/P EST MOD 30 MIN: CPT | Performed by: NURSE PRACTITIONER

## 2022-05-18 PROCEDURE — 1036F TOBACCO NON-USER: CPT | Performed by: NURSE PRACTITIONER

## 2022-05-18 PROCEDURE — G8427 DOCREV CUR MEDS BY ELIG CLIN: HCPCS | Performed by: NURSE PRACTITIONER

## 2022-05-18 PROCEDURE — 3017F COLORECTAL CA SCREEN DOC REV: CPT | Performed by: NURSE PRACTITIONER

## 2022-05-18 PROCEDURE — G8417 CALC BMI ABV UP PARAM F/U: HCPCS | Performed by: NURSE PRACTITIONER

## 2022-05-18 NOTE — PROGRESS NOTES
[address]  [facility] [address] [facility] [address]  Dept: [phone]    Patient:  Alana Mcneil  YOB: 1960  Date: 5/18/22    The patient is a 60 y.o. female who presents today for consult of the following problems:     Chief Complaint   Patient presents with    Follow-up     Cervical spondylosis with radiculopathy         HPI:     Alana Mcneil is a 60 y.o. female who presents for follow-up of neck and arm pain. Patient has had ongoing issues for an extended period of time. Was found to have left shoulder arthropathy, did undergo reverse total left shoulder arthroplasty, is currently undergoing physical therapy to recover from this. And in the interim, patient did have a stroke, was started on dual antiplatelet therapy. Does have some mild right-sided deficits, as well as difficulty with word finding, dysphasia. Does have known multilevel degenerative changes with mild central stenosis and varying degrees of foraminal stenosis. Does follow with pain management, currently in the process of establishing with new pain management. History:     Past Medical History:   Diagnosis Date    Acid reflux     Adult ADHD 08/01/2012    monthly visits @ [facility]    Asthma 08/01/2012    seasonal    Bipolar 1 disorder ([facility]     monthly visits with [facility]    Broken foot 2014    Left foot    Chest pain of uncertain etiology 27/83/4576    Last episode was in 2013 (Written 02/12/2019)    Depression     Under control per pt.  on 6/15/18    DVT of leg (deep venous thrombosis) ([facility] 08/22/2014    Fracture     right foot / pt denies surgical intervention    Fractured nose     x 9 times    Helicobacter pylori (H. pylori) infection 2013    per EGD    Hematuria 04/2018    Has a follow-up with Urologist 6/20    Hiatal hernia     History of blood transfusion     no reactions    History of DVT (deep vein thrombosis) 2012    History of myasthenia gravis     diagnosed around age 26    Hx of blood clots     L leg post knee surgery    Hypertension     managed by Dr. Shine White PCP   Street Internal hemorrhoids     Narcolepsy 2012    Dr. Je[facility]    Osteoporosis     Sleep apnea     no machine    Under care of team 10/2021    Dr. Cullen Qureshi Under care of team     Pain Management Dr. Ghulam Rivas last visit 2022     Past Surgical History:   Procedure Laterality Date    ANKLE SURGERY Left 2018    mass excision    ARM SURGERY Right     Pt states she has had 13 right arm surgeries    BREAST ENHANCEMENT SURGERY Bilateral      SECTION      x 3    COLONOSCOPY  2013    sigmoid diverticula, prominent large internal hemorrhoid    COLONOSCOPY N/A 2/10/2020    COLONOSCOPY DIAGNOSTIC performed by Isaac Robert MD at [facility] 2020    COLORECTAL CANCER SCREENING, NOT HIGH RISK performed by Sp Weaver MD at [address] [place] [address] Rd, COLON, DIAGNOSTIC     [facility] STUDY N/A 2020    PES RN - ESOPHAGEAL MOTILITY STUDY performed by Giancarlo Sandoval DO at [facility] STUDY  9/10/2020    ESOPHAGEAL MOTILITY STUDY performed by Giancarlo Sandoval DO at [facility]    ESOPHAGEAL MOTILITY STUDY N/A 2020    ESOPHAGEAL MOTILITY STUDY ATTEMPTED PER DR. Chris Herman WITHOUT SUCCESS performed by Giancarlo Sandoval DO at [facility]    FOOT SURGERY Left     FOOT TENDON SURGERY Left 2019    LEFT PERONEAL TENDON REPAIR WITH POSSIBLE LEFT TENDON TRANSFER (Left )    KNEE ARTHROSCOPY Left     Two surgeries on left knee per pt    KNEE SURGERY Right     Three surgeries per pt    LIGAMENT REPAIR Left 2019    LEFT PERONEAL TENDON REPAIR performed by Jana Saunders DPM at [address] Bilateral 2021     BILATERAL L4/5, L5/S1 LUMBAR FACET (Bilateral )    NOSE SURGERY      PAIN MANAGEMENT PROCEDURE Bilateral 6/4/2021    BILATERAL L4/5, L5/S1 LUMBAR FACET performed by Ulisses Corea MD at [address] [place] [address]  06/11/2021    BILATERAL L4/5, L5/S1 NERVE BLOCK - Bilateral    PAIN MANAGEMENT PROCEDURE Bilateral 6/11/2021    BILATERAL L4/5, L5/S1 NERVE BLOCK performed by Ulisses Corea MD at [address] [place] [address] Right 07/23/2021    L4/5, L5/S1 NERVE RADIOFREQUENCY ABLATION     PAIN MANAGEMENT PROCEDURE Right 7/23/2021    right  L4/5, L5/S1 NERVE RADIOFREQUENCY ABLATION performed by Ulisses Corea MD at [address] [place] [address] Left 08/06/2021    left  L4/5, L5/S1 NERVE RADIOFREQUENCY ABLATION (Left )    PAIN MANAGEMENT PROCEDURE Left 8/6/2021    left  L4/5, L5/S1 NERVE RADIOFREQUENCY ABLATION performed by Ulisses Corea MD at [address] [facility] OFFICE/OUTPT [facility] Left 6/29/2018    RESECTION/REMOVAL BONY NEOPLASM LEFT FIBULA, LEFT SOFT TISSUE MASS EXCISION WITH BONE BIOPSY LEFT ANKLE FIBULA performed by Kandice Briceño DPM at [facility] Left     SHOULDER SURGERY Left 02/08/2022    REVERSE TOTAL SHOULDER ARTHROPLASTY - Left    SHOULDER SURGERY Left 2/8/2022    REVERSE TOTAL SHOULDER ARTHROPLASTY performed by Yovana Melendez DO at [place] [address]  5/23/2013    tertiary contractures esophagus, 3 to 4 cm hiatal hernia, antral gastritis, + H Pylori, mild active chronic esophagitis    UPPER GASTROINTESTINAL ENDOSCOPY  4/6/2016    one biopsy    UPPER GASTROINTESTINAL ENDOSCOPY N/A 2/10/2020    EGD BIOPSY performed by Fritz Gastelum MD at [address]       Family History   Problem Relation Age of Onset    Cancer Mother     Hypertension Mother     Stroke Mother     Dementia Mother     Cancer Maternal Aunt     Hypertension Father  Heart Surgery Father     Alzheimer's Disease Father     Diabetes Neg Hx      Current Outpatient Medications on File Prior to Visit   Medication Sig Dispense Refill    alendronate (FOSAMAX) 70 MG tablet TAKE 1 TABLET BY MOUTH ONCE WEEKLY BEFORE BREAKFAST, ON AN EMPTY STOMACH: REMAIN UPRIGHT FOR 30 MINUTES:TAKE WITH 8 OUNCES OF WATER 12 tablet 10    amoxicillin (AMOXIL) 500 MG capsule       buPROPion (WELLBUTRIN XL) 300 MG extended release tablet       aspirin 81 MG chewable tablet Take 1 tablet by mouth daily 30 tablet 3    atorvastatin (LIPITOR) 80 MG tablet Take 1 tablet by mouth nightly 30 tablet 3    naproxen (NAPROSYN) 500 MG tablet TAKE ONE TABLET BY MOUTH TWICE A DAY WITH MEALS 28 tablet 1    hydrOXYzine (ATARAX) 50 MG tablet TAKE ONE TABLET BY MOUTH EVERY 8 HOURS AS NEEDED FOR ANXIETY 30 tablet 0    propranolol (INDERAL LA) 80 MG extended release capsule Take 1 capsule by mouth daily 90 capsule 1    amphetamine-dextroamphetamine (ADDERALL) 30 MG tablet 30 mg 2 times daily.  pantoprazole (PROTONIX) 40 MG tablet Take 1 tablet by mouth daily 90 tablet 1    albuterol sulfate HFA (VENTOLIN HFA) 108 (90 Base) MCG/ACT inhaler INHALE TWO PUFFS BY MOUTH EVERY 4 HOURS AS NEEDED FOR WHEEZING 1 Inhaler 5    citalopram (CELEXA) 40 MG tablet TAKE ONE TABLET BY MOUTH DAILY 90 tablet 3    albuterol (PROVENTIL) (2.5 MG/3ML) 0.083% nebulizer solution Take 2.5 mg by nebulization every 6 hours as needed for Wheezing or Shortness of Breath       Calcium Carbonate (CALCIUM 600 PO) Take 600 mg by mouth daily      clopidogrel (PLAVIX) 75 MG tablet Take 1 tablet by mouth daily for 19 doses 30 tablet 3     No current facility-administered medications on file prior to visit.      Social History     Tobacco Use    Smoking status: Former Smoker     Packs/day: 1.00     Years: 20.00     Pack years: 20.00     Quit date:      Years since quittin.4    Smokeless tobacco: Never Used   Vaping Use    Vaping Use: Never used   Substance Use Topics    Alcohol use: Not Currently     Comment: maybe oncee a year    Drug use: No       Allergies   Allergen Reactions    Latex Itching    Prozac [Fluoxetine Hcl] Other (See Comments)     violent    Sulfa Antibiotics      migraine    Codeine Itching       Review of Systems  Constitutional: Negative for activity change and appetite change. HENT: Negative for ear pain and facial swelling. Eyes: Negative for discharge and itching. Respiratory: Negative for choking and chest tightness. Cardiovascular: Negative for chest pain and leg swelling. Gastrointestinal: Negative for nausea and abdominal pain. Endocrine: Negative for cold intolerance and heat intolerance. Genitourinary: Negative for frequency and flank pain. Musculoskeletal: Negative for myalgias and joint swelling. Skin: Negative for rash and wound. Allergic/Immunologic: Negative for environmental allergies and food allergies. Hematological: Negative for adenopathy. Does not bruise/bleed easily. Psychiatric/Behavioral: Negative for self-injury. The patient is not nervous/anxious. Physical Exam:      /76   Pulse 76   Ht 5' (1.524 m)   Wt 143 lb (64.9 kg)   SpO2 97%   BMI 27.93 kg/m²   Estimated body mass index is 27.93 kg/m² as calculated from the following:    Height as of this encounter: 5' (1.524 m). Weight as of this encounter: 143 lb (64.9 kg). General:  Cassidy Lake is a 60y.o. year old female who appears her stated age. HEENT: Normocephalic atraumatic. Neck supple. Chest: regular rate; pulses equal  Abdomen: Soft nontender nondistended.   Ext: DP and PT pulses 2+, good cap refill  Neuro    Mentation  Appropriate affect  Registration intact  Orientation intact  Judgement intact to situation    Cranial Nerves:   Pupils equal and reactive to light  Extraocular motion intact  Face and shrug symmetric  Tongue midline  No dysarthria  v1-3 sensation symmetric, masseter tone symmetric  Hearing symmetric    Sensation: Grossly intact on exam    Motor  L deltoid 4-/5; R deltoid 5/5  L biceps 4+/5; R biceps 5/5  L triceps 5/5; R triceps 5/5  L wrist extension 5/5; R wrist extension 5/5  L intrinsics 5/5; R intrinsics 5/5     L iliopsoas 5/5 , R iliopsoas 5/5  L quadriceps 5/5; R quadriceps 5/5  L Dorsiflexion 5/5; R dorsiflexion 5/5  L Plantarflexion 5/5; R plantarflexion 5/5  L EHL 5/5; R EHL 5/5    Reflexes  L Brachioradialis 2+/4; R brachioradialis 2+/4  L Biceps 2+/4; R Biceps 2+/4  L Triceps 2+/4; R Triceps 2+/4  L Patellar 2+/4: R Patellar 2+/4  L Achilles 2+/4; R Achilles 2+/4    hoffmans L: neg  hoffmans R: neg  Clonus L: neg  Clonus R: neg  Babinski L: neg  Babinski R: neg    Some limitations to left shoulder range of motion secondary to surgery    Studies Review:     MRI cervical spine 3/22/2021:  FINDINGS:   BONES/ALIGNMENT: There is 1-2 mm C4 on C5 anterolisthesis, 2 mm C7 on T1   anterolisthesis, 2 mm T1 on T2 anterolisthesis.  The vertebral body heights   are maintained.  No fracture or destructive osseous lesion.  There is   degenerative disc disease with disc desiccation, disc space narrowing and   endplate changes.  There is congenitally narrow cervical spinal canal.       SPINAL CORD: No abnormal cord signal is seen.       SOFT TISSUES: No paraspinal mass identified.       C2-C3: There is disc bulge with minimal spinal canal narrowing without   foraminal narrowing.  Stable.       C3-C4: There is disc bulge with mild spinal canal narrowing without foraminal   narrowing.  Stable.       C4-C5: There is disc bulge with mild-to-moderate bilateral facet arthrosis,   with mild bilateral foraminal narrowing.  No spinal canal narrowing.  Stable.       C5-C6: There is disc bulge with mild-to-moderate bilateral facet arthrosis,   with minimal spinal canal narrowing, moderate left and mild right foraminal   narrowing.  Stable.       C6-C7: There is disc bulge with mild spinal canal narrowing, and   mild-to-moderate bilateral foraminal narrowing.  Stable.       C7-T1: There is disc bulge, mild bilateral facet arthrosis, with   mild-to-moderate left and mild right foraminal narrowing.  No spinal canal   narrowing.  Stable. X-ray cervical spine 10/5/2021:  FINDINGS:   There is moderate degenerative disc disease at C5-6 and C6-7.  There is mild   to moderate multilevel facet degenerative change.  There is mild   anterolisthesis of C2 on C3 and C4 on C5 with flexion.  On extension this   reduces at C2-3 and partially reduces at C4-5.  The cervical spine is not   visualized below the C6 level.  The predental space remains normal on flexion   and extension. Hospital records reviewed    Assessment and Plan:      1. Cervical spondylosis    2. Ischemic stroke ([address]    3. Status post reverse arthroplasty of left shoulder          Plan: Patient does continue to have neck pain, particularly with right lateral rotation. Some limitations to exam secondary to recent stroke with mild residual right-sided weakness as well as recent left shoulder surgery. Recommend completing shoulder therapy, follow-up with neurology as planned. Patient has also recently been started on dual antiplatelet therapy, continue this as directed. We will see the patient back in 3 to 6 months for reevaluation after current acute medical issues have improved. Followup: Return in about 3 months (around 8/18/2022), or if symptoms worsen or fail to improve. Prescriptions Ordered:  No orders of the defined types were placed in this encounter. Orders Placed:  No orders of the defined types were placed in this encounter. Electronically signed by PIETER Segura CNP on 5/19/2022 at 8:04 AM    Please note that this chart was generated using voice recognition Dragon dictation software.   Although every effort was made to ensure the accuracy of this automated transcription, some errors in transcription may have occurred.

## 2022-05-19 ENCOUNTER — HOSPITAL ENCOUNTER (OUTPATIENT)
Dept: PHYSICAL THERAPY | Age: 62
Setting detail: THERAPIES SERIES
Discharge: HOME | End: 2022-05-19
Payer: MEDICARE

## 2022-05-19 ENCOUNTER — HOSPITAL ENCOUNTER (OUTPATIENT)
Dept: SPEECH THERAPY | Age: 62
Setting detail: THERAPIES SERIES
Discharge: HOME | End: 2022-05-19
Payer: MEDICARE

## 2022-05-19 PROCEDURE — 97110 THERAPEUTIC EXERCISES: CPT

## 2022-05-19 PROCEDURE — 92507 TX SP LANG VOICE COMM INDIV: CPT

## 2022-05-19 PROCEDURE — 97016 VASOPNEUMATIC DEVICE THERAPY: CPT

## 2022-05-19 PROCEDURE — 97130 THER IVNTJ EA ADDL 15 MIN: CPT

## 2022-05-19 PROCEDURE — 97129 THER IVNTJ 1ST 15 MIN: CPT

## 2022-05-19 NOTE — PROGRESS NOTES
Speech Language Pathology  Speech Language Pathology  [facility]    Speech-Language/Cognitive Treatment Note    Date: 5/19/2022  Patients Name: Say Chavez  MRN: [id number]  Diagnosis:   Patient Active Problem List   Diagnosis Code    Attention deficit hyperactivity disorder (ADHD) F90.9    Narcolepsy G47.419    Moderate persistent asthma without complication G27.30    History of DVT (deep vein thrombosis) Z86.718    Hiatal hernia K44.9    Cervical radiculopathy M54.12    Ganglion cyst M67.40    Chronic pain of left ankle M25.572, G89.29    Acquired trigger finger M65.30    Osteoarthritis of knee M17.10    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 Adithya Roblero, Y92.009    Closed fracture of multiple ribs of right side S22.41XA    Hemothorax on right J94.2    Contusion of right lung S27.321A    Chronic fatigue syndrome R53.82    Sleep apnea G47.30    Pain in joint involving ankle and foot M25.579    Lumbosacral spondylosis without myelopathy M47.817    Chronic bilateral low back pain with bilateral sciatica M54.42, M54.41, G89.29    Encounter for long-term opiate analgesic use Z79.891    Arthritis of left shoulder region M19.012    Opioid use, unspecified with unspecified opioid-induced disorder F11.99    Stroke-like symptoms R29.90    Ischemic stroke (HCC) I63.9    Acute encephalopathy G93.40    Chronic renal disease, stage III ([facility] [[id number]] N18.30     Pain: 0/10    Cognitive Treatment    Treatment time: [id number]    Subjective: [x] Alert [x] Cooperative     [] Confused     [] Agitated    [] Lethargic    Objective/Assessment:    Goal 1: Pt will recall 3-5 units with distractions with 90% accuracy  Recall w/ Distractions, 1 functional unit (year): 0/1 x2 increased to 1/1 x2 w/ mod verbal cues; 1/1 w/ visual support independently    Recalling Boxed Information, 3 shapes: 2/6 increased to 6/6 with x1 revisions      Goal 2: Pt will recall orientation information w/ 90% accuracy  Pt oriented to name, age (with increased processing time), and birthday. Oriented to year and month w/ visual support. Originally states [employer]" for place, however oriented w/ 2 choices.      Goal 3: Pt will complete problem solving/reasoning tasks with 90% accuracy   NT this date     Goal 4: Pt will utilize word retrieval strategies to facilitate word finding deficits in 4/5 opportunities during conversation  Pt and ST extensively reviewed strategies to facilitate word retrieval (e.g. gestural facilitation, increased time/patience, coming back to it later, circumlocution, etc.). Pt utilized gestural facilitation, increased time, and circumlocution independently during ST session following discussion. Written education provided.       Goal 5: Pt will complete naming tasks (e.g. responsive, generative) w/ 90% accuracy  Category Members Grid: 8/16 increased to 16/16 w/ min-mod verbal cues. Pt independently requesting to \"skip\" questions and come back to them to allow for increased time.      Common People/Characters: 15/21  increased to 21/21 w/ mod verbal cues. Plan:  [x] Continue ST services    [] Discharge from ST:      Discharge recommendations: []  Further therapy recommended at discharge. The patient should be able to tolerate at least 3 hours of therapy per day over 5 days or 15 hours over 7 days. [] Further therapy recommended at discharge. [x] No therapy recommended at discharge/Pending POC.     Treatment completed by: Julia Mitchell Anshul, SLP

## 2022-05-19 NOTE — FLOWSHEET NOTE
[x] [facility] &  Therapy  [address]  [phone]  F: [phone]     Physical Therapy Daily Treatment Note    Date:  2022  Patient Name:  Feli Velasco      :  1960    MRN: [id number]  Physician: Erna Flores DO                                    Insurance: [facility] Medicare Duel Complete (BMN)  Medical Diagnosis: M19.012 (ICD-10-CM) - Arthritis of left shoulder region            Rehab Codes: Pain M54.2, M25.512, stiffness M25.612, weakness M62.512, posture R29.3, myofascial M79.1, M62.838  Onset Date: 22                  Next 's appt: 22      Visit# / total visits:    Cancels/No Shows: 121 Last 30 Days  Progress note for Medicare patient due at visit 19    Subjective:    Pain:  [x] Yes  [] No Location: L shoulder  Pain Rating: (0-10 scale) 7/10 at rest, 9/10 with motion  Pain altered Tx:  [] No  [x] Yes  Action: held exercise progressions this date and focused on supine exercises. Comments: Patient reports the her pain increased pain after last time she was at physical therapy secondary to her noting it was from when the primary PT checked her ROM. Patient reports completeting her HEP 2 days a week. Patient continues to mention random bruising on her body and whole body fatigue.     Objective:   Modalities: Vaso low compression 40 deg x15 min at end of treatment - seated with pillow under arm   Precautions: healing forearm fracture left, flexion  deg, ER 30 deg, no IR,     Exercises completed marked with \"X\" 22  Exercise Reps/ Time Weight/ Level Comments    UBE 2/2 Level 2 Added 22           Pulleys - Flexion 5 min   x          Standing       Codman's 20x ea 2 lb A/P, M/L, CW/CCW  Added weight 22    Bicep Curls 20x  2 lb Added weight 22    Gentle Shoulder Extension w/cane 20x  AAROM Added 3/31/22           Band   Increased reps 22    - Extension 25x Lime Green  X   - Rows 25x Lime Green  X   - IR  25x Lime [place]     - ER Iso Step out 25x Lime Green  X          Finger Ladder 8x 9th rung Added 4/5/22 X   Wall slides x20 AAROM RUE help x   Seated       Scapular Retraction HEP      CROM HEP  Flex, ext, lat flex, rotation    Shoulder Shrug HEP      Shoulder depression HEP             Table slides 20x ea  Flexion/scaption  Wash cloth on slide board    ER Stretch 20x5\"      Cane Flexion 20x      Cane Chest Press 20x             Supine       Cane:       - Shoulder flex 20x AAROM Standing 5/12/22 X   - Chest press 20x AAROM  x   Horizontal Abduction/Adduction  20x ea AAROM  X   - ER 20x AAROM     Shoulder Flexion 20x AROM Added 3/29/22 x   Rhythmic Stabilization  2x1 min  Added 3/24/22           Other:         Treatment Charges: Mins Units   []  Modalities     [x]  Ther Exercise 25 2   []  Manual Therapy     []  Ther Activities     []  Aquatics     [x]  Vasocompression 15 1   []  Other     Total Treatment time 40 3       Assessment: [x] Progressing toward goals: Provided patient education this date regarding speaking to her DrWill Regarding her eye sight impairment and, whole body fatigue, and her \"random\" bruising on her body, with patient verbally demonstrating a good understanding. Provided continued patient education this date regarding staying within her pain free ROM, with patient verbally demonstrating a good understanding. Ended with vaso compression this date for swelling and pain control. Progressions held to a minimum this date secondary to elevated pain levels. [x] No change in patient pain levels after the treatment session      [] Other:   [x] Patient would continue to benefit from skilled physical therapy services in order to: reduce pain with modalities and exercise, reduce muscle spasms and trigger points with manual, learn proper posture for optimal healing and function, improve ROM and strength as able.     Assessment:   STG: (to be met in 12 treatments)  1. ? Pain: Keep pain at 4/10 or less most times- OMEGA Garcia

## 2022-05-24 ENCOUNTER — HOSPITAL ENCOUNTER (OUTPATIENT)
Dept: SPEECH THERAPY | Age: 62
Setting detail: THERAPIES SERIES
Discharge: HOME | End: 2022-05-24
Payer: MEDICARE

## 2022-05-24 ENCOUNTER — HOSPITAL ENCOUNTER (OUTPATIENT)
Dept: PHYSICAL THERAPY | Age: 62
Setting detail: THERAPIES SERIES
Discharge: HOME | End: 2022-05-24
Payer: MEDICARE

## 2022-05-24 PROCEDURE — 97140 MANUAL THERAPY 1/> REGIONS: CPT

## 2022-05-24 PROCEDURE — 97110 THERAPEUTIC EXERCISES: CPT

## 2022-05-24 PROCEDURE — 97130 THER IVNTJ EA ADDL 15 MIN: CPT

## 2022-05-24 PROCEDURE — 97129 THER IVNTJ 1ST 15 MIN: CPT

## 2022-05-24 PROCEDURE — 92507 TX SP LANG VOICE COMM INDIV: CPT

## 2022-05-24 NOTE — PROGRESS NOTES
Speech Language Pathology  Speech Language Pathology  [address] [facility]    Speech-Language/Cognitive Treatment Note    Date: 5/24/2022  Patients Name: Montse Espinosa  MRN: [id number]  Diagnosis:   Patient Active Problem List   Diagnosis Code    Attention deficit hyperactivity disorder (ADHD) F90.9    Narcolepsy G47.419    Moderate persistent asthma without complication K46.31    History of DVT (deep vein thrombosis) Z86.718    Hiatal hernia K44.9    Cervical radiculopathy M54.12    Ganglion cyst M67.40    Chronic pain of left ankle M25.572, G89.29    Acquired trigger finger M65.30    Osteoarthritis of knee M17.10    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 Theron Gunter, Y92.009    Closed fracture of multiple ribs of right side S22.41XA    Hemothorax on right J94.2    Contusion of right lung S27.321A    Chronic fatigue syndrome R53.82    Sleep apnea G47.30    Pain in joint involving ankle and foot M25.579    Lumbosacral spondylosis without myelopathy M47.817    Chronic bilateral low back pain with bilateral sciatica M54.42, M54.41, G89.29    Encounter for long-term opiate analgesic use Z79.891    Arthritis of left shoulder region M19.012    Opioid use, unspecified with unspecified opioid-induced disorder F11.99    Stroke-like symptoms R29.90    Ischemic stroke (HCC) I63.9    Acute encephalopathy G93.40    Chronic renal disease, stage III ([address] [293047] N18.30     Pain: 0/10    Cognitive Treatment    Treatment time: 3209-0893    Subjective: [x] Alert [x] Cooperative     [] Confused     [] Agitated    [] Lethargic    Objective/Assessment:    Goal 1: Pt will recall 3-5 units with distractions with 90% accuracy  Recall w/ Distractions, 1 functional unit: 0/1 increased to 1/1 with min verbal cues      Goal 2: Pt will recall orientation information w/ 90% accuracy  Pt oriented to name, age, birthday, month, and year independently w/o use of visual support (w/ increased processing time). Unable to recall name of hospital, able to recognize name w/ 3 choices.      Goal 3: Pt will complete problem solving/reasoning tasks with 90% accuracy  Word Deductions: 8/15 increased to 15/15 with min-mod verbal cues     Goal 4: Pt will utilize word retrieval strategies to facilitate word finding deficits in 4/5 opportunities during conversation  Pt and ST extensively reviewed strategies to facilitate word retrieval (e.g. gestural facilitation, increased time/patience, coming back to it later, circumlocution, etc.). Pt utilized gestural facilitation, increased time, and circumlocution independently during ST session following discussion. Written education provided.       Goal 5: Pt will complete naming tasks (e.g. responsive, generative) w/ 90% accuracy  General Information-Animals: 7/11 increased to 11/11 with mod verbal cues     Other: Pt expresses frustration w/ language difficulties/aphasia post-stroke. Pt relates inconsistencies in severities can be difficult. Pt also notes fatigue in the last week, discussed post-stroke fatigue as possible factor, however also encouraged pt to discuss medications w/ MD if fatigue continues to be limiting. Plan:  [x] Continue ST services    [] Discharge from ST:      Discharge recommendations: []  Further therapy recommended at discharge. The patient should be able to tolerate at least 3 hours of therapy per day over 5 days or 15 hours over 7 days. [] Further therapy recommended at discharge.    [x] No therapy recommended at discharge/Pending POC.     Treatment completed by: Magaly Rich, SLP

## 2022-05-24 NOTE — FLOWSHEET NOTE
[x] [facility] &  Therapy  [address]  [phone]  F: [phone]     Physical Therapy Daily Treatment Note    Date:  2022  Patient Name:  Steven Pride      :  1960    MRN: [id number]  Physician: Gia Hendrickson DO                                    Insurance: [facility] Medicare Duel Complete (BMN)  Medical Diagnosis: L98.862 (ICD-10-CM) - Arthritis of left shoulder region            Rehab Codes: Pain M54.2, M25.512, stiffness M25.612, weakness M62.512, posture R29.3, myofascial M79.1, M62.838  Onset Date: 22                  Next 's appt: 22      Visit# / total visits:    Cancels/No Shows: 121 Last 30 Days  Progress note for Medicare patient due at visit 19    Subjective:    Pain:  [x] Yes  [] No Location: L shoulder  Pain Rating: (0-10 scale) 5/10    Pain altered Tx:  [] No  [x] Yes  Action:    Comments: Notes increased pain in shoulder since measurement.  Still frustrated with memory, pain and bruising       Objective:   Modalities: Vaso low compression 40 deg x15 min at end of treatment - seated with pillow under arm   Precautions: healing forearm fracture left, flexion  deg, ER 30 deg, no IR,     Exercises completed marked with \"X\" 22  Exercise Reps/ Time Weight/ Level Comments    UBE 2/2 Level 2 Added 22 x          Pulleys - Flexion 3 min  Up to 120 flex x          Standing       Codman's 20x ea 2 lb A/P, M/L, CW/CCW  Added weight 22    Bicep Curls 20x  2 lb Added weight 22    Gentle Shoulder Extension w/cane 20x  AAROM Added 3/31/22    Ball on mat 1 min    Added 22 x   Band   Increased reps 22    - Extension 25x Lime Green      - Rows 25x Lime Green      - IR  25x Lime Green     - ER Iso Step out 25x Lime Green             Finger Ladder 8x 9th rung Added 22     Wall slides x20 AAROM RUE help     Seated       Scapular Retraction HEP   x   CROM HEP  Flex, ext, lat flex, rotation    Shoulder Shrug HEP      Shoulder depression HEP   x          Table slides 20x ea  Flexion/scaption  Wash cloth on slide board x   ER Stretch 20x5\"      Cane Flexion 20x      Cane Chest Press 20x              Supine       Cane:       - Shoulder flex 20x AAROM Standing 5/12/22 X   - Chest press 20x AAROM  x   Horizontal Abduction/Adduction  20x ea AAROM  X   - ER 20x AAROM     Shoulder Flexion 20x AROM Added 3/29/22 x   Rhythmic Stabilization  2x1 min  Added 3/24/22           Other:   Manual- trial myofascial release to pectoral and subscapular muscles left with fair response. Massage to soft tissue around left shoulder and scar tissue to tolerance 15 min. Somewhat less tension but general pain persists. Treatment Charges: Mins Units   []  Modalities     [x]  Ther Exercise 30 2   [x]  Manual Therapy 15 1   []  Ther Activities     []  Aquatics     []  Vasocompression       []  Other     Total Treatment time 45 3       Assessment: [x] Progressing toward goals:   Provided continued patient education this date regarding staying within her pain free ROM, with patient verbally demonstrating a good understanding. Trial massage and manual work to reduce tight muscles and scar tissue anterior shoulder to reduce pain. [x] No change in patient pain levels after the treatment session      [x] Other: Pt stated she noted some weakness in left arm and leg previous day. [x] Patient would continue to benefit from skilled physical therapy services in order to: reduce pain with modalities and exercise, reduce muscle spasms and trigger points with manual, learn proper posture for optimal healing and function, improve ROM and strength as able.     Assessment:   STG: (to be met in 12 treatments)  1. ? Pain: Keep pain at 4/10 or less most times- Not Met off pain meds  2. ? ROM: PROM shoulder elevation increases to 90 degrees+- Met for flex, not abd  3. ? Strength:Start active motion when cleared by  for Active and light strengthening   4. ? Function:Start functional activities when cleared by  and able to reach head. - MET  5. Notes minimal tight muscles/spasms and able to manage independently if they return. - Not Met  6. Demonstrate Knowledge of fall prevention- MET    LTG: (to be met in 24 treatments)  1. LEFS improves to 50% or better function         2. Patient to be independent with home exercise program as demonstrated by performance with correct form without cues. Patient goals: Lift arm up half way to shoulder    Pt. Education:  [x] Yes  [] No  [x] Reviewed Prior HEP/Ed  Method of Education: [x] Verbal  [x] Demo  [] Written  Comprehension of Education:  [x] Verbalizes understanding. [x] Demonstrates understanding. [] Needs review. [x] Demonstrates/verbalizes HEP/Ed previously given. Given 2/22/2022  Wrist flex/ext  Wrist supination/pronation  CROM all planes  Pendulums  Shoulder shrugs  Scapular retractions   3/10 HEP-Access Code: [email or web address]   URL: [email or web address]  Table slides flexion - 2-3 x daily - 7 x weekly - 20 reps  Seated Shoulder Scaption Slide at Table Top with Forearm in Neutral - 2-3 x daily - 7 x weekly - 20 reps  Supine Shoulder Flexion Extension AAROM with Dowel - 2-3 x daily - 7 x weekly - 20 reps  Supine Shoulder Press AAROM in Abduction with Dowel - 2-3 x daily - 7 x weekly - 20 reps      Plan: [x] Continue current frequency toward long and short term goals. [x]  Frequency: 2 x/week for 24 visits   [x] Specific Instructions for subsequent treatments: Myofascial work on neck, trap, pect muscles, gradually work through protocol.          Time In: 1:00 pm        Time Out:  1:55 pm    Electronically signed by:  Mirna Le PT

## 2022-05-26 ENCOUNTER — HOSPITAL ENCOUNTER (OUTPATIENT)
Dept: SPEECH THERAPY | Age: 62
Setting detail: THERAPIES SERIES
Discharge: HOME | End: 2022-05-26
Payer: MEDICARE

## 2022-05-26 ENCOUNTER — HOSPITAL ENCOUNTER (OUTPATIENT)
Dept: PHYSICAL THERAPY | Age: 62
Setting detail: THERAPIES SERIES
Discharge: HOME | End: 2022-05-26
Payer: MEDICARE

## 2022-05-26 ENCOUNTER — TELEPHONE (OUTPATIENT)
Dept: FAMILY MEDICINE CLINIC | Age: 62
End: 2022-05-26

## 2022-05-26 PROCEDURE — 92507 TX SP LANG VOICE COMM INDIV: CPT

## 2022-05-26 PROCEDURE — 97129 THER IVNTJ 1ST 15 MIN: CPT

## 2022-05-26 PROCEDURE — 97110 THERAPEUTIC EXERCISES: CPT

## 2022-05-26 PROCEDURE — 97016 VASOPNEUMATIC DEVICE THERAPY: CPT

## 2022-05-26 PROCEDURE — 97130 THER IVNTJ EA ADDL 15 MIN: CPT

## 2022-05-26 PROCEDURE — 97140 MANUAL THERAPY 1/> REGIONS: CPT

## 2022-05-26 NOTE — FLOWSHEET NOTE
[x] [facility] &  Therapy  [address]  [phone]  F: [phone]     Physical Therapy Daily Treatment Note    Date:  2022  Patient Name:  Abel Bowers      :  1960    MRN: [id number]  Physician: Kapil Capellan DO                                    Insurance: [facility] Medicare Duel Complete (BMN)  Medical Diagnosis: M31.146 (ICD-10-CM) - Arthritis of left shoulder region            Rehab Codes: Pain M54.2, M25.512, stiffness M25.612, weakness M62.512, posture R29.3, myofascial M79.1, M62.838  Onset Date: 22                  Next 's appt: 22      Visit# / total visits:    Cancels/No Shows: 121 Last 30 Days  Progress note for Medicare patient due at visit 19    Subjective:    Pain:  [x] Yes  [] No Location: L shoulder and neck  Pain Rating: (0-10 scale) 4/10    Pain altered Tx:  [] No  [x] Yes  Action: backing off shoulder exercises, work on soft tissue spasms/tightness   Comments: Concerned about all bruises also feels like there is a dip in left anterior shoulder that is different.    Pt is 15 weeks post -op    Objective:   Modalities: Vaso low compression 38 deg x10 min at end of treatment - seated with pillow under arm   Precautions: healing forearm fracture left, flexion  deg, ER 30 deg, no IR,     Exercises completed marked with \"X\" 22  Exercise Reps/ Time Weight/ Level Comments    UBE 2/2 Level 2 Added 22            Pulleys - Flexion 3 min  Up to 120 flex x          Standing       Codman's 20x ea 2 lb A/P, M/L, CW/CCW  Added weight 22    Bicep Curls 20x  2 lb Added weight 22    Gentle Shoulder Extension w/cane 20x  AAROM Added 3/31/22    Ball on mat 1 min    Added 22     Band   Increased reps 22    - Extension 25x Lime Green      - Rows 25x Lime Green      - IR  25x Lime [place]     - ER Iso Step out 25x Lime Green             Finger Ladder 8x 9th rung Added 22     Wall slides x20 BRYANNA MANRIQUEZ help     Seated       Scapular Retraction HEP   x   CROM HEP  Flex, ext, lat flex, rotation    Shoulder Shrug HEP      Shoulder depression HEP   x   bicep 10 x 1 lb Added X   pron/supinate 10 x 1 lb added x   Table slides 20x ea  Flexion/scaption  Wash cloth on slide board     ER Stretch 20x5\"      Cane Flexion 20x      Cane Chest Press 20x              Supine       Cane:       - Shoulder flex 20x AAROM Standing 5/12/22     - Chest press 20x AAROM      Horizontal Abduction/Adduction  20x ea AAROM      - ER 20x AAROM     Shoulder Flexion 20x AROM Added 3/29/22     Rhythmic Stabilization  2x1 min  Added 3/24/22           Other:   Manual- trial trigger point releases to left upper trap without easing of tension, trial hypervolt to bilat upper and mid traps      Treatment Charges: Mins Units   []  Modalities     [x]  Ther Exercise 20 1   [x]  Manual Therapy 15 1   []  Ther Activities     []  Aquatics     [x]  Vasocompression 15  1   []  Other     Total Treatment time 50 3       Assessment: [] Progressing toward goals:        [x] No change in patient pain levels after the treatment session      [x] Other: Backed off shoulder exercises due to pain and concerns - wants Dr to check shoulder before progressing. Focus on soft tissue myofascial releases in upper traps, lower area    [x] Patient would continue to benefit from skilled physical therapy services in order to: reduce pain with modalities and exercise, reduce muscle spasms and trigger points with manual, learn proper posture for optimal healing and function, improve ROM and strength as able.     Assessment:   STG: (to be met in 12 treatments)  1. ? Pain: Keep pain at 4/10 or less most times- Not Met off pain meds  2. ? ROM: PROM shoulder elevation increases to 90 degrees+- Met for flex, not abd  3. ? Strength:Start active motion when cleared by -OK for Active and light strengthening   4. ? Function:Start functional activities when cleared by  and able to reach head.- MET  5. Notes minimal tight muscles/spasms and able to manage independently if they return. - Not Met  6. Demonstrate Knowledge of fall prevention- MET    LTG: (to be met in 24 treatments)  1. LEFS improves to 50% or better function         2. Patient to be independent with home exercise program as demonstrated by performance with correct form without cues. Patient goals: Lift arm up half way to shoulder    Pt. Education:  [x] Yes  [] No  [x] Reviewed Prior HEP/Ed  Method of Education: [x] Verbal  [x] Demo  [] Written  Comprehension of Education:  [x] Verbalizes understanding. [x] Demonstrates understanding. [] Needs review. [x] Demonstrates/verbalizes HEP/Ed previously given. Given 2/22/2022  Wrist flex/ext  Wrist supination/pronation  CROM all planes  Pendulums  Shoulder shrugs  Scapular retractions   3/10 HEP-Access Code: [email or web address]   URL: [email or web address]  Table slides flexion - 2-3 x daily - 7 x weekly - 20 reps  Seated Shoulder Scaption Slide at Table Top with Forearm in Neutral - 2-3 x daily - 7 x weekly - 20 reps  Supine Shoulder Flexion Extension AAROM with Dowel - 2-3 x daily - 7 x weekly - 20 reps  Supine Shoulder Press AAROM in Abduction with Dowel - 2-3 x daily - 7 x weekly - 20 reps      Plan: [x] Continue current frequency toward long and short term goals. [x]  Frequency: 2 x/week for 24 visits   [x] Specific Instructions for subsequent treatments: Myofascial work on neck, trap, pect muscles, gradually work through protocol.          Time In: 1:00 pm        Time Out:  1:55 pm    Electronically signed by:  Jyoti Valenzuela PT

## 2022-05-26 NOTE — TELEPHONE ENCOUNTER
----- Message from Whitney Guerin sent at 5/26/2022  2:47 PM EDT -----  Subject: Message to Provider    QUESTIONS  Information for Provider? Lakeshia Hussein on [facility] would like to know if fax   was recieved by office from the pain clinic   ---------------------------------------------------------------------------  --------------  [address]  What is the best way for the office to contact you? OK to leave message on   voicemail  Preferred Call Back Phone Number? [phone]  ---------------------------------------------------------------------------  --------------  SCRIPT ANSWERS  Relationship to Patient? Other  Representative Name? Lakeshia Hussein   Is the Representative on the appropriate HIPAA document in Epic?  Yes

## 2022-05-26 NOTE — PROGRESS NOTES
Speech Language Pathology  Speech Language Pathology  [address] [facility]    Cognitive/Speech-Language Treatment Note    Date: 5/26/2022  Patients Name: Steven Pride  MRN: [id number]  Diagnosis:   Patient Active Problem List   Diagnosis Code    Attention deficit hyperactivity disorder (ADHD) F90.9    Narcolepsy G47.419    Moderate persistent asthma without complication S44.98    History of DVT (deep vein thrombosis) Z86.718    Hiatal hernia K44.9    Cervical radiculopathy M54.12    Ganglion cyst M67.40    Chronic pain of left ankle M25.572, G89.29    Acquired trigger finger M65.30    Osteoarthritis of knee M17.10    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 Faye Paniagua, Y92.009    Closed fracture of multiple ribs of right side S22.41XA    Hemothorax on right J94.2    Contusion of right lung S27.321A    Chronic fatigue syndrome R53.82    Sleep apnea G47.30    Pain in joint involving ankle and foot M25.579    Lumbosacral spondylosis without myelopathy M47.817    Chronic bilateral low back pain with bilateral sciatica M54.42, M54.41, G89.29    Encounter for long-term opiate analgesic use Z79.891    Arthritis of left shoulder region M19.012    Opioid use, unspecified with unspecified opioid-induced disorder F11.99    Stroke-like symptoms R29.90    Ischemic stroke (HCC) I63.9    Acute encephalopathy G93.40    Chronic renal disease, stage III ([address] [573600] N18.30     Pain: 0/10    Cognitive Treatment    Treatment time: 8079-6302    Subjective: [x] Alert [x] Cooperative     [] Confused     [] Agitated    [] Lethargic    Objective/Assessment:    Goal 1: Pt will recall 3-5 units with distractions with 90% accuracy  Pt and ST reviewed strategies to facilitate recall including external, written information and calendars. Recall w/ Distractions, 2 functional units: 2/2 x2 independently (5 and 10 minute delays)     Goal 2: Pt will recall orientation information w/ 90% accuracy  Pt oriented to name, age, birthday, and place independently. Oriented to year and month initially w/ visual support however able to recall month and year independently w/o support as detailed above.      Goal 3: Pt will complete problem solving/reasoning tasks with 90% accuracy  Add to the Category, [place]: 4/5 increased to 5/5 with mod verbal cues     Problem Solving: 3/6 increased to 6/6 w/ min verbal cues      Goal 4: Pt will utilize word retrieval strategies to facilitate word finding deficits in 4/5 opportunities during conversation  Pt and ST extensively reviewed strategies to facilitate word retrieval (e.g. gestural facilitation, increased time/patience, coming back to it later, circumlocution, etc.). Pt utilized gestural facilitation, increased time, and circumlocution independently during ST session following discussion. Written education provided.       Goal 5: Pt will complete naming tasks (e.g. responsive, generative) w/ 90% accuracy  Answering Questions (What): 23/24 increased to 24/24 w/ mod verbal cues     Generative Naming, [place] (8): 0/2 increased to 2/2 w/ mod-max verbal cues     Plan:  [x] Continue ST services    [] Discharge from ST:      Discharge recommendations: []  Further therapy recommended at discharge. The patient should be able to tolerate at least 3 hours of therapy per day over 5 days or 15 hours over 7 days. [] Further therapy recommended at discharge. [x] No therapy recommended at discharge/Pending POC.     Treatment completed by: Jefferson Sahu, SLP

## 2022-05-26 NOTE — TELEPHONE ENCOUNTER
----- Message from Lakeisha Beach sent at 5/26/2022  1:10 PM EDT -----  Subject: Message to Provider    QUESTIONS  Information for Provider? Patients S/O Nicolás called and stated that Dr. Woodie Goltz office has not received the referral for pt yet. Rexine Bernheim states he   had called Dr. Woodie Goltz office and was told they do not have it. Rexine Bernheim   would like that to be refaxed please and a call back once that is done so   he can contact the office and get pts appt scheduled. Please call Rexine Bernheim at   [phone]. Thank you!   ---------------------------------------------------------------------------  --------------  CALL BACK INFO  What is the best way for the office to contact you? OK to leave message on   voicemail  Preferred Call Back Phone Number? [phone]  ---------------------------------------------------------------------------  --------------  SCRIPT ANSWERS  Relationship to Patient? Other  Representative Name? Mimi Connors  Is the Representative on the appropriate HIPAA document in Epic?  Yes

## 2022-05-27 ENCOUNTER — TELEPHONE (OUTPATIENT)
Dept: ORTHOPEDIC SURGERY | Age: 62
End: 2022-05-27

## 2022-05-27 NOTE — TELEPHONE ENCOUNTER
RECEIVED VM from PT - Karen Escalera - stating patient s/p reverse shoulder - pt increased pain / has had a stroke with memory issues . LM on jose antonio's VM - reaching out to schedule appt, informed pt to call office to get scheduled to be seen sooner than her f/u.

## 2022-05-31 ENCOUNTER — HOSPITAL ENCOUNTER (OUTPATIENT)
Dept: SPEECH THERAPY | Age: 62
Setting detail: THERAPIES SERIES
Discharge: HOME | End: 2022-05-31
Payer: MEDICARE

## 2022-05-31 ENCOUNTER — HOSPITAL ENCOUNTER (OUTPATIENT)
Dept: PHYSICAL THERAPY | Age: 62
Setting detail: THERAPIES SERIES
Discharge: HOME | End: 2022-05-31
Payer: MEDICARE

## 2022-05-31 PROCEDURE — 92507 TX SP LANG VOICE COMM INDIV: CPT

## 2022-05-31 PROCEDURE — 97130 THER IVNTJ EA ADDL 15 MIN: CPT

## 2022-05-31 PROCEDURE — 97129 THER IVNTJ 1ST 15 MIN: CPT

## 2022-05-31 NOTE — PROGRESS NOTES
Speech Language Pathology  Speech Language Pathology  [address] [place] [address]    Cognitive/Speech-Language Treatment Note    Date: 5/31/2022  Patients Name: Charlie Rm  MRN: [id number]  Diagnosis:   Patient Active Problem List   Diagnosis Code    Attention deficit hyperactivity disorder (ADHD) F90.9    Narcolepsy G47.419    Moderate persistent asthma without complication B89.69    History of DVT (deep vein thrombosis) Z86.718    Hiatal hernia K44.9    Cervical radiculopathy M54.12    Ganglion cyst M67.40    Chronic pain of left ankle M25.572, G89.29    Acquired trigger finger M65.30    Osteoarthritis of knee M17.10    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 Gurpreet Camacho, Y92.009    Closed fracture of multiple ribs of right side S22.41XA    Hemothorax on right J94.2    Contusion of right lung S27.321A    Chronic fatigue syndrome R53.82    Sleep apnea G47.30    Pain in joint involving ankle and foot M25.579    Lumbosacral spondylosis without myelopathy M47.817    Chronic bilateral low back pain with bilateral sciatica M54.42, M54.41, G89.29    Encounter for long-term opiate analgesic use Z79.891    Arthritis of left shoulder region M19.012    Opioid use, unspecified with unspecified opioid-induced disorder F11.99    Stroke-like symptoms R29.90    Ischemic stroke (HCC) I63.9    Acute encephalopathy G93.40    Chronic renal disease, stage III ([facility] [address] [736853] N18.30     Pain: 0/10    Cognitive Treatment    Treatment time: 7311-2055    Subjective: [x] Alert [x] Cooperative     [] Confused     [] Agitated    [] Lethargic    Objective/Assessment:    Goal 1: Pt will recall 3-5 units with distractions with 90% accuracy  Pt and ST reviewed strategies to facilitate recall including external, written information and calendars.      Recall w/ Distractions, 2 functional units: 2/2 x2 independently (5 and 10 minute delays)     Goal 2: Pt will recall orientation information w/ 90% accuracy  Pt oriented to name, age, birthday, month, and year independently. Oriented to place (name of hospital) w/ max cues.      Goal 3: Pt will complete problem solving/reasoning tasks with 90% accuracy  Add to the Category, [place]: 17/20 increased to 20/20 with mod verbal cues      Problem Solving: 3/5 increased to 5/5 w/ min verbal cues      Goal 4: Pt will utilize word retrieval strategies to facilitate word finding deficits in 4/5 opportunities during conversation  Pt and ST extensively reviewed strategies to facilitate word retrieval (e.g. gestural facilitation, increased time/patience, coming back to it later, circumlocution, etc.). Pt utilized gestural facilitation, increased time, and circumlocution independently during ST session following discussion. Written education provided.       Goal 5: Pt will complete naming tasks (e.g. responsive, generative) w/ 90% accuracy  Answering Questions (What): 9/10 increased to 10/10 w/ mod verbal cues      Answering Questions (Who): 7/10 increased to 10/10 w/ mod verbal cues      Other: Pt tearful t/o session re:  appt tomorrow, bruising on arms/legs, difficulty w/ new phone, new \"broken\" nose, and feelings of anxiety/depression per pt. Pt and ST made list of feelings/problems and devised plan to make appts to respective providers. Pt has important appt w/ ortho MD tomorrow at 930, reinforced importance of appt for shoulder- PT also made list of questions/concerns to facilitate pt staying on track during appt.  Will contact PCP re: bruising and anxiety/depression. Offered to walk pt to ED for \"broken\" nose, however pt relates it is not in pain- and she will get it checked if needed. Pt impulsive/tangential t/o (e.g. frequently getting up and showing ST bruising on arms/legs or becoming distracted by bruising). Plan:  [x] Continue ST services    [] Discharge from ST:      Discharge recommendations: []  Further therapy recommended at discharge. The patient should be able to tolerate at least 3 hours of therapy per day over 5 days or 15 hours over 7 days. [] Further therapy recommended at discharge. [x] No therapy recommended at discharge/Pending POC.     Treatment completed by: Kim Dalton, SLP No

## 2022-05-31 NOTE — FLOWSHEET NOTE
[x] [facility]) [facility] &  Therapy  [address]    [phone]  F: [phone]   [] [address]  [facility]   [address]  P: [phone]  F: [phone]  [] [place]  [address]  P: [phone]  F: [phone] [] [address]  P: [phone]  F: [phone]  [] [address]  [facility]   [address]   [place]: [phone]  F: [phone]   [] [address]  [address]  [place]: [phone]   F: [phone]     Physical Therapy Cancel/No Show note    Date: 2022  Patient: Steven Pride  : 1960  MRN: [id number]    Cancels/No Shows to date:     For today's appointment patient:    [x]  Cancelled    [] Rescheduled appointment    [] No-show     Reason given by patient:    []  Patient ill    []  Conflicting appointment    [] No transportation      [] Conflict with work    [] No reason given    [] Weather related    [] COVID-19    [x] Other:      Comments:  Pt showed up in tearful state verbalizing concerns Re: her left shoulder pain and the way it looks, bruising on body, inability to understand and use cell phone. She strongly feels that she does not want to do any therapy until she sees the Dr to know what is going on with the shoulder. Had Pt call Dr office and scheduled an appt with Dr Saturnino Rodriguez tomorrow 22 at 9:30 am then will monitor the outcome.        [] Next appointment was confirmed- not scheduled at this time      Electronically signed by: Mary Michaels, PT

## 2022-06-01 ENCOUNTER — OFFICE VISIT (OUTPATIENT)
Dept: ORTHOPEDIC SURGERY | Age: 62
End: 2022-06-01
Payer: MEDICARE

## 2022-06-01 VITALS — WEIGHT: 143 LBS | BODY MASS INDEX: 28.07 KG/M2 | HEIGHT: 60 IN

## 2022-06-01 DIAGNOSIS — Z96.612 STATUS POST REVERSE TOTAL REPLACEMENT OF LEFT SHOULDER: Primary | ICD-10-CM

## 2022-06-01 PROCEDURE — G8427 DOCREV CUR MEDS BY ELIG CLIN: HCPCS | Performed by: PHYSICIAN ASSISTANT

## 2022-06-01 PROCEDURE — 1036F TOBACCO NON-USER: CPT | Performed by: PHYSICIAN ASSISTANT

## 2022-06-01 PROCEDURE — G8417 CALC BMI ABV UP PARAM F/U: HCPCS | Performed by: PHYSICIAN ASSISTANT

## 2022-06-01 PROCEDURE — 99214 OFFICE O/P EST MOD 30 MIN: CPT | Performed by: PHYSICIAN ASSISTANT

## 2022-06-01 PROCEDURE — 3017F COLORECTAL CA SCREEN DOC REV: CPT | Performed by: PHYSICIAN ASSISTANT

## 2022-06-01 NOTE — PROGRESS NOTES
[address]  [address] [facility] Sheree Bernstein  Dept: [phone]  Dept Fax: [phone]        Postoperative follow-up note    Subjective:   Tish Morton is a 60y.o. year old female who presents to our office today for postoperative followup regarding her   1. Status post reverse total replacement of left shoulder        Chief Complaint   Patient presents with    Follow-up     left shoulder       Date of Surgery: 2/8/2022    Tish Morton  is a 60y.o. year old female who presents to our office today for postoperative follow up after having undergone a left shoulder reverse total shoulder arthroplasty completed on 2/8/2022 by Dr. Radha Adams DO. The patient was last evaluated on 5/2/2022 by Dr. Flaco Armas and was instructed to continue outpatient physical therapy working on range of motion restoration, gentle strengthening. Patient did have a CVA with resulted mild left-sided weakness in both upper and lower extremity as well as expressive aphasia. She has been placed on Plavix and continues to follow-up with neurology. The patient notes that she is also taking aspirin. She does have a history of a DVT previously. Patient notes that over the last 3 to 4 weeks she has had an increase in her left shoulder pain. She notes that during a passive range of motion exercise with physical therapy she felt as if the physical therapy assistant raised her arm too quickly which caused an increase in pain. She has had pain over the last 3 to 4 weeks without significant improvement. She denies numbness and or tingling in the left upper extremity today. She is accompanied by her son today in office. Review of Systems   Constitutional: Negative for activity change and fever. HENT: Negative for sneezing. Respiratory: Negative for cough and shortness of breath. Cardiovascular: Negative for chest pain.    Gastrointestinal: Negative for vomiting. Musculoskeletal: Positive for arthralgias (Left shoulder). Negative for joint swelling and myalgias. Skin: Negative for color change. Neurological: Negative for weakness and numbness. Psychiatric/Behavioral: Negative for sleep disturbance. Objective :   General: Kirstie Starks is a 60 y.o. female who is alert and oriented and sitting comfortably in our office. Ortho Exam  MS: Well-healed surgical incision left shoulder. AROM: Forward flexion= 85, Abduction= 95, functional reach, left back pocket. Sensation is intact to light touch of the left upper extremity without focal deficits present. The skin is noted to be warm with brisk capillary fill distally on the left upper extremity. Eyes: Extra-ocular muscles intact  Mouth: Oral mucosa moist. No perioral lesions  Pulm: Respirations unlabored and regular. Skin: warm, well perfused  Psych:   Patient has good fund of knowledge and displays understanging of exam, diagnosis, and plan. Radiology:    XR SHOULDER LEFT (MIN 2 VIEWS)    Result Date: 6/1/2022  History: Status post left shoulder reverse total shoulder arthroplasty. Comparison: 3/4/2022. Findings: AP and axillary view of the left shoulder obtained today in office reveals orthopedic hardware status post reverse total shoulder arthroplasty. Hardware is intact with appropriate anatomical position appreciated. Heterotopic ossification noted superior to the greater tuberosity of the humerus there is no evidence of periprosthetic fracture, subluxation, dislocation, radiopaque foreign body or radiopaque tumor noted within the left shoulder. Of note there is severe acromioclavicular degenerative changes appreciated. No change in hardware alignment when compared to images from 3/4/2022. Impression: Left shoulder status post reverse total shoulder arthroplasty with hardware in appropriate position as described above. Assessment:      1.  Status post reverse total replacement of left shoulder         Plan:       Patient is here for increase in left shoulder pain after an incident that occurred at outpatient physical therapy where her arm was passively raised over 90 degrees and she noticed some mild increase in pain. I personally reviewed her patient left shoulder x-rays today. Her hardware remains intact without evidence of periprosthetic fracture, subluxation, dislocation, new or radiopaque foreign body or radiopaque tumor appreciated. I discussed the patient case with Dr. Carlo Dumont and her left shoulder pain may have been elicited by a passive range of motion exercise at physical therapy but is likely neurogenic pain due to her recent CVA. Patient is to continue outpatient physical therapy and to limit activities that increase her shoulder pain. She will follow-up in 4 weeks with Dr. Kyara Hahn. She was instructed to call our office with any questions or concerns prior to her next appointment. She noted her understanding. Follow up: Return in about 4 weeks (around 6/29/2022) for post-operative follow up. No orders of the defined types were placed in this encounter. Orders Placed This Encounter   Procedures    XR SHOULDER LEFT (MIN 2 VIEWS)     Standing Status:   Future     Number of Occurrences:   1     Standing Expiration Date:   6/1/2023     Order Specific Question:   Reason for exam:     Answer:   joyce    [facility]     Referral Priority:   Routine     Referral Type:   Eval and Treat     Referral Reason:   Specialty Services Required     Requested Specialty:   Physical Therapist     Number of Visits Requested:   1       This note is created with the assistance of a speech recognition program.  While intending to generate a document that actually reflects the content of the visit, the document can still have some errors including those of syntax and sound a like substitutions which may escape proof reading. In such instances, actual meaning can be extrapolated by contextual diversion.      Electronically signed by Kvng Wade PA-C on 6/2/2022 at 2:40 PM

## 2022-06-02 ASSESSMENT — ENCOUNTER SYMPTOMS
COLOR CHANGE: 0
COUGH: 0
VOMITING: 0
SHORTNESS OF BREATH: 0

## 2022-06-07 ENCOUNTER — HOSPITAL ENCOUNTER (OUTPATIENT)
Dept: SPEECH THERAPY | Age: 62
Setting detail: THERAPIES SERIES
Discharge: HOME | End: 2022-06-07
Payer: MEDICARE

## 2022-06-07 ENCOUNTER — HOSPITAL ENCOUNTER (OUTPATIENT)
Dept: PHYSICAL THERAPY | Age: 62
Setting detail: THERAPIES SERIES
Discharge: HOME | End: 2022-06-07
Payer: MEDICARE

## 2022-06-07 PROCEDURE — 97110 THERAPEUTIC EXERCISES: CPT

## 2022-06-07 PROCEDURE — 92507 TX SP LANG VOICE COMM INDIV: CPT

## 2022-06-07 PROCEDURE — 97130 THER IVNTJ EA ADDL 15 MIN: CPT

## 2022-06-07 PROCEDURE — 97129 THER IVNTJ 1ST 15 MIN: CPT

## 2022-06-07 NOTE — PROGRESS NOTES
Speech Language Pathology  Speech Language Pathology  [address] [facility]    Cognitive/Speech-Language Treatment Note    Date: 6/7/2022  Patients Name: Zen Reddy  MRN: [id number]  Diagnosis:   Patient Active Problem List   Diagnosis Code    Attention deficit hyperactivity disorder (ADHD) F90.9    Narcolepsy G47.419    Moderate persistent asthma without complication O46.64    History of DVT (deep vein thrombosis) Z86.718    Hiatal hernia K44.9    Cervical radiculopathy M54.12    Ganglion cyst M67.40    Chronic pain of left ankle M25.572, G89.29    Acquired trigger finger M65.30    Osteoarthritis of knee M17.10    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 [place] , Y92.009    Closed fracture of multiple ribs of right side S22.41XA    Hemothorax on right J94.2    Contusion of right lung S27.321A    Chronic fatigue syndrome R53.82    Sleep apnea G47.30    Pain in joint involving ankle and foot M25.579    Lumbosacral spondylosis without myelopathy M47.817    Chronic bilateral low back pain with bilateral sciatica M54.42, M54.41, G89.29    Encounter for long-term opiate analgesic use Z79.891    Arthritis of left shoulder region M19.012    Opioid use, unspecified with unspecified opioid-induced disorder F11.99    Stroke-like symptoms R29.90    Ischemic stroke (HCC) I63.9    Acute encephalopathy G93.40    Chronic renal disease, stage III ([facility] [address] [090127] N18.30     Pain: 0/10    Cognitive Treatment    Treatment time: 7546-9795    Subjective: [x] Alert [x] Cooperative     [] Confused     [] Agitated    [] Lethargic    Objective/Assessment:    Goal 1: Pt will recall 3-5 units with distractions with 90% accuracy  Pt and ST reviewed strategies to facilitate recall including external, written information and calendars.      Recall w/ Distractions, 1 functional unit: 2/2 x2 independently (15 minute delay)     Memory and Mental Manipulation, Size: 16/20 increased to 20/20 w/ x1 repetitions     Picture Retention, [place]: 9/10 increased to 10/10 w/ x1 revision     Goal 2: Pt will recall orientation information w/ 90% accuracy  Pt oriented to name, age, birthday, month, and place independently. Oriented to year w/ max verbal and visual cues      Goal 3: Pt will complete problem solving/reasoning tasks with 90% accuracy  Add to the Category, [place]: 17/20 increased to 20/20 with mod verbal cues      Problem Solving: 3/5 increased to 5/5 w/ min verbal cues      Goal 4: Pt will utilize word retrieval strategies to facilitate word finding deficits in 4/5 opportunities during conversation  Pt and ST extensively reviewed strategies to facilitate word retrieval (e.g. gestural facilitation, increased time/patience, coming back to it later, circumlocution, etc.). Pt utilized gestural facilitation, increased time, and circumlocution independently during ST session following discussion. Written education provided.       Goal 5: Pt will complete naming tasks (e.g. responsive, generative) w/ 90% accuracy  Themes (Home): 21/24 increased to 24/24 w/ min-mod verbal cues     Category Members Grid: 11/16 increased to 16/16 min-max verbal cues     Category Members w/ First letter: 20/24 increased to 24/24 w/ min-mod verbal cues     Plan:  [x] Continue ST services    [] Discharge from ST:      Discharge recommendations: []  Further therapy recommended at discharge. The patient should be able to tolerate at least 3 hours of therapy per day over 5 days or 15 hours over 7 days. [] Further therapy recommended at discharge. [x] No therapy recommended at discharge/Pending POC.     Treatment completed by: John Lynn, SLP

## 2022-06-07 NOTE — FLOWSHEET NOTE
[x] [facility]  [address]  [phone]  F: [phone]     Physical Therapy Daily Treatment Note    Date:  2022  Patient Name:  Charlie Rm      :  1960    MRN: [id number]  Physician: Gifty Osorio DO                                    Insurance: [place] [employer] D[employer] Complete (BMN)  Medical Diagnosis: M19.012 (ICD-10-CM) - Arthritis of left shoulder region            Rehab Codes: Pain M54.2, M25.512, stiffness M25.612, weakness M62.512, posture R29.3, myofascial M79.1, M62.838  Onset Date: 22                  Next 's appt: 22      Visit# / total visits:  new order in EPIC   Cancels/No Shows: 10    Progress note for Medicare patient due at visit 19    Subjective:    Pain:  [x] Yes  [] No Location: L shoulder and neck  Pain Rating: (0-10 scale) 5/10    Pain altered Tx:  [] No  [x] Yes  Action: limited exercises  Comments: Patient still with complaints of increased shoulder pain in her anterior shoulder and concerns that shoulder looks different that before surgery. Still notes unusual bruising all over her body and is unsure how they have gotten there.        Objective:   Modalities: Vaso low compression 38 deg x10 min at end of treatment - seated with pillow under arm   Precautions: healing forearm fracture left, flexion  deg, ER 30 deg, no IR,     Exercises completed marked with \"X\" 22  Exercise Reps/ Time Weight/ Level Comments    UBE 2/ Level 2 Added 22  X          Pulleys - Flexion 3 min  Up to 120 flex x          Standing       Codman's 20x ea 2 lb A/P, M/L, CW/CCW  Added weight 22    Bicep Curls 20x  2 lb Added weight 22    Gentle Shoulder Extension w/cane 20x  AAROM Added 3/31/22    Ball on mat 1 min    Added 22     Band   Increased reps 22    - Extension 25x [place]      - Rows 25x [place]      - IR  25x [place]     - ER Iso Step out 25x [place]             Finger Ladder 8x 9th rung Added 4/5/22     Wall slides x20 AAROM RUE help  X   Seated       Scapular Retraction HEP   x   CROM HEP  Flex, ext, lat flex, rotation    Shoulder Shrug HEP      Shoulder depression HEP   x   bicep 10 x 1 lb Added X   pron/supinate 10 x 1 lb added x   Table slides 20x ea  Flexion/scaption  Wash cloth on slide board     ER Stretch 20x5\"      Cane Flexion 20x      Cane Chest Press 20x              Supine       Cane:       - Shoulder flex 20x AAROM Standing 5/12/22 X   - Chest press 20x AAROM      Horizontal Abduction/Adduction  20x ea AAROM  X   - ER 20x AAROM     Shoulder Flexion 20x AROM Added 3/29/22     Rhythmic Stabilization  2x1 min  Added 3/24/22           Other:     Manual- trial trigger point releases to left upper trap without easing of tension, trial hypervolt to bilat upper and mid traps      Treatment Charges: Mins Units   []  Modalities     [x]  Ther Exercise 40 3   [x]  Manual Therapy     []  Ther Activities     []  Aquatics     [x]  Vasocompression     []  Other     Total Treatment time 40 3       Assessment: [] Progressing toward goals:        [x] No change in patient pain levels after the treatment session      [x] Other: informed patient she should track her bruises and take pictures daily, of when they are appearing and changing. Additionally, instructed patient to contact her PCP in regards to this bruising as well. Patient educated that there is still a lot of mm atrophy throughout her shoulder, which is why it looks \"flat and bueno\" as the patient described. Overall Tx limited due to shoulder pain increasing throughout Tx. [x] Patient would continue to benefit from skilled physical therapy services in order to: reduce pain with modalities and exercise, reduce muscle spasms and trigger points with manual, learn proper posture for optimal healing and function, improve ROM and strength as able.     Assessment:   STG: (to be met in 12 treatments)  1. ? Pain: Keep pain at 4/10 or less most times- Not Met off pain meds  2. ? ROM: PROM shoulder elevation increases to 90 degrees+- Met for flex, not abd  3. ? Strength:Start active motion when cleared by -OK for Active and light strengthening   4. ? Function:Start functional activities when cleared by  and able to reach head. - MET  5. Notes minimal tight muscles/spasms and able to manage independently if they return. - Not Met  6. Demonstrate Knowledge of fall prevention- MET    LTG: (to be met in 24 treatments)  1. LEFS improves to 50% or better function         2. Patient to be independent with home exercise program as demonstrated by performance with correct form without cues. Patient goals: Lift arm up half way to shoulder    Pt. Education:  [x] Yes  [] No  [x] Reviewed Prior HEP/Ed  Method of Education: [x] Verbal  [x] Demo  [] Written  Comprehension of Education:  [x] Verbalizes understanding. [x] Demonstrates understanding. [] Needs review. [x] Demonstrates/verbalizes HEP/Ed previously given. Given 2/22/2022  Wrist flex/ext  Wrist supination/pronation  CROM all planes  Pendulums  Shoulder shrugs  Scapular retractions   3/10 HEP-Access Code: [email or web address]   URL: [email or web address]  Table slides flexion - 2-3 x daily - 7 x weekly - 20 reps  Seated Shoulder Scaption Slide at Table Top with Forearm in Neutral - 2-3 x daily - 7 x weekly - 20 reps  Supine Shoulder Flexion Extension AAROM with [place] - 2-3 x daily - 7 x weekly - 20 reps  Supine Shoulder Press AAROM in Abduction with [place] - 2-3 x daily - 7 x weekly - 20 reps      Plan: [x] Continue current frequency toward long and short term goals. [x]  Frequency: 2 x/week for 24 visits   [x] Specific Instructions for subsequent treatments: Myofascial work on neck, trap, pect muscles, gradually work through protocol.          Time In: 4374        Time Out:  1513    Electronically signed by:  Oscar Dang PTA

## 2022-06-14 ENCOUNTER — HOSPITAL ENCOUNTER (OUTPATIENT)
Dept: PHYSICAL THERAPY | Age: 62
Setting detail: THERAPIES SERIES
Discharge: HOME | End: 2022-06-14
Payer: MEDICARE

## 2022-06-14 ENCOUNTER — APPOINTMENT (OUTPATIENT)
Dept: SPEECH THERAPY | Age: 62
End: 2022-06-14
Payer: MEDICARE

## 2022-06-14 ENCOUNTER — HOSPITAL ENCOUNTER (OUTPATIENT)
Dept: SPEECH THERAPY | Age: 62
Setting detail: THERAPIES SERIES
Discharge: HOME | End: 2022-06-14
Payer: MEDICARE

## 2022-06-14 PROCEDURE — 97129 THER IVNTJ 1ST 15 MIN: CPT

## 2022-06-14 PROCEDURE — 97130 THER IVNTJ EA ADDL 15 MIN: CPT

## 2022-06-14 PROCEDURE — 97110 THERAPEUTIC EXERCISES: CPT

## 2022-06-14 PROCEDURE — 92507 TX SP LANG VOICE COMM INDIV: CPT

## 2022-06-14 NOTE — PROGRESS NOTES
Speech Language Pathology  Speech Language Pathology  [facility]    Cognitive/Speech-Language Treatment Note    Date: 6/14/2022  Patients Name: Solis Holley  MRN: [id number]  Diagnosis:   Patient Active Problem List   Diagnosis Code    Attention deficit hyperactivity disorder (ADHD) F90.9    Narcolepsy G47.419    Moderate persistent asthma without complication M66.86    History of DVT (deep vein thrombosis) Z86.718    Hiatal hernia K44.9    Cervical radiculopathy M54.12    Ganglion cyst M67.40    Chronic pain of left ankle M25.572, G89.29    Acquired trigger finger M65.30    Osteoarthritis of knee M17.10    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 Beau Moore, Y92.009    Closed fracture of multiple ribs of right side S22.41XA    Hemothorax on right J94.2    Contusion of right lung S27.321A    Chronic fatigue syndrome R53.82    Sleep apnea G47.30    Pain in joint involving ankle and foot M25.579    Lumbosacral spondylosis without myelopathy M47.817    Chronic bilateral low back pain with bilateral sciatica M54.42, M54.41, G89.29    Encounter for long-term opiate analgesic use Z79.891    Arthritis of left shoulder region M19.012    Opioid use, unspecified with unspecified opioid-induced disorder F11.99    Stroke-like symptoms R29.90    Ischemic stroke (HCC) I63.9    Acute encephalopathy G93.40    Chronic renal disease, stage III ([address] [044014] N18.30     Pain: 0/10    Cognitive Treatment    Treatment time: 4277-6238    Subjective: [x] Alert [x] Cooperative     [] Confused     [] Agitated    [] Lethargic    Objective/Assessment:    Goal 1: Pt will recall 3-5 units with distractions with 90% accuracy  Pt and ST reviewed strategies to facilitate recall, specifically relating to recall of appt times- such as calendars, written reminders, alarms, etc. Pt verbalized understanding. Print out calendar w/ updated ST and PT times provided to pt upon pt request.      Recall w/ Distractions, 2 functional unit: 1/2 x2 independently (15 minute delay)     Picture Retention, Pool: 7/10 increased to 10/10 w/ x1 revision and cues for difficulty w/ word finding for one answer- however note pt did accurately recall given description and accurate gesture of item pictured.       Goal 2: Pt will recall orientation information w/ 90% accuracy  Pt oriented to name, age (w/ very min cue for self correction), birthday, and place independently. Oriented to year independently w/ visual support. Oriented to month w/ max verbal cues.      Goal 3: Pt will complete problem solving/reasoning tasks with 90% accuracy  Stating Situational Problems: 5/8 increased to 8/8 w/ min-mod verbal cues      Goal 4: Pt will utilize word retrieval strategies to facilitate word finding deficits in 4/5 opportunities during conversation  Pt and ST extensively reviewed strategies to facilitate word retrieval (e.g. gestural facilitation, increased time/patience, coming back to it later, circumlocution, etc.). Pt utilized gestural facilitation, increased time, and circumlocution independently during ST session following discussion. Written education provided.       Goal 5: Pt will complete naming tasks (e.g. responsive, generative) w/ 90% accuracy   Category Members Grid: 12/16 increased to 16/16 min-max verbal cues     Plan:  [x] Continue ST services    [] Discharge from ST:      Discharge recommendations: []  Further therapy recommended at discharge. The patient should be able to tolerate at least 3 hours of therapy per day over 5 days or 15 hours over 7 days. [] Further therapy recommended at discharge. [x] No therapy recommended at discharge/Pending POC.     Treatment completed by: Luz Fernandes, SLP

## 2022-06-14 NOTE — FLOWSHEET NOTE
[x] [facility] &  Therapy  [address]  [phone]  F: [phone]     Physical Therapy Daily Treatment Note    Date:  2022  Patient Name:  Jacquelin Mena      :  1960    MRN: [id number]  Physician: Mary Jackson DO                                    Insurance: [facility] Medicare Duel Complete (BMN)  Medical Diagnosis: M19.012 (ICD-10-CM) - Arthritis of left shoulder region , 22   Status post reverse total replacement of left shoulder (T07.679 [ICD-10-CM])       Rehab Codes: Pain M54.2, M25.512, stiffness M25.612, weakness M62.512, posture R29.3, myofascial M79.1, M62.838  Onset Date: 22                  Next 's appt: 22    * new order in [facility] 22 cont therapy     Visit# / total visits:   Cancels/No Shows: 1/0         Subjective:    Pain:  [x] Yes  [] No Location: L anterior shoulder and neck  Pain Rating: (0-10 scale) 4/10    Pain altered Tx:  [] No  [x] Yes  Action:    Comments:  Constant ache in Left anterior shoulder, Difficulty using arm at home. Dr martin pt - OK to continue therapy. Notes both arms/hands will tingle and sometimes turn white.         Objective:   Modalities: Vaso low compression 38 deg x10 min at end of treatment - seated with pillow under arm   Precautions: healing forearm fracture left, flexion  deg, ER 30 deg, no IR,     Exercises completed marked with \"X\" 22  Exercise Reps/ Time Weight/ Level Comments    UBE 2 Level 2               Pulleys - Flexion 3 min  Up to 120 flex            Standing       Codman's 20x ea 2 lb A/P, M/L, CW/CCW  Added weight 22    Bicep Curls 20x  2 lb Added weight 22    Gentle Shoulder Extension w/cane 20x  AAROM Added 3/31/22    Ball on mat 1 min    Added 22     Band   Increased reps 22    - Extension 25x Lime [place]      - Rows 25x [place]      - IR  25x L[place]     - ER Iso Step out 25x Lime [place]             Finger Ladder 8x 9th rung Added 4/5/22     Wall slides x20 AAROM RUE help     Seated       Scapular Retraction HEP      CROM HEP  Flex, ext, lat flex, rotation    Shoulder Shrug HEP      Shoulder depression HEP       bicep 10 x 1 lb Added    pron/supinate 10 x 1 lb added    Table slides 20x ea  Flexion/scaption  Wash cloth on slide board     ER Stretch 20x5\"      Cane Flexion 20x      Cane Chest Press 20x              Supine       Cane:       - Shoulder flex 20x AAROM Standing 5/12/22 X   - Chest press 20x AAROM   x   Horizontal Abduction/Adduction  20x ea AAROM  X   - ER 20x AAROM  x   Shoulder Flexion 20x AROM Added 3/29/22  x   Rhythmic Stabilization  2x1 min  Added 3/24/22           Other:  Strength- left shoulder flex 4- p, abd 4-4+, ER 4 p, IR 4- p  AROM - Lt shoulder flex 110  pain, abd 80 pain, ER 35 pain, IR 70     Palpation -Mod pain in anterior left shoulder, pect, deltoid especially insertion, mild in bicep and tricep       Treatment Charges: Mins Units   []  Modalities     [x]  Ther Exercise 35 2   [x]  Manual Therapy     []  Ther Activities     []  Aquatics     [x]  Vasocompression     []  Other     Total Treatment time 35 2       Assessment: [x] Progressing toward goals:  Discussion of pt's goals and new strength, ROM and functional goals, Focus on ROM and tolerance to mild strenthening activities. [] Other:     [x] Patient would continue to benefit from skilled physical therapy services in order to: reduce pain with modalities, manual and exercise, improve ROM, strength and endurance as able, tolerate ADL's without aggravation. Assessment:   STG: (to be met in 33 treatments)  1. ? Pain: Keep pain at 3/10 or less most times-    2. ? ROM: AROM to consistently left shoulder flexion between 110/115 for daily function,   3. ? Strength: Left shoulder tests at 4-4+ with min to no increase in pain for increased tolerance to activities.   4. ? Function: Tolerate being able to blow dry hair, Able to make a ponytail without stressing her neck, Tolerate carrying household items at home    LTG: (to be met in 36 treatments)  1. LEFS improves to 50% or better function         2. Patient to be independent with home exercise program as demonstrated by performance with correct form without cues. Patient goals: less pain and more function    Pt. Education:  [] Yes  [x] No  [] Reviewed Prior HEP/Ed  Method of Education: [] Verbal  [] Demo  [] Written  Comprehension of Education:  [] Verbalizes understanding. [] Demonstrates understanding. [] Needs review. [] Demonstrates/verbalizes HEP/Ed previously given. Plan: [x] Continue current frequency toward long and short term goals. [x]  Frequency: * Added  2 x/week for 12 visits per Order to Continue Therapy for a total of 36 visits    [x] Specific Instructions for subsequent treatments:  Focus on strengthening left shoulder within the functional range, improving endurance to tolerate functional goals, Myofascial work and/or US to manage shoulder pain especially deltoid insertion. Time In: 2:04 pm        Time Out:  3:00 pm      Electronically signed by: Bashir Munson PT        Physician Signature:________________________________Date:__________________  By signing above or cosigning this note, I have reviewed this plan of care and certify a need for medically necessary rehabilitation services.      *PLEASE SIGN ABOVE AND FAX BACK ALL PAGES*

## 2022-06-16 ENCOUNTER — HOSPITAL ENCOUNTER (OUTPATIENT)
Dept: PHYSICAL THERAPY | Age: 62
Setting detail: THERAPIES SERIES
Discharge: HOME | End: 2022-06-16
Payer: MEDICARE

## 2022-06-16 ENCOUNTER — HOSPITAL ENCOUNTER (OUTPATIENT)
Dept: SPEECH THERAPY | Age: 62
Setting detail: THERAPIES SERIES
Discharge: HOME | End: 2022-06-16
Payer: MEDICARE

## 2022-06-16 ENCOUNTER — APPOINTMENT (OUTPATIENT)
Dept: SPEECH THERAPY | Age: 62
End: 2022-06-16
Payer: MEDICARE

## 2022-06-16 NOTE — FLOWSHEET NOTE
[x] [facility]) - [facility] &  Therapy  [address]    [phone]  F: [phone]   [] [address] [facility] [address]   [address]  P: [phone]  F: [phone]  [] [address] [facility] &  Therapy  [address] [facility]  P: [phone]  F: [phone] [] [email or web address]  P: [phone]  F: [phone]  [] [address]  [facility]   [address]   [place]: [phone]  F: [phone]   [] [place] [address] [facility] [address]  [place]: [phone]   F: [phone]     Physical Therapy Cancel/No Show note    Date: 2022  Patient: Gayla Tripp  : 1960  MRN: [id number]    Cancels/No Shows to date: 3/2    For today's appointment patient:    [x]  Cancelled    [] Rescheduled appointment    [] No-show     Reason given by patient:    []  Patient ill    []  Conflicting appointment    [] No transportation      [] Conflict with work    [] No reason given    [x] Weather related- Too Hot    [] COVID-19    [] Other:      Comments:         [x] Next appointment was confirmed-        Electronically signed by: Sondra Garces, PT

## 2022-06-21 ENCOUNTER — HOSPITAL ENCOUNTER (OUTPATIENT)
Dept: SPEECH THERAPY | Age: 62
Setting detail: THERAPIES SERIES
Discharge: HOME | End: 2022-06-21
Payer: MEDICARE

## 2022-06-21 ENCOUNTER — TELEPHONE (OUTPATIENT)
Dept: FAMILY MEDICINE CLINIC | Age: 62
End: 2022-06-21

## 2022-06-21 ENCOUNTER — HOSPITAL ENCOUNTER (OUTPATIENT)
Dept: PHYSICAL THERAPY | Age: 62
Setting detail: THERAPIES SERIES
Discharge: HOME | End: 2022-06-21
Payer: MEDICARE

## 2022-06-21 PROCEDURE — 97110 THERAPEUTIC EXERCISES: CPT

## 2022-06-21 PROCEDURE — 97129 THER IVNTJ 1ST 15 MIN: CPT

## 2022-06-21 PROCEDURE — 92507 TX SP LANG VOICE COMM INDIV: CPT

## 2022-06-21 PROCEDURE — 97130 THER IVNTJ EA ADDL 15 MIN: CPT

## 2022-06-21 NOTE — TELEPHONE ENCOUNTER
Angie Sawant from [employer] called and stated that they have faxed over a medical clearance for the pt on 6/17. Pt is having a lot of pain with her teeth.  Thank you

## 2022-06-21 NOTE — FLOWSHEET NOTE
[x] [facility] &  Therapy  [address]  [phone]  F: [phone]     Physical Therapy Daily Treatment Note    Date:  2022  Patient Name:  Jacquelin Mena      :  1960    MRN: [id number]  Physician: Mary Jackson DO                                    Insurance: [facility] Medicare Duel Complete (BMN)  Medical Diagnosis: M19.012 (ICD-10-CM) - Arthritis of left shoulder region , 22   Status post reverse total replacement of left shoulder (Z96.612 [ICD-10-CM])       Rehab Codes: Pain M54.2, M25.512, stiffness M25.612, weakness M62.512, posture R29.3, myofascial M79.1, M62.838  Onset Date: 22                  Next 's appt: 22    * new order in [facility] 22 cont therapy     Visit# / total visits:   Cancels/No Shows: 1/0       Subjective:    Pain:  [x] Yes  [] No Location: L anterior shoulder and neck  Pain Rating: (0-10 scale) 4/10    Pain altered Tx:  [] No  [x] Yes  Action: limited exercises and progressions due to pain and fearful movements    Comments:  Patient notes reoccurrence of bruising all over her arms. Most she is unsure how she got them, but the others she claims are from her dog. Notes continued pain in shoulder, which varies on level of activity, where the more she moves her arm - the more it hurts.         Objective:   Modalities: Vaso low compression 38 deg x10 min at end of treatment - seated with pillow under arm   Precautions: healing forearm fracture left, flexion  deg, ER 30 deg, no IR,     Exercises completed marked with \"X\" 22  Exercise Reps/ Time Weight/ Level Comments    UBE 3/3 min Level 2   X          Pulleys - Flexion 3 min  Up to 120 flex X          Standing       Codman's 20x ea 2 lb A/P, M/L, CW/CCW  Added weight 22    Bicep Curls 20x  2 lb Added weight 22 X   Gentle Shoulder Extension w/cane 20x  AAROM     Ball on mat 1 min    Added 22     Band   Increased reps 22    - Extension 25x Lime Green   X   - Rows 25x Lime [place]   X   - IR  25x [place]  X   - ER Iso Step out 25x [place]             Finger Ladder 8x 9th rung Added 4/5/22     Wall slides x20 AAROM RUE help  X   Seated       Scapular Retraction HEP      CROM HEP  Flex, ext, lat flex, rotation    Shoulder Shrug HEP      Shoulder depression HEP       bicep 20x 2 lb Added X   pron/supinate 20x 1.5 lb  hammer added X   Table slides 20x ea  Flexion/scaption  Wash cloth on slide board X     20x5\"      Cane Flexion 20x      Cane Chest Press 20x              Supine       Cane:       - Shoulder flex 20x AAROM Standing 5/12/22 X   - Chest press 20x AAROM  X   Horizontal Abduction/Adduction  20x ea AAROM  X   - ER 20x AAROM  X   Shoulder Flexion 20x AROM Added 3/29/22    Rhythmic Stabilization  2x1 min  Added 3/24/22           Other:    Palpation -Mod pain in anterior left shoulder, pect, deltoid especially insertion, mild in bicep and tricep       Treatment Charges: Mins Units   []  Modalities     [x]  Ther Exercise 39 3   [x]  Manual Therapy     []  Ther Activities     []  Aquatics     [x]  Vasocompression     []  Other     Total Treatment time 39 3       Assessment: [x] Progressing toward goals:     [x] Other: overall poor tolerance again with Tx this date. Patient very concerned about her bruising and their effect on completing her exercises in clinic and her HEP. In which, the patient states she hasn't been keeping up on. [x] Patient would continue to benefit from skilled physical therapy services in order to: reduce pain with modalities, manual and exercise, improve ROM, strength and endurance as able, tolerate ADL's without aggravation.     Assessment:   STG: (to be met in 33 treatments)  1. ? Pain: Keep pain at 3/10 or less most times-    2. ? ROM: AROM to consistently left shoulder flexion between 110/115 for daily function,   3. ? Strength: Left shoulder tests at 4-4+ with min to no increase in pain for increased tolerance to activities. 4. ? Function: Tolerate being able to blow dry hair, Able to make a ponytail without stressing her neck, Tolerate carrying household items at home    LTG: (to be met in 36 treatments)  1. LEFS improves to 50% or better function         2. Patient to be independent with home exercise program as demonstrated by performance with correct form without cues. Patient goals: less pain and more function      Pt. Education:  [] Yes  [x] No  [] Reviewed Prior HEP/Ed  Method of Education: [x] Verbal  [x] Demo  [] Written  Comprehension of Education:  [x] Verbalizes understanding. [] Demonstrates understanding. [x] Needs review. [] Demonstrates/verbalizes HEP/Ed previously given. Plan: [x] Continue current frequency toward long and short term goals. [x]  Frequency: * Added  2 x/week for 12 visits per Order to Continue Therapy for a total of 36 visits    [x] Specific Instructions for subsequent treatments:  Focus on strengthening left shoulder within the functional range, improving endurance to tolerate functional goals, Myofascial work and/or US to manage shoulder pain especially deltoid insertion.         Time In: 1304        Time Out:  1348      Electronically signed by: George Busch PTA

## 2022-06-21 NOTE — PROGRESS NOTES
Speech Language Pathology  Speech Language Pathology  [address] [facility]    Cognitive/Speech-Language Treatment Note    Date: 6/21/2022  Patients Name: Earl Lakhani  MRN: [id number]  Diagnosis:   Patient Active Problem List   Diagnosis Code    Attention deficit hyperactivity disorder (ADHD) F90.9    Narcolepsy G47.419    Moderate persistent asthma without complication A79.25    History of DVT (deep vein thrombosis) Z86.718    Hiatal hernia K44.9    Cervical radiculopathy M54.12    Ganglion cyst M67.40    Chronic pain of left ankle M25.572, G89.29    Acquired trigger finger M65.30    Osteoarthritis of knee M17.10    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 Marily Sandoval, Y92.009    Closed fracture of multiple ribs of right side S22.41XA    Hemothorax on right J94.2    Contusion of right lung S27.321A    Chronic fatigue syndrome R53.82    Sleep apnea G47.30    Pain in joint involving ankle and foot M25.579    Lumbosacral spondylosis without myelopathy M47.817    Chronic bilateral low back pain with bilateral sciatica M54.42, M54.41, G89.29    Encounter for long-term opiate analgesic use Z79.891    Arthritis of left shoulder region M19.012    Opioid use, unspecified with unspecified opioid-induced disorder F11.99    Stroke-like symptoms R29.90    Ischemic stroke (HCC) I63.9    Acute encephalopathy G93.40    Chronic renal disease, stage III ([facility] [address] [316906] N18.30     Pain: 0/10    Cognitive Treatment    Treatment time: 2178-5979    Subjective: [x] Alert [x] Cooperative     [] Confused     [] Agitated    [] Lethargic    Objective/Assessment:  Goal 1: Pt will recall 3-5 units with distractions with 90% accuracy  Pt and ST reviewed strategies to facilitate recall, specifically relating to recall of appt times- such as calendars, written reminders, alarms, etc. Pt verbalized understanding.      Recall w/ Distractions, 2 functional unit: 1/2 x2 independently (15 minute delay)     Goal 2: Pt will recall orientation information w/ 90% accuracy  Pt oriented to name, age (w/ very min cue for self correction), birthday, month, and place independently. Oriented to year independently w/ visual support.      Goal 3: Pt will complete problem solving/reasoning tasks with 90% accuracy  Stating Situational Problems: 4/6 increased to 6/6 w/ mod verbal cues     Word Deductions: 13/15 increased to 15/15 w/ min verbal cues      Goal 4: Pt will utilize word retrieval strategies to facilitate word finding deficits in 4/5 opportunities during conversation  Pt and ST extensively reviewed strategies to facilitate word retrieval (e.g. gestural facilitation, increased time/patience, coming back to it later, circumlocution, etc.). Pt utilized gestural facilitation, increased time, and circumlocution independently during ST session following discussion. Written education provided.       Goal 5: Pt will complete naming tasks (e.g. responsive, generative) w/ 90% accuracy   Generative Naming, [place]ort: +4 increased to +8 w/ max verbal cues; +8 independently; +5 increased to +8 w/ mod-max verbal cues     Other: Pt expresses frustration over increased difficulty w/ word finding deficits in conversation at home specifically w/ s/o. Pt relates she often resorts to using y/n answers to most things as opposed to responding open endedly. Encouraged pt to bring s/o to next session for education re: supporting conversations w/ people w/ aphasia.  Pt verbalized understanding. Note also pt w/ increased recall of appts this date. Pt recalls she only sees PT on Thursday; also recalls appt for shoulder on Thursday. Plan:  [x] Continue ST services    [] Discharge from ST:      Discharge recommendations: []  Further therapy recommended at discharge. The patient should be able to tolerate at least 3 hours of therapy per day over 5 days or 15 hours over 7 days. [] Further therapy recommended at discharge. [x] No therapy recommended at discharge/Pending POC.     Treatment completed by: Natasha Shannon, SLP

## 2022-06-23 ENCOUNTER — OFFICE VISIT (OUTPATIENT)
Dept: ORTHOPEDIC SURGERY | Age: 62
End: 2022-06-23
Payer: MEDICARE

## 2022-06-23 VITALS — BODY MASS INDEX: 28.07 KG/M2 | WEIGHT: 143 LBS | HEIGHT: 60 IN

## 2022-06-23 DIAGNOSIS — Z96.612 STATUS POST REVERSE TOTAL REPLACEMENT OF LEFT SHOULDER: Primary | ICD-10-CM

## 2022-06-23 PROCEDURE — 3017F COLORECTAL CA SCREEN DOC REV: CPT | Performed by: PHYSICIAN ASSISTANT

## 2022-06-23 PROCEDURE — G8417 CALC BMI ABV UP PARAM F/U: HCPCS | Performed by: PHYSICIAN ASSISTANT

## 2022-06-23 PROCEDURE — G8427 DOCREV CUR MEDS BY ELIG CLIN: HCPCS | Performed by: PHYSICIAN ASSISTANT

## 2022-06-23 PROCEDURE — 1036F TOBACCO NON-USER: CPT | Performed by: PHYSICIAN ASSISTANT

## 2022-06-23 PROCEDURE — 99213 OFFICE O/P EST LOW 20 MIN: CPT | Performed by: PHYSICIAN ASSISTANT

## 2022-06-23 ASSESSMENT — ENCOUNTER SYMPTOMS
CHEST TIGHTNESS: 0
SHORTNESS OF BREATH: 0
DIARRHEA: 0
NAUSEA: 0
APNEA: 0
VOMITING: 0
COLOR CHANGE: 0
CONSTIPATION: 0
ABDOMINAL PAIN: 0
RESPIRATORY NEGATIVE: 1
COUGH: 0
ABDOMINAL DISTENTION: 0

## 2022-06-23 NOTE — PROGRESS NOTES
[address] [facility]  [facility] [address]  Dept: [phone]  Dept Fax: [phone]        Post Operative Follow Up    Subjective:     Chief Complaint   Patient presents with    Follow-up     left shoulder     Post Op Surgery:     The patient is here for a follow up after having a left reverse total shoulder arthroplasty. Date of surgery was 2/8/2022. Patient was supposed to continue outpatient therapy and to limit activities that increase her shoulder pain. She was supposed to follow-up with Dr. Winnie Machado in 4 weeks but she is here for an appointment with me. Patient states they are still having pain. She states she takes Tylenol for the pain but it does not really help. She is going to physical therapy but she states it makes her shoulder hurt worse. Review of Systems   Constitutional: Positive for activity change. Negative for appetite change, fatigue and fever. Respiratory: Negative. Negative for apnea, cough, chest tightness and shortness of breath. Cardiovascular: Negative. Negative for chest pain, palpitations and leg swelling. Gastrointestinal: Negative for abdominal distention, abdominal pain, constipation, diarrhea, nausea and vomiting. Genitourinary: Negative for difficulty urinating, dysuria and hematuria. Musculoskeletal: Positive for arthralgias. Negative for gait problem, joint swelling and myalgias. Skin: Negative for color change and rash. Neurological: Negative for dizziness, weakness, numbness and headaches. Psychiatric/Behavioral: Positive for sleep disturbance. I have reviewed the CC, HPI, ROS, PMH, FHX, Social History, and if not present in this note, I have reviewed in the patient's chart. I agree with the documentation provided by other staff and have reviewed their documentation prior to providing my signature indicating agreement.   Vitals:   Ht 5' (1.524 m)   Wt 143 lb (64.9 kg)   BMI 27.93 kg/m²  Body mass index is 27.93 kg/m². Physical Examination:     Orthopedics:    GENERAL: Alert and oriented X3 in no acute distress. SKIN: Intact without lesions or ulcerations. Incision is well healed with no signs of infection. NEURO: Intact to sensory and motor testing. VASC: Capillary refill is less than 3 seconds. Post Op Exam:    LOCATION: Left shoulder  SITE: Distal neurocirculatory status is intact. EXAM: Sensation is intact to light touch, there is full motor function of the extremity. ROM: 90 degrees of forward elevation, 20 degrees of external rotation in neutral, 60 degrees of external rotation and AB duction, internal rotation to back pocket. 4/5 deltoid strength in all directions    Radiology:   No results found. Assessment:     1. Status post reverse total replacement of left shoulder      Plan:   Post Op Treatment : Patient had the treatment regimen reviewed today 4 months status post reverse total shoulder arthroplasty. I reviewed the films with the patient. During today's visit, we discussed that she is having more pain with physical therapy. I do feel that her range of motion and strength will get better just as she is doing the active normal daily activities. After a total shoulder replacement is that the patient can reach the top of her head and her back pocket. The patient is able to do that with some discomfort but overall is better than she was before surgery. Her partner is older is with her today and states she has some infected teeth that need to come out. I am going to give her a note that is okay to go to the dentist to get the infected teeth out. Infection in the mouth can spread to the shoulder and then she can end up with an infected total shoulder replacement. The patient then stated that they understand the plan and at this time, the patient has opted canceling physical therapy and doing exercises on her own.   Patient should return to the office in 2 months to f/u with me. The patient will call the office immediately with any problems that may arise. No orders of the defined types were placed in this encounter. No orders of the defined types were placed in this encounter.         Electronically signed by Nino Zamora PA-C, on 6/23/2022 at 1:34 PM

## 2022-06-23 NOTE — LETTER
[facility]  [address] [facility] [address] [facility]  Phone: [phone]  Fax: [phone]    Antonio Junior        June 23, 2022     Patient: Selena Kang   YOB: 1960   Date of Visit: 6/23/2022       To Whom It May Concern: It is my medical opinion that Ijeoma Franklin can go to her dentist and have her infected teeth removed. If you have any questions or concerns, please don't hesitate to call.     Sincerely,        Jocy Tamez PA-C

## 2022-06-28 ENCOUNTER — HOSPITAL ENCOUNTER (OUTPATIENT)
Dept: SPEECH THERAPY | Age: 62
Setting detail: THERAPIES SERIES
Discharge: HOME | End: 2022-06-28
Payer: MEDICARE

## 2022-06-28 ENCOUNTER — HOSPITAL ENCOUNTER (OUTPATIENT)
Dept: PHYSICAL THERAPY | Age: 62
Setting detail: THERAPIES SERIES
Discharge: HOME | End: 2022-06-28
Payer: MEDICARE

## 2022-06-28 ENCOUNTER — APPOINTMENT (OUTPATIENT)
Dept: SPEECH THERAPY | Age: 62
End: 2022-06-28
Payer: MEDICARE

## 2022-06-28 PROCEDURE — 97130 THER IVNTJ EA ADDL 15 MIN: CPT

## 2022-06-28 PROCEDURE — 92507 TX SP LANG VOICE COMM INDIV: CPT

## 2022-06-28 PROCEDURE — 97129 THER IVNTJ 1ST 15 MIN: CPT

## 2022-06-28 NOTE — PROGRESS NOTES
Speech Language Pathology  Speech Language Pathology  [address] [place] [address]    Cognitive/Speech-Language Treatment Note    Date: 6/28/2022  Patients Name: Tate Winston  MRN: [id number]  Diagnosis:   Patient Active Problem List   Diagnosis Code    Attention deficit hyperactivity disorder (ADHD) F90.9    Narcolepsy G47.419    Moderate persistent asthma without complication H73.58    History of DVT (deep vein thrombosis) Z86.718    Hiatal hernia K44.9    Cervical radiculopathy M54.12    Ganglion cyst M67.40    Chronic pain of left ankle M25.572, G89.29    Acquired trigger finger M65.30    Osteoarthritis of knee M17.10    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 Gilson Latham, Y92.009    Closed fracture of multiple ribs of right side S22.41XA    Hemothorax on right J94.2    Contusion of right lung S27.321A    Chronic fatigue syndrome R53.82    Sleep apnea G47.30    Pain in joint involving ankle and foot M25.579    Lumbosacral spondylosis without myelopathy M47.817    Chronic bilateral low back pain with bilateral sciatica M54.42, M54.41, G89.29    Encounter for long-term opiate analgesic use Z79.891    Arthritis of left shoulder region M19.012    Opioid use, unspecified with unspecified opioid-induced disorder F11.99    Stroke-like symptoms R29.90    Ischemic stroke (HCC) I63.9    Acute encephalopathy G93.40    Chronic renal disease, stage III ([address] [492602] N18.30     Pain: 0/10    Cognitive Treatment    Treatment time: 4655-5278    Subjective: [x] Alert [x] Cooperative     [] Confused     [] Agitated    [] Lethargic    Objective/Assessment:    Goal 1: Pt will recall 3-5 units with distractions with 90% accuracy  Pt and ST reviewed strategies to facilitate recall, specifically relating to recall of appt times- such as calendars, written reminders, alarms, etc. Pt verbalized understanding.      Picture Retention, Cupboards: 7/10 increased to 10/10 w/ mod-max verbal cues     Memory and Mental Manipulation, Size: 16/20 increased to 20/20 w/ min verbal cues and repetition     Goal 2: Pt will recall orientation information w/ 90% accuracy  Pt oriented to name, age (w/ very min cue for self correction), birthday, month, year, and place independently.      Goal 3: Pt will complete problem solving/reasoning tasks with 90% accuracy  Word Deductions: 17/20 increased to 20/20 w/ min-max verbal cues      Goal 4: Pt will utilize word retrieval strategies to facilitate word finding deficits in 4/5 opportunities during conversation  Pt and ST extensively reviewed strategies to facilitate word retrieval (e.g. gestural facilitation, increased time/patience, coming back to it later, circumlocution, etc.). Pt utilized gestural facilitation, increased time, and circumlocution independently during ST session following discussion. Written education provided.       Goal 5: Pt will complete naming tasks (e.g. responsive, generative) w/ 90% accuracy   Generative Naming, [place]: +3 increased to +8 w/ max verbal cues; +8 independently     Plan:  [x] Continue ST services    [] Discharge from ST:      Discharge recommendations: []  Further therapy recommended at discharge. The patient should be able to tolerate at least 3 hours of therapy per day over 5 days or 15 hours over 7 days. [] Further therapy recommended at discharge. [x] No therapy recommended at discharge/Pending POC.     Treatment completed by: Bethany Murcia, SLP

## 2022-06-28 NOTE — FLOWSHEET NOTE
[x] [facility]  [address]    [phone]  F: [phone]     Physical Therapy Cancel/No Show note    Date: 2022  Patient: Elena Doss  : 1960  MRN: [id number]    Cancels/No Shows to date: 4/3    For today's appointment patient:    [x]  Cancelled    [] Rescheduled appointment    [] No-show     Reason given by patient:    []  Patient ill    []  Conflicting appointment    [] No transportation      [] Conflict with work    [] No reason given    [] Weather related-    [] [id number]    [x] Other:      Comments: 22: pt states she is no longer coming to PT due to pain and stating ortho cleared her to stop PT. Will discharge at this time.          [] Next appointment was confirmed-        Electronically signed by: Cale Lin PTA

## 2022-06-30 ENCOUNTER — APPOINTMENT (OUTPATIENT)
Dept: SPEECH THERAPY | Age: 62
End: 2022-06-30
Payer: MEDICARE

## 2022-06-30 ENCOUNTER — APPOINTMENT (OUTPATIENT)
Dept: PHYSICAL THERAPY | Age: 62
End: 2022-06-30
Payer: MEDICARE

## 2022-07-07 ENCOUNTER — HOSPITAL ENCOUNTER (OUTPATIENT)
Dept: SPEECH THERAPY | Age: 62
Setting detail: THERAPIES SERIES
Discharge: HOME | DRG: 812 | End: 2022-07-07
Payer: MEDICARE

## 2022-07-07 NOTE — PROGRESS NOTES
Speech Language Pathology    [] [facility] [facility]  [address]    [phone]  F: [phone]   [] [address] [facility] [address]   [address]  P: [phone]  F: [phone]  [] [address]  Therapy  [address] [facility]  P: [phone]  F: [phone] [] [address] [employer]  P: [phone]  F: [phone]  [] [address]  [address] [facility]   [address]   [place]: [phone]  F: [phone]   [] [address]  [place]: [phone]   F: [phone]     Physical Therapy Cancel/No Show note    Date: 2022  Patient: Nick Romo  : 1960  MRN: [id number]    Cancels/No Shows to date: 0    For today's appointment patient:    []  Cancelled    [] Rescheduled appointment    [] No-show     Reason given by patient:    []  Patient ill    []  Conflicting appointment    [] No transportation      [] Conflict with work    [] No reason given    [] Weather related    [] COVID-19    [x] Other:      Comments: Provider cx session as provider ill. Left VM for pt. Pt next appt. Tuesday @ 1.        [] Next appointment was confirmed    Electronically signed by: UBALDO Spencer

## 2022-07-12 ENCOUNTER — APPOINTMENT (OUTPATIENT)
Dept: GENERAL RADIOLOGY | Age: 62
DRG: 812 | End: 2022-07-12
Payer: MEDICARE

## 2022-07-12 ENCOUNTER — APPOINTMENT (OUTPATIENT)
Dept: CT IMAGING | Age: 62
DRG: 812 | End: 2022-07-12
Payer: MEDICARE

## 2022-07-12 ENCOUNTER — APPOINTMENT (OUTPATIENT)
Dept: ULTRASOUND IMAGING | Age: 62
DRG: 812 | End: 2022-07-12
Payer: MEDICARE

## 2022-07-12 ENCOUNTER — HOSPITAL ENCOUNTER (OUTPATIENT)
Dept: SPEECH THERAPY | Age: 62
Setting detail: THERAPIES SERIES
Discharge: HOME | DRG: 812 | End: 2022-07-12
Payer: MEDICARE

## 2022-07-12 ENCOUNTER — HOSPITAL ENCOUNTER (INPATIENT)
Age: 62
LOS: 1 days | Discharge: HOME | DRG: 812 | End: 2022-07-15
Attending: EMERGENCY MEDICINE | Admitting: EMERGENCY MEDICINE
Payer: MEDICARE

## 2022-07-12 DIAGNOSIS — D64.9 ANEMIA, UNSPECIFIED TYPE: Primary | ICD-10-CM

## 2022-07-12 DIAGNOSIS — R53.1 GENERALIZED WEAKNESS: ICD-10-CM

## 2022-07-12 LAB
-: ABNORMAL
ABSOLUTE EOS #: 0.38 K/UL (ref 0–0.44)
ABSOLUTE IMMATURE GRANULOCYTE: <0.03 K/UL (ref 0–0.3)
ABSOLUTE LYMPH #: 1.05 K/UL (ref 1.1–3.7)
ABSOLUTE MONO #: 0.47 K/UL (ref 0.1–1.2)
ALBUMIN SERPL-MCNC: 4 G/DL (ref 3.5–5.2)
ALBUMIN/GLOBULIN RATIO: 1.4 (ref 1–2.5)
ALP BLD-CCNC: 364 U/L (ref 35–104)
ALT SERPL-CCNC: 34 U/L (ref 5–33)
ANION GAP SERPL CALCULATED.3IONS-SCNC: 13 MMOL/L (ref 9–17)
AST SERPL-CCNC: 42 U/L
BASOPHILS # BLD: 1 % (ref 0–2)
BASOPHILS ABSOLUTE: 0.06 K/UL (ref 0–0.2)
BILIRUB SERPL-MCNC: 0.22 MG/DL (ref 0.3–1.2)
BILIRUBIN DIRECT: <0.08 MG/DL
BILIRUBIN URINE: NEGATIVE
BILIRUBIN, INDIRECT: ABNORMAL MG/DL (ref 0–1)
BUN BLDV-MCNC: 18 MG/DL (ref 8–23)
CALCIUM SERPL-MCNC: 9.5 MG/DL (ref 8.6–10.4)
CASTS UA: ABNORMAL /LPF (ref 0–8)
CHLORIDE BLD-SCNC: 101 MMOL/L (ref 98–107)
CO2: 25 MMOL/L (ref 20–31)
COLOR: YELLOW
CREAT SERPL-MCNC: 1.31 MG/DL (ref 0.5–0.9)
EOSINOPHILS RELATIVE PERCENT: 7 % (ref 1–4)
EPITHELIAL CELLS UA: ABNORMAL /HPF (ref 0–5)
GFR AFRICAN AMERICAN: 50 ML/MIN
GFR NON-AFRICAN AMERICAN: 41 ML/MIN
GFR SERPL CREATININE-BSD FRML MDRD: ABNORMAL ML/MIN/{1.73_M2}
GLUCOSE BLD-MCNC: 121 MG/DL (ref 70–99)
GLUCOSE URINE: NEGATIVE
HCT VFR BLD CALC: 35.4 % (ref 36.3–47.1)
HEMOGLOBIN: 10.8 G/DL (ref 11.9–15.1)
IMMATURE GRANULOCYTES: 0 %
KETONES, URINE: NEGATIVE
LEUKOCYTE ESTERASE, URINE: ABNORMAL
LYMPHOCYTES # BLD: 19 % (ref 24–43)
MAGNESIUM: 2.3 MG/DL (ref 1.6–2.6)
MCH RBC QN AUTO: 27.1 PG (ref 25.2–33.5)
MCHC RBC AUTO-ENTMCNC: 30.5 G/DL (ref 28.4–34.8)
MCV RBC AUTO: 88.7 FL (ref 82.6–102.9)
MONOCYTES # BLD: 8 % (ref 3–12)
NITRITE, URINE: NEGATIVE
NRBC AUTOMATED: 0 PER 100 WBC
PDW BLD-RTO: 14.6 % (ref 11.8–14.4)
PH UA: 7 (ref 5–8)
PLATELET # BLD: 409 K/UL (ref 138–453)
PMV BLD AUTO: 10.6 FL (ref 8.1–13.5)
POTASSIUM SERPL-SCNC: 4.4 MMOL/L (ref 3.7–5.3)
PROTEIN UA: NEGATIVE
RBC # BLD: 3.99 M/UL (ref 3.95–5.11)
RBC # BLD: ABNORMAL 10*6/UL
RBC UA: ABNORMAL /HPF (ref 0–4)
SARS-COV-2, RAPID: NOT DETECTED
SEG NEUTROPHILS: 65 % (ref 36–65)
SEGMENTED NEUTROPHILS ABSOLUTE COUNT: 3.71 K/UL (ref 1.5–8.1)
SODIUM BLD-SCNC: 139 MMOL/L (ref 135–144)
SPECIFIC GRAVITY UA: 1.01 (ref 1–1.03)
SPECIMEN DESCRIPTION: NORMAL
TOTAL PROTEIN: 6.8 G/DL (ref 6.4–8.3)
TROPONIN, HIGH SENSITIVITY: 8 NG/L (ref 0–14)
TROPONIN, HIGH SENSITIVITY: 9 NG/L (ref 0–14)
TURBIDITY: CLEAR
URINE HGB: NEGATIVE
UROBILINOGEN, URINE: NORMAL
WBC # BLD: 5.7 K/UL (ref 3.5–11.3)
WBC UA: ABNORMAL /HPF (ref 0–5)

## 2022-07-12 PROCEDURE — 84484 ASSAY OF TROPONIN QUANT: CPT

## 2022-07-12 PROCEDURE — 6370000000 HC RX 637 (ALT 250 FOR IP): Performed by: STUDENT IN AN ORGANIZED HEALTH CARE EDUCATION/TRAINING PROGRAM

## 2022-07-12 PROCEDURE — G0378 HOSPITAL OBSERVATION PER HR: HCPCS

## 2022-07-12 PROCEDURE — 76705 ECHO EXAM OF ABDOMEN: CPT

## 2022-07-12 PROCEDURE — 93005 ELECTROCARDIOGRAM TRACING: CPT | Performed by: STUDENT IN AN ORGANIZED HEALTH CARE EDUCATION/TRAINING PROGRAM

## 2022-07-12 PROCEDURE — 2580000003 HC RX 258: Performed by: STUDENT IN AN ORGANIZED HEALTH CARE EDUCATION/TRAINING PROGRAM

## 2022-07-12 PROCEDURE — 80076 HEPATIC FUNCTION PANEL: CPT

## 2022-07-12 PROCEDURE — 71045 X-RAY EXAM CHEST 1 VIEW: CPT

## 2022-07-12 PROCEDURE — 80048 BASIC METABOLIC PNL TOTAL CA: CPT

## 2022-07-12 PROCEDURE — 70450 CT HEAD/BRAIN W/O DYE: CPT

## 2022-07-12 PROCEDURE — 97129 THER IVNTJ 1ST 15 MIN: CPT

## 2022-07-12 PROCEDURE — 81001 URINALYSIS AUTO W/SCOPE: CPT

## 2022-07-12 PROCEDURE — 87635 SARS-COV-2 COVID-19 AMP PRB: CPT

## 2022-07-12 PROCEDURE — 92507 TX SP LANG VOICE COMM INDIV: CPT

## 2022-07-12 PROCEDURE — 99285 EMERGENCY DEPT VISIT HI MDM: CPT

## 2022-07-12 PROCEDURE — 83735 ASSAY OF MAGNESIUM: CPT

## 2022-07-12 PROCEDURE — 85025 COMPLETE CBC W/AUTO DIFF WBC: CPT

## 2022-07-12 RX ORDER — SODIUM CHLORIDE 9 MG/ML
25 INJECTION, SOLUTION INTRAVENOUS PRN
Status: DISCONTINUED | OUTPATIENT
Start: 2022-07-12 | End: 2022-07-15 | Stop reason: HOSPADM

## 2022-07-12 RX ORDER — ONDANSETRON 2 MG/ML
4 INJECTION INTRAMUSCULAR; INTRAVENOUS EVERY 4 HOURS PRN
Status: DISCONTINUED | OUTPATIENT
Start: 2022-07-12 | End: 2022-07-15 | Stop reason: HOSPADM

## 2022-07-12 RX ORDER — BUPROPION HYDROCHLORIDE 150 MG/1
300 TABLET ORAL DAILY
Status: DISCONTINUED | OUTPATIENT
Start: 2022-07-13 | End: 2022-07-15 | Stop reason: HOSPADM

## 2022-07-12 RX ORDER — POLYETHYLENE GLYCOL 3350 17 G/17G
17 POWDER, FOR SOLUTION ORAL DAILY PRN
Status: DISCONTINUED | OUTPATIENT
Start: 2022-07-12 | End: 2022-07-15 | Stop reason: HOSPADM

## 2022-07-12 RX ORDER — POTASSIUM CHLORIDE 20 MEQ/1
40 TABLET, EXTENDED RELEASE ORAL PRN
Status: DISCONTINUED | OUTPATIENT
Start: 2022-07-12 | End: 2022-07-15 | Stop reason: HOSPADM

## 2022-07-12 RX ORDER — CLOPIDOGREL BISULFATE 75 MG/1
75 TABLET ORAL DAILY
Status: DISCONTINUED | OUTPATIENT
Start: 2022-07-13 | End: 2022-07-15 | Stop reason: HOSPADM

## 2022-07-12 RX ORDER — DEXTROAMPHETAMINE SACCHARATE, AMPHETAMINE ASPARTATE, DEXTROAMPHETAMINE SULFATE AND AMPHETAMINE SULFATE 2.5; 2.5; 2.5; 2.5 MG/1; MG/1; MG/1; MG/1
30 TABLET ORAL 2 TIMES DAILY
Status: DISCONTINUED | OUTPATIENT
Start: 2022-07-12 | End: 2022-07-15 | Stop reason: HOSPADM

## 2022-07-12 RX ORDER — ACETAMINOPHEN 325 MG/1
650 TABLET ORAL EVERY 6 HOURS PRN
Status: DISCONTINUED | OUTPATIENT
Start: 2022-07-12 | End: 2022-07-15 | Stop reason: HOSPADM

## 2022-07-12 RX ORDER — ENOXAPARIN SODIUM 100 MG/ML
40 INJECTION SUBCUTANEOUS DAILY
Status: DISCONTINUED | OUTPATIENT
Start: 2022-07-13 | End: 2022-07-15 | Stop reason: HOSPADM

## 2022-07-12 RX ORDER — 0.9 % SODIUM CHLORIDE 0.9 %
500 INTRAVENOUS SOLUTION INTRAVENOUS ONCE
Status: COMPLETED | OUTPATIENT
Start: 2022-07-12 | End: 2022-07-12

## 2022-07-12 RX ORDER — POTASSIUM CHLORIDE 7.45 MG/ML
10 INJECTION INTRAVENOUS PRN
Status: DISCONTINUED | OUTPATIENT
Start: 2022-07-12 | End: 2022-07-15 | Stop reason: HOSPADM

## 2022-07-12 RX ORDER — ACETAMINOPHEN 650 MG/1
650 SUPPOSITORY RECTAL EVERY 6 HOURS PRN
Status: DISCONTINUED | OUTPATIENT
Start: 2022-07-12 | End: 2022-07-15 | Stop reason: HOSPADM

## 2022-07-12 RX ORDER — SODIUM CHLORIDE 9 MG/ML
INJECTION, SOLUTION INTRAVENOUS CONTINUOUS
Status: DISCONTINUED | OUTPATIENT
Start: 2022-07-12 | End: 2022-07-13

## 2022-07-12 RX ORDER — SODIUM CHLORIDE 0.9 % (FLUSH) 0.9 %
5-40 SYRINGE (ML) INJECTION EVERY 12 HOURS SCHEDULED
Status: DISCONTINUED | OUTPATIENT
Start: 2022-07-12 | End: 2022-07-15 | Stop reason: HOSPADM

## 2022-07-12 RX ORDER — PROPRANOLOL HYDROCHLORIDE 80 MG/1
80 CAPSULE, EXTENDED RELEASE ORAL DAILY
Status: DISCONTINUED | OUTPATIENT
Start: 2022-07-13 | End: 2022-07-15

## 2022-07-12 RX ORDER — PANTOPRAZOLE SODIUM 40 MG/1
40 TABLET, DELAYED RELEASE ORAL DAILY
Status: DISCONTINUED | OUTPATIENT
Start: 2022-07-13 | End: 2022-07-15 | Stop reason: HOSPADM

## 2022-07-12 RX ORDER — SODIUM CHLORIDE 0.9 % (FLUSH) 0.9 %
5-40 SYRINGE (ML) INJECTION PRN
Status: DISCONTINUED | OUTPATIENT
Start: 2022-07-12 | End: 2022-07-15 | Stop reason: HOSPADM

## 2022-07-12 RX ORDER — CEPHALEXIN 250 MG/1
500 CAPSULE ORAL ONCE
Status: COMPLETED | OUTPATIENT
Start: 2022-07-12 | End: 2022-07-12

## 2022-07-12 RX ORDER — ATORVASTATIN CALCIUM 80 MG/1
80 TABLET, FILM COATED ORAL NIGHTLY
Status: DISCONTINUED | OUTPATIENT
Start: 2022-07-12 | End: 2022-07-15 | Stop reason: HOSPADM

## 2022-07-12 RX ORDER — HYDROXYZINE 50 MG/1
50 TABLET, FILM COATED ORAL EVERY 6 HOURS PRN
Status: DISCONTINUED | OUTPATIENT
Start: 2022-07-12 | End: 2022-07-15 | Stop reason: HOSPADM

## 2022-07-12 RX ORDER — CITALOPRAM 20 MG/1
40 TABLET ORAL DAILY
Status: DISCONTINUED | OUTPATIENT
Start: 2022-07-13 | End: 2022-07-15 | Stop reason: HOSPADM

## 2022-07-12 RX ADMIN — SODIUM CHLORIDE 500 ML: 9 INJECTION, SOLUTION INTRAVENOUS at 17:47

## 2022-07-12 RX ADMIN — CEPHALEXIN 500 MG: 250 CAPSULE ORAL at 17:46

## 2022-07-12 ASSESSMENT — PAIN SCALES - GENERAL: PAINLEVEL_OUTOF10: 0

## 2022-07-12 NOTE — ED TRIAGE NOTES
Patient presented to the ED today with complaints of abdominal pain, bilateral leg/arm weakness, unsteady gait, fatigue. Patient states that symptoms presented 2 weeks ago.

## 2022-07-12 NOTE — PROGRESS NOTES
Speech Language Pathology  Speech Language Pathology  [facility]    Cognitive/Speech-Language Treatment Note    Date: 7/12/2022  Patients Name: Chauncey Flores  MRN: [id number]  Diagnosis:   Patient Active Problem List   Diagnosis Code    Attention deficit hyperactivity disorder (ADHD) F90.9    Narcolepsy G47.419    Moderate persistent asthma without complication O20.79    History of DVT (deep vein thrombosis) Z86.718    Hiatal hernia K44.9    Cervical radiculopathy M54.12    Ganglion cyst M67.40    Chronic pain of left ankle M25.572, G89.29    Acquired trigger finger M65.30    Osteoarthritis of knee M17.10    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 Lawrence Sandhoff, Y92.009    Closed fracture of multiple ribs of right side S22.41XA    Hemothorax on right J94.2    Contusion of right lung S27.321A    Chronic fatigue syndrome R53.82    Sleep apnea G47.30    Pain in joint involving ankle and foot M25.579    Lumbosacral spondylosis without myelopathy M47.817    Chronic bilateral low back pain with bilateral sciatica M54.42, M54.41, G89.29    Encounter for long-term opiate analgesic use Z79.891    Arthritis of left shoulder region M19.012    Opioid use, unspecified with unspecified opioid-induced disorder F11.99    Stroke-like symptoms R29.90    Ischemic stroke (HCC) I63.9    Acute encephalopathy G93.40    Chronic renal disease, stage III ([facility] [address] [[id number]] N18.30     Pain: 0/10    Cognitive Treatment    Treatment time: 1221-1636    Subjective: [x] Alert [x] Cooperative     [] Confused     [] Agitated    [] Lethargic    Objective/Assessment:    Goal 1: Pt will recall 3-5 units with distractions with 90% accuracy  Pt and ST reviewed strategies to facilitate recall, specifically relating to recall of appt times- such as calendars, written reminders, alarms, etc. Pt verbalized understanding.      Recall w/ Distraction, 1 functional unit: 0/1 increased to 1/1 w/ min verbal cues; 1/1 x2 independently     Sorting Categories for Associations: 100% independently     Goal 2: Pt will recall orientation information w/ 90% accuracy  Pt oriented to name, age (w/ very min cue for self correction),  birthday, month, year, and place independently.      Goal 3: Pt will complete problem solving/reasoning tasks with 90% accuracy  Multiple Uses, Objects (2): 5/6 increased to 6/6 w/ max verbal cues      Goal 4: Pt will utilize word retrieval strategies to facilitate word finding deficits in 4/5 opportunities during conversation  Pt and ST extensively reviewed strategies to facilitate word retrieval (e.g. gestural facilitation, increased time/patience, coming back to it later, circumlocution, etc.). Pt utilized gestural facilitation, increased time, and circumlocution independently during ST session following discussion. Written education provided.       Goal 5: Pt will complete naming tasks (e.g. responsive, generative) w/ 90% accuracy   Themes, Games/Toys: 18/24 increased to 23/24 w/ min-max verbal cues      Other: Pt arrived to ST session w/ c/o feeling off and SOB from walk from [facility] to clinic. T/o session, pt w/ c/o feeling SOB, dizzy, lightheaded, and w/ heavy arms and legs. Pt requesting to go to ER to be seen. ST and pt wrote out a list of symptoms (to facilitate during conversations w/ RNs and MDs in ER). ST accompanied pt to ED. Session d/c early as pt to ER.      Plan:  [x] Continue ST services    [] Discharge from ST:      Discharge recommendations: []  Further therapy recommended at discharge. The patient should be able to tolerate at least 3 hours of therapy per day over 5 days or 15 hours over 7 days. [x] Further therapy recommended at discharge. [] No therapy recommended at discharge.     Treatment completed by: Rigoberto Bennett, SLP

## 2022-07-12 NOTE — ED PROVIDER NOTES
[facility]     Emergency Department     Faculty Note/ Attestation      Pt Name: Ana Ro                                       MRN: [id number]  [place] 1960  Date of evaluation: 7/12/2022  Patients PCP:    Melanie Marquez DO    Attestation  I performed a history and physical examination of the patient/ or directly observed  and discussed management with the resident. I reviewed the residents note and agree with the documented findings and plan of care. Any areas of disagreement are noted on the chart. I was personally present for the key portions of any procedures. I have documented in the chart those procedures where I was not present during the key portions. I have reviewed the emergency nurses triage note. I agree with the chief complaint, past medical history, past surgical history, allergies, medications, social and family history as documented unless otherwise noted below. For Physician Assistant/ Nurse Practitioner cases/documentation I have personally evaluated this patient and have completed at least one if not all key elements of the E/M (history, physical exam, and MDM). Additional findings are as noted. This patient was evaluated in the Emergency Department for symptoms described in the history of present illness. The patient was evaluated in the context of the global COVID-19 pandemic, which necessitated consideration that the patient might be at risk for infection with the SARS-CoV-2 virus that causes COVID-19. Institutional protocols and algorithms that pertain to the evaluation of patients at risk for COVID-19 are in a state of rapid change based on information released by regulatory bodies including the CDC and federal and state organizations. These policies and algorithms were followed during the patient's care in the ED.      Initial Screens:        Param Coma Scale  Eye Opening: Spontaneous  Best Verbal Response: Oriented  Best Motor Response: Obeys commands  Param Coma Scale Score: 15    Vitals:    Vitals:    07/12/22 1425 07/12/22 1431 07/12/22 1432 07/12/22 1433   BP: (!) 131/90  115/81    Pulse:  62     Resp:       Temp:       TempSrc:       SpO2:    96%   Weight:           Chief Complaint      Chief Complaint   Patient presents with    Dizziness    Abdominal Pain    Fatigue    Shortness of Breath    Gait Problem          weight is 160 lb (72.6 kg). Her oral temperature is 97 °F (36.1 °C). Her blood pressure is 115/81 and her pulse is 62. Her respiration is 16 and oxygen saturation is 96%. DIAGNOSTIC RESULTS       RADIOLOGY:   CT Head W/O Contrast   Final Result   No acute intracranial abnormality.          XR CHEST PORTABLE    (Results Pending)         LABS:  Labs Reviewed   CBC WITH AUTO DIFFERENTIAL - Abnormal; Notable for the following components:       Result Value    Hemoglobin 10.8 (*)     Hematocrit 35.4 (*)     RDW 14.6 (*)     Lymphocytes 19 (*)     Eosinophils % 7 (*)     Absolute Lymph # 1.05 (*)     All other components within normal limits   BASIC METABOLIC PANEL - Abnormal; Notable for the following components:    Glucose 121 (*)     CREATININE 1.31 (*)     GFR Non- 41 (*)     GFR  50 (*)     All other components within normal limits   TROPONIN   MAGNESIUM   URINALYSIS WITH MICROSCOPIC   HEPATIC FUNCTION PANEL         EMERGENCY DEPARTMENT COURSE:     -------------------------      BP: 115/81, Temp: 97 °F (36.1 °C), Heart Rate: 62, Resp: 16    System Problem List     Patient Active Problem List   Diagnosis    Attention deficit hyperactivity disorder (ADHD)    Narcolepsy    Moderate persistent asthma without complication    History of DVT (deep vein thrombosis)    Hiatal hernia    Cervical radiculopathy    Ganglion cyst    Chronic pain of left ankle    Acquired trigger finger    Osteoarthritis of knee    Osteoarthritis of wrist    Pes anserinus bursitis    Plantar fasciitis    Flexion contracture of joint of foot, left    Essential hypertension    Chronic anticoagulation    Iron deficiency anemia due to chronic blood loss    GERD (gastroesophageal reflux disease)    Absolute anemia    Anemia    Generalized anxiety disorder    Vitamin B12 deficiency    Iron malabsorption    Fall at home, initial encounter    Closed fracture of multiple ribs of right side    Hemothorax on right    Contusion of right lung    Chronic fatigue syndrome    Sleep apnea    Pain in joint involving ankle and foot    Lumbosacral spondylosis without myelopathy    Chronic bilateral low back pain with bilateral sciatica    Encounter for long-term opiate analgesic use    Arthritis of left shoulder region    Opioid use, unspecified with unspecified opioid-induced disorder    Stroke-like symptoms    Ischemic stroke ([facility] [address]    Acute encephalopathy    Chronic renal disease, stage III ([facility] [address] [[id number]]       Comments  Chronic Prob List noted    No notes of EC Admission Criteria type on file. Aimee Tesfaye MD,, MD, F.A.C.E.P.   Attending Emergency Physician         Aimee Tesfaye MD  07/12/22 3516

## 2022-07-13 LAB
ABSOLUTE EOS #: 0.44 K/UL (ref 0–0.44)
ABSOLUTE IMMATURE GRANULOCYTE: 0.03 K/UL (ref 0–0.3)
ABSOLUTE LYMPH #: 1.35 K/UL (ref 1.1–3.7)
ABSOLUTE MONO #: 1.02 K/UL (ref 0.1–1.2)
BASOPHILS # BLD: 1 % (ref 0–2)
BASOPHILS ABSOLUTE: 0.07 K/UL (ref 0–0.2)
DATE, STOOL #1: ABNORMAL
EKG ATRIAL RATE: 53 BPM
EKG ATRIAL RATE: 54 BPM
EKG P AXIS: 43 DEGREES
EKG P AXIS: 60 DEGREES
EKG P-R INTERVAL: 150 MS
EKG P-R INTERVAL: 158 MS
EKG Q-T INTERVAL: 462 MS
EKG Q-T INTERVAL: 480 MS
EKG QRS DURATION: 86 MS
EKG QRS DURATION: 90 MS
EKG QTC CALCULATION (BAZETT): 433 MS
EKG QTC CALCULATION (BAZETT): 455 MS
EKG R AXIS: -6 DEGREES
EKG R AXIS: 41 DEGREES
EKG T AXIS: 33 DEGREES
EKG T AXIS: 68 DEGREES
EKG VENTRICULAR RATE: 53 BPM
EKG VENTRICULAR RATE: 54 BPM
EOSINOPHILS RELATIVE PERCENT: 6 % (ref 1–4)
FERRITIN: 12 NG/ML (ref 13–150)
FOLATE: 10.1 NG/ML
HCT VFR BLD CALC: 35.4 % (ref 36.3–47.1)
HEMOCCULT SP1 STL QL: POSITIVE
HEMOGLOBIN: 10.5 G/DL (ref 11.9–15.1)
IMMATURE GRANULOCYTES: 0 %
IRON SATURATION: 11 % (ref 20–55)
IRON: 31 UG/DL (ref 37–145)
LYMPHOCYTES # BLD: 18 % (ref 24–43)
MCH RBC QN AUTO: 26.6 PG (ref 25.2–33.5)
MCHC RBC AUTO-ENTMCNC: 29.7 G/DL (ref 28.4–34.8)
MCV RBC AUTO: 89.6 FL (ref 82.6–102.9)
MONOCYTES # BLD: 13 % (ref 3–12)
NRBC AUTOMATED: 0 PER 100 WBC
PDW BLD-RTO: 14.5 % (ref 11.8–14.4)
PLATELET # BLD: 349 K/UL (ref 138–453)
PMV BLD AUTO: 10 FL (ref 8.1–13.5)
RBC # BLD: 3.95 M/UL (ref 3.95–5.11)
RBC # BLD: ABNORMAL 10*6/UL
SEG NEUTROPHILS: 62 % (ref 36–65)
SEGMENTED NEUTROPHILS ABSOLUTE COUNT: 4.76 K/UL (ref 1.5–8.1)
TIME, STOOL #1: ABNORMAL
TOTAL IRON BINDING CAPACITY: 283 UG/DL (ref 250–450)
UNSATURATED IRON BINDING CAPACITY: 252 UG/DL (ref 112–347)
VITAMIN B-12: 503 PG/ML (ref 232–1245)
WBC # BLD: 7.7 K/UL (ref 3.5–11.3)

## 2022-07-13 PROCEDURE — G0378 HOSPITAL OBSERVATION PER HR: HCPCS

## 2022-07-13 PROCEDURE — 96366 THER/PROPH/DIAG IV INF ADDON: CPT

## 2022-07-13 PROCEDURE — 6360000002 HC RX W HCPCS: Performed by: INTERNAL MEDICINE

## 2022-07-13 PROCEDURE — 96372 THER/PROPH/DIAG INJ SC/IM: CPT

## 2022-07-13 PROCEDURE — 93970 EXTREMITY STUDY: CPT

## 2022-07-13 PROCEDURE — 82746 ASSAY OF FOLIC ACID SERUM: CPT

## 2022-07-13 PROCEDURE — 93005 ELECTROCARDIOGRAM TRACING: CPT | Performed by: STUDENT IN AN ORGANIZED HEALTH CARE EDUCATION/TRAINING PROGRAM

## 2022-07-13 PROCEDURE — 2580000003 HC RX 258: Performed by: INTERNAL MEDICINE

## 2022-07-13 PROCEDURE — 82607 VITAMIN B-12: CPT

## 2022-07-13 PROCEDURE — 85025 COMPLETE CBC W/AUTO DIFF WBC: CPT

## 2022-07-13 PROCEDURE — 96365 THER/PROPH/DIAG IV INF INIT: CPT

## 2022-07-13 PROCEDURE — 83550 IRON BINDING TEST: CPT

## 2022-07-13 PROCEDURE — 6360000002 HC RX W HCPCS: Performed by: STUDENT IN AN ORGANIZED HEALTH CARE EDUCATION/TRAINING PROGRAM

## 2022-07-13 PROCEDURE — 82270 OCCULT BLOOD FECES: CPT

## 2022-07-13 PROCEDURE — 6370000000 HC RX 637 (ALT 250 FOR IP): Performed by: STUDENT IN AN ORGANIZED HEALTH CARE EDUCATION/TRAINING PROGRAM

## 2022-07-13 PROCEDURE — 36415 COLL VENOUS BLD VENIPUNCTURE: CPT

## 2022-07-13 PROCEDURE — 99222 1ST HOSP IP/OBS MODERATE 55: CPT | Performed by: INTERNAL MEDICINE

## 2022-07-13 PROCEDURE — 82728 ASSAY OF FERRITIN: CPT

## 2022-07-13 PROCEDURE — 83540 ASSAY OF IRON: CPT

## 2022-07-13 PROCEDURE — 2580000003 HC RX 258: Performed by: STUDENT IN AN ORGANIZED HEALTH CARE EDUCATION/TRAINING PROGRAM

## 2022-07-13 RX ADMIN — CITALOPRAM 40 MG: 20 TABLET, FILM COATED ORAL at 10:12

## 2022-07-13 RX ADMIN — IRON SUCROSE 300 MG: 20 INJECTION, SOLUTION INTRAVENOUS at 18:34

## 2022-07-13 RX ADMIN — SODIUM CHLORIDE, PRESERVATIVE FREE 10 ML: 5 INJECTION INTRAVENOUS at 00:31

## 2022-07-13 RX ADMIN — CLOPIDOGREL 75 MG: 75 TABLET, FILM COATED ORAL at 10:12

## 2022-07-13 RX ADMIN — BUPROPION HYDROCHLORIDE 300 MG: 150 TABLET, EXTENDED RELEASE ORAL at 10:12

## 2022-07-13 RX ADMIN — ATORVASTATIN CALCIUM 80 MG: 80 TABLET, FILM COATED ORAL at 00:31

## 2022-07-13 RX ADMIN — SODIUM CHLORIDE: 9 INJECTION, SOLUTION INTRAVENOUS at 00:39

## 2022-07-13 RX ADMIN — ENOXAPARIN SODIUM 40 MG: 100 INJECTION SUBCUTANEOUS at 14:34

## 2022-07-13 RX ADMIN — ATORVASTATIN CALCIUM 80 MG: 80 TABLET, FILM COATED ORAL at 20:35

## 2022-07-13 RX ADMIN — DEXTROAMPHETAMINE SACCHARATE, AMPHETAMINE ASPARTATE, DEXTROAMPHETAMINE SULFATE AND AMPHETAMINE SULFATE 30 MG: 2.5; 2.5; 2.5; 2.5 TABLET ORAL at 20:35

## 2022-07-13 RX ADMIN — PROPRANOLOL HYDROCHLORIDE 80 MG: 80 CAPSULE, EXTENDED RELEASE ORAL at 10:12

## 2022-07-13 RX ADMIN — PANTOPRAZOLE SODIUM 40 MG: 40 TABLET, DELAYED RELEASE ORAL at 10:12

## 2022-07-13 RX ADMIN — DEXTROAMPHETAMINE SACCHARATE, AMPHETAMINE ASPARTATE, DEXTROAMPHETAMINE SULFATE AND AMPHETAMINE SULFATE 30 MG: 2.5; 2.5; 2.5; 2.5 TABLET ORAL at 10:19

## 2022-07-13 ASSESSMENT — PAIN SCALES - GENERAL
PAINLEVEL_OUTOF10: 0

## 2022-07-13 NOTE — CONSULTS
Today's Date: 7/13/2022  Patient Name: Lizandro Gore  Date of admission: 7/12/2022  1:51 PM  Patient's age: 60 y. o., 1960  Admission Dx: Generalized weakness [R53.1]  Anemia, unspecified type [D64.9]  Acute anemia [D64.9]    Reason for Consult: management recommendations  Requesting Physician: Curtis Ramon MD    CHIEF COMPLAINT:  Dizziness, LE weakness, anemia    History Obtained From:  patient, electronic medical record    HISTORY OF PRESENT ILLNESS:      The patient is a 60 y.o.  female who is admitted to the hospital for dizziness with sensation of room spinning, bilateral lower extremity weakness and L> R pain, ongoing for 2 weeeks, worsened in the past 2 days. PMH significant for provoked DVT of LE off AC, iron deficiency anemia in the past, follows up with Dr. Mu Frias, CVA on antiplatelet therapy in 8/1206, H Pylori gastritis    EGD 2020 showed hiatal hernia, colonoscopy in May 2020 did not reveal significant pathology due to limited prep. Patient was initially treated in Feb 2020 for iron deficiency and B12 deficiency anemia. Stated that she has had a capsule endoscopy but unsure if she really understands. Hb 10.5, drop from last Hb 13 in April 2022, Iron sat 11, B12 503, folate 10.1. Patient is a poor historian. States that she has had black tarry stools in the past but they have cleared up. Hem occult negative in this admission. Of note patient is currently on aspirin for CVA secondary prevention.     Past Medical History:   has a past medical history of Acid reflux, Adult ADHD, Asthma, Bipolar 1 disorder ([address] Broken foot, Chest pain of uncertain etiology, Depression, DVT of leg (deep venous thrombosis) ([address] Fracture, Fractured nose, Helicobacter pylori (H. pylori) infection, Hematuria, Hiatal hernia, History of blood transfusion, History of DVT (deep vein thrombosis), History of myasthenia gravis, Hx of blood clots, Hypertension, Internal hemorrhoids, Narcolepsy, Osteoporosis, Sleep apnea, Under care of team, and Under care of team.    Past Surgical History:   has a past surgical history that includes  section; Nose surgery; Knee arthroscopy (Left); shoulder surgery (Left); Wrist surgery; Upper gastrointestinal endoscopy (2013); Colonoscopy (2013); Upper gastrointestinal endoscopy (2016); Foot surgery (Left, 2015); Arm Surgery (Right); knee surgery (Right); vaginal dilatation; Ankle surgery (Left, 2018); pr office/outpt visit,procedure only (Left, 2018); Foot Tendon Surgery (Left, 2019); ligament repair (Left, 2019); Upper gastrointestinal endoscopy (N/A, 2/10/2020); Colonoscopy (N/A, 2/10/2020); Endoscopy, colon, diagnostic; Colonoscopy (N/A, 2020); esophageal motility study (N/A, 2020); esophageal motility study (9/10/2020); esophageal motility study (N/A, 2020); Nerve Block (Bilateral, 2021); Pain management procedure (Bilateral, 2021); Pain management procedure (2021); Pain management procedure (Bilateral, 2021); Pain management procedure (Right, 2021); Pain management procedure (Right, 2021); Pain management procedure (Left, 2021); Pain management procedure (Left, 2021); Breast enhancement surgery (Bilateral); shoulder surgery (Left, 2022); and shoulder surgery (Left, 2022). Medications:    Reviewed in Epic     Allergies:  Latex, Prozac [fluoxetine hcl], Sulfa antibiotics, and Codeine    Social History:   reports that she quit smoking about 33 years ago. She has a 20.00 pack-year smoking history. She has never used smokeless tobacco. She reports previous alcohol use. She reports that she does not use drugs. Family History: family history includes Alzheimer's Disease in her father; Cancer in her maternal aunt and mother; Dementia in her mother; Heart Surgery in her father; Hypertension in her father and mother; Stroke in her mother.     REVIEW OF findings or movement disorder noted   Musculoskeletal - no joint tenderness, deformity or swelling   Extremities - peripheral pulses normal, no pedal edema, no clubbing or cyanosis   Skin - normal coloration and turgor, no rashes, no suspicious skin lesions noted ,    DATA:    Labs:   CBC: Recent Labs     07/12/22  1455 07/13/22  0325   WBC 5.7 7.7   HGB 10.8* 10.5*   HCT 35.4* 35.4*    349     BMP:   Recent Labs     07/12/22  1455      K 4.4   CO2 25   BUN 18   CREATININE 1.31*   LABGLOM 41*   GLUCOSE 121*     PT/INR: No results for input(s): PROTIME, INR in the last 72 hours. IMAGING DATA:  US GALLBLADDER RUQ   Final Result   1. Mild hepatic steatosis. 2. Contracted gallbladder without findings to suggest cholelithiasis or   cholecystitis. XR CHEST PORTABLE   Final Result   No acute cardiopulmonary abnormality identified. Slight increased elevation of the right hemidiaphragm when compared to the   prior chest radiograph. Probable hiatal hernia. CT Head W/O Contrast   Final Result   No acute intracranial abnormality. VL DUP LOWER EXTREMITY VENOUS BILATERAL    (Results Pending)   MRI BRAIN WO CONTRAST    (Results Pending)       Primary Problem  Acute anemia    Active Hospital Problems    Diagnosis Date Noted    Acute anemia [D64.9] 07/12/2022     Priority: Medium         IMPRESSION:   1. Iron deficiency anemia, on aspirin for secondary prevention  2. History of CVA-lacunar infarct  3. History of left lower extremity DVT, successfully treated  4. History of gastritis  5. Hiatal hernia    RECOMMENDATIONS:  1. Labs reviewed. We will start patient on IV Venofer 300 mg for 4 doses. Patient will require outpatient follow-up with GI for colonoscopy to check for GI causes of blood loss as she appears to have had inadequate prep with suboptimal colonoscopy in 2020 and no further follow-up.   2. If patient is found to have DVT on imaging, will require anticoagulation; in

## 2022-07-14 ENCOUNTER — APPOINTMENT (OUTPATIENT)
Dept: MRI IMAGING | Age: 62
DRG: 812 | End: 2022-07-14
Payer: MEDICARE

## 2022-07-14 ENCOUNTER — HOSPITAL ENCOUNTER (OUTPATIENT)
Dept: SPEECH THERAPY | Age: 62
Setting detail: THERAPIES SERIES
Discharge: HOME | DRG: 812 | End: 2022-07-14
Payer: MEDICARE

## 2022-07-14 LAB
ABSOLUTE EOS #: 0.36 K/UL (ref 0–0.44)
ABSOLUTE IMMATURE GRANULOCYTE: <0.03 K/UL (ref 0–0.3)
ABSOLUTE LYMPH #: 0.77 K/UL (ref 1.1–3.7)
ABSOLUTE MONO #: 0.54 K/UL (ref 0.1–1.2)
ANION GAP SERPL CALCULATED.3IONS-SCNC: 8 MMOL/L (ref 9–17)
BASOPHILS # BLD: 1 % (ref 0–2)
BASOPHILS ABSOLUTE: 0.05 K/UL (ref 0–0.2)
BUN BLDV-MCNC: 13 MG/DL (ref 8–23)
CALCIUM SERPL-MCNC: 9.2 MG/DL (ref 8.6–10.4)
CHLORIDE BLD-SCNC: 105 MMOL/L (ref 98–107)
CO2: 24 MMOL/L (ref 20–31)
CREAT SERPL-MCNC: 1.29 MG/DL (ref 0.5–0.9)
EOSINOPHILS RELATIVE PERCENT: 6 % (ref 1–4)
GFR AFRICAN AMERICAN: 51 ML/MIN
GFR NON-AFRICAN AMERICAN: 42 ML/MIN
GFR SERPL CREATININE-BSD FRML MDRD: ABNORMAL ML/MIN/{1.73_M2}
GLUCOSE BLD-MCNC: 105 MG/DL (ref 70–99)
HCT VFR BLD CALC: 32.5 % (ref 36.3–47.1)
HCT VFR BLD CALC: 32.9 % (ref 36.3–47.1)
HEMOGLOBIN: 10 G/DL (ref 11.9–15.1)
HEMOGLOBIN: 9.9 G/DL (ref 11.9–15.1)
IMMATURE GRANULOCYTES: 0 %
LYMPHOCYTES # BLD: 13 % (ref 24–43)
MCH RBC QN AUTO: 27.2 PG (ref 25.2–33.5)
MCHC RBC AUTO-ENTMCNC: 30.8 G/DL (ref 28.4–34.8)
MCV RBC AUTO: 88.6 FL (ref 82.6–102.9)
MONOCYTES # BLD: 9 % (ref 3–12)
NRBC AUTOMATED: 0 PER 100 WBC
PDW BLD-RTO: 14.5 % (ref 11.8–14.4)
PLATELET # BLD: 323 K/UL (ref 138–453)
PMV BLD AUTO: 10.3 FL (ref 8.1–13.5)
POTASSIUM SERPL-SCNC: 4.1 MMOL/L (ref 3.7–5.3)
RBC # BLD: 3.67 M/UL (ref 3.95–5.11)
RBC # BLD: ABNORMAL 10*6/UL
SEG NEUTROPHILS: 71 % (ref 36–65)
SEGMENTED NEUTROPHILS ABSOLUTE COUNT: 4.32 K/UL (ref 1.5–8.1)
SODIUM BLD-SCNC: 137 MMOL/L (ref 135–144)
WBC # BLD: 6.1 K/UL (ref 3.5–11.3)

## 2022-07-14 PROCEDURE — G0378 HOSPITAL OBSERVATION PER HR: HCPCS

## 2022-07-14 PROCEDURE — 85014 HEMATOCRIT: CPT

## 2022-07-14 PROCEDURE — 6370000000 HC RX 637 (ALT 250 FOR IP): Performed by: EMERGENCY MEDICINE

## 2022-07-14 PROCEDURE — 70551 MRI BRAIN STEM W/O DYE: CPT

## 2022-07-14 PROCEDURE — 93005 ELECTROCARDIOGRAM TRACING: CPT | Performed by: NURSE PRACTITIONER

## 2022-07-14 PROCEDURE — 2580000003 HC RX 258: Performed by: STUDENT IN AN ORGANIZED HEALTH CARE EDUCATION/TRAINING PROGRAM

## 2022-07-14 PROCEDURE — 36415 COLL VENOUS BLD VENIPUNCTURE: CPT

## 2022-07-14 PROCEDURE — 6360000002 HC RX W HCPCS: Performed by: INTERNAL MEDICINE

## 2022-07-14 PROCEDURE — 6360000002 HC RX W HCPCS: Performed by: STUDENT IN AN ORGANIZED HEALTH CARE EDUCATION/TRAINING PROGRAM

## 2022-07-14 PROCEDURE — 99222 1ST HOSP IP/OBS MODERATE 55: CPT | Performed by: INTERNAL MEDICINE

## 2022-07-14 PROCEDURE — 96366 THER/PROPH/DIAG IV INF ADDON: CPT

## 2022-07-14 PROCEDURE — 85025 COMPLETE CBC W/AUTO DIFF WBC: CPT

## 2022-07-14 PROCEDURE — 2580000003 HC RX 258: Performed by: INTERNAL MEDICINE

## 2022-07-14 PROCEDURE — 6370000000 HC RX 637 (ALT 250 FOR IP): Performed by: STUDENT IN AN ORGANIZED HEALTH CARE EDUCATION/TRAINING PROGRAM

## 2022-07-14 PROCEDURE — 1200000000 HC SEMI PRIVATE

## 2022-07-14 PROCEDURE — 6370000000 HC RX 637 (ALT 250 FOR IP): Performed by: INTERNAL MEDICINE

## 2022-07-14 PROCEDURE — 80048 BASIC METABOLIC PNL TOTAL CA: CPT

## 2022-07-14 PROCEDURE — 96375 TX/PRO/DX INJ NEW DRUG ADDON: CPT

## 2022-07-14 PROCEDURE — 85018 HEMOGLOBIN: CPT

## 2022-07-14 PROCEDURE — 99232 SBSQ HOSP IP/OBS MODERATE 35: CPT | Performed by: INTERNAL MEDICINE

## 2022-07-14 RX ORDER — MECLIZINE HCL 12.5 MG/1
25 TABLET ORAL EVERY 6 HOURS SCHEDULED
Status: DISCONTINUED | OUTPATIENT
Start: 2022-07-14 | End: 2022-07-15 | Stop reason: HOSPADM

## 2022-07-14 RX ADMIN — ATORVASTATIN CALCIUM 80 MG: 80 TABLET, FILM COATED ORAL at 20:22

## 2022-07-14 RX ADMIN — ONDANSETRON 4 MG: 2 INJECTION INTRAMUSCULAR; INTRAVENOUS at 04:34

## 2022-07-14 RX ADMIN — DEXTROAMPHETAMINE SACCHARATE, AMPHETAMINE ASPARTATE, DEXTROAMPHETAMINE SULFATE AND AMPHETAMINE SULFATE 30 MG: 2.5; 2.5; 2.5; 2.5 TABLET ORAL at 20:21

## 2022-07-14 RX ADMIN — IRON SUCROSE 300 MG: 20 INJECTION, SOLUTION INTRAVENOUS at 18:12

## 2022-07-14 RX ADMIN — MECLIZINE HCL 12.5 MG 25 MG: 12.5 TABLET ORAL at 18:14

## 2022-07-14 RX ADMIN — SODIUM CHLORIDE 1000 ML: 9 INJECTION, SOLUTION INTRAVENOUS at 18:10

## 2022-07-14 RX ADMIN — CITALOPRAM 40 MG: 20 TABLET, FILM COATED ORAL at 08:56

## 2022-07-14 RX ADMIN — CLOPIDOGREL 75 MG: 75 TABLET, FILM COATED ORAL at 08:56

## 2022-07-14 RX ADMIN — PANTOPRAZOLE SODIUM 40 MG: 40 TABLET, DELAYED RELEASE ORAL at 08:56

## 2022-07-14 RX ADMIN — PROPRANOLOL HYDROCHLORIDE 80 MG: 80 CAPSULE, EXTENDED RELEASE ORAL at 08:56

## 2022-07-14 RX ADMIN — POLYETHYLENE GLYCOL 3350, SODIUM SULFATE ANHYDROUS, SODIUM BICARBONATE, SODIUM CHLORIDE, POTASSIUM CHLORIDE 4000 ML: 236; 22.74; 6.74; 5.86; 2.97 POWDER, FOR SOLUTION ORAL at 14:46

## 2022-07-14 RX ADMIN — BISACODYL 20 MG: 5 TABLET, COATED ORAL at 11:59

## 2022-07-14 RX ADMIN — DEXTROAMPHETAMINE SACCHARATE, AMPHETAMINE ASPARTATE, DEXTROAMPHETAMINE SULFATE AND AMPHETAMINE SULFATE 30 MG: 2.5; 2.5; 2.5; 2.5 TABLET ORAL at 08:56

## 2022-07-14 RX ADMIN — BUPROPION HYDROCHLORIDE 300 MG: 150 TABLET, EXTENDED RELEASE ORAL at 08:56

## 2022-07-14 RX ADMIN — SODIUM CHLORIDE, PRESERVATIVE FREE 10 ML: 5 INJECTION INTRAVENOUS at 08:56

## 2022-07-14 RX ADMIN — ACETAMINOPHEN 325MG 650 MG: 325 TABLET ORAL at 16:05

## 2022-07-14 ASSESSMENT — PAIN SCALES - GENERAL: PAINLEVEL_OUTOF10: 6

## 2022-07-14 ASSESSMENT — PAIN DESCRIPTION - LOCATION: LOCATION: HEAD

## 2022-07-14 NOTE — PROGRESS NOTES
Speech Language Pathology    [] [address]  [address]    [phone]  F: [phone]   [] [address] [facility] [address]   [address]  P: [phone]  F: [phone]  [] [address]  Therapy  [address] [facility]  P: [phone]  F: [phone] [] [address]  P: [phone]  F: [phone]  [] [address]  [facility]   [address]   [place]: [phone]  F: [phone]   [] [place] [address] [facility] [address]  [place]: [phone]   F: [phone]     Physical Therapy Cancel/No Show note    Date: 2022  Patient: Cornelius Zuniga  : 1960  MRN: [id number]    Cancels/No Shows to date: 3/0    For today's appointment patient:    [x]  Cancelled    [] Rescheduled appointment    [] No-show     Reason given by patient:    [x]  Patient ill    []  Conflicting appointment    [] No transportation      [] Conflict with work    [] No reason given    [] Weather related    [] COVID-19    [x] Other:      Comments: Pt currently admitted to [facility];  brought calendar of future appts for July to pt's room.         [x] Next appointment was confirmed    Electronically signed by: UBALDO Bolaños

## 2022-07-14 NOTE — H&P
[address]  CDU / OBSERVATION ENCOUNTER  ATTENDING NOTE     Pt Name: Makenna Bryant  MRN: [id number]  Rupeshgfindira 1960  Date of evaluation: 7/13/22  Patient's PCP is :  Jimmy Shi DO    CHIEF COMPLAINT       Chief Complaint   Patient presents with    Dizziness    Abdominal Pain    Fatigue    Shortness of Breath    Gait Problem     Patient is a difficult historian secondary to the fact that she has had prior stroke. Patient has sometimes trouble with word finding and also problems with memory. Is at her baseline. History also relies on past medical records and secondary sources    [address] Nancy De La Cruz is a 60 y.o. female who presents with complaints of weakness for me. For the hematologist who is seeing her the patient had more complaints of vertigo and room spinning. This is worsened over the past 2 weeks. Patient with discomfort and nausea. Patient also has history of DVT and has been off anticoagulation. Patient has a history of iron deficiency anemia and follows with Dr. Km Parmar. Patient was admitted because of vague symptomatology. Patient also with nearly 3-1/2 g hemoglobin drop from last measurement approximately 6 weeks ago. Discussed also with gastroenterology. GI has done evaluation on patient previously and has not found a clear source of bleed. Patient not felt to benefit from endoscopy per GI. No evidence of GI bleeding on this admission. Location/Symptom: Symptoms of dizziness and weakness. Timing/Onset: Over the past several days to weeks  Provocation: Uncertain. History of underlying iron deficiency anemia  Quality: Lightheadedness, dizziness, poorly characterized.   Patient symptoms quite vague and difficult to pin down given memory issues and word finding problems  Radiation: None  Severity: Moderate  Timing/Duration: Days to weeks  Modifying Factors: Unclear    REVIEW OF SYSTEMS        General ROS -pain, lightheadedness, generalized body aches. Ophthalmic ROS - No discharge, No changes in vision  ENT ROS -  No sore throat, No rhinorrhea,   Respiratory ROS - no shortness of breath, no cough, no  wheezing  Cardiovascular ROS - No chest pain, no dyspnea on exertion  Gastrointestinal ROS -abdominal discomfort, nausea, negative for GI bleed  Genito-Urinary ROS - No dysuria, trouble voiding, or hematuria  Musculoskeletal ROS - No myalgias, No arthalgias  Neurological ROS - No headache, no dizziness/lightheadedness, No focal weakness, no loss of sensation  Dermatological ROS - No lesions, No rash     (PQRS) Advance directives on face sheet per hospital policy. No change unless specifically mentioned in chart    [address]    has a past medical history of Acid reflux, Adult ADHD, Asthma, Bipolar 1 disorder ([address] Broken foot, Chest pain of uncertain etiology, Depression, DVT of leg (deep venous thrombosis) [address] [address] [facility] Fracture, Fractured nose, Helicobacter pylori (H. pylori) infection, Hematuria, Hiatal hernia, History of blood transfusion, History of DVT (deep vein thrombosis), History of myasthenia gravis, Hx of blood clots, Hypertension, Internal hemorrhoids, Narcolepsy, Osteoporosis, Sleep apnea, Under care of team, and Under care of team.    I have reviewed the past medical history with the patient and it is  pertinent to this complaint. SURGICAL HISTORY      has a past surgical history that includes  section; Nose surgery; Knee arthroscopy (Left); shoulder surgery (Left); Wrist surgery; Upper gastrointestinal endoscopy (2013); Colonoscopy (2013); Upper gastrointestinal endoscopy (2016); Foot surgery (Left, ); Arm Surgery (Right); knee surgery (Right); vaginal dilatation; Ankle surgery (Left, 2018); pr office/outpt visit,procedure only (Left, 2018); Foot Tendon Surgery (Left, 2019); ligament repair (Left, 2019);  Upper gastrointestinal endoscopy (N/A, 2/10/2020); Colonoscopy (N/A, 2/10/2020); Endoscopy, colon, diagnostic; Colonoscopy (N/A, 5/18/2020); esophageal motility study (N/A, 8/12/2020); esophageal motility study (9/10/2020); esophageal motility study (N/A, 12/18/2020); Nerve Block (Bilateral, 06/04/2021); Pain management procedure (Bilateral, 6/4/2021); Pain management procedure (06/11/2021); Pain management procedure (Bilateral, 6/11/2021); Pain management procedure (Right, 07/23/2021); Pain management procedure (Right, 7/23/2021); Pain management procedure (Left, 08/06/2021); Pain management procedure (Left, 8/6/2021); Breast enhancement surgery (Bilateral); shoulder surgery (Left, 02/08/2022); and shoulder surgery (Left, 2/8/2022). I have reviewed and agree with Surgical History entered and it is  pertinent to this complaint.      CURRENT MEDICATIONS     iron sucrose (VENOFER) 300 mg in sodium chloride 0.9 % 250 mL IVPB, Q24H  amphetamine-dextroamphetamine (ADDERALL) tablet 30 mg, BID  atorvastatin (LIPITOR) tablet 80 mg, Nightly  buPROPion (WELLBUTRIN XL) extended release tablet 300 mg, Daily  citalopram (CELEXA) tablet 40 mg, Daily  clopidogrel (PLAVIX) tablet 75 mg, Daily  hydrOXYzine HCl (ATARAX) tablet 50 mg, Q6H PRN  pantoprazole (PROTONIX) tablet 40 mg, Daily  propranolol (INDERAL LA) extended release capsule 80 mg, Daily  sodium chloride flush 0.9 % injection 5-40 mL, 2 times per day  sodium chloride flush 0.9 % injection 5-40 mL, PRN  0.9 % sodium chloride infusion, PRN  potassium chloride (KLOR-CON M) extended release tablet 40 mEq, PRN   Or  potassium bicarb-citric acid (EFFER-K) effervescent tablet 40 mEq, PRN   Or  potassium chloride 10 mEq/100 mL IVPB (Peripheral Line), PRN  enoxaparin (LOVENOX) injection 40 mg, Daily  ondansetron (ZOFRAN) injection 4 mg, Q4H PRN  polyethylene glycol (GLYCOLAX) packet 17 g, Daily PRN  acetaminophen (TYLENOL) tablet 650 mg, Q6H PRN   Or  acetaminophen (TYLENOL) suppository 650 mg, Q6H PRN        All medication charted and reviewed. ALLERGIES     is allergic to latex, prozac [fluoxetine hcl], sulfa antibiotics, and codeine. FAMILY HISTORY     She indicated that her mother is . She indicated that her father is . She indicated that the status of her maternal aunt is unknown. She indicated that the status of her neg hx is unknown.     family history includes Alzheimer's Disease in her father; Cancer in her maternal aunt and mother; Dementia in her mother; Heart Surgery in her father; Hypertension in her father and mother; Stroke in her mother. The patient denies any pertinent family history. I have reviewed and agree with the family history entered. I have reviewed the Family History and it is not significant to the case    SOCIAL HISTORY      reports that she quit smoking about 33 years ago. She has a 20.00 pack-year smoking history. She has never used smokeless tobacco. She reports previous alcohol use. She reports that she does not use drugs. I have reviewed and agree with all Social.  There are no  concerns for substance abuse/use. PHYSICAL EXAM     INITIAL VITALS:  height is 5' (1.524 m) and weight is 133 lb (60.3 kg). Her oral temperature is 98.1 °F (36.7 °C). Her blood pressure is 131/66 and her pulse is 61. Her respiration is 15 and oxygen saturation is 95%. CONSTITUTIONAL: AOx4, no apparent distress, appears stated age     HEAD: normocephalic, atraumatic   EYES: PERRLA, EOMI    ENT: moist mucous membranes, uvula midline   NECK: supple, symmetric   BACK: symmetric   LUNGS: clear to auscultation bilaterally   CARDIOVASCULAR: regular rate and rhythm, no murmurs, rubs or gallops   ABDOMEN: soft, non-tender, non-distended with normal active bowel sounds   NEUROLOGIC:  MAEx4, no focal sensory or motor deficits   MUSCULOSKELETAL: no clubbing, cyanosis or edema   SKIN: no rash or wounds       DIFFERENTIAL DIAGNOSIS/MDM:     Given 3 g hemoglobin drop will ask hematology to see. Patient with history of DVT previously. Patient off anticoagulation we will get Doppler studies for rule out DVT. Patient with some vertigo. Patient with history of stroke. Apparent neurologic baseline but will get MRI of brain looking for acute change. Otology is seeing patient. Appreciate input. Gastroenterology has discussed patient with me. Patient not felt to benefit from endoscopy. DIAGNOSTIC RESULTS     EKG: All EKG's are interpreted by the Observation Physician who either signs or Co-signs this chart in the absence of a cardiologist.    EKG Interpretation    Interpreted by observation physician    Rhythm: normal sinus   Rate: normal  Ectopy: none  Conduction: normal  ST Segments: no acute change  T Waves: no acute change  Q Waves: none    Clinical Impression: no acute changes    Esther Godinez MD         RADIOLOGY:   I directly visualized the following  images and reviewed the radiologist interpretations:    CT Head W/O Contrast    Result Date: 7/12/2022  EXAMINATION: CT OF THE HEAD WITHOUT CONTRAST  7/12/2022 3:13 pm TECHNIQUE: CT of the head was performed without the administration of intravenous contrast. Automated exposure control, iterative reconstruction, and/or weight based adjustment of the mA/kV was utilized to reduce the radiation dose to as low as reasonably achievable. COMPARISON: CT brain and MRI brain 04/14/2022. HISTORY: ORDERING SYSTEM PROVIDED HISTORY: dizziness, worsening weakness, off balance TECHNOLOGIST PROVIDED HISTORY: dizziness, worsening weakness, off balance Decision Support Exception - unselect if not a suspected or confirmed emergency medical condition->Emergency Medical Condition (MA) FINDINGS: BRAIN/VENTRICLES: There is no acute intracranial hemorrhage, mass effect or midline shift. No abnormal extra-axial fluid collection. The gray-white differentiation is maintained without evidence of an acute infarct. There is no evidence of hydrocephalus.  Chronic lacunar infarctions in the left basal ganglia, internal capsule, and caudate head. Mild chronic periventricular white matter hypoattenuation. ORBITS: The visualized portion of the orbits demonstrate no acute abnormality. SINUSES: The visualized paranasal sinuses and mastoid air cells demonstrate no acute abnormality. SOFT TISSUES/SKULL:  No acute abnormality of the visualized skull or soft tissues. No acute intracranial abnormality. US GALLBLADDER RUQ    Result Date: 7/12/2022  EXAMINATION: RIGHT UPPER QUADRANT ULTRASOUND 7/12/2022 8:42 pm COMPARISON: 01/13/2021 CT study HISTORY: ORDERING SYSTEM PROVIDED HISTORY: elevated alk phos, nonspecific abdominal pain TECHNOLOGIST PROVIDED HISTORY: elevated alk phos, nonspecific abdominal pain FINDINGS: LIVER:  Mild hepatic steatosis. No focal mass or ductal dilation is identified. BILIARY SYSTEM:  The gallbladder is contracted. No intraluminal stones are seen. No pericholecystic fluid or significant wall thickening. Common bile duct is within normal limits measuring 3 mm. RIGHT KIDNEY: Renal length measures 8.1 cm which is decreased, with mild cortical thinning which may represent mild atrophy. No mass or hydronephrosis is identified. No significant increased echogenicity. PANCREAS:  Visualized portions of the pancreas are unremarkable. OTHER: No evidence of right upper quadrant ascites. 1. Mild hepatic steatosis. 2. Contracted gallbladder without findings to suggest cholelithiasis or cholecystitis. XR CHEST PORTABLE    Result Date: 7/13/2022  EXAMINATION: ONE XRAY VIEW OF THE CHEST 7/12/2022 2:58 pm COMPARISON: 04/14/2022 HISTORY: ORDERING SYSTEM PROVIDED HISTORY: dizziness, chronic cough TECHNOLOGIST PROVIDED HISTORY: dizziness, chronic cough FINDINGS: Heart is similar in size to the prior study. No pleural effusion or pneumothorax is seen. No focal consolidation is identified.   Slight increased elevation of the right hemidiaphragm when compared to the prior exam.  There is a left shoulder prosthesis. Probable hiatal hernia. No acute cardiopulmonary abnormality identified. Slight increased elevation of the right hemidiaphragm when compared to the prior chest radiograph. Probable hiatal hernia. LABS:  I have reviewed and interpreted all available lab results.   Labs Reviewed   CBC WITH AUTO DIFFERENTIAL - Abnormal; Notable for the following components:       Result Value    Hemoglobin 10.8 (*)     Hematocrit 35.4 (*)     RDW 14.6 (*)     Lymphocytes 19 (*)     Eosinophils % 7 (*)     Absolute Lymph # 1.05 (*)     All other components within normal limits   BASIC METABOLIC PANEL - Abnormal; Notable for the following components:    Glucose 121 (*)     CREATININE 1.31 (*)     GFR Non- 41 (*)     GFR  50 (*)     All other components within normal limits   URINALYSIS WITH MICROSCOPIC - Abnormal; Notable for the following components:    Leukocyte Esterase, Urine TRACE (*)     All other components within normal limits   HEPATIC FUNCTION PANEL - Abnormal; Notable for the following components:    Alkaline Phosphatase 364 (*)     ALT 34 (*)     AST 42 (*)     Total Bilirubin 0.22 (*)     All other components within normal limits   CBC WITH AUTO DIFFERENTIAL - Abnormal; Notable for the following components:    Hemoglobin 10.5 (*)     Hematocrit 35.4 (*)     RDW 14.5 (*)     Lymphocytes 18 (*)     Monocytes 13 (*)     Eosinophils % 6 (*)     All other components within normal limits   IRON AND TIBC - Abnormal; Notable for the following components:    Iron 31 (*)     Iron Saturation 11 (*)     All other components within normal limits   FERRITIN - Abnormal; Notable for the following components:    Ferritin 12 (*)     All other components within normal limits   COVID-19, RAPID   TROPONIN   MAGNESIUM   TROPONIN   VITAMIN B12 & FOLATE   OCCULT BLOOD SCREEN         CDU IMPRESSION / PLAN      Terris Solders is a 60 y.o. female who presents with       · Symptomatic anemia. Patient with decrease in hemoglobin by almost 3 g.  · Known iron deficiency anemia. · By hematology. Appreciate consult. · Will give iron  mg x 3  · History of DVT. Will get Doppler ultrasound of lower extremities  · Vertiginous symptoms and history of prior stroke. We will get MRI brain looking for acute change. Patient at apparent baseline. · Cussed with gastroenterology. No evidence of GI bleeding currently. We will hold on GI consult with plan for involvement if GI bleed should become evident. · Will Hemoccult of stool. · Continue home medications and pain control  · Monitor vitals, labs, and imaging  · DISPO: pending consults and clinical improvement    CONSULTS:    IP CONSULT TO GI  IP CONSULT TO HEM/ONC    PROCEDURES:  Not indicated        PATIENT REFERRED TO:    No follow-up provider specified. --  Heidi Murillo MD   Emergency Medicine Attending    This dictation was generated by voice recognition computer software. Although all attempts are made to edit the dictation for accuracy, there may be errors in the transcription that are not intended.

## 2022-07-14 NOTE — PROGRESS NOTES
Assessment: Patient was awake and alert when  visited. Family was not present at the time. However, patient did make mention of her son and her boyfriend. When asked how she was feeling, patient responded; \"not so good. I may be undergoing surgery. \" Patient said she was raised Buddhist. Intervention:  provided presence, offered support,  prayed with patient and reassured her that she was in good hands. .   Outcome: Patient expressed appreciation for the spiritual and emotional support she received. Plan: Follow up visits recommended for more prayers and support.

## 2022-07-14 NOTE — PROGRESS NOTES
[address]  CDU / OBSERVATION ENCOUNTER  ATTENDING NOTE       Patient reevaluated secondary to bradycardia being noted on monitor. Patient was checked and was found to be asymptomatic but bradycardia was significant. Patient states that she has been lightheaded intermittently. I believe that the bradycardia may be part of her etiology. EKG ordered. Will reevaluate this evening.   Patient was unaware of the bradycardia when asked how she was feeling    Brown Rene MD  Attending Emergency  Physician

## 2022-07-14 NOTE — PLAN OF CARE
Problem: Chronic Conditions and Co-morbidities  Goal: Patient's chronic conditions and co-morbidity symptoms are monitored and maintained or improved  Outcome: Progressing     Problem: Safety - Adult  Goal: Free from fall injury  Outcome: Progressing     Problem: Pain  Goal: Verbalizes/displays adequate comfort level or baseline comfort level  Outcome: Progressing

## 2022-07-14 NOTE — PROGRESS NOTES
[facility] [employer]  CDU / OBSERVATION ENCOUNTER  ATTENDING NOTE       I performed a history and physical examination of the patient and discussed management with the resident or midlevel provider. I reviewed the resident or midlevel provider's note and agree with the documented findings and plan of care. Any areas of disagreement are noted on the chart. I was personally present for the key portions of any procedures. I have documented in the chart those procedures where I was not present during the key portions. I have reviewed the nurses notes. I agree with the chief complaint, past medical history, past surgical history, allergies, medications, social and family history as documented unless otherwise noted below. The Family history, social history, and ROS are effectively unchanged since admission unless noted elsewhere in the chart. Patient describes dizziness. Patient is not orthostatic. Patient had nearly identical blood pressure is lying and standing. Patient was being kept for bowel prep and colonoscopy. Currently the patient is having difficulty with the bowel prep secondary to the taste of the GoLytely. Patient has been reassured that she can take this at her own pace. Patient's reluctance to do appropriate prep given the taste of the material.  This has been a difficult thing in the past as well. Patient with both vertigo and possible near syncope. Patient having difficulty describing her symptomatology. We will give Antivert and attempt to treat the vertiginous component of her difficulty. Patient without neurologic symptoms. Patient with negative orthostatics. We may need to abandon hope of endoscopy. Patient will be kept however for treatment of the vertigo.   Will reassess tomorrow with anticipation of possible discharge if vertigo improved    Lisette Mclean MD  Attending Emergency  Physician

## 2022-07-14 NOTE — PROGRESS NOTES
OBS/CDU   RESIDENT NOTE      Patients PCP is:  Melanie Marquez, DO        SUBJECTIVE      No acute events overnight. Patient states he is otherwise doing well. Have a bit of upset stomach yesterday with improved with some Zofran. Denies any melanotic or grossly bloody bowel movements today. PHYSICAL EXAM      General: NAD, AO X 3  Heent: EMOI, PERRL  Neck: SUPPLE, NO JVD  Cardiovascular: RRR, S1S2  Pulmonary: CTAB, NO SOB  Abdomen: SOFT, NTTP, ND, +BS  Extremities: +2/4 PULSES DISTAL, NO SWELLING  Neuro / Psych: NO NUMBNESS OR TINGLING, MENTATION AT BASELINE    PERTINENT TEST /EXAMS      I have reviewed all available laboratory results. MEDICATIONS CURRENT   iron sucrose (VENOFER) 300 mg in sodium chloride 0.9 % 250 mL IVPB, Q24H  amphetamine-dextroamphetamine (ADDERALL) tablet 30 mg, BID  atorvastatin (LIPITOR) tablet 80 mg, Nightly  buPROPion (WELLBUTRIN XL) extended release tablet 300 mg, Daily  citalopram (CELEXA) tablet 40 mg, Daily  clopidogrel (PLAVIX) tablet 75 mg, Daily  hydrOXYzine HCl (ATARAX) tablet 50 mg, Q6H PRN  pantoprazole (PROTONIX) tablet 40 mg, Daily  propranolol (INDERAL LA) extended release capsule 80 mg, Daily  sodium chloride flush 0.9 % injection 5-40 mL, 2 times per day  sodium chloride flush 0.9 % injection 5-40 mL, PRN  0.9 % sodium chloride infusion, PRN  potassium chloride (KLOR-CON M) extended release tablet 40 mEq, PRN   Or  potassium bicarb-citric acid (EFFER-K) effervescent tablet 40 mEq, PRN   Or  potassium chloride 10 mEq/100 mL IVPB (Peripheral Line), PRN  enoxaparin (LOVENOX) injection 40 mg, Daily  ondansetron (ZOFRAN) injection 4 mg, Q4H PRN  polyethylene glycol (GLYCOLAX) packet 17 g, Daily PRN  acetaminophen (TYLENOL) tablet 650 mg, Q6H PRN   Or  acetaminophen (TYLENOL) suppository 650 mg, Q6H PRN        All medication charted and reviewed.     CONSULTS      IP CONSULT TO GI  IP CONSULT TO HEM/ONC    ASSESSMENT/PLAN       Ana Ro is a 60 y.o. female who presents with      · Symptomatic anemia. Patient with decrease in hemoglobin by almost 3 g.  ? Known iron deficiency anemia. ? Evaluated by hematology. Appreciate consult. ? Will give iron  mg x 3  · History of DVT. Will get Doppler ultrasound of lower extremities  · Vertiginous symptoms and history of prior stroke. We will get MRI brain looking for acute  change. Patient at apparent baseline. · GI consult for colonoscopy  · Continue home medications and pain control  · Monitor vitals, labs, and imaging  · DISPO: Pending consults and further evaluation  --  Julian Sen DO  Emergency Medicine Resident Physician     This dictation was generated by voice recognition computer software. Although all attempts are made to edit the dictation for accuracy, there may be errors in the transcription that are not intended.

## 2022-07-14 NOTE — CONSULTS
Today's Date: 7/14/2022  Patient Name: Shelbi Pritchett  Date of admission: 7/12/2022  1:51 PM  Patient's age: 60 y. o., 1960  Admission Dx: Generalized weakness [R53.1]  Anemia, unspecified type [D64.9]  Acute anemia [D64.9]    Reason for Consult: management recommendations  Requesting Physician: Eva Cross MD    CHIEF COMPLAINT:  Dizziness, LE weakness, anemia    History Obtained From:  patient, electronic medical record    INTERVAL HISTORY:  Patient seen and examined at bedside  No melena overnight  Stool positive for occult blood  Plan for EGD and colonoscopy tomorrow am  Hb 10.0    HISTORY OF PRESENT ILLNESS:      The patient is a 60 y.o.  female who is admitted to the hospital for dizziness with sensation of room spinning, bilateral lower extremity weakness and L> R pain, ongoing for 2 weeeks, worsened in the past 2 days. PMH significant for provoked DVT of LE off AC, iron deficiency anemia in the past, follows up with Dr. Suyapa Marroquin, CVA on antiplatelet therapy in 4/7590, H Pylori gastritis    EGD 2020 showed hiatal hernia, colonoscopy in May 2020 did not reveal significant pathology due to limited prep. Patient was initially treated in Feb 2020 for iron deficiency and B12 deficiency anemia. Stated that she has had a capsule endoscopy but unsure if she really understands. Hb 10.5, drop from last Hb 13 in April 2022, Iron sat 11, B12 503, folate 10.1. Patient is a poor historian. States that she has had black tarry stools in the past but they have cleared up. Hem occult negative in this admission. Of note patient is currently on aspirin for CVA secondary prevention.     Past Medical History:   has a past medical history of Acid reflux, Adult ADHD, Asthma, Bipolar 1 disorder ([address] Broken foot, Chest pain of uncertain etiology, Depression, DVT of leg (deep venous thrombosis) ([facility] [address] Fracture, Fractured nose, Helicobacter pylori (H. pylori) infection, Hematuria, Hiatal hernia, History of blood transfusion, History of DVT (deep vein thrombosis), History of myasthenia gravis, Hx of blood clots, Hypertension, Internal hemorrhoids, Narcolepsy, Osteoporosis, Sleep apnea, Under care of team, and Under care of team.    Past Surgical History:   has a past surgical history that includes  section; Nose surgery; Knee arthroscopy (Left); shoulder surgery (Left); Wrist surgery; Upper gastrointestinal endoscopy (2013); Colonoscopy (2013); Upper gastrointestinal endoscopy (2016); Foot surgery (Left, 2015); Arm Surgery (Right); knee surgery (Right); vaginal dilatation; Ankle surgery (Left, 2018); pr office/outpt visit,procedure only (Left, 2018); Foot Tendon Surgery (Left, 2019); ligament repair (Left, 2019); Upper gastrointestinal endoscopy (N/A, 2/10/2020); Colonoscopy (N/A, 2/10/2020); Endoscopy, colon, diagnostic; Colonoscopy (N/A, 2020); esophageal motility study (N/A, 2020); esophageal motility study (9/10/2020); esophageal motility study (N/A, 2020); Nerve Block (Bilateral, 2021); Pain management procedure (Bilateral, 2021); Pain management procedure (2021); Pain management procedure (Bilateral, 2021); Pain management procedure (Right, 2021); Pain management procedure (Right, 2021); Pain management procedure (Left, 2021); Pain management procedure (Left, 2021); Breast enhancement surgery (Bilateral); shoulder surgery (Left, 2022); and shoulder surgery (Left, 2022). Medications:    Reviewed in Epic     Allergies:  Latex, Prozac [fluoxetine hcl], Sulfa antibiotics, and Codeine    Social History:   reports that she quit smoking about 33 years ago. She has a 20.00 pack-year smoking history. She has never used smokeless tobacco. She reports previous alcohol use. She reports that she does not use drugs.      Family History: family history includes Alzheimer's Disease in her father; Cancer in her maternal aunt and mother; Dementia in her mother; Heart Surgery in her father; Hypertension in her father and mother; Stroke in her mother. REVIEW OF SYSTEMS:    Constitutional: No fever or chills. No night sweats, no weight loss   Eyes: No eye discharge, double vision, or eye pain   HEENT: negative for sore mouth, sore throat, hoarseness and voice change   Respiratory: negative for cough , sputum, dyspnea, wheezing, hemoptysis, chest pain   Cardiovascular: negative for chest pain, dyspnea, palpitations, orthopnea, PND   Gastrointestinal: negative for nausea, vomiting, diarrhea, constipation, abdominal pain, Dysphagia, hematemesis and hematochezia   Genitourinary: negative for frequency, dysuria, nocturia, urinary incontinence, and hematuria   Integument: negative for rash, skin lesions, bruises.    Hematologic/Lymphatic: negative for easy bruising, bleeding, lymphadenopathy, or petechiae   Endocrine: negative for heat or cold intolerance,weight changes, change in bowel habits and hair loss   Musculoskeletal: negative for myalgias, arthralgias, pain, joint swelling,and bone pain   Neurological: negative for headaches, dizziness, seizures, weakness, numbness    PHYSICAL EXAM:      /72   Pulse 53   Temp 97.9 °F (36.6 °C)   Resp 16   Ht 5' (1.524 m)   Wt 133 lb (60.3 kg)   SpO2 96%   BMI 25.97 kg/m²    Temp (24hrs), Av °F (36.7 °C), Min:97.9 °F (36.6 °C), Max:98.1 °F (36.7 °C)    General appearance - well appearing, no in pain or distress   Mental status - alert and cooperative   Eyes - pupils equal and reactive, extraocular eye movements intact   Ears - bilateral TM's and external ear canals normal   Mouth - mucous membranes moist, pharynx normal without lesions   Neck - supple, no significant adenopathy   Lymphatics - no palpable lymphadenopathy, no hepatosplenomegaly   Chest - clear to auscultation, no wheezes, rales or rhonchi, symmetric air entry   Heart - normal rate, regular rhythm, normal S1, S2, no murmurs  Abdomen - soft, nontender, nondistended, no masses or organomegaly   Neurological - alert, oriented, normal speech, no focal findings or movement disorder noted   Musculoskeletal - no joint tenderness, deformity or swelling   Extremities - peripheral pulses normal, no pedal edema, no clubbing or cyanosis   Skin - normal coloration and turgor, no rashes, no suspicious skin lesions noted ,    DATA:    Labs:   CBC:   Recent Labs     07/13/22  0325 07/13/22  0325 07/14/22  1020 07/14/22  1132   WBC 7.7  --   --  6.1   HGB 10.5*   < > 9.9* 10.0*   HCT 35.4*   < > 32.9* 32.5*     --   --  323    < > = values in this interval not displayed. BMP:   Recent Labs     07/12/22  1455 07/14/22  1132    137   K 4.4 4.1   CO2 25 24   BUN 18 13   CREATININE 1.31* 1.29*   LABGLOM 41* 42*   GLUCOSE 121* 105*     PT/INR: No results for input(s): PROTIME, INR in the last 72 hours. IMAGING DATA:  MRI BRAIN WO CONTRAST   Final Result   Chronic microvascular disease without acute intracranial abnormality. Remote lacunar infarct in the left basal ganglia. VL DUP LOWER EXTREMITY VENOUS BILATERAL   Final Result      US GALLBLADDER RUQ   Final Result   1. Mild hepatic steatosis. 2. Contracted gallbladder without findings to suggest cholelithiasis or   cholecystitis. XR CHEST PORTABLE   Final Result   No acute cardiopulmonary abnormality identified. Slight increased elevation of the right hemidiaphragm when compared to the   prior chest radiograph. Probable hiatal hernia. CT Head W/O Contrast   Final Result   No acute intracranial abnormality.              Primary Problem  Acute anemia    Active Hospital Problems    Diagnosis Date Noted    Drop in hemoglobin [R71.0]      Priority: Medium    Long term (current) use of antithrombotics/antiplatelets [B03.06]      Priority: Medium    Acute anemia [D64.9] 07/12/2022     Priority: Medium         IMPRESSION: 1. Iron deficiency anemia, on aspirin for secondary prevention  2. History of CVA-lacunar infarct  3. History of left lower extremity DVT, successfully treated  4. History of gastritis  5. Hiatal hernia    RECOMMENDATIONS:  1. Labs reviewed. We will start patient on IV Venofer 300 mg for 4 doses. Plan for inpatient EGD and colonoscopy tomorrow am  2. Venous dopplers negative for DVT  3. We will continue to follow. 4. Outpatient follow-up with Dr. Luis Abraham      Discussed with patient and Nurse. Thank you for asking us to see this patient. Bishop Haley  PGY-3  Internal Medicine  [address]  7/14/2022 2:58 PM      Attending Physician Statement  The patient was seen and examined during rounds, I have discussed the care of Anjana Hodges, including pertinent history and exam findings with the resident. I have reviewed the key elements of all parts of the encounter with the resident. I agree with the assessment, and status of the problem list as documented. Additional assessment/ plan      MRI brain shows chronic microvascular disease. Remote lacunar infarct  No events overnight  Stool occult blood test positive  Tolerating iron.      Electronically signed by   Lashay Mack MD    on 7/14/2022 at 10:04 PM

## 2022-07-14 NOTE — CONSULTS
[facility]    GASTROENTEROLOGY CONSULT    Patient:   Shelbi Pritchett   :    1960   Facility:   [facility]   Date:    2022  Admission Dx:  Generalized weakness [R53.1]  Anemia, unspecified type [D64.9]  Acute anemia [D64.9]  Requesting physician: Eva Cross MD  Reason for consult:  Drop in Hgb   CC :     SUBJECTIVE     HISTORY OF PRESENT ILLNESS  This is a 60 y.o.   female who was admitted 2022 with Generalized weakness [R53.1]  Anemia, unspecified type [D64.9]  Acute anemia [D64.9]. We have been asked to see the patient in consultation by Eva Cross MD for drop in Hgb. 19-year-old female with past medical history of ADHD, bipolar disorder, DVT, HTN, MEY, CVA on antiplatelet therapy,  who presented to the ED with reports of weakness and vertigo. Labs on admission revealed Hgb 10.8, a drop from 13.2 g/dL on 4/15/2022 prompting GI consult. Patient seen and examined. Patient denies any upper GI symptoms such as nausea, vomiting, heartburn, reflux, dysphagia or odynophagia. Patient reports a black stool here yesterday since starting on iron and denies any melena or hematochezia at home though states she does not always observe her stool. Further work-up included iron studies showing ferritin 12, iron 31, iron saturation 11% with iron binding capacity 283, folate 10.1 and vitamin B12 503. Patient reports she follows with hematology for supplemental iron transfusions and was scheduled to see hematology next month. Patient has been seen by GI in the past for work-up with her anemia. She underwent EGD and colonoscopy in . EGD showed large hiatal hernia. Colonoscopy x2 were completed though bowel prep was poor. No overt signs of GI bleeding. Patient did undergo a video capsule in 2020 with no overt signs of GI bleeding.   Patient was also referred to Dr. Kiah Dominguez -bariatric surgery for possible fundoplication. Presurgical work-up included esophageal manometry however, patient reports probe was attempted x3 and was unable to be placed due to her history of being a boxer and has subsequent altered nasal septum. Previous GI history:     VCE - 12/10/2020            COLONOSCOPY 05/18/2020    DATE OF PROCEDURE: 5/18/2020     SURGEON: Ilya Singh MD     ASSISTANT: None     PREOPERATIVE DIAGNOSIS: ANEMIA  SCREEN     POSTOPERATIVE DIAGNOSIS: as described below     OPERATION: Total colonoscopy     LARGE AMOUNT OF RETAINED STOOLS  PREP AT BEST WAS FAIR  AGGRESSIVE FLUSHING WERE DONE TO THE BEST OF THE ABILITY  Findings:  Terminal ileum: not examined     Cecum/Ascending colon: normal     Transverse colon: normal     Descending/Sigmoid colon: abnormal: RETAINED STOOLS NO GROSS PATHOLOGY     Rectum/Anus: examined in normal and retroflexed positions and was abnormal: PROMINENT INT HEMORRHOIDS     Withdrawal Time was (minutes): 12     The colon was decompressed and the scope was removed. The patient tolerated the procedure well.      Recommendations/Plan:   1. Lifestyle and dietary modifications as discussed  2. F/U In Office in 3-4 weeks  3. Discussed with the family  4. Colonoscopy Recall :1-2  Year WITH BETTER PREP  5. POST SEDATION PATIENT WAS STABLE WITH STABLE VITAL SIGNS AND OXYGEN SATURATIONS AND WAS DISCHARGED HOME WITH RIDE IN A STABLE CONDITION. COLONOSCOPY 02/10/2022     The prep was inadequate. Large amount of solid stool lianet in rt colon , limited exam , need repeat with 2 day prep as out pt     Recommendations/Plan:   1. H/H monitoring   2. Repeat colonoscopy in3 months , with 2 day prep as an out pt  3.  R/o other cause of anemia      Electronically signed by Sharon Marc MD  on 2/10/2020 at 2:19 PM       EGD 02/10/2020    Diagnosis:  Large H H   Random small bowel bxs taken       Findings:     Retropharyngeal area was grossly normal appearing     Esophagus: normal     Stomach:    Fundus: abnormal: Large H H     Body: normal    Antrum: normal     Duodenum:    Random small bowel bxs taken   The scope was removed and the patient tolerated the procedure well.      Recommendations/Plan:   1. F/U Biopsies  2. Colonoscopy      Electronically signed by Kip Staton MD  on 2/10/2020 at 2:05 PM   OBJECTIVE:     PAST MEDICAL/SURGICAL HISTORY  Past Medical History:   Diagnosis Date    Acid reflux     Adult ADHD 2012    monthly visits @ [place]    Asthma 2012    seasonal    Bipolar 1 disorder ([facility]     monthly visits with [facility] Broken foot     Left foot    Chest pain of uncertain etiology     Last episode was in  (Written 2019)    Depression     Under control per pt.  on 6/15/18    DVT of leg (deep venous thrombosis) ([facility] [address] [facility] 2014    Fracture     right foot / pt denies surgical intervention    Fractured nose     x 9 times    Helicobacter pylori (H. pylori) infection     per EGD    Hematuria 2018    Has a follow-up with Urologist     Hiatal hernia     History of blood transfusion     no reactions    History of DVT (deep vein thrombosis) 2012    History of myasthenia gravis     diagnosed around age 26    Hx of blood clots     L leg post knee surgery    Hypertension     managed by Dr. Funmilayo Alcazar PCP   Bhakti Hogan Internal hemorrhoids     Narcolepsy 2012    Dr. Mary Carey [facility]    Osteoporosis     Sleep apnea     no machine    Under care of team 10/2021    Dr. Jean Benton Under care of team     Pain Management Dr. Farhana Pinzon last visit 2022     Past Surgical History:   Procedure Laterality Date    ANKLE SURGERY Left 2018    mass excision    ARM SURGERY Right     Pt states she has had 13 right arm surgeries    BREAST ENHANCEMENT SURGERY Bilateral      SECTION      x 3    COLONOSCOPY  2013    sigmoid diverticula, prominent large internal hemorrhoid    COLONOSCOPY N/A 2/10/2020    COLONOSCOPY DIAGNOSTIC performed by Elidia Robison MD at [facility] 5/18/2020    COLORECTAL CANCER SCREENING, NOT HIGH RISK performed by Ros Cortez MD at [address], COLON, DIAGNOSTIC      ESOPHAGEAL MOTILITY STUDY N/A 8/12/2020    PES RN - ESOPHAGEAL MOTILITY STUDY performed by Althea Prieto DO at [address] STUDY  9/10/2020    ESOPHAGEAL MOTILITY STUDY performed by Althea Prieto DO at [facility]    ESOPHAGEAL MOTILITY STUDY N/A 12/18/2020    ESOPHAGEAL MOTILITY STUDY ATTEMPTED PER DR. Sandra Quiroz WITHOUT SUCCESS performed by Althea Prieto DO at [facility]    FOOT SURGERY Left 2015    FOOT TENDON SURGERY Left 02/22/2019    LEFT PERONEAL TENDON REPAIR WITH POSSIBLE LEFT TENDON TRANSFER (Left )    KNEE ARTHROSCOPY Left     Two surgeries on left knee per pt    KNEE SURGERY Right     Three surgeries per pt    LIGAMENT REPAIR Left 2/22/2019    LEFT PERONEAL TENDON REPAIR performed by Renato Perla DPM at [facility] Bilateral 06/04/2021     BILATERAL L4/5, L5/S1 LUMBAR FACET (Bilateral )    NOSE SURGERY      PAIN MANAGEMENT PROCEDURE Bilateral 6/4/2021    BILATERAL L4/5, L5/S1 LUMBAR FACET performed by Ernesto Garcia MD at [address] [place] [address]  06/11/2021    BILATERAL L4/5, L5/S1 NERVE BLOCK - Bilateral    PAIN MANAGEMENT PROCEDURE Bilateral 6/11/2021    BILATERAL L4/5, L5/S1 NERVE BLOCK performed by Ernesto Garcia MD at [address] [place] [address] Right 07/23/2021    L4/5, L5/S1 NERVE RADIOFREQUENCY ABLATION     PAIN MANAGEMENT PROCEDURE Right 7/23/2021    right  L4/5, L5/S1 NERVE RADIOFREQUENCY ABLATION performed by Ernesto Garcia MD at [address] [place] [address] Left 08/06/2021    left  L4/5, L5/S1 NERVE RADIOFREQUENCY ABLATION (Left )    PAIN MANAGEMENT PROCEDURE Left 8/6/2021    left  L4/5, L5/S1 NERVE RADIOFREQUENCY ABLATION performed by Cordell Casarez MD at [address] OFFICE/OUTPT [address] Left 6/29/2018    RESECTION/REMOVAL BONY NEOPLASM LEFT FIBULA, LEFT SOFT TISSUE MASS EXCISION WITH BONE BIOPSY LEFT ANKLE FIBULA performed by Governor PRINCE Arreola at [address] Left     SHOULDER SURGERY Left 02/08/2022    REVERSE TOTAL SHOULDER ARTHROPLASTY - Left    SHOULDER SURGERY Left 2/8/2022    REVERSE TOTAL SHOULDER ARTHROPLASTY performed by Leopold Midget, DO at [address] [facility]  5/23/2013    tertiary contractures esophagus, 3 to 4 cm hiatal hernia, antral gastritis, + H Pylori, mild active chronic esophagitis    UPPER GASTROINTESTINAL ENDOSCOPY  4/6/2016    one biopsy    UPPER GASTROINTESTINAL ENDOSCOPY N/A 2/10/2020    EGD BIOPSY performed by Kip Staton MD at [address]:  Allergies   Allergen Reactions    Latex Itching    Prozac [Fluoxetine Hcl] Other (See Comments)     violent    Sulfa Antibiotics      migraine    Codeine Itching       HOME MEDICATIONS:  Prior to Admission medications    Medication Sig Start Date End Date Taking?  Authorizing Provider   alendronate (FOSAMAX) 70 MG tablet TAKE 1 TABLET BY MOUTH ONCE WEEKLY BEFORE BREAKFAST, ON AN EMPTY STOMACH: REMAIN UPRIGHT FOR 30 MINUTES:TAKE WITH 8 OUNCES OF WATER 5/17/22   Mary Holliday DO   amoxicillin (AMOXIL) 500 MG capsule  4/26/22   Historical Provider, MD   buPROPion (WELLBUTRIN XL) 300 MG extended release tablet  4/11/22   Historical Provider, MD   aspirin 81 MG chewable tablet Take 1 tablet by mouth daily 4/17/22   Esteban Tijerina MD   clopidogrel (PLAVIX) 75 MG tablet Take 1 tablet by mouth daily for 19 doses 4/17/22 5/6/22  Esteban Tijerina MD   atorvastatin (LIPITOR) 80 MG tablet Take 1 tablet by mouth nightly 4/16/22   Esteban Tijerina MD   naproxen (NAPROSYN) 500 MG tablet TAKE ONE TABLET BY MOUTH TWICE A DAY WITH MEALS  Patient not taking: Reported on 7/13/2022 4/13/22   Primus [place],    hydrOXYzine (ATARAX) 50 MG tablet TAKE ONE TABLET BY MOUTH EVERY 8 HOURS AS NEEDED FOR ANXIETY 2/16/22   Thania Leal,    propranolol (INDERAL LA) 80 MG extended release capsule Take 1 capsule by mouth daily 10/29/21   Thania Leal,    amphetamine-dextroamphetamine (ADDERALL) 30 MG tablet 30 mg 2 times daily.   9/27/21   Historical Provider, MD   pantoprazole (PROTONIX) 40 MG tablet Take 1 tablet by mouth daily 9/15/21   Thania Leal,    albuterol sulfate HFA (VENTOLIN HFA) 108 (90 Base) MCG/ACT inhaler INHALE TWO PUFFS BY MOUTH EVERY 4 HOURS AS NEEDED FOR WHEEZING  Patient not taking: Reported on 7/13/2022 10/28/20   Kj Pineda MD   citalopram (CELEXA) 40 MG tablet TAKE ONE TABLET BY MOUTH DAILY 4/8/20   Kj Pineda MD   albuterol (PROVENTIL) (2.5 MG/3ML) 0.083% nebulizer solution Take 2.5 mg by nebulization every 6 hours as needed for Wheezing or Shortness of Breath     Historical Provider, MD   Calcium Carbonate (CALCIUM 600 PO) Take 600 mg by mouth daily    Historical Provider, MD       CURRENT MEDICATIONS:  Scheduled Meds:   bisacodyl  20 mg Oral Once    polyethylene glycol  4,000 mL Oral Once    iron sucrose  300 mg IntraVENous Q24H    amphetamine-dextroamphetamine  30 mg Oral BID    atorvastatin  80 mg Oral Nightly    buPROPion  300 mg Oral Daily    citalopram  40 mg Oral Daily    clopidogrel  75 mg Oral Daily    pantoprazole  40 mg Oral Daily    propranolol  80 mg Oral Daily    sodium chloride flush  5-40 mL IntraVENous 2 times per day    enoxaparin  40 mg SubCUTAneous Daily     Continuous Infusions:   sodium chloride       PRN Meds:hydrOXYzine HCl, sodium chloride flush, sodium chloride, potassium chloride **OR** potassium alternative oral replacement **OR** potassium chloride, ondansetron, polyethylene glycol, acetaminophen **OR** acetaminophen    SOCIAL HISTORY:     Tobacco:   reports that she quit smoking about 33 years ago. She has a 20.00 pack-year smoking history. She has never used smokeless tobacco.  Alcohol:   reports previous alcohol use. Illicit drugs:  reports no history of drug use. FAMILY HISTORY:     Family History   Problem Relation Age of Onset   [facility] Cancer Mother     Hypertension Mother     Stroke Mother     Dementia Mother     Cancer Maternal Aunt     Hypertension Father     Heart Surgery Father     Alzheimer's Disease Father     Diabetes Neg Hx        REVIEW OF SYSTEMS:    Constitutional: No fever, no chills, no lethargy,+ weakness, + dizziness/vertigo. HEENT:  No headache, otalgia, itchy eyes, nasal discharge or sore throat. Cardiac:  No chest pain, dyspnea, orthopnea or PND. Chest:   No cough, phlegm or wheezing. Abdomen:  No abdominal pain, nausea or vomiting. Neuro:  No focal weakness, abnormal movements or seizure like activity. Skin:   No rashes, no itching. :   No hematuria, no pyuria, no dysuria, no flank pain. Extremities:  No swelling or joint pains. ROS was otherwise negative except as mentioned in the [address] PHYSICAL EXAM:    /72   Pulse 53   Temp 97.9 °F (36.6 °C)   Resp 16   Ht 5' (1.524 m)   Wt 133 lb (60.3 kg)   SpO2 96%   BMI 25.97 kg/m²     GENERAL: Pale, chronically ill, well developed, No apparent distress  HEAD:   Normocephalic, Atraumatic  EENT:   EOMI, Sclera not icteric, Oropharynx moist   NECK:   Supple, Trachea midline  LUNGS:  CTA Bilaterally  HEART:  RRR, No murmur  ABDOMEN:   Soft, Nontender, Nondistended, BS WNL  EXT:   No clubbing. No cyanosis. No edema. SKIN:   No rashes. No jaundice. No stigmata of liver disease.     MUSC/SKEL:   Adequate muscle bulk for patient's age, No significant synovitis, No deformities  NEURO:  A&O x Three, CN II- XII grossly intact      LABS AND IMAGING:     CBC  Recent Labs     07/12/22  1455 07/13/22  0325   WBC 5.7 7.7   HGB 10.8* 10.5*   HCT 35.4* 35.4*   MCV 88.7 89.6   MCH 27.1 26.6   MCHC 30.5 29.7    349       IMMATURE PLTs  No results found for: PLTFLUORE    BMP  Recent Labs     07/12/22  1455      K 4.4      CO2 25   BUN 18   CREATININE 1.31*   GLUCOSE 121*   CALCIUM 9.5       LFTS  Recent Labs     07/12/22  1455   ALKPHOS 364*   ALT 34*   AST 42*   PROT 6.8   BILITOT 0.22*   BILIDIR <0.08   LABALBU 4.0       AMYLASE/LIPASE/AMMONIA  No results for input(s): AMYLASE, LIPASE, AMMONIA in the last 72 hours. PT/INR  No results for input(s): PROTIME, INR in the last 72 hours. ANEMIA STUDIES  Recent Labs     07/13/22  0816   IRON 31*   LABIRON 11*   TIBC 283   UIBC 252   FERRITIN 12*   [id number] 503   FOLATE 10.1         LIVER WORK UP:    Acute Hepatitis Panel   Lab Results   Component Value Date/Time    HEPCAB NONREACTIVE 04/27/2018 11:42 AM       JUAN  Lab Results   Component Value Date/Time    JUAN NEGATIVE 03/17/2021 12:22 PM         IMAGING  CT Head W/O Contrast  Result Date: 7/12/2022  No acute intracranial abnormality. US GALLBLADDER RUQ  Result Date: 7/12/2022  1. Mild hepatic steatosis. 2. Contracted gallbladder without findings to suggest cholelithiasis or cholecystitis. XR CHEST PORTABLE  Result Date: 7/13/2022  No acute cardiopulmonary abnormality identified. Slight increased elevation of the right hemidiaphragm when compared to the prior chest radiograph. Probable hiatal hernia. VL DUP LOWER EXTREMITY VENOUS BILATERAL  Result Date: 7/14/2022  Conclusions   Summary   Bilateral:  No evidence of deep or superficial venous thrombosis. MRI BRAIN WO CONTRAST  Result Date: 7/14/2022  Chronic microvascular disease without acute intracranial abnormality. Remote lacunar infarct in the left basal ganglia. IMPRESSION:   57-year-old female with past medical history of ADHD, bipolar disorder, DVT, HTN, MEY, CVA on antiplatelet therapy,  who presented to the ED with reports of weakness and vertigo.   Labs on admission revealed Hgb 10.8, a drop from 13.2 g/dL on 4/15/2022 prompting GI consult. 1. Drop in Hgb -patient reports 1 episode of black stool after starting iron though no reported overt GI bleeding at home. 2.  History of iron deficiency anemia. Anemia suspected secondary to [facility] erosions associated with large hiatal hernia noted on EGD 05/20. Patient had VCE 12/2020 which showed no signs of bleeding. Patient did undergo 2 colonoscopies in 2020 though both colonoscopies without adequate bowel prep. 3. Hx of CVA on long-term dual antiplatelet therapy with aspirin and Plavix      Old records, labs and imaging reviewed. PLAN   1. Long discussion with patient regarding suspected anemia secondary to [facility] erosions in setting of large hiatal hernia. Patient reports she has been unable to undergo preprocedural requirements/testing including esophageal manometry. She is willing to repeat testing  tomorrow to rule out other causes for her anemia and as colonoscopies in past were inadequate. We will plan for EGD with push enteroscopy and colonoscopy -Discussed with Dr. Aliyah Mercedes  2. Patient will need additional laxatives given history of poor preps in past and patient ate breakfast this morning. Will give Dulcolax 20 mg p.o. now and start bowel prep with GoLytely early. If bowel cleansing inadequate, we will give additional 2 L GoLytely in early a.m.  3.  Clear liquid diet now then NPO after midnight  4. Monitor serial hemoglobin and hematocrit. Transfuse to keep hemoglobin greater than 7 g/dL or as clinically indicated  5. Okay to continue antiplatelet therapy from GI perspective  6. Iron therapy replacement per hematology  7. PPI 40 mg once daily for history of hiatal hernia  8.   Recommend patient continue to follow with Dr. Pamela Drake for hiatal hernia repair and further work-up including esophageal manometry-discussed with    This plan was formulated in collaboration with Dr. Felix John  Thank you for allowing us to participate in the care of your patient. Electronically signed by: PIETER Johns CNP on 7/14/2022 at 11:09 AM     Please note that this note was generated using a voice recognition dictation software. Although every effort was made to ensure the accuracy of this automated transcription, some errors in transcription may have occurred.

## 2022-07-14 NOTE — PLAN OF CARE
Problem: Chronic Conditions and Co-morbidities  Goal: Patient's chronic conditions and co-morbidity symptoms are monitored and maintained or improved  7/14/2022 1849 by Bernard Herrera RN  Outcome: Progressing  7/14/2022 1738 by Musa Gruber RN  Outcome: Progressing     Problem: Safety - Adult  Goal: Free from fall injury  7/14/2022 1849 by Bernard Herrera RN  Outcome: Progressing  7/14/2022 1738 by Musa Gruber RN  Outcome: Progressing     Problem: Pain  Goal: Verbalizes/displays adequate comfort level or baseline comfort level  7/14/2022 1849 by Bernard Herrera RN  Outcome: Progressing  7/14/2022 1738 by Musa Gruber RN  Outcome: Progressing     Problem: Discharge Planning  Goal: Discharge to home or other facility with appropriate resources  7/14/2022 1849 by Bernard Herrera RN  Outcome: Progressing  7/14/2022 1738 by Musa Gruber RN  Outcome: Progressing

## 2022-07-15 ENCOUNTER — ANESTHESIA (OUTPATIENT)
Dept: OPERATING ROOM | Age: 62
DRG: 812 | End: 2022-07-15
Payer: MEDICARE

## 2022-07-15 ENCOUNTER — ANESTHESIA EVENT (OUTPATIENT)
Dept: OPERATING ROOM | Age: 62
DRG: 812 | End: 2022-07-15
Payer: MEDICARE

## 2022-07-15 VITALS
WEIGHT: 133 LBS | TEMPERATURE: 97.6 F | DIASTOLIC BLOOD PRESSURE: 70 MMHG | OXYGEN SATURATION: 97 % | HEART RATE: 58 BPM | RESPIRATION RATE: 16 BRPM | HEIGHT: 60 IN | SYSTOLIC BLOOD PRESSURE: 105 MMHG | BODY MASS INDEX: 26.11 KG/M2

## 2022-07-15 LAB
ABSOLUTE EOS #: 0.43 K/UL (ref 0–0.44)
ABSOLUTE IMMATURE GRANULOCYTE: <0.03 K/UL (ref 0–0.3)
ABSOLUTE LYMPH #: 1.13 K/UL (ref 1.1–3.7)
ABSOLUTE MONO #: 0.53 K/UL (ref 0.1–1.2)
ANION GAP SERPL CALCULATED.3IONS-SCNC: 10 MMOL/L (ref 9–17)
BASOPHILS # BLD: 1 % (ref 0–2)
BASOPHILS ABSOLUTE: 0.06 K/UL (ref 0–0.2)
BUN BLDV-MCNC: 12 MG/DL (ref 8–23)
CALCIUM SERPL-MCNC: 8.8 MG/DL (ref 8.6–10.4)
CHLORIDE BLD-SCNC: 105 MMOL/L (ref 98–107)
CO2: 25 MMOL/L (ref 20–31)
CREAT SERPL-MCNC: 1.2 MG/DL (ref 0.5–0.9)
EKG ATRIAL RATE: 52 BPM
EKG P AXIS: 54 DEGREES
EKG P-R INTERVAL: 154 MS
EKG Q-T INTERVAL: 470 MS
EKG QRS DURATION: 82 MS
EKG QTC CALCULATION (BAZETT): 437 MS
EKG R AXIS: 0 DEGREES
EKG T AXIS: 38 DEGREES
EKG VENTRICULAR RATE: 52 BPM
EOSINOPHILS RELATIVE PERCENT: 6 % (ref 1–4)
GFR AFRICAN AMERICAN: 55 ML/MIN
GFR NON-AFRICAN AMERICAN: 45 ML/MIN
GFR SERPL CREATININE-BSD FRML MDRD: ABNORMAL ML/MIN/{1.73_M2}
GLUCOSE BLD-MCNC: 106 MG/DL (ref 70–99)
HCT VFR BLD CALC: 35 % (ref 36.3–47.1)
HEMOGLOBIN: 10.6 G/DL (ref 11.9–15.1)
IMMATURE GRANULOCYTES: 0 %
LYMPHOCYTES # BLD: 15 % (ref 24–43)
MCH RBC QN AUTO: 27 PG (ref 25.2–33.5)
MCHC RBC AUTO-ENTMCNC: 30.3 G/DL (ref 28.4–34.8)
MCV RBC AUTO: 89.1 FL (ref 82.6–102.9)
MONOCYTES # BLD: 7 % (ref 3–12)
NRBC AUTOMATED: 0 PER 100 WBC
PDW BLD-RTO: 14.6 % (ref 11.8–14.4)
PLATELET # BLD: 343 K/UL (ref 138–453)
PMV BLD AUTO: 10.6 FL (ref 8.1–13.5)
POTASSIUM SERPL-SCNC: 4.4 MMOL/L (ref 3.7–5.3)
RBC # BLD: 3.93 M/UL (ref 3.95–5.11)
RBC # BLD: ABNORMAL 10*6/UL
SEG NEUTROPHILS: 71 % (ref 36–65)
SEGMENTED NEUTROPHILS ABSOLUTE COUNT: 5.54 K/UL (ref 1.5–8.1)
SODIUM BLD-SCNC: 140 MMOL/L (ref 135–144)
WBC # BLD: 7.7 K/UL (ref 3.5–11.3)

## 2022-07-15 PROCEDURE — 6370000000 HC RX 637 (ALT 250 FOR IP): Performed by: EMERGENCY MEDICINE

## 2022-07-15 PROCEDURE — 99232 SBSQ HOSP IP/OBS MODERATE 35: CPT | Performed by: INTERNAL MEDICINE

## 2022-07-15 PROCEDURE — 6370000000 HC RX 637 (ALT 250 FOR IP): Performed by: STUDENT IN AN ORGANIZED HEALTH CARE EDUCATION/TRAINING PROGRAM

## 2022-07-15 PROCEDURE — APPSS30 APP SPLIT SHARED TIME 16-30 MINUTES: Performed by: INTERNAL MEDICINE

## 2022-07-15 PROCEDURE — 2580000003 HC RX 258: Performed by: INTERNAL MEDICINE

## 2022-07-15 PROCEDURE — G0378 HOSPITAL OBSERVATION PER HR: HCPCS

## 2022-07-15 PROCEDURE — 36415 COLL VENOUS BLD VENIPUNCTURE: CPT

## 2022-07-15 PROCEDURE — 96366 THER/PROPH/DIAG IV INF ADDON: CPT

## 2022-07-15 PROCEDURE — 85025 COMPLETE CBC W/AUTO DIFF WBC: CPT

## 2022-07-15 PROCEDURE — 6360000002 HC RX W HCPCS: Performed by: INTERNAL MEDICINE

## 2022-07-15 PROCEDURE — 80048 BASIC METABOLIC PNL TOTAL CA: CPT

## 2022-07-15 PROCEDURE — 2580000003 HC RX 258: Performed by: STUDENT IN AN ORGANIZED HEALTH CARE EDUCATION/TRAINING PROGRAM

## 2022-07-15 RX ADMIN — BUPROPION HYDROCHLORIDE 300 MG: 150 TABLET, EXTENDED RELEASE ORAL at 08:47

## 2022-07-15 RX ADMIN — IRON SUCROSE 300 MG: 20 INJECTION, SOLUTION INTRAVENOUS at 16:46

## 2022-07-15 RX ADMIN — CITALOPRAM 40 MG: 20 TABLET, FILM COATED ORAL at 08:47

## 2022-07-15 RX ADMIN — CLOPIDOGREL 75 MG: 75 TABLET, FILM COATED ORAL at 08:47

## 2022-07-15 RX ADMIN — PROPRANOLOL HYDROCHLORIDE 80 MG: 80 CAPSULE, EXTENDED RELEASE ORAL at 08:47

## 2022-07-15 RX ADMIN — PANTOPRAZOLE SODIUM 40 MG: 40 TABLET, DELAYED RELEASE ORAL at 08:47

## 2022-07-15 RX ADMIN — SODIUM CHLORIDE, PRESERVATIVE FREE 10 ML: 5 INJECTION INTRAVENOUS at 08:47

## 2022-07-15 RX ADMIN — MECLIZINE HCL 12.5 MG 25 MG: 12.5 TABLET ORAL at 06:32

## 2022-07-15 RX ADMIN — DEXTROAMPHETAMINE SACCHARATE, AMPHETAMINE ASPARTATE, DEXTROAMPHETAMINE SULFATE AND AMPHETAMINE SULFATE 30 MG: 2.5; 2.5; 2.5; 2.5 TABLET ORAL at 08:47

## 2022-07-15 RX ADMIN — MECLIZINE HCL 12.5 MG 25 MG: 12.5 TABLET ORAL at 00:28

## 2022-07-15 RX ADMIN — MECLIZINE HCL 12.5 MG 25 MG: 12.5 TABLET ORAL at 14:05

## 2022-07-15 ASSESSMENT — LIFESTYLE VARIABLES: SMOKING_STATUS: 0

## 2022-07-15 NOTE — PLAN OF CARE
Problem: Safety - Adult  Goal: Free from fall injury  7/15/2022 0930 by Rebecca Simeon RN  Outcome: Progressing  7/15/2022 0544 by Shavon Ernandez RN  Outcome: Progressing     Problem: Pain  Goal: Verbalizes/displays adequate comfort level or baseline comfort level  7/15/2022 0930 by Rebecca Simeon RN  Outcome: Progressing  7/15/2022 0544 by Shavon Ernandez RN  Outcome: Progressing

## 2022-07-15 NOTE — PROGRESS NOTES
[facility] Progress Note    Chauncey Flores is a 60 y.o. female patient. Hospitalization Day:1      Chief consult reason:-Hemoglobin      Subjective: Patient seen and examined. Plan was for EGD and colonoscopy today. Patient did not complete bowel prep last evening. States she cannot tolerate GoLytely. Denies any overt signs of GI bleeding  Hemoglobin improved to 10.6 g/dL    Objective:   VITALS:  /70   Pulse 58   Temp 97.6 °F (36.4 °C) (Oral)   Resp 16   Ht 5' (1.524 m)   Wt 133 lb (60.3 kg)   SpO2 97%   BMI 25.97 kg/m²   TEMPERATURE:  Current - Temp: 97.6 °F (36.4 °C);  Max - Temp  Av.9 °F (36.6 °C)  Min: 97.6 °F (36.4 °C)  Max: 98.1 °F (36.7 °C)    Physical Assessment:  General appearance:  alert, cooperative and no distress  Mental Status:  oriented to person, place and time and normal affect  Lungs:  clear to auscultation bilaterally, normal effort  Heart:  regular rate and rhythm, no murmur  Abdomen:  soft, nontender, nondistended, normal bowel sounds,  Extremities:  no edema, no redness, No clubbing  Skin:  warm, dry, no gross lesions or rashes    CURRENT MEDICATIONS:  Scheduled Meds:   meclizine  25 mg Oral 4 times per day    iron sucrose  300 mg IntraVENous Q24H    amphetamine-dextroamphetamine  30 mg Oral BID    atorvastatin  80 mg Oral Nightly    buPROPion  300 mg Oral Daily    citalopram  40 mg Oral Daily    clopidogrel  75 mg Oral Daily    pantoprazole  40 mg Oral Daily    propranolol  80 mg Oral Daily    sodium chloride flush  5-40 mL IntraVENous 2 times per day    enoxaparin  40 mg SubCUTAneous Daily     Continuous Infusions:   sodium chloride 100 mL/hr at 22 5497     PRN Meds:hydrOXYzine HCl, sodium chloride flush, sodium chloride, potassium chloride **OR** potassium alternative oral replacement **OR** potassium chloride, ondansetron, polyethylene glycol, acetaminophen **OR** acetaminophen      Data Review:  LABS and IMAGING:     CBC  Recent results found for: AFP    Lactic acid:No results for input(s): LACTACIDWB in the last 72 hours. Radiology Review:    CT Head W/O Contrast    Result Date: 7/12/2022  EXAMINATION: CT OF THE HEAD WITHOUT CONTRAST  7/12/2022 3:13 pm TECHNIQUE: CT of the head was performed without the administration of intravenous contrast. Automated exposure control, iterative reconstruction, and/or weight based adjustment of the mA/kV was utilized to reduce the radiation dose to as low as reasonably achievable. COMPARISON: CT brain and MRI brain 04/14/2022. HISTORY: ORDERING SYSTEM PROVIDED HISTORY: dizziness, worsening weakness, off balance TECHNOLOGIST PROVIDED HISTORY: dizziness, worsening weakness, off balance Decision Support Exception - unselect if not a suspected or confirmed emergency medical condition->Emergency Medical Condition (MA) FINDINGS: BRAIN/VENTRICLES: There is no acute intracranial hemorrhage, mass effect or midline shift. No abnormal extra-axial fluid collection. The gray-white differentiation is maintained without evidence of an acute infarct. There is no evidence of hydrocephalus. Chronic lacunar infarctions in the left basal ganglia, internal capsule, and caudate head. Mild chronic periventricular white matter hypoattenuation. ORBITS: The visualized portion of the orbits demonstrate no acute abnormality. SINUSES: The visualized paranasal sinuses and mastoid air cells demonstrate no acute abnormality. SOFT TISSUES/SKULL:  No acute abnormality of the visualized skull or soft tissues. No acute intracranial abnormality. US GALLBLADDER RUQ    Result Date: 7/12/2022  EXAMINATION: RIGHT UPPER QUADRANT ULTRASOUND 7/12/2022 8:42 pm COMPARISON: 01/13/2021 CT study HISTORY: ORDERING SYSTEM PROVIDED HISTORY: elevated alk phos, nonspecific abdominal pain TECHNOLOGIST PROVIDED HISTORY: elevated alk phos, nonspecific abdominal pain FINDINGS: LIVER:  Mild hepatic steatosis.   No focal mass or ductal dilation is identified. BILIARY SYSTEM:  The gallbladder is contracted. No intraluminal stones are seen. No pericholecystic fluid or significant wall thickening. Common bile duct is within normal limits measuring 3 mm. RIGHT KIDNEY: Renal length measures 8.1 cm which is decreased, with mild cortical thinning which may represent mild atrophy. No mass or hydronephrosis is identified. No significant increased echogenicity. PANCREAS:  Visualized portions of the pancreas are unremarkable. OTHER: No evidence of right upper quadrant ascites. 1. Mild hepatic steatosis. 2. Contracted gallbladder without findings to suggest cholelithiasis or cholecystitis. XR CHEST PORTABLE    Result Date: 7/13/2022  EXAMINATION: ONE XRAY VIEW OF THE CHEST 7/12/2022 2:58 pm COMPARISON: 04/14/2022 HISTORY: ORDERING SYSTEM PROVIDED HISTORY: dizziness, chronic cough TECHNOLOGIST PROVIDED HISTORY: dizziness, chronic cough FINDINGS: Heart is similar in size to the prior study. No pleural effusion or pneumothorax is seen. No focal consolidation is identified. Slight increased elevation of the right hemidiaphragm when compared to the prior exam.  There is a left shoulder prosthesis. Probable hiatal hernia. No acute cardiopulmonary abnormality identified. Slight increased elevation of the right hemidiaphragm when compared to the prior chest radiograph. Probable hiatal hernia. MRI BRAIN WO CONTRAST    Result Date: 7/14/2022  EXAMINATION: MRI OF THE BRAIN WITHOUT CONTRAST  7/14/2022 7:22 am TECHNIQUE: Multiplanar multisequence MRI of the brain was performed without the administration of intravenous contrast. COMPARISON: CT brain performed 07/12/2022. HISTORY: ORDERING SYSTEM PROVIDED HISTORY: dizziness TECHNOLOGIST PROVIDED HISTORY: dizziness FINDINGS: INTRACRANIAL STRUCTURES/VENTRICLES: The sellar and suprasellar structures, optic chiasm, corpus callosum, pineal gland, tectum, and midline brainstem structures are unremarkable.   The craniocervical junction is unremarkable. There is no acute hemorrhage, mass effect, or midline shift. There is satisfactory overall gray-white matter differentiation. There is chronic microvascular disease. There is a remote lacunar infarct in the left basal ganglia. The ventricular structures are symmetric and unremarkable. The infratentorial structures including the cerebellopontine angles and internal auditory canals are unremarkable. There is no abnormal restricted diffusion. There is no abnormal blooming artifact on susceptibility weighted imaging. ORBITS: The visualized portion of the orbits demonstrate no acute abnormality. SINUSES: The visualized paranasal sinuses and mastoid air cells demonstrate no acute abnormality. BONES/SOFT TISSUES: The bone marrow signal intensity appears normal. The soft tissues demonstrate no acute abnormality. Chronic microvascular disease without acute intracranial abnormality. Remote lacunar infarct in the left basal ganglia. ENDOSCOPY     Principal Problem:    Acute anemia  Active Problems:    Drop in hemoglobin    Long term (current) use of antithrombotics/antiplatelets    Iron deficiency    Pain of left lower extremity  Resolved Problems:    * No resolved hospital problems. *       GI Assessment:  79-year-old female with past medical history of ADHD, bipolar disorder, DVT, HTN, MEY, CVA on antiplatelet therapy,  who presented to the ED with reports of weakness and vertigo. Labs on admission revealed Hgb 10.8, a drop from 13.2 g/dL on 4/15/2022 prompting GI consult. 1. Drop in Hgb -patient reports 1 episode of black stool after starting iron though no reported overt GI bleeding at home. - Hgb improved to 10.6 g/dL     2. History of iron deficiency anemia. Anemia suspected secondary to [facility] erosions associated with large hiatal hernia noted on EGD 05/20. Patient had VCE 12/2020 which showed no signs of bleeding.   Patient did undergo 2 colonoscopies in 2020 though both colonoscopies without adequate bowel prep. 3. Hx of CVA on long-term dual antiplatelet therapy with aspirin and Plavix      Plan of care:  Patient to continue on supplemental iron as per hematology recommendation. Recommend patient follow-up with Dr. Parisa Hendricks for hiatal hernia repair and further work-up including esophageal manometry  Patient to follow-up with Dr. Tessy Lam as needed   GI will sign off. Time spent reviewing chart, seeing patient, and discussing with attending: Around 25 minutes    This plan was formulated in collaboration with Dr. Mary Richey  Please feel free to contact me with any questions or concerns. Thank you for allowing me to participate in the care of your patient. Sweta Henson, PIETER - CNP on 7/15/2022 at 10:42 AM   THE [facility]    Please note that this note was generated using a voice recognition dictation software. Although every effort was made to ensure the accuracy of this automated transcription, some errors in transcription may have occurred.

## 2022-07-15 NOTE — PROGRESS NOTES
Received patient awake, alert and oriented. Noted sinus bradycardia during shift with heart rate on 53 beats per minute. Upon assessment patient no complained however she verbalized when he stand up she felt dizzy. Patient refused to do colonoscopy despite explanation. Primary care team to note.

## 2022-07-15 NOTE — ANESTHESIA PRE PROCEDURE
Department of Anesthesiology  Preprocedure Note       Name:  Nick Romo   Age:  60 y.o.  :  1960                                          MRN:  [id number]         Date:  7/15/2022      Surgeon: Marlon Saucedo):  Adrianna Toribio MD    Procedure: Procedure(s):  EGD ESOPHAGOGASTRODUODENOSCOPY    Medications prior to admission:   Prior to Admission medications    Medication Sig Start Date End Date Taking? Authorizing Provider   alendronate (FOSAMAX) 70 MG tablet TAKE 1 TABLET BY MOUTH ONCE WEEKLY BEFORE BREAKFAST, ON AN EMPTY STOMACH: REMAIN UPRIGHT FOR 30 MINUTES:TAKE WITH 8 OUNCES OF WATER 22   Erickson Leon, DO   amoxicillin (AMOXIL) 500 MG capsule  22   Historical Provider, MD   buPROPion (WELLBUTRIN XL) 300 MG extended release tablet  22   Historical Provider, MD   aspirin 81 MG chewable tablet Take 1 tablet by mouth daily 22   Hallie Cummings MD   clopidogrel (PLAVIX) 75 MG tablet Take 1 tablet by mouth daily for 19 doses 22  Hallie Cummings MD   atorvastatin (LIPITOR) 80 MG tablet Take 1 tablet by mouth nightly 22   Hallie Cummings MD   naproxen (NAPROSYN) 500 MG tablet TAKE ONE TABLET BY MOUTH TWICE A DAY WITH MEALS  Patient not taking: Reported on 2022   Jerome Neal,    hydrOXYzine (ATARAX) 50 MG tablet TAKE ONE TABLET BY MOUTH EVERY 8 HOURS AS NEEDED FOR ANXIETY 22   Erickson Leon, DO   propranolol (INDERAL LA) 80 MG extended release capsule Take 1 capsule by mouth daily 10/29/21   Erickson Edward, DO   amphetamine-dextroamphetamine (ADDERALL) 30 MG tablet 30 mg 2 times daily.   21   Historical Provider, MD   pantoprazole (PROTONIX) 40 MG tablet Take 1 tablet by mouth daily 9/15/21   Erickson Leon, DO   albuterol sulfate HFA (VENTOLIN HFA) 108 (90 Base) MCG/ACT inhaler INHALE TWO PUFFS BY MOUTH EVERY 4 HOURS AS NEEDED FOR WHEEZING  Patient not taking: Reported on 2022 10/28/20   Arley Santoro MD citalopram (CELEXA) 40 MG tablet TAKE ONE TABLET BY MOUTH DAILY 4/8/20   Ankit Walker MD   albuterol (PROVENTIL) (2.5 MG/3ML) 0.083% nebulizer solution Take 2.5 mg by nebulization every 6 hours as needed for Wheezing or Shortness of Breath     Historical Provider, MD   Calcium Carbonate (CALCIUM 600 PO) Take 600 mg by mouth daily    Historical Provider, MD       Current medications:    No current facility-administered medications for this visit. No current outpatient medications on file.      Facility-Administered Medications Ordered in Other Visits   Medication Dose Route Frequency Provider Last Rate Last Admin    meclizine (ANTIVERT) tablet 25 mg  25 mg Oral 4 times per day Ria Blake MD   25 mg at 07/15/22 1687    iron sucrose (VENOFER) 300 mg in sodium chloride 0.9 % 250 mL IVPB  300 mg IntraVENous Q24H Julian Holbrook MD   Stopped at 07/14/22 1942    amphetamine-dextroamphetamine (ADDERALL) tablet 30 mg  30 mg Oral BID Lior Ricks DO   30 mg at 07/14/22 2021    atorvastatin (LIPITOR) tablet 80 mg  80 mg Oral Nightly Monica Ricks DO   80 mg at 07/14/22 2022    buPROPion (WELLBUTRIN XL) extended release tablet 300 mg  300 mg Oral Daily Monica Ricks DO   300 mg at 07/14/22 0856    citalopram (CELEXA) tablet 40 mg  40 mg Oral Daily Monica Ricks DO   40 mg at 07/14/22 0856    clopidogrel (PLAVIX) tablet 75 mg  75 mg Oral Daily Monica Ricks DO   75 mg at 07/14/22 0856    hydrOXYzine HCl (ATARAX) tablet 50 mg  50 mg Oral Q6H PRN [place] Countess, DO        pantoprazole (PROTONIX) tablet 40 mg  40 mg Oral Daily Monica Ricks DO   40 mg at 07/14/22 0856    propranolol (INDERAL LA) extended release capsule 80 mg  80 mg Oral Daily [place] Countess, DO   80 mg at 07/14/22 0856    sodium chloride flush 0.9 % injection 5-40 mL  5-40 mL IntraVENous 2 times per day Jayme Countess, DO   10 mL at 07/14/22 0856    sodium chloride flush 0.9 % injection 5-40 mL  5-40 mL IntraVENous PRN Randolph Murphy, DO        0.9 % sodium chloride infusion  25 mL IntraVENous PRN Randolph Murphy,  mL/hr at 07/14/22 1847 Rate Verify at 07/14/22 1847    potassium chloride (KLOR-CON M) extended release tablet 40 mEq  40 mEq Oral PRN Randolph Murhpy, DO        Or    potassium bicarb-citric acid (EFFER-K) effervescent tablet 40 mEq  40 mEq Oral PRN Randolph Murphy, DO        Or    potassium chloride 10 mEq/100 mL IVPB (Peripheral Line)  10 mEq IntraVENous PRN Randolph Murphy, DO        enoxaparin (LOVENOX) injection 40 mg  40 mg SubCUTAneous Daily Monica F Rambo, DO   40 mg at 07/13/22 1434    ondansetron (ZOFRAN) injection 4 mg  4 mg IntraVENous Q4H PRN Chantel Guerin Rambo, DO   4 mg at 07/14/22 0434    polyethylene glycol (GLYCOLAX) packet 17 g  17 g Oral Daily PRN Randolph Murphy, DO        acetaminophen (TYLENOL) tablet 650 mg  650 mg Oral Q6H PRN Randolph Murphy, DO   650 mg at 07/14/22 1605    Or    acetaminophen (TYLENOL) suppository 650 mg  650 mg Rectal Q6H PRN Randolph Murphy, DO           Allergies:     Allergies   Allergen Reactions    Latex Itching    Prozac [Fluoxetine Hcl] Other (See Comments)     violent    Sulfa Antibiotics      migraine    Codeine Itching       Problem List:    Patient Active Problem List   Diagnosis Code    Attention deficit hyperactivity disorder (ADHD) F90.9    Narcolepsy G47.419    Moderate persistent asthma without complication U11.46    History of DVT (deep vein thrombosis) Z86.718    Hiatal hernia K44.9    Cervical radiculopathy M54.12    Ganglion cyst M67.40    Chronic pain of left ankle M25.572, G89.29    Acquired trigger finger M65.30    Osteoarthritis of knee M17.10    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 Colon Regla, Y92.009    Closed fracture of multiple ribs of right side S22.41XA    Hemothorax on right J94.2    Contusion of right lung S27.321A    Chronic fatigue syndrome R53.82    Sleep apnea G47.30    Pain in joint involving ankle and foot M25.579    Lumbosacral spondylosis without myelopathy M47.817    Chronic bilateral low back pain with bilateral sciatica M54.42, M54.41, G89.29    Encounter for long-term opiate analgesic use Z79.891    Arthritis of left shoulder region M19.012    Opioid use, unspecified with unspecified opioid-induced disorder F11.99    Stroke-like symptoms R29.90    Ischemic stroke (HCC) I63.9    Acute encephalopathy G93.40    Chronic renal disease, stage III ([facility] [917151] N18.30    Acute anemia D64.9    Drop in hemoglobin R71.0    Long term (current) use of antithrombotics/antiplatelets C78.84    Iron deficiency E61.1    Pain of left lower extremity M79.605       Past Medical History:        Diagnosis Date    Acid reflux     Adult ADHD 08/01/2012    monthly visits @ [place]    Asthma 08/01/2012    seasonal    [facility]     monthly visits with [facility]    Broken foot 2014    Left foot    Chest pain of uncertain etiology 54/81/9776    Last episode was in 2013 (Written 02/12/2019)    Depression     Under control per pt.  on 6/15/18    DVT of leg (deep venous thrombosis) ([facility] 08/22/2014    Fracture     right foot / pt denies surgical intervention    Fractured nose     x 9 times    Helicobacter pylori (H. pylori) infection 2013    per EGD    Hematuria 04/2018    Has a follow-up with Urologist 6/20    Hiatal hernia     History of blood transfusion     no reactions    History of DVT (deep vein thrombosis) 2012    History of myasthenia gravis     diagnosed around age 26    Hx of blood clots     L leg post knee surgery    Hypertension     managed by Dr. Sudhakar Perez PCP   [facility] Internal hemorrhoids     Narcolepsy 2012    Dr. Arnold Markham [facility]    Osteoporosis     Sleep apnea     no machine    Under care of team 10/2021    Dr. Estrellita Monzon Under care of team     Pain Management Dr. Zulma Galarza last visit 2022       Past Surgical History:        Procedure Laterality Date    ANKLE SURGERY Left 2018    mass excision    ARM SURGERY Right     Pt states she has had 13 right arm surgeries    BREAST ENHANCEMENT SURGERY Bilateral      SECTION      x 3    COLONOSCOPY  2013    sigmoid diverticula, prominent large internal hemorrhoid    COLONOSCOPY N/A 2/10/2020    COLONOSCOPY DIAGNOSTIC performed by Sharon Marc MD at [facility] 2020    COLORECTAL CANCER SCREENING, NOT HIGH RISK performed by Ilya Singh MD at [address] [place] [address], COLON, DIAGNOSTIC     [address] [place] [address] STUDY N/A 2020    PES RN - ESOPHAGEAL MOTILITY STUDY performed by Kanwal Gomez DO at [address] STUDY  9/10/2020    ESOPHAGEAL MOTILITY STUDY performed by Kanwal Gomez DO at [facility]    ESOPHAGEAL MOTILITY STUDY N/A 2020    ESOPHAGEAL MOTILITY STUDY ATTEMPTED PER DR. Malorie Morales WITHOUT SUCCESS performed by Kanwal Gomez DO at [facility]    FOOT SURGERY Left     FOOT TENDON SURGERY Left 2019    LEFT PERONEAL TENDON REPAIR WITH POSSIBLE LEFT TENDON TRANSFER (Left )    KNEE ARTHROSCOPY Left     Two surgeries on left knee per pt    KNEE SURGERY Right     Three surgeries per pt    LIGAMENT REPAIR Left 2019    LEFT PERONEAL TENDON REPAIR performed by Anahy Maddox DPM at [address] [address],[address] Bilateral 2021     BILATERAL L4/5, L5/S1 LUMBAR FACET (Bilateral )    NOSE SURGERY      PAIN MANAGEMENT PROCEDURE Bilateral 6/4/2021    BILATERAL L4/5, L5/S1 LUMBAR FACET performed by Kanika Hill MD at [address] [place] [address]  06/11/2021    BILATERAL L4/5, L5/S1 NERVE BLOCK - Bilateral    PAIN MANAGEMENT PROCEDURE Bilateral 6/11/2021    BILATERAL L4/5, L5/S1 NERVE BLOCK performed by Kanika Hill MD at [address] [place] [address] Right 07/23/2021    L4/5, L5/S1 NERVE RADIOFREQUENCY ABLATION     PAIN MANAGEMENT PROCEDURE Right 7/23/2021    right  L4/5, L5/S1 NERVE RADIOFREQUENCY ABLATION performed by Kanika Hill MD at [address] [place] [address] Left 08/06/2021    left  L4/5, L5/S1 NERVE RADIOFREQUENCY ABLATION (Left )    PAIN MANAGEMENT PROCEDURE Left 8/6/2021    left  L4/5, L5/S1 NERVE RADIOFREQUENCY ABLATION performed by Kanika Hill MD at [address] OFFICE/OUTPT [address] Left 6/29/2018    RESECTION/REMOVAL BONY NEOPLASM LEFT FIBULA, LEFT SOFT TISSUE MASS EXCISION WITH BONE BIOPSY LEFT ANKLE FIBULA performed by Gilbert Matute DPM at [facility] Left     SHOULDER SURGERY Left 02/08/2022    REVERSE TOTAL SHOULDER ARTHROPLASTY - Left    SHOULDER SURGERY Left 2/8/2022    REVERSE TOTAL SHOULDER ARTHROPLASTY performed by Pietro Piña DO at [facility]  5/23/2013    tertiary contractures esophagus, 3 to 4 cm hiatal hernia, antral gastritis, + H Pylori, mild active chronic esophagitis    UPPER GASTROINTESTINAL ENDOSCOPY  4/6/2016    one biopsy    UPPER GASTROINTESTINAL ENDOSCOPY N/A 2/10/2020    EGD BIOPSY performed by Johnson Castro MD at [address]         Social History:    Social History     Tobacco Use    Smoking status: Former     Packs/day: 1.00     Years: 20.00     Pack years: 20.00     Types: Cigarettes     Quit date: 1989     Years since quittin.5    Smokeless tobacco: Never   Substance Use Topics    Alcohol use: Not Currently     Comment: maybe oncee a year                                Counseling given: Not Answered      Vital Signs (Current): There were no vitals filed for this visit. BP Readings from Last 3 Encounters:   22 114/64   22 112/76   22 108/76       NPO Status:                                                                                 BMI:   Wt Readings from Last 3 Encounters:   22 133 lb (60.3 kg)   22 133 lb (60.3 kg)   22 143 lb (64.9 kg)     There is no height or weight on file to calculate BMI.    CBC:   Lab Results   Component Value Date/Time    WBC 6.1 2022 11:32 AM    RBC 3.67 2022 11:32 AM    HGB 10.0 2022 11:32 AM    HCT 32.5 2022 11:32 AM    MCV 88.6 2022 11:32 AM    RDW 14.5 2022 11:32 AM     2022 11:32 AM       CMP:   Lab Results   Component Value Date/Time     2022 11:32 AM    K 4.1 2022 11:32 AM     2022 11:32 AM    CO2 24 2022 11:32 AM    BUN 13 2022 11:32 AM    CREATININE 1.29 2022 11:32 AM    GFRAA 51 2022 11:32 AM    LABGLOM 42 2022 11:32 AM    GLUCOSE 105 2022 11:32 AM    PROT 6.8 2022 02:55 PM    CALCIUM 9.2 2022 11:32 AM    BILITOT 0.22 2022 02:55 PM    ALKPHOS 364 2022 02:55 PM    AST 42 2022 02:55 PM    ALT 34 2022 02:55 PM       POC Tests: No results for input(s): POCGLU, POCNA, POCK, POCCL, POCBUN, POCHEMO, POCHCT in the last 72 hours.     Coags:   Lab Results   Component Value Date/Time    PROTIME 10.1 2022 02:19 PM    INR 0.9 2022 02:19 PM    APTT 22.2 2022 02:19 PM       HCG (If Applicable): No results found for: PREGTESTUR, PREGSERUM, HCG, HCGQUANT     ABGs: No results found for: PHART, PO2ART, FIB6FWZ, QBE4JVC, BEART, U1DTZVTE Type & Screen (If Applicable):  No results found for: LABABO, LABRH    Drug/Infectious Status (If Applicable):  Lab Results   Component Value Date/Time    HEPCAB NONREACTIVE 04/27/2018 11:42 AM       COVID-19 Screening (If Applicable):   Lab Results   Component Value Date/Time    COVID19 Not Detected 07/12/2022 08:39 PM    COVID19 Not Detected 08/02/2021 10:30 AM    COVID19 Not Detected 09/25/2020 10:00 AM    COVID19 Not Detected 07/02/2020 08:00 AM           Anesthesia Evaluation   no history of anesthetic complications:   Airway: Mallampati: II     Neck ROM: full     Dental:    (+) upper dentures      Pulmonary:   (+) sleep apnea:  asthma:     (-) not a current smoker                          ROS comment: Hx DVT   Cardiovascular:    (+) hypertension:,                   Neuro/Psych:   (+) neuromuscular disease: myasthenia gravis, psychiatric history:             ROS comment: EMILIA,ADHD,narcolepsy GI/Hepatic/Renal:   (+) hiatal hernia, GERD:,           Endo/Other:    (+) blood dyscrasia: anemia:., .                 Abdominal:             Vascular: Other Findings:             Anesthesia Plan      MAC     ASA 4             Anesthetic plan and risks discussed with patient. Plan discussed with CRNA.                     Bethany To MD   7/15/2022

## 2022-07-15 NOTE — PROGRESS NOTES
History of blood transfusion, History of DVT (deep vein thrombosis), History of myasthenia gravis, Hx of blood clots, Hypertension, Internal hemorrhoids, Narcolepsy, Osteoporosis, Sleep apnea, Under care of team, and Under care of team.    Past Surgical History:   has a past surgical history that includes  section; Nose surgery; Knee arthroscopy (Left); shoulder surgery (Left); Wrist surgery; Upper gastrointestinal endoscopy (2013); Colonoscopy (2013); Upper gastrointestinal endoscopy (2016); Foot surgery (Left, 2015); Arm Surgery (Right); knee surgery (Right); vaginal dilatation; Ankle surgery (Left, 2018); pr office/outpt visit,procedure only (Left, 2018); Foot Tendon Surgery (Left, 2019); ligament repair (Left, 2019); Upper gastrointestinal endoscopy (N/A, 2/10/2020); Colonoscopy (N/A, 2/10/2020); Endoscopy, colon, diagnostic; Colonoscopy (N/A, 2020); esophageal motility study (N/A, 2020); esophageal motility study (9/10/2020); esophageal motility study (N/A, 2020); Nerve Block (Bilateral, 2021); Pain management procedure (Bilateral, 2021); Pain management procedure (2021); Pain management procedure (Bilateral, 2021); Pain management procedure (Right, 2021); Pain management procedure (Right, 2021); Pain management procedure (Left, 2021); Pain management procedure (Left, 2021); Breast enhancement surgery (Bilateral); shoulder surgery (Left, 2022); and shoulder surgery (Left, 2022). Medications:    Reviewed in Epic     Allergies:  Latex, Prozac [fluoxetine hcl], Sulfa antibiotics, and Codeine    Social History:   reports that she quit smoking about 33 years ago. She has a 20.00 pack-year smoking history. She has never used smokeless tobacco. She reports that she does not currently use alcohol. She reports that she does not use drugs.      Family History: family history includes Alzheimer's Disease in her father; Gurpreet Salas in her maternal aunt and mother; Dementia in her mother; Heart Surgery in her father; Hypertension in her father and mother; Stroke in her mother. REVIEW OF SYSTEMS:    Constitutional: No fever or chills. No night sweats, no weight loss   Eyes: No eye discharge, double vision, or eye pain   HEENT: negative for sore mouth, sore throat, hoarseness and voice change   Respiratory: negative for cough , sputum, dyspnea, wheezing, hemoptysis, chest pain   Cardiovascular: negative for chest pain, dyspnea, palpitations, orthopnea, PND   Gastrointestinal: negative for nausea, vomiting, diarrhea, constipation, abdominal pain, Dysphagia, hematemesis and hematochezia   Genitourinary: negative for frequency, dysuria, nocturia, urinary incontinence, and hematuria   Integument: negative for rash, skin lesions, bruises.    Hematologic/Lymphatic: negative for easy bruising, bleeding, lymphadenopathy, or petechiae   Endocrine: negative for heat or cold intolerance,weight changes, change in bowel habits and hair loss   Musculoskeletal: negative for myalgias, arthralgias, pain, joint swelling,and bone pain   Neurological: negative for headaches, dizziness, seizures, weakness, numbness    PHYSICAL EXAM:      /64   Pulse 55   Temp 98.1 °F (36.7 °C) (Oral)   Resp 16   Ht 5' (1.524 m)   Wt 133 lb (60.3 kg)   SpO2 94%   BMI 25.97 kg/m²    Temp (24hrs), Av °F (36.7 °C), Min:97.9 °F (36.6 °C), Max:98.1 °F (36.7 °C)    General appearance - well appearing, no in pain or distress   Mental status - alert and cooperative   Eyes - pupils equal and reactive, extraocular eye movements intact   Ears - bilateral TM's and external ear canals normal   Mouth - mucous membranes moist, pharynx normal without lesions   Neck - supple, no significant adenopathy   Lymphatics - no palpable lymphadenopathy, no hepatosplenomegaly   Chest - clear to auscultation, no wheezes, rales or rhonchi, symmetric air entry   Heart - normal rate, regular rhythm, normal S1, S2, no murmurs  Abdomen - soft, nontender, nondistended, no masses or organomegaly   Neurological - alert, oriented, normal speech, no focal findings or movement disorder noted   Musculoskeletal - no joint tenderness, deformity or swelling   Extremities - peripheral pulses normal, no pedal edema, no clubbing or cyanosis   Skin - normal coloration and turgor, no rashes, no suspicious skin lesions noted ,    DATA:    Labs:   CBC:   Recent Labs     07/13/22  0325 07/14/22  1020 07/14/22  1132   WBC 7.7  --  6.1   HGB 10.5* 9.9* 10.0*   HCT 35.4* 32.9* 32.5*     --  323     BMP:   Recent Labs     07/12/22  1455 07/14/22  1132    137   K 4.4 4.1   CO2 25 24   BUN 18 13   CREATININE 1.31* 1.29*   LABGLOM 41* 42*   GLUCOSE 121* 105*     PT/INR: No results for input(s): PROTIME, INR in the last 72 hours. IMAGING DATA:  MRI BRAIN WO CONTRAST   Final Result   Chronic microvascular disease without acute intracranial abnormality. Remote lacunar infarct in the left basal ganglia. VL DUP LOWER EXTREMITY VENOUS BILATERAL   Final Result      US GALLBLADDER RUQ   Final Result   1. Mild hepatic steatosis. 2. Contracted gallbladder without findings to suggest cholelithiasis or   cholecystitis. XR CHEST PORTABLE   Final Result   No acute cardiopulmonary abnormality identified. Slight increased elevation of the right hemidiaphragm when compared to the   prior chest radiograph. Probable hiatal hernia. CT Head W/O Contrast   Final Result   No acute intracranial abnormality.              Primary Problem  Acute anemia    Active Hospital Problems    Diagnosis Date Noted    Drop in hemoglobin [R71.0]      Priority: Medium    Long term (current) use of antithrombotics/antiplatelets [A38.76]      Priority: Medium    Iron deficiency [E61.1]      Priority: Medium    Pain of left lower extremity [M79.605]      Priority: Medium    Acute anemia [D64.9] 07/12/2022     Priority: Medium         IMPRESSION:   Iron deficiency anemia, on aspirin for secondary prevention  History of CVA-lacunar infarct  History of left lower extremity DVT, successfully treated  History of gastritis  Hiatal hernia    RECOMMENDATIONS:  Labs reviewed.   Patient on IV Venofer  Patient could not finish prep for colonoscopy  Colonoscopy outpatient  Okay to discharge patient home with outpatient follow-up with Dr. Iesha Rogers   Electronically signed by   Florentin Oreilly MD    on 7/15/2022 at 6:55 AM

## 2022-07-15 NOTE — PLAN OF CARE
Problem: Chronic Conditions and Co-morbidities  Goal: Patient's chronic conditions and co-morbidity symptoms are monitored and maintained or improved  7/15/2022 0544 by Lj Wellington RN  Outcome: Progressing  7/14/2022 1849 by Justin Sims RN  Outcome: Progressing  7/14/2022 1738 by Trupti Jordan RN  Outcome: Progressing     Problem: Safety - Adult  Goal: Free from fall injury  7/15/2022 0544 by Lj Wellington RN  Outcome: Progressing  7/14/2022 1849 by Justin Sims RN  Outcome: Progressing  7/14/2022 1738 by Trupti Jordan RN  Outcome: Progressing     Problem: Pain  Goal: Verbalizes/displays adequate comfort level or baseline comfort level  7/15/2022 0544 by Lj Wellington RN  Outcome: Progressing  7/14/2022 1849 by Justin Sims RN  Outcome: Progressing  7/14/2022 1738 by Trupti Jordan RN  Outcome: Progressing     Problem: Discharge Planning  Goal: Discharge to home or other facility with appropriate resources  7/15/2022 0544 by Lj Wellington RN  Outcome: Progressing  7/14/2022 1849 by Justin Sims RN  Outcome: Progressing  7/14/2022 1738 by Trupti Jordan RN  Outcome: Progressing

## 2022-07-15 NOTE — PROGRESS NOTES
[facility]  Documentation Note                Admission Dx: Generalized weakness [R53.1]  Anemia, unspecified type [D64.9]  Acute anemia [D64.9]    Past Medical History:   has a past medical history of Acid reflux, Adult ADHD, Asthma, Bipolar 1 disorder ([facility] [address] Broken foot, Chest pain of uncertain etiology, Depression, DVT of leg (deep venous thrombosis) ([facility] [address] Fracture, Fractured nose, Helicobacter pylori (H. pylori) infection, Hematuria, Hiatal hernia, History of blood transfusion, History of DVT (deep vein thrombosis), History of myasthenia gravis, Hx of blood clots, Hypertension, Internal hemorrhoids, Narcolepsy, Osteoporosis, Sleep apnea, Under care of team, and Under care of team.    Previous Testing:     ECHO 4/16/2022: EF 64%, mild TR, RVSP is 35 mmHg, negative bubble study. HOLTER 12/18/13: Normal monitor. STRESS 12/5/13: No fixed/reversible perfusion defects. EF 65%. Previous office/hospital visit:   None? ?      Karen Knox [facility]

## 2022-07-15 NOTE — PROGRESS NOTES
OBS/CDU   RESIDENT NOTE      Patients PCP is:  Florencia Paulino,         SUBJECTIVE      No acute events overnight. Patient refused bowel prep overnight, ordered breakfast this morning and is eating. States that she no longer wants these procedures done. Mentions that she has had multiple colonoscopies in the past, would like to just continue transfusions. Long discussion is had regarding need for these procedures, patient refuses    PHYSICAL EXAM      General: NAD, AO X 3  Heent: EMOI, PERRL  Neck: SUPPLE, NO JVD  Cardiovascular: RRR, S1S2  Pulmonary: CTAB, NO SOB  Abdomen: SOFT, NTTP, ND, +BS  Extremities: +2/4 PULSES DISTAL, NO SWELLING  Neuro / Psych: NO NUMBNESS OR TINGLING, MENTATION AT BASELINE    PERTINENT TEST /EXAMS      I have reviewed all available laboratory results.     MEDICATIONS CURRENT   meclizine (ANTIVERT) tablet 25 mg, 4 times per day  iron sucrose (VENOFER) 300 mg in sodium chloride 0.9 % 250 mL IVPB, Q24H  amphetamine-dextroamphetamine (ADDERALL) tablet 30 mg, BID  atorvastatin (LIPITOR) tablet 80 mg, Nightly  buPROPion (WELLBUTRIN XL) extended release tablet 300 mg, Daily  citalopram (CELEXA) tablet 40 mg, Daily  clopidogrel (PLAVIX) tablet 75 mg, Daily  hydrOXYzine HCl (ATARAX) tablet 50 mg, Q6H PRN  pantoprazole (PROTONIX) tablet 40 mg, Daily  propranolol (INDERAL LA) extended release capsule 80 mg, Daily  sodium chloride flush 0.9 % injection 5-40 mL, 2 times per day  sodium chloride flush 0.9 % injection 5-40 mL, PRN  0.9 % sodium chloride infusion, PRN  potassium chloride (KLOR-CON M) extended release tablet 40 mEq, PRN   Or  potassium bicarb-citric acid (EFFER-K) effervescent tablet 40 mEq, PRN   Or  potassium chloride 10 mEq/100 mL IVPB (Peripheral Line), PRN  enoxaparin (LOVENOX) injection 40 mg, Daily  ondansetron (ZOFRAN) injection 4 mg, Q4H PRN  polyethylene glycol (GLYCOLAX) packet 17 g, Daily PRN  acetaminophen (TYLENOL) tablet 650 mg, Q6H PRN   Or  acetaminophen (TYLENOL) suppository 650 mg, Q6H PRN      All medication charted and reviewed. CONSULTS      IP CONSULT TO GI  IP CONSULT TO HEM/ONC  IP CONSULT TO CARDIOLOGY    ASSESSMENT/PLAN       Haley De La Torre is a 60 y.o. female who presents with     Symptomatic anemia  History of iron deficiency anemia  Hematology consult, appreciate recommendation  IV iron x3  History of DVT, no DVT on ultrasound  Vertiginous symptoms  History of prior stroke, MRI brain shows chronic changes  Bradycardia  Cardiology consultation, appreciate recommendations  Continue home medications and pain control  GI consult, appreciate recommendations  Patient refusing bowel prep and EGD/colonoscopy  Monitor vitals, labs, and imaging  DISPO: pending consults and clinical improvement    --  Nestor Sen MD  Emergency Medicine Resident Physician     This dictation was generated by voice recognition computer software. Although all attempts are made to edit the dictation for accuracy, there may be errors in the transcription that are not intended.

## 2022-07-15 NOTE — CONSULTS
Attestation signed by      Attending Physician Statement:    I have discussed the care of  Kofi Angulo , including pertinent history and exam findings, with the Cardiology fellow/resident. I have seen and examined the patient and the key elements of all parts of the encounter have been performed by me. I agree with the assessment, plan and orders as documented by the fellow/resident, after I modified exam findings and plan of treatments, and the final version is my approved version of the assessment. Additional Comments:   Asymptomatic Sinus Maury  Dizziness due to anemia  Anemia  Concern for GI bleed  SOB with minor CP  H/o CVA on DAPT  - stop propranolol   - ECG reviewed- Sinus maury- HR 53- no acute ST/T changes  - Echo 4/22 reviewed  - acute MI ruled out with troponins  - plan to get GI involved, address anemia, then consider stress test as outpatient for further risk stratification once GI issues addressed so long as remains stable. Silverio Calzada DO, [facility], [address], [address], [place] [address] [facility]  [email or web address]  [phone]     [facility] Cardiology Cardiology    Consult / H&P               Today's Date: 7/15/2022  Patient Name: Kofi Angulo  Date of admission: 7/12/2022  1:51 PM  Patient's age: 60 y. o., 1960  Admission Dx: Generalized weakness [R53.1]  Anemia, unspecified type [D64.9]  Acute anemia [D64.9]    Reason for Consult:  Cardiac evaluation    Requesting Physician: Esther Godinez MD    CHIEF COMPLAINT:  Dizziness    History Obtained From:  patient, electronic medical record    HISTORY OF PRESENT ILLNESS:      The patient is a 60 y.o.  female who is admitted to the hospital for dizziness. The patient has PMH of CVA, on ASA, Plavix, Bipolar disorder, normal ECHO recently who presented to the hospital with c/c dizziness.      The patient reports that she has been having episodes of dizziness after standing for a long time, associated with shortness of breath and intermittent substernal, heavy chest pain. She has been having generalized fatigue and weakness of the body. She does report having blood in her stools as well going on for a long time. EKG revealed sinus bradycardia with no acute ST or T wave changes. CT head revealed no acute ST or T wave changes. Hb 10.1, occult blood positive. Trop 9-8,   Cardiology was consulted for dizziness and bradycardia. Past Medical History:   has a past medical history of Acid reflux, Adult ADHD, Asthma, Bipolar 1 disorder ([address] Broken foot, Chest pain of uncertain etiology, Depression, DVT of leg (deep venous thrombosis) ([address] Fracture, Fractured nose, Helicobacter pylori (H. pylori) infection, Hematuria, Hiatal hernia, History of blood transfusion, History of DVT (deep vein thrombosis), History of myasthenia gravis, Hx of blood clots, Hypertension, Internal hemorrhoids, Narcolepsy, Osteoporosis, Sleep apnea, Under care of team, and Under care of team.    Past Surgical History:   has a past surgical history that includes  section; Nose surgery; Knee arthroscopy (Left); shoulder surgery (Left); Wrist surgery; Upper gastrointestinal endoscopy (2013); Colonoscopy (2013); Upper gastrointestinal endoscopy (2016); Foot surgery (Left, ); Arm Surgery (Right); knee surgery (Right); vaginal dilatation; Ankle surgery (Left, 2018); pr office/outpt visit,procedure only (Left, 2018); Foot Tendon Surgery (Left, 2019); ligament repair (Left, 2019); Upper gastrointestinal endoscopy (N/A, 2/10/2020); Colonoscopy (N/A, 2/10/2020); Endoscopy, colon, diagnostic; Colonoscopy (N/A, 2020); esophageal motility study (N/A, 2020); esophageal motility study (9/10/2020); esophageal motility study (N/A, 2020); Nerve Block (Bilateral, 2021); Pain management procedure (Bilateral, 2021); Pain management procedure (2021);  Pain management procedure (Bilateral, 2021); Pain management procedure (Right, 07/23/2021); Pain management procedure (Right, 7/23/2021); Pain management procedure (Left, 08/06/2021); Pain management procedure (Left, 8/6/2021); Breast enhancement surgery (Bilateral); shoulder surgery (Left, 02/08/2022); and shoulder surgery (Left, 2/8/2022). Home Medications:    Prior to Admission medications    Medication Sig Start Date End Date Taking? Authorizing Provider   alendronate (FOSAMAX) 70 MG tablet TAKE 1 TABLET BY MOUTH ONCE WEEKLY BEFORE BREAKFAST, ON AN EMPTY STOMACH: REMAIN UPRIGHT FOR 30 MINUTES:TAKE WITH 8 OUNCES OF WATER 5/17/22   Nikhil Good DO   amoxicillin (AMOXIL) 500 MG capsule  4/26/22   Historical Provider, MD   buPROPion (WELLBUTRIN XL) 300 MG extended release tablet  4/11/22   Historical Provider, MD   aspirin 81 MG chewable tablet Take 1 tablet by mouth daily 4/17/22   Scooter Mendosa MD   clopidogrel (PLAVIX) 75 MG tablet Take 1 tablet by mouth daily for 19 doses 4/17/22 5/6/22  Scooter Mendosa MD   atorvastatin (LIPITOR) 80 MG tablet Take 1 tablet by mouth nightly 4/16/22   Scooter Mendosa MD   naproxen (NAPROSYN) 500 MG tablet TAKE ONE TABLET BY MOUTH TWICE A DAY WITH MEALS  Patient not taking: Reported on 7/13/2022 4/13/22   Charo Ch DO   hydrOXYzine (ATARAX) 50 MG tablet TAKE ONE TABLET BY MOUTH EVERY 8 HOURS AS NEEDED FOR ANXIETY 2/16/22   Nikhil Good DO   propranolol (INDERAL LA) 80 MG extended release capsule Take 1 capsule by mouth daily 10/29/21   Nikhil Good DO   amphetamine-dextroamphetamine (ADDERALL) 30 MG tablet 30 mg 2 times daily.   9/27/21   Historical Provider, MD   pantoprazole (PROTONIX) 40 MG tablet Take 1 tablet by mouth daily 9/15/21   Nikhil Good DO   albuterol sulfate HFA (VENTOLIN HFA) 108 (90 Base) MCG/ACT inhaler INHALE TWO PUFFS BY MOUTH EVERY 4 HOURS AS NEEDED FOR WHEEZING  Patient not taking: Reported on 7/13/2022 10/28/20   Mina Spicer MD   citalopram (CELEXA) 40 MG tablet TAKE ONE TABLET BY MOUTH DAILY 4/8/20   Sherif Galeas MD   albuterol (PROVENTIL) (2.5 MG/3ML) 0.083% nebulizer solution Take 2.5 mg by nebulization every 6 hours as needed for Wheezing or Shortness of Breath     Historical Provider, MD   Calcium Carbonate (CALCIUM 600 PO) Take 600 mg by mouth daily    Historical Provider, MD      Current Facility-Administered Medications: meclizine (ANTIVERT) tablet 25 mg, 25 mg, Oral, 4 times per day  iron sucrose (VENOFER) 300 mg in sodium chloride 0.9 % 250 mL IVPB, 300 mg, IntraVENous, Q24H  amphetamine-dextroamphetamine (ADDERALL) tablet 30 mg, 30 mg, Oral, BID  atorvastatin (LIPITOR) tablet 80 mg, 80 mg, Oral, Nightly  buPROPion (WELLBUTRIN XL) extended release tablet 300 mg, 300 mg, Oral, Daily  citalopram (CELEXA) tablet 40 mg, 40 mg, Oral, Daily  clopidogrel (PLAVIX) tablet 75 mg, 75 mg, Oral, Daily  hydrOXYzine HCl (ATARAX) tablet 50 mg, 50 mg, Oral, Q6H PRN  pantoprazole (PROTONIX) tablet 40 mg, 40 mg, Oral, Daily  propranolol (INDERAL LA) extended release capsule 80 mg, 80 mg, Oral, Daily  sodium chloride flush 0.9 % injection 5-40 mL, 5-40 mL, IntraVENous, 2 times per day  sodium chloride flush 0.9 % injection 5-40 mL, 5-40 mL, IntraVENous, PRN  0.9 % sodium chloride infusion, 25 mL, IntraVENous, PRN  potassium chloride (KLOR-CON M) extended release tablet 40 mEq, 40 mEq, Oral, PRN **OR** potassium bicarb-citric acid (EFFER-K) effervescent tablet 40 mEq, 40 mEq, Oral, PRN **OR** potassium chloride 10 mEq/100 mL IVPB (Peripheral Line), 10 mEq, IntraVENous, PRN  enoxaparin (LOVENOX) injection 40 mg, 40 mg, SubCUTAneous, Daily  ondansetron (ZOFRAN) injection 4 mg, 4 mg, IntraVENous, Q4H PRN  polyethylene glycol (GLYCOLAX) packet 17 g, 17 g, Oral, Daily PRN  acetaminophen (TYLENOL) tablet 650 mg, 650 mg, Oral, Q6H PRN **OR** acetaminophen (TYLENOL) suppository 650 mg, 650 mg, Rectal, Q6H PRN    Allergies:  Latex, Prozac [fluoxetine hcl], Sulfa antibiotics, and Codeine    Social History:   reports that she quit smoking about 33 years ago. She has a 20.00 pack-year smoking history. She has never used smokeless tobacco. She reports that she does not currently use alcohol. She reports that she does not use drugs. Family History: family history includes Alzheimer's Disease in her father; Cancer in her maternal aunt and mother; Dementia in her mother; Heart Surgery in her father; Hypertension in her father and mother; Stroke in her mother. No h/o sudden cardiac death. No for premature CAD    REVIEW OF SYSTEMS:    Constitutional: there has been no unanticipated weight loss. There's been No change in energy level, No change in activity level. Eyes: No visual changes or diplopia. No scleral icterus. ENT: No Headaches  Cardiovascular: No cardiac history  Respiratory: No previous pulmonary problems, No cough  Gastrointestinal: No abdominal pain. No change in bowel or bladder habits. Genitourinary: No dysuria, trouble voiding, or hematuria. Musculoskeletal:  No gait disturbance, No weakness or joint complaints. Integumentary: No rash or pruritis. Neurological: No headache, diplopia, change in muscle strength, numbness or tingling. No change in gait, balance, coordination, mood, affect, memory, mentation, behavior. Psychiatric: No anxiety, or depression. Endocrine: No temperature intolerance. No excessive thirst, fluid intake, or urination. No tremor. Hematologic/Lymphatic: No abnormal bruising or bleeding, blood clots or swollen lymph nodes. Allergic/Immunologic: No nasal congestion or hives. PHYSICAL EXAM:      /70   Pulse 58   Temp 97.6 °F (36.4 °C) (Oral)   Resp 16   Ht 5' (1.524 m)   Wt 133 lb (60.3 kg)   SpO2 97%   BMI 25.97 kg/m²    Constitutional and General Appearance: alert, cooperative, no distress and appears stated age  HEENT: PERRL, no cervical lymphadenopathy. No masses palpable.  Normal oral mucosa  Respiratory:  Normal excursion and expansion without use of accessory muscles  Resp Auscultation: Good respiratory effort. No for increased work of breathing. On auscultation: clear to auscultation bilaterally  Cardiovascular: The apical impulse is not displaced  Heart tones are crisp and normal. regular S1 and S2.  Jugular venous pulsation Normal  The carotid upstroke is normal in amplitude and contour without delay or bruit  Peripheral pulses are symmetrical and full   Abdomen:   No masses or tenderness  Bowel sounds present  Extremities:   No Cyanosis or Clubbing   Lower extremity edema: No   Skin: Warm and dry  Neurological:  Alert and oriented. Moves all extremities well  No abnormalities of mood, affect, memory, mentation, or behavior are noted    DATA:    Diagnostics:    EKG: Sinus bradycardia    ECHO 4/16/2022: EF 64%, mild TR, RVSP is 35 mmHg, negative bubble study. HOLTER 12/18/13: Normal monitor. STRESS 12/5/13: No fixed/reversible perfusion defects. EF 65%. CBC:   Recent Labs     07/14/22  1132 07/15/22  0654   WBC 6.1 7.7   HGB 10.0* 10.6*   HCT 32.5* 35.0*    343     BMP:   Recent Labs     07/14/22  1132 07/15/22  0654    140   K 4.1 4.4   CO2 24 25   BUN 13 12   CREATININE 1.29* 1.20*   LABGLOM 42* 45*   GLUCOSE 105* 106*     BNP: No results for input(s): BNP in the last 72 hours. PT/INR: No results for input(s): PROTIME, INR in the last 72 hours. APTT:No results for input(s): APTT in the last 72 hours. CARDIAC ENZYMES:No results for input(s): CKTOTAL, CKMB, CKMBINDEX, TROPONINI in the last 72 hours.   FASTING LIPID PANEL:  Lab Results   Component Value Date/Time    HDL 46 04/15/2022 05:06 AM    TRIG 103 04/15/2022 05:06 AM     LIVER PROFILE:  Recent Labs     07/12/22  1455   AST 42*   ALT 34*   LABALBU 4.0       IMPRESSION:    Patient Active Problem List   Diagnosis    Attention deficit hyperactivity disorder (ADHD)    Narcolepsy    Moderate persistent asthma without complication    History of DVT (deep vein thrombosis)    Hiatal hernia    Cervical radiculopathy    Ganglion cyst    Chronic pain of left ankle    Acquired trigger finger    Osteoarthritis of knee    Osteoarthritis of wrist    Pes anserinus bursitis    Plantar fasciitis    Flexion contracture of joint of foot, left    Essential hypertension    Chronic anticoagulation    Iron deficiency anemia due to chronic blood loss    GERD (gastroesophageal reflux disease)    Absolute anemia    Anemia    Generalized anxiety disorder    Vitamin B12 deficiency    Iron malabsorption    Fall at home, initial encounter    Closed fracture of multiple ribs of right side    Hemothorax on right    Contusion of right lung    Chronic fatigue syndrome    Sleep apnea    Pain in joint involving ankle and foot    Lumbosacral spondylosis without myelopathy    Chronic bilateral low back pain with bilateral sciatica    Encounter for long-term opiate analgesic use    Arthritis of left shoulder region    Opioid use, unspecified with unspecified opioid-induced disorder    Stroke-like symptoms    Ischemic stroke ([facility]    Acute encephalopathy    Chronic renal disease, stage III (HCC) [199355]    Acute anemia    Drop in hemoglobin    Long term (current) use of antithrombotics/antiplatelets    Iron deficiency    Pain of left lower extremity     Sinus bradycardia  Dizziness  GI bleed  Anemia  H/O CVA  H/O DVT  H/O GERD    RECOMMENDATIONS:  Patient symptoms are likely related to acute blood loss from GI bleed, continue to monitor and continue fluids and GI workup for the same. Will plan for stress test as outpatient. Hold BB due to bradycardia. Continue lipitor. Recent ECHO and EKG reviewed.      Javier Delatorre MD  Fellow, [id number] [facility]

## 2022-07-15 NOTE — PROGRESS NOTES
Reviewed with cardiology and states patient ok to d/c from their standpoint and follow up in office for a stress test.

## 2022-07-18 DIAGNOSIS — I10 ESSENTIAL HYPERTENSION: ICD-10-CM

## 2022-07-18 NOTE — ADT AUTH CERT
Utilization Reviews         Anemia, Iron Deficiency or Unspecified - Care Day 3 (7/14/2022) by Winnie Silver RN       Review Status Review Entered   Completed 7/15/2022 11:47      Criteria Review      Care Day: 3 Care Date: 7/14/2022 Level of Care: Inpatient Floor    Guideline Day 1    Level Of Care    ( ) ICU or floor    7/15/2022 11:46 AM EDT by Jesús [address] [place] [address]    Clinical Status    ( ) * Clinical Indications met    7/15/2022 11:46 AM EDT by Florence Mistry      07/14/22 0845 97.9 (36.6) 16 53 110/72 96% RA  07/14/22 1730 98 (36.7) 16 50 128/82 95% RA    (X) Possible pallor, light-headedness, tachycardia, chest pain    7/15/2022 11:46 AM EDT by Florence Mistry      SHORT OF BREATH WEAKNESS FATIGUE    Routes    (X) Oral medications    (X) Diet as tolerated    7/15/2022 11:46 AM EDT by Florence Mistry      CLD    (X) Possible IV fluids    Interventions    (X) Hematologic evaluation    7/15/2022 11:46 AM EDT by Levi Valenzuela    (X) Laboratory tests    (X) Possible endoscopy    7/15/2022 11:46 AM EDT by Florence Mistry      PLAN FOR UPPER AND LOWER SCOPE ON7/15/22    * Milestone   Additional Notes   DATE: 7/14/22      Pertinent Updates:      Patient reevaluated secondary to bradycardia being noted on monitor   bradycardia was significant. Patient states that she has been lightheaded intermittently. I believe that the bradycardia may be part of her etiology. EKG ordered         Abnl/Pertinent Labs/Radiology/Diagnostic Studies:      VL DUP LOWER EXTREMITY VENOUS BILATERAL   Result Date: 7/14/2022   Conclusions   Summary   Bilateral:  No evidence of deep or superficial venous thrombosis. MRI BRAIN WO CONTRAST   Result Date: 7/14/2022   Chronic microvascular disease without acute intracranial abnormality.  Remote lacunar infarct in the left basal ganglia         7/14/22 10:20   Hemoglobin Quant: 9.9 (L)   Hematocrit: 32.9 (L)      7/14/22 11:32   Sodium: 137   Potassium: 4.1 Chloride: 105   CO2: 24   BUN,BUNPL: 13   Creatinine: 1.29 (H)   Anion Gap: 8 (L)   GFR Non-: 42 (L)   GFR : 51 (L)   GLUCOSE, FASTING,GF: 105 (H)   CALCIUM, SERUM, [id number]: 9.2       WBC: 6.1   RBC: 3.67 (L)   Hemoglobin Quant: 10.0 (L)   Hematocrit: 32.5 (L)   Platelet Count: 896         7/14/22 16:38   EKG 12-LEAD: Rpt   Atrial Rate: 52 (P)   P Axis: 54 (P)   P-R Interval: 154 (P)   Q-T Interval: 470 (P)   QRS Duration: 82 (P)   QTc Calculation (Bazett): 437 (P)   R Axis: 0 (P)   T Axis: 38 (P)   Ventricular Rate: 52 (P)         Physical Exam:      General: NAD, AO X 3   Heent: EMOI, PERRL   Neck: SUPPLE, NO JVD   Cardiovascular: RRR, S1S2   Pulmonary: CTAB, NO SOB   Abdomen: SOFT, NTTP, ND, +BS   Extremities: +2/4 PULSES DISTAL, NO SWELLING   Neuro / Psych: NO NUMBNESS OR TINGLING, MENTATION AT BASELINE         MD Consults/Assessments & Plans:      ===CDU      Saurabh Whiting is a 60 y.o. female who presents with         Symptomatic anemia. Patient with decrease in hemoglobin by almost 3 g. Known iron deficiency anemia. Evaluated by hematology. Appreciate consult. Will give iron  mg x 3   History of DVT. Will get Doppler ultrasound of lower extremities   Vertiginous symptoms and history of prior stroke. We will get MRI brain looking for acute  change. Patient at apparent baseline. GI consult for colonoscopy   Continue home medications and pain control   Monitor vitals, labs, and imaging   DISPO: Pending consults and further evaluation      ===GI      1. Long discussion with patient regarding suspected anemia secondary to [facility] erosions in setting of large hiatal hernia. Patient reports she has been unable to undergo preprocedural requirements/testing including esophageal manometry. She is willing to repeat testing  tomorrow to rule out other causes for her anemia and as colonoscopies in past were inadequate.   We will plan for EGD with push enteroscopy and colonoscopy -Discussed with Dr. Charlie Meeks   2. Patient will need additional laxatives given history of poor preps in past and patient ate breakfast this morning. Will give Dulcolax 20 mg p.o. now and start bowel prep with GoLytely early. If bowel cleansing inadequate, we will give additional 2 L GoLytely in early a.m.   3.  Clear liquid diet now then NPO after midnight   4. Monitor serial hemoglobin and hematocrit. Transfuse to keep hemoglobin greater than 7 g/dL or as clinically indicated   5. Okay to continue antiplatelet therapy from GI perspective   6. Iron therapy replacement per hematology   7. PPI 40 mg once daily for history of hiatal hernia   8. Recommend patient continue to follow with Dr. Dodge for hiatal hernia repair and further work-up including esophageal manometry-discussed with         ==HEMATOLOGY      Labs reviewed. We will start patient on IV Venofer 300 mg for 4 doses. Plan for inpatient EGD and colonoscopy tomorrow am   Venous dopplers negative for DVT   We will continue to follow.          Medications:      meclizine, 25 mg, Oral, 4 times per day   iron sucrose, 300 mg, IntraVENous, Q24H   amphetamine-dextroamphetamine, 30 mg, Oral, BID   atorvastatin, 80 mg, Oral, Nightly   buPROPion, 300 mg, Oral, Daily   citalopram, 40 mg, Oral, Daily   clopidogrel, 75 mg, Oral, Daily   pantoprazole, 40 mg, Oral, Daily   propranolol, 80 mg, Oral, Daily   enoxaparin, 40 mg, SubCUTAneous, Daily   GO LYTELY BOWEL PREP         Continuous Infusions:   sodium chloride Last Rate: 100 mL/hr at 07/14/22 2977         PRN Meds:   ZOFRAN 4 MG IV X 1      Orders:          ADULT DIET; CLEARS       Vital signs per unit routine       Telemetry monitoring       Up as tolerated       Orthostatic blood pressure and pulse       Full Code

## 2022-07-19 RX ORDER — PROPRANOLOL HYDROCHLORIDE 80 MG/1
CAPSULE, EXTENDED RELEASE ORAL
Qty: 90 CAPSULE | Refills: 1 | Status: SHIPPED | OUTPATIENT
Start: 2022-07-19 | End: 2022-10-05

## 2022-07-19 NOTE — DISCHARGE SUMMARY
CDU Discharge Summary        Patient:  Clara Edwards  YOB: 1960    MRN: [id number]   Acct: [de-identified]    Primary Care Physician: Rae Goss DO    Admit date:  7/12/2022  1:51 PM  Discharge date: 7/15/2022  6:53 PM     Discharge Diagnoses:     Acute weakness, vertigo due to anemia  Improved with iron infusion, observation    Follow-up:  Call today/tomorrow for a follow up appointment with Rae Goss DO , or return to the Emergency Room with worsening symptoms    Stressed to patient the importance of following up with primary care doctor for further workup/management of symptoms. Pt verbalizes understanding and agrees with plan. Discharge Medications:  Changes to medications, D/c propanolol per cardiology           Medication List        CHANGE how you take these medications      albuterol (2.5 MG/3ML) 0.083% nebulizer solution  Commonly known as: PROVENTIL  What changed: Another medication with the same name was removed. Continue taking this medication, and follow the directions you see here.             CONTINUE taking these medications      alendronate 70 MG tablet  Commonly known as: FOSAMAX  TAKE 1 TABLET BY MOUTH ONCE WEEKLY BEFORE BREAKFAST, ON AN EMPTY STOMACH: REMAIN UPRIGHT FOR 30 MINUTES:TAKE WITH 8 OUNCES OF WATER     amphetamine-dextroamphetamine 30 MG tablet  Commonly known as: ADDERALL     aspirin 81 MG chewable tablet  Take 1 tablet by mouth daily     atorvastatin 80 MG tablet  Commonly known as: LIPITOR  Take 1 tablet by mouth nightly     buPROPion 300 MG extended release tablet  Commonly known as: WELLBUTRIN XL     CALCIUM 600 PO     citalopram 40 MG tablet  Commonly known as: CELEXA  TAKE ONE TABLET BY MOUTH DAILY     clopidogrel 75 MG tablet  Commonly known as: PLAVIX  Take 1 tablet by mouth daily for 19 doses     hydrOXYzine HCl 50 MG tablet  Commonly known as: ATARAX  TAKE ONE TABLET BY MOUTH EVERY 8 HOURS AS NEEDED FOR ANXIETY     pantoprazole 40 MG tablet  Commonly known as: PROTONIX  Take 1 tablet by mouth daily     propranolol 80 MG extended release capsule  Commonly known as: INDERAL LA  Take 1 capsule by mouth daily            STOP taking these medications      amoxicillin 500 MG capsule  Commonly known as: AMOXIL     naproxen 500 MG tablet  Commonly known as: NAPROSYN              Diet:  No diet orders on file , Advance as tolerated     Activity:  As tolerated    Consultants: IP CONSULT TO GI  IP CONSULT TO HEM/ONC  IP CONSULT TO CARDIOLOGY    Procedures:  Not indicated     Diagnostic Test:   Results for orders placed or performed during the hospital encounter of 07/12/22   COVID-19, Rapid    Specimen: Nasopharyngeal Swab   Result Value Ref Range    Specimen Description . NASOPHARYNGEAL SWAB     SARS-CoV-2, Rapid Not Detected Not Detected   CBC with Auto Differential   Result Value Ref Range    WBC 5.7 3.5 - 11.3 k/uL    RBC 3.99 3.95 - 5.11 m/uL    Hemoglobin 10.8 (L) 11.9 - 15.1 g/dL    Hematocrit 35.4 (L) 36.3 - 47.1 %    MCV 88.7 82.6 - 102.9 fL    MCH 27.1 25.2 - 33.5 pg    MCHC 30.5 28.4 - 34.8 g/dL    RDW 14.6 (H) 11.8 - 14.4 %    Platelets 205 016 - 583 k/uL    MPV 10.6 8.1 - 13.5 fL    NRBC Automated 0.0 0.0 per 100 WBC    Seg Neutrophils 65 36 - 65 %    Lymphocytes 19 (L) 24 - 43 %    Monocytes 8 3 - 12 %    Eosinophils % 7 (H) 1 - 4 %    Basophils 1 0 - 2 %    Immature Granulocytes 0 0 %    Segs Absolute 3.71 1.50 - 8.10 k/uL    Absolute Lymph # 1.05 (L) 1.10 - 3.70 k/uL    Absolute Mono # 0.47 0.10 - 1.20 k/uL    Absolute Eos # 0.38 0.00 - 0.44 k/uL    Basophils Absolute 0.06 0.00 - 0.20 k/uL    Absolute Immature Granulocyte <0.03 0.00 - 0.30 k/uL    RBC Morphology ANISOCYTOSIS PRESENT    BMP   Result Value Ref Range    Glucose 121 (H) 70 - 99 mg/dL    BUN 18 8 - 23 mg/dL    CREATININE 1.31 (H) 0.50 - 0.90 mg/dL    Calcium 9.5 8.6 - 10.4 mg/dL    Sodium 139 135 - 144 mmol/L    Potassium 4.4 3.7 - 5.3 mmol/L    Chloride 101 98 - 107 mmol/L (L) 24 - 43 %    Monocytes 13 (H) 3 - 12 %    Eosinophils % 6 (H) 1 - 4 %    Basophils 1 0 - 2 %    Immature Granulocytes 0 0 %    Segs Absolute 4.76 1.50 - 8.10 k/uL    Absolute Lymph # 1.35 1.10 - 3.70 k/uL    Absolute Mono # 1.02 0.10 - 1.20 k/uL    Absolute Eos # 0.44 0.00 - 0.44 k/uL    Basophils Absolute 0.07 0.00 - 0.20 k/uL    Absolute Immature Granulocyte 0.03 0.00 - 0.30 k/uL   Vitamin B12 & Folate   Result Value Ref Range    Vitamin B-12 503 232 - 1245 pg/mL    Folate 10.1 >4.8 ng/mL   Iron and TIBC   Result Value Ref Range    Iron 31 (L) 37 - 145 ug/dL    TIBC 283 250 - 450 ug/dL    Iron Saturation 11 (L) 20 - 55 %    UIBC 252 112 - 347 ug/dL   Ferritin   Result Value Ref Range    Ferritin 12 (L) 13 - 150 ng/mL   OCCULT BLOOD SCREEN   Result Value Ref Range    Occult Blood, Stool #1 POSITIVE (A) NEGATIVE    Date, Stool #1 UNKNOWN     Time, Stool #1 UNKNOWN    Hemoglobin and Hematocrit   Result Value Ref Range    Hemoglobin 9.9 (L) 11.9 - 15.1 g/dL    Hematocrit 32.9 (L) 36.3 - 47.1 %   Basic Metabolic Panel   Result Value Ref Range    Glucose 105 (H) 70 - 99 mg/dL    BUN 13 8 - 23 mg/dL    CREATININE 1.29 (H) 0.50 - 0.90 mg/dL    Calcium 9.2 8.6 - 10.4 mg/dL    Sodium 137 135 - 144 mmol/L    Potassium 4.1 3.7 - 5.3 mmol/L    Chloride 105 98 - 107 mmol/L    CO2 24 20 - 31 mmol/L    Anion Gap 8 (L) 9 - 17 mmol/L    GFR Non-African American 42 (L) >60 mL/min    GFR  51 (L) >60 mL/min    GFR Comment         CBC with Auto Differential   Result Value Ref Range    WBC 6.1 3.5 - 11.3 k/uL    RBC 3.67 (L) 3.95 - 5.11 m/uL    Hemoglobin 10.0 (L) 11.9 - 15.1 g/dL    Hematocrit 32.5 (L) 36.3 - 47.1 %    MCV 88.6 82.6 - 102.9 fL    MCH 27.2 25.2 - 33.5 pg    MCHC 30.8 28.4 - 34.8 g/dL    RDW 14.5 (H) 11.8 - 14.4 %    Platelets 866 806 - 461 k/uL    MPV 10.3 8.1 - 13.5 fL    NRBC Automated 0.0 0.0 per 100 WBC    Seg Neutrophils 71 (H) 36 - 65 %    Lymphocytes 13 (L) 24 - 43 %    Monocytes 9 3 - 12 % Eosinophils % 6 (H) 1 - 4 %    Basophils 1 0 - 2 %    Immature Granulocytes 0 0 %    Segs Absolute 4.32 1.50 - 8.10 k/uL    Absolute Lymph # 0.77 (L) 1.10 - 3.70 k/uL    Absolute Mono # 0.54 0.10 - 1.20 k/uL    Absolute Eos # 0.36 0.00 - 0.44 k/uL    Basophils Absolute 0.05 0.00 - 0.20 k/uL    Absolute Immature Granulocyte <0.03 0.00 - 0.30 k/uL    RBC Morphology ANISOCYTOSIS PRESENT    CBC with Auto Differential   Result Value Ref Range    WBC 7.7 3.5 - 11.3 k/uL    RBC 3.93 (L) 3.95 - 5.11 m/uL    Hemoglobin 10.6 (L) 11.9 - 15.1 g/dL    Hematocrit 35.0 (L) 36.3 - 47.1 %    MCV 89.1 82.6 - 102.9 fL    MCH 27.0 25.2 - 33.5 pg    MCHC 30.3 28.4 - 34.8 g/dL    RDW 14.6 (H) 11.8 - 14.4 %    Platelets 518 679 - 656 k/uL    MPV 10.6 8.1 - 13.5 fL    NRBC Automated 0.0 0.0 per 100 WBC    Seg Neutrophils 71 (H) 36 - 65 %    Lymphocytes 15 (L) 24 - 43 %    Monocytes 7 3 - 12 %    Eosinophils % 6 (H) 1 - 4 %    Basophils 1 0 - 2 %    Immature Granulocytes 0 0 %    Segs Absolute 5.54 1.50 - 8.10 k/uL    Absolute Lymph # 1.13 1.10 - 3.70 k/uL    Absolute Mono # 0.53 0.10 - 1.20 k/uL    Absolute Eos # 0.43 0.00 - 0.44 k/uL    Basophils Absolute 0.06 0.00 - 0.20 k/uL    Absolute Immature Granulocyte <0.03 0.00 - 0.30 k/uL    RBC Morphology ANISOCYTOSIS PRESENT    Basic Metabolic Panel w/ Reflex to MG   Result Value Ref Range    Glucose 106 (H) 70 - 99 mg/dL    BUN 12 8 - 23 mg/dL    CREATININE 1.20 (H) 0.50 - 0.90 mg/dL    Calcium 8.8 8.6 - 10.4 mg/dL    Sodium 140 135 - 144 mmol/L    Potassium 4.4 3.7 - 5.3 mmol/L    Chloride 105 98 - 107 mmol/L    CO2 25 20 - 31 mmol/L    Anion Gap 10 9 - 17 mmol/L    GFR Non-African American 45 (L) >60 mL/min    GFR  55 (L) >60 mL/min    GFR Comment         EKG 12 Lead   Result Value Ref Range    Ventricular Rate 53 BPM    Atrial Rate 53 BPM    P-R Interval 150 ms    QRS Duration 90 ms    Q-T Interval 462 ms    QTc Calculation (Bazett) 433 ms    P Axis 43 degrees    R Axis -6 degrees    T Axis 33 degrees   EKG 12 lead   Result Value Ref Range    Ventricular Rate 54 BPM    Atrial Rate 54 BPM    P-R Interval 158 ms    QRS Duration 86 ms    Q-T Interval 480 ms    QTc Calculation (Bazett) 455 ms    P Axis 60 degrees    R Axis 41 degrees    T Axis 68 degrees   EKG 12 Lead   Result Value Ref Range    Ventricular Rate 52 BPM    Atrial Rate 52 BPM    P-R Interval 154 ms    QRS Duration 82 ms    Q-T Interval 470 ms    QTc Calculation (Bazett) 437 ms    P Axis 54 degrees    R Axis 0 degrees    T Axis 38 degrees     CT Head W/O Contrast    Result Date: 7/12/2022  EXAMINATION: CT OF THE HEAD WITHOUT CONTRAST  7/12/2022 3:13 pm TECHNIQUE: CT of the head was performed without the administration of intravenous contrast. Automated exposure control, iterative reconstruction, and/or weight based adjustment of the mA/kV was utilized to reduce the radiation dose to as low as reasonably achievable. COMPARISON: CT brain and MRI brain 04/14/2022. HISTORY: ORDERING SYSTEM PROVIDED HISTORY: dizziness, worsening weakness, off balance TECHNOLOGIST PROVIDED HISTORY: dizziness, worsening weakness, off balance Decision Support Exception - unselect if not a suspected or confirmed emergency medical condition->Emergency Medical Condition (MA) FINDINGS: BRAIN/VENTRICLES: There is no acute intracranial hemorrhage, mass effect or midline shift. No abnormal extra-axial fluid collection. The gray-white differentiation is maintained without evidence of an acute infarct. There is no evidence of hydrocephalus. Chronic lacunar infarctions in the left basal ganglia, internal capsule, and caudate head. Mild chronic periventricular white matter hypoattenuation. ORBITS: The visualized portion of the orbits demonstrate no acute abnormality. SINUSES: The visualized paranasal sinuses and mastoid air cells demonstrate no acute abnormality. SOFT TISSUES/SKULL:  No acute abnormality of the visualized skull or soft tissues.      No acute intracranial abnormality. US GALLBLADDER RUQ    Result Date: 7/12/2022  EXAMINATION: RIGHT UPPER QUADRANT ULTRASOUND 7/12/2022 8:42 pm COMPARISON: 01/13/2021 CT study HISTORY: ORDERING SYSTEM PROVIDED HISTORY: elevated alk phos, nonspecific abdominal pain TECHNOLOGIST PROVIDED HISTORY: elevated alk phos, nonspecific abdominal pain FINDINGS: LIVER:  Mild hepatic steatosis. No focal mass or ductal dilation is identified. BILIARY SYSTEM:  The gallbladder is contracted. No intraluminal stones are seen. No pericholecystic fluid or significant wall thickening. Common bile duct is within normal limits measuring 3 mm. RIGHT KIDNEY: Renal length measures 8.1 cm which is decreased, with mild cortical thinning which may represent mild atrophy. No mass or hydronephrosis is identified. No significant increased echogenicity. PANCREAS:  Visualized portions of the pancreas are unremarkable. OTHER: No evidence of right upper quadrant ascites. 1. Mild hepatic steatosis. 2. Contracted gallbladder without findings to suggest cholelithiasis or cholecystitis. XR CHEST PORTABLE    Result Date: 7/13/2022  EXAMINATION: ONE XRAY VIEW OF THE CHEST 7/12/2022 2:58 pm COMPARISON: 04/14/2022 HISTORY: ORDERING SYSTEM PROVIDED HISTORY: dizziness, chronic cough TECHNOLOGIST PROVIDED HISTORY: dizziness, chronic cough FINDINGS: Heart is similar in size to the prior study. No pleural effusion or pneumothorax is seen. No focal consolidation is identified. Slight increased elevation of the right hemidiaphragm when compared to the prior exam.  There is a left shoulder prosthesis. Probable hiatal hernia. No acute cardiopulmonary abnormality identified. Slight increased elevation of the right hemidiaphragm when compared to the prior chest radiograph. Probable hiatal hernia.      VL DUP LOWER EXTREMITY VENOUS BILATERAL    Result Date: 7/14/2022    [facility]  Vascular Lower Extremities DVT Study Procedure Patient Name   Clif Agrawal    Date of Study           07/13/2022                 DERIAN A   Date of Birth  1960   Gender                  Female   Age            60 year(s)   Race                       Room Number    9928   Corporate ID # [id number]   Patient Acct # [de-identified]   MR #           [id number]      [address] [place] [address] Maddy   Accession #    [id number]   Interpreting Physician  Ermias Palafox   Referring                   Referring Physician     Rupal Conklin,  Nurse  Practitioner  Procedure Type of Study:   Veins: Lower Extremities DVT Study, Venous Scan Lower Bilateral.  Indications for Study:Pain, leg and Hx of DVT. Patient Status: In Patient. Conclusions   Summary   Bilateral:  No evidence of deep or superficial venous thrombosis. Signature   ----------------------------------------------------------------  Electronically signed by Mary Lou Vaughn AVELINA Castañeda(Sonographer) on  07/13/2022 04:51 PM  ----------------------------------------------------------------   ----------------------------------------------------------------  Electronically signed by Ermias Palafox(Interpreting physician)  on 07/14/2022 07:08 AM  ----------------------------------------------------------------  Findings:   Right Impression:                    Left Impression:  The common femoral, femoral,         The common femoral, femoral,  popliteal and tibial veins           popliteal and tibial veins  demonstrate normal compressibility   demonstrate normal compressibility  and augmentation. and augmentation. Normal compressibility of the great  Normal compressibility of the great  saphenous vein. saphenous vein. Normal compressibility of the small  Normal compressibility of the small  saphenous vein. saphenous vein. Incidental finding of a non  vascularized fluid collection in the  pop fossa measuring 2.22 cm x 4.19  cm.   Enlarged lymph nodes are noted at  the right groin level. Risk Factors History +-----------------------------+----------+---------------------------------+ ! Diagnosis                    ! Date      ! Comments                         ! +-----------------------------+----------+---------------------------------+ ! Previous Scan                !08/25/2021! Left leg: WNL                    ! +-----------------------------+----------+---------------------------------+   - The patient's risk factor(s) include: arterial hypertension.   - The patient has a former tobacco history. Velocities are measured in cm/s ; Diameters are measured in cm Right Lower Extremities DVT Study Measurements Right 2D Measurements +------------------------------------+----------+---------------+----------+ ! Location                            ! Visualized! Compressibility! Thrombosis! +------------------------------------+----------+---------------+----------+ ! Common Femoral                      !Yes       ! Yes            ! None      ! +------------------------------------+----------+---------------+----------+ ! Prox Femoral                        !Yes       ! Yes            ! None      ! +------------------------------------+----------+---------------+----------+ ! Mid Femoral                         !Yes       ! Yes            ! None      ! +------------------------------------+----------+---------------+----------+ ! Dist Femoral                        !Yes       ! Yes            ! None      ! +------------------------------------+----------+---------------+----------+ ! Popliteal                           !Yes       ! Yes            ! None      ! +------------------------------------+----------+---------------+----------+ ! Sapheno Femoral Junction            ! Yes       ! Yes            ! None      ! +------------------------------------+----------+---------------+----------+ ! PTV                                 ! Yes       ! Yes            ! None      ! +------------------------------------+----------+---------------+----------+ ! Peroneal                            !Yes       ! Yes            ! None      ! +------------------------------------+----------+---------------+----------+ ! Gastroc                             ! Yes       ! Yes            ! None      ! +------------------------------------+----------+---------------+----------+ ! GSV Thigh                           ! Yes       ! Yes            ! None      ! +------------------------------------+----------+---------------+----------+ ! GSV Knee                            ! Yes       ! Yes            ! None      ! +------------------------------------+----------+---------------+----------+ ! GSV Ankle                           ! Yes       ! Yes            ! None      ! +------------------------------------+----------+---------------+----------+ ! SSV                                 ! Yes       ! Yes            ! None      ! +------------------------------------+----------+---------------+----------+ Right Doppler Measurements +---------------------------+------+------+--------------------------------+ ! Location                   ! Signal!Reflux! Reflux (msec)                   ! +---------------------------+------+------+--------------------------------+ ! Common Femoral             !Phasic!      !                                ! +---------------------------+------+------+--------------------------------+ ! Prox Femoral               !Phasic!      !                                ! +---------------------------+------+------+--------------------------------+ ! Popliteal                  !Phasic!      !                                ! +---------------------------+------+------+--------------------------------+ Left Lower Extremities DVT Study Measurements Left 2D Measurements +------------------------------------+----------+---------------+----------+ ! Location                            ! Visualized! Compressibility! Thrombosis! +------------------------------------+----------+---------------+----------+ ! Common Femoral                      !Yes       ! Yes            ! None      ! +------------------------------------+----------+---------------+----------+ ! Prox Femoral                        !Yes       ! Yes            ! None      ! +------------------------------------+----------+---------------+----------+ ! Mid Femoral                         !Yes       ! Yes            ! None      ! +------------------------------------+----------+---------------+----------+ ! Dist Femoral                        !Yes       ! Yes            ! None      ! +------------------------------------+----------+---------------+----------+ ! Popliteal                           !Yes       ! Yes            ! None      ! +------------------------------------+----------+---------------+----------+ ! Sapheno Femoral Junction            ! Yes       ! Yes            ! None      ! +------------------------------------+----------+---------------+----------+ ! PTV                                 ! Yes       ! Yes            ! None      ! +------------------------------------+----------+---------------+----------+ ! Peroneal                            !Yes       ! Yes            ! None      ! +------------------------------------+----------+---------------+----------+ ! Gastroc                             ! Yes       ! Yes            ! None      ! +------------------------------------+----------+---------------+----------+ ! GSV Thigh                           ! Yes       ! Yes            ! None      ! +------------------------------------+----------+---------------+----------+ ! GSV Knee                            ! Yes       ! Yes            ! None      ! +------------------------------------+----------+---------------+----------+ ! GSV Ankle                           ! Yes       ! Yes            ! None      ! +------------------------------------+----------+---------------+----------+ ! SSV !Yes       !Yes            ! None      ! +------------------------------------+----------+---------------+----------+ Left Doppler Measurements +---------------------------+------+------+--------------------------------+ ! Location                   ! Signal!Reflux! Reflux (msec)                   ! +---------------------------+------+------+--------------------------------+ ! Common Femoral             !Phasic!      !                                ! +---------------------------+------+------+--------------------------------+ ! Prox Femoral               !Phasic!      !                                ! +---------------------------+------+------+--------------------------------+ ! Popliteal                  !Phasic!      !                                ! +---------------------------+------+------+--------------------------------+    MRI BRAIN WO CONTRAST    Result Date: 7/14/2022  EXAMINATION: MRI OF THE BRAIN WITHOUT CONTRAST  7/14/2022 7:22 am TECHNIQUE: Multiplanar multisequence MRI of the brain was performed without the administration of intravenous contrast. COMPARISON: CT brain performed 07/12/2022. HISTORY: ORDERING SYSTEM PROVIDED HISTORY: dizziness TECHNOLOGIST PROVIDED HISTORY: dizziness FINDINGS: INTRACRANIAL STRUCTURES/VENTRICLES: The sellar and suprasellar structures, optic chiasm, corpus callosum, pineal gland, tectum, and midline brainstem structures are unremarkable. The craniocervical junction is unremarkable. There is no acute hemorrhage, mass effect, or midline shift. There is satisfactory overall gray-white matter differentiation. There is chronic microvascular disease. There is a remote lacunar infarct in the left basal ganglia. The ventricular structures are symmetric and unremarkable. The infratentorial structures including the cerebellopontine angles and internal auditory canals are unremarkable. There is no abnormal restricted diffusion.  There is no abnormal blooming artifact on susceptibility weighted imaging. ORBITS: The visualized portion of the orbits demonstrate no acute abnormality. SINUSES: The visualized paranasal sinuses and mastoid air cells demonstrate no acute abnormality. BONES/SOFT TISSUES: The bone marrow signal intensity appears normal. The soft tissues demonstrate no acute abnormality. Chronic microvascular disease without acute intracranial abnormality. Remote lacunar infarct in the left basal ganglia. Physical Exam:    General appearance - NAD, AOx 3   Lungs -CTAB, no R/R/R  Heart - RRR, no M/R/G  Abdomen - Soft, NT/ND  Neurological:  MAEx4, No focal motor deficit, sensory loss  Extremities - Cap refil <2 sec in all ext., no edema  Skin -warm, dry      Hospital Course:  Clinical course has improved, labs and imaging reviewed. Serafina Lundborg originally presented to the hospital on 7/12/2022  1:51 PM. with weakness, and vertigo. At that time it was determined that She required further observation and iron infusions. She was admitted and labs and imaging were followed daily. GI, and cardiology were consulted. GI recommended EGD and colonoscopy but patient was unable to tolerate bowel prep. Cardiology recommended discontinuing propanolol and following up outpatient. Imaging results as above. She is medically stable to be discharged. Disposition: Home    Patient stated that they will not drive themselves home from the hospital if they have gotten pain killers/ narcotics earlier that day and that they will arrange for transportation on their own or work with the  for a ride. Patient counseled NOT to drive while under the influence of narcotics/ pain killers. Condition: Good    Patient stable and ready for discharge home. I have discussed plan of care with patient and they are in understanding. They were instructed to read discharge paperwork. All of their questions and concerns were addressed.      Time Spent: 1 day      --  Karthikeyan Brianda Shelby MD  Emergency Medicine Resident Physician    This dictation was generated by voice recognition computer software. Although all attempts are made to edit the dictation for accuracy, there may be errors in the transcription that are not intended.

## 2022-07-21 ENCOUNTER — HOSPITAL ENCOUNTER (OUTPATIENT)
Dept: SPEECH THERAPY | Age: 62
Setting detail: THERAPIES SERIES
Discharge: HOME | DRG: 812 | End: 2022-07-21
Payer: MEDICARE

## 2022-07-21 NOTE — PROGRESS NOTES
Speech Language Pathology    [x] [facility]) - [facility] &  Therapy  [address]    [phone]  F: [phone]   [] [address] [facility]   [address]  P: [phone]  F: [phone]  [] [address] &  Therapy  [address]  P: [phone]  F: [phone] [] [address]  P: [phone]  F: [phone]  [] [address]  [facility]   [address]   [place]: [phone]  F: [phone]   [] [facility]  [address] [facility] [address]  [place]: [phone]   F: [phone]     Physical Therapy Cancel/No Show note    Date: 2022  Patient: Whitley Gross  : 1960  MRN: [id number]    Cancels/No Shows to date: 3/0    For today's appointment patient:    [x]  Cancelled    [] Rescheduled appointment    [] No-show     Reason given by patient:    [x]  Patient ill    []  Conflicting appointment    [] No transportation      [] Conflict with work    [] No reason given    [] Weather related    [] COVID-19    [x] Other: Pt called ST dept to cx appt this date d/t illness. Pt relates she no longer wants to come to ST and wants to practice at home. Pt and ST reviewed ways to practice language/cognition at home. Pt verbalized understanding. Encouraged pt to request referral for ST if desire to return. Pt verbalized understanding.          [] Next appointment was confirmed    Electronically signed by: Zari Malcolm, SLP

## 2022-07-27 DIAGNOSIS — K21.9 GASTROESOPHAGEAL REFLUX DISEASE, UNSPECIFIED WHETHER ESOPHAGITIS PRESENT: ICD-10-CM

## 2022-07-27 RX ORDER — PANTOPRAZOLE SODIUM 40 MG/1
40 TABLET, DELAYED RELEASE ORAL DAILY
Qty: 90 TABLET | Refills: 1 | Status: SHIPPED | OUTPATIENT
Start: 2022-07-27

## 2022-07-27 RX ORDER — ATORVASTATIN CALCIUM 80 MG/1
80 TABLET, FILM COATED ORAL NIGHTLY
Qty: 30 TABLET | Refills: 3 | Status: SHIPPED | OUTPATIENT
Start: 2022-07-27

## 2022-07-27 NOTE — TELEPHONE ENCOUNTER
Patient's spouse called regarding her medications.  I told him to check with the pharmacy after 5 pm.    Misbah Linares
Iron and TIBC q 6 weeks    Ferritin q 6 weeks                Patient Active Problem List:     Attention deficit hyperactivity disorder (ADHD)     Narcolepsy     Moderate persistent asthma without complication     History of DVT (deep vein thrombosis)     Hiatal hernia     Cervical radiculopathy     Ganglion cyst     Chronic pain of left ankle     Acquired trigger finger     Osteoarthritis of knee     Osteoarthritis of wrist     Pes anserinus bursitis     Plantar fasciitis     Flexion contracture of joint of foot, left     Essential hypertension     Chronic anticoagulation     Iron deficiency anemia due to chronic blood loss     GERD (gastroesophageal reflux disease)     Absolute anemia     Anemia     Generalized anxiety disorder     Vitamin B12 deficiency     Iron malabsorption     Fall at home, initial encounter     Closed fracture of multiple ribs of right side     Hemothorax on right     Contusion of right lung     Chronic fatigue syndrome     Sleep apnea     Pain in joint involving ankle and foot     Lumbosacral spondylosis without myelopathy     Chronic bilateral low back pain with bilateral sciatica     Encounter for long-term opiate analgesic use     Arthritis of left shoulder region     Opioid use, unspecified with unspecified opioid-induced disorder     Stroke-like symptoms     Ischemic stroke ([facility]     Acute encephalopathy     Chronic renal disease, stage III (HCC) [982240]     Acute anemia     Drop in hemoglobin     Long term (current) use of antithrombotics/antiplatelets     Iron deficiency     Pain of left lower extremity

## 2022-07-28 ENCOUNTER — APPOINTMENT (OUTPATIENT)
Dept: SPEECH THERAPY | Age: 62
DRG: 812 | End: 2022-07-28
Payer: MEDICARE

## 2022-08-03 ENCOUNTER — TELEPHONE (OUTPATIENT)
Dept: FAMILY MEDICINE CLINIC | Age: 62
End: 2022-08-03

## 2022-08-04 ENCOUNTER — TELEPHONE (OUTPATIENT)
Dept: FAMILY MEDICINE CLINIC | Age: 62
End: 2022-08-04

## 2022-08-04 ENCOUNTER — TELEPHONE (OUTPATIENT)
Dept: ORTHOPEDIC SURGERY | Age: 62
End: 2022-08-04

## 2022-08-04 NOTE — TELEPHONE ENCOUNTER
Patient called and asked to have dental clearance letter refaxed to Dr. Bladimir Zuñiga. Letter faxed as requested.

## 2022-08-04 NOTE — TELEPHONE ENCOUNTER
Patient called requesting a clearance letter for dental work be faxed to Dr Bae [place] office at [phone]

## 2022-08-05 NOTE — TELEPHONE ENCOUNTER
I'd like to see Madalyn Abdallaon in the office since she has been to the hospital since the last time I saw her.  Please use a SD or PF slot to get her in

## 2022-08-15 ENCOUNTER — OFFICE VISIT (OUTPATIENT)
Dept: ORTHOPEDIC SURGERY | Age: 62
End: 2022-08-15
Payer: MEDICARE

## 2022-08-15 VITALS — WEIGHT: 143 LBS | HEIGHT: 60 IN | OXYGEN SATURATION: 99 % | BODY MASS INDEX: 28.07 KG/M2

## 2022-08-15 DIAGNOSIS — Z96.612 STATUS POST REVERSE TOTAL REPLACEMENT OF LEFT SHOULDER: Primary | ICD-10-CM

## 2022-08-15 PROCEDURE — 99213 OFFICE O/P EST LOW 20 MIN: CPT | Performed by: ORTHOPAEDIC SURGERY

## 2022-08-15 PROCEDURE — 1036F TOBACCO NON-USER: CPT | Performed by: ORTHOPAEDIC SURGERY

## 2022-08-15 PROCEDURE — G8417 CALC BMI ABV UP PARAM F/U: HCPCS | Performed by: ORTHOPAEDIC SURGERY

## 2022-08-15 PROCEDURE — 3017F COLORECTAL CA SCREEN DOC REV: CPT | Performed by: ORTHOPAEDIC SURGERY

## 2022-08-15 PROCEDURE — G8427 DOCREV CUR MEDS BY ELIG CLIN: HCPCS | Performed by: ORTHOPAEDIC SURGERY

## 2022-08-15 ASSESSMENT — ENCOUNTER SYMPTOMS
ROS SKIN COMMENTS: NEGATIVE FOR RASH
SHORTNESS OF BREATH: 0
EYE DISCHARGE: 0
ABDOMINAL PAIN: 0

## 2022-08-15 NOTE — PROGRESS NOTES
[address] [place] [address] AND SPORTS MEDICINE  [facility] [address]  Dept: [phone]  Dept Fax: [phone]        Postoperative follow-up note    Subjective:   Makenna Bryant is a 60y.o. year old female who presents to our office today for postoperative followup regarding her   1. Status post reverse total replacement of left shoulder    . Chief Complaint   Patient presents with    Shoulder Pain     Follow up L shoulder       Wilmar Holliday is a 43-year-old female who presents to the office today for recheck of her left reverse total shoulder arthroplasty from 2/8/2022. She notes that the shoulder is slowly improving as she works on therapy on her own. She has not been going to formal therapy as she had a cerebrovascular accident 2 months ago. She does have a mild Broca's aphasia which is evident in the office today. Review of Systems   Constitutional:  Positive for activity change. Negative for fever. HENT:  Negative for dental problem. Eyes:  Negative for discharge. Respiratory:  Negative for shortness of breath. Cardiovascular:  Negative for chest pain. Gastrointestinal:  Negative for abdominal pain. Genitourinary: Negative. Musculoskeletal:  Positive for arthralgias. Skin:         Negative for rash   Neurological:  Positive for weakness. Psychiatric/Behavioral:  Negative for confusion. I have reviewed the CC, HPI, ROS, PMH, FHX, Social History, and if not present in this note, I have reviewed in the patient's chart. I agree with the documentation provided by other staff and have reviewed their documentation prior to providing my signature indicating agreement. Objective :   General: Makenna Bryant is a 60 y.o. female who is alert and oriented and sitting comfortably in our office.   Ortho Exam  MS:   No outward deformity of the left shoulder is appreciated and of the left shoulder incisions are well-healed without signs of infection. Active range of motion is to 130 degrees of forward elevation, 90 degrees of abduction, functional reach to L2 for the left shoulder. Patient has full range of motion left elbow. Motor, sensory, vascular examination to the left upper extremity is grossly intact without focal deficits. Neuro: alert. oriented  Eyes: Extra-ocular muscles intact  Mouth: Oral mucosa moist. No perioral lesions  Pulm: Respirations unlabored and regular. Skin: warm, well perfused  Psych:   Patient has good fund of knowledge and displays understanging of exam, diagnosis, and plan. Radiology: No results found. Assessment:      1. Status post reverse total replacement of left shoulder         Plan:      The patient is doing fine with her shoulder. She is going to continue home exercise program.  I plan to see her back in 1 year for further clinical and radiographic evaluation or sooner as necessary. Follow up: No follow-ups on file. No orders of the defined types were placed in this encounter. No orders of the defined types were placed in this encounter. This note is created with the assistance of a speech recognition program.  While intending to generate a document that actually reflects the content of the visit, the document can still have some errors including those of syntax and sound a like substitutions which may escape proof reading.   In such instances, actual meaning can be extrapolated by contextual diversion      Electronically signed by Sheila Queen DO, FAOAO on 8/15/2022 at 1:33 PM

## 2022-08-18 NOTE — DISCHARGE SUMMARY
[x] [facility]) [facility] &  Therapy  [address]  [phone]  F: [phone] [] [address]  [facility]   [address]  P: [phone]  F: [phone] [] [place]  [address]  P: [phone]  F: [phone] [] [address]  P: [phone]  F: [phone] [] [address]  [address] [facility] [address]   [address]   [place]: [phone]  F: [phone]      Physical Therapy Discharge Note    Date: 2022      Patient: Alana Mcneil  : 1960  MRN: [id number]    Physician: John France DO                                    Insurance: [place] [employer] (BMN)  Medical Diagnosis: M19.012 (ICD-10-CM) - Arthritis of left shoulder region , 22   Status post reverse total replacement of left shoulder (M30.245 [ICD-10-CM])       Rehab Codes: Pain M54.2, M25.512, stiffness M25.612, weakness M62.512, posture R29.3, myofascial M79.1, M62.838  Onset Date: 22                                 Next 's appt: 22                        * new order in [facility] 22 cont therapy          Visit# / total visits:                   Cancels/No Shows: 4/3     Date of initial visit: 22                Date of final visit: 22      Subjective: At last attended visit:  Pain:  [x] Yes  [] No   Location: L anterior shoulder and neck  Pain Rating: (0-10 scale) 4/10    Pain altered Tx:  [] No  [x] Yes  Action:    Comments:  Constant ache in Left anterior shoulder, Difficulty using arm at home.  saw pt - OK to continue therapy. Notes both arms/hands will tingle and sometimes turn white.         Objective:  Test Measurements:  Strength- left shoulder flex 4- p, abd 4-4+, ER 4 p, IR 4- p  AROM - Lt shoulder flex 110 stating ortho cleared her to stop PT. Will discharge at this time. Electronically signed by Janae Lau PT on 8/18/2022 at 1:12 PM      If you have any questions or concerns, please don't hesitate to call.   Thank you for your referral.

## 2022-09-14 ENCOUNTER — OFFICE VISIT (OUTPATIENT)
Dept: NEUROLOGY | Age: 62
End: 2022-09-14
Payer: MEDICARE

## 2022-09-14 VITALS
HEART RATE: 71 BPM | WEIGHT: 148.2 LBS | BODY MASS INDEX: 29.09 KG/M2 | HEIGHT: 60 IN | SYSTOLIC BLOOD PRESSURE: 133 MMHG | DIASTOLIC BLOOD PRESSURE: 90 MMHG

## 2022-09-14 DIAGNOSIS — M54.12 CERVICAL RADICULOPATHY: ICD-10-CM

## 2022-09-14 DIAGNOSIS — I69.30 CHRONIC LEFT ARTERIAL ISCHEMIC STROKE, MCA (MIDDLE CEREBRAL ARTERY): Primary | ICD-10-CM

## 2022-09-14 PROCEDURE — G8417 CALC BMI ABV UP PARAM F/U: HCPCS | Performed by: PSYCHIATRY & NEUROLOGY

## 2022-09-14 PROCEDURE — 3017F COLORECTAL CA SCREEN DOC REV: CPT | Performed by: PSYCHIATRY & NEUROLOGY

## 2022-09-14 PROCEDURE — G8427 DOCREV CUR MEDS BY ELIG CLIN: HCPCS | Performed by: PSYCHIATRY & NEUROLOGY

## 2022-09-14 PROCEDURE — 99214 OFFICE O/P EST MOD 30 MIN: CPT | Performed by: PSYCHIATRY & NEUROLOGY

## 2022-09-14 PROCEDURE — 1036F TOBACCO NON-USER: CPT | Performed by: PSYCHIATRY & NEUROLOGY

## 2022-09-14 RX ORDER — BUPROPION HYDROCHLORIDE 150 MG/1
TABLET ORAL
Status: ON HOLD | COMMUNITY
Start: 2022-09-03 | End: 2022-10-11 | Stop reason: HOSPADM

## 2022-09-14 RX ORDER — IBUPROFEN 800 MG/1
TABLET ORAL
Status: ON HOLD | COMMUNITY
Start: 2022-08-23 | End: 2022-10-11 | Stop reason: HOSPADM

## 2022-09-14 RX ORDER — OXYCODONE HYDROCHLORIDE AND ACETAMINOPHEN 5; 325 MG/1; MG/1
TABLET ORAL
COMMUNITY
Start: 2022-09-13

## 2022-09-14 NOTE — PROGRESS NOTES
[facility], [address], [address] [facility]  Ph: [phone] or [phone]  FAX: [phone]    Chief Complaint:  Stroke     Dear Christianna Babinski, DO     I had the pleasure of seeing your patient today in neurology consultation for her symptoms. As you would recall Brain Castro is a 60 y.o. female. She presents today with her roommate of 20 years. Her roommate states that her memory comes and goes, following a stroke on 4/14/2022. She has had previous trouble remembering certain things, but notes that it has become worse since the episode. She experiences difficulties speaking occasionally. Patient has bipolar disorder, however is not medicated. She is currently medicated for depression, and takes percocet 325 mg for cervical neck pain. She denies any hallucinations. The patient denies a history of myasthenia gravis. She reports that she does not drive. Neurological Workup  MRI Brain WO Contrast 7/14/2022: Chronic microvascular disease without acute intracranial abnormality. Remote lacunar infarct in the left basal ganglia  CT Head WO Contrast 7/12/2022: No acute intracranial abnormality. MRI Brain WO Contrast 4/14/2022: Acute lacunar infarct within the left internal capsule/lentiform nucleus. The findings were sent to the Radiology Results [address] at 5:16pm on 4/14/2022 to be communicated to a licensed caregiver  Maximum score 5 Score 0/5 What is the year, season, date, day, month   Maximum score 5 Score 5 Where are we: State, county, town, hospital, floor? Maximum score 3 Score 1/3 Name 3 objects: ball, pen, car. Ask patient to repeat 3 objects. Maximum score 5 Score 1/5 Serial sevens from 100:93, 86, 79, 72, 65   Maximum score 3 Score 3 Recall 3 objects   Maximum score 2 Score 2 Name a pencil and watch. Maximum score 1 Score 1 Repeat the following: No ifs ands or buts.      Maximum score 3 Score 3 Follow 3 stage command take a paper in your hand, fold it in half, and put it on the floor. Maximum score 1  Score 1 Read and obey the following: Close your eyes     Maximum score 1 Score 1 Write a sentence. Maximum score 1 Score 1 Copy a design below. Max 30   Patients score /30  Past Medical History:   Diagnosis Date    Acid reflux     Adult ADHD 2012    monthly visits @ [facility]    Asthma 2012    seasonal    Bipolar 1 disorder ([facility]     monthly visits with [facility]    Broken foot     Left foot    Chest pain of uncertain etiology     Last episode was in  (Written 2019)    Depression     Under control per pt.  on 6/15/18    DVT of leg (deep venous thrombosis) ([facility] 2014    Fracture     right foot / pt denies surgical intervention    Fractured nose     x 9 times    Helicobacter pylori (H. pylori) infection     per EGD    Hematuria 2018    Has a follow-up with Urologist     Hiatal hernia     History of blood transfusion     no reactions    History of DVT (deep vein thrombosis) 2012    History of myasthenia gravis     diagnosed around age 26    Hx of blood clots     L leg post knee surgery    Hypertension     managed by Dr. Filomena Howe PCP    Internal hemorrhoids     Narcolepsy 2012    Dr. [facility]    Osteoporosis     Sleep apnea     no machine    Under care of team 10/2021    Dr. Apryl Najera. V    Under care of team     Pain Management Dr. Oseil Hdz last visit 2022     Past Surgical History:   Procedure Laterality Date    ANKLE SURGERY Left 2018    mass excision    ARM SURGERY Right     Pt states she has had 13 right arm surgeries    BREAST ENHANCEMENT SURGERY Bilateral      SECTION      x 3    COLONOSCOPY  2013    sigmoid diverticula, prominent large internal hemorrhoid    COLONOSCOPY N/A 2/10/2020    COLONOSCOPY DIAGNOSTIC performed by Kasandra Pryor MD at [address] [place] [address] Rd N/A 2020 RESECTION/REMOVAL BONY NEOPLASM LEFT FIBULA, LEFT SOFT TISSUE MASS EXCISION WITH BONE BIOPSY LEFT ANKLE FIBULA performed by Emil Duenas DPM at [place] [address] Left     SHOULDER SURGERY Left 2022    REVERSE TOTAL SHOULDER ARTHROPLASTY - Left    SHOULDER SURGERY Left 2022    REVERSE TOTAL SHOULDER ARTHROPLASTY performed by Luiz Daugherty DO at [address] [place] [address]  2013    tertiary contractures esophagus, 3 to 4 cm hiatal hernia, antral gastritis, + H Pylori, mild active chronic esophagitis    UPPER GASTROINTESTINAL ENDOSCOPY  2016    one biopsy    UPPER GASTROINTESTINAL ENDOSCOPY N/A 2/10/2020    EGD BIOPSY performed by Tomeka Rae MD at [address] [place] [address]       Allergies   Allergen Reactions    Latex Itching    Prozac [Fluoxetine Hcl] Other (See Comments)     violent    Sulfa Antibiotics      migraine    Codeine Itching     Family History   Problem Relation Age of Onset    Cancer Mother     Hypertension Mother     Stroke Mother     Dementia Mother     Cancer Maternal Aunt     Hypertension Father     Heart Surgery Father     Alzheimer's Disease Father     Diabetes Neg Hx       Social History     Socioeconomic History    Marital status: Single     Spouse name: Not on file    Number of children: Not on file    Years of education: Not on file    Highest education level: Not on file   Occupational History    Not on file   Tobacco Use    Smoking status: Former     Packs/day: 1.00     Years: 20.00     Pack years: 20.00     Types: Cigarettes     Quit date: 45     Years since quittin.7    Smokeless tobacco: Never   Vaping Use    Vaping Use: Never used   Substance and Sexual Activity    Alcohol use: Not Currently     Comment: maybe oncee a year    Drug use: No    Sexual activity: Not Currently   Other Topics Concern    Not on file   Social History Narrative    Not on file     Social Determinants of Health     Financial Resource Strain: Not on file   Food Insecurity: Not on file   Transportation Needs: Not on file   Physical Activity: Not on file   Stress: Not on file   Social Connections: Not on file   Intimate Partner Violence: Not on file   Housing Stability: Not on file      There were no vitals taken for this visit. General appearence: Normal. Well groomed. In no acute distress    Head: Normocephalic, atraumatic  Eyes: Extraocular movements intact, eye lids normal  Lungs: Respirations unlabored, chest wall no deformity  ENT: Normal external ear canals, no sinus tenderness  Heart: Regular rate rhythm  Abdomen: No masses, tenderness  Extremities: No cyanosis or edema, 2+ pulses  Musculoskeletal: Normal range of motion in all joints  Skin: Intact, normal skin color    Neurological examination:    Mental status   Alert and oriented; intact memory with no confusion, speech or language problems; no hallucinations or delusions     Cranial nerves   II - visual fields intact to confrontation                                                III, IV, VI - extra-ocular muscles full: no pupillary defect; no SAYDA, no nystagmus, no ptosis   V - normal facial sensation                                                               VII - normal facial symmetry                                                             VIII - intact hearing                                                                             IX, X - symmetrical palate                                                                  XI - symmetrical shoulder shrug                                                       XII - midline tongue without atrophy or fasciculation     Motor function  Normal muscle bulk and tone; normal power 5/5, including fine motor movements     Sensory function Intact to touch, pin, vibration, proprioception     Cerebellar Intact fine motor movement. No involuntary movements or tremors     Reflex function Intact 2+ DTR and symmetric.  Negative Babinski     Gait                  Normal station and gait       Lab Results   Component Value Date    LDLCHOLESTEROL 130 04/15/2022     No components found for: CHLPL  Lab Results   Component Value Date    TRIG 103 04/15/2022    TRIG 176 (H) 11/15/2018    TRIG 232 (H) 04/27/2018     Lab Results   Component Value Date    HDL 46 04/15/2022    HDL 52 11/15/2018    HDL 57 04/27/2018     No results found for: LDLCALC  No results found for: LABVLDL  Lab Results   Component Value Date    LABA1C 5.7 04/15/2022     Lab Results   Component Value Date     04/15/2022     Lab Results   Component Value Date    [id number] 503 07/13/2022              Lab Results   Component Value Date     LDLCHOLESTEROL 130 04/15/2022      No components found for: CHLPL        Lab Results   Component Value Date     TRIG 103 04/15/2022     TRIG 176 (H) 11/15/2018     TRIG 232 (H) 04/27/2018            Lab Results   Component Value Date     HDL 46 04/15/2022     HDL 52 11/15/2018     HDL 57 04/27/2018      No results found for: LDLCALC  No results found for: LABVLDL        Lab Results   Component Value Date     LABA1C 5.7 04/15/2022            Lab Results   Component Value Date      04/15/2022            Lab Results   Component Value Date     [id number] 503 07/13/2022       Neurological work up:  CT head  CTA head and neck  MRI brain   2 D echo        All of patient's labs were personally reviewed. All the imaging studies were personally reviewed and discussed with the patient. Assessment Recommendations:  Left subcortical acute ischemic stroke July 12, 2022:  The patient presented to [place] [address] in July 2022 with difficulty finding words and confusion. MRI scan the brain showed left subcortical acute ischemic stroke. CT angiogram head and neck was negative. Doppler ultrasound of lower extremities was negative for DVTs.   Patient was discharged on Plavix 75 mg a day and aspirin for 3 weeks and then long-term aspirin along with Lipitor. The patient reports being compliant to medications. There is some improvement of difficulty with her words. She still has intermittent difficulty with orientation. Patient risk factor for stroke include hypertension. Her CT angiogram was negative and given her relatively young age, I would refer her to cardiology for loop recorder implantation rule out cardiac dysrhythmia as etiology for the symptoms    Cervical myeloradiculopathy  The patient reports having neck pain radiates to both upper extremities. She has positive Spurling sign. I will obtain MRI cervical spine. Previous MRI cervical spine in March 2021 showed degenerative disc disease with spinal canal narrowing, mild at C3-4, C6-7 and multilevel neuroforaminal narrowing    Patient to follow-up with me next 3 to 4 months. Antoni Taylor, DO I would like to thank you for the consult. Please do not hesitate if you have any questions about the patient care. Scribe Attestation:   By signing my name below, Kemar Shrestha attest that this documentation has been prepared under the direction and in the presence of Leti Daly MD.    Electronically Signed: Say Bains. 09/14/22. 9:51 AM    I, Beka Valdes MD, personally performed the services described in this documentation. All medical record entries made by the scribe were at my direction and in my presence. I have reviewed the chart and discharge instructions (if applicable) and agree that the record reflects my personal performance and is accurate and complete.     Electronically Signed: Beka Valdes 9/15/2022 11:06 AM    Diplomate, American Board of Psychiatry and Neurology  Diplomate, American Board of Clinical Neurophysiology  Diplomate, American Board of Epilepsy             This note is created with the assistance of a speech-recognition program. While intending to generate a document that actually reflects the content of the visit, the document can still have some errors including those of syntax and sound a- like substitutions which may escape proofreading. In such instances, actual meaning can be extrapolated by contextual derivation.

## 2022-09-14 NOTE — PROGRESS NOTES
Lab Results   Component Value Date    LDLCHOLESTEROL 130 04/15/2022     No components found for: CHLPL  Lab Results   Component Value Date    TRIG 103 04/15/2022    TRIG 176 (H) 11/15/2018    TRIG 232 (H) 04/27/2018     Lab Results   Component Value Date    HDL 46 04/15/2022    HDL 52 11/15/2018    HDL 57 04/27/2018     No results found for: LDLCALC  No results found for: LABVLDL  Lab Results   Component Value Date    LABA1C 5.7 04/15/2022     Lab Results   Component Value Date     04/15/2022     Lab Results   Component Value Date    [id number] 029 07/13/2022      Neurological work up:  CT head  CTA head and neck  MRI brain     2 D echo

## 2022-10-05 ENCOUNTER — OFFICE VISIT (OUTPATIENT)
Dept: FAMILY MEDICINE CLINIC | Age: 62
End: 2022-10-05
Payer: MEDICARE

## 2022-10-05 VITALS
HEART RATE: 59 BPM | TEMPERATURE: 97.3 F | DIASTOLIC BLOOD PRESSURE: 60 MMHG | BODY MASS INDEX: 28.32 KG/M2 | WEIGHT: 145 LBS | OXYGEN SATURATION: 96 % | SYSTOLIC BLOOD PRESSURE: 120 MMHG

## 2022-10-05 DIAGNOSIS — G31.84 MCI (MILD COGNITIVE IMPAIRMENT) WITH MEMORY LOSS: ICD-10-CM

## 2022-10-05 DIAGNOSIS — I63.9 ISCHEMIC STROKE (HCC): ICD-10-CM

## 2022-10-05 DIAGNOSIS — G93.32 CHRONIC FATIGUE SYNDROME: ICD-10-CM

## 2022-10-05 DIAGNOSIS — I10 ESSENTIAL HYPERTENSION: Primary | ICD-10-CM

## 2022-10-05 DIAGNOSIS — F41.1 GENERALIZED ANXIETY DISORDER: ICD-10-CM

## 2022-10-05 PROCEDURE — 99214 OFFICE O/P EST MOD 30 MIN: CPT | Performed by: FAMILY MEDICINE

## 2022-10-05 PROCEDURE — G8427 DOCREV CUR MEDS BY ELIG CLIN: HCPCS | Performed by: FAMILY MEDICINE

## 2022-10-05 PROCEDURE — G8417 CALC BMI ABV UP PARAM F/U: HCPCS | Performed by: FAMILY MEDICINE

## 2022-10-05 PROCEDURE — G8484 FLU IMMUNIZE NO ADMIN: HCPCS | Performed by: FAMILY MEDICINE

## 2022-10-05 PROCEDURE — 1036F TOBACCO NON-USER: CPT | Performed by: FAMILY MEDICINE

## 2022-10-05 PROCEDURE — 3017F COLORECTAL CA SCREEN DOC REV: CPT | Performed by: FAMILY MEDICINE

## 2022-10-05 SDOH — ECONOMIC STABILITY: FOOD INSECURITY: WITHIN THE PAST 12 MONTHS, YOU WORRIED THAT YOUR FOOD WOULD RUN OUT BEFORE YOU GOT MONEY TO BUY MORE.: NEVER TRUE

## 2022-10-05 SDOH — ECONOMIC STABILITY: FOOD INSECURITY: WITHIN THE PAST 12 MONTHS, THE FOOD YOU BOUGHT JUST DIDN'T LAST AND YOU DIDN'T HAVE MONEY TO GET MORE.: NEVER TRUE

## 2022-10-05 ASSESSMENT — ENCOUNTER SYMPTOMS
ORTHOPNEA: 0
BLURRED VISION: 0
RESPIRATORY NEGATIVE: 1
EYES NEGATIVE: 1
SHORTNESS OF BREATH: 0
ALLERGIC/IMMUNOLOGIC NEGATIVE: 1
GASTROINTESTINAL NEGATIVE: 1

## 2022-10-05 ASSESSMENT — SOCIAL DETERMINANTS OF HEALTH (SDOH): HOW HARD IS IT FOR YOU TO PAY FOR THE VERY BASICS LIKE FOOD, HOUSING, MEDICAL CARE, AND HEATING?: NOT HARD AT ALL

## 2022-10-05 NOTE — ASSESSMENT & PLAN NOTE
Unclear control, continue current plan pending work up below   Neuro advised loop recorder by cardio

## 2022-10-05 NOTE — PATIENT INSTRUCTIONS
Patient Education        Preventing Falls: Care Instructions  Your Care Instructions     Getting around your home safely can be a challenge if you have injuries or health problems that make it easy for you to fall. Loose rugs and furniture in walkways are among the dangers for many older people who have problems walking or who have poor eyesight. People who have conditions such as arthritis, osteoporosis, or dementia also have to be careful not to fall. You can make your home safer with a few simple measures. Follow-up care is a key part of your treatment and safety. Be sure to make and go to all appointments, and call your doctor if you are having problems. It's also a good idea to know your test results and keep a list of the medicines you take. How can you care for yourself at home? Taking care of yourself  Exercise regularly to improve your strength, muscle tone, and balance. Walk if you can. Swimming may be a good choice if you cannot walk easily. Have your vision and hearing checked each year or any time you notice a change. If you have trouble seeing and hearing, you might not be able to avoid objects and could lose your balance. Know the side effects of the medicines you take. Ask your doctor or pharmacist whether the medicines you take can affect your balance. Sleeping pills or sedatives can affect your balance. Limit the amount of alcohol you drink. Alcohol can impair your balance and other senses. Ask your doctor whether calluses or corns on your feet need to be removed. If you wear loose-fitting shoes because of calluses or corns, you can lose your balance and fall. Talk to your doctor if you have numbness in your feet. You may get dizzy if you do not drink enough water. To prevent dehydration, drink plenty of fluids. Choose water and other clear liquids.  If you have kidney, heart, or liver disease and have to limit fluids, talk with your doctor before you increase the amount of fluids you drink.  Preventing falls at home  Remove raised doorway thresholds, throw rugs, and clutter. Repair loose carpet or raised areas in the floor. Move furniture and electrical cords to keep them out of walking paths. Use nonskid floor wax, and wipe up spills right away, especially on ceramic tile floors. If you use a walker or cane, put rubber tips on it. If you use crutches, clean the bottoms of them regularly with an abrasive pad, such as steel wool. Keep your house well lit, especially stairways, porches, and outside walkways. Use night-lights in areas such as hallways and bathrooms. Add extra light switches or use remote switches (such as switches that go on or off when you clap your hands) to make it easier to turn lights on if you have to get up during the night. Install sturdy handrails on stairways. Move items in your cabinets so that the things you use a lot are on the lower shelves (about waist level). Keep a cordless phone and a flashlight with new batteries by your bed. If possible, put a phone in each of the main rooms of your house, or carry a cell phone in case you fall and cannot reach a phone. Or, you can wear a device around your neck or wrist. You push a button that sends a signal for help. Wear low-heeled shoes that fit well and give your feet good support. Use footwear with nonskid soles. Check the heels and soles of your shoes for wear. Repair or replace worn heels or soles. Do not wear socks without shoes on wood floors. Walk on the grass when the sidewalks are slippery. If you live in an area that gets snow and ice in the winter, sprinkle salt on slippery steps and sidewalks. Or ask a family member or friend to do this for you. Preventing falls in the bath  Install grab bars and nonskid mats inside and outside your shower or tub and near the toilet and sinks. Use shower chairs and bath benches. Use a hand-held shower head that will allow you to sit while showering.   Get into a tub or shower by putting the weaker leg in first. Get out of a tub or shower with your strong side first.  Repair loose toilet seats and consider installing a raised toilet seat to make getting on and off the toilet easier. Keep your bathroom door unlocked while you are in the shower. Where can you learn more? Go to https://chpepiceweb.[employer]. org and sign in to your [employer] account. Enter [phone] in the [employer] box to learn more about \"Preventing Falls: Care Instructions. \"     If you do not have an account, please click on the \"Sign Up Now\" link. Current as of: May 4, 2022               Content Version: 13.4  © 8527-2377 [employer], [employer]orporated. Care instructions adapted under license by [facility]. If you have questions about a medical condition or this instruction, always ask your healthcare professional. [address] any warranty or liability for your use of this information.

## 2022-10-05 NOTE — PROGRESS NOTES
[facility] Progress Note    Date:10/5/2022         Patient Name:Latisha Mccurdy     YOB: 1960     Age:62 y.o. Assessment/Plan        Problem List Items Addressed This Visit          Circulatory    Essential hypertension - Primary      Well-controlled, continue current medications, continue current treatment plan, medication adherence emphasized and lifestyle modifications recommended         Ischemic stroke ([facility] [address]      Monitored by specialist- no acute findings meriting change in the plan         Relevant Orders    LINDA Marina MD, Cardiology, [place]       Nervous and Auditory    MCI (mild cognitive impairment) with memory loss      Monitored by specialist- no acute findings meriting change in the plan   Worse since stroke         Relevant Orders    LINDA Marina MD, Cardiology, [place]    Chronic fatigue syndrome      Unclear control, continue current plan pending work up below   Neuro advised loop recorder by cardio         Relevant Orders    LINDA Marina MD, Cardiology, [place]       Other    Generalized anxiety disorder        Return in about 2 months (around 12/5/2022). Electronically signed by Rocael Hoffman DO on 10/5/22       Total time spent was between Time personally spent assessing and managing the patient on the date of service: Est: 30-39 minutes (15307) mins. This included time spent reviewing the patient's medical record (e.g., recent visits, labs, and studies); seeing the patient in the office (face-to-face time); ordering medications, studies, procedures, or referrals; calling the patient or family later in the day with results and further recommendations; and documenting the visit in the medical record. Bakari Douglas is a 60 y.o. female presenting today for   Chief Complaint   Patient presents with    Hypertension   . Casey Maria Del Carmen is a 57 y.o. female who presents for follow up of anxiety disorder.  Current symptoms: difficulty concentrating, irritable, racing thoughts. She denies current suicidal and homicidal ideation. She complains of the following side effects from the treatment: none. Has psych set up    Dyan Saldivar is a 60 y.o. female who presents for follow up of a stroke. Event occurred several months ago. She has residual symptoms of balance disturbance, slurred speech, cognitive impairment. She denies unresponsiveness, syncope, seizures. Overall she feels her condition is ongoing. Saw neuro who suggested cardio referral and loop recorder    Hypertension  This is a chronic problem. The current episode started more than 1 year ago. The problem has been gradually improving since onset. The problem is controlled. Associated symptoms include anxiety. Pertinent negatives include no blurred vision, malaise/fatigue, neck pain, orthopnea, palpitations, peripheral edema, PND, shortness of breath or sweats. Past treatments include beta blockers. The current treatment provides significant improvement. Fatigue  Associated symptoms include fatigue. Pertinent negatives include no neck pain. Review of Systems   Review of Systems   Constitutional:  Positive for fatigue. Negative for malaise/fatigue. HENT: Negative. Eyes: Negative. Negative for blurred vision. Respiratory: Negative. Negative for shortness of breath. Cardiovascular: Negative. Negative for palpitations, orthopnea and PND. Gastrointestinal: Negative. Endocrine: Negative. Genitourinary: Negative. Musculoskeletal: Negative. Negative for neck pain. Skin: Negative. Allergic/Immunologic: Negative. Neurological: Negative. Hematological: Negative. Psychiatric/Behavioral: Negative. All other systems reviewed and are negative.     Medications     Current Outpatient Medications   Medication Sig Dispense Refill    buPROPion (WELLBUTRIN XL) 150 MG extended release tablet       ibuprofen (ADVIL;MOTRIN) 800 MG tablet Family History:   Family History   Problem Relation Age of Onset    Cancer Mother     Hypertension Mother     Stroke Mother     Dementia Mother     Cancer Maternal Aunt     Hypertension Father     Heart Surgery Father     Alzheimer's Disease Father     Diabetes Neg Hx        Surgical History:   Past Surgical History:   Procedure Laterality Date    ANKLE SURGERY Left 2018    mass excision    ARM SURGERY Right     Pt states she has had 13 right arm surgeries    BREAST ENHANCEMENT SURGERY Bilateral      SECTION      x 3    COLONOSCOPY  2013    sigmoid diverticula, prominent large internal hemorrhoid    COLONOSCOPY N/A 2/10/2020    COLONOSCOPY DIAGNOSTIC performed by Edita Mace MD at [facility] N/A 2020    COLORECTAL CANCER SCREENING, NOT HIGH RISK performed by Abbey Pantoja MD at [place] [address], COLON, DIAGNOSTIC      ESOPHAGEAL MOTILITY STUDY N/A 2020    PES RN - ESOPHAGEAL MOTILITY STUDY performed by Shane Mercer DO at [address] [place] [address] STUDY  9/10/2020    ESOPHAGEAL MOTILITY STUDY performed by Shane Mercer DO at [address] [place] [address] STUDY N/A 2020    ESOPHAGEAL MOTILITY STUDY ATTEMPTED PER DR. Delia Valles WITHOUT SUCCESS performed by Shane Mercer DO at [facility]    FOOT SURGERY Left 2015    FOOT TENDON SURGERY Left 2019    LEFT PERONEAL TENDON REPAIR WITH POSSIBLE LEFT TENDON TRANSFER (Left )    KNEE ARTHROSCOPY Left     Two surgeries on left knee per pt    KNEE SURGERY Right     Three surgeries per pt    LIGAMENT REPAIR Left 2019    LEFT PERONEAL TENDON REPAIR performed by Carol Vasques DPM at . [address] Bilateral 2021     BILATERAL L4/5, L5/S1 LUMBAR FACET (Bilateral )    NOSE SURGERY      PAIN MANAGEMENT PROCEDURE Bilateral 2021    BILATERAL L4/5, L5/S1 LUMBAR FACET performed by Eddie Cintron MD at [address] [place] [address] 06/11/2021    BILATERAL L4/5, L5/S1 NERVE BLOCK - Bilateral    PAIN MANAGEMENT PROCEDURE Bilateral 6/11/2021    BILATERAL L4/5, L5/S1 NERVE BLOCK performed by Es Yee MD at [facility] Right 07/23/2021    L4/5, L5/S1 NERVE RADIOFREQUENCY ABLATION     PAIN MANAGEMENT PROCEDURE Right 7/23/2021    right  L4/5, L5/S1 NERVE RADIOFREQUENCY ABLATION performed by Es Yee MD at [facility] Left 08/06/2021    left  L4/5, L5/S1 NERVE RADIOFREQUENCY ABLATION (Left )    PAIN MANAGEMENT PROCEDURE Left 8/6/2021    left  L4/5, L5/S1 NERVE RADIOFREQUENCY ABLATION performed by Es Yee MD at [facility] OFFICE/OUTPT [facility] Left 6/29/2018    RESECTION/REMOVAL BONY NEOPLASM LEFT FIBULA, LEFT SOFT TISSUE MASS EXCISION WITH BONE BIOPSY LEFT ANKLE FIBULA performed by Jad Rubio DPM at [place] [address] Left     SHOULDER SURGERY Left 02/08/2022    REVERSE TOTAL SHOULDER ARTHROPLASTY - Left    SHOULDER SURGERY Left 2/8/2022    REVERSE TOTAL SHOULDER ARTHROPLASTY performed by Apryl Rome DO at [facility]  5/23/2013    tertiary contractures esophagus, 3 to 4 cm hiatal hernia, antral gastritis, + H Pylori, mild active chronic esophagitis    UPPER GASTROINTESTINAL ENDOSCOPY  4/6/2016    one biopsy    UPPER GASTROINTESTINAL ENDOSCOPY N/A 2/10/2020    EGD BIOPSY performed by Lisa Cardona MD at [address]           Physical Examination      Vitals:  /60   Pulse 59   Temp 97.3 °F (36.3 °C)   Wt 145 lb (65.8 kg)   SpO2 96%   BMI 28.32 kg/m²     Physical Exam  Vitals and nursing note reviewed. Constitutional:       General: She is not in acute distress. Appearance: Normal appearance. She is overweight. She is not ill-appearing, toxic-appearing or diaphoretic. HENT:      Head: Normocephalic and atraumatic. Eyes:      General: No scleral icterus. Right eye: No discharge. Left eye: No discharge. Extraocular Movements: Extraocular movements intact. Conjunctiva/sclera: Conjunctivae normal.   Cardiovascular:      Rate and Rhythm: Normal rate and regular rhythm. Pulses: Normal pulses. Heart sounds: Normal heart sounds. No murmur heard. No friction rub. No gallop. Pulmonary:      Effort: Pulmonary effort is normal. No respiratory distress. Breath sounds: Normal breath sounds. No stridor. No wheezing, rhonchi or rales. Chest:      Chest wall: No tenderness. Neurological:      Mental Status: She is alert and oriented to person, place, and time. Mental status is at baseline. Psychiatric:         Attention and Perception: Attention and perception normal.         Mood and Affect: Mood normal. Affect is labile. Speech: Speech is rapid and pressured. Behavior: Behavior normal.         Thought Content: Thought content normal.         Cognition and Memory: Cognition is impaired. Memory is impaired. Judgment: Judgment normal.       Labs/Imaging/Diagnostics   Labs:  Hemoglobin A1C   Date Value Ref Range Status   04/15/2022 5.7 4.0 - 6.0 % Final       Imaging Last 24 Hours:  MRI BRAIN WO CONTRAST  Narrative: EXAMINATION:  MRI OF THE BRAIN WITHOUT CONTRAST  7/14/2022 7:22 am    TECHNIQUE:  Multiplanar multisequence MRI of the brain was performed without the  administration of intravenous contrast.    COMPARISON:  CT brain performed 07/12/2022. HISTORY:  ORDERING SYSTEM PROVIDED HISTORY: dizziness  TECHNOLOGIST PROVIDED HISTORY:  dizziness    FINDINGS:  INTRACRANIAL STRUCTURES/VENTRICLES: The sellar and suprasellar structures,  optic chiasm, corpus callosum, pineal gland, tectum, and midline brainstem  structures are unremarkable. The craniocervical junction is unremarkable. There is no acute hemorrhage, mass effect, or midline shift.   There is  satisfactory overall gray-white matter differentiation. There is chronic  microvascular disease. There is a remote lacunar infarct in the left basal  ganglia. The ventricular structures are symmetric and unremarkable. The  infratentorial structures including the cerebellopontine angles and internal  auditory canals are unremarkable. There is no abnormal restricted diffusion. There is no abnormal blooming artifact on susceptibility weighted imaging. ORBITS: The visualized portion of the orbits demonstrate no acute abnormality. SINUSES: The visualized paranasal sinuses and mastoid air cells demonstrate  no acute abnormality. BONES/SOFT TISSUES: The bone marrow signal intensity appears normal. The soft  tissues demonstrate no acute abnormality. Impression: Chronic microvascular disease without acute intracranial abnormality. Remote lacunar infarct in the left basal ganglia. VL DUP LOWER EXTREMITY VENOUS BILATERAL      [facility]   Vascular Lower Extremities DVT Study Procedure      Patient Name   [facility]    Date of Study           07/13/2022                  DERIAN A      Date of Birth  1960   Gender                  Female      Age            60 year(s)   Race                          Room Number    0657      Corporate ID # [id number]      Patient Acct # [de-identified]      MR #           [id number]      [address] [place] [address]      Accession #    [id number]   Interpreting Physician  Ermias Palafox      Referring                   Referring Physician     Oliverio De La Paz,   Nurse   Practitioner     Procedure  Type of Study:      Veins: Lower Extremities DVT Study, Venous Scan Lower Bilateral.     Indications for Study:Pain, leg and Hx of DVT. Patient Status: In Patient. Conclusions      Summary      Bilateral:   No evidence of deep or superficial venous thrombosis.       Signature      ----------------------------------------------------------------   Electronically signed by EKTA Lainez(Sonographer) on   07/13/2022 04:51 PM   ----------------------------------------------------------------      ----------------------------------------------------------------   Electronically signed by Maynor Trujillo physician)   on 07/14/2022 07:08 AM   ----------------------------------------------------------------     Findings:      Right Impression:                    Left Impression:   The common femoral, femoral,         The common femoral, femoral,   popliteal and tibial veins           popliteal and tibial veins   demonstrate normal compressibility   demonstrate normal compressibility   and augmentation. and augmentation. Normal compressibility of the great  Normal compressibility of the great   saphenous vein. saphenous vein. Normal compressibility of the small  Normal compressibility of the small   saphenous vein. saphenous vein. Incidental finding of a non   vascularized fluid collection in the   pop fossa measuring 2.22 cm x 4.19   cm. Enlarged lymph nodes are noted at   the right groin level. Risk Factors  History  +-----------------------------+----------+---------------------------------+  ! Diagnosis                    ! Date      ! Comments                         ! +-----------------------------+----------+---------------------------------+  ! Previous Scan                !08/25/2021! Left leg: WNL                    !  +-----------------------------+----------+---------------------------------+      - The patient's risk factor(s) include: arterial hypertension.      - The patient has a former tobacco history. Velocities are measured in cm/s ; Diameters are measured in cm    Right Lower Extremities DVT Study Measurements  Right 2D Measurements  +------------------------------------+----------+---------------+----------+  ! Location !Visualized! Compressibility! Thrombosis! +------------------------------------+----------+---------------+----------+  ! Common Femoral                      !Yes       ! Yes            ! None      !  +------------------------------------+----------+---------------+----------+  ! Prox Femoral                        !Yes       ! Yes            ! None      !  +------------------------------------+----------+---------------+----------+  ! Mid Femoral                         !Yes       ! Yes            ! None      !  +------------------------------------+----------+---------------+----------+  ! Dist Femoral                        !Yes       ! Yes            ! None      !  +------------------------------------+----------+---------------+----------+  ! Popliteal                           !Yes       ! Yes            ! None      !  +------------------------------------+----------+---------------+----------+  ! Sapheno Femoral Junction            ! Yes       ! Yes            ! None      !  +------------------------------------+----------+---------------+----------+  ! PTV                                 ! Yes       ! Yes            ! None      !  +------------------------------------+----------+---------------+----------+  ! Peroneal                            !Yes       ! Yes            ! None      !  +------------------------------------+----------+---------------+----------+  ! Gastroc                             ! Yes       ! Yes            ! None      !  +------------------------------------+----------+---------------+----------+  ! GSV Thigh                           ! Yes       ! Yes            ! None      !  +------------------------------------+----------+---------------+----------+  ! GSV Knee                            ! Yes       ! Yes            ! None      !  +------------------------------------+----------+---------------+----------+  ! GSV Ankle                           ! Yes       ! Yes            ! None !  +------------------------------------+----------+---------------+----------+  ! SSV                                 ! Yes       ! Yes            ! None      !  +------------------------------------+----------+---------------+----------+    Right Doppler Measurements  +---------------------------+------+------+--------------------------------+  ! Location                   ! Signal!Reflux! Reflux (msec)                   ! +---------------------------+------+------+--------------------------------+  ! Common Femoral             !Phasic!      !                                !  +---------------------------+------+------+--------------------------------+  ! Prox Femoral               !Phasic!      !                                !  +---------------------------+------+------+--------------------------------+  ! Popliteal                  !Phasic!      !                                !  +---------------------------+------+------+--------------------------------+    Left Lower Extremities DVT Study Measurements  Left 2D Measurements  +------------------------------------+----------+---------------+----------+  ! Location                            ! Visualized! Compressibility! Thrombosis! +------------------------------------+----------+---------------+----------+  ! Common Femoral                      !Yes       ! Yes            ! None      !  +------------------------------------+----------+---------------+----------+  ! Prox Femoral                        !Yes       ! Yes            ! None      !  +------------------------------------+----------+---------------+----------+  ! Mid Femoral                         !Yes       ! Yes            ! None      !  +------------------------------------+----------+---------------+----------+  ! Dist Femoral                        !Yes       ! Yes            ! None      !  +------------------------------------+----------+---------------+----------+  ! Popliteal                           !Yes !Yes            !None      !  +------------------------------------+----------+---------------+----------+  ! Sapheno Femoral Junction            ! Yes       ! Yes            ! None      !  +------------------------------------+----------+---------------+----------+  ! PTV                                 ! Yes       ! Yes            ! None      !  +------------------------------------+----------+---------------+----------+  ! Peroneal                            !Yes       ! Yes            ! None      !  +------------------------------------+----------+---------------+----------+  ! Gastroc                             ! Yes       ! Yes            ! None      !  +------------------------------------+----------+---------------+----------+  ! GSV Thigh                           ! Yes       ! Yes            ! None      !  +------------------------------------+----------+---------------+----------+  ! GSV Knee                            ! Yes       ! Yes            ! None      !  +------------------------------------+----------+---------------+----------+  ! GSV Ankle                           ! Yes       ! Yes            ! None      !  +------------------------------------+----------+---------------+----------+  ! SSV                                 ! Yes       ! Yes            ! None      !  +------------------------------------+----------+---------------+----------+    Left Doppler Measurements  +---------------------------+------+------+--------------------------------+  ! Location                   ! Signal!Reflux! Reflux (msec)                   ! +---------------------------+------+------+--------------------------------+  ! Common Femoral             !Phasic!      !                                !  +---------------------------+------+------+--------------------------------+  ! Prox Femoral               !Phasic!      !                                !  +---------------------------+------+------+--------------------------------+  ! Popliteal !Phasic!      !                                !  +---------------------------+------+------+--------------------------------+

## 2022-10-10 ENCOUNTER — TELEPHONE (OUTPATIENT)
Dept: ONCOLOGY | Age: 62
End: 2022-10-10

## 2022-10-10 ENCOUNTER — HOSPITAL ENCOUNTER (INPATIENT)
Age: 62
LOS: 1 days | Discharge: HOME | DRG: 684 | End: 2022-10-11
Attending: EMERGENCY MEDICINE | Admitting: INTERNAL MEDICINE
Payer: MEDICARE

## 2022-10-10 ENCOUNTER — APPOINTMENT (OUTPATIENT)
Dept: GENERAL RADIOLOGY | Age: 62
DRG: 684 | End: 2022-10-10
Payer: MEDICARE

## 2022-10-10 ENCOUNTER — APPOINTMENT (OUTPATIENT)
Dept: CT IMAGING | Age: 62
DRG: 684 | End: 2022-10-10
Payer: MEDICARE

## 2022-10-10 DIAGNOSIS — I51.3 MURAL THROMBUS OF CARDIAC APEX: ICD-10-CM

## 2022-10-10 DIAGNOSIS — R93.1 ABNORMAL ECHOCARDIOGRAM: Primary | ICD-10-CM

## 2022-10-10 PROBLEM — I51.89 ATRIAL MASS: Status: ACTIVE | Noted: 2022-10-10

## 2022-10-10 LAB
ALBUMIN SERPL-MCNC: 3.7 G/DL (ref 3.5–5.2)
ALBUMIN/GLOBULIN RATIO: 1.4 (ref 1–2.5)
ALP BLD-CCNC: 148 U/L (ref 35–104)
ALT SERPL-CCNC: 21 U/L (ref 5–33)
ANION GAP SERPL CALCULATED.3IONS-SCNC: 8 MMOL/L (ref 9–17)
AST SERPL-CCNC: 26 U/L
BILIRUB SERPL-MCNC: 0.2 MG/DL (ref 0.3–1.2)
BUN BLDV-MCNC: 13 MG/DL (ref 8–23)
CALCIUM SERPL-MCNC: 9.2 MG/DL (ref 8.6–10.4)
CHLORIDE BLD-SCNC: 100 MMOL/L (ref 98–107)
CO2: 27 MMOL/L (ref 20–31)
CREAT SERPL-MCNC: 1.36 MG/DL (ref 0.5–0.9)
GFR SERPL CREATININE-BSD FRML MDRD: 44 ML/MIN/1.73M2
GLUCOSE BLD-MCNC: 101 MG/DL (ref 70–99)
LIPASE: 23 U/L (ref 13–60)
MAGNESIUM: 2.2 MG/DL (ref 1.6–2.6)
PARTIAL THROMBOPLASTIN TIME: 23 SEC (ref 20.5–30.5)
POTASSIUM SERPL-SCNC: 4.8 MMOL/L (ref 3.7–5.3)
REASON FOR REJECTION: NORMAL
SODIUM BLD-SCNC: 135 MMOL/L (ref 135–144)
TOTAL PROTEIN: 6.3 G/DL (ref 6.4–8.3)
TROPONIN, HIGH SENSITIVITY: 8 NG/L (ref 0–14)
TROPONIN, HIGH SENSITIVITY: 9 NG/L (ref 0–14)
ZZ NTE CLEAN UP: ORDERED TEST: NORMAL
ZZ NTE WITH NAME CLEAN UP: SPECIMEN SOURCE: NORMAL

## 2022-10-10 PROCEDURE — 83615 LACTATE (LD) (LDH) ENZYME: CPT

## 2022-10-10 PROCEDURE — 93005 ELECTROCARDIOGRAM TRACING: CPT

## 2022-10-10 PROCEDURE — 83690 ASSAY OF LIPASE: CPT

## 2022-10-10 PROCEDURE — 71045 X-RAY EXAM CHEST 1 VIEW: CPT

## 2022-10-10 PROCEDURE — 80053 COMPREHEN METABOLIC PANEL: CPT

## 2022-10-10 PROCEDURE — 99285 EMERGENCY DEPT VISIT HI MDM: CPT

## 2022-10-10 PROCEDURE — 2060000000 HC ICU INTERMEDIATE R&B

## 2022-10-10 PROCEDURE — 99221 1ST HOSP IP/OBS SF/LOW 40: CPT | Performed by: INTERNAL MEDICINE

## 2022-10-10 PROCEDURE — 71046 X-RAY EXAM CHEST 2 VIEWS: CPT

## 2022-10-10 PROCEDURE — 84484 ASSAY OF TROPONIN QUANT: CPT

## 2022-10-10 PROCEDURE — 70450 CT HEAD/BRAIN W/O DYE: CPT

## 2022-10-10 PROCEDURE — 6360000002 HC RX W HCPCS: Performed by: STUDENT IN AN ORGANIZED HEALTH CARE EDUCATION/TRAINING PROGRAM

## 2022-10-10 PROCEDURE — 83735 ASSAY OF MAGNESIUM: CPT

## 2022-10-10 PROCEDURE — 85730 THROMBOPLASTIN TIME PARTIAL: CPT

## 2022-10-10 RX ORDER — HEPARIN SODIUM 1000 [USP'U]/ML
60 INJECTION, SOLUTION INTRAVENOUS; SUBCUTANEOUS PRN
Status: DISCONTINUED | OUTPATIENT
Start: 2022-10-10 | End: 2022-10-11

## 2022-10-10 RX ORDER — HEPARIN SODIUM 1000 [USP'U]/ML
60 INJECTION, SOLUTION INTRAVENOUS; SUBCUTANEOUS ONCE
Status: COMPLETED | OUTPATIENT
Start: 2022-10-10 | End: 2022-10-10

## 2022-10-10 RX ORDER — PANTOPRAZOLE SODIUM 40 MG/1
40 TABLET, DELAYED RELEASE ORAL 2 TIMES DAILY
Status: DISCONTINUED | OUTPATIENT
Start: 2022-10-11 | End: 2022-10-11 | Stop reason: HOSPADM

## 2022-10-10 RX ORDER — HEPARIN SODIUM 1000 [USP'U]/ML
30 INJECTION, SOLUTION INTRAVENOUS; SUBCUTANEOUS PRN
Status: DISCONTINUED | OUTPATIENT
Start: 2022-10-10 | End: 2022-10-11

## 2022-10-10 RX ORDER — HEPARIN SODIUM AND DEXTROSE 10000; 5 [USP'U]/100ML; G/100ML
5-30 INJECTION INTRAVENOUS CONTINUOUS
Status: DISCONTINUED | OUTPATIENT
Start: 2022-10-10 | End: 2022-10-11

## 2022-10-10 RX ADMIN — HEPARIN SODIUM AND DEXTROSE 12 UNITS/KG/HR: 10000; 5 INJECTION INTRAVENOUS at 21:30

## 2022-10-10 RX ADMIN — HEPARIN SODIUM 3950 UNITS: 1000 INJECTION INTRAVENOUS; SUBCUTANEOUS at 21:29

## 2022-10-10 NOTE — ED NOTES
The following labs were labeled with appropriate pt sticker and tubed to lab:     [] Blue     [] Lavender   [] on ice  [x] Green/yellow  [] Green/black [] on ice  [] Stevie Susan  [] on ice  [] Yellow  [] Red  [] Pink  [] ABG  [] VBG    [] COVID-19 swab    [] Rapid  [] PCR  [] Flu swab  [] Peds Viral Panel     [] Urine Sample  [] Pelvic Cultures  [] Blood Cultures  [] X 2  [] STREP Cultures         Summer CUCO Lozano  10/10/22 2848

## 2022-10-10 NOTE — TELEPHONE ENCOUNTER
PT CALLED TO SAY THAT SHE \"HAD A STROKE\" AND IS NOT SURE THAT SHE NEEDS TO COME TO F/U APPT WITH DR LING, SEEN FOR IRON DEFICIENCY ANEMIA. WRITER CALLED AND LEFT MESSAGE FOR PT TO KEEP APPT IF ABLE, AND TO CALL IF WOULD LIKE TO RESCHEDULE.

## 2022-10-10 NOTE — LETTER
Aide Russellite accompanied Ankita Jaimes to the emergency department on 10/10/2022. If you have any questions or concerns, please don't hesitate to call.       Marielena Turner, DO

## 2022-10-10 NOTE — ED TRIAGE NOTES
Patient arrived to ED with c/o \"mass to atrium\" and headache. Patient reports she has an echo done last week and was called by PCP to come and be evaluated d/t results. Patient denies any other complaint at this time. Patient is Aox4 and shows no signs of distress at this time.   Dr. Sophia Kline is at bedside for eval.

## 2022-10-10 NOTE — ED PROVIDER NOTES
[facility] ED  Emergency Department  Emergency Medicine Resident Sign-out     Care of Riya López was assumed from Dr. Arely Gauthier and is being seen for Headache and Abnormal Test Results (Found on left atrium during echo, told to come in by cardiology )  . The patient's initial evaluation and plan have been discussed with the prior provider who initially evaluated the patient. EMERGENCY DEPARTMENT COURSE / MEDICAL DECISION MAKING:       MEDICATIONS GIVEN:  Orders Placed This Encounter   Medications    heparin (porcine) injection 3,950 Units    heparin (porcine) injection 3,950 Units    heparin (porcine) injection 1,970 Units    heparin 25,000 units in dextrose 5 % 250 mL infusion (rate based)         LABS / RADIOLOGY:     Labs Reviewed   COMPREHENSIVE METABOLIC PANEL - Abnormal; Notable for the following components:       Result Value    Glucose 101 (*)     Creatinine 1.36 (*)     Est, Glom Filt Rate 44 (*)     Anion Gap 8 (*)     Alkaline Phosphatase 148 (*)     Total Bilirubin 0.2 (*)     Total Protein 6.3 (*)     All other components within normal limits   TROPONIN   TROPONIN   SPECIMEN REJECTION   LIPASE   MAGNESIUM   APTT   CBC WITH AUTO DIFFERENTIAL   PREVIOUS SPECIMEN   PREVIOUS SPECIMEN   APTT   APTT       No results found. RECENT VITALS:     Temp: 97 °F (36.1 °C),  Heart Rate: 53, Resp: 12, BP: 125/85, SpO2: 97 %    This patient is a 60 y.o. Female she was sent in by cardiology, Dr. Winsome Barlow for an abnormal echo. Patient stable, obtaining cardiac work-up. Attempting to obtain echo results from cardiology. Patient additionally complaining of a headache. Cardiology wanted to start heparin. Will obtain CT head prior to anticoagulating. Will require admission to [facility]. Head CT negative for any acute abnormality. Patient started on heparin drip. Case discussed with [facility], agreed to admit patient.       OUTSTANDING TASKS / RECOMMENDATIONS:    Echo results  Lab results  Admission     FINAL IMPRESSION:     1. Abnormal echocardiogram        DISPOSITION:         DISPOSITION:  []  Discharge   []  Transfer -    [x]  Admission -Intermed   []  Against Medical Advice   []  Eloped   FOLLOW-UP: No follow-up provider specified.    DISCHARGE MEDICATIONS: New Prescriptions    No medications on file           Zahra Rosario DO  Emergency Medicine Resident  [address] [facility] [place], [place]  Resident  10/10/22 7003

## 2022-10-10 NOTE — ED PROVIDER NOTES
[facility]  ED  Emergency Department Encounter  Emergency Medicine Resident     Pt Name: Casey Joyce  MRN: [id number]  [place] 1960  Date of evaluation: 10/10/22  PCP:  Rocael Hoffman DO    CHIEF COMPLAINT       Chief Complaint   Patient presents with    Headache    Abnormal Test Results     Found on left atrium during echo, told to come in by cardiology        EVELYN Pham  (Location/Symptom, Timing/Onset, Context/Setting, Quality, Duration, Modifying Factors,Severity.)      Casey Joyce is a 60 y. o.yo female who presents with no complaints of chest pain, shortness of breath. Patient was told by the cardiologist to come to the emergency be evaluated due to mass that was found in her ventricles after an echo was done. Patient states that she does not have any complaints today. She did report a history of CVA. Denies any abdominal pain, nausea, vomiting, fever, chills. PAST MEDICAL / SURGICAL / SOCIAL / FAMILY HISTORY      has a past medical history of Acid reflux, Adult ADHD, Asthma, Bipolar 1 disorder ([facility] [address] Broken foot, Cerebral artery occlusion with cerebral infarction [facility]), Chest pain of uncertain etiology, Depression, DVT of leg (deep venous thrombosis) ([facility] [address] Fracture, Fractured nose, Helicobacter pylori (H. pylori) infection, Hematuria, Hiatal hernia, History of blood transfusion, History of DVT (deep vein thrombosis), History of myasthenia gravis, Hx of blood clots, Hypertension, Internal hemorrhoids, Narcolepsy, Osteoporosis, Sleep apnea, Under care of team, and Under care of team.     has a past surgical history that includes  section; Nose surgery; Knee arthroscopy (Left); shoulder surgery (Left); Wrist surgery; Upper gastrointestinal endoscopy (2013); Colonoscopy (2013); Upper gastrointestinal endoscopy (2016); Foot surgery (Left, ); Arm Surgery (Right); knee surgery (Right); vaginal dilatation;  Ankle surgery (Left, 2018); pr office/outpt visit,procedure only (Left, 2018); Foot Tendon Surgery (Left, 2019); ligament repair (Left, 2019); Upper gastrointestinal endoscopy (N/A, 2/10/2020); Colonoscopy (N/A, 2/10/2020); Endoscopy, colon, diagnostic; Colonoscopy (N/A, 2020); esophageal motility study (N/A, 2020); esophageal motility study (9/10/2020); esophageal motility study (N/A, 2020); Nerve Block (Bilateral, 2021); Pain management procedure (Bilateral, 2021); Pain management procedure (2021); Pain management procedure (Bilateral, 2021); Pain management procedure (Right, 2021); Pain management procedure (Right, 2021); Pain management procedure (Left, 2021); Pain management procedure (Left, 2021); Breast enhancement surgery (Bilateral); shoulder surgery (Left, 2022); and shoulder surgery (Left, 2022).      Social History     Socioeconomic History    Marital status: Single     Spouse name: Not on file    Number of children: Not on file    Years of education: Not on file    Highest education level: Not on file   Occupational History    Not on file   Tobacco Use    Smoking status: Former     Packs/day: 1.00     Years: 20.00     Pack years: 20.00     Types: Cigarettes     Quit date: 46     Years since quittin.8     Passive exposure: Current (inside the home)    Smokeless tobacco: Never   Vaping Use    Vaping Use: Never used   Substance and Sexual Activity    Alcohol use: Not Currently     Comment: maybe oncee a year    Drug use: No    Sexual activity: Not Currently   Other Topics Concern    Not on file   Social History Narrative    Not on file     Social Determinants of Health     Financial Resource Strain: Low Risk     Difficulty of Paying Living Expenses: Not hard at all   Food Insecurity: No Food Insecurity    Worried About [address] in the Last Year: Never true    [address] [place] in the Last Year: Never true   Transportation Needs: Not on file   Physical Activity: Not on file   Stress: Not on file   Social Connections: Not on file   Intimate Partner Violence: Not on file   Housing Stability: Not on file       Family History   Problem Relation Age of Onset    Cancer Mother     Hypertension Mother     Stroke Mother     Dementia Mother     Cancer Maternal Aunt     Hypertension Father     Heart Surgery Father     Alzheimer's Disease Father     Diabetes Neg Hx         Allergies:  Latex, Prozac [fluoxetine hcl], Sulfa antibiotics, and Codeine    Home Medications:  Prior to Admission medications    Medication Sig Start Date End Date Taking? Authorizing Provider   oxyCODONE-acetaminophen (PERCOCET) 5-325 MG per tablet  9/13/22   Historical Provider, MD   pantoprazole (PROTONIX) 40 MG tablet Take 1 tablet by mouth in the morning. 7/27/22   Shayan Watts DO   atorvastatin (LIPITOR) 80 MG tablet Take 1 tablet by mouth nightly 7/27/22   Shayan Watts DO   alendronate (FOSAMAX) 70 MG tablet TAKE 1 TABLET BY MOUTH ONCE WEEKLY BEFORE BREAKFAST, ON AN EMPTY STOMACH: REMAIN UPRIGHT FOR 30 MINUTES:TAKE WITH 8 OUNCES OF WATER 5/17/22   Shayan Watts DO   buPROPion (WELLBUTRIN XL) 300 MG extended release tablet  4/11/22   Historical Provider, MD   aspirin 81 MG chewable tablet Take 1 tablet by mouth daily 4/17/22   Melodie Marquez MD   naproxen (NAPROSYN) 500 MG tablet TAKE ONE TABLET BY MOUTH TWICE A DAY WITH MEALS  Patient not taking: No sig reported 4/13/22 7/15/22  Elham Kingston DO   amphetamine-dextroamphetamine (ADDERALL) 30 MG tablet 30 mg 2 times daily.   9/27/21   Historical Provider, MD   citalopram (CELEXA) 40 MG tablet TAKE ONE TABLET BY MOUTH DAILY 4/8/20   Kaylynn Walker MD   albuterol (PROVENTIL) (2.5 MG/3ML) 0.083% nebulizer solution Take 2.5 mg by nebulization every 6 hours as needed for Wheezing or Shortness of Breath     Historical Provider, MD   Calcium Carbonate (CALCIUM 600 PO) Take 600 mg by mouth daily Historical Provider, MD       REVIEW OFSYSTEMS    (2-9 systems for level 4, 10 or more for level 5)      Review of Systems   Constitutional:  Negative for fatigue and fever. HENT:  Negative for dental problem. Respiratory:  Negative for cough and shortness of breath. Cardiovascular:  Negative for chest pain and leg swelling. Gastrointestinal:  Negative for abdominal pain, nausea and vomiting. Genitourinary:  Negative for flank pain. Musculoskeletal:  Negative for back pain and gait problem. Skin:  Negative for rash and wound. Neurological:  Negative for light-headedness and headaches. Psychiatric/Behavioral:  Negative for behavioral problems and confusion. PHYSICAL EXAM   (up to 7 for level 4, 8 or more forlevel 5)      INITIAL VITALS:   ED Triage Vitals [10/10/22 1650]   BP Temp Temp Source Heart Rate Resp SpO2 Height Weight   (!) 144/89 97 °F (36.1 °C) Oral 57 16 97 % 5' (1.524 m) 145 lb (65.8 kg)       Physical Exam  Constitutional:       Appearance: Normal appearance. HENT:      Head: Normocephalic and atraumatic. Nose: Nose normal.      Mouth/Throat:      Mouth: Mucous membranes are moist.   Eyes:      Extraocular Movements: Extraocular movements intact. Pupils: Pupils are equal, round, and reactive to light. Cardiovascular:      Rate and Rhythm: Normal rate. Pulmonary:      Effort: Pulmonary effort is normal. No respiratory distress. Abdominal:      General: There is no distension. Palpations: Abdomen is soft. Tenderness: There is no abdominal tenderness. Musculoskeletal:         General: No swelling. Normal range of motion. Cervical back: Normal range of motion. No rigidity. Skin:     General: Skin is warm. Capillary Refill: Capillary refill takes less than 2 seconds. Coloration: Skin is not jaundiced. Neurological:      General: No focal deficit present. Mental Status: She is alert.       Cranial Nerves: No cranial nerve deficit. Motor: No weakness.    Psychiatric:         Mood and Affect: Mood normal.         Behavior: Behavior normal.       DIFFERENTIAL  DIAGNOSIS     PLAN (LABS / IMAGING / EKG):  Orders Placed This Encounter   Procedures    XR CHEST (2 VW)    CT HEAD WO CONTRAST    XR CHEST PORTABLE    Troponin    SPECIMEN REJECTION    PREVIOUS SPECIMEN    Comprehensive Metabolic Panel    Lipase    Magnesium    Lactate Dehydrogenase    Hemoglobin and hematocrit, blood    LAB SCANNED REPORT    Cardiac Monitor    Pulse Oximetry    Inpatient consult to Hospitalist    Inpatient consult to Cardiology    POC CHEMISTRY (NA,K,ICA,GLU,CALC HCT/HGB,LACTATE,CREA,CL)    EKG 12 Lead    ECHO Complete 2D W Doppler W Color    ECHO Transesophageal    Saline lock IV    ADMIT TO INPATIENT    ADMIT TO INPATIENT    Discharge patient       MEDICATIONS ORDERED:  Orders Placed This Encounter   Medications    heparin (porcine) injection 3,950 Units    DISCONTD: heparin (porcine) injection 3,950 Units    DISCONTD: heparin (porcine) injection 1,970 Units    DISCONTD: heparin 25,000 units in dextrose 5 % 250 mL infusion (rate based)    DISCONTD: pantoprazole (PROTONIX) tablet 40 mg    DISCONTD: albuterol (PROVENTIL) nebulizer solution 2.5 mg     Order Specific Question:   Initiate RT Bronchodilator Protocol     Answer:   Yes - Inpatient Protocol    DISCONTD: amphetamine-dextroamphetamine (ADDERALL) tablet 30 mg    DISCONTD: aspirin chewable tablet 81 mg    DISCONTD: atorvastatin (LIPITOR) tablet 80 mg    DISCONTD: buPROPion (WELLBUTRIN XL) extended release tablet 300 mg    DISCONTD: calcium carbonate tablet 600 mg    DISCONTD: citalopram (CELEXA) tablet 40 mg    DISCONTD: oxyCODONE-acetaminophen (PERCOCET) 5-325 MG per tablet 1 tablet    DISCONTD: pantoprazole (PROTONIX) tablet 40 mg    DISCONTD: sodium chloride flush 0.9 % injection 5-40 mL    DISCONTD: sodium chloride flush 0.9 % injection 10 mL    DISCONTD: 0.9 % sodium chloride infusion    DISCONTD: potassium chloride (KLOR-CON M) extended release tablet 40 mEq    DISCONTD: potassium bicarb-citric acid (EFFER-K) effervescent tablet 40 mEq    DISCONTD: potassium chloride 10 mEq/100 mL IVPB (Peripheral Line)    DISCONTD: magnesium sulfate 1000 mg in dextrose 5% 100 mL IVPB    DISCONTD: ondansetron (ZOFRAN-ODT) disintegrating tablet 4 mg    DISCONTD: ondansetron (ZOFRAN) injection 4 mg    DISCONTD: polyethylene glycol (GLYCOLAX) packet 17 g    DISCONTD: acetaminophen (TYLENOL) tablet 650 mg    DISCONTD: acetaminophen (TYLENOL) suppository 650 mg    DISCONTD: amphetamine-dextroamphetamine (ADDERALL) tablet 30 mg    DISCONTD: 0.9 % sodium chloride infusion           Initial MDM/Plan: 60 y.o. female who presents due to her cardiologist finding a large thrombus in her left atrium and wanted her to come to be evaluated. We will plan for cardiac work-up, EKG, troponin, BNP, portable chest x-ray x-ray. We will reach out to [facility] versus [facility] to try to obtain records of echo.   We will reach out to cardiology    We will plan for admission  DIAGNOSTIC RESULTS / EMERGENCYDEPARTMENT COURSE / MDM     LABS:  Labs Reviewed   COMPREHENSIVE METABOLIC PANEL - Abnormal; Notable for the following components:       Result Value    Glucose 101 (*)     Creatinine 1.36 (*)     Est, Glom Filt Rate 44 (*)     Anion Gap 8 (*)     Alkaline Phosphatase 148 (*)     Total Bilirubin 0.2 (*)     Total Protein 6.3 (*)     All other components within normal limits   APTT - Abnormal; Notable for the following components:    PTT 74.7 (*)     All other components within normal limits   APTT - Abnormal; Notable for the following components:    PTT 44.7 (*)     All other components within normal limits   LACTATE DEHYDROGENASE - Abnormal; Notable for the following components:     (*)     All other components within normal limits   APTT - Abnormal; Notable for the following components:    PTT 49.4 (*)     All other components within normal limits   TROPONIN   TROPONIN   SPECIMEN REJECTION   LIPASE   MAGNESIUM   APTT   HGB/HCT   PREVIOUS SPECIMEN   POC CHEMISTRY (NA,K,ICA,GLU,CALC HCT/HGB,LACTATE,CREA,CL)         RADIOLOGY:  No results found. EKG  EKG Interpretation    Interpreted by me    Rhythm: Sinus bradycardia  Rate: 53  Axis: normal  Ectopy: none  Conduction: normal  ST Segments: no acute change  T Waves: Inversion in V1  Q Waves: none    Clinical Impression: Inversion in V1    All EKG's are interpreted by the Emergency Department Physicianwho either signs or Co-signs this chart in the absence of a cardiologist.    EMERGENCY DEPARTMENT COURSE:  I did speak to the cardiology fellow who was able to find the angina which did show a large atrium thrombus. Patient started on heparin. Patient to be admitted      PROCEDURES:  None    CONSULTS:  IP CONSULT TO HOSPITALIST  IP CONSULT TO CARDIOLOGY    CRITICAL CARE:      FINAL IMPRESSION      1. Abnormal echocardiogram    2.  Mural thrombus of cardiac apex          DISPOSITION / PLAN     DISPOSITION Admitted 10/11/2022 12:19:32 AM      PATIENT REFERRED TO:  Robin Sanchez DO  [address]  [place], [place] [address] [facility]  [phone]    Schedule an appointment as soon as possible for a visit in 3 day(s)  Hosptial follow up    DISCHARGE MEDICATIONS:  Discharge Medication List as of 10/11/2022  4:47 PM          Rock Maximino MD  Emergency Medicine Resident    (Please note that portions of this note were completed with a voice recognition program.Efforts were made to edit the dictations but occasionally words are mis-transcribed.)        Rock Maximino MD  Resident  10/17/22 8679

## 2022-10-10 NOTE — ED PROVIDER NOTES
[facility] ED     Emergency Department     Faculty Attestation        I performed a history and physical examination of the patient and discussed management with the resident. I reviewed the residents note and agree with the documented findings and plan of care. Any areas of disagreement are noted on the chart. I was personally present for the key portions of any procedures. I have documented in the chart those procedures where I was not present during the key portions. I have reviewed the emergency nurses triage note. I agree with the chief complaint, past medical history, past surgical history, allergies, medications, social and family history as documented unless otherwise noted below. For Physician Assistant/ Nurse Practitioner cases/documentation I have personally evaluated this patient and have completed at least one if not all key elements of the E/M (history, physical exam, and MDM). Additional findings are as noted. Vital Signs: BP: (!) 144/89  Heart Rate: 57  Resp: 16  Temp: 97 °F (36.1 °C) SpO2: 97 %  PCP:  Bhavya Arenas,     Pertinent Comments:     Patient is a 69-year-old female with multiple medical problems including previous DVT as well as bipolar disorder and hypertension with TIA in the past as well. Patient's cardiologist, Dr. Mariel Juarez, apparently performed echocardiogram outpatient on Friday which was 3 days ago, she was called with result of showing some sort of \"mass in my atria or ventricle\" and she was asked to come to the hospital for admission. She has no symptoms at this time in regards to this other than several chronic things abdomen going on for months.    Will obtain laboratory work-up as well as: Discussed with cardiology for admission      EKG Interpretation    Interpreted by emergency department physician    Rhythm: normal sinus   Rate: normal  Axis: normal  Conduction: normal  ST Segments: no acute change  T Waves: no acute change  Q Waves: no acute change    Clinical Impression:  nonspecific EKG. Critical Care  None      (Please note that portions of this note were completed with a voice recognition program. Efforts were made to edit the dictations but occasionally words are mis-transcribed.  Whenever words are used in this note in any gender, they shall be construed as though they were used in the gender appropriate to the circumstances; and whenever words are used in this note in the singular or plural form, they shall be construed as though they were used in the form appropriate to the circumstances.)    MD Luzmaria Caballero  Attending Emergency Medicine Physician            Marichuy Kimbrough MD  10/10/22 [address] [facility] [address], [place]  10/10/22 6097

## 2022-10-10 NOTE — ED NOTES
The following labs were labeled with appropriate pt sticker and tubed to lab:     [] Blue     [x] Lavender   [] on ice  [x] Green/yellow  [] Green/black [] on ice  [] Sandy Benito  [] on ice  [] Yellow  [] Red  [] Pink  [] ABG  [] VBG    [] COVID-19 swab    [] Rapid  [] PCR  [] Flu swab  [] Peds Viral Panel     [] Urine Sample  [] Pelvic Cultures  [] Blood Cultures  [] X 2  [] STREP Cultures         Oriana Lozano RN  10/10/22 4271

## 2022-10-11 ENCOUNTER — APPOINTMENT (OUTPATIENT)
Dept: CARDIAC CATH/INVASIVE PROCEDURES | Age: 62
DRG: 684 | End: 2022-10-11
Payer: MEDICARE

## 2022-10-11 VITALS
HEIGHT: 60 IN | TEMPERATURE: 98 F | BODY MASS INDEX: 28.47 KG/M2 | WEIGHT: 145 LBS | DIASTOLIC BLOOD PRESSURE: 69 MMHG | HEART RATE: 67 BPM | RESPIRATION RATE: 21 BRPM | OXYGEN SATURATION: 94 % | SYSTOLIC BLOOD PRESSURE: 107 MMHG

## 2022-10-11 PROBLEM — R93.1 ABNORMAL ECHOCARDIOGRAM: Status: ACTIVE | Noted: 2022-10-11

## 2022-10-11 PROBLEM — I51.89 ATRIAL MASS: Status: RESOLVED | Noted: 2022-10-10 | Resolved: 2022-10-11

## 2022-10-11 LAB
LACTATE DEHYDROGENASE: 273 U/L (ref 135–214)
LV EF: 55 %
LVEF MODALITY: NORMAL
PARTIAL THROMBOPLASTIN TIME: 44.7 SEC (ref 20.5–30.5)
PARTIAL THROMBOPLASTIN TIME: 49.4 SEC (ref 20.5–30.5)
PARTIAL THROMBOPLASTIN TIME: 74.7 SEC (ref 20.5–30.5)
POC HEMATOCRIT: 43 % (ref 36–46)
POC HEMOGLOBIN: 14.8 G/DL (ref 12–16)

## 2022-10-11 PROCEDURE — 7100000000 HC PACU RECOVERY - FIRST 15 MIN

## 2022-10-11 PROCEDURE — 2580000003 HC RX 258: Performed by: INTERNAL MEDICINE

## 2022-10-11 PROCEDURE — 99238 HOSP IP/OBS DSCHRG MGMT 30/<: CPT | Performed by: INTERNAL MEDICINE

## 2022-10-11 PROCEDURE — 85014 HEMATOCRIT: CPT

## 2022-10-11 PROCEDURE — 85730 THROMBOPLASTIN TIME PARTIAL: CPT

## 2022-10-11 PROCEDURE — 93312 ECHO TRANSESOPHAGEAL: CPT

## 2022-10-11 PROCEDURE — 2709999900 HC NON-CHARGEABLE SUPPLY

## 2022-10-11 PROCEDURE — 6360000002 HC RX W HCPCS

## 2022-10-11 PROCEDURE — 6370000000 HC RX 637 (ALT 250 FOR IP): Performed by: CLINICAL NURSE SPECIALIST

## 2022-10-11 PROCEDURE — 6360000002 HC RX W HCPCS: Performed by: CLINICAL NURSE SPECIALIST

## 2022-10-11 PROCEDURE — 7100000001 HC PACU RECOVERY - ADDTL 15 MIN

## 2022-10-11 RX ORDER — ONDANSETRON 4 MG/1
4 TABLET, ORALLY DISINTEGRATING ORAL EVERY 8 HOURS PRN
Status: DISCONTINUED | OUTPATIENT
Start: 2022-10-11 | End: 2022-10-11 | Stop reason: HOSPADM

## 2022-10-11 RX ORDER — CITALOPRAM 20 MG/1
40 TABLET ORAL DAILY
Status: DISCONTINUED | OUTPATIENT
Start: 2022-10-11 | End: 2022-10-11

## 2022-10-11 RX ORDER — DEXTROAMPHETAMINE SACCHARATE, AMPHETAMINE ASPARTATE, DEXTROAMPHETAMINE SULFATE AND AMPHETAMINE SULFATE 7.5; 7.5; 7.5; 7.5 MG/1; MG/1; MG/1; MG/1
30 TABLET ORAL 2 TIMES DAILY
Status: DISCONTINUED | OUTPATIENT
Start: 2022-10-11 | End: 2022-10-11 | Stop reason: SDUPTHER

## 2022-10-11 RX ORDER — SODIUM CHLORIDE 9 MG/ML
INJECTION, SOLUTION INTRAVENOUS CONTINUOUS
Status: DISCONTINUED | OUTPATIENT
Start: 2022-10-11 | End: 2022-10-11 | Stop reason: HOSPADM

## 2022-10-11 RX ORDER — BUPROPION HYDROCHLORIDE 150 MG/1
300 TABLET ORAL DAILY
Status: DISCONTINUED | OUTPATIENT
Start: 2022-10-11 | End: 2022-10-11 | Stop reason: HOSPADM

## 2022-10-11 RX ORDER — ALBUTEROL SULFATE 2.5 MG/3ML
2.5 SOLUTION RESPIRATORY (INHALATION) EVERY 6 HOURS PRN
Status: DISCONTINUED | OUTPATIENT
Start: 2022-10-11 | End: 2022-10-11 | Stop reason: HOSPADM

## 2022-10-11 RX ORDER — DEXTROAMPHETAMINE SACCHARATE, AMPHETAMINE ASPARTATE, DEXTROAMPHETAMINE SULFATE AND AMPHETAMINE SULFATE 2.5; 2.5; 2.5; 2.5 MG/1; MG/1; MG/1; MG/1
30 TABLET ORAL 2 TIMES DAILY
Status: DISCONTINUED | OUTPATIENT
Start: 2022-10-11 | End: 2022-10-11 | Stop reason: HOSPADM

## 2022-10-11 RX ORDER — ACETAMINOPHEN 325 MG/1
650 TABLET ORAL EVERY 6 HOURS PRN
Status: DISCONTINUED | OUTPATIENT
Start: 2022-10-11 | End: 2022-10-11 | Stop reason: HOSPADM

## 2022-10-11 RX ORDER — ONDANSETRON 2 MG/ML
4 INJECTION INTRAMUSCULAR; INTRAVENOUS EVERY 6 HOURS PRN
Status: DISCONTINUED | OUTPATIENT
Start: 2022-10-11 | End: 2022-10-11 | Stop reason: HOSPADM

## 2022-10-11 RX ORDER — SODIUM CHLORIDE 0.9 % (FLUSH) 0.9 %
10 SYRINGE (ML) INJECTION PRN
Status: DISCONTINUED | OUTPATIENT
Start: 2022-10-11 | End: 2022-10-11 | Stop reason: HOSPADM

## 2022-10-11 RX ORDER — POTASSIUM CHLORIDE 7.45 MG/ML
10 INJECTION INTRAVENOUS PRN
Status: DISCONTINUED | OUTPATIENT
Start: 2022-10-11 | End: 2022-10-11 | Stop reason: HOSPADM

## 2022-10-11 RX ORDER — PANTOPRAZOLE SODIUM 40 MG/1
40 TABLET, DELAYED RELEASE ORAL DAILY
Status: DISCONTINUED | OUTPATIENT
Start: 2022-10-11 | End: 2022-10-11

## 2022-10-11 RX ORDER — OXYCODONE HYDROCHLORIDE AND ACETAMINOPHEN 5; 325 MG/1; MG/1
1 TABLET ORAL EVERY 6 HOURS PRN
Status: DISCONTINUED | OUTPATIENT
Start: 2022-10-11 | End: 2022-10-11 | Stop reason: HOSPADM

## 2022-10-11 RX ORDER — ATORVASTATIN CALCIUM 80 MG/1
80 TABLET, FILM COATED ORAL NIGHTLY
Status: DISCONTINUED | OUTPATIENT
Start: 2022-10-11 | End: 2022-10-11 | Stop reason: HOSPADM

## 2022-10-11 RX ORDER — ACETAMINOPHEN 650 MG/1
650 SUPPOSITORY RECTAL EVERY 6 HOURS PRN
Status: DISCONTINUED | OUTPATIENT
Start: 2022-10-11 | End: 2022-10-11 | Stop reason: HOSPADM

## 2022-10-11 RX ORDER — SODIUM CHLORIDE 0.9 % (FLUSH) 0.9 %
5-40 SYRINGE (ML) INJECTION EVERY 12 HOURS SCHEDULED
Status: DISCONTINUED | OUTPATIENT
Start: 2022-10-11 | End: 2022-10-11 | Stop reason: HOSPADM

## 2022-10-11 RX ORDER — MAGNESIUM SULFATE 1 G/100ML
1000 INJECTION INTRAVENOUS PRN
Status: DISCONTINUED | OUTPATIENT
Start: 2022-10-11 | End: 2022-10-11 | Stop reason: HOSPADM

## 2022-10-11 RX ORDER — POTASSIUM CHLORIDE 20 MEQ/1
40 TABLET, EXTENDED RELEASE ORAL PRN
Status: DISCONTINUED | OUTPATIENT
Start: 2022-10-11 | End: 2022-10-11 | Stop reason: HOSPADM

## 2022-10-11 RX ORDER — ASPIRIN 81 MG/1
81 TABLET, CHEWABLE ORAL DAILY
Status: DISCONTINUED | OUTPATIENT
Start: 2022-10-11 | End: 2022-10-11 | Stop reason: HOSPADM

## 2022-10-11 RX ORDER — POLYETHYLENE GLYCOL 3350 17 G/17G
17 POWDER, FOR SOLUTION ORAL DAILY PRN
Status: DISCONTINUED | OUTPATIENT
Start: 2022-10-11 | End: 2022-10-11 | Stop reason: HOSPADM

## 2022-10-11 RX ORDER — SODIUM CHLORIDE 9 MG/ML
INJECTION, SOLUTION INTRAVENOUS PRN
Status: DISCONTINUED | OUTPATIENT
Start: 2022-10-11 | End: 2022-10-11 | Stop reason: HOSPADM

## 2022-10-11 RX ADMIN — DEXTROAMPHETAMINE SACCHARATE, AMPHETAMINE ASPARTATE, DEXTROAMPHETAMINE SULFATE AND AMPHETAMINE SULFATE 30 MG: 2.5; 2.5; 2.5; 2.5 TABLET ORAL at 09:11

## 2022-10-11 RX ADMIN — PANTOPRAZOLE SODIUM 40 MG: 40 TABLET, DELAYED RELEASE ORAL at 02:43

## 2022-10-11 RX ADMIN — Medication 600 MG: at 09:10

## 2022-10-11 RX ADMIN — SODIUM CHLORIDE: 9 INJECTION, SOLUTION INTRAVENOUS at 13:16

## 2022-10-11 RX ADMIN — BUPROPION HYDROCHLORIDE 300 MG: 150 TABLET, EXTENDED RELEASE ORAL at 09:09

## 2022-10-11 RX ADMIN — ASPIRIN 81 MG 81 MG: 81 TABLET ORAL at 09:08

## 2022-10-11 RX ADMIN — HEPARIN SODIUM 1970 UNITS: 1000 INJECTION INTRAVENOUS; SUBCUTANEOUS at 10:36

## 2022-10-11 RX ADMIN — PANTOPRAZOLE SODIUM 40 MG: 40 TABLET, DELAYED RELEASE ORAL at 09:09

## 2022-10-11 RX ADMIN — CITALOPRAM 40 MG: 20 TABLET, FILM COATED ORAL at 09:09

## 2022-10-11 NOTE — PROGRESS NOTES
Received post JACOBO procedure to [facility] room 3. Assessment obtained. Restrictions reviewed with patient. Post procedure pathway initiated. Patient to remain NPO until gag reflex intact Patient without complaints.

## 2022-10-11 NOTE — ED NOTES
The following labs were labeled with appropriate pt sticker and tubed to lab:     [x] Blue     [] Lavender   [] on ice  [] Green/yellow  [] Green/black [] on ice  [] Vesta Reel  [] on ice  [] Yellow  [] Red  [] Pink  [] ABG  [] VBG    [] COVID-19 swab    [] Rapid  [] PCR  [] Flu swab  [] Peds Viral Panel     [] Urine Sample  [] Pelvic Cultures  [] Blood Cultures  [] X 2  [] STREP Cultures         Emil Elkins RN  10/11/22 7055

## 2022-10-11 NOTE — ED NOTES
Report given to [facility], all questions answered     Loreto Villanueva RN  10/11/22 4742

## 2022-10-11 NOTE — ED NOTES
Report called to Joaquín Cristina RN with all questions answered. Pt off floor in cath lab, will go direct to Justin Neal 542.      Whitley Benitez RN  10/11/22 2901

## 2022-10-11 NOTE — CONSULTS
[facility]   Consult Note         Today's Date: 10/11/2022  Patient Name: Rosalinda Reich  Date of admission: 10/10/2022  5:12 PM  Patient's age: 60 y. o., 1960  Admission Dx: Atrial mass [I51.89]    Reason for Consult:  Cardiac evaluation    Requesting Physician: Catherine Viveros DO    REASON FOR CONSULT:  Mass in [facility]    History Obtained From:  Patient, chart, staff, records    HISTORY OF PRESENT ILLNESS:      The patient is a 60 y.o. female who is admitted to the hospital for atrial mass. She was sent to the ER by Dr. Gila Prabhakar after reviewing the results of an echocardiogram she had performed last week in office that found an left atrial mass. She has a PMH of HTN, TIA, previous DVT's, and bipolar disorder. She states that she was having the echo performed as follow up from her most recent stroke. She denies any syncope, palpitations, chest pain, cough, peripheral edema, orthopnea, increased fatigue, and dyspnea. She admits to recent headaches and baseline level of fatigue. She is a poor historian. Troponin 9, , EKG showed normal sinus rhythm, CXR showed no acute cardiopulmonary process. Past Medical History:   has a past medical history of Acid reflux, Adult ADHD, Asthma, Bipolar 1 disorder ([facility] [address] Broken foot, Cerebral artery occlusion with cerebral infarction [facility]), Chest pain of uncertain etiology, Depression, DVT of leg (deep venous thrombosis) ([facility] [address] Fracture, Fractured nose, Helicobacter pylori (H. pylori) infection, Hematuria, Hiatal hernia, History of blood transfusion, History of DVT (deep vein thrombosis), History of myasthenia gravis, Hx of blood clots, Hypertension, Internal hemorrhoids, Narcolepsy, Osteoporosis, Sleep apnea, Under care of team, and Under care of team.    Past Surgical History:   has a past surgical history that includes  section; Nose surgery; Knee arthroscopy (Left); shoulder surgery (Left); Wrist surgery;  Upper gastrointestinal endoscopy (5/23/2013); Colonoscopy (5/23/2013); Upper gastrointestinal endoscopy (4/6/2016); Foot surgery (Left, 2015); Arm Surgery (Right); knee surgery (Right); vaginal dilatation; Ankle surgery (Left, 06/29/2018); pr office/outpt visit,procedure only (Left, 6/29/2018); Foot Tendon Surgery (Left, 02/22/2019); ligament repair (Left, 2/22/2019); Upper gastrointestinal endoscopy (N/A, 2/10/2020); Colonoscopy (N/A, 2/10/2020); Endoscopy, colon, diagnostic; Colonoscopy (N/A, 5/18/2020); esophageal motility study (N/A, 8/12/2020); esophageal motility study (9/10/2020); esophageal motility study (N/A, 12/18/2020); Nerve Block (Bilateral, 06/04/2021); Pain management procedure (Bilateral, 6/4/2021); Pain management procedure (06/11/2021); Pain management procedure (Bilateral, 6/11/2021); Pain management procedure (Right, 07/23/2021); Pain management procedure (Right, 7/23/2021); Pain management procedure (Left, 08/06/2021); Pain management procedure (Left, 8/6/2021); Breast enhancement surgery (Bilateral); shoulder surgery (Left, 02/08/2022); and shoulder surgery (Left, 2/8/2022). Home Medications:    Prior to Admission medications    Medication Sig Start Date End Date Taking?  Authorizing Provider   buPROPion (WELLBUTRIN XL) 150 MG extended release tablet  9/3/22   Historical Provider, MD   ibuprofen (ADVIL;MOTRIN) 800 MG tablet  8/23/22   Historical Provider, MD   oxyCODONE-acetaminophen (PERCOCET) 5-325 MG per tablet  9/13/22   Historical Provider, MD   pantoprazole (PROTONIX) 40 MG tablet Take 1 tablet by mouth in the morning. 7/27/22   Radha Helton DO   atorvastatin (LIPITOR) 80 MG tablet Take 1 tablet by mouth nightly 7/27/22   Radha Helton DO   alendronate (FOSAMAX) 70 MG tablet TAKE 1 TABLET BY MOUTH ONCE WEEKLY BEFORE BREAKFAST, ON AN EMPTY STOMACH: REMAIN UPRIGHT FOR 30 MINUTES:TAKE WITH 8 OUNCES OF WATER 5/17/22   Radha Helton, DO   buPROPion (WELLBUTRIN XL) 300 MG extended release tablet  4/11/22   Historical Provider, MD   aspirin 81 MG chewable tablet Take 1 tablet by mouth daily 4/17/22   Jeannette Herrera MD   naproxen (NAPROSYN) 500 MG tablet TAKE ONE TABLET BY MOUTH TWICE A DAY WITH MEALS  Patient not taking: No sig reported 4/13/22 7/15/22  Louise Beebe DO   amphetamine-dextroamphetamine (ADDERALL) 30 MG tablet 30 mg 2 times daily. 9/27/21   Historical Provider, MD   citalopram (CELEXA) 40 MG tablet TAKE ONE TABLET BY MOUTH DAILY 4/8/20   Laya Walker MD   albuterol (PROVENTIL) (2.5 MG/3ML) 0.083% nebulizer solution Take 2.5 mg by nebulization every 6 hours as needed for Wheezing or Shortness of Breath     Historical Provider, MD   Calcium Carbonate (CALCIUM 600 PO) Take 600 mg by mouth daily    Historical Provider, MD       aspirin, 81 mg, Oral, Daily    atorvastatin, 80 mg, Oral, Nightly    buPROPion, 300 mg, Oral, Daily    calcium carbonate, 600 mg, Oral, Daily    sodium chloride flush, 5-40 mL, IntraVENous, 2 times per day    amphetamine-dextroamphetamine, 30 mg, Oral, BID    pantoprazole, 40 mg, Oral, BID      Allergies:  Latex, Prozac [fluoxetine hcl], Sulfa antibiotics, and Codeine    Social History:   reports that she quit smoking about 33 years ago. Her smoking use included cigarettes. She has a 20.00 pack-year smoking history. She has been exposed to tobacco smoke. She has never used smokeless tobacco. She reports that she does not currently use alcohol. She reports that she does not use drugs. Family History: family history includes Alzheimer's Disease in her father; Cancer in her maternal aunt and mother; Dementia in her mother; Heart Surgery in her father; Hypertension in her father and mother; Stroke in her mother. No h/o sudden cardiac death. REVIEW OF SYSTEMS:    Constitutional: there has been no unanticipated weight loss. There's been No change in energy level, No change in activity level. Eyes: No visual changes or diplopia.  No scleral icterus. ENT: +Headaches, no hearing loss or vertigo. No mouth sores or sore throat. Cardiovascular: per HPI  Respiratory: per HPI  Gastrointestinal: No abdominal pain, appetite loss, blood in stools. No change in bowel or bladder habits. Genitourinary: No dysuria, trouble voiding, or hematuria. Musculoskeletal:  No gait disturbance, No weakness or joint complaints. Integumentary: No rash or pruritis. Neurological: No headache, diplopia, change in muscle strength, numbness or tingling. No change in gait, balance, coordination, mood, affect, memory, mentation, behavior. Psychiatric: No anxiety, or depression. Endocrine: No temperature intolerance. No excessive thirst, fluid intake, or urination. No tremor. Hematologic/Lymphatic: No abnormal bruising or bleeding, blood clots or swollen lymph nodes. Allergic/Immunologic: No nasal congestion or hives. PHYSICAL EXAM:      /75   Pulse 56   Temp 97 °F (36.1 °C) (Oral)   Resp 20   Ht 5' (1.524 m)   Wt 145 lb (65.8 kg)   SpO2 96%   BMI 28.32 kg/m²    Constitutional and General Appearance: alert, cooperative, no distress and appears stated age  HEENT: PERRL, no cervical lymphadenopathy. No masses palpable. Normal oral mucosa  Respiratory:  Normal excursion and expansion without use of accessory muscles  Resp Auscultation: Good respiratory effort. No for increased work of breathing. On auscultation: clear to auscultation bilaterally  Cardiovascular: The apical impulse is not displaced  Heart tones are crisp and normal. regular S1 and S2.  Jugular venous pulsation Normal  The carotid upstroke is normal in amplitude and contour without delay or bruit  Peripheral pulses are symmetrical and full   Abdomen:   No masses or tenderness  Bowel sounds present  Extremities:   No Cyanosis or Clubbing   Lower extremity edema: No   Skin: Warm and dry  Neurological:  Alert and oriented.   Moves all extremities well  No abnormalities of mood, affect, memory, mentation, or behavior are noted      EKG:    Date: 10/11/22  Reading: Normal sinus rhythm      LAST ECHO:  10/6/2022  1. A large mass is seen is LA. 2. Normal LV systolic function. 3. Patient will be called to be admitted and JACOBO scheduled for tomorrow. Consider IV heparin until JACOBO is done. Previous Echo:  Date: 4/16/2022  Findings Summary:  Negative bubble study. (no right to left intra-cardiac shunt via agitated saline). Left ventricle is normal in size with normal systolic function globally. Calculated ejection fraction by bi-plane Ngo's method is 64%  Normal right ventricular size and function. Mild tricuspid regurgitation. Estimated right ventricular systolic pressure is 35 mmHg. No significant pericardial effusion is seen. LAST Stress Test:   Date of last ST: 12/04/2013  Major Findings: The stress SPECT images reveal normal left ventricular myocardial    perfusion. There is no stress-induced perfusion defects or fixed    perfusion defects. Resting images are noncontributory. The transient ischemic dilatation ratio is 1.12 which is normal.   SSS 0 , SRS 0, SDs0. No definite ischemic burden. The gated SPECT analysis reveals normal left ventricular wall motion with    normal LV size. There is preserved left ventricular systolic function     and LVEF of 65%. LAST Cardiac Angiography:. None      Labs:     CBC: No results for input(s): WBC, HGB, HCT, PLT in the last 72 hours. BMP:   Recent Labs     10/10/22  1906      K 4.8   CO2 27   BUN 13   CREATININE 1.36*   LABGLOM 44*   GLUCOSE 101*     BNP: No results for input(s): BNP in the last 72 hours. PT/INR: No results for input(s): PROTIME, INR in the last 72 hours. APTT:  Recent Labs     10/11/22  0243 10/11/22  0924   APTT 74.7* 44.7*     CARDIAC ENZYMES:No results for input(s): CKTOTAL, CKMB, CKMBINDEX, TROPONINI in the last 72 hours.   FASTING LIPID PANEL:  Lab Results   Component Value Date/Time    HDL 46 04/15/2022 05:06 AM    TRIG 103 04/15/2022 05:06 AM     LIVER PROFILE:  Recent Labs     10/10/22  1906   AST 26   ALT 21   LABALBU 3.7     Troponins: Invalid input(s): TROPONIN     Other Current Problems  Patient Active Problem List   Diagnosis    Attention deficit hyperactivity disorder (ADHD)    Narcolepsy    Moderate persistent asthma without complication    History of DVT (deep vein thrombosis)    Hiatal hernia    Cervical radiculopathy    Ganglion cyst    Chronic pain of left ankle    Acquired trigger finger    Osteoarthritis of knee    Osteoarthritis of wrist    Pes anserinus bursitis    Plantar fasciitis    Flexion contracture of joint of foot, left    Essential hypertension    Chronic anticoagulation    Iron deficiency anemia due to chronic blood loss    GERD (gastroesophageal reflux disease)    Absolute anemia    Anemia    Generalized anxiety disorder    Vitamin B12 deficiency    Iron malabsorption    Fall at home, initial encounter    Closed fracture of multiple ribs of right side    Hemothorax on right    Contusion of right lung    Chronic fatigue syndrome    Sleep apnea    Pain in joint involving ankle and foot    Lumbosacral spondylosis without myelopathy    Chronic bilateral low back pain with bilateral sciatica    Encounter for long-term opiate analgesic use    Arthritis of left shoulder region    Opioid use, unspecified with unspecified opioid-induced disorder    Stroke-like symptoms    Ischemic stroke ([address]    Acute encephalopathy    Chronic renal disease, stage III (HCC) [484101]    Acute anemia    Drop in hemoglobin    Long term (current) use of antithrombotics/antiplatelets    Iron deficiency    Pain of left lower extremity    MCI (mild cognitive impairment) with memory loss    Atrial mass           IMPRESSION  Atrial Mass on echo  HTN  H/o TIA    Recommendations:    JACOBO for further evaluation of mass noted in echo in left atrium   Continue Iv heparin  Continue Lipitor 80mg and ASA 81mg    Discussed with patient, family, and Nurse.     Electronically signed by Alie May MD on 10/11/2022 at 11:06 AM

## 2022-10-11 NOTE — PROGRESS NOTES
[employer]  PROGRESS NOTE    Shift date: 10/10/2022  Shift day: Monday   Shift # 3    Room # 27/27   Name: Casey Joyce                Ad[employer]ist: [address] [employer]: Unknown    Referral: Routine Visit    Admit Date & Time: 10/10/2022  5:12 PM    Assessment:  Casey Joyce is a 60 y.o. female in the hospital because of an [place] Mass. \" Upon entering the room writer observes patient resting in hospital bed. Intervention:  Writer introduced self and title as .  learned about patient's well-being and provided support to her. Patient shared hopefulness for admission to the hospital overnight. Patient confirmed that family had been present with her in the ED earlier this evening.  provided support and hospitality to patient. Outcome:  Patient thanked  for visit and care. Plan:  Chaplains will remain available to offer spiritual and emotional support as needed. 10/11/22 0140   Encounter Summary   Service Provided For: Patient   Referral/Consult From: Rounding  (ED Rounding)   Support System Family members   Last Encounter  10/10/22   Complexity of Encounter Low   Begin Time 0140   End Time  0159   Total Time Calculated 19 min   Encounter    Type Initial Screen/Assessment   Spiritual/Emotional needs   Type Spiritual Support   Assessment/Intervention/Outcome   Assessment Calm;Coping;Powerlessness   Intervention Active listening;Discussed illness injury and its impact; Explored/Affirmed feelings, thoughts, concerns;Explored Coping Skills/Resources;Nurtured Hope;Sustaining Presence/Ministry of presence   Outcome Comfort;Coping;Receptive   Plan and Referrals   Plan/Referrals Continue to visit, (comment)  (as needed)     Electronically signed by Xenia Aguero on 10/11/2022 at 6:58 AM.  [facility]  [phone]

## 2022-10-11 NOTE — ED NOTES
Pt resting on stretcher. RR even and unlabored. No distress noted. Call light within reach.        Tricia De Dios RN  10/11/22 0487

## 2022-10-11 NOTE — DISCHARGE SUMMARY
[facility]  Office: [address], DO, Kandis Miguel, DO, Thien Silva, DO, Janeth Atkins Blood, DO, Mirtha Anguiano MD, Mack Dancer, MD, Chong Gomez MD, Theron Rosales MD,  Melina Shah MD, Norm Pruett MD, Michael Trivedi, DO, Pietro Moeller MD,  Estephania Huang, DO, Elvia Saravia MD, Nitin Conti MD, Macie Encarnacion, DO, Arpita Sheth MD, Brooke Mcgovern MD, Mauricio Bowers MD, Abby Matthews MD, Ziyad Sterling MD, Po Haynes MD, Vanessa Eduardo DO, Camilo Moses MD, Hanna Payne MD, Stephanie Parada, CNP,  Alba Hay, CNP, Johnson Huber, CNP, Wayne Beck, CNP,  Josette Alvarez, [facility], Darrick Moss, CNP, Mary Kwong, CNP, Jared Keller, CNP, Saturnino Biggs, CNP, Shad Bella, CNP, Yovanny Evans PA-C, Omar Pruett, CNS, Jerald Saldivar, DNP, Hien Montanez, CNP, Melissa Daniels, CNP, Unknown Pump,  [facility]    Discharge Summary     Patient ID: Riya López  :  1960   MRN: [id number]     ACCOUNT:  [de-identified]   Patient's PCP: Panda Edouard DO  Admit Date: 10/10/2022   Discharge Date: 10/11/2022     Length of Stay: 1  Code Status:  Full Code  Admitting Physician: Rudy Ryan DO  Discharge Physician: Corie Lloyd DO     Active Discharge Diagnoses:     Hospital Problem Lists:  Principal Problem (Resolved):    Atrial mass  Active Problems:    * No active hospital problems. *      Admission Condition:  stable     Discharged Condition: stable    Hospital Stay:     Hospital Course:  Riya López is a 60 y.o. female who initially presents to the hospital at the request of her cardiologist after outpatient echocardiogram showed findings concerning for atrial mass. On admission, patient was completely asymptomatic. She did not have any chest pain, shortness of breath, difficulty breathing, PND, orthopnea.   Patient was seen by cardiology and underwent JACOBO which showed ejection fraction 55% with no thrombus or valvular vegetation identified. There was no atrial mass on JACOBO. Procedure was tolerated well. Currently, patient medically stable for discharge. Needs follow-up with her primary care physician and cardiologist on an outpatient basis. Significant therapeutic interventions:   JACOBO findings:     LA:       Normal  GOGO:    No thrombus  RA:      Normal  RV:      Normal  LV:       Normal  Estimated LVEF:         55%  Aorta:               Mild atheromatous disease arch  Pericardium:    No pericardial effusion  Septum:           No intracardiac shunt via color Doppler. Valves:     Mitral Valve: Structurally normal. No regurgitation is identified. Aortic Valve: The aortic valve is trileaflet and opens adequately. No regurgitiation is identified. Tricuspid valve: Structurally normal. No regurgitation is identified. Pulmonary valve: Normal. No significant regurgitation     No valvular vegetations or thrombus identified. Summary:      1. A JACOBO was performed without complications. 2. LVEF 55%  3. No thrombus or valvular vegetation identified  4. No left atrial mass found on JACOBO  5.  There were no complications encountered    Significant Diagnostic Studies:   Labs / Micro:  CBC:   Lab Results   Component Value Date/Time    WBC 7.7 07/15/2022 06:54 AM    RBC 3.93 07/15/2022 06:54 AM    HGB 10.6 07/15/2022 06:54 AM    HCT 35.0 07/15/2022 06:54 AM    MCV 89.1 07/15/2022 06:54 AM    MCH 27.0 07/15/2022 06:54 AM    MCHC 30.3 07/15/2022 06:54 AM    RDW 14.6 07/15/2022 06:54 AM     07/15/2022 06:54 AM     BMP:    Lab Results   Component Value Date/Time    GLUCOSE 101 10/10/2022 07:06 PM     10/10/2022 07:06 PM    K 4.8 10/10/2022 07:06 PM     10/10/2022 07:06 PM    CO2 27 10/10/2022 07:06 PM    ANIONGAP 8 10/10/2022 07:06 PM    BUN 13 10/10/2022 07:06 PM    CREATININE 1.36 10/10/2022 07:06 PM    BUNCRER NOT REPORTED 02/11/2022 02:59 PM    CALCIUM 9.2 10/10/2022 07:06 PM    LABGLOM 44 10/10/2022 07:06 PM    GFRAA 55 07/15/2022 06:54 AM    GFR      07/15/2022 06:54 AM        Radiology:  XR CHEST (2 VW)    Result Date: 10/10/2022  No acute process. CT HEAD WO CONTRAST    Result Date: 10/10/2022  No evidence for acute intracranial hemorrhage, territorial infarction or intracranial mass lesion. Mild chronic microangiopathic ischemic disease. Mild generalized volume loss. Unchanged chronic encephalomalacia of left caudothalamic groove and left caudate head. XR CHEST PORTABLE    Result Date: 10/11/2022  No acute findings. Consultations:    Consults:     Final Specialist Recommendations/Findings:   IP CONSULT TO HOSPITALIST  IP CONSULT TO CARDIOLOGY      The patient was seen and examined on day of discharge and this discharge summary is in conjunction with any daily progress note from day of discharge. Discharge plan:     Disposition: Home    Physician Follow Up:     Robin Sanchez DO  [address]  [address]  [phone]    Schedule an appointment as soon as possible for a visit in 3 day(s)  Hosptial follow up     Requiring Further Evaluation/Follow Up POST HOSPITALIZATION/Incidental Findings:   -Make an appointment with your primary care physician for evaluation after being hospitalized. Also recommend work-up for increased creatinine/CKD  -Continue outpatient follow-up with your cardiologist.  -Continue previous medication prescribed  -Stop all NSAIDS  -Needs anemia workup outpt. Diet: regular    Activity: As tolerated    Instructions to Patient: see above    Discharge Medications:      Medication List        CHANGE how you take these medications      buPROPion 300 MG extended release tablet  Commonly known as: WELLBUTRIN XL  What changed: Another medication with the same name was removed. Continue taking this medication, and follow the directions you see here.             CONTINUE taking these medications albuterol (2.5 MG/3ML) 0.083% nebulizer solution  Commonly known as: PROVENTIL     alendronate 70 MG tablet  Commonly known as: FOSAMAX  TAKE 1 TABLET BY MOUTH ONCE WEEKLY BEFORE BREAKFAST, ON AN EMPTY STOMACH: REMAIN UPRIGHT FOR 30 MINUTES:TAKE WITH 8 OUNCES OF WATER     amphetamine-dextroamphetamine 30 MG tablet  Commonly known as: ADDERALL     aspirin 81 MG chewable tablet  Take 1 tablet by mouth daily     atorvastatin 80 MG tablet  Commonly known as: LIPITOR  Take 1 tablet by mouth nightly     CALCIUM 600 PO     citalopram 40 MG tablet  Commonly known as: CELEXA  TAKE ONE TABLET BY MOUTH DAILY     oxyCODONE-acetaminophen 5-325 MG per tablet  Commonly known as: PERCOCET     pantoprazole 40 MG tablet  Commonly known as: PROTONIX  Take 1 tablet by mouth in the morning. STOP taking these medications      ibuprofen 800 MG tablet  Commonly known as: ADVIL;MOTRIN     naproxen 500 MG tablet  Commonly known as: NAPROSYN              No discharge procedures on file. Time Spent on discharge is  33 mins in patient examination, evaluation, counseling as well as medication reconciliation, prescriptions for required medications, discharge plan and follow up. Electronically signed by   Lesa Parsons DO  10/11/2022  4:38 PM      Thank you Dr. Julianne Andrade DO for the opportunity to be involved in this patient's care.

## 2022-10-11 NOTE — ED NOTES
Pt resting on stretcher with eyes closed. RR even and unlabored. No distress noted. Call light within reach.         Gordon Del Rio RN  10/11/22 [id number]

## 2022-10-11 NOTE — ED NOTES
Report received from Summer RN, all questions answered. Pt is resting in bed comfortably, NAD noted.  Pt remains on full cardiac monitor     Jesenia Amor RN  10/10/22 [id number]

## 2022-10-11 NOTE — DISCHARGE INSTRUCTIONS
-Make an appointment with your primary care physician for evaluation after being hospitalized.   Also recommend work-up for increased creatinine/CKD  -Continue outpatient follow-up with your cardiologist.  -Continue previous medication prescribed  -Stop all NSAIDS

## 2022-10-11 NOTE — OP NOTE
[facility]  Transesophageal Echocardiogram       Today's Date:  10/11/2022  Indication:   Left Atrial Mass    Operators:  Primary:   Dr Genet Higgins (Attending Physician)  Assistant:   Rhonda Gilbert MD (Cardiovascular Fellow)      Pre Procedure Conscious Sedation Data:    ASA Class:    [] I [x] II [] III [] IV    Mallampati Class:  [] I [x] II [] III [] IV    Procedure:    Patient seen and examined. History and Physical reviewed. Labs reviewed. After informed consent was obtained with explanation of the risks and benefits, the patient was brought to cardiac cath lab. All sedation was administered by the cardiologist. The oropharynx was pre-anesthesized with viscous lidocaine and cetacaine spray. The ultrasound probe was passed without any difficulty. JACOBO findings:    LA:  Normal  GOGO:  No thrombus  RA:  Normal  RV:   Normal  LV:  Normal  Estimated LVEF:  55%  Aorta:   Mild atheromatous disease arch  Pericardium: No pericardial effusion  Septum:  No intracardiac shunt via color Doppler. Valves:    Mitral Valve: Structurally normal. No regurgitation is identified. Aortic Valve: The aortic valve is trileaflet and opens adequately. No regurgitiation is identified. Tricuspid valve: Structurally normal. No regurgitation is identified. Pulmonary valve: Normal. No significant regurgitation    No valvular vegetations or thrombus identified. Summary:     1. A JACOBO was performed without complications. 2. LVEF 55%  3. No thrombus or valvular vegetation identified  4. No left atrial mass found on JACOBO  5. There were no complications encountered.       Electronically signed by Rhonda Gilbert MD on 10/11/2022 at 2:00 PM  Cardiovascular Diseases Fellow  [facility]      I have reviewed the case / procedure with resident / fellow  I have examined the patient personally  Patient agree with treatment plan as discussed before, final arrangement based on my evaluation and exam.    Risk and benefit of procedure planned were explained in details. Procedure was performed by me personally, with all aspect of the procedure being done using standard protocol. Note was modified based on my own assessment and treatment.     Ivet Farmer MD  [facility]

## 2022-10-11 NOTE — H&P
[facility]  Office: [address], DO, Liliana Barkley, DO, Jenifer Hall, DO, Paige Robertson Blood, DO, Derek Irizarry MD, Rob Lemus MD, Anuradha Pitt MD, Jenny Morin MD,  Gerardo Beaver MD, Mary Rizzo MD, Esther Coello, DO, Naz Buck MD,  Trey Weldon MD, Apryl Gavin MD, Liz Espinosa, DO, Maru Suazo MD, Funmi Trammell MD, Alex Lua MD, Halie Moreira MD, Reji Jensen MD, Marisela Mandujano MD, Brigid Espinoza, DO, Luz Maria Fernandes MD, Melanie Vega MD, Everton Encinas CNP,  Juan Muro, CNP, Jess Archer, CNP, Rigoberto Angel, CNP,  Brian Jensen, DNP, Otf Restrepo, CNP, Mellissa Peralta, CNP, Marielena Motley, CNP, Pascual Rogers, CNP, Malvin Aguilar, CNP, Fani Duncan PA-C, Sowmya Hinkle, CNS, Lucie Carey, [facility], Tho Espinal, CNP, Sylwia Slater, CNP, Walt Feng, [facility]    HISTORY AND PHYSICAL EXAMINATION            Date:   10/10/2022  Patient name:  Kristan Benjamin  Date of admission:  10/10/2022  5:12 PM  MRN:   [id number]  Account:  [de-identified]  YOB: 1960  PCP:    Evonne Bates DO  Room:   27/27  Code Status:    Prior    Chief Complaint:     Chief Complaint   Patient presents with    Headache    Abnormal Test Results     Found on left atrium during echo, told to come in by cardiology            History of Present Illness:     Kristan Benjamin is a 60 y.o. female with hx of VTE, adhd, Myasthena gravis, TIA, CVA and hypertension who presents from her doctor office after concerns for alarming echo a few days ago. She reports no complaints of chest pain, shortness of breath., leg swelling, PND or orthopnea. Patient was told by the cardiologist to come to the emergency be evaluated due to mass that was found in her heart chamber  after an echo was done. Patient states that she does not have any complaints today.   She did report a history of CVA but without residuals. She denies any abdominal pain, nausea, vomiting, fever, chills. N or V. Weight loss or night sweats. Past Medical History:     Past Medical History:   Diagnosis Date    Acid reflux     Adult ADHD 2012    monthly visits @ [facility]    Asthma 2012    seasonal    Bipolar 1 disorder ([facility]     monthly visits with [facility]    Broken foot     Left foot    Cerebral artery occlusion with cerebral infarction [facility])     Chest pain of uncertain etiology     Last episode was in  (Written 2019)    Depression     Under control per pt.  on 6/15/18    DVT of leg (deep venous thrombosis) ([facility] 2014    Fracture     right foot / pt denies surgical intervention    Fractured nose     x 9 times    Helicobacter pylori (H. pylori) infection     per EGD    Hematuria 2018    Has a follow-up with Urologist     Hiatal hernia     History of blood transfusion     no reactions    History of DVT (deep vein thrombosis) 2012    History of myasthenia gravis     diagnosed around age 26    Hx of blood clots     L leg post knee surgery    Hypertension     managed by Dr. Washington Harkins PCP    Internal hemorrhoids     Narcolepsy 2012    Dr. Brandee Quinn [facility]    Osteoporosis     Sleep apnea     no machine    Under care of team 10/2021    Dr. Tico Cali. V    Under care of team     Pain Management Dr. Isiah Norton last visit 2022        Past Surgical History:     Past Surgical History:   Procedure Laterality Date    ANKLE SURGERY Left 2018    mass excision    ARM SURGERY Right     Pt states she has had 13 right arm surgeries    BREAST ENHANCEMENT SURGERY Bilateral      SECTION      x 3    COLONOSCOPY  2013    sigmoid diverticula, prominent large internal hemorrhoid    COLONOSCOPY N/A 2/10/2020    COLONOSCOPY DIAGNOSTIC performed by Garry Vasquez MD at [place] [address] N/A 2020    COLORECTAL CANCER SCREENING, NOT HIGH RISK performed by Sugey Ely MD at [facility], COLON, DIAGNOSTIC      ESOPHAGEAL MOTILITY STUDY N/A 8/12/2020    PES RN - ESOPHAGEAL MOTILITY STUDY performed by Nolan Ervin DO at [address] [place] [address] STUDY  9/10/2020    ESOPHAGEAL MOTILITY STUDY performed by Nolan Ervin DO at [facility]    ESOPHAGEAL MOTILITY STUDY N/A 12/18/2020    ESOPHAGEAL MOTILITY STUDY ATTEMPTED PER DR. Abe Shrestha WITHOUT SUCCESS performed by Nolan Ervin DO at [facility]    FOOT SURGERY Left 2015    FOOT TENDON SURGERY Left 02/22/2019    LEFT PERONEAL TENDON REPAIR WITH POSSIBLE LEFT TENDON TRANSFER (Left )    KNEE ARTHROSCOPY Left     Two surgeries on left knee per pt    KNEE SURGERY Right     Three surgeries per pt    LIGAMENT REPAIR Left 2/22/2019    LEFT PERONEAL TENDON REPAIR performed by Blair Woo DPM at [address] Bilateral 06/04/2021     BILATERAL L4/5, L5/S1 LUMBAR FACET (Bilateral )    NOSE SURGERY      PAIN MANAGEMENT PROCEDURE Bilateral 6/4/2021    BILATERAL L4/5, L5/S1 LUMBAR FACET performed by Nghia Payton MD at [address] [place] [address]  06/11/2021    BILATERAL L4/5, L5/S1 NERVE BLOCK - Bilateral    PAIN MANAGEMENT PROCEDURE Bilateral 6/11/2021    BILATERAL L4/5, L5/S1 NERVE BLOCK performed by Nghia Payton MD at [address] [place] [address] Right 07/23/2021    L4/5, L5/S1 NERVE RADIOFREQUENCY ABLATION     PAIN MANAGEMENT PROCEDURE Right 7/23/2021    right  L4/5, L5/S1 NERVE RADIOFREQUENCY ABLATION performed by Nghia Payton MD at [address] [place] [address] Left 08/06/2021    left  L4/5, L5/S1 NERVE RADIOFREQUENCY ABLATION (Left )    PAIN MANAGEMENT PROCEDURE Left 8/6/2021    left  L4/5, L5/S1 NERVE RADIOFREQUENCY ABLATION performed by Nghia Payton MD at [address] [place] [address],[address] OFFICE/OUTPT [facility] Left 6/29/2018 RESECTION/REMOVAL BONY NEOPLASM LEFT FIBULA, LEFT SOFT TISSUE MASS EXCISION WITH BONE BIOPSY LEFT ANKLE FIBULA [facility]     SHOULDER SURGERY Left 02/08/2022    REVERSE TOTAL SHOULDER ARTHROPLASTY - Left    SHOULDER SURGERY Left 2/8/2022    REVERSE TOTAL SHOULDER ARTHROPLASTY performed by Irma Rivera DO at [facility]  5/23/2013    tertiary contractures esophagus, 3 to 4 cm hiatal hernia, antral gastritis, + H Pylori, mild active chronic esophagitis    UPPER GASTROINTESTINAL ENDOSCOPY  4/6/2016    one biopsy    UPPER GASTROINTESTINAL ENDOSCOPY N/A 2/10/2020    EGD BIOPSY performed by Ruth Staples MD at [facility]          Medications Prior to Admission:     Prior to Admission medications    Medication Sig Start Date End Date Taking?  Authorizing Provider   buPROPion (WELLBUTRIN XL) 150 MG extended release tablet  9/3/22   Historical Provider, MD   ibuprofen (ADVIL;MOTRIN) 800 MG tablet  8/23/22   Historical Provider, MD   oxyCODONE-acetaminophen (PERCOCET) 5-325 MG per tablet  9/13/22   Historical Provider, MD   pantoprazole (PROTONIX) 40 MG tablet Take 1 tablet by mouth in the morning. 7/27/22   [facility]   atorvastatin (LIPITOR) 80 MG tablet Take 1 tablet by mouth nightly 7/27/22   [facility]   alendronate (FOSAMAX) 70 MG tablet TAKE 1 TABLET BY MOUTH ONCE WEEKLY BEFORE BREAKFAST, ON AN EMPTY STOMACH: REMAIN UPRIGHT FOR 30 MINUTES:TAKE WITH 8 OUNCES OF WATER 5/17/22   [facility]   buPROPion (WELLBUTRIN XL) 300 MG extended release tablet  4/11/22   Historical Provider, MD   aspirin 81 MG chewable tablet Take 1 tablet by mouth daily 4/17/22   Marcus Mistry MD   naproxen (NAPROSYN) 500 MG tablet TAKE ONE TABLET BY MOUTH TWICE A DAY WITH MEALS  Patient not taking: No sig reported 4/13/22 7/15/22  Claire Metzger DO amphetamine-dextroamphetamine (ADDERALL) 30 MG tablet 30 mg 2 times daily. 9/27/21   Historical Provider, MD   citalopram (CELEXA) 40 MG tablet TAKE ONE TABLET BY MOUTH DAILY 4/8/20   Robby Walker MD   albuterol (PROVENTIL) (2.5 MG/3ML) 0.083% nebulizer solution Take 2.5 mg by nebulization every 6 hours as needed for Wheezing or Shortness of Breath     Historical Provider, MD   Calcium Carbonate (CALCIUM 600 PO) Take 600 mg by mouth daily    Historical Provider, MD        Allergies:     Latex, Prozac [fluoxetine hcl], Sulfa antibiotics, and Codeine    Social History:     Tobacco:    reports that she quit smoking about 33 years ago. Her smoking use included cigarettes. She has a 20.00 pack-year smoking history. She has been exposed to tobacco smoke. She has never used smokeless tobacco.  Alcohol:      reports that she does not currently use alcohol. Drug Use:  reports no history of drug use. Family History:     Family History   Problem Relation Age of Onset    Cancer Mother     Hypertension Mother     Stroke Mother     Dementia Mother     Cancer Maternal Aunt     Hypertension Father     Heart Surgery Father     Alzheimer's Disease Father     Diabetes Neg Hx        Review of Systems:     Positive and Negative as described in HPI.     CONSTITUTIONAL:  negative for fevers, chills, sweats, fatigue, weight loss  HEENT:  negative for vision, hearing changes, runny nose, throat pain  RESPIRATORY:  negative for shortness of breath, cough, congestion, wheezing  CARDIOVASCULAR:  negative for chest pain, palpitations  GASTROINTESTINAL:  negative for nausea, vomiting, diarrhea, constipation, change in bowel habits, abdominal pain   GENITOURINARY:  negative for difficulty of urination, burning with urination, frequency   INTEGUMENT:  negative for rash, skin lesions, easy bruising   HEMATOLOGIC/LYMPHATIC:  negative for swelling/edema   ALLERGIC/IMMUNOLOGIC:  negative for urticaria , itching  ENDOCRINE:  negative increase in drinking, increase in urination, hot or cold intolerance  MUSCULOSKELETAL:  negative joint pains, muscle aches, swelling of joints  NEUROLOGICAL:  negative for headaches, dizziness, lightheadedness, numbness, pain, tingling extremities  BEHAVIOR/PSYCH:  negative for depression, anxiety    Physical Exam:   /85   Pulse 52   Temp 97 °F (36.1 °C) (Oral)   Resp 17   Ht 5' (1.524 m)   Wt 145 lb (65.8 kg)   SpO2 95%   BMI 28.32 kg/m²   Temp (24hrs), Av °F (36.1 °C), Min:97 °F (36.1 °C), Max:97 °F (36.1 °C)    No results for input(s): POCGLU in the last 72 hours.   No intake or output data in the 24 hours ending 10/10/22 7788    General Appearance: alert, well appearing, and in no acute distress  Mental status: oriented to person, place, and time  Head: normocephalic, atraumatic  Eye: no icterus, redness, pupils equal and reactive, extraocular eye movements intact, conjunctiva clear  Ear: normal external ear, no discharge, hearing intact  Nose: no drainage noted  Mouth: mucous membranes moist  Neck: supple, no carotid bruits, thyroid not palpable  Lungs: Bilateral equal air entry, clear to ausculation, no wheezing, rales or rhonchi, normal effort  Cardiovascular: normal rate, regular rhythm, no murmur, gallop, rub  Abdomen: Soft, nontender, nondistended, normal bowel sounds, no hepatomegaly or splenomegaly  Neurologic: There are no new focal motor or sensory deficits, normal muscle tone and bulk, no abnormal sensation, normal speech, cranial nerves II through XII grossly intact  Skin: No gross lesions, rashes, bruising or bleeding on exposed skin area  Extremities: peripheral pulses palpable, no pedal edema or calf pain with palpation  Psych: normal affect    Investigations:      Laboratory Testing:  Recent Results (from the past 24 hour(s))   Troponin    Collection Time: 10/10/22  6:13 PM   Result Value Ref Range    Troponin, High Sensitivity 8 0 - 14 ng/L   SPECIMEN REJECTION    Collection Time: 10/10/22  6:13 PM   Result Value Ref Range    Specimen Source . BLOOD     Ordered Test CDP, CP, MG, LIP     Reason for Rejection Unable to perform testing: Specimen clotted. Comprehensive Metabolic Panel    Collection Time: 10/10/22  7:06 PM   Result Value Ref Range    Glucose 101 (H) 70 - 99 mg/dL    BUN 13 8 - 23 mg/dL    Creatinine 1.36 (H) 0.50 - 0.90 mg/dL    Est, Glom Filt Rate 44 (L) >60 mL/min/1.73m2    Calcium 9.2 8.6 - 10.4 mg/dL    Sodium 135 135 - 144 mmol/L    Potassium 4.8 3.7 - 5.3 mmol/L    Chloride 100 98 - 107 mmol/L    CO2 27 20 - 31 mmol/L    Anion Gap 8 (L) 9 - 17 mmol/L    Alkaline Phosphatase 148 (H) 35 - 104 U/L    ALT 21 5 - 33 U/L    AST 26 <32 U/L    Total Bilirubin 0.2 (L) 0.3 - 1.2 mg/dL    Total Protein 6.3 (L) 6.4 - 8.3 g/dL    Albumin 3.7 3.5 - 5.2 g/dL    Albumin/Globulin Ratio 1.4 1.0 - 2.5   Lipase    Collection Time: 10/10/22  7:06 PM   Result Value Ref Range    Lipase 23 13 - 60 U/L   Magnesium    Collection Time: 10/10/22  7:06 PM   Result Value Ref Range    Magnesium 2.2 1.6 - 2.6 mg/dL   Troponin    Collection Time: 10/10/22  9:22 PM   Result Value Ref Range    Troponin, High Sensitivity 9 0 - 14 ng/L   APTT    Collection Time: 10/10/22  9:23 PM   Result Value Ref Range    PTT 23.0 20.5 - 30.5 sec       Imaging/Diagnostics:  XR CHEST (2 VW)    Result Date: 10/10/2022  No acute process. CT HEAD WO CONTRAST    Result Date: 10/10/2022  No evidence for acute intracranial hemorrhage, territorial infarction or intracranial mass lesion. Mild chronic microangiopathic ischemic disease. Mild generalized volume loss. Unchanged chronic encephalomalacia of left caudothalamic groove and left caudate head. Assessment :      Hospital Problems             Last Modified POA    * (Principal) Atrial mass 10/10/2022 Yes     #Mass seen on outpatient Echo  -Cardiologist concerned with Mass seen on echo 3 day ago. -No CP.  Hx of DVT  -last echo in 4/22: no mass seen     -EKG: no acute changes   -troponin unrevealing   -Confirm on echo []  -start heparin as per cardiology  -start protonix. Patient is anemic, monitor for bleeding.   -Chest imaging: pending []  -as per cardio       #Uncomplicated Headache  -Hx of CVA without residenuals  -CT head unremarkable  -No red flag symoptoms  -symptomatic mgt       #WESLY  -baseline renal function  -Renal functiopn on admission showing Cr of 1.4  -encourage po intake    #Anemia  -Hgb 10, mcv 88,  on ADMISSION  -appears near baseline.  Follow and monitor       #TIA/CVA hx  -mri ON 7/22 remote lacunar infarct

## 2022-10-11 NOTE — CARE COORDINATION
10/11/22 4951   Service Assessment   Patient Orientation Alert and Oriented;Person;Place;Situation   Cognition Alert   History Provided By Patient   Primary Caregiver Self   Accompanied By/Relationship no one   Support Systems Spouse/Significant Other;Children   Patient's Healthcare Decision Maker is:   (states she has one-her BF Bill is listed)   PCP Verified by CM Yes  Rubi Markham)   Last Visit to PCP Within last 3 months   Prior Functional Level Independent in ADLs/IADLs   Current Functional Level Independent in ADLs/IADLs   Can patient return to prior living arrangement Yes   Ability to make needs known: Good   Financial Resources Medicaid; Medicare   Social/Functional History   Lives With Significant other   Type of [address] [place] [address] Two level; Able to Live on Main level with bedroom/bathroom   Home Access Stairs to enter without rails   Entrance Stairs - Number of Steps 4   Bathroom Shower/Tub Walk-in shower;Doors   Bathroom Toilet Standard   Bathroom Accessibility Accessible   Home Equipment Walker, standard   Receives Help From Family  ([facility])   ADL Assistance Independent   Ambulation Assistance Independent   Active  Yes  (no car)   Education 12th grade   Occupation On disability   Discharge Planning   Type of Residence [employer]   Living Arrangements Spouse/Significant Other   Current Services Prior To Admission Durable Medical Equipment   Current DME Prior to [employer] Needed N/A   DME Ordered? No   Potential Assistance Purchasing Medications No   Type of Home Care Services None   Patient expects to be discharged to: House   One/Two Story Residence Two story   Lift Chair Available No   History of falls?  0   Services At/After Discharge   Transition of Care Consult (CM Consult) Discharge Planning   Mode of Transport at Discharge   (BF Bill to pick her up)   Condition of Participation: Discharge Planning   The Patient and/or Patient Representative was provided with a Choice of Provider? Patient   The Patient and/Or Patient Representative agree with the Discharge Plan? Yes   Freedom of Choice list was provided with basic dialogue that supports the patient's individualized plan of care/goals, treatment preferences, and shares the quality data associated with the providers? Yes   Transitional planning-talked with patient. plan is to go home with her BF Bill. Has ride. Verified address, emergency contacts, and insurance with patient.

## 2022-10-11 NOTE — ED NOTES
Pt resting pain free with no complaints, breathing non-labored, call light within reach.       Karla Gastelum RN  10/11/22 6591

## 2022-10-11 NOTE — PROGRESS NOTES
Patient admitted, consent signed and questions answered. Patient ready for procedure. Call light to reach with side rails up 2 of 2. History and physical complete.

## 2022-10-11 NOTE — PROGRESS NOTES
[facility]  Office: [address], DO, Nena Peguero, DO, Elle Rizo, DO, Deja Guerrero Blood, DO, Grayson Leary MD, Tay Dickerson MD, Alvaro Dominguez MD, Lindsey Freed MD,  Gavin Fontenot MD, Becky Styles MD, Waqar Verdin, DO, Omer Davis MD,  Harshal Watts, DO, Ted Ashley MD, Declan Celestin MD, Guanako Waddell, DO, Yesika Chan MD, Pam Villarreal MD, Nakul Mario MD, Melinda Nassar MD, Stefania Agosto MD, Chela Flood MD, Esther Lamb DO, Tata Ceja MD, Bonnie Dozier MD, Bryant Arias, CNP,  Haley Sharma, CNP, Mario Pa, CNP, Kamar Zelaya, CNP,  Rosie Bales, [facility], Jade Benites, CNP, Aida Arreola, CNP, Emil Liu, CNP, Emily Payne, CNP, John García, CNP, Stone Robison PAMiguel AngelC, Katharina Clifford, CNS, Steven Lyles, [facility], Tian Daniels, CNP, Jessica Dupont, CNP, Esthela Skelton, [facility]    Progress Note    10/11/2022    9:31 AM    Name:   Elidia Holman  MRN:     [id number]     Acct:      [de-identified]   Room:   [facility] Day:  1  Admit Date:  10/10/2022  5:12 PM    PCP:   Stephen Abrams DO  Code Status:  Full Code    Subjective:     C/C:   Chief Complaint   Patient presents with    Headache    Abnormal Test Results     Found on left atrium during echo, told to come in by cardiology      Interval History Status: Pt in bed comfortable no complaints. Tolerated procedure well. No headache , chest pain , cough, SOB. Review of Systems:     Constitutional:  negative for chills, fevers, sweats  Respiratory:  negative for cough, dyspnea on exertion, shortness of breath, wheezing  Cardiovascular:  negative for chest pain, chest pressure/discomfort, lower extremity edema, palpitations  Gastrointestinal:  negative for abdominal pain, constipation, diarrhea, nausea, vomiting  Neurological:  negative for dizziness, headache    Medications: Allergies:     Allergies Allergen Reactions    Latex Itching    Prozac [Fluoxetine Hcl] Other (See Comments)     violent    Sulfa Antibiotics      migraine    Codeine Itching       Current Meds:   Scheduled Meds:    aspirin  81 mg Oral Daily    atorvastatin  80 mg Oral Nightly    buPROPion  300 mg Oral Daily    calcium carbonate  600 mg Oral Daily    citalopram  40 mg Oral Daily    pantoprazole  40 mg Oral Daily    sodium chloride flush  5-40 mL IntraVENous 2 times per day    amphetamine-dextroamphetamine  30 mg Oral BID    pantoprazole  40 mg Oral BID     Continuous Infusions:    sodium chloride      heparin (PORCINE) Infusion 10 Units/kg/hr (10/11/22 0312)     PRN Meds: albuterol, oxyCODONE-acetaminophen, sodium chloride flush, sodium chloride, potassium chloride **OR** potassium alternative oral replacement **OR** potassium chloride, magnesium sulfate, ondansetron **OR** ondansetron, polyethylene glycol, acetaminophen **OR** acetaminophen, heparin (porcine), heparin (porcine)    Data:     Past Medical History:   has a past medical history of Acid reflux, Adult ADHD, Asthma, Bipolar 1 disorder ([facility] Broken foot, Cerebral artery occlusion with cerebral infarction [facility]), Chest pain of uncertain etiology, Depression, DVT of leg (deep venous thrombosis) ([facility] Fracture, Fractured nose, Helicobacter pylori (H. pylori) infection, Hematuria, Hiatal hernia, History of blood transfusion, History of DVT (deep vein thrombosis), History of myasthenia gravis, Hx of blood clots, Hypertension, Internal hemorrhoids, Narcolepsy, Osteoporosis, Sleep apnea, Under care of team, and Under care of team.    Social History:   reports that she quit smoking about 33 years ago. Her smoking use included cigarettes. She has a 20.00 pack-year smoking history. She has been exposed to tobacco smoke. She has never used smokeless tobacco. She reports that she does not currently use alcohol. She reports that she does not use drugs.      Family History:   Family History Problem Relation Age of Onset    Cancer Mother     Hypertension Mother     Stroke Mother     Dementia Mother     Cancer Maternal Aunt     Hypertension Father     Heart Surgery Father     Alzheimer's Disease Father     Diabetes Neg Hx        Vitals:  BP 93/75   Pulse 55   Temp 97 °F (36.1 °C) (Oral)   Resp 18   Ht 5' (1.524 m)   Wt 145 lb (65.8 kg)   SpO2 97%   BMI 28.32 kg/m²   Temp (24hrs), Av °F (36.1 °C), Min:97 °F (36.1 °C), Max:97 °F (36.1 °C)    No results for input(s): POCGLU in the last 72 hours. I/O (24Hr): No intake or output data in the 24 hours ending 10/11/22 0931    Labs:  Hematology:No results for input(s): WBC, RBC, HGB, HCT, MCV, MCH, MCHC, RDW, PLT, MPV, SEDRATE, CRP, INR, DDIMER, [id number], LABABSO in the last 72 hours. Invalid input(s): PT  Chemistry:  Recent Labs     10/10/22  1813 10/10/22  1906 10/10/22  2122   NA  --  135  --    K  --  4.8  --    CL  --  100  --    CO2  --  27  --    GLUCOSE  --  101*  --    BUN  --  13  --    CREATININE  --  1.36*  --    MG  --  2.2  --    ANIONGAP  --  8*  --    LABGLOM  --  44*  --    CALCIUM  --  9.2  --    TROPHS 8  --  9     Recent Labs     10/10/22  1906 10/10/22  2122   PROT 6.3*  --    LABALBU 3.7  --    AST 26  --    ALT 21  --    LDH  --  273*   ALKPHOS 148*  --    BILITOT 0.2*  --    LIPASE 23  --      ABG:No results found for: POCPH, PHART, PH, POCPCO2, MAA6EAL, PCO2, POCPO2, PO2ART, PO2, POCHCO3, CLI9AKI, HCO3, NBEA, PBEA, BEART, BE, THGBART, THB, ADC8URW, QMHY3AWG, M2YPPDPK, O2SAT, FIO2  Lab Results   Component Value Date/Time    SPECIAL NOT REPORTED 2018 10:19 PM     Lab Results   Component Value Date/Time    CULTURE NO GROWTH 2018 10:19 PM    CULTURE  2018 10:19 PM     [employer] [address] [address] [phone]       Radiology:  XR CHEST (2 VW)    Result Date: 10/10/2022  No acute process.      CT HEAD WO CONTRAST    Result Date: 10/10/2022  No evidence for acute intracranial hemorrhage, territorial infarction or intracranial mass lesion. Mild chronic microangiopathic ischemic disease. Mild generalized volume loss. Unchanged chronic encephalomalacia of left caudothalamic groove and left caudate head. XR CHEST PORTABLE    Result Date: 10/11/2022  No acute findings. Physical Examination:        General appearance:  alert, cooperative and no distress  Mental Status:  oriented to person, place and time and normal affect  Lungs:  clear to auscultation bilaterally, normal effort  Heart:  regular rate and rhythm, no murmur  Abdomen:  soft, nontender, nondistended, normal bowel sounds, no masses, hepatomegaly, splenomegaly  Extremities:  no edema, redness, tenderness in the calves  Skin:  no gross lesions, rashes, induration    Assessment:        Hospital Problems             Last Modified POA    * (Principal) Atrial mass 10/10/2022 Yes       Plan:        Atrial mass seen on Echo: repeat echocardiogram. Cardiology consulted for evaluation, had JACOBO which was negative. Plan for d/c today. Stop heparin gtt. CKD stage 3a: baseline Scr ~1.2. was 1.3 on admission   Hx of gastritis: continue home PPI  History of CVA  History of lower ext DVT- successfully treated  History of MEY- previously received IV iron.     Mian Diego DO  10/11/2022  9:31 AM

## 2022-10-12 ENCOUNTER — CARE COORDINATION (OUTPATIENT)
Dept: CARE COORDINATION | Age: 62
End: 2022-10-12

## 2022-10-12 NOTE — CARE COORDINATION
Care Transitions Outreach Attempt #1    Call within 2 business days of discharge: Yes   Attempted to reach patient for transitions of care follow up. Unable to reach patient. HIPAA compliant message left on  requesting a return call. Letter sent via [address] Patient: Sloane Burner Patient : 1960 MRN: [id number]    Last Discharge [address]       Date Complaint Diagnosis Description Type Department Provider    10/10/22 Headache; Abnormal Test Results Abnormal echocardiogram ED to Hosp-Admission (Discharged) (ADMITTED) [facility] 5C Mian Diego DO; Saba Davis ... Was this an external facility discharge?  No Discharge Facility: [facility]    Noted following upcoming appointments from discharge chart review:   [facility] follow up appointment(s):   Future Appointments   Date Time Provider Naya Roger   10/27/2022 11:00 AM Nathaniel Mueller MD [facility]   2022 12:15 PM STA MRI RM STAZ MRI STA Radiolog   2022 12:00 PM Martha Ashby MD Neuro Spec [facility]   2023 11:40 AM Frantz Benedict DO  NORA  [address] [address] Health/ Care Transition Nurse  [phone]

## 2022-10-13 ENCOUNTER — CARE COORDINATION (OUTPATIENT)
Dept: CARE COORDINATION | Age: 62
End: 2022-10-13

## 2022-10-13 NOTE — CARE COORDINATION
Care Transitions Outreach Attempt #2 final    Call within 2 business days of discharge: Yes   Attempted to reach patient for transitions of care follow up. Unable to reach patient. HIPAA compliant message left on  requesting a return call. Final attempt, episode resolved. Patient: Kavon Zavala Patient : 1960 MRN: [id number]    Last Discharge [address]       Date Complaint Diagnosis Description Type Department Provider    10/10/22 Headache; Abnormal Test Results Abnormal echocardiogram ED to Hosp-Admission (Discharged) (ADMITTED) [facility] 5C Shelton Lewis DO; Cecil Roman ...             Noted following upcoming appointments from discharge chart review:   [facility] follow up appointment(s):   Future Appointments   Date Time Provider Naya Roger   10/27/2022 11:00 AM Miguel Steward MD SV Cancer [facility]   2022 12:15 PM STA MRI RM STAZ MRI STA Radiolog   2022 11:20 AM Luisana Billings APRN - CNP W [facility]   2022 12:00 PM Deana Chand MD Neuro Spec [facility]   2023 11:40 AM DO KAMRYN Hoffman [address] [address] Health/ Care Transition Nurse  [phone]

## 2022-10-15 LAB
EKG ATRIAL RATE: 53 BPM
EKG P AXIS: 41 DEGREES
EKG P-R INTERVAL: 148 MS
EKG Q-T INTERVAL: 462 MS
EKG QRS DURATION: 78 MS
EKG QTC CALCULATION (BAZETT): 433 MS
EKG R AXIS: -9 DEGREES
EKG T AXIS: 49 DEGREES
EKG VENTRICULAR RATE: 53 BPM

## 2022-10-17 ASSESSMENT — ENCOUNTER SYMPTOMS
COUGH: 0
VOMITING: 0
ABDOMINAL PAIN: 0
NAUSEA: 0
BACK PAIN: 0
SHORTNESS OF BREATH: 0

## 2022-10-21 NOTE — TELEPHONE ENCOUNTER
Patient didn't complete manometry because she was on Eliquis    She needs to complete the manometry No

## 2022-10-25 DIAGNOSIS — D50.0 IRON DEFICIENCY ANEMIA DUE TO CHRONIC BLOOD LOSS: Primary | ICD-10-CM

## 2022-10-27 ENCOUNTER — TELEPHONE (OUTPATIENT)
Dept: ONCOLOGY | Age: 62
End: 2022-10-27

## 2022-10-27 ENCOUNTER — HOSPITAL ENCOUNTER (OUTPATIENT)
Age: 62
Setting detail: SPECIMEN
Discharge: HOME | End: 2022-10-27
Payer: MEDICARE

## 2022-10-27 ENCOUNTER — OFFICE VISIT (OUTPATIENT)
Dept: ONCOLOGY | Age: 62
End: 2022-10-27
Payer: MEDICARE

## 2022-10-27 VITALS
RESPIRATION RATE: 18 BRPM | DIASTOLIC BLOOD PRESSURE: 51 MMHG | HEART RATE: 52 BPM | TEMPERATURE: 97.3 F | WEIGHT: 147.7 LBS | BODY MASS INDEX: 28.85 KG/M2 | SYSTOLIC BLOOD PRESSURE: 122 MMHG

## 2022-10-27 DIAGNOSIS — E53.8 VITAMIN B12 DEFICIENCY: ICD-10-CM

## 2022-10-27 DIAGNOSIS — K90.9 IRON MALABSORPTION: ICD-10-CM

## 2022-10-27 DIAGNOSIS — D50.0 IRON DEFICIENCY ANEMIA DUE TO CHRONIC BLOOD LOSS: Primary | ICD-10-CM

## 2022-10-27 DIAGNOSIS — D50.0 IRON DEFICIENCY ANEMIA DUE TO CHRONIC BLOOD LOSS: ICD-10-CM

## 2022-10-27 LAB
ABSOLUTE EOS #: 0.15 K/UL (ref 0–0.44)
ABSOLUTE IMMATURE GRANULOCYTE: 0.01 K/UL (ref 0–0.3)
ABSOLUTE LYMPH #: 0.93 K/UL (ref 1.1–3.7)
ABSOLUTE MONO #: 0.37 K/UL (ref 0.1–1.2)
BASOPHILS # BLD: 1 % (ref 0–2)
BASOPHILS ABSOLUTE: 0.04 K/UL (ref 0–0.2)
EOSINOPHILS RELATIVE PERCENT: 3 % (ref 1–4)
FERRITIN: 31 NG/ML (ref 13–150)
HCT VFR BLD CALC: 43.3 % (ref 36.3–47.1)
HEMOGLOBIN: 13.1 G/DL (ref 11.9–15.1)
IMMATURE GRANULOCYTES: 0 %
IRON SATURATION: 33 % (ref 20–55)
IRON: 92 UG/DL (ref 37–145)
LYMPHOCYTES # BLD: 20 % (ref 24–43)
MCH RBC QN AUTO: 28.4 PG (ref 25.2–33.5)
MCHC RBC AUTO-ENTMCNC: 30.3 G/DL (ref 28.4–34.8)
MCV RBC AUTO: 93.9 FL (ref 82.6–102.9)
MONOCYTES # BLD: 8 % (ref 3–12)
NRBC AUTOMATED: 0 PER 100 WBC
PDW BLD-RTO: 15 % (ref 11.8–14.4)
PLATELET # BLD: 253 K/UL (ref 138–453)
PMV BLD AUTO: 9.7 FL (ref 8.1–13.5)
RBC # BLD: 4.61 M/UL (ref 3.95–5.11)
RBC # BLD: ABNORMAL 10*6/UL
SEG NEUTROPHILS: 68 % (ref 36–65)
SEGMENTED NEUTROPHILS ABSOLUTE COUNT: 3.13 K/UL (ref 1.5–8.1)
TOTAL IRON BINDING CAPACITY: 278 UG/DL (ref 250–450)
UNSATURATED IRON BINDING CAPACITY: 186 UG/DL (ref 112–347)
WBC # BLD: 4.6 K/UL (ref 3.5–11.3)

## 2022-10-27 PROCEDURE — G8417 CALC BMI ABV UP PARAM F/U: HCPCS | Performed by: INTERNAL MEDICINE

## 2022-10-27 PROCEDURE — 3074F SYST BP LT 130 MM HG: CPT | Performed by: INTERNAL MEDICINE

## 2022-10-27 PROCEDURE — 36415 COLL VENOUS BLD VENIPUNCTURE: CPT

## 2022-10-27 PROCEDURE — G8484 FLU IMMUNIZE NO ADMIN: HCPCS | Performed by: INTERNAL MEDICINE

## 2022-10-27 PROCEDURE — G8427 DOCREV CUR MEDS BY ELIG CLIN: HCPCS | Performed by: INTERNAL MEDICINE

## 2022-10-27 PROCEDURE — 82607 VITAMIN B-12: CPT

## 2022-10-27 PROCEDURE — 3017F COLORECTAL CA SCREEN DOC REV: CPT | Performed by: INTERNAL MEDICINE

## 2022-10-27 PROCEDURE — 83540 ASSAY OF IRON: CPT

## 2022-10-27 PROCEDURE — 99214 OFFICE O/P EST MOD 30 MIN: CPT | Performed by: INTERNAL MEDICINE

## 2022-10-27 PROCEDURE — 82728 ASSAY OF FERRITIN: CPT

## 2022-10-27 PROCEDURE — 1111F DSCHRG MED/CURRENT MED MERGE: CPT | Performed by: INTERNAL MEDICINE

## 2022-10-27 PROCEDURE — 82746 ASSAY OF FOLIC ACID SERUM: CPT

## 2022-10-27 PROCEDURE — 99211 OFF/OP EST MAY X REQ PHY/QHP: CPT | Performed by: INTERNAL MEDICINE

## 2022-10-27 PROCEDURE — 83550 IRON BINDING TEST: CPT

## 2022-10-27 PROCEDURE — 1036F TOBACCO NON-USER: CPT | Performed by: INTERNAL MEDICINE

## 2022-10-27 PROCEDURE — 3078F DIAST BP <80 MM HG: CPT | Performed by: INTERNAL MEDICINE

## 2022-10-27 PROCEDURE — 85025 COMPLETE CBC W/AUTO DIFF WBC: CPT

## 2022-10-27 NOTE — TELEPHONE ENCOUNTER
DERIAN HERE FOR MD VISIT  RV 12 months with labs before RV  LABS CDP VITAMIN B 12 IRON AND TIBC FERRITIN 10/12/23 ORDERS MAILED TO PT  MD VISIT 10/19/23 @ 11AM  AVS PRINTED W/ INSTRUCTIONS AND GIVEN TO PT ON EXIT

## 2022-10-27 NOTE — PATIENT INSTRUCTIONS
RV 12 months with labs before RV c/o left inguinal hernia left inguinal hernia, nontender and palpable

## 2022-10-28 DIAGNOSIS — D50.0 IRON DEFICIENCY ANEMIA DUE TO CHRONIC BLOOD LOSS: Primary | ICD-10-CM

## 2022-10-28 LAB
FOLATE: 13.5 NG/ML
VITAMIN B-12: 401 PG/ML (ref 232–1245)

## 2022-11-02 ENCOUNTER — HOSPITAL ENCOUNTER (OUTPATIENT)
Dept: MRI IMAGING | Age: 62
Discharge: HOME | End: 2022-11-04
Payer: MEDICARE

## 2022-11-02 DIAGNOSIS — M54.12 CERVICAL RADICULOPATHY: ICD-10-CM

## 2022-11-02 DIAGNOSIS — I69.30 CHRONIC LEFT ARTERIAL ISCHEMIC STROKE, MCA (MIDDLE CEREBRAL ARTERY): ICD-10-CM

## 2022-11-02 PROCEDURE — 72156 MRI NECK SPINE W/O & W/DYE: CPT

## 2022-11-02 PROCEDURE — A9579 GAD-BASE MR CONTRAST NOS,1ML: HCPCS | Performed by: PSYCHIATRY & NEUROLOGY

## 2022-11-02 PROCEDURE — 6360000004 HC RX CONTRAST MEDICATION: Performed by: PSYCHIATRY & NEUROLOGY

## 2022-11-02 RX ADMIN — GADOTERIDOL 13 ML: 279.3 INJECTION, SOLUTION INTRAVENOUS at 12:06

## 2022-11-04 ENCOUNTER — TELEPHONE (OUTPATIENT)
Dept: INFUSION THERAPY | Facility: MEDICAL CENTER | Age: 62
End: 2022-11-04

## 2022-11-04 NOTE — TELEPHONE ENCOUNTER
Returned call to patient as she called asking if something was wrong at her last visit that she has new lab orders. Explained all looks Ok and the labs she has are to be completed prior to next year visit. She states now she understands; she continues to say after her stroke these dates and issues confuse her.

## 2022-11-09 ENCOUNTER — TELEPHONE (OUTPATIENT)
Dept: NEUROLOGY | Age: 62
End: 2022-11-09

## 2022-11-09 DIAGNOSIS — M47.892 OTHER OSTEOARTHRITIS OF SPINE, CERVICAL REGION: ICD-10-CM

## 2022-11-09 DIAGNOSIS — R93.7 ABNORMAL MRI, CERVICAL SPINE: Primary | ICD-10-CM

## 2022-11-09 NOTE — TELEPHONE ENCOUNTER
----- Message from Erica Loving MD sent at 11/6/2022 12:40 PM EST -----  Mri c spine shows multilevel DJD. Rec referral to neurosurgery.

## 2022-11-09 NOTE — TELEPHONE ENCOUNTER
Call placed to the patient and this information was given. Patient verbally stated her understanding and was agreeable with the referral to [facility] neurosurgery. Patient informed that I would send the referral and their office would contact her to scheduled. Patient was given their number as well. I asked that she call if there were any problems.

## 2022-12-13 ENCOUNTER — OFFICE VISIT (OUTPATIENT)
Dept: NEUROLOGY | Age: 62
End: 2022-12-13
Payer: MEDICARE

## 2022-12-13 VITALS
SYSTOLIC BLOOD PRESSURE: 131 MMHG | BODY MASS INDEX: 28.86 KG/M2 | HEART RATE: 61 BPM | DIASTOLIC BLOOD PRESSURE: 85 MMHG | WEIGHT: 147 LBS | HEIGHT: 60 IN

## 2022-12-13 DIAGNOSIS — M54.12 CERVICAL RADICULOPATHY: Primary | ICD-10-CM

## 2022-12-13 PROCEDURE — G8484 FLU IMMUNIZE NO ADMIN: HCPCS | Performed by: PSYCHIATRY & NEUROLOGY

## 2022-12-13 PROCEDURE — 99214 OFFICE O/P EST MOD 30 MIN: CPT | Performed by: PSYCHIATRY & NEUROLOGY

## 2022-12-13 PROCEDURE — 3074F SYST BP LT 130 MM HG: CPT | Performed by: PSYCHIATRY & NEUROLOGY

## 2022-12-13 PROCEDURE — 1036F TOBACCO NON-USER: CPT | Performed by: PSYCHIATRY & NEUROLOGY

## 2022-12-13 PROCEDURE — G8427 DOCREV CUR MEDS BY ELIG CLIN: HCPCS | Performed by: PSYCHIATRY & NEUROLOGY

## 2022-12-13 PROCEDURE — 3078F DIAST BP <80 MM HG: CPT | Performed by: PSYCHIATRY & NEUROLOGY

## 2022-12-13 PROCEDURE — 3017F COLORECTAL CA SCREEN DOC REV: CPT | Performed by: PSYCHIATRY & NEUROLOGY

## 2022-12-13 PROCEDURE — G8417 CALC BMI ABV UP PARAM F/U: HCPCS | Performed by: PSYCHIATRY & NEUROLOGY

## 2022-12-13 RX ORDER — BUPROPION HYDROCHLORIDE 150 MG/1
150 TABLET ORAL
COMMUNITY
Start: 2022-12-12
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
## 2022-12-13 NOTE — PROGRESS NOTES
Assessment Recommendations:  Left subcortical acute ischemic stroke July 12, 2022:  Overall the patient reports no new symptoms of weakness numbness or tingling. Currently she is taking aspirin and Lipitor for secondary stroke prophylaxis. The patient was admitted to the hospital in October 2022. On 2D echo there is a concern of patient having a left rectal mass for which patient underwent JACOBO which showed EF of 55% and no mass was seen. The patient is scheduled to be seen by cardiology and possible cardiac cath    Cervical myeloradiculopathy  MRI cervical spine showed multilevel DJD with mild canal stenosis at C3-C4, C6-C7 and additionally moderate to severe C4-C5 left foraminal stenosis.   I would refer the patient to neurosurgery for evaluation    Patient to follow-up with me next 6 months

## 2022-12-20 ENCOUNTER — HOSPITAL ENCOUNTER (OUTPATIENT)
Dept: CARDIAC CATH/INVASIVE PROCEDURES | Age: 62
Discharge: HOME | End: 2022-12-20
Payer: MEDICARE

## 2022-12-20 VITALS
RESPIRATION RATE: 24 BRPM | HEART RATE: 49 BPM | WEIGHT: 151 LBS | TEMPERATURE: 98 F | HEIGHT: 60 IN | BODY MASS INDEX: 29.64 KG/M2 | OXYGEN SATURATION: 94 % | SYSTOLIC BLOOD PRESSURE: 102 MMHG | DIASTOLIC BLOOD PRESSURE: 63 MMHG

## 2022-12-20 LAB
EGFR, POC: 42 ML/MIN/1.73M2
GLUCOSE BLD-MCNC: 94 MG/DL (ref 74–100)
PLATELET # BLD: 185 K/UL (ref 138–453)
POC BUN: 13 MG/DL (ref 8–26)
POC CHLORIDE: 105 MMOL/L (ref 98–107)
POC CREATININE: 1.42 MG/DL (ref 0.51–1.19)
POC HEMATOCRIT: 42 % (ref 36–46)
POC HEMOGLOBIN: 14.3 G/DL (ref 12–16)
POC IONIZED CALCIUM: 1.24 MMOL/L (ref 1.15–1.33)
POC POTASSIUM: 4.2 MMOL/L (ref 3.5–4.5)
POC POTASSIUM: 5.3 MMOL/L (ref 3.5–4.5)
POC SODIUM: 143 MMOL/L (ref 138–146)

## 2022-12-20 PROCEDURE — 2709999900 HC NON-CHARGEABLE SUPPLY

## 2022-12-20 PROCEDURE — 84520 ASSAY OF UREA NITROGEN: CPT

## 2022-12-20 PROCEDURE — 6360000004 HC RX CONTRAST MEDICATION

## 2022-12-20 PROCEDURE — 84132 ASSAY OF SERUM POTASSIUM: CPT

## 2022-12-20 PROCEDURE — 6360000002 HC RX W HCPCS

## 2022-12-20 PROCEDURE — 84295 ASSAY OF SERUM SODIUM: CPT

## 2022-12-20 PROCEDURE — 85049 AUTOMATED PLATELET COUNT: CPT

## 2022-12-20 PROCEDURE — 82947 ASSAY GLUCOSE BLOOD QUANT: CPT

## 2022-12-20 PROCEDURE — 85014 HEMATOCRIT: CPT

## 2022-12-20 PROCEDURE — 82330 ASSAY OF CALCIUM: CPT

## 2022-12-20 PROCEDURE — 82565 ASSAY OF CREATININE: CPT

## 2022-12-20 PROCEDURE — 7100000000 HC PACU RECOVERY - FIRST 15 MIN

## 2022-12-20 PROCEDURE — 7100000001 HC PACU RECOVERY - ADDTL 15 MIN

## 2022-12-20 PROCEDURE — 82435 ASSAY OF BLOOD CHLORIDE: CPT

## 2022-12-20 PROCEDURE — 2500000003 HC RX 250 WO HCPCS

## 2022-12-20 PROCEDURE — 2580000003 HC RX 258: Performed by: INTERNAL MEDICINE

## 2022-12-20 RX ORDER — SODIUM CHLORIDE 0.9 % (FLUSH) 0.9 %
5-40 SYRINGE (ML) INJECTION PRN
Status: DISCONTINUED | OUTPATIENT
Start: 2022-12-20 | End: 2022-12-21 | Stop reason: HOSPADM

## 2022-12-20 RX ORDER — SODIUM CHLORIDE 9 MG/ML
INJECTION, SOLUTION INTRAVENOUS CONTINUOUS
Status: DISCONTINUED | OUTPATIENT
Start: 2022-12-20 | End: 2022-12-21 | Stop reason: HOSPADM

## 2022-12-20 RX ORDER — SODIUM CHLORIDE 9 MG/ML
INJECTION, SOLUTION INTRAVENOUS PRN
Status: DISCONTINUED | OUTPATIENT
Start: 2022-12-20 | End: 2022-12-21 | Stop reason: HOSPADM

## 2022-12-20 RX ORDER — SODIUM CHLORIDE 0.9 % (FLUSH) 0.9 %
5-40 SYRINGE (ML) INJECTION EVERY 12 HOURS SCHEDULED
Status: DISCONTINUED | OUTPATIENT
Start: 2022-12-20 | End: 2022-12-21 | Stop reason: HOSPADM

## 2022-12-20 RX ORDER — ACETAMINOPHEN 325 MG/1
650 TABLET ORAL EVERY 4 HOURS PRN
Status: DISCONTINUED | OUTPATIENT
Start: 2022-12-20 | End: 2022-12-21 | Stop reason: HOSPADM

## 2022-12-20 RX ADMIN — SODIUM CHLORIDE: 9 INJECTION, SOLUTION INTRAVENOUS at 10:26

## 2022-12-20 ASSESSMENT — PAIN DESCRIPTION - DESCRIPTORS: DESCRIPTORS: ACHING

## 2022-12-20 ASSESSMENT — PAIN DESCRIPTION - PAIN TYPE: TYPE: CHRONIC PAIN

## 2022-12-20 ASSESSMENT — PAIN DESCRIPTION - LOCATION: LOCATION: NECK

## 2022-12-20 ASSESSMENT — PAIN SCALES - GENERAL: PAINLEVEL_OUTOF10: 6

## 2022-12-20 NOTE — H&P
[facility]    Consult / H&P               Today's Date: 2022  Patient Name: Sloane Pruett  Date of admission: 2022  8:58 AM  Patient's age: 60 y. o., 1960  Admission Dx: No admission diagnoses are documented for this encounter. Reason for Consult:  Cardiac evaluation    Requesting Physician: No admitting provider for patient encounter. History Obtained From:  patient, electronic medical record    HISTORY OF PRESENT ILLNESS:    This patient 60y.o. years old with past medical history given below. She was seen at the office on 2022 by Dr Mary Carmen Land. She was complaining of recurrent episodes of chest pain and with chest tightness specially with activity in addition to dyspnea on exertion. denies palpitation or dizziness. poor historian, anxious. She had stress test ordered which was negative for ischemia but did show Small anterior - apical non transmural infarction. Past Medical History:   has a past medical history of Acid reflux, Adult ADHD, Asthma, Bipolar 1 disorder ([facility] [address] Broken foot, Cancer ([facility] [address] Cerebral artery occlusion with cerebral infarction [facility]), Chest pain of uncertain etiology, Depression, DVT of leg (deep venous thrombosis) ([facility] [address] Fracture, Fractured nose, Headache, Helicobacter pylori (H. pylori) infection, Hematuria, Hiatal hernia, History of blood transfusion, History of DVT (deep vein thrombosis), History of myasthenia gravis, Hx of blood clots, Hypertension, Internal hemorrhoids, Migraine, Narcolepsy, Osteoporosis, Sleep apnea, Under care of team, and Under care of team.    Past Surgical History:   has a past surgical history that includes  section; Nose surgery; Knee arthroscopy (Left); shoulder surgery (Left); Wrist surgery; Upper gastrointestinal endoscopy (2013); Colonoscopy (2013); Upper gastrointestinal endoscopy (2016); Foot surgery (Left, 2015);  Arm Surgery (Right); knee surgery (Right); vaginal dilatation; Ankle surgery (Left, 06/29/2018); pr office/outpt visit,procedure only (Left, 06/29/2018); Foot Tendon Surgery (Left, 02/22/2019); ligament repair (Left, 02/22/2019); Upper gastrointestinal endoscopy (N/A, 02/10/2020); Colonoscopy (N/A, 02/10/2020); Endoscopy, colon, diagnostic; Colonoscopy (N/A, 05/18/2020); esophageal motility study (N/A, 08/12/2020); esophageal motility study (09/10/2020); esophageal motility study (N/A, 12/18/2020); Nerve Block (Bilateral, 06/04/2021); Pain management procedure (Bilateral, 06/04/2021); Pain management procedure (06/11/2021); Pain management procedure (Bilateral, 06/11/2021); Pain management procedure (Right, 07/23/2021); Pain management procedure (Right, 07/23/2021); Pain management procedure (Left, 08/06/2021); Pain management procedure (Left, 08/06/2021); Breast enhancement surgery (Bilateral); shoulder surgery (Left, 02/08/2022); and shoulder surgery (Left, 02/08/2022). Home Medications:    Prior to Admission medications    Medication Sig Start Date End Date Taking?  Authorizing Provider   buPROPion (WELLBUTRIN XL) 150 MG extended release tablet  12/12/22   Historical Provider, MD   oxyCODONE-acetaminophen (PERCOCET) 5-325 MG per tablet  9/13/22   Historical Provider, MD   pantoprazole (PROTONIX) 40 MG tablet Take 1 tablet by mouth in the morning. 7/27/22   Robin Sanchez DO   atorvastatin (LIPITOR) 80 MG tablet Take 1 tablet by mouth nightly  Patient not taking: Reported on 10/27/2022 7/27/22   Robin Sanchez DO   alendronate (FOSAMAX) 70 MG tablet TAKE 1 TABLET BY MOUTH ONCE WEEKLY BEFORE BREAKFAST, ON AN EMPTY STOMACH: REMAIN UPRIGHT FOR 30 MINUTES:TAKE WITH 8 OUNCES OF WATER 5/17/22   Robin Sanchez DO   buPROPion (WELLBUTRIN XL) 300 MG extended release tablet Take 450 mg by mouth daily 4/11/22   Historical Provider, MD   aspirin 81 MG chewable tablet Take 1 tablet by mouth daily 4/17/22   Arias Smith MD   naproxen (NAPROSYN) 500 MG tablet TAKE ONE TABLET BY MOUTH TWICE A DAY WITH MEALS  Patient not taking: No sig reported 4/13/22 7/15/22  Kandi Motley DO   amphetamine-dextroamphetamine (ADDERALL) 30 MG tablet 30 mg 2 times daily. 9/27/21   Historical Provider, MD   citalopram (CELEXA) 40 MG tablet TAKE ONE TABLET BY MOUTH DAILY 4/8/20   Jennifer Craft MD   albuterol (PROVENTIL) (2.5 MG/3ML) 0.083% nebulizer solution Take 2.5 mg by nebulization every 6 hours as needed for Wheezing or Shortness of Breath   Patient not taking: Reported on 10/27/2022    Historical Provider, MD   Calcium Carbonate (CALCIUM 600 PO) Take 600 mg by mouth daily  Patient not taking: Reported on 10/27/2022    Historical Provider, MD       Allergies:  Latex, Prozac [fluoxetine hcl], Sulfa antibiotics, and Codeine    Social History:   reports that she quit smoking about 33 years ago. Her smoking use included cigarettes. She has a 30.00 pack-year smoking history. She has been exposed to tobacco smoke. She has never used smokeless tobacco. She reports that she does not currently use alcohol. She reports that she does not use drugs. Family History: family history includes Alzheimer's Disease in her father; Cancer in her maternal aunt and mother; Dementia in her mother; Heart Surgery in her father; Hypertension in her father and mother; Migraines in her mother; Stroke in her mother. REVIEW OF SYSTEMS:    Constitutional: there has been no unanticipated weight loss. There's been No change in energy level, No change in activity level. Eyes: No visual changes or diplopia. No scleral icterus. ENT: No Headaches  Cardiovascular: as per HPI  Respiratory: as per HPI  Gastrointestinal: No abdominal pain. No change in bowel or bladder habits. Genitourinary: No dysuria, trouble voiding, or hematuria. Musculoskeletal:  No gait disturbance, No weakness or joint complaints. Integumentary: No rash or pruritis.   Neurological: No headache, diplopia, change in muscle strength, numbness or tingling. No change in gait, balance, coordination, mood, affect, memory, mentation, behavior. Endocrine: No temperature intolerance. No excessive thirst, fluid intake, or urination. No tremor. Hematologic/Lymphatic: No abnormal bruising or bleeding, blood clots or swollen lymph nodes. Allergic/Immunologic: No nasal congestion or hives. PHYSICAL EXAM:      There were no vitals taken for this visit. Constitutional and General Appearance: alert, cooperative, no distress and appears stated age  [de-identified]: PERRL, no cervical lymphadenopathy. No masses palpable. Normal oral mucosa  Respiratory:  Resp Auscultation: Good respiratory effort. No for increased work of breathing. On auscultation: clear to auscultation bilaterally  Cardiovascular: The apical impulse is not displaced  regular S1 and S2.  Jugular venous pulsation Normal  Peripheral pulses are symmetrical and full   Abdomen:   No masses or tenderness  Bowel sounds present  Extremities:   No Cyanosis or Clubbing   Lower extremity edema: No   Skin: Warm and dry  Neurological:  Deferred     Stress test 11/15/2022   Intermediate Risk (1-3% annual death or MI)  1. Ischemia: None  2. Infarction: Small anterior - apical non transmural infarction. 3. LV Systolic Function: Preserved, EF 73%. 4. Wall Motion: Normal.    IMPRESSION:    Abnormal stress test as above   Asymptomatic Sinus Maury  Dizziness due to anemia  ECHO 4/16/2022: EF 64%, mild TR, RVSP is 35 mmHg, negative bubble study. HOLTER 12/18/13: Normal monitor. STRESS 12/5/13: No fixed/reversible perfusion defects. EF 65%.   H/O CVA  H/O DVT  H/O GERD  Echocardiogram 10/06/2022 showed possible mass in the left atrium and stress echo was canceled  JACOBO 10/11/2022 showed no evidence of left atrial mass with normal ejection fraction and no significant valvular abnormalities    RECOMMENDATIONS:  Will proceed with LHC +/- PCi   Further rec to follow post procedure       Imani West MD. PGY- 4  Cardiology Fellow  [facility], [place], [place]  12/20/2022, 10:08 AM    I was present during entire procedure and performed all critical elements of the procedure.     Charly Khanna MD

## 2022-12-20 NOTE — OP NOTE
[facility]    CARDIAC CATHETERIZATION    Date:   12/20/2022  Patient name:  Ady Avendano  Date of admission:  12/20/2022  8:58 AM  MRN:   [id number]  YOB: 1960    Operators:  Primary:   JOJO Enriquez MD (Attending Physician)    Assistant/CV fellow: Ousmane Santizo MD      Procedure performed:     [x] Left Heart Catheterization. [] Graft Angiography. [x] Left Ventriculography. [] Right Heart Catheterization. [x] Coronary Angiography. [] Aortic Valve Studies. [] PCI:      [] Other:       Pre Procedure Conscious Sedation Data:  ASA Class:    [] I [x] II [] III [] IV    Mallampati Class:  [] I [x] II [] III [] IV      Indication:  [] STEMI      [x] + Stress test  [] ACS      [] + EKG Changes  [] Non Q MI       [] Significant Risk Factors  [] Recurrent Angina             [] Diabetes Mellitus    [] New LBBB      [] Other.  [] CHF / Low EF changes     [] Abnormal CTA / Ca Score      Procedure:  Access:  [] Femoral  [x] Radial  artery       [x] Right  [] Left    Procedure: After informed consent was obtained with explanation of the risks and benefits, patient was brought to the cath lab. The access area was prepped and draped in sterile fashion. 1% lidocaine was used for local block. The artery was cannulated with 6  Fr sheath with brisk arterial blood return. The side port was frequently flushed and aspirated with normal saline. Estimated Blood Loss:  [x] Minimal < 25 cc [] Moderate 25-50 cc  [] >50 cc    Findings:  LMCA: Normal 0% stenosis. LAD: Normal 0% stenosis. LCx: Normal 0% stenosis. The OM1 is normal.     RCA: Normal 0% stenosis. Ramus: Normal 0% stenosis. Coronary Tree Dominance: Right       LV Analysis LV function assessed as:Normal.   The LV gram was performed in the JO 30 position. LVEF: 55%. Conclusions:  Normal coronary arteries observed. Normal LV systolic function. Recommendations   Medical therapy as needed.    Risk factor modification. Routine Post Diagnostic Cath orders. ____________________________________________________________________    History and Risk Factors    [] Hypertension     [] Family history of CAD  [] Hyperlipidemia     [x] Cerebrovascular Disease   [] Prior MI       [] Peripheral Vascular disease   [] Prior PCI              [] Diabetes Mellitus    [] Left Main PCI. [] Currently on Dialysis. [] Prior CABG. [] Currently smoker. [] Cardiac Arrest outside of healthcare facility. [] Yes    [x] No        Witnessed     [] Yes   [] No     Arrest after arrival of EMS  [] Yes   [] No     [] Cardiac Arrest at other Facility. [] Yes   [x] No    Pre-Procedure Information. Heart Failure       [] Yes    [x] No        Class  [] I      [] II  [] III    [] IV. New Diagnosis    [] Yes  [] No    HF Type      [] Systolic   [] Diastolic          [] Unknown. Diagnostic Test:   EKG       [] Normal   [x] Abnormal    New antiarrhythmia medications:    [] Yes   [] No   New onset atrial fibrillation / Flutter     [] Yes   [] No   ECG Abnormalities:      [] V. Fib   [] Jennifer V. Tach           [] NS V. T   [] New LBBB           [] T. Inv  []  ST dev > 0.5 mm         [] PVC's freq  [] PVC's infrequent    Stress Test Performed:      [x] Yes    [] No     Type:     [] Stress Echo   [] Exercise Stress Test (no imaging)      [x] Stress Nuclear  [] Stress Imaging     Results   [] Negative   [x] Positive        [] Indeterminate  [] Unavailable     If Positive/ Risk / Extent of Ischemia:       [] Low  [x] Intermediate         [] High  [] Unavailable      Cardiac CTA Performed:     [] Yes    [x] No      Results   [] CAD   [] Non obstructive CAD      [] No CAD   [] Uncertain      [] Unknown   [] Structural Disease.      Pre Procedure Medications:   [x] Yes    [] No         [x] ASA   [] Beta Blockers      [] Nitrate   [] Ca Channel Blockers      [] Ranolazine   [x] Statin       [] Plavix/Others antiplatelets      Electronically signed on 12/20/2022 at 11:08 AM by:    Tae Conklin MD  Fellow, [address] I was present during entire procedure and performed all critical elements of the procedure.     Bruce Skelton MD

## 2022-12-20 NOTE — PROGRESS NOTES
VASC BAND removed and site viewed by patient with wound care reviewed with patient. Transportation arrangements made with significant other. Pressure dressing applied and ambulates in halls / restroom with minimal assistance. Assist with dressing.

## 2022-12-20 NOTE — PROGRESS NOTES
All discharge instructions reviewed, questions answered, paper signed and given copy. Patient discharged per wheelchair with significant other and belongings.

## 2022-12-20 NOTE — PROGRESS NOTES
Patient received post cath to pcc ROOM 10. Assessment obtained. Post cath pathway initiated. Right radial site with TR band inflated with 11 mL of air intact. No hematoma noted. Patient without complaints.

## 2022-12-20 NOTE — DISCHARGE INSTRUCTIONS
DISCHARGE INSTRUCTIONS / ARM CARE POST CATHERIZATION        ENCOURAGE FLUIDS    NO STRENUOUS LIFTING WITH AFFECTED ARM FOR 3 DAYS ANYTHING HEAVIER THAN 8 TO 10 POUNDS    REMOVE BAND-AID/PRESSURE DRESSING THE FOLLOWING DAY AND DO NOT APPLY ANY FURTHER BAND-AIDS    KEEP INCISION CLEAN DRY AND OPEN TO THE AIR / NO HAND LOTION NEAR PUNCTURE SITE    WATCH FOR SIGNS OF INFECTION /  REDNESS / SWELLING / DRAINAGE / [place] Deep / TEMPERATURE GREATER THAN 101    IF BLEEDING OCCURS HOLD MANUAL PRESSURE DIRECTLY OVER SITE  (YOU WILL FEEL PULSATION OF ARTERY) AND IF BLEEDING DOES NOT STOP AFTER 2 MINUTES CALL 911    OK TO SHOWER THE NEXT DAY, NO TUB BATHING OR HOT TUBS/SWIMMING FOR 7 DAYS    IF AREA BECOMES HARD AND SWOLLEN AND IF YOU ARE AT ALL CONCERNED SEEK HELP IMMEDIATELY    SEEK HELP IMMEDIATELY IF AFFECTED ARM BECOMES COLD / [place] Cook / SEVERE PAIN / NAILBEDS TURN BLUE     IF ON METFORMIN / GLUCOPHAGE DO NOT RESTART MEDICATION FOR 48 HOURS    PLEASE PRACTICE GOOD HAND WASHING AND INCLUDE PUNCTURE SITE ESPECIALLY AFTER USING THE RESTROOM    AVOID USING ALCOHOL BASED HAND SANITIZERS FOR ONE WEEK      CALL 911 if you have symptoms including:   Drooping facial muscles   Changes in vision or speech   Difficulty walking or using your limbs   Change in sensation to affected leg, including numbness, feeling cold, or change in color   Extreme sweating, nausea or vomiting   Dizziness or lightheadedness   Chest pain   Rapid, irregular heartbeat   Palpitations   Cough, shortness of breath, or difficulty breathing   Weakness or fainting   If you think you have an emergency, CALL 911 . SEDATION / ANALGESIA INFORMATION / [address] have received the sedation/analgesia medication during your visit    Sedation/analgesia is used during short medical procedures under controlled supervision. The medication will produce a strong relaxation.  You will be able to hear, speak and follow instructions, but your memory and alertness will be decreased. You will be able to swallow and breathe on your own. During sedation/analgesia your blood pressure, heart and breathing will be watched closely. After the procedure, you may not remember what was said or done. You may have the following effects from the medication. \" Drowsiness, dizziness, sleepiness or confusion. \" Difficulty remembering or delayed reaction times. \" Loss of fine muscle control or difficulty with your balance especially while walking. \" Difficulty focusing or blurred vision. You may not be aware of slight changes in your behavior and/or your reaction time because of the medication used during the procedure. Therefore you should follow these instructions. \" Have someone responsible help you with your care. \" Do not drive for 24 hours. \" Do not operate equipment for 24 hours (lawnmowers, power tools, kitchen accessories, stove). \" Do not drink any alcoholic beverages for a minimum of 24 hours. \" Do not make important personal, legal or business decisions for 24 hours. \" You may experience dizziness or lightheadedness. Move slowly and carefully, do not make sudden position changes. \" Drink extra amounts of fluids today. \" Increase your diet as tolerated (unless you have received specific instructions from your doctor). \" If you feel nauseated, continue with liquids until the nausea is gone. \" Notify your physician if you have not urinated within 8 hours after the procedure. \" Resume your medications unless otherwise instructed. Coronary artery disease (CAD) occurs when plaque builds up in the arteries that bring oxygen-rich blood to your heart. Plaque is a fatty substance made of cholesterol, calcium, and other substances in the blood. This process is called hardening of the arteries, or atherosclerosis. What happens when you have coronary artery disease? Plaque may narrow the coronary arteries. Narrowed arteries cause poor blood flow.  This can lead to angina symptoms such as chest pain or discomfort. If blood flow is completely blocked, you could have a heart attack. You can slow CAD and reduce the risk of future problems by making changes in your lifestyle. These include quitting smoking and eating heart-healthy foods. Treatments for CAD, along with changes in your lifestyle, can help you live a longer and healthier life. How can you prevent coronary artery disease? Do not smoke. It may be the best thing you can do to prevent heart disease. If you need help quitting, talk to your doctor about stop-smoking programs and medicines. These can increase your chances of quitting for good. Be active. Get at least 30 minutes of exercise on most days of the week. Walking is a good choice. You also may want to do other activities, such as running, swimming, cycling, or playing tennis or team sports. Eat heart-healthy foods. Eat more fruits and vegetables and less foods that contain saturated and trans fats. Limit alcohol, sodium, and sweets. Stay at a healthy weight. Lose weight if you need to. Manage other health problems such as diabetes, high blood pressure, and high cholesterol. Talk to your doctor about taking a daily aspirin. Manage stress. Stress can hurt your heart. To keep stress low, talk about your problems and feelings. Don't keep your feelings hidden. How is coronary artery disease treated? Your doctor will suggest that you make lifestyle changes. For example, your doctor may ask you to eat healthy foods, quit smoking, lose extra weight, and be more active. You will have to take medicines. Your doctor may suggest a procedure to open narrowed or blocked arteries. This is called angioplasty. Or your doctor may suggest using healthy blood vessels to create detours around narrowed or blocked arteries. This is called bypass surgery. Follow-up care is a key part of your treatment and safety.  Be sure to make and go to all appointments, and call your doctor if you are having problems. It's also a good idea to know your test results and keep a list of the medicines you take. Where can you learn more? Go to https://cherry.[employer]. org and sign in to your [employer] account. Enter ([phone] [phone] in the [employer] box to learn more about Learning About Coronary Artery Disease (CAD).     If you do not have an account, please click on the Sign Up Now link.

## 2023-01-16 ENCOUNTER — OFFICE VISIT (OUTPATIENT)
Dept: NEUROSURGERY | Age: 63
End: 2023-01-16
Payer: MEDICARE

## 2023-01-16 VITALS
OXYGEN SATURATION: 98 % | TEMPERATURE: 97.3 F | HEART RATE: 80 BPM | HEIGHT: 60 IN | WEIGHT: 152.8 LBS | DIASTOLIC BLOOD PRESSURE: 80 MMHG | BODY MASS INDEX: 30 KG/M2 | SYSTOLIC BLOOD PRESSURE: 103 MMHG

## 2023-01-16 DIAGNOSIS — F11.90 CHRONIC, CONTINUOUS USE OF OPIOIDS: ICD-10-CM

## 2023-01-16 DIAGNOSIS — M47.22 CERVICAL SPONDYLOSIS WITH RADICULOPATHY: Primary | ICD-10-CM

## 2023-01-16 DIAGNOSIS — Z86.73 HISTORY OF CVA (CEREBROVASCULAR ACCIDENT): ICD-10-CM

## 2023-01-16 PROCEDURE — 1036F TOBACCO NON-USER: CPT | Performed by: NURSE PRACTITIONER

## 2023-01-16 PROCEDURE — G8484 FLU IMMUNIZE NO ADMIN: HCPCS | Performed by: NURSE PRACTITIONER

## 2023-01-16 PROCEDURE — 99214 OFFICE O/P EST MOD 30 MIN: CPT | Performed by: NURSE PRACTITIONER

## 2023-01-16 PROCEDURE — G8427 DOCREV CUR MEDS BY ELIG CLIN: HCPCS | Performed by: NURSE PRACTITIONER

## 2023-01-16 PROCEDURE — 3074F SYST BP LT 130 MM HG: CPT | Performed by: NURSE PRACTITIONER

## 2023-01-16 PROCEDURE — G8417 CALC BMI ABV UP PARAM F/U: HCPCS | Performed by: NURSE PRACTITIONER

## 2023-01-16 PROCEDURE — 3079F DIAST BP 80-89 MM HG: CPT | Performed by: NURSE PRACTITIONER

## 2023-01-16 PROCEDURE — 3017F COLORECTAL CA SCREEN DOC REV: CPT | Performed by: NURSE PRACTITIONER
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
## 2023-01-17 ENCOUNTER — OFFICE VISIT (OUTPATIENT)
Dept: FAMILY MEDICINE CLINIC | Age: 63
End: 2023-01-17
Payer: MEDICARE

## 2023-01-17 VITALS
OXYGEN SATURATION: 95 % | BODY MASS INDEX: 30.08 KG/M2 | WEIGHT: 154 LBS | SYSTOLIC BLOOD PRESSURE: 90 MMHG | HEART RATE: 74 BPM | DIASTOLIC BLOOD PRESSURE: 60 MMHG

## 2023-01-17 DIAGNOSIS — M54.41 CHRONIC BILATERAL LOW BACK PAIN WITH BILATERAL SCIATICA: ICD-10-CM

## 2023-01-17 DIAGNOSIS — N18.30 STAGE 3 CHRONIC KIDNEY DISEASE, UNSPECIFIED WHETHER STAGE 3A OR 3B CKD (HCC): ICD-10-CM

## 2023-01-17 DIAGNOSIS — F11.99 OPIOID USE, UNSPECIFIED WITH UNSPECIFIED OPIOID-INDUCED DISORDER (HCC): ICD-10-CM

## 2023-01-17 DIAGNOSIS — G89.29 CHRONIC BILATERAL LOW BACK PAIN WITH BILATERAL SCIATICA: ICD-10-CM

## 2023-01-17 DIAGNOSIS — E55.9 VITAMIN D DEFICIENCY: ICD-10-CM

## 2023-01-17 DIAGNOSIS — R73.03 PREDIABETES: Primary | ICD-10-CM

## 2023-01-17 DIAGNOSIS — E61.1 IRON DEFICIENCY: ICD-10-CM

## 2023-01-17 DIAGNOSIS — R53.83 FATIGUE, UNSPECIFIED TYPE: ICD-10-CM

## 2023-01-17 DIAGNOSIS — J45.40 MODERATE PERSISTENT ASTHMA WITHOUT COMPLICATION: ICD-10-CM

## 2023-01-17 DIAGNOSIS — E78.5 DYSLIPIDEMIA: ICD-10-CM

## 2023-01-17 DIAGNOSIS — K21.9 GASTROESOPHAGEAL REFLUX DISEASE, UNSPECIFIED WHETHER ESOPHAGITIS PRESENT: ICD-10-CM

## 2023-01-17 DIAGNOSIS — R82.90 ABNORMAL URINALYSIS: ICD-10-CM

## 2023-01-17 DIAGNOSIS — E53.8 VITAMIN B12 DEFICIENCY: ICD-10-CM

## 2023-01-17 DIAGNOSIS — M54.42 CHRONIC BILATERAL LOW BACK PAIN WITH BILATERAL SCIATICA: ICD-10-CM

## 2023-01-17 DIAGNOSIS — I63.9 ISCHEMIC STROKE (HCC): ICD-10-CM

## 2023-01-17 DIAGNOSIS — I10 ESSENTIAL HYPERTENSION: ICD-10-CM

## 2023-01-17 LAB — HBA1C MFR BLD: 5.6 %

## 2023-01-17 PROCEDURE — 83036 HEMOGLOBIN GLYCOSYLATED A1C: CPT | Performed by: FAMILY MEDICINE

## 2023-01-17 PROCEDURE — 3074F SYST BP LT 130 MM HG: CPT | Performed by: FAMILY MEDICINE

## 2023-01-17 PROCEDURE — G8427 DOCREV CUR MEDS BY ELIG CLIN: HCPCS | Performed by: FAMILY MEDICINE

## 2023-01-17 PROCEDURE — G8417 CALC BMI ABV UP PARAM F/U: HCPCS | Performed by: FAMILY MEDICINE

## 2023-01-17 PROCEDURE — 3017F COLORECTAL CA SCREEN DOC REV: CPT | Performed by: FAMILY MEDICINE

## 2023-01-17 PROCEDURE — 99214 OFFICE O/P EST MOD 30 MIN: CPT | Performed by: FAMILY MEDICINE

## 2023-01-17 PROCEDURE — 3078F DIAST BP <80 MM HG: CPT | Performed by: FAMILY MEDICINE

## 2023-01-17 PROCEDURE — G8484 FLU IMMUNIZE NO ADMIN: HCPCS | Performed by: FAMILY MEDICINE

## 2023-01-17 PROCEDURE — 1036F TOBACCO NON-USER: CPT | Performed by: FAMILY MEDICINE

## 2023-01-17 RX ORDER — PROPRANOLOL HYDROCHLORIDE 80 MG/1
CAPSULE, EXTENDED RELEASE ORAL
COMMUNITY
Start: 2022-11-28

## 2023-01-17 ASSESSMENT — PATIENT HEALTH QUESTIONNAIRE - PHQ9
SUM OF ALL RESPONSES TO PHQ9 QUESTIONS 1 & 2: 6
7. TROUBLE CONCENTRATING ON THINGS, SUCH AS READING THE NEWSPAPER OR WATCHING TELEVISION: 3
3. TROUBLE FALLING OR STAYING ASLEEP: 3
SUM OF ALL RESPONSES TO PHQ QUESTIONS 1-9: 24
9. THOUGHTS THAT YOU WOULD BE BETTER OFF DEAD, OR OF HURTING YOURSELF: 0
1. LITTLE INTEREST OR PLEASURE IN DOING THINGS: 3
SUM OF ALL RESPONSES TO PHQ QUESTIONS 1-9: 24
8. MOVING OR SPEAKING SO SLOWLY THAT OTHER PEOPLE COULD HAVE NOTICED. OR THE OPPOSITE, BEING SO FIGETY OR RESTLESS THAT YOU HAVE BEEN MOVING AROUND A LOT MORE THAN USUAL: 3
10. IF YOU CHECKED OFF ANY PROBLEMS, HOW DIFFICULT HAVE THESE PROBLEMS MADE IT FOR YOU TO DO YOUR WORK, TAKE CARE OF THINGS AT HOME, OR GET ALONG WITH OTHER PEOPLE: 3
SUM OF ALL RESPONSES TO PHQ QUESTIONS 1-9: 24
6. FEELING BAD ABOUT YOURSELF - OR THAT YOU ARE A FAILURE OR HAVE LET YOURSELF OR YOUR FAMILY DOWN: 3
4. FEELING TIRED OR HAVING LITTLE ENERGY: 3
2. FEELING DOWN, DEPRESSED OR HOPELESS: 3
5. POOR APPETITE OR OVEREATING: 3
SUM OF ALL RESPONSES TO PHQ QUESTIONS 1-9: 24

## 2023-01-17 ASSESSMENT — COLUMBIA-SUICIDE SEVERITY RATING SCALE - C-SSRS
6. HAVE YOU EVER DONE ANYTHING, STARTED TO DO ANYTHING, OR PREPARED TO DO ANYTHING TO END YOUR LIFE?: YES
2. HAVE YOU ACTUALLY HAD ANY THOUGHTS OF KILLING YOURSELF?: NO
1. WITHIN THE PAST MONTH, HAVE YOU WISHED YOU WERE DEAD OR WISHED YOU COULD GO TO SLEEP AND NOT WAKE UP?: NO
7. DID THIS OCCUR IN THE LAST THREE MONTHS: NO

## 2023-01-23 ASSESSMENT — ENCOUNTER SYMPTOMS
CHOKING: 0
BELCHING: 0
HOARSE VOICE: 0
STRIDOR: 0
GLOBUS SENSATION: 0
WHEEZING: 0
SHORTNESS OF BREATH: 0
WATER BRASH: 0
EYES NEGATIVE: 1
HEARTBURN: 1
ALLERGIC/IMMUNOLOGIC NEGATIVE: 1
RESPIRATORY NEGATIVE: 1

## 2023-01-23 NOTE — PATIENT INSTRUCTIONS
Patient Education        Preventing Falls: Care Instructions  Overview     Getting around your home safely can be a challenge if you have injuries or health problems that make it easy for you to fall. Loose rugs and furniture in walkways are among the dangers for many older people who have problems walking or who have poor eyesight. People who have conditions such as arthritis, osteoporosis, or dementia also have to be careful not to fall. You can make your home safer with a few simple measures. Follow-up care is a key part of your treatment and safety. Be sure to make and go to all appointments, and call your doctor if you are having problems. It's also a good idea to know your test results and keep a list of the medicines you take. How can you care for yourself at home? Taking care of yourself  Exercise regularly to improve your strength, muscle tone, and balance. Walk if you can. Swimming may be a good choice if you cannot walk easily. Have your vision and hearing checked each year or any time you notice a change. If you have trouble seeing and hearing, you might not be able to avoid objects and could lose your balance. Know the side effects of the medicines you take. Ask your doctor or pharmacist whether the medicines you take can affect your balance. Sleeping pills or sedatives can affect your balance. Limit the amount of alcohol you drink. Alcohol can impair your balance and other senses. Ask your doctor whether calluses or corns on your feet need to be removed. If you wear loose-fitting shoes because of calluses or corns, you can lose your balance and fall. Talk to your doctor if you have numbness in your feet. You may get dizzy if you do not drink enough water. To prevent dehydration, drink plenty of fluids. Choose water and other clear liquids. If you have kidney, heart, or liver disease and have to limit fluids, talk with your doctor before you increase the amount of fluids you drink.   Preventing falls at home  Remove raised doorway thresholds, throw rugs, and clutter. Repair loose carpet or raised areas in the floor. Move furniture and electrical cords to keep them out of walking paths. Use nonskid floor wax, and wipe up spills right away, especially on ceramic tile floors. If you use a walker or cane, put rubber tips on it. If you use crutches, clean the bottoms of them regularly with an abrasive pad, such as steel wool. Keep your house well lit, especially stairways, porches, and outside walkways. Use night-lights in areas such as hallways and bathrooms. Add extra light switches or use remote switches (such as switches that go on or off when you clap your hands) to make it easier to turn lights on if you have to get up during the night. Install sturdy handrails on stairways. Move items in your cabinets so that the things you use a lot are on the lower shelves (about waist level). Keep a cordless phone and a flashlight with new batteries by your bed. If possible, put a phone in each of the main rooms of your house, or carry a cell phone in case you fall and cannot reach a phone. Or, you can wear a device around your neck or wrist. You push a button that sends a signal for help. Wear low-heeled shoes that fit well and give your feet good support. Use footwear with nonskid soles. Check the heels and soles of your shoes for wear. Repair or replace worn heels or soles. Do not wear socks without shoes on smooth floors, such as wood. Walk on the grass when the sidewalks are slippery. If you live in an area that gets snow and ice in the winter, sprinkle salt on slippery steps and sidewalks. Or ask a family member or friend to do this for you. Preventing falls in the bath  Install grab bars and nonskid mats inside and outside your shower or tub and near the toilet and sinks. Use shower chairs and bath benches. Use a hand-held shower head that will allow you to sit while showering.   Get into a tub or shower by putting the weaker leg in first. Get out of a tub or shower with your strong side first.  Repair loose toilet seats and consider installing a raised toilet seat to make getting on and off the toilet easier. Keep your bathroom door unlocked while you are in the shower. Where can you learn more? Go to http://www.rodriguez.com/ and enter G117 to learn more about \"Preventing Falls: Care Instructions. \"  Current as of: May 4, 2022               Content Version: 13.5  © 9533-6233 [employer]. Care instructions adapted under license by [facility] ([facility]). If you have questions about a medical condition or this instruction, always ask your healthcare professional. [address] any warranty or liability for your use of this information.

## 2023-01-23 NOTE — PROGRESS NOTES
[facility] Progress Note    Date:1/17/2023         Patient Name:Latisha Prasad     Date of Birth:[birth year]     Age:63 y.o.     Assessment/Plan        Problem List Items Addressed This Visit          Circulatory    Essential hypertension      Well-controlled, continue current medications, continue current treatment plan, medication adherence emphasized and lifestyle modifications recommended         Relevant Orders    CBC with Auto Differential    Comprehensive Metabolic Panel    Ischem[facility] ([facility]      Monitored by specialist- no acute findings meriting change in the plan         Relevant Orders    CBC with Auto Differential       Respiratory    Moderate persistent asthma without complication      Borderline controlled, continue current medications and continue current treatment plan         Relevant Orders    CBC with Auto Differential       Digestive    GERD (gastroesophageal reflux disease)      Well-controlled, continue current medications and continue current treatment plan         Relevant Orders    CBC with Auto Differential       Nervous and Auditory    Chronic bilateral low back pain with bilateral sciatica      Monitored by specialist- no acute findings meriting change in the plan         Relevant Orders    DRUG SCREEN, PAIN    CBC with Auto Differential    Opioid use, unspecified with unspecified opioid-induced disorder      In remission         Relevant Orders    CBC with Auto Differential       Genitourinary    Chronic renal disease, stage III ([facility] [[id number]]      Asymptomatic, continue current medications and continue current treatment plan         Relevant Orders    CBC with Auto Differential       Other    Iron deficiency      Unclear control, continue current plan pending work up below         Relevant Orders    CBC with Auto Differential    Iron and TIBC    Vitamin B12 deficiency    Relevant Orders    CBC with Auto Differential    Vitamin B12 & Folate     Other Visit Diagnoses       Prediabetes    - Primary    Relevant Orders    POCT glycosylated hemoglobin (Hb A1C) (Completed)    CBC with Auto Differential    Hemoglobin A1C    Abnormal urinalysis        Relevant Orders    DRUG SCREEN, PAIN    CBC with Auto Differential    Fatigue, unspecified type        Relevant Orders    CBC with Auto Differential    TSH with Reflex    Vitamin D deficiency        Relevant Orders    CBC with Auto Differential    Vitamin D 25 Hydroxy    Dyslipidemia        Relevant Orders    CBC with Auto Differential    Comprehensive Metabolic Panel    Lipid Panel             Return in about 3 months (around 4/17/2023). Electronically signed by John Clarke DO on 1/23/23       Total time spent was between Time personally spent assessing and managing the patient on the date of service: Est: 30-39 minutes ([id number]) mins. This included time spent reviewing the patient's medical record (e.g., recent visits, labs, and studies); seeing the patient in the office (face-to-face time); ordering medications, studies, procedures, or referrals; calling the patient or family later in the day with results and further recommendations; and documenting the visit in the medical record. Dori Dave is a 58 y.o. female presenting today for   Chief Complaint   Patient presents with    Hypertension   . Riley Rivera is a 60 y.o. female who presents for follow up of a stroke. Event occurred several months ago. She has residual symptoms of balance disturbance, slurred speech, cognitive impairment. She denies unresponsiveness, syncope, seizures. Overall she feels her condition is ongoing. Saw neuro who suggested cardio referral and loop recorder    Riley Rivera is a 57 y.o. female who presents for follow up of anxiety/depression disorder. Current symptoms: difficulty concentrating, irritable, racing thoughts.  She denies current suicidal and homicidal ideation although admits to answering questions like she is - says never would act on them. She complains of the following side effects from the treatment: none. Hypertension  This is a chronic problem. The current episode started more than 1 year ago. The problem has been gradually improving since onset. The problem is controlled. Associated symptoms include anxiety. Pertinent negatives include no peripheral edema, shortness of breath or sweats. Past treatments include beta blockers. The current treatment provides significant improvement. Gastroesophageal Reflux  She complains of heartburn. She reports no belching, no choking, no dysphagia, no early satiety, no globus sensation, no hoarse voice, no stridor, no tooth decay, no water brash or no wheezing. This is a chronic problem. The current episode started more than 1 year ago. The problem occurs frequently. The problem has been waxing and waning. The symptoms are aggravated by certain foods. Pertinent negatives include no anemia, melena, muscle weakness or orthopnea. She has tried a histamine-2 antagonist for the symptoms. The treatment provided significant relief. Review of Systems   Review of Systems   Constitutional: Negative. HENT: Negative. Negative for hoarse voice. Eyes: Negative. Respiratory: Negative. Negative for choking, shortness of breath and wheezing. Cardiovascular: Negative. Gastrointestinal:  Positive for heartburn. Negative for dysphagia and melena. Endocrine: Negative. Genitourinary: Negative. Musculoskeletal: Negative. Negative for muscle weakness. Skin: Negative. Allergic/Immunologic: Negative. Neurological: Negative. Hematological: Negative. Psychiatric/Behavioral: Negative. All other systems reviewed and are negative.     Medications     Current Outpatient Medications   Medication Sig Dispense Refill    propranolol (INDERAL LA) 80 MG extended release capsule       buPROPion (WELLBUTRIN XL) 150 MG extended release tablet Take 150 mg by mouth TOTAL  MG DAILY      oxyCODONE-acetaminophen (PERCOCET) 5-325 MG per tablet       pantoprazole (PROTONIX) 40 MG tablet Take 1 tablet by mouth in the morning. 90 tablet 1    atorvastatin (LIPITOR) 80 MG tablet Take 1 tablet by mouth nightly 30 tablet 3    alendronate (FOSAMAX) 70 MG tablet TAKE 1 TABLET BY MOUTH ONCE WEEKLY BEFORE BREAKFAST, ON AN EMPTY STOMACH: REMAIN UPRIGHT FOR 30 MINUTES:TAKE WITH 8 OUNCES OF WATER 12 tablet 10    buPROPion (WELLBUTRIN XL) 300 MG extended release tablet Take 450 mg by mouth daily      aspirin 81 MG chewable tablet Take 1 tablet by mouth daily 30 tablet 3    amphetamine-dextroamphetamine (ADDERALL) 30 MG tablet 30 mg 2 times daily. citalopram (CELEXA) 40 MG tablet TAKE ONE TABLET BY MOUTH DAILY 90 tablet 3    Calcium Carbonate (CALCIUM 600 PO) Take 600 mg by mouth daily       No current facility-administered medications for this visit. Past History    Past Medical History:   has a past medical history of Acid reflux, Adult ADHD, Asthma, Bipolar 1 disorder ([facility] Broken foot, Cancer ([facility] Cerebral artery occlusion with cerebral infarction [facility]), Chest pain of uncertain etiology, Depression, DVT of leg (deep venous thrombosis) ([facility] Fracture, Fractured nose, Headache, Helicobacter pylori (H. pylori) infection, Hematuria, Hiatal hernia, History of blood transfusion, History of DVT (deep vein thrombosis), History of myasthenia gravis, Hx of blood clots, Hypertension, Internal hemorrhoids, Migraine, Narcolepsy, Osteoporosis, Sleep apnea, Under care of team, and Under care of team.    Social History:   reports that she quit smoking about 34 years ago. Her smoking use included cigarettes. She has a 30.00 pack-year smoking history. She has been exposed to tobacco smoke. She has never used smokeless tobacco. She reports that she does not currently use alcohol. She reports that she does not use drugs.      Family History:   Family History   Problem Relation Age of Onset    Cancer Mother Hypertension Mother     Stroke Mother     Dementia Mother     Migraines Mother     Cancer Maternal Aunt     Hypertension Father     Heart Surgery Father     Alzheimer's Disease Father     Diabetes Neg Hx        Surgical History:   Past Surgical History:   Procedure Laterality Date    ANKLE SURGERY Left 2018    mass excision    ARM SURGERY Right     Pt states she has had 13 right arm surgeries    BREAST ENHANCEMENT SURGERY Bilateral     CARDIAC CATHETERIZATION  2022    DR ARIZMENDI  /  VERONICA CORS     SECTION      x 3    COLONOSCOPY  2013    sigmoid diverticula, prominent large internal hemorrhoid    COLONOSCOPY N/A 02/10/2020    COLONOSCOPY DIAGNOSTIC performed by Kip Staton MD at [place] [address] N/A 2020    COLORECTAL CANCER SCREENING, NOT HIGH RISK performed by Daisha Salinas MD at [place] [address], COLON, DIAGNOSTIC      ESOPHAGEAL MOTILITY STUDY N/A 2020    PES RN - ESOPHAGEAL MOTILITY STUDY performed by Mercedes Ruby DO at [address] [place] [address] STUDY  09/10/2020    ESOPHAGEAL MOTILITY STUDY performed by Mercedes Ruby DO at [facility]    ESOPHAGEAL MOTILITY STUDY N/A 2020    ESOPHAGEAL MOTILITY STUDY ATTEMPTED PER DR. Pretty Dawson WITHOUT SUCCESS performed by Mercedes Ruby DO at [facility]    FOOT SURGERY Left     FOOT TENDON SURGERY Left 2019    LEFT PERONEAL TENDON REPAIR WITH POSSIBLE LEFT TENDON TRANSFER (Left )    KNEE ARTHROSCOPY Left     Two surgeries on left knee per pt    KNEE SURGERY Right     Three surgeries per pt    LIGAMENT REPAIR Left 2019    LEFT PERONEAL TENDON REPAIR performed by Governor PRINCE Arreola at [facility] Bilateral 2021     BILATERAL L4/5, L5/S1 LUMBAR FACET (Bilateral )    NOSE SURGERY      PAIN MANAGEMENT PROCEDURE Bilateral 2021    BILATERAL L4/5, L5/S1 LUMBAR FACET performed by Cordell Casarez MD at [address] [place] [address] 06/11/2021    BILATERAL L4/5, L5/S1 NERVE BLOCK - Bilateral    PAIN MANAGEMENT PROCEDURE Bilateral 06/11/2021    BILATERAL L4/5, L5/S1 NERVE BLOCK performed by Benoit Erickson MD at [facility] Right 07/23/2021    L4/5, L5/S1 NERVE RADIOFREQUENCY ABLATION     PAIN MANAGEMENT PROCEDURE Right 07/23/2021    right  L4/5, L5/S1 NERVE RADIOFREQUENCY ABLATION performed by Benoit Erickson MD at [facility] Left 08/06/2021    left  L4/5, L5/S1 NERVE RADIOFREQUENCY ABLATION (Left )    PAIN MANAGEMENT PROCEDURE Left 08/06/2021    left  L4/5, L5/S1 NERVE RADIOFREQUENCY ABLATION performed by Benoit Erickson MD at [facility] OFFICE/OUTPT [facility] Left 06/29/2018    RESECTION/REMOVAL BONY NEOPLASM LEFT FIBULA, LEFT SOFT TISSUE MASS EXCISION WITH BONE BIOPSY LEFT ANKLE FIBULA performed by Homa Condon DPM at [place] [address] Left     SHOULDER SURGERY Left 02/08/2022    REVERSE TOTAL SHOULDER ARTHROPLASTY - Left    SHOULDER SURGERY Left 02/08/2022    REVERSE TOTAL SHOULDER ARTHROPLASTY performed by Alex Chapman DO at [address] [place] [address]  05/23/2013    tertiary contractures esophagus, 3 to 4 cm hiatal hernia, antral gastritis, + H Pylori, mild active chronic esophagitis    UPPER GASTROINTESTINAL ENDOSCOPY  04/06/2016    one biopsy    UPPER GASTROINTESTINAL ENDOSCOPY N/A 02/10/2020    EGD BIOPSY performed by Eduardo Marin MD at [facility]          Physical Examination      Vitals:  BP 90/60   Pulse 74   Wt 154 lb (69.9 kg)   SpO2 95%   BMI 30.08 kg/m²     Physical Exam  Vitals and nursing note reviewed. Constitutional:       General: She is not in acute distress. Appearance: Normal appearance. She is obese. She is not ill-appearing, toxic-appearing or diaphoretic. HENT:      Head: Normocephalic and atraumatic.    Eyes:      General: No scleral icterus. Right eye: No discharge. Left eye: No discharge. Extraocular Movements: Extraocular movements intact. Conjunctiva/sclera: Conjunctivae normal.   Cardiovascular:      Rate and Rhythm: Normal rate and regular rhythm. Pulses: Normal pulses. Heart sounds: Normal heart sounds. No murmur heard. No friction rub. No gallop. Pulmonary:      Effort: Pulmonary effort is normal. No respiratory distress. Breath sounds: Normal breath sounds. No stridor. No wheezing, rhonchi or rales. Chest:      Chest wall: No tenderness. Neurological:      Mental Status: She is alert and oriented to person, place, and time. Mental status is at baseline. Psychiatric:         Mood and Affect: Mood normal.         Behavior: Behavior normal.         Thought Content: Thought content normal.         Judgment: Judgment normal.       Labs/Imaging/Diagnostics   Labs:  Hemoglobin A1C   Date Value Ref Range Status   01/17/2023 5.6 % Final       Imaging Last 24 Hours:  MRI CERVICAL SPINE W WO CONTRAST  Narrative: EXAMINATION:  MRI OF THE CERVICAL SPINE WITHOUT AND WITH CONTRAST  11/2/2022 12:05 pm:    TECHNIQUE:  Multiplanar multisequence MRI of the cervical spine was performed without and  with the administration of intravenous contrast.    COMPARISON:  Cervical spine MRI performed 03/22/2021. HISTORY:  ORDERING SYSTEM PROVIDED HISTORY: Chronic left arterial ischemic stroke, MCA  (middle cerebral artery)  TECHNOLOGIST PROVIDED HISTORY:  STAT Creatinine as needed:->No  Reason for Exam: Difficulty turning neck to the right, getting worse x  several years, hx CVA, radiculopathy    FINDINGS:  BONES/ALIGNMENT: The vertebral body heights are maintained. There is  age-appropriate bone marrow signal.  There is multilevel degenerative disc  disease with loss of disc signal.  There is disc space narrowing in the mid  cervical spine most pronounced at the C5-6 and C6-7 levels.   There is no  spondylolisthesis. SPINAL CORD: No abnormal cord signal is seen. SOFT TISSUES: No abnormal enhancement of the cervical spine. No paraspinal  mass identified. C2-C3: There is no significant disc protrusion, spinal canal stenosis or  neural foraminal narrowing. C3-C4: There is a disc osteophyte complex with uncovertebral and facet  hypertrophy. There is canal stenosis measuring 9 mm in AP dimension. There  is mild left and severe right foraminal narrowing. C4-C5: There is a disc osteophyte complex with uncovertebral and facet  hypertrophy. There is no canal stenosis or right foraminal narrowing. There  is moderate to severe left foraminal narrowing. C5-C6: There is a disc osteophyte complex with uncovertebral and facet  hypertrophy. There is no canal stenosis. There is mild left and moderate to  severe right foraminal narrowing. C6-C7: There is a disc osteophyte complex with uncovertebral and facet  hypertrophy. There is canal stenosis measuring 9 mm in AP dimension. There  is moderate bilateral foraminal narrowing. C7-T1: There is no significant disc protrusion, spinal canal stenosis or  neural foraminal narrowing. Impression: Multilevel degenerative disc disease with uncovertebral and facet hypertrophy  resulting in mild canal stenosis at C3-4 and C6-7. Foraminal narrowing as described above.

## 2023-01-28 DIAGNOSIS — K21.9 GASTROESOPHAGEAL REFLUX DISEASE, UNSPECIFIED WHETHER ESOPHAGITIS PRESENT: ICD-10-CM

## 2023-01-30 RX ORDER — PANTOPRAZOLE SODIUM 40 MG/1
TABLET, DELAYED RELEASE ORAL
Qty: 90 TABLET | Refills: 1 | Status: SHIPPED | OUTPATIENT
Start: 2023-01-30

## 2023-01-30 NOTE — TELEPHONE ENCOUNTER
Howie Cortés is calling to request a refill on the following medication(s):    Medication Request:  Requested Prescriptions     Pending Prescriptions Disp Refills    pantoprazole (PROTONIX) 40 MG tablet [Pharmacy Med Name: PANTOPRAZOLE SOD DR 40 MG TAB] 90 tablet 1     Sig: TAKE ONE TABLET BY MOUTH EVERY MORNING       Last Visit Date (If Applicable):  1/53/1631    Next Visit Date:    4/19/2023

## 2023-02-07 ENCOUNTER — APPOINTMENT (OUTPATIENT)
Dept: CT IMAGING | Age: 63
End: 2023-02-07
Payer: MEDICARE

## 2023-02-07 ENCOUNTER — APPOINTMENT (OUTPATIENT)
Dept: GENERAL RADIOLOGY | Age: 63
End: 2023-02-07
Payer: MEDICARE

## 2023-02-07 ENCOUNTER — HOSPITAL ENCOUNTER (EMERGENCY)
Age: 63
Discharge: HOME | End: 2023-02-07
Attending: EMERGENCY MEDICINE
Payer: MEDICARE

## 2023-02-07 ENCOUNTER — HOSPITAL ENCOUNTER (OUTPATIENT)
Dept: PHYSICAL THERAPY | Facility: CLINIC | Age: 63
Setting detail: THERAPIES SERIES
Discharge: HOME | End: 2023-02-07

## 2023-02-07 VITALS
RESPIRATION RATE: 16 BRPM | SYSTOLIC BLOOD PRESSURE: 103 MMHG | WEIGHT: 165 LBS | TEMPERATURE: 97 F | OXYGEN SATURATION: 95 % | HEART RATE: 64 BPM | DIASTOLIC BLOOD PRESSURE: 76 MMHG | BODY MASS INDEX: 32.22 KG/M2

## 2023-02-07 DIAGNOSIS — T14.8XXA SPRAIN: Primary | ICD-10-CM

## 2023-02-07 LAB
ABSOLUTE EOS #: 0.11 K/UL (ref 0–0.44)
ABSOLUTE IMMATURE GRANULOCYTE: <0.03 K/UL (ref 0–0.3)
ABSOLUTE LYMPH #: 0.89 K/UL (ref 1.1–3.7)
ABSOLUTE MONO #: 0.67 K/UL (ref 0.1–1.2)
ALBUMIN SERPL-MCNC: 4.1 G/DL (ref 3.5–5.2)
ALBUMIN/GLOBULIN RATIO: 1.5 (ref 1–2.5)
ALP SERPL-CCNC: 142 U/L (ref 35–104)
ALT SERPL-CCNC: 27 U/L (ref 5–33)
ANION GAP SERPL CALCULATED.3IONS-SCNC: 8 MMOL/L (ref 9–17)
AST SERPL-CCNC: 44 U/L
BASOPHILS # BLD: 1 % (ref 0–2)
BASOPHILS ABSOLUTE: 0.04 K/UL (ref 0–0.2)
BILIRUB SERPL-MCNC: 0.5 MG/DL (ref 0.3–1.2)
BUN SERPL-MCNC: 16 MG/DL (ref 8–23)
CALCIUM SERPL-MCNC: 9.2 MG/DL (ref 8.6–10.4)
CHLORIDE SERPL-SCNC: 104 MMOL/L (ref 98–107)
CO2 SERPL-SCNC: 26 MMOL/L (ref 20–31)
CREAT SERPL-MCNC: 1.3 MG/DL (ref 0.5–0.9)
CRP SERPL HS-MCNC: 6.3 MG/L (ref 0–5)
EOSINOPHILS RELATIVE PERCENT: 2 % (ref 1–4)
ERYTHROCYTE [SEDIMENTATION RATE] IN BLOOD BY WESTERGREN METHOD: 11 MM/HR (ref 0–30)
GFR SERPL CREATININE-BSD FRML MDRD: 46 ML/MIN/1.73M2
GLUCOSE SERPL-MCNC: 94 MG/DL (ref 70–99)
HCT VFR BLD AUTO: 40.7 % (ref 36.3–47.1)
HGB BLD-MCNC: 13 G/DL (ref 11.9–15.1)
IMMATURE GRANULOCYTES: 0 %
LYMPHOCYTES # BLD: 14 % (ref 24–43)
MAGNESIUM SERPL-MCNC: 2.1 MG/DL (ref 1.6–2.6)
MCH RBC QN AUTO: 30.3 PG (ref 25.2–33.5)
MCHC RBC AUTO-ENTMCNC: 31.9 G/DL (ref 28.4–34.8)
MCV RBC AUTO: 94.9 FL (ref 82.6–102.9)
MONOCYTES # BLD: 11 % (ref 3–12)
NRBC AUTOMATED: 0 PER 100 WBC
PDW BLD-RTO: 14.1 % (ref 11.8–14.4)
PLATELET # BLD AUTO: 237 K/UL (ref 138–453)
PMV BLD AUTO: 10.7 FL (ref 8.1–13.5)
POTASSIUM SERPL-SCNC: 4.1 MMOL/L (ref 3.7–5.3)
PROT SERPL-MCNC: 6.8 G/DL (ref 6.4–8.3)
RBC # BLD: 4.29 M/UL (ref 3.95–5.11)
SEG NEUTROPHILS: 72 % (ref 36–65)
SEGMENTED NEUTROPHILS ABSOLUTE COUNT: 4.49 K/UL (ref 1.5–8.1)
SODIUM SERPL-SCNC: 138 MMOL/L (ref 135–144)
WBC # BLD AUTO: 6.2 K/UL (ref 3.5–11.3)

## 2023-02-07 PROCEDURE — 85652 RBC SED RATE AUTOMATED: CPT

## 2023-02-07 PROCEDURE — 73080 X-RAY EXAM OF ELBOW: CPT

## 2023-02-07 PROCEDURE — 73030 X-RAY EXAM OF SHOULDER: CPT

## 2023-02-07 PROCEDURE — 73130 X-RAY EXAM OF HAND: CPT

## 2023-02-07 PROCEDURE — 73110 X-RAY EXAM OF WRIST: CPT

## 2023-02-07 PROCEDURE — 85025 COMPLETE CBC W/AUTO DIFF WBC: CPT

## 2023-02-07 PROCEDURE — 29125 APPL SHORT ARM SPLINT STATIC: CPT

## 2023-02-07 PROCEDURE — 72125 CT NECK SPINE W/O DYE: CPT

## 2023-02-07 PROCEDURE — 83735 ASSAY OF MAGNESIUM: CPT

## 2023-02-07 PROCEDURE — 6370000000 HC RX 637 (ALT 250 FOR IP): Performed by: STUDENT IN AN ORGANIZED HEALTH CARE EDUCATION/TRAINING PROGRAM

## 2023-02-07 PROCEDURE — 70450 CT HEAD/BRAIN W/O DYE: CPT

## 2023-02-07 PROCEDURE — 86140 C-REACTIVE PROTEIN: CPT

## 2023-02-07 PROCEDURE — 80053 COMPREHEN METABOLIC PANEL: CPT

## 2023-02-07 PROCEDURE — 99284 EMERGENCY DEPT VISIT MOD MDM: CPT

## 2023-02-07 RX ORDER — ACETAMINOPHEN 325 MG/1
650 TABLET ORAL ONCE
Status: COMPLETED | OUTPATIENT
Start: 2023-02-07 | End: 2023-02-07

## 2023-02-07 RX ORDER — METHOCARBAMOL 500 MG/1
1000 TABLET, FILM COATED ORAL ONCE
Status: COMPLETED | OUTPATIENT
Start: 2023-02-07 | End: 2023-02-07

## 2023-02-07 RX ADMIN — ACETAMINOPHEN 650 MG: 325 TABLET ORAL at 11:30

## 2023-02-07 RX ADMIN — METHOCARBAMOL 1000 MG: 500 TABLET ORAL at 11:29

## 2023-02-07 ASSESSMENT — ENCOUNTER SYMPTOMS
ABDOMINAL PAIN: 0
VOMITING: 0
SHORTNESS OF BREATH: 0
DIARRHEA: 0
COLOR CHANGE: 1
NAUSEA: 0

## 2023-02-07 ASSESSMENT — PAIN SCALES - GENERAL
PAINLEVEL_OUTOF10: 9
PAINLEVEL_OUTOF10: 8

## 2023-02-07 ASSESSMENT — PAIN DESCRIPTION - ORIENTATION: ORIENTATION: LEFT

## 2023-02-07 ASSESSMENT — PAIN DESCRIPTION - LOCATION
LOCATION: ARM;HAND
LOCATION: WRIST

## 2023-02-07 NOTE — ED PROVIDER NOTES
[facility]  ED  Emergency Department Encounter  Emergency Medicine Resident     Pt Name:Latisha Linton  MRN: [id number]  [place] 1960  Date of evaluation: 23  PCP:  Hardik   Note Started: 10:29 AM EST      CHIEF COMPLAINT       Chief Complaint   Patient presents with    Fall     23    Facial Pain    Arm Pain    Hand Pain     left       HISTORY OF PRESENT ILLNESS  (Location/Symptom, Timing/Onset, Context/Setting, Quality, Duration, Modifying Factors, Severity.)      Neeraj Huitron is a 58 y.o. female who presents with complaint of left wrist pain as well as headache after fall yesterday. Past medical history significant for osteoarthritis, acute CVA as well as asthma. Patient states she was walking at the gas station yesterday evening when she tripped fell. Denies any loss of consciousness. Is only on aspirin. States she noticed some bruising this morning on her forehead and has had a painful eft wrist since the fall. Denies any seizure activity or vomiting since then. PAST MEDICAL / SURGICAL / SOCIAL / FAMILY HISTORY      has a past medical history of Acid reflux, Adult ADHD, Asthma, Bipolar 1 disorder ([facility] [address] Broken foot, Cancer ([facility] [address] Cerebral artery occlusion with cerebral infarction [facility]), Chest pain of uncertain etiology, Depression, DVT of leg (deep venous thrombosis) ([facility] [address] Fracture, Fractured nose, Headache, Helicobacter pylori (H. pylori) infection, Hematuria, Hiatal hernia, History of blood transfusion, History of DVT (deep vein thrombosis), History of myasthenia gravis, Hx of blood clots, Hypertension, Internal hemorrhoids, Migraine, Narcolepsy, Osteoporosis, Sleep apnea, Under care of team, and Under care of team.       has a past surgical history that includes  section; Nose surgery; Knee arthroscopy (Left); shoulder surgery (Left); Wrist surgery; Upper gastrointestinal endoscopy (2013); Colonoscopy (2013);  Upper gastrointestinal endoscopy (2016); Foot surgery (Left, ); Arm Surgery (Right); knee surgery (Right); vaginal dilatation; Ankle surgery (Left, 2018); pr office/outpt visit,procedure only (Left, 2018); Foot Tendon Surgery (Left, 2019); ligament repair (Left, 2019); Upper gastrointestinal endoscopy (N/A, 02/10/2020); Colonoscopy (N/A, 02/10/2020); Endoscopy, colon, diagnostic; Colonoscopy (N/A, 2020); esophageal motility study (N/A, 2020); esophageal motility study (09/10/2020); esophageal motility study (N/A, 2020); Nerve Block (Bilateral, 2021); Pain management procedure (Bilateral, 2021); Pain management procedure (2021); Pain management procedure (Bilateral, 2021); Pain management procedure (Right, 2021); Pain management procedure (Right, 2021); Pain management procedure (Left, 2021); Pain management procedure (Left, 2021); Breast enhancement surgery (Bilateral); shoulder surgery (Left, 2022); shoulder surgery (Left, 2022); and Cardiac catheterization (2022).       Social History     Socioeconomic History    Marital status: Single     Spouse name: Not on file    Number of children: Not on file    Years of education: Not on file    Highest education level: Not on file   Occupational History    Not on file   Tobacco Use    Smoking status: Former     Packs/day: 1.50     Years: 20.00     Pack years: 30.00     Types: Cigarettes     Quit date: 45     Years since quittin.1     Passive exposure: Current (inside the home)    Smokeless tobacco: Never    Tobacco comments:     staRTED 8TH GRADE-ENDED 32YRS AGO   Vaping Use    Vaping Use: Never used   Substance and Sexual Activity    Alcohol use: Not Currently     Comment: maybe oncee a year    Drug use: No    Sexual activity: Yes     Partners: Male     Comment: I DONT KNOW -MAYBE 1/2 DOZEN   Other Topics Concern    Not on file   Social History Narrative Not on file     Social Determinants of Health     Financial Resource Strain: Low Risk     Difficulty of Paying Living Expenses: Not hard at all   Food Insecurity: No Food Insecurity    Worried About Running Out of Food in the Last Year: Never true    Ran Out of Food in the Last Year: Never true   Transportation Needs: Not on file   Physical Activity: Insufficiently Active    Days of Exercise per Week: 2 days    Minutes of Exercise per Session: 30 min   Stress: Not on file   Social Connections: Not on file   Intimate Partner Violence: Not on file   Housing Stability: Not on file       Family History   Problem Relation Age of Onset    Cancer Mother     Hypertension Mother     Stroke Mother     Dementia Mother     Migraines Mother     Cancer Maternal Aunt     Hypertension Father     Heart Surgery Father     Alzheimer's Disease Father     Diabetes Neg Hx        Allergies:  Latex, Prozac [fluoxetine hcl], Sulfa antibiotics, and Codeine    Home Medications:  Prior to Admission medications    Medication Sig Start Date End Date Taking?  Authorizing Provider   pantoprazole (PROTONIX) 40 MG tablet TAKE ONE TABLET BY MOUTH EVERY MORNING 1/30/23   Romario Heard,    propranolol (INDERAL LA) 80 MG extended release capsule  11/28/22   Historical Provider, MD   buPROPion (WELLBUTRIN XL) 150 MG extended release tablet Take 150 mg by mouth TOTAL  MG DAILY 12/12/22   Historical Provider, MD   oxyCODONE-acetaminophen (PERCOCET) 5-325 MG per tablet  9/13/22   Historical Provider, MD   atorvastatin (LIPITOR) 80 MG tablet Take 1 tablet by mouth nightly 7/27/22   Romario Heard DO   alendronate (FOSAMAX) 70 MG tablet TAKE 1 TABLET BY MOUTH ONCE WEEKLY BEFORE BREAKFAST, ON AN EMPTY STOMACH: REMAIN UPRIGHT FOR 30 MINUTES:TAKE WITH 8 OUNCES OF WATER 5/17/22   Romario Heard DO   buPROPion (WELLBUTRIN XL) 300 MG extended release tablet Take 450 mg by mouth daily 4/11/22   Historical Provider, MD   aspirin 81 MG chewable tablet Take 1 tablet by mouth daily 4/17/22   Jovani Lang MD   naproxen (NAPROSYN) 500 MG tablet TAKE ONE TABLET BY MOUTH TWICE A DAY WITH MEALS  Patient not taking: No sig reported 4/13/22 7/15/22  Lazaro Rahman DO   amphetamine-dextroamphetamine (ADDERALL) 30 MG tablet 30 mg 2 times daily. 9/27/21   Historical Provider, MD   citalopram (CELEXA) 40 MG tablet TAKE ONE TABLET BY MOUTH DAILY 4/8/20   Deb Lewis MD   Calcium Carbonate (CALCIUM 600 PO) Take 600 mg by mouth daily    Historical Provider, MD         REVIEW OF SYSTEMS       Review of Systems   Constitutional:  Negative for chills and fever. HENT:  Negative for ear discharge. Eyes:  Negative for visual disturbance. Respiratory:  Negative for shortness of breath. Cardiovascular:  Negative for chest pain. Gastrointestinal:  Negative for abdominal pain, diarrhea, nausea and vomiting. Musculoskeletal:         Left wrist pain, left elbow pain   Skin:  Positive for color change. Neurological:  Positive for headaches. Negative for seizures, syncope, speech difficulty and weakness. PHYSICAL EXAM      INITIAL VITALS:   /76   Pulse 64   Temp 97 °F (36.1 °C) (Oral)   Resp 16   Wt 165 lb (74.8 kg)   SpO2 95%   BMI 32.22 kg/m²     Physical Exam  Constitutional:       General: She is not in acute distress. Appearance: She is not ill-appearing, toxic-appearing or diaphoretic. HENT:      Head:      Comments: Ecchymosis of the left frontal bone over the left orbit, negative raccoon eyes, no lauren sign, no hemotympanum bilaterally, small abrasion over the nasal bridge, no septal hematoma   Eyes:      Extraocular Movements: Extraocular movements intact. Pupils: Pupils are equal, round, and reactive to light. Cardiovascular:      Rate and Rhythm: Normal rate and regular rhythm. Heart sounds: No murmur heard. No friction rub. No gallop. Pulmonary:      Effort: No respiratory distress.       Breath sounds: No stridor. No wheezing, rhonchi or rales. Chest:      Chest wall: No tenderness. Abdominal:      General: There is no distension. Palpations: There is no mass. Tenderness: There is no abdominal tenderness. There is no guarding or rebound. Hernia: No hernia is present. Musculoskeletal:         General: Tenderness present. No swelling, deformity or signs of injury. Cervical back: No rigidity or tenderness. Comments: Left wrist tenderness to palpation, limited range of motion some scaphoid tenderness, tenderness of the left elbow, some mild tenderness to left shoulder, no obvious deformity, thoracic and lumbar spine nontender to palpation, pelvis stable nontender, no tenderness of the lower extremities no deformity   Skin:     Capillary Refill: Capillary refill takes less than 2 seconds. Coloration: Skin is not jaundiced or pale. Findings: Bruising present. No erythema, lesion or rash. Neurological:      Mental Status: She is oriented to person, place, and time. Cranial Nerves: No cranial nerve deficit. Sensory: No sensory deficit. Motor: No weakness. Coordination: Coordination normal.         DDX/DIAGNOSTIC RESULTS / EMERGENCY DEPARTMENT COURSE / MDM     Medical Decision Making  34-year-old female presenting with complaint of headache and left arm pain status post fall yesterday. Patient giving differing stories for myself medical student as well as attending physician. Therefore given complaint of severe headache patient cannot be cleared [facility] CT head. Will give analgesia obtain imaging. Patient stated that she is increased falls will obtain laboratory testing as well. Reassess. Differential diagnosis of strain/sprain, fracture, intracranial hemorrhage, electrolyte abnormality, anemia    Work-up unremarkable. Low suspicion for giant cell arteritis. Was unremarkable. No findings of anemia.   No fractures or intracranial hemorrhage on imaging. Neuro intact. Thumb spica splint placed in emergency department. Neurovascular intact afterwards. Patient given an splint precautions and advised to follow-up in 1 week for repeat imaging. Amount and/or Complexity of Data Reviewed  Labs: ordered. Radiology: ordered. Risk  OTC drugs. Prescription drug management. EMERGENCY DEPARTMENT COURSE:      ED Course as of 02/07/23 1346   Tue Feb 07, 2023   1104 Patient did tell attending physician that she has had headache with chewing for the past year. Given age will obtain ESR and CRP. Is nontender over the temporal regions. [MS]   1202 X-ray imaging unremarkable. Awaiting CT head and CT cervical spine results. Awaiting laboratory results. [MS]   1257 Splint placed due to scaphoid tenderness. Does have possible scaphoid lunate dissociation as well. Will have patient follow-up with primary care provider for repeat x-ray imaging. Given splint precautions. Patient voiced understanding amenable discharge [MS]      ED Course User Index  [MS] Jayson Bailey DO       PROCEDURES:      CONSULTS:  None    CRITICAL CARE:  There was significant risk of life threatening deterioration of patient's condition requiring my direct management. Critical care time  minutes, excluding any documented procedures. FINAL IMPRESSION      1.  Sprain          DISPOSITION / PLAN     DISPOSITION Decision To Discharge 02/07/2023 12:57:39 PM      PATIENT REFERRED TO:  Holley Alvarez DO  [address]  [address]  [phone]    Schedule an appointment as soon as possible for a visit       [facility] ED  [place] [id number] [phone]    If symptoms worsen    DISCHARGE MEDICATIONS:  Discharge Medication List as of 2/7/2023  1:09 PM          Luz Arrington DO  Emergency Medicine Resident    (Please note that portions of thisnote were completed with a voice recognition program.  Efforts were made to edit the dictations but occasionally words are mis-transcribed.)        Rod Asher DO  Resident  02/07/23 [id number]

## 2023-02-07 NOTE — DISCHARGE INSTRUCTIONS
Please wear your splint at all times. Please call and schedule appoint with your primary care provider for repeat x-ray imaging in 1 to 2 weeks. Emergency department immediately if you have a swelling in the fingers changing color numbness or tingling. Take your medication as indicated and prescribed. For pain use acetaminophen (Tylenol) or ibuprofen (Motrin / Advil), unless prescribed medications that have acetaminophen or ibuprofen (or similar medications) in it. You can take over the counter acetaminophen tablets (1 - 2 tablets of the 500-mg strength every 6 hours) or ibuprofen tablets (2 tablets every 4 hours). Use an ice pack or bag filled with ice and apply to the injured area 3 - 4 times a day for 15 - 20 minutes each time. If the injury is older than 3 days, then use a heating pad to help relax the muscles. PLEASE RETURN TO THE EMERGENCY DEPARTMENT IMMEDIATELY for worsening of pain, worse swelling to your wrist, inability to move your wrist or fingers, or if you develop any concerning symptoms such as: high fever not relieved by acetaminophen (Tylenol) and/or ibuprofen (Motrin / Advil), chills, feeling of your heart fluttering or racing, persistent nausea and/or vomiting, vomiting up blood, blood in your stool, loss of consciousness, numbness, weakness or tingling in the arms or legs or change in color of the extremities, changes in mental status, persistent headache, blurry vision, loss of bladder / bowel control, unable to follow up with your physician, or other any other care or concern.

## 2023-02-07 NOTE — ED PROVIDER NOTES
[facility]  Emergency Department  Faculty Attestation     I performed a history and physical examination of the patient and discussed management with the resident. I reviewed the residents note and agree with the documented findings and plan of care. Any areas of disagreement are noted on the chart. I was personally present for the key portions of any procedures. I have documented in the chart those procedures where I was not present during the key portions. I have reviewed the emergency nurses triage note. I agree with the chief complaint, past medical history, past surgical history, allergies, medications, social and family history as documented unless otherwise noted below. For Physician Assistant/ Nurse Practitioner cases/documentation I have personally evaluated this patient and have completed at least one if not all key elements of the E/M (history, physical exam, and MDM). Additional findings are as noted. Primary Care Physician:  Marjorie Lim DO    Screenings:  [unfilled]    CHIEF COMPLAINT       Chief Complaint   Patient presents with    Fall     2/6/23    Facial Pain    Arm Pain    Hand Pain     left       RECENT VITALS:   Temp: 97 °F (36.1 °C),  Heart Rate: 64, Resp: 16, BP: 103/76    LABS:  Labs Reviewed   CBC WITH AUTO DIFFERENTIAL   COMPREHENSIVE METABOLIC PANEL   MAGNESIUM   C-REACTIVE PROTEIN   SEDIMENTATION RATE       Radiology  XR WRIST LEFT (MIN 3 VIEWS)    (Results Pending)   XR HAND LEFT (MIN 3 VIEWS)    (Results Pending)   XR ELBOW LEFT (MIN 3 VIEWS)    (Results Pending)   XR SHOULDER LEFT (MIN 2 VIEWS)    (Results Pending)   CT HEAD WO CONTRAST    (Results Pending)   CT CERVICAL SPINE WO CONTRAST    (Results Pending)       CRITICAL CARE: There was a high probability of clinically significant/life threatening deterioration in this patient's condition which required my urgent intervention. Total critical care time was none minutes. This excludes any time for separately reportable procedures. EKG:      Attending Physician Additional  Notes    Patient fell after tripping on a curb late Sunday night. She landed on her face and left upper extremity. She is uncertain regarding loss of consciousness though she doubts it. She has been having headache but this has been present for the past year or 2 pets constant in nature and worse with chewing. She has mild upper midline neck pain. There is bruising by her left elbow and inability to completely extend. She also has pain on the wrist with pain with dorsiflexion. No nausea or vomiting. No vertigo. She states she has been having frequent falling. She feels that she cannot walk fast enough to keep up with her feet. No history of tremor or Parkinson's. She is not on anticoagulation despite history of stroke. She does take aspirin and a statin. No Plavix Coumadin or DOAC. She denies alcohol. No history of abuse. On exam she is GCS 15, vital signs normal.  Neck is supple and full range of movement but has tenderness at C1-C2 region. Temporal artery pulses are diminished but no nodularity. There is minimal to no scalp tenderness. Normal pupils next ocular movements. Normal speech and mentation. Normal motor strength. Left shoulder is full range of movement. There is yellowish bruise over the ulnar aspect of the left elbow. There is some pain with extension of the elbow and localized tenderness over the proximal ulna. Wrist has limited extension. There is tenderness and swelling over the scapholunate region. There is minimal scaphoid tenderness. There is signs of degenerative arthritis in multiple digits. Lungs are clear. Card exam is without murmur gallop or rub. Abdomen soft and nontender. There is normal motor strength. Gait not yet tested. There is abrasion over the bridge of the nose as well as the left malar region. No epistaxis. Normal occlusion.   Impression is fall, head contusion, neck strain rule out fracture. Suspect left wrist injury scapholunate dissociation possible scaphoid fracture, possible left elbow fracture, gait disturbance, frequent falling. Plan is laboratory studies, fluids, analgesics, CT head, CT cervical spine, plain films of the left upper extremity, anticipate splinting of the left wrist.  Will assess gait prior to disposition. Latesha Meyer.  Susie Robertson MD, [facility]  Attending Emergency  Physician                Murphy Riddle MD  02/07/23 [id number]

## 2023-02-07 NOTE — ED NOTES
Labeled blood specimens sent to lab via tube system.     [x] Lavender   [] on ice   [x] Blue   [x] Green/yellow  [] Green/black [] on ice  [] Pink  [] Red  [x] Yellow  [] on ice  [] Blood Cultures      Tamica Holman RN  02/07/23 2245

## 2023-02-07 NOTE — ED NOTES
Pt arrives to ED 11 via triage. Pt states that she fell yesterday. Pt is unsure of whether or not she passed out or tripped. Pt did hit her head, nose and she states that her wrist is very painful and swollen. Pt states that she does take aspirin. Pt states that she had a previous stroke but denies any deficits on one side. Pt respirations are even and unlabored, pt is alert and oriented X 4, speaking in complete sentences, bed is in the lowest position, call light is within reach, NAD noted. Will continue to follow plan of care.         Sarah Cueto RN  02/07/23 [id number]

## 2023-02-07 NOTE — FLOWSHEET NOTE
[] [facility]) - [facility]  [address]    [phone]  F: [phone]   [] [address]  [facility] [address]   [address]  P: [phone]  F: [phone]  [] [address]  [address]  P: [phone]  F: [phone] [] [address]  P: [phone]  F: [phone]  [] [address]  [facility]   [address]   [place]: [phone]  F: [phone]   [] [place] [id number] [facility] [address]  [place]: [phone]   F: [phone]     Physical Therapy Cancel/No Show note    Date: 2023  Patient: Loly Massey  : 1960  MRN: [id number]    Cancels/No Shows to date: 1 cancel/0 no shows    For today's appointment patient:    [x]  Cancelled    [] Rescheduled appointment    [] No-show     Reason given by patient:    []  Patient ill    []  Conflicting appointment    [] No transportation      [] Conflict with work    [] No reason given    [] Weather related    [] [id number]    [x] Other:   Hospital admission   Comments:        [] Next appointment was confirmed: Spoke to patient on phone, she had a fall and is heading home from the hospital. She will call back to reschedule when feeling better.     Electronically signed by: Jamel Steiner, PT

## 2023-02-24 DIAGNOSIS — I10 ESSENTIAL HYPERTENSION: Primary | ICD-10-CM

## 2023-02-24 RX ORDER — PROPRANOLOL HYDROCHLORIDE 80 MG/1
CAPSULE, EXTENDED RELEASE ORAL
Qty: 90 CAPSULE | Refills: 3 | Status: SHIPPED | OUTPATIENT
Start: 2023-02-24

## 2023-02-24 NOTE — TELEPHONE ENCOUNTER
Sloane Pruett is calling to request a refill on the following medication(s):    Medication Request:  Requested Prescriptions     Pending Prescriptions Disp Refills    propranolol (INDERAL LA) 80 MG extended release capsule [Pharmacy Med Name: PROPRANOLOL ER 80 MG CAPSULE] 90 capsule      Sig: TAKE ONE CAPSULE BY MOUTH DAILY       Last Visit Date (If Applicable):  8/82/6167    Next Visit Date:    4/19/2023

## 2023-03-06 RX ORDER — ATORVASTATIN CALCIUM 80 MG/1
TABLET, FILM COATED ORAL
Qty: 30 TABLET | Refills: 3 | OUTPATIENT
Start: 2023-03-06

## 2023-03-07 RX ORDER — ATORVASTATIN CALCIUM 80 MG/1
TABLET, FILM COATED ORAL
Qty: 30 TABLET | Refills: 3 | OUTPATIENT
Start: 2023-03-07

## 2023-03-08 ENCOUNTER — TELEPHONE (OUTPATIENT)
Dept: FAMILY MEDICINE CLINIC | Age: 63
End: 2023-03-08

## 2023-03-08 RX ORDER — ATORVASTATIN CALCIUM 80 MG/1
TABLET, FILM COATED ORAL
Qty: 30 TABLET | Refills: 3 | Status: SHIPPED | OUTPATIENT
Start: 2023-03-08

## 2023-03-08 NOTE — TELEPHONE ENCOUNTER
Patient states she is having a acne flare up again and is wanting a rx of the abx you prescribed before.  Please advise

## 2023-03-08 NOTE — TELEPHONE ENCOUNTER
Neeraj Koch is calling to request a refill on the following medication(s):    Medication Request:  Requested Prescriptions     Pending Prescriptions Disp Refills    atorvastatin (LIPITOR) 80 MG tablet [Pharmacy Med Name: ATORVASTATIN 80 MG TABLET] 30 tablet 3     Sig: TAKE ONE TABLET BY MOUTH ONCE NIGHTLY       Last Visit Date (If Applicable):  5/75/8162    Next Visit Date:    3/8/2023

## 2023-04-06 ENCOUNTER — HOSPITAL ENCOUNTER (OUTPATIENT)
Age: 63
Discharge: HOME | End: 2023-04-08
Payer: MEDICARE

## 2023-04-06 ENCOUNTER — HOSPITAL ENCOUNTER (OUTPATIENT)
Dept: GENERAL RADIOLOGY | Age: 63
Discharge: HOME | End: 2023-04-08
Payer: MEDICARE

## 2023-04-06 ENCOUNTER — HOSPITAL ENCOUNTER (OUTPATIENT)
Dept: PHYSICAL THERAPY | Facility: CLINIC | Age: 63
Setting detail: THERAPIES SERIES
Discharge: HOME | End: 2023-04-06
Payer: MEDICARE

## 2023-04-06 DIAGNOSIS — M47.22 CERVICAL SPONDYLOSIS WITH RADICULOPATHY: ICD-10-CM

## 2023-04-06 PROCEDURE — 97163 PT EVAL HIGH COMPLEX 45 MIN: CPT

## 2023-04-06 PROCEDURE — 97162 PT EVAL MOD COMPLEX 30 MIN: CPT

## 2023-04-06 PROCEDURE — 72040 X-RAY EXAM NECK SPINE 2-3 VW: CPT

## 2023-04-06 NOTE — PLAN OF CARE
no bottom teeth and she cannot ingest other foods. She gets new teeth soon. Because of this, the patient reports gaining much weight. Assessment:the patient is a pleasant 58year old female with a complicated medical and surgical history who presents with neck and back pain. She has limited movement in all extremities and trunk, has areas of strength and weakness and has muscle and fascial tightness. She has pelvic and spine anomalies including a scoliosis. Due to having a stroke, the patient is somewhat of a poor historian but has a good attitude. In her daily life she is inactive, excessively sleeping and has a poor diet (reports only eating sweets due to lack of teeth which she should be getting dentures in the next week.) Patient would benefit from skilled physical therapy services in order to: restore physical conditioning, decreasing tightness and improving pelvic and spine anomalies. We will encourage her to eat a healthy, balanced diet and to increase overall activity level. I would suggest seeing her for her LB as well as her neck to further improve her overall well being. ADD LBP to current script:     Problem list, as detailed above:   [x] ? Back Pain: 7/10 L side                  [x] ? Cervical Pain:      7/10 with headaches (L), UE numbness L more than R  [x] ? ROM:       L more than R shoulder, neck, back, LEs        [x] ? Strength:  Core and spine strength, L UE  [x] ? Function: NDI: 68% deficit  [x] Postural Deviations: scoliosis, mild guarding upper body  [] Gait Deviations not assessed  [x] Other:         Decreased balance L LE     STG: (to be met in 8 treatments)  ? Pain: below 7/10 avg pain  ? ROM: tolerate flexibility exercises for entire spine, extremities to improve ability to   ? Strength: tolerate strength program for core and spine, UEs to make it easier to lift and carry with less pain  ?  Function: have a better balance between sleep and activity   Patient to be independent with home

## 2023-04-06 NOTE — CONSULTS
Bud Fall Risk Assessment    Patient Name:  Toney Troncoso  : 1960    Risk Factor Scale  Score   History of Falls [x] Yes  [] No 25  0 25   Secondary Diagnosis [x] Yes  [] No 15  0 15   Ambulatory Aid [] Furniture  [] Crutches/cane/walker  [x] None/bedrest/wheelchair/nurse 30  15  0 0   IV/Heparin Lock [] Yes  [x] No 20  0 0   Gait/Transferring [] Impaired  [] Weak  [x] Normal/bedrest/immobile 20  10  0 0   Mental Status [] Forgets limitations  [x] Oriented to own ability 15  0 0      Total:  40     Based on the Assessment score: check the appropriate box.     []  No intervention needed   Low =   Score of 0-24    [x]  Use standard prevention interventions Moderate =  Score of 24-44   [x] Give patient handout and discuss fall prevention strategies   [x] Establish goal of education for patient/family RE: fall prevention strategies    []  Use high risk prevention interventions High = Score of 45 and higher   [] Give patient handout and discuss fall prevention strategies   [] Establish goal of education for patient/family Re: fall prevention strategies   [] Discuss lifeline / other resources    Electronically signed by:   Lyndsey Meza PT  Date: 2023
gets new teeth soon. Because of this, the patient reports gaining much weight. PMHx: L shoulder reverse replacement. Has had much happen between January and March. Pt with R wrist fusion.   Past Surgical History:   Procedure Laterality Date    ANKLE SURGERY Left 2018    mass excision    ARM SURGERY Right     Pt states she has had 13 right arm surgeries    BREAST ENHANCEMENT SURGERY Bilateral     CARDIAC CATHETERIZATION  2022    DR ARIZMENDI  /  VERONICA CORS     SECTION      x 3    COLONOSCOPY  2013    sigmoid diverticula, prominent large internal hemorrhoid    COLONOSCOPY N/A 02/10/2020    COLONOSCOPY DIAGNOSTIC performed by Anderson Servin MD at [address] [place] [address] 2020    COLORECTAL CANCER SCREENING, NOT HIGH RISK performed by Juanito Ovalle MD at [place] [address], COLON, DIAGNOSTIC      ESOPHAGEAL MOTILITY STUDY N/A 2020    PES RN - ESOPHAGEAL MOTILITY STUDY performed by Man Smith DO at [facility] STUDY  09/10/2020    ESOPHAGEAL MOTILITY STUDY performed by Man Smith DO at [facility] Endoscopy    ESOPHAGEAL MOTILITY STUDY N/A 2020    ESOPHAGEAL MOTILITY STUDY ATTEMPTED PER DR. Ace Avila WITHOUT SUCCESS performed by Man Smith DO at [facility]oscopy    FOOT SURGERY Left     FOOT TENDON SURGERY Left 2019    LEFT PERONEAL TENDON REPAIR WITH POSSIBLE LEFT TENDON TRANSFER (Left )    KNEE ARTHROSCOPY Left     Two surgeries on left knee per pt    KNEE SURGERY Right     Three surgeries per pt    LIGAMENT REPAIR Left 2019    LEFT PERONEAL TENDON REPAIR performed by Larry Boyle DPM at [address] [place] [address],[address] Bilateral 2021     BILATERAL L4/5, L5/S1 LUMBAR FACET (Bilateral )    NOSE SURGERY      PAIN MANAGEMENT PROCEDURE Bilateral 2021    BILATERAL L4/5, L5/S1 LUMBAR FACET performed by Ericka Aguilar MD at [address] [place] [address]  2021    BILATERAL L4/5, L5/S1 NERVE

## 2023-04-18 ENCOUNTER — OFFICE VISIT (OUTPATIENT)
Dept: NEUROSURGERY | Age: 63
End: 2023-04-18

## 2023-04-18 ENCOUNTER — HOSPITAL ENCOUNTER (OUTPATIENT)
Dept: PHYSICAL THERAPY | Facility: CLINIC | Age: 63
Setting detail: THERAPIES SERIES
Discharge: HOME | End: 2023-04-18
Payer: MEDICARE

## 2023-04-18 VITALS
WEIGHT: 158 LBS | HEART RATE: 66 BPM | DIASTOLIC BLOOD PRESSURE: 89 MMHG | OXYGEN SATURATION: 97 % | TEMPERATURE: 98 F | SYSTOLIC BLOOD PRESSURE: 132 MMHG | BODY MASS INDEX: 30.86 KG/M2 | RESPIRATION RATE: 22 BRPM

## 2023-04-18 DIAGNOSIS — M47.22 CERVICAL SPONDYLOSIS WITH RADICULOPATHY: Primary | ICD-10-CM

## 2023-04-18 PROCEDURE — 99024 POSTOP FOLLOW-UP VISIT: CPT | Performed by: NURSE PRACTITIONER

## 2023-04-18 PROCEDURE — 97110 THERAPEUTIC EXERCISES: CPT

## 2023-04-18 SDOH — ECONOMIC STABILITY: INCOME INSECURITY: HOW HARD IS IT FOR YOU TO PAY FOR THE VERY BASICS LIKE FOOD, HOUSING, MEDICAL CARE, AND HEATING?: SOMEWHAT HARD

## 2023-04-18 SDOH — ECONOMIC STABILITY: HOUSING INSECURITY
IN THE LAST 12 MONTHS, WAS THERE A TIME WHEN YOU DID NOT HAVE A STEADY PLACE TO SLEEP OR SLEPT IN A SHELTER (INCLUDING NOW)?: NO

## 2023-04-18 SDOH — ECONOMIC STABILITY: FOOD INSECURITY: WITHIN THE PAST 12 MONTHS, YOU WORRIED THAT YOUR FOOD WOULD RUN OUT BEFORE YOU GOT MONEY TO BUY MORE.: SOMETIMES TRUE

## 2023-04-18 SDOH — ECONOMIC STABILITY: TRANSPORTATION INSECURITY
IN THE PAST 12 MONTHS, HAS LACK OF TRANSPORTATION KEPT YOU FROM MEETINGS, WORK, OR FROM GETTING THINGS NEEDED FOR DAILY LIVING?: YES

## 2023-04-18 SDOH — ECONOMIC STABILITY: FOOD INSECURITY: WITHIN THE PAST 12 MONTHS, THE FOOD YOU BOUGHT JUST DIDN'T LAST AND YOU DIDN'T HAVE MONEY TO GET MORE.: SOMETIMES TRUE

## 2023-04-18 NOTE — FLOWSHEET NOTE
Seated Backward Shoulder Rolls  - 1 x daily - 7 x weekly - 2 sets - 10 reps  - Hooklying Hamstring Stretch with Strap  - 1 x daily - 7 x weekly - 3 sets - 20 seconds hold  - Supine Posterior Pelvic Tilt  - 1 x daily - 7 x weekly - 2 sets - 10 reps - 5 seconds  hold  - Supine Bridge  - 1 x daily - 7 x weekly - 2 sets - 10 reps  - Supine Lower Trunk Rotation  - 1 x daily - 7 x weekly - 1 sets - 10 reps - 5 seconds hold  - Hooklying Single Knee to Chest Stretch  - 1 x daily - 7 x weekly - 1 sets - 10 reps - 10 seconds hold  - Supine Hip Adduction Isometric with Ball  - 1 x daily - 7 x weekly - 1 sets - 10 reps - 5 seconds hold  - Hooklying Clamshell with Resistance  - 1 x daily - 7 x weekly - 2 sets - 10 reps  - Seated Long Arc Quad  - 1 x daily - 7 x weekly - 2 sets - 10 reps - 5 seconds  hold  Comprehension of Education:  [x] Verbalizes understanding. [] Demonstrates understanding. [] Needs review. [] Demonstrates/verbalizes HEP/Ed previously given. Plan: [x] Continue current frequency toward long and short term goals.     [] Specific Instructions for subsequent treatments:       Time In:  2:05 pm          Time Out: 2:55 pm    Electronically signed by:  Jonny Leyden, PTA
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
## 2023-04-19 ENCOUNTER — OFFICE VISIT (OUTPATIENT)
Dept: FAMILY MEDICINE CLINIC | Age: 63
End: 2023-04-19
Payer: MEDICARE

## 2023-04-19 ENCOUNTER — HOSPITAL ENCOUNTER (OUTPATIENT)
Age: 63
Setting detail: SPECIMEN
Discharge: HOME | End: 2023-04-19

## 2023-04-19 VITALS
DIASTOLIC BLOOD PRESSURE: 70 MMHG | OXYGEN SATURATION: 98 % | WEIGHT: 158 LBS | BODY MASS INDEX: 30.86 KG/M2 | HEART RATE: 70 BPM | SYSTOLIC BLOOD PRESSURE: 110 MMHG

## 2023-04-19 DIAGNOSIS — F41.1 GENERALIZED ANXIETY DISORDER: ICD-10-CM

## 2023-04-19 DIAGNOSIS — F90.9 ATTENTION DEFICIT HYPERACTIVITY DISORDER (ADHD), UNSPECIFIED ADHD TYPE: ICD-10-CM

## 2023-04-19 DIAGNOSIS — G93.32 CHRONIC FATIGUE SYNDROME: ICD-10-CM

## 2023-04-19 DIAGNOSIS — I63.9 ISCHEMIC STROKE (HCC): ICD-10-CM

## 2023-04-19 DIAGNOSIS — I10 ESSENTIAL HYPERTENSION: Primary | ICD-10-CM

## 2023-04-19 DIAGNOSIS — J45.40 MODERATE PERSISTENT ASTHMA WITHOUT COMPLICATION: ICD-10-CM

## 2023-04-19 PROBLEM — J94.2 HEMOTHORAX ON RIGHT: Status: RESOLVED | Noted: 2021-01-14 | Resolved: 2023-04-19

## 2023-04-19 PROBLEM — D64.9 ABSOLUTE ANEMIA: Status: RESOLVED | Noted: 2020-06-12 | Resolved: 2023-04-19

## 2023-04-19 PROBLEM — Z79.891 ENCOUNTER FOR LONG-TERM OPIATE ANALGESIC USE: Chronic | Status: RESOLVED | Noted: 2021-07-15 | Resolved: 2023-04-19

## 2023-04-19 PROBLEM — R93.1 ABNORMAL ECHOCARDIOGRAM: Status: RESOLVED | Noted: 2022-10-11 | Resolved: 2023-04-19

## 2023-04-19 PROBLEM — D64.9 ANEMIA: Status: RESOLVED | Noted: 2020-10-28 | Resolved: 2023-04-19

## 2023-04-19 PROBLEM — S27.321A CONTUSION OF RIGHT LUNG: Status: RESOLVED | Noted: 2021-01-14 | Resolved: 2023-04-19

## 2023-04-19 PROBLEM — D64.9 ACUTE ANEMIA: Status: RESOLVED | Noted: 2022-07-12 | Resolved: 2023-04-19

## 2023-04-19 LAB
ABSOLUTE EOS #: 0.22 K/UL (ref 0–0.44)
ABSOLUTE IMMATURE GRANULOCYTE: <0.03 K/UL (ref 0–0.3)
ABSOLUTE LYMPH #: 1.61 K/UL (ref 1.1–3.7)
ABSOLUTE MONO #: 0.66 K/UL (ref 0.1–1.2)
ALBUMIN SERPL-MCNC: 4.1 G/DL (ref 3.5–5.2)
ALBUMIN/GLOBULIN RATIO: 1.6 (ref 1–2.5)
ALP SERPL-CCNC: 125 U/L (ref 35–104)
ALT SERPL-CCNC: 19 U/L (ref 5–33)
ANION GAP SERPL CALCULATED.3IONS-SCNC: 13 MMOL/L (ref 9–17)
AST SERPL-CCNC: 29 U/L
BASOPHILS # BLD: 1 % (ref 0–2)
BASOPHILS ABSOLUTE: 0.06 K/UL (ref 0–0.2)
BILIRUB SERPL-MCNC: 0.2 MG/DL (ref 0.3–1.2)
BUN SERPL-MCNC: 17 MG/DL (ref 8–23)
CALCIUM SERPL-MCNC: 9.4 MG/DL (ref 8.6–10.4)
CHLORIDE SERPL-SCNC: 105 MMOL/L (ref 98–107)
CO2 SERPL-SCNC: 25 MMOL/L (ref 20–31)
CREAT SERPL-MCNC: 1.37 MG/DL (ref 0.5–0.9)
EOSINOPHILS RELATIVE PERCENT: 4 % (ref 1–4)
GFR SERPL CREATININE-BSD FRML MDRD: 43 ML/MIN/1.73M2
GLUCOSE SERPL-MCNC: 76 MG/DL (ref 70–99)
HCT VFR BLD AUTO: 40.9 % (ref 36.3–47.1)
HGB BLD-MCNC: 12.8 G/DL (ref 11.9–15.1)
IMMATURE GRANULOCYTES: 0 %
LYMPHOCYTES # BLD: 30 % (ref 24–43)
MCH RBC QN AUTO: 30 PG (ref 25.2–33.5)
MCHC RBC AUTO-ENTMCNC: 31.3 G/DL (ref 28.4–34.8)
MCV RBC AUTO: 96 FL (ref 82.6–102.9)
MONOCYTES # BLD: 12 % (ref 3–12)
NRBC AUTOMATED: 0 PER 100 WBC
PDW BLD-RTO: 13.7 % (ref 11.8–14.4)
PLATELET # BLD AUTO: 300 K/UL (ref 138–453)
PMV BLD AUTO: 10.7 FL (ref 8.1–13.5)
POTASSIUM SERPL-SCNC: 4.1 MMOL/L (ref 3.7–5.3)
PROT SERPL-MCNC: 6.7 G/DL (ref 6.4–8.3)
RBC # BLD: 4.26 M/UL (ref 3.95–5.11)
SEG NEUTROPHILS: 53 % (ref 36–65)
SEGMENTED NEUTROPHILS ABSOLUTE COUNT: 2.85 K/UL (ref 1.5–8.1)
SODIUM SERPL-SCNC: 143 MMOL/L (ref 135–144)
TSH SERPL-ACNC: 3.86 UIU/ML (ref 0.3–5)
WBC # BLD AUTO: 5.4 K/UL (ref 3.5–11.3)

## 2023-04-19 PROCEDURE — 1036F TOBACCO NON-USER: CPT | Performed by: FAMILY MEDICINE

## 2023-04-19 PROCEDURE — 3078F DIAST BP <80 MM HG: CPT | Performed by: FAMILY MEDICINE

## 2023-04-19 PROCEDURE — 99214 OFFICE O/P EST MOD 30 MIN: CPT | Performed by: FAMILY MEDICINE

## 2023-04-19 PROCEDURE — 3074F SYST BP LT 130 MM HG: CPT | Performed by: FAMILY MEDICINE

## 2023-04-19 PROCEDURE — 3017F COLORECTAL CA SCREEN DOC REV: CPT | Performed by: FAMILY MEDICINE

## 2023-04-19 PROCEDURE — G8417 CALC BMI ABV UP PARAM F/U: HCPCS | Performed by: FAMILY MEDICINE

## 2023-04-19 PROCEDURE — G8427 DOCREV CUR MEDS BY ELIG CLIN: HCPCS | Performed by: FAMILY MEDICINE

## 2023-04-19 ASSESSMENT — ENCOUNTER SYMPTOMS
HEMOPTYSIS: 0
ALLERGIC/IMMUNOLOGIC NEGATIVE: 1
TROUBLE SWALLOWING: 0
DIFFICULTY BREATHING: 0
SORE THROAT: 0
HOARSE VOICE: 0
SHORTNESS OF BREATH: 0
RHINORRHEA: 0
HEARTBURN: 0
EYES NEGATIVE: 1
GASTROINTESTINAL NEGATIVE: 1
WHEEZING: 0
COUGH: 1
FREQUENT THROAT CLEARING: 0
CHEST TIGHTNESS: 0
SPUTUM PRODUCTION: 0

## 2023-04-19 NOTE — PROGRESS NOTES
Cognition and Memory: Cognition is impaired. Judgment: Judgment normal.       Labs/Imaging/Diagnostics   Labs:  Hemoglobin A1C   Date Value Ref Range Status   02/09/2023 5.9 (H) 4.2 - 5.6 % Final     Comment:              [employer] GUIDELINES FOR HGB A1C:            PREDIABETES/INCREASED RISK . . . . . . . 5.7-6.4%            DIAGNOSIS OF DIABETES  . . . . . . . . . >=6.5%              WITH CONFIRMATION OR APPROPRIATE SYMPTOMS       NOTE: ASSAY MAY BE AFFECTED BY HEMOGLOBINOPATHIES (SICKLE       CELL ANEMIA, S-C DISEASE, OTHERS) OR ARTIFICIALLY LOWERED BY       DECREASED RED CELL SURVIVAL (HEMOLYTIC ANEMIAS, BLOOD LOSS,       ETC.). CONSIDER ALTERNATE TESTING OR LABORATORY CONSULTATION. Imaging Last 24 Hours:  XR CERVICAL SPINE FLEXION AND EXTENSION  Narrative: EXAMINATION:  XRAY VIEWS OF THE CERVICAL SPINE    4/6/2023 12:49 pm    COMPARISON:  None. HISTORY:  ORDERING SYSTEM PROVIDED HISTORY: Cervical spondylosis with radiculopathy  TECHNOLOGIST PROVIDED HISTORY:  Reason for Exam: States chronic neck pain with limited range of motion. FINDINGS:  Lateral flexion extension images demonstrates no dynamic instability with  flexion or extension. 1-2 mm persistent anterolisthesis C3 on C4 and C4 on  C5. Moderate degenerative change C5-6 with loss disc height endplate  spondylosis. Prevertebral soft tissues unremarkable. Impression: No dynamic instability with flexion or extension. Multilevel degenerative change.

## 2023-04-20 ENCOUNTER — TELEPHONE (OUTPATIENT)
Dept: FAMILY MEDICINE CLINIC | Age: 63
End: 2023-04-20

## 2023-04-20 ENCOUNTER — HOSPITAL ENCOUNTER (OUTPATIENT)
Dept: PHYSICAL THERAPY | Facility: CLINIC | Age: 63
Setting detail: THERAPIES SERIES
Discharge: HOME | End: 2023-04-20
Payer: MEDICARE

## 2023-04-20 DIAGNOSIS — J01.90 ACUTE BACTERIAL SINUSITIS: Primary | ICD-10-CM

## 2023-04-20 DIAGNOSIS — B96.89 ACUTE BACTERIAL SINUSITIS: Primary | ICD-10-CM

## 2023-04-20 PROCEDURE — 97140 MANUAL THERAPY 1/> REGIONS: CPT

## 2023-04-20 RX ORDER — AMOXICILLIN 500 MG/1
500 CAPSULE ORAL 2 TIMES DAILY
Qty: 14 CAPSULE | Refills: 0 | Status: SHIPPED | OUTPATIENT
Start: 2023-04-20 | End: 2023-04-27

## 2023-04-20 NOTE — FLOWSHEET NOTE
[] [facility]  [address]  [phone]  F: [phone] [x] [address]  [place] [address]   [address]  P: [phone]  F: [phone] [] [address]  [address]  P: [phone]  F: [phone] [] [address]  P: [phone]  F: [phone] [] [address]  [facility]   [address]   [place]: [phone]  F: [phone]      Physical Therapy Daily Treatment Note    Date:  2023  Patient Name:  Devante Dow    :  1960  MRN: [id number]  Physician: CHELSEY Cutler                                   Insurance: [facility] Medicare/[facility] Dual Complete (MED NEC)  Medical Diagnosis: cervical spondylosis with radiculopathy           (Adding LBP by therapy 23)             Rehab Codes: M54.12; M54.59; R29.3; R51; M62.81; R53.81  Onset Date: 2000             Worse since 10/1/2022  Next 's appt. : 23  Visit# / total visits: 3/16; Progress note for Medicare patient due at visit 8     Cancels/No Shows: 0/1    Subjective:    Pain:  [x] Yes  [] No Location: neck, LBP  Pain Rating: (0-10 scale) 'steady 5'/10 neck;  2/10 LBP  Pain altered Tx:  [x] No  [] Yes  Action:  Comments: pt with L sided headache she would like to get rid of. Pt with constant neck and back pain, especially L side. Pt walked home from therapy last session and plans on doing so today to \"lose weight because of the stroke\". Pt then slept all day yesterday. Objective:  Modalities: HP neck and back (pt has never tried using heat at home).  15 min with towel covering in supine legs elevated  Precautions: LATEX allergy, L reverse total shoulder; R wrist fusion, L ankle post tibial tendon repair  Exercises: not

## 2023-04-20 NOTE — TELEPHONE ENCOUNTER
Pt  was seen yesterday and c/o that she has a lot of mucus that is exteremly bad and wants to know what she can do for this.  She uses [facility] on [address]  please advise Thank you

## 2023-04-25 ENCOUNTER — HOSPITAL ENCOUNTER (OUTPATIENT)
Dept: PHYSICAL THERAPY | Facility: CLINIC | Age: 63
Setting detail: THERAPIES SERIES
Discharge: HOME | End: 2023-04-25
Payer: MEDICARE

## 2023-04-25 PROCEDURE — 97110 THERAPEUTIC EXERCISES: CPT

## 2023-04-25 NOTE — FLOWSHEET NOTE
[] [facility] [facility]  [address]  [phone]  F: [phone] [x] [address]  [facility]   [address]  P: [phone]  F: [phone] [] [address]  [address]  P: [phone]  F: [phone] [] [address]  P: [phone]  F: [phone] [] [address]  [facility]   [address]   [place]: [phone]  F: [phone]      Physical Therapy Daily Treatment Note    Date:  2023  Patient Name:  Sonja Garcia    :  1960  MRN: [id number]  Physician: CHELSEY Smith                                   Insurance: [facility] Medicare/[facility] Dual Complete (MED NEC)  Medical Diagnosis: cervical spondylosis with radiculopathy           (Adding LBP by therapy 23)             Rehab Codes: M54.12; M54.59; R29.3; R51; M62.81; R53.81  Onset Date: 2000             Worse since 10/1/2022  Next 's appt. : 23  Visit# / total visits: ; Progress note for Medicare patient due at visit 8     Cancels/No Shows: 0/1    Subjective:    Pain:  [x] Yes  [] No Location: neck, LBP  Pain Rating: (0-10 scale) 5/10 neck;  4-5/10 LBP  Pain altered Tx:  [x] No  [] Yes  Action:  Comments: Reports 8-9/10 neck pain in the morning and reduced after pain medication. Has sharp pains in her back when walking. Continues to express interest in losing weight and unsure why weight hasn't changed as she's been walking more. Mentions she doesn't do HEP as they hurt. Presents with headache on L side of head. Felt good for about an hour after last session then sx's resumed.     Objective:  Modalities: HP neck and back with 2 pillow case each in supine during exercises x20'  Precautions: LATEX allergy, L reverse

## 2023-04-26 ENCOUNTER — OFFICE VISIT (OUTPATIENT)
Dept: FAMILY MEDICINE CLINIC | Age: 63
End: 2023-04-26
Payer: MEDICARE

## 2023-04-26 VITALS
SYSTOLIC BLOOD PRESSURE: 106 MMHG | HEART RATE: 67 BPM | WEIGHT: 156 LBS | OXYGEN SATURATION: 94 % | BODY MASS INDEX: 30.47 KG/M2 | DIASTOLIC BLOOD PRESSURE: 82 MMHG | TEMPERATURE: 97.7 F

## 2023-04-26 DIAGNOSIS — I10 ESSENTIAL HYPERTENSION: ICD-10-CM

## 2023-04-26 DIAGNOSIS — G47.30 SLEEP APNEA, UNSPECIFIED TYPE: ICD-10-CM

## 2023-04-26 DIAGNOSIS — J30.2 SEASONAL ALLERGIES: Primary | ICD-10-CM

## 2023-04-26 DIAGNOSIS — R09.89 CHRONIC THROAT CLEARING: ICD-10-CM

## 2023-04-26 PROCEDURE — 99214 OFFICE O/P EST MOD 30 MIN: CPT

## 2023-04-26 PROCEDURE — G8427 DOCREV CUR MEDS BY ELIG CLIN: HCPCS

## 2023-04-26 PROCEDURE — 1036F TOBACCO NON-USER: CPT

## 2023-04-26 PROCEDURE — 3074F SYST BP LT 130 MM HG: CPT

## 2023-04-26 PROCEDURE — 3017F COLORECTAL CA SCREEN DOC REV: CPT

## 2023-04-26 PROCEDURE — G8417 CALC BMI ABV UP PARAM F/U: HCPCS

## 2023-04-26 PROCEDURE — 3079F DIAST BP 80-89 MM HG: CPT

## 2023-04-26 RX ORDER — FLUTICASONE PROPIONATE 50 MCG
2 SPRAY, SUSPENSION (ML) NASAL DAILY
Qty: 16 G | Refills: 0 | Status: SHIPPED | OUTPATIENT
Start: 2023-04-26

## 2023-04-26 ASSESSMENT — ENCOUNTER SYMPTOMS
NAUSEA: 0
SINUS PRESSURE: 0
DIARRHEA: 0
SORE THROAT: 0
SHORTNESS OF BREATH: 0
CHEST TIGHTNESS: 0
ABDOMINAL PAIN: 0
WHEEZING: 0
COLOR CHANGE: 0
TROUBLE SWALLOWING: 0
VOMITING: 0
COUGH: 1
EYE DISCHARGE: 0
CONSTIPATION: 0

## 2023-04-26 ASSESSMENT — PATIENT HEALTH QUESTIONNAIRE - PHQ9
5. POOR APPETITE OR OVEREATING: 1
1. LITTLE INTEREST OR PLEASURE IN DOING THINGS: 0
2. FEELING DOWN, DEPRESSED OR HOPELESS: 3
10. IF YOU CHECKED OFF ANY PROBLEMS, HOW DIFFICULT HAVE THESE PROBLEMS MADE IT FOR YOU TO DO YOUR WORK, TAKE CARE OF THINGS AT HOME, OR GET ALONG WITH OTHER PEOPLE: 1
9. THOUGHTS THAT YOU WOULD BE BETTER OFF DEAD, OR OF HURTING YOURSELF: 0
SUM OF ALL RESPONSES TO PHQ QUESTIONS 1-9: 10
SUM OF ALL RESPONSES TO PHQ9 QUESTIONS 1 & 2: 3
4. FEELING TIRED OR HAVING LITTLE ENERGY: 1
SUM OF ALL RESPONSES TO PHQ QUESTIONS 1-9: 10
3. TROUBLE FALLING OR STAYING ASLEEP: 1
SUM OF ALL RESPONSES TO PHQ QUESTIONS 1-9: 10
7. TROUBLE CONCENTRATING ON THINGS, SUCH AS READING THE NEWSPAPER OR WATCHING TELEVISION: 2
6. FEELING BAD ABOUT YOURSELF - OR THAT YOU ARE A FAILURE OR HAVE LET YOURSELF OR YOUR FAMILY DOWN: 2
8. MOVING OR SPEAKING SO SLOWLY THAT OTHER PEOPLE COULD HAVE NOTICED. OR THE OPPOSITE, BEING SO FIGETY OR RESTLESS THAT YOU HAVE BEEN MOVING AROUND A LOT MORE THAN USUAL: 0
SUM OF ALL RESPONSES TO PHQ QUESTIONS 1-9: 10

## 2023-04-26 NOTE — PATIENT INSTRUCTIONS
Try a generic Claritin (Loratidine) or Zyrtec (Cetirizine) daily to see if this helps the mucous production    If this doesn't improve you need to follow up with Pulmonology

## 2023-04-26 NOTE — PROGRESS NOTES
Elidia Holman (:  1960) is a 58 y.o. female,Established patient, here for evaluation of the following chief complaint(s):  Shortness of Breath          Subjective   SUBJECTIVE/OBJECTIVE:  Pt here today for SOB  VSS    This is not a new problem, pt was recently seen by PCP and c/o increased mucous production and dry cough. She has a hx of mild asthma and denied SOB at that time. She states she get excessive mucous production which causes a plug in her throat and she has to cough and clear her throat excessively to clear it. This causes panic and she feels SOB in these moments. She was given a course of Amoxicillin which she feels has not improved anything. Denies fevers, no nasal congestion. Dyspnea w/ exertion is a chronic complaint, she states she feels she breathes heavy intermittently w/out cause. Hx of sleep apnea and was evaluated once by sleep medicine w/ no plan per pt. Does not follow regularly. Her significant other has told her she breathes loud and stops intermittently when she's sleeping. Review of Systems   Constitutional:  Negative for activity change, appetite change, chills, fatigue, fever and unexpected weight change. HENT:  Positive for congestion and postnasal drip. Negative for ear pain, sinus pressure, sneezing, sore throat and trouble swallowing. Eyes:  Negative for discharge and visual disturbance. Respiratory:  Positive for cough. Negative for chest tightness, shortness of breath and wheezing. Cardiovascular:  Negative for chest pain, palpitations and leg swelling. Gastrointestinal:  Negative for abdominal pain, constipation, diarrhea, nausea and vomiting. Genitourinary:  Negative for dysuria and pelvic pain. Musculoskeletal:  Negative for gait problem and neck pain. Skin:  Negative for color change, rash and wound. Allergic/Immunologic: Positive for environmental allergies.    Neurological:  Negative for dizziness, seizures, weakness, numbness

## 2023-04-27 ENCOUNTER — HOSPITAL ENCOUNTER (OUTPATIENT)
Dept: PHYSICAL THERAPY | Facility: CLINIC | Age: 63
Setting detail: THERAPIES SERIES
Discharge: HOME | End: 2023-04-27
Payer: MEDICARE

## 2023-04-27 NOTE — FLOWSHEET NOTE
[] [facility]  [address]    [phone]  F: [phone]   [x] [address]  [facility]   [address]  P: [phone]  F: [phone]  [] [address] &  Therapy  [address]  P: [phone]  F: [phone] [] [address]  P: [phone]  F: [phone]  [] [address]  [address] [facility] [address]   [address]   [place]: [phone]  F: [phone]   [] [address]  [place]: [phone]   F: [phone]     Physical Therapy Cancel/No Show note    Date: 2023  Patient: Thao Tyler  : 1960  MRN: [id number]    Cancels/No Shows to date: 1 cx/1 ns    For today's appointment patient:      [x]  Cancelled     Reason given by patient:       [x] Other:   \"too sore\"      [x] Next appointment was confirmed 23    Electronically signed by: Jamie Yusuf PT

## 2023-05-02 ENCOUNTER — HOSPITAL ENCOUNTER (OUTPATIENT)
Dept: PHYSICAL THERAPY | Facility: CLINIC | Age: 63
Setting detail: THERAPIES SERIES
Discharge: HOME | End: 2023-05-02
Payer: MEDICARE

## 2023-05-02 PROCEDURE — 97110 THERAPEUTIC EXERCISES: CPT

## 2023-05-02 NOTE — FLOWSHEET NOTE
[] [facility]  [address]  [phone]  F: [phone] [x] [address]  [facility] [address]   [address]  P: [phone]  F: [phone] [] [address]  [address]  P: [phone]  F: [phone] [] [address]  P: [phone]  F: [phone] [] [address]  [place]   [address]   [place]: [phone]  F: [phone]      Physical Therapy Daily Treatment Note    Date:  2023  Patient Name:  Nano Edwards    :  1960  MRN: [id number]  Physician: CHELSEY Benavidez                                   Insurance: [facility] Medicare/[facility] Dual Complete (MED NEC)  Medical Diagnosis: cervical spondylosis with radiculopathy           (Adding LBP by therapy 23)             Rehab Codes: M54.12; M54.59; R29.3; R51; M62.81; R53.81  Onset Date: 2000             Worse since 10/1/2022  Next 's appt. : 23  Visit# / total visits: ; Progress note for Medicare patient due at visit 8     Cancels/No Shows:     Subjective:    Pain:  [x] Yes  [] No Location: neck, LBP  Pain Rating: (0-10 scale) does not rate/10  Pain altered Tx:  [x] No  [] Yes  Action:  Comments: Pt arrives with soreness in legs and arms but notes 'the middle part is okay.' When asked pain level pt notes she is not sure and we will wait and see. Mentions she walks her dog everyday but he pulls. Thinks increased soreness could be from 3.5 mile walk home from PT.      Objective:  Modalities: HP neck and back with 2 pillow case each in supine during exercises x20' -not today   Precautions: LATEX allergy, L reverse total shoulder; R wrist fusion, L ankle post tibial tendon repair  Exercises: not 23   Exercise

## 2023-05-04 ENCOUNTER — HOSPITAL ENCOUNTER (OUTPATIENT)
Dept: PHYSICAL THERAPY | Facility: CLINIC | Age: 63
Setting detail: THERAPIES SERIES
Discharge: HOME | End: 2023-05-04
Payer: MEDICARE

## 2023-05-04 PROCEDURE — 97110 THERAPEUTIC EXERCISES: CPT

## 2023-05-04 NOTE — FLOWSHEET NOTE
[] [facility] [facility]  [address]  [phone]  F: [phone] [x] [address]  [place] [address]   [address]  P: [phone]  F: [phone] [] [address]  [address]  P: [phone]  F: [phone] [] [address]  P: [phone]  F: [phone] [] [address]  [facility]   [address]   [place]: [phone]  F: [phone]      Physical Therapy Daily Treatment Note    Date:  2023  Patient Name:  Julian Gerardo    :  1960  MRN: [id number]  Physician: CHELSEY Kothari                                   Insurance: [facility] Medicare/[facility] Dual Complete (MED NEC)  Medical Diagnosis: cervical spondylosis with radiculopathy           (Adding LBP by therapy 23)             Rehab Codes: M54.12; M54.59; R29.3; R51; M62.81; R53.81  Onset Date: 2000             Worse since 10/1/2022  Next 's appt. : 23  Visit# / total visits: ; Progress note for Medicare patient due at visit 8     Cancels/No Shows:     Subjective:    Pain:  [x] Yes  [] No Location: neck, LBP  Pain Rating: (0-10 scale) 6-7/10 neck + HA  Pain altered Tx:  [x] No  [] Yes  Action:  Comments: Presents noting her L UE bothers her most as there is 'a piece missing'. Has back pain when she uses it and aggravated when hopping down from truck.      Objective:  Modalities: HP neck and back with 2 pillow case each in supine during exercises x20' -not today   Precautions: LATEX allergy, L reverse total shoulder; R wrist fusion, L ankle post tibial tendon repair  Exercises: not 23   Exercise Reps/ Time Weight/ Level Comments   Sci fit  5' L1 New 5/2         Seated          C-AROM 10x5\" ea  Side bend, rotation,

## 2023-05-09 ENCOUNTER — HOSPITAL ENCOUNTER (OUTPATIENT)
Dept: PHYSICAL THERAPY | Facility: CLINIC | Age: 63
Setting detail: THERAPIES SERIES
Discharge: HOME | End: 2023-05-09
Payer: MEDICARE

## 2023-05-09 PROCEDURE — 97110 THERAPEUTIC EXERCISES: CPT

## 2023-05-09 NOTE — FLOWSHEET NOTE
[] [facility]  [address]  [phone]  F: [phone] [x] [address]  [facility]   [address]  P: [phone]  F: [phone] [] [address]  [address]  P: [phone]  F: [phone] [] [address]  P: [phone]  F: [phone] [] [address]  [facility]   [address]   [place]: [phone]  F: [phone]      Physical Therapy Daily Treatment Note    Date:  2023  Patient Name:  Robinson Aceves    :  1960  MRN: [id number]  Physician: CHELSEY Garrison                                   Insurance: [facility] Medicare/[facility] Dual Complete (MED NEC)  Medical Diagnosis: cervical spondylosis with radiculopathy           (Adding LBP by therapy 23)             Rehab Codes: M54.12; M54.59; R29.3; R51; M62.81; R53.81  Onset Date: 2000             Worse since 10/1/2022  Next 's appt. : 23  Visit# / total visits: ; Progress note for Medicare patient due at visit 8     Cancels/No Shows:     Subjective:    Pain:  [x] Yes  [] No Location: neck, LBP  Pain Rating: (0-10 scale) 8/10 neck + HA  Pain altered Tx:  [x] No  [] Yes  Action:  Comments: Arrives noting on  she fell and hit her nose, hand, and toe. Tripped over the side walk as she as was reading on her phone and walking. More tightness on L side of neck. Continues to report poor compliance to HEP, diet modifications, and making doctor appts.      Objective:  Modalities: HP neck and back with 2 pillow case each in supine during exercises x20' -not today   Precautions: LATEX allergy, L reverse total shoulder; R wrist fusion, L ankle post tibial tendon repair  Exercises: not 23   Exercise Reps/ Time

## 2023-05-11 ENCOUNTER — HOSPITAL ENCOUNTER (OUTPATIENT)
Dept: PHYSICAL THERAPY | Facility: CLINIC | Age: 63
Setting detail: THERAPIES SERIES
Discharge: HOME | End: 2023-05-11
Payer: MEDICARE

## 2023-05-11 PROCEDURE — 97140 MANUAL THERAPY 1/> REGIONS: CPT

## 2023-05-11 PROCEDURE — 97110 THERAPEUTIC EXERCISES: CPT

## 2023-05-11 NOTE — FLOWSHEET NOTE
[] [facility] [address] [facility]  [address]  [phone]  F: [phone] [x] [address]  [facility]   [address]  P: [phone]  F: [phone] [] [address]  [address] [facility]  P: [phone]  F: [phone] [] [address]  P: [phone]  F: [phone] [] [address]  [facility]   [address]   [place]: [phone]  F: [phone]      Physical Therapy Daily Treatment Note    Date:  2023  Patient Name:  Dilip Chavarria    :  1960  MRN: [id number]  Physician: CHELSEY Gilbert                                   Insurance: [facility] Medicare/[facility] Dual Complete (MED NEC)  Medical Diagnosis: cervical spondylosis with radiculopathy           (Adding LBP by therapy 23)             Rehab Codes: M54.12; M54.59; R29.3; R51; M62.81; R53.81  Onset Date: 2000             Worse since 10/1/2022  Next 's appt. : 23  Visit# / total visits: ; Progress note for Medicare patient due at visit 8     Cancels/No Shows:     Subjective:    Pain:  [x] Yes  [] No Location: neck, LBP  Pain Rating: (0-10 scale) 6/10 neck + HA  Pain altered Tx:  [x] No  [] Yes  Action:  Comments: pt states she can breathe a bit better since injuring her nose. The patient has not had any medical attention for it. ONGOING symptoms: Pain in cervical region especially with L rotation. Head still hurts on L side of the head. Pt is unsure if head/neck is worse since the fall. Pt with n/t L UE a few times per day. Pt would like to be able to draw legs up closer to self so she could put on shoes without having to bend over.    Objective: 23: active sitting L shoulder flexion 100; abduction 70; IR post hip; strength

## 2023-05-16 ENCOUNTER — HOSPITAL ENCOUNTER (OUTPATIENT)
Dept: PHYSICAL THERAPY | Facility: CLINIC | Age: 63
Setting detail: THERAPIES SERIES
Discharge: HOME | End: 2023-05-16
Payer: MEDICARE

## 2023-05-16 PROCEDURE — 97140 MANUAL THERAPY 1/> REGIONS: CPT

## 2023-05-16 NOTE — FLOWSHEET NOTE
[] [facility] [facility]  [address]  [phone]  F: [phone] [x] [address]  [facility]   [address]  P: [phone]  F: [phone] [] [address]  [address]  P: [phone]  F: [phone] [] [address]  P: [phone]  F: [phone] [] [address]  [facility]   [address]   [place]: [phone]  F: [phone]      Physical Therapy Daily Treatment Note    Date:  2023  Patient Name:  Kavon Zavala    :  1960  MRN: [id number]  Physician: CHELSEY Almanza                                   Insurance: [facility] Medicare/[facility] Dual Complete (MED NEC)  Medical Diagnosis: cervical spondylosis with radiculopathy           (Adding LBP by therapy 23)             Rehab Codes: M54.12; M54.59; R29.3; R51; M62.81; R53.81  Onset Date: 2000             Worse since 10/1/2022  Next 's appt. : 23  Visit# / total visits: ; Progress note for Medicare patient due at visit 8     Cancels/No Shows:     Subjective:    Pain:  [x] Yes  [] No Location: neck, LBP  Pain Rating: (0-10 scale) 8/10 neck + HA  Pain altered Tx:  [x] No  [] Yes  Action:  Comments: pt is sore from doing exercises at home and feels she is feeling worse than when she started therapy. Was doing better for awhile and now it is worse. Manual work helps but it doesn't last long. Pt feels L shoulder is feeling worse as well. Pt has stopped walking here as she wonders if this contributes to her soreness. Pt got a ride today. ONGOING symptoms: Pain in cervical region especially with L rotation. Head still hurts on L side of the head. Pt is unsure if head/neck is worse since the fall.  Pt with n/t L UE a

## 2023-05-18 ENCOUNTER — HOSPITAL ENCOUNTER (OUTPATIENT)
Dept: PHYSICAL THERAPY | Facility: CLINIC | Age: 63
Setting detail: THERAPIES SERIES
Discharge: HOME | End: 2023-05-18
Payer: MEDICARE

## 2023-05-18 PROCEDURE — 97110 THERAPEUTIC EXERCISES: CPT

## 2023-05-18 NOTE — FLOWSHEET NOTE
[] [facility]  [address]  [phone]  F: [phone] [x] [address]  [facility]   [address]  P: [phone]  F: [phone] [] [address]  [facility]  P: [phone]  F: [phone] [] [address]  P: [phone]  F: [phone] [] [address]  [facility]   [address]   [place]: [phone]  F: [phone]      Physical Therapy Daily Treatment Note    Date:  2023  Patient Name:  Marietta Manuel    :  1960  MRN: [id number]  Physician: CHELSEY Elise                                   Insurance: [facility] Medicare/[facility] Dual Complete (MED NEC)  Medical Diagnosis: cervical spondylosis with radiculopathy           (Adding LBP by therapy 23)             Rehab Codes: M54.12; M54.59; R29.3; R51; M62.81; R53.81  Onset Date: 2000             Worse since 10/1/2022  Next 's appt. : 23  Visit# / total visits: 10/16; Progress note for Medicare patient due at visit 16     Cancels/No Shows:     Subjective:    Pain:  [x] Yes  [] No Location: neck, LBP  Pain Rating: (0-10 scale) does not rate /10 neck + HA  Pain altered Tx:  [x] No  [] Yes  Action:  Comments: Arrives feeling sore and with pain but does not rate this date. Reports having an neck injection on  to help reduce pain. Took pain pill this morning. ONGOING symptoms: Pain in cervical region especially with L rotation. Head still hurts on L side of the head. Pt is unsure if head/neck is worse since the fall. Pt with n/t L UE a few times per day. Pt would like to be able to draw legs up closer to self so she could put on shoes without having to bend over.    Objective: 23: active sitting L

## 2023-05-21 DIAGNOSIS — M85.80 OSTEOPENIA, UNSPECIFIED LOCATION: ICD-10-CM

## 2023-05-22 RX ORDER — ALENDRONATE SODIUM 70 MG/1
TABLET ORAL
Qty: 12 TABLET | Refills: 10 | Status: SHIPPED | OUTPATIENT
Start: 2023-05-22

## 2023-05-22 NOTE — TELEPHONE ENCOUNTER
Vernelle Cockayne is calling to request a refill on the following medication(s):    Last Visit Date (If Applicable):  8/77/4593    Next Visit Date:    6/29/2023    Medication Request:  Requested Prescriptions     Pending Prescriptions Disp Refills    alendronate (FOSAMAX) 70 MG tablet [Pharmacy Med Name: ALENDRONATE SODIUM 70 MG TAB] 12 tablet 10     Sig: TAKE 1 TABLET BY MOUTH ONCE WEEKLY ON AN EMPTY STOMACH BEFORE BREAKFAST.  REMAIN UPRIGHT FOR 30 MINUTES AND TAKE WITH 8 OUNCES OF WATER

## 2023-05-23 ENCOUNTER — HOSPITAL ENCOUNTER (OUTPATIENT)
Dept: PHYSICAL THERAPY | Facility: CLINIC | Age: 63
Setting detail: THERAPIES SERIES
Discharge: HOME | End: 2023-05-23
Payer: MEDICARE

## 2023-05-23 PROCEDURE — 97110 THERAPEUTIC EXERCISES: CPT

## 2023-05-23 NOTE — FLOWSHEET NOTE
[] [facility]  [address]  [phone]  F: [phone] [x] [address]  [place] [address]   [address]  P: [phone]  F: [phone] [] [address]  [address]  P: [phone]  F: [phone] [] [address]  P: [phone]  F: [phone] [] [address]  [facility]   [address]   [place]: [phone]  F: [phone]      Physical Therapy Daily Treatment Note    Date:  2023  Patient Name:  Berta Garcia    :  1960  MRN: [id number]  Physician: CHELSEY Trent                                   Insurance: [facility] Medicare/[facility] Dual Complete (MED NEC)  Medical Diagnosis: cervical spondylosis with radiculopathy           (Adding LBP by therapy 23)             Rehab Codes: M54.12; M54.59; R29.3; R51; M62.81; R53.81  Onset Date: 2000             Worse since 10/1/2022  Next 's appt. : 23  Visit# / total visits: ; Progress note for Medicare patient due at visit 16     Cancels/No Shows:     Subjective:    Pain:  [x] Yes  [] No Location: neck, LBP  Pain Rating: (0-10 scale) 6/10 neck + HA; '9/10 LBP'   Pain altered Tx:  [x] No  [] Yes  Action:  Comments: Arrives over 20 minutes late today. Reports she had a dentist appt then walked to PT. Presents with 10/10 back pain initially denying need to go to the emergency room noting it will go away then mentions pain is 9/10. Completes HEP 2x a week. Denies any change in pain or tightness since beginning of PT. Reports she fell again about 2 days ago when walking and reading on phone. ONGOING symptoms: Pain in cervical region especially with L rotation. Head still hurts on L side of the head.  Pt is unsure if

## 2023-05-30 ENCOUNTER — HOSPITAL ENCOUNTER (OUTPATIENT)
Dept: PHYSICAL THERAPY | Facility: CLINIC | Age: 63
Setting detail: THERAPIES SERIES
Discharge: HOME | End: 2023-05-30
Payer: MEDICARE

## 2023-05-30 PROCEDURE — 97110 THERAPEUTIC EXERCISES: CPT

## 2023-06-01 ENCOUNTER — HOSPITAL ENCOUNTER (OUTPATIENT)
Dept: PHYSICAL THERAPY | Facility: CLINIC | Age: 63
Setting detail: THERAPIES SERIES
Discharge: HOME | End: 2023-06-01

## 2023-06-01 ENCOUNTER — APPOINTMENT (OUTPATIENT)
Dept: PHYSICAL THERAPY | Facility: CLINIC | Age: 63
End: 2023-06-01
Payer: MEDICARE

## 2023-06-01 NOTE — FLOWSHEET NOTE
[] [facility]  [address]    [phone]  F: [phone]   [x] [address]  [facility] 36   [address]  P: [phone]  F: [phone]  [] [address] &  Therapy  [address] [facility] [address] [facility] [address]  P: [phone]  F: [phone] [] [address]  P: [phone]  F: [phone]  [] [address]  [address] [facility]   [address]   [place]: [phone]  F: [phone]   [] [address]  [place]: [phone]   F: [phone]     Physical Therapy Cancel/No Show note    Date: 2023  Patient: Hector Freeman  : 1960  MRN: [id number]    Cancels/No Shows to date: 2cx /2 ns    For today's appointment patient:      [x]  Cancel       Reason given by patient:       [x] Other: CX per injections this week       [x] Next appointment was confirmed    Electronically signed by: Edgard Saldaña PTA

## 2023-06-06 ENCOUNTER — HOSPITAL ENCOUNTER (OUTPATIENT)
Dept: PHYSICAL THERAPY | Facility: CLINIC | Age: 63
Setting detail: THERAPIES SERIES
Discharge: HOME | End: 2023-06-06
Payer: MEDICARE

## 2023-06-06 PROCEDURE — 97140 MANUAL THERAPY 1/> REGIONS: CPT

## 2023-06-06 NOTE — FLOWSHEET NOTE
1 x daily - 7 x weekly - 2 sets - 10 reps  - Seated Long Arc Quad  - 1 x daily - 7 x weekly - 2 sets - 10 reps - 5 seconds  hold  Comprehension of Education:  [] Verbalizes understanding. [] Demonstrates understanding. [x] Needs review. FOR COMPLIANCE   [] Demonstrates/verbalizes HEP/Ed previously given. Plan: [x] Continue current frequency toward long and short term goals.     [] Specific Instructions for subsequent treatments:       Time In: 10:30 am       Time Out:  11:25 am    Electronically signed by:  Scott Pearce PT

## 2023-06-07 ENCOUNTER — HOSPITAL ENCOUNTER (OUTPATIENT)
Age: 63
Setting detail: OBSERVATION
Discharge: HOME | End: 2023-06-08
Attending: EMERGENCY MEDICINE | Admitting: EMERGENCY MEDICINE
Payer: MEDICARE

## 2023-06-07 ENCOUNTER — NURSE TRIAGE (OUTPATIENT)
Dept: OTHER | Age: 63
End: 2023-06-07

## 2023-06-07 DIAGNOSIS — M79.604 RIGHT LEG PAIN: Primary | ICD-10-CM

## 2023-06-07 PROBLEM — M79.606 LEG PAIN: Status: ACTIVE | Noted: 2023-06-07

## 2023-06-07 LAB
D DIMER PPP FEU-MCNC: 0.44 UG/ML FEU (ref 0–0.57)
SARS-COV-2 RDRP RESP QL NAA+PROBE: NOT DETECTED
SPECIMEN DESCRIPTION: NORMAL

## 2023-06-07 PROCEDURE — G0378 HOSPITAL OBSERVATION PER HR: HCPCS

## 2023-06-07 PROCEDURE — 87635 SARS-COV-2 COVID-19 AMP PRB: CPT

## 2023-06-07 PROCEDURE — 6370000000 HC RX 637 (ALT 250 FOR IP): Performed by: STUDENT IN AN ORGANIZED HEALTH CARE EDUCATION/TRAINING PROGRAM

## 2023-06-07 PROCEDURE — 6360000002 HC RX W HCPCS: Performed by: STUDENT IN AN ORGANIZED HEALTH CARE EDUCATION/TRAINING PROGRAM

## 2023-06-07 PROCEDURE — 99285 EMERGENCY DEPT VISIT HI MDM: CPT

## 2023-06-07 PROCEDURE — 85379 FIBRIN DEGRADATION QUANT: CPT

## 2023-06-07 RX ORDER — ACETAMINOPHEN 325 MG/1
650 TABLET ORAL EVERY 4 HOURS PRN
Status: DISCONTINUED | OUTPATIENT
Start: 2023-06-07 | End: 2023-06-08 | Stop reason: HOSPADM

## 2023-06-07 RX ORDER — ATORVASTATIN CALCIUM 80 MG/1
80 TABLET, FILM COATED ORAL NIGHTLY
Status: DISCONTINUED | OUTPATIENT
Start: 2023-06-08 | End: 2023-06-08 | Stop reason: HOSPADM

## 2023-06-07 RX ORDER — ENOXAPARIN SODIUM 100 MG/ML
1 INJECTION SUBCUTANEOUS ONCE
Status: COMPLETED | OUTPATIENT
Start: 2023-06-07 | End: 2023-06-07

## 2023-06-07 RX ORDER — ONDANSETRON 4 MG/1
4 TABLET, ORALLY DISINTEGRATING ORAL EVERY 8 HOURS PRN
Status: DISCONTINUED | OUTPATIENT
Start: 2023-06-07 | End: 2023-06-08 | Stop reason: HOSPADM

## 2023-06-07 RX ORDER — ASPIRIN 81 MG/1
81 TABLET, CHEWABLE ORAL DAILY
Status: DISCONTINUED | OUTPATIENT
Start: 2023-06-08 | End: 2023-06-08 | Stop reason: HOSPADM

## 2023-06-07 RX ORDER — ENOXAPARIN SODIUM 100 MG/ML
40 INJECTION SUBCUTANEOUS DAILY
Status: DISCONTINUED | OUTPATIENT
Start: 2023-06-08 | End: 2023-06-08 | Stop reason: HOSPADM

## 2023-06-07 RX ORDER — ONDANSETRON 2 MG/ML
4 INJECTION INTRAMUSCULAR; INTRAVENOUS EVERY 6 HOURS PRN
Status: DISCONTINUED | OUTPATIENT
Start: 2023-06-07 | End: 2023-06-08 | Stop reason: HOSPADM

## 2023-06-07 RX ORDER — BUPROPION HYDROCHLORIDE 150 MG/1
450 TABLET ORAL DAILY
Status: DISCONTINUED | OUTPATIENT
Start: 2023-06-08 | End: 2023-06-08 | Stop reason: HOSPADM

## 2023-06-07 RX ORDER — IBUPROFEN 400 MG/1
600 TABLET ORAL EVERY 6 HOURS PRN
Status: DISCONTINUED | OUTPATIENT
Start: 2023-06-07 | End: 2023-06-08 | Stop reason: HOSPADM

## 2023-06-07 RX ORDER — ACETAMINOPHEN 500 MG
1000 TABLET ORAL ONCE
Status: COMPLETED | OUTPATIENT
Start: 2023-06-07 | End: 2023-06-07

## 2023-06-07 RX ORDER — SODIUM CHLORIDE 9 MG/ML
INJECTION, SOLUTION INTRAVENOUS PRN
Status: DISCONTINUED | OUTPATIENT
Start: 2023-06-07 | End: 2023-06-08 | Stop reason: HOSPADM

## 2023-06-07 RX ORDER — DIAZEPAM 5 MG/1
5 TABLET ORAL ONCE
Status: COMPLETED | OUTPATIENT
Start: 2023-06-07 | End: 2023-06-07

## 2023-06-07 RX ORDER — SODIUM CHLORIDE 0.9 % (FLUSH) 0.9 %
5-40 SYRINGE (ML) INJECTION PRN
Status: DISCONTINUED | OUTPATIENT
Start: 2023-06-07 | End: 2023-06-08 | Stop reason: HOSPADM

## 2023-06-07 RX ORDER — METHOCARBAMOL 500 MG/1
500 TABLET, FILM COATED ORAL 4 TIMES DAILY
Status: DISCONTINUED | OUTPATIENT
Start: 2023-06-07 | End: 2023-06-08 | Stop reason: HOSPADM

## 2023-06-07 RX ORDER — SODIUM CHLORIDE 0.9 % (FLUSH) 0.9 %
5-40 SYRINGE (ML) INJECTION EVERY 12 HOURS SCHEDULED
Status: DISCONTINUED | OUTPATIENT
Start: 2023-06-07 | End: 2023-06-08 | Stop reason: HOSPADM

## 2023-06-07 RX ORDER — METHOCARBAMOL 500 MG/1
1000 TABLET, FILM COATED ORAL ONCE
Status: COMPLETED | OUTPATIENT
Start: 2023-06-07 | End: 2023-06-07

## 2023-06-07 RX ORDER — DEXTROAMPHETAMINE SACCHARATE, AMPHETAMINE ASPARTATE, DEXTROAMPHETAMINE SULFATE AND AMPHETAMINE SULFATE 2.5; 2.5; 2.5; 2.5 MG/1; MG/1; MG/1; MG/1
30 TABLET ORAL 2 TIMES DAILY
Status: DISCONTINUED | OUTPATIENT
Start: 2023-06-08 | End: 2023-06-08 | Stop reason: HOSPADM

## 2023-06-07 RX ORDER — OXYCODONE HYDROCHLORIDE AND ACETAMINOPHEN 5; 325 MG/1; MG/1
1 TABLET ORAL EVERY 6 HOURS PRN
Status: DISCONTINUED | OUTPATIENT
Start: 2023-06-07 | End: 2023-06-08 | Stop reason: HOSPADM

## 2023-06-07 RX ADMIN — DIAZEPAM 5 MG: 5 TABLET ORAL at 21:28

## 2023-06-07 RX ADMIN — ENOXAPARIN SODIUM 80 MG: 100 INJECTION SUBCUTANEOUS at 21:28

## 2023-06-07 RX ADMIN — ACETAMINOPHEN 1000 MG: 500 TABLET ORAL at 20:33

## 2023-06-07 RX ADMIN — METHOCARBAMOL 1000 MG: 500 TABLET ORAL at 20:33

## 2023-06-07 ASSESSMENT — PAIN DESCRIPTION - FREQUENCY: FREQUENCY: INTERMITTENT

## 2023-06-07 ASSESSMENT — PAIN SCALES - GENERAL: PAINLEVEL_OUTOF10: 6

## 2023-06-07 ASSESSMENT — PAIN DESCRIPTION - PAIN TYPE: TYPE: ACUTE PAIN

## 2023-06-07 ASSESSMENT — PAIN DESCRIPTION - LOCATION: LOCATION: LEG

## 2023-06-07 ASSESSMENT — PAIN DESCRIPTION - ORIENTATION: ORIENTATION: LEFT;RIGHT

## 2023-06-07 NOTE — TELEPHONE ENCOUNTER
Pt stated that she can walk gingerly. Someone is at home with her and can take her to the ED now. Writer discussed care advice and pt will go to ED now for evaluation. Caller verballized understanding of instructions.   Edgar Win

## 2023-06-07 NOTE — TELEPHONE ENCOUNTER
Reason for Disposition   Entire foot is cool or blue in comparison to other side    Answer Assessment - Initial Assessment Questions  1. ONSET: \"When did the pain start? \"       4 days ago    2. LOCATION: \"Where is the pain located? \"   Jose F tejeda x 4 days in left leg from calf to top of leg in back. Has travelled upward and feels like when she has had a blood clot in the past.    3. PAIN: \"How bad is the pain? \"    (Scale 1-10; or mild, moderate, severe)    -  MILD (1-3): doesn't interfere with normal activities     -  MODERATE (4-7): interferes with normal activities (e.g., work or school) or awakens from sleep, limping     -  SEVERE (8-10): excruciating pain, unable to do any normal activities, unable to walk     Moderate to severe. Has spent the last two days in bed due to pain with walking. 4. WORK OR EXERCISE: \"Has there been any recent work or exercise that involved this part of the body? \"     Has not done anything out of the usual.    5. CAUSE: \"What do you think is causing the leg pain? \"     ? Blood clot    6. OTHER SYMPTOMS: \"Do you have any other symptoms? \" (e.g., chest pain, back pain, breathing difficulty, swelling, rash, fever, numbness, weakness)    Leg pain. NO warmth, redness to left leg. Pt is unable to tell if leg swollen. States there are hard areas going up the leg. States that left foot/toes are white and cold. Denies numbness/tingling. 7. PREGNANCY: \"Is there any chance you are pregnant? \" \"When was your last menstrual period? \"    N/A    Protocols used: Leg Pain-ADULT-

## 2023-06-08 VITALS
HEIGHT: 60 IN | BODY MASS INDEX: 32.59 KG/M2 | WEIGHT: 166 LBS | OXYGEN SATURATION: 94 % | SYSTOLIC BLOOD PRESSURE: 104 MMHG | RESPIRATION RATE: 16 BRPM | TEMPERATURE: 97.8 F | DIASTOLIC BLOOD PRESSURE: 69 MMHG | HEART RATE: 51 BPM

## 2023-06-08 PROCEDURE — G0378 HOSPITAL OBSERVATION PER HR: HCPCS

## 2023-06-08 PROCEDURE — 6370000000 HC RX 637 (ALT 250 FOR IP): Performed by: STUDENT IN AN ORGANIZED HEALTH CARE EDUCATION/TRAINING PROGRAM

## 2023-06-08 PROCEDURE — 6360000002 HC RX W HCPCS: Performed by: STUDENT IN AN ORGANIZED HEALTH CARE EDUCATION/TRAINING PROGRAM

## 2023-06-08 PROCEDURE — 93970 EXTREMITY STUDY: CPT

## 2023-06-08 RX ORDER — ACETAMINOPHEN 500 MG
1000 TABLET ORAL EVERY 8 HOURS PRN
Qty: 120 TABLET | Refills: 0 | Status: SHIPPED | OUTPATIENT
Start: 2023-06-08

## 2023-06-08 RX ORDER — IBUPROFEN 600 MG/1
600 TABLET ORAL EVERY 6 HOURS PRN
Qty: 120 TABLET | Refills: 3 | Status: SHIPPED
Start: 2023-06-08 | End: 2023-06-08 | Stop reason: HOSPADM

## 2023-06-08 RX ORDER — METHOCARBAMOL 500 MG/1
500 TABLET, FILM COATED ORAL 3 TIMES DAILY
Qty: 9 TABLET | Refills: 0 | Status: SHIPPED | OUTPATIENT
Start: 2023-06-08 | End: 2023-06-11

## 2023-06-08 RX ADMIN — OXYCODONE HYDROCHLORIDE AND ACETAMINOPHEN 1 TABLET: 5; 325 TABLET ORAL at 08:48

## 2023-06-08 RX ADMIN — ENOXAPARIN SODIUM 40 MG: 40 INJECTION SUBCUTANEOUS at 08:44

## 2023-06-08 RX ADMIN — IBUPROFEN 600 MG: 400 TABLET, FILM COATED ORAL at 02:02

## 2023-06-08 RX ADMIN — OXYCODONE HYDROCHLORIDE AND ACETAMINOPHEN 1 TABLET: 5; 325 TABLET ORAL at 02:02

## 2023-06-08 ASSESSMENT — PAIN SCALES - GENERAL
PAINLEVEL_OUTOF10: 6
PAINLEVEL_OUTOF10: 7

## 2023-06-08 ASSESSMENT — PAIN DESCRIPTION - ORIENTATION: ORIENTATION: LEFT;LOWER

## 2023-06-08 ASSESSMENT — ENCOUNTER SYMPTOMS
ABDOMINAL PAIN: 0
NAUSEA: 0
RHINORRHEA: 0
DIARRHEA: 0
VOMITING: 0
COUGH: 0
SHORTNESS OF BREATH: 0

## 2023-06-08 ASSESSMENT — PAIN DESCRIPTION - LOCATION: LOCATION: LEG

## 2023-06-08 NOTE — DISCHARGE INSTRUCTIONS
You were admitted to the observation unit overnight in order to receive venous Dopplers in the morning to rule out DVT. Venous Dopplers were completed. No DVTs noted in bilateral legs. This is likely a musculoskeletal strain. We will send you home with a few doses of muscle relaxants. Please do not take before driving or operating machinery as these can make you drowsy. Recommend Tylenol every 8 hours. Avoid ibuprofen due to your mildly impaired kidney function. You may also try ice or warm compresses. I have also attached some stretches you can try for your hamstring and calf. Please follow-up with your primary care provider in 1 week given your recent hospitalization and to ensure your symptoms have resolved. Please return to the emergency department if your pain worsens or you develop inability to walk increased redness/swelling. Please call and schedule an appointment with GI as needed.

## 2023-06-08 NOTE — ED PROVIDER NOTES
patient as recorded by the APC.     Bari Persaud MD  Attending Emergency  Physician       Dorene Roman MD  06/07/23 2003
program.Efforts were made to edit the dictations but occasionally words are mis-transcribed.)        Sharmin Smith DO  Resident  06/08/23 8445

## 2023-06-08 NOTE — H&P
symptomatically for pain    DIAGNOSTIC RESULTS     EKG: Not done in ER    RADIOLOGY:   I directly visualized the following  images and reviewed the radiologist interpretations:    No results found. LABS:  I have reviewed and interpreted all available lab results. Labs Reviewed   COVID-19, RAPID   D-DIMER, QUANTITATIVE         CDU DIANDRA / Christinscot Hawkins is a 58 y.o. female who presents with left leg pain for last 4 days    Left leg pain  Prior history of DVTs  D-dimer 0.44  Venous Doppler ordered  Continue home medications and pain control  Monitor vitals, labs, and imaging  DISPO: pending consults and clinical improvement    CONSULTS:    IP CONSULT TO IV TEAM    PROCEDURES:  Not indicated       PATIENT REFERRED TO:    No follow-up provider specified. --  Darryle Cooper, MD   Emergency Medicine     This dictation was generated by voice recognition computer software. Although all attempts are made to edit the dictation for accuracy, there may be errors in the transcription that are not intended.

## 2023-06-08 NOTE — ED TRIAGE NOTES
Pt here for leg cramps or grant horse to left leg for the past 4 days.      Patient Believes she has a blood clots; hx of blood clots  Took a Percocet right before she came to hospital     And is taking her home medication now,, okay per Dr John Valencia

## 2023-06-08 NOTE — PROGRESS NOTES
[facility]  CDU / OBSERVATION eNCOUnter  Attending NOte       I performed a history and physical examination of the patient and discussed management with the resident. This patient was placed in the observation unit for reevaluation for possible admission to the hospital. I reviewed the residents note and agree with the documented findings and plan of care. Any areas of disagreement are noted on the chart. I was personally present for the key portions of any procedures. I have documented in the chart those procedures where I was not present during the key portions. I have reviewed the nurses notes. I agree with the chief complaint, past medical history, past surgical history, allergies, medications, social and family history as documented unless otherwise noted below. The patient was placed in the observation unit for reevaluation for possible admission to the hospital.      The Family history, social history, and ROS are effectively unchanged since admission unless noted elsewhere in the chart. Venous doppler negative.  Plan for discharge home.,     Renata Hodges MD  Attending Emergency  Physician

## 2023-06-08 NOTE — DISCHARGE SUMMARY
was admitted and labs and imaging were followed daily. Imaging results as above. She is medically stable to be discharged. Disposition: Home    Patient stated that they will not drive themselves home from the hospital if they have gotten pain killers/ narcotics earlier that day and that they will arrange for transportation on their own or work with the  for a ride. Patient counseled NOT to drive while under the influence of narcotics/ pain killers. Condition: Good    Patient stable and ready for discharge home. I have discussed plan of care with patient and they are in understanding. They were instructed to read discharge paperwork. All of their questions and concerns were addressed. Time Spent: 0 day      --  Judie Olvera MD  Emergency Medicine Resident Physician    This dictation was generated by voice recognition computer software. Although all attempts are made to edit the dictation for accuracy, there may be errors in the transcription that are not intended.

## 2023-06-08 NOTE — ED NOTES
Attempt 3 times to place PIV access, Dr Delmis Lambert notified   Enriqueta Briggs to sent to floor without PIV access;  Floor Rn can attempt      Radha Edmondson RN  06/07/23 1905
Labeled blood specimens sent to lab via tube system.     [x] Lavender   [] on ice   [x] Blue   [x] Green/yellow  [x] Green/black [] on ice  [] Pink  [] Red  [x] Yellow  [] on ice  [] Blood Cultures        Ryan Vega RN  06/07/23 2023
Pt report given to Loco Langston RN.       Roby Farrar RN  06/08/23 9482
The following labs were labeled with appropriate pt sticker and tubed to lab:     [] Blue     [] Lavender   [] on ice  [] Green/yellow  [] Green/black [] on ice  [] Yaa Azeem  [] on ice  [] Yellow  [] Red  [] Type/ Screen  [] ABG  [] VBG    [x] COVID-19 swab    [] Rapid  [] PCR  [] Flu swab  [] Peds Viral Panel     [] Urine Sample  [] Fecal Sample  [] Pelvic Cultures  [] Blood Cultures  [] X 2  [] STREP Cultures         Yareli Recinos RN  06/07/23 7027
The following labs were labeled with appropriate pt sticker and tubed to lab:     [x] Blue     [x] Lavender   [] on ice  [x] Green/yellow  [x] Green/black [] on ice  [] Larence Gosselin  [] on ice  [] Yellow  [] Red  [] Type/ Screen  [] ABG  [] VBG    [] COVID-19 swab    [] Rapid  [] PCR  [] Flu swab  [] Peds Viral Panel     [] Urine Sample  [] Fecal Sample  [] Pelvic Cultures  [] Blood Cultures  [] X 2  [] STREP Cultures       Merrick Acevedo RN  06/07/23 2031
Attending Provider: Jeanie Deshpande MD; Resident: Nancy lBanco DO; Registered Nurse:  Jess Hernandez RN    Treatment:              Skin Assessment:        Pain Score:  Pain Assessment  Pain Assessment: 0-10  Pain Level: 6  Patient's Stated Pain Goal: 6, 2  Pain Location: Leg  Pain Orientation: Left, Right  Functional Pain Assessment: Prevents or interferes some active activities and ADLs  Pain Type: Acute pain  Pain Frequency: Intermittent      SOCIAL HISTORY       Social History     Socioeconomic History    Marital status: Single     Spouse name: None    Number of children: None    Years of education: None    Highest education level: None   Tobacco Use    Smoking status: Former     Packs/day: 1.50     Years: 20.00     Pack years: 30.00     Types: Cigarettes     Quit date:      Years since quittin.4     Passive exposure: Current (inside the home)    Smokeless tobacco: Never    Tobacco comments:     staRTED 8TH GRADE-ENDED 32YRS AGO   Vaping Use    Vaping Use: Never used   Substance and Sexual Activity    Alcohol use: Not Currently     Comment: maybe oncee a year    Drug use: No    Sexual activity: Yes     Partners: Male     Comment: I DONT KNOW -MAYBE 1/2 DOZEN     Social Determinants of Health     Financial Resource Strain: Low Risk     Difficulty of Paying Living Expenses: Not hard at all   Food Insecurity: No Food Insecurity    Worried About Running Out of Food in the Last Year: Never true    Ran Out of Food in the Last Year: Never true   Physical Activity: Insufficiently Active    Days of Exercise per Week: 2 days    Minutes of Exercise per Session: 30 min       FAMILY HISTORY       Family History   Problem Relation Age of Onset    Cancer Mother     Hypertension Mother     Stroke Mother     Dementia Mother     Migraines Mother     Cancer Maternal Aunt     Hypertension Father     Heart Surgery Father     Alzheimer's Disease Father     Diabetes Neg Hx        ALLERGIES     Latex, Prozac [fluoxetine hcl],

## 2023-06-09 ENCOUNTER — CARE COORDINATION (OUTPATIENT)
Dept: CASE MANAGEMENT | Age: 63
End: 2023-06-09

## 2023-06-09 NOTE — CARE COORDINATION
Care Transitions Outreach Attempt #1  Initial 24 hour call    Call within 2 business days of discharge: Yes   Attempted to reach patient for transitions of care follow up. Unable to reach patient. HIPAA compliant message left on  requesting a return call. Patient: Sloane Pruett Patient : 1960 MRN: [id number]    Last Discharge [address]       Date Complaint Diagnosis Description Type Department Provider    23 Leg Pain Right leg pain ED to Hosp-Admission (Discharged) (ADMITTED) SKYE 3C Kaia Powell MD; Sweetie Modi. .. Was this an external facility discharge?  No Discharge Facility: [facility]    Noted following upcoming appointments from discharge chart review:   [facility] follow up appointment(s):   Future Appointments   Date Time Provider Naya Roger   2023  9:30 AM Francisca Dockery PT [facility]   2023  2:40 PM DO KAMRYN Hill [facility]   2023 10:00 AM Isaak Boyle APRN - CNP [facility]   2023 11:20 AM Martha Ashby MD Neuro Spec Neurology -   10/19/2023 11:00 AM Nathaniel Mueller MD [facility] [address] LPN  Care Transition Nurse  [phone]

## 2023-06-19 ENCOUNTER — CARE COORDINATION (OUTPATIENT)
Dept: CASE MANAGEMENT | Age: 63
End: 2023-06-19

## 2023-06-19 NOTE — CARE COORDINATION
[facility] Care Transitions Follow Up Call    Patient Current Location:  Home: [address] [address]    Care Transition Nurse contacted the patient by telephone to follow up after admission on 23. Verified name and  with patient as identifiers. Patient: Todd Perez  Patient : 1960   MRN: [id number]  Reason for Admission: right leg pain  Discharge Date: 23 RARS: Readmission Risk Score: 15.1      Needs to be reviewed by the provider   Additional needs identified to be addressed with provider: No  none             Method of communication with provider: none. Spoke to [place] for transitions call. Stated she went to pain management appt on 23 and had procedure done, unsure of what exactly they did but stated her headaches are gone, neck pain improved. Denies any further pain or cramping in right leg, able to bear weight without difficulty. Declined HFU appt with PCP, has AWV on 23. Addressed changes since last contact:   leg pain subsided, neck pain improved  Discussed follow-up appointments. Follow Up  Future Appointments   Date Time Provider Department Center   2023  2:40 PM Mahogany MELENDEZ FP [facility]   2023 10:00 AM PIETER Bustos - CNP [facility]   2023 11:20 AM Obi Morrison MD Neuro Spec Neurology -   10/19/2023 11:00 AM Kisha Glasgow  [facility] Transition Nurse reviewed discharge instructions with patient and discussed any barriers to care and/or understanding of plan of care after discharge. Discussed appropriate site of care based on symptoms and resources available to patient including: PCP  Specialist  When to call [address] The patient agrees to contact the PCP office for questions related to their healthcare. Offered patient enrollment in the Remote Patient Monitoring (RPM) program for in-home monitoring:  did not discuss this call .      Care Transitions Subsequent

## 2023-06-26 ENCOUNTER — CARE COORDINATION (OUTPATIENT)
Dept: CASE MANAGEMENT | Age: 63
End: 2023-06-26

## 2023-06-26 SDOH — HEALTH STABILITY: PHYSICAL HEALTH: ON AVERAGE, HOW MANY DAYS PER WEEK DO YOU ENGAGE IN MODERATE TO STRENUOUS EXERCISE (LIKE A BRISK WALK)?: 4 DAYS

## 2023-06-26 SDOH — HEALTH STABILITY: PHYSICAL HEALTH: ON AVERAGE, HOW MANY MINUTES DO YOU ENGAGE IN EXERCISE AT THIS LEVEL?: 60 MIN

## 2023-06-26 ASSESSMENT — LIFESTYLE VARIABLES
HOW OFTEN DO YOU HAVE SIX OR MORE DRINKS ON ONE OCCASION: 1
HOW MANY STANDARD DRINKS CONTAINING ALCOHOL DO YOU HAVE ON A TYPICAL DAY: PATIENT DOES NOT DRINK
HOW OFTEN DO YOU HAVE A DRINK CONTAINING ALCOHOL: NEVER
HOW OFTEN DO YOU HAVE A DRINK CONTAINING ALCOHOL: 1
HOW MANY STANDARD DRINKS CONTAINING ALCOHOL DO YOU HAVE ON A TYPICAL DAY: 0

## 2023-06-26 ASSESSMENT — PATIENT HEALTH QUESTIONNAIRE - PHQ9
SUM OF ALL RESPONSES TO PHQ9 QUESTIONS 1 & 2: 4
10. IF YOU CHECKED OFF ANY PROBLEMS, HOW DIFFICULT HAVE THESE PROBLEMS MADE IT FOR YOU TO DO YOUR WORK, TAKE CARE OF THINGS AT HOME, OR GET ALONG WITH OTHER PEOPLE: 0
SUM OF ALL RESPONSES TO PHQ QUESTIONS 1-9: 17
1. LITTLE INTEREST OR PLEASURE IN DOING THINGS: 3
2. FEELING DOWN, DEPRESSED OR HOPELESS: 1
5. POOR APPETITE OR OVEREATING: 3
SUM OF ALL RESPONSES TO PHQ QUESTIONS 1-9: 16
7. TROUBLE CONCENTRATING ON THINGS, SUCH AS READING THE NEWSPAPER OR WATCHING TELEVISION: 0
9. THOUGHTS THAT YOU WOULD BE BETTER OFF DEAD, OR OF HURTING YOURSELF: 1
3. TROUBLE FALLING OR STAYING ASLEEP: 3
SUM OF ALL RESPONSES TO PHQ QUESTIONS 1-9: 17
SUM OF ALL RESPONSES TO PHQ QUESTIONS 1-9: 17
6. FEELING BAD ABOUT YOURSELF - OR THAT YOU ARE A FAILURE OR HAVE LET YOURSELF OR YOUR FAMILY DOWN: 1
4. FEELING TIRED OR HAVING LITTLE ENERGY: 3
8. MOVING OR SPEAKING SO SLOWLY THAT OTHER PEOPLE COULD HAVE NOTICED. OR THE OPPOSITE, BEING SO FIGETY OR RESTLESS THAT YOU HAVE BEEN MOVING AROUND A LOT MORE THAN USUAL: 2

## 2023-06-27 ENCOUNTER — CARE COORDINATION (OUTPATIENT)
Dept: CASE MANAGEMENT | Age: 63
End: 2023-06-27

## 2023-06-29 ENCOUNTER — CARE COORDINATION (OUTPATIENT)
Dept: CASE MANAGEMENT | Age: 63
End: 2023-06-29

## 2023-06-29 ENCOUNTER — OFFICE VISIT (OUTPATIENT)
Dept: FAMILY MEDICINE CLINIC | Age: 63
End: 2023-06-29
Payer: MEDICARE

## 2023-06-29 VITALS
OXYGEN SATURATION: 97 % | SYSTOLIC BLOOD PRESSURE: 100 MMHG | WEIGHT: 150 LBS | DIASTOLIC BLOOD PRESSURE: 70 MMHG | HEART RATE: 63 BPM | BODY MASS INDEX: 29.29 KG/M2

## 2023-06-29 DIAGNOSIS — Z00.00 MEDICARE ANNUAL WELLNESS VISIT, SUBSEQUENT: Primary | ICD-10-CM

## 2023-06-29 DIAGNOSIS — R73.03 PREDIABETES: ICD-10-CM

## 2023-06-29 DIAGNOSIS — I63.9 ISCHEMIC STROKE (HCC): ICD-10-CM

## 2023-06-29 DIAGNOSIS — G89.29 CHRONIC BILATERAL LOW BACK PAIN WITHOUT SCIATICA: ICD-10-CM

## 2023-06-29 DIAGNOSIS — R10.32 LEFT LOWER QUADRANT ABDOMINAL PAIN: ICD-10-CM

## 2023-06-29 DIAGNOSIS — M54.50 CHRONIC BILATERAL LOW BACK PAIN WITHOUT SCIATICA: ICD-10-CM

## 2023-06-29 DIAGNOSIS — Z12.31 BREAST CANCER SCREENING BY MAMMOGRAM: ICD-10-CM

## 2023-06-29 PROCEDURE — G0439 PPPS, SUBSEQ VISIT: HCPCS | Performed by: FAMILY MEDICINE

## 2023-06-29 PROCEDURE — 3074F SYST BP LT 130 MM HG: CPT | Performed by: FAMILY MEDICINE

## 2023-06-29 PROCEDURE — 3017F COLORECTAL CA SCREEN DOC REV: CPT | Performed by: FAMILY MEDICINE

## 2023-06-29 PROCEDURE — 3078F DIAST BP <80 MM HG: CPT | Performed by: FAMILY MEDICINE

## 2023-06-30 ENCOUNTER — CARE COORDINATION (OUTPATIENT)
Dept: CASE MANAGEMENT | Age: 63
End: 2023-06-30

## 2023-07-03 ENCOUNTER — CARE COORDINATION (OUTPATIENT)
Dept: CASE MANAGEMENT | Age: 63
End: 2023-07-03

## 2023-07-03 NOTE — CARE COORDINATION
Request from Elizabeth Angel LPN. Please schedule patient for Neuro and GI. Left msg for GI for call back to patient also left this writers information for any questions. Called Neuro, patient has appt In Sept.  This writer was asked the reason appt needed to be moved up sooner. Was told Neuro is booked up to November. Keep appt as is unless patient's needs are emergent.     Luz Minor, [address] [facility] [place] Coordination Transition

## 2023-07-05 ENCOUNTER — CARE COORDINATION (OUTPATIENT)
Dept: CARE COORDINATION | Age: 63
End: 2023-07-05

## 2023-07-05 NOTE — CARE COORDINATION
Referral from [address], LPN-  I have a patient that I am sending to [facility], but thought after speaking with her today. She will be good for a referral. She told me is mostly only drinking santos-aide. Devante Rainey [birth year] . Will reach out for consult.   Blu,VICTOR MANUEL
Patient states it is hard to eat vegetable, has no teeth- discussed cooking vegetables until soft and softer fruits or canned fruit in its own juice she can eat. We also discussed protein sources that are soft- eggs, yogurt, cottage cheese and cooking meats until very tender and cutting into very small pieces or pureeing. Patient is a picky eater so many of the suggestions she stated she does not like. 6.Discussed limiting carbohydrate intake, especially sugary foods and drinks. Patient is drinking pop and santos-aide during the day- encouraged water which she states she does not like. Discussed sugar-free alternative like flavored water, Crystal Light, ect. Explained to patient that sugary drinks will keep her blood sugars elevated. Patient was also encouraged to limit sweets. Nutrition Intervention Need:  Initial/Brief:  Comprehensive Nutrition Education:X  Coordination of other care during nutrition care:    Monitoring and Evaluation:  Ability to plan meals/snacks:X  Ability to select healthy foods/meals:X  Food and Nutrition Knowledge:X  Self Monitoring:  Physical Activity:  Energy intake:  Fluid/beverage intake:  Fat/chol intake:  Carb intake:X  Other:sodium intake: X    Plan: 1. Will continue to educate patient on DASH diet guidelines, processed foods to avoid/limit, seasonings and sauces high in sodium to avoid, reading food labels, using plate method at meals- ideas for softer foods she can eat, and limiting/avoiding sweets and sugary drinks. Patient will be mailed nutrition information on all the above discussed. 2. Will follow up with patient in 2-3 weeks to review nutrition information and answer questions. VICTOR MANUEL Fam

## 2023-07-06 ENCOUNTER — HOSPITAL ENCOUNTER (OUTPATIENT)
Dept: SLEEP CENTER | Age: 63
Discharge: HOME | End: 2023-07-08
Payer: MEDICARE

## 2023-07-06 PROCEDURE — 95810 POLYSOM 6/> YRS 4/> PARAM: CPT

## 2023-07-12 ENCOUNTER — HOSPITAL ENCOUNTER (OUTPATIENT)
Age: 63
Discharge: HOME | End: 2023-07-14
Payer: MEDICARE

## 2023-07-12 ENCOUNTER — CARE COORDINATION (OUTPATIENT)
Dept: CARE COORDINATION | Age: 63
End: 2023-07-12

## 2023-07-12 ENCOUNTER — HOSPITAL ENCOUNTER (OUTPATIENT)
Dept: GENERAL RADIOLOGY | Age: 63
Discharge: HOME | End: 2023-07-14
Payer: MEDICARE

## 2023-07-12 ENCOUNTER — HOSPITAL ENCOUNTER (OUTPATIENT)
Age: 63
Discharge: HOME | End: 2023-07-12
Payer: MEDICARE

## 2023-07-12 DIAGNOSIS — R10.32 LEFT LOWER QUADRANT ABDOMINAL PAIN: ICD-10-CM

## 2023-07-12 DIAGNOSIS — R73.03 PREDIABETES: ICD-10-CM

## 2023-07-12 LAB
ALBUMIN SERPL-MCNC: 3.9 G/DL (ref 3.5–5.2)
ALBUMIN/GLOB SERPL: 1.3 {RATIO} (ref 1–2.5)
ALP SERPL-CCNC: 147 U/L (ref 35–104)
ALT SERPL-CCNC: 14 U/L (ref 5–33)
ANION GAP SERPL CALCULATED.3IONS-SCNC: 15 MMOL/L (ref 9–17)
AST SERPL-CCNC: 22 U/L
BASOPHILS # BLD: 0.03 K/UL (ref 0–0.2)
BASOPHILS NFR BLD: 1 % (ref 0–2)
BILIRUB SERPL-MCNC: 0.5 MG/DL (ref 0.3–1.2)
BUN SERPL-MCNC: 12 MG/DL (ref 8–23)
CALCIUM SERPL-MCNC: 9.7 MG/DL (ref 8.6–10.4)
CHLORIDE SERPL-SCNC: 108 MMOL/L (ref 98–107)
CO2 SERPL-SCNC: 21 MMOL/L (ref 20–31)
CREAT SERPL-MCNC: 1.4 MG/DL (ref 0.5–0.9)
EOSINOPHIL # BLD: 0.07 K/UL (ref 0–0.44)
EOSINOPHILS RELATIVE PERCENT: 2 % (ref 1–4)
ERYTHROCYTE [DISTWIDTH] IN BLOOD BY AUTOMATED COUNT: 15.2 % (ref 11.8–14.4)
GFR SERPL CREATININE-BSD FRML MDRD: 42 ML/MIN/1.73M2
GLUCOSE SERPL-MCNC: 73 MG/DL (ref 70–99)
HCT VFR BLD AUTO: 40.3 % (ref 36.3–47.1)
HGB BLD-MCNC: 12.5 G/DL (ref 11.9–15.1)
IMM GRANULOCYTES # BLD AUTO: <0.03 K/UL (ref 0–0.3)
IMM GRANULOCYTES NFR BLD: 0 %
LYMPHOCYTES # BLD: 29 % (ref 24–43)
LYMPHOCYTES NFR BLD: 1.13 K/UL (ref 1.1–3.7)
MCH RBC QN AUTO: 30 PG (ref 25.2–33.5)
MCHC RBC AUTO-ENTMCNC: 31 G/DL (ref 28.4–34.8)
MCV RBC AUTO: 96.9 FL (ref 82.6–102.9)
MONOCYTES NFR BLD: 0.52 K/UL (ref 0.1–1.2)
MONOCYTES NFR BLD: 13 % (ref 3–12)
NEUTROPHILS NFR BLD: 55 % (ref 36–65)
NEUTS SEG NFR BLD: 2.15 K/UL (ref 1.5–8.1)
NRBC BLD-RTO: 0 PER 100 WBC
PLATELET # BLD AUTO: 308 K/UL (ref 138–453)
PMV BLD AUTO: 10 FL (ref 8.1–13.5)
POTASSIUM SERPL-SCNC: 3.7 MMOL/L (ref 3.7–5.3)
PROT SERPL-MCNC: 6.8 G/DL (ref 6.4–8.3)
RBC # BLD AUTO: 4.16 M/UL (ref 3.95–5.11)
RBC # BLD: ABNORMAL 10*6/UL
SODIUM SERPL-SCNC: 144 MMOL/L (ref 135–144)
WBC OTHER # BLD: 3.9 K/UL (ref 3.5–11.3)

## 2023-07-12 PROCEDURE — 74019 RADEX ABDOMEN 2 VIEWS: CPT

## 2023-07-12 PROCEDURE — 80053 COMPREHEN METABOLIC PANEL: CPT

## 2023-07-12 PROCEDURE — 83036 HEMOGLOBIN GLYCOSYLATED A1C: CPT

## 2023-07-12 PROCEDURE — 36415 COLL VENOUS BLD VENIPUNCTURE: CPT

## 2023-07-12 PROCEDURE — 85027 COMPLETE CBC AUTOMATED: CPT

## 2023-07-12 NOTE — CARE COORDINATION
Attempted outreach for CC enrollment needs, CTN handoff, HTN management  No answer and unable to LVM at this time.

## 2023-07-13 LAB
EST. AVERAGE GLUCOSE BLD GHB EST-MCNC: 120 MG/DL
HBA1C MFR BLD: 5.8 % (ref 4–6)

## 2023-07-18 ENCOUNTER — OFFICE VISIT (OUTPATIENT)
Dept: NEUROSURGERY | Age: 63
End: 2023-07-18
Payer: MEDICARE

## 2023-07-18 VITALS
DIASTOLIC BLOOD PRESSURE: 71 MMHG | WEIGHT: 149 LBS | SYSTOLIC BLOOD PRESSURE: 101 MMHG | HEART RATE: 67 BPM | HEIGHT: 60 IN | BODY MASS INDEX: 29.25 KG/M2

## 2023-07-18 DIAGNOSIS — M47.812 CERVICAL SPONDYLOSIS: Primary | ICD-10-CM

## 2023-07-18 PROCEDURE — G8427 DOCREV CUR MEDS BY ELIG CLIN: HCPCS | Performed by: NURSE PRACTITIONER

## 2023-07-18 PROCEDURE — 1036F TOBACCO NON-USER: CPT | Performed by: NURSE PRACTITIONER

## 2023-07-18 PROCEDURE — 3078F DIAST BP <80 MM HG: CPT | Performed by: NURSE PRACTITIONER

## 2023-07-18 PROCEDURE — 3017F COLORECTAL CA SCREEN DOC REV: CPT | Performed by: NURSE PRACTITIONER

## 2023-07-18 PROCEDURE — 3074F SYST BP LT 130 MM HG: CPT | Performed by: NURSE PRACTITIONER

## 2023-07-18 PROCEDURE — G8417 CALC BMI ABV UP PARAM F/U: HCPCS | Performed by: NURSE PRACTITIONER

## 2023-07-18 PROCEDURE — 99213 OFFICE O/P EST LOW 20 MIN: CPT | Performed by: NURSE PRACTITIONER
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
## 2023-07-19 ENCOUNTER — CARE COORDINATION (OUTPATIENT)
Dept: CARE COORDINATION | Age: 63
End: 2023-07-19

## 2023-07-19 NOTE — CARE COORDINATION
Attempted outreach for CC needs, HTN management, well-being  No answer and unable to LVM at this time

## 2023-07-20 ENCOUNTER — CARE COORDINATION (OUTPATIENT)
Dept: CARE COORDINATION | Age: 63
End: 2023-07-20

## 2023-07-21 NOTE — CARE COORDINATION
Attempted to reach patient for follow up. LVM with contact information. Will attempt to reach again within 1-2 weeks.   VICTOR MANUEL Hurt

## 2023-07-22 LAB — STATUS: NORMAL

## 2023-07-26 ENCOUNTER — CARE COORDINATION (OUTPATIENT)
Dept: CARE COORDINATION | Age: 63
End: 2023-07-26

## 2023-07-26 NOTE — CARE COORDINATION
ACM attempted outreach for CC needs, HTN management, well-being  NO answer and LVM with call back information

## 2023-08-02 ENCOUNTER — CARE COORDINATION (OUTPATIENT)
Dept: CARE COORDINATION | Age: 63
End: 2023-08-02

## 2023-08-02 NOTE — CARE COORDINATION
Attempted outreach for CC enrollment needs, HTN management, well-being.    No answer and LVM with call back information

## 2023-08-03 ENCOUNTER — CARE COORDINATION (OUTPATIENT)
Dept: CARE COORDINATION | Age: 63
End: 2023-08-03

## 2023-08-10 ENCOUNTER — CARE COORDINATION (OUTPATIENT)
Dept: CARE COORDINATION | Age: 63
End: 2023-08-10

## 2023-08-10 NOTE — CARE COORDINATION
Attempted outreach for CC needs, HTN management, well-being  No answer and LVM with call back information

## 2023-08-14 ENCOUNTER — HOSPITAL ENCOUNTER (OUTPATIENT)
Dept: SLEEP CENTER | Age: 63
Discharge: HOME | End: 2023-08-16
Payer: MEDICARE

## 2023-08-14 PROCEDURE — 95811 POLYSOM 6/>YRS CPAP 4/> PARM: CPT

## 2023-08-15 VITALS — BODY MASS INDEX: 29.25 KG/M2 | HEIGHT: 60 IN | WEIGHT: 149 LBS

## 2023-08-16 DIAGNOSIS — K21.9 GASTROESOPHAGEAL REFLUX DISEASE, UNSPECIFIED WHETHER ESOPHAGITIS PRESENT: ICD-10-CM

## 2023-08-16 RX ORDER — PANTOPRAZOLE SODIUM 40 MG/1
TABLET, DELAYED RELEASE ORAL
Qty: 90 TABLET | Refills: 3 | Status: SHIPPED | OUTPATIENT
Start: 2023-08-16

## 2023-08-16 NOTE — TELEPHONE ENCOUNTER
Liz Palmer is calling to request a refill on the following medication(s):    Medication Request:  Requested Prescriptions     Pending Prescriptions Disp Refills    pantoprazole (PROTONIX) 40 MG tablet [Pharmacy Med Name: PANTOPRAZOLE SOD DR 40 MG TAB] 90 tablet 1     Sig: TAKE ONE TABLET BY MOUTH EVERY MORNING       Last Visit Date (If Applicable):  0/96/2027    Next Visit Date:    10/2/2023

## 2023-08-18 ENCOUNTER — CARE COORDINATION (OUTPATIENT)
Dept: CARE COORDINATION | Age: 63
End: 2023-08-18

## 2023-08-18 NOTE — CARE COORDINATION
Attempted to reach patient for follow up. LVM with contact information. Have been unable to reach patient, will remove from panel.   VICTOR MANUEL Hurt

## 2023-08-20 LAB — STATUS: NORMAL

## 2023-08-25 ENCOUNTER — CARE COORDINATION (OUTPATIENT)
Dept: CARE COORDINATION | Age: 63
End: 2023-08-25

## 2023-08-25 NOTE — CARE COORDINATION
ACM attempted outreach for HTN management, well-being  NO answer and LVM with call back information    Unable to contact at this time, will follow if needed in future

## 2023-09-05 ENCOUNTER — OFFICE VISIT (OUTPATIENT)
Dept: NEUROLOGY | Age: 63
End: 2023-09-05
Payer: MEDICARE

## 2023-09-05 VITALS
WEIGHT: 151 LBS | DIASTOLIC BLOOD PRESSURE: 70 MMHG | HEART RATE: 64 BPM | BODY MASS INDEX: 29.64 KG/M2 | HEIGHT: 60 IN | SYSTOLIC BLOOD PRESSURE: 107 MMHG

## 2023-09-05 DIAGNOSIS — M54.12 CERVICAL RADICULOPATHY: Primary | ICD-10-CM

## 2023-09-05 DIAGNOSIS — R93.7 ABNORMAL MRI, CERVICAL SPINE: ICD-10-CM

## 2023-09-05 DIAGNOSIS — I63.9 ISCHEMIC STROKE (HCC): ICD-10-CM

## 2023-09-05 PROCEDURE — G8427 DOCREV CUR MEDS BY ELIG CLIN: HCPCS | Performed by: PSYCHIATRY & NEUROLOGY

## 2023-09-05 PROCEDURE — 3017F COLORECTAL CA SCREEN DOC REV: CPT | Performed by: PSYCHIATRY & NEUROLOGY

## 2023-09-05 PROCEDURE — G8417 CALC BMI ABV UP PARAM F/U: HCPCS | Performed by: PSYCHIATRY & NEUROLOGY

## 2023-09-05 PROCEDURE — 3078F DIAST BP <80 MM HG: CPT | Performed by: PSYCHIATRY & NEUROLOGY

## 2023-09-05 PROCEDURE — 1036F TOBACCO NON-USER: CPT | Performed by: PSYCHIATRY & NEUROLOGY

## 2023-09-05 PROCEDURE — 3074F SYST BP LT 130 MM HG: CPT | Performed by: PSYCHIATRY & NEUROLOGY

## 2023-09-05 PROCEDURE — 99214 OFFICE O/P EST MOD 30 MIN: CPT | Performed by: PSYCHIATRY & NEUROLOGY

## 2023-09-05 RX ORDER — PREDNISONE 20 MG/1
TABLET ORAL
Qty: 18 TABLET | Refills: 0 | Status: SHIPPED | OUTPATIENT
Start: 2023-09-05

## 2023-09-05 NOTE — PROGRESS NOTES
Value Date    HDL 63 02/09/2023    HDL 46 04/15/2022    HDL 52 11/15/2018     Lab Results   Component Value Date    LDLCALC 65 02/09/2023     Lab Results   Component Value Date    LABVLDL 17 02/09/2023     Lab Results   Component Value Date    LABA1C 5.8 07/12/2023     Lab Results   Component Value Date     07/12/2023     Lab Results   Component Value Date    WBOZQZCJ49 333 02/09/2023              Lab Results   Component Value Date     LDLCHOLESTEROL 130 04/15/2022      No components found for: CHLPL        Lab Results   Component Value Date     TRIG 103 04/15/2022     TRIG 176 (H) 11/15/2018     TRIG 232 (H) 04/27/2018            Lab Results   Component Value Date     HDL 46 04/15/2022     HDL 52 11/15/2018     HDL 57 04/27/2018      No results found for: LDLCALC  No results found for: LABVLDL        Lab Results   Component Value Date     LABA1C 5.7 04/15/2022            Lab Results   Component Value Date      04/15/2022            Lab Results   Component Value Date     SXAJTYYM06 847 07/13/2022       Neurological work up:  CT head  CTA head and neck  MRI brain     2 D echo        All of patient's labs were personally reviewed. All the imaging studies were personally reviewed and discussed with the patient. Assessment Recommendations:  Left subcortical acute ischemic stroke July 12, 2022:  The patient has residual speech hesitancy from the stroke otherwise does not report new symptoms of stroke or TIA. Recommend continued aspirin 81 mg a day along with Lipitor for secondary stroke prophylaxis. Stroke work-up including 2D echo, JACOBO have been unremarkable. Cervical myeloradiculopathy  The patient is being seen by neurosurgery. The plan is to refer to pain management for evaluation for medial bundle branch block    Patient to follow-up with me next 6 months    Denisa Arango, DO I would like to thank you for the consult.  Please do not hesitate if you have any questions about the patient

## 2023-09-11 ENCOUNTER — OFFICE VISIT (OUTPATIENT)
Dept: ORTHOPEDIC SURGERY | Age: 63
End: 2023-09-11
Payer: MEDICARE

## 2023-09-11 VITALS — HEIGHT: 60 IN | BODY MASS INDEX: 29.64 KG/M2 | WEIGHT: 151 LBS

## 2023-09-11 DIAGNOSIS — M79.602 LEFT ARM PAIN: ICD-10-CM

## 2023-09-11 DIAGNOSIS — Z96.612 STATUS POST REVERSE TOTAL REPLACEMENT OF LEFT SHOULDER: Primary | ICD-10-CM

## 2023-09-11 PROCEDURE — G8417 CALC BMI ABV UP PARAM F/U: HCPCS | Performed by: PHYSICIAN ASSISTANT

## 2023-09-11 PROCEDURE — 1036F TOBACCO NON-USER: CPT | Performed by: PHYSICIAN ASSISTANT

## 2023-09-11 PROCEDURE — G8427 DOCREV CUR MEDS BY ELIG CLIN: HCPCS | Performed by: PHYSICIAN ASSISTANT

## 2023-09-11 PROCEDURE — 3017F COLORECTAL CA SCREEN DOC REV: CPT | Performed by: PHYSICIAN ASSISTANT

## 2023-09-11 PROCEDURE — 99214 OFFICE O/P EST MOD 30 MIN: CPT | Performed by: PHYSICIAN ASSISTANT

## 2023-09-11 ASSESSMENT — ENCOUNTER SYMPTOMS
COUGH: 0
COLOR CHANGE: 0
SHORTNESS OF BREATH: 0
VOMITING: 0

## 2023-09-11 NOTE — PROGRESS NOTES
[address] [place] [address]  Dept: [phone]  Dept Fax: [phone]        Postoperative follow-up note    Subjective:   Argelia Young is a 58y.o. year old female who presents to our office today for postoperative followup regarding her   1. Status post reverse total replacement of left shoulder    2. Left arm pain    . Chief Complaint   Patient presents with    Shoulder Pain     Left shoulder pain. Dog pulled on leash 9/7/23. H/o left total reverse arthroplasty 2/8/22         Date of Surgery: ***    Argelia Young  is a 58y.o. year old female who presents to our office today for postoperative follow up after having undergone a *** on ***. The patient denies fevers, chills, nausea, vomiting, diarrhea. The patient {HAS HAS [id number]} started physical therapy. Review of Systems        Objective :   General: Argelia Young is a 58 y.o. female who is alert and oriented and sitting comfortably in our office. Ortho Exam  MS:   ***  Neuro: alert. oriented  Eyes: Extra-ocular muscles intact  Mouth: Oral mucosa moist. No perioral lesions  Pulm: Respirations unlabored and regular. Skin: warm, well perfused  Psych:   Patient has good fund of knowledge and displays understanging of exam, diagnosis, and plan. Radiology: ***     Procedure:    Assessment:      1. Status post reverse total replacement of left shoulder    2. Left arm pain         Plan:      PT [facility]  F/u dr Gilson Perez 6 weeks        Follow up: Return in about 6 weeks (around 10/23/2023) for with Dr Gilson Perez in [place] . No orders of the defined types were placed in this encounter.       Orders Placed This Encounter   Procedures    XR SHOULDER LEFT (MIN 2 VIEWS)     Standing Status:   Future     Number of Occurrences:   1     Standing Expiration Date:   9/11/2024    [address] [place] [address] Physical Therapy - [facility]     Referral Priority:   Routine
when compared to images from 2/7/2023. Impression: Left shoulder status post reverse total shoulder arthroplasty with hardware in appropriate position as described above. Assessment:      1. Status post reverse total replacement of left shoulder    2. Left arm pain         Plan:      Jerald Herrera is a 58 y.o. female here today for left shoulder pain after falling from a standing height on 9/7/2023. Of note the patient did have a left reverse total shoulder arthroplasty on 2/8/2022 with Dr. Elsy Figueroa DO. I personally interpreted and reviewed the patient's left shoulder x-rays from today revealing no acute osseous abnormality and/or fracture. There is no evidence of loosening or lucency surrounding the surgical hardware. I discussed with the patient that I feel as if her pain would improve if her range of motion improves therefore a referral to formal outpatient physical therapy was placed for her to work on range of motion improvement with gentle strengthening. Patient is to do active range of motion only to reduce the risk of dislocation of her left reverse total shoulder arthroplasty. She was given a referral to [address] [place] [address] outpatient PT. The patient will follow up in 6 weeks with Dr Bobby Pickard in [place] for re-evaluation, or sooner if needed. We discussed that the patient should call us with any questions or concerns. The patient voiced her understanding. Follow up: Return in about 6 weeks (around 10/23/2023) for with Dr Bobby Pickard in [place] . No orders of the defined types were placed in this encounter.       Orders Placed This Encounter   Procedures    XR SHOULDER LEFT (MIN 2 VIEWS)     Standing Status:   Future     Number of Occurrences:   1     Standing Expiration Date:   9/11/2024    [facility] Physical Therapy [facility]     Referral Priority:   Routine     Referral Type:   Eval and Treat     Referral Reason:   Specialty Services Required     Requested

## 2023-09-18 ENCOUNTER — HOSPITAL ENCOUNTER (OUTPATIENT)
Dept: PHYSICAL THERAPY | Facility: CLINIC | Age: 63
Setting detail: THERAPIES SERIES
Discharge: HOME | End: 2023-09-18

## 2023-09-18 NOTE — FLOWSHEET NOTE
[] [facility] - [facility] &  Therapy  [address] [facility] [place] [facility].    [phone]  F: [phone]   [x] [address]  [address]   [address]  P: [phone]  F: [phone]  [] [address]  [address]  P: [phone]  F: [phone] [] [facility]  P: [phone]  F: [phone]  [] [address] [facility]   [address]   [place]: [phone]  F: [phone]   [] [address] [facility]  [address]  [place]: [phone]   F: [phone]     Physical Therapy Cancel/No Show note    Date: 2023  Patient: Trav Guerrero  : 1960  MRN: [id number]    Cancels/No Shows to date: - to initial evaluation     For today's appointment patient:    [x]  Cancelled    [] Rescheduled appointment    [] No-show     Reason given by patient:    []  Patient ill    []  Conflicting appointment    [] No transportation      [] Conflict with work    [x] No reason given    [] Weather related    [] COVID-19    [x] Other:      Comments:  pt to call back to reschedule initial evaluation      [] Next appointment was confirmed    Electronically signed by: Rima Oliva PT

## 2023-09-21 ENCOUNTER — APPOINTMENT (OUTPATIENT)
Dept: GENERAL RADIOLOGY | Age: 63
End: 2023-09-21
Payer: MEDICARE

## 2023-09-21 ENCOUNTER — HOSPITAL ENCOUNTER (EMERGENCY)
Age: 63
Discharge: HOME | End: 2023-09-21
Attending: EMERGENCY MEDICINE
Payer: MEDICARE

## 2023-09-21 VITALS
OXYGEN SATURATION: 100 % | RESPIRATION RATE: 18 BRPM | TEMPERATURE: 98.2 F | HEART RATE: 58 BPM | DIASTOLIC BLOOD PRESSURE: 68 MMHG | WEIGHT: 150 LBS | SYSTOLIC BLOOD PRESSURE: 101 MMHG | HEIGHT: 60 IN | BODY MASS INDEX: 29.45 KG/M2

## 2023-09-21 DIAGNOSIS — S62.501A CLOSED NONDISPLACED FRACTURE OF PHALANX OF RIGHT THUMB, UNSPECIFIED PHALANX, INITIAL ENCOUNTER: Primary | ICD-10-CM

## 2023-09-21 DIAGNOSIS — S40.811A ABRASION OF ARM, RIGHT, INITIAL ENCOUNTER: ICD-10-CM

## 2023-09-21 PROCEDURE — 73130 X-RAY EXAM OF HAND: CPT

## 2023-09-21 PROCEDURE — 73090 X-RAY EXAM OF FOREARM: CPT

## 2023-09-21 PROCEDURE — 73110 X-RAY EXAM OF WRIST: CPT

## 2023-09-21 PROCEDURE — 6370000000 HC RX 637 (ALT 250 FOR IP): Performed by: NURSE PRACTITIONER

## 2023-09-21 PROCEDURE — 90471 IMMUNIZATION ADMIN: CPT | Performed by: NURSE PRACTITIONER

## 2023-09-21 PROCEDURE — 6360000002 HC RX W HCPCS: Performed by: NURSE PRACTITIONER

## 2023-09-21 PROCEDURE — 90714 TD VACC NO PRESV 7 YRS+ IM: CPT | Performed by: NURSE PRACTITIONER

## 2023-09-21 PROCEDURE — 99284 EMERGENCY DEPT VISIT MOD MDM: CPT

## 2023-09-21 RX ORDER — BACITRACIN ZINC AND POLYMYXIN B SULFATE 500; 1000 [USP'U]/G; [USP'U]/G
OINTMENT TOPICAL
Qty: 15 G | Refills: 0 | Status: SHIPPED | OUTPATIENT
Start: 2023-09-21 | End: 2023-09-28

## 2023-09-21 RX ORDER — OXYCODONE HYDROCHLORIDE AND ACETAMINOPHEN 5; 325 MG/1; MG/1
1 TABLET ORAL ONCE
Status: COMPLETED | OUTPATIENT
Start: 2023-09-21 | End: 2023-09-21

## 2023-09-21 RX ADMIN — CLOSTRIDIUM TETANI TOXOID ANTIGEN (FORMALDEHYDE INACTIVATED) AND CORYNEBACTERIUM DIPHTHERIAE TOXOID ANTIGEN (FORMALDEHYDE INACTIVATED) 0.5 ML: 5; 2 INJECTION, SUSPENSION INTRAMUSCULAR at 14:33

## 2023-09-21 RX ADMIN — OXYCODONE AND ACETAMINOPHEN 1 TABLET: 325; 5 TABLET ORAL at 15:39

## 2023-09-21 ASSESSMENT — PAIN - FUNCTIONAL ASSESSMENT: PAIN_FUNCTIONAL_ASSESSMENT: 0-10

## 2023-09-21 ASSESSMENT — ENCOUNTER SYMPTOMS: COLOR CHANGE: 1

## 2023-09-21 ASSESSMENT — PAIN DESCRIPTION - LOCATION: LOCATION: HAND

## 2023-09-21 ASSESSMENT — PAIN SCALES - GENERAL
PAINLEVEL_OUTOF10: 7
PAINLEVEL_OUTOF10: 7

## 2023-09-21 ASSESSMENT — PAIN DESCRIPTION - ORIENTATION: ORIENTATION: RIGHT

## 2023-09-21 NOTE — DISCHARGE INSTRUCTIONS
Wear splint provided. Follow-up with orthopedics provided for further evaluation of your injury. Return to the emergency department for worsening or new symptoms.

## 2023-09-23 NOTE — ED PROVIDER NOTES
eMERGENCY dEPARTMENT eNCOUnter   Independent Attestation     Pt Name: Enzo Thompson  MRN: [id number]  [address] [facility] 1960  Date of evaluation: 9/23/23     Enzo Thompson is a 58 y.o. female with CC: Hand Pain (Px fell 9/19, claims pain and inflammation is increasing ) and Arm Injury (Pain and laceration on R forearm related to fall 9/19)        This visit was performed by both a physician and an APC. I performed all aspects of the MDM as documented.       Kimber Adams MD  Attending Emergency Physician            Kimber Adams MD  09/23/23 [id number]

## 2023-10-02 ENCOUNTER — OFFICE VISIT (OUTPATIENT)
Dept: FAMILY MEDICINE CLINIC | Age: 63
End: 2023-10-02
Payer: MEDICARE

## 2023-10-02 VITALS
SYSTOLIC BLOOD PRESSURE: 110 MMHG | OXYGEN SATURATION: 98 % | DIASTOLIC BLOOD PRESSURE: 70 MMHG | WEIGHT: 151 LBS | BODY MASS INDEX: 29.49 KG/M2 | HEART RATE: 79 BPM

## 2023-10-02 DIAGNOSIS — G47.33 OSA ON CPAP: ICD-10-CM

## 2023-10-02 DIAGNOSIS — I10 ESSENTIAL HYPERTENSION: Primary | ICD-10-CM

## 2023-10-02 DIAGNOSIS — S62.501D CLOSED AVULSION FRACTURE OF RIGHT THUMB WITH ROUTINE HEALING, SUBSEQUENT ENCOUNTER: ICD-10-CM
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
## 2023-10-02 DIAGNOSIS — E78.5 DYSLIPIDEMIA: ICD-10-CM

## 2023-10-02 DIAGNOSIS — F41.1 GENERALIZED ANXIETY DISORDER: ICD-10-CM

## 2023-10-02 DIAGNOSIS — M54.50 ACUTE BILATERAL LOW BACK PAIN WITHOUT SCIATICA: ICD-10-CM

## 2023-10-02 DIAGNOSIS — R73.03 PREDIABETES: ICD-10-CM

## 2023-10-02 PROCEDURE — G8427 DOCREV CUR MEDS BY ELIG CLIN: HCPCS | Performed by: FAMILY MEDICINE

## 2023-10-02 PROCEDURE — 3074F SYST BP LT 130 MM HG: CPT | Performed by: FAMILY MEDICINE

## 2023-10-02 PROCEDURE — G8417 CALC BMI ABV UP PARAM F/U: HCPCS | Performed by: FAMILY MEDICINE

## 2023-10-02 PROCEDURE — 99214 OFFICE O/P EST MOD 30 MIN: CPT | Performed by: FAMILY MEDICINE

## 2023-10-02 PROCEDURE — 3078F DIAST BP <80 MM HG: CPT | Performed by: FAMILY MEDICINE

## 2023-10-02 PROCEDURE — 3017F COLORECTAL CA SCREEN DOC REV: CPT | Performed by: FAMILY MEDICINE

## 2023-10-02 PROCEDURE — G8484 FLU IMMUNIZE NO ADMIN: HCPCS | Performed by: FAMILY MEDICINE

## 2023-10-02 PROCEDURE — 1036F TOBACCO NON-USER: CPT | Performed by: FAMILY MEDICINE

## 2023-10-02 RX ORDER — IBUPROFEN 600 MG/1
TABLET ORAL
COMMUNITY

## 2023-10-02 ASSESSMENT — ENCOUNTER SYMPTOMS
EYES NEGATIVE: 1
GASTROINTESTINAL NEGATIVE: 1
BLURRED VISION: 0
NAUSEA: 0
RESPIRATORY NEGATIVE: 1
SHORTNESS OF BREATH: 0
FEELING OF CHOKING: 0
ALLERGIC/IMMUNOLOGIC NEGATIVE: 1
HYPERVENTILATION: 0
ORTHOPNEA: 0

## 2023-10-02 NOTE — PROGRESS NOTES
APSO Progress Note    Date:10/2/2023         Patient Name:Latisha Prasad     Date of Birth:[birth year]     Age:63 y.o. Assessment/Plan        Problem List Items Addressed This Visit          Circulatory    Essential hypertension - Primary      Well-controlled, continue current medications, continue current treatment plan, medication adherence emphasized and lifestyle modifications recommended            Respiratory    KHALIDA on CPAP     Patient is very compliant with CPAP therapy  Patient benefits greatly from CPAP therapy  Recommend continuing CPAP therapy          Relevant Orders    CBC with Auto Differential       Other    Generalized anxiety disorder      Borderline controlled, continue current medications and continue current treatment plan         Dyslipidemia      Unclear control, continue current plan pending work up below, medication adherence emphasized and lifestyle modifications recommended         Relevant Orders    Comprehensive Metabolic Panel    Lipid Panel    Prediabetes      Unclear control, continue current plan pending work up below         Relevant Orders    Hemoglobin A1C     Other Visit Diagnoses       Closed avulsion fracture of right thumb with routine healing, subsequent encounter        Seeing ortho    Relevant Medications    ibuprofen (ADVIL;MOTRIN) 600 MG tablet    Acute bilateral low back pain without sciatica        XR  Home exercises    Relevant Medications    ibuprofen (ADVIL;MOTRIN) 600 MG tablet    Other Relevant Orders    XR LUMBAR SPINE (2-3 VIEWS)    Fall, subsequent encounter        Reviewed ED notes  Seeing ortho             Return in about 3 months (around 1/2/2024). Electronically signed by Melissa Parish DO on 10/2/23       Total time spent was between Time personally spent assessing and managing the patient on the date of service: Est: 30-39 minutes (71548) mins.  This included time spent reviewing the patient's medical record (e.g., recent visits, labs, and

## 2023-10-02 NOTE — PATIENT INSTRUCTIONS
[employer]. Care instructions adapted under license by [facility] ([facility]). If you have questions about a medical condition or this instruction, always ask your healthcare professional. [address] any warranty or liability for your use of this information.

## 2023-10-18 ENCOUNTER — OFFICE VISIT (OUTPATIENT)
Dept: NEUROSURGERY | Age: 63
End: 2023-10-18
Payer: MEDICARE

## 2023-10-18 VITALS
HEART RATE: 66 BPM | HEIGHT: 60 IN | OXYGEN SATURATION: 95 % | SYSTOLIC BLOOD PRESSURE: 129 MMHG | TEMPERATURE: 97.9 F | BODY MASS INDEX: 29.64 KG/M2 | WEIGHT: 151 LBS | DIASTOLIC BLOOD PRESSURE: 90 MMHG

## 2023-10-18 DIAGNOSIS — M47.812 CERVICAL SPONDYLOSIS: Primary | ICD-10-CM

## 2023-10-18 DIAGNOSIS — R45.86 EMOTIONAL LABILITY: ICD-10-CM

## 2023-10-18 DIAGNOSIS — I63.9 ISCHEMIC STROKE (HCC): ICD-10-CM

## 2023-10-18 PROCEDURE — 99214 OFFICE O/P EST MOD 30 MIN: CPT | Performed by: NURSE PRACTITIONER

## 2023-10-18 PROCEDURE — 3017F COLORECTAL CA SCREEN DOC REV: CPT | Performed by: NURSE PRACTITIONER

## 2023-10-18 PROCEDURE — 3074F SYST BP LT 130 MM HG: CPT | Performed by: NURSE PRACTITIONER

## 2023-10-18 PROCEDURE — G8484 FLU IMMUNIZE NO ADMIN: HCPCS | Performed by: NURSE PRACTITIONER

## 2023-10-18 PROCEDURE — 3080F DIAST BP >= 90 MM HG: CPT | Performed by: NURSE PRACTITIONER

## 2023-10-18 PROCEDURE — 1036F TOBACCO NON-USER: CPT | Performed by: NURSE PRACTITIONER

## 2023-10-18 PROCEDURE — G8417 CALC BMI ABV UP PARAM F/U: HCPCS | Performed by: NURSE PRACTITIONER

## 2023-10-18 PROCEDURE — G8427 DOCREV CUR MEDS BY ELIG CLIN: HCPCS | Performed by: NURSE PRACTITIONER
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
## 2023-10-19 ENCOUNTER — TELEPHONE (OUTPATIENT)
Dept: ONCOLOGY | Age: 63
End: 2023-10-19

## 2023-10-19 ENCOUNTER — OFFICE VISIT (OUTPATIENT)
Dept: ONCOLOGY | Age: 63
End: 2023-10-19
Payer: MEDICARE

## 2023-10-19 ENCOUNTER — HOSPITAL ENCOUNTER (OUTPATIENT)
Age: 63
Setting detail: SPECIMEN
Discharge: HOME | End: 2023-10-19
Payer: MEDICARE

## 2023-10-19 VITALS
RESPIRATION RATE: 18 BRPM | DIASTOLIC BLOOD PRESSURE: 89 MMHG | HEART RATE: 69 BPM | BODY MASS INDEX: 30.21 KG/M2 | SYSTOLIC BLOOD PRESSURE: 129 MMHG | WEIGHT: 154.7 LBS | TEMPERATURE: 98 F

## 2023-10-19 DIAGNOSIS — E78.5 DYSLIPIDEMIA: ICD-10-CM

## 2023-10-19 DIAGNOSIS — I69.320 CVA, OLD, APHASIA: ICD-10-CM

## 2023-10-19 DIAGNOSIS — D50.0 IRON DEFICIENCY ANEMIA DUE TO CHRONIC BLOOD LOSS: Primary | ICD-10-CM

## 2023-10-19 DIAGNOSIS — D50.0 IRON DEFICIENCY ANEMIA DUE TO CHRONIC BLOOD LOSS: ICD-10-CM

## 2023-10-19 DIAGNOSIS — R73.03 PREDIABETES: ICD-10-CM

## 2023-10-19 DIAGNOSIS — G47.33 OSA ON CPAP: ICD-10-CM

## 2023-10-19 LAB
ALBUMIN SERPL-MCNC: 4.1 G/DL (ref 3.5–5.2)
ALBUMIN/GLOB SERPL: 1.5 {RATIO} (ref 1–2.5)
ALP SERPL-CCNC: 127 U/L (ref 35–104)
ALT SERPL-CCNC: 12 U/L (ref 5–33)
ANION GAP SERPL CALCULATED.3IONS-SCNC: 10 MMOL/L (ref 9–17)
AST SERPL-CCNC: 23 U/L
BASOPHILS # BLD: 0.03 K/UL (ref 0–0.2)
BASOPHILS # BLD: 0.05 K/UL (ref 0–0.2)
BASOPHILS NFR BLD: 1 % (ref 0–2)
BASOPHILS NFR BLD: 1 % (ref 0–2)
BILIRUB SERPL-MCNC: 0.3 MG/DL (ref 0.3–1.2)
BUN SERPL-MCNC: 12 MG/DL (ref 8–23)
CALCIUM SERPL-MCNC: 9.3 MG/DL (ref 8.6–10.4)
CHLORIDE SERPL-SCNC: 107 MMOL/L (ref 98–107)
CHOLEST SERPL-MCNC: 206 MG/DL
CHOLESTEROL/HDL RATIO: 4.3
CO2 SERPL-SCNC: 25 MMOL/L (ref 20–31)
CREAT SERPL-MCNC: 1.3 MG/DL (ref 0.5–0.9)
EOSINOPHIL # BLD: 0.07 K/UL (ref 0–0.44)
EOSINOPHIL # BLD: 0.08 K/UL (ref 0–0.44)
EOSINOPHILS RELATIVE PERCENT: 2 % (ref 1–4)
EOSINOPHILS RELATIVE PERCENT: 2 % (ref 1–4)
ERYTHROCYTE [DISTWIDTH] IN BLOOD BY AUTOMATED COUNT: 13 % (ref 11.8–14.4)
ERYTHROCYTE [DISTWIDTH] IN BLOOD BY AUTOMATED COUNT: 13.1 % (ref 11.8–14.4)
EST. AVERAGE GLUCOSE BLD GHB EST-MCNC: 120 MG/DL
FERRITIN SERPL-MCNC: 22 NG/ML (ref 13–150)
FOLATE SERPL-MCNC: 11.2 NG/ML
GFR SERPL CREATININE-BSD FRML MDRD: 46 ML/MIN/1.73M2
GLUCOSE SERPL-MCNC: 78 MG/DL (ref 70–99)
HBA1C MFR BLD: 5.8 % (ref 4–6)
HCT VFR BLD AUTO: 42.2 % (ref 36.3–47.1)
HCT VFR BLD AUTO: 42.3 % (ref 36.3–47.1)
HDLC SERPL-MCNC: 48 MG/DL
HGB BLD-MCNC: 13.1 G/DL (ref 11.9–15.1)
HGB BLD-MCNC: 13.2 G/DL (ref 11.9–15.1)
IMM GRANULOCYTES # BLD AUTO: 0 K/UL (ref 0–0.3)
IMM GRANULOCYTES # BLD AUTO: <0.03 K/UL (ref 0–0.3)
IMM GRANULOCYTES NFR BLD: 0 %
IMM GRANULOCYTES NFR BLD: 0 %
IRON SATN MFR SERPL: 40 % (ref 20–55)
IRON SERPL-MCNC: 128 UG/DL (ref 37–145)
LDLC SERPL CALC-MCNC: 120 MG/DL (ref 0–130)
LYMPHOCYTES NFR BLD: 0.96 K/UL (ref 1.1–3.7)
LYMPHOCYTES NFR BLD: 1.01 K/UL (ref 1.1–3.7)
LYMPHOCYTES RELATIVE PERCENT: 26 % (ref 24–43)
LYMPHOCYTES RELATIVE PERCENT: 27 % (ref 24–43)
MCH RBC QN AUTO: 29.6 PG (ref 25.2–33.5)
MCH RBC QN AUTO: 30.1 PG (ref 25.2–33.5)
MCHC RBC AUTO-ENTMCNC: 31 G/DL (ref 28.4–34.8)
MCHC RBC AUTO-ENTMCNC: 31.3 G/DL (ref 28.4–34.8)
MCV RBC AUTO: 95.5 FL (ref 82.6–102.9)
MCV RBC AUTO: 96.3 FL (ref 82.6–102.9)
MONOCYTES NFR BLD: 0.39 K/UL (ref 0.1–1.2)
MONOCYTES NFR BLD: 0.41 K/UL (ref 0.1–1.2)
MONOCYTES NFR BLD: 11 % (ref 3–12)
MONOCYTES NFR BLD: 11 % (ref 3–12)
NEUTROPHILS NFR BLD: 59 % (ref 36–65)
NEUTROPHILS NFR BLD: 60 % (ref 36–65)
NEUTS SEG NFR BLD: 2.14 K/UL (ref 1.5–8.1)
NEUTS SEG NFR BLD: 2.27 K/UL (ref 1.5–8.1)
NRBC BLD-RTO: 0 PER 100 WBC
NRBC BLD-RTO: 0 PER 100 WBC
PLATELET # BLD AUTO: 285 K/UL (ref 138–453)
PLATELET # BLD AUTO: 304 K/UL (ref 138–453)
PMV BLD AUTO: 10.2 FL (ref 8.1–13.5)
PMV BLD AUTO: 9.5 FL (ref 8.1–13.5)
POTASSIUM SERPL-SCNC: 3.8 MMOL/L (ref 3.7–5.3)
PROT SERPL-MCNC: 6.8 G/DL (ref 6.4–8.3)
RBC # BLD AUTO: 4.38 M/UL (ref 3.95–5.11)
RBC # BLD AUTO: 4.43 M/UL (ref 3.95–5.11)
SODIUM SERPL-SCNC: 142 MMOL/L (ref 135–144)
TIBC SERPL-MCNC: 320 UG/DL (ref 250–450)
TRIGL SERPL-MCNC: 192 MG/DL
UNSATURATED IRON BINDING CAPACITY: 192 UG/DL (ref 112–347)
VIT B12 SERPL-MCNC: 309 PG/ML (ref 232–1245)
WBC OTHER # BLD: 3.7 K/UL (ref 3.5–11.3)
WBC OTHER # BLD: 3.7 K/UL (ref 3.5–11.3)

## 2023-10-19 PROCEDURE — 99211 OFF/OP EST MAY X REQ PHY/QHP: CPT | Performed by: INTERNAL MEDICINE

## 2023-10-19 PROCEDURE — 82607 VITAMIN B-12: CPT

## 2023-10-19 PROCEDURE — 85025 COMPLETE CBC W/AUTO DIFF WBC: CPT

## 2023-10-19 PROCEDURE — 99214 OFFICE O/P EST MOD 30 MIN: CPT | Performed by: INTERNAL MEDICINE

## 2023-10-19 PROCEDURE — G8417 CALC BMI ABV UP PARAM F/U: HCPCS | Performed by: INTERNAL MEDICINE

## 2023-10-19 PROCEDURE — 82746 ASSAY OF FOLIC ACID SERUM: CPT

## 2023-10-19 PROCEDURE — 83540 ASSAY OF IRON: CPT

## 2023-10-19 PROCEDURE — 80061 LIPID PANEL: CPT

## 2023-10-19 PROCEDURE — 3074F SYST BP LT 130 MM HG: CPT | Performed by: INTERNAL MEDICINE

## 2023-10-19 PROCEDURE — 36415 COLL VENOUS BLD VENIPUNCTURE: CPT

## 2023-10-19 PROCEDURE — G8427 DOCREV CUR MEDS BY ELIG CLIN: HCPCS | Performed by: INTERNAL MEDICINE

## 2023-10-19 PROCEDURE — 3017F COLORECTAL CA SCREEN DOC REV: CPT | Performed by: INTERNAL MEDICINE

## 2023-10-19 PROCEDURE — 1036F TOBACCO NON-USER: CPT | Performed by: INTERNAL MEDICINE

## 2023-10-19 PROCEDURE — 83550 IRON BINDING TEST: CPT

## 2023-10-19 PROCEDURE — 80053 COMPREHEN METABOLIC PANEL: CPT

## 2023-10-19 PROCEDURE — 3078F DIAST BP <80 MM HG: CPT | Performed by: INTERNAL MEDICINE

## 2023-10-19 PROCEDURE — G8484 FLU IMMUNIZE NO ADMIN: HCPCS | Performed by: INTERNAL MEDICINE

## 2023-10-19 PROCEDURE — 83036 HEMOGLOBIN GLYCOSYLATED A1C: CPT

## 2023-10-19 PROCEDURE — 82728 ASSAY OF FERRITIN: CPT

## 2023-10-19 NOTE — PROGRESS NOTES
_           Chief Complaint   Patient presents with    Follow-up    Discuss Labs     DIAGNOSIS:       Severe iron deficiency anemia  Vitamin B12 deficiency  2 episodes of probable GI blood loss February 2020 and late October 2020  Severe GERD and hiatal hernia  Past history of left leg DVT triggered by immobilization. Status post anticoagulation. CURRENT THERAPY:         Status post IV iron infusion November 2020  Status post blood transfusion    BRIEF CASE HISTORY:      Ms. Rachana Mendez is a very pleasant 58 y.o. female with history of multiple co morbidities as listed. The patient is referred for further evaluation and management of severe iron deficiency anemia. The patient had normal blood work over the last several years up to 2019. She presented in February 2020 with severe anemia with hemoglobin down to 5.4. She was hospitalized and received blood transfusion and iron infusion at that time. She had colonoscopy which was not prepped so it was repeated in May 2020 and so far did not show any active bleeding. Patient had severe upper GI symptoms with GERD and hiatal hernia. She was hospitalized for the second time late October 2020 with severe anemia again and she received blood transfusion and iron infusion as well. She had surgery evaluation and plan for possible gastric surgery in few weeks. The patient's symptoms were typical for severe anemia. She was complaining of increasing weakness and fatigue and shortness of breath on minimal exertion and palpitation. Dizzy spells. She denies any active bleeding. No history of melena or hematochezia. No hematemesis. As stated above she had severe heartburn and acid reflux and she was maintained on Protonix. The patient also had history of left leg DVT.   She was maintained on anticoagulation for long duration which was discontinued at the time of her first

## 2023-10-19 NOTE — TELEPHONE ENCOUNTER
DERIAN HERE FOR MD VISIT  RV 12 months with labs before RV  Refer to neurology for expressive aphasia  REFERRAL FOR DR WINKLER IS ON 12/4/23 @ 8:20AM  LABS CDP FERRITIN FE TIBC VITAMIN B 12 & FOLATE ORDERS MAILED TO PT TO BE DONE 10/17/24  MD VISIT 10/24/24 @ 11AM  AVS PRINTED W/ INSTRUCTIONS AND GIVEN TO PT ON EXIT

## 2023-10-20 ENCOUNTER — TELEPHONE (OUTPATIENT)
Dept: NEUROSURGERY | Age: 63
End: 2023-10-20

## 2023-10-20 NOTE — TELEPHONE ENCOUNTER
[facility] PM faxed over all of patient's office notes. They are scanned into the media.

## 2023-10-25 ENCOUNTER — OFFICE VISIT (OUTPATIENT)
Dept: ORTHOPEDIC SURGERY | Age: 63
End: 2023-10-25

## 2023-10-25 ENCOUNTER — CARE COORDINATION (OUTPATIENT)
Dept: CARE COORDINATION | Age: 63
End: 2023-10-25

## 2023-10-25 VITALS — HEIGHT: 60 IN | BODY MASS INDEX: 29.84 KG/M2 | WEIGHT: 152 LBS | RESPIRATION RATE: 14 BRPM

## 2023-10-25 DIAGNOSIS — Z96.612 STATUS POST REVERSE TOTAL REPLACEMENT OF LEFT SHOULDER: Primary | ICD-10-CM

## 2023-10-25 DIAGNOSIS — M79.602 LEFT ARM PAIN: ICD-10-CM

## 2023-10-25 NOTE — CARE COORDINATION
Attempted outreach for CC enrollment needs, fall precautions, social needs  No answer and LVM with HIPPA compliant call back information    OV with orthopedic specialist today at 3:30 PM

## 2023-10-26 ASSESSMENT — ENCOUNTER SYMPTOMS
SHORTNESS OF BREATH: 0
ABDOMINAL PAIN: 0
ROS SKIN COMMENTS: NEGATIVE FOR RASH
EYE DISCHARGE: 0

## 2023-10-30 ENCOUNTER — HOSPITAL ENCOUNTER (OUTPATIENT)
Dept: GENERAL RADIOLOGY | Age: 63
Discharge: HOME | End: 2023-11-01
Payer: MEDICARE

## 2023-10-30 ENCOUNTER — HOSPITAL ENCOUNTER (OUTPATIENT)
Age: 63
Discharge: HOME | End: 2023-11-01
Payer: MEDICARE

## 2023-10-30 DIAGNOSIS — D50.0 IRON DEFICIENCY ANEMIA DUE TO CHRONIC BLOOD LOSS: Primary | ICD-10-CM

## 2023-10-30 DIAGNOSIS — M54.50 ACUTE BILATERAL LOW BACK PAIN WITHOUT SCIATICA: ICD-10-CM

## 2023-10-30 PROCEDURE — 72100 X-RAY EXAM L-S SPINE 2/3 VWS: CPT

## 2023-11-06 ENCOUNTER — CARE COORDINATION (OUTPATIENT)
Dept: CARE COORDINATION | Age: 63
End: 2023-11-06

## 2023-11-06 SDOH — ECONOMIC STABILITY: INCOME INSECURITY: IN THE LAST 12 MONTHS, WAS THERE A TIME WHEN YOU WERE NOT ABLE TO PAY THE MORTGAGE OR RENT ON TIME?: NO

## 2023-11-06 SDOH — ECONOMIC STABILITY: HOUSING INSECURITY: IN THE LAST 12 MONTHS, HOW MANY PLACES HAVE YOU LIVED?: 1

## 2023-11-06 ASSESSMENT — SOCIAL DETERMINANTS OF HEALTH (SDOH)

## 2023-11-06 NOTE — CARE COORDINATION
Independent  Ability handle personal hygeine needs (bathing/dressing/grooming): Independent  Ability to manage Medications: Independent  Ability to prepare Food Preparation: Independent  Ability to maintain home (clean home, laundry): Independent  Ability to drive and/or has transportation: Independent  Ability to do shopping: Independent  Ability to manage finances:  Independent  Is patient able to live independently?: Yes     Current Housing: Private Residence  Who do you live with?: Parent(s)  Are you an active caregiver in your home?: Yes  For whom are you the caregiver?: cares for her mom  in mother's home several times a day   Does the person that you care for see a [facility] PCP?: No     Do you have any DME?: Yes  Patient DME: Straight cane              Suggested Interventions and Community Resources                  Future Appointments   Date Time Provider [address]   12/4/2023  8:20 AM Laura Odom Neuro [facility] Neurology -   1/3/2024  2:00 PM DO KAMRYN Kuhn [facility]   1/10/2024  9:30 AM PIETER Flores - CNP [facility]   10/24/2024 11:00 AM Bonilla Thornton MD [facility]     ,   Hypertension - Encounter Level    Symptom course: stable     , and   General Assessment    Do you have any symptoms that are causing concern?: Yes  Progression since Onset: Intermittent - Waxing/Waning  Reported Symptoms: Other (Comment: Memory issues)

## 2023-11-07 ENCOUNTER — CARE COORDINATION (OUTPATIENT)
Dept: CARE COORDINATION | Age: 63
End: 2023-11-07

## 2023-11-08 NOTE — CARE COORDINATION
Initial Contact Social Work Note - Ambulatory  11/8/2023      Date of referral: 11/7/2023  Referral received from: Miguel Pichardo  Reason for referral: roof repair     Previous SW referral: No  If yes, brief summary of outcome: n/a    Two Identifiers Verified: Yes    Insurance Provider: [employer] and [place] Medicaid. Support System:  Spouse/Partner    [place] Status:  n/a    Community Providers: SNAP/[employer] [place]    ADL Assistance Needed: N/A    Housing/Living Concerns or Home Modification Needs: yes, Roof leaking and \"falling in\". Transportation Concern: Lakshmi Jin does not drive. Medication Cost Concern: no     Medication Adherence Concern: no    Financial Concern(s): yes    Income (only if applicable): ss    Ability to Read/Write: Yes    Advance Care Plan:  N/A    Other: roof repair, transportation, and help with hospital bills. Identified Needs:  Roof repair   Transportation   Jeanne MONTALVO    Social Work Plan:  Complete application to [employer]. Discuss Medicaid cab.  contacted [employer] and confirmed Lakshmi Jin was not on the list for Home repairs.  contacted [employer] and left message for the home repair program.  Plan:    will follow up with Lakshmi Jin to complete financial assistance application.

## 2023-11-09 ENCOUNTER — CARE COORDINATION (OUTPATIENT)
Dept: CARE COORDINATION | Age: 63
End: 2023-11-09

## 2023-11-10 NOTE — CARE COORDINATION
called and spoke to Juan.  and Juan completed [address] [facility] application.  will mail Juan application for Juan to sign and send in    Plan:   Mail [address] [facility] Application. Follow up with Juan regarding form.

## 2023-11-17 ENCOUNTER — CARE COORDINATION (OUTPATIENT)
Dept: CARE COORDINATION | Age: 63
End: 2023-11-17

## 2023-11-17 NOTE — CARE COORDINATION
attempted to contact Mir Miller.  left message with name and number.  requested a call back to [phone]. Plan:    will follow up with Mir Miller regarding the Financial Assistance Application  mailed her.

## 2023-11-21 ENCOUNTER — HOSPITAL ENCOUNTER (OUTPATIENT)
Age: 63
Discharge: HOME | End: 2023-11-21
Payer: MEDICARE

## 2023-11-21 ENCOUNTER — HOSPITAL ENCOUNTER (OUTPATIENT)
Dept: GENERAL RADIOLOGY | Age: 63
Discharge: HOME | End: 2023-11-23
Payer: MEDICARE

## 2023-11-21 ENCOUNTER — TELEPHONE (OUTPATIENT)
Dept: INFUSION THERAPY | Facility: MEDICAL CENTER | Age: 63
End: 2023-11-21

## 2023-11-21 ENCOUNTER — HOSPITAL ENCOUNTER (OUTPATIENT)
Age: 63
Discharge: HOME | End: 2023-11-23
Payer: MEDICARE

## 2023-11-21 ENCOUNTER — OFFICE VISIT (OUTPATIENT)
Dept: FAMILY MEDICINE CLINIC | Age: 63
End: 2023-11-21
Payer: MEDICARE

## 2023-11-21 VITALS
BODY MASS INDEX: 30.19 KG/M2 | TEMPERATURE: 98.1 F | WEIGHT: 154.6 LBS | SYSTOLIC BLOOD PRESSURE: 122 MMHG | HEART RATE: 67 BPM | DIASTOLIC BLOOD PRESSURE: 82 MMHG | OXYGEN SATURATION: 95 %

## 2023-11-21 DIAGNOSIS — R22.32 LOCALIZED SWELLING ON LEFT HAND: ICD-10-CM

## 2023-11-21 DIAGNOSIS — I10 ESSENTIAL HYPERTENSION: ICD-10-CM

## 2023-11-21 DIAGNOSIS — M79.642 LEFT HAND PAIN: ICD-10-CM

## 2023-11-21 DIAGNOSIS — Z13.31 POSITIVE SCREENING FOR DEPRESSION ON 9-ITEM PATIENT HEALTH QUESTIONNAIRE (PHQ-9): ICD-10-CM

## 2023-11-21 DIAGNOSIS — M79.642 LEFT HAND PAIN: Primary | ICD-10-CM

## 2023-11-21 DIAGNOSIS — Z12.31 ENCOUNTER FOR SCREENING MAMMOGRAM FOR MALIGNANT NEOPLASM OF BREAST: Primary | ICD-10-CM

## 2023-11-21 LAB
ALBUMIN SERPL-MCNC: 4 G/DL (ref 3.5–5.2)
ALBUMIN/GLOB SERPL: 1.6 {RATIO} (ref 1–2.5)
ALP SERPL-CCNC: 119 U/L (ref 35–104)
ALT SERPL-CCNC: 14 U/L (ref 5–33)
ANION GAP SERPL CALCULATED.3IONS-SCNC: 8 MMOL/L (ref 9–17)
AST SERPL-CCNC: 24 U/L
BASOPHILS # BLD: 0.04 K/UL (ref 0–0.2)
BASOPHILS NFR BLD: 1 % (ref 0–2)
BILIRUB SERPL-MCNC: 0.3 MG/DL (ref 0.3–1.2)
BUN SERPL-MCNC: 15 MG/DL (ref 8–23)
CALCIUM SERPL-MCNC: 9.4 MG/DL (ref 8.6–10.4)
CHLORIDE SERPL-SCNC: 104 MMOL/L (ref 98–107)
CO2 SERPL-SCNC: 28 MMOL/L (ref 20–31)
CREAT SERPL-MCNC: 1.4 MG/DL (ref 0.5–0.9)
CRP SERPL HS-MCNC: <3 MG/L (ref 0–5)
EOSINOPHIL # BLD: 0.08 K/UL (ref 0–0.44)
EOSINOPHILS RELATIVE PERCENT: 2 % (ref 1–4)
ERYTHROCYTE [DISTWIDTH] IN BLOOD BY AUTOMATED COUNT: 13.4 % (ref 11.8–14.4)
ERYTHROCYTE [SEDIMENTATION RATE] IN BLOOD BY PHOTOMETRIC METHOD: 13 MM/HR (ref 0–30)
GFR SERPL CREATININE-BSD FRML MDRD: 42 ML/MIN/1.73M2
GLUCOSE SERPL-MCNC: 82 MG/DL (ref 70–99)
HCT VFR BLD AUTO: 40.9 % (ref 36.3–47.1)
HGB BLD-MCNC: 12.7 G/DL (ref 11.9–15.1)
IMM GRANULOCYTES # BLD AUTO: <0.03 K/UL (ref 0–0.3)
IMM GRANULOCYTES NFR BLD: 0 %
LYMPHOCYTES NFR BLD: 0.92 K/UL (ref 1.1–3.7)
LYMPHOCYTES RELATIVE PERCENT: 17 % (ref 24–43)
MCH RBC QN AUTO: 29.3 PG (ref 25.2–33.5)
MCHC RBC AUTO-ENTMCNC: 31.1 G/DL (ref 28.4–34.8)
MCV RBC AUTO: 94.5 FL (ref 82.6–102.9)
MONOCYTES NFR BLD: 0.47 K/UL (ref 0.1–1.2)
MONOCYTES NFR BLD: 9 % (ref 3–12)
NEUTROPHILS NFR BLD: 71 % (ref 36–65)
NEUTS SEG NFR BLD: 3.8 K/UL (ref 1.5–8.1)
NRBC BLD-RTO: 0 PER 100 WBC
PLATELET # BLD AUTO: 279 K/UL (ref 138–453)
PMV BLD AUTO: 10.3 FL (ref 8.1–13.5)
POTASSIUM SERPL-SCNC: 4.1 MMOL/L (ref 3.7–5.3)
PROT SERPL-MCNC: 6.5 G/DL (ref 6.4–8.3)
RBC # BLD AUTO: 4.33 M/UL (ref 3.95–5.11)
SODIUM SERPL-SCNC: 140 MMOL/L (ref 135–144)
URATE SERPL-MCNC: 4.3 MG/DL (ref 2.4–5.7)
WBC OTHER # BLD: 5.3 K/UL (ref 3.5–11.3)

## 2023-11-21 PROCEDURE — G8427 DOCREV CUR MEDS BY ELIG CLIN: HCPCS

## 2023-11-21 PROCEDURE — 3074F SYST BP LT 130 MM HG: CPT

## 2023-11-21 PROCEDURE — 84550 ASSAY OF BLOOD/URIC ACID: CPT

## 2023-11-21 PROCEDURE — 85025 COMPLETE CBC W/AUTO DIFF WBC: CPT

## 2023-11-21 PROCEDURE — 85652 RBC SED RATE AUTOMATED: CPT

## 2023-11-21 PROCEDURE — 3079F DIAST BP 80-89 MM HG: CPT

## 2023-11-21 PROCEDURE — 73130 X-RAY EXAM OF HAND: CPT

## 2023-11-21 PROCEDURE — G8417 CALC BMI ABV UP PARAM F/U: HCPCS

## 2023-11-21 PROCEDURE — 3017F COLORECTAL CA SCREEN DOC REV: CPT

## 2023-11-21 PROCEDURE — 1036F TOBACCO NON-USER: CPT

## 2023-11-21 PROCEDURE — 73100 X-RAY EXAM OF WRIST: CPT

## 2023-11-21 PROCEDURE — 99214 OFFICE O/P EST MOD 30 MIN: CPT

## 2023-11-21 PROCEDURE — 36415 COLL VENOUS BLD VENIPUNCTURE: CPT

## 2023-11-21 PROCEDURE — 86140 C-REACTIVE PROTEIN: CPT

## 2023-11-21 PROCEDURE — G8484 FLU IMMUNIZE NO ADMIN: HCPCS

## 2023-11-21 PROCEDURE — 80053 COMPREHEN METABOLIC PANEL: CPT

## 2023-11-21 RX ORDER — PREDNISONE 10 MG/1
10 TABLET ORAL 2 TIMES DAILY
Qty: 10 TABLET | Refills: 0 | Status: SHIPPED | OUTPATIENT
Start: 2023-11-21 | End: 2023-11-26

## 2023-11-21 SDOH — ECONOMIC STABILITY: INCOME INSECURITY: HOW HARD IS IT FOR YOU TO PAY FOR THE VERY BASICS LIKE FOOD, HOUSING, MEDICAL CARE, AND HEATING?: NOT VERY HARD

## 2023-11-21 SDOH — ECONOMIC STABILITY: FOOD INSECURITY: WITHIN THE PAST 12 MONTHS, YOU WORRIED THAT YOUR FOOD WOULD RUN OUT BEFORE YOU GOT MONEY TO BUY MORE.: NEVER TRUE

## 2023-11-21 SDOH — ECONOMIC STABILITY: FOOD INSECURITY: WITHIN THE PAST 12 MONTHS, THE FOOD YOU BOUGHT JUST DIDN'T LAST AND YOU DIDN'T HAVE MONEY TO GET MORE.: NEVER TRUE

## 2023-11-21 ASSESSMENT — PATIENT HEALTH QUESTIONNAIRE - PHQ9
SUM OF ALL RESPONSES TO PHQ QUESTIONS 1-9: 25
SUM OF ALL RESPONSES TO PHQ9 QUESTIONS 1 & 2: 5
9. THOUGHTS THAT YOU WOULD BE BETTER OFF DEAD, OR OF HURTING YOURSELF: 3
10. IF YOU CHECKED OFF ANY PROBLEMS, HOW DIFFICULT HAVE THESE PROBLEMS MADE IT FOR YOU TO DO YOUR WORK, TAKE CARE OF THINGS AT HOME, OR GET ALONG WITH OTHER PEOPLE: 3
1. LITTLE INTEREST OR PLEASURE IN DOING THINGS: 3
SUM OF ALL RESPONSES TO PHQ QUESTIONS 1-9: 25
4. FEELING TIRED OR HAVING LITTLE ENERGY: 3
3. TROUBLE FALLING OR STAYING ASLEEP: 3
6. FEELING BAD ABOUT YOURSELF - OR THAT YOU ARE A FAILURE OR HAVE LET YOURSELF OR YOUR FAMILY DOWN: 3
SUM OF ALL RESPONSES TO PHQ QUESTIONS 1-9: 25
8. MOVING OR SPEAKING SO SLOWLY THAT OTHER PEOPLE COULD HAVE NOTICED. OR THE OPPOSITE, BEING SO FIGETY OR RESTLESS THAT YOU HAVE BEEN MOVING AROUND A LOT MORE THAN USUAL: 3
7. TROUBLE CONCENTRATING ON THINGS, SUCH AS READING THE NEWSPAPER OR WATCHING TELEVISION: 3
SUM OF ALL RESPONSES TO PHQ QUESTIONS 1-9: 22
5. POOR APPETITE OR OVEREATING: 2
2. FEELING DOWN, DEPRESSED OR HOPELESS: 2

## 2023-11-21 ASSESSMENT — ENCOUNTER SYMPTOMS: COLOR CHANGE: 1

## 2023-11-21 ASSESSMENT — COLUMBIA-SUICIDE SEVERITY RATING SCALE - C-SSRS
1. WITHIN THE PAST MONTH, HAVE YOU WISHED YOU WERE DEAD OR WISHED YOU COULD GO TO SLEEP AND NOT WAKE UP?: YES
4. HAVE YOU HAD THESE THOUGHTS AND HAD SOME INTENTION OF ACTING ON THEM?: YES
5. HAVE YOU STARTED TO WORK OUT OR WORKED OUT THE DETAILS OF HOW TO KILL YOURSELF? DO YOU INTEND TO CARRY OUT THIS PLAN?: NO
3. HAVE YOU BEEN THINKING ABOUT HOW YOU MIGHT KILL YOURSELF?: YES
BASED ON RESPONSES TO C-SSRS QS 1-6, WHAT IS THE PATIENT'S OVERALL RISK RATING FOR SUICIDE: HIGH RISK
2. HAVE YOU ACTUALLY HAD ANY THOUGHTS OF KILLING YOURSELF?: YES
6. HAVE YOU EVER DONE ANYTHING, STARTED TO DO ANYTHING, OR PREPARED TO DO ANYTHING TO END YOUR LIFE?: YES
7. DID THIS OCCUR IN THE LAST THREE MONTHS: YES

## 2023-11-21 NOTE — PROGRESS NOTES
Trav Guerrero (:  1960) is a 58 y.o. female,Established patient, here for evaluation of the following chief complaint(s):  Hand Pain (Left hand)          Subjective   SUBJECTIVE/OBJECTIVE:  Pt here today for hand pain  VSS    Pt reports she feel 1 week ago but landed on her right forearm  She had has left wrist pain and swelling since this however  There is slight erythema to the dorsal aspect, mild swelling noted  Pt has full mobility, does admit to pain w/ this  Denies fevers, chills    PHQ 9 concerning on rooming process  Pt follows w/ psych, denies SI        Review of Systems   Constitutional:  Negative for chills and fever. Musculoskeletal:  Positive for arthralgias and joint swelling. Skin:  Positive for color change. Negative for rash and wound. Psychiatric/Behavioral:  Negative for self-injury and suicidal ideas. The patient is nervous/anxious and is hyperactive. Objective   Physical Exam  Vitals and nursing note reviewed. Constitutional:       General: She is not in acute distress. Appearance: Normal appearance. She is not ill-appearing, toxic-appearing or diaphoretic. Musculoskeletal:      Right wrist: Tenderness present. No swelling or deformity. Normal range of motion. Normal pulse. Right hand: Swelling and tenderness present. Decreased range of motion. Normal pulse. Skin:     Findings: Erythema present. Neurological:      Mental Status: She is alert. Psychiatric:         Mood and Affect: Mood is anxious. Affect is labile. Speech: Speech is rapid and pressured. Behavior: Behavior is hyperactive. ASSESSMENT/PLAN:  1. Left hand pain  2. Localized swelling on left hand  -likely arthritic but will eval for infectious process vs gout    -     XR WRIST LEFT (2 VIEWS); Future  -     XR HAND LEFT (MIN 3 VIEWS); Future  -     CBC with Auto Differential; Future  -     Comprehensive Metabolic Panel;  Future  -     Sedimentation Rate;

## 2023-11-21 NOTE — TELEPHONE ENCOUNTER
Pt asking when to do labs, pt to F/U in 1 year with labs, and labs ordered reviewed and OK to do 2 days to 1 week prior to F/U appt with Dr Cruz--pt notified of above and no further questions and states understanding.

## 2023-11-21 NOTE — TELEPHONE ENCOUNTER
Hailey Oakes For Dr Contreras Sales office/Neurology    (P) [phone]    Can Pt SEE NP ? OR wait [address] [facility] already appt in [facility] with Dr Oneil Bryant?

## 2023-11-22 ENCOUNTER — CARE COORDINATION (OUTPATIENT)
Dept: CARE COORDINATION | Age: 63
End: 2023-11-22

## 2023-11-22 NOTE — TELEPHONE ENCOUNTER
Informed [facility] Neurology that per Dr. Hetal Manzano patient may see the NP. Patient is scheduled 12/4/23.

## 2023-11-22 NOTE — CARE COORDINATION
called and spoke to Juan. Juan confirmed that she received the financial application in the mail. Juan reports that she will sign it and mail it to the address on the bottom of the application. Juan inquired about X-rays.  encouraged Juan to contact office regarding x-rays. Denied questions or concerns. Plan:    will follow up with Juan in 1 week to discuss additional resources.

## 2023-11-24 ENCOUNTER — CARE COORDINATION (OUTPATIENT)
Dept: CARE COORDINATION | Age: 63
End: 2023-11-24

## 2023-11-24 DIAGNOSIS — M79.642 LEFT HAND PAIN: Primary | ICD-10-CM

## 2023-11-24 NOTE — CARE COORDINATION
Ambulatory Care Coordination Note  2023    Patient Current Location:  Home: [address] [place] [address] Jeanne Barkeryessi [place] [address]     ACM contacted the patient by telephone. Verified name and  with patient as identifiers. Provided introduction to self, and explanation of the ACM role. Challenges to be reviewed by the provider   Additional needs identified to be addressed with provider: Yes  Patient inquiring, when she was informed about recent labs she was under the impression that she was going to be referred to Nephrologist. I reviewed chart and do not see any referral at this time               Method of communication with provider: chart routing. ACM: Moon Sin, [facility]    ACM spoke with Idania Moore for CC needs, updates with social needs, and HTN management, recent left hand pain  Continues with hand pain, is taking steroids as recommended by recent visit to PCP office, chronic back pain as well  Inquiring of recent lab work completed and was under the impression that she would be receiving a referral for a specialist  She is unsure if needing a liver specialist or kidney specialist, will communicate with PCP, due to CKD III diagnosis. She currently does not follow with nephrologist   HTN and Fall precautions reviewed  Working with SW for home needs, roof repairs    ACM Plan  Will follow up with PCP recommendations for referral needs at this time, HTN and Fall precautions, hand pain, back pain, social nees    Offered patient enrollment in the Remote Patient Monitoring (RPM) program for in-home monitoring: Patient declined. Lab Results       None            Care Coordination Interventions    Referral from Primary Care Provider: No  Suggested Interventions and Community Resources  Zone Management Tools: In Process (Comment: HTN management)          Goals Addressed                      This Visit's Progress       Patient Stated      Patient Stated (pt-stated)   Improving      Find an agency to repair roof.     Barriers:

## 2023-11-27 NOTE — TELEPHONE ENCOUNTER
No need to see a nephrologist right now. I will be monitoring kidney function with bloodwork in the future.  Referral to hand specialist entered

## 2023-11-29 ENCOUNTER — CARE COORDINATION (OUTPATIENT)
Dept: CARE COORDINATION | Age: 63
End: 2023-11-29

## 2023-11-29 NOTE — CARE COORDINATION
attempted to contact [address]  left  with name, number, and reason for the call.  requested a call back to [phone]. Plan:    will attempt to follow up with Latisha valerio Financial Assistance Application.

## 2023-12-01 ENCOUNTER — CARE COORDINATION (OUTPATIENT)
Dept: CARE COORDINATION | Age: 63
End: 2023-12-01

## 2023-12-04 NOTE — CARE COORDINATION
spoke with Alex Wilder. Alex Guzmandavion confirmed that she did sign and mail out financial assistance application. Alex Wilder reports that she has some appointments coming up in January and will need transportation.  offered to [employer] scheduling. Alex Wilder was agreeable. Plan:    will contact [facility] to confirm Alex Wilder has transportation services.

## 2023-12-05 ENCOUNTER — CARE COORDINATION (OUTPATIENT)
Dept: CARE COORDINATION | Age: 63
End: 2023-12-05

## 2023-12-05 NOTE — CARE COORDINATION
Attempted outreach for CC needs, HTN and CKD needs, financial and transportation needs  No answer and LVM with call back information

## 2023-12-12 ENCOUNTER — CARE COORDINATION (OUTPATIENT)
Dept: CARE COORDINATION | Age: 63
End: 2023-12-12

## 2023-12-12 SDOH — ECONOMIC STABILITY: FOOD INSECURITY: WITHIN THE PAST 12 MONTHS, THE FOOD YOU BOUGHT JUST DIDN'T LAST AND YOU DIDN'T HAVE MONEY TO GET MORE.: NEVER TRUE

## 2023-12-12 SDOH — ECONOMIC STABILITY: FOOD INSECURITY: WITHIN THE PAST 12 MONTHS, YOU WORRIED THAT YOUR FOOD WOULD RUN OUT BEFORE YOU GOT MONEY TO BUY MORE.: NEVER TRUE

## 2023-12-12 NOTE — CARE COORDINATION
Ambulatory Care Coordination Note  2023    Patient Current Location:  Home: [address] [place] [address] Jeanne Purcell [place] [address]     ACM contacted the patient by telephone. Verified name and  with patient as identifiers. Provided introduction to self, and explanation of the ACM role. Challenges to be reviewed by the provider   Additional needs identified to be addressed with provider: No  none               Method of communication with provider: none. ACM: Joseph Mcintyre RN    ACM spoke with Stanislaw Sanford for updates with well-being, and CC needs, HTN management and social needs  Informed of PCP recommendations for no need for Nephrology referral at this time and he will monitor kidney function, verbalizes understanding  Also informed of ortho referral for hand numbness, denies any recent call from specialist to schedule as of yet  ACM will attempt outreach for assistance with scheduling  HTN management reviewed  SW active with financial and home needs    ACM Plan  Will follow up with outreach and scheduling information for ortho, HTN management and social/home needs    Offered patient enrollment in the Remote Patient Monitoring (RPM) program for in-home monitoring: Patient declined. Lab Results       None            Care Coordination Interventions    Referral from Primary Care Provider: No  Suggested Interventions and Community Resources  Zone Management Tools: In Process (Comment: HTN management)          Goals Addressed                      This Visit's Progress       Patient Stated      Patient Stated (pt-stated)   Improving      Find an agency to repair roof.     Barriers: impairment:  cognitive, financial, lack of support, overwhelmed by complexity of regimen, and stress  Plan for overcoming my barriers: Stanislaw Sanford will work with social service team.  Confidence: 9/10  Anticipated Goal Completion Date: 2024         Other      Conditions and Symptoms   Improving      I will schedule office visits, as directed by my

## 2023-12-15 ENCOUNTER — CARE COORDINATION (OUTPATIENT)
Dept: CARE COORDINATION | Age: 63
End: 2023-12-15

## 2023-12-15 NOTE — CARE COORDINATION
ACM attempted outreach for CC needs, HTN management, social needs  No answer and LVM with call back information

## 2023-12-26 ENCOUNTER — CARE COORDINATION (OUTPATIENT)
Dept: CARE COORDINATION | Age: 63
End: 2023-12-26

## 2023-12-26 NOTE — CARE COORDINATION
ACM attempted outreach for CC needs, HTN management, chronic conditions, social needs  No answer and LVM with HIPPA compliant call back information

## 2023-12-28 ENCOUNTER — CARE COORDINATION (OUTPATIENT)
Dept: CARE COORDINATION | Age: 63
End: 2023-12-28

## 2023-12-28 DIAGNOSIS — I10 ESSENTIAL HYPERTENSION: ICD-10-CM

## 2023-12-28 RX ORDER — PROPRANOLOL HYDROCHLORIDE 80 MG/1
80 CAPSULE, EXTENDED RELEASE ORAL DAILY
Qty: 90 CAPSULE | Refills: 1 | Status: SHIPPED | OUTPATIENT
Start: 2023-12-28

## 2023-12-28 NOTE — CARE COORDINATION
received a call from Juan. [place] confirmed the address that she needs to mail her financial application. Juan had correct [address] address.  provided Juan with phone number incase she had additional questions. Plan:   Juan will mail out application today 67/39.

## 2023-12-28 NOTE — TELEPHONE ENCOUNTER
Óscar Galarza is calling to request a refill on the following medication(s):    Medication Request:  Requested Prescriptions     Pending Prescriptions Disp Refills    propranolol (INDERAL LA) 80 MG extended release capsule [Pharmacy Med Name: Propranolol HCl ER 80MG CP24] 28 capsule      Sig: TAKE 1 CAPSULE BY MOUTH DAILY       Last Visit Date (If Applicable):  62/3/9558    Next Visit Date:    1/3/2024

## 2024-01-03 ENCOUNTER — OFFICE VISIT (OUTPATIENT)
Dept: FAMILY MEDICINE CLINIC | Age: 64
End: 2024-01-03
Payer: MEDICARE

## 2024-01-03 ENCOUNTER — HOSPITAL ENCOUNTER (OUTPATIENT)
Age: 64
Setting detail: SPECIMEN
Discharge: HOME | End: 2024-01-03

## 2024-01-03 VITALS
OXYGEN SATURATION: 96 % | BODY MASS INDEX: 30.47 KG/M2 | DIASTOLIC BLOOD PRESSURE: 78 MMHG | WEIGHT: 156 LBS | SYSTOLIC BLOOD PRESSURE: 122 MMHG | HEART RATE: 69 BPM

## 2024-01-03 DIAGNOSIS — E78.5 DYSLIPIDEMIA: Primary | ICD-10-CM

## 2024-01-03 DIAGNOSIS — I10 ESSENTIAL HYPERTENSION: ICD-10-CM

## 2024-01-03 DIAGNOSIS — L65.9 HAIR LOSS: ICD-10-CM

## 2024-01-03 DIAGNOSIS — R73.03 PREDIABETES: ICD-10-CM

## 2024-01-03 DIAGNOSIS — E55.9 VITAMIN D DEFICIENCY: ICD-10-CM

## 2024-01-03 DIAGNOSIS — I63.9 ISCHEMIC STROKE (HCC): ICD-10-CM

## 2024-01-03 DIAGNOSIS — F11.99 OPIOID USE, UNSPECIFIED WITH UNSPECIFIED OPIOID-INDUCED DISORDER (HCC): ICD-10-CM

## 2024-01-03 DIAGNOSIS — N18.30 STAGE 3 CHRONIC KIDNEY DISEASE, UNSPECIFIED WHETHER STAGE 3A OR 3B CKD (HCC): ICD-10-CM

## 2024-01-03 DIAGNOSIS — E53.8 VITAMIN B12 DEFICIENCY: ICD-10-CM

## 2024-01-03 DIAGNOSIS — E78.5 DYSLIPIDEMIA: ICD-10-CM

## 2024-01-03 LAB
25(OH)D3 SERPL-MCNC: 59.3 NG/ML
ALBUMIN SERPL-MCNC: 4.1 G/DL (ref 3.5–5.2)
ALBUMIN/GLOB SERPL: 1.5 {RATIO} (ref 1–2.5)
ALP SERPL-CCNC: 144 U/L (ref 35–104)
ALT SERPL-CCNC: 14 U/L (ref 5–33)
ANION GAP SERPL CALCULATED.3IONS-SCNC: 11 MMOL/L (ref 9–17)
AST SERPL-CCNC: 23 U/L
BASOPHILS # BLD: 0.03 K/UL (ref 0–0.2)
BASOPHILS NFR BLD: 1 % (ref 0–2)
BILIRUB SERPL-MCNC: 0.3 MG/DL (ref 0.3–1.2)
BUN SERPL-MCNC: 16 MG/DL (ref 8–23)
CALCIUM SERPL-MCNC: 9.8 MG/DL (ref 8.6–10.4)
CHLORIDE SERPL-SCNC: 105 MMOL/L (ref 98–107)
CHOLEST SERPL-MCNC: 205 MG/DL
CHOLESTEROL/HDL RATIO: 4.2
CO2 SERPL-SCNC: 25 MMOL/L (ref 20–31)
CREAT SERPL-MCNC: 1.5 MG/DL (ref 0.5–0.9)
EOSINOPHIL # BLD: 0.06 K/UL (ref 0–0.44)
EOSINOPHILS RELATIVE PERCENT: 1 % (ref 1–4)
ERYTHROCYTE [DISTWIDTH] IN BLOOD BY AUTOMATED COUNT: 14.3 % (ref 11.8–14.4)
FOLATE SERPL-MCNC: 9.4 NG/ML
GFR SERPL CREATININE-BSD FRML MDRD: 39 ML/MIN/1.73M2
GLUCOSE SERPL-MCNC: 95 MG/DL (ref 70–99)
HCT VFR BLD AUTO: 42.9 % (ref 36.3–47.1)
HDLC SERPL-MCNC: 49 MG/DL
HGB BLD-MCNC: 13.1 G/DL (ref 11.9–15.1)
IMM GRANULOCYTES # BLD AUTO: <0.03 K/UL (ref 0–0.3)
IMM GRANULOCYTES NFR BLD: 0 %
LDLC SERPL CALC-MCNC: 134 MG/DL (ref 0–130)
LYMPHOCYTES NFR BLD: 1.04 K/UL (ref 1.1–3.7)
LYMPHOCYTES RELATIVE PERCENT: 20 % (ref 24–43)
MCH RBC QN AUTO: 28.5 PG (ref 25.2–33.5)
MCHC RBC AUTO-ENTMCNC: 30.5 G/DL (ref 28.4–34.8)
MCV RBC AUTO: 93.3 FL (ref 82.6–102.9)
MONOCYTES NFR BLD: 0.49 K/UL (ref 0.1–1.2)
MONOCYTES NFR BLD: 10 % (ref 3–12)
NEUTROPHILS NFR BLD: 68 % (ref 36–65)
NEUTS SEG NFR BLD: 3.47 K/UL (ref 1.5–8.1)
NRBC BLD-RTO: 0 PER 100 WBC
PLATELET # BLD AUTO: 362 K/UL (ref 138–453)
PMV BLD AUTO: 10.7 FL (ref 8.1–13.5)
POTASSIUM SERPL-SCNC: 4.6 MMOL/L (ref 3.7–5.3)
PROT SERPL-MCNC: 6.8 G/DL (ref 6.4–8.3)
RBC # BLD AUTO: 4.6 M/UL (ref 3.95–5.11)
SODIUM SERPL-SCNC: 141 MMOL/L (ref 135–144)
TRIGL SERPL-MCNC: 112 MG/DL
TSH SERPL DL<=0.05 MIU/L-ACNC: 0.8 UIU/ML (ref 0.3–5)
VIT B12 SERPL-MCNC: 275 PG/ML (ref 232–1245)
WBC OTHER # BLD: 5.1 K/UL (ref 3.5–11.3)

## 2024-01-03 PROCEDURE — G8417 CALC BMI ABV UP PARAM F/U: HCPCS | Performed by: FAMILY MEDICINE

## 2024-01-03 PROCEDURE — G8484 FLU IMMUNIZE NO ADMIN: HCPCS | Performed by: FAMILY MEDICINE

## 2024-01-03 PROCEDURE — 1036F TOBACCO NON-USER: CPT | Performed by: FAMILY MEDICINE

## 2024-01-03 PROCEDURE — G8427 DOCREV CUR MEDS BY ELIG CLIN: HCPCS | Performed by: FAMILY MEDICINE

## 2024-01-03 PROCEDURE — 99214 OFFICE O/P EST MOD 30 MIN: CPT | Performed by: FAMILY MEDICINE

## 2024-01-03 PROCEDURE — 3074F SYST BP LT 130 MM HG: CPT | Performed by: FAMILY MEDICINE

## 2024-01-03 PROCEDURE — 3078F DIAST BP <80 MM HG: CPT | Performed by: FAMILY MEDICINE

## 2024-01-03 PROCEDURE — 3017F COLORECTAL CA SCREEN DOC REV: CPT | Performed by: FAMILY MEDICINE

## 2024-01-03 RX ORDER — ATORVASTATIN CALCIUM 80 MG/1
80 TABLET, FILM COATED ORAL NIGHTLY
Qty: 90 TABLET | Refills: 3 | Status: SHIPPED | OUTPATIENT
Start: 2024-01-03

## 2024-01-03 RX ORDER — GABAPENTIN 300 MG/1
1 CAPSULE ORAL
COMMUNITY

## 2024-01-03 ASSESSMENT — PATIENT HEALTH QUESTIONNAIRE - PHQ9
SUM OF ALL RESPONSES TO PHQ QUESTIONS 1-9: 7
7. TROUBLE CONCENTRATING ON THINGS, SUCH AS READING THE NEWSPAPER OR WATCHING TELEVISION: 1
10. IF YOU CHECKED OFF ANY PROBLEMS, HOW DIFFICULT HAVE THESE PROBLEMS MADE IT FOR YOU TO DO YOUR WORK, TAKE CARE OF THINGS AT HOME, OR GET ALONG WITH OTHER PEOPLE: 0
5. POOR APPETITE OR OVEREATING: 1
4. FEELING TIRED OR HAVING LITTLE ENERGY: 1
SUM OF ALL RESPONSES TO PHQ QUESTIONS 1-9: 7
3. TROUBLE FALLING OR STAYING ASLEEP: 1
1. LITTLE INTEREST OR PLEASURE IN DOING THINGS: 1
6. FEELING BAD ABOUT YOURSELF - OR THAT YOU ARE A FAILURE OR HAVE LET YOURSELF OR YOUR FAMILY DOWN: 1
8. MOVING OR SPEAKING SO SLOWLY THAT OTHER PEOPLE COULD HAVE NOTICED. OR THE OPPOSITE, BEING SO FIGETY OR RESTLESS THAT YOU HAVE BEEN MOVING AROUND A LOT MORE THAN USUAL: 0
SUM OF ALL RESPONSES TO PHQ QUESTIONS 1-9: 7
2. FEELING DOWN, DEPRESSED OR HOPELESS: 1
SUM OF ALL RESPONSES TO PHQ QUESTIONS 1-9: 7
9. THOUGHTS THAT YOU WOULD BE BETTER OFF DEAD, OR OF HURTING YOURSELF: 0
SUM OF ALL RESPONSES TO PHQ9 QUESTIONS 1 & 2: 2

## 2024-01-03 NOTE — PROGRESS NOTES
AP[facility] Progress Note    Date:1/3/2024         Patient Name:Latisha Prasad     YOB: 1960     Age:63 y.o.    Assessment/Plan        Problem List Items Addressed This Visit          Circulatory    Essential hypertension      Well-controlled, continue current medications, continue current treatment plan, medication adherence emphasized, and lifestyle modifications recommended         Relevant Medications    atorvastatin (LIPITOR) 80 MG tablet    Ischemic stroke (HCC)      Monitored by specialist- no acute findings meriting change in the plan         Relevant Orders    [place] MD, Neurology, [facility]       Nervous and Auditory    Opioid use, unspecified with unspecified opioid-induced disorder       Genitourinary    Chronic renal disease, stage III (HCC) [981280]    Relevant Orders    CBC with Auto Differential (Completed)       Other    Vitamin B12 deficiency    Relevant Orders    Vitamin B12 & Folate (Completed)    Dyslipidemia - Primary    Relevant Medications    atorvastatin (LIPITOR) 80 MG tablet    Other Relevant Orders    Comprehensive Metabolic Panel (Completed)    Lipid Panel (Completed)    Prediabetes    Relevant Orders    Hemoglobin A1C (Completed)     Other Visit Diagnoses       Vitamin D deficiency        Relevant Orders    Vitamin D 25 Hydroxy (Completed)    Hair loss        Relevant Orders    TSH with Reflex (Completed)             Return in about 3 months (around 4/3/2024).    Electronically signed by Nicholas Conway DO on 1/3/24       Total time spent was between Time personally spent assessing and managing the patient on the date of service: Est: 30-39 minutes (64967) mins. This included time spent reviewing the patient's medical record (e.g., recent visits, labs, and studies); seeing the patient in the office (face-to-face time); ordering medications, studies, procedures, or referrals; calling the patient or family later in the day with results and further

## 2024-01-04 LAB
EST. AVERAGE GLUCOSE BLD GHB EST-MCNC: 126 MG/DL
HBA1C MFR BLD: 6 % (ref 4–6)

## 2024-01-05 ENCOUNTER — TELEPHONE (OUTPATIENT)
Dept: FAMILY MEDICINE CLINIC | Age: 64
End: 2024-01-05

## 2024-01-05 DIAGNOSIS — L65.9 HAIR LOSS: Primary | ICD-10-CM

## 2024-01-07 ASSESSMENT — ENCOUNTER SYMPTOMS
BELCHING: 0
ALLERGIC/IMMUNOLOGIC NEGATIVE: 1
RESPIRATORY NEGATIVE: 1
HOARSE VOICE: 0
CHOKING: 0
WHEEZING: 0
STRIDOR: 0
EYES NEGATIVE: 1
SHORTNESS OF BREATH: 0
WATER BRASH: 0
GLOBUS SENSATION: 0
ORTHOPNEA: 0
BLURRED VISION: 0
HEARTBURN: 1

## 2024-01-09 ENCOUNTER — CARE COORDINATION (OUTPATIENT)
Dept: CARE COORDINATION | Age: 64
End: 2024-01-09

## 2024-01-09 NOTE — CARE COORDINATION
attempted to contact Latisha.   left message with name and number.    requested a call back to [phone].    Plan:    will follow up with Latisha regarding  needs.

## 2024-01-10 ENCOUNTER — OFFICE VISIT (OUTPATIENT)
Dept: NEUROSURGERY | Age: 64
End: 2024-01-10

## 2024-01-10 VITALS
BODY MASS INDEX: 31.37 KG/M2 | WEIGHT: 159.8 LBS | DIASTOLIC BLOOD PRESSURE: 75 MMHG | SYSTOLIC BLOOD PRESSURE: 124 MMHG | HEART RATE: 59 BPM | HEIGHT: 60 IN

## 2024-01-10 DIAGNOSIS — R20.0 BILATERAL HAND NUMBNESS: ICD-10-CM

## 2024-01-10 DIAGNOSIS — Z86.73 HISTORY OF CVA (CEREBROVASCULAR ACCIDENT): ICD-10-CM

## 2024-01-10 DIAGNOSIS — M47.22 CERVICAL SPONDYLOSIS WITH RADICULOPATHY: Primary | ICD-10-CM

## 2024-01-10 DIAGNOSIS — M47.26 OTHER SPONDYLOSIS WITH RADICULOPATHY, LUMBAR REGION: ICD-10-CM

## 2024-01-10 PROCEDURE — G8417 CALC BMI ABV UP PARAM F/U: HCPCS | Performed by: NURSE PRACTITIONER

## 2024-01-10 PROCEDURE — 1090F PRES/ABSN URINE INCON ASSESS: CPT | Performed by: NURSE PRACTITIONER

## 2024-01-10 PROCEDURE — G8399 PT W/DXA RESULTS DOCUMENT: HCPCS | Performed by: NURSE PRACTITIONER

## 2024-01-10 PROCEDURE — 3017F COLORECTAL CA SCREEN DOC REV: CPT | Performed by: NURSE PRACTITIONER

## 2024-01-10 PROCEDURE — G8427 DOCREV CUR MEDS BY ELIG CLIN: HCPCS | Performed by: NURSE PRACTITIONER

## 2024-01-10 PROCEDURE — G8484 FLU IMMUNIZE NO ADMIN: HCPCS | Performed by: NURSE PRACTITIONER

## 2024-01-10 PROCEDURE — 1036F TOBACCO NON-USER: CPT | Performed by: NURSE PRACTITIONER

## 2024-01-10 ASSESSMENT — ENCOUNTER SYMPTOMS: BACK PAIN: 1

## 2024-01-10 NOTE — PROGRESS NOTES
[facility]  [address]  [address]  [address]  [facility] [address]  Dept: [phone]    Patient:  Latisha Prasad  YOB: 1960  Date: 1/10/24    The patient is a 64 y.o. female who presents today for consult of the following problems:     Chief Complaint   Patient presents with    Follow-up         HPI:     Latisha Prasad is a 64 y.o. female who presents for follow up of Neck pain.    Neck pain starts on the right side of her neck, travels down the right side of her back and radiates to he right arm and hand with numbness no tingling.  Pain will progressively get worse through the day, reaching 10/10. Rest will alleviate the pain.  Her gabapentin was recently increased which is also helping. She can only lift arms to shoulder height.     She has not been to physical therapy, she is apprehensive therapy will make the pain worse.  She saw [facility] pain management, they did an intervention on 11/7 - patient responded to the treatments, we are requesting pain managements documents to find out what was done.  She has not followed up with pain management because she is concerned with being able to afford it.    Patient is having a difficult time articulating her symptoms due to expressive aphasia.    Patient reports left hand stabbing feeling EMG was already ordered by orthopedics.    She does not have a strong support system, a friend Bill but he has his own medical issues.    History:     Past Medical History:   Diagnosis Date    Acid reflux     Adult ADHD 08/01/2012    monthly visits @ [facility]    Asthma 08/01/2012    seasonal    Bipolar 1 disorder (HCC)     monthly visits with [facility]    Broken foot 2014    Left foot    Cerebral artery occlusion with cerebral infarction (HCC)     Chest pain of uncertain etiology 12/05/2013    Last episode was in 2013 (Written 02/12/2019)    Depression      3

## 2024-01-10 NOTE — PATIENT INSTRUCTIONS
Return to pain management to follow up on 11/7 procedure.  Ask pain management about your insurance and clarify your  copays  Follow up with Neurology scheduled appointment 1/23  Contact    phone number [phone]

## 2024-01-16 ENCOUNTER — CARE COORDINATION (OUTPATIENT)
Dept: CARE COORDINATION | Age: 64
End: 2024-01-16

## 2024-01-16 NOTE — CARE COORDINATION
Attempted outreach for CC needs, F/U with HTN management, SDOH needs  No answer and unable to LVM at this time.

## 2024-01-19 ENCOUNTER — CARE COORDINATION (OUTPATIENT)
Dept: CARE COORDINATION | Age: 64
End: 2024-01-19

## 2024-01-19 NOTE — CARE COORDINATION
attempted to contact Latisha.   left message with name and number.      missed call from Latisha.     attempted to contact Ltaisha back however no answer.   left another message with name and number.    Plan:    will attempt to follow up with Latisha regarding social needs.

## 2024-01-22 ENCOUNTER — HOSPITAL ENCOUNTER (OUTPATIENT)
Dept: PHYSICAL THERAPY | Facility: CLINIC | Age: 64
Setting detail: THERAPIES SERIES
Discharge: HOME | End: 2024-01-22

## 2024-01-22 NOTE — FLOWSHEET NOTE
[] [facility]  Outpatient Rehabilitation &  Therapy  [address]  [phone]  F: [phone] [x] [facility]  Outpatient Rehabilitation &  Therapy  [address]   [address]  P: [phone]  F: [phone] [] [facility]  Outpatient Rehabilitation &  Therapy  [address]  P: [phone]  F: [phone] [] [facility]  Outpatient Rehabilitation &  Therapy  [address]  P: [phone]  F: [phone] [] [facility]  Outpatient Rehabilitation &  Therapy  [address]   [address]   P: [phone]  F: [phone]  [] [facility]  Outpatient Rehabilitation &  Therapy  [address]   P: [phone]  F: [phone] [] [facility]  Outpatient Rehabilitation &  Therapy  [address]  [address]  P: [phone]  F: [phone] [] [facility]  Outpatient Rehabilitation &  Therapy  [address]  [address]  P: [phone]  F: [phone] [] [facility]  Outpatient Rehabilitation &  Therapy  [address]  P: [phone]  F: [phone]  [] [facility] Outpatient Rehabilitation &  Therapy  [address]  P: [phone]  F: [phone]     Therapy Cancel/No Show note    Date: 2024  Patient: Latisha MARCUS Osmar  : 1960  MRN: [id number]    Cancels/No Shows to date:  - initial evaluation     For today's appointment patient:    [x]  Cancelled    [] Rescheduled appointment    [] No-show     Reason given by patient:    []  Patient ill    []  Conflicting appointment    [] No transportation      [] Conflict with work    [x] No reason given    [] Weather related    [] COVID-19    [] Other:      Comments:        [] Next appointment was confirmed    Electronically signed by: Rae Ayala PT

## 2024-01-23 ENCOUNTER — CARE COORDINATION (OUTPATIENT)
Dept: CARE COORDINATION | Age: 64
End: 2024-01-23

## 2024-01-23 ENCOUNTER — OFFICE VISIT (OUTPATIENT)
Dept: NEUROLOGY | Age: 64
End: 2024-01-23
Payer: MEDICARE

## 2024-01-23 VITALS
HEIGHT: 60 IN | BODY MASS INDEX: 29.45 KG/M2 | SYSTOLIC BLOOD PRESSURE: 113 MMHG | WEIGHT: 150 LBS | DIASTOLIC BLOOD PRESSURE: 82 MMHG | HEART RATE: 67 BPM

## 2024-01-23 DIAGNOSIS — Z86.73 CHRONIC LEFT ARTERIAL ISCHEMIC STROKE, MCA (MIDDLE CEREBRAL ARTERY): Primary | ICD-10-CM

## 2024-01-23 DIAGNOSIS — I63.9 ISCHEMIC STROKE (HCC): ICD-10-CM

## 2024-01-23 DIAGNOSIS — M54.12 CERVICAL RADICULOPATHY: ICD-10-CM

## 2024-01-23 DIAGNOSIS — R93.7 ABNORMAL MRI, CERVICAL SPINE: ICD-10-CM

## 2024-01-23 PROCEDURE — 1123F ACP DISCUSS/DSCN MKR DOCD: CPT | Performed by: PSYCHIATRY & NEUROLOGY

## 2024-01-23 PROCEDURE — 3017F COLORECTAL CA SCREEN DOC REV: CPT | Performed by: PSYCHIATRY & NEUROLOGY

## 2024-01-23 PROCEDURE — 3079F DIAST BP 80-89 MM HG: CPT | Performed by: PSYCHIATRY & NEUROLOGY

## 2024-01-23 PROCEDURE — 3074F SYST BP LT 130 MM HG: CPT | Performed by: PSYCHIATRY & NEUROLOGY

## 2024-01-23 PROCEDURE — G8417 CALC BMI ABV UP PARAM F/U: HCPCS | Performed by: PSYCHIATRY & NEUROLOGY

## 2024-01-23 PROCEDURE — 99214 OFFICE O/P EST MOD 30 MIN: CPT | Performed by: PSYCHIATRY & NEUROLOGY

## 2024-01-23 PROCEDURE — G8484 FLU IMMUNIZE NO ADMIN: HCPCS | Performed by: PSYCHIATRY & NEUROLOGY

## 2024-01-23 PROCEDURE — 1090F PRES/ABSN URINE INCON ASSESS: CPT | Performed by: PSYCHIATRY & NEUROLOGY

## 2024-01-23 PROCEDURE — G8427 DOCREV CUR MEDS BY ELIG CLIN: HCPCS | Performed by: PSYCHIATRY & NEUROLOGY

## 2024-01-23 PROCEDURE — 1036F TOBACCO NON-USER: CPT | Performed by: PSYCHIATRY & NEUROLOGY

## 2024-01-23 PROCEDURE — G8399 PT W/DXA RESULTS DOCUMENT: HCPCS | Performed by: PSYCHIATRY & NEUROLOGY

## 2024-01-23 RX ORDER — OXYCODONE HYDROCHLORIDE AND ACETAMINOPHEN 5; 325 MG/1; MG/1
1 TABLET ORAL DAILY
COMMUNITY
Start: 2024-01-03

## 2024-01-23 NOTE — CARE COORDINATION
ACM attempted outreach for CC needs, CKD and HTN management, social/home needs  No answer and unable to LVM at this time

## 2024-01-23 NOTE — PROGRESS NOTES
UPPER GASTROINTESTINAL ENDOSCOPY  2016    one biopsy    UPPER GASTROINTESTINAL ENDOSCOPY N/A 02/10/2020    EGD BIOPSY performed by Noemí Castillo MD at [facility]    VAGINAL DILATATION      WRIST SURGERY       Allergies   Allergen Reactions    Latex Itching    Prozac [Fluoxetine Hcl] Other (See Comments)     violent    Sulfa Antibiotics      migraine    Adhesive Tape      Other reaction(s): Allergy: ITCHING;     Family History   Problem Relation Age of Onset    Cancer Mother     Hypertension Mother     Stroke Mother     Dementia Mother     Migraines Mother     Cancer Maternal Aunt     Hypertension Father     Heart Surgery Father     Alzheimer's Disease Father     Diabetes Neg Hx       Social History     Socioeconomic History    Marital status: Single     Spouse name: Not on file    Number of children: Not on file    Years of education: Not on file    Highest education level: Not on file   Occupational History    Not on file   Tobacco Use    Smoking status: Former     Current packs/day: 0.00     Average packs/day: 1.5 packs/day for 20.0 years (30.0 ttl pk-yrs)     Types: Cigarettes     Start date:      Quit date:      Years since quittin.0     Passive exposure: Current (inside the home)    Smokeless tobacco: Never    Tobacco comments:     staRTED 8TH GRADE-ENDED 32YRS AGO   Vaping Use    Vaping Use: Never used   Substance and Sexual Activity    Alcohol use: Not Currently     Comment: maybe oncee a year    Drug use: No    Sexual activity: Yes     Partners: Male     Comment: I DONT KNOW -MAYBE 1/2 DOZEN   Other Topics Concern    Not on file   Social History Narrative    Not on file     Social Determinants of Health     Financial Resource Strain: Low Risk  (2023)    Overall Financial Resource Strain (CARDIA)     Difficulty of Paying Living Expenses: Not very hard   Food Insecurity: No Food Insecurity (2023)    Hunger Vital Sign     Worried About Running Out of Food in the Last Year: Never

## 2024-01-24 ENCOUNTER — CARE COORDINATION (OUTPATIENT)
Dept: CARE COORDINATION | Age: 64
End: 2024-01-24

## 2024-01-25 NOTE — CARE COORDINATION
spoke with Latisha.  Latisha denied hearing back regarding financial assistance.  Latisha reports that she mailed it late December.       noted that with mail being slower around the holiday we would wait another 2 weeks before calling [employer].      Latisha reports that she has a lot of appointments coming up.  Latisha reports that she has them all wrote down on her calender.      Latsiha reports that she is stressed about bills and money.  Latisha reports that by the end of the month she has no money left over.  Latisha reports that her boyfriend who lives with her has no income coming in and unable to assist with bills.    Plan:    will follow up with Latisha in 2 weeks.  If Latisha has not received mail regarding assistance application,  and Latisha will call [employer].

## 2024-01-29 ENCOUNTER — CARE COORDINATION (OUTPATIENT)
Dept: CARE COORDINATION | Age: 64
End: 2024-01-29

## 2024-01-29 ENCOUNTER — SCHEDULED TELEPHONE ENCOUNTER (OUTPATIENT)
Dept: FAMILY MEDICINE CLINIC | Age: 64
End: 2024-01-29
Payer: MEDICARE

## 2024-01-29 DIAGNOSIS — I63.9 ISCHEMIC STROKE (HCC): Primary | ICD-10-CM

## 2024-01-29 DIAGNOSIS — N18.32 STAGE 3B CHRONIC KIDNEY DISEASE (HCC): ICD-10-CM

## 2024-01-29 PROCEDURE — 99442 PR PHYS/QHP TELEPHONE EVALUATION 11-20 MIN: CPT | Performed by: FAMILY MEDICINE

## 2024-01-29 NOTE — PROGRESS NOTES
Documentation:  I communicated with the patient and/or health care decision maker about see below.   Details of this discussion including any medical advice provided:   Problem List Items Addressed This Visit          Circulatory    Ischemic stroke (HCC) - Primary      Monitored by specialist- no acute findings meriting change in the plan  Discussed that her main neurologist is Dr. Leigh  Will have my assistant call and give her his information as well as her overall next few appointments in our chart until her next visit with me in April             Genitourinary    Stage 3b chronic kidney disease (HCC)      Asymptomatic, continue current medications and continue current treatment plan  Discussed lab work today and advised of plan to refer to nephrology if and when needed  Stay well hydrated and avoid NSAIDs              Total Time: minutes: 11-20 minutes    Latisha Prasad was evaluated through a synchronous (real-time) audio encounter. Patient identification was verified at the start of the visit. She (or guardian if applicable) is aware that this is a billable service, which includes applicable co-pays. This visit was conducted with the patient's (and/or legal guardian's) verbal consent. She has not had a related appointment within my department in the past 7 days or scheduled within the next 24 hours.   The patient was located at Home: [address] [place] [address].  The provider was located at Home (Appt Dept State): [place].    Note: not billable if this call serves to triage the patient into an appointment for the relevant concern    Latisha Prasad is a 64 y.o. female evaluated via telephone on 1/29/2024 for Results  .        Nicholas Conway, DO

## 2024-01-29 NOTE — CARE COORDINATION
ACM attempted outreach for CC needs, F/U with HTN management, social needs  Has VV with PCP today  No answer and LVM with call back information

## 2024-01-29 NOTE — ASSESSMENT & PLAN NOTE
Monitored by specialist- no acute findings meriting change in the plan  Discussed that her main neurologist is Dr. Leigh  Will have my assistant call and give her his information as well as her overall next few appointments in our chart until her next visit with me in April

## 2024-01-29 NOTE — ASSESSMENT & PLAN NOTE
Asymptomatic, continue current medications and continue current treatment plan  Discussed lab work today and advised of plan to refer to nephrology if and when needed  Stay well hydrated and avoid NSAIDs

## 2024-01-31 ENCOUNTER — CARE COORDINATION (OUTPATIENT)
Dept: CARE COORDINATION | Age: 64
End: 2024-01-31

## 2024-01-31 NOTE — CARE COORDINATION
Ambulatory Care Coordination Note  2024    Patient Current Location:  Home: [address] [place] [address]     ACM contacted the patient by telephone. Verified name and  with patient as identifiers. Provided introduction to self, and explanation of the ACM role.     Challenges to be reviewed by the provider   Additional needs identified to be addressed with provider: No  none               Method of communication with provider: none.    ACM: Larisa Velazco RN    ACM spoke with Latisha for updates with CC needs, HTN management, well-being.   Home improvement needs, and continues to work with  team for assistance  Reviewed upcoming appointment for EEG tomorrow has to be to [facility] by 6:45 AM, [facility] appointment at 10 AM  Reviewed appointment for next week, 2024 at 8:45 with pain management for injection  No other verbalized concerns at this time    ACM Plan  Will follow up with outreach next week for review of EEG and upcoming appointment with [facility] pain mgt and HTN management.    Offered patient enrollment in the Remote Patient Monitoring (RPM) program for in-home monitoring: Patient declined.    Lab Results       None            Care Coordination Interventions    Referral from Primary Care Provider: No  Suggested Interventions and Community Resources  Zone Management Tools: In Process (Comment: HTN management)          Goals Addressed                   This Visit's Progress     Conditions and Symptoms   Improving     I will schedule office visits, as directed by my provider.  I will keep my appointment or reschedule if I have to cancel.  I will notify my provider of any barriers to my plan of care.  I will follow my Zone Management tool to seek urgent or emergent care.  I will notify my provider of any symptoms that indicate a worsening of my condition.    Barriers: overwhelmed by complexity of regimen  Plan for overcoming my barriers: Willing to work with ACM/PCP for HTN management, social

## 2024-02-01 ENCOUNTER — HOSPITAL ENCOUNTER (OUTPATIENT)
Dept: NEUROLOGY | Age: 64
Discharge: HOME | End: 2024-02-01
Payer: MEDICARE

## 2024-02-01 DIAGNOSIS — I63.9 ISCHEMIC STROKE (HCC): ICD-10-CM

## 2024-02-01 PROCEDURE — 95819 EEG AWAKE AND ASLEEP: CPT | Performed by: PSYCHIATRY & NEUROLOGY

## 2024-02-01 PROCEDURE — 95816 EEG AWAKE AND DROWSY: CPT

## 2024-02-01 NOTE — PROCEDURES
EEG REPORT       Patient: Latisha Prasad Age: 64 y.o.  MRN: [id number]    Date: 2/1/2024  Referring Provider: [place] Leigh MD    History: This routine 30 minute scalp EEG was recorded with video- monitoring for a 64 y.o.. female  who presented with encephalopathy. This EEG was performed to evaluate for focal and epileptiform abnormalities.     Latisha Prasad   Current Outpatient Medications   Medication Sig Dispense Refill    oxyCODONE-acetaminophen (PERCOCET) 5-325 MG per tablet Take 1 tablet by mouth daily.      gabapentin (NEURONTIN) 300 MG capsule 1 capsule. (Patient not taking: Reported on 1/23/2024)      atorvastatin (LIPITOR) 80 MG tablet Take 1 tablet by mouth nightly 90 tablet 3    propranolol (INDERAL LA) 80 MG extended release capsule TAKE 1 CAPSULE BY MOUTH DAILY 90 capsule 1    pantoprazole (PROTONIX) 40 MG tablet TAKE ONE TABLET BY MOUTH EVERY MORNING 90 tablet 3    fluticasone (FLONASE) 50 MCG/ACT nasal spray 2 sprays by Each Nostril route daily 16 g 0    buPROPion (WELLBUTRIN XL) 300 MG extended release tablet Take 1 tablet by mouth daily      aspirin 81 MG chewable tablet Take 1 tablet by mouth daily 30 tablet 3    amphetamine-dextroamphetamine (ADDERALL) 30 MG tablet 1 tablet 2 times daily.      citalopram (CELEXA) 40 MG tablet TAKE ONE TABLET BY MOUTH DAILY 90 tablet 3     No current facility-administered medications for this encounter.        Technical Description: This is a 21 channel digital EEG recording with time-locked video. Electrodes were placed in accordance with the 10-20 International System of Electrode Placement. Single lead EKG monitoring as well as temporal electrodes were included.    The patient was not sleep deprived. This recording was obtained during wakefulness.  EEG Description: The dominant background activity during maximal recorded wakefulness consisted of bioccipitally dominant 9-10 Hz, 25-35 uV symmetric, regular activity that was reactive to eye opening.

## 2024-02-02 ENCOUNTER — HOSPITAL ENCOUNTER (EMERGENCY)
Age: 64
Discharge: HOME | End: 2024-02-02
Attending: STUDENT IN AN ORGANIZED HEALTH CARE EDUCATION/TRAINING PROGRAM
Payer: MEDICARE

## 2024-02-02 ENCOUNTER — APPOINTMENT (OUTPATIENT)
Dept: GENERAL RADIOLOGY | Age: 64
End: 2024-02-02
Payer: MEDICARE

## 2024-02-02 VITALS
RESPIRATION RATE: 18 BRPM | DIASTOLIC BLOOD PRESSURE: 89 MMHG | TEMPERATURE: 97.9 F | BODY MASS INDEX: 30.43 KG/M2 | HEART RATE: 64 BPM | OXYGEN SATURATION: 99 % | SYSTOLIC BLOOD PRESSURE: 141 MMHG | HEIGHT: 60 IN | WEIGHT: 155 LBS
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
## 2024-02-02 DIAGNOSIS — M23.92 INTERNAL DERANGEMENT OF LEFT KNEE: Primary | ICD-10-CM

## 2024-02-02 PROCEDURE — 73562 X-RAY EXAM OF KNEE 3: CPT

## 2024-02-02 PROCEDURE — 99283 EMERGENCY DEPT VISIT LOW MDM: CPT

## 2024-02-02 PROCEDURE — 6370000000 HC RX 637 (ALT 250 FOR IP): Performed by: NURSE PRACTITIONER

## 2024-02-02 RX ORDER — HYDROCODONE BITARTRATE AND ACETAMINOPHEN 5; 325 MG/1; MG/1
1 TABLET ORAL ONCE
Status: COMPLETED | OUTPATIENT
Start: 2024-02-02 | End: 2024-02-02

## 2024-02-02 RX ORDER — KETOROLAC TROMETHAMINE 15 MG/ML
15 INJECTION, SOLUTION INTRAMUSCULAR; INTRAVENOUS ONCE
Status: DISCONTINUED | OUTPATIENT
Start: 2024-02-02 | End: 2024-02-02

## 2024-02-02 RX ADMIN — HYDROCODONE BITARTRATE AND ACETAMINOPHEN 1 TABLET: 5; 325 TABLET ORAL at 20:56

## 2024-02-02 ASSESSMENT — PAIN - FUNCTIONAL ASSESSMENT: PAIN_FUNCTIONAL_ASSESSMENT: 0-10

## 2024-02-02 ASSESSMENT — ENCOUNTER SYMPTOMS
COLOR CHANGE: 1
ABDOMINAL PAIN: 0

## 2024-02-02 ASSESSMENT — PAIN SCALES - GENERAL: PAINLEVEL_OUTOF10: 9

## 2024-02-03 NOTE — ED PROVIDER NOTES
Team [facility] ED  eMERGENCY dEPARTMENT eNCOUnter      Pt Name: Latisha Prasad  MRN: [id number]  Birthdate 1960  Date of evaluation: 2/2/2024  Provider: PIETER Cheema CNP    CHIEF COMPLAINT       Chief Complaint   Patient presents with    Knee Pain     Left knee pain. Started 3 days ago. Fell on ice landed on knee.         HISTORY OF PRESENT ILLNESS  (Location/Symptom, Timing/Onset, Context/Setting, Quality, Duration, Modifying Factors, Severity.)   Latisha Prasad is a 64 y.o. female who presents to the emergency department for evaluation of left knee pain after she slipped on ice and fell twisting her left knee this past week.  Pain 9 out of 10 aggravated with certain movement.  Denies other injury.      Nursing Notes were reviewed.    ALLERGIES     Latex, Prozac [fluoxetine hcl], Sulfa antibiotics, and Adhesive tape    CURRENT MEDICATIONS       Previous Medications    AMPHETAMINE-DEXTROAMPHETAMINE (ADDERALL) 30 MG TABLET    1 tablet 2 times daily.    ASPIRIN 81 MG CHEWABLE TABLET    Take 1 tablet by mouth daily    ATORVASTATIN (LIPITOR) 80 MG TABLET    Take 1 tablet by mouth nightly    BUPROPION (WELLBUTRIN XL) 300 MG EXTENDED RELEASE TABLET    Take 1 tablet by mouth daily    CITALOPRAM (CELEXA) 40 MG TABLET    TAKE ONE TABLET BY MOUTH DAILY    FLUTICASONE (FLONASE) 50 MCG/ACT NASAL SPRAY    2 sprays by Each Nostril route daily    GABAPENTIN (NEURONTIN) 300 MG CAPSULE    1 capsule.    OXYCODONE-ACETAMINOPHEN (PERCOCET) 5-325 MG PER TABLET    Take 1 tablet by mouth daily.    PANTOPRAZOLE (PROTONIX) 40 MG TABLET    TAKE ONE TABLET BY MOUTH EVERY MORNING    PROPRANOLOL (INDERAL LA) 80 MG EXTENDED RELEASE CAPSULE    TAKE 1 CAPSULE BY MOUTH DAILY       PAST MEDICAL HISTORY         Diagnosis Date    Acid reflux     Adult ADHD 08/01/2012    monthly visits @ [place]    Asthma 08/01/2012    seasonal    Bipolar 1 disorder (HCC)     monthly visits with [place]    Broken foot 2014    Left foot

## 2024-02-07 ENCOUNTER — CARE COORDINATION (OUTPATIENT)
Dept: CARE COORDINATION | Age: 64
End: 2024-02-07

## 2024-02-07 NOTE — CARE COORDINATION
ACM attempted outreach for CC needs, ED follow up for knee injury, fall, HTN management  No answer and LVM with HIPAA compliant call back information

## 2024-02-10 NOTE — TELEPHONE ENCOUNTER
Problem: PAIN - ADULT  Goal: Verbalizes/displays adequate comfort level or baseline comfort level  Description: Interventions:  - Encourage patient to monitor pain and request assistance  - Assess pain using appropriate pain scale  - Administer analgesics based on type and severity of pain and evaluate response  - Implement non-pharmacological measures as appropriate and evaluate response  - Notify physician/advanced practitioner if interventions unsuccessful or patient reports new pain  2/10/2024 1544 by Marisela Lucero RN  Outcome: Progressing  2/10/2024 1544 by Marisela Lucero RN  Outcome: Progressing     Problem: INFECTION - ADULT  Goal: Absence or prevention of progression during hospitalization  Description: INTERVENTIONS:  - Assess and monitor for signs and symptoms of infection  - Monitor lab/diagnostic results  - Monitor all insertion sites, i.e. indwelling lines, tubes, and drains  - [place] appropriate cooling/warming therapies per order  - Administer medications as ordered  - Instruct and encourage patient and family to use good hand hygiene technique  - Identify and instruct in appropriate isolation precautions for identified infection/condition  2/10/2024 1544 by Marisela Lucero RN  Outcome: Progressing  2/10/2024 1544 by Marisela Lucero RN  Outcome: Progressing     Problem: SAFETY ADULT  Goal: Patient will remain free of falls  Description: INTERVENTIONS:  - Educate patient/family on patient safety including physical limitations  - Instruct patient to call for assistance with activity   - Consult OT/PT to assist with strengthening/mobility   - Keep Call bell within reach  - Keep bed low and locked with side rails adjusted as appropriate  - Keep care items and personal belongings within reach  - Initiate and maintain comfort rounds  - Make Fall Risk Sign visible to staff  - Offer Toileting every 2 Hours, in advance of need  - Initiate/Maintain bed alarm  - Obtain necessary fall risk  Ok to send order for CBC management equipment.  - Apply yellow socks and bracelet for high fall risk patients  - Consider moving patient to room near nurses station  2/10/2024 1544 by Marisela Lucero RN  Outcome: Progressing  2/10/2024 1544 by Marisela Lucero RN  Outcome: Progressing     Problem: DISCHARGE PLANNING  Goal: Discharge to home or other facility with appropriate resources  Description: INTERVENTIONS:  - Identify barriers to discharge w/patient and caregiver  - Arrange for needed discharge resources and transportation as appropriate  - Identify discharge learning needs (meds, wound care, etc.)  - Refer to Case Management Department for coordinating discharge planning if the patient needs post-hospital services based on physician/advanced practitioner order or complex needs related to functional status, cognitive ability, or social support system  2/10/2024 1544 by Marisela Lucero RN  Outcome: Progressing  2/10/2024 1544 by Marisela Lucero RN  Outcome: Progressing     Problem: Knowledge Deficit  Goal: Patient/family/caregiver demonstrates understanding of disease process, treatment plan, medications, and discharge instructions  Description: Complete learning assessment and assess knowledge base.  Interventions:  - Provide teaching at level of understanding  - Provide teaching via preferred learning methods  2/10/2024 1544 by Marisela Lucero RN  Outcome: Progressing  2/10/2024 1544 by Marisela Lucero RN  Outcome: Progressing     Problem: NEUROSENSORY - ADULT  Goal: Achieves stable or improved neurological status  Description: INTERVENTIONS  - Monitor and report changes in neurological status  - Monitor vital signs such as temperature, blood pressure, glucose, and any other labs ordered   - Initiate measures to prevent increased intracranial pressure  - Monitor for seizure activity and implement precautions if appropriate      2/10/2024 1544 by Marisela Lucero RN  Outcome: Progressing  2/10/2024 1544 by  Marisela Lucero RN  Outcome: Progressing  Goal: Remains free of injury related to seizures activity  Description: INTERVENTIONS  - Maintain airway, patient safety  and administer oxygen as ordered  - Monitor patient for seizure activity, document and report duration and description of seizure to physician/advanced practitioner  - If seizure occurs,  ensure patient safety during seizure  - Reorient patient post seizure  - Seizure pads on all 4 side rails  - Instruct patient/family to notify RN of any seizure activity including if an aura is experienced  - Instruct patient/family to call for assistance with activity based on nursing assessment  - Administer anti-seizure medications if ordered    2/10/2024 1544 by Marisela Lucero RN  Outcome: Progressing  2/10/2024 1544 by Marisela Lucero RN  Outcome: Progressing     Problem: BEHAVIOR  Goal: Pt/Family maintain appropriate behavior and adhere to behavioral management agreement, if implemented  Description: INTERVENTIONS:  - Assess the family dynamic   - Encourage verbalization of thoughts and concerns in a socially appropriate manner  - Assess patient/family's coping skills and non-compliant behavior (including use of illegal substances).  - Utilize positive, consistent limit setting strategies supporting safety of patient, staff and others  - Initiate consult with Case Management, Spiritual Care or other ancillary services as appropriate  - If a patient's/visitor's behavior jeopardizes the safety of the patient, staff, or others, refer to organization procedure.   - Notify Security of behavior or suspected illegal substances which indicate the need for search of the patient and/or belongings  - Encourage participation in the decision making process about a behavioral management agreement; implement if patient meets criteria  2/10/2024 1544 by Marisela Lucero RN  Outcome: Progressing  2/10/2024 1544 by Marisela Lucero RN  Outcome: Progressing

## 2024-02-12 ENCOUNTER — CARE COORDINATION (OUTPATIENT)
Dept: CARE COORDINATION | Age: 64
End: 2024-02-12

## 2024-02-12 NOTE — CARE COORDINATION
received a VM from Latisha.    contacted Latisha today inquiring how she can assist.  Latisha reports that she does not remember why she called on Friday 2/9.  Latisha informed  that she would contact her after her foot is looked at this week.     GUERRERO Sinha contacted  stated that Latisha was requesting a call from .     attempted to contact Latisha.  No answer.   left message with name and number.     Plan:    will attempt to follow up with Latisha.

## 2024-02-13 ENCOUNTER — CARE COORDINATION (OUTPATIENT)
Dept: CARE COORDINATION | Age: 64
End: 2024-02-13

## 2024-02-13 ENCOUNTER — HOSPITAL ENCOUNTER (OUTPATIENT)
Dept: MRI IMAGING | Age: 64
Discharge: HOME | End: 2024-02-15
Attending: ORTHOPAEDIC SURGERY
Payer: MEDICARE

## 2024-02-13 DIAGNOSIS — S80.02XA CONTUSION OF LEFT KNEE, INITIAL ENCOUNTER: ICD-10-CM

## 2024-02-13 DIAGNOSIS — M23.92 DERANGEMENT OF LEFT KNEE: ICD-10-CM

## 2024-02-13 PROCEDURE — 73721 MRI JNT OF LWR EXTRE W/O DYE: CPT

## 2024-02-14 ENCOUNTER — CARE COORDINATION (OUTPATIENT)
Dept: CARE COORDINATION | Age: 64
End: 2024-02-14

## 2024-02-14 NOTE — CARE COORDINATION
ACM attempted outreach for CC needs, F/U with social needs, memory issues, HTN management  No answer and LVM with call back information

## 2024-02-14 NOTE — CARE COORDINATION
attempted to contact Latisha.   No answer.   left message with name and number.    Plan:    will attempt to contact Latisha in 1 week.

## 2024-02-16 ENCOUNTER — CARE COORDINATION (OUTPATIENT)
Dept: CARE COORDINATION | Age: 64
End: 2024-02-16

## 2024-02-16 NOTE — CARE COORDINATION
received a message from PCP office that Latisha called the office requesting to speak to .     spoke to Latisha regarding upcoming appointments.   Latisha reports that she is unsure why she called but believed it was to discuss appointments and pain/issues with her knee.     Latisha called  back because she had VM from .   noted that she called the other day after Latisha called the office looking for .   also noted that Larisa RICO attempted to contact her and provided phone number.     Plan:   Latisha will attend all upcoming appointments.

## 2024-02-21 ENCOUNTER — CARE COORDINATION (OUTPATIENT)
Dept: CARE COORDINATION | Age: 64
End: 2024-02-21

## 2024-02-21 NOTE — CARE COORDINATION
ACM attempted outreach for CC needs, CKD and HTN management, social needs  No answer and LVM with HIPAA compliant call back information

## 2024-02-28 ENCOUNTER — CARE COORDINATION (OUTPATIENT)
Dept: CARE COORDINATION | Age: 64
End: 2024-02-28

## 2024-02-29 ENCOUNTER — CARE COORDINATION (OUTPATIENT)
Dept: CARE COORDINATION | Age: 64
End: 2024-02-29

## 2024-02-29 NOTE — CARE COORDINATION
attempted to contact Latisha.   left message with Latisha.   requested a call back to [phone].    Plan:   will attempt to follow up with Latisha in 1 week.

## 2024-02-29 NOTE — CARE COORDINATION
ACM attempted outreach for CC needs, HTN and CKD, social needs, well-being  No answer and LVM with HIPAA compliant message

## 2024-03-05 ENCOUNTER — CARE COORDINATION (OUTPATIENT)
Dept: CARE COORDINATION | Age: 64
End: 2024-03-05

## 2024-03-05 NOTE — CARE COORDINATION
Social received a call from Latisha.   Latisha reports that she is upset because her leg has been bothering her and no one is calling her back.   encouraged Latisha to check VM stating that she has has left Latisha multiple messages before and want to make sure Latisha has not missed their call.    Latisha reports that she is currently waiting at the hospital because her boyfriend is having surgery.    During conversation it was hard to follow Latisha was all over the place when talking about different topics regarding her mother, ex , children, current boyfriend, and health.       encouraged Latisha to call Dr. Nicholas Conway regarding her leg and schedule an appointment.    Plan:   Latisha will schedule an appointment.

## 2024-03-12 ENCOUNTER — CARE COORDINATION (OUTPATIENT)
Dept: CARE COORDINATION | Age: 64
End: 2024-03-12

## 2024-03-12 NOTE — CARE COORDINATION
ACM attempted outreach for CC needs, HTN management, social needs  No answer and LVM with HIPAA compliant call back information

## 2024-03-18 ENCOUNTER — CARE COORDINATION (OUTPATIENT)
Dept: CARE COORDINATION | Age: 64
End: 2024-03-18

## 2024-03-18 NOTE — CARE COORDINATION
spoke with Latisha.  Latisha reports that she is hobbling around the house.      inquired if Latisha has attempted to contact PCP office for a follow up appointment.     Latisha reports that she has attempted to contact office multiple times however has not been able to reach anyone to follow up on her x-rays.  Latisha reports the phone continuous to ring and no one answers.     encouraged Latisha to check VM on her phone.     Plan:   will route message to ASHLEY Peres requesting if office staff can contact Latisha to schedule a follow up regarding her X-rays.

## 2024-03-19 ENCOUNTER — CARE COORDINATION (OUTPATIENT)
Dept: CARE COORDINATION | Age: 64
End: 2024-03-19

## 2024-03-25 ENCOUNTER — TELEPHONE (OUTPATIENT)
Dept: NEUROLOGY | Age: 64
End: 2024-03-25

## 2024-03-26 ENCOUNTER — HOSPITAL ENCOUNTER (OUTPATIENT)
Dept: NEUROLOGY | Age: 64
Discharge: HOME | End: 2024-03-26
Payer: MEDICARE

## 2024-03-26 DIAGNOSIS — R20.0 BILATERAL HAND NUMBNESS: ICD-10-CM

## 2024-03-26 DIAGNOSIS — M47.22 CERVICAL SPONDYLOSIS WITH RADICULOPATHY: ICD-10-CM

## 2024-03-26 PROCEDURE — 95910 NRV CNDJ TEST 7-8 STUDIES: CPT | Performed by: PHYSICAL MEDICINE & REHABILITATION

## 2024-03-26 PROCEDURE — 95886 MUSC TEST DONE W/N TEST COMP: CPT | Performed by: PHYSICAL MEDICINE & REHABILITATION

## 2024-03-27 ENCOUNTER — CARE COORDINATION (OUTPATIENT)
Dept: CARE COORDINATION | Age: 64
End: 2024-03-27

## 2024-03-27 NOTE — CARE COORDINATION
ACM attempted outreach for CC needs, home/social needs, memory concerns, wellbeing  No answer and LVM with HIPAA compliant call back information  Will attempt outreach again next week for CC needs.

## 2024-04-01 ENCOUNTER — CARE COORDINATION (OUTPATIENT)
Dept: CARE COORDINATION | Age: 64
End: 2024-04-01

## 2024-04-02 NOTE — CARE COORDINATION
called and spoke to Latisha.    Latisha noted that she is scheduled for surgery.      Latisha reports that she is doing therapy to hopefully get stronger before her surgery.    Latisha denied questions or concerns.     Latisha reports that her boyfriend moved in to help take care of her after her surgery.    Plan:   Latisha will continue with therapy.   Latisha will follow recommendations for physician.

## 2024-04-03 ENCOUNTER — OFFICE VISIT (OUTPATIENT)
Dept: FAMILY MEDICINE CLINIC | Age: 64
End: 2024-04-03
Payer: MEDICARE

## 2024-04-03 ENCOUNTER — HOSPITAL ENCOUNTER (OUTPATIENT)
Age: 64
Setting detail: SPECIMEN
Discharge: HOME | End: 2024-04-03

## 2024-04-03 VITALS — WEIGHT: 155 LBS | BODY MASS INDEX: 30.27 KG/M2 | DIASTOLIC BLOOD PRESSURE: 70 MMHG | SYSTOLIC BLOOD PRESSURE: 118 MMHG

## 2024-04-03 DIAGNOSIS — N18.32 STAGE 3B CHRONIC KIDNEY DISEASE (HCC): ICD-10-CM

## 2024-04-03 DIAGNOSIS — I63.9 ISCHEMIC STROKE (HCC): ICD-10-CM

## 2024-04-03 DIAGNOSIS — R73.03 PREDIABETES: ICD-10-CM

## 2024-04-03 DIAGNOSIS — Z01.818 PREOPERATIVE EXAMINATION: ICD-10-CM

## 2024-04-03 DIAGNOSIS — I10 ESSENTIAL HYPERTENSION: ICD-10-CM

## 2024-04-03 DIAGNOSIS — G47.33 OSA (OBSTRUCTIVE SLEEP APNEA): ICD-10-CM

## 2024-04-03 DIAGNOSIS — Z86.718 HISTORY OF DVT (DEEP VEIN THROMBOSIS): ICD-10-CM

## 2024-04-03 DIAGNOSIS — Z01.818 PREOPERATIVE EXAMINATION: Primary | ICD-10-CM

## 2024-04-03 LAB
ALBUMIN SERPL-MCNC: 4 G/DL (ref 3.5–5.2)
ALBUMIN/GLOB SERPL: 1 {RATIO} (ref 1–2.5)
ALP SERPL-CCNC: 159 U/L (ref 35–104)
ALT SERPL-CCNC: 18 U/L (ref 10–35)
ANION GAP SERPL CALCULATED.3IONS-SCNC: 12 MMOL/L (ref 9–16)
AST SERPL-CCNC: 29 U/L (ref 10–35)
BASOPHILS # BLD: 0.05 K/UL (ref 0–0.2)
BASOPHILS NFR BLD: 1 % (ref 0–2)
BILIRUB SERPL-MCNC: 0.3 MG/DL (ref 0–1.2)
BUN SERPL-MCNC: 8 MG/DL (ref 8–23)
CALCIUM SERPL-MCNC: 9.7 MG/DL (ref 8.6–10.4)
CHLORIDE SERPL-SCNC: 106 MMOL/L (ref 98–107)
CO2 SERPL-SCNC: 25 MMOL/L (ref 20–31)
CREAT SERPL-MCNC: 1.2 MG/DL (ref 0.5–0.9)
EOSINOPHIL # BLD: 0.13 K/UL (ref 0–0.44)
EOSINOPHILS RELATIVE PERCENT: 3 % (ref 1–4)
ERYTHROCYTE [DISTWIDTH] IN BLOOD BY AUTOMATED COUNT: 15.6 % (ref 11.8–14.4)
EST. AVERAGE GLUCOSE BLD GHB EST-MCNC: 131 MG/DL
GFR SERPL CREATININE-BSD FRML MDRD: 52 ML/MIN/1.73M2
GLUCOSE SERPL-MCNC: 86 MG/DL (ref 74–99)
HBA1C MFR BLD: 6.2 % (ref 4–6)
HCT VFR BLD AUTO: 42.7 % (ref 36.3–47.1)
HGB BLD-MCNC: 13.1 G/DL (ref 11.9–15.1)
IMM GRANULOCYTES # BLD AUTO: <0.03 K/UL (ref 0–0.3)
IMM GRANULOCYTES NFR BLD: 0 %
LYMPHOCYTES NFR BLD: 1.01 K/UL (ref 1.1–3.7)
LYMPHOCYTES RELATIVE PERCENT: 21 % (ref 24–43)
MCH RBC QN AUTO: 27.9 PG (ref 25.2–33.5)
MCHC RBC AUTO-ENTMCNC: 30.7 G/DL (ref 28.4–34.8)
MCV RBC AUTO: 91 FL (ref 82.6–102.9)
MONOCYTES NFR BLD: 0.44 K/UL (ref 0.1–1.2)
MONOCYTES NFR BLD: 9 % (ref 3–12)
NEUTROPHILS NFR BLD: 66 % (ref 36–65)
NEUTS SEG NFR BLD: 3.15 K/UL (ref 1.5–8.1)
NRBC BLD-RTO: 0 PER 100 WBC
PLATELET # BLD AUTO: 312 K/UL (ref 138–453)
PMV BLD AUTO: 10.6 FL (ref 8.1–13.5)
POTASSIUM SERPL-SCNC: 4.7 MMOL/L (ref 3.7–5.3)
PROT SERPL-MCNC: 6.9 G/DL (ref 6.6–8.7)
RBC # BLD AUTO: 4.69 M/UL (ref 3.95–5.11)
RBC # BLD: ABNORMAL 10*6/UL
SODIUM SERPL-SCNC: 143 MMOL/L (ref 136–145)
WBC OTHER # BLD: 4.8 K/UL (ref 3.5–11.3)

## 2024-04-03 PROCEDURE — 99214 OFFICE O/P EST MOD 30 MIN: CPT | Performed by: FAMILY MEDICINE

## 2024-04-03 PROCEDURE — 93000 ELECTROCARDIOGRAM COMPLETE: CPT | Performed by: FAMILY MEDICINE

## 2024-04-03 PROCEDURE — 3017F COLORECTAL CA SCREEN DOC REV: CPT | Performed by: FAMILY MEDICINE

## 2024-04-03 PROCEDURE — 3078F DIAST BP <80 MM HG: CPT | Performed by: FAMILY MEDICINE

## 2024-04-03 PROCEDURE — G8417 CALC BMI ABV UP PARAM F/U: HCPCS | Performed by: FAMILY MEDICINE

## 2024-04-03 PROCEDURE — G8427 DOCREV CUR MEDS BY ELIG CLIN: HCPCS | Performed by: FAMILY MEDICINE

## 2024-04-03 PROCEDURE — 1090F PRES/ABSN URINE INCON ASSESS: CPT | Performed by: FAMILY MEDICINE

## 2024-04-03 PROCEDURE — 1036F TOBACCO NON-USER: CPT | Performed by: FAMILY MEDICINE

## 2024-04-03 PROCEDURE — 1123F ACP DISCUSS/DSCN MKR DOCD: CPT | Performed by: FAMILY MEDICINE

## 2024-04-03 PROCEDURE — G8399 PT W/DXA RESULTS DOCUMENT: HCPCS | Performed by: FAMILY MEDICINE

## 2024-04-03 PROCEDURE — 3074F SYST BP LT 130 MM HG: CPT | Performed by: FAMILY MEDICINE

## 2024-04-03 NOTE — PROGRESS NOTES
2018    mass excision    ARM SURGERY Right     Pt states she has had 13 right arm surgeries    BREAST ENHANCEMENT SURGERY Bilateral     CARDIAC CATHETERIZATION  2022    DR ARIZMENDI  /  NML CORS     SECTION      x 3    COLONOSCOPY  2013    sigmoid diverticula, prominent large internal hemorrhoid    COLONOSCOPY N/A 02/10/2020    COLONOSCOPY DIAGNOSTIC performed by Noemí Castillo MD at [facility] OR    COLONOSCOPY N/A 2020    COLORECTAL CANCER SCREENING, NOT HIGH RISK performed by Harriett Monroy MD at [facility] OR    ENDOSCOPY, COLON, DIAGNOSTIC      ESOPHAGEAL MOTILITY STUDY N/A 2020    PES RN - ESOPHAGEAL MOTILITY STUDY performed by Willie Gallo DO at [facility]    ESOPHAGEAL MOTILITY STUDY  09/10/2020    ESOPHAGEAL MOTILITY STUDY performed by Willie Gallo DO at [facility]    ESOPHAGEAL MOTILITY STUDY N/A 2020    ESOPHAGEAL MOTILITY STUDY ATTEMPTED PER DR. GALLO WITHOUT SUCCESS performed by Willie Gallo DO at [facility]    FOOT SURGERY Left     FOOT TENDON SURGERY Left 2019    LEFT PERONEAL TENDON REPAIR WITH POSSIBLE LEFT TENDON TRANSFER (Left )    KNEE ARTHROSCOPY Left     Two surgeries on left knee per pt    KNEE SURGERY Right     Three surgeries per pt    LIGAMENT REPAIR Left 2019    LEFT PERONEAL TENDON REPAIR performed by Haroldo Renner DPM at [facility] OR    NERVE BLOCK Bilateral 2021     BILATERAL L4/5, L5/S1 LUMBAR FACET (Bilateral )    NOSE SURGERY      PAIN MANAGEMENT PROCEDURE Bilateral 2021    BILATERAL L4/5, L5/S1 LUMBAR FACET performed by Hua Ma MD at [facility] OR    PAIN MANAGEMENT PROCEDURE  2021    BILATERAL L4/5, L5/S1 NERVE BLOCK - Bilateral    PAIN MANAGEMENT PROCEDURE Bilateral 2021    BILATERAL L4/5, L5/S1 NERVE BLOCK performed by Hua Ma MD at [facility] OR    PAIN MANAGEMENT PROCEDURE Right 2021    L4/5, L5/S1 NERVE RADIOFREQUENCY ABLATION     PAIN

## 2024-04-04 ENCOUNTER — CARE COORDINATION (OUTPATIENT)
Dept: CARE COORDINATION | Age: 64
End: 2024-04-04

## 2024-04-05 NOTE — CARE COORDINATION
ACM attempted outreach for CC needs, F/U with HTN management, social needs, memory issues  No answer and LVM with HIPAA compliant call back informaiton    Will attempt outreach next week for updates if no return call back.

## 2024-04-10 ENCOUNTER — OFFICE VISIT (OUTPATIENT)
Dept: NEUROSURGERY | Age: 64
End: 2024-04-10
Payer: MEDICARE

## 2024-04-10 VITALS
WEIGHT: 158 LBS | SYSTOLIC BLOOD PRESSURE: 113 MMHG | HEART RATE: 67 BPM | BODY MASS INDEX: 31.02 KG/M2 | DIASTOLIC BLOOD PRESSURE: 77 MMHG | HEIGHT: 60 IN

## 2024-04-10 DIAGNOSIS — I69.320 APHASIA, POST-STROKE: ICD-10-CM

## 2024-04-10 DIAGNOSIS — M47.22 CERVICAL SPONDYLOSIS WITH RADICULOPATHY: Primary | ICD-10-CM

## 2024-04-10 DIAGNOSIS — G56.02 LEFT CARPAL TUNNEL SYNDROME: ICD-10-CM

## 2024-04-10 PROCEDURE — 1123F ACP DISCUSS/DSCN MKR DOCD: CPT | Performed by: NURSE PRACTITIONER

## 2024-04-10 PROCEDURE — G8427 DOCREV CUR MEDS BY ELIG CLIN: HCPCS | Performed by: NURSE PRACTITIONER

## 2024-04-10 PROCEDURE — 99214 OFFICE O/P EST MOD 30 MIN: CPT | Performed by: NURSE PRACTITIONER

## 2024-04-10 PROCEDURE — 1036F TOBACCO NON-USER: CPT | Performed by: NURSE PRACTITIONER

## 2024-04-10 PROCEDURE — 3017F COLORECTAL CA SCREEN DOC REV: CPT | Performed by: NURSE PRACTITIONER

## 2024-04-10 PROCEDURE — 3074F SYST BP LT 130 MM HG: CPT | Performed by: NURSE PRACTITIONER

## 2024-04-10 PROCEDURE — 1090F PRES/ABSN URINE INCON ASSESS: CPT | Performed by: NURSE PRACTITIONER

## 2024-04-10 PROCEDURE — 3078F DIAST BP <80 MM HG: CPT | Performed by: NURSE PRACTITIONER

## 2024-04-10 PROCEDURE — G8399 PT W/DXA RESULTS DOCUMENT: HCPCS | Performed by: NURSE PRACTITIONER

## 2024-04-10 PROCEDURE — G8417 CALC BMI ABV UP PARAM F/U: HCPCS | Performed by: NURSE PRACTITIONER
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
## 2024-04-11 ENCOUNTER — OFFICE VISIT (OUTPATIENT)
Dept: NEUROLOGY | Age: 64
End: 2024-04-11
Payer: MEDICARE

## 2024-04-11 VITALS
SYSTOLIC BLOOD PRESSURE: 124 MMHG | WEIGHT: 158 LBS | DIASTOLIC BLOOD PRESSURE: 82 MMHG | HEART RATE: 66 BPM | HEIGHT: 60 IN | BODY MASS INDEX: 31.02 KG/M2

## 2024-04-11 DIAGNOSIS — Z86.73 CHRONIC LEFT ARTERIAL ISCHEMIC STROKE, MCA (MIDDLE CEREBRAL ARTERY): Primary | ICD-10-CM

## 2024-04-11 PROCEDURE — 3074F SYST BP LT 130 MM HG: CPT | Performed by: PSYCHIATRY & NEUROLOGY

## 2024-04-11 PROCEDURE — 3079F DIAST BP 80-89 MM HG: CPT | Performed by: PSYCHIATRY & NEUROLOGY

## 2024-04-11 PROCEDURE — 1090F PRES/ABSN URINE INCON ASSESS: CPT | Performed by: PSYCHIATRY & NEUROLOGY

## 2024-04-11 PROCEDURE — 1123F ACP DISCUSS/DSCN MKR DOCD: CPT | Performed by: PSYCHIATRY & NEUROLOGY

## 2024-04-11 PROCEDURE — 3017F COLORECTAL CA SCREEN DOC REV: CPT | Performed by: PSYCHIATRY & NEUROLOGY

## 2024-04-11 PROCEDURE — G8417 CALC BMI ABV UP PARAM F/U: HCPCS | Performed by: PSYCHIATRY & NEUROLOGY

## 2024-04-11 PROCEDURE — 1036F TOBACCO NON-USER: CPT | Performed by: PSYCHIATRY & NEUROLOGY

## 2024-04-11 PROCEDURE — 99214 OFFICE O/P EST MOD 30 MIN: CPT | Performed by: PSYCHIATRY & NEUROLOGY

## 2024-04-11 PROCEDURE — G8399 PT W/DXA RESULTS DOCUMENT: HCPCS | Performed by: PSYCHIATRY & NEUROLOGY

## 2024-04-11 PROCEDURE — G8427 DOCREV CUR MEDS BY ELIG CLIN: HCPCS | Performed by: PSYCHIATRY & NEUROLOGY

## 2024-04-11 NOTE — PROGRESS NOTES
Normal station and gait       Lab Results   Component Value Date    LDLCALC 65 02/09/2023    LDLCHOLESTEROL 134 (H) 01/03/2024     No components found for: \"CHLPL\"  Lab Results   Component Value Date    TRIG 112 01/03/2024    TRIG 192 (H) 10/19/2023    TRIG 85 02/09/2023     Lab Results   Component Value Date    HDL 49 01/03/2024    HDL 48 10/19/2023    HDL 63 02/09/2023     Lab Results   Component Value Date    LDLCALC 65 02/09/2023     No components found for: \"LABVLDL\"    Lab Results   Component Value Date    LABA1C 6.2 (H) 04/03/2024     Lab Results   Component Value Date     04/03/2024     Lab Results   Component Value Date    NLIBVFVB16 275 01/03/2024              Lab Results   Component Value Date     LDLCHOLESTEROL 130 04/15/2022      No components found for: CHLPL        Lab Results   Component Value Date     TRIG 103 04/15/2022     TRIG 176 (H) 11/15/2018     TRIG 232 (H) 04/27/2018            Lab Results   Component Value Date     HDL 46 04/15/2022     HDL 52 11/15/2018     HDL 57 04/27/2018      No results found for: LDLCALC  No results found for: LABVLDL        Lab Results   Component Value Date     LABA1C 5.7 04/15/2022            Lab Results   Component Value Date      04/15/2022            Lab Results   Component Value Date     NCAEDBVR54 503 07/13/2022         Neurological work up:  CT head  CTA head and neck  MRI brain     2 D echo        All of patient's labs were personally reviewed. All the imaging studies were personally reviewed and discussed with the patient.     Assessment Recommendations:    Left subcortical acute ischemic stroke July 12, 2022:  The patient has residual speech hesitancy from the stroke. Otherwise, she does not report recurrent symptoms of stroke or TIA. I recommend for her to continue aspirin 81 mg daily along with Lipitor 80 mg daily for secondary stroke prophylaxis.  Additionally, she experiences some memory issues. I advised she initiate Vitamin B12 as her

## 2024-04-12 ENCOUNTER — TELEPHONE (OUTPATIENT)
Dept: FAMILY MEDICINE CLINIC | Age: 64
End: 2024-04-12

## 2024-04-15 ENCOUNTER — CARE COORDINATION (OUTPATIENT)
Dept: CARE COORDINATION | Age: 64
End: 2024-04-15

## 2024-04-15 RX ORDER — GABAPENTIN 300 MG/1
300 CAPSULE ORAL 3 TIMES DAILY
COMMUNITY

## 2024-04-15 NOTE — TELEPHONE ENCOUNTER
Chart review shows she reported not taking it in January 2024. How much Gabapentin is she on currently? I will add it to her medication list

## 2024-04-15 NOTE — CARE COORDINATION
directed by my provider.  I will keep my appointment or reschedule if I have to cancel.  I will notify my provider of any barriers to my plan of care.  I will follow my Zone Management tool to seek urgent or emergent care.  I will notify my provider of any symptoms that indicate a worsening of my condition.    Barriers: overwhelmed by complexity of regimen  Plan for overcoming my barriers: Willing to work with ACM/PCP for HTN management, social needs  Confidence: 8/10  Anticipated Goal Completion Date: 3/2/2024                Future Appointments   Date Time Provider Department Center   4/25/2024 10:00 AM [facility] PAT RM 2 [facility]   7/10/2024  3:00 PM Nicholas Conway DO [facility]  [facility]   9/9/2024  9:15 AM Luz Marina Sharma MD [facility]   10/24/2024 11:00 AM Mark Cruz MD [facility]   ,   Hypertension - Encounter Level         , and   General Assessment    Do you have any symptoms that are causing concern?: Yes  Progression since Onset: Unchanged  Reported Symptoms: Pain

## 2024-04-16 ENCOUNTER — CARE COORDINATION (OUTPATIENT)
Dept: CARE COORDINATION | Age: 64
End: 2024-04-16

## 2024-04-17 NOTE — CARE COORDINATION
spoke to Latisha briefly who reports that home care is at her house and would call  back.    Plan:    will speak with Latisha about social needs.

## 2024-04-25 ENCOUNTER — HOSPITAL ENCOUNTER (OUTPATIENT)
Dept: PREADMISSION TESTING | Age: 64
Discharge: HOME | End: 2024-04-25
Payer: MEDICARE

## 2024-04-25 VITALS
HEIGHT: 60 IN | TEMPERATURE: 97.1 F | OXYGEN SATURATION: 98 % | HEART RATE: 66 BPM | SYSTOLIC BLOOD PRESSURE: 113 MMHG | BODY MASS INDEX: 31.16 KG/M2 | WEIGHT: 158.7 LBS | RESPIRATION RATE: 16 BRPM | DIASTOLIC BLOOD PRESSURE: 74 MMHG

## 2024-04-25 LAB
25(OH)D3 SERPL-MCNC: 47.8 NG/ML (ref 30–100)
ABO + RH BLD: NORMAL
ALBUMIN SERPL-MCNC: 4 G/DL (ref 3.5–5.2)
ALP SERPL-CCNC: 156 U/L (ref 35–104)
ALT SERPL-CCNC: 17 U/L (ref 5–33)
ANION GAP SERPL CALCULATED.3IONS-SCNC: 7 MMOL/L (ref 9–17)
ARM BAND NUMBER: NORMAL
AST SERPL-CCNC: 23 U/L
BASOPHILS # BLD: 0.03 K/UL (ref 0–0.2)
BASOPHILS NFR BLD: 1 % (ref 0–2)
BILIRUB SERPL-MCNC: 0.3 MG/DL (ref 0.3–1.2)
BILIRUB UR QL STRIP: ABNORMAL
BLOOD BANK SAMPLE EXPIRATION: NORMAL
BLOOD GROUP ANTIBODIES SERPL: NEGATIVE
BUN SERPL-MCNC: 10 MG/DL (ref 8–23)
BUN/CREAT SERPL: 8 (ref 9–20)
CALCIUM SERPL-MCNC: 9.2 MG/DL (ref 8.6–10.4)
CHLORIDE SERPL-SCNC: 105 MMOL/L (ref 98–107)
CLARITY UR: ABNORMAL
CO2 SERPL-SCNC: 27 MMOL/L (ref 20–31)
COLOR UR: ABNORMAL
CREAT SERPL-MCNC: 1.3 MG/DL (ref 0.5–0.9)
CRYSTALS URNS MICRO: ABNORMAL /HPF
EOSINOPHIL # BLD: 0.14 K/UL (ref 0–0.44)
EOSINOPHILS RELATIVE PERCENT: 3 % (ref 1–4)
EPI CELLS #/AREA URNS HPF: ABNORMAL /HPF (ref 0–5)
ERYTHROCYTE [DISTWIDTH] IN BLOOD BY AUTOMATED COUNT: 15.4 % (ref 11.8–14.4)
EST. AVERAGE GLUCOSE BLD GHB EST-MCNC: 131 MG/DL
GFR SERPL CREATININE-BSD FRML MDRD: 46 ML/MIN/1.73M2
GLUCOSE SERPL-MCNC: 69 MG/DL (ref 70–99)
GLUCOSE UR STRIP-MCNC: NEGATIVE MG/DL
HBA1C MFR BLD: 6.2 % (ref 4–6)
HCT VFR BLD AUTO: 40.3 % (ref 36.3–47.1)
HGB BLD-MCNC: 12.2 G/DL (ref 11.9–15.1)
HGB UR QL STRIP.AUTO: NEGATIVE
IMM GRANULOCYTES # BLD AUTO: 0.01 K/UL (ref 0–0.3)
IMM GRANULOCYTES NFR BLD: 0 %
INR PPP: 0.9
KETONES UR STRIP-MCNC: ABNORMAL MG/DL
LEUKOCYTE ESTERASE UR QL STRIP: NEGATIVE
LYMPHOCYTES NFR BLD: 1.16 K/UL (ref 1.1–3.7)
LYMPHOCYTES RELATIVE PERCENT: 28 % (ref 24–43)
MCH RBC QN AUTO: 27.7 PG (ref 25.2–33.5)
MCHC RBC AUTO-ENTMCNC: 30.3 G/DL (ref 28.4–34.8)
MCV RBC AUTO: 91.4 FL (ref 82.6–102.9)
MONOCYTES NFR BLD: 0.48 K/UL (ref 0.1–1.2)
MONOCYTES NFR BLD: 12 % (ref 3–12)
NEUTROPHILS NFR BLD: 56 % (ref 36–65)
NEUTS SEG NFR BLD: 2.37 K/UL (ref 1.5–8.1)
NITRITE UR QL STRIP: NEGATIVE
NRBC BLD-RTO: 0 PER 100 WBC
PARTIAL THROMBOPLASTIN TIME: 24.4 SEC (ref 23.9–33.8)
PH UR STRIP: 5.5 [PH] (ref 5–8)
PLATELET # BLD AUTO: 273 K/UL (ref 138–453)
PMV BLD AUTO: 10 FL (ref 8.1–13.5)
POTASSIUM SERPL-SCNC: 3.9 MMOL/L (ref 3.7–5.3)
PROT SERPL-MCNC: 6.5 G/DL (ref 6.4–8.3)
PROT UR STRIP-MCNC: ABNORMAL MG/DL
PROTHROMBIN TIME: 12.4 SEC (ref 11.5–14.2)
RBC # BLD AUTO: 4.41 M/UL (ref 3.95–5.11)
RBC # BLD: ABNORMAL 10*6/UL
RBC #/AREA URNS HPF: ABNORMAL /HPF (ref 0–2)
SODIUM SERPL-SCNC: 139 MMOL/L (ref 135–144)
SP GR UR STRIP: 1.04 (ref 1–1.03)
UROBILINOGEN UR STRIP-ACNC: NORMAL EU/DL (ref 0–1)
WBC #/AREA URNS HPF: ABNORMAL /HPF (ref 0–5)
WBC OTHER # BLD: 4.2 K/UL (ref 3.5–11.3)

## 2024-04-25 PROCEDURE — 85610 PROTHROMBIN TIME: CPT

## 2024-04-25 PROCEDURE — 85730 THROMBOPLASTIN TIME PARTIAL: CPT

## 2024-04-25 PROCEDURE — 86850 RBC ANTIBODY SCREEN: CPT

## 2024-04-25 PROCEDURE — 36415 COLL VENOUS BLD VENIPUNCTURE: CPT

## 2024-04-25 PROCEDURE — 86901 BLOOD TYPING SEROLOGIC RH(D): CPT

## 2024-04-25 PROCEDURE — 83036 HEMOGLOBIN GLYCOSYLATED A1C: CPT

## 2024-04-25 PROCEDURE — 81001 URINALYSIS AUTO W/SCOPE: CPT

## 2024-04-25 PROCEDURE — 86900 BLOOD TYPING SEROLOGIC ABO: CPT

## 2024-04-25 PROCEDURE — 85025 COMPLETE CBC W/AUTO DIFF WBC: CPT

## 2024-04-25 PROCEDURE — 87641 MR-STAPH DNA AMP PROBE: CPT

## 2024-04-25 PROCEDURE — 80053 COMPREHEN METABOLIC PANEL: CPT

## 2024-04-25 PROCEDURE — 82306 VITAMIN D 25 HYDROXY: CPT

## 2024-04-25 RX ORDER — GABAPENTIN 300 MG/1
300 CAPSULE ORAL ONCE
OUTPATIENT
Start: 2024-05-08

## 2024-04-25 RX ORDER — ACETAMINOPHEN 500 MG
1000 TABLET ORAL ONCE
OUTPATIENT
Start: 2024-05-08

## 2024-04-25 ASSESSMENT — PROMIS GLOBAL HEALTH SCALE
IN THE PAST 7 DAYS, HOW OFTEN HAVE YOU BEEN BOTHERED BY EMOTIONAL PROBLEMS, SUCH AS FEELING ANXIOUS, DEPRESSED, OR IRRITABLE [ON A SCALE FROM 1 (NEVER) TO 5 (ALWAYS)]?: OFTEN
SUM OF RESPONSES TO QUESTIONS 2, 4, 5, & 10: 8
IN GENERAL, WOULD YOU SAY YOUR HEALTH IS...[ON A SCALE OF 1 (POOR) TO 5 (EXCELLENT)]: GOOD
IN GENERAL, PLEASE RATE HOW WELL YOU CARRY OUT YOUR USUAL SOCIAL ACTIVITIES (INCLUDES ACTIVITIES AT HOME, AT WORK, AND IN YOUR COMMUNITY, AND RESPONSIBILITIES AS A PARENT, CHILD, SPOUSE, EMPLOYEE, FRIEND, ETC) [ON A SCALE OF 1 (POOR) TO 5 (EXCELLENT)]?: GOOD
IN GENERAL, HOW WOULD YOU RATE YOUR SATISFACTION WITH YOUR SOCIAL ACTIVITIES AND RELATIONSHIPS [ON A SCALE OF 1 (POOR) TO 5 (EXCELLENT)]?: GOOD
TO WHAT EXTENT ARE YOU ABLE TO CARRY OUT YOUR EVERYDAY PHYSICAL ACTIVITIES SUCH AS WALKING, CLIMBING STAIRS, CARRYING GROCERIES, OR MOVING A CHAIR [ON A SCALE OF 1 (NOT AT ALL) TO 5 (COMPLETELY)]?: A LITTLE
IN THE PAST 7 DAYS, HOW WOULD YOU RATE YOUR FATIGUE ON AVERAGE [ON A SCALE FROM 1 (NONE) TO 5 (VERY SEVERE)]?: SEVERE
SUM OF RESPONSES TO QUESTIONS 3, 6, 7, & 8: 12
IN GENERAL, HOW WOULD YOU RATE YOUR PHYSICAL HEALTH [ON A SCALE OF 1 (POOR) TO 5 (EXCELLENT)]?: POOR
IN GENERAL, HOW WOULD YOU RATE YOUR MENTAL HEALTH, INCLUDING YOUR MOOD AND YOUR ABILITY TO THINK [ON A SCALE OF 1 (POOR) TO 5 (EXCELLENT)]?: POOR
HOW IS THE PROMIS V1.1 BEING ADMINISTERED?: PAPER
WHO IS THE PERSON COMPLETING THE PROMIS V1.1 SURVEY?: SELF
IN THE PAST 7 DAYS, HOW WOULD YOU RATE YOUR PAIN ON AVERAGE [ON A SCALE FROM 0 (NO PAIN) TO 10 (WORST IMAGINABLE PAIN)]?: 7
IN GENERAL, WOULD YOU SAY YOUR QUALITY OF LIFE IS...[ON A SCALE OF 1 (POOR) TO 5 (EXCELLENT)]: FAIR

## 2024-04-25 ASSESSMENT — PAIN DESCRIPTION - ORIENTATION: ORIENTATION: LEFT

## 2024-04-25 ASSESSMENT — KOOS JR
STANDING UPRIGHT: MODERATE
HOW SEVERE IS YOUR KNEE STIFFNESS AFTER FIRST WAKING IN MORNING: SEVERE
BENDING TO THE FLOOR TO PICK UP OBJECT: SEVERE
TWISING OR PIVOTING ON KNEE: EXTREME
STRAIGHTENING KNEE FULLY: EXTREME
KOOS JR TOTAL INTERVAL SCORE: 36.931
GOING UP OR DOWN STAIRS: EXTREME

## 2024-04-25 ASSESSMENT — PAIN DESCRIPTION - LOCATION: LOCATION: KNEE

## 2024-04-25 ASSESSMENT — PAIN SCALES - GENERAL: PAINLEVEL_OUTOF10: 8

## 2024-04-25 NOTE — PRE-PROCEDURE INSTRUCTIONS
On the Day of Your Surgery, Wednesday, 5/8/24, Please Arrive At 11:30 AM     Enter the hospital through the Main Entrance, take the lobby elevators to the second floor and check in at the Surgery Registration desk.     Continue to take your home medications as you normally do up to and including the night before surgery with the exception of blood thinning medications.    Blood Thinning Medications:  Please stop prescription blood thinning medications such as Apixaban (Eliquis); Clopidogrel (Plavix); Dabigatran (Pradaxa); Prasugrel (Effient); Rivaroxaban (Xarelto); Ticagrelor (Brilinta); Warfarin (Coumadin) only as directed by your surgeon and/or the prescribing physician    Some common examples of other medications that can thin your blood are: Aspirin, Ibuprofen (Advil, Motrin), Naproxen (Aleve), Meloxicam (Mobic), Celecoxib (Celebrex), Fish Oil, many Herbal Supplements.  These medications should usually be stopped at least 7 days prior to surgery.    Stop the Aspirin seven days prior to surgery if it is OK with your Stroke doctor    Stop using OTC pain relievers containing Aspirin, Ibuprofen (Advil, Motrin) or Naproxen (Aleve) seven days prior to surgery.  It is OK to continue using Tylenol for pain the week prior to surgery.    Failure to stop certain medications may interfere with your scheduled surgery.    If you receive instructions from your surgeon regarding what medications to stop prior to surgery, please follow those specific instructions.      Please take the following medication(s) the day of surgery with small sips of water:              Propranolol, Pantoprazole      Showering Before Surgery:     You can play an important role in your own health by carefully washing before surgery. Shower the night before and the morning of surgery using the instructions below. If you are allergic to Chlorhexidine Gluconate (CHG) use antibacterial soap instead.    If you were given Chlorhexidine soap, please follow

## 2024-04-26 ENCOUNTER — ANESTHESIA EVENT (OUTPATIENT)
Dept: OPERATING ROOM | Age: 64
End: 2024-04-26
Payer: MEDICARE

## 2024-04-26 LAB
MRSA, DNA, NASAL: NEGATIVE
SPECIMEN DESCRIPTION: NORMAL

## 2024-04-26 NOTE — PROGRESS NOTES
Latisha MARCUS Osmar was evaluated today and a DME order was entered for a wheeled walker because she requires this to successfully complete daily living tasks of ambulating.  A wheeled walker is necessary due to the patient's unsteady gait, upper body weakness, and inability to  an ambulation device; and she can ambulate only by pushing a walker instead of a lesser assistive device such as a cane, crutch, or standard walker.  The need for this equipment was discussed with the patient and she understands and is in agreement.

## 2024-04-26 NOTE — PERIOP NOTE
Dr. Caballero reviewed history,labs, and functional capacity and is requesting neuro clearance and PCP clearance with attention to kidneys.

## 2024-04-26 NOTE — PROGRESS NOTES
[facility] Joint Replacement Pre-surgical Assessment    Scheduled Surgery Date: 2024  Surgery Time: 1330    Surgeon: MARY  Procedure: left Total Knee    Primary Insurance Coverage [facility] MEDICAFRE  Pre-op class attended YES 1100.    PCP: Nicholas Conway DO  Clearance received by PCP: Yes    Anticipated Discharge Plan: home  Agency (if applicable): OPPT    Significant PMH:   Surgical History    Procedure Laterality Date Comment Source   ANKLE SURGERY Left 2018 mass excision    ARM SURGERY Right  Pt states she has had 13 right arm surgeries    BREAST ENHANCEMENT SURGERY Bilateral      CARDIAC CATHETERIZATION  2022 DR ARIZMENDI  /  VERONICA CORS     SECTION   x 3    COLONOSCOPY  2013 sigmoid diverticula, prominent large internal hemorrhoid    COLONOSCOPY N/A 02/10/2020 COLONOSCOPY DIAGNOSTIC performed by Noemí Castillo MD at [facility] OR    COLONOSCOPY N/A 2020 COLORECTAL CANCER SCREENING, NOT HIGH RISK performed by Harriett Monroy MD at [facility] OR    ENDOSCOPY, COLON, DIAGNOSTIC       ESOPHAGEAL MOTILITY STUDY N/A 2020 PES RN - ESOPHAGEAL MOTILITY STUDY performed by Willie Gallo DO at [facility]    ESOPHAGEAL MOTILITY STUDY  09/10/2020 ESOPHAGEAL MOTILITY STUDY performed by Willie Gallo DO at [facility]    ESOPHAGEAL MOTILITY STUDY N/A 2020 ESOPHAGEAL MOTILITY STUDY ATTEMPTED PER DR. GALLO WITHOUT SUCCESS performed by Willie Gallo DO at [facility]    FOOT SURGERY Left      FOOT TENDON SURGERY Left 2019 LEFT PERONEAL TENDON REPAIR WITH POSSIBLE LEFT TENDON TRANSFER (Left )    KNEE ARTHROSCOPY Left  Two surgeries on left knee per pt    KNEE SURGERY Right  Three surgeries per pt    LIGAMENT REPAIR Left 2019 LEFT PERONEAL TENDON REPAIR performed by Haroldo Renner DPM at [facility] OR    NERVE BLOCK Bilateral 2021  BILATERAL L4/5, L5/S1 LUMBAR FACET (Bilateral )    NOSE SURGERY       PAIN MANAGEMENT PROCEDURE Bilateral 2021

## 2024-04-29 NOTE — PERIOP NOTE
Spoke with Ju at Dr. Pretty's office and is aware of the need for Neurology and PCP with focus on the patient's kidneys

## 2024-05-06 ENCOUNTER — CASE MANAGEMENT (OUTPATIENT)
Dept: CASE MANAGEMENT | Age: 64
End: 2024-05-06

## 2024-05-06 PROBLEM — M79.2: Status: ACTIVE | Noted: 2024-05-06

## 2024-05-06 PROBLEM — M17.12 OSTEOARTHRITIS OF LEFT KNEE: Status: ACTIVE | Noted: 2018-07-05

## 2024-05-06 RX ORDER — KETOROLAC TROMETHAMINE 30 MG/ML
15 INJECTION, SOLUTION INTRAMUSCULAR; INTRAVENOUS ONCE
Status: CANCELLED | OUTPATIENT
Start: 2024-05-08 | End: 2024-05-13

## 2024-05-06 NOTE — PROGRESS NOTES
Pre-op phone call    Spoke with patient preop concerning:     Procedure left  Knee arthroplasty with Dr. HERNANDEZ  on  05/08/2024 .    DME: Patient has shower chair and walker AND PT PICKED FWW ORDERED FROM [facility] ON 04/26/2024.    Therapy after surgery: [facility] WITH [facility] (PT THINKS and WRITER WILL VERIFY WHICH [facility]HC HAD DONE PREOP THERAPY)    Other concerns:none

## 2024-05-08 ENCOUNTER — ANESTHESIA (OUTPATIENT)
Dept: OPERATING ROOM | Age: 64
End: 2024-05-08
Payer: MEDICARE

## 2024-05-08 ENCOUNTER — HOSPITAL ENCOUNTER (OUTPATIENT)
Age: 64
Setting detail: OBSERVATION
Discharge: NURSING, REHAB OR LONG-TERM CARE | End: 2024-05-10
Attending: ORTHOPAEDIC SURGERY | Admitting: ORTHOPAEDIC SURGERY
Payer: MEDICARE

## 2024-05-08 DIAGNOSIS — M17.12 ARTHRITIS OF LEFT KNEE: ICD-10-CM

## 2024-05-08 DIAGNOSIS — M79.2 INTRACTABLE NEUROPATHIC PAIN OF KNEE, LEFT: ICD-10-CM

## 2024-05-08 DIAGNOSIS — M17.12 PRIMARY OSTEOARTHRITIS OF LEFT KNEE: Primary | ICD-10-CM

## 2024-05-08 PROCEDURE — A4216 STERILE WATER/SALINE, 10 ML: HCPCS | Performed by: ORTHOPAEDIC SURGERY

## 2024-05-08 PROCEDURE — 6360000002 HC RX W HCPCS: Performed by: ANESTHESIOLOGY

## 2024-05-08 PROCEDURE — 3700000000 HC ANESTHESIA ATTENDED CARE: Performed by: ORTHOPAEDIC SURGERY

## 2024-05-08 PROCEDURE — 6370000000 HC RX 637 (ALT 250 FOR IP): Performed by: ORTHOPAEDIC SURGERY

## 2024-05-08 PROCEDURE — 6360000002 HC RX W HCPCS: Performed by: NURSE ANESTHETIST, CERTIFIED REGISTERED

## 2024-05-08 PROCEDURE — 6360000002 HC RX W HCPCS: Performed by: ORTHOPAEDIC SURGERY

## 2024-05-08 PROCEDURE — 3600000005 HC SURGERY LEVEL 5 BASE: Performed by: ORTHOPAEDIC SURGERY

## 2024-05-08 PROCEDURE — 2700000000 HC OXYGEN THERAPY PER DAY

## 2024-05-08 PROCEDURE — 2720000010 HC SURG SUPPLY STERILE: Performed by: ORTHOPAEDIC SURGERY

## 2024-05-08 PROCEDURE — 3600000015 HC SURGERY LEVEL 5 ADDTL 15MIN: Performed by: ORTHOPAEDIC SURGERY

## 2024-05-08 PROCEDURE — C1776 JOINT DEVICE (IMPLANTABLE): HCPCS | Performed by: ORTHOPAEDIC SURGERY

## 2024-05-08 PROCEDURE — 2709999900 HC NON-CHARGEABLE SUPPLY: Performed by: ORTHOPAEDIC SURGERY

## 2024-05-08 PROCEDURE — 2580000003 HC RX 258: Performed by: ANESTHESIOLOGY

## 2024-05-08 PROCEDURE — 1200000000 HC SEMI PRIVATE

## 2024-05-08 PROCEDURE — 2500000003 HC RX 250 WO HCPCS: Performed by: NURSE ANESTHETIST, CERTIFIED REGISTERED

## 2024-05-08 PROCEDURE — 96372 THER/PROPH/DIAG INJ SC/IM: CPT

## 2024-05-08 PROCEDURE — 94761 N-INVAS EAR/PLS OXIMETRY MLT: CPT

## 2024-05-08 PROCEDURE — C1713 ANCHOR/SCREW BN/BN,TIS/BN: HCPCS | Performed by: ORTHOPAEDIC SURGERY

## 2024-05-08 PROCEDURE — C9290 INJ, BUPIVACAINE LIPOSOME: HCPCS | Performed by: ORTHOPAEDIC SURGERY

## 2024-05-08 PROCEDURE — 7100000001 HC PACU RECOVERY - ADDTL 15 MIN: Performed by: ORTHOPAEDIC SURGERY

## 2024-05-08 PROCEDURE — 6370000000 HC RX 637 (ALT 250 FOR IP): Performed by: ANESTHESIOLOGY

## 2024-05-08 PROCEDURE — 3700000001 HC ADD 15 MINUTES (ANESTHESIA): Performed by: ORTHOPAEDIC SURGERY

## 2024-05-08 PROCEDURE — 7100000000 HC PACU RECOVERY - FIRST 15 MIN: Performed by: ORTHOPAEDIC SURGERY

## 2024-05-08 PROCEDURE — 2580000003 HC RX 258: Performed by: ORTHOPAEDIC SURGERY

## 2024-05-08 PROCEDURE — 2500000003 HC RX 250 WO HCPCS: Performed by: ANESTHESIOLOGY

## 2024-05-08 DEVICE — COMPONENT ARTC SURF PS 6-9 CD 10 MM LT TIB FIX BEAR: Type: IMPLANTABLE DEVICE | Site: KNEE | Status: FUNCTIONAL

## 2024-05-08 DEVICE — COMPONENT PAT DIA29MM THK8MM KNEE POLY CEM CONVENTIONAL: Type: IMPLANTABLE DEVICE | Site: KNEE | Status: FUNCTIONAL

## 2024-05-08 DEVICE — PSN TIB STM 5 DEG SZ D L: Type: IMPLANTABLE DEVICE | Site: KNEE | Status: FUNCTIONAL

## 2024-05-08 DEVICE — IMPLANTABLE DEVICE
Type: IMPLANTABLE DEVICE | Site: KNEE | Status: FUNCTIONAL
Brand: PERSONA®

## 2024-05-08 DEVICE — CEMENT BNE 40GM W/ GENT HI VISC RADPQ FOR REV SURG: Type: IMPLANTABLE DEVICE | Site: KNEE | Status: FUNCTIONAL

## 2024-05-08 RX ORDER — FLUTICASONE PROPIONATE 50 MCG
2 SPRAY, SUSPENSION (ML) NASAL DAILY
Status: DISCONTINUED | OUTPATIENT
Start: 2024-05-08 | End: 2024-05-10 | Stop reason: HOSPADM

## 2024-05-08 RX ORDER — SODIUM CHLORIDE 9 MG/ML
INJECTION, SOLUTION INTRAVENOUS CONTINUOUS
Status: DISCONTINUED | OUTPATIENT
Start: 2024-05-08 | End: 2024-05-08 | Stop reason: HOSPADM

## 2024-05-08 RX ORDER — PROPOFOL 10 MG/ML
INJECTION, EMULSION INTRAVENOUS PRN
Status: DISCONTINUED | OUTPATIENT
Start: 2024-05-08 | End: 2024-05-08 | Stop reason: SDUPTHER

## 2024-05-08 RX ORDER — MORPHINE SULFATE 2 MG/ML
2 INJECTION, SOLUTION INTRAMUSCULAR; INTRAVENOUS
Status: DISCONTINUED | OUTPATIENT
Start: 2024-05-08 | End: 2024-05-10 | Stop reason: HOSPADM

## 2024-05-08 RX ORDER — ENOXAPARIN SODIUM 100 MG/ML
40 INJECTION SUBCUTANEOUS DAILY
Status: DISCONTINUED | OUTPATIENT
Start: 2024-05-08 | End: 2024-05-10 | Stop reason: HOSPADM

## 2024-05-08 RX ORDER — DEXTROAMPHETAMINE SACCHARATE, AMPHETAMINE ASPARTATE, DEXTROAMPHETAMINE SULFATE AND AMPHETAMINE SULFATE 7.5; 7.5; 7.5; 7.5 MG/1; MG/1; MG/1; MG/1
30 TABLET ORAL 2 TIMES DAILY
Status: DISCONTINUED | OUTPATIENT
Start: 2024-05-09 | End: 2024-05-10 | Stop reason: HOSPADM

## 2024-05-08 RX ORDER — PROPRANOLOL HYDROCHLORIDE 80 MG/1
80 CAPSULE, EXTENDED RELEASE ORAL DAILY
Status: DISCONTINUED | OUTPATIENT
Start: 2024-05-09 | End: 2024-05-10 | Stop reason: HOSPADM

## 2024-05-08 RX ORDER — FAMOTIDINE 10 MG/ML
INJECTION, SOLUTION INTRAVENOUS PRN
Status: DISCONTINUED | OUTPATIENT
Start: 2024-05-08 | End: 2024-05-08 | Stop reason: SDUPTHER

## 2024-05-08 RX ORDER — LIDOCAINE HYDROCHLORIDE 20 MG/ML
INJECTION, SOLUTION EPIDURAL; INFILTRATION; INTRACAUDAL; PERINEURAL PRN
Status: DISCONTINUED | OUTPATIENT
Start: 2024-05-08 | End: 2024-05-08 | Stop reason: SDUPTHER

## 2024-05-08 RX ORDER — SODIUM CHLORIDE 9 MG/ML
INJECTION, SOLUTION INTRAVENOUS PRN
Status: DISCONTINUED | OUTPATIENT
Start: 2024-05-08 | End: 2024-05-10 | Stop reason: HOSPADM

## 2024-05-08 RX ORDER — FENTANYL CITRATE 50 UG/ML
25 INJECTION, SOLUTION INTRAMUSCULAR; INTRAVENOUS EVERY 5 MIN PRN
Status: DISCONTINUED | OUTPATIENT
Start: 2024-05-08 | End: 2024-05-08 | Stop reason: HOSPADM

## 2024-05-08 RX ORDER — ATORVASTATIN CALCIUM 80 MG/1
80 TABLET, FILM COATED ORAL NIGHTLY
Status: DISCONTINUED | OUTPATIENT
Start: 2024-05-08 | End: 2024-05-10 | Stop reason: HOSPADM

## 2024-05-08 RX ORDER — ONDANSETRON 2 MG/ML
INJECTION INTRAMUSCULAR; INTRAVENOUS PRN
Status: DISCONTINUED | OUTPATIENT
Start: 2024-05-08 | End: 2024-05-08 | Stop reason: SDUPTHER

## 2024-05-08 RX ORDER — SODIUM CHLORIDE, SODIUM LACTATE, POTASSIUM CHLORIDE, CALCIUM CHLORIDE 600; 310; 30; 20 MG/100ML; MG/100ML; MG/100ML; MG/100ML
INJECTION, SOLUTION INTRAVENOUS CONTINUOUS
Status: DISCONTINUED | OUTPATIENT
Start: 2024-05-08 | End: 2024-05-10 | Stop reason: HOSPADM

## 2024-05-08 RX ORDER — RIVAROXABAN 10 MG/1
10 TABLET, FILM COATED ORAL
Qty: 30 TABLET | Refills: 0 | Status: SHIPPED | OUTPATIENT
Start: 2024-05-08 | End: 2024-06-07

## 2024-05-08 RX ORDER — ONDANSETRON 2 MG/ML
4 INJECTION INTRAMUSCULAR; INTRAVENOUS
Status: DISCONTINUED | OUTPATIENT
Start: 2024-05-08 | End: 2024-05-08 | Stop reason: HOSPADM

## 2024-05-08 RX ORDER — PANTOPRAZOLE SODIUM 40 MG/1
40 TABLET, DELAYED RELEASE ORAL EVERY MORNING
Status: DISCONTINUED | OUTPATIENT
Start: 2024-05-09 | End: 2024-05-10 | Stop reason: HOSPADM

## 2024-05-08 RX ORDER — NALOXONE HYDROCHLORIDE 0.4 MG/ML
INJECTION, SOLUTION INTRAMUSCULAR; INTRAVENOUS; SUBCUTANEOUS PRN
Status: DISCONTINUED | OUTPATIENT
Start: 2024-05-08 | End: 2024-05-08 | Stop reason: HOSPADM

## 2024-05-08 RX ORDER — SODIUM CHLORIDE 0.9 % (FLUSH) 0.9 %
5-40 SYRINGE (ML) INJECTION PRN
Status: DISCONTINUED | OUTPATIENT
Start: 2024-05-08 | End: 2024-05-08 | Stop reason: HOSPADM

## 2024-05-08 RX ORDER — TRANEXAMIC ACID 100 MG/ML
INJECTION, SOLUTION INTRAVENOUS
Status: DISCONTINUED
Start: 2024-05-08 | End: 2024-05-08 | Stop reason: WASHOUT

## 2024-05-08 RX ORDER — METOPROLOL TARTRATE 1 MG/ML
INJECTION, SOLUTION INTRAVENOUS PRN
Status: DISCONTINUED | OUTPATIENT
Start: 2024-05-08 | End: 2024-05-08 | Stop reason: SDUPTHER

## 2024-05-08 RX ORDER — SODIUM CHLORIDE 9 MG/ML
INJECTION, SOLUTION INTRAVENOUS PRN
Status: DISCONTINUED | OUTPATIENT
Start: 2024-05-08 | End: 2024-05-08 | Stop reason: HOSPADM

## 2024-05-08 RX ORDER — KETOROLAC TROMETHAMINE 30 MG/ML
15 INJECTION, SOLUTION INTRAMUSCULAR; INTRAVENOUS ONCE
Status: DISCONTINUED | OUTPATIENT
Start: 2024-05-08 | End: 2024-05-08 | Stop reason: HOSPADM

## 2024-05-08 RX ORDER — HYDROMORPHONE HYDROCHLORIDE 2 MG/ML
INJECTION, SOLUTION INTRAMUSCULAR; INTRAVENOUS; SUBCUTANEOUS PRN
Status: DISCONTINUED | OUTPATIENT
Start: 2024-05-08 | End: 2024-05-08 | Stop reason: SDUPTHER

## 2024-05-08 RX ORDER — ASPIRIN 81 MG/1
81 TABLET, CHEWABLE ORAL DAILY
Status: DISCONTINUED | OUTPATIENT
Start: 2024-05-08 | End: 2024-05-10 | Stop reason: HOSPADM

## 2024-05-08 RX ORDER — HYDROMORPHONE HYDROCHLORIDE 1 MG/ML
0.5 INJECTION, SOLUTION INTRAMUSCULAR; INTRAVENOUS; SUBCUTANEOUS EVERY 5 MIN PRN
Status: DISCONTINUED | OUTPATIENT
Start: 2024-05-08 | End: 2024-05-08 | Stop reason: HOSPADM

## 2024-05-08 RX ORDER — OXYCODONE HYDROCHLORIDE 5 MG/1
10 TABLET ORAL EVERY 4 HOURS PRN
Status: DISCONTINUED | OUTPATIENT
Start: 2024-05-08 | End: 2024-05-10 | Stop reason: HOSPADM

## 2024-05-08 RX ORDER — SODIUM CHLORIDE, SODIUM LACTATE, POTASSIUM CHLORIDE, CALCIUM CHLORIDE 600; 310; 30; 20 MG/100ML; MG/100ML; MG/100ML; MG/100ML
INJECTION, SOLUTION INTRAVENOUS CONTINUOUS
Status: DISCONTINUED | OUTPATIENT
Start: 2024-05-08 | End: 2024-05-08 | Stop reason: HOSPADM

## 2024-05-08 RX ORDER — LIDOCAINE HYDROCHLORIDE 10 MG/ML
1 INJECTION, SOLUTION EPIDURAL; INFILTRATION; INTRACAUDAL; PERINEURAL
Status: DISCONTINUED | OUTPATIENT
Start: 2024-05-09 | End: 2024-05-08 | Stop reason: HOSPADM

## 2024-05-08 RX ORDER — ACETAMINOPHEN 325 MG/1
650 TABLET ORAL EVERY 6 HOURS
Status: DISCONTINUED | OUTPATIENT
Start: 2024-05-08 | End: 2024-05-10 | Stop reason: HOSPADM

## 2024-05-08 RX ORDER — SODIUM CHLORIDE 0.9 % (FLUSH) 0.9 %
5-40 SYRINGE (ML) INJECTION EVERY 12 HOURS SCHEDULED
Status: DISCONTINUED | OUTPATIENT
Start: 2024-05-08 | End: 2024-05-10 | Stop reason: HOSPADM

## 2024-05-08 RX ORDER — ACETAMINOPHEN 325 MG/1
650 TABLET ORAL ONCE
Status: DISCONTINUED | OUTPATIENT
Start: 2024-05-08 | End: 2024-05-08 | Stop reason: HOSPADM

## 2024-05-08 RX ORDER — SODIUM CHLORIDE 0.9 % (FLUSH) 0.9 %
5-40 SYRINGE (ML) INJECTION PRN
Status: DISCONTINUED | OUTPATIENT
Start: 2024-05-08 | End: 2024-05-10 | Stop reason: HOSPADM

## 2024-05-08 RX ORDER — MORPHINE SULFATE 4 MG/ML
4 INJECTION, SOLUTION INTRAMUSCULAR; INTRAVENOUS
Status: DISCONTINUED | OUTPATIENT
Start: 2024-05-08 | End: 2024-05-10 | Stop reason: HOSPADM

## 2024-05-08 RX ORDER — ONDANSETRON 2 MG/ML
4 INJECTION INTRAMUSCULAR; INTRAVENOUS EVERY 6 HOURS PRN
Status: DISCONTINUED | OUTPATIENT
Start: 2024-05-08 | End: 2024-05-10 | Stop reason: HOSPADM

## 2024-05-08 RX ORDER — GABAPENTIN 300 MG/1
300 CAPSULE ORAL ONCE
Status: DISCONTINUED | OUTPATIENT
Start: 2024-05-08 | End: 2024-05-08

## 2024-05-08 RX ORDER — DIPHENHYDRAMINE HYDROCHLORIDE 50 MG/ML
INJECTION INTRAMUSCULAR; INTRAVENOUS PRN
Status: DISCONTINUED | OUTPATIENT
Start: 2024-05-08 | End: 2024-05-08 | Stop reason: SDUPTHER

## 2024-05-08 RX ORDER — LANOLIN ALCOHOL/MO/W.PET/CERES
1000 CREAM (GRAM) TOPICAL DAILY
Status: DISCONTINUED | OUTPATIENT
Start: 2024-05-09 | End: 2024-05-10 | Stop reason: HOSPADM

## 2024-05-08 RX ORDER — TRANEXAMIC ACID 100 MG/ML
INJECTION, SOLUTION INTRAVENOUS PRN
Status: DISCONTINUED | OUTPATIENT
Start: 2024-05-08 | End: 2024-05-08 | Stop reason: SDUPTHER

## 2024-05-08 RX ORDER — OXYCODONE HYDROCHLORIDE 5 MG/1
5 TABLET ORAL EVERY 4 HOURS PRN
Status: DISCONTINUED | OUTPATIENT
Start: 2024-05-08 | End: 2024-05-10 | Stop reason: HOSPADM

## 2024-05-08 RX ORDER — HYDRALAZINE HYDROCHLORIDE 20 MG/ML
INJECTION INTRAMUSCULAR; INTRAVENOUS PRN
Status: DISCONTINUED | OUTPATIENT
Start: 2024-05-08 | End: 2024-05-08 | Stop reason: SDUPTHER

## 2024-05-08 RX ORDER — SODIUM CHLORIDE 0.9 % (FLUSH) 0.9 %
5-40 SYRINGE (ML) INJECTION EVERY 12 HOURS SCHEDULED
Status: DISCONTINUED | OUTPATIENT
Start: 2024-05-08 | End: 2024-05-08 | Stop reason: HOSPADM

## 2024-05-08 RX ORDER — CITALOPRAM 20 MG/1
40 TABLET ORAL DAILY
Status: DISCONTINUED | OUTPATIENT
Start: 2024-05-09 | End: 2024-05-10 | Stop reason: HOSPADM

## 2024-05-08 RX ORDER — GABAPENTIN 300 MG/1
300 CAPSULE ORAL 3 TIMES DAILY
Status: DISCONTINUED | OUTPATIENT
Start: 2024-05-08 | End: 2024-05-10 | Stop reason: HOSPADM

## 2024-05-08 RX ORDER — MIDAZOLAM HYDROCHLORIDE 1 MG/ML
2 INJECTION INTRAMUSCULAR; INTRAVENOUS ONCE
Status: DISCONTINUED | OUTPATIENT
Start: 2024-05-08 | End: 2024-05-08

## 2024-05-08 RX ORDER — BUPROPION HYDROCHLORIDE 150 MG/1
300 TABLET ORAL DAILY
Status: DISCONTINUED | OUTPATIENT
Start: 2024-05-09 | End: 2024-05-10 | Stop reason: HOSPADM

## 2024-05-08 RX ORDER — FENTANYL CITRATE 50 UG/ML
INJECTION, SOLUTION INTRAMUSCULAR; INTRAVENOUS PRN
Status: DISCONTINUED | OUTPATIENT
Start: 2024-05-08 | End: 2024-05-08 | Stop reason: SDUPTHER

## 2024-05-08 RX ORDER — OXYCODONE HYDROCHLORIDE AND ACETAMINOPHEN 5; 325 MG/1; MG/1
1 TABLET ORAL EVERY 6 HOURS PRN
Qty: 30 TABLET | Refills: 0 | Status: SHIPPED | OUTPATIENT
Start: 2024-05-08 | End: 2024-05-15

## 2024-05-08 RX ORDER — ACETAMINOPHEN 500 MG
1000 TABLET ORAL ONCE
Status: COMPLETED | OUTPATIENT
Start: 2024-05-08 | End: 2024-05-08

## 2024-05-08 RX ORDER — DEXAMETHASONE SODIUM PHOSPHATE 10 MG/ML
INJECTION, SOLUTION INTRAMUSCULAR; INTRAVENOUS PRN
Status: DISCONTINUED | OUTPATIENT
Start: 2024-05-08 | End: 2024-05-08 | Stop reason: SDUPTHER

## 2024-05-08 RX ADMIN — DEXAMETHASONE SODIUM PHOSPHATE 10 MG: 10 INJECTION INTRAMUSCULAR; INTRAVENOUS at 13:26

## 2024-05-08 RX ADMIN — FAMOTIDINE 20 MG: 10 INJECTION, SOLUTION INTRAVENOUS at 13:26

## 2024-05-08 RX ADMIN — DIPHENHYDRAMINE HYDROCHLORIDE 12.5 MG: 50 INJECTION INTRAMUSCULAR; INTRAVENOUS at 13:26

## 2024-05-08 RX ADMIN — METOPROLOL TARTRATE 2.5 MG: 5 INJECTION INTRAVENOUS at 14:34

## 2024-05-08 RX ADMIN — SODIUM CHLORIDE, POTASSIUM CHLORIDE, SODIUM LACTATE AND CALCIUM CHLORIDE: 600; 310; 30; 20 INJECTION, SOLUTION INTRAVENOUS at 13:53

## 2024-05-08 RX ADMIN — FENTANYL CITRATE 25 MCG: 50 INJECTION INTRAMUSCULAR; INTRAVENOUS at 17:09

## 2024-05-08 RX ADMIN — Medication 2000 MG: at 13:25

## 2024-05-08 RX ADMIN — SODIUM CHLORIDE, POTASSIUM CHLORIDE, SODIUM LACTATE AND CALCIUM CHLORIDE: 600; 310; 30; 20 INJECTION, SOLUTION INTRAVENOUS at 12:09

## 2024-05-08 RX ADMIN — ONDANSETRON 4 MG: 2 INJECTION INTRAMUSCULAR; INTRAVENOUS at 15:07

## 2024-05-08 RX ADMIN — FENTANYL CITRATE 200 MCG: 50 INJECTION INTRAMUSCULAR; INTRAVENOUS at 13:46

## 2024-05-08 RX ADMIN — ENOXAPARIN SODIUM 40 MG: 100 INJECTION SUBCUTANEOUS at 21:17

## 2024-05-08 RX ADMIN — METOPROLOL TARTRATE 2.5 MG: 5 INJECTION INTRAVENOUS at 14:30

## 2024-05-08 RX ADMIN — ACETAMINOPHEN 1000 MG: 500 TABLET ORAL at 12:30

## 2024-05-08 RX ADMIN — HYDROMORPHONE HYDROCHLORIDE 1 MG: 2 INJECTION, SOLUTION INTRAMUSCULAR; INTRAVENOUS; SUBCUTANEOUS at 14:54

## 2024-05-08 RX ADMIN — OXYCODONE HYDROCHLORIDE 10 MG: 5 TABLET ORAL at 21:22

## 2024-05-08 RX ADMIN — GABAPENTIN 300 MG: 300 CAPSULE ORAL at 21:11

## 2024-05-08 RX ADMIN — ATORVASTATIN CALCIUM 80 MG: 80 TABLET, FILM COATED ORAL at 21:11

## 2024-05-08 RX ADMIN — PROPOFOL 100 MG: 10 INJECTION, EMULSION INTRAVENOUS at 13:21

## 2024-05-08 RX ADMIN — SODIUM CHLORIDE, POTASSIUM CHLORIDE, SODIUM LACTATE AND CALCIUM CHLORIDE: 600; 310; 30; 20 INJECTION, SOLUTION INTRAVENOUS at 18:00

## 2024-05-08 RX ADMIN — FLUTICASONE PROPIONATE 2 SPRAY: 50 SPRAY, METERED NASAL at 21:15

## 2024-05-08 RX ADMIN — TRANEXAMIC ACID 1000 MG: 100 INJECTION, SOLUTION INTRAVENOUS at 13:30

## 2024-05-08 RX ADMIN — ASPIRIN 81 MG CHEWABLE TABLET 81 MG: 81 TABLET CHEWABLE at 21:12

## 2024-05-08 RX ADMIN — LIDOCAINE HYDROCHLORIDE 60 MG: 20 INJECTION, SOLUTION EPIDURAL; INFILTRATION; INTRACAUDAL; PERINEURAL at 13:21

## 2024-05-08 RX ADMIN — HYDRALAZINE HYDROCHLORIDE 5 MG: 20 INJECTION INTRAMUSCULAR; INTRAVENOUS at 14:50

## 2024-05-08 RX ADMIN — TRANEXAMIC ACID 1000 MG: 100 INJECTION, SOLUTION INTRAVENOUS at 14:38

## 2024-05-08 RX ADMIN — PHENYLEPHRINE HYDROCHLORIDE 100 MCG: 10 INJECTION INTRAVENOUS at 13:42

## 2024-05-08 RX ADMIN — Medication 2000 MG: at 21:11

## 2024-05-08 ASSESSMENT — PAIN - FUNCTIONAL ASSESSMENT
PAIN_FUNCTIONAL_ASSESSMENT: FACE, LEGS, ACTIVITY, CRY, AND CONSOLABILITY (FLACC)
PAIN_FUNCTIONAL_ASSESSMENT: PREVENTS OR INTERFERES SOME ACTIVE ACTIVITIES AND ADLS
PAIN_FUNCTIONAL_ASSESSMENT: 0-10
PAIN_FUNCTIONAL_ASSESSMENT: 0-10

## 2024-05-08 ASSESSMENT — PAIN DESCRIPTION - ORIENTATION
ORIENTATION: LEFT
ORIENTATION: LEFT

## 2024-05-08 ASSESSMENT — PAIN DESCRIPTION - PAIN TYPE: TYPE: SURGICAL PAIN

## 2024-05-08 ASSESSMENT — PAIN DESCRIPTION - DESCRIPTORS
DESCRIPTORS: SORE;PRESSURE
DESCRIPTORS: ACHING

## 2024-05-08 ASSESSMENT — PAIN DESCRIPTION - LOCATION
LOCATION: KNEE
LOCATION: KNEE

## 2024-05-08 ASSESSMENT — PAIN SCALES - GENERAL
PAINLEVEL_OUTOF10: 8
PAINLEVEL_OUTOF10: 5
PAINLEVEL_OUTOF10: 6
PAINLEVEL_OUTOF10: 8

## 2024-05-08 ASSESSMENT — PAIN DESCRIPTION - ONSET: ONSET: ON-GOING

## 2024-05-08 ASSESSMENT — PAIN DESCRIPTION - FREQUENCY: FREQUENCY: CONTINUOUS

## 2024-05-08 NOTE — ANESTHESIA POSTPROCEDURE EVALUATION
Department of Anesthesiology  Postprocedure Note    Patient: Latisha Prasad  MRN: [id number]  YOB: 1960  Date of evaluation: 5/8/2024    Procedure Summary       Date: 05/08/24 Room / Location: [address]    Anesthesia Start: 1315 Anesthesia Stop: 1550    Procedure: KNEE TOTAL ARTHROPLASTY (Left: Knee) Diagnosis:       Primary localized osteoarthritis of left knee      (Primary localized osteoarthritis of left knee [M17.12])    Surgeons: Ciro Pretty MD Responsible Provider: Stuart Brown DO    Anesthesia Type: general ASA Status: 3            Anesthesia Type: No value filed.    Hong Phase I: Hong Score: 8    Hong Phase II:      Anesthesia Post Evaluation    Patient location during evaluation: PACU  Patient participation: complete - patient participated  Level of consciousness: awake and alert  Airway patency: patent  Nausea & Vomiting: no nausea and no vomiting  Cardiovascular status: hemodynamically stable  Respiratory status: acceptable  Hydration status: stable  Pain management: adequate    No notable events documented.

## 2024-05-08 NOTE — DISCHARGE INSTR - COC
No  Bladder: Yes  Urinary Catheter: None   Colostomy/Ileostomy/Ileal Conduit: No       Date of Last BM: 05/09/24      Intake/Output Summary (Last 24 hours) at 5/8/2024 1529  Last data filed at 5/8/2024 1510  Gross per 24 hour   Intake 1000 ml   Output 100 ml   Net 900 ml     No intake/output data recorded.    Safety Concerns:     At Risk for Falls    Impairments/Disabilities:      Speech    Nutrition Therapy:  Current Nutrition Therapy:   - Oral Diet:  General    Routes of Feeding: Oral  Liquids: No Restrictions  Daily Fluid Restriction: no  Last Modified Barium Swallow with Video (Video Swallowing Test): not done    Treatments at the Time of Hospital Discharge:   Respiratory Treatments: N/A  Oxygen Therapy:  is not on home oxygen therapy.  Ventilator:    - No ventilator support    Rehab Therapies: Physical Therapy and Occupational Therapy  Weight Bearing Status/Restrictions: Touchdown weight bearing (10-25 lbs) only on left leg  Other Medical Equipment (for information only, NOT a DME order):  crutches and walker  Other Treatments: N/A    Patient's personal belongings (please select all that are sent with patient):  Ishaan    RN SIGNATURE:  Electronically signed by JIMENEZ GOMEZ RN on 5/10/24 at 2:42 PM EDT    CASE MANAGEMENT/SOCIAL WORK SECTION    Inpatient Status Date: 05/08/24    Readmission Risk Assessment Score:  Readmission Risk              Risk of Unplanned Readmission:  0           Discharging to Facility/ Agency   Name: [facility]  Address: [address] [place] [address]  Phone: [phone]   Fax:  [phone]     / signature: Electronically signed by JOSEFINA Garcia on 5/9/24 at 3:37 PM EDT    PHYSICIAN SECTION    Prognosis: Good    Condition at Discharge: Stable    Rehab Potential (if transferring to Rehab): Good    Recommended Labs or Other Treatments After Discharge:     Physician Certification: I certify the above information and transfer of Latisha Prasad

## 2024-05-08 NOTE — H&P
06:54 AM    GFR      07/15/2022 06:54 AM     U/A:    Lab Results   Component Value Date/Time    COLORU Dark Yellow 04/25/2024 11:25 AM    TURBIDITY SLIGHTLY CLOUDY 04/25/2024 11:25 AM    SPECGRAV 1.030 05/07/2018 01:30 PM    HGBUR NEGATIVE 04/25/2024 11:25 AM    PHUR 5.5 04/25/2024 11:25 AM    PROTEINU TRACE 04/25/2024 11:25 AM    GLUCOSEU NEGATIVE 04/25/2024 11:25 AM    KETUA TRACE 04/25/2024 11:25 AM    BILIRUBINUR  04/25/2024 11:25 AM     Presumptive positive. Unable to confirm due to unavailability of reagent.    BILIRUBINUR neg 05/07/2018 01:30 PM    UROBILINOGEN Normal 04/25/2024 11:25 AM    NITRU NEGATIVE 04/25/2024 11:25 AM    LEUKOCYTESUR NEGATIVE 04/25/2024 11:25 AM       Provisional diagnosis:      Plan:  Multimodal pain control.  This includes Tylenol, Toradol, Exparel, narcotics.  2.  Prophylactic antibiotics    3.  DVT prophylaxis with Xarelto or Eliquis    4.  Conway pressure dressing    5.  Polar Care    6.  Postoperative physical therapy assessment    7.  Postoperative pain control.  If comfortable on oral analgesics and safely independent after assessment discharge home.    8.  Social service consult to facilitate discharge recommendation    See orders  Electronically signed by Ciro Pretty MD APRN, ACNP-BC on 5/8/2024 at 12:42 PM    Copy of H+P to Nicholas Conway DO

## 2024-05-08 NOTE — BRIEF OP NOTE
Brief Postoperative Note      Patient: Latisha Prasad  YOB: 1960  MRN: [id number]    Date of Procedure: 5/8/2024    Pre-Op Diagnosis Codes:     * Primary localized osteoarthritis of left knee [M17.12]    Post-Op Diagnosis: Same       Procedure(s):  KNEE TOTAL ARTHROPLASTY    Surgeon(s):  Ciro Pretty MD    Assistant:  Surgical Assistant: Alberto Cardenas    Anesthesia: General    Estimated Blood Loss (mL): less than 50     Complications: None    Specimens:   * No specimens in log *    Implants:  Implant Name Type Inv. Item Serial No.  Lot No. LRB No. Used Action   CEMENT BNE 40GM W/ GENT HI VISC RADPQ FOR REV SURG - [id number]  CEMENT BNE 40GM W/ GENT HI VISC RADPQ FOR REV SURG  [employer]- [id number] Left 2 Implanted   EXTENSION STEM L30MM [id number] KNEE TAPR TIESHA PERSONA - [id number]  EXTENSION STEM L30MM [id number] KNEE TAPR TIESHA PERSONA  [employer]- [id number] Left 1 Implanted   COMPONENT PAT [id number] THK8MM KNEE POLY TIESHA CONVENTIONAL - [id number]  COMPONENT PAT [id number] THK8MM KNEE POLY TIESHA CONVENTIONAL  [employer]- [id number] Left 1 Implanted   PSN TIB STM 5 DEG SZ D L - [id number]  PSN TIB STM 5 DEG SZ D L  [employer]- [id number] Left 1 Implanted   COMPONENT FEM SZ 7 RICKI L KNEE CO CHROM TIESHA POST STBL COR - [id number]  COMPONENT FEM SZ 7 RICKI L KNEE CO CHROM TIESHA POST STBL COR  [employer]- [id number] Left 1 Implanted   COMPONENT ARTC SURF PS 6-9 CD 10 MM LT TIB FIX BEAR - [id number]  COMPONENT ARTC SURF PS 6-9 CD 10 MM LT TIB FIX BEAR  [employer]- [id number] Left 1 Implanted         Drains: * No LDAs found *    Findings:  Infection Present At Time Of Surgery (PATOS) (choose all levels that have infection present):  No infection present  Other Findings: See dictation    Electronically signed by Ciro Pretty MD on 5/8/2024 at 3:22 PM

## 2024-05-08 NOTE — DISCHARGE INSTRUCTIONS
ANY ORTHOPEDIC QUESTIONS OR ANY OTHER CONCERNS YOU MAY CALL THE ORTHOPEDIC COORDINATOR:  LIDA CARR RN, MSN  [phone]  [email or web address]    DISCHARGE INSTRUCTIONS  Caring for yourself after joint replacement surgery (Total Hip and Total Knee Replacement)    Activity and Therapy  Receive physical therapy three times per week. (Pain medication one hour prior to therapy)   Perform PT exercises on own when not receiving home or outpatient PT.  Ideally exercises should be at least two times a day.  Increase level of activity and ambulation each day.  Perform deep breathing exercises daily.  Patient provides self-care when possible.  Work on Range of motion for Total knee patients.   No pillow under the knee for Total knee patients.  Elevate the surgical leg when seated.  No driving until cleared by surgeon      Diet:  Increase oral intake of fruits, fiber and water to prevent constipation.  Drink fluids frequently and take stool softeners to aid in bowel motility.  Increase protein intake/reduce high-sugar intake to help promote healing and prevent infection.    Incision Care:  Keep Primaseal or other dressing intact and remove as directed by surgeon, unless saturated, in which case, call surgeon and request instructions.  If dressing falls off, call surgeon.  Thaddeus Hose on in the am and off in the pm to reduce swelling.  Ice affected area four times a day, for twenty minutes.    Pain Medications and Anticoagulant  You have been place on an anticoagulant to prevent blood clots.  Take this medication exactly as prescribed.  Be alert for signs of bleeding.  Take care not to injure yourself.  You have been provided pain medicine to control your pain.  Do not take more narcotics than prescribed.  You may begin weaning from narcotics as your pain level improves by decreasing the amount or frequency of the narcotics.  You may also take plain acetaminophen as an alternate to the narcotics.   Never exceed the

## 2024-05-08 NOTE — ANESTHESIA PRE PROCEDURE
tobacco: Never    Tobacco comments:     staRTED 8TH GRADE-ENDED 32YRS AGO   Substance Use Topics    Alcohol use: Not Currently                                Counseling given: Not Answered  Tobacco comments: staRTED 8TH GRADE-ENDED 32YRS AGO      Vital Signs (Current):   Vitals:    05/08/24 1136 05/08/24 1139   BP:  116/74   Pulse:  61   Resp:  18   Temp:  97.2 °F (36.2 °C)   SpO2:  93%   Weight: 71.7 kg (158 lb)    Height: 1.524 m (5')                                               BP Readings from Last 3 Encounters:   05/08/24 116/74   04/25/24 113/74   04/11/24 124/82       NPO Status: Time of last liquid consumption: 2350                        Time of last solid consumption: 2350                        Date of last liquid consumption: 05/07/24                        Date of last solid food consumption: 05/07/24    BMI:   Wt Readings from Last 3 Encounters:   05/08/24 71.7 kg (158 lb)   04/25/24 72 kg (158 lb 11.2 oz)   04/11/24 71.7 kg (158 lb)     Body mass index is 30.86 kg/m².    CBC:   Lab Results   Component Value Date/Time    WBC 4.2 04/25/2024 10:46 AM    RBC 4.41 04/25/2024 10:46 AM    RBC 4.37 02/09/2023 11:45 AM    HGB 12.2 04/25/2024 10:46 AM    HCT 40.3 04/25/2024 10:46 AM    MCV 91.4 04/25/2024 10:46 AM    RDW 15.4 04/25/2024 10:46 AM     04/25/2024 10:46 AM       CMP:   Lab Results   Component Value Date/Time     04/25/2024 10:46 AM    K 3.9 04/25/2024 10:46 AM     04/25/2024 10:46 AM    CO2 27 04/25/2024 10:46 AM    BUN 10 04/25/2024 10:46 AM    CREATININE 1.3 04/25/2024 10:46 AM    GFRAA 55 07/15/2022 06:54 AM    LABGLOM 46 04/25/2024 10:46 AM    GLUCOSE 69 04/25/2024 10:46 AM    GLUCOSE 96 02/09/2023 11:45 AM    CALCIUM 9.2 04/25/2024 10:46 AM    BILITOT 0.3 04/25/2024 10:46 AM    ALKPHOS 156 04/25/2024 10:46 AM    AST 23 04/25/2024 10:46 AM    ALT 17 04/25/2024 10:46 AM       POC Tests: No results for input(s): \"POCGLU\", \"POCNA\", \"POCK\", \"POCCL\", \"POCBUN\", \"POCHEMO\", \"POCHCT\"

## 2024-05-09 PROBLEM — M17.12 ARTHRITIS OF LEFT KNEE: Status: ACTIVE | Noted: 2024-05-09

## 2024-05-09 PROCEDURE — G0378 HOSPITAL OBSERVATION PER HR: HCPCS

## 2024-05-09 PROCEDURE — 97116 GAIT TRAINING THERAPY: CPT

## 2024-05-09 PROCEDURE — 97530 THERAPEUTIC ACTIVITIES: CPT

## 2024-05-09 PROCEDURE — 97163 PT EVAL HIGH COMPLEX 45 MIN: CPT

## 2024-05-09 PROCEDURE — 97535 SELF CARE MNGMENT TRAINING: CPT

## 2024-05-09 PROCEDURE — 6360000002 HC RX W HCPCS: Performed by: ORTHOPAEDIC SURGERY

## 2024-05-09 PROCEDURE — 6370000000 HC RX 637 (ALT 250 FOR IP): Performed by: ORTHOPAEDIC SURGERY

## 2024-05-09 PROCEDURE — 96372 THER/PROPH/DIAG INJ SC/IM: CPT

## 2024-05-09 PROCEDURE — 97110 THERAPEUTIC EXERCISES: CPT

## 2024-05-09 PROCEDURE — 2580000003 HC RX 258: Performed by: ORTHOPAEDIC SURGERY

## 2024-05-09 PROCEDURE — 97167 OT EVAL HIGH COMPLEX 60 MIN: CPT

## 2024-05-09 RX ORDER — RIVAROXABAN 10 MG/1
10 TABLET, FILM COATED ORAL
Qty: 30 TABLET | Refills: 0 | Status: SHIPPED | OUTPATIENT
Start: 2024-05-09 | End: 2024-06-08

## 2024-05-09 RX ORDER — OXYCODONE AND ACETAMINOPHEN 10; 325 MG/1; MG/1
1 TABLET ORAL EVERY 8 HOURS PRN
Qty: 21 TABLET | Refills: 0 | Status: SHIPPED | OUTPATIENT
Start: 2024-05-09 | End: 2024-05-16

## 2024-05-09 RX ORDER — IBUPROFEN 800 MG/1
800 TABLET ORAL EVERY 8 HOURS PRN
Qty: 21 TABLET | Refills: 0 | Status: SHIPPED | OUTPATIENT
Start: 2024-05-09 | End: 2024-05-16

## 2024-05-09 RX ADMIN — OXYCODONE HYDROCHLORIDE 10 MG: 5 TABLET ORAL at 20:48

## 2024-05-09 RX ADMIN — ACETAMINOPHEN 650 MG: 325 TABLET ORAL at 19:09

## 2024-05-09 RX ADMIN — ACETAMINOPHEN 650 MG: 325 TABLET ORAL at 05:37

## 2024-05-09 RX ADMIN — OXYCODONE HYDROCHLORIDE 10 MG: 5 TABLET ORAL at 03:00

## 2024-05-09 RX ADMIN — ASPIRIN 81 MG CHEWABLE TABLET 81 MG: 81 TABLET CHEWABLE at 08:52

## 2024-05-09 RX ADMIN — ACETAMINOPHEN 650 MG: 325 TABLET ORAL at 01:01

## 2024-05-09 RX ADMIN — ACETAMINOPHEN 650 MG: 325 TABLET ORAL at 11:04

## 2024-05-09 RX ADMIN — CITALOPRAM HYDROBROMIDE 40 MG: 20 TABLET ORAL at 08:51

## 2024-05-09 RX ADMIN — Medication 2000 MG: at 05:40

## 2024-05-09 RX ADMIN — FLUTICASONE PROPIONATE 2 SPRAY: 50 SPRAY, METERED NASAL at 08:52

## 2024-05-09 RX ADMIN — PANTOPRAZOLE SODIUM 40 MG: 40 TABLET, DELAYED RELEASE ORAL at 08:51

## 2024-05-09 RX ADMIN — GABAPENTIN 300 MG: 300 CAPSULE ORAL at 15:18

## 2024-05-09 RX ADMIN — OXYCODONE HYDROCHLORIDE 10 MG: 5 TABLET ORAL at 11:04

## 2024-05-09 RX ADMIN — CYANOCOBALAMIN TAB 1000 MCG 1000 MCG: 1000 TAB at 08:51

## 2024-05-09 RX ADMIN — SODIUM CHLORIDE, POTASSIUM CHLORIDE, SODIUM LACTATE AND CALCIUM CHLORIDE: 600; 310; 30; 20 INJECTION, SOLUTION INTRAVENOUS at 03:07

## 2024-05-09 RX ADMIN — PROPRANOLOL HYDROCHLORIDE 80 MG: 80 CAPSULE, EXTENDED RELEASE ORAL at 08:51

## 2024-05-09 RX ADMIN — OXYCODONE HYDROCHLORIDE 10 MG: 5 TABLET ORAL at 07:11

## 2024-05-09 RX ADMIN — ENOXAPARIN SODIUM 40 MG: 100 INJECTION SUBCUTANEOUS at 08:51

## 2024-05-09 RX ADMIN — ATORVASTATIN CALCIUM 80 MG: 80 TABLET, FILM COATED ORAL at 20:45

## 2024-05-09 RX ADMIN — SODIUM CHLORIDE, PRESERVATIVE FREE 10 ML: 5 INJECTION INTRAVENOUS at 20:45

## 2024-05-09 RX ADMIN — BUPROPION HYDROCHLORIDE 300 MG: 150 TABLET, EXTENDED RELEASE ORAL at 08:51

## 2024-05-09 RX ADMIN — OXYCODONE HYDROCHLORIDE 10 MG: 5 TABLET ORAL at 15:15

## 2024-05-09 RX ADMIN — GABAPENTIN 300 MG: 300 CAPSULE ORAL at 20:45

## 2024-05-09 RX ADMIN — GABAPENTIN 300 MG: 300 CAPSULE ORAL at 08:51

## 2024-05-09 ASSESSMENT — PAIN DESCRIPTION - LOCATION
LOCATION: KNEE

## 2024-05-09 ASSESSMENT — PAIN DESCRIPTION - DESCRIPTORS
DESCRIPTORS: ACHING
DESCRIPTORS: SORE
DESCRIPTORS: DISCOMFORT
DESCRIPTORS: SHARP
DESCRIPTORS: SHARP
DESCRIPTORS: ACHING

## 2024-05-09 ASSESSMENT — PAIN DESCRIPTION - ORIENTATION
ORIENTATION: LEFT

## 2024-05-09 ASSESSMENT — PAIN SCALES - GENERAL
PAINLEVEL_OUTOF10: 7
PAINLEVEL_OUTOF10: 9
PAINLEVEL_OUTOF10: 6
PAINLEVEL_OUTOF10: 8
PAINLEVEL_OUTOF10: 5
PAINLEVEL_OUTOF10: 9
PAINLEVEL_OUTOF10: 5
PAINLEVEL_OUTOF10: 8
PAINLEVEL_OUTOF10: 10
PAINLEVEL_OUTOF10: 6
PAINLEVEL_OUTOF10: 4
PAINLEVEL_OUTOF10: 5
PAINLEVEL_OUTOF10: 8
PAINLEVEL_OUTOF10: 7
PAINLEVEL_OUTOF10: 8

## 2024-05-09 ASSESSMENT — PAIN DESCRIPTION - PAIN TYPE
TYPE: SURGICAL PAIN

## 2024-05-09 ASSESSMENT — PAIN DESCRIPTION - ONSET
ONSET: ON-GOING

## 2024-05-09 ASSESSMENT — PAIN DESCRIPTION - FREQUENCY
FREQUENCY: CONTINUOUS

## 2024-05-09 NOTE — BRIEF OP NOTE
Brief Postoperative Note      Patient: Latisha Prasad  YOB: 1960  MRN: [id number]    Date of Procedure: 5/8/2024    Pre-Op Diagnosis Codes:     * Primary localized osteoarthritis of left knee [M17.12]    Post-Op Diagnosis: Same       Procedure(s):  KNEE TOTAL ARTHROPLASTY    Surgeon(s):  Ciro Pretty MD    Assistant:  Surgical Assistant: Alberto Cardenas    Anesthesia: General    Estimated Blood Loss (mL): less than 50     Complications: None    Specimens:   * No specimens in log *    Implants:  Implant Name Type Inv. Item Serial No.  Lot No. LRB No. Used Action   CEMENT BNE 40GM W/ GENT HI VISC RADPQ FOR REV SURG - [id number]  CEMENT BNE 40GM W/ GENT HI VISC RADPQ FOR REV SURG  [employer]- [id number] Left 2 Implanted   EXTENSION STEM L30MM [id number] KNEE TAPR TIESHA PERSONA - [id number]  EXTENSION STEM L30MM [id number] KNEE TAPR TIESHA PERSONA  [employer]- [id number] Left 1 Implanted   COMPONENT PAT [id number] THK8MM KNEE POLY TIESHA CONVENTIONAL - [id number]  COMPONENT PAT [id number] THK8MM KNEE POLY TIESHA CONVENTIONAL  [employer]- [id number] Left 1 Implanted   PSN TIB STM 5 DEG SZ D L - [id number]  PSN TIB STM 5 DEG SZ D L  [employer]- [id number] Left 1 Implanted   COMPONENT FEM SZ 7 RICKI L KNEE CO CHROM TIESHA POST STBL COR - [id number]  COMPONENT FEM SZ 7 RICKI L KNEE CO CHROM TIESHA POST STBL COR  [employer]- [id number] Left 1 Implanted   COMPONENT ARTC SURF PS 6-9 CD 10 MM LT TIB FIX BEAR - [id number]  COMPONENT ARTC SURF PS 6-9 CD 10 MM LT TIB FIX BEAR  [employer]- [id number] Left 1 Implanted         Drains: * No LDAs found *    Findings:  Infection Present At Time Of Surgery (PATOS) (choose all levels that have infection present):  No infection present  Other Findings: See dictation    Electronically signed by Ciro Pretty MD on 5/9/2024 at 9:49 AM

## 2024-05-09 NOTE — DISCHARGE SUMMARY
Physician Discharge Summary     Patient ID:  Latisha Prasad  [id number]  64 y.o.  1960    Admit date: 5/8/2024    Discharge date and time: No discharge date for patient encounter.     Admitting Physician: Ciro Pretty MD     Discharge Physician: Dr. Pretty    Admission Diagnoses: Primary localized osteoarthritis of left knee [M17.12]  Arthritis of left knee [M17.12]    Discharge Diagnoses: Intractable left knee pain; tricompartmental osteoarthritis left knee    Admission Condition: good    Discharged Condition: good    Indication for Admission: Intractable left knee pain due to tricompartmental osteoarthritis    Hospital Course: admission for left knee pain attributed to tricompartment osteoarthritis.  Preoperative preparation included education regarding total knee arthroplasty.  Health assessment, health optimization, discussion of postoperative rehab.    Preop included multimodal pain control.  Tylenol, Toradol, prophylactic antibiotics, TXA.  Recovery room found intractable postoperative knee pain requiring parenteral narcotics.  Patient has previous history of failed back syndrome requiring narcotics orally.  Consequently admitted.    With admission physical therapy, Occupational Therapy, social service consult placed.  Patient safe with ambulation but requires home physical therapy.  These arrangements made.    Higher risk of DVT with positive past history.  Xarelto planned at discharge along with Percocet narcotics, nonsteroidal inflammatory drugs.    Patient placed in Conway pressure dressing along with Polar Care.  This to be continued 3 postop days then removed.    Consults: PT, OT, social service    Significant Diagnostic Studies: labs: Postoperative hemoglobin    Treatments: IV hydration, antibiotics: Ancef, analgesia: Vicodin and Morphine, anticoagulation: LMW heparin, and surgery: Cemented persona left total knee arthroplasty with patellar button    Discharge Exam:  /73 Comment: Sitting

## 2024-05-09 NOTE — PLAN OF CARE
Problem: Pain  Goal: Verbalizes/displays adequate comfort level or baseline comfort level  Outcome: Progressing  Flowsheets (Taken 5/9/2024 0253)  Verbalizes/displays adequate comfort level or baseline comfort level:   Encourage patient to monitor pain and request assistance   Assess pain using appropriate pain scale   Implement non-pharmacological measures as appropriate and evaluate response     Problem: Safety - Adult  Goal: Free from fall injury  Outcome: Progressing  Flowsheets (Taken 5/9/2024 0253)  Free From Fall Injury: Instruct family/caregiver on patient safety

## 2024-05-09 NOTE — CARE COORDINATION
Case Management Assessment  Initial Evaluation    Date/Time of Evaluation: 5/9/2024 5:02 PM  Assessment Completed by: SEBASTIAN Rolon, YINAW    If patient is discharged prior to next notation, then this note serves as note for discharge by case management.    Patient Name: Latisha Prasad                   YOB: 1960  Diagnosis: Primary localized osteoarthritis of left knee [M17.12]  Arthritis of left knee [M17.12]                   Date / Time: 5/8/2024 11:02 AM    Patient Admission Status: Observation   Readmission Risk (Low < 19, Mod (19-27), High > 27): Readmission Risk Score: 7.2    Current PCP: Nicholas Conway, DO  PCP verified by CM? Yes    Chart Reviewed: Yes      History Provided by: Patient  Patient Orientation: Alert and Oriented    Patient Cognition: Alert    Hospitalization in the last 30 days (Readmission):  No    If yes, Readmission Assessment in CM Navigator will be completed.    Advance Directives:      Code Status: Full Code   Patient's Primary Decision Maker is: Legal Next of Kin    Primary Decision Maker: Nicolás Stapleton  Other - [phone]    Secondary Decision Maker: Dinh Prasad - Child - [phone]    Secondary Decision Maker: Stefano Prasad - Child - [phone]    Discharge Planning:    Patient lives with: Spouse/Significant Other Type of Home: Skilled Nursing Facility  Primary Care Giver: Self  Patient Support Systems include: Family Members, Friends/Neighbors   Current Financial resources: Medicare  Current community resources: None  Current services prior to admission: Skilled Nursing Facility            Current DME:              Type of Home Care services:  None    ADLS  Prior functional level: Assistance with the following:, Bathing, Dressing, Toileting, Cooking, Housework, Shopping, Mobility  Current functional level: Mobility, Shopping, Housework, Cooking, Toileting, Dressing, Bathing, Assistance with the following:    PT AM-PAC: 15 /24  OT AM-PAC: 16

## 2024-05-09 NOTE — CARE COORDINATION
Social work:  Noted PT/OT recommendation of SNF.  Met with patient to discuss, she is agreeable if covered by insurance.   Closest location for her is [facility].  Referral sent.                        Post Acute Facility/Agency List     Provided patient with the following list, the list includes the overall star ratings obtained from CMS per the Medicare Web site (www.Medicare.gov):     [] Long Term Acute Care Facilities  [] Acute Inpatient Rehabilitation Facilities  [] Skilled Nursing Facilities  [] Home Care    Provided verbal instructions on how to utilize the QR Code to obtain additional detailed star ratings from www.Medicare.gov     offered to print and provide the detailed list:    []Accepted   [x]Declined

## 2024-05-09 NOTE — CARE COORDINATION
Social work: Spoke to [facility], they are able to accept.   Patient informed and agreed to plan, she would like to verify if she will have a copay- [facility] to look into it.   Needs precert.

## 2024-05-09 NOTE — OP [place] [id number]                            OPERATIVE REPORT      PATIENT NAME: DERIAN KRUGER            : 1960  MED REC NO: [id number]                         ROOM:   ACCOUNT NO: [id number]                       ADMIT DATE: 2024  PROVIDER: Ciro Pretty MD      DATE OF PROCEDURE:  2024    SURGEON:  Ciro Pretty MD    PREOPERATIVE DIAGNOSIS:  Tricompartmental osteoarthritis, left knee.    POSTOPERATIVE DIAGNOSIS:  Tricompartmental osteoarthritis, left knee.    PROCEDURE:  Cemented Persona Freddie left total knee arthroplasty with patellar button (narrow posterior stabilized size 7 femoral component), 5 degree left size D natural Persona tibial component with 30 mm length and 14 mm diameter cemented stem extension, 29 mm polyethylene patellar component, and then 10 mm height posterior stabilized fixed bearing articular surface.    INDICATIONS:  The patient is a 64-year-old with intractable left knee pain.  Kellgren grade 4 arthritic changes are present with chondrolysis, subchondral sclerosis, subchondral cyst, marginal osteophytes.  Conservative care was ineffective including corticosteroid injections, appropriate exercise, physical therapy, knee sleeves, medication.  Surgery in the form of total knee arthroplasty was discussed to decrease pain to facilitate ADLs.  Risks of procedure including infection, phlebitis, neurovascular and tendon injuries, anesthetic complications, failure of the procedure, anesthetic concerns amongst others were discussed and accepted.    The patient has a history of DVT, so prophylactic medications were provided for this.  She also had appropriate preoperative educational counseling regarding the procedure, the perioperative course, health optimization, health assessment, and discussion of postoperative rehabilitation at home with a home physical therapist.    DESCRIPTION OF

## 2024-05-10 VITALS
HEART RATE: 61 BPM | OXYGEN SATURATION: 100 % | RESPIRATION RATE: 20 BRPM | DIASTOLIC BLOOD PRESSURE: 75 MMHG | TEMPERATURE: 98.2 F | BODY MASS INDEX: 31.02 KG/M2 | SYSTOLIC BLOOD PRESSURE: 104 MMHG | HEIGHT: 60 IN | WEIGHT: 158 LBS

## 2024-05-10 PROCEDURE — 96372 THER/PROPH/DIAG INJ SC/IM: CPT

## 2024-05-10 PROCEDURE — 6360000002 HC RX W HCPCS: Performed by: ORTHOPAEDIC SURGERY

## 2024-05-10 PROCEDURE — 6370000000 HC RX 637 (ALT 250 FOR IP): Performed by: ORTHOPAEDIC SURGERY

## 2024-05-10 PROCEDURE — 2580000003 HC RX 258: Performed by: ORTHOPAEDIC SURGERY

## 2024-05-10 PROCEDURE — 97530 THERAPEUTIC ACTIVITIES: CPT

## 2024-05-10 PROCEDURE — G0378 HOSPITAL OBSERVATION PER HR: HCPCS

## 2024-05-10 PROCEDURE — 97110 THERAPEUTIC EXERCISES: CPT

## 2024-05-10 PROCEDURE — 97129 THER IVNTJ 1ST 15 MIN: CPT

## 2024-05-10 PROCEDURE — 97535 SELF CARE MNGMENT TRAINING: CPT

## 2024-05-10 RX ADMIN — FLUTICASONE PROPIONATE 2 SPRAY: 50 SPRAY, METERED NASAL at 08:30

## 2024-05-10 RX ADMIN — BUPROPION HYDROCHLORIDE 300 MG: 150 TABLET, EXTENDED RELEASE ORAL at 08:28

## 2024-05-10 RX ADMIN — CYANOCOBALAMIN TAB 1000 MCG 1000 MCG: 1000 TAB at 08:28

## 2024-05-10 RX ADMIN — PROPRANOLOL HYDROCHLORIDE 80 MG: 80 CAPSULE, EXTENDED RELEASE ORAL at 08:28

## 2024-05-10 RX ADMIN — CITALOPRAM HYDROBROMIDE 40 MG: 20 TABLET ORAL at 08:28

## 2024-05-10 RX ADMIN — OXYCODONE HYDROCHLORIDE 10 MG: 5 TABLET ORAL at 08:29

## 2024-05-10 RX ADMIN — ACETAMINOPHEN 650 MG: 325 TABLET ORAL at 12:41

## 2024-05-10 RX ADMIN — OXYCODONE HYDROCHLORIDE 5 MG: 5 TABLET ORAL at 12:41

## 2024-05-10 RX ADMIN — SODIUM CHLORIDE, PRESERVATIVE FREE 10 ML: 5 INJECTION INTRAVENOUS at 08:29

## 2024-05-10 RX ADMIN — PANTOPRAZOLE SODIUM 40 MG: 40 TABLET, DELAYED RELEASE ORAL at 08:28

## 2024-05-10 RX ADMIN — OXYCODONE HYDROCHLORIDE 10 MG: 5 TABLET ORAL at 03:36

## 2024-05-10 RX ADMIN — GABAPENTIN 300 MG: 300 CAPSULE ORAL at 14:16

## 2024-05-10 RX ADMIN — ACETAMINOPHEN 650 MG: 325 TABLET ORAL at 00:24

## 2024-05-10 RX ADMIN — ASPIRIN 81 MG CHEWABLE TABLET 81 MG: 81 TABLET CHEWABLE at 08:28

## 2024-05-10 RX ADMIN — ENOXAPARIN SODIUM 40 MG: 100 INJECTION SUBCUTANEOUS at 08:28

## 2024-05-10 RX ADMIN — GABAPENTIN 300 MG: 300 CAPSULE ORAL at 08:28

## 2024-05-10 RX ADMIN — DEXTROAMPHETAMINE SACCHARATE, AMPHETAMINE ASPARTATE, DEXTROAMPHETAMINE SULFATE AND AMPHETAMINE SULFATE 30 MG: 7.5; 7.5; 7.5; 7.5 TABLET ORAL at 08:00

## 2024-05-10 ASSESSMENT — PAIN - FUNCTIONAL ASSESSMENT
PAIN_FUNCTIONAL_ASSESSMENT: PREVENTS OR INTERFERES SOME ACTIVE ACTIVITIES AND ADLS

## 2024-05-10 ASSESSMENT — PAIN DESCRIPTION - PAIN TYPE
TYPE: SURGICAL PAIN

## 2024-05-10 ASSESSMENT — PAIN DESCRIPTION - DESCRIPTORS
DESCRIPTORS: DISCOMFORT
DESCRIPTORS: SORE;ACHING
DESCRIPTORS: BURNING;SORE
DESCRIPTORS: DISCOMFORT

## 2024-05-10 ASSESSMENT — PAIN DESCRIPTION - LOCATION
LOCATION: KNEE

## 2024-05-10 ASSESSMENT — PAIN SCALES - GENERAL
PAINLEVEL_OUTOF10: 6
PAINLEVEL_OUTOF10: 10
PAINLEVEL_OUTOF10: 5
PAINLEVEL_OUTOF10: 6
PAINLEVEL_OUTOF10: 6
PAINLEVEL_OUTOF10: 8

## 2024-05-10 ASSESSMENT — PAIN DESCRIPTION - ORIENTATION
ORIENTATION: LEFT

## 2024-05-10 ASSESSMENT — PAIN DESCRIPTION - FREQUENCY
FREQUENCY: CONTINUOUS

## 2024-05-10 ASSESSMENT — PAIN DESCRIPTION - ONSET
ONSET: ON-GOING
ONSET: ON-GOING

## 2024-05-10 NOTE — PROGRESS NOTES
Comprehensive Nutrition Assessment    Type and Reason for Visit:  Initial, Consult    Nutrition Recommendations/Plan:   Provide diet rx as ordered   Provide supplements as ordered   Monitor labs as they become available   Monitor skin integrity/weights     Malnutrition Assessment:  Malnutrition Status:  No malnutrition (05/09/24 1236)    Context:        Findings of the 6 clinical characteristics of malnutrition:  Energy Intake:     Weight Loss:        Body Fat Loss:        Muscle Mass Loss:       Fluid Accumulation:        Strength:       Nutrition Assessment:    Patient s/p left total Knee replacement on 05/08/2024, Required admission for intractable pain requiring IV narcotics.  Now comfortable on oral medications so we will discharge home with home PT. Is eating and drinking well. Supplements in place for lunch and dinner, is seen by this writer for consult for eval and treat. weight on 5/8 was 158#, 1/10/24 159#. weight is stable. Monitor po intakes, weigths, labs, skin integrity, and supplement acceptance.    Nutrition Related Findings:    LLE +1 non-pitting edema, active sounds, labs/meds reviewed. Wound Type: Surgical Incision (left knee)       Current Nutrition Intake & Therapies:    Average Meal Intake: %  Average Supplements Intake: 51-75%  ADULT DIET; Regular  ADULT ORAL NUTRITION SUPPLEMENT; Lunch, Dinner; Low Calorie/High Protein Oral Supplement    Anthropometric Measures:  Height: 152.4 cm (5')  Ideal Body Weight (IBW): 100 lbs (45 kg)    Admission Body Weight: 71.7 kg (158 lb)  Current Body Weight: 71.7 kg (158 lb), 158 % IBW. Weight Source: Bed Scale  Current BMI (kg/m2): 30.9        Weight Adjustment For: No Adjustment                 BMI Categories: Obese Class 1 (BMI 30.0-34.9)    Estimated Daily Nutrient Needs:  Energy Requirements Based On: Kcal/kg  Weight Used for Energy Requirements: Current  Energy (kcal/day): 3873-3569 kcals per day using 25-30 g/kg of actual body weight.  Weight 
Occupational Therapy  DATE: 2024    NAME: Latisha Prasad  MRN: [id number]   : 1960    Patient not seen this date for Occupational Therapy due to:      [] Cancel by RN or physician due to:    [] Hemodialysis    [] Critical Lab Value Level     [] Blood transfusion in progress    [] Acute or unstable cardiovascular status   _MAP < 55 or more than >115  _HR < 40 or > 130    [] Acute or unstable pulmonary status   -FiO2 > 60%   _RR < 5 or >40    _O2 sats < 85%    [] Strict Bedrest    [] Off Unit for surgery or procedure    [] Off Unit for testing       [] Pending imaging to R/O fracture    [] Refusal by Patient      [x] Other: Pt. In PACU at 1715. Will evaluate in AM.       [] OT being discontinued at this time. Patient independent. No further needs.     [] OT being discontinued at this time as the patient has been transferred to hospice care. No further needs.      Dalila Wang, OT   
Occupational Therapy  Facility/Department: [facility]  Rehabilitation Occupational Therapy Daily Treatment Note    Date: 5/10/24  Patient Name: Latisha Prasad       Room:   MRN: [id number]  Account: [id number]   : 1960  (64 y.o.) Gender: female                    Past Medical History:  has a past medical history of Acid reflux, Adult ADHD, Asthma, Awareness under anesthesia, Balance problem, Bipolar 1 disorder (HCC), Broken foot, Cancer (HCC), Cerebral artery occlusion with cerebral infarction (HCC), Chest pain of uncertain etiology, Depression, DVT of leg (deep venous thrombosis) ([facility]), Fracture, Fractured nose, Headache, Helicobacter pylori (H. pylori) infection, Hematuria, Hiatal hernia, History of blood transfusion, History of DVT (deep vein thrombosis), History of myasthenia gravis, Hx of blood clots, Hypertension, Internal hemorrhoids, Migraine, Narcolepsy, Numbness and tingling in both hands, Osteoporosis, Sleep apnea, Under care of team, and Under care of team.  Past Surgical History:   has a past surgical history that includes  section; Nose surgery; Knee arthroscopy (Left); shoulder surgery (Left); Wrist surgery; Upper gastrointestinal endoscopy (2013); Colonoscopy (2013); Upper gastrointestinal endoscopy (2016); Foot surgery (Left, ); Arm Surgery (Right); knee surgery (Right); vaginal dilatation; Ankle surgery (Left, 2018); pr office/outpt visit,procedure only (Left, 2018); Foot Tendon Surgery (Left, 2019); ligament repair (Left, 2019); Upper gastrointestinal endoscopy (N/A, 02/10/2020); Colonoscopy (N/A, 02/10/2020); Endoscopy, colon, diagnostic; Colonoscopy (N/A, 2020); esophageal motility study (N/A, 2020); esophageal motility study (09/10/2020); esophageal motility study (N/A, 2020); Nerve Block (Bilateral, 2021); Pain management procedure (Bilateral, 2021); Pain management procedure 
Occupational Therapy  Facility/Department: [facility]  Rehabilitation Occupational Therapy Daily Treatment Note    Date: 24  Patient Name: Latisha Prasad       Room:   MRN: [id number]  Account: [id number]   : 1960  (64 y.o.) Gender: female           Additional Pertinent Hx: Bipolar, ADHD, recent CVA with aphasia noted. Previous shoulder surgeries        Past Medical History:  has a past medical history of Acid reflux, Adult ADHD, Asthma, Awareness under anesthesia, Balance problem, Bipolar 1 disorder (HCC), Broken foot, Cancer (HCC), Cerebral artery occlusion with cerebral infarction (HCC), Chest pain of uncertain etiology, Depression, DVT of leg (deep venous thrombosis) ([facility]), Fracture, Fractured nose, Headache, Helicobacter pylori (H. pylori) infection, Hematuria, Hiatal hernia, History of blood transfusion, History of DVT (deep vein thrombosis), History of myasthenia gravis, Hx of blood clots, Hypertension, Internal hemorrhoids, Migraine, Narcolepsy, Numbness and tingling in both hands, Osteoporosis, Sleep apnea, Under care of team, and Under care of team.  Past Surgical History:   has a past surgical history that includes  section; Nose surgery; Knee arthroscopy (Left); shoulder surgery (Left); Wrist surgery; Upper gastrointestinal endoscopy (2013); Colonoscopy (2013); Upper gastrointestinal endoscopy (2016); Foot surgery (Left, 2015); Arm Surgery (Right); knee surgery (Right); vaginal dilatation; Ankle surgery (Left, 2018); pr office/outpt visit,procedure only (Left, 2018); Foot Tendon Surgery (Left, 2019); ligament repair (Left, 2019); Upper gastrointestinal endoscopy (N/A, 02/10/2020); Colonoscopy (N/A, 02/10/2020); Endoscopy, colon, diagnostic; Colonoscopy (N/A, 2020); esophageal motility study (N/A, 2020); esophageal motility study (09/10/2020); esophageal motility study (N/A, 2020); Nerve Block (Bilateral, 
Orthopedic Coordinator Note    Patient s/p left total Knee replacement on 05/08/2024 WITH DR. HERNANDEZ.    The following appointments are currently scheduled:    Post-op with surgeon 05/23/2024 AT 1300.    Physical Therapy [facility] WITH [id number], PT HAD PREOP P.T. WITH THEM AND WOULD LIKE TO CONTINUE. WRITER WILL FAX OVER NEEDED DEMOS.    PT HAS A FWW.    DVT Prophylaxis: 81MG ASA BID X 30 DAYS POSTOP.    PT CAME TO JOINT CLASS ON 04/25/2024.    PT IS A SDD.      Any questions please contact Lida Gallegos RN, MSN  [phone]      Electronically signed by: LIDA GALLEGOS RN on 5/8/2024 at 9:22 AM     
Patient discharged to [facility].   All belongings with patient including the polar care and walker.   Writer tried to call report to [facility], there was no answer. I left a VM for them to call us back.   
Physical Therapy        Physical Therapy Cancel Note      DATE: 2024    NAME: Latisha Prasad  MRN: [id number]   : 1960      Patient not seen this date for Physical Therapy due to:  Pt. In PACU at 1715. Will evaluate in AM.    Electronically signed by LIZA PITTMAN PT on 2024 at 5:16 PM     
Physical Therapy  Facility/Department: [facility]  Daily Treatment Note  NAME: Latisha Prasad  : 1960  MRN: [id number]    Date of Service: 5/10/2024    Discharge Recommendations:  Patient would benefit from continued therapy after discharge   PT Equipment Recommendations  Equipment Needed: No  Pt currently functioning below baseline.  Recommend daily inpatient skilled therapy at time of discharge to maximize long term outcomes and prevent re-admission. Please refer to AM-PAC score for current level of function.     Patient Diagnosis(es): The primary encounter diagnosis was Primary osteoarthritis of left knee. Diagnoses of Arthritis of left knee and Intractable neuropathic pain of knee, left were also pertinent to this visit.    Assessment   Assessment: Patient recent had pain meds prior to treatment. Difficulties keeping patient awake during supine HEP, performed long sitting in recliner, requiring constant VCs, tactiles cues and assistance for completion. Attempted standing with patient x 3 mins with several attempts to WB on to Lt LE with help of UB to assist off loading LT LE. Patient crying out in pain and unable to WB on surgical leg. Patient assisted stand to sit MOD-MAX A, then MOD A to scoot back in chair, legs up and given time to recover. MAX VCs for pursed lip breathing with no carryover. RN updated on level of assistance change from yesterday. Patient was on RA yesterday and now on 3-4 L NC.  Activity Tolerance: Patient tolerated treatment well;Treatment limited secondary to decreased cognition  Equipment Needed: No     Plan    Physical Therapy Plan  General Plan: 2 times a day 7 days a week  Current Treatment Recommendations: Strengthening;ROM;Balance training;Functional mobility training;Transfer training;Endurance training;Gait training;Stair training;Home exercise program;Safety education & training;Patient/Caregiver education & training;Therapeutic activities 
Post Operative Progress Note    5/10/2024 7:40 AM  Subjective:   Admit Date: 5/8/2024  PCP: Nicholas Conway DO  Date of Discharge: Today    Medications:   Scheduled Meds:   citalopram  40 mg Oral Daily    amphetamine-dextroamphetamine  30 mg Oral BID    aspirin  81 mg Oral Daily    buPROPion  300 mg Oral Daily    fluticasone  2 spray Each Nostril Daily    pantoprazole  40 mg Oral QAM    propranolol  80 mg Oral Daily    atorvastatin  80 mg Oral Nightly    cyanocobalamin  1,000 mcg Oral Daily    gabapentin  300 mg Oral TID    sodium chloride flush  5-40 mL IntraVENous 2 times per day    acetaminophen  650 mg Oral Q6H    enoxaparin  40 mg SubCUTAneous Daily     Continuous Infusions:   lactated ringers IV soln Stopped (05/09/24 0540)    sodium chloride       PRN Meds:sodium chloride flush, sodium chloride, morphine **OR** morphine, oxyCODONE **OR** oxyCODONE, ondansetron    Diet:   Diet: ADULT DIET; Regular  ADULT ORAL NUTRITION SUPPLEMENT; Lunch, Dinner; Low Calorie/High Protein Oral Supplement    Subjective:   Systemic or Specific Complaints:Pain Control    Objective:     Patient Vitals for the past 24 hrs:   BP Temp Temp src Pulse Resp SpO2 Height   05/10/24 0415 -- -- -- -- 18 -- --   05/10/24 0414 132/85 99 °F (37.2 °C) -- 80 18 95 % --   05/10/24 0336 -- -- -- -- 17 -- --   05/10/24 0115 -- -- -- -- 16 -- --   05/10/24 0009 -- -- -- 90 -- 92 % --   05/10/24 0008 -- -- -- 88 -- (!) 89 % --   05/10/24 0001 114/79 99.5 °F (37.5 °C) -- 89 18 (!) 77 % --   05/09/24 2140 -- -- -- -- 18 -- --   05/09/24 2019 125/82 99.7 °F (37.6 °C) -- 81 18 90 % --   05/09/24 1550 108/68 97.7 °F (36.5 °C) Oral 76 18 93 % --   05/09/24 1230 -- -- -- -- -- -- 1.524 m (5')   05/09/24 1010 103/73 -- -- -- -- -- --     I/O last 3 completed shifts:  In: 1766.8 [P.O.:300; I.V.:1391.7; IV Piggyback:75.1]  Out: 550 [Urine:550]  No intake/output data recorded.      General: alert, appears stated age, cooperative, and moderate distress 
Post Operative Progress Note    5/9/2024 9:50 AM  Subjective:   Admit Date: 5/8/2024  PCP: Nicholas Conway DO  Date of Discharge: 5-9-2024    Medications:   Scheduled Meds:   citalopram  40 mg Oral Daily    amphetamine-dextroamphetamine  30 mg Oral BID    aspirin  81 mg Oral Daily    buPROPion  300 mg Oral Daily    fluticasone  2 spray Each Nostril Daily    pantoprazole  40 mg Oral QAM    propranolol  80 mg Oral Daily    atorvastatin  80 mg Oral Nightly    cyanocobalamin  1,000 mcg Oral Daily    gabapentin  300 mg Oral TID    sodium chloride flush  5-40 mL IntraVENous 2 times per day    acetaminophen  650 mg Oral Q6H    enoxaparin  40 mg SubCUTAneous Daily     Continuous Infusions:   lactated ringers IV soln Stopped (05/09/24 0540)    sodium chloride       PRN Meds:sodium chloride flush, sodium chloride, morphine **OR** morphine, oxyCODONE **OR** oxyCODONE, ondansetron    Diet:   Diet: ADULT DIET; Regular  ADULT ORAL NUTRITION SUPPLEMENT; AM Snack; Low Calorie/High Protein Oral Supplement    Subjective:   Systemic or Specific Complaints:No Complaints    Objective:     Patient Vitals for the past 24 hrs:   BP Temp Temp src Pulse Resp SpO2 Height Weight   05/09/24 0737 99/73 98.2 °F (36.8 °C) Oral 57 16 94 % -- --   05/09/24 0405 (!) 101/57 98.4 °F (36.9 °C) Oral 58 16 94 % -- --   05/09/24 0013 107/60 98.2 °F (36.8 °C) Oral 63 16 93 % -- --   05/08/24 1932 101/77 98.2 °F (36.8 °C) Oral 60 16 95 % -- --   05/08/24 1911 -- -- -- -- -- 94 % -- --   05/08/24 1759 (!) 106/51 98.2 °F (36.8 °C) Oral 53 16 94 % -- --   05/08/24 1745 (!) 108/59 -- -- 54 13 95 % -- --   05/08/24 1730 101/66 -- -- 55 15 95 % -- --   05/08/24 1715 100/61 97.9 °F (36.6 °C) -- 56 12 94 % -- --   05/08/24 1700 105/79 -- -- 58 18 96 % -- --   05/08/24 1645 100/75 -- -- 57 15 95 % -- --   05/08/24 1630 112/67 -- -- 57 16 95 % -- --   05/08/24 1615 112/71 -- -- 60 16 94 % -- --   05/08/24 1600 106/67 -- -- 61 14 93 % -- --   05/08/24 1550 -- -- 
Pt admitted to room 2025. Oriented to room, call light and bed mechanics. Side rails up x2. Call light within reach. Orders reviewed.     
Pt has been approved for [facility] with [id number] postop and updated them that pt may go to SNF and we will inform at discharge. Pt had been seen by NEVIN preop for eval and strengthening.   
Writer faxed over facesheet, order for consult for [facility] for P.T., and current H&P note to [address].  
until writer has RW in front of patient.     Transfer Training  Transfer Training: Yes  Overall Level of Assistance: Contact-guard assistance  Interventions: Safety awareness training;Tactile cues;Verbal cues  Sit to Stand: Contact-guard assistance  Stand to Sit: Contact-guard assistance   Patient requires VCs and tactile cues to push up from EOB or arms of the chair for proper sit to stand transfer, instead of pulling of RW, to promote safety and correct transfer technique.       Gait  Gait Training: Yes  Overall Level of Assistance: Contact-guard assistance  Assistive Device: Walker, rolling;Gait belt  Distance: 20' x 2  Interventions: Safety awareness training;Tactile cues;Verbal cues  Gait Abnormalities: Step to gait;Decreased step clearance  Comment: VCs for push RW vs picking it up, assistance with RW during turns, sequencing pivot transfer to sit down in chair, noted Lt LE buckling occasionally during WBing    While writer speaking with ortho navigator Yanet at the computer, Patient abruptly stood up from chair, hoping on Rt LE, almost tripped on the polar care tubing, and falling out to bed. Discussed why that was not safe, to call out for assistance to get up, she needs to use the RW at all time when standing/gait, and slow down. Patient not receptive to education with no retention. Patient was able to scoot self up into bed with VCs and bed alarm was set. RN, Joyce aware of the poor decision making and poor safety.     Wheelchair Management  Wheelchair Management: No    Neuromuscular Education  Neuromuscular Education: Yes  Treatment: Gait ;Sitting;Standing  Neuromuscular Comments: VCs req for proper breathing jax (pursed lip breathing) during functional mobility. Tactile and VCs req for postural control during sit<>stands & amb to promote abdominal and erector spinae mm facilitation for increased stability and balance, decreasing kyphosis of the spine. Pt req VCs to correct for forward WS with squatting 
limits (RLE)  Strength: Generally decreased, functional (RLE)  Sensation: Impaired (reports that both LEs are numb)     AROM LLE (degrees)  LLE General AROM: 40 degrees hip/knee flexion supine via heel slide; ankle/hip WNL  Strength LLE  Comment: DF 4+/5; partial LAQ           Bed mobility  Supine to Sit: Minimal assistance  Sit to Supine: Minimal assistance  Transfers  Sit to Stand: Moderate Assistance  Stand to Sit: Moderate Assistance  Ambulation  Surface: Level tile  Device: Rolling Walker  Assistance: Moderate assistance  Quality of Gait: Unable to properly test wt. shifts and steps in place prior to gait as pt. not following directions. Pt. able to do step-to gait leading with surgical LE with RW. Pt. not following other directions, so feel this was intuitive for pt. to amb. this way. Pt. owns correct size RW for her height. Moves impulsively with questionable L knee stability.  Distance: 15 ft.; 5ft  Comments: up to chair for brief period but pt. too impulsive to leave in chair so assisted back to bed with bed alarm        Exercise Treatment: Instruction for heel slides and AP while in bed. Pt. did well with demonstration. Continue to educate.  Static Sitting Balance Exercises: CGA; pt. moves impulsively  Dynamic Sitting Balance Exercises: CGA; pt. moves impulsively  Static Standing Balance Exercises: min-mod A with RW; pt. moves impulsively and not following pregait assessment instructions to check stability of knee due to [employer]        OutComes Score                                                  AM-PAC - Mobility    AM-PAC Basic Mobility - Inpatient   How much help is needed turning from your back to your side while in a flat bed without using bedrails?: A Little  How much help is needed moving from lying on your back to sitting on the side of a flat bed without using bedrails?: A Little  How much help is needed moving to and from a bed to a chair?: A Lot  How much help is needed standing up from a 
A Lot  How much help is needed for bathing (which includes washing, rinsing, drying)?: A Lot  How much help is needed for toileting (which includes using toilet, bedpan, or urinal)?: A Lot  How much help is needed for putting on and taking off regular upper body clothing?: A Lot  How much help is needed for taking care of personal grooming?: A Little  How much help for eating meals?: None  AM-PAC Inpatient Daily Activity Raw Score: 15  AM-PAC Inpatient ADL T-Scale Score : 34.69  ADL Inpatient CMS 0-100% Score: 56.46  ADL Inpatient CMS G-Code Modifier : CK           Goals  Short Term Goals  Time Frame for Short Term Goals: by discharge, pt will  Short Term Goal 1: pt/caregiver demo and verb good understanding of ed provided on fall prevention techs, EC/WS techs, equip needs, TKA precuations, ADL techs, positioning techs, and cog compensatory techs  Short Term Goal 2: demo CGA with ADL transfers and functional mob in room with RW and approp DME with min cues for safety for ADL completion  Short Term Goal 3: demo CGA with toileting routine with DME as needed  Short Term Goal 4: demo I with UB ADLs and min A with LB ADLs with DME as needed with min cues for intitiation/follow through  Patient Goals   Patient goals : to go home       Therapy Time   Individual Concurrent Group Co-treatment   Time In 0950         Time Out 1048         Minutes 58          Treatment min: 45         Cande Jackson, OT

## 2024-05-10 NOTE — CARE COORDINATION
Social Work-[facility] will admit today. [facility] will transport PAS completed Orders faxed. Nurse to call report to [phone]. Patient is agreeable with dc plans. Clarisse

## 2024-05-10 NOTE — PLAN OF CARE
Problem: Chronic Conditions and Co-morbidities  Goal: Patient's chronic conditions and co-morbidity symptoms are monitored and maintained or improved  5/10/2024 1108 by Guero Darnell RN  Outcome: Progressing     Problem: Discharge Planning  Goal: Discharge to home or other facility with appropriate resources  5/10/2024 1108 by Guero Darnell RN  Outcome: Progressing     Problem: Pain  Goal: Verbalizes/displays adequate comfort level or baseline comfort level  5/10/2024 1108 by Guero Darnell RN  Outcome: Progressing     Problem: Safety - Adult  Goal: Free from fall injury  5/10/2024 1108 by Guero Darnell RN  Outcome: Progressing     Problem: ABCDS Injury Assessment  Goal: Absence of physical injury  5/10/2024 1108 by Guero Darnell RN  Outcome: Progressing     Problem: Nutrition Deficit:  Goal: Optimize nutritional status  5/10/2024 1108 by Guero Darnell RN  Outcome: Progressing     Problem: Neurosensory - Adult  Goal: Achieves stable or improved neurological status  Outcome: Progressing     Problem: Neurosensory - Adult  Goal: Achieves maximal functionality and self care  Outcome: Progressing     Problem: Skin/Tissue Integrity - Adult  Goal: Skin integrity remains intact  Outcome: Progressing     Problem: Skin/Tissue Integrity - Adult  Goal: Incisions, wounds, or drain sites healing without S/S of infection  Outcome: Progressing     Problem: Musculoskeletal - Adult  Goal: Return mobility to safest level of function  Outcome: Progressing     Problem: Musculoskeletal - Adult  Goal: Maintain proper alignment of affected body part  Outcome: Progressing     Problem: Musculoskeletal - Adult  Goal: Return ADL status to a safe level of function  Outcome: Progressing     Problem: Infection - Adult  Goal: Absence of infection at discharge  Outcome: Progressing

## 2024-05-10 NOTE — PLAN OF CARE
Second day post-op of left total knee arthroplasty. Dressing - CDI. Patient up to bedside commode with standby assist. PO pain medications. Patient has been accepted at [facility] and los just awaiting percert. Discharge today.       Problem: Chronic Conditions and Co-morbidities  Goal: Patient's chronic conditions and co-morbidity symptoms are monitored and maintained or improved  Outcome: Progressing     Problem: Discharge Planning  Goal: Discharge to home or other facility with appropriate resources  5/10/2024 0342 by Salma Huerta RN  Outcome: Progressing     Problem: Pain  Goal: Verbalizes/displays adequate comfort level or baseline comfort level  5/10/2024 0342 by Salma Huerta RN  Outcome: Progressing  Flowsheets (Taken 5/10/2024 0342)  Verbalizes/displays adequate comfort level or baseline comfort level:   Encourage patient to monitor pain and request assistance   Administer analgesics based on type and severity of pain and evaluate response   Consider cultural and social influences on pain and pain management   Assess pain using appropriate pain scale   Implement non-pharmacological measures as appropriate and evaluate response     Problem: Safety - Adult  Goal: Free from fall injury  Outcome: Progressing  Flowsheets (Taken 5/10/2024 0342)  Free From Fall Injury:   Instruct family/caregiver on patient safety   Based on caregiver fall risk screen, instruct family/caregiver to ask for assistance with transferring infant if caregiver noted to have fall risk factors     Problem: ABCDS Injury Assessment  Goal: Absence of physical injury  Outcome: Progressing     Problem: Nutrition Deficit:  Goal: Optimize nutritional status  Outcome: Progressing

## 2024-05-13 ENCOUNTER — HOSPITAL ENCOUNTER (OUTPATIENT)
Age: 64
Setting detail: SPECIMEN
Discharge: HOME | End: 2024-05-13

## 2024-05-13 LAB
ALBUMIN SERPL-MCNC: 3.3 G/DL (ref 3.5–5.2)
ALP SERPL-CCNC: 175 U/L (ref 35–104)
ALT SERPL-CCNC: 18 U/L (ref 5–33)
ANION GAP SERPL CALCULATED.3IONS-SCNC: 9 MMOL/L (ref 9–17)
AST SERPL-CCNC: 44 U/L
BILIRUB SERPL-MCNC: 0.8 MG/DL (ref 0.3–1.2)
BUN SERPL-MCNC: 12 MG/DL (ref 8–23)
BUN/CREAT SERPL: 12 (ref 9–20)
CALCIUM SERPL-MCNC: 8.2 MG/DL (ref 8.6–10.4)
CHLORIDE SERPL-SCNC: 102 MMOL/L (ref 98–107)
CHOLEST SERPL-MCNC: 94 MG/DL (ref 0–199)
CHOLESTEROL/HDL RATIO: 2
CO2 SERPL-SCNC: 27 MMOL/L (ref 20–31)
CREAT SERPL-MCNC: 1 MG/DL (ref 0.5–0.9)
ERYTHROCYTE [DISTWIDTH] IN BLOOD BY AUTOMATED COUNT: 16 % (ref 11.8–14.4)
GFR, ESTIMATED: 63 ML/MIN/1.73M2
GLUCOSE SERPL-MCNC: 129 MG/DL (ref 70–99)
HCT VFR BLD AUTO: 29.3 % (ref 36.3–47.1)
HDLC SERPL-MCNC: 38 MG/DL
HGB BLD-MCNC: 9 G/DL (ref 11.9–15.1)
LDLC SERPL CALC-MCNC: 41 MG/DL (ref 0–100)
MCH RBC QN AUTO: 27.9 PG (ref 25.2–33.5)
MCHC RBC AUTO-ENTMCNC: 30.7 G/DL (ref 28.4–34.8)
MCV RBC AUTO: 90.7 FL (ref 82.6–102.9)
NRBC BLD-RTO: 0 PER 100 WBC
PLATELET # BLD AUTO: 234 K/UL (ref 138–453)
PMV BLD AUTO: 11 FL (ref 8.1–13.5)
POTASSIUM SERPL-SCNC: 3.6 MMOL/L (ref 3.7–5.3)
PROT SERPL-MCNC: 6 G/DL (ref 6.4–8.3)
RBC # BLD AUTO: 3.23 M/UL (ref 3.95–5.11)
SODIUM SERPL-SCNC: 138 MMOL/L (ref 135–144)
TRIGL SERPL-MCNC: 77 MG/DL
VLDLC SERPL CALC-MCNC: 15 MG/DL
WBC OTHER # BLD: 6 K/UL (ref 3.5–11.3)

## 2024-05-13 PROCEDURE — 80053 COMPREHEN METABOLIC PANEL: CPT

## 2024-05-13 PROCEDURE — 85027 COMPLETE CBC AUTOMATED: CPT

## 2024-05-13 PROCEDURE — 80061 LIPID PANEL: CPT

## 2024-05-13 PROCEDURE — 36415 COLL VENOUS BLD VENIPUNCTURE: CPT

## 2024-05-13 PROCEDURE — P9603 ONE-WAY ALLOW PRORATED MILES: HCPCS

## 2024-05-17 ENCOUNTER — HOSPITAL ENCOUNTER (OUTPATIENT)
Age: 64
Setting detail: SPECIMEN
Discharge: HOME | End: 2024-05-17

## 2024-05-20 ENCOUNTER — HOSPITAL ENCOUNTER (OUTPATIENT)
Age: 64
Setting detail: SPECIMEN
Discharge: HOME | End: 2024-05-20

## 2024-05-24 ENCOUNTER — HOSPITAL ENCOUNTER (OUTPATIENT)
Age: 64
Setting detail: SPECIMEN
Discharge: HOME | End: 2024-05-24

## 2024-05-28 ENCOUNTER — HOSPITAL ENCOUNTER (OUTPATIENT)
Age: 64
Setting detail: SPECIMEN
Discharge: HOME | End: 2024-05-28

## 2024-06-14 ENCOUNTER — HOSPITAL ENCOUNTER (OUTPATIENT)
Dept: GENERAL RADIOLOGY | Age: 64
End: 2024-06-14
Payer: MEDICARE

## 2024-06-14 ENCOUNTER — HOSPITAL ENCOUNTER (OUTPATIENT)
Age: 64
End: 2024-06-14
Payer: MEDICARE

## 2024-06-14 DIAGNOSIS — M17.12 PRIMARY OSTEOARTHRITIS OF LEFT KNEE: ICD-10-CM

## 2024-06-14 PROCEDURE — 73562 X-RAY EXAM OF KNEE 3: CPT

## 2024-06-18 DIAGNOSIS — I10 ESSENTIAL HYPERTENSION: ICD-10-CM

## 2024-06-18 RX ORDER — PROPRANOLOL HYDROCHLORIDE 80 MG/1
80 CAPSULE, EXTENDED RELEASE ORAL DAILY
Qty: 28 CAPSULE | Refills: 2 | Status: SHIPPED | OUTPATIENT
Start: 2024-06-18

## 2024-06-18 NOTE — TELEPHONE ENCOUNTER
Latisha Prasad is calling to request a refill on the following medication(s):    Medication Request:  Requested Prescriptions     Pending Prescriptions Disp Refills    propranolol (INDERAL LA) 80 MG extended release capsule [Pharmacy Med Name: Propranolol HCl ER 80MG CP24] 28 capsule      Sig: TAKE 1 CAPSULE BY MOUTH DAILY       Last Visit Date (If Applicable):  4/3/2024    Next Visit Date:    7/10/2024

## 2024-07-10 ENCOUNTER — OFFICE VISIT (OUTPATIENT)
Dept: FAMILY MEDICINE CLINIC | Age: 64
End: 2024-07-10
Payer: MEDICARE

## 2024-07-10 VITALS
WEIGHT: 153 LBS | HEART RATE: 59 BPM | OXYGEN SATURATION: 98 % | BODY MASS INDEX: 29.88 KG/M2 | DIASTOLIC BLOOD PRESSURE: 62 MMHG | SYSTOLIC BLOOD PRESSURE: 108 MMHG

## 2024-07-10 DIAGNOSIS — I10 ESSENTIAL HYPERTENSION: ICD-10-CM

## 2024-07-10 DIAGNOSIS — Z00.00 MEDICARE ANNUAL WELLNESS VISIT, SUBSEQUENT: Primary | ICD-10-CM

## 2024-07-10 PROCEDURE — 1123F ACP DISCUSS/DSCN MKR DOCD: CPT | Performed by: FAMILY MEDICINE

## 2024-07-10 PROCEDURE — 3017F COLORECTAL CA SCREEN DOC REV: CPT | Performed by: FAMILY MEDICINE

## 2024-07-10 PROCEDURE — 3074F SYST BP LT 130 MM HG: CPT | Performed by: FAMILY MEDICINE

## 2024-07-10 PROCEDURE — 3078F DIAST BP <80 MM HG: CPT | Performed by: FAMILY MEDICINE

## 2024-07-10 PROCEDURE — G0439 PPPS, SUBSEQ VISIT: HCPCS | Performed by: FAMILY MEDICINE

## 2024-07-10 RX ORDER — OXYCODONE HYDROCHLORIDE AND ACETAMINOPHEN 5; 325 MG/1; MG/1
TABLET ORAL
COMMUNITY
Start: 2024-05-17

## 2024-07-10 ASSESSMENT — PATIENT HEALTH QUESTIONNAIRE - PHQ9
2. FEELING DOWN, DEPRESSED OR HOPELESS: SEVERAL DAYS
SUM OF ALL RESPONSES TO PHQ QUESTIONS 1-9: 2
SUM OF ALL RESPONSES TO PHQ QUESTIONS 1-9: 2
1. LITTLE INTEREST OR PLEASURE IN DOING THINGS: SEVERAL DAYS
SUM OF ALL RESPONSES TO PHQ QUESTIONS 1-9: 2
SUM OF ALL RESPONSES TO PHQ QUESTIONS 1-9: 2
SUM OF ALL RESPONSES TO PHQ9 QUESTIONS 1 & 2: 2

## 2024-07-10 ASSESSMENT — LIFESTYLE VARIABLES
HOW MANY STANDARD DRINKS CONTAINING ALCOHOL DO YOU HAVE ON A TYPICAL DAY: PATIENT DOES NOT DRINK
HOW OFTEN DO YOU HAVE A DRINK CONTAINING ALCOHOL: NEVER

## 2024-07-10 NOTE — PATIENT INSTRUCTIONS
professional. [employer] disclaims any warranty or liability for your use of this information.      Personalized Preventive Plan for Latisha Prasad - 7/10/2024  Medicare offers a range of preventive health benefits. Some of the tests and screenings are paid in full while other may be subject to a deductible, co-insurance, and/or copay.    Some of these benefits include a comprehensive review of your medical history including lifestyle, illnesses that may run in your family, and various assessments and screenings as appropriate.    After reviewing your medical record and screening and assessments performed today your provider may have ordered immunizations, labs, imaging, and/or referrals for you.  A list of these orders (if applicable) as well as your Preventive Care list are included within your After Visit Summary for your review.    Other Preventive Recommendations:    A preventive eye exam performed by an eye specialist is recommended every 1-2 years to screen for glaucoma; cataracts, macular degeneration, and other eye disorders.  A preventive dental visit is recommended every 6 months.  Try to get at least 150 minutes of exercise per week or 10,000 steps per day on a pedometer .  Order or download the FREE \"Exercise & Physical Activity: Your Everyday Guide\" from [employer] [employer]. Call 1-588.213.8886 or search [employer] [place] [employer] online.  You need 5407-9642 mg of calcium and 5914-2182 IU of vitamin D per day. It is possible to meet your calcium requirement with diet alone, but a vitamin D supplement is usually necessary to meet this goal.  When exposed to the sun, use a sunscreen that protects against both UVA and UVB radiation with an SPF of 30 or greater. Reapply every 2 to 3 hours or after sweating, drying off with a towel, or swimming.  Always wear a seat belt when traveling in a car. Always wear a helmet when riding a bicycle or motorcycle.

## 2024-07-10 NOTE — PROGRESS NOTES
Medicare Annual Wellness Visit    Latisha Prasad is here for Medicare AWV    Assessment & Plan   Medicare annual wellness visit, subsequent  Recommendations for Preventive Services Due: see orders and patient instructions/AVS.  Recommended screening schedule for the next 5-10 years is provided to the patient in written form: see Patient Instructions/AVS.     Return in 3 months (on 10/10/2024).     Subjective       Patient's complete Health Risk Assessment and screening values have been reviewed and are found in Flowsheets. The following problems were reviewed today and where indicated follow up appointments were made and/or referrals ordered.    Positive Risk Factor Screenings with Interventions:    Fall Risk:  Do you feel unsteady or are you worried about falling? : (!) yes  2 or more falls in past year?: no  Fall with injury in past year?: no     Interventions:    Patient comments: most likely because of her knee - just had left one replaced 2 months ago  Reviewed medications, home hazards, visual acuity, and co-morbidities that can increase risk for falls         Controlled Medication Review:    Today's Pain Level: No data recorded   Opioid Risk: (Low risk score <55) Opioid risk score: 47    Patient is low risk for opioid use disorder or overdose.    Last PDMP Tony as Reviewed:  Review User Review Instant Review Result   KAITLYN BERNSTEIN 2/2/2024  9:09 PM     Reviewed PDMP [1]     Last Controlled Substance Monitoring Documentation      Flowsheet Row Office Visit from 4/28/2022 in [facility]   Periodic Controlled Substance Monitoring Possible medication side effects, risk of tolerance/dependence & alternative treatments discussed., No signs of potential drug abuse or diversion identified., Obtaining appropriate analgesic effect of treatment. filed at 04/28/2022 0815                Poor Eating Habits/Diet:  Do you eat balanced/healthy meals regularly?: (!) No  Interventions:  Patient comments:

## 2024-07-17 ENCOUNTER — HOSPITAL ENCOUNTER (OUTPATIENT)
Age: 64
Discharge: HOME | End: 2024-07-19
Payer: MEDICARE

## 2024-07-17 ENCOUNTER — HOSPITAL ENCOUNTER (OUTPATIENT)
Dept: GENERAL RADIOLOGY | Age: 64
Discharge: HOME | End: 2024-07-19
Payer: MEDICARE

## 2024-07-17 DIAGNOSIS — M25.561 RIGHT KNEE PAIN, UNSPECIFIED CHRONICITY: ICD-10-CM

## 2024-07-17 PROCEDURE — 73562 X-RAY EXAM OF KNEE 3: CPT

## 2024-07-22 NOTE — TELEPHONE ENCOUNTER
Please send for Dr. Leigh    Pharmacy requesting refill of Vitamin B12 1000 mcg.      Medication active on med list yes      Date of last Rx: 4/11/2024 with 3 refills          verified by SHAWN, OLIVIER      Date of last appointment 4/11/2024    Next Visit Date:  Visit date not found        Most Vitamin B12 lab: 1/3/2024  Level:  275     Patient stated that he fell at work last Saturday, came to er was prescribed Alleve but he is still in pain.  Reports pain of 5 with sitting only on a scale of 0 to .  Right thigh

## 2024-07-23 RX ORDER — LANOLIN ALCOHOL/MO/W.PET/CERES
1000 CREAM (GRAM) TOPICAL DAILY
Qty: 30 TABLET | Refills: 5 | Status: SHIPPED | OUTPATIENT
Start: 2024-07-23

## 2024-08-07 DIAGNOSIS — K21.9 GASTROESOPHAGEAL REFLUX DISEASE, UNSPECIFIED WHETHER ESOPHAGITIS PRESENT: ICD-10-CM

## 2024-08-07 RX ORDER — PANTOPRAZOLE SODIUM 40 MG/1
40 TABLET, DELAYED RELEASE ORAL EVERY MORNING
Qty: 18 TABLET | Refills: 5 | Status: SHIPPED | OUTPATIENT
Start: 2024-08-07

## 2024-08-07 NOTE — TELEPHONE ENCOUNTER
Latisha Prasad is calling to request a refill on the following medication(s):    Last Visit Date (If Applicable):  7/10/2024    Next Visit Date:    10/14/2024    Medication Request:  Requested Prescriptions     Pending Prescriptions Disp Refills    pantoprazole (PROTONIX) 40 MG tablet [Pharmacy Med Name: Pantoprazole Sodium 40MG TBEC] 18 tablet      Sig: TAKE 1 TABLET BY MOUTH EVERY MORNING

## 2024-08-20 ENCOUNTER — CARE COORDINATION (OUTPATIENT)
Dept: CARE COORDINATION | Age: 64
End: 2024-08-20

## 2024-08-20 NOTE — CARE COORDINATION
attempted to follow up with Latisha.  NO answer.    has not spoken to Latisha in a few months.  Will sign off.  Left message if still interested in  to contact  at [phone].    Plan:    will sign off.

## 2024-09-05 ENCOUNTER — HOSPITAL ENCOUNTER (OUTPATIENT)
Dept: PREADMISSION TESTING | Age: 64
Discharge: HOME | End: 2024-09-09
Payer: MEDICARE

## 2024-09-05 VITALS
WEIGHT: 150 LBS | BODY MASS INDEX: 29.45 KG/M2 | TEMPERATURE: 97.5 F | SYSTOLIC BLOOD PRESSURE: 95 MMHG | HEART RATE: 60 BPM | DIASTOLIC BLOOD PRESSURE: 69 MMHG | HEIGHT: 60 IN | RESPIRATION RATE: 16 BRPM | OXYGEN SATURATION: 96 %

## 2024-09-05 LAB
ABO + RH BLD: NORMAL
ALBUMIN SERPL-MCNC: 3.9 G/DL (ref 3.5–5.2)
ALP SERPL-CCNC: 166 U/L (ref 35–104)
ALT SERPL-CCNC: 14 U/L (ref 5–33)
ANION GAP SERPL CALCULATED.3IONS-SCNC: 9 MMOL/L (ref 9–17)
ARM BAND NUMBER: NORMAL
AST SERPL-CCNC: 25 U/L
BASOPHILS # BLD: 0.04 K/UL (ref 0–0.2)
BASOPHILS NFR BLD: 1 % (ref 0–2)
BILIRUB SERPL-MCNC: 0.3 MG/DL (ref 0.3–1.2)
BILIRUB UR QL STRIP: ABNORMAL
BLOOD BANK SAMPLE EXPIRATION: NORMAL
BLOOD GROUP ANTIBODIES SERPL: NEGATIVE
BUN SERPL-MCNC: 16 MG/DL (ref 8–23)
BUN/CREAT SERPL: 12 (ref 9–20)
CALCIUM SERPL-MCNC: 9.5 MG/DL (ref 8.6–10.4)
CASTS #/AREA URNS LPF: ABNORMAL /LPF
CASTS #/AREA URNS LPF: ABNORMAL /LPF
CHLORIDE SERPL-SCNC: 105 MMOL/L (ref 98–107)
CLARITY UR: CLEAR
CO2 SERPL-SCNC: 25 MMOL/L (ref 20–31)
COLOR UR: YELLOW
CREAT SERPL-MCNC: 1.3 MG/DL (ref 0.5–0.9)
CRYSTALS URNS MICRO: ABNORMAL /HPF
EOSINOPHIL # BLD: 0.11 K/UL (ref 0–0.44)
EOSINOPHILS RELATIVE PERCENT: 2 % (ref 1–4)
EPI CELLS #/AREA URNS HPF: ABNORMAL /HPF (ref 0–5)
ERYTHROCYTE [DISTWIDTH] IN BLOOD BY AUTOMATED COUNT: 16.7 % (ref 11.8–14.4)
EST. AVERAGE GLUCOSE BLD GHB EST-MCNC: 140 MG/DL
GFR, ESTIMATED: 46 ML/MIN/1.73M2
GLUCOSE SERPL-MCNC: 85 MG/DL (ref 70–99)
GLUCOSE UR STRIP-MCNC: NEGATIVE MG/DL
HBA1C MFR BLD: 6.5 % (ref 4–6)
HCT VFR BLD AUTO: 39.3 % (ref 36.3–47.1)
HGB BLD-MCNC: 11.9 G/DL (ref 11.9–15.1)
HGB UR QL STRIP.AUTO: NEGATIVE
IMM GRANULOCYTES # BLD AUTO: 0.01 K/UL (ref 0–0.3)
IMM GRANULOCYTES NFR BLD: 0 %
INR PPP: 1
KETONES UR STRIP-MCNC: ABNORMAL MG/DL
LEUKOCYTE ESTERASE UR QL STRIP: NEGATIVE
LYMPHOCYTES NFR BLD: 1.41 K/UL (ref 1.1–3.7)
LYMPHOCYTES RELATIVE PERCENT: 28 % (ref 24–43)
MCH RBC QN AUTO: 28.1 PG (ref 25.2–33.5)
MCHC RBC AUTO-ENTMCNC: 30.3 G/DL (ref 28.4–34.8)
MCV RBC AUTO: 92.9 FL (ref 82.6–102.9)
MONOCYTES NFR BLD: 0.54 K/UL (ref 0.1–1.2)
MONOCYTES NFR BLD: 11 % (ref 3–12)
MUCOUS THREADS URNS QL MICRO: ABNORMAL
NEUTROPHILS NFR BLD: 58 % (ref 36–65)
NEUTS SEG NFR BLD: 3.01 K/UL (ref 1.5–8.1)
NITRITE UR QL STRIP: NEGATIVE
NRBC BLD-RTO: 0 PER 100 WBC
PARTIAL THROMBOPLASTIN TIME: 25.9 SEC (ref 23.9–33.8)
PH UR STRIP: 5.5 [PH] (ref 5–8)
PLATELET # BLD AUTO: 243 K/UL (ref 138–453)
PMV BLD AUTO: 10.9 FL (ref 8.1–13.5)
POTASSIUM SERPL-SCNC: 3.7 MMOL/L (ref 3.7–5.3)
PROT SERPL-MCNC: 6.7 G/DL (ref 6.4–8.3)
PROT UR STRIP-MCNC: ABNORMAL MG/DL
PROTHROMBIN TIME: 13 SEC (ref 11.5–14.2)
RBC # BLD AUTO: 4.23 M/UL (ref 3.95–5.11)
RBC # BLD: ABNORMAL 10*6/UL
RBC #/AREA URNS HPF: ABNORMAL /HPF (ref 0–2)
SODIUM SERPL-SCNC: 139 MMOL/L (ref 135–144)
SP GR UR STRIP: 1.05 (ref 1–1.03)
UROBILINOGEN UR STRIP-ACNC: NORMAL EU/DL (ref 0–1)
WBC #/AREA URNS HPF: ABNORMAL /HPF (ref 0–5)
WBC OTHER # BLD: 5.1 K/UL (ref 3.5–11.3)

## 2024-09-05 PROCEDURE — 87641 MR-STAPH DNA AMP PROBE: CPT

## 2024-09-05 PROCEDURE — 36415 COLL VENOUS BLD VENIPUNCTURE: CPT

## 2024-09-05 PROCEDURE — 81001 URINALYSIS AUTO W/SCOPE: CPT

## 2024-09-05 PROCEDURE — 80053 COMPREHEN METABOLIC PANEL: CPT

## 2024-09-05 PROCEDURE — 85025 COMPLETE CBC W/AUTO DIFF WBC: CPT

## 2024-09-05 PROCEDURE — 85730 THROMBOPLASTIN TIME PARTIAL: CPT

## 2024-09-05 PROCEDURE — 86901 BLOOD TYPING SEROLOGIC RH(D): CPT

## 2024-09-05 PROCEDURE — 86850 RBC ANTIBODY SCREEN: CPT

## 2024-09-05 PROCEDURE — 85610 PROTHROMBIN TIME: CPT

## 2024-09-05 PROCEDURE — 83036 HEMOGLOBIN GLYCOSYLATED A1C: CPT

## 2024-09-05 PROCEDURE — 82306 VITAMIN D 25 HYDROXY: CPT

## 2024-09-05 PROCEDURE — 86900 BLOOD TYPING SEROLOGIC ABO: CPT

## 2024-09-05 RX ORDER — ACETAMINOPHEN 500 MG
1000 TABLET ORAL ONCE
OUTPATIENT
Start: 2024-09-19

## 2024-09-05 RX ORDER — GABAPENTIN 300 MG/1
300 CAPSULE ORAL ONCE
OUTPATIENT
Start: 2024-09-19

## 2024-09-05 ASSESSMENT — PAIN SCALES - GENERAL: PAINLEVEL_OUTOF10: 4

## 2024-09-05 ASSESSMENT — PAIN DESCRIPTION - LOCATION: LOCATION: KNEE

## 2024-09-05 ASSESSMENT — PAIN DESCRIPTION - ORIENTATION: ORIENTATION: RIGHT

## 2024-09-05 NOTE — H&P
History and Physical Service   [facility]    HISTORY AND PHYSICAL EXAMINATION            Date of Evaluation: 9/6/2024  Patient name:  Latisha Prasad  MRN:   [id number]  YOB: 1960  PCP:    Nicholas Conway DO    History Obtained From:     Patient, medical records    History of Present Illness:     This is Latisha Prasad a 64 y.o. female who presents for a pre-admission testing appointment for an upcoming KNEE TOTAL ARTHROPLASTY by Ciro Pretty MD scheduled on 09/19/2024 at 0730 due to Osteoarthritis of right knee. The patient's chief complaint is 5/10 right knee pain which has progressively worsened over the past two years with increased symptoms over the last six months. Right knee pain is constant and aching in nature and is aggravated with prolonged standing, walking, or going up stairs. Knee pain is minimally relieved with rest and Motrin. She recently had her left knee replaced in May 2024 and recovered well. Denies recent falls and injuries.     Patient states she gets occasional chest pain with last episode yesterday before bed. She did not take anything for this and states when she woke up it was gone. She admits to associated nausea and diaphoresis. Prior to left knee replacement in May patient received neurology and PCP clearance.     Sleep apnea questionnaire  Patient had previous sleep study and qualified for CPAP. Received machine but states she got a bill for supplies and was unable to afford it so she returned the machine.     Functional Capacity:  1) Pt is able to walk 2 city blocks on level ground without SOB.  2) Pt is not able to climb 2 flights of stairs without SOB.  3) Pt is not able to walk up a hill for 1-2 city blocks without SOB.    Past Medical History:     Past Medical History:   Diagnosis Date    Acid reflux     medication works pretty good, but has occasional reflux at night    Adult ADHD 08/01/2012    monthly visits @ [facility]    Asthma

## 2024-09-05 NOTE — PRE-PROCEDURE INSTRUCTIONS
On the Day of Your Surgery, Thursday, 9/19/24, Please Arrive At 5:45 AM     Enter the hospital through the Main Entrance, take the lobby elevators to the second floor and check in at the Surgery Registration desk.     Continue to take your home medications as you normally do up to and including the night before surgery with the exception of blood thinning medications.    Blood Thinning Medications:  Please stop prescription blood thinning medications such as Apixaban (Eliquis); Clopidogrel (Plavix); Dabigatran (Pradaxa); Prasugrel (Effient); Rivaroxaban (Xarelto); Ticagrelor (Brilinta); Warfarin (Coumadin) only as directed by your surgeon and/or the prescribing physician    Stop Aspirin seven days prior to surgery if OK with your stroke doctor    Some common examples of other medications that can thin your blood are: Aspirin, Ibuprofen (Advil, Motrin), Naproxen (Aleve), Meloxicam (Mobic), Celecoxib (Celebrex), Fish Oil, many Herbal Supplements.  These medications should usually be stopped at least 7 days prior to surgery.        Stop using OTC pain relievers containing Aspirin, Ibuprofen (Advil, Motrin) or Naproxen (Aleve) seven days prior to surgery.  It is OK to continue using Tylenol for pain the week prior to surgery.    Failure to stop certain medications may interfere with your scheduled surgery.    If you receive instructions from your surgeon regarding what medications to stop prior to surgery, please follow those specific instructions.    Please take the following medication(s) the day of surgery with small sips of water:              Propranolol & Pantoprazole      Showering Before Surgery:     You can play an important role in your own health by carefully washing before surgery. Shower the night before and the morning of surgery using the instructions below. If you are allergic to Chlorhexidine Gluconate (CHG) use antibacterial soap instead.    If you were given Chlorhexidine soap, please follow the  quality medical and nursing care that is centered on you individual needs.  Our goal is to make your surgical experience as comfortable as possible.    Any questions about preparing for your surgery please call [phone].      ____________________________   ____________________________  Signature (Patient)                                 Signature (Nurse)                     Date

## 2024-09-06 LAB
25(OH)D3 SERPL-MCNC: 68.9 NG/ML (ref 30–100)
MRSA, DNA, NASAL: NEGATIVE
SPECIMEN DESCRIPTION: NORMAL

## 2024-09-09 ENCOUNTER — OFFICE VISIT (OUTPATIENT)
Dept: DERMATOLOGY | Age: 64
End: 2024-09-09
Payer: MEDICARE

## 2024-09-09 VITALS
WEIGHT: 153 LBS | DIASTOLIC BLOOD PRESSURE: 80 MMHG | OXYGEN SATURATION: 96 % | SYSTOLIC BLOOD PRESSURE: 109 MMHG | TEMPERATURE: 98.3 F | BODY MASS INDEX: 30.04 KG/M2 | HEIGHT: 60 IN | HEART RATE: 80 BPM

## 2024-09-09 DIAGNOSIS — L65.0 TELOGEN EFFLUVIUM: Primary | ICD-10-CM

## 2024-09-09 PROCEDURE — 3017F COLORECTAL CA SCREEN DOC REV: CPT | Performed by: DERMATOLOGY

## 2024-09-09 PROCEDURE — 99203 OFFICE O/P NEW LOW 30 MIN: CPT | Performed by: DERMATOLOGY

## 2024-09-09 PROCEDURE — 1123F ACP DISCUSS/DSCN MKR DOCD: CPT | Performed by: DERMATOLOGY

## 2024-09-09 PROCEDURE — 3079F DIAST BP 80-89 MM HG: CPT | Performed by: DERMATOLOGY

## 2024-09-09 PROCEDURE — 3074F SYST BP LT 130 MM HG: CPT | Performed by: DERMATOLOGY

## 2024-09-09 PROCEDURE — G8399 PT W/DXA RESULTS DOCUMENT: HCPCS | Performed by: DERMATOLOGY

## 2024-09-09 PROCEDURE — G8417 CALC BMI ABV UP PARAM F/U: HCPCS | Performed by: DERMATOLOGY

## 2024-09-09 PROCEDURE — 1036F TOBACCO NON-USER: CPT | Performed by: DERMATOLOGY

## 2024-09-09 PROCEDURE — G8427 DOCREV CUR MEDS BY ELIG CLIN: HCPCS | Performed by: DERMATOLOGY

## 2024-09-09 PROCEDURE — 1090F PRES/ABSN URINE INCON ASSESS: CPT | Performed by: DERMATOLOGY

## 2024-09-11 DIAGNOSIS — I10 ESSENTIAL HYPERTENSION: ICD-10-CM

## 2024-09-12 ENCOUNTER — OFFICE VISIT (OUTPATIENT)
Dept: FAMILY MEDICINE CLINIC | Age: 64
End: 2024-09-12
Payer: MEDICARE

## 2024-09-12 VITALS
SYSTOLIC BLOOD PRESSURE: 108 MMHG | BODY MASS INDEX: 29.8 KG/M2 | DIASTOLIC BLOOD PRESSURE: 68 MMHG | OXYGEN SATURATION: 95 % | TEMPERATURE: 97.8 F | WEIGHT: 152.6 LBS | HEART RATE: 62 BPM

## 2024-09-12 DIAGNOSIS — M54.50 ACUTE RIGHT-SIDED LOW BACK PAIN WITHOUT SCIATICA: Primary | ICD-10-CM

## 2024-09-12 DIAGNOSIS — R29.898 LEG WEAKNESS, BILATERAL: ICD-10-CM

## 2024-09-12 DIAGNOSIS — F32.1 CURRENT MODERATE EPISODE OF MAJOR DEPRESSIVE DISORDER WITHOUT PRIOR EPISODE (HCC): ICD-10-CM

## 2024-09-12 PROBLEM — M17.12 ARTHRITIS OF LEFT KNEE: Status: RESOLVED | Noted: 2024-05-09 | Resolved: 2024-09-12

## 2024-09-12 PROBLEM — R29.90 STROKE-LIKE SYMPTOMS: Status: RESOLVED | Noted: 2022-04-14 | Resolved: 2024-09-12
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
## 2024-09-12 PROBLEM — M25.579 PAIN IN JOINT INVOLVING ANKLE AND FOOT: Status: RESOLVED | Noted: 2021-03-03 | Resolved: 2024-09-12
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
## 2024-09-12 PROBLEM — K90.9 IRON MALABSORPTION: Status: RESOLVED | Noted: 2020-11-12 | Resolved: 2024-09-12

## 2024-09-12 PROBLEM — S22.41XA CLOSED FRACTURE OF MULTIPLE RIBS OF RIGHT SIDE: Status: RESOLVED | Noted: 2021-01-13 | Resolved: 2024-09-12

## 2024-09-12 PROBLEM — M79.606 LEG PAIN: Status: RESOLVED | Noted: 2023-06-07 | Resolved: 2024-09-12

## 2024-09-12 PROCEDURE — G8417 CALC BMI ABV UP PARAM F/U: HCPCS | Performed by: NURSE PRACTITIONER

## 2024-09-12 PROCEDURE — 3074F SYST BP LT 130 MM HG: CPT | Performed by: NURSE PRACTITIONER

## 2024-09-12 PROCEDURE — 3078F DIAST BP <80 MM HG: CPT | Performed by: NURSE PRACTITIONER

## 2024-09-12 PROCEDURE — 3017F COLORECTAL CA SCREEN DOC REV: CPT | Performed by: NURSE PRACTITIONER

## 2024-09-12 PROCEDURE — G8399 PT W/DXA RESULTS DOCUMENT: HCPCS | Performed by: NURSE PRACTITIONER

## 2024-09-12 PROCEDURE — 99214 OFFICE O/P EST MOD 30 MIN: CPT | Performed by: NURSE PRACTITIONER

## 2024-09-12 PROCEDURE — G8427 DOCREV CUR MEDS BY ELIG CLIN: HCPCS | Performed by: NURSE PRACTITIONER

## 2024-09-12 PROCEDURE — 1036F TOBACCO NON-USER: CPT | Performed by: NURSE PRACTITIONER

## 2024-09-12 PROCEDURE — 1123F ACP DISCUSS/DSCN MKR DOCD: CPT | Performed by: NURSE PRACTITIONER

## 2024-09-12 PROCEDURE — 1090F PRES/ABSN URINE INCON ASSESS: CPT | Performed by: NURSE PRACTITIONER

## 2024-09-12 RX ORDER — PROPRANOLOL HYDROCHLORIDE 80 MG/1
80 CAPSULE, EXTENDED RELEASE ORAL DAILY
Qty: 28 CAPSULE | Refills: 2 | Status: SHIPPED | OUTPATIENT
Start: 2024-09-12

## 2024-09-12 ASSESSMENT — PATIENT HEALTH QUESTIONNAIRE - PHQ9
SUM OF ALL RESPONSES TO PHQ QUESTIONS 1-9: 17
6. FEELING BAD ABOUT YOURSELF - OR THAT YOU ARE A FAILURE OR HAVE LET YOURSELF OR YOUR FAMILY DOWN: NEARLY EVERY DAY
4. FEELING TIRED OR HAVING LITTLE ENERGY: NEARLY EVERY DAY
SUM OF ALL RESPONSES TO PHQ9 QUESTIONS 1 & 2: 3
2. FEELING DOWN, DEPRESSED OR HOPELESS: NEARLY EVERY DAY
SUM OF ALL RESPONSES TO PHQ QUESTIONS 1-9: 17
SUM OF ALL RESPONSES TO PHQ QUESTIONS 1-9: 17
3. TROUBLE FALLING OR STAYING ASLEEP: NEARLY EVERY DAY
SUM OF ALL RESPONSES TO PHQ QUESTIONS 1-9: 17
7. TROUBLE CONCENTRATING ON THINGS, SUCH AS READING THE NEWSPAPER OR WATCHING TELEVISION: NEARLY EVERY DAY
10. IF YOU CHECKED OFF ANY PROBLEMS, HOW DIFFICULT HAVE THESE PROBLEMS MADE IT FOR YOU TO DO YOUR WORK, TAKE CARE OF THINGS AT HOME, OR GET ALONG WITH OTHER PEOPLE: SOMEWHAT DIFFICULT
9. THOUGHTS THAT YOU WOULD BE BETTER OFF DEAD, OR OF HURTING YOURSELF: NOT AT ALL
8. MOVING OR SPEAKING SO SLOWLY THAT OTHER PEOPLE COULD HAVE NOTICED. OR THE OPPOSITE, BEING SO FIGETY OR RESTLESS THAT YOU HAVE BEEN MOVING AROUND A LOT MORE THAN USUAL: NOT AT ALL
1. LITTLE INTEREST OR PLEASURE IN DOING THINGS: NOT AT ALL
5. POOR APPETITE OR OVEREATING: MORE THAN HALF THE DAYS

## 2024-09-12 ASSESSMENT — ENCOUNTER SYMPTOMS: BACK PAIN: 1

## 2024-09-16 ENCOUNTER — CASE MANAGEMENT (OUTPATIENT)
Dept: CASE MANAGEMENT | Age: 64
End: 2024-09-16

## 2024-09-19 ENCOUNTER — ANESTHESIA (OUTPATIENT)
Dept: OPERATING ROOM | Age: 64
End: 2024-09-19
Payer: MEDICARE

## 2024-09-19 ENCOUNTER — HOSPITAL ENCOUNTER (OUTPATIENT)
Age: 64
Setting detail: OUTPATIENT SURGERY
Discharge: HOME | DRG: 941 | End: 2024-09-19
Attending: ORTHOPAEDIC SURGERY | Admitting: ORTHOPAEDIC SURGERY
Payer: MEDICARE

## 2024-09-19 ENCOUNTER — ANESTHESIA EVENT (OUTPATIENT)
Dept: OPERATING ROOM | Age: 64
End: 2024-09-19
Payer: MEDICARE

## 2024-09-19 VITALS
RESPIRATION RATE: 18 BRPM | DIASTOLIC BLOOD PRESSURE: 75 MMHG | HEART RATE: 70 BPM | SYSTOLIC BLOOD PRESSURE: 109 MMHG | TEMPERATURE: 97.7 F | OXYGEN SATURATION: 96 %

## 2024-09-19 DIAGNOSIS — M17.11 ARTHRITIS OF RIGHT KNEE: Primary | ICD-10-CM

## 2024-09-19 PROBLEM — Z96.652 HISTORY OF TOTAL KNEE ARTHROPLASTY, LEFT: Status: ACTIVE | Noted: 2024-09-19

## 2024-09-19 LAB — GLUCOSE BLD-MCNC: 136 MG/DL (ref 65–105)

## 2024-09-19 PROCEDURE — 97530 THERAPEUTIC ACTIVITIES: CPT

## 2024-09-19 PROCEDURE — 2580000003 HC RX 258: Performed by: ANESTHESIOLOGY

## 2024-09-19 PROCEDURE — 97116 GAIT TRAINING THERAPY: CPT

## 2024-09-19 PROCEDURE — 2500000003 HC RX 250 WO HCPCS: Performed by: NURSE ANESTHETIST, CERTIFIED REGISTERED

## 2024-09-19 PROCEDURE — 7100000010 HC PHASE II RECOVERY - FIRST 15 MIN: Performed by: ORTHOPAEDIC SURGERY

## 2024-09-19 PROCEDURE — C1713 ANCHOR/SCREW BN/BN,TIS/BN: HCPCS | Performed by: ORTHOPAEDIC SURGERY

## 2024-09-19 PROCEDURE — 3600000005 HC SURGERY LEVEL 5 BASE: Performed by: ORTHOPAEDIC SURGERY

## 2024-09-19 PROCEDURE — 3700000001 HC ADD 15 MINUTES (ANESTHESIA): Performed by: ORTHOPAEDIC SURGERY

## 2024-09-19 PROCEDURE — 2580000003 HC RX 258: Performed by: ORTHOPAEDIC SURGERY

## 2024-09-19 PROCEDURE — 6360000002 HC RX W HCPCS: Performed by: NURSE ANESTHETIST, CERTIFIED REGISTERED

## 2024-09-19 PROCEDURE — 6370000000 HC RX 637 (ALT 250 FOR IP): Performed by: ANESTHESIOLOGY

## 2024-09-19 PROCEDURE — 6360000002 HC RX W HCPCS: Performed by: ORTHOPAEDIC SURGERY

## 2024-09-19 PROCEDURE — 3700000000 HC ANESTHESIA ATTENDED CARE: Performed by: ORTHOPAEDIC SURGERY

## 2024-09-19 PROCEDURE — 0SRC0J9 REPLACEMENT OF RIGHT KNEE JOINT WITH SYNTHETIC SUBSTITUTE, CEMENTED, OPEN APPROACH: ICD-10-PCS | Performed by: ORTHOPAEDIC SURGERY

## 2024-09-19 PROCEDURE — 97535 SELF CARE MNGMENT TRAINING: CPT

## 2024-09-19 PROCEDURE — 7100000001 HC PACU RECOVERY - ADDTL 15 MIN: Performed by: ORTHOPAEDIC SURGERY

## 2024-09-19 PROCEDURE — C9290 INJ, BUPIVACAINE LIPOSOME: HCPCS | Performed by: ORTHOPAEDIC SURGERY

## 2024-09-19 PROCEDURE — 97166 OT EVAL MOD COMPLEX 45 MIN: CPT

## 2024-09-19 PROCEDURE — 3600000015 HC SURGERY LEVEL 5 ADDTL 15MIN: Performed by: ORTHOPAEDIC SURGERY

## 2024-09-19 PROCEDURE — 82947 ASSAY GLUCOSE BLOOD QUANT: CPT

## 2024-09-19 PROCEDURE — C1776 JOINT DEVICE (IMPLANTABLE): HCPCS | Performed by: ORTHOPAEDIC SURGERY

## 2024-09-19 PROCEDURE — 97162 PT EVAL MOD COMPLEX 30 MIN: CPT

## 2024-09-19 PROCEDURE — 7100000000 HC PACU RECOVERY - FIRST 15 MIN: Performed by: ORTHOPAEDIC SURGERY

## 2024-09-19 PROCEDURE — 2709999900 HC NON-CHARGEABLE SUPPLY: Performed by: ORTHOPAEDIC SURGERY

## 2024-09-19 PROCEDURE — 7100000011 HC PHASE II RECOVERY - ADDTL 15 MIN: Performed by: ORTHOPAEDIC SURGERY

## 2024-09-19 DEVICE — COMPONENT FEM SZ 6 STD R KNEE CO CHROM CEM POST STBL COR: Type: IMPLANTABLE DEVICE | Site: KNEE | Status: FUNCTIONAL

## 2024-09-19 DEVICE — EXTENSION STEM L30MM DIA14MM KNEE TAPR CEM PERSONA: Type: IMPLANTABLE DEVICE | Site: KNEE | Status: FUNCTIONAL

## 2024-09-19 DEVICE — COMPONENT ARTC SURF PS 6-9 EF 10 MM RT TIB KNEE POLYETH: Type: IMPLANTABLE DEVICE | Site: KNEE | Status: FUNCTIONAL

## 2024-09-19 DEVICE — PSN TIB STM 5 DEG SZ E R: Type: IMPLANTABLE DEVICE | Site: KNEE | Status: FUNCTIONAL

## 2024-09-19 DEVICE — CEMENT BNE 40GM W/ GENT HI VISC RADPQ FOR REV SURG: Type: IMPLANTABLE DEVICE | Site: KNEE | Status: FUNCTIONAL

## 2024-09-19 DEVICE — COMPONENT PAT DIA29MM THK8MM KNEE POLY CEM CONVENTIONAL: Type: IMPLANTABLE DEVICE | Site: KNEE | Status: FUNCTIONAL

## 2024-09-19 RX ORDER — SODIUM CHLORIDE 9 MG/ML
INJECTION, SOLUTION INTRAVENOUS CONTINUOUS
Status: DISCONTINUED | OUTPATIENT
Start: 2024-09-19 | End: 2024-09-19

## 2024-09-19 RX ORDER — SODIUM CHLORIDE 0.9 % (FLUSH) 0.9 %
5-40 SYRINGE (ML) INJECTION PRN
Status: DISCONTINUED | OUTPATIENT
Start: 2024-09-19 | End: 2024-09-19 | Stop reason: HOSPADM

## 2024-09-19 RX ORDER — SODIUM CHLORIDE 0.9 % (FLUSH) 0.9 %
5-40 SYRINGE (ML) INJECTION PRN
Status: CANCELLED | OUTPATIENT
Start: 2024-09-19

## 2024-09-19 RX ORDER — PHENYLEPHRINE HCL IN 0.9% NACL 1 MG/10 ML
SYRINGE (ML) INTRAVENOUS
Status: DISCONTINUED | OUTPATIENT
Start: 2024-09-19 | End: 2024-09-19 | Stop reason: SDUPTHER

## 2024-09-19 RX ORDER — OXYCODONE HYDROCHLORIDE 5 MG/1
5 TABLET ORAL EVERY 4 HOURS PRN
Status: CANCELLED | OUTPATIENT
Start: 2024-09-19

## 2024-09-19 RX ORDER — DIPHENHYDRAMINE HYDROCHLORIDE 50 MG/ML
12.5 INJECTION INTRAMUSCULAR; INTRAVENOUS
Status: DISCONTINUED | OUTPATIENT
Start: 2024-09-19 | End: 2024-09-19 | Stop reason: HOSPADM

## 2024-09-19 RX ORDER — MORPHINE SULFATE 4 MG/ML
4 INJECTION, SOLUTION INTRAMUSCULAR; INTRAVENOUS
Status: CANCELLED | OUTPATIENT
Start: 2024-09-19

## 2024-09-19 RX ORDER — TRANEXAMIC ACID 100 MG/ML
INJECTION, SOLUTION INTRAVENOUS
Status: DISCONTINUED | OUTPATIENT
Start: 2024-09-19 | End: 2024-09-19 | Stop reason: SDUPTHER

## 2024-09-19 RX ORDER — PROCHLORPERAZINE EDISYLATE 5 MG/ML
5 INJECTION INTRAMUSCULAR; INTRAVENOUS
Status: DISCONTINUED | OUTPATIENT
Start: 2024-09-19 | End: 2024-09-19 | Stop reason: HOSPADM

## 2024-09-19 RX ORDER — ENOXAPARIN SODIUM 100 MG/ML
40 INJECTION SUBCUTANEOUS DAILY
Status: CANCELLED | OUTPATIENT
Start: 2024-09-19

## 2024-09-19 RX ORDER — MIDAZOLAM HYDROCHLORIDE 1 MG/ML
INJECTION INTRAMUSCULAR; INTRAVENOUS
Status: DISCONTINUED | OUTPATIENT
Start: 2024-09-19 | End: 2024-09-19 | Stop reason: SDUPTHER

## 2024-09-19 RX ORDER — OXYCODONE HYDROCHLORIDE 5 MG/1
10 TABLET ORAL EVERY 4 HOURS PRN
Status: CANCELLED | OUTPATIENT
Start: 2024-09-19

## 2024-09-19 RX ORDER — SODIUM CHLORIDE 0.9 % (FLUSH) 0.9 %
5-40 SYRINGE (ML) INJECTION EVERY 12 HOURS SCHEDULED
Status: DISCONTINUED | OUTPATIENT
Start: 2024-09-19 | End: 2024-09-19 | Stop reason: HOSPADM

## 2024-09-19 RX ORDER — OXYCODONE HYDROCHLORIDE 5 MG/1
5 TABLET ORAL
Status: COMPLETED | OUTPATIENT
Start: 2024-09-19 | End: 2024-09-19

## 2024-09-19 RX ORDER — HYDROMORPHONE HYDROCHLORIDE 2 MG/ML
INJECTION, SOLUTION INTRAMUSCULAR; INTRAVENOUS; SUBCUTANEOUS
Status: DISCONTINUED | OUTPATIENT
Start: 2024-09-19 | End: 2024-09-19 | Stop reason: SDUPTHER

## 2024-09-19 RX ORDER — ONDANSETRON 2 MG/ML
INJECTION INTRAMUSCULAR; INTRAVENOUS
Status: DISCONTINUED | OUTPATIENT
Start: 2024-09-19 | End: 2024-09-19 | Stop reason: SDUPTHER

## 2024-09-19 RX ORDER — FENTANYL CITRATE 50 UG/ML
25 INJECTION, SOLUTION INTRAMUSCULAR; INTRAVENOUS EVERY 5 MIN PRN
Status: DISCONTINUED | OUTPATIENT
Start: 2024-09-19 | End: 2024-09-19 | Stop reason: HOSPADM

## 2024-09-19 RX ORDER — SODIUM CHLORIDE 9 MG/ML
INJECTION, SOLUTION INTRAVENOUS PRN
Status: CANCELLED | OUTPATIENT
Start: 2024-09-19

## 2024-09-19 RX ORDER — SODIUM CHLORIDE, SODIUM LACTATE, POTASSIUM CHLORIDE, CALCIUM CHLORIDE 600; 310; 30; 20 MG/100ML; MG/100ML; MG/100ML; MG/100ML
INJECTION, SOLUTION INTRAVENOUS CONTINUOUS
Status: DISCONTINUED | OUTPATIENT
Start: 2024-09-19 | End: 2024-09-19 | Stop reason: HOSPADM

## 2024-09-19 RX ORDER — FERROUS SULFATE 325(65) MG
325 TABLET, DELAYED RELEASE (ENTERIC COATED) ORAL 2 TIMES DAILY
Qty: 90 TABLET | Refills: 3 | Status: ON HOLD | OUTPATIENT
Start: 2024-09-19

## 2024-09-19 RX ORDER — FENTANYL CITRATE 50 UG/ML
50 INJECTION, SOLUTION INTRAMUSCULAR; INTRAVENOUS EVERY 5 MIN PRN
Status: DISCONTINUED | OUTPATIENT
Start: 2024-09-19 | End: 2024-09-19 | Stop reason: HOSPADM

## 2024-09-19 RX ORDER — ACETAMINOPHEN 500 MG
1000 TABLET ORAL ONCE
Status: COMPLETED | OUTPATIENT
Start: 2024-09-19 | End: 2024-09-19

## 2024-09-19 RX ORDER — SODIUM CHLORIDE 9 MG/ML
INJECTION, SOLUTION INTRAVENOUS PRN
Status: DISCONTINUED | OUTPATIENT
Start: 2024-09-19 | End: 2024-09-19 | Stop reason: HOSPADM

## 2024-09-19 RX ORDER — SODIUM CHLORIDE, SODIUM LACTATE, POTASSIUM CHLORIDE, CALCIUM CHLORIDE 600; 310; 30; 20 MG/100ML; MG/100ML; MG/100ML; MG/100ML
INJECTION, SOLUTION INTRAVENOUS CONTINUOUS
Status: CANCELLED | OUTPATIENT
Start: 2024-09-19

## 2024-09-19 RX ORDER — GABAPENTIN 300 MG/1
300 CAPSULE ORAL ONCE
Status: DISCONTINUED | OUTPATIENT
Start: 2024-09-19 | End: 2024-09-19

## 2024-09-19 RX ORDER — FENTANYL CITRATE 50 UG/ML
INJECTION, SOLUTION INTRAMUSCULAR; INTRAVENOUS
Status: DISCONTINUED | OUTPATIENT
Start: 2024-09-19 | End: 2024-09-19 | Stop reason: SDUPTHER

## 2024-09-19 RX ORDER — PROPOFOL 10 MG/ML
INJECTION, EMULSION INTRAVENOUS
Status: DISCONTINUED | OUTPATIENT
Start: 2024-09-19 | End: 2024-09-19 | Stop reason: SDUPTHER

## 2024-09-19 RX ORDER — LIDOCAINE HYDROCHLORIDE 10 MG/ML
1 INJECTION, SOLUTION EPIDURAL; INFILTRATION; INTRACAUDAL; PERINEURAL
Status: DISCONTINUED | OUTPATIENT
Start: 2024-09-20 | End: 2024-09-19 | Stop reason: HOSPADM

## 2024-09-19 RX ORDER — SODIUM CHLORIDE 0.9 % (FLUSH) 0.9 %
5-40 SYRINGE (ML) INJECTION EVERY 12 HOURS SCHEDULED
Status: CANCELLED | OUTPATIENT
Start: 2024-09-19

## 2024-09-19 RX ORDER — LABETALOL HYDROCHLORIDE 5 MG/ML
INJECTION, SOLUTION INTRAVENOUS
Status: DISCONTINUED | OUTPATIENT
Start: 2024-09-19 | End: 2024-09-19 | Stop reason: SDUPTHER

## 2024-09-19 RX ORDER — LIDOCAINE HYDROCHLORIDE 20 MG/ML
INJECTION, SOLUTION EPIDURAL; INFILTRATION; INTRACAUDAL; PERINEURAL
Status: DISCONTINUED | OUTPATIENT
Start: 2024-09-19 | End: 2024-09-19 | Stop reason: SDUPTHER

## 2024-09-19 RX ORDER — OXYCODONE AND ACETAMINOPHEN 10; 325 MG/1; MG/1
1 TABLET ORAL EVERY 6 HOURS PRN
Qty: 30 TABLET | Refills: 0 | Status: ON HOLD | OUTPATIENT
Start: 2024-09-19 | End: 2024-09-26

## 2024-09-19 RX ORDER — MORPHINE SULFATE 2 MG/ML
2 INJECTION, SOLUTION INTRAMUSCULAR; INTRAVENOUS
Status: CANCELLED | OUTPATIENT
Start: 2024-09-19

## 2024-09-19 RX ORDER — ROCURONIUM BROMIDE 10 MG/ML
INJECTION, SOLUTION INTRAVENOUS
Status: DISCONTINUED | OUTPATIENT
Start: 2024-09-19 | End: 2024-09-19 | Stop reason: SDUPTHER

## 2024-09-19 RX ORDER — ONDANSETRON 2 MG/ML
4 INJECTION INTRAMUSCULAR; INTRAVENOUS
Status: DISCONTINUED | OUTPATIENT
Start: 2024-09-19 | End: 2024-09-19 | Stop reason: HOSPADM

## 2024-09-19 RX ORDER — DEXAMETHASONE SODIUM PHOSPHATE 10 MG/ML
INJECTION, SOLUTION INTRAMUSCULAR; INTRAVENOUS
Status: DISCONTINUED | OUTPATIENT
Start: 2024-09-19 | End: 2024-09-19 | Stop reason: SDUPTHER

## 2024-09-19 RX ADMIN — LIDOCAINE HYDROCHLORIDE 70 MG: 20 INJECTION, SOLUTION EPIDURAL; INFILTRATION; INTRACAUDAL; PERINEURAL at 07:51

## 2024-09-19 RX ADMIN — OXYCODONE HYDROCHLORIDE 5 MG: 5 TABLET ORAL at 11:35

## 2024-09-19 RX ADMIN — Medication 2000 MG: at 07:58

## 2024-09-19 RX ADMIN — FENTANYL CITRATE 50 MCG: 50 INJECTION INTRAMUSCULAR; INTRAVENOUS at 07:51

## 2024-09-19 RX ADMIN — Medication 100 MCG: at 09:53

## 2024-09-19 RX ADMIN — LABETALOL HYDROCHLORIDE 5 MG: 5 INJECTION INTRAVENOUS at 08:45

## 2024-09-19 RX ADMIN — Medication 100 MCG: at 08:03

## 2024-09-19 RX ADMIN — ROCURONIUM BROMIDE 40 MG: 10 INJECTION, SOLUTION INTRAVENOUS at 07:52

## 2024-09-19 RX ADMIN — FENTANYL CITRATE 50 MCG: 50 INJECTION INTRAMUSCULAR; INTRAVENOUS at 08:28

## 2024-09-19 RX ADMIN — FENTANYL CITRATE 50 MCG: 50 INJECTION INTRAMUSCULAR; INTRAVENOUS at 08:20

## 2024-09-19 RX ADMIN — SUGAMMADEX 200 MG: 100 INJECTION, SOLUTION INTRAVENOUS at 10:08

## 2024-09-19 RX ADMIN — Medication 100 MCG: at 08:12

## 2024-09-19 RX ADMIN — SODIUM CHLORIDE, POTASSIUM CHLORIDE, SODIUM LACTATE AND CALCIUM CHLORIDE: 600; 310; 30; 20 INJECTION, SOLUTION INTRAVENOUS at 08:35

## 2024-09-19 RX ADMIN — MIDAZOLAM 1 MG: 1 INJECTION INTRAMUSCULAR; INTRAVENOUS at 07:46

## 2024-09-19 RX ADMIN — SODIUM CHLORIDE, POTASSIUM CHLORIDE, SODIUM LACTATE AND CALCIUM CHLORIDE: 600; 310; 30; 20 INJECTION, SOLUTION INTRAVENOUS at 06:34

## 2024-09-19 RX ADMIN — MIDAZOLAM 1 MG: 1 INJECTION INTRAMUSCULAR; INTRAVENOUS at 07:43

## 2024-09-19 RX ADMIN — DEXAMETHASONE SODIUM PHOSPHATE 10 MG: 10 INJECTION INTRAMUSCULAR; INTRAVENOUS at 07:57

## 2024-09-19 RX ADMIN — ONDANSETRON 4 MG: 2 INJECTION, SOLUTION INTRAMUSCULAR; INTRAVENOUS at 09:53

## 2024-09-19 RX ADMIN — FENTANYL CITRATE 50 MCG: 50 INJECTION INTRAMUSCULAR; INTRAVENOUS at 08:25

## 2024-09-19 RX ADMIN — ACETAMINOPHEN 1000 MG: 500 TABLET ORAL at 07:07

## 2024-09-19 RX ADMIN — HYDROMORPHONE HYDROCHLORIDE 0.5 MG: 2 INJECTION, SOLUTION INTRAMUSCULAR; INTRAVENOUS; SUBCUTANEOUS at 09:35

## 2024-09-19 RX ADMIN — PROPOFOL 130 MG: 10 INJECTION, EMULSION INTRAVENOUS at 07:51

## 2024-09-19 RX ADMIN — LABETALOL HYDROCHLORIDE 7.5 MG: 5 INJECTION INTRAVENOUS at 09:15

## 2024-09-19 RX ADMIN — TRANEXAMIC ACID 1000 MG: 100 INJECTION, SOLUTION INTRAVENOUS at 08:03

## 2024-09-19 RX ADMIN — SODIUM CHLORIDE, POTASSIUM CHLORIDE, SODIUM LACTATE AND CALCIUM CHLORIDE: 600; 310; 30; 20 INJECTION, SOLUTION INTRAVENOUS at 10:26

## 2024-09-19 ASSESSMENT — PAIN DESCRIPTION - DESCRIPTORS
DESCRIPTORS: STABBING
DESCRIPTORS: DISCOMFORT

## 2024-09-19 ASSESSMENT — PAIN DESCRIPTION - ORIENTATION
ORIENTATION: RIGHT

## 2024-09-19 ASSESSMENT — PAIN DESCRIPTION - LOCATION
LOCATION: KNEE

## 2024-09-19 ASSESSMENT — PAIN DESCRIPTION - PAIN TYPE: TYPE: CHRONIC PAIN

## 2024-09-19 ASSESSMENT — PAIN SCALES - GENERAL
PAINLEVEL_OUTOF10: 3
PAINLEVEL_OUTOF10: 6

## 2024-09-19 ASSESSMENT — ENCOUNTER SYMPTOMS: SHORTNESS OF BREATH: 0

## 2024-09-19 ASSESSMENT — PAIN DESCRIPTION - FREQUENCY: FREQUENCY: CONTINUOUS

## 2024-09-19 NOTE — DISCHARGE INSTR - COC
Continuity of Care Form    Patient Name: Latisha Prasad   :  1960  MRN:  [id number]    Admit date:  2024  Discharge date:  ***    Code Status Order: Prior   Advance Directives:   Advance Care Flowsheet Documentation        Date/Time Healthcare Directive Type of Healthcare Directive Copy in Chart Healthcare Agent Appointed Healthcare Agent's Name Healthcare Agent's Phone Number    [phone] Yes, patient has an advance directive for healthcare treatment  Living will;Health care treatment directive  --  --  --  --                     Admitting Physician:  Ciro Pretty MD  PCP: Nicholas Conway DO    Discharging Nurse: ***  Discharging Hospital Unit/Room#: [facility] OR Pool/NONE  Discharging Unit Phone Number: ***    Emergency Contact:   Extended Emergency Contact Information  Primary Emergency Contact: Dinh Prasad  Address: [address] [place] [address] [facility]  Home Phone: [phone]  Relation: Child  Secondary Emergency Contact: Nicolás Stapleton  Address: [address] [place] [address] [facility]  Home Phone: [phone]  Work Phone: [phone]  Mobile Phone: [phone]  Relation: Other    Past Surgical History:  Past Surgical History:   Procedure Laterality Date    ANKLE SURGERY Left 2018    mass excision    ARM SURGERY Right     Pt states she has had 13 right arm surgeries    BREAST ENHANCEMENT SURGERY Bilateral     CARDIAC CATHETERIZATION  2022    DR ARIZMENDI  /  NML CORS     SECTION      x 3    COLONOSCOPY  2013    sigmoid diverticula, prominent large internal hemorrhoid    COLONOSCOPY N/A 02/10/2020    COLONOSCOPY DIAGNOSTIC performed by Noemí Castillo MD at [facility] OR    COLONOSCOPY N/A 2020    COLORECTAL CANCER SCREENING, NOT HIGH RISK performed by Harriett Monroy MD at [facility] OR    ENDOSCOPY, COLON, DIAGNOSTIC      ESOPHAGEAL MOTILITY STUDY N/A 2020    PES RN - ESOPHAGEAL MOTILITY STUDY performed by Willie PRIDE

## 2024-09-19 NOTE — PROGRESS NOTES
Orthopedic Coordinator Note    Patient s/p right total Knee replacement on 09/19/2024 WITH DR. HERNANDEZ.    The following appointments are currently scheduled:    Post-op with surgeon 10/03/2024 AT 1100.    Physical Therapy PT REQUESTED HHC WITH [address] AND WRITER FAXED OVER NEEDED INFORMATION AT 1657.  [address] CALLED TO CONFIRM THEY AT RE ABLE TO ACCEPT.    PT HAS A FWW.    DVT Prophylaxis: 2.5MG ELIQUIS BID FOR 12 DAYS POST-OP. PT SHOULD START ELIQUIS POD1 ON 09/20/2024.    PT HAD LTKA 05/08/2024 AND HER  IS HER SON.    PT IS A SDD.      Any questions please contact Lida Gallegos RN, MSN  [phone]      Electronically signed by: LIDA GALLEGOS RN on 9/19/2024 at 8:21 AM

## 2024-09-19 NOTE — H&P
TYPE AND SCREEN    Collection Time: 09/05/24  3:43 PM   Result Value Ref Range    Blood Bank Sample Expiration 09/22/2024,2359     Arm Band Number [id number]     ABO/Rh A POSITIVE     Antibody Screen NEGATIVE    Vitamin D 25 Hydroxy    Collection Time: 09/05/24  3:47 PM   Result Value Ref Range    Vit D, 25-Hydroxy 68.9 30.0 - 100.0 ng/mL   Hemoglobin A1C    Collection Time: 09/05/24  3:47 PM   Result Value Ref Range    Hemoglobin A1C 6.5 (H) 4.0 - 6.0 %    Estimated Avg Glucose 140 mg/dL   CBC with Auto Differential    Collection Time: 09/05/24  3:47 PM   Result Value Ref Range    WBC 5.1 3.5 - 11.3 k/uL    RBC 4.23 3.95 - 5.11 m/uL    Hemoglobin 11.9 11.9 - 15.1 g/dL    Hematocrit 39.3 36.3 - 47.1 %    MCV 92.9 82.6 - 102.9 fL    MCH 28.1 25.2 - 33.5 pg    MCHC 30.3 28.4 - 34.8 g/dL    RDW 16.7 (H) 11.8 - 14.4 %    Platelets 243 138 - 453 k/uL    MPV 10.9 8.1 - 13.5 fL    NRBC Automated 0.0 0.0 per 100 WBC    Neutrophils % 58 36 - 65 %    Lymphocytes % 28 24 - 43 %    Monocytes % 11 3 - 12 %    Eosinophils % 2 1 - 4 %    Basophils % 1 0 - 2 %    Immature Granulocytes % 0 0 %    Neutrophils Absolute 3.01 1.50 - 8.10 k/uL    Lymphocytes Absolute 1.41 1.10 - 3.70 k/uL    Monocytes Absolute 0.54 0.10 - 1.20 k/uL    Eosinophils Absolute 0.11 0.00 - 0.44 k/uL    Basophils Absolute 0.04 0.00 - 0.20 k/uL    Immature Granulocytes Absolute 0.01 0.00 - 0.30 k/uL    RBC Morphology ANISOCYTOSIS PRESENT    Comprehensive Metabolic Panel    Collection Time: 09/05/24  3:47 PM   Result Value Ref Range    Sodium 139 135 - 144 mmol/L    Potassium 3.7 3.7 - 5.3 mmol/L    Chloride 105 98 - 107 mmol/L    CO2 25 20 - 31 mmol/L    Anion Gap 9 9 - 17 mmol/L    Glucose 85 70 - 99 mg/dL    BUN 16 8 - 23 mg/dL    Creatinine 1.3 (H) 0.5 - 0.9 mg/dL    Est, Glom Filt Rate 46 (L) >60 mL/min/1.73m2    BUN/Creatinine Ratio 12 9 - 20    Calcium 9.5 8.6 - 10.4 mg/dL    Total Protein 6.7 6.4 - 8.3 g/dL    Albumin 3.9 3.5 - 5.2 g/dL    Total  Bilirubin 0.3 0.3 - 1.2 mg/dL    Alkaline Phosphatase 166 (H) 35 - 104 U/L    ALT 14 5 - 33 U/L    AST 25 <32 U/L       Recent Labs     24  1547 24  1508   HGB 11.9  --    HCT 39.3  --    WBC 5.1  --    MCV 92.9  --      --    K 3.7  --      --    CO2 25  --    BUN 16  --    CREATININE 1.3*  --    GLUCOSE 85  --    INR  --  1.0   PROTIME  --  13.0   APTT  --  25.9   AST 25  --    ALT 14  --        No results for input(s): \"COVID19\" in the last 720 hours.    *Please note that labs listed above are the most recent lab values available in EPIC at the time of the visit and additional labs may have been drawn or resulted since that time.    Imaging/Diagnostics:    No results found.    [id number]- in paper chart    Diagnosis:      1. Osteoarthritis of right knee.    Plans:     1. KNEE TOTAL ARTHROPLASTY      PIETER Preston - CNP  2024  6:43 AM

## 2024-09-19 NOTE — DISCHARGE INSTRUCTIONS
Walker wbat tka exercises right knee.   ACE WRAP (CARO DRESSING) TO BE REMOVED POD3 ON 09/22/2024. AQUACEL DRESSING ON KNEE INCISION STAYS ON UNTIL SOILED OR 2 WEEK FOLLOW UP APPT.    ANY ORTHOPEDIC QUESTIONS OR ANY OTHER CONCERNS YOU MAY CALL THE ORTHOPEDIC COORDINATOR:  LIDA CARR RN, MSN  [phone]  [email or web address]    DISCHARGE INSTRUCTIONS  Caring for yourself after joint replacement surgery (Total Hip and Total Knee Replacement)    Activity and Therapy  Receive physical therapy three times per week. (Pain medication one hour prior to therapy)   Perform PT exercises on own when not receiving home or outpatient PT.  Ideally exercises should be at least two times a day.  Increase level of activity and ambulation each day.  Perform deep breathing exercises daily.  Patient provides self-care when possible.  Work on Range of motion for Total knee patients.   No pillow under the knee for Total knee patients.  Elevate the surgical leg when seated.  No driving until cleared by surgeon      Diet:  Increase oral intake of fruits, fiber and water to prevent constipation.  Drink fluids frequently and take stool softeners to aid in bowel motility.  Increase protein intake/reduce high-sugar intake to help promote healing and prevent infection.    Incision Care:  Keep Primaseal or other dressing intact and remove as directed by surgeon, unless saturated, in which case, call surgeon and request instructions.  If dressing falls off, call surgeon.  Thaddeus Hose on in the am and off in the pm to reduce swelling.  Ice affected area four times a day, for twenty minutes.    Pain Medications and Anticoagulant  You have been place on an anticoagulant to prevent blood clots.  Take this medication exactly as prescribed.  Be alert for signs of bleeding.  Take care not to injure yourself.  You have been provided pain medicine to control your pain.  Do not take more narcotics than prescribed.  You may begin weaning from

## 2024-09-19 NOTE — PROGRESS NOTES
navigate RW within environement.  Assist required for safe line mgmt throughout.  More Ambulation?: No  Stairs/Curb  Stairs?: Yes  Stairs  # Steps : 5  Stairs Height: 6\" (2 x 6\", 3 x 4\")  Rails: Bilateral  Device: No Device  Assistance: Minimal assistance  Comment: Pt demonstrating fair steadiness throughout stair negotiation this date.  Pt requiring mod verbal cueing for proper non-reciprocal stair negotiation pattern post-op w/ fair return demo.       Balance  Posture: Fair  Sitting - Static: Good;-  Sitting - Dynamic: Fair;+  Standing - Static: Fair  Standing - Dynamic: Fair;-  Single Leg Stance R Le  Single Leg Stance L Le  Comments: Standing balance assessed w/ RW  Exercise Treatment: ankle pumps, quad sets, glute sets, heelslides       AM-PAC - Mobility  AM-PAC Basic Mobility - Inpatient   AM-PAC Inpatient Mobility Raw Score : 16  AM-PAC Inpatient T-Scale Score : 40.78  Mobility Inpatient CMS 0-100% Score: 54.16  Mobility Inpatient CMS G-Code Modifier : CK    Functional Outcome Measure-   Single Leg Stance Test:  0 sec. (<5 sec.= fall risk)      Goals  Short Term Goals  Time Frame for Short Term Goals: 8 visits  Short Term Goal 1: Pt to demonstrate bed mobility independently  Short Term Goal 2: Pt to perform STS transfers w/ RW independently  Short Term Goal 3: Pt to ambulate at least 100ft w/ RW independently  Short Term Goal 4: Pt to ascend/descend 4 stairs w/ handrails SBA  Short Term Goal 5: Pt to be indep w/ TKA HEP  Patient Goals   Patient Goals : Less pain, To go home       Education  Patient Education  Education Given To: Patient;Family  Education Provided: Role of Therapy;Plan of Care;Home Exercise Program;Precautions;Fall Prevention Strategies;Transfer Training;Energy Conservation;Equipment  Education Provided Comments: Significant time spent on pt education this date.  Pt educated on: purpose of acute PT eval, importance of continued mobility throughout admission & upon discharge, general

## 2024-09-19 NOTE — PROGRESS NOTES
Occupational Therapy  Facility/Department: [facility] OR  Occupational Therapy Initial Assessment    Name: Latisha Prasad  : 1960  MRN: [id number]  Date of Service: 2024    [facility] [place] [facility]               Patient Diagnosis(es): The encounter diagnosis was Arthritis of right knee.  Past Medical History:  has a past medical history of Acid reflux, Adult ADHD, Asthma, Awareness under anesthesia, Balance problem, Bipolar 1 disorder (HCC), Broken foot, Cerebral artery occlusion with cerebral infarction (HCC), Chest pain of uncertain etiology, CKD (chronic kidney disease), Depression, Diabetes mellitus (HCC), DVT of leg (deep venous thrombosis) ([facility]), Fracture, Fractured nose, Headache, Helicobacter pylori (H. pylori) infection, Hematuria, Hiatal hernia, History of blood transfusion, History of DVT (deep vein thrombosis), History of myasthenia gravis, Hx of blood clots, Hypertension, Internal hemorrhoids, Memory loss, Migraine, Narcolepsy, Numbness and tingling in both hands, Osteoporosis, Sleep apnea, Under care of team, and Under care of team.  Past Surgical History:  has a past surgical history that includes  section; Nose surgery; Knee arthroscopy (Left); Wrist surgery; Upper gastrointestinal endoscopy (2013); Colonoscopy (2013); Upper gastrointestinal endoscopy (2016); Foot surgery (Left, ); Arm Surgery (Right); knee surgery (Right); vaginal dilatation; Ankle surgery (Left, 2018); pr office/outpt visit,procedure only (Left, 2018); Foot Tendon Surgery (Left, 2019); ligament repair (Left, 2019); Upper gastrointestinal endoscopy (N/A, 02/10/2020); Colonoscopy (N/A, 02/10/2020); Endoscopy, colon, diagnostic; Colonoscopy (N/A, 2020); esophageal motility study (N/A, 2020); esophageal motility study (09/10/2020); esophageal motility study (N/A, 2020); Nerve Block (Bilateral, 2021); Pain management procedure (Bilateral, 2021); Pain  Factors: max A for gripper socks, min A for pants threading over feet and pulling up over hips in standing. Pt cued on safety with RW and possibly keeping one hand on device at all times but pt is impulsive at times with task    Functional Mobility: Minimal assistance  Functional Mobility Skilled Clinical Factors: Pt able to demo short distances of mob with RW with min A this session including trialing steps. Pt needing mod-max verbal cues and at times physical assist with RW to follow safety instructions and sequencing of steps. Pt with dec follow through overall on instruciton. Pt is forgetful and also with dec alertness at times. Pt having difficulty extending R knee fully so ed on importance of attempting to every step stabilize R knee when stepping through with L. Pt's son present and able to verb good understanding of ed provided. Pt needing inc time for all mob, MAX cues for all turns and backing up to surfaces to sit. Pt fatigues easily as well. Pt ed on safe use of RW during functional contexts at home inc. ADL/IADL completion. Pt ed on safe way to approach sink, counter, and high/low reaching. Pt ed importance of staying within device at all times when navigating doorways and turning.    Additional Comments: .Pt educated on ADL techs following total joint replacement; pt ed on dressing surgical leg first when threading lower body clothing and use of surgical soap when bathing upon d/c as infection prevention measure. Pt also ed on use of easy slide with gisela hose and on wearing schedule of stockings/proper care. Pt encouraged to complete ADL tasks as independently as possible to promote increased functional ROM and return to full independence.     Activity Tolerance  Activity Tolerance: Patient limited by pain;Patient limited by endurance;Treatment limited secondary to decreased cognition    Bed mobility  Supine to Sit: Stand by assistance  Sit to Supine: Stand by assistance  Scooting: Stand by

## 2024-09-19 NOTE — OP NOTE
Operative Note      Patient: Latisha Prasad  YOB: 1960  MRN: [id number]    Date of Procedure: 9/19/2024    Pre-Op Diagnosis Codes:      * Osteoarthritis of right knee, unspecified osteoarthritis type [M17.11]    Post-Op Diagnosis: Same       Procedure(s):  KNEE TOTAL ARTHROPLASTY    Surgeon(s):  Ciro Pretty MD    Assistant:   Surgical Assistant: Jones Presley RN  First Assistant: Rambo Lemon RN    Anesthesia: General    Estimated Blood Loss (mL): less than 50     Complications: None    Specimens:   * No specimens in log *    Implants:  Implant Name Type Inv. Item Serial No.  Lot No. LRB No. Used Action   CEMENT BNE 40GM W/ GENT HI VISC RADPQ FOR REV SURG - [id number]  CEMENT BNE 40GM W/ GENT HI VISC RADPQ FOR REV SURG  [employer]- [id number] Right 1 Implanted   CEMENT BNE 40GM W/ GENT HI VISC RADPQ FOR REV SURG - [id number]  CEMENT BNE 40GM W/ GENT HI VISC RADPQ FOR REV SURG  [employer]- [id number] Right 1 Implanted   COMPONENT PAT PJB35SQ THK8MM KNEE POLY TIESHA CONVENTIONAL - [id number]  COMPONENT PAT FHF41WO THK8MM KNEE POLY TIESHA CONVENTIONAL  [employer]- [id number] Right 1 Implanted   PSN TIB STM 5 DEG SZ E R - [id number]  PSN TIB STM 5 DEG SZ E R  [employer]- [id number] Right 1 Implanted   EXTENSION STEM L30MM PDT69WE KNEE TAPR TIESHA PERSONA - [id number]  EXTENSION STEM L30MM [id number] KNEE TAPR TIESHA PERSONA  [employer]- [id number] Right 1 Implanted   COMPONENT FEM SZ 6 STD R KNEE CO CHROM TIESHA POST STBL COR - [id number]  COMPONENT FEM SZ 6 STD R KNEE CO CHROM TIESHA POST STBL COR  [employer]- [id number] Right 1 Implanted   COMPONENT ARTC SURF PS 6-9 EF 10 MM RT TIB KNEE POLYETH - [id number]  COMPONENT ARTC SURF PS 6-9 EF 10 MM RT TIB KNEE POLYETH  [employer]- [id number] Right 1 Implanted         Drains: * No LDAs found *    Findings:  Infection Present At Time Of Surgery

## 2024-09-19 NOTE — OP NOTE
[place] [address]                            OPERATIVE REPORT      PATIENT NAME: DERIAN KRUGER            : 1960  MED REC NO: [id number]                         ROOM: [facility] OR [place]  ACCOUNT NO: [id number]                       ADMIT DATE: 2024  PROVIDER: Ciro Pretty MD      DATE OF PROCEDURE:  2024    SURGEON:  Ciro Pretty MD    PREOPERATIVE DIAGNOSIS:  Tricompartment osteoarthritis, right knee.    POSTOPERATIVE DIAGNOSIS:  Tricompartment osteoarthritis, right knee.    ADDITIONAL DIAGNOSIS:  Status post cemented Persona Freddie left total knee arthroplasty.    PROCEDURE:  A cemented Persona Freddie right total knee arthroplasty (size 6 right cemented posterior stabilized tibial component) with a 30 mm length 14 mm diameter stem extension, 29 mm polyethylene button, 10 mm height, and posterior stabilized fixed bearing articular surface, size E right femoral component.    INDICATIONS:  The patient is a 64-year-old with intractable knee arthritis and pain.  Initially, her contralateral left knee was more symptomatic and required total knee arthroplasty with satisfactory result.  Now bothered with the right despite corticosteroid injections, medications, exercises, bracing.  Surgery was also discussed.  She went through preoperative education, preoperative health assessment, optimization, and discussion of postoperative rehabilitation anticipating home PT.  Risks of procedure including infection, phlebitis, neurovascular or tendon injury, anesthetic complications, failure of the procedure, amongst others were discussed and accepted.    ESTIMATED BLOOD LOSS:  50 mL.    DESCRIPTION OF PROCEDURE:  The patient was brought to the operating room and given a general anesthetic.  She was given multimodal pain medication, which included TXA and prophylactic antibiotic in the form of Ancef.  In the operative room, she was  internally rotated on a blanket roll.  A 30-inch tourniquet was placed proximally and it was elevated to 300 mmHg after exsanguinating the limb.  The procedure was facilitated with sterile suture and a Pulsavac with antibiotic irrigation and a DeMayo knee mojica.    After exsanguinating the limb and elevating the tourniquet, an anterior approach was made beginning a centimeter proximal to the patella extending to the tibial tubercle.  Hemostasis obtained with Bovie in the subcu.  A retinacular marking was done for closure purposes and a long anteromedial arthrotomy was performed.  Nonpurulent synovial fluid was present.  There were prominent marginal osteophyte and full-thickness articular cartilage loss, particularly in the patellofemoral joint.  The osteophytes were removed.  A synovectomy was done of the suprapatellar pouch, any plica bands, and the medial and lateral recesses were released allowing the patella to be subluxed.  A subperiosteal dissection was then done of the MTC, releasing the superficial MCL.  Infrapatellar fat pad was excised and the patella could be subluxed without tension.    As with the contralateral side with a flexion contracture of the femur, required the first surgery.  The intramedullary technique was utilized.  The ralph was inserted.  Anterior cutting block was positioned 3 mm from the notch with a 5 degree valgus resection angle through which a distal cut was made.    The tibia was then followed with by subluxing it anteriorly after releasing the notch attachments of the ACL and PCL and any residual medial and lateral meniscus.  An extramedullary technique was utilized.  The cutting block was positioned 2 mm from the MTC and an additional +2 mm was required because of the flexion contracture.  Gap balancing was now checked in extension with the 10 spacer block fitting satisfactorily.    Therefore, the femur was completed.  AP height was determined to be size 6.  This was positioned

## 2024-09-20 ENCOUNTER — CASE MANAGEMENT (OUTPATIENT)
Dept: CASE MANAGEMENT | Age: 64
End: 2024-09-20

## 2024-09-21 ENCOUNTER — APPOINTMENT (OUTPATIENT)
Dept: CT IMAGING | Age: 64
DRG: 941 | End: 2024-09-21
Payer: MEDICARE

## 2024-09-21 ENCOUNTER — HOSPITAL ENCOUNTER (INPATIENT)
Age: 64
LOS: 1 days | Discharge: HOME | DRG: 941 | End: 2024-09-23
Attending: EMERGENCY MEDICINE | Admitting: INTERNAL MEDICINE
Payer: MEDICARE

## 2024-09-21 ENCOUNTER — APPOINTMENT (OUTPATIENT)
Dept: GENERAL RADIOLOGY | Age: 64
DRG: 941 | End: 2024-09-21
Payer: MEDICARE

## 2024-09-21 DIAGNOSIS — Z96.653 HISTORY OF TOTAL KNEE ARTHROPLASTY, BILATERAL: ICD-10-CM

## 2024-09-21 DIAGNOSIS — R53.83 FATIGUE, UNSPECIFIED TYPE: Primary | ICD-10-CM

## 2024-09-21 PROBLEM — R41.82 ALTERED MENTAL STATUS, UNSPECIFIED: Status: ACTIVE | Noted: 2024-09-21

## 2024-09-21 LAB
ALBUMIN SERPL-MCNC: 3.4 G/DL (ref 3.5–5.2)
ALP SERPL-CCNC: 120 U/L (ref 35–104)
ALT SERPL-CCNC: 15 U/L (ref 5–33)
AMORPH SED URNS QL MICRO: ABNORMAL
ANION GAP SERPL CALCULATED.3IONS-SCNC: 9 MMOL/L (ref 9–17)
APAP SERPL-MCNC: <10 UG/ML (ref 10–30)
AST SERPL-CCNC: 48 U/L
BACTERIA URNS QL MICRO: ABNORMAL
BASOPHILS # BLD: 0.03 K/UL (ref 0–0.2)
BASOPHILS NFR BLD: 0 % (ref 0–2)
BILIRUB SERPL-MCNC: 0.8 MG/DL (ref 0.3–1.2)
BILIRUB UR QL STRIP: NEGATIVE
BUN SERPL-MCNC: 17 MG/DL (ref 8–23)
BUN/CREAT SERPL: 15 (ref 9–20)
CALCIUM SERPL-MCNC: 8.9 MG/DL (ref 8.6–10.4)
CASTS #/AREA URNS LPF: ABNORMAL /LPF
CASTS #/AREA URNS LPF: ABNORMAL /LPF
CHLORIDE SERPL-SCNC: 98 MMOL/L (ref 98–107)
CLARITY UR: CLEAR
CO2 SERPL-SCNC: 27 MMOL/L (ref 20–31)
COLOR UR: YELLOW
CREAT SERPL-MCNC: 1.1 MG/DL (ref 0.5–0.9)
CRYSTALS URNS MICRO: ABNORMAL /HPF
EOSINOPHIL # BLD: 0.05 K/UL (ref 0–0.44)
EOSINOPHILS RELATIVE PERCENT: 0 % (ref 1–4)
EPI CELLS #/AREA URNS HPF: ABNORMAL /HPF (ref 0–5)
ERYTHROCYTE [DISTWIDTH] IN BLOOD BY AUTOMATED COUNT: 17.5 % (ref 11.8–14.4)
GFR, ESTIMATED: 56 ML/MIN/1.73M2
GLUCOSE SERPL-MCNC: 119 MG/DL (ref 70–99)
GLUCOSE UR STRIP-MCNC: NEGATIVE MG/DL
HCO3 VENOUS: 28.2 MMOL/L (ref 22–29)
HCT VFR BLD AUTO: 29.1 % (ref 36.3–47.1)
HGB BLD-MCNC: 9 G/DL (ref 11.9–15.1)
HGB UR QL STRIP.AUTO: ABNORMAL
IMM GRANULOCYTES # BLD AUTO: 0.04 K/UL (ref 0–0.3)
IMM GRANULOCYTES NFR BLD: 0 %
KETONES UR STRIP-MCNC: NEGATIVE MG/DL
LEUKOCYTE ESTERASE UR QL STRIP: NEGATIVE
LYMPHOCYTES NFR BLD: 1.01 K/UL (ref 1.1–3.7)
LYMPHOCYTES RELATIVE PERCENT: 9 % (ref 24–43)
MCH RBC QN AUTO: 28.4 PG (ref 25.2–33.5)
MCHC RBC AUTO-ENTMCNC: 30.9 G/DL (ref 28.4–34.8)
MCV RBC AUTO: 91.8 FL (ref 82.6–102.9)
MONOCYTES NFR BLD: 1.39 K/UL (ref 0.1–1.2)
MONOCYTES NFR BLD: 12 % (ref 3–12)
NEUTROPHILS NFR BLD: 79 % (ref 36–65)
NEUTS SEG NFR BLD: 8.97 K/UL (ref 1.5–8.1)
NITRITE UR QL STRIP: NEGATIVE
NRBC BLD-RTO: 0 PER 100 WBC
O2 SAT, VEN: 81.2 % (ref 60–85)
PCO2 VENOUS: 46.6 MM HG (ref 41–51)
PH UR STRIP: 5.5 [PH] (ref 5–8)
PH VENOUS: 7.39 (ref 7.32–7.43)
PLATELET # BLD AUTO: 185 K/UL (ref 138–453)
PMV BLD AUTO: 10.5 FL (ref 8.1–13.5)
PO2 VENOUS: 46.5 MM HG (ref 30–50)
POSITIVE BASE EXCESS, VEN: 2.7 MMOL/L (ref 0–3)
POTASSIUM SERPL-SCNC: 4.2 MMOL/L (ref 3.7–5.3)
PROT SERPL-MCNC: 6.1 G/DL (ref 6.4–8.3)
PROT UR STRIP-MCNC: ABNORMAL MG/DL
RBC # BLD AUTO: 3.17 M/UL (ref 3.95–5.11)
RBC # BLD: ABNORMAL 10*6/UL
RBC #/AREA URNS HPF: ABNORMAL /HPF (ref 0–2)
SALICYLATES SERPL-MCNC: <1 MG/DL (ref 3–10)
SODIUM SERPL-SCNC: 134 MMOL/L (ref 135–144)
SP GR UR STRIP: 1.03 (ref 1–1.03)
TROPONIN I SERPL HS-MCNC: 22 NG/L (ref 0–14)
UROBILINOGEN UR STRIP-ACNC: NORMAL EU/DL (ref 0–1)
WBC #/AREA URNS HPF: ABNORMAL /HPF (ref 0–5)
WBC OTHER # BLD: 11.5 K/UL (ref 3.5–11.3)

## 2024-09-21 PROCEDURE — 70450 CT HEAD/BRAIN W/O DYE: CPT

## 2024-09-21 PROCEDURE — 99285 EMERGENCY DEPT VISIT HI MDM: CPT

## 2024-09-21 PROCEDURE — 80053 COMPREHEN METABOLIC PANEL: CPT

## 2024-09-21 PROCEDURE — G0378 HOSPITAL OBSERVATION PER HR: HCPCS

## 2024-09-21 PROCEDURE — 81001 URINALYSIS AUTO W/SCOPE: CPT

## 2024-09-21 PROCEDURE — 2580000003 HC RX 258: Performed by: EMERGENCY MEDICINE

## 2024-09-21 PROCEDURE — 71045 X-RAY EXAM CHEST 1 VIEW: CPT

## 2024-09-21 PROCEDURE — 99222 1ST HOSP IP/OBS MODERATE 55: CPT

## 2024-09-21 PROCEDURE — 80143 DRUG ASSAY ACETAMINOPHEN: CPT

## 2024-09-21 PROCEDURE — 84484 ASSAY OF TROPONIN QUANT: CPT

## 2024-09-21 PROCEDURE — 80179 DRUG ASSAY SALICYLATE: CPT

## 2024-09-21 PROCEDURE — 82803 BLOOD GASES ANY COMBINATION: CPT

## 2024-09-21 PROCEDURE — 6360000004 HC RX CONTRAST MEDICATION: Performed by: EMERGENCY MEDICINE

## 2024-09-21 PROCEDURE — 70498 CT ANGIOGRAPHY NECK: CPT

## 2024-09-21 PROCEDURE — 85025 COMPLETE CBC W/AUTO DIFF WBC: CPT

## 2024-09-21 PROCEDURE — 70496 CT ANGIOGRAPHY HEAD: CPT

## 2024-09-21 PROCEDURE — 93005 ELECTROCARDIOGRAM TRACING: CPT | Performed by: EMERGENCY MEDICINE

## 2024-09-21 RX ORDER — 0.9 % SODIUM CHLORIDE 0.9 %
80 INTRAVENOUS SOLUTION INTRAVENOUS ONCE
Status: COMPLETED | OUTPATIENT
Start: 2024-09-21 | End: 2024-09-21

## 2024-09-21 RX ORDER — SODIUM CHLORIDE 0.9 % (FLUSH) 0.9 %
10 SYRINGE (ML) INJECTION PRN
Status: DISCONTINUED | OUTPATIENT
Start: 2024-09-21 | End: 2024-09-23 | Stop reason: HOSPADM

## 2024-09-21 RX ORDER — IOPAMIDOL 755 MG/ML
75 INJECTION, SOLUTION INTRAVASCULAR
Status: COMPLETED | OUTPATIENT
Start: 2024-09-21 | End: 2024-09-21

## 2024-09-21 RX ADMIN — SODIUM CHLORIDE 80 ML: 9 INJECTION, SOLUTION INTRAVENOUS at 23:23

## 2024-09-21 RX ADMIN — SODIUM CHLORIDE, PRESERVATIVE FREE 10 ML: 5 INJECTION INTRAVENOUS at 23:23

## 2024-09-21 RX ADMIN — IOPAMIDOL 75 ML: 755 INJECTION, SOLUTION INTRAVENOUS at 23:23

## 2024-09-21 ASSESSMENT — PAIN - FUNCTIONAL ASSESSMENT: PAIN_FUNCTIONAL_ASSESSMENT: NONE - DENIES PAIN

## 2024-09-22 PROBLEM — R41.0 DISORIENTATION: Status: ACTIVE | Noted: 2024-09-22

## 2024-09-22 PROBLEM — Z96.653 HISTORY OF TOTAL KNEE ARTHROPLASTY, BILATERAL: Status: ACTIVE | Noted: 2024-09-19

## 2024-09-22 LAB
ALBUMIN SERPL-MCNC: 3.4 G/DL (ref 3.5–5.2)
ALP SERPL-CCNC: 133 U/L (ref 35–104)
ALT SERPL-CCNC: 16 U/L (ref 5–33)
AMPHET UR QL SCN: POSITIVE
ANION GAP SERPL CALCULATED.3IONS-SCNC: 11 MMOL/L (ref 9–17)
AST SERPL-CCNC: 48 U/L
BARBITURATES UR QL SCN: NEGATIVE
BASOPHILS # BLD: 0 K/UL (ref 0–0.2)
BASOPHILS NFR BLD: 0 %
BENZODIAZ UR QL: NEGATIVE
BILIRUB SERPL-MCNC: 0.8 MG/DL (ref 0.3–1.2)
BUN SERPL-MCNC: 15 MG/DL (ref 8–23)
BUN/CREAT SERPL: 14 (ref 9–20)
CALCIUM SERPL-MCNC: 9 MG/DL (ref 8.6–10.4)
CANNABINOIDS UR QL SCN: NEGATIVE
CHLORIDE SERPL-SCNC: 98 MMOL/L (ref 98–107)
CO2 SERPL-SCNC: 24 MMOL/L (ref 20–31)
COCAINE UR QL SCN: NEGATIVE
CREAT SERPL-MCNC: 1.1 MG/DL (ref 0.5–0.9)
EOSINOPHIL # BLD: 0 K/UL (ref 0–0.4)
EOSINOPHILS RELATIVE PERCENT: 0 % (ref 1–4)
ERYTHROCYTE [DISTWIDTH] IN BLOOD BY AUTOMATED COUNT: 17.7 % (ref 11.8–14.4)
FENTANYL UR QL: POSITIVE
GFR, ESTIMATED: 56 ML/MIN/1.73M2
GLUCOSE BLD-MCNC: 107 MG/DL (ref 65–105)
GLUCOSE BLD-MCNC: 115 MG/DL (ref 65–105)
GLUCOSE BLD-MCNC: 67 MG/DL (ref 65–105)
GLUCOSE BLD-MCNC: 75 MG/DL (ref 65–105)
GLUCOSE BLD-MCNC: 79 MG/DL (ref 65–105)
GLUCOSE SERPL-MCNC: 98 MG/DL (ref 70–99)
HCT VFR BLD AUTO: 32.6 % (ref 36.3–47.1)
HGB BLD-MCNC: 9.8 G/DL (ref 11.9–15.1)
IMM GRANULOCYTES # BLD AUTO: 0 K/UL (ref 0–0.3)
IMM GRANULOCYTES NFR BLD: 0 %
LYMPHOCYTES NFR BLD: 1.51 K/UL (ref 1–4.8)
LYMPHOCYTES RELATIVE PERCENT: 12 % (ref 24–44)
MCH RBC QN AUTO: 28.7 PG (ref 25.2–33.5)
MCHC RBC AUTO-ENTMCNC: 30.1 G/DL (ref 28.4–34.8)
MCV RBC AUTO: 95.3 FL (ref 82.6–102.9)
METHADONE UR QL: NEGATIVE
MONOCYTES NFR BLD: 1.76 K/UL (ref 0.2–0.8)
MONOCYTES NFR BLD: 14 % (ref 1–7)
NEUTROPHILS NFR BLD: 74 % (ref 36–66)
NEUTS SEG NFR BLD: 9.33 K/UL (ref 1.8–7.7)
NRBC BLD-RTO: 0.2 PER 100 WBC
OPIATES UR QL SCN: NEGATIVE
OXYCODONE UR QL SCN: POSITIVE
PCP UR QL SCN: NEGATIVE
PLATELET # BLD AUTO: 189 K/UL (ref 138–453)
PMV BLD AUTO: 10.9 FL (ref 8.1–13.5)
POTASSIUM SERPL-SCNC: 4.1 MMOL/L (ref 3.7–5.3)
PROT SERPL-MCNC: 6.7 G/DL (ref 6.4–8.3)
RBC # BLD AUTO: 3.42 M/UL (ref 3.95–5.11)
SODIUM SERPL-SCNC: 133 MMOL/L (ref 135–144)
TEST INFORMATION: ABNORMAL
TROPONIN I SERPL HS-MCNC: 20 NG/L (ref 0–14)
TROPONIN I SERPL HS-MCNC: 21 NG/L (ref 0–14)
WBC OTHER # BLD: 12.6 K/UL (ref 3.5–11.3)

## 2024-09-22 PROCEDURE — 80053 COMPREHEN METABOLIC PANEL: CPT

## 2024-09-22 PROCEDURE — 97110 THERAPEUTIC EXERCISES: CPT

## 2024-09-22 PROCEDURE — 1200000000 HC SEMI PRIVATE

## 2024-09-22 PROCEDURE — 97530 THERAPEUTIC ACTIVITIES: CPT

## 2024-09-22 PROCEDURE — 6370000000 HC RX 637 (ALT 250 FOR IP)

## 2024-09-22 PROCEDURE — 80307 DRUG TEST PRSMV CHEM ANLYZR: CPT

## 2024-09-22 PROCEDURE — G0378 HOSPITAL OBSERVATION PER HR: HCPCS

## 2024-09-22 PROCEDURE — 84484 ASSAY OF TROPONIN QUANT: CPT

## 2024-09-22 PROCEDURE — 2580000003 HC RX 258

## 2024-09-22 PROCEDURE — 36415 COLL VENOUS BLD VENIPUNCTURE: CPT

## 2024-09-22 PROCEDURE — 85025 COMPLETE CBC W/AUTO DIFF WBC: CPT

## 2024-09-22 PROCEDURE — 97167 OT EVAL HIGH COMPLEX 60 MIN: CPT

## 2024-09-22 PROCEDURE — 97129 THER IVNTJ 1ST 15 MIN: CPT

## 2024-09-22 PROCEDURE — 97535 SELF CARE MNGMENT TRAINING: CPT

## 2024-09-22 PROCEDURE — 51798 US URINE CAPACITY MEASURE: CPT

## 2024-09-22 PROCEDURE — 6360000002 HC RX W HCPCS

## 2024-09-22 PROCEDURE — 94761 N-INVAS EAR/PLS OXIMETRY MLT: CPT

## 2024-09-22 PROCEDURE — 99233 SBSQ HOSP IP/OBS HIGH 50: CPT | Performed by: INTERNAL MEDICINE

## 2024-09-22 PROCEDURE — 2700000000 HC OXYGEN THERAPY PER DAY

## 2024-09-22 PROCEDURE — 99222 1ST HOSP IP/OBS MODERATE 55: CPT | Performed by: PSYCHIATRY & NEUROLOGY

## 2024-09-22 PROCEDURE — 97163 PT EVAL HIGH COMPLEX 45 MIN: CPT

## 2024-09-22 PROCEDURE — 82947 ASSAY GLUCOSE BLOOD QUANT: CPT

## 2024-09-22 RX ORDER — ATORVASTATIN CALCIUM 80 MG/1
80 TABLET, FILM COATED ORAL NIGHTLY
Status: DISCONTINUED | OUTPATIENT
Start: 2024-09-22 | End: 2024-09-23 | Stop reason: HOSPADM

## 2024-09-22 RX ORDER — PANTOPRAZOLE SODIUM 40 MG/1
40 TABLET, DELAYED RELEASE ORAL
Status: DISCONTINUED | OUTPATIENT
Start: 2024-09-22 | End: 2024-09-23 | Stop reason: HOSPADM

## 2024-09-22 RX ORDER — PROPRANOLOL HYDROCHLORIDE 80 MG/1
80 CAPSULE, EXTENDED RELEASE ORAL DAILY
Status: DISCONTINUED | OUTPATIENT
Start: 2024-09-22 | End: 2024-09-23 | Stop reason: HOSPADM

## 2024-09-22 RX ORDER — POTASSIUM CHLORIDE 7.45 MG/ML
10 INJECTION INTRAVENOUS PRN
Status: DISCONTINUED | OUTPATIENT
Start: 2024-09-22 | End: 2024-09-23 | Stop reason: HOSPADM

## 2024-09-22 RX ORDER — DEXTROSE MONOHYDRATE 100 MG/ML
INJECTION, SOLUTION INTRAVENOUS CONTINUOUS PRN
Status: DISCONTINUED | OUTPATIENT
Start: 2024-09-22 | End: 2024-09-23 | Stop reason: HOSPADM

## 2024-09-22 RX ORDER — HALOPERIDOL 5 MG/ML
INJECTION INTRAMUSCULAR
Status: COMPLETED
Start: 2024-09-22 | End: 2024-09-22

## 2024-09-22 RX ORDER — LANOLIN ALCOHOL/MO/W.PET/CERES
1000 CREAM (GRAM) TOPICAL DAILY
Status: DISCONTINUED | OUTPATIENT
Start: 2024-09-22 | End: 2024-09-23 | Stop reason: HOSPADM

## 2024-09-22 RX ORDER — GABAPENTIN 300 MG/1
300 CAPSULE ORAL 3 TIMES DAILY
Status: DISCONTINUED | OUTPATIENT
Start: 2024-09-22 | End: 2024-09-23 | Stop reason: HOSPADM

## 2024-09-22 RX ORDER — HALOPERIDOL 5 MG/ML
5 INJECTION INTRAMUSCULAR ONCE
Status: COMPLETED | OUTPATIENT
Start: 2024-09-22 | End: 2024-09-22

## 2024-09-22 RX ORDER — ONDANSETRON 4 MG/1
4 TABLET, ORALLY DISINTEGRATING ORAL EVERY 8 HOURS PRN
Status: DISCONTINUED | OUTPATIENT
Start: 2024-09-22 | End: 2024-09-23 | Stop reason: HOSPADM

## 2024-09-22 RX ORDER — SODIUM CHLORIDE 9 MG/ML
INJECTION, SOLUTION INTRAVENOUS CONTINUOUS
Status: DISCONTINUED | OUTPATIENT
Start: 2024-09-22 | End: 2024-09-23 | Stop reason: HOSPADM

## 2024-09-22 RX ORDER — OXYCODONE AND ACETAMINOPHEN 10; 325 MG/1; MG/1
1 TABLET ORAL EVERY 6 HOURS PRN
Status: DISCONTINUED | OUTPATIENT
Start: 2024-09-22 | End: 2024-09-23 | Stop reason: HOSPADM

## 2024-09-22 RX ORDER — SODIUM CHLORIDE 0.9 % (FLUSH) 0.9 %
5-40 SYRINGE (ML) INJECTION PRN
Status: DISCONTINUED | OUTPATIENT
Start: 2024-09-22 | End: 2024-09-23 | Stop reason: HOSPADM

## 2024-09-22 RX ORDER — ACETAMINOPHEN 650 MG/1
650 SUPPOSITORY RECTAL EVERY 6 HOURS PRN
Status: DISCONTINUED | OUTPATIENT
Start: 2024-09-22 | End: 2024-09-23 | Stop reason: HOSPADM

## 2024-09-22 RX ORDER — INSULIN LISPRO 100 [IU]/ML
0-4 INJECTION, SOLUTION INTRAVENOUS; SUBCUTANEOUS NIGHTLY
Status: DISCONTINUED | OUTPATIENT
Start: 2024-09-22 | End: 2024-09-23 | Stop reason: HOSPADM

## 2024-09-22 RX ORDER — SODIUM CHLORIDE 0.9 % (FLUSH) 0.9 %
5-40 SYRINGE (ML) INJECTION EVERY 12 HOURS SCHEDULED
Status: DISCONTINUED | OUTPATIENT
Start: 2024-09-22 | End: 2024-09-23 | Stop reason: HOSPADM

## 2024-09-22 RX ORDER — FENTANYL CITRATE 0.05 MG/ML
25 INJECTION, SOLUTION INTRAMUSCULAR; INTRAVENOUS
Status: DISCONTINUED | OUTPATIENT
Start: 2024-09-22 | End: 2024-09-22

## 2024-09-22 RX ORDER — SODIUM CHLORIDE 9 MG/ML
INJECTION, SOLUTION INTRAVENOUS PRN
Status: DISCONTINUED | OUTPATIENT
Start: 2024-09-22 | End: 2024-09-23 | Stop reason: HOSPADM

## 2024-09-22 RX ORDER — ONDANSETRON 2 MG/ML
4 INJECTION INTRAMUSCULAR; INTRAVENOUS EVERY 6 HOURS PRN
Status: DISCONTINUED | OUTPATIENT
Start: 2024-09-22 | End: 2024-09-23 | Stop reason: HOSPADM

## 2024-09-22 RX ORDER — MAGNESIUM SULFATE IN WATER 40 MG/ML
2000 INJECTION, SOLUTION INTRAVENOUS PRN
Status: DISCONTINUED | OUTPATIENT
Start: 2024-09-22 | End: 2024-09-23 | Stop reason: HOSPADM

## 2024-09-22 RX ORDER — MIDAZOLAM HYDROCHLORIDE 1 MG/ML
1 INJECTION INTRAMUSCULAR; INTRAVENOUS ONCE
Status: DISCONTINUED | OUTPATIENT
Start: 2024-09-22 | End: 2024-09-23 | Stop reason: HOSPADM

## 2024-09-22 RX ORDER — POTASSIUM CHLORIDE 1500 MG/1
40 TABLET, EXTENDED RELEASE ORAL PRN
Status: DISCONTINUED | OUTPATIENT
Start: 2024-09-22 | End: 2024-09-23 | Stop reason: HOSPADM

## 2024-09-22 RX ORDER — FERROUS SULFATE 325(65) MG
325 TABLET, DELAYED RELEASE (ENTERIC COATED) ORAL 2 TIMES DAILY
Status: DISCONTINUED | OUTPATIENT
Start: 2024-09-22 | End: 2024-09-23 | Stop reason: HOSPADM

## 2024-09-22 RX ORDER — INSULIN LISPRO 100 [IU]/ML
0-8 INJECTION, SOLUTION INTRAVENOUS; SUBCUTANEOUS
Status: DISCONTINUED | OUTPATIENT
Start: 2024-09-22 | End: 2024-09-23 | Stop reason: HOSPADM

## 2024-09-22 RX ORDER — ASPIRIN 81 MG/1
81 TABLET, CHEWABLE ORAL DAILY
Status: DISCONTINUED | OUTPATIENT
Start: 2024-09-22 | End: 2024-09-23 | Stop reason: HOSPADM

## 2024-09-22 RX ORDER — CITALOPRAM HYDROBROMIDE 20 MG/1
40 TABLET ORAL DAILY
Status: DISCONTINUED | OUTPATIENT
Start: 2024-09-22 | End: 2024-09-23 | Stop reason: HOSPADM

## 2024-09-22 RX ORDER — ACETAMINOPHEN 325 MG/1
650 TABLET ORAL EVERY 6 HOURS PRN
Status: DISCONTINUED | OUTPATIENT
Start: 2024-09-22 | End: 2024-09-23 | Stop reason: HOSPADM

## 2024-09-22 RX ADMIN — CITALOPRAM HYDROBROMIDE 40 MG: 20 TABLET ORAL at 09:43

## 2024-09-22 RX ADMIN — SODIUM CHLORIDE: 9 INJECTION, SOLUTION INTRAVENOUS at 02:00

## 2024-09-22 RX ADMIN — PROPRANOLOL HYDROCHLORIDE 80 MG: 80 CAPSULE, EXTENDED RELEASE ORAL at 09:42

## 2024-09-22 RX ADMIN — HALOPERIDOL LACTATE 5 MG: 5 INJECTION, SOLUTION INTRAMUSCULAR at 01:21

## 2024-09-22 RX ADMIN — CYANOCOBALAMIN TAB 1000 MCG 1000 MCG: 1000 TAB at 09:42

## 2024-09-22 RX ADMIN — Medication 12.5 MG: at 09:43

## 2024-09-22 RX ADMIN — ATORVASTATIN CALCIUM 80 MG: 80 TABLET, FILM COATED ORAL at 21:16

## 2024-09-22 RX ADMIN — PANTOPRAZOLE SODIUM 40 MG: 40 TABLET, DELAYED RELEASE ORAL at 09:43

## 2024-09-22 RX ADMIN — HALOPERIDOL 5 MG: 5 INJECTION INTRAMUSCULAR at 01:21

## 2024-09-22 RX ADMIN — FERROUS SULFATE TAB EC 325 MG (65 MG FE EQUIVALENT) 325 MG: 325 (65 FE) TABLET DELAYED RESPONSE at 21:16

## 2024-09-22 RX ADMIN — ASPIRIN 81 MG CHEWABLE TABLET 81 MG: 81 TABLET CHEWABLE at 09:42

## 2024-09-22 RX ADMIN — APIXABAN 2.5 MG: 2.5 TABLET, FILM COATED ORAL at 09:43

## 2024-09-22 RX ADMIN — FERROUS SULFATE TAB EC 325 MG (65 MG FE EQUIVALENT) 325 MG: 325 (65 FE) TABLET DELAYED RESPONSE at 09:43

## 2024-09-22 RX ADMIN — SODIUM CHLORIDE: 9 INJECTION, SOLUTION INTRAVENOUS at 21:18

## 2024-09-22 RX ADMIN — APIXABAN 2.5 MG: 2.5 TABLET, FILM COATED ORAL at 21:16

## 2024-09-22 RX ADMIN — GABAPENTIN 300 MG: 300 CAPSULE ORAL at 09:43

## 2024-09-22 RX ADMIN — FENTANYL CITRATE 25 MCG: 0.05 INJECTION, SOLUTION INTRAMUSCULAR; INTRAVENOUS at 03:05

## 2024-09-22 ASSESSMENT — PAIN SCALES - GENERAL
PAINLEVEL_OUTOF10: 10
PAINLEVEL_OUTOF10: 6

## 2024-09-22 ASSESSMENT — LIFESTYLE VARIABLES: HOW OFTEN DO YOU HAVE A DRINK CONTAINING ALCOHOL: NEVER

## 2024-09-22 ASSESSMENT — PAIN DESCRIPTION - DESCRIPTORS: DESCRIPTORS: ACHING;DISCOMFORT;SORE;TENDER

## 2024-09-22 ASSESSMENT — PAIN DESCRIPTION - LOCATION: LOCATION: LEG

## 2024-09-22 ASSESSMENT — PAIN DESCRIPTION - ORIENTATION: ORIENTATION: RIGHT

## 2024-09-22 NOTE — ED PROVIDER NOTES
and reactive to light.   Cardiovascular:      Rate and Rhythm: Normal rate and regular rhythm.      Heart sounds: Normal heart sounds.   Pulmonary:      Effort: Pulmonary effort is normal. No respiratory distress.      Breath sounds: Normal breath sounds.   Abdominal:      General: Bowel sounds are normal.      Palpations: Abdomen is soft.      Tenderness: There is no abdominal tenderness.   Musculoskeletal:         General: Normal range of motion.   Skin:     General: Skin is warm and dry.   Neurological:      Mental Status: She is alert.      Comments: Patient is somewhat drowsy.          MEDICAL DECISION MAKING / ED COURSE:   Summary of Patient Presentation:        1)  Number and Complexity of Problems  Problem List This Visit: Altered mental status    Differential Diagnosis: Medication overuse, CVA    Pertinent Comorbid Conditions: Recent knee surgery, history of CVA    2)  Data Reviewed  My EKG interpretation: Sinus rhythm ventricular rate 65  Nonspecific T wave flattening    QTc 443      See more data below for the lab and radiology tests and orders.    3)  Treatment and Disposition    Disposition discussion with patient/family: Plan for admission.  CTA head and neck ordered here in the ED given persistence of confusion.  Case signed out to Dr. Reyes at shift change.      \"ED Course\" Notes From Epic Narrator:  ED Course as of 09/21/24 2120   Sat Sep 21, 2024   2118 Patient reevaluated.  She is alert to person and place and time however still seems confused.  When I asked her who would be picking her up she states \"[facility].\" [EG]      ED Course User Index  [EG] Goldberger, Erica B, MD         CRITICAL CARE:       PROCEDURES:    Procedures      DATA FOR LAB AND RADIOLOGY TESTS ORDERED BELOW ARE REVIEWED BY THE ED CLINICIAN:    RADIOLOGY: All x-rays, CT, MRI, and formal ultrasound images (except ED bedside ultrasound) are read by the radiologist, see reports below, unless otherwise noted in MDM or here.

## 2024-09-22 NOTE — ED PROVIDER NOTES
ADDENDUM:        Care of this patient was assumed from Dr. Goldberger    at   9:30 PM on 9/21/2024   .  The patient was seen for Fatigue (Takes percocet for pain. EMS states son saw mom walking with walker and dozing off. )  .  The patient's initial evaluation and plan have been discussed with the prior provider who initially evaluated the patient.  Nursing Notes, Past Medical Hx, Past Surgical Hx, Social Hx, Allergies, and Family Hx were all reviewed.      I performed a repeat evaluation of the patient and reviewed tests completed so far.    Patient experiencing confusion since her surgery 2 days ago.  Son is concerned as he states that she had worsening confusion.  I received patient in signout for final disposition and diagnosis pending CT imaging ordered by Dr. Goldberger.  Her plan was admission for further evaluation of patient's symptoms.  CT imaging shows no large vessel occlusion or acute process intracranially at this time.  I did discuss with hospitalist who accepts admission at this time.  Patient awaits further treatment and evaluation.  Stable at this time.      ED Course     ED Course as of 09/22/24 0412   Sat Sep 21, 2024   2118 Patient reevaluated.  She is alert to person and place and time however still seems confused.  When I asked her who would be picking her up she states \"[facility].\" [EG]   2130 Discussed case with patient's son via phone.  He reports some issues with confusion ongoing for some time now after her stroke a couple years ago.  He reported she seemed more confused after leaving the hospital which has not really seem to resolved. [EG]   2130 I received patient in signout from Dr. Goldberger for final diagnosis and disposition.  Patient has been experiencing confusion since her surgery 2 days ago but worsened today and son is concerned.  On my examination patient is alert and oriented x 2 but otherwise neurologically intact with normal strength and range of motion in bilateral upper  lactate (HALDOL) injection 5 mg    haloperidol lactate (HALDOL) 5 MG/ML injection     Phyllis Woodward: cabinet override    fentaNYL (SUBLIMAZE) injection 25 mcg    midazolam (VERSED) injection 1 mg    QUEtiapine (SEROQUEL) split-tablet 12.5 mg       RECENT VITALS:  BP: 138/77, Temp: 99.7 °F (37.6 °C), Pulse: 61, Respirations: 16     RADIOLOGY:All plain film, CT, MRI, and formal ultrasound images (except ED bedside ultrasound) are read by the radiologist and the images and interpretations are directly viewed by the emergency physician.   CTA HEAD NECK W CONTRAST   Final Result   No flow limiting stenosis or large vessel occlusion detected within the head   or neck.         CT HEAD WO CONTRAST   Final Result   No acute intracranial abnormality.         XR CHEST PORTABLE   Final Result   1. Mild atelectatic changes right lower lobe.   2. Mild congestion.   3. Moderate hiatal hernia.         MRI brain without contrast    (Results Pending)         LABS: All lab results were reviewed by myself, and all abnormals are listed below.  Labs Reviewed   CBC WITH AUTO DIFFERENTIAL - Abnormal; Notable for the following components:       Result Value    WBC 11.5 (*)     RBC 3.17 (*)     Hemoglobin 9.0 (*)     Hematocrit 29.1 (*)     RDW 17.5 (*)     Neutrophils % 79 (*)     Lymphocytes % 9 (*)     Eosinophils % 0 (*)     Neutrophils Absolute 8.97 (*)     Lymphocytes Absolute 1.01 (*)     Monocytes Absolute 1.39 (*)     All other components within normal limits   COMPREHENSIVE METABOLIC PANEL - Abnormal; Notable for the following components:    Sodium 134 (*)     Glucose 119 (*)     Creatinine 1.1 (*)     Est, Glom Filt Rate 56 (*)     Total Protein 6.1 (*)     Albumin 3.4 (*)     Alkaline Phosphatase 120 (*)     AST 48 (*)     All other components within normal limits   URINALYSIS WITH REFLEX TO CULTURE - Abnormal; Notable for the following components:    Specific Gravity, UA 1.035 (*)     Urine Hgb 2+ (*)     Protein, UA 1+

## 2024-09-22 NOTE — PROGRESS NOTES
provider] oxyCODONE-acetaminophen, glucose, dextrose bolus **OR** dextrose bolus, glucagon (rDNA), dextrose, potassium chloride **OR** potassium alternative oral replacement **OR** potassium chloride, ondansetron **OR** ondansetron, acetaminophen **OR** acetaminophen, sodium chloride flush, sodium chloride, magnesium sulfate, fentanNYL, sodium chloride flush    Data:     Past Medical History:   has a past medical history of Acid reflux, Adult ADHD, Asthma, Awareness under anesthesia, Balance problem, Bipolar 1 disorder ([facility]), Broken foot, Cerebral artery occlusion with cerebral infarction ([facility]), Chest pain of uncertain etiology, CKD (chronic kidney disease), Depression, Diabetes mellitus ([facility]), DVT of leg (deep venous thrombosis) ([facility]), Fracture, Fractured nose, Headache, Helicobacter pylori (H. pylori) infection, Hematuria, Hiatal hernia, History of blood transfusion, History of DVT (deep vein thrombosis), History of myasthenia gravis, Hx of blood clots, Hypertension, Internal hemorrhoids, Memory loss, Migraine, Narcolepsy, Numbness and tingling in both hands, Osteoporosis, Sleep apnea, Under care of team, and Under care of team.    Social History:   reports that she quit smoking about 35 years ago. Her smoking use included cigarettes. She started smoking about 55 years ago. She has a 30 pack-year smoking history. She has been exposed to tobacco smoke. She has never used smokeless tobacco. She reports that she does not currently use alcohol. She reports that she does not use drugs.     Family History:   Family History   Problem Relation Age of Onset    Cancer Mother     Hypertension Mother     Stroke Mother     Dementia Mother     Migraines Mother     Cancer Maternal Aunt     Hypertension Father     Heart Surgery Father     Alzheimer's Disease Father     Diabetes Neg Hx        Vitals:  /71   Pulse 65   Temp 97.9 °F (36.6 °C) (Axillary)   Resp 28   Ht 1.524 m (5')   Wt 72.4 kg (159 lb 9 oz)   SpO2 97%    PORTABLE    Result Date: 9/21/2024  1. Mild atelectatic changes right lower lobe. 2. Mild congestion. 3. Moderate hiatal hernia.       Physical Examination:        General appearance:  alert, cooperative and no distress  Mental Status:  oriented to person only; confused  Lungs:  clear to auscultation bilaterally, normal effort  Heart:  regular rate and rhythm, no murmur  Abdomen:  soft, nontender, nondistended, normal bowel sounds, no masses, hepatomegaly, splenomegaly  Extremities:  no redness, tenderness in the calves; rle swelling, bandaged-possibly surgical dressing  Skin:  no gross lesions, rashes, induration    Assessment:        Hospital Problems             Last Modified POA    * (Principal) Altered mental status, unspecified 9/21/2024 Yes    Stage 3b chronic kidney disease (HCC) 9/22/2024 Yes    MCI (mild cognitive impairment) with memory loss 9/22/2024 Yes    Attention deficit hyperactivity disorder (ADHD) 9/22/2024 Yes    Narcolepsy 9/22/2024 Yes    Moderate persistent asthma without complication 9/22/2024 Yes    History of DVT (deep vein thrombosis) 9/22/2024 Yes    Hiatal hernia 9/22/2024 Yes    Cervical radiculopathy 9/22/2024 Yes    Acquired trigger finger 9/22/2024 Yes    Osteoarthritis of wrist 9/22/2024 Yes    Essential hypertension 9/22/2024 Yes    Chronic anticoagulation 9/22/2024 Yes    Iron deficiency anemia due to chronic blood loss 9/22/2024 Yes    GERD (gastroesophageal reflux disease) 9/22/2024 Yes    Generalized anxiety disorder 9/22/2024 Yes    Vitamin B12 deficiency 9/22/2024 Yes    Chronic fatigue syndrome 9/22/2024 Yes    KHALIDA (obstructive sleep apnea) 9/22/2024 Yes    Lumbosacral spondylosis without myelopathy 9/22/2024 Yes    Opioid use, unspecified with unspecified opioid-induced disorder 9/22/2024 Yes    Ischemic stroke (HCC) 9/22/2024 Yes    Dyslipidemia 9/22/2024 Yes    Arthritis of right knee 9/22/2024 Yes       Plan:        Will ask ortho to see to make sure no issues from

## 2024-09-22 NOTE — CONSULTS
[facility] Neurology   IN-PATIENT SERVICE      NEUROLOGY CONSULT  NOTE            Date:   9/22/2024  Patient name:  Latisha Prasad  Date of admission:  9/21/2024  YOB: 1960      Chief Complaint:     Chief Complaint   Patient presents with    Fatigue     Takes percocet for pain. EMS states son saw mom walking with walker and dozing off.        Reason for Consult:      AMS    History of Present Illness:     The patient is a 64 y.o. female who presents with Fatigue (Takes percocet for pain. EMS states son saw mom walking with walker and dozing off. )  . The patient was seen and examined and the chart was reviewed.     This is a 64 year old female with history of CVA ( since then has had memory issues), bipolar, depression, cervical neck pain/ radiculopathy, KHALIDA noncompliant with CPAP. She had a L subcortical acute stroke in 2022 with remaining speech hesitancy.     She follows with Dr. Leigh outpatient. Was seen in office on 4/17/2024 for memory complaints.    Neurological testing includes:    EEG 02/01/2024:   This EEG was abnormal. Frequent left mid temporal polymorphic delta slowing suggested underlying structural defect.  XR lumbar spine 10/30/2023: Degenerative changes with abnormal alignment.   XR cervical spine flexion and extension 04/07/2023: No dynamic instability with flexion or extension. Multilevel degenerative change.   CT cervical spine 02/07/2023: No acute abnormality of the cervical spine.   CT of head without contrast 02/07/2023: No acute intracranial abnormality. No change from prior examination.   MRI Cervical Spine W WO Contrast on 11/2/2022: Multilevel degenerative disc disease with uncovertebral and facet hypertrophy resulting in mild canal stenosis at C3-4 and C6-7.  MRI Brain WO Contrast 7/14/2022: Chronic microvascular disease without acute intracranial abnormality. Remote lacunar infarct in the left basal ganglia  CT Head WO Contrast 7/12/2022: No acute intracranial

## 2024-09-22 NOTE — PLAN OF CARE
Patient came to us with Altered mental status.  She knows her name and that she is at a hospital .  She is up with a walker and one assist to use the bedside commode. Patient's son seen the patient up with a walker and her eyes were closed.  Patient seems confused and impulsive. She does not use the call light appropriately. She is sometimes alert and sometimes seemingly Delirious. Waffle mattress and Bed alarm are in place and on.  Telesitter is currently in the room.  Frequent rounding is needed for patient safety.  Plan of care is ongoing.      Siderails up x 2  Hourly rounding  Call light in reach  Instructed to call for assist before attempting out of bed.  Remains free from falls and accidental injury at this time   Floor free from obstacles  Bed is locked and in lowest position  Adequate lighting provided  Bed alarm on, Red Falling star and Stay with Me signs posted      Problem: Safety - Adult  Goal: Free from fall injury  Outcome: Progressing     Problem: ABCDS Injury Assessment  Goal: Absence of physical injury  Outcome: Progressing     Checked for incontinence every 2 hours and prn.  Pericare as needed.  Assisted to reposition off back frequently.  On waffle mattress.  Heels off bed with pillows.    Problem: Skin/Tissue Integrity  Goal: Absence of new skin breakdown  Description: 1.  Monitor for areas of redness and/or skin breakdown  2.  Assess vascular access sites hourly  3.  Every 4-6 hours minimum:  Change oxygen saturation probe site  4.  Every 4-6 hours:  If on nasal continuous positive airway pressure, respiratory therapy assess nares and determine need for appliance change or resting period.  Outcome: Progressing     Problem: Confusion  Goal: Confusion, delirium, dementia, or psychosis is improved or at baseline  Description: INTERVENTIONS:  1. Assess for possible contributors to thought disturbance, including medications, impaired vision or hearing, underlying metabolic abnormalities,

## 2024-09-22 NOTE — PROGRESS NOTES
Dr Pretty in to see the patient. Aquacel dressing removed from right knee. Incision site was cleansed with CHG and covered with ABD and paper tape.

## 2024-09-22 NOTE — PROGRESS NOTES
Patient didn't eat her dinner. Just taking little sips of Pepsi. Currently resting in bed. Sitter at bedside and video monitoring still in place. No pain meds were given today. Will continue to monitor the patient.

## 2024-09-22 NOTE — PROGRESS NOTES
[] Medication Reconciliation was completed and the patient's home medication list was verified. The Med List Status has been marked \"Complete\". The following sources were used to assist with Medication Reconciliation:    [] Patient had a list of medications which was transcribed into the EHR.    [] Patient provided bottles of their medications    [] Home medications reviewed and confirmed with     [] Contacted patient's pharmacy to confirm home medications    [] Contacted patient's physician office to confirm home medications    [] Medical Records from another facility and/or Care Everywhere were reviewed    OR    [x] There are one or more home medications that need clarification before Medication Reconciliation can be completed. The Med List Status has been marked as In Progress. To assist with Home Medication Reconciliation the following actions have been taken:    [x] Pharmacy medication reconciliation service requested. (Note: This can be done by sending a Perfect Serve message to [facility] Pharmacist or by phoning [phone].)    [] Family requested to bring medications into the hospital    [] Family requested to call hospital with medication list    [] Message left with physician office    [] Request for medical records made to     [] Other

## 2024-09-22 NOTE — PROGRESS NOTES
EEG will be done tomorrow as there is no one to do today. Patient is much calmer right now. Ace wrap and fluff dressing on the right knee removed. Right knee still has Aquacel dressing on it and a small area of drainage was noted on it. Area cleaned with CHG. Dr Pretty has been consulted for this patient. Sitter at bedside and video monitoring still going on. Will continue to monitor the patient.

## 2024-09-22 NOTE — H&P
..  [facility]  Office: [phone]  Miguel Angel Monroe DO, Tristian Huerta, DO, William Barnett DO, Torito Ryan, DO, Asim Tinajero MD, Nemo Moore MD, Cassy Monroy MD, Beti Mcgee MD,  Cristian Diez MD, Richie Pichardo MD, Aleksandr Barroso MD,  Daniel Saolmon DO, Joi Schafer MD, Ammon Lew MD, Adebayo Monroe DO, Shanthi Cannon MD,  Haroldo Wynn DO, Carol Tomlinson MD, Priscila Rivera MD, Bettina Colon MD, Lexie Duvall MD,  Hipolito Mcdermott MD, Rogerio Bey MD, Jazmín Pacheco MD, Tu Pinedo MD, Caio Gross MD, Diane Kee MD, Schuyler Rodriguez DO, Silver Etienne DO, Jordan Lima DO, Rom Thompson MD, Shirley Waterhouse, CNP,  Roxana Ferris, CNP, Schuyler Land, CNP,  Yolanda Chavarria, DNP, Sabrina Moody, CNP, Alexa Womack, CNP, Maggi Hackett, CNP, Shari Smomer, CNP, Patti Padilla, PA-C, Fadumo Cole PA-C, Altagracia Kraft, CNP, Eugenio Avalos, CNP,  Elsa Carvajal, CNP, Mindi Casey, CNP, Asya Jaramillo, CNP, Lia Gonsalves, CNS, Johana Acevedo, CNP, Jayshree Browne, CNP, Tracy Schwab, CNP         [facility]   IN-PATIENT SERVICE   [facility]    HISTORY AND PHYSICAL EXAMINATION            Date:   9/22/2024  Patient name:  Latisha Prasad  Date of admission:  9/21/2024  6:34 PM  MRN:   [id number]  Account:  [id number]  YOB: 1960  PCP:    Nicholas Conway DO  Room:   A/A  Code Status:    Prior    Chief Complaint:     Chief Complaint   Patient presents with    Fatigue     Takes percocet for pain. EMS states son saw mom walking with walker and dozing off.      History Obtained From:     patient, electronic medical record    History of Present Illness:     Latisha Prasad is a 64 y.o. Non- / non  female who presents with Fatigue (Takes percocet for pain. EMS states son saw mom walking with walker and dozing off. )   and is admitted to the hospital for the management of Altered mental status, unspecified.    Patient

## 2024-09-22 NOTE — ED NOTES
Pt calling out needing to urinate after education on purewick and redirection of placement of purewick and it's purpose. Patient continue to state \"I need to pee.\" Writer tried to redirect pt with urination of purewick as she is unable to get out of bed with no success.

## 2024-09-22 NOTE — PROGRESS NOTES
Physical Therapy  Facility/Department: [facility] MED SURG  Physical Therapy Initial Assessment  TKA Readmit. From Dr. Pretty    Name: Latisha Prasad  : 1960  MRN: [id number]  Date of Service: 2024    Discharge Recommendations:Pt currently functioning below baseline.  Recommend daily inpatient skilled therapy at time of discharge to maximize long term outcomes and prevent re-admission. Please refer to AM-PAC score for current level of function.         Patient came to us with Altered mental status. She knows her name and that she is at a hospital . She is up with a walker and one assist to use the bedside commode. Patient's son seen the patient up with a walker and her eyes were closed. Patient seems confused and impulsive. She does not use the call light appropriately. She is sometimes alert and sometimes seemingly Delirious. Waffle mattress and Bed alarm are in place and on. Telesitter is currently in the room. Frequent rounding is needed for patient safety. Plan of care is ongoing.       PT Equipment Recommendations  Other: Pt currently functioning below baseline.  Recommend daily inpatient skilled therapy at time of discharge to maximize long term outcomes and prevent re-admission. Please refer to AM-PAC score for current level of function.      Patient Diagnosis(es): The encounter diagnosis was Fatigue, unspecified type.  Past Medical History:  has a past medical history of Acid reflux, Adult ADHD, Asthma, Awareness under anesthesia, Balance problem, Bipolar 1 disorder ([facility]), Broken foot, Cerebral artery occlusion with cerebral infarction ([facility]), Chest pain of uncertain etiology, CKD (chronic kidney disease), Depression, Diabetes mellitus ([facility]), DVT of leg (deep venous thrombosis) ([facility]), Fracture, Fractured nose, Headache, Helicobacter pylori (H. pylori) infection, Hematuria, Hiatal hernia, History of blood transfusion, History of DVT (deep vein thrombosis), History of myasthenia gravis, Hx of blood clots,  to stop. Pt. lacking 25 degrees from extension and flexing to 55 degrees.  AROM LLE (degrees)  LLE AROM : WFL              Bed mobility  Supine to Sit: Maximum assistance;2 Person assistance  Sit to Supine: Maximum assistance;2 Person assistance  Transfers  Comment: Attempted to have pt. stand but she was not willing. On one attempt, pt. performed a fwd. wt. shift enough to unload bottom and attempt to scoot to Rt. with max x 2. Pt. sat EOB for 10 min., falling asleep and losing balance posteriorly and to L. When awake, pt. could maintain balance CGA. Otherwise needed max A to maintain balance at times.  Ambulation  Comments: Could not get pt. to standing this session. Eyes closed and not following commands.        PROM Exercises: Pt. assisted very minimally initially and ended up being PROM x both LEs. Stretches held into knee flexion and knee extension Rt. knee along with bilat. heel cord stretches. Pt. not awake enough to participate.  Static Sitting Balance Exercises: CGA when awake, falling asleep and needing up to max A to maintain balance       OutComes Score                                                  AM-PAC - Mobility    AM-PAC Basic Mobility - Inpatient   How much help is needed turning from your back to your side while in a flat bed without using bedrails?: A Lot  How much help is needed moving from lying on your back to sitting on the side of a flat bed without using bedrails?: A Lot  How much help is needed moving to and from a bed to a chair?: Total  How much help is needed standing up from a chair using your arms?: A Lot  How much help is needed walking in hospital room?: Total  How much help is needed climbing 3-5 steps with a railing?: Total  AM-PAC Inpatient Mobility Raw Score : 9  AM-PAC Inpatient T-Scale Score : 30.55  Mobility Inpatient CMS 0-100% Score: 81.38  Mobility Inpatient CMS G-Code Modifier : CM         Tinneti Score       Goals  Short Term Goals  Time Frame for Short Term Goals:
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
## 2024-09-22 NOTE — ED NOTES
Dr. Goldberger at bedside with writer to examine pts left knee. Pt' s leg warm to touch with capillary refill less than three seconds in her toes. Writer wrapped patient's leg with three ABD bandages, with synthetic under cast padding and ACE wrap as pt had taken her original wrapping off prior to arriving to ED.     When asked where pt was at she responded with \"I'm at [facility], I want to go home.\" Dr. Goldberger asked how would you get home? Pt responded with \"I'm going to get home by [facility]\"     Patient repositioned with new purewick placed. Warm blankets provided for comfort call light within reach.

## 2024-09-22 NOTE — PROGRESS NOTES
Occupational Therapy  Facility/Department: [facility] MED SURG  Occupational Therapy Initial Assessment    Name: Latisha Prasad  : 1960  MRN: [id number]  Date of Service: 2024    Discharge Recommendations:  Patient would benefit from continued therapy after discharge  OT Equipment Recommendations  Other: Pt currently functioning below baseline.  Recommend daily inpatient skilled therapy at time of discharge to maximize long term outcomes and prevent re-admission. Please refer to AM-PAC score for current level of function.       Patient Diagnosis(es): The encounter diagnosis was Fatigue, unspecified type.  Past Medical History:  has a past medical history of Acid reflux, Adult ADHD, Asthma, Awareness under anesthesia, Balance problem, Bipolar 1 disorder (HCC), Broken foot, Cerebral artery occlusion with cerebral infarction (HCC), Chest pain of uncertain etiology, CKD (chronic kidney disease), Depression, Diabetes mellitus ([facility]), DVT of leg (deep venous thrombosis) ([facility]), Fracture, Fractured nose, Headache, Helicobacter pylori (H. pylori) infection, Hematuria, Hiatal hernia, History of blood transfusion, History of DVT (deep vein thrombosis), History of myasthenia gravis, Hx of blood clots, Hypertension, Internal hemorrhoids, Memory loss, Migraine, Narcolepsy, Numbness and tingling in both hands, Osteoporosis, Sleep apnea, Under care of team, and Under care of team.  Past Surgical History:  has a past surgical history that includes  section; Nose surgery; Knee arthroscopy (Left); Wrist surgery; Upper gastrointestinal endoscopy (2013); Colonoscopy (2013); Upper gastrointestinal endoscopy (2016); Foot surgery (Left, ); Arm Surgery (Right); knee surgery (Right); vaginal dilatation; Ankle surgery (Left, 2018); pr office/outpt visit,procedure only (Left, 2018); Foot Tendon Surgery (Left, 2019); ligament repair (Left, 2019); Upper gastrointestinal endoscopy (N/A,  follow through, dirction following, and insight into deficits. Currently, pt required 2 assist for bed mob this session and is unable to stand. Pt will need reassessemnt and with current deficits, Pt HIGH risk for falls & requires continued OT to maximize independence with functional mobility, balance, safety awareness & activity tolerance.  Prognosis: Good;Fair  Decision Making: High Complexity  REQUIRES OT FOLLOW-UP: Yes  Activity Tolerance  Activity Tolerance: Treatment limited secondary to decreased cognition (pt too lethargic to participate more.)     Plan  Occupational Therapy Plan  Times Per Week: 5-6x/week  Current Treatment Recommendations: Balance training, Strengthening, Functional mobility training, Endurance training, Safety education & training, Self-Care / ADL, Patient/Caregiver education & training, Equipment evaluation, education, & procurement, Positioning, Pain management, Cognitive reorientation, Cognitive/Perceptual training    Restrictions  Restrictions/Precautions  Restrictions/Precautions: Fall Risk, General Precautions, Up as Tolerated, Bed Alarm, Weight Bearing  Lower Extremity Weight Bearing Restrictions  Right Lower Extremity Weight Bearing: Weight Bearing As Tolerated  Position Activity Restriction  Other position/activity restrictions: Pt had R TKA 9/19/2024 by Dr. Pretty. Pt still has ace wrap on- RN stating they are consulting ortho again to clarify when this can come off and if she has gisela hose orders. Pt. with telesitter and 1:1 in room.    Subjective  General  Chart Reviewed: Yes  Patient assessed for rehabilitation services?: Yes  Additional Pertinent Hx: pt had R TKA 9/19/2024, went home same day with family support/assist. History of psyciatric issues, stroke in April of 2024 which resulted in expressive aphaia, and per chart, worsening cognitive decline since then as well. At previous OT evaluations/treatments, pt with dec safety awareness, dec follow through on all education,

## 2024-09-22 NOTE — PLAN OF CARE
Patient very tachypneic and diaphoretic. On 2 L of O2 via NC currently. O2 sat on room air 96% and on 2 L it is 100%. Patient is only alert and oriented to self. Dr Ryan notified about the above. Patient is very impulsive and trying to get out of bed so sitter is at bedside for patient's safety and reorientation. Video monitoring is also in place that was initiated by night staff. Patient ate some of breakfast and took morning meds with no problem. No pain reported by the patient at the time of assessment. Patient has finally calmed down and is sleeping currently. Will continue to monitor and reassess the patient.    Problem: Discharge Planning  Goal: Discharge to home or other facility with appropriate resources  9/22/2024 1002 by Lakisha Hillman RN  Outcome: Progressing  Flowsheets (Taken 9/22/2024 0940)  Discharge to home or other facility with appropriate resources:   Identify barriers to discharge with patient and caregiver   Arrange for needed discharge resources and transportation as appropriate   Identify discharge learning needs (meds, wound care, etc)  9/22/2024 0538 by Kalyn Rosario RN  Outcome: Progressing  Flowsheets  Taken 9/22/2024 0149  Discharge to home or other facility with appropriate resources:   Identify barriers to discharge with patient and caregiver   Identify discharge learning needs (meds, wound care, etc)  Taken 9/22/2024 0130  Discharge to home or other facility with appropriate resources:   Identify barriers to discharge with patient and caregiver   Arrange for needed discharge resources and transportation as appropriate     Problem: Safety - Adult  Goal: Free from fall injury  9/22/2024 1002 by Lakisha Hillman RN  Outcome: Progressing  9/22/2024 0538 by Kalyn Rosario RN  Outcome: Progressing     Problem: ABCDS Injury Assessment  Goal: Absence of physical injury  9/22/2024 1002 by Lakisha Hillman RN  Outcome: Progressing  9/22/2024 0538 by Kalyn Rosario RN  Outcome:

## 2024-09-22 NOTE — PROGRESS NOTES
Pt admitted to room 2010 from ER  Oriented to room and call light/tv controls.  Bed in lowest position, wheels locked, 2/4 side rails up  Call light in reach, room free of clutter, adequate lighting provided.

## 2024-09-23 VITALS
HEIGHT: 60 IN | TEMPERATURE: 98.4 F | HEART RATE: 55 BPM | OXYGEN SATURATION: 96 % | BODY MASS INDEX: 31.33 KG/M2 | RESPIRATION RATE: 17 BRPM | DIASTOLIC BLOOD PRESSURE: 72 MMHG | WEIGHT: 159.56 LBS | SYSTOLIC BLOOD PRESSURE: 114 MMHG

## 2024-09-23 LAB
EKG ATRIAL RATE: 65 BPM
EKG P AXIS: 50 DEGREES
EKG P-R INTERVAL: 140 MS
EKG Q-T INTERVAL: 426 MS
EKG QRS DURATION: 78 MS
EKG QTC CALCULATION (BAZETT): 443 MS
EKG R AXIS: 38 DEGREES
EKG T AXIS: 61 DEGREES
EKG VENTRICULAR RATE: 65 BPM
GLUCOSE BLD-MCNC: 119 MG/DL (ref 65–105)
GLUCOSE BLD-MCNC: 98 MG/DL (ref 65–105)

## 2024-09-23 PROCEDURE — 6370000000 HC RX 637 (ALT 250 FOR IP)

## 2024-09-23 PROCEDURE — 97535 SELF CARE MNGMENT TRAINING: CPT

## 2024-09-23 PROCEDURE — 2700000000 HC OXYGEN THERAPY PER DAY

## 2024-09-23 PROCEDURE — 6360000002 HC RX W HCPCS

## 2024-09-23 PROCEDURE — 97116 GAIT TRAINING THERAPY: CPT

## 2024-09-23 PROCEDURE — 97168 OT RE-EVAL EST PLAN CARE: CPT

## 2024-09-23 PROCEDURE — 99239 HOSP IP/OBS DSCHRG MGMT >30: CPT | Performed by: INTERNAL MEDICINE

## 2024-09-23 PROCEDURE — 2580000003 HC RX 258

## 2024-09-23 PROCEDURE — 82947 ASSAY GLUCOSE BLOOD QUANT: CPT

## 2024-09-23 PROCEDURE — 97164 PT RE-EVAL EST PLAN CARE: CPT

## 2024-09-23 PROCEDURE — 6370000000 HC RX 637 (ALT 250 FOR IP): Performed by: NURSE PRACTITIONER

## 2024-09-23 RX ORDER — OXYCODONE AND ACETAMINOPHEN 5; 325 MG/1; MG/1
1 TABLET ORAL EVERY 6 HOURS PRN
Qty: 20 TABLET | Refills: 0 | Status: SHIPPED | OUTPATIENT
Start: 2024-09-23 | End: 2024-09-28

## 2024-09-23 RX ORDER — LANOLIN ALCOHOL/MO/W.PET/CERES
3 CREAM (GRAM) TOPICAL ONCE
Status: COMPLETED | OUTPATIENT
Start: 2024-09-23 | End: 2024-09-23

## 2024-09-23 RX ORDER — FENTANYL CITRATE 0.05 MG/ML
50 INJECTION, SOLUTION INTRAMUSCULAR; INTRAVENOUS ONCE
Status: COMPLETED | OUTPATIENT
Start: 2024-09-23 | End: 2024-09-23

## 2024-09-23 RX ADMIN — FERROUS SULFATE TAB EC 325 MG (65 MG FE EQUIVALENT) 325 MG: 325 (65 FE) TABLET DELAYED RESPONSE at 09:23

## 2024-09-23 RX ADMIN — ASPIRIN 81 MG CHEWABLE TABLET 81 MG: 81 TABLET CHEWABLE at 09:23

## 2024-09-23 RX ADMIN — CITALOPRAM HYDROBROMIDE 40 MG: 20 TABLET ORAL at 09:23

## 2024-09-23 RX ADMIN — CYANOCOBALAMIN TAB 1000 MCG 1000 MCG: 1000 TAB at 09:23

## 2024-09-23 RX ADMIN — Medication 12.5 MG: at 09:23

## 2024-09-23 RX ADMIN — PANTOPRAZOLE SODIUM 40 MG: 40 TABLET, DELAYED RELEASE ORAL at 06:05

## 2024-09-23 RX ADMIN — SODIUM CHLORIDE, PRESERVATIVE FREE 10 ML: 5 INJECTION INTRAVENOUS at 09:23

## 2024-09-23 RX ADMIN — FENTANYL CITRATE 50 MCG: 0.05 INJECTION, SOLUTION INTRAMUSCULAR; INTRAVENOUS at 03:57

## 2024-09-23 RX ADMIN — APIXABAN 2.5 MG: 2.5 TABLET, FILM COATED ORAL at 09:23

## 2024-09-23 RX ADMIN — PROPRANOLOL HYDROCHLORIDE 80 MG: 80 CAPSULE, EXTENDED RELEASE ORAL at 09:23

## 2024-09-23 RX ADMIN — Medication 3 MG: at 03:57

## 2024-09-23 ASSESSMENT — PAIN DESCRIPTION - ORIENTATION: ORIENTATION: RIGHT

## 2024-09-23 ASSESSMENT — PAIN SCALES - GENERAL: PAINLEVEL_OUTOF10: 10

## 2024-09-23 ASSESSMENT — PAIN DESCRIPTION - DESCRIPTORS: DESCRIPTORS: ACHING

## 2024-09-23 ASSESSMENT — PAIN DESCRIPTION - LOCATION: LOCATION: KNEE

## 2024-09-23 NOTE — PROGRESS NOTES
Occupational Therapy  Facility/Department: [facility] MED SURG  Occupational Therapy Re-Assessment    Name: Latisha Prasad  : 1960  MRN: [id number]  Date of Service: 2024    Discharge Recommendations:  Patient would benefit from continued therapy after discharge, 24 hour supervision or assist  OT Equipment Recommendations  Equipment Needed: Yes  Mobility Devices: ADL Assistive Devices  ADL Assistive Devices: Shower Chair with back  Other: Pt currently functioning below baseline.  Recommend daily inpatient skilled therapy at time of discharge to maximize long term outcomes and prevent re-admission. Please refer to AM-PAC score for current level of function.       Patient Diagnosis(es): The primary encounter diagnosis was Fatigue, unspecified type. A diagnosis of History of total knee arthroplasty, bilateral was also pertinent to this visit.  Past Medical History:  has a past medical history of Acid reflux, Adult ADHD, Asthma, Awareness under anesthesia, Balance problem, Bipolar 1 disorder ([facility]), Broken foot, Cerebral artery occlusion with cerebral infarction ([facility]), Chest pain of uncertain etiology, CKD (chronic kidney disease), Depression, Diabetes mellitus ([facility]), DVT of leg (deep venous thrombosis) ([facility]), Fracture, Fractured nose, Headache, Helicobacter pylori (H. pylori) infection, Hematuria, Hiatal hernia, History of blood transfusion, History of DVT (deep vein thrombosis), History of myasthenia gravis, Hx of blood clots, Hypertension, Internal hemorrhoids, Memory loss, Migraine, Narcolepsy, Numbness and tingling in both hands, Osteoporosis, Sleep apnea, Under care of team, and Under care of team.  Past Surgical History:  has a past surgical history that includes  section; Nose surgery; Knee arthroscopy (Left); Wrist surgery; Upper gastrointestinal endoscopy (2013); Colonoscopy (2013); Upper gastrointestinal endoscopy (2016); Foot surgery (Left, 2015); Arm Surgery (Right); knee    Observation/Palpation  Posture: Fair (flexed posture, rounded shoulders, forward head)  Observation: Dressing on right knee incision, Left IV with ace wrap. Patient in bed upon arrival with 1:1 sitter. Pt remains confused pt is better able to participate today  Safety Devices  Type of Devices: All fall risk precautions in place;Call light within reach;Gait belt;Left in chair;Nurse notified;Chair alarm in place  Restraints  Restraints Initially in Place: No     Toilet Transfers  Toilet - Technique: Ambulating  Equipment Used: Standard toilet  Toilet Transfer: Minimal assistance  Toilet Transfers Comments: mod-max verbal cues to back fully up to toilet to sit, center self, and to use grabbar to sit  AROM: Generally decreased, functional (pt not able to follow directions for ROM or MMT.)  Strength: Generally decreased, functional  Coordination: Generally decreased, functional  Tone: Normal  Sensation:  (pt not able to state)  ADL  Skin Care: Chlorhexidine solution;Foam skin cleanser     Activity Tolerance  Activity Tolerance: Treatment limited secondary to decreased cognition  Bed mobility  Rolling to Left: Modified independent  Rolling to Right: Modified independent  Supine to Sit: Minimal assistance  Scooting: Stand by assistance  Bed Mobility Comments: Required use of bed rail for bed mobility, insisted on pulling on PT for supine to sit, not receptive to techniques to get OOB indep.  Transfers  Sit to stand: Minimal assistance  Stand to sit: Minimal assistance  Transfer Comments: ed on hand placement with pushing up from stable surface and reaching back to sit  Vision  Vision: Within Functional Limits (not able to assess. Pt is unable to answer questions appropriately)  Hearing  Hearing: Within functional limits  Cognition  Overall Cognitive Status: Exceptions  Arousal/Alertness: Appears intact  Following Commands: Follows one step commands with repetition;Follows one step commands with increased

## 2024-09-23 NOTE — PROGRESS NOTES
Physical Therapy  Facility/Department: [facility]  Physical Therapy ReAssessment    Name: Latisha Prasad  : 1960  MRN: [id number]  Date of Service: 2024    Discharge Recommendations:  Patient would benefit from continued therapy after discharge. Pt currently functioning below baseline.  Recommend daily inpatient skilled therapy at time of discharge to maximize long term outcomes and prevent re-admission. Please refer to AM-PAC score for current level of function.       HPI: 64-year-old female presents emergency room by EMS for confusion. Patient has been at home 1 day following knee replacement surgery. There was some concern about overmedication. Patient had previously been on 5 mg Percocet and after surgery had been prescribed 10 mg tablets. Patient believes she took 2 earlier tonight. Patient is answering questions but does appear somewhat confused. Some questions she is not answering correctly-initially she stated that she was here because she \"could not dilate \"and when asked who she lives with she initially said her mother but then remembered she lives with her son.       Patient Diagnosis(es): The primary encounter diagnosis was Fatigue, unspecified type. A diagnosis of History of total knee arthroplasty, bilateral was also pertinent to this visit.  Past Medical History:  has a past medical history of Acid reflux, Adult ADHD, Asthma, Awareness under anesthesia, Balance problem, Bipolar 1 disorder ([facility]), Broken foot, Cerebral artery occlusion with cerebral infarction ([facility]), Chest pain of uncertain etiology, CKD (chronic kidney disease), Depression, Diabetes mellitus ([facility]), DVT of leg (deep venous thrombosis) ([facility]), Fracture, Fractured nose, Headache, Helicobacter pylori (H. pylori) infection, Hematuria, Hiatal hernia, History of blood transfusion, History of DVT (deep vein thrombosis), History of myasthenia gravis, Hx of blood clots, Hypertension, Internal hemorrhoids, Memory loss, Migraine,  Verbal cues to stay close to RW and keep walker with her when approaching chair to sit.  Ambulation  Surface: Level tile  Device: Rolling Walker  Assistance: Minimal assistance  Quality of Gait: Patient standing / ambulating on right toes as she is not able to get right heel to floor with knee extended to to knee flexion  contracture and tight heelcords. Attempting to educate patient on upright posture and importance of trying to get knee straight in stance and to get heel to floor in stance, patient not receptive to education. Required occassional assist to manuever RW with turns / obstacles.  Gait Deviations: Increased TATO  Distance: 20 feet x 2     Balance  Sitting - Static: Good  Sitting - Dynamic: Good  Standing - Static: Fair  Standing - Dynamic: Fair  Single Leg Stance R Le  Single Leg Stance L Le  Comments: Standing balance with RW  Exercise Treatment: P/ A/ AAROM Right knee ROM with prolonged stretch at endrange of motion. Patient not receptive to full TKA exercise program at this time because she is focused on going home and says \"I can do it when I get home\".       OutComes Score  Balance Score: 2 (24 1107)  Gait Score: 3 (24 1107)        Tinetti Total Score: 5 (24 110)                                   AM-PAC - Mobility    AM-PAC Basic Mobility - Inpatient   How much help is needed turning from your back to your side while in a flat bed without using bedrails?: A Little  How much help is needed moving from lying on your back to sitting on the side of a flat bed without using bedrails?: A Little  How much help is needed moving to and from a bed to a chair?: A Little  How much help is needed standing up from a chair using your arms?: A Little  How much help is needed walking in hospital room?: A Little  How much help is needed climbing 3-5 steps with a railing?: A Lot  AM-PAC Inpatient Mobility Raw Score : 17  AM-PAC Inpatient T-Scale Score : 42.13  Mobility Inpatient CMS 0-100%

## 2024-09-23 NOTE — PROGRESS NOTES
Patient discharged home with Med one (JACQUELINE completed). Patient picked up by son. Iv removed and patient dressed - tolerated well. Patient taken by wheelchair to main entrance.

## 2024-09-23 NOTE — PLAN OF CARE
Problem: Discharge Planning  Goal: Discharge to home or other facility with appropriate resources  Outcome: Progressing  Flowsheets (Taken 9/23/2024 0800)  Discharge to home or other facility with appropriate resources: Identify barriers to discharge with patient and caregiver     Problem: Safety - Adult  Goal: Free from fall injury  Outcome: Progressing     Problem: ABCDS Injury Assessment  Goal: Absence of physical injury  Outcome: Progressing  Flowsheets (Taken 9/23/2024 1119)  Absence of Physical Injury: Implement safety measures based on patient assessment     Problem: Skin/Tissue Integrity  Goal: Absence of new skin breakdown  Description: 1.  Monitor for areas of redness and/or skin breakdown  2.  Assess vascular access sites hourly  3.  Every 4-6 hours minimum:  Change oxygen saturation probe site  4.  Every 4-6 hours:  If on nasal continuous positive airway pressure, respiratory therapy assess nares and determine need for appliance change or resting period.  Outcome: Progressing     Problem: Confusion  Goal: Confusion, delirium, dementia, or psychosis is improved or at baseline  Description: INTERVENTIONS:  1. Assess for possible contributors to thought disturbance, including medications, impaired vision or hearing, underlying metabolic abnormalities, dehydration, psychiatric diagnoses, and notify attending LIP  2. [place] high risk fall precautions, as indicated  3. Provide frequent short contacts to provide reality reorientation, refocusing and direction  4. Decrease environmental stimuli, including noise as appropriate  5. Monitor and intervene to maintain adequate nutrition, hydration, elimination, sleep and activity  6. If unable to ensure safety without constant attention obtain sitter and review sitter guidelines with assigned personnel  7. Initiate Psychosocial CNS and Spiritual Care consult, as indicated  Outcome: Progressing  Flowsheets (Taken 9/23/2024 0800)  Effect of thought disturbance

## 2024-09-23 NOTE — PROGRESS NOTES
[facility]  Office: [phone]  Miguel Angel Monroe, DO, Tristian Huerta, DO, William Barnett DO, Torito Ryan, DO, Asim Tinajero MD, Nemo Moore MD, Cassy Monroy MD, Beti Mcgee MD,  Cristian Diez MD, Richie Pichardo MD, Aleksandr Barroso MD,  Daniel Salomon DO, Joi Schafer MD, Ammon Lew MD, Adebayo Monroe DO, Shanthi Cannon MD,  Haroldo Wynn DO, Carol Tomlinson MD, Priscila Rivera MD, Bettina Colon MD, Lexie Duvall MD,  Hipolito Mcdermott MD, Rogerio Bey MD, Jazmín Pacheco MD, Tu Pinedo MD, Caio Gross MD, Diane Kee MD, Schuyler Rodriguez, DO, Silver Etienne DO, Jordan Lima DO, Rom Thompson MD, Shirley Waterhouse, CNP,  Roxana Ferris CNP, Schuyler Land, CNP,  Yolanda Chavarria, DNP, Sabrina Moody, CNP, Alexa Womack, CNP, Maggi Hackett, CNP, Shari Sommer, CNP, Patti Padilla, PA-C, Fadumo Cole, PA-C, Altagracia Kraft, CNP, Eugenio Avalos, CNP,  Elsa Carvajal, CNP, Mindi Casey, CNP, Asya Jaramillo, CNP, Lia Gonsalves, CNS, Johana Acevedo, CNP, Jayshree Browne, CNP, Tracy Schwab, CNP         [facility]   IN-PATIENT SERVICE   [facility]    Progress Note    9/23/2024    10:29 AM    Name:   Latisha Prasad  MRN:     [id number]     Acct:      [id number]   Room:   2010/2010-01  IP Day:  1  Admit Date:  9/21/2024  6:34 PM    PCP:   Nicholas Conway DO  Code Status:  Full Code    Subjective:     C/C:   Chief Complaint   Patient presents with    Fatigue     Takes percocet for pain. EMS states son saw mom walking with walker and dozing off.      Interval History Status: significantly improved.     Much more awake today, apparently back to normal    No longer confused though still sometimes repetitive speech-but she has had word finding issues since cva per her chart    Sitter at bedside    Denies cp/sob/n/v    Brief History:     Per my RADHA:  \"Latisha Prasad is a 64 y.o. Non- / non  female who presents with Fatigue (Takes percocet

## 2024-09-23 NOTE — DISCHARGE INSTR - COC
Continuity of Care Form    Patient Name: Latisha Prasad   :  1960  MRN:  [id number]    Admit date:  2024  Discharge date:  24    Code Status Order: Full Code   Advance Directives:   Advance Care Flowsheet Documentation             Admitting Physician:  Torito Ryan DO  PCP: Nicholas Conway DO    Discharging Nurse: Alaina MENDEZ RN  Discharging Hospital Unit/Room#:   Discharging Unit Phone Number: [phone]    Emergency Contact:   Extended Emergency Contact Information  Primary Emergency Contact: OsmarDinh  Address: [address] [place] [address] [facility]  Home Phone: [phone]  Relation: Child  Secondary Emergency Contact: PieterNicolás  Address: [address] [place] [address] [facility]  Home Phone: [phone]  Work Phone: [phone]  Mobile Phone: [phone]  Relation: Other    Past Surgical History:  Past Surgical History:   Procedure Laterality Date    ANKLE SURGERY Left 2018    mass excision    ARM SURGERY Right     Pt states she has had 13 right arm surgeries    BREAST ENHANCEMENT SURGERY Bilateral     CARDIAC CATHETERIZATION  2022    DR ARIZMENDI  /  NML CORS     SECTION      x 3    COLONOSCOPY  2013    sigmoid diverticula, prominent large internal hemorrhoid    COLONOSCOPY N/A 02/10/2020    COLONOSCOPY DIAGNOSTIC performed by Noemí Castillo MD at [facility]    COLONOSCOPY N/A 2020    COLORECTAL CANCER SCREENING, NOT HIGH RISK performed by Harriett Monroy MD at [facility] OR    ENDOSCOPY, COLON, DIAGNOSTIC      ESOPHAGEAL MOTILITY STUDY N/A 2020    PES RN - ESOPHAGEAL MOTILITY STUDY performed by Willie Gallo DO at [facility]    ESOPHAGEAL MOTILITY STUDY  09/10/2020    ESOPHAGEAL MOTILITY STUDY performed by Willie Gallo DO at [facility]    ESOPHAGEAL MOTILITY STUDY N/A 2020    ESOPHAGEAL MOTILITY STUDY ATTEMPTED PER DR. GALLO WITHOUT SUCCESS performed by Willie  done    Treatments at the Time of Hospital Discharge:   Respiratory Treatments: None  Oxygen Therapy:  is not on home oxygen therapy.  Ventilator:    - No ventilator support    Rehab Therapies: Physical Therapy and Occupational Therapy  Weight Bearing Status/Restrictions: No weight bearing restrictions  Other Medical Equipment (for information only, NOT a DME order):  walker  Other Treatments:   SN   Med teaching     Patient's personal belongings (please select all that are sent with patient):  None    RN SIGNATURE:  Electronically signed by Shalini Langston RN on 9/23/24 at 2:20 PM EDT    CASE MANAGEMENT/SOCIAL WORK SECTION    Inpatient Status Date: 09/23/24    Readmission Risk Assessment Score:  Readmission Risk              Risk of Unplanned Readmission:  21           Discharging to Facility/ Agency   Name: [facility]   Address:  Phone:  Fax:Home care agency will pull data electronically.        / signature: Electronically signed by Xenia Coto RN on 9/23/24 at 1:38 PM EDT    PHYSICIAN SECTION    Prognosis: Fair    Condition at Discharge: Stable    Rehab Potential (if transferring to Rehab): Fair    Recommended Labs or Other Treatments After Discharge: pt/ot    Physician Certification: I certify the above information and transfer of Latisha Prasad  is necessary for the continuing treatment of the diagnosis listed and that she requires Home Care for less 30 days.     Update Admission H&P: No change in H&P    PHYSICIAN SIGNATURE:  Electronically signed by Torito Ryan DO on 9/23/24 at 1:47 PM EDT

## 2024-09-23 NOTE — PLAN OF CARE
..  [facility]  Office: [phone]  Miguel Angel Monroe, DO, Tristian Huerta, DO, William Barnett DO, Torito Ryan, DO, Asim Tinajero MD, Nemo Moore MD, Cassy Monroy MD, Beti Mcgee MD,  Cristian Diez MD, Richie Pichardo MD, Aleksandr Barroso MD,  Daniel Salomon DO, Joi Schafer MD, Ammon Lew MD, Adebayo Monroe DO, Shanthi Cannon MD,  Haroldo Wynn DO, Carol Tomlinson MD, Priscila Rivera MD, Bettina Colon MD, Lexie Duvall MD,  Hipolito Mcdermott MD, Rogerio Bey MD, Jazmín Pacheco MD, Tu Pinedo MD, Caio Gross MD, Diane Kee MD, Schuyler Rodriguez DO, Silver Etienne DO, Jordan Lima DO, Rom Thompson MD, Shirley Waterhouse, CNP,  Roxana Ferris CNP, Schuyler Land, CNP,  Yolanda Chavarria, DNP, Sabrina Moody, CNP, Alexa Womack, CNP, Maggi Hackett, CNP, Shari Sommer, CNP, Patti Padilla, PA-C, Fadumo Cole PA-C, Altagracia Kraft, CNP, Eugenio Avalos, CNP,  Elsa Carvajal, CNP, Mindi Casey, CNP, Asya Jaramillo, CNP, Lia Gonsalves, CNS, Johana Acevedo, CNP, Jayshree Browne, CNP, Tracy Schwab, CNP         [facility]   IN-PATIENT SERVICE   [facility]    Second Visit Note  For more detailed information please refer to the progress note of the day      9/23/2024    4:56 AM    Name:   Latisha Prasad  MRN:     [id number]     Acct:      [id number]   Room:   2010/2010-01  IP Day:  1  Admit Date:  9/21/2024  6:34 PM    PCP:   Nicholas Conway DO  Code Status:  Full Code      Pt vitals were reviewed   New labs were reviewed   Patient was seen    Updated plan :     Called to bedside to evaluate for severe pain. Patient is much more alert, oriented, and cooperative this evening. She is reporting severe pain in knee/surgical site. I will give a one time low dose of fentanyl.         Elsa Carvajal, PIETER - CNP  9/23/2024  4:56 AM

## 2024-09-23 NOTE — PLAN OF CARE
Problem: Discharge Planning  Goal: Discharge to home or other facility with appropriate resources  9/23/2024 1212 by Kate Carrera RN  Outcome: Progressing     Problem: Safety - Adult  Goal: Free from fall injury  9/23/2024 1212 by Kate Carrera RN  Outcome: Progressing     Problem: ABCDS Injury Assessment  Goal: Absence of physical injury  9/23/2024 1212 by Kate Carrera RN  Outcome: Progressing     Problem: Skin/Tissue Integrity  Goal: Absence of new skin breakdown  Description: 1.  Monitor for areas of redness and/or skin breakdown  2.  Assess vascular access sites hourly  3.  Every 4-6 hours minimum:  Change oxygen saturation probe site  4.  Every 4-6 hours:  If on nasal continuous positive airway pressure, respiratory therapy assess nares and determine need for appliance change or resting period.  9/23/2024 1212 by Kate Carrera RN  Outcome: Progressing     Problem: Confusion  Goal: Confusion, delirium, dementia, or psychosis is improved or at baseline  Description: INTERVENTIONS:  1. Assess for possible contributors to thought disturbance, including medications, impaired vision or hearing, underlying metabolic abnormalities, dehydration, psychiatric diagnoses, and notify attending LIP  2. [place] high risk fall precautions, as indicated  3. Provide frequent short contacts to provide reality reorientation, refocusing and direction  4. Decrease environmental stimuli, including noise as appropriate  5. Monitor and intervene to maintain adequate nutrition, hydration, elimination, sleep and activity  6. If unable to ensure safety without constant attention obtain sitter and review sitter guidelines with assigned personnel  7. Initiate Psychosocial CNS and Spiritual Care consult, as indicated  9/23/2024 1212 by Kate Carrera RN  Outcome: Progressing     Problem: Skin/Tissue Integrity - Adult  Goal: Skin integrity remains intact  9/23/2024 1212 by Kate Carrera RN  Outcome: Progressing     Problem:

## 2024-09-23 NOTE — DISCHARGE SUMMARY
[facility]  Office: [phone]  Miguel Angel Monroe DO, Tristian Huerta DO, William Barnett DO, Torito Ryan DO, Asim Tinajero MD, Nemo Moore MD, Cassy Monroy MD, Beti Mcgee MD,  Cristian Diez MD, Richie Pichardo MD, Aleksandr Barroso MD,  Daniel Salomon DO, Joi Schafer MD, Ammon Lew MD, Adebayo Monroe DO, Shanthi Cannon MD,  Haroldo Wynn DO, Carol Tomlinson MD, Priscila Rivera MD, Bettina Colon MD, Lexie Duvall MD,  Hipolito Mcdermott MD, Rogerio Bey MD, Jazmín Pacheco MD, Tu Pinedo MD, Caio Gross MD, Diane Kee MD, Schuyler Rodriguez DO, Silver Etienne DO, Jordan Lima DO, Rom Thompson MD, Shirley Waterhouse, CNP,  Roxana Ferris CNP, Schuyler Land, CNP,  Yolanda Chavarria, DNP, Sabrina Moody, CNP, Alexa Womack, CNP, Maggi Hackett, CNP, Shari Sommer, CNP, Patti Padilla, PA-C, Fadumo Cole PA-C, Altagracia Kraft, CNP, Eugenio Avalos, CNP,  Elsa Carvajal, CNP, Mindi Casey, CNP, Asya Jaramillo, CNP, Lia Gonsalves, CNS, Johana Acevedo, CNP, Jayshree Browne, CNP, Tracy Schwab, CNP         [facility]   IN-PATIENT SERVICE   [facility]    Discharge Summary     Patient ID: Latisha Prasad  :  1960   MRN: [id number]     ACCOUNT:  [id number]   Patient's PCP: Nicholas Conway DO  Admit Date: 2024   Discharge Date: 2024     Length of Stay: 1  Code Status:  Full Code  Admitting Physician: Torito Ryan DO  Discharge Physician: Torito Ryan DO     Active Discharge Diagnoses:     Hospital Problem Lists:  Principal Problem:    Altered mental status, unspecified  Active Problems:    Stage 3b chronic kidney disease (HCC)    MCI (mild cognitive impairment) with memory loss    Attention deficit hyperactivity disorder (ADHD)    Narcolepsy    Moderate persistent asthma without complication    History of DVT (deep vein thrombosis)    Hiatal hernia    Cervical radiculopathy    Acquired trigger finger    Osteoarthritis of      CMP:    Lab Results   Component Value Date/Time    GLUCOSE 98 09/22/2024 02:00 AM    GLUCOSE 96 02/09/2023 11:45 AM     09/22/2024 02:00 AM    K 4.1 09/22/2024 02:00 AM    CL 98 09/22/2024 02:00 AM    CO2 24 09/22/2024 02:00 AM    BUN 15 09/22/2024 02:00 AM    CREATININE 1.1 09/22/2024 02:00 AM    ANIONGAP 11 09/22/2024 02:00 AM    ANIONGAP 10 04/14/2022 02:15 PM    ALKPHOS 133 09/22/2024 02:00 AM    ALT 16 09/22/2024 02:00 AM    AST 48 09/22/2024 02:00 AM    BILITOT 0.8 09/22/2024 02:00 AM    ALBUMIN 3.4 09/22/2024 02:00 AM    LABGLOM 56 09/22/2024 02:00 AM    LABGLOM 46 04/25/2024 10:46 AM    GFRAA 55 07/15/2022 06:54 AM    GFR      07/15/2022 06:54 AM    CALCIUM 9.0 09/22/2024 02:00 AM        Radiology:  CTA HEAD NECK W CONTRAST    Result Date: 9/22/2024  No flow limiting stenosis or large vessel occlusion detected within the head or neck.     CT HEAD WO CONTRAST    Result Date: 9/21/2024  No acute intracranial abnormality.     XR CHEST PORTABLE    Result Date: 9/21/2024  1. Mild atelectatic changes right lower lobe. 2. Mild congestion. 3. Moderate hiatal hernia.       Consultations:    Consults:     Final Specialist Recommendations/Findings:   IP CONSULT TO NEUROLOGY  IP CONSULT TO ORTHOPEDIC SURGERY      The patient was seen and examined on day of discharge and this discharge summary is in conjunction with any daily progress note from day of discharge.    Discharge plan:     Disposition: Home with [facility]    Physician Follow Up:     Nicholas Conway DO  [address]  [address]  [facility] [address]  [phone]    Follow up in 1 week(s)      North Leigh MD  [address]  [address]  [facility] [id number] [phone]    Follow up in 2 week(s)      Ciro Pretty MD  [address]  [facility] [address]  [phone]    Schedule an appointment as soon as possible for a visit  For wound re-check       Requiring Further Evaluation/Follow Up POST HOSPITALIZATION/Incidental Findings: consider

## 2024-09-25 ENCOUNTER — TELEPHONE (OUTPATIENT)
Age: 64
End: 2024-09-25

## 2024-09-25 ENCOUNTER — TELEPHONE (OUTPATIENT)
Dept: FAMILY MEDICINE CLINIC | Age: 64
End: 2024-09-25

## 2024-09-25 ENCOUNTER — TELEMEDICINE (OUTPATIENT)
Age: 64
End: 2024-09-25

## 2024-09-25 DIAGNOSIS — Z09 HOSPITAL DISCHARGE FOLLOW-UP: Primary | ICD-10-CM

## 2024-09-25 ASSESSMENT — ENCOUNTER SYMPTOMS
RESPIRATORY NEGATIVE: 1
GASTROINTESTINAL NEGATIVE: 1

## 2024-09-28 NOTE — CARE COORDINATION
Case Management Assessment  Initial Evaluation    Date/Time of Evaluation: 9/22/2024 1:02 PM  Assessment Completed by: SEBASTIAN Rolon, YINAW    If patient is discharged prior to next notation, then this note serves as note for discharge by case management.    Patient Name: Latisha Prasad                   YOB: 1960  Diagnosis: Altered mental status, unspecified [R41.82]                   Date / Time: 9/21/2024  6:34 PM    Patient Admission Status: Observation   Readmission Risk (Low < 19, Mod (19-27), High > 27): Readmission Risk Score: 7.2    Current PCP: Nicholas Conway, DO  PCP verified by CM? Yes    Chart Reviewed: Yes      History Provided by: Significant Other  Patient Orientation: Person    Patient Cognition: Short Term Memory Deficit    Hospitalization in the last 30 days (Readmission):  No    If yes, Readmission Assessment in CM Navigator will be completed.    Advance Directives:      Code Status: Full Code   Patient's Primary Decision Maker is: Legal Next of Kin    Primary Decision Maker: Nicolás Stapleton  Other - [phone]    Secondary Decision Maker: Dinh Prasad  Child - [phone]    Secondary Decision Maker: Stefano Prasad - Child - [phone]    Discharge Planning:    Patient lives with: Spouse/Significant Other Type of Home: House  Primary Care Giver: Family  Patient Support Systems include: Children, Family Members, Spouse/Significant Other   Current Financial resources: Medicare  Current community resources: None  Current services prior to admission: Home Care            Current DME:              Type of Home Care services:  Nursing Services, PT, OT    ADLS  Prior functional level: Assistance with the following:, Bathing, Dressing, Cooking, Housework, Shopping  Current functional level: Assistance with the following:, Bathing, Dressing, Toileting, Feeding, Cooking, Housework, Shopping, Mobility    PT AM-PAC:   /24  OT AM-PAC:   /24    Family can provide assistance at 
DC Planning    Spoke with pt at bedside, now a+o, pleasant. Plans for home w [facility] today. Family to  and has support. Spoke w Mei from [facility] they were planning SOC but pt had been admitted, will take back. Home care agency will pull data electronically.       JACQUELINE NEEDS SIGNED.   
Social work received a call from jazzy that pt has been upgraded to in pt today at 1:45 pm.   Pt is confused at times. Will advise significant other that cares for her and lives with her Reji. Yolanda velasquez    Social work pt was able and more awake to sign IMM today. Yolanda velasquez  
No

## 2024-10-14 ENCOUNTER — OFFICE VISIT (OUTPATIENT)
Dept: FAMILY MEDICINE CLINIC | Age: 64
End: 2024-10-14
Payer: MEDICARE

## 2024-10-14 VITALS — DIASTOLIC BLOOD PRESSURE: 74 MMHG | WEIGHT: 149 LBS | SYSTOLIC BLOOD PRESSURE: 108 MMHG | BODY MASS INDEX: 29.1 KG/M2

## 2024-10-14 DIAGNOSIS — N18.32 STAGE 3B CHRONIC KIDNEY DISEASE (HCC): ICD-10-CM

## 2024-10-14 DIAGNOSIS — I10 ESSENTIAL HYPERTENSION: ICD-10-CM

## 2024-10-14 DIAGNOSIS — R41.0 DISORIENTATION: ICD-10-CM

## 2024-10-14 DIAGNOSIS — G31.84 MCI (MILD COGNITIVE IMPAIRMENT) WITH MEMORY LOSS: ICD-10-CM

## 2024-10-14 DIAGNOSIS — J45.40 MODERATE PERSISTENT ASTHMA WITHOUT COMPLICATION: ICD-10-CM

## 2024-10-14 DIAGNOSIS — Z09 HOSPITAL DISCHARGE FOLLOW-UP: Primary | ICD-10-CM

## 2024-10-14 DIAGNOSIS — I63.9 ISCHEMIC STROKE (HCC): ICD-10-CM

## 2024-10-14 PROCEDURE — G8417 CALC BMI ABV UP PARAM F/U: HCPCS | Performed by: FAMILY MEDICINE

## 2024-10-14 PROCEDURE — 1036F TOBACCO NON-USER: CPT | Performed by: FAMILY MEDICINE

## 2024-10-14 PROCEDURE — G8484 FLU IMMUNIZE NO ADMIN: HCPCS | Performed by: FAMILY MEDICINE

## 2024-10-14 PROCEDURE — 3074F SYST BP LT 130 MM HG: CPT | Performed by: FAMILY MEDICINE

## 2024-10-14 PROCEDURE — G8399 PT W/DXA RESULTS DOCUMENT: HCPCS | Performed by: FAMILY MEDICINE

## 2024-10-14 PROCEDURE — 1111F DSCHRG MED/CURRENT MED MERGE: CPT | Performed by: FAMILY MEDICINE

## 2024-10-14 PROCEDURE — 3078F DIAST BP <80 MM HG: CPT | Performed by: FAMILY MEDICINE

## 2024-10-14 PROCEDURE — 1123F ACP DISCUSS/DSCN MKR DOCD: CPT | Performed by: FAMILY MEDICINE

## 2024-10-14 PROCEDURE — 3017F COLORECTAL CA SCREEN DOC REV: CPT | Performed by: FAMILY MEDICINE

## 2024-10-14 PROCEDURE — 99214 OFFICE O/P EST MOD 30 MIN: CPT | Performed by: FAMILY MEDICINE

## 2024-10-14 PROCEDURE — G8427 DOCREV CUR MEDS BY ELIG CLIN: HCPCS | Performed by: FAMILY MEDICINE

## 2024-10-14 PROCEDURE — 1090F PRES/ABSN URINE INCON ASSESS: CPT | Performed by: FAMILY MEDICINE

## 2024-10-14 RX ORDER — OXYCODONE AND ACETAMINOPHEN 5; 325 MG/1; MG/1
TABLET ORAL
COMMUNITY
Start: 2024-10-02

## 2024-10-14 ASSESSMENT — ENCOUNTER SYMPTOMS
BLURRED VISION: 0
ORTHOPNEA: 0
ALLERGIC/IMMUNOLOGIC NEGATIVE: 1
RESPIRATORY NEGATIVE: 1
EYES NEGATIVE: 1
GASTROINTESTINAL NEGATIVE: 1

## 2024-10-14 NOTE — PROGRESS NOTES
ill-appearing, toxic-appearing or diaphoretic.   HENT:      Head: Normocephalic and atraumatic.   Eyes:      General: No scleral icterus.        Right eye: No discharge.         Left eye: No discharge.      Extraocular Movements: Extraocular movements intact.      Conjunctiva/sclera: Conjunctivae normal.   Cardiovascular:      Rate and Rhythm: Normal rate and regular rhythm.      Pulses: Normal pulses.      Heart sounds: Normal heart sounds. No murmur heard.     No friction rub. No gallop.   Pulmonary:      Effort: Pulmonary effort is normal. No respiratory distress.      Breath sounds: Normal breath sounds. No stridor. No wheezing, rhonchi or rales.   Chest:      Chest wall: No tenderness.   Neurological:      Mental Status: She is alert and oriented to person, place, and time. Mental status is at baseline.   Psychiatric:         Mood and Affect: Mood normal.         Behavior: Behavior normal.         Thought Content: Thought content normal.         Judgment: Judgment normal.         An electronic signature was used to authenticate this note.  --Nicholas Conway, DO

## 2024-10-23 ENCOUNTER — HOSPITAL ENCOUNTER (OUTPATIENT)
Age: 64
Discharge: HOME | End: 2024-10-23
Payer: MEDICARE

## 2024-10-23 DIAGNOSIS — L65.0 TELOGEN EFFLUVIUM: ICD-10-CM

## 2024-10-23 LAB
BASOPHILS # BLD: <0.03 K/UL (ref 0–0.2)
BASOPHILS NFR BLD: 1 % (ref 0–2)
EOSINOPHIL # BLD: 0.04 K/UL (ref 0–0.44)
EOSINOPHILS RELATIVE PERCENT: 1 % (ref 1–4)
ERYTHROCYTE [DISTWIDTH] IN BLOOD BY AUTOMATED COUNT: 16.9 % (ref 11.8–14.4)
FERRITIN SERPL-MCNC: 241 NG/ML (ref 13–150)
FOLATE SERPL-MCNC: 7.9 NG/ML (ref 4.8–24.2)
HCT VFR BLD AUTO: 40.5 % (ref 36.3–47.1)
HGB BLD-MCNC: 12.5 G/DL (ref 11.9–15.1)
IMM GRANULOCYTES # BLD AUTO: 0.01 K/UL (ref 0–0.3)
IMM GRANULOCYTES NFR BLD: 0 %
IRON SATN MFR SERPL: 33 % (ref 20–55)
IRON SERPL-MCNC: 82 UG/DL (ref 37–145)
LYMPHOCYTES NFR BLD: 0.73 K/UL (ref 1.1–3.7)
LYMPHOCYTES RELATIVE PERCENT: 17 % (ref 24–43)
MCH RBC QN AUTO: 29.8 PG (ref 25.2–33.5)
MCHC RBC AUTO-ENTMCNC: 30.9 G/DL (ref 28.4–34.8)
MCV RBC AUTO: 96.4 FL (ref 82.6–102.9)
MONOCYTES NFR BLD: 0.39 K/UL (ref 0.1–1.2)
MONOCYTES NFR BLD: 9 % (ref 3–12)
NEUTROPHILS NFR BLD: 72 % (ref 36–65)
NEUTS SEG NFR BLD: 3.23 K/UL (ref 1.5–8.1)
NRBC BLD-RTO: 0 PER 100 WBC
PLATELET # BLD AUTO: 256 K/UL (ref 138–453)
PMV BLD AUTO: 9.9 FL (ref 8.1–13.5)
RBC # BLD AUTO: 4.2 M/UL (ref 3.95–5.11)
RBC # BLD: ABNORMAL 10*6/UL
TIBC SERPL-MCNC: 246 UG/DL (ref 250–450)
UNSATURATED IRON BINDING CAPACITY: 164 UG/DL (ref 112–347)
VIT B12 SERPL-MCNC: >2000 PG/ML (ref 232–1245)
WBC OTHER # BLD: 4.4 K/UL (ref 3.5–11.3)

## 2024-10-23 PROCEDURE — 83550 IRON BINDING TEST: CPT

## 2024-10-23 PROCEDURE — 83540 ASSAY OF IRON: CPT

## 2024-10-23 PROCEDURE — 82746 ASSAY OF FOLIC ACID SERUM: CPT

## 2024-10-23 PROCEDURE — 82607 VITAMIN B-12: CPT

## 2024-10-23 PROCEDURE — 36415 COLL VENOUS BLD VENIPUNCTURE: CPT

## 2024-10-23 PROCEDURE — 82728 ASSAY OF FERRITIN: CPT

## 2024-10-23 PROCEDURE — 85025 COMPLETE CBC W/AUTO DIFF WBC: CPT

## 2024-10-24 ENCOUNTER — TELEPHONE (OUTPATIENT)
Dept: ONCOLOGY | Age: 64
End: 2024-10-24

## 2024-10-24 ENCOUNTER — OFFICE VISIT (OUTPATIENT)
Dept: ONCOLOGY | Age: 64
End: 2024-10-24
Payer: MEDICARE

## 2024-10-24 VITALS
DIASTOLIC BLOOD PRESSURE: 72 MMHG | BODY MASS INDEX: 29.06 KG/M2 | WEIGHT: 148.8 LBS | HEART RATE: 59 BPM | RESPIRATION RATE: 16 BRPM | TEMPERATURE: 98.2 F | SYSTOLIC BLOOD PRESSURE: 105 MMHG

## 2024-10-24 DIAGNOSIS — I69.320 CVA, OLD, APHASIA: ICD-10-CM

## 2024-10-24 DIAGNOSIS — D50.0 IRON DEFICIENCY ANEMIA DUE TO CHRONIC BLOOD LOSS: Primary | ICD-10-CM

## 2024-10-24 PROCEDURE — 3017F COLORECTAL CA SCREEN DOC REV: CPT | Performed by: INTERNAL MEDICINE

## 2024-10-24 PROCEDURE — 3074F SYST BP LT 130 MM HG: CPT | Performed by: INTERNAL MEDICINE

## 2024-10-24 PROCEDURE — 99214 OFFICE O/P EST MOD 30 MIN: CPT | Performed by: INTERNAL MEDICINE

## 2024-10-24 PROCEDURE — 1090F PRES/ABSN URINE INCON ASSESS: CPT | Performed by: INTERNAL MEDICINE

## 2024-10-24 PROCEDURE — 99211 OFF/OP EST MAY X REQ PHY/QHP: CPT | Performed by: INTERNAL MEDICINE

## 2024-10-24 PROCEDURE — G8417 CALC BMI ABV UP PARAM F/U: HCPCS | Performed by: INTERNAL MEDICINE

## 2024-10-24 PROCEDURE — 3078F DIAST BP <80 MM HG: CPT | Performed by: INTERNAL MEDICINE

## 2024-10-24 PROCEDURE — G8427 DOCREV CUR MEDS BY ELIG CLIN: HCPCS | Performed by: INTERNAL MEDICINE

## 2024-10-24 PROCEDURE — G8399 PT W/DXA RESULTS DOCUMENT: HCPCS | Performed by: INTERNAL MEDICINE

## 2024-10-24 PROCEDURE — 1123F ACP DISCUSS/DSCN MKR DOCD: CPT | Performed by: INTERNAL MEDICINE

## 2024-10-24 PROCEDURE — 1036F TOBACCO NON-USER: CPT | Performed by: INTERNAL MEDICINE

## 2024-10-24 PROCEDURE — G8484 FLU IMMUNIZE NO ADMIN: HCPCS | Performed by: INTERNAL MEDICINE

## 2024-10-24 RX ORDER — BUPROPION HYDROCHLORIDE 300 MG/1
300 TABLET ORAL EVERY MORNING
COMMUNITY

## 2024-10-24 NOTE — TELEPHONE ENCOUNTER
DERIAN HERE FOR FOLLOW UP   RV 12 months with labs before RV  LABS ORDERED: CBC, FERRITIN, IRON & TIBC, VIT B12 & FOLATE   MD VISIT: 10/23/25 @ 10AM   LABS WILL BE MAILED TO PT   AVS PRINTED AND GIVEN ON EXIT

## 2024-10-24 NOTE — PROGRESS NOTES
nondistended, no masses or organomegaly  Neurological - alert, oriented, expressive aphasia.  No focal findings or movement disorder noted  Musculoskeletal - no joint tenderness, deformity or swelling  Extremities - peripheral pulses normal, no pedal edema, no clubbing or cyanosis  Skin - normal coloration and turgor, no rashes, no suspicious skin lesions noted     Review of Diagnostic data:   Lab Results   Component Value Date    WBC 4.4 10/23/2024    HGB 12.5 10/23/2024    HCT 40.5 10/23/2024    MCV 96.4 10/23/2024     10/23/2024       Chemistry        Component Value Date/Time     (L) 09/22/2024 0200    K 4.1 09/22/2024 0200    CL 98 09/22/2024 0200    CO2 24 09/22/2024 0200    BUN 15 09/22/2024 0200    CREATININE 1.1 (H) 09/22/2024 0200        Component Value Date/Time    CALCIUM 9.0 09/22/2024 0200    ALKPHOS 133 (H) 09/22/2024 0200    AST 48 (H) 09/22/2024 0200    ALT 16 09/22/2024 0200    BILITOT 0.8 09/22/2024 0200        Lab Results   Component Value Date    IRON 82 10/23/2024    TIBC 246 (L) 10/23/2024    FERRITIN 241 (H) 10/23/2024         IMPRESSION:   Severe iron deficiency anemia  Vitamin B12 deficiency  2 episodes of probable GI blood loss February 2020 and late October 2020  Severe GERD and hiatal hernia  Past history of left leg DVT triggered by immobilization.  Status post anticoagulation.  History of CVA.  Expressive aphasia.      PLAN: I reviewed the labs as above and discussed with the patient. I explained to the patient the nature of this hematologic problem. I explained the significance of these abnormalities in layman language.   Reviewing patient's labs obviously she had severe iron deficiency anemia in 2 occasions secondary to acute blood loss.  She had blood transfusion and iron infusion during hospitalization.  Most recent labs showed normocytic anemia.  This is related to combination of iron deficiency and vitamin B12 deficiency.  Considering patient's upper GI symptoms oral

## 2024-10-29 ENCOUNTER — OFFICE VISIT (OUTPATIENT)
Dept: NEUROLOGY | Age: 64
End: 2024-10-29
Payer: MEDICARE

## 2024-10-29 VITALS
HEIGHT: 60 IN | HEART RATE: 62 BPM | WEIGHT: 149.4 LBS | BODY MASS INDEX: 29.33 KG/M2 | DIASTOLIC BLOOD PRESSURE: 81 MMHG | SYSTOLIC BLOOD PRESSURE: 127 MMHG

## 2024-10-29 DIAGNOSIS — G31.84 MILD COGNITIVE IMPAIRMENT: Primary | ICD-10-CM

## 2024-10-29 PROCEDURE — 3074F SYST BP LT 130 MM HG: CPT | Performed by: PSYCHIATRY & NEUROLOGY

## 2024-10-29 PROCEDURE — G8427 DOCREV CUR MEDS BY ELIG CLIN: HCPCS | Performed by: PSYCHIATRY & NEUROLOGY

## 2024-10-29 PROCEDURE — 1090F PRES/ABSN URINE INCON ASSESS: CPT | Performed by: PSYCHIATRY & NEUROLOGY

## 2024-10-29 PROCEDURE — 3079F DIAST BP 80-89 MM HG: CPT | Performed by: PSYCHIATRY & NEUROLOGY

## 2024-10-29 PROCEDURE — G8417 CALC BMI ABV UP PARAM F/U: HCPCS | Performed by: PSYCHIATRY & NEUROLOGY

## 2024-10-29 PROCEDURE — 1036F TOBACCO NON-USER: CPT | Performed by: PSYCHIATRY & NEUROLOGY

## 2024-10-29 PROCEDURE — 1123F ACP DISCUSS/DSCN MKR DOCD: CPT | Performed by: PSYCHIATRY & NEUROLOGY

## 2024-10-29 PROCEDURE — G8484 FLU IMMUNIZE NO ADMIN: HCPCS | Performed by: PSYCHIATRY & NEUROLOGY

## 2024-10-29 PROCEDURE — G8399 PT W/DXA RESULTS DOCUMENT: HCPCS | Performed by: PSYCHIATRY & NEUROLOGY

## 2024-10-29 PROCEDURE — 3017F COLORECTAL CA SCREEN DOC REV: CPT | Performed by: PSYCHIATRY & NEUROLOGY

## 2024-10-29 PROCEDURE — 99214 OFFICE O/P EST MOD 30 MIN: CPT | Performed by: PSYCHIATRY & NEUROLOGY

## 2024-10-29 NOTE — PROGRESS NOTES
intracranial abnormalities   03/26/24 EMG Evidence of cervical radiculopathy   11/02/22 MRI cervical spine Multilevel DDD, stenosis at C3-4, C6-7   07/14/22 MRI brain Chronic microvascular changes, remote lacunar infarct (L basal ganglia)       Seizure History:  Date of Suspected Event: Transient awareness episodes, unclear diagnosis  Semiology: Spacing out, \"daze-like\" state, no loss of consciousness  EEG Findings: Left mid-temporal slowing; further EEG monitoring recommended      Medications:  Previous Medications Current Medications   Aspirin, Lipitor, Vitamin B12 Adderall 1 tab BID, Eliquis 2.5 mg BID, Percocet for neck pain       Assessment and Recommendations:  Cognitive Decline - Memory Loss (ICD-10: G31.84)   Ms. Prasad presents with worsening cognitive symptoms, notably memory lapses and expressive aphasia post-stroke.     Her EEG shows left mid-temporal slowing, suggestive of an underlying structural lesion, which is consistent with chronic microvascular changes and remote infarction observed on MRI.     Given the persistence of cognitive symptoms, neuropsychological testing is advised to evaluate the extent and type of cognitive impairment and to assess or chronic stroke-related deficits.     Her recent vitamin B12 elevation may reflect recent supplementation, and is in normal range     Chronic left MCA Ischemic Stroke, 2022 (ICD-10: I63.532)     Her 2022 stroke has left her with expressive aphasia and episodic memory impairments.     Secondary stroke prophylaxis with Eliquis 2.5 mg BID, Aspirin 81 mg and Lipitor 80 mg remains appropriate and has been well-tolerated.     She should continue with these and maintain regular follow-up, as well as monitoring for signs of recurrence, especially given her vascular dementia risk.    Cervical Radiculopathy (ICD-10: M54.12)     Ms. Prasad has chronic neck pain with radiculopathy affecting the left upper extremity.   MRI and EMG confirm multilevel

## 2024-10-30 ENCOUNTER — HOSPITAL ENCOUNTER (OUTPATIENT)
Dept: GENERAL RADIOLOGY | Age: 64
Discharge: HOME | End: 2024-11-01
Payer: MEDICARE

## 2024-10-30 ENCOUNTER — HOSPITAL ENCOUNTER (OUTPATIENT)
Age: 64
Discharge: HOME | End: 2024-11-01
Payer: MEDICARE

## 2024-10-30 ENCOUNTER — HOSPITAL ENCOUNTER (EMERGENCY)
Age: 64
Discharge: HOME | End: 2024-10-30
Attending: EMERGENCY MEDICINE
Payer: MEDICARE

## 2024-10-30 ENCOUNTER — APPOINTMENT (OUTPATIENT)
Dept: VASCULAR LAB | Age: 64
End: 2024-10-30
Payer: MEDICARE

## 2024-10-30 VITALS
OXYGEN SATURATION: 97 % | TEMPERATURE: 98.1 F | SYSTOLIC BLOOD PRESSURE: 117 MMHG | RESPIRATION RATE: 14 BRPM | HEART RATE: 52 BPM | DIASTOLIC BLOOD PRESSURE: 73 MMHG

## 2024-10-30 DIAGNOSIS — M17.12 ARTHRITIS OF LEFT KNEE: ICD-10-CM

## 2024-10-30 DIAGNOSIS — M17.11 ARTHRITIS OF RIGHT KNEE: ICD-10-CM

## 2024-10-30 DIAGNOSIS — M79.661 RIGHT CALF PAIN: Primary | ICD-10-CM

## 2024-10-30 DIAGNOSIS — M79.89 RIGHT LEG SWELLING: ICD-10-CM

## 2024-10-30 LAB — D DIMER PPP FEU-MCNC: 4.65 UG/ML FEU (ref 0–0.59)

## 2024-10-30 PROCEDURE — 73562 X-RAY EXAM OF KNEE 3: CPT

## 2024-10-30 PROCEDURE — 85379 FIBRIN DEGRADATION QUANT: CPT

## 2024-10-30 PROCEDURE — 93971 EXTREMITY STUDY: CPT | Performed by: SURGERY

## 2024-10-30 PROCEDURE — 36415 COLL VENOUS BLD VENIPUNCTURE: CPT

## 2024-10-30 PROCEDURE — 99284 EMERGENCY DEPT VISIT MOD MDM: CPT

## 2024-10-30 PROCEDURE — 93971 EXTREMITY STUDY: CPT

## 2024-10-30 ASSESSMENT — ENCOUNTER SYMPTOMS
COLOR CHANGE: 0
SHORTNESS OF BREATH: 0

## 2024-10-30 ASSESSMENT — PAIN - FUNCTIONAL ASSESSMENT: PAIN_FUNCTIONAL_ASSESSMENT: 0-10

## 2024-10-30 ASSESSMENT — PAIN DESCRIPTION - FREQUENCY: FREQUENCY: CONTINUOUS

## 2024-10-30 ASSESSMENT — PAIN DESCRIPTION - ORIENTATION: ORIENTATION: RIGHT

## 2024-10-30 ASSESSMENT — PAIN DESCRIPTION - LOCATION: LOCATION: LEG

## 2024-10-30 ASSESSMENT — PAIN DESCRIPTION - DESCRIPTORS: DESCRIPTORS: TENDER

## 2024-10-30 ASSESSMENT — PAIN SCALES - GENERAL: PAINLEVEL_OUTOF10: 5

## 2024-10-30 NOTE — ED PROVIDER NOTES
Right     Order Specific Question:   Length     Answer:   Thigh High    Vascular duplex lower extremity venous right     Standing Status:   Standing     Number of Occurrences:   1         CLINICAL DECISION MAKING:  The patient presented alert with a nontoxic appearance.    The patient was involved in her plan of care through shared decision making. The testing that was ordered was discussed with the patient. Any medications that may have been ordered were discussed with the patient.     I have reviewed the patient's previous medical records using the electronic health record that we have available that were pertinent to today's visit.      Labs were reviewed. D-dimer was elevated. Imaging was reviewed and reported by the radiologist. Results showed no evidence of DVT.     Findings were discussed. She reported she no long has her compression stockings from surgery. A compression stocking was applied to the RLE. The RLE remained NVI. Follow up with pcp and the surgeon as scheduled.   Evaluation and treatment course in the ED, and plan of care upon discharge was discussed in length with the patient. Patient had no further questions prior to being discharged and was instructed to return to the ED for new or worsening symptoms.        FINAL IMPRESSION      1. Right calf pain    2. Right leg swelling            Problem List  Patient Active Problem List   Diagnosis Code    Attention deficit hyperactivity disorder (ADHD) F90.9    Narcolepsy G47.419    Moderate persistent asthma without complication J45.40    History of DVT (deep vein thrombosis) Z86.718    Hiatal hernia K44.9    Cervical radiculopathy M54.12    Ganglion cyst M67.40    Acquired trigger finger M65.30    Osteoarthritis of wrist M19.039    Pes anserinus bursitis M70.50    Plantar fasciitis M72.2    Flexion contracture of joint of foot, left M24.575    Essential hypertension I10    Chronic anticoagulation Z79.01    Iron deficiency anemia due to chronic blood  loss D50.0    GERD (gastroesophageal reflux disease) K21.9    Generalized anxiety disorder F41.1    Vitamin B12 deficiency E53.8    Chronic fatigue syndrome G93.32    KHALIDA (obstructive sleep apnea) G47.33    Lumbosacral spondylosis without myelopathy M47.817    Chronic bilateral low back pain with bilateral sciatica M54.42, M54.41, G89.29    Opioid use, unspecified with unspecified opioid-induced disorder F11.99    Ischemic stroke (HCC) I63.9    Stage 3b chronic kidney disease (HCC) N18.32    MCI (mild cognitive impairment) with memory loss G31.84    Dyslipidemia E78.5    Prediabetes R73.03    Intractable neuropathic pain of knee, left M79.2    Arthritis of right knee M17.11    History of total knee arthroplasty, bilateral Z96.653    Altered mental status, unspecified R41.82    Disorientation R41.0         DISPOSITION/PLAN   DISPOSITION Decision To Discharge 10/30/2024 12:09:22 PM           PATIENT REFERRED TO:   Nicholas Conway DO  [address]  [address]  [facility] [address]  [phone]          Ciro Pretty MD  [address] [facility] [address]  [facility] [address]  [phone]          [facility] ED  [address] [facility] [id number] [phone]    If symptoms worsen, As needed      DISCHARGE MEDICATIONS:     Discharge Medication List as of 10/30/2024 12:09 PM              (Please note that portions of this note were completed with a voice recognition program.  Efforts were made to edit the dictations but occasionally words are mis-transcribed.)    PIETER Johns CNP, Kristin M, APRN - CNP  10/30/24 1534

## 2024-12-13 NOTE — CARE COORDINATION
Patient with Acute on chronic debility due to  chronic illness . The patient's latest AMPAC (Activity Measure for Post Acute Care) Score is listed below.    AM-PAC Score - How much help does the patient need for each activity listed       Plan  - Progressive mobility protocol initated  - PT/OT consulted  - Fall precautions in place           7/15/20  Ankit Walker MD   cyclobenzaprine (FLEXERIL) 10 MG tablet TAKE ONE-HALF TO ONE TABLET BY MOUTH THREE TIMES A DAY AS NEEDED FOR MUSCLE SPASMS 6/11/20   Earlis Koyanagi, MD   ferrous sulfate (FE TABS) 325 (65 Fe) MG EC tablet Take 1 tablet by mouth 2 times daily 5/5/20   Lis Loving MD   albuterol sulfate HFA (VENTOLIN HFA) 108 (90 Base) MCG/ACT inhaler INHALE TWO PUFFS BY MOUTH EVERY 4 HOURS AS NEEDED FOR WHEEZING 4/23/20   Ankit Walker MD   citalopram (CELEXA) 40 MG tablet TAKE ONE TABLET BY MOUTH DAILY 4/8/20   Vaishnavi Walker MD   rivaroxaban (XARELTO) 20 MG TABS tablet TAKE ONE TABLET BY MOUTH DAILY 4/8/20   Ankit Walker MD   propranolol (INDERAL LA) 80 MG extended release capsule TAKE ONE CAPSULE BY MOUTH DAILY 4/8/20   Vaishnavi Walker MD   mometasone (ASMANEX HFA) 200 MCG/ACT AERO inhaler Inhale 2 puffs into the lungs 2 times daily 4/8/20   Ankit Walker MD   busPIRone (BUSPAR) 15 MG tablet Take 15 mg by mouth 3 times daily 4/6/20   Ankit Walker MD   albuterol (PROVENTIL) (2.5 MG/3ML) 0.083% nebulizer solution Take 2.5 mg by nebulization every 6 hours as needed for Wheezing or Shortness of Breath    Historical Provider, MD   pantoprazole (PROTONIX) 40 MG tablet Take 1 tablet by mouth every morning (before breakfast) 2/7/20   Ankit Walker MD   alendronate (FOSAMAX) 70 MG tablet TAKE 1 TABLET BY MOUTH ONCE WEEKLY BEFORE BREAKFAST, ON AN EMPTY STOMACH: REMAIN UPRIGHT FOR 30 MINUTES  Patient taking differently: TAKE 1 TABLET BY MOUTH ONCE WEEKLY BEFORE BREAKFAST, ON AN EMPTY STOMACH: REMAIN UPRIGHT FOR 30 MINUTES, Takes on Fridays 10/23/19   PIETER Donovan CNP   buPROPion (WELLBUTRIN XL) 300 MG extended release tablet TAKE ONE TABLET BY MOUTH EVERY MORNING 10/1/19   Eze Crockett,    Calcium Carbonate (CALCIUM 600 PO) Take 600 mg by mouth daily    Historical Provider, MD       Future Appointments   Date Time Provider Naya Roger   6/19/2020  1:00 PM Luz Marina Mcneil

## 2024-12-18 ENCOUNTER — TELEPHONE (OUTPATIENT)
Dept: NEUROLOGY | Age: 64
End: 2024-12-18

## 2024-12-18 NOTE — TELEPHONE ENCOUNTER
Called [facility] to verify if Dr. aHrt was credentialed yet or not. Spoke with [place], provider services. They do not have Dr. Hart credentialed yet. Provided me with the number credentialing to see what her credentialing status is. Per [facility]/R they do not have a pending application for credentialing for this provider NPI [id number]. Will let  know.

## 2024-12-19 ENCOUNTER — TELEPHONE (OUTPATIENT)
Dept: FAMILY MEDICINE CLINIC | Age: 64
End: 2024-12-19

## 2024-12-19 NOTE — TELEPHONE ENCOUNTER
SUBJECTIVE    Patient reports continued pain left shoulder.  He reports Medrol Dosepak did help temporarily.  He discussed results of his recent MRI.    OBJECTIVE  Physical Exam  Visit Vitals  Ht 5' 11\" (1.803 m)   Wt 104.3 kg (230 lb)   BMI 32.08 kg/m²      Left shoulder   Skin intact   130°, 30°, L5   Positive Neer, positive Brandon, positive compression       Imaging  MRI left shoulder:  IMPRESSION: Supraspinatus and infraspinatus tendinosis with reactive. Tendinosis and bursal inflammation. No recurrent rotator cuff tear.     Moderate acromioclavicular arthropathy. Subacromial decompression.     Biceps tenodesis.        ASSESSMENT AND PLAN  Left shoulder rotator cuff tendinitis    Injection left shoulder  Physical therapy to focus on scapular stabilizing exercises and shoulder blade positioning    PROCEDURE NOTE    Laterality: Left    Site: Shoulder    Specific:  subacromial         After reviewing allergies, potential side effects and complications (including but not necessarily limited to infection, allergic reaction, local tissue breakdown, systemic effects of corticosteroids, elevation of blood glucose, injury to soft tissue and/or nerves, and seizure), the patient indicated they understood and agreed to proceed. A timeout was performed confirming laterality and site. The Injection site was prepared and cleaned. A mixture of  2 cc 0.5% Bupivicaine and 1 cc depo-medol (80 mg/cc) was injected in a sterile fashion. Patient tolerated the procedure and no adverse reactions were observed. The puncture site was covered with an adhesive bandage.    RTC 10 weeks   Left detailed message on patients voice mail

## 2024-12-19 NOTE — TELEPHONE ENCOUNTER
Patient calling stating she fell a week ago. She was only having pain in her right knee and right elbow. Starting Sunday she started having right upper rib pain. Painful to cough or take a deep breath or laugh. Patient states it is getting worse everyday. Please advise

## 2025-01-02 DIAGNOSIS — I10 ESSENTIAL HYPERTENSION: ICD-10-CM

## 2025-01-02 DIAGNOSIS — K21.9 GASTROESOPHAGEAL REFLUX DISEASE, UNSPECIFIED WHETHER ESOPHAGITIS PRESENT: ICD-10-CM

## 2025-01-02 DIAGNOSIS — E78.5 DYSLIPIDEMIA: ICD-10-CM

## 2025-01-02 RX ORDER — ATORVASTATIN CALCIUM 80 MG/1
80 TABLET, FILM COATED ORAL NIGHTLY
Qty: 30 TABLET | Refills: 0 | Status: SHIPPED | OUTPATIENT
Start: 2025-01-02

## 2025-01-02 RX ORDER — PROPRANOLOL HYDROCHLORIDE 80 MG/1
80 CAPSULE, EXTENDED RELEASE ORAL DAILY
Qty: 30 CAPSULE | Refills: 0 | Status: SHIPPED | OUTPATIENT
Start: 2025-01-02

## 2025-01-02 RX ORDER — PANTOPRAZOLE SODIUM 40 MG/1
40 TABLET, DELAYED RELEASE ORAL EVERY MORNING
Qty: 30 TABLET | Refills: 0 | Status: SHIPPED | OUTPATIENT
Start: 2025-01-02

## 2025-01-02 NOTE — TELEPHONE ENCOUNTER
Latisha Prasad is calling to request a refill on the following medication(s):    Medication Request:  Requested Prescriptions     Pending Prescriptions Disp Refills    propranolol (INDERAL LA) 80 MG extended release capsule 30 capsule 0     Sig: Take 1 capsule by mouth daily    pantoprazole (PROTONIX) 40 MG tablet 30 tablet 0     Sig: Take 1 tablet by mouth every morning    atorvastatin (LIPITOR) 80 MG tablet 30 tablet 0     Sig: Take 1 tablet by mouth nightly       Last Visit Date (If Applicable):  10/14/2024    Next Visit Date:    V02/03/2025        Patient est care with you 02/03/2025

## 2025-01-03 RX ORDER — LANOLIN ALCOHOL/MO/W.PET/CERES
1000 CREAM (GRAM) TOPICAL DAILY
Qty: 28 TABLET | Refills: 3 | Status: SHIPPED | OUTPATIENT
Start: 2025-01-03

## 2025-01-03 NOTE — TELEPHONE ENCOUNTER
Please fill for Dr Russell while he is out of the office   Pharmacy requesting refill of Vitamin B-12 Cyanocobalamin 1000 MCG Tablet .      Medication active on med list yes      Date of last Rx: 07/23/2024 with 5 refills          verified by HARSHIL LINCOLN      Date of last appointment 10/29/2024    Next Visit Date:  Visit date not found

## 2025-01-09 NOTE — DISCHARGE INSTRUCTIONS
Care Transitions Note    Initial Call - Call within 2 business days of discharge: Yes    Attempted to reach patient for transitions of care follow up. Unable to reach patient.    Outreach Attempts:   HIPAA compliant voicemail left for patient.     Patient: Darian Russo    Patient : 1952   MRN: [id number]    Reason for Admission: Hydronephrosis with ureteral calculus .  Discharge Date: 25  RURS: No data recorded  Last Discharge Facility       Date Complaint Diagnosis Description Type Department Provider    25 Abdominal Pain Hydronephrosis with ureteral calculus ... ED to Hosp-Admission (Discharged) (ADMITTED) [id number] Monet Zayas, DO; Wilma,...            Was this an external facility discharge? No    Follow Up Appointment:   Patient has hospital follow up appointment scheduled  UTR x 1; no appointment noted at time of attempted outreach; noted in epic that Urology office will be calling the patient to schedule follow up with them.          Plan for follow-up on next business day.      Nlel Zhang RN        Wear knee immobilizer when ambulating.  Use crutches at home to help keep weight off your left leg.  Keep left leg elevated while at rest.  You may apply ice to your knee as needed.  Follow-up with orthopedic doctor provided for further evaluation.  Return to emergency department for worsening or new symptoms.

## 2025-01-14 DIAGNOSIS — I10 ESSENTIAL HYPERTENSION: ICD-10-CM

## 2025-01-14 DIAGNOSIS — E78.5 DYSLIPIDEMIA: ICD-10-CM

## 2025-01-14 RX ORDER — ATORVASTATIN CALCIUM 80 MG/1
80 TABLET, FILM COATED ORAL NIGHTLY
Qty: 30 TABLET | Refills: 0 | Status: SHIPPED | OUTPATIENT
Start: 2025-01-14

## 2025-01-14 RX ORDER — PROPRANOLOL HYDROCHLORIDE 80 MG/1
80 CAPSULE, EXTENDED RELEASE ORAL DAILY
Qty: 30 CAPSULE | Refills: 0 | Status: SHIPPED | OUTPATIENT
Start: 2025-01-14

## 2025-01-14 NOTE — TELEPHONE ENCOUNTER
Latisha Prasad is calling to request a refill on the following medication(s):    Medication Request:  Requested Prescriptions     Pending Prescriptions Disp Refills    propranolol (INDERAL LA) 80 MG extended release capsule 30 capsule 0     Sig: Take 1 capsule by mouth daily    atorvastatin (LIPITOR) 80 MG tablet 30 tablet 0     Sig: Take 1 tablet by mouth nightly       Last Visit Date (If Applicable):  10/14/2024    Next Visit Date:    02/03/2025      Patient to [facility] Care with you 02/03/2025

## 2025-02-03 ENCOUNTER — OFFICE VISIT (OUTPATIENT)
Dept: FAMILY MEDICINE CLINIC | Age: 65
End: 2025-02-03

## 2025-02-03 VITALS
HEIGHT: 60 IN | DIASTOLIC BLOOD PRESSURE: 50 MMHG | TEMPERATURE: 98.3 F | SYSTOLIC BLOOD PRESSURE: 94 MMHG | BODY MASS INDEX: 30.47 KG/M2 | HEART RATE: 59 BPM | OXYGEN SATURATION: 93 % | WEIGHT: 155.2 LBS

## 2025-02-03 DIAGNOSIS — J45.40 MODERATE PERSISTENT ASTHMA WITHOUT COMPLICATION: ICD-10-CM

## 2025-02-03 DIAGNOSIS — Z76.89 ENCOUNTER TO ESTABLISH CARE WITH NEW DOCTOR: Primary | ICD-10-CM

## 2025-02-03 DIAGNOSIS — I10 ESSENTIAL HYPERTENSION: ICD-10-CM

## 2025-02-03 DIAGNOSIS — G47.33 OSA (OBSTRUCTIVE SLEEP APNEA): ICD-10-CM

## 2025-02-03 DIAGNOSIS — R73.03 PREDIABETES: ICD-10-CM

## 2025-02-03 DIAGNOSIS — E53.8 VITAMIN B12 DEFICIENCY: ICD-10-CM

## 2025-02-03 DIAGNOSIS — M67.40 GANGLION CYST: ICD-10-CM

## 2025-02-03 DIAGNOSIS — Z86.718 HISTORY OF DVT (DEEP VEIN THROMBOSIS): ICD-10-CM

## 2025-02-03 DIAGNOSIS — F90.0 ATTENTION DEFICIT HYPERACTIVITY DISORDER (ADHD), PREDOMINANTLY INATTENTIVE TYPE: ICD-10-CM

## 2025-02-03 DIAGNOSIS — K21.9 GASTROESOPHAGEAL REFLUX DISEASE WITHOUT ESOPHAGITIS: ICD-10-CM

## 2025-02-03 DIAGNOSIS — E78.5 DYSLIPIDEMIA: ICD-10-CM

## 2025-02-03 DIAGNOSIS — G47.411 PRIMARY NARCOLEPSY WITH CATAPLEXY: ICD-10-CM

## 2025-02-03 DIAGNOSIS — M54.12 CERVICAL RADICULOPATHY: ICD-10-CM

## 2025-02-03 DIAGNOSIS — I63.9 ISCHEMIC STROKE (HCC): ICD-10-CM

## 2025-02-03 DIAGNOSIS — K44.9 HIATAL HERNIA: ICD-10-CM

## 2025-02-03 DIAGNOSIS — F11.99 OPIOID USE, UNSPECIFIED WITH UNSPECIFIED OPIOID-INDUCED DISORDER (HCC): ICD-10-CM

## 2025-02-03 DIAGNOSIS — Z79.01 CHRONIC ANTICOAGULATION: ICD-10-CM

## 2025-02-03 DIAGNOSIS — F32.1 CURRENT MODERATE EPISODE OF MAJOR DEPRESSIVE DISORDER WITHOUT PRIOR EPISODE (HCC): ICD-10-CM

## 2025-02-03 DIAGNOSIS — N18.32 STAGE 3B CHRONIC KIDNEY DISEASE (HCC): ICD-10-CM

## 2025-02-03 PROBLEM — M54.42 CHRONIC BILATERAL LOW BACK PAIN WITH BILATERAL SCIATICA: Status: RESOLVED | Noted: 2021-06-17 | Resolved: 2025-02-03

## 2025-02-03 PROBLEM — M47.817 LUMBOSACRAL SPONDYLOSIS WITHOUT MYELOPATHY: Status: RESOLVED | Noted: 2021-03-25 | Resolved: 2025-02-03

## 2025-02-03 PROBLEM — R41.0 DISORIENTATION: Status: RESOLVED | Noted: 2024-09-22 | Resolved: 2025-02-03

## 2025-02-03 PROBLEM — M54.41 CHRONIC BILATERAL LOW BACK PAIN WITH BILATERAL SCIATICA: Status: RESOLVED | Noted: 2021-06-17 | Resolved: 2025-02-03

## 2025-02-03 PROBLEM — M17.11 ARTHRITIS OF RIGHT KNEE: Status: RESOLVED | Noted: 2024-09-19 | Resolved: 2025-02-03

## 2025-02-03 PROBLEM — G31.84 MCI (MILD COGNITIVE IMPAIRMENT) WITH MEMORY LOSS: Status: RESOLVED | Noted: 2022-10-05 | Resolved: 2025-02-03

## 2025-02-03 PROBLEM — M65.30 ACQUIRED TRIGGER FINGER: Status: RESOLVED | Noted: 2018-07-05 | Resolved: 2025-02-03

## 2025-02-03 PROBLEM — Z96.653 HISTORY OF TOTAL KNEE ARTHROPLASTY, BILATERAL: Status: RESOLVED | Noted: 2024-09-19 | Resolved: 2025-02-03

## 2025-02-03 PROBLEM — R41.82 ALTERED MENTAL STATUS, UNSPECIFIED: Status: RESOLVED | Noted: 2024-09-21 | Resolved: 2025-02-03

## 2025-02-03 PROBLEM — M70.50 PES ANSERINUS BURSITIS: Status: RESOLVED | Noted: 2018-07-05 | Resolved: 2025-02-03

## 2025-02-03 PROBLEM — G89.29 CHRONIC BILATERAL LOW BACK PAIN WITH BILATERAL SCIATICA: Status: RESOLVED | Noted: 2021-06-17 | Resolved: 2025-02-03

## 2025-02-03 PROBLEM — F41.1 GENERALIZED ANXIETY DISORDER: Status: RESOLVED | Noted: 2020-10-29 | Resolved: 2025-02-03

## 2025-02-03 PROBLEM — M79.2: Status: RESOLVED | Noted: 2024-05-06 | Resolved: 2025-02-03

## 2025-02-03 PROBLEM — G93.32 CHRONIC FATIGUE SYNDROME: Status: RESOLVED | Noted: 2021-03-03 | Resolved: 2025-02-03

## 2025-02-03 PROBLEM — D50.0 IRON DEFICIENCY ANEMIA DUE TO CHRONIC BLOOD LOSS: Status: RESOLVED | Noted: 2020-02-08 | Resolved: 2025-02-03

## 2025-02-03 PROBLEM — M72.2 PLANTAR FASCIITIS: Status: RESOLVED | Noted: 2018-07-05 | Resolved: 2025-02-03

## 2025-02-03 RX ORDER — ARIPIPRAZOLE 5 MG/1
5 TABLET ORAL DAILY
COMMUNITY

## 2025-02-03 ASSESSMENT — PATIENT HEALTH QUESTIONNAIRE - PHQ9
SUM OF ALL RESPONSES TO PHQ QUESTIONS 1-9: 3
3. TROUBLE FALLING OR STAYING ASLEEP: MORE THAN HALF THE DAYS
7. TROUBLE CONCENTRATING ON THINGS, SUCH AS READING THE NEWSPAPER OR WATCHING TELEVISION: NOT AT ALL
6. FEELING BAD ABOUT YOURSELF - OR THAT YOU ARE A FAILURE OR HAVE LET YOURSELF OR YOUR FAMILY DOWN: NOT AT ALL
9. THOUGHTS THAT YOU WOULD BE BETTER OFF DEAD, OR OF HURTING YOURSELF: NOT AT ALL
5. POOR APPETITE OR OVEREATING: NOT AT ALL
SUM OF ALL RESPONSES TO PHQ QUESTIONS 1-9: 3
4. FEELING TIRED OR HAVING LITTLE ENERGY: NOT AT ALL
SUM OF ALL RESPONSES TO PHQ QUESTIONS 1-9: 3
1. LITTLE INTEREST OR PLEASURE IN DOING THINGS: NOT AT ALL
SUM OF ALL RESPONSES TO PHQ9 QUESTIONS 1 & 2: 0
8. MOVING OR SPEAKING SO SLOWLY THAT OTHER PEOPLE COULD HAVE NOTICED. OR THE OPPOSITE, BEING SO FIGETY OR RESTLESS THAT YOU HAVE BEEN MOVING AROUND A LOT MORE THAN USUAL: SEVERAL DAYS
2. FEELING DOWN, DEPRESSED OR HOPELESS: NOT AT ALL
10. IF YOU CHECKED OFF ANY PROBLEMS, HOW DIFFICULT HAVE THESE PROBLEMS MADE IT FOR YOU TO DO YOUR WORK, TAKE CARE OF THINGS AT HOME, OR GET ALONG WITH OTHER PEOPLE: SOMEWHAT DIFFICULT
SUM OF ALL RESPONSES TO PHQ QUESTIONS 1-9: 3

## 2025-02-03 ASSESSMENT — ENCOUNTER SYMPTOMS
BACK PAIN: 1
EYES NEGATIVE: 1
RESPIRATORY NEGATIVE: 1
ALLERGIC/IMMUNOLOGIC NEGATIVE: 1
ABDOMINAL PAIN: 1

## 2025-02-03 NOTE — PROGRESS NOTES
Subjective:      Patient ID: Latisha Prasad is a 65 y.o. female.    Mental Health Problem  The primary symptoms include dysphoric mood, delusions, disorganized speech and somatic symptoms. The current episode started more than 1 month ago. This is a chronic problem.   Somatic symptoms include fatigue, abdominal pain and back pain.   The onset of the illness is precipitated by emotional stress. The degree of incapacity that she is experiencing as a consequence of her illness is moderate. Additional symptoms of the illness include anhedonia, fatigue, agitation, attention impairment, increased goal-directed activity, flight of ideas, distractible and abdominal pain. She does not admit to suicidal ideas. She does not have a plan to attempt suicide. She does not contemplate harming themself. She has not already injured self. She does not contemplate injuring another person. She has not already  injured another person. Risk factors that are present for mental illness include a history of mental illness.       Review of Systems   Constitutional:  Positive for fatigue.   HENT: Negative.     Eyes: Negative.    Respiratory: Negative.     Cardiovascular: Negative.    Gastrointestinal:  Positive for abdominal pain.   Endocrine: Negative.    Musculoskeletal:  Positive for arthralgias and back pain.   Skin: Negative.    Allergic/Immunologic: Negative.    Neurological: Negative.    Hematological: Negative.    Psychiatric/Behavioral:  Positive for agitation, decreased concentration, dysphoric mood and sleep disturbance. The patient is nervous/anxious and is hyperactive.    Past family and social history unremarkable.  .iag      Objective:   Physical Exam  Vitals and nursing note reviewed.   Constitutional:       Appearance: She is well-developed.      Comments: Suspected sleep apnea   HENT:      Head: Normocephalic and atraumatic.      Right Ear: External ear normal.      Left Ear: External ear normal.   Eyes:

## 2025-02-05 ENCOUNTER — HOSPITAL ENCOUNTER (OUTPATIENT)
Age: 65
Setting detail: SPECIMEN
Discharge: HOME | End: 2025-02-05

## 2025-02-05 DIAGNOSIS — N18.32 STAGE 3B CHRONIC KIDNEY DISEASE (HCC): ICD-10-CM

## 2025-02-05 DIAGNOSIS — I63.9 ISCHEMIC STROKE (HCC): ICD-10-CM

## 2025-02-05 DIAGNOSIS — E78.5 DYSLIPIDEMIA: ICD-10-CM

## 2025-02-05 DIAGNOSIS — R73.03 PREDIABETES: ICD-10-CM

## 2025-02-05 DIAGNOSIS — F90.0 ATTENTION DEFICIT HYPERACTIVITY DISORDER (ADHD), PREDOMINANTLY INATTENTIVE TYPE: ICD-10-CM

## 2025-02-05 DIAGNOSIS — Z86.718 HISTORY OF DVT (DEEP VEIN THROMBOSIS): ICD-10-CM

## 2025-02-05 LAB
25(OH)D3 SERPL-MCNC: 44.6 NG/ML (ref 30–100)
ALBUMIN SERPL-MCNC: 3.9 G/DL (ref 3.5–5.2)
ALBUMIN/GLOB SERPL: 1.6 {RATIO} (ref 1–2.5)
ALP SERPL-CCNC: 138 U/L (ref 35–104)
ALT SERPL-CCNC: 18 U/L (ref 10–35)
ANION GAP SERPL CALCULATED.3IONS-SCNC: 8 MMOL/L (ref 9–16)
AST SERPL-CCNC: 26 U/L (ref 10–35)
BILIRUB SERPL-MCNC: 0.4 MG/DL (ref 0–1.2)
BUN SERPL-MCNC: 18 MG/DL (ref 8–23)
CALCIUM SERPL-MCNC: 9.6 MG/DL (ref 8.6–10.4)
CALCIUM SERPL-MCNC: 9.7 MG/DL (ref 8.6–10.4)
CHLORIDE SERPL-SCNC: 106 MMOL/L (ref 98–107)
CHOLEST SERPL-MCNC: 119 MG/DL (ref 0–199)
CHOLESTEROL/HDL RATIO: 2.1
CO2 SERPL-SCNC: 29 MMOL/L (ref 20–31)
CREAT SERPL-MCNC: 1.2 MG/DL (ref 0.6–0.9)
ERYTHROCYTE [DISTWIDTH] IN BLOOD BY AUTOMATED COUNT: 14.3 % (ref 11.8–14.4)
EST. AVERAGE GLUCOSE BLD GHB EST-MCNC: 131 MG/DL
GFR, ESTIMATED: 50 ML/MIN/1.73M2
GLUCOSE SERPL-MCNC: 87 MG/DL (ref 74–99)
HBA1C MFR BLD: 6.2 % (ref 4–6)
HCT VFR BLD AUTO: 41.3 % (ref 36.3–47.1)
HDLC SERPL-MCNC: 56 MG/DL
HGB BLD-MCNC: 12.7 G/DL (ref 11.9–15.1)
LDLC SERPL CALC-MCNC: 43 MG/DL (ref 0–100)
MCH RBC QN AUTO: 28.5 PG (ref 25.2–33.5)
MCHC RBC AUTO-ENTMCNC: 30.8 G/DL (ref 28.4–34.8)
MCV RBC AUTO: 92.8 FL (ref 82.6–102.9)
NRBC BLD-RTO: 0 PER 100 WBC
PLATELET # BLD AUTO: 234 K/UL (ref 138–453)
PMV BLD AUTO: 11.4 FL (ref 8.1–13.5)
POTASSIUM SERPL-SCNC: 3.8 MMOL/L (ref 3.7–5.3)
PROT SERPL-MCNC: 6.4 G/DL (ref 6.6–8.7)
RBC # BLD AUTO: 4.45 M/UL (ref 3.95–5.11)
SODIUM SERPL-SCNC: 143 MMOL/L (ref 136–145)
TRIGL SERPL-MCNC: 98 MG/DL
TSH SERPL DL<=0.05 MIU/L-ACNC: 1.04 UIU/ML (ref 0.27–4.2)
VLDLC SERPL CALC-MCNC: 20 MG/DL (ref 1–30)
WBC OTHER # BLD: 5.5 K/UL (ref 3.5–11.3)

## 2025-02-07 DIAGNOSIS — E78.5 DYSLIPIDEMIA: ICD-10-CM

## 2025-02-07 DIAGNOSIS — I10 ESSENTIAL HYPERTENSION: ICD-10-CM

## 2025-02-07 DIAGNOSIS — K21.9 GASTROESOPHAGEAL REFLUX DISEASE, UNSPECIFIED WHETHER ESOPHAGITIS PRESENT: ICD-10-CM

## 2025-02-07 NOTE — TELEPHONE ENCOUNTER
Latisha Prasad is calling to request a refill on the following medication(s):    Medication Request:  Requested Prescriptions     Pending Prescriptions Disp Refills    atorvastatin (LIPITOR) 80 MG tablet 90 tablet 1     Sig: Take 1 tablet by mouth nightly    pantoprazole (PROTONIX) 40 MG tablet 90 tablet 1     Sig: Take 1 tablet by mouth every morning    propranolol (INDERAL LA) 80 MG extended release capsule 90 capsule 1     Sig: Take 1 capsule by mouth daily       Last Visit Date (If Applicable):  2/3/2025    Next Visit Date:    5/7/2025

## 2025-02-08 RX ORDER — PANTOPRAZOLE SODIUM 40 MG/1
40 TABLET, DELAYED RELEASE ORAL EVERY MORNING
Qty: 90 TABLET | Refills: 1 | Status: SHIPPED | OUTPATIENT
Start: 2025-02-08

## 2025-02-08 RX ORDER — ATORVASTATIN CALCIUM 80 MG/1
80 TABLET, FILM COATED ORAL NIGHTLY
Qty: 90 TABLET | Refills: 1 | Status: SHIPPED | OUTPATIENT
Start: 2025-02-08

## 2025-02-08 RX ORDER — PROPRANOLOL HYDROCHLORIDE 80 MG/1
80 CAPSULE, EXTENDED RELEASE ORAL DAILY
Qty: 90 CAPSULE | Refills: 1 | Status: SHIPPED | OUTPATIENT
Start: 2025-02-08

## 2025-02-27 ENCOUNTER — TELEPHONE (OUTPATIENT)
Dept: NEUROLOGY | Age: 65
End: 2025-02-27

## 2025-03-13 ENCOUNTER — APPOINTMENT (OUTPATIENT)
Dept: GENERAL RADIOLOGY | Age: 65
End: 2025-03-13
Payer: MEDICARE

## 2025-03-13 ENCOUNTER — HOSPITAL ENCOUNTER (EMERGENCY)
Age: 65
Discharge: HOME | End: 2025-03-13
Attending: EMERGENCY MEDICINE
Payer: MEDICARE

## 2025-03-13 VITALS
DIASTOLIC BLOOD PRESSURE: 62 MMHG | OXYGEN SATURATION: 97 % | TEMPERATURE: 98.6 F | WEIGHT: 156 LBS | HEART RATE: 62 BPM | RESPIRATION RATE: 16 BRPM | HEIGHT: 60 IN | BODY MASS INDEX: 30.63 KG/M2 | SYSTOLIC BLOOD PRESSURE: 102 MMHG

## 2025-03-13 DIAGNOSIS — M19.071 OSTEOARTHRITIS OF RIGHT FOOT, UNSPECIFIED OSTEOARTHRITIS TYPE: Primary | ICD-10-CM

## 2025-03-13 PROCEDURE — 99284 EMERGENCY DEPT VISIT MOD MDM: CPT

## 2025-03-13 PROCEDURE — 96372 THER/PROPH/DIAG INJ SC/IM: CPT

## 2025-03-13 PROCEDURE — 73630 X-RAY EXAM OF FOOT: CPT

## 2025-03-13 PROCEDURE — 6360000002 HC RX W HCPCS: Performed by: EMERGENCY MEDICINE

## 2025-03-13 RX ORDER — KETOROLAC TROMETHAMINE 15 MG/ML
15 INJECTION, SOLUTION INTRAMUSCULAR; INTRAVENOUS ONCE
Status: COMPLETED | OUTPATIENT
Start: 2025-03-13 | End: 2025-03-13

## 2025-03-13 RX ORDER — IBUPROFEN 800 MG/1
800 TABLET, FILM COATED ORAL 2 TIMES DAILY PRN
Qty: 30 TABLET | Refills: 1 | Status: SHIPPED | OUTPATIENT
Start: 2025-03-13

## 2025-03-13 RX ADMIN — KETOROLAC TROMETHAMINE 15 MG: 15 INJECTION, SOLUTION INTRAMUSCULAR; INTRAVENOUS at 13:28

## 2025-03-13 ASSESSMENT — PAIN DESCRIPTION - LOCATION
LOCATION: FOOT
LOCATION: FOOT

## 2025-03-13 ASSESSMENT — PAIN DESCRIPTION - DESCRIPTORS: DESCRIPTORS: ACHING

## 2025-03-13 ASSESSMENT — PAIN SCALES - GENERAL
PAINLEVEL_OUTOF10: 6
PAINLEVEL_OUTOF10: 7

## 2025-03-13 ASSESSMENT — PAIN DESCRIPTION - ORIENTATION
ORIENTATION: RIGHT
ORIENTATION: RIGHT

## 2025-03-13 ASSESSMENT — PAIN - FUNCTIONAL ASSESSMENT: PAIN_FUNCTIONAL_ASSESSMENT: 0-10

## 2025-03-13 NOTE — ED PROVIDER NOTES
EMERGENCY DEPARTMENT ENCOUNTER    Pt Name: Latisha Prasad  MRN: [id number]  Birthdate 1960  Date of evaluation: 3/13/25  CHIEF COMPLAINT       Chief Complaint   Patient presents with    Foot Pain     Pt reports pain/swelling to right foot since Monday. States she got out of bed and fell to the ground. Denies head/neck pain. Denies vision changes or numbness/tingling.     HISTORY OF PRESENT ILLNESS   65-year-old female presenting to the emergency department today with a chief complaint of right foot pain and swelling since Monday.  Reports that she got out of her bed on Monday and her right foot gave out.  She is unsure if she twisted her boot but has been having painful with right foot ambulation since then.  She also initially noticed swelling which has now gone down.  Patient otherwise denies having any other systemic signs or symptoms at this time.  She also denies any other trauma or injuries.             REVIEW OF SYSTEMS     Review of Systems   Musculoskeletal:         Right foot pain and swelling   All other systems reviewed and are negative.    PASTMEDICAL HISTORY     Past Medical History:   Diagnosis Date    Acid reflux     medication works pretty good, but has occasional reflux at night    Adult ADHD 08/01/2012    monthly visits @ [facility]    Asthma 08/01/2012    seasonal, has no inhalers    Awareness under anesthesia     Balance problem     due to knees    Bipolar 1 disorder ([facility])     monthly visits with [facility]    Broken foot 2014    Left foot    Cerebral artery occlusion with cerebral infarction (HCC)     was in 2022 or 2023, right eye droops a little    Chest pain of uncertain etiology 12/05/2013    Last episode was in 2013 (Written 02/12/2019) last episode was a couple weeks ago (9/5/24)    CKD (chronic kidney disease)     Depression     Under control per pt. on 6/15/18    Diabetes mellitus ([facility])     DVT of leg (deep venous thrombosis) ([facility]) 08/22/2014    Fracture     right foot / pt denies surgical

## 2025-03-13 NOTE — DISCHARGE INSTRUCTIONS
X-ray of your foot shows that you have arthritis.  I recommend taking over-the-counter NSAIDs such as ibuprofen or Tylenol or Aleve as needed for pain.  Apply ice to minimize the swelling.  Follow-up with your primary care physician.  Return back to the emergency department immediately if you notice any concerning or worsening signs or symptoms.

## 2025-03-14 ENCOUNTER — ENROLLMENT (OUTPATIENT)
Dept: CARE COORDINATION | Age: 65
End: 2025-03-14

## 2025-03-14 ENCOUNTER — CARE COORDINATION (OUTPATIENT)
Dept: CARE COORDINATION | Age: 65
End: 2025-03-14

## 2025-03-14 SDOH — ECONOMIC STABILITY: FOOD INSECURITY: WITHIN THE PAST 12 MONTHS, YOU WORRIED THAT YOUR FOOD WOULD RUN OUT BEFORE YOU GOT THE MONEY TO BUY MORE.: NEVER TRUE

## 2025-03-14 SDOH — SOCIAL STABILITY: SOCIAL INSECURITY
WITHIN THE LAST YEAR, HAVE YOU BEEN KICKED, HIT, SLAPPED, OR OTHERWISE PHYSICALLY HURT BY YOUR PARTNER OR EX-PARTNER?: NO

## 2025-03-14 SDOH — SOCIAL STABILITY: SOCIAL INSECURITY: WITHIN THE LAST YEAR, HAVE YOU BEEN AFRAID OF YOUR PARTNER OR EX-PARTNER?: NO

## 2025-03-14 SDOH — SOCIAL STABILITY: SOCIAL NETWORK
DO YOU BELONG TO ANY CLUBS OR ORGANIZATIONS SUCH AS CHURCH GROUPS, UNIONS, FRATERNAL OR ATHLETIC GROUPS, OR SCHOOL GROUPS?: YES

## 2025-03-14 SDOH — SOCIAL STABILITY: SOCIAL INSECURITY
WITHIN THE LAST YEAR, HAVE YOU BEEN RAPED OR FORCED TO HAVE ANY KIND OF SEXUAL ACTIVITY BY YOUR PARTNER OR EX-PARTNER?: NO

## 2025-03-14 SDOH — ECONOMIC STABILITY: FOOD INSECURITY: WITHIN THE PAST 12 MONTHS, THE FOOD YOU BOUGHT JUST DIDN'T LAST AND YOU DIDN'T HAVE MONEY TO GET MORE.: NEVER TRUE

## 2025-03-14 SDOH — HEALTH STABILITY: PHYSICAL HEALTH: ON AVERAGE, HOW MANY DAYS PER WEEK DO YOU ENGAGE IN MODERATE TO STRENUOUS EXERCISE (LIKE A BRISK WALK)?: 0 DAYS

## 2025-03-14 SDOH — HEALTH STABILITY: MENTAL HEALTH: HOW OFTEN DO YOU HAVE A DRINK CONTAINING ALCOHOL?: NEVER

## 2025-03-14 SDOH — ECONOMIC STABILITY: TRANSPORTATION INSECURITY: IN THE PAST 12 MONTHS, HAS LACK OF TRANSPORTATION KEPT YOU FROM MEDICAL APPOINTMENTS OR FROM GETTING MEDICATIONS?: NO

## 2025-03-14 SDOH — SOCIAL STABILITY: SOCIAL NETWORK: HOW OFTEN DO YOU GET TOGETHER WITH FRIENDS OR RELATIVES?: THREE TIMES A WEEK

## 2025-03-14 SDOH — SOCIAL STABILITY: SOCIAL INSECURITY: ARE YOU MARRIED, WIDOWED, DIVORCED, SEPARATED, NEVER MARRIED, OR LIVING WITH A PARTNER?: LIVING WITH PARTNER

## 2025-03-14 SDOH — SOCIAL STABILITY: SOCIAL NETWORK: IN A TYPICAL WEEK, HOW MANY TIMES DO YOU TALK ON THE PHONE WITH FAMILY, FRIENDS, OR NEIGHBORS?: THREE TIMES A WEEK

## 2025-03-14 SDOH — HEALTH STABILITY: MENTAL HEALTH: HOW MANY DRINKS CONTAINING ALCOHOL DO YOU HAVE ON A TYPICAL DAY WHEN YOU ARE DRINKING?: PATIENT DOES NOT DRINK

## 2025-03-14 SDOH — HEALTH STABILITY: MENTAL HEALTH
DO YOU FEEL STRESS - TENSE, RESTLESS, NERVOUS, OR ANXIOUS, OR UNABLE TO SLEEP AT NIGHT BECAUSE YOUR MIND IS TROUBLED ALL THE TIME - THESE DAYS?: TO SOME EXTENT

## 2025-03-14 SDOH — ECONOMIC STABILITY: HOUSING INSECURITY: IN THE LAST 12 MONTHS, WAS THERE A TIME WHEN YOU WERE NOT ABLE TO PAY THE MORTGAGE OR RENT ON TIME?: NO

## 2025-03-14 SDOH — ECONOMIC STABILITY: FOOD INSECURITY: HOW HARD IS IT FOR YOU TO PAY FOR THE VERY BASICS LIKE FOOD, HOUSING, MEDICAL CARE, AND HEATING?: NOT VERY HARD

## 2025-03-14 SDOH — SOCIAL STABILITY: SOCIAL INSECURITY: WITHIN THE LAST YEAR, HAVE YOU BEEN HUMILIATED OR EMOTIONALLY ABUSED IN OTHER WAYS BY YOUR PARTNER OR EX-PARTNER?: NO

## 2025-03-14 SDOH — HEALTH STABILITY: PHYSICAL HEALTH: ON AVERAGE, HOW MANY MINUTES DO YOU ENGAGE IN EXERCISE AT THIS LEVEL?: 0 MIN

## 2025-03-14 SDOH — SOCIAL STABILITY: SOCIAL NETWORK: HOW OFTEN DO YOU ATTEND MEETINGS OF THE CLUBS OR ORGANIZATIONS YOU BELONG TO?: 1 TO 4 TIMES PER YEAR

## 2025-03-14 SDOH — SOCIAL STABILITY: SOCIAL NETWORK: HOW OFTEN DO YOU ATTEND CHURCH OR RELIGIOUS SERVICES?: 1 TO 4 TIMES PER YEAR

## 2025-03-14 ASSESSMENT — ACTIVITIES OF DAILY LIVING (ADL): LACK_OF_TRANSPORTATION: NO

## 2025-03-14 NOTE — CARE COORDINATION
provider.  I will keep my appointment or reschedule if I have to cancel.  I will notify my provider of any barriers to my plan of care.  I will notify my provider of any symptoms that indicate a worsening of my condition.    Barriers: overwhelmed by complexity of regimen  Plan for overcoming my barriers: Willing to work with ACM/PCP for HTN management and fall precautions  Confidence: 9/10  Anticipated Goal Completion Date: 5/14/2025                 PCP/Specialist follow up:   Future Appointments         Provider Specialty Dept Phone    5/7/2025 8:30 AM Robin Avina MD Family Medicine [phone]    8/14/2025 8:30 AM Shannan Hart PSYD Neurology [phone]    10/23/2025 10:00 AM Mark Cruz MD Oncology [phone]            Follow Up:   Plan for next ACM outreach in approximately 1 week to complete:  - disease specific assessments  - advance care planning   - goal progression  - education .   Caregiver is agreeable to this plan.

## 2025-03-19 ENCOUNTER — TELEPHONE (OUTPATIENT)
Dept: NEUROLOGY | Age: 65
End: 2025-03-19

## 2025-03-19 NOTE — TELEPHONE ENCOUNTER
03 19 2025 called the patient times 2 (02 27 2025 and 03 11 2025 at  [phone]) to schedule follow up appointment with Dr. Leigh, the call would not go through both times.  I called  on 02 27 2027 and the patient asked me to back back another day and on 03 11 2025 I left a voicemail for a return call, no response.  I mailed the patient a letter asking them to call the office back to schedule this appointment.  KS

## 2025-03-22 ENCOUNTER — APPOINTMENT (OUTPATIENT)
Dept: CT IMAGING | Age: 65
End: 2025-03-22
Payer: MEDICARE

## 2025-03-22 ENCOUNTER — HOSPITAL ENCOUNTER (EMERGENCY)
Age: 65
Discharge: HOME | End: 2025-03-22
Attending: EMERGENCY MEDICINE
Payer: MEDICARE

## 2025-03-22 VITALS
WEIGHT: 155 LBS | SYSTOLIC BLOOD PRESSURE: 126 MMHG | RESPIRATION RATE: 14 BRPM | OXYGEN SATURATION: 98 % | DIASTOLIC BLOOD PRESSURE: 91 MMHG | HEART RATE: 53 BPM | TEMPERATURE: 97.5 F | BODY MASS INDEX: 30.27 KG/M2

## 2025-03-22 DIAGNOSIS — K44.9 HIATAL HERNIA: ICD-10-CM

## 2025-03-22 DIAGNOSIS — R10.31 RIGHT LOWER QUADRANT ABDOMINAL PAIN: Primary | ICD-10-CM

## 2025-03-22 DIAGNOSIS — N30.00 ACUTE CYSTITIS WITHOUT HEMATURIA: ICD-10-CM

## 2025-03-22 LAB
ALBUMIN SERPL-MCNC: 3.9 G/DL (ref 3.5–5.2)
ALP SERPL-CCNC: 163 U/L (ref 35–104)
ALT SERPL-CCNC: 14 U/L (ref 10–35)
ANION GAP SERPL CALCULATED.3IONS-SCNC: 13 MMOL/L (ref 9–16)
AST SERPL-CCNC: 26 U/L (ref 10–35)
BASOPHILS # BLD: 0.03 K/UL (ref 0–0.2)
BASOPHILS NFR BLD: 1 % (ref 0–2)
BILIRUB SERPL-MCNC: 0.5 MG/DL (ref 0–1.2)
BUN SERPL-MCNC: 13 MG/DL (ref 8–23)
CALCIUM SERPL-MCNC: 9.4 MG/DL (ref 8.8–10.2)
CHLORIDE SERPL-SCNC: 106 MMOL/L (ref 98–107)
CO2 SERPL-SCNC: 26 MMOL/L (ref 20–31)
CREAT SERPL-MCNC: 1.4 MG/DL (ref 0.5–0.9)
EOSINOPHIL # BLD: 0.15 K/UL (ref 0–0.44)
EOSINOPHILS RELATIVE PERCENT: 3 % (ref 1–4)
ERYTHROCYTE [DISTWIDTH] IN BLOOD BY AUTOMATED COUNT: 14.8 % (ref 11.8–14.4)
GFR, ESTIMATED: 41 ML/MIN/1.73M2
GLUCOSE SERPL-MCNC: 94 MG/DL (ref 82–115)
HCT VFR BLD AUTO: 39.7 % (ref 36.3–47.1)
HGB BLD-MCNC: 12.5 G/DL (ref 11.9–15.1)
IMM GRANULOCYTES # BLD AUTO: 0.01 K/UL (ref 0–0.3)
IMM GRANULOCYTES NFR BLD: 0 %
LIPASE SERPL-CCNC: 24 U/L (ref 13–60)
LYMPHOCYTES NFR BLD: 1.24 K/UL (ref 1.1–3.7)
LYMPHOCYTES RELATIVE PERCENT: 24 % (ref 24–43)
MCH RBC QN AUTO: 29.8 PG (ref 25.2–33.5)
MCHC RBC AUTO-ENTMCNC: 31.5 G/DL (ref 28.4–34.8)
MCV RBC AUTO: 94.7 FL (ref 82.6–102.9)
MONOCYTES NFR BLD: 0.51 K/UL (ref 0.1–1.2)
MONOCYTES NFR BLD: 10 % (ref 3–12)
NEUTROPHILS NFR BLD: 62 % (ref 36–65)
NEUTS SEG NFR BLD: 3.34 K/UL (ref 1.5–8.1)
NRBC BLD-RTO: 0 PER 100 WBC
PLATELET # BLD AUTO: 317 K/UL (ref 138–453)
PMV BLD AUTO: 10.2 FL (ref 8.1–13.5)
POTASSIUM SERPL-SCNC: 3.9 MMOL/L (ref 3.7–5.3)
PROT SERPL-MCNC: 6.5 G/DL (ref 6.6–8.7)
RBC # BLD AUTO: 4.19 M/UL (ref 3.95–5.11)
RBC # BLD: ABNORMAL 10*6/UL
SODIUM SERPL-SCNC: 144 MMOL/L (ref 136–145)
WBC OTHER # BLD: 5.3 K/UL (ref 3.5–11.3)

## 2025-03-22 PROCEDURE — 74177 CT ABD & PELVIS W/CONTRAST: CPT

## 2025-03-22 PROCEDURE — 6360000002 HC RX W HCPCS: Performed by: EMERGENCY MEDICINE

## 2025-03-22 PROCEDURE — 96374 THER/PROPH/DIAG INJ IV PUSH: CPT

## 2025-03-22 PROCEDURE — 80053 COMPREHEN METABOLIC PANEL: CPT

## 2025-03-22 PROCEDURE — 96375 TX/PRO/DX INJ NEW DRUG ADDON: CPT

## 2025-03-22 PROCEDURE — 83690 ASSAY OF LIPASE: CPT

## 2025-03-22 PROCEDURE — 2500000003 HC RX 250 WO HCPCS: Performed by: EMERGENCY MEDICINE

## 2025-03-22 PROCEDURE — 6360000004 HC RX CONTRAST MEDICATION: Performed by: EMERGENCY MEDICINE

## 2025-03-22 PROCEDURE — 2580000003 HC RX 258: Performed by: EMERGENCY MEDICINE

## 2025-03-22 PROCEDURE — 99285 EMERGENCY DEPT VISIT HI MDM: CPT

## 2025-03-22 PROCEDURE — 85025 COMPLETE CBC W/AUTO DIFF WBC: CPT

## 2025-03-22 RX ORDER — IOPAMIDOL 755 MG/ML
75 INJECTION, SOLUTION INTRAVASCULAR
Status: COMPLETED | OUTPATIENT
Start: 2025-03-22 | End: 2025-03-22

## 2025-03-22 RX ORDER — MORPHINE SULFATE 2 MG/ML
2 INJECTION, SOLUTION INTRAMUSCULAR; INTRAVENOUS ONCE
Refills: 0 | Status: COMPLETED | OUTPATIENT
Start: 2025-03-22 | End: 2025-03-22

## 2025-03-22 RX ORDER — 0.9 % SODIUM CHLORIDE 0.9 %
500 INTRAVENOUS SOLUTION INTRAVENOUS ONCE
Status: COMPLETED | OUTPATIENT
Start: 2025-03-22 | End: 2025-03-22

## 2025-03-22 RX ORDER — CEPHALEXIN 500 MG/1
500 CAPSULE ORAL 2 TIMES DAILY
Qty: 14 CAPSULE | Refills: 0 | Status: SHIPPED | OUTPATIENT
Start: 2025-03-22 | End: 2025-03-29

## 2025-03-22 RX ORDER — SODIUM CHLORIDE 0.9 % (FLUSH) 0.9 %
10 SYRINGE (ML) INJECTION PRN
Status: DISCONTINUED | OUTPATIENT
Start: 2025-03-22 | End: 2025-03-23 | Stop reason: HOSPADM

## 2025-03-22 RX ORDER — KETOROLAC TROMETHAMINE 15 MG/ML
15 INJECTION, SOLUTION INTRAMUSCULAR; INTRAVENOUS ONCE
Status: COMPLETED | OUTPATIENT
Start: 2025-03-22 | End: 2025-03-22

## 2025-03-22 RX ORDER — 0.9 % SODIUM CHLORIDE 0.9 %
80 INTRAVENOUS SOLUTION INTRAVENOUS ONCE
Status: COMPLETED | OUTPATIENT
Start: 2025-03-22 | End: 2025-03-22

## 2025-03-22 RX ADMIN — KETOROLAC TROMETHAMINE 15 MG: 15 INJECTION, SOLUTION INTRAMUSCULAR; INTRAVENOUS at 20:09

## 2025-03-22 RX ADMIN — MORPHINE SULFATE 2 MG: 2 INJECTION, SOLUTION INTRAMUSCULAR; INTRAVENOUS at 22:14

## 2025-03-22 RX ADMIN — SODIUM CHLORIDE 80 ML: 0.9 INJECTION, SOLUTION INTRAVENOUS at 20:59

## 2025-03-22 RX ADMIN — IOPAMIDOL 75 ML: 755 INJECTION, SOLUTION INTRAVENOUS at 20:59

## 2025-03-22 RX ADMIN — SODIUM CHLORIDE 500 ML: 0.9 INJECTION, SOLUTION INTRAVENOUS at 19:02

## 2025-03-22 RX ADMIN — SODIUM CHLORIDE, PRESERVATIVE FREE 10 ML: 5 INJECTION INTRAVENOUS at 20:59

## 2025-03-22 ASSESSMENT — ENCOUNTER SYMPTOMS: ABDOMINAL PAIN: 1

## 2025-03-22 ASSESSMENT — PAIN SCALES - GENERAL
PAINLEVEL_OUTOF10: 8
PAINLEVEL_OUTOF10: 8
PAINLEVEL_OUTOF10: 4
PAINLEVEL_OUTOF10: 8

## 2025-03-22 ASSESSMENT — PAIN - FUNCTIONAL ASSESSMENT: PAIN_FUNCTIONAL_ASSESSMENT: 0-10

## 2025-03-22 NOTE — ED PROVIDER NOTES
EMERGENCY DEPARTMENT ENCOUNTER    Pt Name: Latisha Prasad  MRN: [id number]  Birthdate 1960  Date of evaluation: 3/22/25  CHIEF COMPLAINT       Chief Complaint   Patient presents with    Abdominal Pain     Px arrives complaining of RLQ pain present w R flank pain that initiated about 3 to 4 days ago. Px denies N/V/D. Px states she took 4 percocets to sleep last night     HISTORY OF PRESENT ILLNESS   65-year-old female presents to the emergency department today with a chief complaint of right lower quadrant abdominal pain.  Reports that her symptoms started 4 to 5 days ago and has progressively worsened.  Describes her pain as being stabbing with occasional radiation towards her back.  She states that she took Percocet yesterday but had not had any improvement of her symptoms.  She has not taken anything for symptom relief today.  She also reports having increased urinary frequency but no other UTI related symptoms otherwise.  She also denies having any other systemic signs or symptoms including fever, chills, headache, numbness, tingling, chest pain, shortness of breath, nausea, vomiting, cough, hemoptysis, unilateral leg swelling, or bowel related issues.  Denies any trauma or injuries.             REVIEW OF SYSTEMS     Review of Systems   Gastrointestinal:  Positive for abdominal pain.   Genitourinary:  Positive for frequency.   All other systems reviewed and are negative.    PASTMEDICAL HISTORY     Past Medical History:   Diagnosis Date    Acid reflux     medication works pretty good, but has occasional reflux at night    Adult ADHD 08/01/2012    monthly visits @ [facility]    Asthma 08/01/2012    seasonal, has no inhalers    Awareness under anesthesia     Balance problem     due to knees    Bipolar 1 disorder (HCC)     monthly visits with [facility]    Broken foot 2014    Left foot    Cerebral artery occlusion with cerebral infarction (HCC)     was in 2022 or 2023, right eye droops a little    Chest pain of uncertain

## 2025-03-22 NOTE — ED NOTES
Pt to ed c/o abd pain x 4 days. Pt rates pain 9/10. Pt states she sees pain management for neck pain. Pt denies constipation/diarrhea/nausea/vomiting. Pt a/o x4. Respirations equal and non labored. Pt speaking in full and complete sentences. Bed locked and in lowest position. Call light in reach.

## 2025-03-23 NOTE — DISCHARGE INSTRUCTIONS
Start taking antibiotics as prescribed.  Continue taking your home medications as needed for pain.  Follow-up with your primary care physician and return back to the emergency department immediately if you notice any concerning or worsening signs or symptoms.

## 2025-03-24 ENCOUNTER — CARE COORDINATION (OUTPATIENT)
Dept: CARE COORDINATION | Age: 65
End: 2025-03-24

## 2025-03-24 NOTE — CARE COORDINATION
other services you are now recommendation): Required care/services in place and adequately coordinated   Suggested Interventions and Community Resources  Fall Risk Prevention: In Process Zone Management Tools: In Process (Comment: HTN management)               ,   Care Coordination Interventions    Referral from Primary Care Provider: No  Suggested Interventions and Community Resources  Fall Risk Prevention: In Process  Zone Management Tools: In Process (Comment: HTN management)      ,   Hypertension - Encounter Level          ,   General Assessment              Medications Reviewed:   Completed during this call and   Current Outpatient Medications   Medication Sig Dispense Refill    cephALEXin (KEFLEX) 500 MG capsule Take 1 capsule by mouth 2 times daily for 7 days 14 capsule 0    ibuprofen (ADVIL;MOTRIN) 800 MG tablet Take 1 tablet by mouth 2 times daily as needed for Pain 30 tablet 1    atorvastatin (LIPITOR) 80 MG tablet Take 1 tablet by mouth nightly 90 tablet 1    pantoprazole (PROTONIX) 40 MG tablet Take 1 tablet by mouth every morning 90 tablet 1    propranolol (INDERAL LA) 80 MG extended release capsule Take 1 capsule by mouth daily 90 capsule 1    ARIPiprazole (ABILIFY) 5 MG tablet Take 1 tablet by mouth daily      vitamin B-12 (CYANOCOBALAMIN) 1000 MCG tablet TAKE 1 TABLET BY MOUTH DAILY 28 tablet 3    buPROPion (WELLBUTRIN XL) 300 MG extended release tablet Take 1 tablet by mouth every morning      oxyCODONE-acetaminophen (PERCOCET) 5-325 MG per tablet       gabapentin (NEURONTIN) 300 MG capsule Take 1 capsule by mouth 3 times daily.      amphetamine-dextroamphetamine (ADDERALL) 30 MG tablet Take 1 tablet by mouth 2 times daily.      citalopram (CELEXA) 40 MG tablet TAKE ONE TABLET BY MOUTH DAILY 90 tablet 3    apixaban (ELIQUIS) 2.5 MG TABS tablet Take 1 tablet by mouth 2 times daily for 12 days (Patient not taking: Reported on 2/3/2025) 24 tablet 0    ferrous sulfate (FE TABS) 325 (65 Fe) MG EC tablet

## 2025-03-31 ENCOUNTER — CARE COORDINATION (OUTPATIENT)
Dept: CARE COORDINATION | Age: 65
End: 2025-03-31

## 2025-03-31 NOTE — CARE COORDINATION
Ambulatory Care Coordination Note     3/31/2025 3:56 PM     Patient outreach attempt by this ACM today to perform care management follow up . ACM was unable to reach the patient by telephone today;   voicemail full and unable to leave a message.      ACM: Larisa Velazco RN     Care Summary Note: Follow up with CC updates, ED visit for abdominal pain, chronic conditions and wellbeing.     PCP/Specialist follow up:   Future Appointments         Provider Specialty Dept Phone    5/7/2025 8:30 AM Robin Avina MD Family Medicine [phone]    8/14/2025 8:30 AM Shannan Hart PSYD Neurology [phone]    10/23/2025 10:00 AM Mark Cruz MD Oncology [phone]            Follow Up:   Plan for next ACM outreach in approximately 1 week to complete:  - disease specific assessments  - advance care planning  - goal progression  - education .

## 2025-04-08 ENCOUNTER — CARE COORDINATION (OUTPATIENT)
Dept: CARE COORDINATION | Age: 65
End: 2025-04-08

## 2025-04-08 NOTE — CARE COORDINATION
Ambulatory Care Coordination Note     4/8/2025 11:06 AM     Patient outreach attempt by this ACM today to perform care management follow up . ACM was unable to reach the patient by telephone today;   voicemail full and unable to leave a message.      ACM: Larisa Velazco RN     Care Summary Note: Follow up with CC updates, chronic conditions and wellbeing.    PCP/Specialist follow up:   Future Appointments         Provider Specialty Dept Phone    5/7/2025 8:30 AM Robin Avina MD Family Medicine [phone]    8/14/2025 8:30 AM Shannan Hart PSYD Neurology [phone]    10/23/2025 10:00 AM Mark Cruz MD Oncology [phone]            Follow Up:   Plan for next ACM outreach in approximately 1 week to complete:  - disease specific assessments  - advance care planning  - goal progression  - education .

## 2025-04-15 ENCOUNTER — CARE COORDINATION (OUTPATIENT)
Dept: CARE COORDINATION | Age: 65
End: 2025-04-15

## 2025-04-15 NOTE — CARE COORDINATION
Ambulatory Care Coordination Note     4/15/2025 3:49 PM     Patient outreach attempt by this ACM today to perform care management follow up . ACM was unable to reach the patient by telephone today;   voicemail full and unable to leave a message.      ACM: Larisa Velazco RN     Care Summary Note: Follow up with CC outreach, chronic conditions, wellbeing    PCP/Specialist follow up:   Future Appointments         Provider Specialty Dept Phone    5/7/2025 8:30 AM Robin Avina MD Family Medicine [phone]    8/14/2025 8:30 AM Shannan Hart PSYD Neurology [phone]    10/23/2025 10:00 AM Mark Cruz MD Oncology [phone]            Follow Up:   Plan for next ACM outreach in approximately 1 week to complete:  - disease specific assessments  - advance care planning  - goal progression  - education .

## 2025-04-25 ENCOUNTER — CARE COORDINATION (OUTPATIENT)
Dept: CARE COORDINATION | Age: 65
End: 2025-04-25

## 2025-04-25 NOTE — CARE COORDINATION
have any symptoms that are causing concern?: No          Medications Reviewed:   Patient denies any changes with medications and reports taking all medications as prescribed.    Advance Care Planning:   Not on file; education provided     Care Planning:   Education Documentation  Educate transfer safety, taught by Larisa Velazco RN at 4/25/2025 10:18 AM.  Learner: Patient  Readiness: Acceptance  Method: Explanation  Response: Verbalizes Understanding    Educate medication side effects, taught by Larisa Velazco RN at 4/25/2025 10:18 AM.  Learner: Patient  Readiness: Acceptance  Method: Explanation  Response: Verbalizes Understanding    Educate safety precautions, taught by Larisa Velazco RN at 4/25/2025 10:18 AM.  Learner: Patient  Readiness: Acceptance  Method: Explanation  Response: Verbalizes Understanding    Home Health Care Services, taught by Larisa Velazco RN at 4/25/2025 10:18 AM.  Learner: Patient  Readiness: Acceptance  Method: Explanation  Response: Verbalizes Understanding    Adaptive Equipment, taught by Larisa Velazco RN at 4/25/2025 10:18 AM.  Learner: Patient  Readiness: Acceptance  Method: Explanation  Response: Verbalizes Understanding    Education Comments  No comments found.     ,    Goals Addressed                   This Visit's Progress     Conditions and Symptoms   Improving     I will schedule office visits, as directed by my provider.  I will keep my appointment or reschedule if I have to cancel.  I will notify my provider of any barriers to my plan of care.  I will notify my provider of any symptoms that indicate a worsening of my condition.    Barriers: overwhelmed by complexity of regimen  Plan for overcoming my barriers: Willing to work with ACM/PCP for HTN management and fall precautions  Confidence: 9/10  Anticipated Goal Completion Date: 5/14/2025                 PCP/Specialist follow up:   Future Appointments         Provider Specialty Dept Phone    5/7/2025

## 2025-05-06 RX ORDER — LANOLIN ALCOHOL/MO/W.PET/CERES
1000 CREAM (GRAM) TOPICAL DAILY
Qty: 28 TABLET | Refills: 5 | Status: SHIPPED | OUTPATIENT
Start: 2025-05-06

## 2025-05-06 NOTE — TELEPHONE ENCOUNTER
Pharmacy requesting refill of Vitamin B12 1000mcg.      Medication active on med list yes      Date of last Rx: 1/3/2025 with 3 refills          verified by GLADIS MOORE      Date of last appointment 10/29/2024    Next Visit Date:  8/14/2025

## 2025-05-07 ENCOUNTER — OFFICE VISIT (OUTPATIENT)
Dept: FAMILY MEDICINE CLINIC | Age: 65
End: 2025-05-07

## 2025-05-07 VITALS — BODY MASS INDEX: 31.02 KG/M2 | HEIGHT: 60 IN | WEIGHT: 158 LBS | RESPIRATION RATE: 12 BRPM

## 2025-05-07 DIAGNOSIS — G47.429 NARCOLEPSY DUE TO UNDERLYING CONDITION WITHOUT CATAPLEXY: ICD-10-CM

## 2025-05-07 DIAGNOSIS — M54.12 CERVICAL RADICULOPATHY: ICD-10-CM

## 2025-05-07 DIAGNOSIS — I63.9 ISCHEMIC STROKE (HCC): ICD-10-CM

## 2025-05-07 DIAGNOSIS — F11.99 OPIOID USE, UNSPECIFIED WITH UNSPECIFIED OPIOID-INDUCED DISORDER (HCC): ICD-10-CM

## 2025-05-07 DIAGNOSIS — Z79.01 CHRONIC ANTICOAGULATION: ICD-10-CM

## 2025-05-07 DIAGNOSIS — E78.5 DYSLIPIDEMIA: ICD-10-CM

## 2025-05-07 DIAGNOSIS — F32.1 CURRENT MODERATE EPISODE OF MAJOR DEPRESSIVE DISORDER WITHOUT PRIOR EPISODE (HCC): ICD-10-CM

## 2025-05-07 DIAGNOSIS — G47.33 OSA (OBSTRUCTIVE SLEEP APNEA): ICD-10-CM

## 2025-05-07 DIAGNOSIS — J45.40 MODERATE PERSISTENT ASTHMA WITHOUT COMPLICATION: ICD-10-CM

## 2025-05-07 DIAGNOSIS — K44.9 HIATAL HERNIA: ICD-10-CM

## 2025-05-07 DIAGNOSIS — K21.9 GASTROESOPHAGEAL REFLUX DISEASE WITHOUT ESOPHAGITIS: ICD-10-CM

## 2025-05-07 DIAGNOSIS — Z86.718 HISTORY OF DVT (DEEP VEIN THROMBOSIS): ICD-10-CM

## 2025-05-07 DIAGNOSIS — F90.8 OTHER SPECIFIED ATTENTION DEFICIT HYPERACTIVITY DISORDER (ADHD): Primary | ICD-10-CM

## 2025-05-07 DIAGNOSIS — R73.03 PREDIABETES: ICD-10-CM

## 2025-05-07 DIAGNOSIS — N18.32 STAGE 3B CHRONIC KIDNEY DISEASE (HCC): ICD-10-CM

## 2025-05-07 DIAGNOSIS — I10 ESSENTIAL HYPERTENSION: ICD-10-CM

## 2025-05-07 DIAGNOSIS — E53.8 VITAMIN B12 DEFICIENCY: ICD-10-CM

## 2025-05-07 RX ORDER — ASPIRIN 81 MG/1
81 TABLET ORAL DAILY
COMMUNITY

## 2025-05-07 RX ORDER — DIAZEPAM 5 MG/1
TABLET ORAL
COMMUNITY
Start: 2025-04-14

## 2025-05-07 RX ORDER — BUPROPION HYDROCHLORIDE 150 MG/1
TABLET ORAL
COMMUNITY
Start: 2025-04-18

## 2025-05-19 ENCOUNTER — CARE COORDINATION (OUTPATIENT)
Dept: CARE COORDINATION | Age: 65
End: 2025-05-19

## 2025-05-19 NOTE — CARE COORDINATION
Ambulatory Care Coordination Note     5/19/2025 3:34 PM     Patient outreach attempt by this ACM today to perform care management follow up . ACM was unable to reach the patient by telephone today;   voicemail full and unable to leave a message.      ACM: Larisa Velazco RN     Care Summary Note: Follow up with CC updates, chronic conditions and wellbeing.     PCP/Specialist follow up:   Future Appointments         Provider Specialty Dept Phone    5/28/2025 8:00 AM Izabela Rojas, NP-C Family Medicine [phone]    8/14/2025 8:30 AM Shannan Hart PSYD Neurology [phone]    10/23/2025 10:00 AM Mark Cruz MD Oncology [phone]            Follow Up:   Plan for next ACM outreach in approximately 2 weeks to complete:  - disease specific assessments  - advance care planning  - goal progression  - education .

## 2025-05-27 ASSESSMENT — ENCOUNTER SYMPTOMS
ALLERGIC/IMMUNOLOGIC NEGATIVE: 1
SHORTNESS OF BREATH: 0
ABDOMINAL PAIN: 0
EYES NEGATIVE: 1
CHEST TIGHTNESS: 0
GASTROINTESTINAL NEGATIVE: 1
EYE PAIN: 0
RESPIRATORY NEGATIVE: 1

## 2025-05-27 NOTE — PROGRESS NOTES
[facility]     Date of Visit:  2025  Patient Name: Latisha Prasad   Patient :  1960     CHIEF COMPLAINT:     Latisha Prasad is a 65 y.o. female who presents today for an general visit to be evaluated for the following condition(s):  Chief Complaint   Patient presents with    Establish Care    Weight Management       HISTORY OF PRESENT ILLNESS:       HPI     Here today to establish care with provider.     She has concerns for recent gain.  She is interested in starting on a diet pill, like Adipex for at least 6 months to help with weight loss. Admits she is drinking 5 water bottles of santos-aid daily, plus a peach pop daily.  She is not a water drinker.  Reports also not eating a lot during the day.  She does not eat a lot of fruits, vegetables, or protein either.  Denies much physical activity. She has had a stroke in the past and is following with neurology.      She has been having abdominal discomfort.  Reports she has seen GI in the past and she was told she needed some procedure, but did not have it done due to multiple nose injuries and traumas and how the procedure was done.    She also has sleep apnea, but not using CPAP due to being unable to sleep.  Reports she took the machine back to the medical supply company. She has not followed up with pulmonology since.  She had seen commercials for other options to assist with KHALIDA, but is not sure exactly what it is or the name of the product.     Specialists- Dr. Leigh, Neurology, Dr. Cruz - Hem/Onc, Dr. Barroso - Nuno Barber ([facility])    REVIEW OF SYSTEMS:      Review of Systems   Constitutional: Negative.  Negative for activity change and appetite change.   HENT: Negative.  Negative for ear pain, hearing loss and tinnitus.    Eyes: Negative.  Negative for pain and visual disturbance.   Respiratory: Negative.  Negative for chest tightness and shortness of breath.    Cardiovascular: Negative.  Negative for chest pain.

## 2025-05-28 ENCOUNTER — OFFICE VISIT (OUTPATIENT)
Dept: FAMILY MEDICINE CLINIC | Age: 65
End: 2025-05-28
Payer: MEDICARE

## 2025-05-28 VITALS
WEIGHT: 158 LBS | TEMPERATURE: 97.2 F | HEART RATE: 76 BPM | DIASTOLIC BLOOD PRESSURE: 78 MMHG | BODY MASS INDEX: 30.86 KG/M2 | OXYGEN SATURATION: 99 % | SYSTOLIC BLOOD PRESSURE: 116 MMHG

## 2025-05-28 DIAGNOSIS — G47.33 OSA (OBSTRUCTIVE SLEEP APNEA): ICD-10-CM

## 2025-05-28 DIAGNOSIS — I63.9 ISCHEMIC STROKE (HCC): ICD-10-CM

## 2025-05-28 DIAGNOSIS — F90.9 ATTENTION DEFICIT HYPERACTIVITY DISORDER (ADHD), UNSPECIFIED ADHD TYPE: ICD-10-CM

## 2025-05-28 DIAGNOSIS — Z76.89 ENCOUNTER TO ESTABLISH CARE: Primary | ICD-10-CM

## 2025-05-28 DIAGNOSIS — K21.9 GASTROESOPHAGEAL REFLUX DISEASE, UNSPECIFIED WHETHER ESOPHAGITIS PRESENT: ICD-10-CM

## 2025-05-28 PROCEDURE — 3074F SYST BP LT 130 MM HG: CPT | Performed by: NURSE PRACTITIONER

## 2025-05-28 PROCEDURE — G8399 PT W/DXA RESULTS DOCUMENT: HCPCS | Performed by: NURSE PRACTITIONER

## 2025-05-28 PROCEDURE — 99215 OFFICE O/P EST HI 40 MIN: CPT | Performed by: NURSE PRACTITIONER

## 2025-05-28 PROCEDURE — 1036F TOBACCO NON-USER: CPT | Performed by: NURSE PRACTITIONER

## 2025-05-28 PROCEDURE — 3078F DIAST BP <80 MM HG: CPT | Performed by: NURSE PRACTITIONER

## 2025-05-28 PROCEDURE — G8427 DOCREV CUR MEDS BY ELIG CLIN: HCPCS | Performed by: NURSE PRACTITIONER

## 2025-05-28 PROCEDURE — 3017F COLORECTAL CA SCREEN DOC REV: CPT | Performed by: NURSE PRACTITIONER

## 2025-05-28 PROCEDURE — 1123F ACP DISCUSS/DSCN MKR DOCD: CPT | Performed by: NURSE PRACTITIONER

## 2025-05-28 PROCEDURE — 1090F PRES/ABSN URINE INCON ASSESS: CPT | Performed by: NURSE PRACTITIONER

## 2025-05-28 PROCEDURE — G8417 CALC BMI ABV UP PARAM F/U: HCPCS | Performed by: NURSE PRACTITIONER

## 2025-05-28 PROCEDURE — 1160F RVW MEDS BY RX/DR IN RCRD: CPT | Performed by: NURSE PRACTITIONER

## 2025-05-28 PROCEDURE — 1159F MED LIST DOCD IN RCRD: CPT | Performed by: NURSE PRACTITIONER

## 2025-05-30 ENCOUNTER — CARE COORDINATION (OUTPATIENT)
Dept: CARE COORDINATION | Age: 65
End: 2025-05-30

## 2025-05-30 NOTE — CARE COORDINATION
Ambulatory Care Coordination Note     5/30/2025 12:34 PM     Patient outreach attempt by this ACM today to perform care management follow up . ACM was unable to reach the patient by telephone today;   left voice message requesting a return phone call to this ACM.     ACM: Larisa Velazco RN     Care Summary Note: Follow up with CC updates, chronic conditions and wellbeing.     PCP/Specialist follow up:   Future Appointments         Provider Specialty Dept Phone    8/14/2025 8:30 AM Shannan Hart PSYD Neurology [phone]    10/23/2025 10:00 AM Mark Cruz MD Oncology [phone]    11/24/2025 9:00 AM Izabela Rojas, NP-C Family Medicine [phone]            Follow Up:   Plan for next AC outreach in approximately 1 week to complete:  - disease specific assessments  - advance care planning  - goal progression  - education .

## 2025-06-16 ENCOUNTER — CARE COORDINATION (OUTPATIENT)
Dept: CARE COORDINATION | Age: 65
End: 2025-06-16

## 2025-06-16 NOTE — CARE COORDINATION
Ambulatory Care Coordination Note     6/16/2025 9:41 AM     patient outreach attempt by this ACM today to perform care management follow up . ACM was unable to reach the patient by telephone today;   voicemail full and unable to leave a message.      Patient closed (unable to reach patient) from the High Risk Care Management program on 6/16/2025.  Patient progressing towards self-management. .  Care management goals have been completed. No further Ambulatory Care Manager follow up scheduled.

## 2025-07-16 DIAGNOSIS — E78.5 DYSLIPIDEMIA: ICD-10-CM

## 2025-07-16 DIAGNOSIS — I10 ESSENTIAL HYPERTENSION: ICD-10-CM

## 2025-07-16 DIAGNOSIS — K21.9 GASTROESOPHAGEAL REFLUX DISEASE, UNSPECIFIED WHETHER ESOPHAGITIS PRESENT: ICD-10-CM

## 2025-07-16 NOTE — TELEPHONE ENCOUNTER
Latisha Prasad is calling to request a refill on the following medication(s):    Medication Request:  Requested Prescriptions     Pending Prescriptions Disp Refills    atorvastatin (LIPITOR) 80 MG tablet [Pharmacy Med Name: Atorvastatin Calcium 80MG TABS] 28 tablet      Sig: TAKE 1 TABLET BY MOUTH NIGHTLY    pantoprazole (PROTONIX) 40 MG tablet [Pharmacy Med Name: Pantoprazole Sodium 40MG TBEC] 28 tablet      Sig: TAKE 1 TABLET BY MOUTH EVERY MORNING    propranolol (INDERAL LA) 80 MG extended release capsule [Pharmacy Med Name: Propranolol HCl ER 80MG CP24] 28 capsule      Sig: TAKE 1 CAPSULE BY MOUTH DAILY       Last Visit Date (If Applicable):  5/28/2025    Next Visit Date:    11/24/2025        Last refilled 2/8/2025  Rx Pending

## 2025-07-17 RX ORDER — ATORVASTATIN CALCIUM 80 MG/1
80 TABLET, FILM COATED ORAL NIGHTLY
Qty: 28 TABLET | Refills: 3 | Status: SHIPPED | OUTPATIENT
Start: 2025-07-17

## 2025-07-17 RX ORDER — PANTOPRAZOLE SODIUM 40 MG/1
40 TABLET, DELAYED RELEASE ORAL EVERY MORNING
Qty: 90 TABLET | Refills: 1 | OUTPATIENT
Start: 2025-07-17

## 2025-07-17 RX ORDER — PANTOPRAZOLE SODIUM 40 MG/1
40 TABLET, DELAYED RELEASE ORAL EVERY MORNING
Qty: 28 TABLET | Refills: 3 | Status: SHIPPED | OUTPATIENT
Start: 2025-07-17

## 2025-07-17 RX ORDER — ATORVASTATIN CALCIUM 80 MG/1
80 TABLET, FILM COATED ORAL NIGHTLY
Qty: 90 TABLET | Refills: 1 | OUTPATIENT
Start: 2025-07-17

## 2025-07-17 RX ORDER — PROPRANOLOL HYDROCHLORIDE 80 MG/1
80 CAPSULE, EXTENDED RELEASE ORAL DAILY
Qty: 28 CAPSULE | Refills: 3 | Status: SHIPPED | OUTPATIENT
Start: 2025-07-17

## 2025-07-18 ENCOUNTER — APPOINTMENT (OUTPATIENT)
Dept: CT IMAGING | Age: 65
End: 2025-07-18
Payer: MEDICARE

## 2025-07-18 ENCOUNTER — ANESTHESIA EVENT (OUTPATIENT)
Dept: OPERATING ROOM | Age: 65
End: 2025-07-18
Payer: MEDICARE

## 2025-07-18 ENCOUNTER — ANESTHESIA (OUTPATIENT)
Dept: OPERATING ROOM | Age: 65
End: 2025-07-18
Payer: MEDICARE

## 2025-07-18 ENCOUNTER — HOSPITAL ENCOUNTER (EMERGENCY)
Age: 65
Discharge: HOME | End: 2025-07-18
Attending: EMERGENCY MEDICINE
Payer: MEDICARE

## 2025-07-18 VITALS
DIASTOLIC BLOOD PRESSURE: 67 MMHG | SYSTOLIC BLOOD PRESSURE: 96 MMHG | HEART RATE: 59 BPM | OXYGEN SATURATION: 96 % | TEMPERATURE: 97.7 F | WEIGHT: 156 LBS | BODY MASS INDEX: 30.47 KG/M2 | RESPIRATION RATE: 18 BRPM

## 2025-07-18 DIAGNOSIS — K35.80 ACUTE APPENDICITIS, UNSPECIFIED ACUTE APPENDICITIS TYPE: ICD-10-CM

## 2025-07-18 DIAGNOSIS — Z90.49 S/P APPY: ICD-10-CM

## 2025-07-18 DIAGNOSIS — K35.200 ACUTE APPENDICITIS WITH GENERALIZED PERITONITIS WITHOUT GANGRENE, PERFORATION, OR ABSCESS: Primary | ICD-10-CM

## 2025-07-18 LAB
ALBUMIN SERPL-MCNC: 3.5 G/DL (ref 3.5–5.2)
ALBUMIN/GLOB SERPL: 1.2 {RATIO} (ref 1–2.5)
ALP SERPL-CCNC: 154 U/L (ref 35–104)
ALT SERPL-CCNC: 19 U/L (ref 10–35)
ANION GAP SERPL CALCULATED.3IONS-SCNC: 11 MMOL/L (ref 9–16)
AST SERPL-CCNC: 26 U/L (ref 10–35)
BASOPHILS # BLD: 0 K/UL (ref 0–0.2)
BASOPHILS NFR BLD: 0 %
BILIRUB SERPL-MCNC: 0.4 MG/DL (ref 0–1.2)
BUN SERPL-MCNC: 14 MG/DL (ref 8–23)
CALCIUM SERPL-MCNC: 9.2 MG/DL (ref 8.8–10.2)
CHLORIDE SERPL-SCNC: 107 MMOL/L (ref 98–107)
CO2 SERPL-SCNC: 25 MMOL/L (ref 20–31)
CREAT SERPL-MCNC: 1.3 MG/DL (ref 0.5–0.9)
EOSINOPHIL # BLD: 0.08 K/UL (ref 0–0.4)
EOSINOPHILS RELATIVE PERCENT: 1 % (ref 1–4)
ERYTHROCYTE [DISTWIDTH] IN BLOOD BY AUTOMATED COUNT: 13.9 % (ref 11.8–14.4)
GFR, ESTIMATED: 48 ML/MIN/1.73M2
GLUCOSE SERPL-MCNC: 86 MG/DL (ref 82–115)
HCT VFR BLD AUTO: 36.1 % (ref 36.3–47.1)
HGB BLD-MCNC: 11.8 G/DL (ref 11.9–15.1)
IMM GRANULOCYTES # BLD AUTO: 0 K/UL (ref 0–0.3)
IMM GRANULOCYTES NFR BLD: 0 %
LIPASE SERPL-CCNC: 20 U/L (ref 13–60)
LYMPHOCYTES NFR BLD: 0.69 K/UL (ref 1–4.8)
LYMPHOCYTES RELATIVE PERCENT: 9 % (ref 24–44)
MCH RBC QN AUTO: 30.6 PG (ref 25.2–33.5)
MCHC RBC AUTO-ENTMCNC: 32.7 G/DL (ref 28.4–34.8)
MCV RBC AUTO: 93.8 FL (ref 82.6–102.9)
MONOCYTES NFR BLD: 0.69 K/UL (ref 0.2–0.8)
MONOCYTES NFR BLD: 9 % (ref 1–7)
NEUTROPHILS NFR BLD: 81 % (ref 36–66)
NEUTS SEG NFR BLD: 6.24 K/UL (ref 1.8–7.7)
NRBC BLD-RTO: 0 PER 100 WBC
PLATELET # BLD AUTO: 240 K/UL (ref 138–453)
PMV BLD AUTO: 9.7 FL (ref 8.1–13.5)
POTASSIUM SERPL-SCNC: 3.8 MMOL/L (ref 3.7–5.3)
PROT SERPL-MCNC: 6.4 G/DL (ref 6.6–8.7)
RBC # BLD AUTO: 3.85 M/UL (ref 3.95–5.11)
SODIUM SERPL-SCNC: 143 MMOL/L (ref 136–145)
WBC OTHER # BLD: 7.7 K/UL (ref 3.5–11.3)

## 2025-07-18 PROCEDURE — 6360000002 HC RX W HCPCS: Performed by: ANESTHESIOLOGY

## 2025-07-18 PROCEDURE — 2500000003 HC RX 250 WO HCPCS: Performed by: ANESTHESIOLOGY

## 2025-07-18 PROCEDURE — 7100000001 HC PACU RECOVERY - ADDTL 15 MIN: Performed by: SURGERY

## 2025-07-18 PROCEDURE — 88304 TISSUE EXAM BY PATHOLOGIST: CPT

## 2025-07-18 PROCEDURE — 2709999900 HC NON-CHARGEABLE SUPPLY: Performed by: SURGERY

## 2025-07-18 PROCEDURE — 83690 ASSAY OF LIPASE: CPT

## 2025-07-18 PROCEDURE — 3700000000 HC ANESTHESIA ATTENDED CARE: Performed by: SURGERY

## 2025-07-18 PROCEDURE — 99285 EMERGENCY DEPT VISIT HI MDM: CPT

## 2025-07-18 PROCEDURE — 3700000001 HC ADD 15 MINUTES (ANESTHESIA): Performed by: SURGERY

## 2025-07-18 PROCEDURE — 74177 CT ABD & PELVIS W/CONTRAST: CPT

## 2025-07-18 PROCEDURE — 7100000010 HC PHASE II RECOVERY - FIRST 15 MIN: Performed by: SURGERY

## 2025-07-18 PROCEDURE — 7100000000 HC PACU RECOVERY - FIRST 15 MIN: Performed by: SURGERY

## 2025-07-18 PROCEDURE — 2500000003 HC RX 250 WO HCPCS: Performed by: EMERGENCY MEDICINE

## 2025-07-18 PROCEDURE — 6360000002 HC RX W HCPCS: Performed by: SURGERY

## 2025-07-18 PROCEDURE — 2580000003 HC RX 258: Performed by: ANESTHESIOLOGY

## 2025-07-18 PROCEDURE — 6370000000 HC RX 637 (ALT 250 FOR IP): Performed by: ANESTHESIOLOGY

## 2025-07-18 PROCEDURE — 85025 COMPLETE CBC W/AUTO DIFF WBC: CPT

## 2025-07-18 PROCEDURE — 3600000012 HC SURGERY LEVEL 2 ADDTL 15MIN: Performed by: SURGERY

## 2025-07-18 PROCEDURE — 6360000004 HC RX CONTRAST MEDICATION: Performed by: EMERGENCY MEDICINE

## 2025-07-18 PROCEDURE — 80053 COMPREHEN METABOLIC PANEL: CPT

## 2025-07-18 PROCEDURE — 6360000002 HC RX W HCPCS: Performed by: EMERGENCY MEDICINE

## 2025-07-18 PROCEDURE — 3600000002 HC SURGERY LEVEL 2 BASE: Performed by: SURGERY

## 2025-07-18 PROCEDURE — 2720000010 HC SURG SUPPLY STERILE: Performed by: SURGERY

## 2025-07-18 PROCEDURE — 7100000011 HC PHASE II RECOVERY - ADDTL 15 MIN: Performed by: SURGERY

## 2025-07-18 PROCEDURE — 96365 THER/PROPH/DIAG IV INF INIT: CPT

## 2025-07-18 PROCEDURE — 2580000003 HC RX 258: Performed by: EMERGENCY MEDICINE

## 2025-07-18 RX ORDER — SODIUM CHLORIDE 9 MG/ML
INJECTION, SOLUTION INTRAVENOUS PRN
Status: DISCONTINUED | OUTPATIENT
Start: 2025-07-18 | End: 2025-07-18 | Stop reason: HOSPADM

## 2025-07-18 RX ORDER — FAMOTIDINE 10 MG/ML
INJECTION, SOLUTION INTRAVENOUS
Status: DISCONTINUED | OUTPATIENT
Start: 2025-07-18 | End: 2025-07-18 | Stop reason: SDUPTHER

## 2025-07-18 RX ORDER — CEFAZOLIN SODIUM 1 G/3ML
INJECTION, POWDER, FOR SOLUTION INTRAMUSCULAR; INTRAVENOUS
Status: DISCONTINUED | OUTPATIENT
Start: 2025-07-18 | End: 2025-07-18 | Stop reason: SDUPTHER

## 2025-07-18 RX ORDER — 0.9 % SODIUM CHLORIDE 0.9 %
1000 INTRAVENOUS SOLUTION INTRAVENOUS ONCE
Status: COMPLETED | OUTPATIENT
Start: 2025-07-18 | End: 2025-07-18

## 2025-07-18 RX ORDER — FENTANYL CITRATE 50 UG/ML
25 INJECTION, SOLUTION INTRAMUSCULAR; INTRAVENOUS EVERY 5 MIN PRN
Status: DISCONTINUED | OUTPATIENT
Start: 2025-07-18 | End: 2025-07-18 | Stop reason: HOSPADM

## 2025-07-18 RX ORDER — ROCURONIUM BROMIDE 10 MG/ML
INJECTION, SOLUTION INTRAVENOUS
Status: DISCONTINUED | OUTPATIENT
Start: 2025-07-18 | End: 2025-07-18 | Stop reason: SDUPTHER

## 2025-07-18 RX ORDER — DEXAMETHASONE SODIUM PHOSPHATE 10 MG/ML
INJECTION, SOLUTION INTRAMUSCULAR; INTRAVENOUS
Status: DISCONTINUED | OUTPATIENT
Start: 2025-07-18 | End: 2025-07-18 | Stop reason: SDUPTHER

## 2025-07-18 RX ORDER — OXYCODONE HYDROCHLORIDE 5 MG/1
5 TABLET ORAL
Status: COMPLETED | OUTPATIENT
Start: 2025-07-18 | End: 2025-07-18

## 2025-07-18 RX ORDER — SODIUM CHLORIDE 0.9 % (FLUSH) 0.9 %
5-40 SYRINGE (ML) INJECTION EVERY 12 HOURS SCHEDULED
Status: DISCONTINUED | OUTPATIENT
Start: 2025-07-18 | End: 2025-07-18 | Stop reason: HOSPADM

## 2025-07-18 RX ORDER — LIDOCAINE HYDROCHLORIDE 20 MG/ML
INJECTION, SOLUTION EPIDURAL; INFILTRATION; INTRACAUDAL; PERINEURAL
Status: DISCONTINUED | OUTPATIENT
Start: 2025-07-18 | End: 2025-07-18 | Stop reason: SDUPTHER

## 2025-07-18 RX ORDER — PROCHLORPERAZINE EDISYLATE 5 MG/ML
10 INJECTION INTRAMUSCULAR; INTRAVENOUS
Status: DISCONTINUED | OUTPATIENT
Start: 2025-07-18 | End: 2025-07-18 | Stop reason: HOSPADM

## 2025-07-18 RX ORDER — DIPHENHYDRAMINE HYDROCHLORIDE 50 MG/ML
12.5 INJECTION, SOLUTION INTRAMUSCULAR; INTRAVENOUS
Status: DISCONTINUED | OUTPATIENT
Start: 2025-07-18 | End: 2025-07-18 | Stop reason: HOSPADM

## 2025-07-18 RX ORDER — ONDANSETRON 2 MG/ML
INJECTION INTRAMUSCULAR; INTRAVENOUS
Status: DISCONTINUED | OUTPATIENT
Start: 2025-07-18 | End: 2025-07-18 | Stop reason: SDUPTHER

## 2025-07-18 RX ORDER — DIPHENHYDRAMINE HYDROCHLORIDE 50 MG/ML
INJECTION, SOLUTION INTRAMUSCULAR; INTRAVENOUS
Status: DISCONTINUED | OUTPATIENT
Start: 2025-07-18 | End: 2025-07-18 | Stop reason: SDUPTHER

## 2025-07-18 RX ORDER — OXYCODONE AND ACETAMINOPHEN 5; 325 MG/1; MG/1
1 TABLET ORAL EVERY 4 HOURS PRN
Qty: 22 TABLET | Refills: 0 | Status: SHIPPED | OUTPATIENT
Start: 2025-07-18 | End: 2025-07-23

## 2025-07-18 RX ORDER — SODIUM CHLORIDE 0.9 % (FLUSH) 0.9 %
10 SYRINGE (ML) INJECTION PRN
Status: DISCONTINUED | OUTPATIENT
Start: 2025-07-18 | End: 2025-07-18 | Stop reason: HOSPADM

## 2025-07-18 RX ORDER — SODIUM CHLORIDE 0.9 % (FLUSH) 0.9 %
5-40 SYRINGE (ML) INJECTION PRN
Status: DISCONTINUED | OUTPATIENT
Start: 2025-07-18 | End: 2025-07-18 | Stop reason: HOSPADM

## 2025-07-18 RX ORDER — MIDAZOLAM HYDROCHLORIDE 1 MG/ML
INJECTION, SOLUTION INTRAMUSCULAR; INTRAVENOUS
Status: DISCONTINUED | OUTPATIENT
Start: 2025-07-18 | End: 2025-07-18 | Stop reason: SDUPTHER

## 2025-07-18 RX ORDER — HYDROMORPHONE HYDROCHLORIDE 1 MG/ML
0.5 INJECTION, SOLUTION INTRAMUSCULAR; INTRAVENOUS; SUBCUTANEOUS EVERY 5 MIN PRN
Status: DISCONTINUED | OUTPATIENT
Start: 2025-07-18 | End: 2025-07-18 | Stop reason: HOSPADM

## 2025-07-18 RX ORDER — BUPIVACAINE HYDROCHLORIDE 5 MG/ML
INJECTION, SOLUTION EPIDURAL; INTRACAUDAL; PERINEURAL PRN
Status: DISCONTINUED | OUTPATIENT
Start: 2025-07-18 | End: 2025-07-18 | Stop reason: ALTCHOICE

## 2025-07-18 RX ORDER — IOPAMIDOL 755 MG/ML
75 INJECTION, SOLUTION INTRAVASCULAR
Status: COMPLETED | OUTPATIENT
Start: 2025-07-18 | End: 2025-07-18

## 2025-07-18 RX ORDER — PROPOFOL 10 MG/ML
INJECTION, EMULSION INTRAVENOUS
Status: DISCONTINUED | OUTPATIENT
Start: 2025-07-18 | End: 2025-07-18 | Stop reason: SDUPTHER

## 2025-07-18 RX ORDER — FENTANYL CITRATE 50 UG/ML
INJECTION, SOLUTION INTRAMUSCULAR; INTRAVENOUS
Status: DISCONTINUED | OUTPATIENT
Start: 2025-07-18 | End: 2025-07-18 | Stop reason: SDUPTHER

## 2025-07-18 RX ORDER — ONDANSETRON 4 MG/1
4 TABLET, ORALLY DISINTEGRATING ORAL 3 TIMES DAILY PRN
Qty: 6 TABLET | Refills: 0 | Status: SHIPPED | OUTPATIENT
Start: 2025-07-18 | End: 2025-07-20

## 2025-07-18 RX ORDER — 0.9 % SODIUM CHLORIDE 0.9 %
80 INTRAVENOUS SOLUTION INTRAVENOUS ONCE
Status: COMPLETED | OUTPATIENT
Start: 2025-07-18 | End: 2025-07-18

## 2025-07-18 RX ORDER — SODIUM CHLORIDE 9 MG/ML
INJECTION, SOLUTION INTRAVENOUS
Status: DISCONTINUED | OUTPATIENT
Start: 2025-07-18 | End: 2025-07-18 | Stop reason: SDUPTHER

## 2025-07-18 RX ORDER — METOCLOPRAMIDE HYDROCHLORIDE 5 MG/ML
10 INJECTION INTRAMUSCULAR; INTRAVENOUS
Status: DISCONTINUED | OUTPATIENT
Start: 2025-07-18 | End: 2025-07-18 | Stop reason: HOSPADM

## 2025-07-18 RX ADMIN — SODIUM CHLORIDE: 9 INJECTION, SOLUTION INTRAVENOUS at 15:14

## 2025-07-18 RX ADMIN — SODIUM CHLORIDE: 9 INJECTION, SOLUTION INTRAVENOUS at 16:40

## 2025-07-18 RX ADMIN — SODIUM CHLORIDE: 9 INJECTION, SOLUTION INTRAVENOUS at 17:30

## 2025-07-18 RX ADMIN — ROCURONIUM BROMIDE 30 MG: 10 INJECTION, SOLUTION INTRAVENOUS at 16:52

## 2025-07-18 RX ADMIN — DIPHENHYDRAMINE HYDROCHLORIDE 12.5 MG: 50 INJECTION INTRAMUSCULAR; INTRAVENOUS at 17:16

## 2025-07-18 RX ADMIN — IOPAMIDOL 75 ML: 755 INJECTION, SOLUTION INTRAVENOUS at 13:29

## 2025-07-18 RX ADMIN — LIDOCAINE HYDROCHLORIDE 100 MG: 20 INJECTION, SOLUTION EPIDURAL; INFILTRATION; INTRACAUDAL; PERINEURAL at 16:51

## 2025-07-18 RX ADMIN — SODIUM CHLORIDE 1000 ML: 0.9 INJECTION, SOLUTION INTRAVENOUS at 12:36

## 2025-07-18 RX ADMIN — FAMOTIDINE 20 MG: 10 INJECTION, SOLUTION INTRAVENOUS at 17:16

## 2025-07-18 RX ADMIN — ONDANSETRON 4 MG: 2 INJECTION, SOLUTION INTRAMUSCULAR; INTRAVENOUS at 17:16

## 2025-07-18 RX ADMIN — SODIUM CHLORIDE, PRESERVATIVE FREE 10 ML: 5 INJECTION INTRAVENOUS at 13:29

## 2025-07-18 RX ADMIN — PROPOFOL 50 MG: 10 INJECTION, EMULSION INTRAVENOUS at 16:52

## 2025-07-18 RX ADMIN — FENTANYL CITRATE 100 MCG: 50 INJECTION INTRAMUSCULAR; INTRAVENOUS at 16:51

## 2025-07-18 RX ADMIN — CEFAZOLIN 2 G: 1 INJECTION, POWDER, FOR SOLUTION INTRAMUSCULAR; INTRAVENOUS at 17:04

## 2025-07-18 RX ADMIN — SODIUM CHLORIDE 80 ML: 9 INJECTION, SOLUTION INTRAVENOUS at 13:29

## 2025-07-18 RX ADMIN — DEXAMETHASONE SODIUM PHOSPHATE 10 MG: 10 INJECTION, SOLUTION INTRAMUSCULAR; INTRAVENOUS at 17:16

## 2025-07-18 RX ADMIN — OXYCODONE HYDROCHLORIDE 5 MG: 5 TABLET ORAL at 18:43

## 2025-07-18 RX ADMIN — PIPERACILLIN AND TAZOBACTAM 4500 MG: 4; .5 INJECTION, POWDER, LYOPHILIZED, FOR SOLUTION INTRAVENOUS at 15:14

## 2025-07-18 RX ADMIN — MIDAZOLAM 2 MG: 1 INJECTION INTRAMUSCULAR; INTRAVENOUS at 16:51

## 2025-07-18 RX ADMIN — SUGAMMADEX 200 MG: 100 INJECTION, SOLUTION INTRAVENOUS at 17:32

## 2025-07-18 ASSESSMENT — PAIN DESCRIPTION - ORIENTATION: ORIENTATION: MID

## 2025-07-18 ASSESSMENT — PAIN - FUNCTIONAL ASSESSMENT
PAIN_FUNCTIONAL_ASSESSMENT: PREVENTS OR INTERFERES SOME ACTIVE ACTIVITIES AND ADLS
PAIN_FUNCTIONAL_ASSESSMENT: 0-10

## 2025-07-18 ASSESSMENT — PAIN DESCRIPTION - DESCRIPTORS: DESCRIPTORS: DULL;SORE

## 2025-07-18 ASSESSMENT — PAIN SCALES - GENERAL
PAINLEVEL_OUTOF10: 8
PAINLEVEL_OUTOF10: 4

## 2025-07-18 ASSESSMENT — PAIN DESCRIPTION - LOCATION: LOCATION: ABDOMEN

## 2025-07-18 ASSESSMENT — PAIN DESCRIPTION - PAIN TYPE: TYPE: SURGICAL PAIN

## 2025-07-18 NOTE — DISCHARGE INSTRUCTIONS
No shower for 2 days  No swimming for 1 week  No driving for 5 days  No lifting greater than 20 pounds for 1 month.  Patient to call for any severe redness or drainage from the incision sites.

## 2025-07-18 NOTE — PROGRESS NOTES
Pharmacy Medication Review    The patient's list of current home medications has been reviewed.     PHYSICIANS AND NURSE PRACTITIONERS: please note there is no Transitions of Care/Med Rec Pharmacist available to address any discrepancies on inpatient orders. It is the responsibility of the attending provider to review the updates made to the home med list and adjust inpatient orders as appropriate.        Source(s) of information:Care Everywhere, [employer] refill report and [place] medication compliance pack        Based on information provided by the above source(s), I have updated the patient's home med list as described below.     Removed   diazePAM (VALIUM) 5 MG tablet     Added None      Adjusted   oxyCODONE-acetaminophen (PERCOCET) 5-325 MG per tablet  buPROPion (WELLBUTRIN XL) 150 MG extended release tablet     Other notes:   None    Are any of the medications noted above considered a 'high alert' medication? no      Is the patient on warfarin at home? No          Please feel free to call me with any questions about this encounter. Thank you.      Wyatt Rivera, pharmacy technician  [facility]  Phone:  [phone]      Electronically signed by Wyatt Rivera on 7/18/2025 at 6:18 PM   Note: co-signature by the pharmacist only acknowledges that I have performed a medication review and does not attest to an evaluation of this medication review.    Prior to Admission medications    Medication Sig   atorvastatin (LIPITOR) 80 MG tablet TAKE 1 TABLET BY MOUTH NIGHTLY   pantoprazole (PROTONIX) 40 MG tablet TAKE 1 TABLET BY MOUTH EVERY MORNING   propranolol (INDERAL LA) 80 MG extended release capsule TAKE 1 CAPSULE BY MOUTH DAILY   buPROPion (WELLBUTRIN XL) 150 MG extended release tablet Take 1 tablet by mouth every morning   aspirin 81 MG EC tablet Take 1 tablet by mouth daily     vitamin B-12 (CYANOCOBALAMIN) 1000 MCG tablet TAKE 1 TABLET BY MOUTH DAILY   ibuprofen (ADVIL;MOTRIN) 800 MG tablet Take

## 2025-07-18 NOTE — ANESTHESIA POSTPROCEDURE EVALUATION
Department of Anesthesiology  Postprocedure Note    Patient: Latisha Prasad  MRN: [id number]  YOB: 1960  Date of evaluation: 7/18/2025    Procedure Summary       Date: 07/18/25 Room / Location: [address]    Anesthesia Start: 1640 Anesthesia Stop: 1756    Procedure: APPENDECTOMY LAPAROSCOPIC Diagnosis:       S/P appy      Acute appendicitis, unspecified acute appendicitis type      (S/P appy [Z90.49])      (Acute appendicitis, unspecified acute appendicitis type [K35.80])    Surgeons: Patricia Webber MD Responsible Provider: Mera Lim MD    Anesthesia Type: general ASA Status: 3            Anesthesia Type: No value filed.    Hong Phase I:      Hong Phase II:      Anesthesia Post Evaluation    Patient location during evaluation: PACU  Patient participation: complete - patient participated  Level of consciousness: awake and alert  Airway patency: patent  Nausea & Vomiting: no nausea and no vomiting  Cardiovascular status: hemodynamically stable  Respiratory status: acceptable  Hydration status: euvolemic  Pain management: adequate    No notable events documented.

## 2025-07-18 NOTE — ANESTHESIA PRE PROCEDURE
Department of Anesthesiology  Preprocedure Note       Name:  Latisha Prasad   Age:  65 y.o.  :  1960                                          MRN:  [id number]         Date:  2025      Surgeon: Surgeon(s):  Patricia Webber MD    Procedure: Procedure(s):  APPENDECTOMY LAPAROSCOPIC    Medications prior to admission:   Prior to Admission medications    Medication Sig Start Date End Date Taking? Authorizing Provider   atorvastatin (LIPITOR) 80 MG tablet TAKE 1 TABLET BY MOUTH NIGHTLY 25   Izabela Rojas NP-C   pantoprazole (PROTONIX) 40 MG tablet TAKE 1 TABLET BY MOUTH EVERY MORNING 25   Izabela Rojas NP-C   propranolol (INDERAL LA) 80 MG extended release capsule TAKE 1 CAPSULE BY MOUTH DAILY 25   Izabela Rojas NP-C   diazePAM (VALIUM) 5 MG tablet  25   Elzbieta Barboza MD   buPROPion (WELLBUTRIN XL) 150 MG extended release tablet  25   Elzbieta Barboza MD   aspirin 81 MG EC tablet Take 1 tablet by mouth daily    Elzbieta Barboza MD   vitamin B-12 (CYANOCOBALAMIN) 1000 MCG tablet TAKE 1 TABLET BY MOUTH DAILY 25   [place] Leigh MD   ibuprofen (ADVIL;MOTRIN) 800 MG tablet Take 1 tablet by mouth 2 times daily as needed for Pain 3/13/25   Chiki Reis MD   ARIPiprazole (ABILIFY) 5 MG tablet Take 1 tablet by mouth daily    Elzbieta Barboza MD   buPROPion (WELLBUTRIN XL) 300 MG extended release tablet Take 1 tablet by mouth every morning    Elzbieta Barboza MD   oxyCODONE-acetaminophen (PERCOCET) 5-325 MG per tablet  10/2/24   Elzbieta Barboza MD   gabapentin (NEURONTIN) 300 MG capsule Take 1 capsule by mouth 3 times daily.    Elzbieta Barboza MD   amphetamine-dextroamphetamine (ADDERALL) 30 MG tablet Take 1 tablet by mouth 2 times daily. 21   Elzbieta Barboza MD   citalopram (CELEXA) 40 MG tablet TAKE ONE TABLET BY MOUTH DAILY 20   Ankit Walker MD       Current medications:    Current

## 2025-07-18 NOTE — H&P
Chief complaint is right lower quadrant pain    Patient is a 65-year-old female who tells me that she has been having intermittent right lower quadrant pain on and off for about 6 months.  Has been no pattern to this.  She is also had some mild constipation.  Over the last few days this has become considerably worse and she been mostly in bed.  She states this was because she was having increasing pain when she got up to move around.  Denies any fevers chills nausea or vomiting.  Tells me that she has had normal urination.  A little constipated this morning but this has been a chronic issue for her.  With increasing pain and discomfort and persistence she presents through the emergency room.    Patient's labs were fairly unremarkable including a normal white count and differential.  However on CT scan she is thought to have acute appendicitis with a thickening of the appendix and some surrounding inflammatory stranding and inflammation.    Past medical history  Reflux and known hiatal hernia confirmed by CT scan.  ADDH  Asthma  Bipolar disorder  History of stroke  Depression  Chart says diabetes but patient denies this.  History of DVT of leg.  History of nasal fracture  Headaches  History of myasthenia gravis  Hypertension  Hemorrhoids  Migraines  Narcolepsy  Osteoporosis  Sleep apnea    Surgical history   x 3 and abdominoplasty    Allergies are to latex sulfa and some adhesive tapes and Prozac    Home medications include Adderall, Abilify, aspirin, Lipitor, Wellbutrin, Celexa, Valium, Neurontin, Advil, Percocet, Protonix, Inderal, cyanocobalamin    On physical examination very teen female in no acute distress except for during abdominal examination  Heart tones are fairly regular  Lungs are dry crackles  Abdomen soft rounded with appropriate lower midline and transverse pain and still scars.  Question tiny umbilical hernia  Patient has right lower quadrant discomfort with voluntary guarding and rebound

## 2025-07-18 NOTE — ED PROVIDER NOTES
EMERGENCY DEPARTMENT ENCOUNTER    Pt Name: Latisha Prasad  MRN: [id number]  Birthdate 1960  Date of evaluation: 7/18/25  CHIEF COMPLAINT       Chief Complaint   Patient presents with    Abdominal Pain     RLQ, ongoing for 6 months     HISTORY OF PRESENT ILLNESS   The history is provided by the patient and medical records.    The patient is a 65-year-old female with history of CVA, asthma, GERD, CKD, depression, diabetes, hypertension, sleep apnea ADHD, DVT, and migraines who presents to the ED for abdominal pain.  Pain started approximately 6 months.  Pain located right lower quadrant.  Pain aggravated when pressing on it and when eating.  She states that pain also increases just before a bowel movement.  No nausea or vomiting.  She reports no change in her bowel movements.  She reports she has a bowel movement every 3 days. No reports of hematemesis.  No reports of melena or bright red blood in stool.    REVIEW OF SYSTEMS     Review of Systems  All other systems reviewed and are negative.    PASTMEDICAL HISTORY     Past Medical History:   Diagnosis Date    Acid reflux     medication works pretty good, but has occasional reflux at night    Adult ADHD 08/01/2012    monthly visits @ [facility]    Asthma 08/01/2012    seasonal, has no inhalers    Awareness under anesthesia     Balance problem     due to knees    Bipolar 1 disorder (HCC)     monthly visits with [facility]    Broken foot 2014    Left foot    Cerebral artery occlusion with cerebral infarction (HCC)     was in 2022 or 2023, right eye droops a little    Chest pain of uncertain etiology 12/05/2013    Last episode was in 2013 (Written 02/12/2019) last episode was a couple weeks ago (9/5/24)    CKD (chronic kidney disease)     Depression     Under control per pt. on 6/15/18    Diabetes mellitus (HCC)     DVT of leg (deep venous thrombosis) (HCC) 08/22/2014    Fracture     right foot / pt denies surgical intervention    Fractured nose     x 9 times    Headache 1992  states she has had 13 right arm surgeries    BREAST ENHANCEMENT SURGERY Bilateral     CARDIAC CATHETERIZATION  2022    DR ARIZMENDI  /  VERONICA CORS     SECTION      x 3    COLONOSCOPY  2013    sigmoid diverticula, prominent large internal hemorrhoid    COLONOSCOPY N/A 02/10/2020    COLONOSCOPY DIAGNOSTIC performed by Noemí Castillo MD at [facility] OR    COLONOSCOPY N/A 2020    COLORECTAL CANCER SCREENING, NOT HIGH RISK performed by Harriett Monroy MD at [facility] OR    ENDOSCOPY, COLON, DIAGNOSTIC      ESOPHAGEAL MOTILITY STUDY N/A 2020    PES RN - ESOPHAGEAL MOTILITY STUDY performed by Willie Gallo DO at [facility]    ESOPHAGEAL MOTILITY STUDY  09/10/2020    ESOPHAGEAL MOTILITY STUDY performed by Willie Gallo DO at [facility]    ESOPHAGEAL MOTILITY STUDY N/A 2020    ESOPHAGEAL MOTILITY STUDY ATTEMPTED PER DR. GALLO WITHOUT SUCCESS performed by Willie Gallo DO at [facility]    FOOT SURGERY Left     FOOT TENDON SURGERY Left 2019    LEFT PERONEAL TENDON REPAIR WITH POSSIBLE LEFT TENDON TRANSFER (Left )    KNEE ARTHROSCOPY Left     Two surgeries on left knee per pt    KNEE SURGERY Right     Three surgeries per pt    LIGAMENT REPAIR Left 2019    LEFT PERONEAL TENDON REPAIR performed by Haroldo Renner DPM at [facility] OR    NERVE BLOCK Bilateral 2021     BILATERAL L4/5, L5/S1 LUMBAR FACET (Bilateral )    NOSE SURGERY      PAIN MANAGEMENT PROCEDURE Bilateral 2021    BILATERAL L4/5, L5/S1 LUMBAR FACET performed by Hua Ma MD at [facility] OR    PAIN MANAGEMENT PROCEDURE Bilateral 2021    BILATERAL L4/5, L5/S1 NERVE BLOCK performed by Hua Ma MD at [facility] OR    PAIN MANAGEMENT PROCEDURE Right 2021    right  L4/5, L5/S1 NERVE RADIOFREQUENCY ABLATION performed by Hua Ma MD at [facility] OR    PAIN MANAGEMENT PROCEDURE Left 2021    left  L4/5, L5/S1 NERVE RADIOFREQUENCY ABLATION

## 2025-07-18 NOTE — OP NOTE
Operative Note      Patient: Latisha Parsad  YOB: 1960  MRN: [id number]    Date of Procedure: 7/18/2025    Pre-Op Diagnosis Codes:      * S/P appy [Z90.49]     * Acute appendicitis, unspecified acute appendicitis type [K35.80]    Post-Op Diagnosis: Same       Procedure(s):  APPENDECTOMY LAPAROSCOPIC    Surgeon(s):  Patricia Webber MD    Assistant:   First Assistant: Layo Rivera    Anesthesia: General and local 0.5% Marcaine    Estimated Blood Loss (mL): Minimal    Complications: None    Specimens:   ID Type Source Tests Collected by Time Destination   A : APPENDIX Tissue Appendix SURGICAL PATHOLOGY Patricia Webber MD 7/18/2025 1721        Implants:  * No implants in log *      Drains: * No LDAs found *    Findings:  Present At Time Of Surgery (PATOS) (choose all levels that have infection present):  - Deep Infection (muscle/fascia) present as evidenced by pus  Other Findings: none    Detailed Description of Procedure:   The patient had presented with about a 3-day history of right lower quadrant pain clinical exam and CT scan all consistent with appendicitis.  Informed consent was given for the patient and she is brought to the operating room for appendectomy.    Patient voided just prior to the surgery.  For this reason Agarwal catheter is not placed.    Patient was brought to the operating room and laid on table spine position.  General oral endotracheal anesthesia was induced.  Timeout was performed.  Patient's left arm was tucked down by her side.  Abdomen was prepped with ChloraPrep and after 3 minutes draped out in a sterile fashion.    All trocar sites were injected with 0.5% Marcaine prior to the being made.  Using open Marroquin technique through a small scar the patient already has at the umbilicus a 1012 balloon trocar was placed and pneumoperitoneum was established.  Under direct visualization a 5 mm trocar was placed in the suprapubic area and another 5 mm trocar in the left lower

## 2025-07-18 NOTE — ED NOTES
----- Message from Israel Clark MD sent at 8/26/2019  9:51 PM CDT -----  Bone density is rey   Report provided to [facility] in OR.    details…

## 2025-07-21 ENCOUNTER — HOSPITAL ENCOUNTER (EMERGENCY)
Age: 65
Discharge: LEFT BEFORE DISCHARGE | End: 2025-07-21

## 2025-07-21 VITALS
BODY MASS INDEX: 32.81 KG/M2 | WEIGHT: 168 LBS | HEART RATE: 59 BPM | DIASTOLIC BLOOD PRESSURE: 86 MMHG | OXYGEN SATURATION: 97 % | SYSTOLIC BLOOD PRESSURE: 134 MMHG | RESPIRATION RATE: 18 BRPM | TEMPERATURE: 97.7 F

## 2025-07-21 ASSESSMENT — PAIN - FUNCTIONAL ASSESSMENT: PAIN_FUNCTIONAL_ASSESSMENT: 0-10

## 2025-07-21 ASSESSMENT — PAIN DESCRIPTION - INTENSITY: RATING_2: 7

## 2025-07-21 ASSESSMENT — PAIN DESCRIPTION - LOCATION
LOCATION: THROAT
LOCATION_2: ABDOMEN

## 2025-07-21 ASSESSMENT — PAIN SCALES - GENERAL: PAINLEVEL_OUTOF10: 7

## 2025-07-22 LAB — SURGICAL PATHOLOGY REPORT: NORMAL

## 2025-08-11 ENCOUNTER — OFFICE VISIT (OUTPATIENT)
Dept: NEUROLOGY | Age: 65
End: 2025-08-11

## 2025-08-11 DIAGNOSIS — F03.90 MAJOR NEUROCOGNITIVE DISORDER (HCC): Primary | ICD-10-CM

## 2025-08-11 DIAGNOSIS — R41.3 MEMORY DEFICITS: ICD-10-CM

## 2025-08-11 DIAGNOSIS — R41.841 COGNITIVE COMMUNICATION DEFICIT: ICD-10-CM

## 2025-08-11 DIAGNOSIS — F41.8 DEPRESSION WITH ANXIETY: ICD-10-CM

## 2025-08-11 DIAGNOSIS — I63.9 ISCHEMIC STROKE (HCC): ICD-10-CM

## 2025-08-15 PROBLEM — F41.8 DEPRESSION WITH ANXIETY: Status: ACTIVE | Noted: 2025-08-15

## 2025-08-15 PROBLEM — F03.90 MAJOR NEUROCOGNITIVE DISORDER (HCC): Status: ACTIVE | Noted: 2025-08-15

## (undated) DEVICE — PAD GRND FOR RF PAIN MGMT DISP

## (undated) DEVICE — GLOVE SURG SZ 65 CRM LTX FREE POLYISOPRENE POLYMER BEAD ANTI

## (undated) DEVICE — SUTURE VICRYL + SZ 0 L27IN ABSRB UD OS6 L36MM 1/2 CIR REV

## (undated) DEVICE — DISCONTINUED USE 405792 GLOVE SURG SENSICARE ALOE LT LF PF ST GRN SZ 7

## (undated) DEVICE — NEEDLE FLTR 18GA L1.5IN MEM THK5UM BLNT DISP

## (undated) DEVICE — MARKER,SKIN,WI/RULER AND LABELS: Brand: MEDLINE

## (undated) DEVICE — GLOVE ORANGE PI 8   MSG9080

## (undated) DEVICE — BANDAGE COMPR W6INXL5YD WHT BGE POLY COT M E WRP WV HK AND

## (undated) DEVICE — SKIN PREP TRAY W/CHG: Brand: MEDLINE INDUSTRIES, INC.

## (undated) DEVICE — GLOVE SURG SZ 75 L12IN FNGR THK87MIL WHT LTX FREE

## (undated) DEVICE — BANDAGE COBAN 4 IN COMPR W4INXL5YD FOAM COHESIVE QUIK STK SELF ADH SFT

## (undated) DEVICE — 4-PORT MANIFOLD: Brand: NEPTUNE 2

## (undated) DEVICE — SUTURE MONOCRYL SZ 3-0 L27IN ABSRB UD PS-2 3/8 CIR REV CUT NDL MCP427H

## (undated) DEVICE — TOWEL,OR,DSP,ST,BLUE,DLX,XR,4/PK,20PK/CS: Brand: MEDLINE

## (undated) DEVICE — APPLICATOR MEDICATED 10.5 CC SOLUTION HI LT ORNG CHLORAPREP

## (undated) DEVICE — GLOVE ORANGE PI 7   MSG9070

## (undated) DEVICE — ELECTRODE PT RET AD L9FT HI MOIST COND ADH HYDRGEL CORDED

## (undated) DEVICE — DRESSING HYDROFIBER AQUACEL AG ADVANTAGE 3.5X10 IN

## (undated) DEVICE — YANKAUER,FLEXIBLE HANDLE,REGLR CAPACITY: Brand: MEDLINE INDUSTRIES, INC.

## (undated) DEVICE — CEMENT MIXING SYSTEM WITH FEMORAL BREAKWAY NOZZLE: Brand: REVOLUTION

## (undated) DEVICE — GAUZE,SPONGE,4"X4",16PLY,STRL,LF,10/TRAY: Brand: MEDLINE

## (undated) DEVICE — SOLUTION IRRIG 3000ML 0.9% SOD CHL USP UROMATIC PLAS CONT

## (undated) DEVICE — SSC BONE WAX: Brand: SSC BONE WAX

## (undated) DEVICE — LIQUIBAND RAPID ADHESIVE 36/CS 0.8ML: Brand: MEDLINE

## (undated) DEVICE — TRAY NRV BLK SUPP CUST

## (undated) DEVICE — FORCEPS BX L240CM JAW DIA2.4MM ORNG L CAP W/ NDL DISP RAD

## (undated) DEVICE — SOLUTION IV 1000 ML 0.9 NACL INJ USP EXCEL PLAS CONTAINER

## (undated) DEVICE — TROCAR: Brand: KII FIOS FIRST ENTRY

## (undated) DEVICE — APPLICATOR MEDICATED 26 CC SOLUTION HI LT ORNG CHLORAPREP

## (undated) DEVICE — Device

## (undated) DEVICE — SUTURE MONOCRYL SZ 4-0 L27IN ABSRB UD L19MM PS-2 1/2 CIR PRIM Y426H

## (undated) DEVICE — BANDAGE ADH W0.75XL3IN NAT PLAS CURAD

## (undated) DEVICE — BAG SPEC REM 224ML W4XL6IN DIA10MM 1 HND GYN DISP ENDOPCH

## (undated) DEVICE — STANDARD HYPODERMIC NEEDLE,POLYPROPYLENE HUB: Brand: MONOJECT

## (undated) DEVICE — COVER,MAYO STAND,XL,STERILE: Brand: MEDLINE

## (undated) DEVICE — GLOVE SURG SZ 8 L12IN THK91MIL BRN LTX FREE POLYCHLOROPRENE

## (undated) DEVICE — INTENDED FOR TISSUE SEPARATION, AND OTHER PROCEDURES THAT REQUIRE A SHARP SURGICAL BLADE TO PUNCTURE OR CUT.: Brand: BARD-PARKER ® CARBON RIB-BACK BLADES

## (undated) DEVICE — STRIP SKIN CLSR W0.25XL4IN WHT SPUNBOUND FBR NYL HI ADH

## (undated) DEVICE — NEEDLE SPNL 22GA L5IN BLK HUB S STL W/ QNCKE PNT W/OUT

## (undated) DEVICE — SUTURE MONOCRYL SZ 3-0 L27IN ABSRB UD L24MM PS-1 3/8 CIR PRIM Y936H

## (undated) DEVICE — CUSHION PRONEVIEW L HD NK FOAM

## (undated) DEVICE — TOWEL,OR,DSP,ST,NATURAL,DLX,4/PK,20PK/CS: Brand: MEDLINE

## (undated) DEVICE — TUBING SUCT 12FR MAL ALUM SHFT FN CAP VENT UNIV CONN W/ OBT

## (undated) DEVICE — CONTAINER,SPECIMEN,OR STERILE,4OZ: Brand: MEDLINE

## (undated) DEVICE — SUTURE VIC + ABS BR UD X1 2-0 27IN VCP459H

## (undated) DEVICE — BASIN EMSIS 700ML GRAPHITE PLAS KID SHP GRAD

## (undated) DEVICE — T-MAX DISPOSABLE FACE MASK 8 PER BOX

## (undated) DEVICE — CATHETER ETER IV 22GA L1IN POLYUR STR RADPQ INTROCAN SFTY

## (undated) DEVICE — STRYKER PERFORMANCE SERIES SAGITTAL BLADE: Brand: STRYKER PERFORMANCE SERIES

## (undated) DEVICE — PAD COOL W10.9XL11.3IN UNIV REG HOSE WRP ON THER CLD

## (undated) DEVICE — SUTURE VICRYL + SZ 0 L27IN ABSRB VLT L26MM UR-6 5/8 CIR VCP603H

## (undated) DEVICE — GLOVE SURG SZ 85 CRM LTX FREE POLYISOPRENE POLYMER BEAD ANTI

## (undated) DEVICE — STERILE PATIENT PROTECTIVE PAD FOR IMP® KNEE POSITIONERS & COHESIVE WRAP (10 / CASE): Brand: DE MAYO KNEE POSITIONER®

## (undated) DEVICE — TUBING, SUCTION, 1/4" X 12', STRAIGHT: Brand: MEDLINE

## (undated) DEVICE — CHLORAPREP 26ML ORANGE

## (undated) DEVICE — DRAPE,U/ SHT,SPLIT,PLAS,STERIL: Brand: MEDLINE

## (undated) DEVICE — NEEDLE SPNL 22GA L3.5IN BLK HUB S STL REG WALL FIT STYL W/

## (undated) DEVICE — DRESSING GZ W3XL16IN CELOS ACETT OIL EMUL N ADH

## (undated) DEVICE — NEEDLE SFTY RETRCT ST 25GAX1IN INTEGRA

## (undated) DEVICE — STERILE POLYISOPRENE POWDER-FREE SURGICAL GLOVES: Brand: PROTEXIS

## (undated) DEVICE — RELOAD STPL SZ 0 L45MM DIA3.5MM 0DEG STD REG TISS BLU TI

## (undated) DEVICE — ADHESIVE SKIN CLOSURE TOP 36 CC HI VISC DERMBND MINI

## (undated) DEVICE — GOWN,SIRUS,NONRNF,SETINSLV,XL,20/CS: Brand: MEDLINE

## (undated) DEVICE — GLOVE SURG SZ 65 L12IN FNGR THK87MIL WHT LTX FREE

## (undated) DEVICE — SUTURE VICRYL + SZ 2-0 L27IN ABSRB UD CP-1 1/2 CIR REV CUT VCP266H

## (undated) DEVICE — Z DISCONTINUED USE 2859069 SUTURE VICRYL 0 L27IN ABSRB OS-6 BRAID COAT UD J534H

## (undated) DEVICE — ZIPPERED TOGA, X-LARGE: Brand: FLYTE

## (undated) DEVICE — GLOVE SURG SZ 8 L12IN FNGR THK87MIL WHT LTX FREE

## (undated) DEVICE — SYRINGE MED 30ML STD CLR PLAS LUERLOCK TIP N CTRL DISP

## (undated) DEVICE — BLADE RMR L46MM PAT PILOT H

## (undated) DEVICE — SHEET,DRAPE,40X58,STERILE: Brand: MEDLINE

## (undated) DEVICE — GLOVE SURG SZ 65 THK91MIL LTX FREE SYN POLYISOPRENE

## (undated) DEVICE — CO2 CANNULA,SUPERSOFT, ADLT,7'O2,7'CO2: Brand: MEDLINE

## (undated) DEVICE — PADDING UNDERCAST W4INXL4YD COT FBR LO LINTING WYTEX

## (undated) DEVICE — GLOVE ORTHO 8   MSG9480

## (undated) DEVICE — Device: Brand: DEFENDO VALVE AND CONNECTOR KIT

## (undated) DEVICE — 450 ML BOTTLE OF 0.05% CHLORHEXIDINE GLUCONATE IN 99.95% STERILE WATER FOR IRRIGATION, USP AND APPLICATOR.: Brand: IRRISEPT ANTIMICROBIAL WOUND LAVAGE

## (undated) DEVICE — CUTTER ENDOSCP L340MM LIN ARTC SGL STROKE FIRING ENDOPATH

## (undated) DEVICE — RF PROBE, CURVED - SINGLE USE: Brand: RADIOFREQUENCY SINGLE-USE PROBE - CURVED

## (undated) DEVICE — GLOVE SURG SZ 6 THK91MIL LTX FREE SYN POLYISOPRENE ANTI

## (undated) DEVICE — GOWN,AURORA,NONREINFORCED,LARGE: Brand: MEDLINE

## (undated) DEVICE — RECIPROCATING BLADE, DOUBLE SIDED, OFFSET  (70.0 X 0.8 X 12.5MM)

## (undated) DEVICE — STOCKINETTE,IMPERVIOUS,12X48,STERILE: Brand: MEDLINE

## (undated) DEVICE — RECIPROCATING BLADE, DOUBLE SIDED, OFFSET  (70.0 X 0.64 X 12.6MM)

## (undated) DEVICE — 3M™ WARMING BLANKET, UPPER BODY, 10 PER CASE, 42268: Brand: BAIR HUGGER™

## (undated) DEVICE — BLADE ES L6IN ELASTOMERIC COAT EXT DURABLE BEND UPTO 90DEG

## (undated) DEVICE — TROCARS: Brand: KII® BALLOON BLUNT TIP SYSTEM

## (undated) DEVICE — BLADE CLIPPER GEN PURP NS

## (undated) DEVICE — SEALER ENDOSCP L37CM NANO COAT BLNT TIP LAP DIV

## (undated) DEVICE — SUTURE VICRYL ABSRB BRAID COAT UD CP NO 2 27IN  J195H

## (undated) DEVICE — TOWEL,OR,DSP,ST,BLUE,STD,4/PK,20PK/CS: Brand: MEDLINE

## (undated) DEVICE — SUTURE VCRL SZ 1 L36IN ABSRB UD L36MM CT-1 1/2 CIR J947H

## (undated) DEVICE — GLOVE SURG SZ 75 L12IN FNGR THK87MIL DK GRN LTX FREE ISOLEX

## (undated) DEVICE — DECANTER BAG 9": Brand: MEDLINE INDUSTRIES, INC.

## (undated) DEVICE — 1016 S-DRAPE IRRIG POUCH 10/BOX: Brand: STERI-DRAPE™

## (undated) DEVICE — Z DISCONTINUED USE 2220143 SUTURE VCRL SZ 2-0 L27IN ABSRB UD L36MM CP-1 1/2 CIR REV J266H

## (undated) DEVICE — MEDICINE CUP, GRADUATED, STER: Brand: MEDLINE

## (undated) DEVICE — DRAPE,SHOULDER,BEACH CHAIR,STERILE: Brand: MEDLINE

## (undated) DEVICE — ZIMMER® STERILE DISPOSABLE TOURNIQUET CUFF WITH PLC, DUAL PORT, SINGLE BLADDER, 30 IN. (76 CM)

## (undated) DEVICE — GLOVE SURG SZ 7 L12IN FNGR THK87MIL WHT LTX FREE

## (undated) DEVICE — SYRINGE MED 50ML LUERLOCK TIP

## (undated) DEVICE — COVER,MAYO STAND,STERILE: Brand: MEDLINE

## (undated) DEVICE — ADAPTER TBNG LUER STUB 15 GA INTMED

## (undated) DEVICE — SVMMC ORTH SPL DRP PK

## (undated) DEVICE — ZIPPERED TOGA, 2X LARGE: Brand: FLYTE

## (undated) DEVICE — GOWN,SIRUS,NON REINFRCD,LARGE,SET IN SL: Brand: MEDLINE

## (undated) DEVICE — SOLUTION IV IRRIG 500ML 0.9% SODIUM CHL 2F7123

## (undated) DEVICE — TROCAR: Brand: KII® SLEEVE

## (undated) DEVICE — PADDING UNDERCAST W4INXL12FT RAYON POLY SYN NONADHESIVE

## (undated) DEVICE — BIT DRL DIA2.7MM PERIPH SCR REUSE FOR COMPHSVE REV SHLDR

## (undated) DEVICE — SCISSOR SURG CRV ENDOCUT TIP FOR LAP DISP

## (undated) DEVICE — SOLUTION IV 500ML 0.9% SOD BOTTLE CHL LTWT DURABLE SHATTERPROOF

## (undated) DEVICE — GLOVE ORANGE PI 7 1/2   MSG9075